# Patient Record
Sex: MALE | Race: BLACK OR AFRICAN AMERICAN | Employment: UNEMPLOYED | URBAN - METROPOLITAN AREA
[De-identification: names, ages, dates, MRNs, and addresses within clinical notes are randomized per-mention and may not be internally consistent; named-entity substitution may affect disease eponyms.]

---

## 2017-02-04 ENCOUNTER — HOSPITAL ENCOUNTER (EMERGENCY)
Age: 57
Discharge: HOME | End: 2017-02-04
Attending: EMERGENCY MEDICINE
Payer: COMMERCIAL

## 2017-02-04 ENCOUNTER — APPOINTMENT (OUTPATIENT)
Dept: CT IMAGING | Age: 57
End: 2017-02-04
Attending: EMERGENCY MEDICINE
Payer: COMMERCIAL

## 2017-02-04 VITALS
OXYGEN SATURATION: 98 % | HEART RATE: 70 BPM | SYSTOLIC BLOOD PRESSURE: 160 MMHG | RESPIRATION RATE: 16 BRPM | BODY MASS INDEX: 29.47 KG/M2 | TEMPERATURE: 98.1 F | DIASTOLIC BLOOD PRESSURE: 99 MMHG | WEIGHT: 199 LBS | HEIGHT: 69 IN

## 2017-02-04 DIAGNOSIS — G43.909 MIGRAINE WITHOUT STATUS MIGRAINOSUS, NOT INTRACTABLE, UNSPECIFIED MIGRAINE TYPE: Primary | ICD-10-CM

## 2017-02-04 PROCEDURE — 99282 EMERGENCY DEPT VISIT SF MDM: CPT

## 2017-02-04 PROCEDURE — 96374 THER/PROPH/DIAG INJ IV PUSH: CPT

## 2017-02-04 PROCEDURE — 74011250636 HC RX REV CODE- 250/636: Performed by: EMERGENCY MEDICINE

## 2017-02-04 PROCEDURE — 70450 CT HEAD/BRAIN W/O DYE: CPT

## 2017-02-04 RX ORDER — METOCLOPRAMIDE HYDROCHLORIDE 5 MG/ML
10 INJECTION INTRAMUSCULAR; INTRAVENOUS
Status: COMPLETED | OUTPATIENT
Start: 2017-02-04 | End: 2017-02-04

## 2017-02-04 RX ADMIN — METOCLOPRAMIDE 10 MG: 5 INJECTION, SOLUTION INTRAMUSCULAR; INTRAVENOUS at 11:40

## 2017-02-04 NOTE — ED TRIAGE NOTES
Pt presents to the ED with CC of headache onset this morning when awoken. Pt states occasional headaches. Pt reports taking 800 mg of ibuprofen this mornining at approximately 0730 hrs. Pt denies relief. Pt reports left eye pain with light. Pt denies N/V/D. Pt denies fever.

## 2017-02-04 NOTE — DISCHARGE INSTRUCTIONS
Migraine Headache: Care Instructions  Your Care Instructions  Migraines are painful, throbbing headaches that often start on one side of the head. They may cause nausea and vomiting and make you sensitive to light, sound, or smell. Without treatment, migraines can last from 4 hours to a few days. Medicines can help prevent migraines or stop them after they have started. Your doctor can help you find which ones work best for you. Follow-up care is a key part of your treatment and safety. Be sure to make and go to all appointments, and call your doctor if you are having problems. It's also a good idea to know your test results and keep a list of the medicines you take. How can you care for yourself at home? · Do not drive if you have taken a prescription pain medicine. · Rest in a quiet, dark room until your headache is gone. Close your eyes, and try to relax or go to sleep. Don't watch TV or read. · Put a cold, moist cloth or cold pack on the painful area for 10 to 20 minutes at a time. Put a thin cloth between the cold pack and your skin. · Use a warm, moist towel or a heating pad set on low to relax tight shoulder and neck muscles. · Have someone gently massage your neck and shoulders. · Take your medicines exactly as prescribed. Call your doctor if you think you are having a problem with your medicine. You will get more details on the specific medicines your doctor prescribes. · Be careful not to take pain medicine more often than the instructions allow. You could get worse or more frequent headaches when the medicine wears off. To prevent migraines  · Keep a headache diary so you can figure out what triggers your headaches. Avoiding triggers may help you prevent headaches. Record when each headache began, how long it lasted, and what the pain was like.  (Was it throbbing, aching, stabbing, or dull?) Write down any other symptoms you had with the headache, such as nausea, flashing lights or dark spots, or sensitivity to bright light or loud noise. Note if the headache occurred near your period. List anything that might have triggered the headache. Triggers may include certain foods (chocolate, cheese, wine) or odors, smoke, bright light, stress, or lack of sleep. · If your doctor has prescribed medicine for your migraines, take it as directed. You may have medicine that you take only when you get a migraine and medicine that you take all the time to help prevent migraines. ¨ If your doctor has prescribed medicine for when you get a headache, take it at the first sign of a migraine, unless your doctor has given you other instructions. ¨ If your doctor has prescribed medicine to prevent migraines, take it exactly as prescribed. Call your doctor if you think you are having a problem with your medicine. · Find healthy ways to deal with stress. Migraines are most common during or right after stressful times. Take time to relax before and after you do something that has caused a migraine in the past.  · Try to keep your muscles relaxed by keeping good posture. Check your jaw, face, neck, and shoulder muscles for tension. Try to relax them. When you sit at a desk, change positions often. And make sure to stretch for 30 seconds each hour. · Get plenty of sleep and exercise. · Eat meals on a regular schedule. Avoid foods and drinks that often trigger migraines. These include chocolate, alcohol (especially red wine and port), aspartame, monosodium glutamate (MSG), and some additives found in foods (such as hot dogs, du, cold cuts, aged cheeses, and pickled foods). · Limit caffeine. Don't drink too much coffee, tea, or soda. But don't quit caffeine suddenly. That can also give you migraines. · Do not smoke or allow others to smoke around you. If you need help quitting, talk to your doctor about stop-smoking programs and medicines. These can increase your chances of quitting for good.   · If you are taking birth control pills or hormone therapy, talk to your doctor about whether they are triggering your migraines. When should you call for help? Call 911 anytime you think you may need emergency care. For example, call if:  · You have signs of a stroke. These may include:  ¨ Sudden numbness, paralysis, or weakness in your face, arm, or leg, especially on only one side of your body. ¨ Sudden vision changes. ¨ Sudden trouble speaking. ¨ Sudden confusion or trouble understanding simple statements. ¨ Sudden problems with walking or balance. ¨ A sudden, severe headache that is different from past headaches. Call your doctor now or seek immediate medical care if:  · You have new or worse nausea and vomiting. · You have a new or higher fever. · Your headache gets much worse. Watch closely for changes in your health, and be sure to contact your doctor if:  · You are not getting better after 2 days (48 hours). Where can you learn more? Go to http://nam-leon.info/. Enter I403 in the search box to learn more about \"Migraine Headache: Care Instructions. \"  Current as of: February 19, 2016  Content Version: 11.1  © 5539-4832 [employer]. Care instructions adapted under license by [employer] (which disclaims liability or warranty for this information). If you have questions about a medical condition or this instruction, always ask your healthcare professional. [address] any warranty or liability for your use of this information.

## 2017-02-04 NOTE — ED PROVIDER NOTES
HPI Comments: 10:49 AM Amirah Pappas is a 60 y.o. male with a h/o HTN, DM, high cholesterol, CAD, and MI who presents to the ED with complaints of a constant \"sharp\" left sided headache that began at 0730 today. He denies any fever, chills, sweats, nausea, vomiting, diarrhea, cough, SOB, chest pain, weakness, dizziness, numbness, tingling, neck stiffness, neck pain, or any visual problems. He notes that he has been experiencing mild headaches the past few weeks, but \"nothing this bad\". He normally has some light sensitivity, but it is worse today with this headache. He took Ibuprofen this morning with no relief. He has no history of these symptoms. No further symptoms or complaints expressed at this time. Pt's PCP is Sulma Anders MD              The history is provided by the patient. Past Medical History:   Diagnosis Date    Alcohol use      Fri-Sun one fifth; Mon-Thur: Pint of liquor    CAD (coronary artery disease)     Cardiac angioplasty with stent 07/29/2009     2.5 x 13 Cypher stent to CX.  Cardiac cath 11/09/2011     RCA patent. LM patent. LAD patent. mD1 55%. CX patent. Prior stent patent. Edison 85% (2.5 x 15-mm Xience stent, resid 0%). LVEDP 12. EF 40-45%. High lateral hypk (RI distribution).       Cardiac inferior STEMI 2009    Current smoker      contemplating stopping    Diabetes ([facility] [address]      type 2-insulin dependent    GERD (gastroesophageal reflux disease)     HTN (hypertension)     Hyperlipidemia     Myocardial infarction [facility])        Past Surgical History:   Procedure Laterality Date    Hx coronary stent placement  07/29/2009    Hx heart catheterization  8/30/2011    Hx heart catheterization  11/09/2011    Hx wisdom teeth extraction       x4         Family History:   Problem Relation Age of Onset    Hypertension Mother     Heart Attack Mother     Diabetes Mother     Hypertension Father     Heart Attack Father     Diabetes Father     Diabetes Sister     Hypertension Sister     Diabetes Brother     Hypertension Brother     No Known Problems Maternal Grandmother     No Known Problems Maternal Grandfather     No Known Problems Paternal Grandmother     No Known Problems Paternal Grandfather     No Known Problems Brother     Heart Disease Other     Kidney Disease Other        Social History     Social History    Marital status: SINGLE     Spouse name: N/A    Number of children: N/A    Years of education: N/A     Occupational History    Not on file. Social History Main Topics    Smoking status: Current Every Day Smoker     Packs/day: 0.25     Years: 1.00     Types: Cigarettes    Smokeless tobacco: Never Used    Alcohol use 2.5 oz/week     5 Shots of liquor per week      Comment: occassionally    Drug use: Yes     Special: Prescription, OTC    Sexual activity: Not on file     Other Topics Concern    Not on file     Social History Narrative     at [employer]. ALLERGIES: Niacin    Review of Systems   Constitutional: Negative for chills and fever. Eyes: Positive for photophobia. Negative for visual disturbance. Respiratory: Negative for cough and shortness of breath. Cardiovascular: Negative for chest pain. Gastrointestinal: Negative for abdominal pain, diarrhea, nausea and vomiting. Musculoskeletal: Negative for neck pain and neck stiffness. Skin: Negative for rash. Neurological: Positive for headaches. Negative for dizziness, weakness and numbness. All other systems reviewed and are negative. Vitals:    02/04/17 1030   BP: (!) 160/99   Pulse: 70   Resp: 16   Temp: 98.1 °F (36.7 °C)   SpO2: 98%   Weight: 90.3 kg (199 lb)   Height: 5' 9\" (1.753 m)            Physical Exam   Constitutional: He is oriented to person, place, and time. He appears well-developed and well-nourished. No distress. HENT:   Head: Normocephalic.    Right Ear: Tympanic membrane, external ear and ear canal normal.   Left Ear: Tympanic membrane, external ear and ear canal normal.   Nose: Nose normal.   Mouth/Throat: Uvula is midline, oropharynx is clear and moist and mucous membranes are normal. No oropharyngeal exudate or posterior oropharyngeal erythema. Eyes: Conjunctivae and EOM are normal. Pupils are equal, round, and reactive to light. Neck: Neck supple. Cardiovascular: Normal rate, regular rhythm and normal heart sounds. Pulmonary/Chest: Effort normal and breath sounds normal.   Musculoskeletal: Normal range of motion. He exhibits no edema or tenderness. Lymphadenopathy:     He has no cervical adenopathy. Neurological: He is alert and oriented to person, place, and time. He has normal strength. No cranial nerve deficit or sensory deficit. Reflex Scores:       Patellar reflexes are 2+ on the right side and 2+ on the left side. No pronator drift bilaterally. Normal finger to nose bilaterally. Skin: Skin is warm and dry. He is not diaphoretic. Nursing note and vitals reviewed. MDM  Number of Diagnoses or Management Options  Diagnosis management comments: Pt with a left sided headache unrelieved with Ibuprofen. Pt states that he normally does not get headaches and that this one is the worst one of his entire life. No associated symptoms except for some photophobia that is worse than it normally is. Will treat with Reglan and obtain a head CT. Amount and/or Complexity of Data Reviewed  Tests in the radiology section of CPT®: ordered and reviewed      ED Course       Procedures           Medications ordered:   Medications   metoclopramide HCl (REGLAN) injection 10 mg (10 mg IntraVENous Given 2/4/17 1140)         Lab findings:  Labs Reviewed - No data to display      X-Ray, CT or other radiology findings or impressions:  CT HEAD WO CONT   IMPRESSION:     1.  No acute intracranial pathology Per Radiology           Progress notes, Consult notes or additional Procedure notes:     Reevaluation of patient:   I have reevaluated patient at 1315. Patient is feeling much better s/p Reglan. Still has a slight lingering of it. Pt last took Ibuprofen at 0730. Explained migraines and pt advised to take more Ibuprofen after he gets home to help with inflammation around the blood vessels and help prevent him from getting a rebound headache. Dispo:  Patient was discharged to home in stable condition. Patient is to return to emergency department with any new or worsening condition. Scribe Attestation:   I Bulah Aase, am scribing for and in the presence of Leslie Traylor MD on this day 02/04/17 at [address] [place] [address]    Provider Attestation:  Personally performed the services described in the documentation, reviewed the documentation, as recorded by the scribe in my presence, and it accurately and completely records my words and actions.   Leslie Traylor MD. 10:50 AM    Signed by: Bulah Aase, Scribe, 02/04/17 , 10:50 AM

## 2017-02-04 NOTE — ED NOTES
I have reviewed discharge instructions with the patient. The patient verbalized understanding. Current Discharge Medication List      CONTINUE these medications which have NOT CHANGED    Details   sitaGLIPtin-metFORMIN (JANUMET) 50-1,000 mg per tablet Take 1 Tab by mouth two (2) times daily (with meals). Qty: 60 Tab, Refills: 3    Associated Diagnoses: Uncontrolled type II diabetes mellitus ([facility] [address]      guaiFENesin-dextromethorphan (HUMIBID DM)  mg per tablet Take 1 Tab by mouth two (2) times a day. Qty: 30 Tab, Refills: 0    Associated Diagnoses: Acute bacterial rhinosinusitis      prasugrel (EFFIENT) 10 mg tablet Take 1 Tab by mouth daily. Qty: 30 Tab, Refills: 3      INSULIN ASPART (NOVOLOG SC) by SubCUTAneous route. insulin lispro protamine/insulin lispro (HUMALOG MIX 50/50) 100 unit/mL (50-50) injection 10 Units by SubCUTAneous route three (3) times daily (with meals). Inject  beneath the skin. Per pt 10 units three times a day (with meals)      gabapentin (NEURONTIN) 300 mg capsule Take 300 mg by mouth three (3) times daily. Indications: NEUROPATHIC PAIN      lisinopril (PRINIVIL, ZESTRIL) 10 mg tablet Take 1 Tab by mouth daily. Qty: 30 Tab, Refills: 6      aspirin 81 mg tablet Take 1 Tab by mouth daily. Qty: 1 Tab, Refills: 0      simvastatin (ZOCOR) 20 mg tablet Take 1 Tab by mouth nightly. Qty: 30 Tab, Refills: 11      nitroglycerin (NITROSTAT) 0.4 mg SL tablet 0.4 mg by SubLINGual route every five (5) minutes as needed. insulin glargine (LANTUS) 100 unit/mL injection 50 Units by SubCUTAneous route daily.          Patient armband removed and shredded

## 2017-02-22 ENCOUNTER — HOSPITAL ENCOUNTER (EMERGENCY)
Age: 57
Discharge: HOME | End: 2017-02-22
Attending: EMERGENCY MEDICINE
Payer: COMMERCIAL

## 2017-02-22 VITALS
WEIGHT: 199 LBS | BODY MASS INDEX: 29.47 KG/M2 | HEIGHT: 69 IN | HEART RATE: 68 BPM | RESPIRATION RATE: 16 BRPM | OXYGEN SATURATION: 96 % | SYSTOLIC BLOOD PRESSURE: 111 MMHG | TEMPERATURE: 98.5 F | DIASTOLIC BLOOD PRESSURE: 62 MMHG

## 2017-02-22 DIAGNOSIS — R51.9 NONINTRACTABLE HEADACHE, UNSPECIFIED CHRONICITY PATTERN, UNSPECIFIED HEADACHE TYPE: Primary | ICD-10-CM

## 2017-02-22 DIAGNOSIS — J01.90 ACUTE SINUSITIS, RECURRENCE NOT SPECIFIED, UNSPECIFIED LOCATION: ICD-10-CM

## 2017-02-22 PROCEDURE — 99282 EMERGENCY DEPT VISIT SF MDM: CPT

## 2017-02-22 PROCEDURE — 74011250636 HC RX REV CODE- 250/636: Performed by: EMERGENCY MEDICINE

## 2017-02-22 PROCEDURE — 96375 TX/PRO/DX INJ NEW DRUG ADDON: CPT

## 2017-02-22 PROCEDURE — 74011000258 HC RX REV CODE- 258: Performed by: EMERGENCY MEDICINE

## 2017-02-22 PROCEDURE — 96374 THER/PROPH/DIAG INJ IV PUSH: CPT

## 2017-02-22 RX ORDER — DIPHENHYDRAMINE HYDROCHLORIDE 50 MG/ML
25 INJECTION, SOLUTION INTRAMUSCULAR; INTRAVENOUS ONCE
Status: COMPLETED | OUTPATIENT
Start: 2017-02-22 | End: 2017-02-22

## 2017-02-22 RX ORDER — ONDANSETRON 4 MG/1
4 TABLET, ORALLY DISINTEGRATING ORAL
Qty: 14 TAB | Refills: 0 | Status: SHIPPED | OUTPATIENT
Start: 2017-02-22 | End: 2018-01-22

## 2017-02-22 RX ORDER — AMOXICILLIN 500 MG/1
500 TABLET, FILM COATED ORAL 3 TIMES DAILY
Qty: 21 TAB | Refills: 0 | Status: SHIPPED | OUTPATIENT
Start: 2017-02-22 | End: 2017-03-01

## 2017-02-22 RX ORDER — BUTALBITAL, ASPIRIN, CAFFEINE AND CODEINE PHOSPHATE 50; 325; 40; 30 MG/1; MG/1; MG/1; MG/1
1 CAPSULE ORAL
Qty: 20 CAP | Refills: 0 | Status: SHIPPED | OUTPATIENT
Start: 2017-02-22 | End: 2017-05-23

## 2017-02-22 RX ORDER — ONDANSETRON 2 MG/ML
4 INJECTION INTRAMUSCULAR; INTRAVENOUS
Status: COMPLETED | OUTPATIENT
Start: 2017-02-22 | End: 2017-02-22

## 2017-02-22 RX ORDER — HYDROMORPHONE HYDROCHLORIDE 1 MG/ML
1 INJECTION, SOLUTION INTRAMUSCULAR; INTRAVENOUS; SUBCUTANEOUS
Status: COMPLETED | OUTPATIENT
Start: 2017-02-22 | End: 2017-02-22

## 2017-02-22 RX ADMIN — DIPHENHYDRAMINE HYDROCHLORIDE 25 MG: 50 INJECTION INTRAMUSCULAR; INTRAVENOUS at 18:56

## 2017-02-22 RX ADMIN — SODIUM CHLORIDE 20 MG: 900 INJECTION, SOLUTION INTRAVENOUS at 18:56

## 2017-02-22 RX ADMIN — HYDROMORPHONE HYDROCHLORIDE 1 MG: 1 INJECTION, SOLUTION INTRAMUSCULAR; INTRAVENOUS; SUBCUTANEOUS at 19:54

## 2017-02-22 RX ADMIN — ONDANSETRON 4 MG: 2 INJECTION INTRAMUSCULAR; INTRAVENOUS at 19:54

## 2017-02-22 NOTE — ED PROVIDER NOTES
HPI Comments: 6:34 PM Sanchez Goff is a 60 y.o. male with a history of CAD, DM, and MI who presents to the ED c/o HA onset today. Pt states he was about to get off work when the HA started. No injury or trauma. Pt has taken Ibuprofen with mild relief. Pt is also c/o slight nausea, rhinorrhea and sinus flare up. Pt has h/o HA that usually go away on there own. No other concerns. PCP: Thien Pace MD        The history is provided by the patient. Past Medical History:   Diagnosis Date    Alcohol use     Fri-Sun one fifth; Mon-Thur: Pint of liquor    CAD (coronary artery disease)     Cardiac angioplasty with stent 07/29/2009    2.5 x 13 Cypher stent to CX.  Cardiac cath 11/09/2011    RCA patent. LM patent. LAD patent. mD1 55%. CX patent. Prior stent patent. Edison 85% (2.5 x 15-mm Xience stent, resid 0%). LVEDP 12. EF 40-45%. High lateral hypk (RI distribution).       Cardiac inferior STEMI 2009    Current smoker     contemplating stopping    Diabetes ([facility] [address]     type 2-insulin dependent    GERD (gastroesophageal reflux disease)     HTN (hypertension)     Hyperlipidemia     Migraine     Myocardial infarction [facility])        Past Surgical History:   Procedure Laterality Date    HX CORONARY STENT PLACEMENT  07/29/2009    HX HEART CATHETERIZATION  8/30/2011    HX HEART CATHETERIZATION  11/09/2011    HX WISDOM TEETH EXTRACTION      x4         Family History:   Problem Relation Age of Onset    Hypertension Mother     Heart Attack Mother     Diabetes Mother     Hypertension Father     Heart Attack Father     Diabetes Father     Diabetes Sister     Hypertension Sister     Diabetes Brother     Hypertension Brother     No Known Problems Maternal Grandmother     No Known Problems Maternal Grandfather     No Known Problems Paternal Grandmother     No Known Problems Paternal Grandfather     No Known Problems Brother     Heart Disease Other     Kidney Disease Other Social History     Social History    Marital status: SINGLE     Spouse name: N/A    Number of children: N/A    Years of education: N/A     Occupational History    Not on file. Social History Main Topics    Smoking status: Current Every Day Smoker     Packs/day: 0.25     Years: 1.00     Types: Cigarettes    Smokeless tobacco: Never Used    Alcohol use 2.5 oz/week     5 Shots of liquor per week      Comment: occassionally    Drug use: Yes     Special: Prescription, OTC    Sexual activity: Not on file     Other Topics Concern    Not on file     Social History Narrative     at [employer]. ALLERGIES: Niacin    Review of Systems   Constitutional: Negative for diaphoresis and fever. HENT: Positive for rhinorrhea and sinus pressure. Negative for ear pain and trouble swallowing. Eyes: Negative for visual disturbance. Respiratory: Negative for cough and shortness of breath. Cardiovascular: Negative for chest pain and leg swelling. Gastrointestinal: Positive for nausea. Negative for abdominal pain, blood in stool, diarrhea and vomiting. Genitourinary: Negative for difficulty urinating, flank pain and hematuria. Musculoskeletal: Negative for back pain and neck pain. Skin: Negative for rash. Neurological: Positive for headaches. Negative for dizziness, weakness and numbness. Hematological: Negative. Psychiatric/Behavioral: Negative. All other systems reviewed and are negative. Vitals:    02/22/17 2000 02/22/17 2008 02/22/17 2030 02/22/17 2100   BP: 113/64  111/62    Pulse:       Resp:       Temp:       SpO2:  96% 94% 96%   Weight:       Height:                Physical Exam   Constitutional: He is oriented to person, place, and time. He appears well-developed and well-nourished. HENT:   Head: Normocephalic and atraumatic.    Mouth/Throat: Oropharynx is clear and moist.   Clear nasal d/c    Eyes: Conjunctivae are normal. Pupils are equal, round, and reactive to light. No scleral icterus. Neck: Normal range of motion. Neck supple. No JVD present. No tracheal deviation present. Cardiovascular: Normal rate, regular rhythm and normal heart sounds. Pulmonary/Chest: Effort normal and breath sounds normal. No respiratory distress. He has no wheezes. Abdominal: Soft. Bowel sounds are normal.   Musculoskeletal: Normal range of motion. Neurological: He is alert and oriented to person, place, and time. He has normal strength. Gait normal. GCS eye subscore is 4. GCS verbal subscore is 5. GCS motor subscore is 6. In mild degree of pain    Skin: Skin is warm and dry. Psychiatric: He has a normal mood and affect. Nursing note and vitals reviewed. [facility]  ED Course       Procedures    Vitals:  No data found. 99%. Percentage is within normal limits. Progress Notes: Pt care transferred to Dr. Rosanna Curtis from Dr. Otto Sanders. Pt is unchanged, but resting. 8:46 PM Pt reevaluated at this time and is resting comfortably in NAD. Discussed results and findings, as well as, diagnosis and plan for discharge. No emc. No ind for repeat imaging, ct head noted in chart from earlier this month. Pt verbalizes understanding and agreement with plan. All questions addressed at this time. Disposition: DC    Diagnosis:   1. Nonintractable headache, unspecified chronicity pattern, unspecified headache type    2. Acute sinusitis, recurrence not specified, unspecified location        Follow-up Information     Follow up With Details Comments Contact Info    Caesar Pimentel MD Schedule an appointment as soon as possible for a visit in 2 days  [address] [id number] [phone]            Scribe Attestation:     Ada Khan, barbibing for and in the presence of Gerardo Mart MD February 24, 2017     Signed by:  Emiliano Chow, February 24, 2017 at 3:27 AM     Physician Attestation:   I personally performed the services described in this documentation, reviewed and edited the documentation which was dictated to the scribe in my presence, and it accurately records my words and actions. Maine Porter MD  February 24, 2017      Scribe Attestation:     Jacky Harvey, scribing for and in the presence of Allen Mcconnell MD February 24, 2017     Signed by: Emiliano Youngblood, February 24, 2017 at 3:27 AM     Physician Attestation:   I personally performed the services described in this documentation, reviewed and edited the documentation which was dictated to the scribe in my presence, and it accurately records my words and actions.  Allen Mcconnell MD  February 24, 2017

## 2017-02-23 NOTE — DISCHARGE INSTRUCTIONS
Return for pain, fever, shortness of breath, vomiting, decreased fluid intake, weakness, numbness, dizziness, or any change or concerns. Headache: Care Instructions  Your Care Instructions    Headaches have many possible causes. Most headaches aren't a sign of a more serious problem, and they will get better on their own. Home treatment may help you feel better faster. The doctor has checked you carefully, but problems can develop later. If you notice any problems or new symptoms, get medical treatment right away. Follow-up care is a key part of your treatment and safety. Be sure to make and go to all appointments, and call your doctor if you are having problems. It's also a good idea to know your test results and keep a list of the medicines you take. How can you care for yourself at home? · Do not drive if you have taken a prescription pain medicine. · Rest in a quiet, dark room until your headache is gone. Close your eyes and try to relax or go to sleep. Don't watch TV or read. · Put a cold, moist cloth or cold pack on the painful area for 10 to 20 minutes at a time. Put a thin cloth between the cold pack and your skin. · Use a warm, moist towel or a heating pad set on low to relax tight shoulder and neck muscles. · Have someone gently massage your neck and shoulders. · Take pain medicines exactly as directed. ¨ If the doctor gave you a prescription medicine for pain, take it as prescribed. ¨ If you are not taking a prescription pain medicine, ask your doctor if you can take an over-the-counter medicine. · Be careful not to take pain medicine more often than the instructions allow, because you may get worse or more frequent headaches when the medicine wears off. · Do not ignore new symptoms that occur with a headache, such as a fever, weakness or numbness, vision changes, or confusion. These may be signs of a more serious problem.   To prevent headaches  · Keep a headache diary so you can figure out what triggers your headaches. Avoiding triggers may help you prevent headaches. Record when each headache began, how long it lasted, and what the pain was like (throbbing, aching, stabbing, or dull). Write down any other symptoms you had with the headache, such as nausea, flashing lights or dark spots, or sensitivity to bright light or loud noise. Note if the headache occurred near your period. List anything that might have triggered the headache, such as certain foods (chocolate, cheese, wine) or odors, smoke, bright light, stress, or lack of sleep. · Find healthy ways to deal with stress. Headaches are most common during or right after stressful times. Take time to relax before and after you do something that has caused a headache in the past.  · Try to keep your muscles relaxed by keeping good posture. Check your jaw, face, neck, and shoulder muscles for tension, and try relaxing them. When sitting at a desk, change positions often, and stretch for 30 seconds each hour. · Get plenty of sleep and exercise. · Eat regularly and well. Long periods without food can trigger a headache. · Treat yourself to a massage. Some people find that regular massages are very helpful in relieving tension. · Limit caffeine by not drinking too much coffee, tea, or soda. But don't quit caffeine suddenly, because that can also give you headaches. · Reduce eyestrain from computers by blinking frequently and looking away from the computer screen every so often. Make sure you have proper eyewear and that your monitor is set up properly, about an arm's length away. · Seek help if you have depression or anxiety. Your headaches may be linked to these conditions. Treatment can both prevent headaches and help with symptoms of anxiety or depression. When should you call for help? Call 911 anytime you think you may need emergency care. For example, call if:  · You have signs of a stroke.  These may include:  ¨ Sudden numbness, paralysis, or weakness in your face, arm, or leg, especially on only one side of your body. ¨ Sudden vision changes. ¨ Sudden trouble speaking. ¨ Sudden confusion or trouble understanding simple statements. ¨ Sudden problems with walking or balance. ¨ A sudden, severe headache that is different from past headaches. Call your doctor now or seek immediate medical care if:  · You have a new or worse headache. · Your headache gets much worse. Where can you learn more? Go to http://nam-leon.info/. Enter M271 in the search box to learn more about \"Headache: Care Instructions. \"  Current as of: February 19, 2016  Content Version: 11.1  © 8652-4127 [employer]. Care instructions adapted under license by [employer] (which disclaims liability or warranty for this information). If you have questions about a medical condition or this instruction, always ask your healthcare professional. [place] [address] any warranty or liability for your use of this information.

## 2017-02-23 NOTE — ED NOTES
Patient given copy of dc instructions and script(s). Patient verbalized understanding of instructions and script (s). Patient given a current medication reconciliation form and verbalized understanding of their medications. Patient verbalized understanding of the importance of discussing medications with  his or her physician or clinic they will be following up with. Patient alert and oriented and in no acute distress. Patient discharged home ambulatory with self and family. Solis Lopes

## 2017-05-05 ENCOUNTER — APPOINTMENT (OUTPATIENT)
Dept: GENERAL RADIOLOGY | Age: 57
End: 2017-05-05
Attending: PHYSICIAN ASSISTANT
Payer: COMMERCIAL

## 2017-05-05 ENCOUNTER — HOSPITAL ENCOUNTER (EMERGENCY)
Age: 57
Discharge: LEFT BEFORE DISCHARGE | End: 2017-05-05
Attending: EMERGENCY MEDICINE
Payer: COMMERCIAL

## 2017-05-05 VITALS
SYSTOLIC BLOOD PRESSURE: 151 MMHG | BODY MASS INDEX: 29.47 KG/M2 | HEART RATE: 82 BPM | HEIGHT: 69 IN | OXYGEN SATURATION: 98 % | RESPIRATION RATE: 16 BRPM | WEIGHT: 199 LBS | DIASTOLIC BLOOD PRESSURE: 98 MMHG | TEMPERATURE: 98.7 F
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
## 2017-05-05 DIAGNOSIS — S40.012A CONTUSION OF LEFT SHOULDER, INITIAL ENCOUNTER: Primary | ICD-10-CM

## 2017-05-05 PROCEDURE — 73030 X-RAY EXAM OF SHOULDER: CPT

## 2017-05-05 PROCEDURE — 99281 EMR DPT VST MAYX REQ PHY/QHP: CPT

## 2017-05-05 RX ORDER — ACETAMINOPHEN 500 MG
1000 TABLET ORAL
Status: DISCONTINUED | OUTPATIENT
Start: 2017-05-05 | End: 2017-05-06 | Stop reason: HOSPADM

## 2017-05-05 RX ORDER — HYDROCODONE BITARTRATE AND ACETAMINOPHEN 5; 325 MG/1; MG/1
TABLET ORAL
Qty: 12 TAB | Refills: 0 | Status: SHIPPED | OUTPATIENT
Start: 2017-05-05 | End: 2018-01-22

## 2017-05-05 NOTE — ED TRIAGE NOTES
Pt presents to the ED with bilateral shoulder pain onset approximately x4 hours ago after sustaining a slip on some water down x3 stairs. Pt states fell \"mostly\" onto the left shoulder, patient reports right shoulder pain as well. Pt reports left shoulder pain increases with ROM.  Pt denies head injury or LOC

## 2017-05-06 NOTE — ED PROVIDER NOTES
[facility]ERGENCY DEPT      60 y.o. male with noted past medical history presents to the ED c/o left shoulder pain since 4 hours ago. Pt states he slipped on some water down 3 steps, landing on his left shoulder. Says he has a bit of pain to his right shoulder as well, but not much. He has not taken anything for pain. Did not hit his head, neck or back and does not have pain to these sites. Denies fever, chills, numbness, weakness, other injury, other symptoms at this time. Current Facility-Administered Medications   Medication Dose Route Frequency    acetaminophen (TYLENOL) tablet 1,000 mg  1,000 mg Oral NOW     Current Outpatient Prescriptions   Medication Sig    HYDROcodone-acetaminophen (NORCO) 5-325 mg per tablet Take 1-2 tablets PO every 4-6 hours as needed for pain control. If over the counter ibuprofen or acetaminophen was suggested, then only take the vicodin for pain not well controlled with the over the counter medication.  ondansetron (ZOFRAN ODT) 4 mg disintegrating tablet Take 1 Tab by mouth every eight (8) hours as needed for Nausea.  codeine-butalbital-aspirin-caffeine (FIORINAL-CODEINE #3) 76--40 mg per capsule Take 1 Cap by mouth every four (4) hours as needed for Pain for up to 90 days. Max Daily Amount: 6 Caps. Maximum 6 capsules daily    sitaGLIPtin-metFORMIN (JANUMET) 50-1,000 mg per tablet Take 1 Tab by mouth two (2) times daily (with meals).  guaiFENesin-dextromethorphan (HUMIBID DM)  mg per tablet Take 1 Tab by mouth two (2) times a day.  prasugrel (EFFIENT) 10 mg tablet Take 1 Tab by mouth daily.  INSULIN ASPART (NOVOLOG SC) by SubCUTAneous route.  insulin lispro protamine/insulin lispro (HUMALOG MIX 50/50) 100 unit/mL (50-50) injection 10 Units by SubCUTAneous route three (3) times daily (with meals). Inject  beneath the skin.  Per pt 10 units three times a day (with meals)    gabapentin (NEURONTIN) 300 mg capsule Take 300 mg by mouth three (3) times daily. Indications: NEUROPATHIC PAIN    lisinopril (PRINIVIL, ZESTRIL) 10 mg tablet Take 1 Tab by mouth daily.  aspirin 81 mg tablet Take 1 Tab by mouth daily.  simvastatin (ZOCOR) 20 mg tablet Take 1 Tab by mouth nightly.  nitroglycerin (NITROSTAT) 0.4 mg SL tablet 0.4 mg by SubLINGual route every five (5) minutes as needed.  insulin glargine (LANTUS) 100 unit/mL injection 50 Units by SubCUTAneous route daily. Past Medical History:   Diagnosis Date    Alcohol use     Fri-Sun one fifth; Mon-Thur: Pint of liquor    CAD (coronary artery disease)     Cardiac angioplasty with stent 07/29/2009    2.5 x 13 Cypher stent to CX.  Cardiac cath 11/09/2011    RCA patent. LM patent. LAD patent. mD1 55%. CX patent. Prior stent patent. Edison 85% (2.5 x 15-mm Xience stent, resid 0%). LVEDP 12. EF 40-45%. High lateral hypk (RI distribution).       Cardiac inferior STEMI 2009    Current smoker     contemplating stopping    Diabetes ([facility] [address]     type 2-insulin dependent    GERD (gastroesophageal reflux disease)     HTN (hypertension)     Hyperlipidemia     Migraine     Myocardial infarction [facility])        Past Surgical History:   Procedure Laterality Date    HX CORONARY STENT PLACEMENT  07/29/2009    HX HEART CATHETERIZATION  8/30/2011    HX HEART CATHETERIZATION  11/09/2011    HX WISDOM TEETH EXTRACTION      x4       Family History   Problem Relation Age of Onset    Hypertension Mother     Heart Attack Mother     Diabetes Mother     Hypertension Father     Heart Attack Father     Diabetes Father     Diabetes Sister     Hypertension Sister     Diabetes Brother     Hypertension Brother     No Known Problems Maternal Grandmother     No Known Problems Maternal Grandfather     No Known Problems Paternal Grandmother     No Known Problems Paternal Grandfather     No Known Problems Brother     Heart Disease Other     Kidney Disease Other        Social History     Social History    Marital status: SINGLE     Spouse name: N/A    Number of children: N/A    Years of education: N/A     Occupational History    Not on file. Social History Main Topics    Smoking status: Current Every Day Smoker     Packs/day: 0.25     Years: 1.00     Types: Cigarettes    Smokeless tobacco: Never Used    Alcohol use 2.5 oz/week     5 Shots of liquor per week      Comment: occassionally    Drug use: Yes     Special: Prescription, OTC    Sexual activity: Not on file     Other Topics Concern    Not on file     Social History Narrative     at [employer]. Allergies   Allergen Reactions    Niacin Itching       Patient's primary care provider (as noted in EPIC):  Fredo Sloan MD    REVIEW OF SYSTEMS:    Constitutional:  Negative for fever, diaphoresis. Musculoskeletal:  + left shoulder pain, minimal right shoulder pain. Negative for back pain. Skin:  Negative for pallor. Neurological:  Negative for weakness. All other systems negative as reviewed. Visit Vitals    BP (!) 151/98 (BP 1 Location: Right arm, BP Patient Position: Sitting)    Pulse 82    Temp 98.7 °F (37.1 °C)    Resp 16    Ht 5' 9\" (1.753 m)    Wt 90.3 kg (199 lb)    SpO2 98%    BMI 29.39 kg/m2       PHYSICAL EXAM:    CONSTITUTIONAL:  Alert, in no apparent distress;  well developed;  well nourished. HEAD:  Normocephalic, atraumatic. EYES:  EOMI. Non-icteric sclera. Normal conjunctiva. ENTM:  Mouth: mucous membranes moist.  NECK:  Supple, full ROM. RESPIRATORY:  Chest clear, equal breath sounds, good air movement. Without wheezes, rhonchi or rales. CARDIOVASCULAR:  Regular rate and rhythm. No murmurs, rubs, or gallops. UPPER EXT:  Normal inspection. Mildly limited ROM of left shoulder with TTP to posterior aspect. Right shoulder with full ROM, no TTP, minimal pain with full abduction. NVI distally bilaterally with 5/5 strength. LOWER EXT: normal gait.    MS: No TTP to CTLS Spine, no step off. NEURO:  Moves all four extremities, and grossly normal motor exam.  SKIN:  No rashes;  Normal for age. PSYCH:  Alert and normal affect. DIFFERENTIAL DIAGNOSES/ MEDICAL DECISION MAKING:  Contusion, sprain, dislocation, fracture, ligamentous tear/ disruption or a combination of the above. Radiologist and cardiologist interpretations if available at time of this note:  XR SHOULDER LT AP/LAT MIN 2 V   Final Result        Medication(s) ordered for patient during this emergency visit encounter:  Medications   acetaminophen (TYLENOL) tablet 1,000 mg (not administered)     Xray left shoulder: NAD    IMPRESSION AND MEDICAL DECISION MAKING:  Based upon the patients presentation with noted HPI and PE, along with the work up done in the emergency department, I believe that the patient is having noted left shoulder contusion. Will write for few Vicodin at home and have f/u with orthopedics if pain persists. Diagnosis:   1. Contusion of left shoulder, initial encounter    2. Fall, initial encounter      Disposition: Discharge    Follow-up Information     Follow up With Details Comments [address] [facility]. In 1 week  [facility] [address] [facility]. [address]    [address] [facility] EMERGENCY DEPT  If symptoms worsen [address] [facility]  [phone]          Patient's Medications   Start Taking    HYDROCODONE-ACETAMINOPHEN (NORCO) 5-325 MG PER TABLET    Take 1-2 tablets PO every 4-6 hours as needed for pain control. If over the counter ibuprofen or acetaminophen was suggested, then only take the vicodin for pain not well controlled with the over the counter medication. Continue Taking    ASPIRIN 81 MG TABLET    Take 1 Tab by mouth daily.     CODEINE-BUTALBITAL-ASPIRIN-CAFFEINE (FIORINAL-CODEINE #3) 60--40 MG PER CAPSULE    Take 1 Cap by mouth every four (4) hours as needed for Pain for up to 90 days. Max Daily Amount: 6 Caps. Maximum 6 capsules daily    GABAPENTIN (NEURONTIN) 300 MG CAPSULE    Take 300 mg by mouth three (3) times daily. Indications: NEUROPATHIC PAIN    GUAIFENESIN-DEXTROMETHORPHAN (HUMIBID DM)  MG PER TABLET    Take 1 Tab by mouth two (2) times a day. INSULIN ASPART (NOVOLOG SC)    by SubCUTAneous route. INSULIN GLARGINE (LANTUS) 100 UNIT/ML INJECTION    50 Units by SubCUTAneous route daily. INSULIN LISPRO PROTAMINE/INSULIN LISPRO (HUMALOG MIX 50/50) 100 UNIT/ML (50-50) INJECTION    10 Units by SubCUTAneous route three (3) times daily (with meals). Inject  beneath the skin. Per pt 10 units three times a day (with meals)    LISINOPRIL (PRINIVIL, ZESTRIL) 10 MG TABLET    Take 1 Tab by mouth daily. NITROGLYCERIN (NITROSTAT) 0.4 MG SL TABLET    0.4 mg by SubLINGual route every five (5) minutes as needed. ONDANSETRON (ZOFRAN ODT) 4 MG DISINTEGRATING TABLET    Take 1 Tab by mouth every eight (8) hours as needed for Nausea. PRASUGREL (EFFIENT) 10 MG TABLET    Take 1 Tab by mouth daily. SIMVASTATIN (ZOCOR) 20 MG TABLET    Take 1 Tab by mouth nightly. SITAGLIPTIN-METFORMIN (JANUMET) 50-1,000 MG PER TABLET    Take 1 Tab by mouth two (2) times daily (with meals).    These Medications have changed    No medications on file   Stop Taking    No medications on file     JESSICA Odonnell

## 2017-05-06 NOTE — ED NOTES
Patient called to say that he left because \" I wasn't seen fast enough. Other patients were seen before me and I was here before they were. So I left\". Patient was explained to how triage and care worked for different types of patients and that patient are not seen on a 'first come first serve basis'. Patient was instructed that his paperwork and prescriptions were ready if he wished to come pick them up and have the results from his xray read to him. Patient stated that he did not wish to come back in.

## 2018-01-22 ENCOUNTER — APPOINTMENT (OUTPATIENT)
Dept: GENERAL RADIOLOGY | Age: 58
End: 2018-01-22
Attending: PHYSICIAN ASSISTANT
Payer: SELF-PAY

## 2018-01-22 ENCOUNTER — HOSPITAL ENCOUNTER (EMERGENCY)
Age: 58
Discharge: HOME | End: 2018-01-22
Attending: EMERGENCY MEDICINE | Admitting: EMERGENCY MEDICINE
Payer: SELF-PAY

## 2018-01-22 VITALS
RESPIRATION RATE: 20 BRPM | HEART RATE: 67 BPM | BODY MASS INDEX: 29.62 KG/M2 | HEIGHT: 69 IN | DIASTOLIC BLOOD PRESSURE: 110 MMHG | TEMPERATURE: 98.9 F | OXYGEN SATURATION: 98 % | SYSTOLIC BLOOD PRESSURE: 175 MMHG | WEIGHT: 200 LBS
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
## 2018-01-22 DIAGNOSIS — S16.1XXA STRAIN OF NECK MUSCLE, INITIAL ENCOUNTER: Primary | ICD-10-CM

## 2018-01-22 DIAGNOSIS — S56.912A STRAIN OF LEFT FOREARM, INITIAL ENCOUNTER: ICD-10-CM

## 2018-01-22 DIAGNOSIS — S46.912A STRAIN OF LEFT SHOULDER, INITIAL ENCOUNTER: ICD-10-CM

## 2018-01-22 PROCEDURE — 72040 X-RAY EXAM NECK SPINE 2-3 VW: CPT

## 2018-01-22 PROCEDURE — 99283 EMERGENCY DEPT VISIT LOW MDM: CPT

## 2018-01-22 PROCEDURE — 73030 X-RAY EXAM OF SHOULDER: CPT

## 2018-01-22 PROCEDURE — 73090 X-RAY EXAM OF FOREARM: CPT

## 2018-01-22 PROCEDURE — 74011250637 HC RX REV CODE- 250/637: Performed by: PHYSICIAN ASSISTANT

## 2018-01-22 RX ORDER — CYCLOBENZAPRINE HCL 10 MG
10 TABLET ORAL
Qty: 12 TAB | Refills: 0 | Status: SHIPPED | OUTPATIENT
Start: 2018-01-22 | End: 2018-05-07

## 2018-01-22 RX ORDER — IBUPROFEN 400 MG/1
800 TABLET ORAL
Status: COMPLETED | OUTPATIENT
Start: 2018-01-22 | End: 2018-01-22

## 2018-01-22 RX ADMIN — IBUPROFEN 800 MG: 400 TABLET ORAL at 19:56

## 2018-01-23 NOTE — ED NOTES
Mahogany Bruno is a 59 y.o. male that was discharged in stable. Pt was accompanied by self. Pt is driving. The patients diagnosis, condition and treatment were explained to  patient and aftercare instructions were given. The patient verbalized understanding. Patient armband removed and shredded.
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
## 2018-01-29 ENCOUNTER — OFFICE VISIT (OUTPATIENT)
Dept: ORTHOPEDIC SURGERY | Facility: CLINIC | Age: 58
End: 2018-01-29

## 2018-01-29 VITALS
TEMPERATURE: 97.6 F | WEIGHT: 193 LBS | RESPIRATION RATE: 18 BRPM | DIASTOLIC BLOOD PRESSURE: 102 MMHG | SYSTOLIC BLOOD PRESSURE: 175 MMHG | OXYGEN SATURATION: 99 % | BODY MASS INDEX: 28.58 KG/M2 | HEIGHT: 69 IN | HEART RATE: 64 BPM

## 2018-01-29 DIAGNOSIS — S46.002A INJURY OF LEFT ROTATOR CUFF, INITIAL ENCOUNTER: Primary | ICD-10-CM

## 2018-01-29 NOTE — PATIENT INSTRUCTIONS
Rotator Cuff Problems: Care Instructions  Your Care Instructions  The rotator cuff is a group of tendons and muscles around the shoulder that keeps the shoulder joint stable and allows you to raise and rotate your arm. Over time, daily wear and exercise can cause the tendons to rub on the bones of your shoulder. This is called impingement. This condition may cause the tendons to bruise, degenerate, or tear. In many people, these problems do not cause pain. When they do cause pain, you can use rest, physical therapy, ice and heat, and anti-inflammatory medicine to reduce pain and swelling. If you still have pain after trying these treatments, you and your doctor can discuss having a steroid injection or surgery. Follow-up care is a key part of your treatment and safety. Be sure to make and go to all appointments, and call your doctor if you are having problems. It's also a good idea to know your test results and keep a list of the medicines you take. How can you care for yourself at home? · Be safe with medicines. Read and follow all instructions on the label. ¨ If the doctor gave you a prescription medicine for pain, take it as prescribed. ¨ If you are not taking a prescription pain medicine, ask your doctor if you can take an over-the-counter medicine. · Put ice or a cold pack on your shoulder for 10 to 20 minutes at a time. Try to do this every 1 to 2 hours for the next 3 days (when you are awake). Put a thin cloth between the ice pack and your skin. · After 3 days, put a warm, wet towel on your shoulder. This is to relax the muscles and increase blood flow. While holding the towel on your shoulder, lean forward so your arm hangs freely, and gently swing your arm back and forth like a pendulum. You also can do this standing under a warm shower. · Follow your doctor's advice for physical therapy. When your doctor says it is okay, try these stretching exercises. Do them slowly to avoid injury.  Put a warm, wet towel on your shoulder before exercising. Stop any exercise that increases pain. ¨ Range-of-motion exercises. If it is not too painful, stretch your arm in four directions: across the body, up the back, to the side, and overhead. ¨ Pendulum exercise. Lean forward and hold onto a table or the back of a chair with your good arm. Bend at the waist, letting the arm with the sore shoulder hang straight down. Swing your arm back and forth like a pendulum, then in circles that start small and slowly grow larger. This exercise does not use the arm muscles. Instead, use your legs and your hips to create movement that makes your arm swing freely. Try this for about 5 minutes, several times a day. ¨ Wall climbing (to the side). Stand with your side to a wall so that your fingers can just touch it. Then turn so your body is turned slightly toward the wall. Walk the fingers of your injured arm up the wall as high as pain permits. Try not to shrug your shoulder up toward your ear as you move your arm up. Hold that position for a count of 15 to 30 seconds. Walk your fingers down to the starting position. Repeat 2 to 4 times, trying to reach higher each time. ¨ Wall climbing (to the front). Face a wall, standing so your fingers can just touch it. Walk the fingers of your affected arm up the wall as high as pain permits. Try not to shrug your shoulder up toward your ear as you move your arm up. Hold that position for a count of 15 to 30 seconds. Slowly walk your fingers to the starting position. Repeat 2 to 4 times, trying to reach higher each time. · Rest your shoulder when you are not doing stretches and other exercises. Your doctor may tell you to wait for the pain to go away before doing exercises. Do not lift heavy bags of groceries, play sports, or do anything else that makes you twist or stress your shoulder. Avoid activities where you move your affected arm above your head.   When should you call for help?  Call your doctor now or seek immediate medical care if:  ? · You have severe pain. ? · You cannot move your shoulder or arm. ? · You have tingling or numbness in your arm or hand. ? · Your arm or hand is cool or pale. ? Watch closely for changes in your health, and be sure to contact your doctor if:  ? · Your pain gets worse. ? · You have new or worse swelling in your arm or hand. ? · You do not get better as expected. Where can you learn more? Go to http://nam-leon.info/. Enter E207 in the search box to learn more about \"Rotator Cuff Problems: Care Instructions. \"  Current as of: March 21, 2017  Content Version: 11.4  © 3620-1360 [employer]. Care instructions adapted under license by [employer] (which disclaims liability or warranty for this information). If you have questions about a medical condition or this instruction, always ask your healthcare professional. [place] [address] any warranty or liability for your use of this information.
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
## 2018-02-07 ENCOUNTER — HOSPITAL ENCOUNTER (OUTPATIENT)
Dept: MRI IMAGING | Age: 58
Discharge: HOME | End: 2018-02-07
Attending: ORTHOPAEDIC SURGERY
Payer: SELF-PAY

## 2018-02-07 DIAGNOSIS — S46.002A INJURY OF LEFT ROTATOR CUFF, INITIAL ENCOUNTER: ICD-10-CM

## 2018-02-07 PROCEDURE — 73221 MRI JOINT UPR EXTREM W/O DYE: CPT

## 2018-03-27 ENCOUNTER — OFFICE VISIT (OUTPATIENT)
Dept: FAMILY MEDICINE CLINIC | Age: 58
End: 2018-03-27

## 2018-03-27 ENCOUNTER — HOSPITAL ENCOUNTER (OUTPATIENT)
Dept: LAB | Age: 58
Discharge: HOME | End: 2018-03-27
Payer: COMMERCIAL

## 2018-03-27 VITALS
TEMPERATURE: 97.9 F | SYSTOLIC BLOOD PRESSURE: 140 MMHG | OXYGEN SATURATION: 99 % | WEIGHT: 195 LBS | HEART RATE: 64 BPM | HEIGHT: 69 IN | DIASTOLIC BLOOD PRESSURE: 81 MMHG | BODY MASS INDEX: 28.88 KG/M2 | RESPIRATION RATE: 17 BRPM

## 2018-03-27 DIAGNOSIS — E11.9 DIABETES MELLITUS TYPE 2, INSULIN DEPENDENT (HCC): ICD-10-CM

## 2018-03-27 DIAGNOSIS — E78.2 MIXED HYPERLIPIDEMIA: ICD-10-CM

## 2018-03-27 DIAGNOSIS — I10 ESSENTIAL HYPERTENSION: ICD-10-CM

## 2018-03-27 DIAGNOSIS — M25.512 LEFT SHOULDER PAIN, UNSPECIFIED CHRONICITY: ICD-10-CM

## 2018-03-27 DIAGNOSIS — Z29.9 PREVENTIVE MEASURE: ICD-10-CM

## 2018-03-27 DIAGNOSIS — Z79.4 DIABETES MELLITUS TYPE 2, INSULIN DEPENDENT (HCC): ICD-10-CM

## 2018-03-27 DIAGNOSIS — E11.49 OTHER DIABETIC NEUROLOGICAL COMPLICATION ASSOCIATED WITH TYPE 2 DIABETES MELLITUS (HCC): Primary | ICD-10-CM

## 2018-03-27 PROBLEM — E11.40 DIABETIC NEUROPATHY (HCC): Status: ACTIVE | Noted: 2018-03-27

## 2018-03-27 LAB
ALBUMIN SERPL-MCNC: 3.9 G/DL (ref 3.4–5)
ALBUMIN/GLOB SERPL: 1.2 {RATIO} (ref 0.8–1.7)
ALP SERPL-CCNC: 81 U/L (ref 45–117)
ALT SERPL-CCNC: 23 U/L (ref 16–61)
ANION GAP SERPL CALC-SCNC: 5 MMOL/L (ref 3–18)
AST SERPL-CCNC: 17 U/L (ref 15–37)
BASOPHILS # BLD: 0 K/UL (ref 0–0.06)
BASOPHILS NFR BLD: 1 % (ref 0–2)
BILIRUB SERPL-MCNC: 0.3 MG/DL (ref 0.2–1)
BUN SERPL-MCNC: 17 MG/DL (ref 7–18)
BUN/CREAT SERPL: 18 (ref 12–20)
CALCIUM SERPL-MCNC: 9.1 MG/DL (ref 8.5–10.1)
CHLORIDE SERPL-SCNC: 105 MMOL/L (ref 100–108)
CO2 SERPL-SCNC: 28 MMOL/L (ref 21–32)
CREAT SERPL-MCNC: 0.93 MG/DL (ref 0.6–1.3)
DIFFERENTIAL METHOD BLD: ABNORMAL
EOSINOPHIL # BLD: 0.1 K/UL (ref 0–0.4)
EOSINOPHIL NFR BLD: 4 % (ref 0–5)
ERYTHROCYTE [DISTWIDTH] IN BLOOD BY AUTOMATED COUNT: 14 % (ref 11.6–14.5)
ERYTHROCYTE [SEDIMENTATION RATE] IN BLOOD: 5 MM/HR (ref 0–20)
EST. AVERAGE GLUCOSE BLD GHB EST-MCNC: 197 MG/DL
GLOBULIN SER CALC-MCNC: 3.3 G/DL (ref 2–4)
GLUCOSE SERPL-MCNC: 151 MG/DL (ref 74–99)
HBA1C MFR BLD: 8.5 % (ref 4.2–5.6)
HCT VFR BLD AUTO: 44.9 % (ref 36–48)
HGB BLD-MCNC: 14.9 G/DL (ref 13–16)
LYMPHOCYTES # BLD: 1.2 K/UL (ref 0.9–3.6)
LYMPHOCYTES NFR BLD: 40 % (ref 21–52)
MCH RBC QN AUTO: 29.9 PG (ref 24–34)
MCHC RBC AUTO-ENTMCNC: 33.2 G/DL (ref 31–37)
MCV RBC AUTO: 90 FL (ref 74–97)
MONOCYTES # BLD: 0.3 K/UL (ref 0.05–1.2)
MONOCYTES NFR BLD: 10 % (ref 3–10)
NEUTS SEG # BLD: 1.3 K/UL (ref 1.8–8)
NEUTS SEG NFR BLD: 45 % (ref 40–73)
PLATELET # BLD AUTO: 185 K/UL (ref 135–420)
PMV BLD AUTO: 13 FL (ref 9.2–11.8)
POTASSIUM SERPL-SCNC: 4.4 MMOL/L (ref 3.5–5.5)
PROT SERPL-MCNC: 7.2 G/DL (ref 6.4–8.2)
RBC # BLD AUTO: 4.99 M/UL (ref 4.7–5.5)
SODIUM SERPL-SCNC: 138 MMOL/L (ref 136–145)
TSH SERPL DL<=0.05 MIU/L-ACNC: 1.06 UIU/ML (ref 0.36–3.74)
WBC # BLD AUTO: 3 K/UL (ref 4.6–13.2)

## 2018-03-27 PROCEDURE — 80053 COMPREHEN METABOLIC PANEL: CPT | Performed by: INTERNAL MEDICINE

## 2018-03-27 PROCEDURE — 82306 VITAMIN D 25 HYDROXY: CPT | Performed by: INTERNAL MEDICINE

## 2018-03-27 PROCEDURE — 83036 HEMOGLOBIN GLYCOSYLATED A1C: CPT | Performed by: INTERNAL MEDICINE

## 2018-03-27 PROCEDURE — 87389 HIV-1 AG W/HIV-1&-2 AB AG IA: CPT | Performed by: INTERNAL MEDICINE

## 2018-03-27 PROCEDURE — 36415 COLL VENOUS BLD VENIPUNCTURE: CPT | Performed by: INTERNAL MEDICINE

## 2018-03-27 PROCEDURE — 85025 COMPLETE CBC W/AUTO DIFF WBC: CPT | Performed by: INTERNAL MEDICINE

## 2018-03-27 PROCEDURE — 85652 RBC SED RATE AUTOMATED: CPT | Performed by: INTERNAL MEDICINE

## 2018-03-27 PROCEDURE — 84439 ASSAY OF FREE THYROXINE: CPT | Performed by: INTERNAL MEDICINE

## 2018-03-27 PROCEDURE — 84443 ASSAY THYROID STIM HORMONE: CPT | Performed by: INTERNAL MEDICINE

## 2018-03-27 RX ORDER — GABAPENTIN 800 MG/1
800 TABLET ORAL 2 TIMES DAILY
Qty: 60 TAB | Refills: 0 | Status: SHIPPED | OUTPATIENT
Start: 2018-03-27 | End: 2018-04-20 | Stop reason: SDUPTHER

## 2018-03-27 NOTE — MR AVS SNAPSHOT
[address] [place] [address] 
 
 
 [address] [place] [address] 
[phone] Patient: Dulce Rivero MRN: [id number] Visit Information Date & Time Provider Department Dept. Phone Encounter #  
 3/27/2018 10:15 AM Hiral Chester 77 [id number] Follow-up Instructions Return in about 2 weeks (around 4/9/2018) for Prefers late day appt. Upcoming Health Maintenance Date Due Hepatitis C Screening 1960 FOOT EXAM Q1 6/2/1968 EYE EXAM RETINAL OR DILATED Q1 6/2/1968 Pneumococcal 19-64 Medium Risk (1 of 1 - PPSV23) 6/2/1977 DTaP/Tdap/Td series (1 - Tdap) 6/2/1979 FOBT Q 1 YEAR AGE 50-75 6/2/2008 HEMOGLOBIN A1C Q6M 7/13/2016 MICROALBUMIN Q1 1/13/2017 LIPID PANEL Q1 1/13/2017 Influenza Age 5 to Adult 8/1/2017 Allergies as of 3/27/2018  Review Complete On: 4/34/1386 By: Agapito Alvarenga. Teo Escobar LPN Severity Noted Reaction Type Reactions Niacin  12/09/2011   Systemic Itching Current Immunizations  Never Reviewed No immunizations on file. Not reviewed this visit You Were Diagnosed With   
  
 Codes Comments Diabetes mellitus type 2, insulin dependent (HCC)    -  Primary ICD-10-CM: E11.9, Z79.4 ICD-9-CM: 250.00, V58.67 Left shoulder pain, unspecified chronicity     ICD-10-CM: M25.512 ICD-9-CM: 719.41 Mixed hyperlipidemia     ICD-10-CM: E78.2 ICD-9-CM: 272.2 Essential hypertension     ICD-10-CM: I10 
ICD-9-CM: 401.9 Preventive measure     ICD-10-CM: Z29.9 ICD-9-CM: V07.9 Other diabetic neurological complication associated with type 2 diabetes mellitus ([facility]     ICD-10-CM: E11.49 
ICD-9-CM: 250.60 Vitals BP Pulse Temp Resp Height(growth percentile) Weight(growth percentile) 140/81 (BP 1 Location: Right arm, BP Patient Position: Sitting) 64 97.9 °F (36.6 °C) (Oral) 17 5' 9\" (1.753 m) 195 lb (88.5 kg) SpO2 BMI Smoking Status 99% 28.8 kg/m2 Current Every Day Smoker BMI and BSA Data Body Mass Index Body Surface Area  
 28.8 kg/m 2 2.08 m 2 Preferred Pharmacy Pharmacy Name Phone [facility],[address] [facility] [phone] Your Updated Medication List  
  
   
This list is accurate as of 3/27/18 11:29 AM.  Always use your most recent med list.  
  
  
  
  
 aspirin 81 mg tablet Take 1 Tab by mouth daily. cyclobenzaprine 10 mg tablet Commonly known as:  FLEXERIL Take 1 Tab by mouth three (3) times daily as needed for Muscle Spasm(s). gabapentin 800 mg tablet Commonly known as:  NEURONTIN Take 1 Tab by mouth two (2) times a day. insulin lispro protamine/insulin lispro 100 unit/mL (50-50) injection Commonly known as:  HUMALOG MIX 50/50  
10 Units by SubCUTAneous route three (3) times daily (with meals). Inject  beneath the skin. Per pt 10 units three times a day (with meals) LANTUS U-100 INSULIN 100 unit/mL injection Generic drug:  insulin glargine 50 Units by SubCUTAneous route daily. lisinopril 10 mg tablet Commonly known as:  Aria Likens Take 1 Tab by mouth daily. NOVOLOG SC  
by SubCUTAneous route. simvastatin 20 mg tablet Commonly known as:  ZOCOR Take 1 Tab by mouth nightly. SITagliptin-metFORMIN 50-1,000 mg per tablet Commonly known as:  Zelpha Sciara Take 1 Tab by mouth two (2) times daily (with meals). Prescriptions Sent to Pharmacy Refills  
 gabapentin (NEURONTIN) 800 mg tablet 0 Sig: Take 1 Tab by mouth two (2) times a day. Class: Normal  
 Pharmacy: [facility] [address] [place] [address] #: [phone] Route: Oral  
  
We Performed the Following REFERRAL TO ORTHOPEDIC SURGERY [REF62 Custom] Comments:  
 Please evaluate patient for left shoulder pain and MRI follow up. Follow-up Instructions Return in about 2 weeks (around 4/9/2018) for Prefers late day appt. To-Do List   
 03/27/2018 Lab:  HEMOGLOBIN A1C WITH EAG   
  
 03/27/2018 Lab:  HIV 1/2 AG/AB, 4TH GENERATION,W RFLX CONFIRM   
  
 03/27/2018 Lab:  VITAMIN D, 25 HYDROXY Around 06/25/2018 Lab:  CBC WITH AUTOMATED DIFF Around 06/25/2018 Lab:  METABOLIC PANEL, COMPREHENSIVE Around 06/25/2018 Lab:  SED RATE (ESR) Around 06/25/2018 Lab:  T4, FREE Around 06/25/2018 Lab:  TSH 3RD GENERATION Referral Information Referral ID Referred By Referred To  
  
 [id number] YUKI Bueno Aas, MD   
   [address] [facility] [address] [place], [address] Phone: [phone] Fax: [phone] Visits Status Start Date End Date 1 New Request 3/27/18 3/27/19 If your referral has a status of pending review or denied, additional information will be sent to support the outcome of this decision. Patient Instructions Please contact our office if you have any questions about your visit today. 1.) Complete patient assistance form and leave it up front. 2.) Call Dr. Michael Mcbride about the MRI and shoulder. 3.) Return in early April. Please provide this summary of care documentation to your next provider. Your primary care clinician is listed as John Gastelum. If you have any questions after today's visit, please call [phone].

## 2018-03-27 NOTE — PROGRESS NOTES
Chief Complaint   Patient presents with    New Patient     1. Have you been to the ER, urgent care clinic since your last visit? Hospitalized since your last visit? No    2. Have you seen or consulted any other health care providers outside of the [address] [place] [address] since your last visit? Include any pap smears or colon screening.  No

## 2018-03-27 NOTE — PROGRESS NOTES
History and Physical    Patient: Adrianna Bence MRN: [id number]  SSN: [id number]    YOB: 1960  Age: 59 y.o. Sex: male      Subjective:      Adrianna Bence is a 59 y.o. male who is here to establish care. The patient previously saw Dr. Kristian Aranda. The patient also had been seeing orthopedics as well as cardiology. The patient mentions that he had stents placed 8 years ago. The patient mentions that he has been out of his BP meds and diabetic meds refilled. He mentions that he takes the gabapentin over three times a day. He mentions that e was previously on 300 mg but it wasn't sufficient. He states that he continues to smoke but is interested in quitting. He mentions that he would like to get back in contact with his cardiologist.   The patient mentions that he is on prescription assistance      Lantus through [employer] and [place] through [place]. Past Medical History:   Diagnosis Date    Alcohol use     Fri-Sun one fifth; Mon-Thur: Pint of liquor    CAD (coronary artery disease)     Cardiac angioplasty with stent 07/29/2009    2.5 x 13 Cypher stent to CX.  Cardiac cath 11/09/2011    RCA patent. LM patent. LAD patent. mD1 55%. CX patent. Prior stent patent. Edison 85% (2.5 x 15-mm Xience stent, resid 0%). LVEDP 12. EF 40-45%. High lateral hypk (RI distribution).       Cardiac inferior STEMI 2009    Current smoker     contemplating stopping    Diabetes ([address]     type 2-insulin dependent    GERD (gastroesophageal reflux disease)     HTN (hypertension)     Hyperlipidemia     Migraine     Myocardial infarction      Past Surgical History:   Procedure Laterality Date    HX CORONARY STENT PLACEMENT  07/29/2009    HX HEART CATHETERIZATION  8/30/2011    HX HEART CATHETERIZATION  11/09/2011    HX WISDOM TEETH EXTRACTION      x4      Family History   Problem Relation Age of Onset    Hypertension Mother     Heart Attack Mother     Diabetes Mother     Hypertension Father    [facility] Heart Attack Father     Diabetes Father     Diabetes Sister     Hypertension Sister     Stroke Sister     Diabetes Brother     Hypertension Brother     Stroke Brother     No Known Problems Maternal Grandmother     No Known Problems Maternal Grandfather     No Known Problems Paternal Grandmother     No Known Problems Paternal Grandfather     No Known Problems Brother     Heart Disease Other     Kidney Disease Other      Social History   Substance Use Topics    Smoking status: Current Every Day Smoker     Packs/day: 0.50     Years: 1.00     Types: Cigarettes    Smokeless tobacco: Never Used    Alcohol use 2.5 oz/week     5 Shots of liquor per week      Comment: occassionally      Prior to Admission medications    Medication Sig Start Date End Date Taking? Authorizing Provider   cyclobenzaprine (FLEXERIL) 10 mg tablet Take 1 Tab by mouth three (3) times daily as needed for Muscle Spasm(s). 1/22/18   Jonel Garber PA-C   sitaGLIPtin-metFORMIN (JANUMET) 50-1,000 mg per tablet Take 1 Tab by mouth two (2) times daily (with meals). 3/21/16   Diana Lacey NP   INSULIN ASPART (NOVOLOG SC) by SubCUTAneous route. Historical Provider   insulin lispro protamine/insulin lispro (HUMALOG MIX 50/50) 100 unit/mL (50-50) injection 10 Units by SubCUTAneous route three (3) times daily (with meals). Inject  beneath the skin. Per pt 10 units three times a day (with meals)    Historical Provider   gabapentin (NEURONTIN) 300 mg capsule Take 300 mg by mouth three (3) times daily. Indications: NEUROPATHIC PAIN    Historical Provider   lisinopril (PRINIVIL, ZESTRIL) 10 mg tablet Take 1 Tab by mouth daily. 5/29/15   Saurav Betts MD   aspirin 81 mg tablet Take 1 Tab by mouth daily. 11/16/12   Saurav Betts MD   simvastatin (ZOCOR) 20 mg tablet Take 1 Tab by mouth nightly. 11/16/12   Saurav Betts MD   insulin glargine (LANTUS) 100 unit/mL injection 50 Units by SubCUTAneous route daily.     Historical Provider        Allergies Allergen Reactions    Niacin Itching       Review of Systems:  Pertinent items are noted in the History of Present Illness. Objective:     Vitals:    03/27/18 1040   BP: 140/81   Pulse: 64   Resp: 17   Temp: 97.9 °F (36.6 °C)   TempSrc: Oral   SpO2: 99%   Weight: 195 lb (88.5 kg)   Height: 5' 9\" (1.753 m)        Physical Exam:  GENERAL: alert, cooperative, no distress, appears stated age  EYE: conjunctivae/corneas clear. PERRL, EOM's intact. Fundi benign  LYMPHATIC: Cervical, supraclavicular, and axillary nodes normal.   THROAT & NECK: normal and no erythema or exudates noted. LUNG: clear to auscultation bilaterally  HEART: regular rate and rhythm, S1, S2 normal, no murmur, click, rub or gallop  ABDOMEN: soft, non-tender. Bowel sounds normal. No masses,  no organomegaly  EXTREMITIES:  extremities normal, atraumatic, no cyanosis or edema  SKIN: Normal.  NEUROLOGIC: negative  PSYCHIATRIC: non focal      Labs:   Lab Results   Component Value Date/Time    WBC 4.3 07/06/2015 03:45 PM    WBC 4.3 07/06/2015 03:45 PM    HGB 12.5 (L) 07/06/2015 03:45 PM    HGB 12.5 (L) 07/06/2015 03:45 PM    HCT 37.5 (L) 07/06/2015 03:45 PM    HCT 37.5 (L) 07/06/2015 03:45 PM    PLATELET 204 37/80/2426 03:45 PM    MCV 89 07/06/2015 03:45 PM    MCV 89 07/06/2015 03:45 PM     Lab Results   Component Value Date/Time    Sodium 136 01/13/2016 05:14 PM    Potassium 4.1 01/13/2016 05:14 PM    Chloride 100 01/13/2016 05:14 PM    CO2 28 01/13/2016 05:14 PM    Anion gap 8 01/13/2016 05:14 PM    Glucose 206 (H) 01/13/2016 05:14 PM    BUN 14 01/13/2016 05:14 PM    Creatinine 1.09 01/13/2016 05:14 PM    BUN/Creatinine ratio 13 01/13/2016 05:14 PM    GFR est AA >60 01/13/2016 05:14 PM    GFR est non-AA >60 01/13/2016 05:14 PM    Calcium 8.9 01/13/2016 05:14 PM    AST (SGOT) 24 01/13/2016 05:14 PM    Alk.  phosphatase 95 01/13/2016 05:14 PM    Protein, total 7.4 01/13/2016 05:14 PM    Albumin 3.9 01/13/2016 05:14 PM    Globulin 3.5 01/13/2016 05:14 PM    A-G Ratio 1.1 01/13/2016 05:14 PM    ALT (SGPT) 46 01/13/2016 05:14 PM     Lab Results   Component Value Date/Time    TSH 1.66 06/19/2015 04:12 PM     Lab Results   Component Value Date/Time    Hemoglobin A1c 11.4 (H) 01/13/2016 05:26 PM    Hemoglobin A1c 10.6 (H) 06/19/2015 04:12 PM    Hemoglobin A1c 10.8 (H) 11/09/2011 04:20 PM    Hemoglobin A1c, External 7.8 01/28/2015 11:15 PM     Lab Results   Component Value Date/Time    Cholesterol, total 213 (H) 01/13/2016 05:14 PM    Cholesterol, total 157 06/19/2015 04:12 PM    Cholesterol, total 152 11/10/2011 04:10 AM    Cholesterol, total 152 08/29/2011 05:15 AM    Cholesterol, total 248 (H) 08/10/2010 03:45 PM    HDL Cholesterol 47 01/13/2016 05:14 PM    HDL Cholesterol 38 (L) 06/19/2015 04:12 PM    HDL Cholesterol 29 (L) 11/10/2011 04:10 AM    HDL Cholesterol 27 (L) 08/29/2011 05:15 AM    HDL Cholesterol 28 (L) 08/10/2010 03:45 PM    LDL, calculated 120.8 (H) 01/13/2016 05:14 PM    LDL, calculated 95 06/19/2015 04:12 PM    LDL, calculated 63.8 11/10/2011 04:10 AM    LDL, calculated  08/29/2011 05:15 AM     LDL AND VLDL CHOLESTEROL NOT CALCULATED WHEN TRIGLYCERIDES >400 MG/DL OR HDL CHOLESTEROL <20 MG/DL    LDL, calculated  08/10/2010 03:45 PM     LDL AND VLDL CHOLESTEROL NOT CALCULATED WHEN TRIGLYCERIDES >400 MG/DL OR HDL CHOLESTEROL <20 MG/DL    Triglyceride 226 (H) 01/13/2016 05:14 PM    Triglyceride 117 06/19/2015 04:12 PM    Triglyceride 296 (H) 11/10/2011 04:10 AM    Triglyceride 556 (H) 08/29/2011 05:15 AM    Triglyceride 1116 (H) 08/10/2010 03:45 PM    CHOL/HDL Ratio 4.5 01/13/2016 05:14 PM    CHOL/HDL Ratio 5.2 (H) 11/10/2011 04:10 AM    CHOL/HDL Ratio 5.6 (H) 08/29/2011 05:15 AM    CHOL/HDL Ratio 8.9 (H) 08/10/2010 03:45 PM    CHOL/HDL Ratio 5.7 (H) 12/09/2009 09:30 AM         Assessment:     1.) Diabetic Neuropathy: Patient's gabapentin ordered but dose was increased to 800 mg twice a day.      2.) Insulin Dependent Diabetes Mellitus Type 2: Patient will complete Patient Assistance paperwork to get his Lantus filled. 3.) Left Shoulder Pain: Patient referred to Orthopedic Surgeon, Dr. Joan Winn. He has seen him previously and will get his MRI reviewed by them. 4.) Essential Hypertension: Patient will contact us regarding refills on his BP meds and which appropriate agent he needs to be placed on.     5.) Hyperlipidemia: Patient ate earlier today. I would like to check this on future visits. 6.) Preventive: Other screening labs ordered as noted below. Patient will return in 2 weeks for follow up.      Plan:     Orders Placed This Encounter    METABOLIC PANEL, COMPREHENSIVE    CBC WITH AUTOMATED DIFF    SED RATE (ESR)    TSH 3RD GENERATION    T4, FREE    HEMOGLOBIN A1C WITH EAG    VITAMIN D, 25 HYDROXY    HIV 1/2 AG/AB, 4TH GENERATION,W RFLX CONFIRM    REFERRAL TO ORTHOPEDIC SURGERY    gabapentin (NEURONTIN) 800 mg tablet                 Signed By: [id number] Cristino Rivas DO     March 27, 2018

## 2018-03-27 NOTE — PATIENT INSTRUCTIONS
Please contact our office if you have any questions about your visit today. 1.) Complete patient assistance form and leave it up front. 2.) Call Dr. Omar Giron about the MRI and shoulder. 3.) Return in early April.

## 2018-03-28 LAB
25(OH)D3 SERPL-MCNC: 13.5 NG/ML (ref 30–100)
HIV 1+2 AB+HIV1 P24 AG SERPL QL IA: NONREACTIVE
HIV12 RESULT COMMENT, HHIVC: NORMAL
T4 FREE SERPL-MCNC: 1 NG/DL (ref 0.7–1.5)

## 2018-04-02 ENCOUNTER — TELEPHONE (OUTPATIENT)
Dept: FAMILY MEDICINE CLINIC | Age: 58
End: 2018-04-02

## 2018-04-06 NOTE — TELEPHONE ENCOUNTER
Pt calling again,asked me to try to find out what is happening with medication request --if you could let me kmow what to tell pt I will call him.

## 2018-04-06 NOTE — TELEPHONE ENCOUNTER
Talked with pt,explained info I was told--pt said,he has talked with drug co. He is already enrolled,trice that is needed it the top form to be filled out and sent in,said he asked them to send form

## 2018-04-10 ENCOUNTER — OFFICE VISIT (OUTPATIENT)
Dept: FAMILY MEDICINE CLINIC | Age: 58
End: 2018-04-10

## 2018-04-10 VITALS
BODY MASS INDEX: 29.62 KG/M2 | HEIGHT: 69 IN | WEIGHT: 200 LBS | RESPIRATION RATE: 16 BRPM | DIASTOLIC BLOOD PRESSURE: 100 MMHG | SYSTOLIC BLOOD PRESSURE: 170 MMHG | HEART RATE: 80 BPM | TEMPERATURE: 98.2 F

## 2018-04-10 DIAGNOSIS — I10 ESSENTIAL HYPERTENSION: ICD-10-CM

## 2018-04-10 DIAGNOSIS — Z79.4 TYPE 2 DIABETES MELLITUS WITH DIABETIC NEUROPATHY, WITH LONG-TERM CURRENT USE OF INSULIN (HCC): Primary | ICD-10-CM

## 2018-04-10 DIAGNOSIS — E55.9 VITAMIN D DEFICIENCY: ICD-10-CM

## 2018-04-10 DIAGNOSIS — E11.9 ENCOUNTER FOR DIABETIC FOOT EXAM (HCC): ICD-10-CM

## 2018-04-10 DIAGNOSIS — E11.40 TYPE 2 DIABETES MELLITUS WITH DIABETIC NEUROPATHY, WITH LONG-TERM CURRENT USE OF INSULIN (HCC): Primary | ICD-10-CM

## 2018-04-10 RX ORDER — INSULIN GLARGINE 100 [IU]/ML
10 INJECTION, SOLUTION SUBCUTANEOUS
Qty: 15 PEN | Refills: 3 | Status: SHIPPED | OUTPATIENT
Start: 2018-04-10 | End: 2018-06-28 | Stop reason: SDUPTHER

## 2018-04-10 RX ORDER — LISINOPRIL 40 MG/1
40 TABLET ORAL DAILY
Qty: 30 TAB | Refills: 3 | Status: SHIPPED | OUTPATIENT
Start: 2018-04-10 | End: 2018-08-20 | Stop reason: SDUPTHER

## 2018-04-10 NOTE — PROGRESS NOTES
Chief Complaint   Patient presents with    Other     neuropathy    Diabetes    Shoulder Pain     left    Hypertension    Cholesterol Problem     high chol    Results     discuss lab results       Health Maintenance Due   Topic Date Due    Hepatitis C Screening  1960    FOOT EXAM Q1  06/02/1968    EYE EXAM RETINAL OR DILATED Q1  06/02/1968    Pneumococcal 19-64 Medium Risk (1 of 1 - PPSV23) 06/02/1977    DTaP/Tdap/Td series (1 - Tdap) 06/02/1979    FOBT Q 1 YEAR AGE 50-75  06/02/2008    MICROALBUMIN Q1  01/13/2017    LIPID PANEL Q1  01/13/2017    Influenza Age 9 to Adult  08/01/2017    ZOSTER VACCINE AGE 60>  04/02/2018       Health Maintenance reviewed     1. Have you been to the ER, urgent care clinic since your last visit? Hospitalized since your last visit? No    2. Have you seen or consulted any other health care providers outside of the [address] [place] [address] since your last visit? Include any pap smears or colon screening.  No

## 2018-04-10 NOTE — PROGRESS NOTES
History and Physical    Patient: Marco Antonio Baron MRN: [id number]  SSN: [id number]    YOB: 1960  Age: 59 y.o. Sex: male      Subjective:      Marco Antonio Baron is a 59 y.o. male who is here for follow up. The patient mentions that he has completed his forms for Patient Assistance but has not heard back anything yet. He mentions that he has not had any medications with the exception of his gabapentin. He mentions that the gabapentin has been somewhat effective but does not resolve his neuropathy completely. He also mentions that he also needs to get all of his diabetes medications as well as his hypertension medications. Besides the neuropathy his shoulder still bothers him. Visit from 3/27/2018  Patient is here to establish care. The patient previously saw Dr. Bk Briscoe. The patient also had been seeing orthopedics as well as cardiology. The patient mentions that he had stents placed 8 years ago. The patient mentions that he has been out of his BP meds and diabetic meds refilled. He mentions that he takes the gabapentin over three times a day. He mentions that e was previously on 300 mg but it wasn't sufficient. He states that he continues to smoke but is interested in quitting. He mentions that he would like to get back in contact with his cardiologist.   The patient mentions that he is on prescription assistance      Lantus through [place] and [place] through [facility]. Past Medical History:   Diagnosis Date    Alcohol use     Fri-Sun one fifth; Mon-Thur: Pint of liquor    CAD (coronary artery disease)     Cardiac angioplasty with stent 07/29/2009    2.5 x 13 Cypher stent to CX.  Cardiac cath 11/09/2011    RCA patent. LM patent. LAD patent. mD1 55%. CX patent. Prior stent patent. Edison 85% (2.5 x 15-mm Xience stent, resid 0%). LVEDP 12. EF 40-45%. High lateral hypk (RI distribution).       Cardiac inferior STEMI 2009    Current smoker     contemplating stopping    Diabetes ([facility] [address]     type 2-insulin dependent    GERD (gastroesophageal reflux disease)     HTN (hypertension)     Hyperlipidemia     Migraine     Myocardial infarction [facility])      Past Surgical History:   Procedure Laterality Date    HX CORONARY STENT PLACEMENT  07/29/2009    HX HEART CATHETERIZATION  8/30/2011    HX HEART CATHETERIZATION  11/09/2011    HX WISDOM TEETH EXTRACTION      x4      Family History   Problem Relation Age of Onset    Hypertension Mother     Heart Attack Mother     Diabetes Mother     Hypertension Father     Heart Attack Father     Diabetes Father     Diabetes Sister     Hypertension Sister     Stroke Sister     Diabetes Brother     Hypertension Brother     Stroke Brother     No Known Problems Maternal Grandmother     No Known Problems Maternal Grandfather     No Known Problems Paternal Grandmother     No Known Problems Paternal Grandfather     No Known Problems Brother     Heart Disease Other     Kidney Disease Other      Social History   Substance Use Topics    Smoking status: Current Every Day Smoker     Packs/day: 0.50     Years: 1.00     Types: Cigarettes    Smokeless tobacco: Never Used    Alcohol use 2.5 oz/week     5 Shots of liquor per week      Comment: occassionally      Prior to Admission medications    Medication Sig Start Date End Date Taking? Authorizing Provider   gabapentin (NEURONTIN) 800 mg tablet Take 1 Tab by mouth two (2) times a day. 3/27/18  Yes Raeann Maurer,    sitaGLIPtin-metFORMIN (JANUMET) 50-1,000 mg per tablet Take 1 Tab by mouth two (2) times daily (with meals). 3/21/16  Yes Sebastian Purvis NP   INSULIN ASPART (NOVOLOG SC) by SubCUTAneous route. Yes Historical Provider   insulin lispro protamine/insulin lispro (HUMALOG MIX 50/50) 100 unit/mL (50-50) injection 10 Units by SubCUTAneous route three (3) times daily (with meals). Inject  beneath the skin.  Per pt 10 units three times a day (with meals)   Yes Historical Provider lisinopril (PRINIVIL, ZESTRIL) 10 mg tablet Take 1 Tab by mouth daily. 5/29/15  Yes Whit Husbands, MD   aspirin 81 mg tablet Take 1 Tab by mouth daily. 11/16/12  Yes Whit Husbands, MD   simvastatin (ZOCOR) 20 mg tablet Take 1 Tab by mouth nightly. 11/16/12  Yes Whit Husbands, MD   insulin glargine (LANTUS) 100 unit/mL injection 50 Units by SubCUTAneous route daily. Yes Historical Provider   cyclobenzaprine (FLEXERIL) 10 mg tablet Take 1 Tab by mouth three (3) times daily as needed for Muscle Spasm(s). 1/22/18   Peggy Costello PA-C        Allergies   Allergen Reactions    Niacin Itching       Review of Systems:  Pertinent items are noted in the History of Present Illness. Objective:     Vitals:    04/10/18 0823 04/10/18 0831   BP: (!) 157/97 (!) 170/100   Pulse: 80    Resp: 16    Temp: 98.2 °F (36.8 °C)    TempSrc: Oral    Weight: 200 lb (90.7 kg)    Height: 5' 9\" (1.753 m)         Physical Exam:  GENERAL: alert, cooperative, no distress, appears stated age  EYE: conjunctivae/corneas clear. PERRL, EOM's intact. Fundi benign  LYMPHATIC: Cervical, supraclavicular, and axillary nodes normal.   THROAT & NECK: normal and no erythema or exudates noted. LUNG: clear to auscultation bilaterally  HEART: regular rate and rhythm, S1, S2 normal, no murmur, click, rub or gallop  ABDOMEN: soft, non-tender.  Bowel sounds normal. No masses,  no organomegaly  EXTREMITIES:  extremities normal, atraumatic, no cyanosis or edema  SKIN: Normal.  NEUROLOGIC: negative  PSYCHIATRIC: non focal      Labs:     Lab Results   Component Value Date/Time    Hemoglobin A1c 8.5 (H) 03/27/2018 11:35 AM    Hemoglobin A1c, External 7.8 01/28/2015 11:15 PM     Lab Results   Component Value Date/Time    Vitamin D 25-Hydroxy 13.5 (L) 03/27/2018 11:35 AM             Lab Results   Component Value Date/Time    WBC 3.0 (L) 03/27/2018 11:35 AM    HGB 14.9 03/27/2018 11:35 AM    HCT 44.9 03/27/2018 11:35 AM    PLATELET 841 27/11/7429 11:35 AM    MCV 90.0 03/27/2018 11:35 AM     Lab Results   Component Value Date/Time    Sodium 138 03/27/2018 11:35 AM    Potassium 4.4 03/27/2018 11:35 AM    Chloride 105 03/27/2018 11:35 AM    CO2 28 03/27/2018 11:35 AM    Anion gap 5 03/27/2018 11:35 AM    Glucose 151 (H) 03/27/2018 11:35 AM    BUN 17 03/27/2018 11:35 AM    Creatinine 0.93 03/27/2018 11:35 AM    BUN/Creatinine ratio 18 03/27/2018 11:35 AM    GFR est AA >60 03/27/2018 11:35 AM    GFR est non-AA >60 03/27/2018 11:35 AM    Calcium 9.1 03/27/2018 11:35 AM    AST (SGOT) 17 03/27/2018 11:35 AM    Alk.  phosphatase 81 03/27/2018 11:35 AM    Protein, total 7.2 03/27/2018 11:35 AM    Albumin 3.9 03/27/2018 11:35 AM    Globulin 3.3 03/27/2018 11:35 AM    A-G Ratio 1.2 03/27/2018 11:35 AM    ALT (SGPT) 23 03/27/2018 11:35 AM     Lab Results   Component Value Date/Time    TSH 1.06 03/27/2018 11:35 AM     Lab Results   Component Value Date/Time    Hemoglobin A1c 8.5 (H) 03/27/2018 11:35 AM    Hemoglobin A1c 11.4 (H) 01/13/2016 05:26 PM    Hemoglobin A1c 10.6 (H) 06/19/2015 04:12 PM    Hemoglobin A1c, External 7.8 01/28/2015 11:15 PM     Lab Results   Component Value Date/Time    Cholesterol, total 213 (H) 01/13/2016 05:14 PM    Cholesterol, total 157 06/19/2015 04:12 PM    Cholesterol, total 152 11/10/2011 04:10 AM    Cholesterol, total 152 08/29/2011 05:15 AM    Cholesterol, total 248 (H) 08/10/2010 03:45 PM    HDL Cholesterol 47 01/13/2016 05:14 PM    HDL Cholesterol 38 (L) 06/19/2015 04:12 PM    HDL Cholesterol 29 (L) 11/10/2011 04:10 AM    HDL Cholesterol 27 (L) 08/29/2011 05:15 AM    HDL Cholesterol 28 (L) 08/10/2010 03:45 PM    LDL, calculated 120.8 (H) 01/13/2016 05:14 PM    LDL, calculated 95 06/19/2015 04:12 PM    LDL, calculated 63.8 11/10/2011 04:10 AM    LDL, calculated  08/29/2011 05:15 AM     LDL AND VLDL CHOLESTEROL NOT CALCULATED WHEN TRIGLYCERIDES >400 MG/DL OR HDL CHOLESTEROL <20 MG/DL    LDL, calculated  08/10/2010 03:45 PM     LDL AND VLDL CHOLESTEROL NOT CALCULATED WHEN TRIGLYCERIDES >400 MG/DL OR HDL CHOLESTEROL <20 MG/DL    Triglyceride 226 (H) 01/13/2016 05:14 PM    Triglyceride 117 06/19/2015 04:12 PM    Triglyceride 296 (H) 11/10/2011 04:10 AM    Triglyceride 556 (H) 08/29/2011 05:15 AM    Triglyceride 1116 (H) 08/10/2010 03:45 PM    CHOL/HDL Ratio 4.5 01/13/2016 05:14 PM    CHOL/HDL Ratio 5.2 (H) 11/10/2011 04:10 AM    CHOL/HDL Ratio 5.6 (H) 08/29/2011 05:15 AM    CHOL/HDL Ratio 8.9 (H) 08/10/2010 03:45 PM    CHOL/HDL Ratio 5.7 (H) 12/09/2009 09:30 AM         Assessment:     1.)  Insulin Dependent Diabetes Mellitus Type 2: Patient's Patient Assistance paperwork was completed for his Lantus and Janumet    2.) Diabetic Neuropathy: Patient will continue on gabapentin 800 mg twice a day that was previously reordered. 3.) Left Shoulder Pain: Patient will follow up with Orthopedic Surgeon, Dr. Mikey Velez.     4.) Essential Hypertension: Patient ordered lisinopril. 5.) Hyperlipidemia: Will get lipid panel on future visit. 6.) Vitamin D deficiency: Patient does not have funds to afford prescription strength Vitamin D. He was advised to get OTC Vitamin D 2,000 units daily. 7.)  Preventive: Patient referred to podiatry for diabetic foot exam.     This encounter including interview, exam, evaluation and discussion/ counseling was approximately 45 minutes. I reviewed patient's labs and summarized them with him. Patient will return in 1 month for follow up.      Plan:     Orders Placed This Encounter    REFERRAL TO PODIATRY    lisinopril (PRINIVIL, ZESTRIL) 40 mg tablet    SITagliptin-metFORMIN (JANUMET) 50-1,000 mg per tablet    insulin glargine (LANTUS,BASAGLAR) 100 unit/mL (3 mL) inpn               Signed By: Gisselle Rodrigez DO     April 10, 2018

## 2018-04-10 NOTE — MR AVS SNAPSHOT
[facility] 
 
 
 [address] [address] [address] [address] [phone] Patient: Isabel Snyder MRN: [id number] GRX:1/4/7000 Visit Information Date & Time Provider Department Dept. Phone Encounter #  
 4/10/2018  8:15 AM Arturo Alcantar, [address] [id number] Follow-up Instructions Return in about 1 month (around 5/10/2018) for Follow up . Your Appointments 4/16/2018 10:00 AM  
PROBLEM VISIT with Enriqueta Paz MD  
[facility] - [place] [id number] [facility]) Appt Note: LEFT SHOULDER PAIN/ REF BY [facility]/ MRI IN CC/*ADVISED PT TO COME EARLY W. PHOTO ID & INS. CARD, CURRENT MEDICATION LIST & DOSAGE TO THE  LOCATION  
 [address] [place] [address], [address] [place] [address]  
[phone]  
  
   
 [address] [place] [address], [address] [place] [address] Upcoming Health Maintenance Date Due Hepatitis C Screening 1960 FOOT EXAM Q1 6/2/1968 EYE EXAM RETINAL OR DILATED Q1 6/2/1968 Pneumococcal 19-64 Medium Risk (1 of 1 - PPSV23) 6/2/1977 DTaP/Tdap/Td series (1 - Tdap) 6/2/1979 FOBT Q 1 YEAR AGE 50-75 6/2/2008 MICROALBUMIN Q1 1/13/2017 LIPID PANEL Q1 1/13/2017 Influenza Age 5 to Adult 8/1/2017 ZOSTER VACCINE AGE 60> 4/2/2018 HEMOGLOBIN A1C Q6M 9/27/2018 Allergies as of 4/10/2018  Review Complete On: 4/10/2018 By: Arturo Alcantar DO Severity Noted Reaction Type Reactions Niacin  12/09/2011   Systemic Itching Current Immunizations  Never Reviewed No immunizations on file. Not reviewed this visit You Were Diagnosed With   
  
 Codes Comments Type 2 diabetes mellitus with diabetic neuropathy, with long-term current use of insulin (HCC)    -  Primary ICD-10-CM: E11.40, Z79.4 ICD-9-CM: 250.60, 357.2, V58.67 Essential hypertension     ICD-10-CM: I10 
ICD-9-CM: 401.9 Uncontrolled type 2 diabetes mellitus with complication, without long-term current use of insulin (HCC)     ICD-10-CM: E11.8, E11.65 ICD-9-CM: 250.82 Encounter for diabetic foot exam ([facility] [address]     ICD-10-CM: E11.9 ICD-9-CM: 250.00 Vitals BP Pulse Temp Resp Height(growth percentile) Weight(growth percentile) (!) 170/100 (BP 1 Location: Right arm, BP Patient Position: Sitting) 80 98.2 °F (36.8 °C) (Oral) 16 5' 9\" (1.753 m) 200 lb (90.7 kg) BMI Smoking Status 29.53 kg/m2 Current Every Day Smoker Vitals History BMI and BSA Data Body Mass Index Body Surface Area  
 29.53 kg/m 2 2.1 m 2 Preferred Pharmacy Pharmacy Name Phone Daniela Parcel [address], [address] [facility] [phone] Your Updated Medication List  
  
   
This list is accurate as of 4/10/18  9:25 AM.  Always use your most recent med list.  
  
  
  
  
 aspirin 81 mg tablet Take 1 Tab by mouth daily. cyclobenzaprine 10 mg tablet Commonly known as:  FLEXERIL Take 1 Tab by mouth three (3) times daily as needed for Muscle Spasm(s). gabapentin 800 mg tablet Commonly known as:  NEURONTIN Take 1 Tab by mouth two (2) times a day. insulin glargine 100 unit/mL (3 mL) Inpn Commonly known as:  LANTUS,BASAGLAR  
10 Units by SubCUTAneous route nightly. insulin lispro protamine/insulin lispro 100 unit/mL (50-50) injection Commonly known as:  HUMALOG MIX 50/50  
10 Units by SubCUTAneous route three (3) times daily (with meals). Inject  beneath the skin. Per pt 10 units three times a day (with meals)  
  
 lisinopril 40 mg tablet Commonly known as:  Fortunato Littler Take 1 Tab by mouth daily. NOVOLOG SC  
by SubCUTAneous route. simvastatin 20 mg tablet Commonly known as:  ZOCOR Take 1 Tab by mouth nightly. SITagliptin-metFORMIN 50-1,000 mg per tablet Commonly known as:  Merlyn Lott  
 Take 1 Tab by mouth two (2) times daily (with meals). Prescriptions Printed Refills SITagliptin-metFORMIN (JANUMET) 50-1,000 mg per tablet 3 Sig: Take 1 Tab by mouth two (2) times daily (with meals). Class: Print Route: Oral  
 insulin glargine (LANTUS,BASAGLAR) 100 unit/mL (3 mL) inpn 3 Sig: 10 Units by SubCUTAneous route nightly. Class: Print Route: SubCUTAneous Prescriptions Sent to Pharmacy Refills  
 lisinopril (PRINIVIL, ZESTRIL) 40 mg tablet 3 Sig: Take 1 Tab by mouth daily. Class: Normal  
 Pharmacy: Eleonora Mendoza [address] [place] [address], [address] [place] [address] #: [phone] Route: Oral  
  
We Performed the Following REFERRAL TO PODIATRY [REF90 Custom] Comments:  
 Please evaluate patient for Diabetic Foot Exam.  
  
Follow-up Instructions Return in about 1 month (around 5/10/2018) for Follow up . Referral Information Referral ID Referred By Referred To  
  
 [id number] YUKI LOWERY [facility] [address]. Phone: [phone] Fax: [phone] Visits Status Start Date End Date 1 New Request 4/10/18 4/10/19 If your referral has a status of pending review or denied, additional information will be sent to support the outcome of this decision. Patient Instructions Please contact our office if you have any questions about your visit today. Please provide this summary of care documentation to your next provider. Your primary care clinician is listed as Gisselle Rodrigez. If you have any questions after today's visit, please call [phone].

## 2018-04-15 PROBLEM — E55.9 VITAMIN D DEFICIENCY: Status: ACTIVE | Noted: 2018-04-15

## 2018-04-16 ENCOUNTER — OFFICE VISIT (OUTPATIENT)
Dept: ORTHOPEDIC SURGERY | Facility: CLINIC | Age: 58
End: 2018-04-16

## 2018-04-16 VITALS
TEMPERATURE: 97.4 F | OXYGEN SATURATION: 100 % | HEIGHT: 69 IN | HEART RATE: 65 BPM | DIASTOLIC BLOOD PRESSURE: 91 MMHG | RESPIRATION RATE: 18 BRPM | WEIGHT: 196.8 LBS | SYSTOLIC BLOOD PRESSURE: 145 MMHG | BODY MASS INDEX: 29.15 KG/M2

## 2018-04-16 DIAGNOSIS — M25.512 CHRONIC LEFT SHOULDER PAIN: Primary | ICD-10-CM

## 2018-04-16 DIAGNOSIS — M75.82 ROTATOR CUFF TENDONITIS, LEFT: ICD-10-CM

## 2018-04-16 DIAGNOSIS — G89.29 CHRONIC LEFT SHOULDER PAIN: Primary | ICD-10-CM

## 2018-04-16 DIAGNOSIS — M75.52 CHRONIC SHOULDER BURSITIS, LEFT: ICD-10-CM

## 2018-04-16 RX ORDER — BETAMETHASONE SODIUM PHOSPHATE AND BETAMETHASONE ACETATE 3; 3 MG/ML; MG/ML
6 INJECTION, SUSPENSION INTRA-ARTICULAR; INTRALESIONAL; INTRAMUSCULAR; SOFT TISSUE ONCE
Qty: 0.5 ML | Refills: 0
Start: 2018-04-16 | End: 2018-04-16

## 2018-04-16 RX ORDER — BUPIVACAINE HYDROCHLORIDE 2.5 MG/ML
4 INJECTION, SOLUTION EPIDURAL; INFILTRATION; INTRACAUDAL ONCE
Qty: 4 ML | Refills: 0
Start: 2018-04-16 | End: 2018-04-16

## 2018-04-16 NOTE — PATIENT INSTRUCTIONS
Shoulder Bursitis: Exercises  Your Care Instructions  Here are some examples of typical rehabilitation exercises for your condition. Start each exercise slowly. Ease off the exercise if you start to have pain. Your doctor or physical therapist will tell you when you can start these exercises and which ones will work best for you. How to do the exercises  Posterior stretching exercise    1. Hold the elbow of your injured arm with your other hand. 2. Use your hand to pull your injured arm gently up and across your body. You will feel a gentle stretch across the back of your injured shoulder. 3. Hold for at least 15 to 30 seconds. Then slowly lower your arm. 4. Repeat 2 to 4 times. Up-the-back stretch    Your doctor or physical therapist may want you to wait to do this stretch until you have regained most of your range of motion and strength. You can do this stretch in different ways. Hold any of these stretches for at least 15 to 30 seconds. Repeat them 2 to 4 times. 1. Put your hand in your back pocket. Let it rest there to stretch your shoulder. 2. With your other hand, hold your injured arm (palm outward) behind your back by the wrist. Pull your arm up gently to stretch your shoulder. 3. Next, put a towel over your other shoulder. Put the hand of your injured arm behind your back. Now hold the back end of the towel. With the other hand, hold the front end of the towel in front of your body. Pull gently on the front end of the towel. This will bring your hand farther up your back to stretch your shoulder. Overhead stretch    1. Standing about an arm's length away, grasp onto a solid surface. You could use a countertop, a doorknob, or the back of a sturdy chair. 2. With your knees slightly bent, bend forward with your arms straight. Lower your upper body, and let your shoulders stretch. 3. As your shoulders are able to stretch farther, you may need to take a step or two backward.   4. Hold for at least 15 to 30 seconds. Then stand up and relax. If you had stepped back during your stretch, step forward so you can keep your hands on the solid surface. 5. Repeat 2 to 4 times. Shoulder flexion (lying down)    To make a wand for this exercise, use a piece of PVC pipe or a broom handle with the broom removed. Make the wand about a foot wider than your shoulders. 1. Lie on your back, holding a wand with both hands. Your palms should face down as you hold the wand. 2. Keeping your elbows straight, slowly raise your arms over your head. Raise them until you feel a stretch in your shoulders, upper back, and chest.  3. Hold for 15 to 30 seconds. 4. Repeat 2 to 4 times. Shoulder rotation (lying down)    To make a wand for this exercise, use a piece of PVC pipe or a broom handle with the broom removed. Make the wand about a foot wider than your shoulders. 1. Lie on your back. Hold a wand with both hands with your elbows bent and palms up. 2. Keep your elbows close to your body, and move the wand across your body toward the sore arm. 3. Hold for 8 to 12 seconds. 4. Repeat 2 to 4 times. Shoulder blade squeeze    1. Stand with your arms at your sides, and squeeze your shoulder blades together. Do not raise your shoulders up as you squeeze. 2. Hold 6 seconds. 3. Repeat 8 to 12 times. Shoulder flexor and extensor exercise    These are isometric exercises. That means you contract your muscles without actually moving. 1. Push forward (flex): Stand facing a wall or doorjamb, about 6 inches or less back. Hold your injured arm against your body. Make a closed fist with your thumb on top. Then gently push your hand forward into the wall with about 25% to 50% of your strength. Don't let your body move backward as you push. Hold for about 6 seconds. Relax for a few seconds. Repeat 8 to 12 times. 2. Push backward (extend): Stand with your back flat against a wall.  Your upper arm should be against the wall, with your elbow bent 90 degrees (your hand straight ahead). Push your elbow gently back against the wall with about 25% to 50% of your strength. Don't let your body move forward as you push. Hold for about 6 seconds. Relax for a few seconds. Repeat 8 to 12 times. Scapular exercise: Wall push-ups    This exercise is best done with your fingers somewhat turned out, rather than straight up and down. 1. Stand facing a wall, about 12 inches to 18 inches away. 2. Place your hands on the wall at shoulder height. 3. Slowly bend your elbows and bring your face to the wall. Keep your back and hips straight. 4. Push back to where you started. 5. Repeat 8 to 12 times. 6. When you can do this exercise against a wall comfortably, you can try it against a counter. You can then slowly progress to the end of a couch, then to a sturdy chair, and finally to the floor. Scapular exercise: Retraction    For this exercise, you will need elastic exercise material, such as surgical tubing or Thera-Band. 1. Put the band around a solid object at about waist level. (A bedpost will work well.) Each hand should hold an end of the band. 2. With your elbows at your sides and bent to 90 degrees, pull the band back. Your shoulder blades should move toward each other. Then move your arms back where you started. 3. Repeat 8 to 12 times. 4. If you have good range of motion in your shoulders, try this exercise with your arms lifted out to the sides. Keep your elbows at a 90-degree angle. Raise the elastic band up to about shoulder level. Pull the band back to move your shoulder blades toward each other. Then move your arms back where you started. Internal rotator strengthening exercise    1. Start by tying a piece of elastic exercise material to a doorknob. You can use surgical tubing or Thera-Band. 2. Stand or sit with your shoulder relaxed and your elbow bent 90 degrees. Your upper arm should rest comfortably against your side.  Squeeze a rolled towel between your elbow and your body for comfort. This will help keep your arm at your side. 3. Hold one end of the elastic band in the hand of the painful arm. 4. Slowly rotate your forearm toward your body until it touches your belly. Slowly move it back to where you started. 5. Keep your elbow and upper arm firmly tucked against the towel roll or at your side. 6. Repeat 8 to 12 times. External rotator strengthening exercise    1. Start by tying a piece of elastic exercise material to a doorknob. You can use surgical tubing or Thera-Band. (You may also hold one end of the band in each hand.)  2. Stand or sit with your shoulder relaxed and your elbow bent 90 degrees. Your upper arm should rest comfortably against your side. Squeeze a rolled towel between your elbow and your body for comfort. This will help keep your arm at your side. 3. Hold one end of the elastic band with the hand of the painful arm. 4. Start with your forearm across your belly. Slowly rotate the forearm out away from your body. Keep your elbow and upper arm tucked against the towel roll or the side of your body until you begin to feel tightness in your shoulder. Slowly move your arm back to where you started. 5. Repeat 8 to 12 times. Follow-up care is a key part of your treatment and safety. Be sure to make and go to all appointments, and call your doctor if you are having problems. It's also a good idea to know your test results and keep a list of the medicines you take. Where can you learn more? Go to http://nam-leon.info/. Enter P107 in the search box to learn more about \"Shoulder Bursitis: Exercises. \"  Current as of: March 21, 2017  Content Version: 11.4  © 0566-6486 [employer]. Care instructions adapted under license by [employer] (which disclaims liability or warranty for this information).  If you have questions about a medical condition or this instruction, always ask your healthcare professional. [address] any warranty or liability for your use of this information. Joint Injections: Care Instructions  Your Care Instructions  Joint injections are shots into a joint, such as the knee. They may be used to put in medicines, such as pain relievers. Or they can be used to take out fluid. Sometimes the fluid is tested in a lab. This can help find the cause of a joint problem. A corticosteroid, or steroid, shot is used to reduce inflammation in tendons or joints. It is often used to treat problems such as arthritis, tendinitis, and bursitis. Steroids can be injected directly into a painful, inflamed joint. They can also help reduce inflammation of a bursa. A bursa is a sac of fluid. It cushions and lubricates areas where tendons, ligaments, skin, muscles, or bones rub against each other. A steroid shot can sometimes help with short-term pain relief when other treatments haven't worked. If steroid shots help, pain may improve for weeks or months. Follow-up care is a key part of your treatment and safety. Be sure to make and go to all appointments, and call your doctor if you are having problems. It's also a good idea to know your test results and keep a list of the medicines you take. How can you care for yourself at home? · Put ice or a cold pack on the area for 10 to 20 minutes at a time. Put a thin cloth between the ice and your skin. · Take anti-inflammatory medicines to reduce pain, swelling, or inflammation. These include ibuprofen (Advil, Motrin) and naproxen (Aleve). Read and follow all instructions on the label. · Avoid strenuous activities for several days, especially those that put stress on the area where you got the shot. · If you have dressings over the area, keep them clean and dry. You may remove them when your doctor tells you to. When should you call for help?   Call your doctor now or seek immediate medical care if:  ? · You have signs of infection, such as:  ¨ Increased pain, swelling, warmth, or redness. ¨ Red streaks leading from the site. ¨ Pus draining from the site. ¨ A fever. ? Watch closely for changes in your health, and be sure to contact your doctor if you have any problems. Where can you learn more? Go to http://nam-leon.info/. Enter N616 in the search box to learn more about \"Joint Injections: Care Instructions. \"  Current as of: March 21, 2017  Content Version: 11.4  © 0927-2053 [employer]. Care instructions adapted under license by [employer] (which disclaims liability or warranty for this information). If you have questions about a medical condition or this instruction, always ask your healthcare professional. [address] any warranty or liability for your use of this information.

## 2018-04-16 NOTE — MR AVS SNAPSHOT
[address] [place] [address], [address] [place] [address] 
[phone] Patient: Marco Antonio Baron MRN: [id number] Visit Information Date & Time Provider Department Dept. Phone Encounter #  
 4/16/2018  8:15 AM Umesh Bahena MD [address] [facility] [phone] [id number] Follow-up Instructions Return if symptoms worsen or fail to improve. Your Appointments 5/10/2018  8:00 AM  
Follow Up with Baljit-Gurmeet Maurer DO [address] (--) Appt Note: 1 month fu  
 [address] [facility] [address] [phone]  
  
   
 [address] [facility] [address] Upcoming Health Maintenance Date Due Hepatitis C Screening 1960 FOOT EXAM Q1 6/2/1968 EYE EXAM RETINAL OR DILATED Q1 6/2/1968 Pneumococcal 19-64 Medium Risk (1 of 1 - PPSV23) 6/2/1977 DTaP/Tdap/Td series (1 - Tdap) 6/2/1979 FOBT Q 1 YEAR AGE 50-75 6/2/2008 MICROALBUMIN Q1 1/13/2017 LIPID PANEL Q1 1/13/2017 Influenza Age 5 to Adult 8/1/2017 ZOSTER VACCINE AGE 60> 4/2/2018 HEMOGLOBIN A1C Q6M 9/27/2018 Allergies as of 4/16/2018  Review Complete On: 4/16/2018 By: Obi Pablo Severity Noted Reaction Type Reactions Niacin  12/09/2011   Systemic Itching Current Immunizations  Never Reviewed No immunizations on file. Not reviewed this visit You Were Diagnosed With   
  
 Codes Comments Chronic left shoulder pain    -  Primary ICD-10-CM: M25.512, E74.14 ICD-9-CM: 719.41, 338.29 Chronic shoulder bursitis, left     ICD-10-CM: M75.52 
ICD-9-CM: 726.10 Rotator cuff tendonitis, left     ICD-10-CM: M75.82 ICD-9-CM: 726.10 Vitals BP Pulse Temp Resp Height(growth percentile) Weight(growth percentile) (!) 145/91 65 97.4 °F (36.3 °C) (Oral) 18 5' 9\" (1.753 m) 196 lb 12.8 oz (89.3 kg) SpO2 BMI Smoking Status 100% 29.06 kg/m2 Current Every Day Smoker BMI and BSA Data Body Mass Index Body Surface Area  
 29.06 kg/m 2 2.09 m 2 Preferred Pharmacy Pharmacy Name Phone [facility],[address] [facility] [phone] Your Updated Medication List  
  
   
This list is accurate as of 4/16/18  9:21 AM.  Always use your most recent med list.  
  
  
  
  
 aspirin 81 mg tablet Take 1 Tab by mouth daily. cyclobenzaprine 10 mg tablet Commonly known as:  FLEXERIL Take 1 Tab by mouth three (3) times daily as needed for Muscle Spasm(s). gabapentin 800 mg tablet Commonly known as:  NEURONTIN Take 1 Tab by mouth two (2) times a day. insulin glargine 100 unit/mL (3 mL) Inpn Commonly known as:  LANTUS,BASAGLAR  
10 Units by SubCUTAneous route nightly. insulin lispro protamine/insulin lispro 100 unit/mL (50-50) injection Commonly known as:  HUMALOG MIX 50/50  
10 Units by SubCUTAneous route three (3) times daily (with meals). Inject  beneath the skin. Per pt 10 units three times a day (with meals)  
  
 lisinopril 40 mg tablet Commonly known as:  Brennan Hails Take 1 Tab by mouth daily. NOVOLOG SC  
by SubCUTAneous route. simvastatin 20 mg tablet Commonly known as:  ZOCOR Take 1 Tab by mouth nightly. SITagliptin-metFORMIN 50-1,000 mg per tablet Commonly known as:  Malen Gram Take 1 Tab by mouth two (2) times daily (with meals). Follow-up Instructions Return if symptoms worsen or fail to improve. Patient Instructions Shoulder Bursitis: Exercises Your Care Instructions Here are some examples of typical rehabilitation exercises for your condition. Start each exercise slowly. Ease off the exercise if you start to have pain. Your doctor or physical therapist will tell you when you can start these exercises and which ones will work best for you. How to do the exercises Posterior stretching exercise 1. Hold the elbow of your injured arm with your other hand. 2. Use your hand to pull your injured arm gently up and across your body. You will feel a gentle stretch across the back of your injured shoulder. 3. Hold for at least 15 to 30 seconds. Then slowly lower your arm. 4. Repeat 2 to 4 times. Up-the-back stretch Your doctor or physical therapist may want you to wait to do this stretch until you have regained most of your range of motion and strength. You can do this stretch in different ways. Hold any of these stretches for at least 15 to 30 seconds. Repeat them 2 to 4 times. 1. Put your hand in your back pocket. Let it rest there to stretch your shoulder. 2. With your other hand, hold your injured arm (palm outward) behind your back by the wrist. Pull your arm up gently to stretch your shoulder. 3. Next, put a towel over your other shoulder. Put the hand of your injured arm behind your back. Now hold the back end of the towel. With the other hand, hold the front end of the towel in front of your body. Pull gently on the front end of the towel. This will bring your hand farther up your back to stretch your shoulder. Overhead stretch 1. Standing about an arm's length away, grasp onto a solid surface. You could use a countertop, a doorknob, or the back of a sturdy chair. 2. With your knees slightly bent, bend forward with your arms straight. Lower your upper body, and let your shoulders stretch. 3. As your shoulders are able to stretch farther, you may need to take a step or two backward. 4. Hold for at least 15 to 30 seconds. Then stand up and relax. If you had stepped back during your stretch, step forward so you can keep your hands on the solid surface. 5. Repeat 2 to 4 times. Shoulder flexion (lying down) To make a wand for this exercise, use a piece of PVC pipe or a broom handle with the broom removed.  Make the wand about a foot wider than your shoulders. 1. Lie on your back, holding a wand with both hands. Your palms should face down as you hold the wand. 2. Keeping your elbows straight, slowly raise your arms over your head. Raise them until you feel a stretch in your shoulders, upper back, and chest. 
3. Hold for 15 to 30 seconds. 4. Repeat 2 to 4 times. Shoulder rotation (lying down) To make a wand for this exercise, use a piece of PVC pipe or a broom handle with the broom removed. Make the wand about a foot wider than your shoulders. 1. Lie on your back. Hold a wand with both hands with your elbows bent and palms up. 2. Keep your elbows close to your body, and move the wand across your body toward the sore arm. 3. Hold for 8 to 12 seconds. 4. Repeat 2 to 4 times. Shoulder blade squeeze 1. Stand with your arms at your sides, and squeeze your shoulder blades together. Do not raise your shoulders up as you squeeze. 2. Hold 6 seconds. 3. Repeat 8 to 12 times. Shoulder flexor and extensor exercise These are isometric exercises. That means you contract your muscles without actually moving. 1. Push forward (flex): Stand facing a wall or doorjamb, about 6 inches or less back. Hold your injured arm against your body. Make a closed fist with your thumb on top. Then gently push your hand forward into the wall with about 25% to 50% of your strength. Don't let your body move backward as you push. Hold for about 6 seconds. Relax for a few seconds. Repeat 8 to 12 times. 2. Push backward (extend): Stand with your back flat against a wall. Your upper arm should be against the wall, with your elbow bent 90 degrees (your hand straight ahead). Push your elbow gently back against the wall with about 25% to 50% of your strength. Don't let your body move forward as you push. Hold for about 6 seconds. Relax for a few seconds. Repeat 8 to 12 times. Scapular exercise: Wall push-ups This exercise is best done with your fingers somewhat turned out, rather than straight up and down. 1. Stand facing a wall, about 12 inches to 18 inches away. 2. Place your hands on the wall at shoulder height. 3. Slowly bend your elbows and bring your face to the wall. Keep your back and hips straight. 4. Push back to where you started. 5. Repeat 8 to 12 times. 6. When you can do this exercise against a wall comfortably, you can try it against a counter. You can then slowly progress to the end of a couch, then to a sturdy chair, and finally to the floor. Scapular exercise: Retraction For this exercise, you will need elastic exercise material, such as surgical tubing or Thera-Band. 1. Put the band around a solid object at about waist level. (A bedpost will work well.) Each hand should hold an end of the band. 2. With your elbows at your sides and bent to 90 degrees, pull the band back. Your shoulder blades should move toward each other. Then move your arms back where you started. 3. Repeat 8 to 12 times. 4. If you have good range of motion in your shoulders, try this exercise with your arms lifted out to the sides. Keep your elbows at a 90-degree angle. Raise the elastic band up to about shoulder level. Pull the band back to move your shoulder blades toward each other. Then move your arms back where you started. Internal rotator strengthening exercise 1. Start by tying a piece of elastic exercise material to a doorknob. You can use surgical tubing or Thera-Band. 2. Stand or sit with your shoulder relaxed and your elbow bent 90 degrees. Your upper arm should rest comfortably against your side. Squeeze a rolled towel between your elbow and your body for comfort. This will help keep your arm at your side. 3. Hold one end of the elastic band in the hand of the painful arm. 4. Slowly rotate your forearm toward your body until it touches your belly. Slowly move it back to where you started. 5. Keep your elbow and upper arm firmly tucked against the towel roll or at your side. 6. Repeat 8 to 12 times. External rotator strengthening exercise 1. Start by tying a piece of elastic exercise material to a doorknob. You can use surgical tubing or Thera-Band. (You may also hold one end of the band in each hand.) 2. Stand or sit with your shoulder relaxed and your elbow bent 90 degrees. Your upper arm should rest comfortably against your side. Squeeze a rolled towel between your elbow and your body for comfort. This will help keep your arm at your side. 3. Hold one end of the elastic band with the hand of the painful arm. 4. Start with your forearm across your belly. Slowly rotate the forearm out away from your body. Keep your elbow and upper arm tucked against the towel roll or the side of your body until you begin to feel tightness in your shoulder. Slowly move your arm back to where you started. 5. Repeat 8 to 12 times. Follow-up care is a key part of your treatment and safety. Be sure to make and go to all appointments, and call your doctor if you are having problems. It's also a good idea to know your test results and keep a list of the medicines you take. Where can you learn more? Go to http://nam-leon.info/. Enter X473 in the search box to learn more about \"Shoulder Bursitis: Exercises. \" Current as of: March 21, 2017 Content Version: 11.4 © 1885-0392 [employer]. Care instructions adapted under license by [employer] (which disclaims liability or warranty for this information). If you have questions about a medical condition or this instruction, always ask your healthcare professional. [place] [address] any warranty or liability for your use of this information. Joint Injections: Care Instructions Your Care Instructions Joint injections are shots into a joint, such as the knee.  They may be used to put in medicines, such as pain relievers. Or they can be used to take out fluid. Sometimes the fluid is tested in a lab. This can help find the cause of a joint problem. A corticosteroid, or steroid, shot is used to reduce inflammation in tendons or joints. It is often used to treat problems such as arthritis, tendinitis, and bursitis. Steroids can be injected directly into a painful, inflamed joint. They can also help reduce inflammation of a bursa. A bursa is a sac of fluid. It cushions and lubricates areas where tendons, ligaments, skin, muscles, or bones rub against each other. A steroid shot can sometimes help with short-term pain relief when other treatments haven't worked. If steroid shots help, pain may improve for weeks or months. Follow-up care is a key part of your treatment and safety. Be sure to make and go to all appointments, and call your doctor if you are having problems. It's also a good idea to know your test results and keep a list of the medicines you take. How can you care for yourself at home? · Put ice or a cold pack on the area for 10 to 20 minutes at a time. Put a thin cloth between the ice and your skin. · Take anti-inflammatory medicines to reduce pain, swelling, or inflammation. These include ibuprofen (Advil, Motrin) and naproxen (Aleve). Read and follow all instructions on the label. · Avoid strenuous activities for several days, especially those that put stress on the area where you got the shot. · If you have dressings over the area, keep them clean and dry. You may remove them when your doctor tells you to. When should you call for help? Call your doctor now or seek immediate medical care if: 
? · You have signs of infection, such as: 
¨ Increased pain, swelling, warmth, or redness. ¨ Red streaks leading from the site. ¨ Pus draining from the site. ¨ A fever. ? Watch closely for changes in your health, and be sure to contact your doctor if you have any problems. Where can you learn more? Go to http://nam-leon.info/. Enter N616 in the search box to learn more about \"Joint Injections: Care Instructions. \" Current as of: March 21, 2017 Content Version: 11.4 © 1232-8491 [employer]. Care instructions adapted under license by [employer] (which disclaims liability or warranty for this information). If you have questions about a medical condition or this instruction, always ask your healthcare professional. [address] any warranty or liability for your use of this information. Please provide this summary of care documentation to your next provider. Your primary care clinician is listed as Evins Kanner. If you have any questions after today's visit, please call [phone].
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
## 2018-04-18 ENCOUNTER — TELEPHONE (OUTPATIENT)
Dept: FAMILY MEDICINE CLINIC | Age: 58
End: 2018-04-18

## 2018-04-18 NOTE — TELEPHONE ENCOUNTER
Pt called and stated that he left several messages and has not received a return call pt stated that he dropped paper work off at the  yesterday in regards to medication. Pt states that he would like a call back today in regards to status of paperwork and medication. Please advise.

## 2018-04-20 DIAGNOSIS — E11.49 OTHER DIABETIC NEUROLOGICAL COMPLICATION ASSOCIATED WITH TYPE 2 DIABETES MELLITUS (HCC): ICD-10-CM

## 2018-04-20 RX ORDER — GABAPENTIN 800 MG/1
TABLET ORAL
Qty: 60 TAB | Refills: 0 | Status: SHIPPED | OUTPATIENT
Start: 2018-04-20 | End: 2018-05-10 | Stop reason: SDUPTHER

## 2018-04-22 ENCOUNTER — HOSPITAL ENCOUNTER (EMERGENCY)
Age: 58
Discharge: HOME | End: 2018-04-22
Attending: EMERGENCY MEDICINE | Admitting: EMERGENCY MEDICINE
Payer: COMMERCIAL

## 2018-04-22 VITALS
HEART RATE: 72 BPM | SYSTOLIC BLOOD PRESSURE: 130 MMHG | OXYGEN SATURATION: 99 % | WEIGHT: 200 LBS | BODY MASS INDEX: 29.62 KG/M2 | RESPIRATION RATE: 16 BRPM | HEIGHT: 69 IN | TEMPERATURE: 97.9 F | DIASTOLIC BLOOD PRESSURE: 75 MMHG

## 2018-04-22 DIAGNOSIS — R51.9 ACUTE NONINTRACTABLE HEADACHE, UNSPECIFIED HEADACHE TYPE: Primary | ICD-10-CM

## 2018-04-22 PROCEDURE — 74011250636 HC RX REV CODE- 250/636: Performed by: PHYSICIAN ASSISTANT

## 2018-04-22 PROCEDURE — 99283 EMERGENCY DEPT VISIT LOW MDM: CPT

## 2018-04-22 PROCEDURE — 96374 THER/PROPH/DIAG INJ IV PUSH: CPT

## 2018-04-22 PROCEDURE — 74011250637 HC RX REV CODE- 250/637: Performed by: PHYSICIAN ASSISTANT

## 2018-04-22 PROCEDURE — 96361 HYDRATE IV INFUSION ADD-ON: CPT

## 2018-04-22 PROCEDURE — 96375 TX/PRO/DX INJ NEW DRUG ADDON: CPT

## 2018-04-22 RX ORDER — BUTALBITAL, ACETAMINOPHEN AND CAFFEINE 300; 40; 50 MG/1; MG/1; MG/1
1 CAPSULE ORAL
Qty: 6 CAP | Refills: 0 | Status: SHIPPED | OUTPATIENT
Start: 2018-04-22 | End: 2019-03-03

## 2018-04-22 RX ORDER — KETOROLAC TROMETHAMINE 30 MG/ML
30 INJECTION, SOLUTION INTRAMUSCULAR; INTRAVENOUS
Status: COMPLETED | OUTPATIENT
Start: 2018-04-22 | End: 2018-04-22

## 2018-04-22 RX ORDER — BUTALBITAL, ACETAMINOPHEN AND CAFFEINE 300; 40; 50 MG/1; MG/1; MG/1
1 CAPSULE ORAL
Status: COMPLETED | OUTPATIENT
Start: 2018-04-22 | End: 2018-04-22

## 2018-04-22 RX ORDER — METOCLOPRAMIDE HYDROCHLORIDE 5 MG/ML
10 INJECTION INTRAMUSCULAR; INTRAVENOUS
Status: COMPLETED | OUTPATIENT
Start: 2018-04-22 | End: 2018-04-22

## 2018-04-22 RX ADMIN — BUTALBITAL, ACETAMINOPHEN AND CAFFEINE 1 CAPSULE: 300; 40; 50 CAPSULE ORAL at 14:18

## 2018-04-22 RX ADMIN — KETOROLAC TROMETHAMINE 30 MG: 30 INJECTION, SOLUTION INTRAMUSCULAR at 13:20

## 2018-04-22 RX ADMIN — METOCLOPRAMIDE 10 MG: 5 INJECTION, SOLUTION INTRAMUSCULAR; INTRAVENOUS at 13:21

## 2018-04-22 RX ADMIN — SODIUM CHLORIDE 1000 ML: 900 INJECTION, SOLUTION INTRAVENOUS at 13:21

## 2018-04-22 NOTE — ED PROVIDER NOTES
EMERGENCY DEPARTMENT HISTORY AND PHYSICAL EXAM    Date: 4/22/2018  Patient Name: Yesenia Richard    History of Presenting Illness     Chief Complaint   Patient presents with    Headache         History Provided By: Patient    Chief Complaint: headache  Duration: today  Timing:  Acute  Location: forehead  Quality: Aching  Severity: Moderate  Modifying Factors: no improvement with motrin  Associated Symptoms: photophobia      Additional History (Context): Yesenia Richard is a 59 y.o. male with HTN, GERD, DM, migraines who presents with HA which started this morning. Located on left side of forehead. Similar to previous HAs. Not worse HA of life. Took motrin without improvement in pain. Has some associated photophobia. Denies fever, chills, stiff neck, n/v. No other complaints. PCP: Gene Benitez DO    Current Facility-Administered Medications   Medication Dose Route Frequency Provider Last Rate Last Dose    sodium chloride 0.9 % bolus infusion 1,000 mL  1,000 mL IntraVENous ONCE Charla Garcia PA-C 1,000 mL/hr at 04/22/18 1321 1,000 mL at 04/22/18 1321    butalbital-acetaminophen-caff (FIORICET) per capsule 1 Cap  1 Cap Oral NOW Charla Garcia PA-C         Current Outpatient Prescriptions   Medication Sig Dispense Refill    butalbital-acetaminophen-caff (FIORICET) -40 mg per capsule Take 1 Cap by mouth every six (6) hours as needed for Headache. 6 Cap 0    lisinopril (PRINIVIL, ZESTRIL) 40 mg tablet Take 1 Tab by mouth daily. 30 Tab 3    insulin glargine (LANTUS,BASAGLAR) 100 unit/mL (3 mL) inpn 10 Units by SubCUTAneous route nightly. 15 Pen 3    aspirin 81 mg tablet Take 1 Tab by mouth daily. 1 Tab 0    gabapentin (NEURONTIN) 800 mg tablet TAKE ONE TABLET BY MOUTH TWICE A DAY 60 Tab 0    SITagliptin-metFORMIN (JANUMET) 50-1,000 mg per tablet Take 1 Tab by mouth two (2) times daily (with meals).  180 Tab 3    cyclobenzaprine (FLEXERIL) 10 mg tablet Take 1 Tab by mouth three (3) times daily as needed for Muscle Spasm(s). 12 Tab 0    INSULIN ASPART (NOVOLOG SC) by SubCUTAneous route.  insulin lispro protamine/insulin lispro (HUMALOG MIX 50/50) 100 unit/mL (50-50) injection 10 Units by SubCUTAneous route three (3) times daily (with meals). Inject  beneath the skin. Per pt 10 units three times a day (with meals)      simvastatin (ZOCOR) 20 mg tablet Take 1 Tab by mouth nightly. 27 Tab 11       Past History     Past Medical History:  Past Medical History:   Diagnosis Date    Alcohol use     Fri-Sun one fifth; Mon-Thur: Pint of liquor    CAD (coronary artery disease)     Cardiac angioplasty with stent 07/29/2009    2.5 x 13 Cypher stent to CX.  Cardiac cath 11/09/2011    RCA patent. LM patent. LAD patent. mD1 55%. CX patent. Prior stent patent. Edison 85% (2.5 x 15-mm Xience stent, resid 0%). LVEDP 12. EF 40-45%. High lateral hypk (RI distribution).       Cardiac inferior STEMI 2009    Current smoker     contemplating stopping    Diabetes ([address]     type 2-insulin dependent    GERD (gastroesophageal reflux disease)     HTN (hypertension)     Hyperlipidemia     Migraine     Myocardial infarction [facility])        Past Surgical History:  Past Surgical History:   Procedure Laterality Date    HX CORONARY STENT PLACEMENT  07/29/2009    HX HEART CATHETERIZATION  8/30/2011    HX HEART CATHETERIZATION  11/09/2011    HX WISDOM TEETH EXTRACTION      x4       Family History:  Family History   Problem Relation Age of Onset    Hypertension Mother     Heart Attack Mother     Diabetes Mother     Hypertension Father     Heart Attack Father     Diabetes Father     Diabetes Sister     Hypertension Sister     Stroke Sister     Diabetes Brother     Hypertension Brother     Stroke Brother     No Known Problems Maternal Grandmother     No Known Problems Maternal Grandfather     No Known Problems Paternal Grandmother     No Known Problems Paternal Grandfather     No Known Problems Brother  Heart Disease Other     Kidney Disease Other        Social History:  Social History   Substance Use Topics    Smoking status: Current Every Day Smoker     Packs/day: 0.50     Years: 1.00     Types: Cigarettes    Smokeless tobacco: Never Used    Alcohol use 2.5 oz/week     5 Shots of liquor per week      Comment: occassionally       Allergies: Allergies   Allergen Reactions    Niacin Itching         Review of Systems   Review of Systems   Constitutional: Negative for chills and fever. Eyes: Positive for photophobia. Gastrointestinal: Negative for nausea and vomiting. Neurological: Positive for headaches. All other systems reviewed and are negative. All Other Systems Negative  Physical Exam     Vitals:    04/22/18 1256   BP: 130/75   Pulse: 72   Resp: 16   Temp: 97.9 °F (36.6 °C)   SpO2: 99%   Weight: 90.7 kg (200 lb)   Height: 5' 9\" (1.753 m)     Physical Exam   Constitutional: He is oriented to person, place, and time. He appears well-developed and well-nourished. No distress. HENT:   Head: Normocephalic and atraumatic. Eyes: Conjunctivae and EOM are normal. Pupils are equal, round, and reactive to light. Neck: Normal range of motion. Neck supple. Cardiovascular: Normal rate, regular rhythm and normal heart sounds. Pulmonary/Chest: Effort normal and breath sounds normal. No respiratory distress. He has no wheezes. He has no rales. Abdominal: Soft. He exhibits no distension. There is no tenderness. There is no rebound and no guarding. Musculoskeletal: Normal range of motion. Neurological: He is alert and oriented to person, place, and time. No cranial nerve deficit. He exhibits normal muscle tone. Coordination normal.   Skin: Skin is warm and dry. Psychiatric: He has a normal mood and affect. His behavior is normal. Judgment and thought content normal.   Nursing note and vitals reviewed.              Diagnostic Study Results     Labs -   No results found for this or any previous visit (from the past 12 hour(s)). Radiologic Studies -   No orders to display     CT Results  (Last 48 hours)    None        CXR Results  (Last 48 hours)    None            Medical Decision Making   I am the first provider for this patient. I reviewed the vital signs, available nursing notes, past medical history, past surgical history, family history and social history. Records Reviewed: Nursing Notes and Old Medical Records    Procedures:  Procedures    Provider Notes (Medical Decision Making):     DDX: migraine, other HA    2:21 PM Pt well appearing and in NAD. Here with typical presentation of migraine HA. Feeling better with meds. No red flags. Will d/h to f/u with PCP for further eval.     MED RECONCILIATION:  Current Facility-Administered Medications   Medication Dose Route Frequency    sodium chloride 0.9 % bolus infusion 1,000 mL  1,000 mL IntraVENous ONCE    butalbital-acetaminophen-caff (FIORICET) per capsule 1 Cap  1 Cap Oral NOW     Current Outpatient Prescriptions   Medication Sig    butalbital-acetaminophen-caff (FIORICET) -40 mg per capsule Take 1 Cap by mouth every six (6) hours as needed for Headache.  lisinopril (PRINIVIL, ZESTRIL) 40 mg tablet Take 1 Tab by mouth daily.  insulin glargine (LANTUS,BASAGLAR) 100 unit/mL (3 mL) inpn 10 Units by SubCUTAneous route nightly.  aspirin 81 mg tablet Take 1 Tab by mouth daily.  gabapentin (NEURONTIN) 800 mg tablet TAKE ONE TABLET BY MOUTH TWICE A DAY    SITagliptin-metFORMIN (JANUMET) 50-1,000 mg per tablet Take 1 Tab by mouth two (2) times daily (with meals).  cyclobenzaprine (FLEXERIL) 10 mg tablet Take 1 Tab by mouth three (3) times daily as needed for Muscle Spasm(s).  INSULIN ASPART (NOVOLOG SC) by SubCUTAneous route.  insulin lispro protamine/insulin lispro (HUMALOG MIX 50/50) 100 unit/mL (50-50) injection 10 Units by SubCUTAneous route three (3) times daily (with meals). Inject  beneath the skin.  Per pt 10 units three times a day (with meals)    simvastatin (ZOCOR) 20 mg tablet Take 1 Tab by mouth nightly. Disposition:  home    DISCHARGE NOTE:     Patient is improved, resting quietly and comfortably. The patient will be discharged home.      The patient was reassured that these symptoms do not appear to represent a serious or life threatening condition at this time. Warning signs of worsening condition were discussed and understood by the patient.      Based on patient's age, coexisting illness, exam, and the results of this ED evaluation, the decision to treat as an outpatient was made. Based on the information available at time of discharge, acute pathology requiring immediate intervention was deemed relative unlikely. While it is impossible to completely exclude the possibility of underlying serious disease or worsening of condition, I feel the relative likelihood is extremely low. I discussed this uncertainty with the patient, who understood ED evaluation and treatment and felt comfortable with the outpatient treatment plan.      All questions regarding care, test results, and follow up were answered. The patient is stable and appropriate to discharge. They understand that they should return to the emergency department for any new or worsening symptoms. I stressed the importance of follow up for repeat assessment and possibly further evaluation/treatment. Follow-up Information     Follow up With Details Comments Contact Info    [address], DO Call To make a follow up appointment [address] [phone] [address] [facility] EMERGENCY DEPT  Immediately if symptoms worsen Derik Shepherd [address]           Current Discharge Medication List      START taking these medications    Details   butalbital-acetaminophen-caff (FIORICET) -40 mg per capsule Take 1 Cap by mouth every six (6) hours as needed for Headache.   Qty: 6 Cap, Refills: 0 CONTINUE these medications which have NOT CHANGED    Details   lisinopril (PRINIVIL, ZESTRIL) 40 mg tablet Take 1 Tab by mouth daily. Qty: 30 Tab, Refills: 3    Associated Diagnoses: Essential hypertension      insulin glargine (LANTUS,BASAGLAR) 100 unit/mL (3 mL) inpn 10 Units by SubCUTAneous route nightly. Qty: 15 Pen, Refills: 3    Comments: Dispense Lantus Specifically  Associated Diagnoses: Type 2 diabetes mellitus with diabetic neuropathy, with long-term current use of insulin ([facility] Uncontrolled type 2 diabetes mellitus with complication, without long-term current use of insulin ([facility])      aspirin 81 mg tablet Take 1 Tab by mouth daily. Qty: 1 Tab, Refills: 0      gabapentin (NEURONTIN) 800 mg tablet TAKE ONE TABLET BY MOUTH TWICE A DAY  Qty: 60 Tab, Refills: 0    Associated Diagnoses: Other diabetic neurological complication associated with type 2 diabetes mellitus ([facility])      SITagliptin-metFORMIN (JANUMET) 50-1,000 mg per tablet Take 1 Tab by mouth two (2) times daily (with meals). Qty: 180 Tab, Refills: 3    Associated Diagnoses: Uncontrolled type 2 diabetes mellitus with complication, without long-term current use of insulin ([address] Type 2 diabetes mellitus with diabetic neuropathy, with long-term current use of insulin ([facility])      cyclobenzaprine (FLEXERIL) 10 mg tablet Take 1 Tab by mouth three (3) times daily as needed for Muscle Spasm(s). Qty: 12 Tab, Refills: 0      INSULIN ASPART (NOVOLOG SC) by SubCUTAneous route. insulin lispro protamine/insulin lispro (HUMALOG MIX 50/50) 100 unit/mL (50-50) injection 10 Units by SubCUTAneous route three (3) times daily (with meals). Inject  beneath the skin. Per pt 10 units three times a day (with meals)      simvastatin (ZOCOR) 20 mg tablet Take 1 Tab by mouth nightly. Qty: 30 Tab, Refills: 11               Diagnosis     Clinical Impression:   1.  Acute nonintractable headache, unspecified headache type

## 2018-04-22 NOTE — ED NOTES
Farzana Gomez is a 59 y.o. male that was discharged in stable condition. The patients diagnosis, condition and treatment were explained to  patient and aftercare instructions were given. The patient verbalized understanding. Patient armband removed and shredded.

## 2018-04-22 NOTE — ED TRIAGE NOTES
NOBLE began about 8 this morning pt is light sensitive took motrin but not helping , Denies vomiting

## 2018-04-22 NOTE — DISCHARGE INSTRUCTIONS

## 2018-04-27 ENCOUNTER — TELEPHONE (OUTPATIENT)
Dept: FAMILY MEDICINE CLINIC | Age: 58
End: 2018-04-27

## 2018-04-27 ENCOUNTER — HOSPITAL ENCOUNTER (OUTPATIENT)
Dept: PHYSICAL THERAPY | Age: 58
Discharge: HOME | End: 2018-04-27
Payer: COMMERCIAL

## 2018-04-27 PROCEDURE — 97110 THERAPEUTIC EXERCISES: CPT | Performed by: PHYSICAL THERAPIST

## 2018-04-27 PROCEDURE — 97161 PT EVAL LOW COMPLEX 20 MIN: CPT | Performed by: PHYSICAL THERAPIST
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
## 2018-04-27 NOTE — PROGRESS NOTES
PT DAILY TREATMENT NOTE     Patient Name: Lakshmi Whiteside  Date:2018  : 1960  [x]  Patient  Verified  Payor: Seth Mendoza / Plan: Madelyn Care / Product Type: Commerical /    In time:308  Out time:332  Total Treatment Time (min): 24  Visit #: 1 of 10    Treatment Area: Pain in left shoulder [M25.512]  Bursitis of left shoulder [M75.52]  Other shoulder lesions, left shoulder [M75.82]    SUBJECTIVE  Pain Level (0-10 scale): 2  Any medication changes, allergies to medications, adverse drug reactions, diagnosis change, or new procedure performed?: [x] No    [] Yes (see summary sheet for update)  Subjective functional status/changes:   [] No changes reported  See eval.    OBJECTIVE    14 min [x]Eval                  []Re-Eval       10 min Therapeutic Exercise:  [] See flow sheet :   Rationale: increase ROM, increase strength, improve coordination and increase proprioception to improve the patients ability to tolerate L UE tasks with reduced pain. With   [] TE   [] TA   [] neuro   [] other: Patient Education: [x] Review HEP    [] Progressed/Changed HEP based on:   [] positioning   [] body mechanics   [] transfers   [] heat/ice application    [] other:      Other Objective/Functional Measures: See eval.     Pain Level (0-10 scale) post treatment: 2    ASSESSMENT/Changes in Function:   [x]  See Plan of Care    Progress towards goals / Updated goals:  Short Term Goals: To be accomplished in 2 weeks:  1. Pt to report [place] with HEP. Eval status: HEP issued and reviewed physically/verbally with pt    Long Term Goals: To be accomplished in 4 weeks:  1. Pt to report score of 68 on FOTO, indicating improved tolerance to activity and reduced pain. Eval status: 39 on initial FOTO  2. Pt to report max level of pain <4/10 with activity, indicating reduced pain and improved mobility. Eval status: max level of pain 9/10  3.  Pt to demonstrate full L shoulder AROM/PROM at 160 degrees flexion/abduction, 75 degrees ER (90/90), able to reach C7 with IR behind the back  Eval status: L shoulder AROM 125 degrees flexion, 100 degrees abduction, 50 degrees ER (90/90), able to reach to waistline of pants with behind the back IR  4. Pt to demonstrate full 5/5 L shoulder strength with MMT, indicating improved tolerance to activity and reduced pain. Eval status: 4/5 shoulder flexion, 4-/5 abduction, 4/5 ER, 4/5 IR, 4/5 bicep, 4/5 tricep  5. Pt to be able to sleep through the night without waking secondary to pain. Eval status: currently limited secondary to pain  6. Pt to be able to return to full PLOF at home and in the community with improved mobility, strength and stability, and reduced fall risk. Eval status: limited  7. Pt to be able to perform overhead and upper body hygiene/dressing tasks without increased c/o pain or difficulty.   Eval status: limited by pain and reduced ROM    PLAN  [x]  Upgrade activities as tolerated     [x]  Continue plan of care  []  Update interventions per flow sheet       []  Discharge due to:_  []  Other:_      Winter Colin, PT 4/27/2018  4:10 PM    Future Appointments  Date Time Provider Henri Becerra   5/10/2018 8:00 AM [facility] None

## 2018-04-27 NOTE — TELEPHONE ENCOUNTER
Pt calling in regards to update about insulin med that is supposed to be sent to office. Please advise.

## 2018-04-27 NOTE — TELEPHONE ENCOUNTER
Called the patient to advise him that [employer] has faxed a letter stating that the patient has to provide proof of income as the letter he handwrote was not sufficient and they will not process his application until this information is submitted. He has been given the contact number for them so that he can inquire as to what would be an acceptable document for submission.

## 2018-05-07 ENCOUNTER — APPOINTMENT (OUTPATIENT)
Dept: GENERAL RADIOLOGY | Age: 58
End: 2018-05-07
Attending: PHYSICIAN ASSISTANT
Payer: SELF-PAY

## 2018-05-07 ENCOUNTER — HOSPITAL ENCOUNTER (EMERGENCY)
Age: 58
Discharge: HOME | End: 2018-05-07
Attending: EMERGENCY MEDICINE
Payer: SELF-PAY

## 2018-05-07 VITALS
BODY MASS INDEX: 29.62 KG/M2 | DIASTOLIC BLOOD PRESSURE: 66 MMHG | OXYGEN SATURATION: 100 % | TEMPERATURE: 97.5 F | HEIGHT: 69 IN | WEIGHT: 200 LBS | RESPIRATION RATE: 18 BRPM | SYSTOLIC BLOOD PRESSURE: 124 MMHG | HEART RATE: 65 BPM

## 2018-05-07 DIAGNOSIS — R10.9 LEFT FLANK PAIN: ICD-10-CM

## 2018-05-07 DIAGNOSIS — M25.512 ACUTE PAIN OF LEFT SHOULDER: Primary | ICD-10-CM
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
## 2018-05-07 DIAGNOSIS — E11.65 TYPE 2 DIABETES MELLITUS WITH HYPERGLYCEMIA, WITH LONG-TERM CURRENT USE OF INSULIN (HCC): ICD-10-CM

## 2018-05-07 DIAGNOSIS — M75.52 ACUTE SHOULDER BURSITIS, LEFT: ICD-10-CM

## 2018-05-07 DIAGNOSIS — Z79.4 TYPE 2 DIABETES MELLITUS WITH HYPERGLYCEMIA, WITH LONG-TERM CURRENT USE OF INSULIN (HCC): ICD-10-CM

## 2018-05-07 LAB
ANION GAP SERPL CALC-SCNC: 7 MMOL/L (ref 3–18)
APPEARANCE UR: CLEAR
ATRIAL RATE: 66 BPM
BASOPHILS # BLD: 0 K/UL (ref 0–0.06)
BASOPHILS NFR BLD: 1 % (ref 0–2)
BILIRUB UR QL: NEGATIVE
BUN SERPL-MCNC: 18 MG/DL (ref 7–18)
BUN/CREAT SERPL: 16 (ref 12–20)
CALCIUM SERPL-MCNC: 8.5 MG/DL (ref 8.5–10.1)
CALCULATED P AXIS, ECG09: 40 DEGREES
CALCULATED R AXIS, ECG10: 44 DEGREES
CALCULATED T AXIS, ECG11: -112 DEGREES
CHLORIDE SERPL-SCNC: 98 MMOL/L (ref 100–108)
CK MB CFR SERPL CALC: 0.7 % (ref 0–4)
CK MB SERPL-MCNC: 2.1 NG/ML (ref 5–25)
CK SERPL-CCNC: 287 U/L (ref 39–308)
CO2 SERPL-SCNC: 28 MMOL/L (ref 21–32)
COLOR UR: YELLOW
CREAT SERPL-MCNC: 1.1 MG/DL (ref 0.6–1.3)
DIAGNOSIS, 93000: NORMAL
DIFFERENTIAL METHOD BLD: ABNORMAL
EOSINOPHIL # BLD: 0.1 K/UL (ref 0–0.4)
EOSINOPHIL NFR BLD: 4 % (ref 0–5)
ERYTHROCYTE [DISTWIDTH] IN BLOOD BY AUTOMATED COUNT: 12.8 % (ref 11.6–14.5)
GLUCOSE BLD STRIP.AUTO-MCNC: 193 MG/DL (ref 70–110)
GLUCOSE SERPL-MCNC: 384 MG/DL (ref 74–99)
GLUCOSE UR STRIP.AUTO-MCNC: >1000 MG/DL
HCT VFR BLD AUTO: 41.9 % (ref 36–48)
HGB BLD-MCNC: 14.5 G/DL (ref 13–16)
HGB UR QL STRIP: NEGATIVE
KETONES UR QL STRIP.AUTO: NEGATIVE MG/DL
LEUKOCYTE ESTERASE UR QL STRIP.AUTO: NEGATIVE
LYMPHOCYTES # BLD: 1.2 K/UL (ref 0.9–3.6)
LYMPHOCYTES NFR BLD: 38 % (ref 21–52)
MCH RBC QN AUTO: 30.2 PG (ref 24–34)
MCHC RBC AUTO-ENTMCNC: 34.6 G/DL (ref 31–37)
MCV RBC AUTO: 87.3 FL (ref 74–97)
MONOCYTES # BLD: 0.4 K/UL (ref 0.05–1.2)
MONOCYTES NFR BLD: 11 % (ref 3–10)
NEUTS SEG # BLD: 1.5 K/UL (ref 1.8–8)
NEUTS SEG NFR BLD: 46 % (ref 40–73)
NITRITE UR QL STRIP.AUTO: NEGATIVE
P-R INTERVAL, ECG05: 160 MS
PH UR STRIP: 5.5 [PH] (ref 5–8)
PLATELET # BLD AUTO: 131 K/UL (ref 135–420)
PMV BLD AUTO: 12.7 FL (ref 9.2–11.8)
POTASSIUM SERPL-SCNC: 3.7 MMOL/L (ref 3.5–5.5)
PROT UR STRIP-MCNC: NEGATIVE MG/DL
Q-T INTERVAL, ECG07: 420 MS
QRS DURATION, ECG06: 96 MS
QTC CALCULATION (BEZET), ECG08: 440 MS
RBC # BLD AUTO: 4.8 M/UL (ref 4.7–5.5)
SODIUM SERPL-SCNC: 133 MMOL/L (ref 136–145)
SP GR UR REFRACTOMETRY: >1.03 (ref 1–1.03)
TROPONIN I SERPL-MCNC: <0.02 NG/ML (ref 0–0.06)
UROBILINOGEN UR QL STRIP.AUTO: 1 EU/DL (ref 0.2–1)
VENTRICULAR RATE, ECG03: 66 BPM
WBC # BLD AUTO: 3.3 K/UL (ref 4.6–13.2)

## 2018-05-07 PROCEDURE — 82550 ASSAY OF CK (CPK): CPT | Performed by: PHYSICIAN ASSISTANT

## 2018-05-07 PROCEDURE — 73030 X-RAY EXAM OF SHOULDER: CPT

## 2018-05-07 PROCEDURE — 96361 HYDRATE IV INFUSION ADD-ON: CPT

## 2018-05-07 PROCEDURE — 74011636637 HC RX REV CODE- 636/637: Performed by: PHYSICIAN ASSISTANT

## 2018-05-07 PROCEDURE — 74011250636 HC RX REV CODE- 250/636: Performed by: PHYSICIAN ASSISTANT

## 2018-05-07 PROCEDURE — 85025 COMPLETE CBC W/AUTO DIFF WBC: CPT | Performed by: PHYSICIAN ASSISTANT

## 2018-05-07 PROCEDURE — 82962 GLUCOSE BLOOD TEST: CPT

## 2018-05-07 PROCEDURE — 80048 BASIC METABOLIC PNL TOTAL CA: CPT | Performed by: PHYSICIAN ASSISTANT

## 2018-05-07 PROCEDURE — 81003 URINALYSIS AUTO W/O SCOPE: CPT | Performed by: PHYSICIAN ASSISTANT

## 2018-05-07 PROCEDURE — 99284 EMERGENCY DEPT VISIT MOD MDM: CPT

## 2018-05-07 PROCEDURE — 71046 X-RAY EXAM CHEST 2 VIEWS: CPT

## 2018-05-07 PROCEDURE — 93005 ELECTROCARDIOGRAM TRACING: CPT

## 2018-05-07 PROCEDURE — 96374 THER/PROPH/DIAG INJ IV PUSH: CPT

## 2018-05-07 RX ORDER — HYDROCODONE BITARTRATE AND ACETAMINOPHEN 5; 325 MG/1; MG/1
1 TABLET ORAL
Qty: 10 TAB | Refills: 0 | Status: SHIPPED | OUTPATIENT
Start: 2018-05-07 | End: 2018-07-04 | Stop reason: ALTCHOICE

## 2018-05-07 RX ADMIN — SODIUM CHLORIDE 1000 ML: 900 INJECTION, SOLUTION INTRAVENOUS at 17:41

## 2018-05-07 RX ADMIN — INSULIN HUMAN 10 UNITS: 100 INJECTION, SOLUTION PARENTERAL at 17:41
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
## 2018-05-08 LAB — GLUCOSE BLD STRIP.AUTO-MCNC: 205 MG/DL (ref 70–110)

## 2018-05-10 ENCOUNTER — OFFICE VISIT (OUTPATIENT)
Dept: FAMILY MEDICINE CLINIC | Age: 58
End: 2018-05-10

## 2018-05-10 VITALS
SYSTOLIC BLOOD PRESSURE: 140 MMHG | RESPIRATION RATE: 20 BRPM | HEIGHT: 69 IN | HEART RATE: 55 BPM | WEIGHT: 196 LBS | DIASTOLIC BLOOD PRESSURE: 74 MMHG | TEMPERATURE: 97.4 F | BODY MASS INDEX: 29.03 KG/M2

## 2018-05-10 DIAGNOSIS — Z79.4 TYPE 2 DIABETES MELLITUS WITH DIABETIC NEUROPATHY, WITH LONG-TERM CURRENT USE OF INSULIN (HCC): ICD-10-CM

## 2018-05-10 DIAGNOSIS — E11.40 TYPE 2 DIABETES MELLITUS WITH DIABETIC NEUROPATHY, WITH LONG-TERM CURRENT USE OF INSULIN (HCC): ICD-10-CM

## 2018-05-10 DIAGNOSIS — E11.49 OTHER DIABETIC NEUROLOGICAL COMPLICATION ASSOCIATED WITH TYPE 2 DIABETES MELLITUS (HCC): ICD-10-CM

## 2018-05-10 DIAGNOSIS — M25.512 ACUTE PAIN OF LEFT SHOULDER: ICD-10-CM

## 2018-05-10 DIAGNOSIS — E78.00 HYPERCHOLESTEROLEMIA: Primary | ICD-10-CM

## 2018-05-10 RX ORDER — METFORMIN HYDROCHLORIDE 1000 MG/1
1000 TABLET ORAL 2 TIMES DAILY WITH MEALS
Qty: 60 TAB | Refills: 2 | Status: SHIPPED | OUTPATIENT
Start: 2018-05-10 | End: 2018-08-20 | Stop reason: SDUPTHER

## 2018-05-10 RX ORDER — IBUPROFEN 800 MG/1
800 TABLET ORAL
Qty: 60 TAB | Refills: 1 | Status: SHIPPED | OUTPATIENT
Start: 2018-05-10 | End: 2018-12-07 | Stop reason: SDUPTHER

## 2018-05-10 RX ORDER — SIMVASTATIN 20 MG/1
20 TABLET, FILM COATED ORAL
Qty: 30 TAB | Refills: 2 | Status: SHIPPED | OUTPATIENT
Start: 2018-05-10 | End: 2018-08-20 | Stop reason: SDUPTHER

## 2018-05-10 RX ORDER — INSULIN GLARGINE 100 [IU]/ML
10 INJECTION, SOLUTION SUBCUTANEOUS
Qty: 15 PEN | Refills: 3 | Status: CANCELLED | OUTPATIENT
Start: 2018-05-10

## 2018-05-10 RX ORDER — GABAPENTIN 800 MG/1
TABLET ORAL
Qty: 60 TAB | Refills: 2 | Status: SHIPPED | OUTPATIENT
Start: 2018-05-10 | End: 2018-07-04 | Stop reason: ALTCHOICE

## 2018-05-10 RX ORDER — GLIPIZIDE 5 MG/1
5 TABLET ORAL 2 TIMES DAILY
Qty: 60 TAB | Refills: 2 | Status: SHIPPED | OUTPATIENT
Start: 2018-05-10 | End: 2018-08-20 | Stop reason: DRUGHIGH
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
## 2018-05-10 NOTE — PATIENT INSTRUCTIONS
Please contact our office if you have any questions about your visit today. 1.) OK to use Men's One A Day multivitamin. 2.) Use Glipizide and Metformin for now for diabetes. 3.) Use Ibuprofen as needed for pain. 4.) Return in 2 months for follow up. Learning About Depression Screening  What is depression screening? Depression screening is a way to see if you have depression symptoms. It may be done by a doctor or counselor. This screening is often part of a routine checkup. That's because your mental health is just as important as your physical health. Depression is a medical illness that affects how you feel, think, and act. You may:  · Have less energy. · Lose interest in your daily activities. · Feel sad and grouchy for a long time. Depression is very common. It affects men and women of all ages. Many things can trigger depression. Some people become depressed after they have a stroke or find out they have a major illness like cancer or heart disease. The death of a loved one, a breakup, or changes in the natural brain chemicals may lead to depression. It can run in families. Most experts believe that a combination of family history (a person's genes) and stressful life events can cause depression. What happens during screening? Your doctor may ask about your feelings, any changes in eating habits, your energy level, and your interest in your daily tasks. He or she may ask other questions, such as how well you are sleeping and if you can focus on the things you do. This may be an informal talk between the two of you. Or your doctor may ask you to fill out a quick form and then talk about your answers. Some diseases or changes in your body can cause symptoms that look like depression. So your doctor may do blood tests to help rule out other problems, such as hormone changes, a low thyroid level, or anemia. What happens after screening?   If you have signs of depression, your doctor will talk to you about your options. Doctors usually treat depression with medicines or counseling. Often, combining the two works best. Many people don't get help because they think that they'll get over the depression on their own. But people with depression may not get better unless they get treatment. Many people feel embarrassed or ashamed about having depression. But it isn't a sign of personal weakness. It's not a character flaw. A person who is depressed is not \"crazy. \" Depression is caused by changes in the brain. A serious symptom of depression is thinking about death or suicide. If you or someone you care about talks about this or about feeling hopeless, get help right away. It's important to know that depression can be treated. The first step toward feeling better is often just seeing that the problem exists. Where can you learn more? Go to http://nam-leon.info/. Enter T185 in the search box to learn more about \"Learning About Depression Screening. \"  Current as of: May 12, 2017  Content Version: 11.4  © 6595-6841 Healthwise, Incorporated. Care instructions adapted under license by [employer] (which disclaims liability or warranty for this information). If you have questions about a medical condition or this instruction, always ask your healthcare professional. [address] any warranty or liability for your use of this information.

## 2018-05-10 NOTE — LETTER
NOTIFICATION RETURN TO WORK / SCHOOL 
 
5/10/2018 8:35 AM 
 
Mr. Kristina Schmitt 
[address] [facility] [address] To Whom It May Concern: 
 
Kristina Schmitt is currently under the care of Amador Wren. He is exempt from any tardiness for May 10, 2018. He will return to work/school on: May 10, 2018. If there are questions or concerns please have the patient contact our office. Sincerely, [id number] Cristino Rivas, DO

## 2018-05-10 NOTE — MR AVS SNAPSHOT
[facility] 
 
 
 [address] [address] [address] [address] [phone] Patient: Chan Beltran MRN: [id number] MNS:[id number] Visit Information Date & Time Provider Department Dept. Phone Encounter #  
 5/10/2018  8:00 AM Hiral Peña 77 [id number] Follow-up Instructions Return in about 2 months (around 7/10/2018) for Follow Up . Upcoming Health Maintenance Date Due Hepatitis C Screening 1960 FOOT EXAM Q1 6/2/1968 EYE EXAM RETINAL OR DILATED Q1 6/2/1968 Pneumococcal 19-64 Medium Risk (1 of 1 - PPSV23) 6/2/1977 DTaP/Tdap/Td series (1 - Tdap) 6/2/1979 FOBT Q 1 YEAR AGE 50-75 6/2/2008 MICROALBUMIN Q1 1/13/2017 LIPID PANEL Q1 1/13/2017 ZOSTER VACCINE AGE 60> 4/2/2018 Influenza Age 5 to Adult 8/1/2018 HEMOGLOBIN A1C Q6M 9/27/2018 Allergies as of 5/10/2018  Review Complete On: 5/7/2018 By: Eben Carr RN Severity Noted Reaction Type Reactions Niacin  12/09/2011   Systemic Itching Current Immunizations  Never Reviewed No immunizations on file. Not reviewed this visit You Were Diagnosed With   
  
 Codes Comments Hypercholesterolemia    -  Primary ICD-10-CM: E78.00 ICD-9-CM: 272.0 Other diabetic neurological complication associated with type 2 diabetes mellitus ([facility]     ICD-10-CM: E11.49 
ICD-9-CM: 250.60 Type 2 diabetes mellitus with diabetic neuropathy, with long-term current use of insulin (HCC)     ICD-10-CM: E11.40, Z79.4 ICD-9-CM: 250.60, 357.2, V58.67 Uncontrolled type 2 diabetes mellitus with complication, without long-term current use of insulin (HCC)     ICD-10-CM: E11.8, E11.65 ICD-9-CM: 250.82 Acute pain of left shoulder     ICD-10-CM: M25.512 ICD-9-CM: 719.41 Vitals BP Pulse Temp Resp Height(growth percentile) Weight(growth percentile) 140/74 (BP 1 Location: Left arm, BP Patient Position: Sitting) (!) 55 97.4 °F (36.3 °C) (Oral) 20 5' 9\" (1.753 m) 196 lb (88.9 kg) BMI Smoking Status 28.94 kg/m2 Current Every Day Smoker BMI and BSA Data Body Mass Index Body Surface Area  
 28.94 kg/m 2 2.08 m 2 Preferred Pharmacy Pharmacy Name Phone [facility] [address] [facility] [phone] Your Updated Medication List  
  
   
This list is accurate as of 5/10/18  8:31 AM.  Always use your most recent med list.  
  
  
  
  
 aspirin 81 mg tablet Take 1 Tab by mouth daily. butalbital-acetaminophen-caff -40 mg per capsule Commonly known as:  [employer] Take 1 Cap by mouth every six (6) hours as needed for Headache.  
  
 gabapentin 800 mg tablet Commonly known as:  NEURONTIN  
TAKE ONE TABLET BY MOUTH TWICE A DAY  
  
 glipiZIDE 5 mg tablet Commonly known as:  Zoey Huger Take 1 Tab by mouth two (2) times a day. HYDROcodone-acetaminophen 5-325 mg per tablet Commonly known as:  Ludlow Laity Take 1 Tab by mouth every four (4) hours as needed for Pain. Max Daily Amount: 6 Tabs. ibuprofen 800 mg tablet Commonly known as:  MOTRIN Take 1 Tab by mouth every eight (8) hours as needed for Pain. insulin glargine 100 unit/mL (3 mL) Inpn Commonly known as:  LANTUS,BASAGLAR  
10 Units by SubCUTAneous route nightly. insulin lispro protamine/insulin lispro 100 unit/mL (50-50) injection Commonly known as:  HUMALOG MIX 50/50  
10 Units by SubCUTAneous route three (3) times daily (with meals). Inject  beneath the skin. Per pt 10 units three times a day (with meals)  
  
 lisinopril 40 mg tablet Commonly known as:  Pecola Cypher Take 1 Tab by mouth daily. metFORMIN 1,000 mg tablet Commonly known as:  GLUCOPHAGE Take 1 Tab by mouth two (2) times daily (with meals). NOVOLOG SC  
by SubCUTAneous route. simvastatin 20 mg tablet Commonly known as:  ZOCOR Take 1 Tab by mouth nightly. SITagliptin-metFORMIN 50-1,000 mg per tablet Commonly known as:  Florinda Brow Take 1 Tab by mouth two (2) times daily (with meals). Prescriptions Sent to Pharmacy Refills  
 gabapentin (NEURONTIN) 800 mg tablet 2 Sig: TAKE ONE TABLET BY MOUTH TWICE A DAY Class: Normal  
 Pharmacy: [address] Ph #: [phone]  
 simvastatin (ZOCOR) 20 mg tablet 2 Sig: Take 1 Tab by mouth nightly. Class: Normal  
 Pharmacy: [address] Ph #: [phone] Route: Oral  
 glipiZIDE (GLUCOTROL) 5 mg tablet 2 Sig: Take 1 Tab by mouth two (2) times a day. Class: Normal  
 Pharmacy: [address] Ph #: [phone] Route: Oral  
 metFORMIN (GLUCOPHAGE) 1,000 mg tablet 2 Sig: Take 1 Tab by mouth two (2) times daily (with meals). Class: Normal  
 Pharmacy: [address] Ph #: [phone] Route: Oral  
 ibuprofen (MOTRIN) 800 mg tablet 1 Sig: Take 1 Tab by mouth every eight (8) hours as needed for Pain. Class: Normal  
 Pharmacy: [address] Ph #: [phone] Route: Oral  
  
Follow-up Instructions Return in about 2 months (around 7/10/2018) for Follow Up . Patient Instructions Please contact our office if you have any questions about your visit today. 1.) OK to use Men's One A Day multivitamin. 2.) Use Glipizide and Metformin for now for diabetes. 3.) Use Ibuprofen as needed for pain. 4.) Return in 2 months for follow up. Learning About Depression Screening What is depression screening? Depression screening is a way to see if you have depression symptoms.  It may be done by a doctor or counselor. This screening is often part of a routine checkup. That's because your mental health is just as important as your physical health. Depression is a medical illness that affects how you feel, think, and act. You may: 
· Have less energy. · Lose interest in your daily activities. · Feel sad and grouchy for a long time. Depression is very common. It affects men and women of all ages. Many things can trigger depression. Some people become depressed after they have a stroke or find out they have a major illness like cancer or heart disease. The death of a loved one, a breakup, or changes in the natural brain chemicals may lead to depression. It can run in families. Most experts believe that a combination of family history (a person's genes) and stressful life events can cause depression. What happens during screening? Your doctor may ask about your feelings, any changes in eating habits, your energy level, and your interest in your daily tasks. He or she may ask other questions, such as how well you are sleeping and if you can focus on the things you do. This may be an informal talk between the two of you. Or your doctor may ask you to fill out a quick form and then talk about your answers. Some diseases or changes in your body can cause symptoms that look like depression. So your doctor may do blood tests to help rule out other problems, such as hormone changes, a low thyroid level, or anemia. What happens after screening? If you have signs of depression, your doctor will talk to you about your options. Doctors usually treat depression with medicines or counseling. Often, combining the two works best. Many people don't get help because they think that they'll get over the depression on their own. But people with depression may not get better unless they get treatment. Many people feel embarrassed or ashamed about having depression.  But it isn't a sign of personal weakness. It's not a character flaw. A person who is depressed is not \"crazy. \" Depression is caused by changes in the brain. A serious symptom of depression is thinking about death or suicide. If you or someone you care about talks about this or about feeling hopeless, get help right away. It's important to know that depression can be treated. The first step toward feeling better is often just seeing that the problem exists. Where can you learn more? Go to http://nam-leon.info/. Enter T185 in the search box to learn more about \"Learning About Depression Screening. \" Current as of: May 12, 2017 Content Version: 11.4 © 3035-5793 Healthwise, Incorporated. Care instructions adapted under license by [employer] (which disclaims liability or warranty for this information). If you have questions about a medical condition or this instruction, always ask your healthcare professional. [place] [address] any warranty or liability for your use of this information. Please provide this summary of care documentation to your next provider. Your primary care clinician is listed as [id number] Cristino Rivas. If you have any questions after today's visit, please call [phone].

## 2018-05-10 NOTE — PROGRESS NOTES
Chief Complaint   Patient presents with    Follow-up     1 month f/u    Other     neuropathy    Hypertension    Cholesterol Problem     high chol    Vitamin D Deficiency       Health Maintenance Due   Topic Date Due    Hepatitis C Screening  1960    FOOT EXAM Q1  06/02/1968    EYE EXAM RETINAL OR DILATED Q1  06/02/1968    Pneumococcal 19-64 Medium Risk (1 of 1 - PPSV23) 06/02/1977    DTaP/Tdap/Td series (1 - Tdap) 06/02/1979    FOBT Q 1 YEAR AGE 50-75  06/02/2008    MICROALBUMIN Q1  01/13/2017    LIPID PANEL Q1  01/13/2017    ZOSTER VACCINE AGE 60>  04/02/2018       Health Maintenance reviewed       1. Have you been to the ER, urgent care clinic since your last visit? Hospitalized since your last visit? Yes When: 5/18 Where: [facility] Reason for visit: shoulder pain    2. Have you seen or consulted any other health care providers outside of the [address] [place] [address] since your last visit? Include any pap smears or colon screening.  No

## 2018-05-17 ENCOUNTER — OFFICE VISIT (OUTPATIENT)
Dept: ORTHOPEDIC SURGERY | Facility: CLINIC | Age: 58
End: 2018-05-17

## 2018-05-17 VITALS
TEMPERATURE: 98.2 F | SYSTOLIC BLOOD PRESSURE: 111 MMHG | RESPIRATION RATE: 18 BRPM | OXYGEN SATURATION: 98 % | HEIGHT: 69 IN | WEIGHT: 186.4 LBS | DIASTOLIC BLOOD PRESSURE: 63 MMHG | BODY MASS INDEX: 27.61 KG/M2 | HEART RATE: 99 BPM

## 2018-05-17 DIAGNOSIS — M25.512 CHRONIC LEFT SHOULDER PAIN: Primary | ICD-10-CM

## 2018-05-17 DIAGNOSIS — M75.52 CHRONIC SHOULDER BURSITIS, LEFT: ICD-10-CM

## 2018-05-17 DIAGNOSIS — M75.82 ROTATOR CUFF TENDONITIS, LEFT: ICD-10-CM

## 2018-05-17 DIAGNOSIS — S46.002A INJURY OF LEFT ROTATOR CUFF, INITIAL ENCOUNTER: ICD-10-CM

## 2018-05-17 DIAGNOSIS — G89.29 CHRONIC LEFT SHOULDER PAIN: Primary | ICD-10-CM
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
## 2018-05-21 ENCOUNTER — HOSPITAL ENCOUNTER (OUTPATIENT)
Dept: PHYSICAL THERAPY | Age: 58
Discharge: HOME | End: 2018-05-21
Payer: COMMERCIAL

## 2018-05-21 PROCEDURE — 97140 MANUAL THERAPY 1/> REGIONS: CPT

## 2018-05-21 PROCEDURE — 97164 PT RE-EVAL EST PLAN CARE: CPT

## 2018-05-21 PROCEDURE — 97014 ELECTRIC STIMULATION THERAPY: CPT

## 2018-05-21 NOTE — PROGRESS NOTES
PT DAILY TREATMENT NOTE     Patient Name: Mikala Woodson  Date:2018  : 1960  [x]  Patient  Verified  Payor: Minna Gayleal / Plan: Jessi Angel / Product Type: Commerical /    In time:400  Out time:450  Total Treatment Time (min): 50  Visit #: 1 of 8    Treatment Area: Pain in left shoulder [M25.512]  Bursitis of left shoulder [M75.52]  Other shoulder lesions, left shoulder [M75.82]    SUBJECTIVE  Pain Level (0-10 scale): 4  Any medication changes, allergies to medications, adverse drug reactions, diagnosis change, or new procedure performed?: [x] No    [] Yes (see summary sheet for update)  Subjective functional status/changes:   [x] See Eval form in paper chart     OBJECTIVE      30 min []Eval                  [x]Re-Eval       10 min Manual Therapy:  Supine gentle passive shoulder ROM, STm left UT   Rationale: decrease pain, increase ROM, increase tissue extensibility, decrease trigger points and increase postural awareness to improve ROM for ADLs.      Modality rationale: decrease pain and increase tissue extensibility to improve the patients ease with mobility   Min Type Additional Details   10 [x] Estim:  [x]Unatt       [x]IFC  []Premod                        []Other:  []w/ice   [x]w/heat  Position:supine with wedge under legs  Location:left shoulder    [] Estim: []Att    []TENS instruct  []NMES                    []Other:  []w/US   []w/ice   []w/heat  Position:  Location:    []  Traction: [] Cervical       []Lumbar                       [] Prone          []Supine                       []Intermittent   []Continuous Lbs:  [] before manual  [] after manual    []  Ultrasound: []Continuous   [] Pulsed                           []1MHz   []3MHz Location:  W/cm2:    []  Iontophoresis with dexamethasone         Location: [] Take home patch   [] In clinic    []  Ice     []  heat  []  Ice massage  []  Laser   []  Anodyne Position:  Location:    []  Laser with stim  []  Other: Position:  Location:    []  Vasopneumatic Device Pressure:       [] lo [] med [] hi   Temperature: [] lo [] med [] hi   [x] Skin assessment post-treatment:  [x]intact []redness- no adverse reaction    []redness  adverse reaction:             With   [] TE   [] TA   [] neuro   [] other: Patient Education: [x] Review HEP    [] Progressed/Changed HEP based on:   [] positioning   [] body mechanics   [] transfers   [] heat/ice application    [] other:           Pain Level (0-10 scale) post treatment: 4    ASSESSMENT:   [x]  See Evaluation          Goals:  Short Term Goals: To be accomplished in 2 weeks:  1. Pt to report [place] with HEP. Re- Eval status: HEP issued and reviewed physically/verbally with pt - updated HEP     Long Term Goals: To be accomplished in 4 weeks:  1. Pt to report score of 68 on FOTO, indicating improved tolerance to activity and reduced pain. Re- Eval status 39 on initial FOTO     2. Pt to report max level of pain <4/10 with activity, indicating reduced pain and improved mobility. Re- Eval status: max level of pain 9-10/10  3. Pt to demonstrate full L shoulder AROM/PROM at 160 degrees flexion/abduction, 75 degrees ER (90/90), able to reach C7 with IR behind the back      Re- Eval status: L shoulder AROM 90 degrees flexion, 70 degrees abduction, 50 degrees ER,  able to reach to waistline of pants with behind the back IR  4. Pt to demonstrate full 5/5 L shoulder strength with MMT, indicating improved tolerance to activity and reduced pain.       Re- Eval status:2+/5 shoulder flexion, 4-/5 abduction, 3+/5 ER, 3+5 IR, 4/5 bicep, 4/5 tricep      PLAN      [x]  Continue plan of care           Courtney Breen, PT 5/21/2018  4:50 PM

## 2018-05-24 ENCOUNTER — HOSPITAL ENCOUNTER (OUTPATIENT)
Dept: PHYSICAL THERAPY | Age: 58
Discharge: HOME | End: 2018-05-24
Payer: COMMERCIAL

## 2018-05-24 PROCEDURE — 97110 THERAPEUTIC EXERCISES: CPT | Performed by: PHYSICAL THERAPIST

## 2018-05-24 NOTE — PROGRESS NOTES
PT DAILY TREATMENT NOTE     Patient Name: Beatriz Rutledge  Date:2018  : 1960  [x]  Patient  Verified  Payor: Veronica Queen / Plan: Fransisco Logan / Product Type: Commerical /    In time:457  Out time:521  Total Treatment Time (min): 24  Visit #: 2 of 8    Treatment Area: Pain in left shoulder [M25.512]  Bursitis of left shoulder [M75.52]  Other shoulder lesions, left shoulder [M75.82]    SUBJECTIVE  Pain Level (0-10 scale): 4  Any medication changes, allergies to medications, adverse drug reactions, diagnosis change, or new procedure performed?: [x] No    [] Yes (see summary sheet for update)  Subjective functional status/changes:   [] No changes reported  \"I had an ok day. It is a little sore. \"    OBJECTIVE    24 min Therapeutic Exercise:  [] See flow sheet :   Rationale: increase ROM, increase strength, improve coordination and increase proprioception to improve the patients ability to tolerate L UE tasks with reduced pain. With   [] TE   [] TA   [] neuro   [] other: Patient Education: [x] Review HEP    [] Progressed/Changed HEP based on:   [] positioning   [] body mechanics   [] transfers   [] heat/ice application    [] other:      Other Objective/Functional Measures:      Pain Level (0-10 scale) post treatment: 3    ASSESSMENT/Changes in Function: Pt c/o fatigue at end of session today after initiating POC. He reports reduced pain intensity as well. Pt requires multiple visual, verbal, and tactile cues to complete exercises correctly today. Pt will transfer to the [place] location after today's visit to accommodate his work schedule and appointment times.     Patient will continue to benefit from skilled PT services to modify and progress therapeutic interventions, address functional mobility deficits, address ROM deficits, address strength deficits, analyze and address soft tissue restrictions, analyze and cue movement patterns and analyze and modify body mechanics/ergonomics to attain remaining goals. Progress towards goals / Updated goals:  Short Term Goals: To be accomplished in 2 weeks:  1.  Pt to report [place] with HEP. Re- Eval status: HEP issued and reviewed physically/verbally with pt - updated HEP      Long Term Goals: To be accomplished in 4 weeks: 1.    Pt to report score of 68 on FOTO, indicating improved tolerance to activity and reduced pain.                  Re- Eval status 39 on initial FOTO      2.   Pt to report max level of pain <4/10 with activity, indicating reduced pain and improved mobility.                 Re- Eval status: max level of pain 9-10/10  3. Pt to demonstrate full L shoulder AROM/PROM at 160 degrees flexion/abduction, 75 degrees ER (90/90), able to reach C7 with IR behind the back      Re- Eval status: L shoulder AROM 90 degrees flexion, 70 degrees abduction, 50 degrees ER,  able to reach to waistline of pants with behind the back IR  4. Pt to demonstrate full 5/5 L shoulder strength with MMT, indicating improved tolerance to activity and reduced pain. Re- Eval status:2+/5 shoulder flexion, 4-/5 abduction, 3+/5 ER, 3+5 IR, 4/5 bicep, 4/5 tricep    PLAN  [x]  Upgrade activities as tolerated     [x]  Continue plan of care  []  Update interventions per flow sheet       []  Discharge due to:_  []  Other:_      [place], PT 5/24/2018  5:05 PM    No future appointments.

## 2018-06-04 ENCOUNTER — APPOINTMENT (OUTPATIENT)
Dept: PHYSICAL THERAPY | Age: 58
End: 2018-06-04
Payer: COMMERCIAL

## 2018-06-07 ENCOUNTER — HOSPITAL ENCOUNTER (OUTPATIENT)
Dept: PHYSICAL THERAPY | Age: 58
Discharge: HOME | End: 2018-06-07
Payer: COMMERCIAL

## 2018-06-07 PROCEDURE — 97110 THERAPEUTIC EXERCISES: CPT

## 2018-06-07 NOTE — PROGRESS NOTES
PT DAILY TREATMENT NOTE     Patient Name: Jakub Carrasco  Date:2018  : 1960  [x]  Patient  Verified  Payor: Christen Dutta / Plan: Janice Couchr / Product Type: Commerical /    In time:503  Out time:522  Total Treatment Time (min): 19  Visit #: 3 of 8    Treatment Area: Pain in left shoulder [M25.512]  Bursitis of left shoulder [M75.52]  Other shoulder lesions, left shoulder [M75.82]    SUBJECTIVE  Pain Level (0-10 scale): 5  Any medication changes, allergies to medications, adverse drug reactions, diagnosis change, or new procedure performed?: [x] No    [] Yes (see summary sheet for update)  Subjective functional status/changes:   [] No changes reported  \"My shoulder hurts some. \"    OBJECTIVE    Modality rationale: patient declined   Min Type Additional Details    [] Estim:  []Unatt       []IFC  []Premod                        []Other:  []w/ice   []w/heat  Position:  Location:    [] Estim: []Att    []TENS instruct  []NMES                    []Other:  []w/US   []w/ice   []w/heat  Position:  Location:    []  Traction: [] Cervical       []Lumbar                       [] Prone          []Supine                       []Intermittent   []Continuous Lbs:  [] before manual  [] after manual    []  Ultrasound: []Continuous   [] Pulsed                           []1MHz   []3MHz W/cm2:  Location:    []  Iontophoresis with dexamethasone         Location: [] Take home patch   [] In clinic    []  Ice     []  heat  []  Ice massage  []  Laser   []  Anodyne Position:  Location:    []  Laser with stim  []  Other:  Position:  Location:    []  Vasopneumatic Device Pressure:       [] lo [] med [] hi   Temperature: [] lo [] med [] hi   [] Skin assessment post-treatment:  []intact []redness- no adverse reaction    []redness - adverse reaction:     19 min Therapeutic Exercise:  [x] See flow sheet :   Rationale: increase ROM and increase strength to improve the patients ability to perform ADLs        With [x] TE   [] TA   [] neuro   [] other: Patient Education: [x] Review HEP    [] Progressed/Changed HEP based on:   [x] positioning   [x] body mechanics   [] transfers   [] heat/ice application    [] other:      Other Objective/Functional Measures:      Pain Level (0-10 scale) post treatment: 4-5    ASSESSMENT/Changes in Function: Pt has seen minimal changes with shoulder pain, but has also had inconsistent attendance with therapy due to work schedule. Patient will continue to benefit from skilled PT services to modify and progress therapeutic interventions, address functional mobility deficits, address ROM deficits, address strength deficits, analyze and address soft tissue restrictions, analyze and cue movement patterns, analyze and modify body mechanics/ergonomics, assess and modify postural abnormalities, address imbalance/dizziness and instruct in home and community integration to attain remaining goals. [x]  See Plan of Care  []  See progress note/recertification  []  See Discharge Summary         Progress towards goals / Updated goals:  Short Term Goals: To be accomplished in 2 weeks:  1. Pt to report [place] with HEP. Re- Eval status: HEP issued and reviewed physically/verbally with pt - updated HEP     MET  Long Term Goals: To be accomplished in 4 weeks: 1.    Pt to report score of 68 on FOTO, indicating improved tolerance to activity and reduced pain. Re- Eval status 39 on initial FOTO     Assess at end of POC  2. Pt to report max level of pain <4/10 with activity, indicating reduced pain and improved mobility. Re- Eval status: max level of pain 9-10/10   Pain remains 4-5/10  3.  Pt to demonstrate full L shoulder AROM/PROM at 160 degrees flexion/abduction, 75 degrees ER (90/90), able to reach C7 with IR behind the back   Re- Eval status: L shoulder AROM 90 degrees flexion, 70 degrees abduction, 50 degrees ER,  able to reach to waistline of pants with behind the back IR   Slow improvements with motion  4. Pt to demonstrate full 5/5 L shoulder strength with MMT, indicating improved tolerance to activity and reduced pain.    Re- Eval status:2+/5 shoulder flexion, 4-/5 abduction, 3+/5 ER, 3+5 IR, 4/5 bicep, 4/5 tricep   Quick to fatigue with exercises    PLAN  []  Upgrade activities as tolerated     [x]  Continue plan of care  []  Update interventions per flow sheet       []  Discharge due to:_  []  Other:_      Davide Chan PTA, [facility] 6/7/2018  5:28 PM    Future Appointments  Date Time Provider Henri Becerra   6/7/2018 5:00 PM Davide Chan PTA [facility]   8/21/2018 3:40 PM [address] Odonnell, MD [address] [place] [address]

## 2018-06-14 ENCOUNTER — OFFICE VISIT (OUTPATIENT)
Dept: ORTHOPEDIC SURGERY | Facility: CLINIC | Age: 58
End: 2018-06-14

## 2018-06-14 VITALS
TEMPERATURE: 98.1 F | SYSTOLIC BLOOD PRESSURE: 158 MMHG | RESPIRATION RATE: 18 BRPM | WEIGHT: 181.6 LBS | OXYGEN SATURATION: 98 % | HEART RATE: 74 BPM | BODY MASS INDEX: 26.9 KG/M2 | DIASTOLIC BLOOD PRESSURE: 97 MMHG | HEIGHT: 69 IN

## 2018-06-15 ENCOUNTER — TELEPHONE (OUTPATIENT)
Dept: FAMILY MEDICINE CLINIC | Age: 58
End: 2018-06-15

## 2018-06-21 NOTE — TELEPHONE ENCOUNTER
Returned call to the patient to gather more information regarding the request for a new provider and the patient states that the provider declined his request to receive pain medications so he wants a new doctor that will silverio his request. Patient has been advised that the PCP will receive this information regarding the request and that we will contact him once the provider puts the referral in. Patient has also been advised that since he will have new health insurance at the beginning of next month to month he can call them and inquire about which providers are within the network.

## 2018-06-28 DIAGNOSIS — Z79.4 TYPE 2 DIABETES MELLITUS WITH DIABETIC NEUROPATHY, WITH LONG-TERM CURRENT USE OF INSULIN (HCC): ICD-10-CM

## 2018-06-28 DIAGNOSIS — E11.40 TYPE 2 DIABETES MELLITUS WITH DIABETIC NEUROPATHY, WITH LONG-TERM CURRENT USE OF INSULIN (HCC): ICD-10-CM

## 2018-06-28 RX ORDER — INSULIN GLARGINE 100 [IU]/ML
10 INJECTION, SOLUTION SUBCUTANEOUS
Qty: 15 PEN | Refills: 3 | Status: SHIPPED | OUTPATIENT
Start: 2018-06-28 | End: 2018-08-20 | Stop reason: SDUPTHER

## 2018-06-28 NOTE — PROGRESS NOTES
[facility] - [place] [address]  [address], [address] [phone] fax    Discharge Summary  Patient name: Vinnie Patel Start of Care: 2018   Referral source: Yadira Mota MD : 1960                          Medical Diagnosis: Pain in left shoulder [M25.512]  Bursitis of left shoulder [M75.52]  Other shoulder lesions, left shoulder [M75.82] Onset Date:17                          Treatment Diagnosis: L shoulder pain   Prior Hospitalization: see medical history Provider#: [id number]   Medications: Verified on Patient summary List    Comorbidities: depression, diabetes, high BP   Prior Level of Function: Pt reports [place] with all prior activities at home and work. Miguel Person states he is employed as a  at work and is working full time/full duty.  Ysey Howe         Visits from Start of Care: 4    Missed Visits: 2    Reporting Period : 18 to 18    Short Term Goals: To be accomplished in 2 weeks:  1. Pt to report [place] with HEP. Re- Eval status: HEP issued and reviewed physically/verbally with pt - updated HEP                        MET  Long Term Goals: To be accomplished in 4 weeks: 1.    Pt to report score of 68 on FOTO, indicating improved tolerance to activity and reduced pain. Re- Eval status 39 on initial FOTO                        Assess at end of POC  2. Pt to report max level of pain <4/10 with activity, indicating reduced pain and improved mobility. Re- Eval status: max level of pain 9-10/10                        Pain remains 4-5/10  3.  Pt to demonstrate full L shoulder AROM/PROM at 160 degrees flexion/abduction, 75 degrees ER (90/90), able to reach C7 with IR behind the back                        Re- Eval status: L shoulder AROM 90 degrees flexion, 70 degrees abduction, 50 degrees ER,  able to reach to waistline of pants with behind the back IR                        Slow improvements with motion  4. Pt to demonstrate full 5/5 L shoulder strength with MMT, indicating improved tolerance to activity and reduced pain. Re- Eval status:2+/5 shoulder flexion, 4-/5 abduction, 3+/5 ER, 3+5 IR, 4/5 bicep, 4/5 tricep                        Quick to fatigue with exercises    Assessment/Summary of care: The patient was scheduled for 6 visits. They attended 4 of them and was last seen on 6/7/18 with 2 missed visits. Patient was unable to be reached via phone to reschedule appointments. On the last visit they reported \"My shoulder hurts some. \"    Due to the inability to further their care from non-attendance, we are discharging the patient from physical therapy at this time. We appreciate the kind referral and would willingly work with this patient again, should they be able to arrange regular attendance. Your patient's health is our primary concern.       RECOMMENDATIONS:  [x]Discontinue therapy: []Patient has reached or is progressing toward set goals      [x]Patient is non-compliant or has abdicated      []Due to lack of appreciable progress towards set [address], PT 6/28/2018 8:35 AM

## 2018-06-28 NOTE — TELEPHONE ENCOUNTER
Pt called requesting refill for medication . Requested Prescriptions     Pending Prescriptions Disp Refills    insulin glargine (LANTUS,BASAGLAR) 100 unit/mL (3 mL) inpn 15 Pen 3     Sig: 10 Units by SubCUTAneous route nightly.

## 2018-07-02 ENCOUNTER — OFFICE VISIT (OUTPATIENT)
Dept: FAMILY MEDICINE CLINIC | Age: 58
End: 2018-07-02

## 2018-07-02 VITALS
WEIGHT: 177.25 LBS | BODY MASS INDEX: 26.25 KG/M2 | HEART RATE: 86 BPM | DIASTOLIC BLOOD PRESSURE: 76 MMHG | RESPIRATION RATE: 20 BRPM | TEMPERATURE: 98.4 F | SYSTOLIC BLOOD PRESSURE: 132 MMHG | HEIGHT: 69 IN

## 2018-07-02 DIAGNOSIS — E55.9 VITAMIN D DEFICIENCY: ICD-10-CM

## 2018-07-02 DIAGNOSIS — E78.2 MIXED HYPERLIPIDEMIA: ICD-10-CM

## 2018-07-02 DIAGNOSIS — G47.09 OTHER INSOMNIA: Primary | ICD-10-CM

## 2018-07-02 DIAGNOSIS — M25.512 CHRONIC LEFT SHOULDER PAIN: ICD-10-CM

## 2018-07-02 DIAGNOSIS — G89.29 CHRONIC LEFT SHOULDER PAIN: ICD-10-CM

## 2018-07-02 DIAGNOSIS — H00.015 HORDEOLUM EXTERNUM OF LEFT LOWER EYELID: ICD-10-CM

## 2018-07-02 RX ORDER — HYDROXYZINE PAMOATE 50 MG/1
50 CAPSULE ORAL
Qty: 30 CAP | Refills: 1 | Status: SHIPPED | OUTPATIENT
Start: 2018-07-02 | End: 2018-08-27 | Stop reason: SDUPTHER

## 2018-07-02 NOTE — PROGRESS NOTES
Chief Complaint   Patient presents with    Diabetes    Other     diabetic neuropathy    Shoulder Pain     left, would like referral to a diffrent orthopaedic, feel like 'not enough is being done'.  Hypertension    Cholesterol Problem     high chol    Vitamin D Deficiency       Health Maintenance Due   Topic Date Due    Hepatitis C Screening  1960    FOOT EXAM Q1  06/02/1968    EYE EXAM RETINAL OR DILATED Q1  06/02/1968    Pneumococcal 19-64 Medium Risk (1 of 1 - PPSV23) 06/02/1977    DTaP/Tdap/Td series (1 - Tdap) 06/02/1979    FOBT Q 1 YEAR AGE 50-75  06/02/2008    MICROALBUMIN Q1  01/13/2017    LIPID PANEL Q1  01/13/2017    ZOSTER VACCINE AGE 60>  04/02/2018       Health Maintenance reviewed      1. Have you been to the ER, urgent care clinic since your last visit? Hospitalized since your last visit? No    2. Have you seen or consulted any other health care providers outside of the [address] [place] [address] since your last visit? Include any pap smears or colon screening.  No
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
## 2018-07-02 NOTE — PATIENT INSTRUCTIONS
Please contact our office if you have any questions about your visit today. 1.) Use Hydroxyzine as needed for sleep. 2.) Return in 1 month for follow up. Please get labs a week before next visit. Give orthopedic doctor a call. Insomnia: Care Instructions  Your Care Instructions    Insomnia is the inability to sleep well. It is a common problem for most people at some time. Insomnia may make it hard for you to get to sleep, stay asleep, or sleep as long as you need to. This can make you tired and grouchy during the day. It can also make you forgetful, less effective at work, and unhappy. Insomnia can be caused by conditions such as depression or anxiety. Pain can also affect your ability to sleep. When these problems are solved, the insomnia usually clears up. But sometimes bad sleep habits can cause insomnia. If insomnia is affecting your work or your enjoyment of life, you can take steps to improve your sleep. Follow-up care is a key part of your treatment and safety. Be sure to make and go to all appointments, and call your doctor if you are having problems. It's also a good idea to know your test results and keep a list of the medicines you take. How can you care for yourself at home? What to avoid  · Do not have drinks with caffeine, such as coffee or black tea, for 8 hours before bed. · Do not smoke or use other types of tobacco near bedtime. Nicotine is a stimulant and can keep you awake. · Avoid drinking alcohol late in the evening, because it can cause you to wake in the middle of the night. · Do not eat a big meal close to bedtime. If you are hungry, eat a light snack. · Do not drink a lot of water close to bedtime, because the need to urinate may wake you up during the night. · Do not read or watch TV in bed. Use the bed only for sleeping and sexual activity. What to try  · Go to bed at the same time every night, and wake up at the same time every morning.  Do not take naps during the day. · Keep your bedroom quiet, dark, and cool. · Sleep on a comfortable pillow and mattress. · If watching the clock makes you anxious, turn it facing away from you so you cannot see the time. · If you worry when you lie down, start a worry book. Well before bedtime, write down your worries, and then set the book and your concerns aside. · Try meditation or other relaxation techniques before you go to bed. · If you cannot fall asleep, get up and go to another room until you feel sleepy. Do something relaxing. Repeat your bedtime routine before you go to bed again. · Make your house quiet and calm about an hour before bedtime. Turn down the lights, turn off the TV, log off the computer, and turn down the volume on music. This can help you relax after a busy day. When should you call for help? Watch closely for changes in your health, and be sure to contact your doctor if:  ? · Your efforts to improve your sleep do not work. ? · Your insomnia gets worse. ? · You have been feeling down, depressed, or hopeless or have lost interest in things that you usually enjoy. Where can you learn more? Go to http://nam-leon.info/. Enter P513 in the search box to learn more about \"Insomnia: Care Instructions. \"  Current as of: July 26, 2016  Content Version: 11.4  © 1072-9620 [employer]. Care instructions adapted under license by [employer] (which disclaims liability or warranty for this information). If you have questions about a medical condition or this instruction, always ask your healthcare professional. [place] [id number] any warranty or liability for your use of this information. Styes and Chalazia: Care Instructions  Your Care Instructions    Styes and chalazia (say \"tcf-ZTP-met-uh\") are both conditions that can cause swelling of the eyelid. A stye is an infection in the root of an eyelash.  The infection causes a tender red lump on the edge of the eyelid. The infection can spread until the whole eyelid becomes red and inflamed. Styes usually break open, and a tiny amount of pus drains. They usually clear up on their own in about a week, but they sometimes need treatment with antibiotics. A chalazion is a lump or cyst in the eyelid (chalazion is singular; chalazia is plural). It is caused by swelling and inflammation of deep oil glands inside the eyelid. Chalazia are usually not infected. They can take a few months to heal.  If a chalazion becomes more swollen and painful or does not go away, you may need to have it drained by your doctor. Follow-up care is a key part of your treatment and safety. Be sure to make and go to all appointments, and call your doctor if you are having problems. It's also a good idea to know your test results and keep a list of the medicines you take. How can you care for yourself at home? · Do not rub your eyes. Do not squeeze or try to open a stye or chalazion. · To help a stye or chalazion heal faster:  ¨ Put a warm, moist compress on your eye for 5 to 10 minutes, 3 to 6 times a day. Heat often brings a stye to a point where it drains on its own. Keep in mind that warm compresses will often increase swelling a little at first.  ¨ Do not use hot water or heat a wet cloth in a microwave oven. The compress may get too hot and can burn the eyelid. · Always wash your hands before and after you use a compress or touch your eyes. · If the doctor gave you antibiotic drops or ointment, use the medicine exactly as directed. Use the medicine for as long as instructed, even if your eye starts to feel better. · To put in eyedrops or ointment:  ¨ Tilt your head back, and pull your lower eyelid down with one finger. ¨ Drop or squirt the medicine inside the lower lid. ¨ Close your eye for 30 to 60 seconds to let the drops or ointment move around.   ¨ Do not touch the ointment or dropper tip to your eyelashes or any other surface. · Do not wear eye makeup or contact lenses until the stye or chalazion heals. · Do not share towels, pillows, or washcloths while you have a stye. When should you call for help? Call your doctor now or seek immediate medical care if:  ? · You have pain in your eye.   ? · You have a change in vision or loss of vision. ? · Redness and swelling get much worse. ? Watch closely for changes in your health, and be sure to contact your doctor if:  ? · Your stye does not get better in 1 week. ? · Your chalazion does not start to get better after several weeks. Where can you learn more? Go to http://nam-leon.info/. Enter T507 in the search box to learn more about \"Styes and Chalazia: Care Instructions. \"  Current as of: March 3, 2017  Content Version: 11.4  © 0266-8990 [employer]. Care instructions adapted under license by [employer] (which disclaims liability or warranty for this information). If you have questions about a medical condition or this instruction, always ask your healthcare professional. [place] [address] any warranty or liability for your use of this information.

## 2018-07-02 NOTE — MR AVS SNAPSHOT
[address] [facility] 
 
 
 [address] [address] [address] [address] [phone] Patient: Sindy Jo MRN: [id number] FFQ:[birth year] Visit Information Date & Time Provider Department Dept. Phone Encounter #  
 7/2/2018  3:15 PM Serene Pickett, [address] [id number] Follow-up Instructions Return in about 1 month (around 8/2/2018). Your Appointments 8/21/2018  3:40 PM  
Follow Up with Carole Samuels MD  
[facility] [place] ([address] [place] [address]) Appt Note: pt requested follow up, hasn't been seen since 2016 [facility] [address] [address] [address] [phone] [address] [place] [address] [address] Upcoming Health Maintenance Date Due Hepatitis C Screening 1960 FOOT EXAM Q1 6/2/1968 EYE EXAM RETINAL OR DILATED Q1 6/2/1968 Pneumococcal 19-64 Medium Risk (1 of 1 - PPSV23) 6/2/1977 DTaP/Tdap/Td series (1 - Tdap) 6/2/1979 FOBT Q 1 YEAR AGE 50-75 6/2/2008 MICROALBUMIN Q1 1/13/2017 LIPID PANEL Q1 1/13/2017 ZOSTER VACCINE AGE 60> 4/2/2018 Influenza Age 5 to Adult 8/1/2018 HEMOGLOBIN A1C Q6M 9/27/2018 Allergies as of 7/2/2018  Review Complete On: 7/2/2018 By: Serene Pickett DO Severity Noted Reaction Type Reactions Niacin  12/09/2011   Systemic Itching Current Immunizations  Never Reviewed No immunizations on file. Not reviewed this visit You Were Diagnosed With   
  
 Codes Comments Other insomnia    -  Primary ICD-10-CM: G47.09 
ICD-9-CM: 780.52 Insulin dependent diabetes mellitus (HCC)     ICD-10-CM: E11.9, Z79.4 ICD-9-CM: 250.00, V58.67 Mixed hyperlipidemia     ICD-10-CM: E78.2 ICD-9-CM: 272.2 Vitamin D deficiency     ICD-10-CM: E55.9 ICD-9-CM: 268.9 Chronic left shoulder pain     ICD-10-CM: M25.512, G89.29 ICD-9-CM: 719.41, 338.29   
 Hordeolum externum of left lower eyelid     ICD-10-CM: H00.015 
ICD-9-CM: 373.11 Vitals BP Pulse Temp Resp Height(growth percentile) Weight(growth percentile) 132/76 (BP 1 Location: Left arm, BP Patient Position: Sitting) 86 98.4 °F (36.9 °C) (Oral) 20 5' 9\" (1.753 m) 177 lb 4 oz (80.4 kg) BMI Smoking Status 26.18 kg/m2 Current Every Day Smoker BMI and BSA Data Body Mass Index Body Surface Area  
 26.18 kg/m 2 1.98 m 2 Preferred Pharmacy Pharmacy Name Phone [address], [address] [facility] [phone] Your Updated Medication List  
  
   
This list is accurate as of 7/2/18  4:08 PM.  Always use your most recent med list.  
  
  
  
  
 aspirin 81 mg tablet Take 1 Tab by mouth daily. butalbital-acetaminophen-caff -40 mg per capsule Commonly known as:  [employer] Take 1 Cap by mouth every six (6) hours as needed for Headache.  
  
 gabapentin 800 mg tablet Commonly known as:  NEURONTIN  
TAKE ONE TABLET BY MOUTH TWICE A DAY  
  
 glipiZIDE 5 mg tablet Commonly known as:  Carina Factor Take 1 Tab by mouth two (2) times a day. HYDROcodone-acetaminophen 5-325 mg per tablet Commonly known as:  Wayna Glaze Take 1 Tab by mouth every four (4) hours as needed for Pain. Max Daily Amount: 6 Tabs. hydrOXYzine pamoate 50 mg capsule Commonly known as:  VISTARIL Take 1 Cap by mouth nightly as needed for Other (sleep). ibuprofen 800 mg tablet Commonly known as:  MOTRIN Take 1 Tab by mouth every eight (8) hours as needed for Pain. insulin glargine 100 unit/mL (3 mL) Inpn Commonly known as:  LANTUS,BASAGLAR  
10 Units by SubCUTAneous route nightly. insulin lispro protamine/insulin lispro 100 unit/mL (50-50) injection Commonly known as:  HUMALOG MIX 50/50  
10 Units by SubCUTAneous route three (3) times daily (with meals).  Inject beneath the skin. Per pt 10 units three times a day (with meals)  
  
 lisinopril 40 mg tablet Commonly known as:  Michaelyn Mather Take 1 Tab by mouth daily. metFORMIN 1,000 mg tablet Commonly known as:  GLUCOPHAGE Take 1 Tab by mouth two (2) times daily (with meals). NOVOLOG SC  
by SubCUTAneous route. simvastatin 20 mg tablet Commonly known as:  ZOCOR Take 1 Tab by mouth nightly. SITagliptin-metFORMIN 50-1,000 mg per tablet Commonly known as:  Oni Brochure Take 1 Tab by mouth two (2) times daily (with meals). Prescriptions Sent to Pharmacy Refills  
 hydrOXYzine pamoate (VISTARIL) 50 mg capsule 1 Sig: Take 1 Cap by mouth nightly as needed for Other (sleep). Class: Normal  
 Pharmacy: Yonathan Carreon [address] [place] [address] #: [phone] Route: Oral  
  
We Performed the Following REFERRAL TO ORTHOPEDIC SURGERY [REF62 Custom] Comments:  
 Please evaluate patient for Chronic Left Shoulder Pain Follow-up Instructions Return in about 1 month (around 8/2/2018). To-Do List   
 07/02/2018 Lab:  HEMOGLOBIN A1C WITH EAG Around 09/30/2018 Lab:  LIPID PANEL Around 09/30/2018 Lab:  METABOLIC PANEL, COMPREHENSIVE Referral Information Referral ID Referred By Referred To  
  
 [id number] YUKI Arora [address] [place] [address],  [address], [address] [facility] [address] [place] [address]. Phone: [phone] Fax: [phone] Visits Status Start Date End Date 1 New Request 7/2/18 7/2/19 If your referral has a status of pending review or denied, additional information will be sent to support the outcome of this decision. Patient Instructions Please contact our office if you have any questions about your visit today. 1.) Use Hydroxyzine as needed for sleep. 2.) Return in 1 month for follow up.  Please get labs a week before next visit. Give orthopedic doctor a call. Insomnia: Care Instructions Your Care Instructions Insomnia is the inability to sleep well. It is a common problem for most people at some time. Insomnia may make it hard for you to get to sleep, stay asleep, or sleep as long as you need to. This can make you tired and grouchy during the day. It can also make you forgetful, less effective at work, and unhappy. Insomnia can be caused by conditions such as depression or anxiety. Pain can also affect your ability to sleep. When these problems are solved, the insomnia usually clears up. But sometimes bad sleep habits can cause insomnia. If insomnia is affecting your work or your enjoyment of life, you can take steps to improve your sleep. Follow-up care is a key part of your treatment and safety. Be sure to make and go to all appointments, and call your doctor if you are having problems. It's also a good idea to know your test results and keep a list of the medicines you take. How can you care for yourself at home? What to avoid · Do not have drinks with caffeine, such as coffee or black tea, for 8 hours before bed. · Do not smoke or use other types of tobacco near bedtime. Nicotine is a stimulant and can keep you awake. · Avoid drinking alcohol late in the evening, because it can cause you to wake in the middle of the night. · Do not eat a big meal close to bedtime. If you are hungry, eat a light snack. · Do not drink a lot of water close to bedtime, because the need to urinate may wake you up during the night. · Do not read or watch TV in bed. Use the bed only for sleeping and sexual activity. What to try · Go to bed at the same time every night, and wake up at the same time every morning. Do not take naps during the day. · Keep your bedroom quiet, dark, and cool. · Sleep on a comfortable pillow and mattress.  
· If watching the clock makes you anxious, turn it facing away from you so you cannot see the time. · If you worry when you lie down, start a worry book. Well before bedtime, write down your worries, and then set the book and your concerns aside. · Try meditation or other relaxation techniques before you go to bed. · If you cannot fall asleep, get up and go to another room until you feel sleepy. Do something relaxing. Repeat your bedtime routine before you go to bed again. · Make your house quiet and calm about an hour before bedtime. Turn down the lights, turn off the TV, log off the computer, and turn down the volume on music. This can help you relax after a busy day. When should you call for help? Watch closely for changes in your health, and be sure to contact your doctor if: 
? · Your efforts to improve your sleep do not work. ? · Your insomnia gets worse. ? · You have been feeling down, depressed, or hopeless or have lost interest in things that you usually enjoy. Where can you learn more? Go to http://nam-leon.info/. Enter P513 in the search box to learn more about \"Insomnia: Care Instructions. \" Current as of: July 26, 2016 Content Version: 11.4 © 1553-6337 [employer]. Care instructions adapted under license by [employer] (which disclaims liability or warranty for this information). If you have questions about a medical condition or this instruction, always ask your healthcare professional. [place] [address] any warranty or liability for your use of this information. Styes and Chalazia: Care Instructions Your Care Instructions Styes and chalazia (say \"zsy-CBX-ugd-uh\") are both conditions that can cause swelling of the eyelid. A stye is an infection in the root of an eyelash. The infection causes a tender red lump on the edge of the eyelid. The infection can spread until the whole eyelid becomes red and inflamed.  Styes usually break open, and a tiny amount of pus drains. They usually clear up on their own in about a week, but they sometimes need treatment with antibiotics. A chalazion is a lump or cyst in the eyelid (chalazion is singular; chalazia is plural). It is caused by swelling and inflammation of deep oil glands inside the eyelid. Chalazia are usually not infected. They can take a few months to heal. 
If a chalazion becomes more swollen and painful or does not go away, you may need to have it drained by your doctor. Follow-up care is a key part of your treatment and safety. Be sure to make and go to all appointments, and call your doctor if you are having problems. It's also a good idea to know your test results and keep a list of the medicines you take. How can you care for yourself at home? · Do not rub your eyes. Do not squeeze or try to open a stye or chalazion. · To help a stye or chalazion heal faster: ¨ Put a warm, moist compress on your eye for 5 to 10 minutes, 3 to 6 times a day. Heat often brings a stye to a point where it drains on its own. Keep in mind that warm compresses will often increase swelling a little at first. 
¨ Do not use hot water or heat a wet cloth in a microwave oven. The compress may get too hot and can burn the eyelid. · Always wash your hands before and after you use a compress or touch your eyes. · If the doctor gave you antibiotic drops or ointment, use the medicine exactly as directed. Use the medicine for as long as instructed, even if your eye starts to feel better. · To put in eyedrops or ointment: ¨ Tilt your head back, and pull your lower eyelid down with one finger. ¨ Drop or squirt the medicine inside the lower lid. ¨ Close your eye for 30 to 60 seconds to let the drops or ointment move around. ¨ Do not touch the ointment or dropper tip to your eyelashes or any other surface. · Do not wear eye makeup or contact lenses until the stye or chalazion heals. · Do not share towels, pillows, or washcloths while you have a stye. When should you call for help? Call your doctor now or seek immediate medical care if: 
? · You have pain in your eye.  
? · You have a change in vision or loss of vision. ? · Redness and swelling get much worse. ? Watch closely for changes in your health, and be sure to contact your doctor if: 
? · Your stye does not get better in 1 week. ? · Your chalazion does not start to get better after several weeks. Where can you learn more? Go to http://nam-leon.info/. Enter V414 in the search box to learn more about \"Styes and Chalazia: Care Instructions. \" Current as of: March 3, 2017 Content Version: 11.4 © 1483-2702 [employer]. Care instructions adapted under license by [employer]. [employer] (which disclaims liability or warranty for this information). If you have questions about a medical condition or this instruction, always ask your healthcare professional. [address] any warranty or liability for your use of this information. Please provide this summary of care documentation to your next provider. Your primary care clinician is listed as Divina Richards. If you have any questions after today's visit, please call [phone].

## 2018-07-30 ENCOUNTER — HOSPITAL ENCOUNTER (OUTPATIENT)
Dept: LAB | Age: 58
Discharge: HOME | End: 2018-07-30
Payer: COMMERCIAL

## 2018-07-30 DIAGNOSIS — E78.2 MIXED HYPERLIPIDEMIA: ICD-10-CM

## 2018-07-30 DIAGNOSIS — G47.09 OTHER INSOMNIA: ICD-10-CM

## 2018-07-30 DIAGNOSIS — E55.9 VITAMIN D DEFICIENCY: ICD-10-CM

## 2018-07-30 LAB
ALBUMIN SERPL-MCNC: 3.8 G/DL (ref 3.4–5)
ALBUMIN/GLOB SERPL: 1.2 {RATIO} (ref 0.8–1.7)
ALP SERPL-CCNC: 67 U/L (ref 45–117)
ALT SERPL-CCNC: 20 U/L (ref 16–61)
ANION GAP SERPL CALC-SCNC: 8 MMOL/L (ref 3–18)
AST SERPL-CCNC: 14 U/L (ref 15–37)
BILIRUB SERPL-MCNC: 0.3 MG/DL (ref 0.2–1)
BUN SERPL-MCNC: 10 MG/DL (ref 7–18)
BUN/CREAT SERPL: 12 (ref 12–20)
CALCIUM SERPL-MCNC: 8.9 MG/DL (ref 8.5–10.1)
CHLORIDE SERPL-SCNC: 107 MMOL/L (ref 100–108)
CHOLEST SERPL-MCNC: 142 MG/DL
CO2 SERPL-SCNC: 27 MMOL/L (ref 21–32)
CREAT SERPL-MCNC: 0.84 MG/DL (ref 0.6–1.3)
EST. AVERAGE GLUCOSE BLD GHB EST-MCNC: 197 MG/DL
GLOBULIN SER CALC-MCNC: 3.2 G/DL (ref 2–4)
GLUCOSE SERPL-MCNC: 84 MG/DL (ref 74–99)
HBA1C MFR BLD: 8.5 % (ref 4.2–5.6)
HDLC SERPL-MCNC: 54 MG/DL (ref 40–60)
HDLC SERPL: 2.6 {RATIO} (ref 0–5)
LDLC SERPL CALC-MCNC: 72 MG/DL (ref 0–100)
LIPID PROFILE,FLP: NORMAL
POTASSIUM SERPL-SCNC: 4.2 MMOL/L (ref 3.5–5.5)
PROT SERPL-MCNC: 7 G/DL (ref 6.4–8.2)
SODIUM SERPL-SCNC: 142 MMOL/L (ref 136–145)
TRIGL SERPL-MCNC: 80 MG/DL (ref ?–150)
VLDLC SERPL CALC-MCNC: 16 MG/DL

## 2018-07-30 PROCEDURE — 36415 COLL VENOUS BLD VENIPUNCTURE: CPT | Performed by: INTERNAL MEDICINE

## 2018-07-30 PROCEDURE — 80061 LIPID PANEL: CPT | Performed by: INTERNAL MEDICINE

## 2018-07-30 PROCEDURE — 83036 HEMOGLOBIN GLYCOSYLATED A1C: CPT | Performed by: INTERNAL MEDICINE

## 2018-07-30 PROCEDURE — 80053 COMPREHEN METABOLIC PANEL: CPT | Performed by: INTERNAL MEDICINE

## 2018-08-08 DIAGNOSIS — E78.2 MIXED HYPERLIPIDEMIA: ICD-10-CM

## 2018-08-08 DIAGNOSIS — E11.40 TYPE 2 DIABETES MELLITUS WITH DIABETIC NEUROPATHY, WITH LONG-TERM CURRENT USE OF INSULIN (HCC): ICD-10-CM

## 2018-08-08 DIAGNOSIS — E78.00 HYPERCHOLESTEROLEMIA: ICD-10-CM

## 2018-08-08 DIAGNOSIS — E55.9 VITAMIN D DEFICIENCY: ICD-10-CM

## 2018-08-08 DIAGNOSIS — Z79.4 TYPE 2 DIABETES MELLITUS WITH DIABETIC NEUROPATHY, WITH LONG-TERM CURRENT USE OF INSULIN (HCC): ICD-10-CM

## 2018-08-08 DIAGNOSIS — I10 ESSENTIAL HYPERTENSION: ICD-10-CM

## 2018-08-08 DIAGNOSIS — G47.09 OTHER INSOMNIA: ICD-10-CM

## 2018-08-08 RX ORDER — GLIPIZIDE 5 MG/1
5 TABLET ORAL 2 TIMES DAILY
Qty: 60 TAB | Refills: 2 | Status: CANCELLED | OUTPATIENT
Start: 2018-08-08

## 2018-08-08 RX ORDER — BUTALBITAL, ACETAMINOPHEN AND CAFFEINE 300; 40; 50 MG/1; MG/1; MG/1
1 CAPSULE ORAL
Qty: 6 CAP | Refills: 0 | Status: CANCELLED | OUTPATIENT
Start: 2018-08-08

## 2018-08-08 RX ORDER — SIMVASTATIN 20 MG/1
20 TABLET, FILM COATED ORAL
Qty: 30 TAB | Refills: 2 | Status: CANCELLED | OUTPATIENT
Start: 2018-08-08

## 2018-08-08 RX ORDER — METFORMIN HYDROCHLORIDE 1000 MG/1
1000 TABLET ORAL 2 TIMES DAILY WITH MEALS
Qty: 60 TAB | Refills: 2 | Status: CANCELLED | OUTPATIENT
Start: 2018-08-08

## 2018-08-08 RX ORDER — LISINOPRIL 40 MG/1
40 TABLET ORAL DAILY
Qty: 30 TAB | Refills: 3 | Status: CANCELLED | OUTPATIENT
Start: 2018-08-08

## 2018-08-08 RX ORDER — HYDROXYZINE PAMOATE 50 MG/1
50 CAPSULE ORAL
Qty: 30 CAP | Refills: 1 | Status: CANCELLED | OUTPATIENT
Start: 2018-08-08

## 2018-08-08 NOTE — TELEPHONE ENCOUNTER
Patient needs a medication refill. Please advise      Requested Prescriptions     Pending Prescriptions Disp Refills    butalbital-acetaminophen-caff (FIORICET) -40 mg per capsule 6 Cap 0     Sig: Take 1 Cap by mouth every six (6) hours as needed for Headache.  glipiZIDE (GLUCOTROL) 5 mg tablet 60 Tab 2     Sig: Take 1 Tab by mouth two (2) times a day.  hydrOXYzine pamoate (VISTARIL) 50 mg capsule 30 Cap 1     Sig: Take 1 Cap by mouth nightly as needed for Other (sleep).  lisinopril (PRINIVIL, ZESTRIL) 40 mg tablet 30 Tab 3     Sig: Take 1 Tab by mouth daily.  metFORMIN (GLUCOPHAGE) 1,000 mg tablet 60 Tab 2     Sig: Take 1 Tab by mouth two (2) times daily (with meals).  simvastatin (ZOCOR) 20 mg tablet 30 Tab 2     Sig: Take 1 Tab by mouth nightly.  SITagliptin-metFORMIN (JANUMET) 50-1,000 mg per tablet 180 Tab 3     Sig: Take 1 Tab by mouth two (2) times daily (with meals).

## 2018-08-20 ENCOUNTER — OFFICE VISIT (OUTPATIENT)
Dept: FAMILY MEDICINE CLINIC | Age: 58
End: 2018-08-20

## 2018-08-20 VITALS
HEART RATE: 64 BPM | BODY MASS INDEX: 27.55 KG/M2 | HEIGHT: 69 IN | SYSTOLIC BLOOD PRESSURE: 157 MMHG | TEMPERATURE: 98 F | WEIGHT: 186 LBS | RESPIRATION RATE: 16 BRPM | DIASTOLIC BLOOD PRESSURE: 85 MMHG

## 2018-08-20 DIAGNOSIS — I10 ESSENTIAL HYPERTENSION: ICD-10-CM

## 2018-08-20 DIAGNOSIS — G47.09 OTHER INSOMNIA: ICD-10-CM

## 2018-08-20 DIAGNOSIS — E11.40 TYPE 2 DIABETES MELLITUS WITH DIABETIC NEUROPATHY, WITH LONG-TERM CURRENT USE OF INSULIN (HCC): ICD-10-CM

## 2018-08-20 DIAGNOSIS — E78.00 HYPERCHOLESTEROLEMIA: ICD-10-CM

## 2018-08-20 DIAGNOSIS — E78.2 MIXED HYPERLIPIDEMIA: ICD-10-CM

## 2018-08-20 DIAGNOSIS — E55.9 VITAMIN D DEFICIENCY: ICD-10-CM

## 2018-08-20 DIAGNOSIS — Z79.4 TYPE 2 DIABETES MELLITUS WITH DIABETIC NEUROPATHY, WITH LONG-TERM CURRENT USE OF INSULIN (HCC): ICD-10-CM

## 2018-08-20 RX ORDER — SIMVASTATIN 20 MG/1
20 TABLET, FILM COATED ORAL
Qty: 90 TAB | Refills: 3 | Status: SHIPPED | OUTPATIENT
Start: 2018-08-20 | End: 2019-04-08 | Stop reason: SDUPTHER

## 2018-08-20 RX ORDER — RAMELTEON 8 MG/1
8 TABLET ORAL
Qty: 30 TAB | Refills: 0 | Status: SHIPPED | OUTPATIENT
Start: 2018-08-20 | End: 2019-05-28 | Stop reason: SDUPTHER

## 2018-08-20 RX ORDER — GLIPIZIDE 10 MG/1
10 TABLET ORAL 2 TIMES DAILY
Qty: 180 TAB | Refills: 3 | Status: SHIPPED | OUTPATIENT
Start: 2018-08-20 | End: 2019-04-08 | Stop reason: SDUPTHER

## 2018-08-20 RX ORDER — LISINOPRIL 40 MG/1
40 TABLET ORAL DAILY
Qty: 90 TAB | Refills: 3 | Status: SHIPPED | OUTPATIENT
Start: 2018-08-20 | End: 2019-04-11 | Stop reason: SDUPTHER

## 2018-08-20 RX ORDER — GLIPIZIDE 5 MG/1
5 TABLET ORAL 2 TIMES DAILY
Qty: 60 TAB | Refills: 2 | Status: CANCELLED | OUTPATIENT
Start: 2018-08-20

## 2018-08-20 RX ORDER — METFORMIN HYDROCHLORIDE 1000 MG/1
1000 TABLET ORAL 2 TIMES DAILY WITH MEALS
Qty: 180 TAB | Refills: 3 | Status: SHIPPED | OUTPATIENT
Start: 2018-08-20 | End: 2019-04-08 | Stop reason: ALTCHOICE

## 2018-08-20 RX ORDER — HYDROXYZINE PAMOATE 50 MG/1
50 CAPSULE ORAL
Qty: 30 CAP | Refills: 1 | Status: CANCELLED | OUTPATIENT
Start: 2018-08-20

## 2018-08-20 RX ORDER — INSULIN GLARGINE 100 [IU]/ML
10 INJECTION, SOLUTION SUBCUTANEOUS
Qty: 15 PEN | Refills: 3 | Status: SHIPPED | OUTPATIENT
Start: 2018-08-20 | End: 2018-09-18 | Stop reason: SDUPTHER

## 2018-08-20 NOTE — PROGRESS NOTES
Chief Complaint   Patient presents with    Diabetes     1. Have you been to the ER, urgent care clinic since your last visit? Hospitalized since your last visit? No    2. Have you seen or consulted any other health care providers outside of the [address] [place] [address] since your last visit? Include any pap smears or colon screening.  No

## 2018-08-20 NOTE — PATIENT INSTRUCTIONS
Please contact our office if you have any questions about your visit today. 1.) Please call us to let us know if the Rozerem (Sleep Medication) works in 2 weeks. We can refill this. 2.) Please call for follow up with either Dr. Sue Bucio or  Humphrey on your sleep medication. 3.) Check blood sugar regularly. Your glipizide was increased to 10 mg.     4.) Please call for follow up in 1 month. Other [facility] Clinicians: Dr. Sue Bucio and Hanane Humphrey (NP)      Dr. Keith Baron  [facility] [address] [place] [address].   [facility] 43, [address]    [phone]             Dr. Bob Olson  [address] [place] [address] [address]  [phone]      Dr. Kassy Gutierrez   [facility] [address]   [place]  [phone]

## 2018-08-20 NOTE — MR AVS SNAPSHOT
[address] 
 
 
 [place] [address] [phone] Patient: Uriel Gann MRN: [id number] WAK:[birth year] Visit Information Date & Time Provider Department Dept. Phone Encounter #  
 8/20/2018  3:15 PM JayaHiral [address] [id number] Follow-up Instructions Return for Patient will call for follow up. .  
  
Your Appointments 8/21/2018  3:40 PM  
Follow Up with [address]un[address], MD  
[facility] ([address]) Appt Note: pt requested follow up, hasn't been seen since 2016 [place] [address] [phone] [place] [phone]  
  
    
 9/5/2018  3:15 PM  
Complete Physical with Raeann Maurer DO [facility] (--) Appt Note: annual  
 [place] [id number] [phone]  
  
   
 [place] [id number] Upcoming Health Maintenance Date Due Hepatitis C Screening 1960 EYE EXAM RETINAL OR DILATED Q1 6/2/1968 Pneumococcal 19-64 Medium Risk (1 of 1 - PPSV23) 6/2/1977 DTaP/Tdap/Td series (1 - Tdap) 6/2/1979 FOBT Q 1 YEAR AGE 50-75 6/2/2008 MICROALBUMIN Q1 1/13/2017 ZOSTER VACCINE AGE 60> 4/2/2018 Influenza Age 5 to Adult 8/1/2018 HEMOGLOBIN A1C Q6M 1/30/2019 FOOT EXAM Q1 7/11/2019 LIPID PANEL Q1 7/30/2019 Allergies as of 8/20/2018  Review Complete On: 2/36/8652 By: Jeet Jamison. Dayron Dimas LPN Severity Noted Reaction Type Reactions Niacin  12/09/2011   Systemic Itching Current Immunizations  Never Reviewed No immunizations on file. Not reviewed this visit You Were Diagnosed With   
  
 Codes Comments Other insomnia     ICD-10-CM: G47.09 
ICD-9-CM: 780.52 Insulin dependent diabetes mellitus (HCC)     ICD-10-CM: E11.9, Z79.4 ICD-9-CM: 250.00, V58.67 Mixed hyperlipidemia     ICD-10-CM: E78.2 ICD-9-CM: 272.2 Vitamin D deficiency     ICD-10-CM: E55.9 ICD-9-CM: 268.9 Type 2 diabetes mellitus with diabetic neuropathy, with long-term current use of insulin (HCC)     ICD-10-CM: E11.40, Z79.4 ICD-9-CM: 250.60, 357.2, V58.67 Uncontrolled type 2 diabetes mellitus with complication, without long-term current use of insulin (HCC)     ICD-10-CM: E11.8, E11.65 ICD-9-CM: 250.82 Hypercholesterolemia     ICD-10-CM: E78.00 ICD-9-CM: 272.0 Essential hypertension     ICD-10-CM: I10 
ICD-9-CM: 401.9 Vitals BP Pulse Temp Resp Height(growth percentile) Weight(growth percentile) 157/85 (BP 1 Location: Right arm, BP Patient Position: Sitting) 64 98 °F (36.7 °C) (Oral) 16 5' 9\" (1.753 m) 186 lb (84.4 kg) BMI Smoking Status 27.47 kg/m2 Current Every Day Smoker BMI and BSA Data Body Mass Index Body Surface Area  
 27.47 kg/m 2 2.03 m 2 Preferred Pharmacy Pharmacy Name Phone [facility] [address] [facility] [phone] Your Updated Medication List  
  
   
This list is accurate as of 8/20/18  4:00 PM.  Always use your most recent med list.  
  
  
  
  
 aspirin 81 mg tablet Take 1 Tab by mouth daily. butalbital-acetaminophen-caff -40 mg per capsule Commonly known as:  [employer] Take 1 Cap by mouth every six (6) hours as needed for Headache. flash glucose scanning reader Misc Commonly known as:  FREESTYLE BRENT READER Check Blood Sugar After Largest Meal of the Day  
  
 flash glucose sensor Kit Commonly known as:  [address] Check Blood Sugar daily after largest meal of the day. glipiZIDE 10 mg tablet Commonly known as:  Carmela Harrison Take 1 Tab by mouth two (2) times a day.  
  
 hydrOXYzine pamoate 50 mg capsule Commonly known as:  VISTARIL  
 Take 1 Cap by mouth nightly as needed for Other (sleep). ibuprofen 800 mg tablet Commonly known as:  MOTRIN Take 1 Tab by mouth every eight (8) hours as needed for Pain. insulin glargine 100 unit/mL (3 mL) Inpn Commonly known as:  LANTUS,BASAGLAR  
10 Units by SubCUTAneous route nightly. insulin lispro protamine/insulin lispro 100 unit/mL (50-50) injection Commonly known as:  HUMALOG MIX 50/50  
10 Units by SubCUTAneous route three (3) times daily (with meals). Inject  beneath the skin. Per pt 10 units three times a day (with meals)  
  
 lisinopril 40 mg tablet Commonly known as:  Maljamar Escort Take 1 Tab by mouth daily. metFORMIN 1,000 mg tablet Commonly known as:  GLUCOPHAGE Take 1 Tab by mouth two (2) times daily (with meals). NOVOLOG SC  
by SubCUTAneous route. ramelteon 8 mg tablet Commonly known as:  ROZEREM Take 1 Tab by mouth nightly as needed for Sleep.  
  
 simvastatin 20 mg tablet Commonly known as:  ZOCOR Take 1 Tab by mouth nightly. SITagliptin-metFORMIN 50-1,000 mg per tablet Commonly known as:  Donnella Amble Take 1 Tab by mouth two (2) times daily (with meals). Prescriptions Sent to Pharmacy Refills  
 insulin glargine (LANTUS,BASAGLAR) 100 unit/mL (3 mL) inpn 3 Sig: 10 Units by SubCUTAneous route nightly. Class: Normal  
 Pharmacy: [address] Ph #: [phone] Route: SubCUTAneous  
 simvastatin (ZOCOR) 20 mg tablet 3 Sig: Take 1 Tab by mouth nightly. Class: Normal  
 Pharmacy: [address] Ph #: [phone] Route: Oral  
 metFORMIN (GLUCOPHAGE) 1,000 mg tablet 3 Sig: Take 1 Tab by mouth two (2) times daily (with meals). Class: Normal  
 Pharmacy: [address] Ph #: [phone]  Route: Oral  
 lisinopril (PRINIVIL, ZESTRIL) 40 mg tablet 3 Sig: Take 1 Tab by mouth daily. Class: Normal  
 Pharmacy: [address] Ph #: [phone] Route: Oral  
 flash glucose scanning reader (FREESTYLE BRENT READER) misc 1 Sig: Check Blood Sugar After Largest Meal of the Day Class: Normal  
 Pharmacy: [facility] [address] Ph #: [phone]  
 flash glucose sensor (FREESTYLE BRENT SENSOR) kit 1 Sig: Check Blood Sugar daily after largest meal of the day. Class: Normal  
 Pharmacy: [facility] [address]  Ph #: [phone]  
 glipiZIDE (GLUCOTROL) 10 mg tablet 3 Sig: Take 1 Tab by mouth two (2) times a day. Class: Normal  
 Pharmacy: [address] Ph #: [phone] Route: Oral  
 ramelteon (ROZEREM) 8 mg tablet 0 Sig: Take 1 Tab by mouth nightly as needed for Sleep. Class: Normal  
 Pharmacy: [address] Ph #: [phone] Route: Oral  
  
Follow-up Instructions Return for Patient will call for follow up. .  
  
  
Patient Instructions Please contact our office if you have any questions about your visit today. 1.) Please call us to let us know if the Rozerem (Sleep Medication) works in 2 weeks. We can refill this. 2.) Please call for follow up with either Dr. Paula Santana or Mrs. Yin on your sleep medication. 3.) Check blood sugar regularly. Your glipizide was increased to 10 mg.  
 
4.) Please call for follow up in 1 month. Other [facility] Clinicians: Dr. Paula Santana and Mrs. Yin (NP) Dr. Shan Jacobs [facility] [address] [place], [address] 
 
[phone] Dr. Kylah Julian [facility] [address],[facility] 300 
[phone] Dr. Migue Pillai [facility] [address] [place] 
[phone] Introducing [facility] SERVICES! Dear Imelda Vuong: Thank you for requesting a [employer] account. Our records indicate that you already have an active [employer] account. You can access your account anytime at [email or web address] Did you know that you can access your hospital and ER discharge instructions at any time in [employer]? You can also review all of your test results from your hospital stay or ER visit. Additional Information If you have questions, please visit the Frequently Asked Questions section of the [employer] website at [email or web address] Remember, [employer] is NOT to be used for urgent needs. For medical emergencies, dial 911. Now available from your iPhone and Android! Please provide this summary of care documentation to your next provider. Your primary care clinician is listed as Ray Acevedo. If you have any questions after today's visit, please call [phone].
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
## 2018-08-27 DIAGNOSIS — E55.9 VITAMIN D DEFICIENCY: ICD-10-CM

## 2018-08-27 DIAGNOSIS — G47.09 OTHER INSOMNIA: ICD-10-CM

## 2018-08-27 DIAGNOSIS — E78.2 MIXED HYPERLIPIDEMIA: ICD-10-CM

## 2018-08-28 RX ORDER — HYDROXYZINE PAMOATE 50 MG/1
CAPSULE ORAL
Qty: 30 CAP | Refills: 0 | Status: SHIPPED | OUTPATIENT
Start: 2018-08-28 | End: 2019-04-08 | Stop reason: SDUPTHER

## 2018-08-31 ENCOUNTER — OFFICE VISIT (OUTPATIENT)
Dept: CARDIOLOGY CLINIC | Age: 58
End: 2018-08-31

## 2018-08-31 VITALS
HEIGHT: 69 IN | WEIGHT: 186 LBS | HEART RATE: 71 BPM | OXYGEN SATURATION: 97 % | SYSTOLIC BLOOD PRESSURE: 110 MMHG | DIASTOLIC BLOOD PRESSURE: 72 MMHG | BODY MASS INDEX: 27.55 KG/M2

## 2018-08-31 DIAGNOSIS — E78.2 MIXED HYPERLIPIDEMIA: ICD-10-CM

## 2018-08-31 DIAGNOSIS — I25.10 ATHEROSCLEROSIS OF NATIVE CORONARY ARTERY OF NATIVE HEART WITHOUT ANGINA PECTORIS: Primary | ICD-10-CM

## 2018-08-31 DIAGNOSIS — F17.200 TOBACCO USE DISORDER: ICD-10-CM

## 2018-08-31 DIAGNOSIS — I10 ESSENTIAL HYPERTENSION: ICD-10-CM

## 2018-08-31 RX ORDER — IBUPROFEN 200 MG
1 TABLET ORAL EVERY 24 HOURS
Qty: 90 PATCH | Refills: 0 | Status: SHIPPED | OUTPATIENT
Start: 2018-08-31 | End: 2018-09-30

## 2018-08-31 RX ORDER — GABAPENTIN 800 MG/1
TABLET ORAL 3 TIMES DAILY
COMMUNITY
End: 2018-12-06 | Stop reason: SDUPTHER

## 2018-08-31 NOTE — MR AVS SNAPSHOT
[facility] [address]  [place] [address] 
[phone] Patient: Humphrey Lara MRN: [id number] UNL:[birth year] Visit Information Date & Time Provider Department Dept. Phone Encounter #  
 8/31/2018  3:20 PM Isabel Glass MD [facility] [id number] Your Appointments 9/5/2018  3:15 PM  
Complete Physical with Raeann Maurer DO [facility] (--) Appt Note: annual  
 [address]  [place] [address]  
[phone]  
  
   
 [address]  [place] [address] Upcoming Health Maintenance Date Due Hepatitis C Screening 1960 EYE EXAM RETINAL OR DILATED Q1 6/2/1968 Pneumococcal 19-64 Medium Risk (1 of 1 - PPSV23) 6/2/1977 DTaP/Tdap/Td series (1 - Tdap) 6/2/1979 FOBT Q 1 YEAR AGE 50-75 6/2/2008 MICROALBUMIN Q1 1/13/2017 ZOSTER VACCINE AGE 60> 4/2/2018 Influenza Age 5 to Adult 8/1/2018 HEMOGLOBIN A1C Q6M 1/30/2019 FOOT EXAM Q1 7/11/2019 LIPID PANEL Q1 7/30/2019 Allergies as of 8/31/2018  Review Complete On: 8/21/2018 By: Maryhelen Burkitt, DO Severity Noted Reaction Type Reactions Niacin  12/09/2011   Systemic Itching Current Immunizations  Never Reviewed No immunizations on file. Not reviewed this visit You Were Diagnosed With   
  
 Codes Comments Atherosclerosis of native coronary artery of native heart without angina pectoris    -  Primary ICD-10-CM: I25.10 ICD-9-CM: 414.01 Mixed hyperlipidemia     ICD-10-CM: E78.2 ICD-9-CM: 272.2 Essential hypertension     ICD-10-CM: I10 
ICD-9-CM: 401.9 Vitals BP Pulse Height(growth percentile) Weight(growth percentile) SpO2 BMI  
 110/72 71 5' 9\" (1.753 m) 186 lb (84.4 kg) 97% 27.47 kg/m2 Smoking Status Current Every Day Smoker Vitals History BMI and BSA Data Body Mass Index Body Surface Area  
 27.47 kg/m 2 2.03 m 2 Preferred Pharmacy Pharmacy Name Phone MELISSA ROWE [facility] [address] [facility] [phone] Your Updated Medication List  
  
   
This list is accurate as of 8/31/18  3:46 PM.  Always use your most recent med list.  
  
  
  
  
 aspirin 81 mg tablet Take 1 Tab by mouth daily. butalbital-acetaminophen-caff -40 mg per capsule Commonly known as:  [employer] Take 1 Cap by mouth every six (6) hours as needed for Headache. flash glucose scanning reader Misc Commonly known as:  FREESTYLE BRENT READER Check Blood Sugar After Largest Meal of the Day  
  
 flash glucose sensor Kit Commonly known as:  [address] Check Blood Sugar daily after largest meal of the day.  
  
 gabapentin 800 mg tablet Commonly known as:  NEURONTIN Take  by mouth three (3) times daily. glipiZIDE 10 mg tablet Commonly known as:  Jimmie Holcomb Take 1 Tab by mouth two (2) times a day.  
  
 hydrOXYzine pamoate 50 mg capsule Commonly known as:  VISTARIL  
TAKE ONE CAPSULE BY MOUTH EVERY EVENING AS NEEDED FOR SLEEP  
  
 ibuprofen 800 mg tablet Commonly known as:  MOTRIN Take 1 Tab by mouth every eight (8) hours as needed for Pain. insulin glargine 100 unit/mL (3 mL) Inpn Commonly known as:  LANLANGBASAGLAR  
10 Units by SubCUTAneous route nightly. lisinopril 40 mg tablet Commonly known as:  Sanders Edwin Take 1 Tab by mouth daily. metFORMIN 1,000 mg tablet Commonly known as:  GLUCOPHAGE Take 1 Tab by mouth two (2) times daily (with meals). NOVOLOG SC  
by SubCUTAneous route. ramelteon 8 mg tablet Commonly known as:  ROZEREM Take 1 Tab by mouth nightly as needed for Sleep.  
  
 simvastatin 20 mg tablet Commonly known as:  ZOCOR Take 1 Tab by mouth nightly. SITagliptin-metFORMIN 50-1,000 mg per tablet Commonly known as:  Ana Samuels  
 Take 1 Tab by mouth two (2) times daily (with meals). We Performed the Following AMB POC EKG ROUTINE W/ 12 LEADS, INTER & REP [40116 CPT(R)] Introducing [facility] SERVICES! Dear Víctor Later: Thank you for requesting a [employer] account. Our records indicate that you already have an active V[employer]ient account. You can access your account anytime at [email or web address] Did you know that you can access your hospital and ER discharge instructions at any time in [employer]? You can also review all of your test results from your hospital stay or ER visit. Additional Information If you have questions, please visit the Frequently Asked Questions section of the V[employer] website at [email or web address] Remember, [employer] is NOT to be used for urgent needs. For medical emergencies, dial 911. Now available from your iPhone and Android! Please provide this summary of care documentation to your next provider. Your primary care clinician is listed as Patrick Pena. If you have any questions after today's visit, please call [phone].

## 2018-08-31 NOTE — PROGRESS NOTES
HISTORY OF PRESENT ILLNESS  Vincenzo Miles is a 59 y.o. male. ASSESSMENT and PLAN    Mr. Richie Mclean has history of CAD. He presented back in 2009 with Malaise but no specific chest pains, inferior wall MI, thrombotic occlusion of his circumflex. He had Thrombectomy, angioplasty and stent to circumflex. He was also noted to have vasospasm. In 2011, he presented with non-STEMI; his ramus intermedius branch had severe stenosis which was stented. Unfortunately, he continues to drink about a fifth of liquor per week and smokes about half a pack per day. 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 On numerous occasions in the past, I have advised him and encouraged him to discontinue tobacco use completely. Again, I have asked him to discontinue tobacco use completely. He is not on beta blocker therapy due to the fact that he has ED. He also has asymptomatic sinus bradycardia. Patient had nuclear scan done in October 2016 which showed EF of 64% with small inferior fixed defect likely from diaphragmatic attenuation.  CAD:   Symptomatically stable.  BP:   Well-controlled.  HR:    Stable.  CHF:   There is no evidence of decompensated CHF noted.  Weight:   His weight today is 186 pounds. It is at baseline.  Cholesterol:   Target LDL <70. Zocor 20.  Tobacco:   Unfortunately, he still smokes about a pack per week. Again, lengthy discussion was carried on about the importance of smoking cessation. NicoDerm patch was provided.  Anti-platelet:   Remains on ASA. I will see him back annually. Thank you.     Again, I spent over 20 minutes trying to educate and encourage smoking cessation, as well as moderate alcohol use. Encounter Diagnoses   Name Primary?     Atherosclerosis of native coronary artery of native heart without angina pectoris Yes    Mixed hyperlipidemia     Essential hypertension     Tobacco use disorder      current treatment plan is effective, no change in therapy  lab results and schedule of future lab studies reviewed with patient  reviewed diet, exercise and weight control  very strongly urged to quit smoking to reduce cardiovascular risk  cardiovascular risk and specific lipid/LDL goals reviewed  use of aspirin to prevent MI and TIA's discussed      HPI  Today, Mr. Luke Benson has no complaints of chest pains, or increased shortness of breath. He remains active. He works as a . He denies any changes in his exercise capacity. He denies any orthopnea or PND. Unfortunately, he still smokes cigarettes. When pressed, he states smoking about pack per week. But he is not sure. He drinks about 1 pint of alcohol per week. Review of Systems   Respiratory: Negative for shortness of breath. Cardiovascular: Negative for chest pain, palpitations, orthopnea, claudication, leg swelling and PND. All other systems reviewed and are negative. Physical Exam   Constitutional: He is oriented to person, place, and time. He appears well-developed and well-nourished. HENT:   Head: Normocephalic. Eyes: Conjunctivae are normal.   Neck: Neck supple. No JVD present. Carotid bruit is not present. No thyromegaly present. Cardiovascular: Normal rate and regular rhythm. Abdominal: Bowel sounds are normal.   Musculoskeletal: He exhibits no edema. Neurological: He is alert and oriented to person, place, and time. Skin: Skin is warm and dry. Nursing note and vitals reviewed. PCP: Yassine Hogan DO    Past Medical History:   Diagnosis Date    Alcohol use     Fri-Sun one fifth; Mon-Thur: Pint of liquor    CAD (coronary artery disease)     Cardiac angioplasty with stent 07/29/2009    2.5 x 13 Cypher stent to CX.  Cardiac cath 11/09/2011    RCA patent. LM patent. LAD patent. mD1 55%. CX patent. Prior stent patent. Edison 85% (2.5 x 15-mm Xience stent, resid 0%). LVEDP 12. EF 40-45%.   High lateral hypk (RI distribution).  Cardiac inferior STEMI 2009    Current smoker     contemplating stopping    Diabetes ([address]     type 2-insulin dependent    GERD (gastroesophageal reflux disease)     HTN (hypertension)     Hyperlipidemia     Migraine     Myocardial infarction [facility])        Past Surgical History:   Procedure Laterality Date    HX CORONARY STENT PLACEMENT  07/29/2009    HX HEART CATHETERIZATION  8/30/2011    HX HEART CATHETERIZATION  11/09/2011    HX WISDOM TEETH EXTRACTION      x4       Current Outpatient Prescriptions   Medication Sig Dispense Refill    gabapentin (NEURONTIN) 800 mg tablet Take  by mouth three (3) times daily.  nicotine (NICODERM CQ) 21 mg/24 hr 1 Patch by TransDERmal route every twenty-four (24) hours for 30 days. 90 Patch 0    hydrOXYzine pamoate (VISTARIL) 50 mg capsule TAKE ONE CAPSULE BY MOUTH EVERY EVENING AS NEEDED FOR SLEEP 30 Cap 0    flash glucose sensor (FREESTYLE BRENT SENSOR) kit Check Blood Sugar daily after largest meal of the day. 3 Each 3    insulin glargine (LANTUS,BASAGLAR) 100 unit/mL (3 mL) inpn 10 Units by SubCUTAneous route nightly. (Patient taking differently: 50 Units by SubCUTAneous route daily. ) 15 Pen 3    simvastatin (ZOCOR) 20 mg tablet Take 1 Tab by mouth nightly. 90 Tab 3    metFORMIN (GLUCOPHAGE) 1,000 mg tablet Take 1 Tab by mouth two (2) times daily (with meals). 180 Tab 3    lisinopril (PRINIVIL, ZESTRIL) 40 mg tablet Take 1 Tab by mouth daily. 90 Tab 3    flash glucose scanning reader (FREESTYLE BRENT READER) misc Check Blood Sugar After Largest Meal of the Day 1 Each 1    glipiZIDE (GLUCOTROL) 10 mg tablet Take 1 Tab by mouth two (2) times a day. 180 Tab 3    ramelteon (ROZEREM) 8 mg tablet Take 1 Tab by mouth nightly as needed for Sleep. 30 Tab 0    ibuprofen (MOTRIN) 800 mg tablet Take 1 Tab by mouth every eight (8) hours as needed for Pain.  60 Tab 1    butalbital-acetaminophen-caff (FIORICET) -40 mg per capsule Take 1 Cap by mouth every six (6) hours as needed for Headache. 6 Cap 0    SITagliptin-metFORMIN (JANUMET) 50-1,000 mg per tablet Take 1 Tab by mouth two (2) times daily (with meals). 180 Tab 3    INSULIN ASPART (NOVOLOG SC) by SubCUTAneous route.  aspirin 81 mg tablet Take 1 Tab by mouth daily. 1 Tab 0       The patient has a family history of    Social History   Substance Use Topics    Smoking status: Current Every Day Smoker     Packs/day: 0.50     Years: 1.00     Types: Cigarettes    Smokeless tobacco: Never Used    Alcohol use 2.5 oz/week     5 Shots of liquor per week      Comment: occassionally       Lab Results   Component Value Date/Time    Cholesterol, total 142 07/30/2018 11:43 AM    HDL Cholesterol 54 07/30/2018 11:43 AM    LDL, calculated 72 07/30/2018 11:43 AM    Triglyceride 80 07/30/2018 11:43 AM    CHOL/HDL Ratio 2.6 07/30/2018 11:43 AM        BP Readings from Last 3 Encounters:   08/31/18 110/72   08/20/18 157/85   07/02/18 132/76        Pulse Readings from Last 3 Encounters:   08/31/18 71   08/20/18 64   07/02/18 86       Wt Readings from Last 3 Encounters:   08/31/18 84.4 kg (186 lb)   08/20/18 84.4 kg (186 lb)   07/02/18 80.4 kg (177 lb 4 oz)         EKG: unchanged from previous tracings, normal sinus rhythm, nonspecific ST and T waves changes, T-wave inversions noted inferolaterally.

## 2018-08-31 NOTE — PROGRESS NOTES
Ash Riggs presents today for   Chief Complaint   Patient presents with    Hypertension     follow up - no cardiac complaints        Ash Riggs preferred language for health care discussion is english/other. Is someone accompanying this pt? Self     Is the patient using any DME equipment during OV? No     Depression Screening:  PHQ over the last two weeks 8/20/2018   PHQ Not Done Patient Decline   Little interest or pleasure in doing things -   Feeling down, depressed, irritable, or hopeless -   Total Score PHQ 2 -       Learning Assessment:  Learning Assessment 10/11/2016   PRIMARY LEARNER Patient   HIGHEST LEVEL OF EDUCATION - PRIMARY LEARNER  -   BARRIERS PRIMARY LEARNER -   CO-LEARNER CAREGIVER -   PRIMARY LANGUAGE ENGLISH   LEARNER PREFERENCE PRIMARY DEMONSTRATION   ANSWERED BY Patient   RELATIONSHIP SELF       Abuse Screening:  Abuse Screening Questionnaire 8/20/2018   Do you ever feel afraid of your partner? N   Are you in a relationship with someone who physically or mentally threatens you? N   Is it safe for you to go home? Y       Fall Risk  Fall Risk Assessment, last 12 mths 8/20/2018   Able to walk? Yes   Fall in past 12 months? No       Pt currently taking Antiplatelet therapy? No     Coordination of Care:  1. Have you been to the ER, urgent care clinic since your last visit? Hospitalized since your last visit? No     2. Have you seen or consulted any other health care providers outside of the [facility] since your last visit? Include any pap smears or colon screening.  No

## 2018-09-18 DIAGNOSIS — E11.40 TYPE 2 DIABETES MELLITUS WITH DIABETIC NEUROPATHY, WITH LONG-TERM CURRENT USE OF INSULIN (HCC): ICD-10-CM

## 2018-09-18 DIAGNOSIS — Z79.4 TYPE 2 DIABETES MELLITUS WITH DIABETIC NEUROPATHY, WITH LONG-TERM CURRENT USE OF INSULIN (HCC): ICD-10-CM

## 2018-09-20 RX ORDER — INSULIN GLARGINE 100 [IU]/ML
50 INJECTION, SOLUTION SUBCUTANEOUS DAILY
Qty: 5 PEN | Refills: 10 | Status: SHIPPED | OUTPATIENT
Start: 2018-09-20 | End: 2019-04-04

## 2018-09-20 NOTE — TELEPHONE ENCOUNTER
PCP: Naida El DO    Last appt: 2018  Future Appointments  Date Time Provider Henri Becerra   9/3/2019 4:00 PM Kely Campbell MD [address] [place] [address]       Requested Prescriptions     Pending Prescriptions Disp Refills    insulin glargine (LANTUS,BASAGLAR) 100 unit/mL (3 mL) inpn 5 Pen 10     Si Units by SubCUTAneous route daily. Patient contacted, patient identified with two identifiers (Name & ). Patient states he is taking 50 units daily.

## 2018-12-06 DIAGNOSIS — M25.512 ACUTE PAIN OF LEFT SHOULDER: ICD-10-CM

## 2018-12-06 NOTE — TELEPHONE ENCOUNTER
Patient needs a medication refill. Please advise    Requested Prescriptions     Pending Prescriptions Disp Refills    gabapentin (NEURONTIN) 800 mg tablet       Sig: Take  by mouth three (3) times daily.

## 2018-12-07 NOTE — TELEPHONE ENCOUNTER
PCP: Margarita Orozco NP    Last appt: 8/20/2018  Future Appointments   Date Time Provider Henri Becerra   12/17/2018  2:30 PM Ramin ANAYA NP [facility] None   9/3/2019  4:00 PM Erin Larios MD [address] [place] [address]       Requested Prescriptions     Pending Prescriptions Disp Refills    gabapentin (NEURONTIN) 800 mg tablet 90 Tab 0     Sig: Take 1 Tab by mouth three (3) times daily.  ibuprofen (MOTRIN) 800 mg tablet 60 Tab 1     Sig: Take 1 Tab by mouth every eight (8) hours as needed for Pain.

## 2018-12-10 RX ORDER — GABAPENTIN 800 MG/1
800 TABLET ORAL 3 TIMES DAILY
Qty: 90 TAB | Refills: 0 | Status: SHIPPED | OUTPATIENT
Start: 2018-12-10 | End: 2019-02-13 | Stop reason: SDUPTHER

## 2018-12-10 RX ORDER — IBUPROFEN 800 MG/1
800 TABLET ORAL
Qty: 60 TAB | Refills: 1 | Status: SHIPPED | OUTPATIENT
Start: 2018-12-10 | End: 2019-02-13 | Stop reason: SDUPTHER

## 2019-02-13 DIAGNOSIS — M25.512 ACUTE PAIN OF LEFT SHOULDER: ICD-10-CM

## 2019-02-13 NOTE — TELEPHONE ENCOUNTER
Pt called requesting refill for medication . Requested Prescriptions     Pending Prescriptions Disp Refills    ibuprofen (MOTRIN) 800 mg tablet 60 Tab 1     Sig: Take 1 Tab by mouth every eight (8) hours as needed for Pain.  gabapentin (NEURONTIN) 800 mg tablet 90 Tab 0     Sig: Take 1 Tab by mouth three (3) times daily.

## 2019-02-19 RX ORDER — IBUPROFEN 800 MG/1
800 TABLET ORAL
Qty: 60 TAB | Refills: 1 | Status: SHIPPED | OUTPATIENT
Start: 2019-02-19 | End: 2019-06-04 | Stop reason: SDUPTHER

## 2019-02-19 RX ORDER — GABAPENTIN 800 MG/1
800 TABLET ORAL 3 TIMES DAILY
Qty: 90 TAB | Refills: 0 | Status: SHIPPED | OUTPATIENT
Start: 2019-02-19 | End: 2019-04-08 | Stop reason: ALTCHOICE

## 2019-02-19 NOTE — TELEPHONE ENCOUNTER
Pt re calling for medication---can this be filled ? ?-- pt ask me to call him and let him know if it cannot be filled at this time

## 2019-03-03 ENCOUNTER — APPOINTMENT (OUTPATIENT)
Dept: GENERAL RADIOLOGY | Age: 59
End: 2019-03-03
Attending: EMERGENCY MEDICINE
Payer: SELF-PAY

## 2019-03-03 ENCOUNTER — HOSPITAL ENCOUNTER (EMERGENCY)
Age: 59
Discharge: HOME | End: 2019-03-03
Attending: EMERGENCY MEDICINE
Payer: SELF-PAY

## 2019-03-03 VITALS
WEIGHT: 170 LBS | SYSTOLIC BLOOD PRESSURE: 137 MMHG | HEART RATE: 83 BPM | BODY MASS INDEX: 25.18 KG/M2 | RESPIRATION RATE: 18 BRPM | HEIGHT: 69 IN | DIASTOLIC BLOOD PRESSURE: 79 MMHG | OXYGEN SATURATION: 98 %

## 2019-03-03 DIAGNOSIS — M25.551 PAIN OF RIGHT HIP JOINT: Primary | ICD-10-CM

## 2019-03-03 PROCEDURE — 73502 X-RAY EXAM HIP UNI 2-3 VIEWS: CPT

## 2019-03-03 PROCEDURE — 99282 EMERGENCY DEPT VISIT SF MDM: CPT

## 2019-03-03 RX ORDER — DIFLUNISAL 500 MG/1
500 TABLET, FILM COATED ORAL 2 TIMES DAILY
Qty: 20 TAB | Refills: 0 | Status: SHIPPED | OUTPATIENT
Start: 2019-03-03 | End: 2019-06-04 | Stop reason: SDUPTHER

## 2019-03-03 NOTE — DISCHARGE INSTRUCTIONS
Patient Education        Hip Pain: Care Instructions  Your Care Instructions    Hip pain may be caused by many things, including overuse, a fall, or a twisting movement. Another cause of hip pain is arthritis. Your pain may increase when you stand up, walk, or squat. The pain may come and go or may be constant. Home treatment can help relieve hip pain, swelling, and stiffness. If your pain is ongoing, you may need more tests and treatment. Follow-up care is a key part of your treatment and safety. Be sure to make and go to all appointments, and call your doctor if you are having problems. It's also a good idea to know your test results and keep a list of the medicines you take. How can you care for yourself at home? · Take pain medicines exactly as directed. ? If the doctor gave you a prescription medicine for pain, take it as prescribed. ? If you are not taking a prescription pain medicine, ask your doctor if you can take an over-the-counter medicine. · Rest and protect your hip. Take a break from any activity, including standing or walking, that may cause pain. · Put ice or a cold pack against your hip for 10 to 20 minutes at a time. Try to do this every 1 to 2 hours for the next 3 days (when you are awake) or until the swelling goes down. Put a thin cloth between the ice and your skin. · Sleep on your healthy side with a pillow between your knees, or sleep on your back with pillows under your knees. · If there is no swelling, you can put moist heat, a heating pad, or a warm cloth on your hip. Do gentle stretching exercises to help keep your hip flexible. · Learn how to prevent falls. Have your vision and hearing checked regularly. Wear slippers or shoes with a nonskid sole. · Stay at a healthy weight. · Wear comfortable shoes. When should you call for help? Call 911 anytime you think you may need emergency care.  For example, call if:    · You have sudden chest pain and shortness of breath, or you cough up blood.     · You are not able to stand or walk or bear weight.     · Your buttocks, legs, or feet feel numb or tingly.     · Your leg or foot is cool or pale or changes color.     · You have severe pain.    Call your doctor now or seek immediate medical care if:    · You have signs of infection, such as:  ? Increased pain, swelling, warmth, or redness in the hip area. ? Red streaks leading from the hip area. ? Pus draining from the hip area. ? A fever.     · You have signs of a blood clot, such as:  ? Pain in your calf, back of the knee, thigh, or groin. ? Redness and swelling in your leg or groin.     · You are not able to bend, straighten, or move your leg normally.     · You have trouble urinating or having bowel movements.    Watch closely for changes in your health, and be sure to contact your doctor if:    · You do not get better as expected. Where can you learn more? Go to http://nam-leon.info/. Enter R596 in the search box to learn more about \"Hip Pain: Care Instructions. \"  Current as of: September 23, 2018  Content Version: 11.9  © 8521-9220 [employer]. Care instructions adapted under license by [employer] (which disclaims liability or warranty for this information). If you have questions about a medical condition or this instruction, always ask your healthcare professional. [place] [address] any warranty or liability for your use of this information.

## 2019-03-03 NOTE — ED NOTES
Pedro Guerra is a 59 y.o. male that was discharged in stable condition. The patients diagnosis, condition and treatment were explained to  patient and aftercare instructions were given. The patient verbalized understanding. Patient armband removed and shredded.

## 2019-03-03 NOTE — LETTER
[address] [place] [address] [facility] 
[address] [facility] [address] [phone] Work/School Note Date: 3/3/2019 To Whom It May concern: 
 
Shyann Donovan was seen and treated today in the emergency room by the following provider(s): 
Attending Provider: Martin Jacobo MD.   
 
Shyann Donovan may return to work on 3/5/2019.  
 
Sincerely, 
 
 
 
 
Gaurang Cameron MD

## 2019-03-03 NOTE — ED PROVIDER NOTES
EMERGENCY DEPARTMENT HISTORY AND PHYSICAL EXAM 
 
11:36 AM 
 
 
Date: 3/3/2019 Patient Name: Ata Lombardi History of Presenting Illness Chief Complaint Patient presents with  
 Hip Pain History Provided By: Patient Additional History (Context): Ata Lombardi is a 59 y.o. male with No significant past medical history pertaining to this encounter who presents with sharp pain at the right hip that radiates down the leg x4 days. The pt denies any fall or traumatic injury that his symptoms could be associated with. He does not recall any specific movement that resulted in the onset of the pain. He is still able to stand and ambulate independently without significant difficulty despite his symptoms. The pt states that Ibuprofen 800 mg has not been effective in alleviating his pain, only providing mild relief. The pt briefly mentions that he has been mildly constipated over the last few days. He presents no further medical complaints or concerns to the ED at this time. PCP: Fay Moreno NP Chief Complaint: Hip Pain Duration:  Days Timing:  Gradual 
Location: Right Hip Quality: Emilee Malling Severity: Moderate Modifying Factors: Mild alleviation of pain with Ibuprofen Associated Symptoms: denies any other associated signs or symptoms Current Outpatient Medications Medication Sig Dispense Refill  ibuprofen (MOTRIN) 800 mg tablet Take 1 Tab by mouth every eight (8) hours as needed for Pain. 60 Tab 1  
 gabapentin (NEURONTIN) 800 mg tablet Take 1 Tab by mouth three (3) times daily. 90 Tab 0  
 insulin glargine (LANTUS,BASAGLAR) 100 unit/mL (3 mL) inpn 50 Units by SubCUTAneous route daily. 5 Pen 10  
 hydrOXYzine pamoate (VISTARIL) 50 mg capsule TAKE ONE CAPSULE BY MOUTH EVERY EVENING AS NEEDED FOR SLEEP 30 Cap 0  
 flash glucose sensor (FREESTYLE BRENT SENSOR) kit Check Blood Sugar daily after largest meal of the day.  3 Each 3  
  simvastatin (ZOCOR) 20 mg tablet Take 1 Tab by mouth nightly. 90 Tab 3  
 metFORMIN (GLUCOPHAGE) 1,000 mg tablet Take 1 Tab by mouth two (2) times daily (with meals). 180 Tab 3  
 lisinopril (PRINIVIL, ZESTRIL) 40 mg tablet Take 1 Tab by mouth daily. 90 Tab 3  
 flash glucose scanning reader (FREESTYLE BRENT READER) misc Check Blood Sugar After Largest Meal of the Day 1 Each 1  
 glipiZIDE (GLUCOTROL) 10 mg tablet Take 1 Tab by mouth two (2) times a day. 180 Tab 3  
 ramelteon (ROZEREM) 8 mg tablet Take 1 Tab by mouth nightly as needed for Sleep. 30 Tab 0  
 butalbital-acetaminophen-caff (FIORICET) -40 mg per capsule Take 1 Cap by mouth every six (6) hours as needed for Headache. 6 Cap 0  
 SITagliptin-metFORMIN (JANUMET) 50-1,000 mg per tablet Take 1 Tab by mouth two (2) times daily (with meals). 180 Tab 3  
 INSULIN ASPART (NOVOLOG SC) by SubCUTAneous route.  aspirin 81 mg tablet Take 1 Tab by mouth daily. 1 Tab 0 Past History Past Medical History: 
Past Medical History:  
Diagnosis Date  Alcohol use Fri-Sun one fifth; Mon-Thur: Pint of liquor  CAD (coronary artery disease)  Cardiac angioplasty with stent 07/29/2009  
 2.5 x 13 Cypher stent to CX.  Cardiac cath 11/09/2011 RCA patent. LM patent. LAD patent. mD1 55%. CX patent. Prior stent patent. Edison 85% (2.5 x 15-mm Xience stent, resid 0%). LVEDP 12. EF 40-45%. High lateral hypk (RI distribution).  Cardiac inferior STEMI 2009  Current smoker   
 contemplating stopping  Diabetes ([address] type 2-insulin dependent  GERD (gastroesophageal reflux disease)  HTN (hypertension)  Hyperlipidemia  Migraine  Myocardial infarction ([facility] [address] Past Surgical History: 
Past Surgical History:  
Procedure Laterality Date  HX CORONARY STENT PLACEMENT  07/29/2009  HX HEART CATHETERIZATION  8/30/2011  HX HEART CATHETERIZATION  11/09/2011  HX WISDOM TEETH EXTRACTION    
 x4  
 
 
 Family History: 
Family History Problem Relation Age of Onset  Hypertension Mother  Heart Attack Mother  Diabetes Mother  Hypertension Father  Heart Attack Father  Diabetes Father  Diabetes Sister  Hypertension Sister  Stroke Sister  Diabetes Brother  Hypertension Brother  Stroke Brother  No Known Problems Maternal Grandmother  No Known Problems Maternal Grandfather  No Known Problems Paternal Grandmother  No Known Problems Paternal Grandfather  No Known Problems Brother  Heart Disease Other  Kidney Disease Other Social History: 
Social History Tobacco Use  Smoking status: Current Every Day Smoker Packs/day: 0.50 Years: 1.00 Pack years: 0.50 Types: Cigarettes  Smokeless tobacco: Never Used Substance Use Topics  Alcohol use: Yes Alcohol/week: 2.5 oz Types: 5 Shots of liquor per week Comment: occassionally  Drug use: Yes Types: Marijuana, Cocaine Allergies: Allergies Allergen Reactions  Niacin Itching Review of Systems Review of Systems Constitutional: Negative for activity change, fatigue and fever. HENT: Negative for congestion and rhinorrhea. Eyes: Negative for visual disturbance. Respiratory: Negative for shortness of breath. Cardiovascular: Negative for chest pain and palpitations. Gastrointestinal: Positive for constipation (mild). Negative for abdominal pain, diarrhea, nausea and vomiting. Genitourinary: Negative for dysuria and hematuria. Musculoskeletal: Negative for back pain. Positive for right hip pain. Skin: Negative for rash. Neurological: Negative for dizziness, weakness, light-headedness and numbness. Psychiatric/Behavioral: Negative for agitation. All other systems reviewed and are negative. Physical Exam  
 
Visit Vitals /79 (BP 1 Location: Left arm, BP Patient Position: At rest) Pulse 83 Resp 18 Ht 5' 9\" (1.753 m) Wt 77.1 kg (170 lb) SpO2 98% BMI 25.10 kg/m² Physical Exam  
Constitutional: He appears well-developed and well-nourished. No distress. HENT:  
Head: Normocephalic and atraumatic. Right Ear: External ear normal.  
Left Ear: External ear normal.  
Nose: Nose normal.  
Mouth/Throat: Oropharynx is clear and moist.  
Eyes: Conjunctivae and EOM are normal. Pupils are equal, round, and reactive to light. No scleral icterus. Neck: Normal range of motion. Neck supple. No JVD present. No tracheal deviation present. No thyromegaly present. Cardiovascular: Normal rate and regular rhythm. Exam reveals no friction rub. No murmur heard. Pulmonary/Chest: Effort normal and breath sounds normal. No stridor. He exhibits no tenderness. Abdominal: Soft. Bowel sounds are normal. He exhibits no distension. There is no tenderness. There is no rebound and no guarding. Musculoskeletal: Normal range of motion. He exhibits no edema. Right hip: He exhibits tenderness (mild over the lateral right hip). Lymphadenopathy:  
  He has no cervical adenopathy. Neurological: He is alert. No cranial nerve deficit. Coordination normal.  
Skin: Skin is warm and dry. Psychiatric: He has a normal mood and affect. His behavior is normal. Judgment and thought content normal.  
Nursing note and vitals reviewed. Diagnostic Study Results Labs - No results found for this or any previous visit (from the past 12 hour(s)). Radiologic Studies - R hip and pelvis exam show no acute changes, no FX. Jimena Adkins MD 
 
XR HIP RT W OR WO PELV 2-3 VWS    (Results Pending) Medical Decision Making It should be noted that Levi Spangler MD will be the provider of record for this patient. I reviewed the vital signs, available nursing notes, past medical history, past surgical history, family history and social history. Vital Signs-Reviewed the patient's vital signs. Pulse Oximetry Analysis -  98% on room air (Interpretation), wnl 
 
Cardiac Monitor: 
Rate: 83 bpm 
 
Records Reviewed: Nursing Notes (Time of Review: 11:36 AM) 
 
ED Course: Progress Notes, Reevaluation, and Consults: 
 
Provider Notes (Medical Decision Making): Results reviwed with pt who agrees with dispo and F/U plan. Liza Villa MD 
12:06 PM 
 
 
Diagnosis Clinical Impression: No diagnosis found. Disposition: Discharge home Follow-up Information None Medication List  
  
ASK your doctor about these medications   
aspirin 81 mg tablet Take 1 Tab by mouth daily. butalbital-acetaminophen-caff -40 mg per capsule Commonly known as:  [employer] Take 1 Cap by mouth every six (6) hours as needed for Headache. flash glucose scanning reader Misc Commonly known as:  FREESTYLE BRENT 10 DAY READER Check Blood Sugar After Largest Meal of the Day 
  
flash glucose sensor Kit Commonly known as:  [employer] Check Blood Sugar daily after largest meal of the day. 
  
gabapentin 800 mg tablet Commonly known as:  NEURONTIN Take 1 Tab by mouth three (3) times daily. glipiZIDE 10 mg tablet Commonly known as:  Dung Best Take 1 Tab by mouth two (2) times a day. 
  
hydrOXYzine pamoate 50 mg capsule Commonly known as:  VISTARIL 
TAKE ONE CAPSULE BY MOUTH EVERY EVENING AS NEEDED FOR SLEEP 
  
ibuprofen 800 mg tablet Commonly known as:  MOTRIN Take 1 Tab by mouth every eight (8) hours as needed for Pain. insulin glargine 100 unit/mL (3 mL) Inpn Commonly known as:  LANTUS,BASAGLAR 
50 Units by SubCUTAneous route daily. lisinopril 40 mg tablet Commonly known as:  Damaso Azul Take 1 Tab by mouth daily. metFORMIN 1,000 mg tablet Commonly known as:  GLUCOPHAGE Take 1 Tab by mouth two (2) times daily (with meals). NOVOLOG SC 
  
ramelteon 8 mg tablet Commonly known as:  ROZEREM Take 1 Tab by mouth nightly as needed for Sleep. simvastatin 20 mg tablet Commonly known as:  ZOCOR Take 1 Tab by mouth nightly. SITagliptin-metFORMIN 50-1,000 mg per tablet Commonly known as:  Randa Chin Take 1 Tab by mouth two (2) times daily (with meals). _______________________________ Scribe Attestation Joe Palafox acting as a scribe for and in the presence of Smita Pérez MD     
March 03, 2019 at 11:36 AM 
    
Provider Attestation:     
I personally performed the services described in the documentation, reviewed the documentation, as recorded by the scribe in my presence, and it accurately and completely records my words and actions. March 03, 2019 at 11:36 AM - Smita Pérez MD   
 
 
_______________________________

## 2019-04-01 ENCOUNTER — TELEPHONE (OUTPATIENT)
Dept: FAMILY MEDICINE CLINIC | Age: 59
End: 2019-04-01

## 2019-04-01 NOTE — TELEPHONE ENCOUNTER
Pharmacy called today and has questions about a new medication for this patient. She has questions concerning him getting back on lantus. They can be reached at [phone].

## 2019-04-03 NOTE — TELEPHONE ENCOUNTER
Called Pharmacy back and confirmed that pt has last filled 30 day supply of insulin glargine( Lantus, Basaglar) 50 units by subcutaneous route daily 9/21/18. Message sent to provider. Last   Last office visit 8/20/18 with Dr Marian Plummer. Last labs 7/30/18.

## 2019-04-04 ENCOUNTER — OFFICE VISIT (OUTPATIENT)
Dept: FAMILY MEDICINE CLINIC | Age: 59
End: 2019-04-04

## 2019-04-04 ENCOUNTER — HOSPITAL ENCOUNTER (EMERGENCY)
Age: 59
Discharge: HOME | End: 2019-04-04
Attending: EMERGENCY MEDICINE | Admitting: EMERGENCY MEDICINE
Payer: COMMERCIAL

## 2019-04-04 VITALS
WEIGHT: 169 LBS | RESPIRATION RATE: 16 BRPM | BODY MASS INDEX: 25.03 KG/M2 | HEIGHT: 69 IN | DIASTOLIC BLOOD PRESSURE: 88 MMHG | HEART RATE: 88 BPM | OXYGEN SATURATION: 100 % | TEMPERATURE: 98 F | SYSTOLIC BLOOD PRESSURE: 154 MMHG

## 2019-04-04 VITALS
SYSTOLIC BLOOD PRESSURE: 130 MMHG | HEIGHT: 69 IN | WEIGHT: 169.2 LBS | BODY MASS INDEX: 25.06 KG/M2 | RESPIRATION RATE: 20 BRPM | DIASTOLIC BLOOD PRESSURE: 72 MMHG | TEMPERATURE: 98.5 F | OXYGEN SATURATION: 99 % | HEART RATE: 90 BPM

## 2019-04-04 DIAGNOSIS — Z79.4 TYPE 2 DIABETES MELLITUS WITH DIABETIC NEUROPATHY, WITH LONG-TERM CURRENT USE OF INSULIN (HCC): Primary | ICD-10-CM

## 2019-04-04 DIAGNOSIS — E78.2 MIXED HYPERLIPIDEMIA: ICD-10-CM

## 2019-04-04 DIAGNOSIS — Z79.4 TYPE 2 DIABETES MELLITUS WITH DIABETIC NEUROPATHY, WITH LONG-TERM CURRENT USE OF INSULIN (HCC): ICD-10-CM

## 2019-04-04 DIAGNOSIS — E11.40 TYPE 2 DIABETES MELLITUS WITH DIABETIC NEUROPATHY, WITH LONG-TERM CURRENT USE OF INSULIN (HCC): ICD-10-CM

## 2019-04-04 DIAGNOSIS — E55.9 VITAMIN D DEFICIENCY: ICD-10-CM

## 2019-04-04 DIAGNOSIS — R73.9 HYPERGLYCEMIA: Primary | ICD-10-CM

## 2019-04-04 DIAGNOSIS — I10 ESSENTIAL HYPERTENSION: ICD-10-CM

## 2019-04-04 DIAGNOSIS — M25.512 LEFT SHOULDER PAIN, UNSPECIFIED CHRONICITY: ICD-10-CM

## 2019-04-04 DIAGNOSIS — E11.40 TYPE 2 DIABETES MELLITUS WITH DIABETIC NEUROPATHY, WITH LONG-TERM CURRENT USE OF INSULIN (HCC): Primary | ICD-10-CM

## 2019-04-04 PROBLEM — E11.21 TYPE 2 DIABETES WITH NEPHROPATHY (HCC): Status: ACTIVE | Noted: 2019-04-04

## 2019-04-04 LAB
ALBUMIN SERPL-MCNC: 3.9 G/DL (ref 3.4–5)
ALBUMIN/GLOB SERPL: 1 {RATIO} (ref 0.8–1.7)
ALP SERPL-CCNC: 119 U/L (ref 45–117)
ALT SERPL-CCNC: 27 U/L (ref 16–61)
ANION GAP SERPL CALC-SCNC: 17 MMOL/L (ref 3–18)
APPEARANCE UR: CLEAR
AST SERPL-CCNC: 16 U/L (ref 15–37)
BASOPHILS # BLD: 0 K/UL (ref 0–0.1)
BASOPHILS NFR BLD: 0 % (ref 0–2)
BILIRUB SERPL-MCNC: 0.5 MG/DL (ref 0.2–1)
BILIRUB UR QL: NEGATIVE
BUN SERPL-MCNC: 24 MG/DL (ref 7–18)
BUN/CREAT SERPL: 15 (ref 12–20)
CALCIUM SERPL-MCNC: 8.6 MG/DL (ref 8.5–10.1)
CHLORIDE SERPL-SCNC: 89 MMOL/L (ref 100–108)
CO2 SERPL-SCNC: 19 MMOL/L (ref 21–32)
COLOR UR: YELLOW
CREAT SERPL-MCNC: 1.59 MG/DL (ref 0.6–1.3)
DIFFERENTIAL METHOD BLD: ABNORMAL
EOSINOPHIL # BLD: 0 K/UL (ref 0–0.4)
EOSINOPHIL NFR BLD: 0 % (ref 0–5)
ERYTHROCYTE [DISTWIDTH] IN BLOOD BY AUTOMATED COUNT: 13.1 % (ref 11.6–14.5)
GLOBULIN SER CALC-MCNC: 3.9 G/DL (ref 2–4)
GLUCOSE BLD STRIP.AUTO-MCNC: 287 MG/DL (ref 70–110)
GLUCOSE POC: NORMAL MG/DL
GLUCOSE SERPL-MCNC: 571 MG/DL (ref 74–99)
GLUCOSE UR STRIP.AUTO-MCNC: >1000 MG/DL
HBA1C MFR BLD HPLC: NORMAL %
HCT VFR BLD AUTO: 42.2 % (ref 36–48)
HGB BLD-MCNC: 14.8 G/DL (ref 13–16)
HGB UR QL STRIP: NEGATIVE
KETONES UR QL STRIP.AUTO: 80 MG/DL
LEUKOCYTE ESTERASE UR QL STRIP.AUTO: NEGATIVE
LYMPHOCYTES # BLD: 1.4 K/UL (ref 0.9–3.6)
LYMPHOCYTES NFR BLD: 35 % (ref 21–52)
MCH RBC QN AUTO: 30.6 PG (ref 24–34)
MCHC RBC AUTO-ENTMCNC: 35.1 G/DL (ref 31–37)
MCV RBC AUTO: 87.4 FL (ref 74–97)
MONOCYTES # BLD: 0.2 K/UL (ref 0.05–1.2)
MONOCYTES NFR BLD: 6 % (ref 3–10)
NEUTS SEG # BLD: 2.3 K/UL (ref 1.8–8)
NEUTS SEG NFR BLD: 59 % (ref 40–73)
NITRITE UR QL STRIP.AUTO: NEGATIVE
PH UR STRIP: 5 [PH] (ref 5–8)
PLATELET # BLD AUTO: 167 K/UL (ref 135–420)
PMV BLD AUTO: 13.2 FL (ref 9.2–11.8)
POTASSIUM SERPL-SCNC: 4.8 MMOL/L (ref 3.5–5.5)
PROT SERPL-MCNC: 7.8 G/DL (ref 6.4–8.2)
PROT UR STRIP-MCNC: NEGATIVE MG/DL
RBC # BLD AUTO: 4.83 M/UL (ref 4.7–5.5)
SODIUM SERPL-SCNC: 125 MMOL/L (ref 136–145)
SP GR UR REFRACTOMETRY: 1.03 (ref 1–1.03)
UROBILINOGEN UR QL STRIP.AUTO: 0.2 EU/DL (ref 0.2–1)
WBC # BLD AUTO: 3.9 K/UL (ref 4.6–13.2)

## 2019-04-04 PROCEDURE — 74011250636 HC RX REV CODE- 250/636: Performed by: EMERGENCY MEDICINE

## 2019-04-04 PROCEDURE — 81003 URINALYSIS AUTO W/O SCOPE: CPT

## 2019-04-04 PROCEDURE — 96374 THER/PROPH/DIAG INJ IV PUSH: CPT

## 2019-04-04 PROCEDURE — 85025 COMPLETE CBC W/AUTO DIFF WBC: CPT

## 2019-04-04 PROCEDURE — 99282 EMERGENCY DEPT VISIT SF MDM: CPT

## 2019-04-04 PROCEDURE — 82962 GLUCOSE BLOOD TEST: CPT

## 2019-04-04 PROCEDURE — 80053 COMPREHEN METABOLIC PANEL: CPT

## 2019-04-04 PROCEDURE — 74011636637 HC RX REV CODE- 636/637: Performed by: EMERGENCY MEDICINE

## 2019-04-04 PROCEDURE — 96361 HYDRATE IV INFUSION ADD-ON: CPT

## 2019-04-04 RX ORDER — INSULIN ASPART 100 [IU]/ML
INJECTION, SOLUTION INTRAVENOUS; SUBCUTANEOUS
Qty: 10 ML | Refills: 0 | Status: SHIPPED | OUTPATIENT
Start: 2019-04-04 | End: 2019-06-04 | Stop reason: SDUPTHER

## 2019-04-04 RX ORDER — INSULIN GLARGINE 100 [IU]/ML
50 INJECTION, SOLUTION SUBCUTANEOUS DAILY
Qty: 5 PEN | Refills: 0 | Status: SHIPPED | OUTPATIENT
Start: 2019-04-04 | End: 2019-04-08 | Stop reason: SDUPTHER

## 2019-04-04 RX ADMIN — INSULIN HUMAN 10 UNITS: 100 INJECTION, SOLUTION PARENTERAL at 17:05

## 2019-04-04 RX ADMIN — SODIUM CHLORIDE 1000 ML: 900 INJECTION, SOLUTION INTRAVENOUS at 16:03

## 2019-04-04 RX ADMIN — SODIUM CHLORIDE 1000 ML: 900 INJECTION, SOLUTION INTRAVENOUS at 17:06

## 2019-04-04 NOTE — PROGRESS NOTES
HPI  Lorena Strickland is a 59 y.o. male  Chief Complaint   Patient presents with    Diabetes     needs medication, states he wants lantus    Hypertension     Reports he did not get insurance until April 1 so he has not had treatment or seen a doctor in 8 months. He also reports he does not have an endocrinologist.  Has not had recent labs. Reports he has not been checking his blood glucose at home. Reports he has not had insulin in over 8 months. Reports he is eating any and everything. Reports he is also drinking alcohol daily. Reports he has increased thirst, urination, and hunger. He denies chest pain, shortness of breath, nausea, vomiting, or abdominal pain. Reports increased fatigue. Reports he has some chronic leg pains. Past Medical History  Past Medical History:   Diagnosis Date    Alcohol use     Fri-Sun one fifth; Mon-Thur: Pint of liquor    CAD (coronary artery disease)     Cardiac angioplasty with stent 07/29/2009    2.5 x 13 Cypher stent to CX.  Cardiac cath 11/09/2011    RCA patent. LM patent. LAD patent. mD1 55%. CX patent. Prior stent patent. Edison 85% (2.5 x 15-mm Xience stent, resid 0%). LVEDP 12. EF 40-45%. High lateral hypk (RI distribution).  Cardiac inferior STEMI 2009    Current smoker     contemplating stopping    Diabetes ([address]     type 2-insulin dependent    GERD (gastroesophageal reflux disease)     HTN (hypertension)     Hyperlipidemia     Migraine     Myocardial infarction [facility])        Surgical History  Past Surgical History:   Procedure Laterality Date    HX CORONARY STENT PLACEMENT  07/29/2009    HX HEART CATHETERIZATION  8/30/2011    HX HEART CATHETERIZATION  11/09/2011    HX WISDOM TEETH EXTRACTION      x4        Medications  Current Outpatient Medications   Medication Sig Dispense Refill    aspirin 81 mg tablet Take 1 Tab by mouth daily. 1 Tab 0    insulin glargine (LANTUS,BASAGLAR) 100 unit/mL (3 mL) inpn 50 Units by SubCUTAneous route daily. 5 Pen 0    insulin aspart U-100 (NOVOLOG U-100 INSULIN ASPART) 100 unit/mL injection 10 units with meals 10 mL 0    diflunisal (DOLOBID) 500 mg tab Take 1 Tab by mouth two (2) times a day. 20 Tab 0    ibuprofen (MOTRIN) 800 mg tablet Take 1 Tab by mouth every eight (8) hours as needed for Pain. 60 Tab 1    gabapentin (NEURONTIN) 800 mg tablet Take 1 Tab by mouth three (3) times daily. 90 Tab 0    hydrOXYzine pamoate (VISTARIL) 50 mg capsule TAKE ONE CAPSULE BY MOUTH EVERY EVENING AS NEEDED FOR SLEEP 30 Cap 0    flash glucose sensor (FREESTYLE BRENT SENSOR) kit Check Blood Sugar daily after largest meal of the day. 3 Each 3    simvastatin (ZOCOR) 20 mg tablet Take 1 Tab by mouth nightly. 90 Tab 3    metFORMIN (GLUCOPHAGE) 1,000 mg tablet Take 1 Tab by mouth two (2) times daily (with meals). 180 Tab 3    lisinopril (PRINIVIL, ZESTRIL) 40 mg tablet Take 1 Tab by mouth daily. 90 Tab 3    flash glucose scanning reader (FREESTYLE BRENT READER) misc Check Blood Sugar After Largest Meal of the Day 1 Each 1    glipiZIDE (GLUCOTROL) 10 mg tablet Take 1 Tab by mouth two (2) times a day. 180 Tab 3    ramelteon (ROZEREM) 8 mg tablet Take 1 Tab by mouth nightly as needed for Sleep. 30 Tab 0    SITagliptin-metFORMIN (JANUMET) 50-1,000 mg per tablet Take 1 Tab by mouth two (2) times daily (with meals).  180 Tab 3       Allergies  Allergies   Allergen Reactions    Niacin Itching       Family History  Family History   Problem Relation Age of Onset    Hypertension Mother     Heart Attack Mother     Diabetes Mother     Hypertension Father     Heart Attack Father     Diabetes Father     Diabetes Sister     Hypertension Sister     Stroke Sister     Diabetes Brother     Hypertension Brother     Stroke Brother     No Known Problems Maternal Grandmother     No Known Problems Maternal Grandfather     No Known Problems Paternal Grandmother     No Known Problems Paternal Grandfather     No Known Problems Brother  Heart Disease Other     Kidney Disease Other        Social History  Social History     Socioeconomic History    Marital status:      Spouse name: Not on file    Number of children: Not on file    Years of education: Not on file    Highest education level: Not on file   Occupational History    Not on file   Social Needs    Financial resource strain: Not on file    Food insecurity:     Worry: Not on file     Inability: Not on file    Transportation needs:     Medical: Not on file     Non-medical: Not on file   Tobacco Use    Smoking status: Current Every Day Smoker     Packs/day: 0.50     Years: 1.00     Pack years: 0.50     Types: Cigarettes    Smokeless tobacco: Never Used   Substance and Sexual Activity    Alcohol use: Yes     Alcohol/week: 2.5 oz     Types: 5 Shots of liquor per week     Comment: occassionally    Drug use: Yes     Types: Marijuana, Cocaine    Sexual activity: Yes   Lifestyle    Physical activity:     Days per week: Not on file     Minutes per session: Not on file    Stress: Not on file   Relationships    Social connections:     Talks on phone: Not on file     Gets together: Not on file     Attends Yazidism service: Not on file     Active member of club or organization: Not on file     Attends meetings of clubs or organizations: Not on file     Relationship status: Not on file    Intimate partner violence:     Fear of current or ex partner: Not on file     Emotionally abused: Not on file     Physically abused: Not on file     Forced sexual activity: Not on file   Other Topics Concern    Not on file   Social History Narrative     at [employer].        Problem List  Patient Active Problem List   Diagnosis Code    Hyperlipidemia E78.5    HTN (hypertension) I10    Insulin dependent diabetes mellitus ([facility] [address] E11.9, Z79.4    Tobacco use disorder F17.200    Coronary atherosclerosis of native coronary artery I25.10    Tobacco dependence F17.200    Other and unspecified alcohol dependence, continuous drinking behavior F10.20    Mediastinal adenopathy R59.0    Diabetic neuropathy (HCC) E11.40    Vitamin D deficiency E55.9    Other insomnia G47.09    Type 2 diabetes with nephropathy ([facility] [address] E11.21       Review of Systems  Review of Systems   Constitutional: Positive for malaise/fatigue. Respiratory: Negative for shortness of breath. Cardiovascular: Negative for chest pain and palpitations. Gastrointestinal: Negative for abdominal pain, nausea and vomiting. Genitourinary: Positive for frequency. Musculoskeletal: Positive for myalgias. Negative for falls. Neurological: Negative for dizziness and loss of consciousness. Endo/Heme/Allergies: Positive for polydipsia. Vital Signs  Vitals:    04/04/19 1352   BP: 130/72   Pulse: 90   Resp: 20   Temp: 98.5 °F (36.9 °C)   TempSrc: Oral   SpO2: 99%   Weight: 169 lb 3.2 oz (76.7 kg)   Height: 5' 9\" (1.753 m)   PainSc:   8   PainLoc: Foot       Physical Exam  Physical Exam   Constitutional: He is oriented to person, place, and time. HENT:   Mouth/Throat: Oropharynx is clear and moist.   Eyes: Pupils are equal, round, and reactive to light. Puffiness under eyes   Cardiovascular: Normal rate, regular rhythm and normal heart sounds. Pulmonary/Chest: Effort normal and breath sounds normal.   Abdominal: Soft. There is no tenderness. Neurological: He is alert and oriented to person, place, and time.        Diagnostics  Orders Placed This Encounter    METABOLIC PANEL, COMPREHENSIVE     Standing Status:   Future     Number of Occurrences:   1     Standing Expiration Date:   4/4/2020    LIPID PANEL     Standing Status:   Future     Number of Occurrences:   1     Standing Expiration Date:   4/4/2020    CBC WITH AUTOMATED DIFF     Standing Status:   Future     Number of Occurrences:   1     Standing Expiration Date:   4/4/2020    TSH 3RD GENERATION     Standing Status:   Future Number of Occurrences:   1     Standing Expiration Date:   4/4/2020    T4, FREE     Standing Status:   Future     Number of Occurrences:   1     Standing Expiration Date:   10/4/2019    VITAMIN D, 25 HYDROXY     Standing Status:   Future     Number of Occurrences:   1     Standing Expiration Date:   4/4/2020    MICROALBUMIN, UR, RAND W/ MICROALB/CREAT RATIO     Standing Status:   Future     Number of Occurrences:   1     Standing Expiration Date:   4/4/2020    REFERRAL TO ENDOCRINOLOGY     Referral Priority:   Routine     Referral Type:   Consultation     Referral Reason:   Specialty Services Required     Number of Visits Requested:   1    AMB POC GLUCOSE, BLOOD BY GLUCOSE MONITORING DEVICE    AMB POC GLYCOSYLATED HEMOGLOBIN ASSAY       Results  Results for orders placed or performed in visit on 04/04/19   AMB POC GLUCOSE BLOOD, BY GLUCOSE MONITORING DEVICE   Result Value Ref Range    Glucose POC [facility] mg/dL   AMB POC HEMOGLOBIN A1C   Result Value Ref Range    Hemoglobin A1c (POC) Code 106( >15%) %       Assessment and Plan  Diagnoses and all orders for this visit:    1. Type 2 diabetes mellitus with diabetic neuropathy, with long-term current use of insulin (HCC)  -     METABOLIC PANEL, COMPREHENSIVE; Future  -     LIPID PANEL; Future  -     CBC WITH AUTOMATED DIFF; Future  -     TSH 3RD GENERATION; Future  -     T4, FREE; Future  -     VITAMIN D, 25 HYDROXY; Future  -     MICROALBUMIN, UR, RAND W/ MICROALB/CREAT RATIO; Future  -     REFERRAL TO ENDOCRINOLOGY  -     AMB POC GLUCOSE BLOOD, BY GLUCOSE MONITORING DEVICE  -     AMB POC HEMOGLOBIN A1C    2. Mixed hyperlipidemia  -     METABOLIC PANEL, COMPREHENSIVE; Future  -     LIPID PANEL; Future  -     CBC WITH AUTOMATED DIFF; Future  -     TSH 3RD GENERATION; Future  -     T4, FREE;  Future  -     VITAMIN D, 25 HYDROXY; Future  -     MICROALBUMIN, UR, RAND W/ MICROALB/CREAT RATIO; Future  -     REFERRAL TO ENDOCRINOLOGY    3. Vitamin D deficiency  - METABOLIC PANEL, COMPREHENSIVE; Future  -     LIPID PANEL; Future  -     CBC WITH AUTOMATED DIFF; Future  -     TSH 3RD GENERATION; Future  -     T4, FREE; Future  -     VITAMIN D, 25 HYDROXY; Future  -     MICROALBUMIN, UR, RAND W/ MICROALB/CREAT RATIO; Future  -     REFERRAL TO ENDOCRINOLOGY    4. Essential hypertension  -     METABOLIC PANEL, COMPREHENSIVE; Future  -     LIPID PANEL; Future  -     CBC WITH AUTOMATED DIFF; Future  -     TSH 3RD GENERATION; Future  -     T4, FREE; Future  -     VITAMIN D, 25 HYDROXY; Future  -     MICROALBUMIN, UR, RAND W/ MICROALB/CREAT RATIO; Future  -     REFERRAL TO ENDOCRINOLOGY    5. Left shoulder pain, unspecified chronicity  -     METABOLIC PANEL, COMPREHENSIVE; Future  -     LIPID PANEL; Future  -     CBC WITH AUTOMATED DIFF; Future  -     TSH 3RD GENERATION; Future  -     T4, FREE; Future  -     VITAMIN D, 25 HYDROXY; Future  -     MICROALBUMIN, UR, RAND W/ MICROALB/CREAT RATIO; Future  -     REFERRAL TO ENDOCRINOLOGY    Other orders  -     Blood-Glucose Meter monitoring kit; Twice daily. Once in the morning fasting and once two hours after a meal.      Discussed concerns for DKA. Recommended patient go to ER as plan of care hemoglobin A1c is above 15% and glucose register is a triple H indicating blood glucose is high and cannot be read. EMS transport offered and risks discussed with patient drove himself. Patient declines transport and signed a treatment refusal form acknowledging transport was offered, and the risks of him driving himself. After care summary printed and reviewed with patient. Plan reviewed with patient. Stressed the importance of compliance. Questions answered. Patient verbalized understanding of plan and is in agreement with plan. Patient to follow up in one week or earlier if symptoms worsen.      Johanna Gosselin, FNP-C

## 2019-04-04 NOTE — PROGRESS NOTES
Monique Huerta presents today for   Chief Complaint   Patient presents with    Diabetes     needs medication, states he wants lantus    Hypertension       Is someone accompanying this pt? No    Is the patient using any DME equipment during OV? No    Depression Screening:  3 most recent PHQ Screens 8/20/2018   PHQ Not Done Patient Decline   Little interest or pleasure in doing things -   Feeling down, depressed, irritable, or hopeless -   Total Score PHQ 2 -       Learning Assessment:  Learning Assessment 10/11/2016   PRIMARY LEARNER Patient   HIGHEST LEVEL OF EDUCATION - PRIMARY LEARNER  -   BARRIERS PRIMARY LEARNER -   CO-LEARNER CAREGIVER -   PRIMARY LANGUAGE ENGLISH   LEARNER PREFERENCE PRIMARY DEMONSTRATION   ANSWERED BY Patient   RELATIONSHIP SELF       Abuse Screening:  Abuse Screening Questionnaire 8/20/2018   Do you ever feel afraid of your partner? N   Are you in a relationship with someone who physically or mentally threatens you? N   Is it safe for you to go home? Y         Health Maintenance Due   Topic Date Due    Hepatitis C Screening  1960    Pneumococcal 0-64 years (1 of 1 - PPSV23) 06/02/1964    EYE EXAM RETINAL OR DILATED  06/02/1968    DTaP/Tdap/Td series (1 - Tdap) 06/02/1979    Shingrix Vaccine Age 50> (1 of 2) 06/02/2008    FOBT Q 1 YEAR AGE 50-75  06/02/2008    MICROALBUMIN Q1  01/13/2017    HEMOGLOBIN A1C Q6M  01/30/2019   . Health Maintenance reviewed and discussed and ordered per Provider. Monique Huerta is updated on all     Coordination of Care  1. Have you been to the ER, urgent care clinic since your last visit? Hospitalized since your last visit? Yes, HBV 3//19, r/t leg pain. 2. Have you seen or consulted any other health care providers outside of the [address] [place] [address] since your last visit? Include any pap smears or colon screening. No        Advance Directive:  1. Do you have an advance directive in place? Patient Reply:No    2.  If not, would you like material regarding how to put one in place?  Patient Reply: No

## 2019-04-04 NOTE — DISCHARGE INSTRUCTIONS
Patient Education        Counting Carbohydrates: Care Instructions  Your Care Instructions    You don't have to eat special foods when you have diabetes. You just have to be careful to eat healthy foods. Carbohydrates (carbs) raise blood sugar higher and quicker than any other nutrient. Carbs are found in desserts, breads and cereals, and fruit. They're also in starchy vegetables. These include potatoes, corn, and grains such as rice and pasta. Carbs are also in milk and yogurt. The more carbs you eat at one time, the higher your blood sugar will rise. Spreading carbs all through the day helps keep your blood sugar levels within your target range. Counting carbs is one of the best ways to keep your blood sugar under control. If you use insulin, counting carbs helps you match the right amount of insulin to the number of grams of carbs in a meal. Then you can change your diet and insulin dose as needed. Testing your blood sugar several times a day can help you learn how carbs affect your blood sugar. A registered dietitian or certified diabetes educator can help you plan meals and snacks. Follow-up care is a key part of your treatment and safety. Be sure to make and go to all appointments, and call your doctor if you are having problems. It's also a good idea to know your test results and keep a list of the medicines you take. How can you care for yourself at home? Know your daily amount of carbohydrates  Your daily amount depends on several things, such as your weight, how active you are, which diabetes medicines you take, and what your goals are for your blood sugar levels. A registered dietitian or certified diabetes educator can help you plan how many carbs to include in each meal and snack. For most adults, a guideline for the daily amount of carbs is:  · 45 to 60 grams at each meal. That's about the same as 3 to 4 carbohydrate servings. · 15 to 20 grams at each snack.  That's about the same as 1 carbohydrate serving. Count carbs  Counting carbs lets you know how much rapid-acting insulin to take before you eat. If you use an insulin pump, you get a constant rate of insulin during the day. So the pump must be programmed at meals. This gives you extra insulin to cover the rise in blood sugar after meals. If you take insulin:  · Learn your own insulin-to-carb ratio. You and your diabetes health professional will figure out the ratio. You can do this by testing your blood sugar after meals. For example, you may need a certain amount of insulin for every 15 grams of carbs. · Add up the carb grams in a meal. Then you can figure out how many units of insulin to take based on your insulin-to-carb ratio. · Exercise lowers blood sugar. You can use less insulin than you would if you were not doing exercise. Keep in mind that timing matters. If you exercise within 1 hour after a meal, your body may need less insulin for that meal than it would if you exercised 3 hours after the meal. Test your blood sugar to find out how exercise affects your need for insulin. If you do or don't take insulin:  · Look at labels on packaged foods. This can tell you how many carbs are in a serving. You can also use guides from the American Diabetes Association. · Be aware of portions, or serving sizes. If a package has two servings and you eat the whole package, you need to double the number of grams of carbohydrate listed for one serving. · Protein, fat, and fiber do not raise blood sugar as much as carbs do. If you eat a lot of these nutrients in a meal, your blood sugar will rise more slowly than it would otherwise. Eat from all food groups  · Eat at least three meals a day. · Plan meals to include food from all the food groups. The food groups include grains, fruits, dairy, proteins, and vegetables. · Talk to your dietitian or diabetes educator about ways to add limited amounts of sweets into your meal plan.   · If you drink alcohol, talk to your doctor. It may not be recommended when you are taking certain diabetes medicines. Where can you learn more? Go to http://nam-leon.info/. Enter A322 in the search box to learn more about \"Counting Carbohydrates: Care Instructions. \"  Current as of: July 25, 2018  Content Version: 11.9  © 6374-3206 [employer]. Care instructions adapted under license by [employer] (which disclaims liability or warranty for this information). If you have questions about a medical condition or this instruction, always ask your healthcare professional. [address] any warranty or liability for your use of this information. Patient Education        Diabetes Blood Sugar Emergencies: Your Action Plan  How can you prevent a blood sugar emergency? An important part of living with diabetes is keeping your blood sugar in your target range. You'll need to know what to do if it's too high or too low. Managing your blood sugar levels helps you avoid emergencies. This care sheet will teach you about the signs of high and low blood sugar. It will help you make an action plan with your doctor for when these signs occur. Low blood sugar is more likely to happen if you take certain medicines for diabetes. It can also happen if you skip a meal, drink alcohol, or exercise more than usual.  You may get high blood sugar if you eat differently than you normally do. One example is eating more carbohydrate than usual. Having a cold, the flu, or other sudden illness can also cause high blood sugar levels. Levels can also rise if you miss a dose of medicine. Any change in how you take your medicine may affect your blood sugar level. So it's important to work with your doctor before you make any changes. Check your blood sugar  Work with your doctor to fill in the blank spaces below that apply to you.   Track your levels, know your target range, and write down ways you can get your blood sugar back in your target range. A log book can help you track your levels. Take the book to all of your medical appointments. · Check your blood sugar _____ times a day, at these times:________________________________________________. (For example: Before meals, at bedtime, before exercise, during exercise, other.)  · Your blood sugar target range before a meal is ___________________. Your blood sugar target range after a meal is _______________________. · Do this--___________________________________________________--to get your blood sugar back within your safe range if your blood sugar results are _________________________________________. (For example: Less than 70 or above 250 mg/dL.)  Call your doctor when your blood sugar results are ___________________________________. (For example: Less than 70 or above 250 mg/dL.)  What are the symptoms of low and high blood sugar? Common symptoms of low blood sugar are sweating and feeling shaky, weak, hungry, or confused. Symptoms can start quickly. Common symptoms of high blood sugar are feeling very thirsty or very hungry. You may also pass urine more often than usual. You may have blurry vision and may lose weight without trying. But some people may have high or low blood sugar without having any symptoms. That's a good reason to check your blood sugar on a regular schedule. What should you do if you have symptoms? Work with your doctor to fill in the blank spaces below that apply to you. Low blood sugar  If you have symptoms of low blood sugar, check your blood sugar. If it's below _____ ( for example, below 70), eat or drink a quick-sugar food that has about 15 grams of carbohydrate. Your goal is to get your level back to your safe range. Check your blood sugar again 15 minutes later. If it's still not in your target range, take another 15 grams of carbohydrate and check your blood sugar again in 15 minutes.  Repeat this until you reach your target. Then go back to your regular testing schedule. When you have low blood sugar, it's best to stop or reduce any physical activity until your blood sugar is back in your target range and is stable. If you must stay active, eat or drink 30 grams of carbohydrate. Then check your blood sugar again in 15 minutes. If it's not in your target range, take another 30 grams of carbohydrates. Check your blood sugar again in 15 minutes. Keep doing this until you reach your target. You can then go back to your regular testing schedule. If your symptoms or blood sugar levels are getting worse or have not improved after 15 minutes, seek medical care right away. Here are some examples of quick-sugar foods with 15 grams of carbohydrate:  · 3 or 4 glucose tablets  · 1 tube of glucose gel  · Hard candy (such as 3 Jolly Ranchers or 5 to 7 Life Savers)  · ½ cup to ¾ cup (4 to 6 ounces) of fruit juice or regular (not diet) soda  High blood sugar  If you have symptoms of high blood sugar, check your blood sugar. Your goal is to get your level back to your target range. If it's above ______ ( for example, above 250), follow these steps:  · If you missed a dose of your diabetes medicine, take it now. Take only the amount of medicine that you have been prescribed. Do not take more or less medicine. · Give yourself insulin if your doctor has prescribed it for high blood sugar. · Test for ketones, if the doctor told you to do so. If the results of the ketone test show a moderate-to-large amount of ketones, call the doctor for advice. · Wait 30 minutes after you take the extra insulin or the missed medicine. Check your blood sugar again. If your symptoms or blood sugar levels are getting worse or have not improved after taking these steps, seek medical care right away. Follow-up care is a key part of your treatment and safety.  Be sure to make and go to all appointments, and call your doctor if you are having problems. It's also a good idea to know your test results and keep a list of the medicines you take. Where can you learn more? Go to http://nam-leon.info/. Enter E469 in the search box to learn more about \"Diabetes Blood Sugar Emergencies: Your Action Plan. \"  Current as of: July 25, 2018  Content Version: 11.9  © 6721-7923 [employer]. Care instructions adapted under license by [address] (which disclaims liability or warranty for this information). If you have questions about a medical condition or this instruction, always ask your healthcare professional. [place] [address] any warranty or liability for your use of this information.

## 2019-04-04 NOTE — ED PROVIDER NOTES
EMERGENCY DEPARTMENT HISTORY AND PHYSICAL EXAM 
 
3:50 PM 
 
 
Date: 4/4/2019 Patient Name: Nori Lopez History of Presenting Illness Chief Complaint Patient presents with  
 High Blood Sugar History Provided By: Patient Chief Complaint: High blood sugar Duration:  N/A Timing:  N/A Location: NA 
Quality: NA Severity: N/A Modifying Factors: none Associated Symptoms: Frequent urination and thirst 
 
 
Additional History (Context): Nori Lopez is a 59 y.o. male with diabetes and hypertension who presents with high blood sugar. Patient states that he has not taken any insulin in the last approximately 8 months. Due to loss of insurance. He does state he now has insurance so he made an appointment to see his PCP. Patient presented to the office today and reportedly his blood sugar was high so they send him to the ER for evaluation. He does admit to polyuria and polyphagia. Denies any nausea vomiting or abdominal pain. No other complaints. The patient was previously on Lantus 50 units in the morning and 10-15 units of Humalog with meals 3 times a day. PCP: Guy Edgar NP Current Outpatient Medications Medication Sig Dispense Refill  insulin glargine (LANTUS,BASAGLAR) 100 unit/mL (3 mL) inpn 50 Units by SubCUTAneous route daily. 5 Pen 0  
 insulin aspart U-100 (NOVOLOG U-100 INSULIN ASPART) 100 unit/mL injection 10 units with meals 10 mL 0  
 diflunisal (DOLOBID) 500 mg tab Take 1 Tab by mouth two (2) times a day. 20 Tab 0  ibuprofen (MOTRIN) 800 mg tablet Take 1 Tab by mouth every eight (8) hours as needed for Pain. 60 Tab 1  
 gabapentin (NEURONTIN) 800 mg tablet Take 1 Tab by mouth three (3) times daily. 90 Tab 0  
 hydrOXYzine pamoate (VISTARIL) 50 mg capsule TAKE ONE CAPSULE BY MOUTH EVERY EVENING AS NEEDED FOR SLEEP 30 Cap 0  
 flash glucose sensor (FREESTYLE BRENT SENSOR) kit Check Blood Sugar daily after largest meal of the day.  3 Each 3  
  simvastatin (ZOCOR) 20 mg tablet Take 1 Tab by mouth nightly. 90 Tab 3  
 metFORMIN (GLUCOPHAGE) 1,000 mg tablet Take 1 Tab by mouth two (2) times daily (with meals). 180 Tab 3  
 lisinopril (PRINIVIL, ZESTRIL) 40 mg tablet Take 1 Tab by mouth daily. 90 Tab 3  
 flash glucose scanning reader (FREESTYLE BRENT READER) misc Check Blood Sugar After Largest Meal of the Day 1 Each 1  
 glipiZIDE (GLUCOTROL) 10 mg tablet Take 1 Tab by mouth two (2) times a day. 180 Tab 3  
 ramelteon (ROZEREM) 8 mg tablet Take 1 Tab by mouth nightly as needed for Sleep. 30 Tab 0  
 SITagliptin-metFORMIN (JANUMET) 50-1,000 mg per tablet Take 1 Tab by mouth two (2) times daily (with meals). 180 Tab 3  
 aspirin 81 mg tablet Take 1 Tab by mouth daily. 1 Tab 0 Past History Past Medical History: 
Past Medical History:  
Diagnosis Date  Alcohol use Fri-Sun one fifth; Mon-Thur: Pint of liquor  CAD (coronary artery disease)  Cardiac angioplasty with stent 07/29/2009  
 2.5 x 13 Cypher stent to CX.  Cardiac cath 11/09/2011 RCA patent. LM patent. LAD patent. mD1 55%. CX patent. Prior stent patent. Edison 85% (2.5 x 15-mm Xience stent, resid 0%). LVEDP 12. EF 40-45%. High lateral hypk (RI distribution).  Cardiac inferior STEMI 2009  Current smoker   
 contemplating stopping  Diabetes ([address] type 2-insulin dependent  GERD (gastroesophageal reflux disease)  HTN (hypertension)  Hyperlipidemia  Migraine  Myocardial infarction ([address] Past Surgical History: 
Past Surgical History:  
Procedure Laterality Date  HX CORONARY STENT PLACEMENT  07/29/2009  HX HEART CATHETERIZATION  8/30/2011  HX HEART CATHETERIZATION  11/09/2011  HX WISDOM TEETH EXTRACTION    
 x4 Family History: 
Family History Problem Relation Age of Onset  Hypertension Mother  Heart Attack Mother  Diabetes Mother  Hypertension Father  Heart Attack Father  Diabetes Father  Diabetes Sister  Hypertension Sister  Stroke Sister  Diabetes Brother  Hypertension Brother  Stroke Brother  No Known Problems Maternal Grandmother  No Known Problems Maternal Grandfather  No Known Problems Paternal Grandmother  No Known Problems Paternal Grandfather  No Known Problems Brother  Heart Disease Other  Kidney Disease Other Social History: 
Social History Tobacco Use  Smoking status: Current Every Day Smoker Packs/day: 0.50 Years: 1.00 Pack years: 0.50 Types: Cigarettes  Smokeless tobacco: Never Used Substance Use Topics  Alcohol use: Yes Alcohol/week: 2.5 oz Types: 5 Shots of liquor per week Comment: occassionally  Drug use: Yes Types: Marijuana, Cocaine Allergies: Allergies Allergen Reactions  Niacin Itching Review of Systems Review of Systems Constitutional: Negative for fever. Gastrointestinal: Negative for abdominal pain, nausea and vomiting. Endocrine: Positive for polyphagia and polyuria. Genitourinary: Positive for frequency. All other systems reviewed and are negative. Physical Exam  
 
Visit Vitals /88 (BP 1 Location: Left arm, BP Patient Position: At rest;Sitting) Pulse 88 Temp 98 °F (36.7 °C) Resp 16 Ht 5' 9\" (1.753 m) Wt 76.7 kg (169 lb) SpO2 100% BMI 24.96 kg/m² Physical Exam  
Constitutional: He is oriented to person, place, and time. He appears well-developed and well-nourished. HENT:  
Head: Normocephalic and atraumatic. Neck: Neck supple. No JVD present. Cardiovascular: Normal rate and regular rhythm. Pulmonary/Chest: Effort normal and breath sounds normal. No respiratory distress. Abdominal: Soft. He exhibits no distension. There is no tenderness. There is no rebound and no guarding. Musculoskeletal: He exhibits no edema. No joint tenderness Neurological: He is alert and oriented to person, place, and time. Skin: Skin is warm and dry. No erythema. Psychiatric: Judgment normal.  
 
 
 
Diagnostic Study Results Labs - Recent Results (from the past 12 hour(s)) AMB POC HEMOGLOBIN A1C Collection Time: 04/04/19  2:00 PM  
Result Value Ref Range Hemoglobin A1c (POC) Code 106( >15%) % AMB POC GLUCOSE BLOOD, BY GLUCOSE MONITORING DEVICE Collection Time: 04/04/19  2:45 PM  
Result Value Ref Range Glucose POC HHH mg/dL CBC WITH AUTOMATED DIFF Collection Time: 04/04/19  3:45 PM  
Result Value Ref Range WBC 3.9 (L) 4.6 - 13.2 K/uL  
 RBC 4.83 4.70 - 5.50 M/uL  
 HGB 14.8 13.0 - 16.0 g/dL HCT 42.2 36.0 - 48.0 % MCV 87.4 74.0 - 97.0 FL  
 MCH 30.6 24.0 - 34.0 PG  
 MCHC 35.1 31.0 - 37.0 g/dL  
 RDW 13.1 11.6 - 14.5 % PLATELET 894 393 - 348 K/uL MPV 13.2 (H) 9.2 - 11.8 FL  
 NEUTROPHILS 59 40 - 73 % LYMPHOCYTES 35 21 - 52 % MONOCYTES 6 3 - 10 % EOSINOPHILS 0 0 - 5 % BASOPHILS 0 0 - 2 %  
 ABS. NEUTROPHILS 2.3 1.8 - 8.0 K/UL  
 ABS. LYMPHOCYTES 1.4 0.9 - 3.6 K/UL  
 ABS. MONOCYTES 0.2 0.05 - 1.2 K/UL  
 ABS. EOSINOPHILS 0.0 0.0 - 0.4 K/UL  
 ABS. BASOPHILS 0.0 0.0 - 0.1 K/UL  
 DF AUTOMATED METABOLIC PANEL, COMPREHENSIVE Collection Time: 04/04/19  3:45 PM  
Result Value Ref Range Sodium 125 (L) 136 - 145 mmol/L Potassium 4.8 3.5 - 5.5 mmol/L Chloride 89 (L) 100 - 108 mmol/L  
 CO2 19 (L) 21 - 32 mmol/L Anion gap 17 3.0 - 18 mmol/L Glucose 571 (HH) 74 - 99 mg/dL BUN 24 (H) 7.0 - 18 MG/DL Creatinine 1.59 (H) 0.6 - 1.3 MG/DL  
 BUN/Creatinine ratio 15 12 - 20 GFR est AA 54 (L) >60 ml/min/1.73m2 GFR est non-AA 45 (L) >60 ml/min/1.73m2 Calcium 8.6 8.5 - 10.1 MG/DL Bilirubin, total 0.5 0.2 - 1.0 MG/DL  
 ALT (SGPT) 27 16 - 61 U/L  
 AST (SGOT) 16 15 - 37 U/L Alk. phosphatase 119 (H) 45 - 117 U/L Protein, total 7.8 6.4 - 8.2 g/dL Albumin 3.9 3.4 - 5.0 g/dL Globulin 3.9 2.0 - 4.0 g/dL A-G Ratio 1.0 0.8 - 1.7 URINALYSIS W/ RFLX MICROSCOPIC Collection Time: 04/04/19  3:45 PM  
Result Value Ref Range Color YELLOW Appearance CLEAR Specific gravity 1.028 1.005 - 1.030    
 pH (UA) 5.0 5.0 - 8.0 Protein NEGATIVE  NEG mg/dL Glucose >1,000 (A) NEG mg/dL Ketone 80 (A) NEG mg/dL Bilirubin NEGATIVE  NEG Blood NEGATIVE  NEG Urobilinogen 0.2 0.2 - 1.0 EU/dL Nitrites NEGATIVE  NEG Leukocyte Esterase NEGATIVE  NEG    
GLUCOSE, POC Collection Time: 04/04/19  6:40 PM  
Result Value Ref Range Glucose (POC) 287 (H) 70 - 110 mg/dL Radiologic Studies - No orders to display Medical Decision Making I am the first provider for this patient. I reviewed the vital signs, available nursing notes, past medical history, past surgical history, family history and social history. Vital Signs-Reviewed the patient's vital signs. Pulse Oximetry Analysis -  100 on room air (Interpretation)nl Cardiac Monitor: 
Rate: 89 Rhythm:  Normal Sinus Rhythm Records Reviewed: Nursing Notes (Time of Review: 3:50 PM) ED Course: Progress Notes, Reevaluation, and Consults: 
 
Provider Notes (Medical Decision Making): 49-year-old male presents for evaluation of hyperglycemia. He reports it has been about a month since he used any insulin, was seen by his PCP today was noted to have an A1c reading greater than 15 so he was sent to the ER for evaluation. Will screen for DKA, give fluids check basic labs. He has no infectious symptoms. 6:42 PM 
Repeat glucose is 287 after 2 liters of fluid and 10 units of insulin. Patient stable for [place] home. Given new prescriptions for his insulin. Instructed to start taking these and follow-up with his PCP. Diagnosis Clinical Impression: 1. Hyperglycemia 2. Type 2 diabetes mellitus with diabetic neuropathy, with long-term current use of insulin ([address] 3. Uncontrolled type 2 diabetes mellitus with complication, without long-term current use of insulin ([address] Disposition: discharged Follow-up Information Follow up With Specialties Details Why Contact Cathi Benavidez NP Nurse Practitioner In 1 week  [address] [facility] 
[phone] [facility] EMERGENCY DEPT Emergency Medicine  As needed, If symptoms worsen [address] [facility] [address] 
[phone] Discharge Medication List as of 4/4/2019  6:42 PM  
  
CONTINUE these medications which have CHANGED Details  
insulin glargine (LANTUS,BASAGLAR) 100 unit/mL (3 mL) inpn 50 Units by SubCUTAneous route daily. , PrintDispense Lantus SpecificallyDisp-5 Pen, R-0  
  
insulin aspart U-100 (NOVOLOG U-100 INSULIN ASPART) 100 unit/mL injection 10 units with meals, Print, Disp-10 mL, R-0  
  
  
CONTINUE these medications which have NOT CHANGED Details  
diflunisal (DOLOBID) 500 mg tab Take 1 Tab by mouth two (2) times a day., Print, Disp-20 Tab, R-0  
  
ibuprofen (MOTRIN) 800 mg tablet Take 1 Tab by mouth every eight (8) hours as needed for Pain., Normal, Disp-60 Tab, R-1  
  
gabapentin (NEURONTIN) 800 mg tablet Take 1 Tab by mouth three (3) times daily. , Normal, Disp-90 Tab, R-0  
  
hydrOXYzine pamoate (VISTARIL) 50 mg capsule TAKE ONE CAPSULE BY MOUTH EVERY EVENING AS NEEDED FOR SLEEP, Normal, Disp-30 Cap, R-0  
  
flash glucose sensor (FREESTYLE BRENT SENSOR) kit Check Blood Sugar daily after largest meal of the day., Normal, Disp-3 Each, R-3  
  
simvastatin (ZOCOR) 20 mg tablet Take 1 Tab by mouth nightly., Normal, Disp-90 Tab, R-3  
  
metFORMIN (GLUCOPHAGE) 1,000 mg tablet Take 1 Tab by mouth two (2) times daily (with meals). , Normal, Disp-180 Tab, R-3  
  
lisinopril (PRINIVIL, ZESTRIL) 40 mg tablet Take 1 Tab by mouth daily. , Normal, Disp-90 Tab, R-3  
  
flash glucose scanning reader (FREESTYLE BRENT READER) [facility] Check Blood Sugar After Largest Meal of the Day, Normal, Disp-1 Each, R-1  
  
glipiZIDE (GLUCOTROL) 10 mg tablet Take 1 Tab by mouth two (2) times a day., Normal, Disp-180 Tab, R-3  
  
ramelteon (ROZEREM) 8 mg tablet Take 1 Tab by mouth nightly as needed for Sleep., Normal, Disp-30 Tab, R-0  
  
SITagliptin-metFORMIN (JANUMET) 50-1,000 mg per tablet Take 1 Tab by mouth two (2) times daily (with meals). , Print, Disp-180 Tab, R-3  
  
aspirin 81 mg tablet Take 1 Tab by mouth daily. , No Print, Disp-1 Tab, R-0  
  
  
 
_______________________________

## 2019-04-06 RX ORDER — INSULIN PUMP SYRINGE, 3 ML
EACH MISCELLANEOUS
Qty: 1 KIT | Refills: 0 | Status: SHIPPED | OUTPATIENT
Start: 2019-04-06 | End: 2019-04-11 | Stop reason: ALTCHOICE

## 2019-04-08 ENCOUNTER — OFFICE VISIT (OUTPATIENT)
Dept: FAMILY MEDICINE CLINIC | Age: 59
End: 2019-04-08

## 2019-04-08 VITALS
BODY MASS INDEX: 25.92 KG/M2 | WEIGHT: 175 LBS | TEMPERATURE: 98 F | HEIGHT: 69 IN | RESPIRATION RATE: 20 BRPM | HEART RATE: 80 BPM | SYSTOLIC BLOOD PRESSURE: 130 MMHG | DIASTOLIC BLOOD PRESSURE: 83 MMHG

## 2019-04-08 DIAGNOSIS — Z51.81 MEDICATION MONITORING ENCOUNTER: ICD-10-CM

## 2019-04-08 DIAGNOSIS — E11.40 TYPE 2 DIABETES MELLITUS WITH DIABETIC NEUROPATHY, WITH LONG-TERM CURRENT USE OF INSULIN (HCC): ICD-10-CM

## 2019-04-08 DIAGNOSIS — E55.9 VITAMIN D DEFICIENCY: ICD-10-CM

## 2019-04-08 DIAGNOSIS — E78.00 HYPERCHOLESTEROLEMIA: ICD-10-CM

## 2019-04-08 DIAGNOSIS — E78.2 MIXED HYPERLIPIDEMIA: ICD-10-CM

## 2019-04-08 DIAGNOSIS — Z79.4 TYPE 2 DIABETES MELLITUS WITH DIABETIC NEUROPATHY, WITH LONG-TERM CURRENT USE OF INSULIN (HCC): ICD-10-CM

## 2019-04-08 DIAGNOSIS — Z51.81 MEDICATION MONITORING ENCOUNTER: Primary | ICD-10-CM

## 2019-04-08 DIAGNOSIS — G47.09 OTHER INSOMNIA: ICD-10-CM

## 2019-04-08 RX ORDER — HYDROXYZINE PAMOATE 50 MG/1
CAPSULE ORAL
Qty: 30 CAP | Refills: 0 | Status: SHIPPED | OUTPATIENT
Start: 2019-04-08 | End: 2019-06-04 | Stop reason: SDUPTHER

## 2019-04-08 RX ORDER — GLIPIZIDE 10 MG/1
10 TABLET ORAL 2 TIMES DAILY
Qty: 180 TAB | Refills: 3 | Status: SHIPPED | OUTPATIENT
Start: 2019-04-08 | End: 2019-06-04 | Stop reason: SDUPTHER

## 2019-04-08 RX ORDER — INSULIN GLARGINE 100 [IU]/ML
50 INJECTION, SOLUTION SUBCUTANEOUS DAILY
Qty: 5 PEN | Refills: 0 | Status: SHIPPED | OUTPATIENT
Start: 2019-04-08 | End: 2019-06-03 | Stop reason: SDUPTHER

## 2019-04-08 RX ORDER — PREGABALIN 50 MG/1
50 CAPSULE ORAL 2 TIMES DAILY
Qty: 60 CAP | Refills: 0 | Status: SHIPPED | OUTPATIENT
Start: 2019-04-08 | End: 2019-04-10 | Stop reason: ALTCHOICE

## 2019-04-08 RX ORDER — METFORMIN HYDROCHLORIDE 1000 MG/1
1000 TABLET ORAL 2 TIMES DAILY WITH MEALS
Qty: 180 TAB | Refills: 3 | Status: CANCELLED | OUTPATIENT
Start: 2019-04-08

## 2019-04-08 RX ORDER — SIMVASTATIN 20 MG/1
20 TABLET, FILM COATED ORAL
Qty: 90 TAB | Refills: 3 | Status: SHIPPED | OUTPATIENT
Start: 2019-04-08 | End: 2019-05-28 | Stop reason: SDUPTHER

## 2019-04-08 NOTE — LETTER
Name:Sarah Beth Dawkins XVC:[birth year] MR #:[id number] Provider Name:Delilah Hernandez NP  
*EHTV-007* BSMG-491 (5/16) Page 1 of 5 Initial "[employer] CONTROLLED SUBSTANCE AGREEMENT I may be prescribed medications that are controlled substances as part  of my treatment plan for management of my medical condition(s). The goal of my treatment plan is to maintain and/or improve my health and wellbeing. Because controlled substances have an increased risk of abuse or harm, continual re-evaluation is needed determine if the goals of my treatment plan are being met for my safety and the safety of others. Liberty Tisha  am entering into this Controlled Substance Agreement with my provider, Kieran Ramos NP at [place] [address] . I understand that successful treatment requires mutual trust and honesty between me and my provider. I understand that there are state and federal laws and regulations which apply to the medications that my provider may prescribe that must be followed. I understand there are risks and benefits ts of taking the medicines that my provider may prescribe. I understand and agree that following this Agreement is necessary in continuing my provider-patient relationship and success of my treatment plan. As a part of my treatment plan, I agree to the following: COMMUNICATION: 
 
1. I will communicate fully with my provider about my medical condition(s), including the effect on my daily life and how well my medications are helping. I will tell my provider all of the medications that I take for any reason, including medications I receive from another health care provider, and will notify my provider about all issues, problems or concerns, including any side effects, which may be related to my medications. I understand that this information allows my provider to adjust my treatment plan to help manage my medical condition.  I understand that this information will become part of my permanent medical record. 2. I will notify my provider if I have a history of alcohol/drug misuse/addiction or if I have had treatment for alcohol/drug addiction in the past, or if I have a new problem with or concern about alcohol/drug use/addiction, because this increases the likelihood of high risk behaviors and may lead to serious medical conditions. 3. Females Only: I will notify my provider if I am or become pregnant, or if I intend to become pregnant, or if I intend to breastfeed. I understand that communication of these issues with my provider is important, due to possible effects my medication could have on an unborn fetus or breastfeeding child. Name:.Bryce Dawkins [facility]:[birth year] MR #:[id number] Provider Name:Reji Hernandez, MARILYN  
*REXL-545* BSMG-491 (5/16) Page 2 of 5 Initial [employer] MISUSE OF MEDICATIONS / DRUGS: 
 
1. I agree to take all controlled substances as prescribed, and will not misuse or abuse any controlled substances prescribed by my provider. For my safety, I will not increase the amount of medicine I take without first talking with and getting permission from my provider. 2. If I have a medical emergency, another health care provider may prescribe me medication. If I seek emergency treatment, I will notify my provider within seventy-two (72) hours. 3. I understand that my provider may discuss my use and/or possible misuse/abuse of controlled substances and alcohol, as appropriate, with any health care provider involved in my care, pharmacist or legal authority. ILLEGAL DRUGS: 
 
1. I will not use illegal drugs of any kind, including but not limited to marijuana, heroin, cocaine, or any prescription drug which is not prescribed to me. DRUG DIVERSION / PRESCRIPTION FRAUD: 
 
1. I will not share, sell, trade, give away, or otherwise misuse my prescriptions or medications. 2. I will not alter any prescriptions provided to me by my provider. SINGLE PROVIDER: 
 
1. I agree that all controlled substances that I take will be prescribed only by my provider (or his/her covering provider) under this Agreement. This agreement does not prevent me from seeking emergency medical treatment or receiving pain management related to a surgery. PROTECTING MEDICATIONS: 
 
1. I am responsible for keeping my prescriptions and medications in a safe and secure place including safeguarding them from loss or theft. I understand that lost, stolen or damaged/destroyed prescriptions or medications will not be replaced. Name:.Bryce Dawkins [facility]:[birth year] MR #:[id number] Provider Name:Lana Hernandez, MARILYN  
*VZUC-576* BSMG-491 (5/16) Page 3 of 5 Initial [employer] PRESCRIPTION RENEWALS/REFILLS: 
 
1. I will follow my controlled substance medication schedule as prescribed by my provider. 2. I understand and agree that I will make any requests for renewals or refills of my prescriptions only at the time of an office visit or during my providers regular office hours subject to the prescription refill requirements of the individual practice. 3. I understand that my provider may not call in prescriptions for controlled substances to my pharmacy. 4. I understand that my provider may adjust or discontinue these medications as deemed appropriate for my medical treatment plan. This Agreement does not guarantee the prescription of controlled medications. 5. I agree that if my medications are adjusted or discontinued, I will properly dispose of any remaining medications. I understand that I will be required to dispose of any remaining controlled medications prior to being provided with any prescriptions for other controlled medications.  
 
 
1. I authorize my provider and my pharmacy to cooperate fully with any local, state, or federal law enforcement agency in the investigation of any possible misuse, sale, or other diversion of my controlled substance prescriptions or medications. RISKS: 
 
 
1. I understand that if I do not adhere to this Agreement in any way, my provider may change my prescriptions, stop prescribing controlled substances or end our provider-patient relationship. 2. If my provider decides to stop prescribing medication, or decides to end our provider-patient relationship,my provider may require that I taper my medications slowly. If necessary, my provider may also provide a prescription for other medications to treat my withdrawal symptoms. UNDERSTANDING THIS AGREEMENT: 
 
I understand that my provider may adjust or stop my prescriptions for controlled substances based on my medical condition and my treatment plan. I understand that this Agreement does not guarantee that I will be prescribed medications or controlled substances. I understand that controlled substances may be just one part 
of my treatment plan. My initial on each page and my signature below shows that I have read each page of this Agreement, I have had an opportunity to ask questions, and all of my questions have been answered to my satisfaction by my provider. By signing below, I agree to comply with this Agreement, and I understand that if I do not follow the Agreements listed above, my provider may stop 
 
 
 
_________________________________________  Date/Time 4/8/2019 3:28 PM   
             (Patient Signature)

## 2019-04-08 NOTE — PROGRESS NOTES
SHARON Lopez is a 59 y.o. male  Chief Complaint   Patient presents with    Follow-up     1 week    Diabetes     Reports he has not had any alcohol. Reports he did see podiatry today and was told he needed lyrica and not gabapentin. Reports gabapentin is not helping for his burning and pain in his lower extremities. Pain is 6/10 and this has been chronic. Reports he has taken his insulin. Past Medical History  Past Medical History:   Diagnosis Date    Alcohol use     Fri-Sun one fifth; Mon-Thur: Pint of liquor    CAD (coronary artery disease)     Cardiac angioplasty with stent 07/29/2009    2.5 x 13 Cypher stent to CX.  Cardiac cath 11/09/2011    RCA patent. LM patent. LAD patent. mD1 55%. CX patent. Prior stent patent. Edison 85% (2.5 x 15-mm Xience stent, resid 0%). LVEDP 12. EF 40-45%. High lateral hypk (RI distribution).  Cardiac inferior STEMI 2009    Current smoker     contemplating stopping    Diabetes ([facility] [address]     type 2-insulin dependent    GERD (gastroesophageal reflux disease)     HTN (hypertension)     Hyperlipidemia     Migraine     Myocardial infarction [facility])        Surgical History  Past Surgical History:   Procedure Laterality Date    HX CORONARY STENT PLACEMENT  07/29/2009    HX HEART CATHETERIZATION  8/30/2011    HX HEART CATHETERIZATION  11/09/2011    HX WISDOM TEETH EXTRACTION      x4        Medications  Current Outpatient Medications   Medication Sig Dispense Refill    SITagliptin-metFORMIN (JANUMET) 50-1,000 mg per tablet Take 1 Tab by mouth two (2) times daily (with meals). 180 Tab 3    simvastatin (ZOCOR) 20 mg tablet Take 1 Tab by mouth nightly. 90 Tab 3    insulin glargine (LANTUS,BASAGLAR) 100 unit/mL (3 mL) inpn 50 Units by SubCUTAneous route daily. 5 Pen 0    glipiZIDE (GLUCOTROL) 10 mg tablet Take 1 Tab by mouth two (2) times a day.  180 Tab 3    flash glucose sensor (FREESTYLE BRENT 10 DAY SENSOR) kit Check Blood Sugar daily after largest meal of the day. 1 Kit 0    flash glucose scanning reader (NoveloSTYLE BRENT 10 DAY READER) misc Check Blood Sugar After Largest Meal of the Day 1 Each 1    hydrOXYzine pamoate (VISTARIL) 50 mg capsule TAKE ONE CAPSULE BY MOUTH EVERY EVENING AS NEEDED FOR SLEEP 30 Cap 0    pregabalin (LYRICA) 50 mg capsule Take 1 Cap by mouth two (2) times a day. Max Daily Amount: 100 mg. 60 Cap 0    Blood-Glucose Meter monitoring kit Twice daily. Once in the morning fasting and once two hours after a meal. 1 Kit 0    insulin aspart U-100 (NOVOLOG U-100 INSULIN ASPART) 100 unit/mL injection 10 units with meals 10 mL 0    diflunisal (DOLOBID) 500 mg tab Take 1 Tab by mouth two (2) times a day. 20 Tab 0    ibuprofen (MOTRIN) 800 mg tablet Take 1 Tab by mouth every eight (8) hours as needed for Pain. 60 Tab 1    lisinopril (PRINIVIL, ZESTRIL) 40 mg tablet Take 1 Tab by mouth daily. 90 Tab 3    ramelteon (ROZEREM) 8 mg tablet Take 1 Tab by mouth nightly as needed for Sleep. 30 Tab 0    aspirin 81 mg tablet Take 1 Tab by mouth daily.  1 Tab 0       Allergies  Allergies   Allergen Reactions    Niacin Itching       Family History  Family History   Problem Relation Age of Onset    Hypertension Mother     Heart Attack Mother     Diabetes Mother     Hypertension Father     Heart Attack Father     Diabetes Father     Diabetes Sister     Hypertension Sister     Stroke Sister     Diabetes Brother     Hypertension Brother     Stroke Brother     No Known Problems Maternal Grandmother     No Known Problems Maternal Grandfather     No Known Problems Paternal Grandmother     No Known Problems Paternal Grandfather     No Known Problems Brother     Heart Disease Other     Kidney Disease Other        Social History  Social History     Socioeconomic History    Marital status:      Spouse name: Not on file    Number of children: Not on file    Years of education: Not on file    Highest education level: Not on file Occupational History    Not on file   Social Needs    Financial resource strain: Not on file    Food insecurity:     Worry: Not on file     Inability: Not on file    Transportation needs:     Medical: Not on file     Non-medical: Not on file   Tobacco Use    Smoking status: Current Every Day Smoker     Packs/day: 0.50     Years: 1.00     Pack years: 0.50     Types: Cigarettes    Smokeless tobacco: Never Used   Substance and Sexual Activity    Alcohol use: Yes     Alcohol/week: 2.5 oz     Types: 5 Shots of liquor per week     Comment: occassionally    Drug use: Yes     Types: Marijuana, Cocaine    Sexual activity: Yes   Lifestyle    Physical activity:     Days per week: Not on file     Minutes per session: Not on file    Stress: Not on file   Relationships    Social connections:     Talks on phone: Not on file     Gets together: Not on file     Attends Bahai service: Not on file     Active member of club or organization: Not on file     Attends meetings of clubs or organizations: Not on file     Relationship status: Not on file    Intimate partner violence:     Fear of current or ex partner: Not on file     Emotionally abused: Not on file     Physically abused: Not on file     Forced sexual activity: Not on file   Other Topics Concern    Not on file   Social History Narrative     at [employer].        Problem List  Patient Active Problem List   Diagnosis Code    Hyperlipidemia E78.5    HTN (hypertension) I10    Insulin dependent diabetes mellitus ([facility] [address] E11.9, Z79.4    Tobacco use disorder F17.200    Coronary atherosclerosis of native coronary artery I25.10    Tobacco dependence F17.200    Other and unspecified alcohol dependence, continuous drinking behavior F10.20    Mediastinal adenopathy R59.0    Diabetic neuropathy ([address] E11.40    Vitamin D deficiency E55.9    Other insomnia G47.09    Type 2 diabetes with nephropathy ([address] E11.21       Review of Systems  Review of Systems   Respiratory: Negative for shortness of breath. Cardiovascular: Negative for chest pain and palpitations. Gastrointestinal: Negative for abdominal pain, nausea and vomiting. Endo/Heme/Allergies: Negative for polydipsia. Vital Signs  Vitals:    04/08/19 1456   BP: 130/83   Pulse: 80   Resp: 20   Temp: 98 °F (36.7 °C)   TempSrc: Oral   Weight: 175 lb (79.4 kg)   Height: 5' 9\" (1.753 m)   PainSc:   6   PainLoc: Foot       Physical Exam  Physical Exam   Constitutional: He is oriented to person, place, and time. HENT:   Mouth/Throat: Oropharynx is clear and moist.   Eyes: Pupils are equal, round, and reactive to light. Cardiovascular: Normal rate, regular rhythm and normal heart sounds. Pulmonary/Chest: Effort normal and breath sounds normal.   Abdominal: Soft. Bowel sounds are normal. He exhibits no distension. There is no tenderness. Neurological: He is alert and oriented to person, place, and time. No cranial nerve deficit. Psychiatric: He has a normal mood and affect. His behavior is normal.   Vitals reviewed.       Diagnostics  Orders Placed This Encounter    COMPLIANCE DRUG SCREEN/PRESCRIPTION MONITORING     Standing Status:   Future     Number of Occurrences:   1     Standing Expiration Date:   8/6/8309    METABOLIC PANEL, COMPREHENSIVE     Standing Status:   Future     Number of Occurrences:   1     Standing Expiration Date:   4/8/2020    LIPID PANEL     Standing Status:   Future     Number of Occurrences:   1     Standing Expiration Date:   4/8/2020    CBC WITH AUTOMATED DIFF     Standing Status:   Future     Number of Occurrences:   1     Standing Expiration Date:   4/8/2020    TSH 3RD GENERATION     Standing Status:   Future     Number of Occurrences:   1     Standing Expiration Date:   4/8/2020    T4, FREE     Standing Status:   Future     Standing Expiration Date:   10/8/2019    HEMOGLOBIN A1C WITH EAG     Standing Status:   Future     Number of Occurrences:   1     Standing Expiration Date:   2020    VITAMIN D, 25 HYDROXY     Standing Status:   Future     Number of Occurrences:   1     Standing Expiration Date:   2020    SITagliptin-metFORMIN (JANUMET) 50-1,000 mg per tablet     Sig: Take 1 Tab by mouth two (2) times daily (with meals). Dispense:  180 Tab     Refill:  3    simvastatin (ZOCOR) 20 mg tablet     Sig: Take 1 Tab by mouth nightly. Dispense:  90 Tab     Refill:  3    insulin glargine (LANTUS,BASAGLAR) 100 unit/mL (3 mL) inpn     Si Units by SubCUTAneous route daily. Dispense:  5 Pen     Refill:  0     Dispense Lantus Specifically    glipiZIDE (GLUCOTROL) 10 mg tablet     Sig: Take 1 Tab by mouth two (2) times a day. Dispense:  180 Tab     Refill:  3    flash glucose sensor (FREESTYLE BRENT 10 DAY SENSOR) kit     Sig: Check Blood Sugar daily after largest meal of the day. Dispense:  1 Kit     Refill:  0    flash glucose scanning reader (FREESTYLE BRENT 10 DAY READER) misc     Sig: Check Blood Sugar After Largest Meal of the Day     Dispense:  1 Each     Refill:  1    hydrOXYzine pamoate (VISTARIL) 50 mg capsule     Sig: TAKE ONE CAPSULE BY MOUTH EVERY EVENING AS NEEDED FOR SLEEP     Dispense:  30 Cap     Refill:  0    pregabalin (LYRICA) 50 mg capsule     Sig: Take 1 Cap by mouth two (2) times a day. Max Daily Amount: 100 mg.      Dispense:  60 Cap     Refill:  0       Results  Results for orders placed or performed during the hospital encounter of 19   CBC WITH AUTOMATED DIFF   Result Value Ref Range    WBC 3.9 (L) 4.6 - 13.2 K/uL    RBC 4.83 4.70 - 5.50 M/uL    HGB 14.8 13.0 - 16.0 g/dL    HCT 42.2 36.0 - 48.0 %    MCV 87.4 74.0 - 97.0 FL    MCH 30.6 24.0 - 34.0 PG    MCHC 35.1 31.0 - 37.0 g/dL    RDW 13.1 11.6 - 14.5 %    PLATELET 765 744 - 230 K/uL    MPV 13.2 (H) 9.2 - 11.8 FL    NEUTROPHILS 59 40 - 73 %    LYMPHOCYTES 35 21 - 52 %    MONOCYTES 6 3 - 10 %    EOSINOPHILS 0 0 - 5 %    BASOPHILS 0 0 - 2 %    ABS. NEUTROPHILS 2.3 1.8 - 8.0 K/UL    ABS. LYMPHOCYTES 1.4 0.9 - 3.6 K/UL    ABS. MONOCYTES 0.2 0.05 - 1.2 K/UL    ABS. EOSINOPHILS 0.0 0.0 - 0.4 K/UL    ABS. BASOPHILS 0.0 0.0 - 0.1 K/UL    DF AUTOMATED     METABOLIC PANEL, COMPREHENSIVE   Result Value Ref Range    Sodium 125 (L) 136 - 145 mmol/L    Potassium 4.8 3.5 - 5.5 mmol/L    Chloride 89 (L) 100 - 108 mmol/L    CO2 19 (L) 21 - 32 mmol/L    Anion gap 17 3.0 - 18 mmol/L    Glucose 571 (HH) 74 - 99 mg/dL    BUN 24 (H) 7.0 - 18 MG/DL    Creatinine 1.59 (H) 0.6 - 1.3 MG/DL    BUN/Creatinine ratio 15 12 - 20      GFR est AA 54 (L) >60 ml/min/1.73m2    GFR est non-AA 45 (L) >60 ml/min/1.73m2    Calcium 8.6 8.5 - 10.1 MG/DL    Bilirubin, total 0.5 0.2 - 1.0 MG/DL    ALT (SGPT) 27 16 - 61 U/L    AST (SGOT) 16 15 - 37 U/L    Alk. phosphatase 119 (H) 45 - 117 U/L    Protein, total 7.8 6.4 - 8.2 g/dL    Albumin 3.9 3.4 - 5.0 g/dL    Globulin 3.9 2.0 - 4.0 g/dL    A-G Ratio 1.0 0.8 - 1.7     URINALYSIS W/ RFLX MICROSCOPIC   Result Value Ref Range    Color YELLOW      Appearance CLEAR      Specific gravity 1.028 1.005 - 1.030      pH (UA) 5.0 5.0 - 8.0      Protein NEGATIVE  NEG mg/dL    Glucose >1,000 (A) NEG mg/dL    Ketone 80 (A) NEG mg/dL    Bilirubin NEGATIVE  NEG      Blood NEGATIVE  NEG      Urobilinogen 0.2 0.2 - 1.0 EU/dL    Nitrites NEGATIVE  NEG      Leukocyte Esterase NEGATIVE  NEG     GLUCOSE, POC   Result Value Ref Range    Glucose (POC) 287 (H) 70 - 110 mg/dL       Assessment and Plan  Diagnoses and all orders for this visit:    1. Medication monitoring encounter  -     COMPLIANCE DRUG SCREEN/PRESCRIPTION MONITORING; Future  -     METABOLIC PANEL, COMPREHENSIVE; Future  -     LIPID PANEL; Future  -     CBC WITH AUTOMATED DIFF; Future  -     TSH 3RD GENERATION; Future  -     T4, FREE; Future  -     HEMOGLOBIN A1C WITH EAG; Future  -     VITAMIN D, 25 HYDROXY; Future    2.  Uncontrolled type 2 diabetes mellitus with complication, without long-term current use of insulin ([facility])  -     SITagliptin-metFORMIN (JANUMET) 50-1,000 mg per tablet; Take 1 Tab by mouth two (2) times daily (with meals). -     insulin glargine (LANTUS,BASAGLAR) 100 unit/mL (3 mL) inpn; 50 Units by SubCUTAneous route daily.  -     glipiZIDE (GLUCOTROL) 10 mg tablet; Take 1 Tab by mouth two (2) times a day. -     METABOLIC PANEL, COMPREHENSIVE; Future  -     LIPID PANEL; Future  -     CBC WITH AUTOMATED DIFF; Future  -     TSH 3RD GENERATION; Future  -     T4, FREE; Future  -     HEMOGLOBIN A1C WITH EAG; Future  -     VITAMIN D, 25 HYDROXY; Future    3. Type 2 diabetes mellitus with diabetic neuropathy, with long-term current use of insulin ([facility])  -     SITagliptin-metFORMIN (JANUMET) 50-1,000 mg per tablet; Take 1 Tab by mouth two (2) times daily (with meals). -     insulin glargine (LANTUS,BASAGLAR) 100 unit/mL (3 mL) inpn; 50 Units by SubCUTAneous route daily. -     pregabalin (LYRICA) 50 mg capsule; Take 1 Cap by mouth two (2) times a day. Max Daily Amount: 100 mg.  -     METABOLIC PANEL, COMPREHENSIVE; Future  -     LIPID PANEL; Future  -     CBC WITH AUTOMATED DIFF; Future  -     TSH 3RD GENERATION; Future  -     T4, FREE; Future  -     HEMOGLOBIN A1C WITH EAG; Future  -     VITAMIN D, 25 HYDROXY; Future    4. Hypercholesterolemia  -     simvastatin (ZOCOR) 20 mg tablet; Take 1 Tab by mouth nightly. -     METABOLIC PANEL, COMPREHENSIVE; Future  -     LIPID PANEL; Future  -     CBC WITH AUTOMATED DIFF; Future  -     TSH 3RD GENERATION; Future  -     T4, FREE; Future  -     HEMOGLOBIN A1C WITH EAG; Future  -     VITAMIN D, 25 HYDROXY; Future    5. Insulin dependent diabetes mellitus (HCC)  -     glipiZIDE (GLUCOTROL) 10 mg tablet; Take 1 Tab by mouth two (2) times a day. -     flash glucose sensor (FREESTYLE BRENT 10 DAY SENSOR) kit; Check Blood Sugar daily after largest meal of the day. -     flash glucose scanning reader (FREESTYLE BRENT 10 DAY READER) misc;  Check Blood Sugar After Largest Meal of the Day  -     hydrOXYzine pamoate (VISTARIL) 50 mg capsule; TAKE ONE CAPSULE BY MOUTH EVERY EVENING AS NEEDED FOR SLEEP  -     METABOLIC PANEL, COMPREHENSIVE; Future  -     LIPID PANEL; Future  -     CBC WITH AUTOMATED DIFF; Future  -     TSH 3RD GENERATION; Future  -     T4, FREE; Future  -     HEMOGLOBIN A1C WITH EAG; Future  -     VITAMIN D, 25 HYDROXY; Future    6. Other insomnia  -     hydrOXYzine pamoate (VISTARIL) 50 mg capsule; TAKE ONE CAPSULE BY MOUTH EVERY EVENING AS NEEDED FOR SLEEP  -     METABOLIC PANEL, COMPREHENSIVE; Future  -     LIPID PANEL; Future  -     CBC WITH AUTOMATED DIFF; Future  -     TSH 3RD GENERATION; Future  -     T4, FREE; Future  -     HEMOGLOBIN A1C WITH EAG; Future  -     VITAMIN D, 25 HYDROXY; Future    7. Mixed hyperlipidemia  -     hydrOXYzine pamoate (VISTARIL) 50 mg capsule; TAKE ONE CAPSULE BY MOUTH EVERY EVENING AS NEEDED FOR SLEEP  -     METABOLIC PANEL, COMPREHENSIVE; Future  -     LIPID PANEL; Future  -     CBC WITH AUTOMATED DIFF; Future  -     TSH 3RD GENERATION; Future  -     T4, FREE; Future  -     HEMOGLOBIN A1C WITH EAG; Future  -     VITAMIN D, 25 HYDROXY; Future    8. Vitamin D deficiency  -     hydrOXYzine pamoate (VISTARIL) 50 mg capsule; TAKE ONE CAPSULE BY MOUTH EVERY EVENING AS NEEDED FOR SLEEP  -     METABOLIC PANEL, COMPREHENSIVE; Future  -     LIPID PANEL; Future  -     CBC WITH AUTOMATED DIFF; Future  -     TSH 3RD GENERATION; Future  -     T4, FREE; Future  -     HEMOGLOBIN A1C WITH EAG; Future  -     VITAMIN D, 25 HYDROXY; Future       checked with no concerns. Discussed Lyrica in detail. Patient aware that he needs to have a urine drug screen prior to receiving the script for this medication. Patient to monitor his blood glucose and return with his logs. Discussed his diet and importance of exercise. After care summary printed and reviewed with patient. Plan reviewed with patient. Questions answered.  Patient verbalized understanding of plan and is in agreement with plan. Patient to follow up in three weeks or earlier if symptoms worsen.      Reida Lombard, FNP-C

## 2019-04-08 NOTE — LETTER
NOTIFICATION RETURN TO WORK / SCHOOL 
 
4/8/2019 4:01 PM 
 
Mr. Jovanna Monroe 
[address] [facility] ([place] [id number] To Whom It May Concern: 
 
Jovanna Monroe is currently under the care of Amador Wren. He will return to work/school on: 04/09/2019 If there are questions or concerns please have the patient contact our office.  
 
 
 
Sincerely, 
 
 
Jose Duong NP

## 2019-04-08 NOTE — PROGRESS NOTES
Chief Complaint   Patient presents with    Follow-up     1 week    Diabetes         1. Have you been to the ER, urgent care clinic since your last visit? Hospitalized since your last visit? No    2. Have you seen or consulted any other health care providers outside of the [address] [place] [address] since your last visit? Include any pap smears or colon screening.  No

## 2019-04-10 LAB
INTERPRETATION, 910389: NORMAL
Lab: NORMAL
PDF IMAGE, 910387: NORMAL

## 2019-04-10 RX ORDER — GABAPENTIN 100 MG/1
100 CAPSULE ORAL 3 TIMES DAILY
Qty: 90 CAP | Refills: 0 | Status: SHIPPED | OUTPATIENT
Start: 2019-04-10 | End: 2019-05-07 | Stop reason: SDUPTHER

## 2019-04-10 RX ORDER — GABAPENTIN 800 MG/1
800 TABLET ORAL 3 TIMES DAILY
Qty: 90 TAB | Refills: 0 | Status: SHIPPED | OUTPATIENT
Start: 2019-04-10 | End: 2019-05-06 | Stop reason: SDUPTHER

## 2019-04-11 ENCOUNTER — HOSPITAL ENCOUNTER (OUTPATIENT)
Dept: LAB | Age: 59
Discharge: HOME | End: 2019-04-11

## 2019-04-11 ENCOUNTER — OFFICE VISIT (OUTPATIENT)
Dept: FAMILY MEDICINE CLINIC | Age: 59
End: 2019-04-11

## 2019-04-11 VITALS
HEIGHT: 69 IN | SYSTOLIC BLOOD PRESSURE: 155 MMHG | BODY MASS INDEX: 27.08 KG/M2 | RESPIRATION RATE: 18 BRPM | WEIGHT: 182.8 LBS | HEART RATE: 73 BPM | TEMPERATURE: 98.1 F | DIASTOLIC BLOOD PRESSURE: 81 MMHG

## 2019-04-11 DIAGNOSIS — Z51.81 MEDICATION MONITORING ENCOUNTER: Primary | ICD-10-CM

## 2019-04-11 DIAGNOSIS — I10 ESSENTIAL HYPERTENSION: ICD-10-CM

## 2019-04-11 LAB — XX-LABCORP SPECIMEN COL,LCBCF: NORMAL

## 2019-04-11 PROCEDURE — 99001 SPECIMEN HANDLING PT-LAB: CPT

## 2019-04-11 RX ORDER — METFORMIN HYDROCHLORIDE 500 MG/1
TABLET, EXTENDED RELEASE ORAL
Qty: 60 TAB | Refills: 1 | Status: CANCELLED | OUTPATIENT
Start: 2019-04-11

## 2019-04-11 RX ORDER — BLOOD-GLUCOSE METER
EACH MISCELLANEOUS
COMMUNITY
Start: 2019-04-07 | End: 2019-11-24 | Stop reason: SDUPTHER

## 2019-04-11 NOTE — PROGRESS NOTES
SHARON Arciniega is a 59 y.o. male  Chief Complaint   Patient presents with    Diabetes     follow up    Labs     completed 4/8/19     Reports his blood glucose was 233 fasting. He denies increased thirst, hunger, and or urination and reports his symptoms have improved since he has restarted his medications. Reports he has not been able to get all of his medications as his insurance does not cover all of them. Reports he has not been for his labs. Reports he still having pain in his legs from his neuropathy. Reports pain is burning and mainly in his right leg but he does feel the pain in both legs. 10/10 leg pain. He is requesting to restart the gabapentin as he is not taking anything for the pain right now. He denies having an endocrinologist or ever seeing an endocrinologist.    Past Medical History  Past Medical History:   Diagnosis Date    Alcohol use     Fri-Sun one fifth; Mon-Thur: Pint of liquor    CAD (coronary artery disease)     Cardiac angioplasty with stent 07/29/2009    2.5 x 13 Cypher stent to CX.  Cardiac cath 11/09/2011    RCA patent. LM patent. LAD patent. mD1 55%. CX patent. Prior stent patent. Edison 85% (2.5 x 15-mm Xience stent, resid 0%). LVEDP 12. EF 40-45%. High lateral hypk (RI distribution).  Cardiac inferior STEMI 2009    Current smoker     contemplating stopping    Diabetes ([address]     type 2-insulin dependent    GERD (gastroesophageal reflux disease)     HTN (hypertension)     Hyperlipidemia     Migraine     Myocardial infarction [facility])        Surgical History  Past Surgical History:   Procedure Laterality Date    HX CORONARY STENT PLACEMENT  07/29/2009    HX HEART CATHETERIZATION  8/30/2011    HX HEART CATHETERIZATION  11/09/2011    HX WISDOM TEETH EXTRACTION      x4        Medications  Current Outpatient Medications   Medication Sig Dispense Refill    lisinopril (PRINIVIL, ZESTRIL) 40 mg tablet Take 1 Tab by mouth daily.  [address] VERIO FLEX misc       gabapentin (NEURONTIN) 800 mg tablet Take 1 Tab by mouth three (3) times daily. Take in addition to the 100 mg tablet for a total of 900 mg 3x a day. 90 Tab 0    gabapentin (NEURONTIN) 100 mg capsule Take 1 Cap by mouth three (3) times daily. Take in addition to  800 mg tablet for a total of 900 mg 3x day 90 Cap 0    glucose blood VI test strips (ASCENSIA AUTODISC VI, ONE TOUCH ULTRA TEST VI) strip Twice daily-  fasting in the morning and once two hours after a meal. 100 Strip 10    SITagliptin-metFORMIN (JANUMET) 50-1,000 mg per tablet Take 1 Tab by mouth two (2) times daily (with meals). 180 Tab 3    simvastatin (ZOCOR) 20 mg tablet Take 1 Tab by mouth nightly. 90 Tab 3    insulin glargine (LANTUS,BASAGLAR) 100 unit/mL (3 mL) inpn 50 Units by SubCUTAneous route daily. 5 Pen 0    glipiZIDE (GLUCOTROL) 10 mg tablet Take 1 Tab by mouth two (2) times a day. 180 Tab 3    hydrOXYzine pamoate (VISTARIL) 50 mg capsule TAKE ONE CAPSULE BY MOUTH EVERY EVENING AS NEEDED FOR SLEEP 30 Cap 0    insulin aspart U-100 (NOVOLOG U-100 INSULIN ASPART) 100 unit/mL injection 10 units with meals 10 mL 0    diflunisal (DOLOBID) 500 mg tab Take 1 Tab by mouth two (2) times a day. 20 Tab 0    ibuprofen (MOTRIN) 800 mg tablet Take 1 Tab by mouth every eight (8) hours as needed for Pain. 60 Tab 1    ramelteon (ROZEREM) 8 mg tablet Take 1 Tab by mouth nightly as needed for Sleep. 30 Tab 0    aspirin 81 mg tablet Take 1 Tab by mouth daily.  1 Tab 0       Allergies  Allergies   Allergen Reactions    Niacin Itching       Family History  Family History   Problem Relation Age of Onset    Hypertension Mother     Heart Attack Mother     Diabetes Mother     Hypertension Father     Heart Attack Father     Diabetes Father     Diabetes Sister     Hypertension Sister     Stroke Sister     Diabetes Brother     Hypertension Brother     Stroke Brother     No Known Problems Maternal Grandmother     No Known Problems Maternal Grandfather     No Known Problems Paternal Grandmother     No Known Problems Paternal Grandfather     No Known Problems Brother     Heart Disease Other     Kidney Disease Other        Social History  Social History     Socioeconomic History    Marital status:      Spouse name: Not on file    Number of children: Not on file    Years of education: Not on file    Highest education level: Not on file   Occupational History    Not on file   Social Needs    Financial resource strain: Not on file    Food insecurity:     Worry: Not on file     Inability: Not on file    Transportation needs:     Medical: Not on file     Non-medical: Not on file   Tobacco Use    Smoking status: Current Every Day Smoker     Packs/day: 0.50     Years: 1.00     Pack years: 0.50     Types: Cigarettes    Smokeless tobacco: Never Used   Substance and Sexual Activity    Alcohol use: Yes     Alcohol/week: 2.5 oz     Types: 5 Shots of liquor per week     Comment: occassionally    Drug use: Yes     Types: Marijuana, Cocaine    Sexual activity: Yes   Lifestyle    Physical activity:     Days per week: Not on file     Minutes per session: Not on file    Stress: Not on file   Relationships    Social connections:     Talks on phone: Not on file     Gets together: Not on file     Attends [employer] service: Not on file     Active member of club or organization: Not on file     Attends meetings of clubs or organizations: Not on file     Relationship status: Not on file    Intimate partner violence:     Fear of current or ex partner: Not on file     Emotionally abused: Not on file     Physically abused: Not on file     Forced sexual activity: Not on file   Other Topics Concern    Not on file   Social History Narrative     at [employer].        Problem List  Patient Active Problem List   Diagnosis Code    Hyperlipidemia E78.5    HTN (hypertension) I10    Insulin dependent diabetes mellitus ([facility] E11.9, Z79.4    Tobacco use disorder F17.200    Coronary atherosclerosis of native coronary artery I25.10    Tobacco dependence F17.200    Other and unspecified alcohol dependence, continuous drinking behavior F10.20    Mediastinal adenopathy R59.0    Diabetic neuropathy (HCC) E11.40    Vitamin D deficiency E55.9    Other insomnia G47.09    Type 2 diabetes with nephropathy ([facility] E11.21       Review of Systems  Review of Systems   Respiratory: Negative for shortness of breath. Cardiovascular: Negative for chest pain. Gastrointestinal: Negative for abdominal pain, nausea and vomiting. Genitourinary: Negative. Musculoskeletal: Positive for myalgias. Endo/Heme/Allergies: Negative for polydipsia. Vital Signs  Vitals:    04/11/19 0819   BP: 155/81   Pulse: 73   Resp: 18   Temp: 98.1 °F (36.7 °C)   TempSrc: Oral   Weight: 182 lb 12.8 oz (82.9 kg)   Height: 5' 9\" (1.753 m)   PainSc:   0 - No pain       Physical Exam  Physical Exam   Constitutional: He is oriented to person, place, and time. HENT:   Mouth/Throat: Oropharynx is clear and moist.   Cardiovascular: Normal rate, regular rhythm and normal heart sounds. Pulmonary/Chest: Effort normal and breath sounds normal. No respiratory distress. Neurological: He is alert and oriented to person, place, and time. Psychiatric: He has a normal mood and affect. His behavior is normal.   Vitals reviewed. Diagnostics  Orders Placed This Encounter    ONETOUCH VERIO FLEX misc    lisinopril (PRINIVIL, ZESTRIL) 40 mg tablet     Sig: Take 1 Tab by mouth daily.      Dispense:  90 Tab     Refill:  0       Results  Results for orders placed or performed in visit on 04/08/19   CVD REPORT   Result Value Ref Range    INTERPRETATION Not applicable     PDF IMAGE Not applicable    DIABETES PATIENT EDUCATION   Result Value Ref Range    PDF Image Not applicable    COMPLIANCE DRUG SCREEN/PRESCRIPTION MONITORING   Result Value Ref Range Summary FINAL    SPECIMEN STATUS REPORT   Result Value Ref Range    SPECIMEN STATUS REPORT COMMENT    CBC WITH AUTOMATED DIFF   Result Value Ref Range    WBC 3.7 3.4 - 10.8 x10E3/uL    RBC 4.21 4.14 - 5.80 x10E6/uL    HGB 13.0 13.0 - 17.7 g/dL    HCT 39.0 37.5 - 51.0 %    MCV 93 79 - 97 fL    MCH 30.9 26.6 - 33.0 pg    MCHC 33.3 31.5 - 35.7 g/dL    RDW 14.2 12.3 - 15.4 %    PLATELET 028 737 - 126 x10E3/uL    NEUTROPHILS 54 Not Estab. %    Lymphocytes 36 Not Estab. %    MONOCYTES 7 Not Estab. %    EOSINOPHILS 2 Not Estab. %    BASOPHILS 1 Not Estab. %    ABS. NEUTROPHILS 2.0 1.4 - 7.0 x10E3/uL    Abs Lymphocytes 1.3 0.7 - 3.1 x10E3/uL    ABS. MONOCYTES 0.3 0.1 - 0.9 x10E3/uL    ABS. EOSINOPHILS 0.1 0.0 - 0.4 x10E3/uL    ABS. BASOPHILS 0.0 0.0 - 0.2 x10E3/uL    IMMATURE GRANULOCYTES 0 Not Estab. %    ABS. IMM. GRANS. 0.0 0.0 - 0.1 [id number]   METABOLIC PANEL, COMPREHENSIVE   Result Value Ref Range    Glucose 202 (H) 65 - 99 mg/dL    BUN 12 8 - 27 mg/dL    Creatinine 0.86 0.76 - 1.27 mg/dL    GFR est non-AA 94 >59 mL/min/1.73    GFR est  >59 mL/min/1.73    BUN/Creatinine ratio 14 10 - 24    Sodium 138 134 - 144 mmol/L    Potassium 4.0 3.5 - 5.2 mmol/L    Chloride 101 96 - 106 mmol/L    CO2 25 20 - 29 mmol/L    Calcium 8.9 8.6 - 10.2 mg/dL    Protein, total 5.7 (L) 6.0 - 8.5 g/dL    Albumin 3.7 3.6 - 4.8 g/dL    GLOBULIN, TOTAL 2.0 1.5 - 4.5 g/dL    A-G Ratio 1.9 1.2 - 2.2    Bilirubin, total <0.2 0.0 - 1.2 mg/dL    Alk.  phosphatase 67 39 - 117 IU/L    AST (SGOT) 18 0 - 40 IU/L    ALT (SGPT) 17 0 - 44 IU/L   LIPID PANEL   Result Value Ref Range    Cholesterol, total 191 100 - 199 mg/dL    Triglyceride 59 0 - 149 mg/dL    HDL Cholesterol 44 >39 mg/dL    VLDL, calculated 12 5 - 40 mg/dL    LDL, calculated 135 (H) 0 - 99 mg/dL   CVD REPORT   Result Value Ref Range    INTERPRETATION Note    HEMOGLOBIN A1C WITH EAG   Result Value Ref Range    Hemoglobin A1c >15.5 (H) 4.8 - 5.6 %    Estimated average glucose >398 mg/dL DIABETES PATIENT EDUCATION   Result Value Ref Range    PDF Image Not applicable    T4, FREE   Result Value Ref Range    T4, Free 1.29 0.82 - 1.77 ng/dL   VITAMIN D, 25 HYDROXY   Result Value Ref Range    VITAMIN D, 25-HYDROXY 13.0 (L) 30.0 - 100.0 ng/mL   TSH 3RD GENERATION   Result Value Ref Range    TSH 1.300 0.450 - 4.500 uIU/mL         Assessment and Plan  Diagnoses and all orders for this visit:    1. Essential hypertension  -     lisinopril (PRINIVIL, ZESTRIL) 40 mg tablet; Take 1 Tab by mouth daily. 2. Insulin dependent diabetes mellitus (HCC)  -     lisinopril (PRINIVIL, ZESTRIL) 40 mg tablet; Take 1 Tab by mouth daily. The nurse is currently working on prior authorization for patient's medications. She has also reached out to the pharmacy to determine if there are medications that his insurance will not pay for. Patient has been given a drug discount card to use in the event his insurance will not pay for his medications. Long discussion with patient about prior authorizations and alternate medications that can be used in the event his insurance will not cover some of the medications ordered. Will restart gabapentin and increase the dose. Will discontinue and avoid the prescription for Lyrica as patient has never received this as we were waiting for his urine compliance drug screen to return. Patient to go for fasting labs. Patient is aware of the signs and symptoms of hypo-and hyperglycemia and when to seek emergency assistance. More than 50% of 50 minute visit spent counseling and coordinating care with patient face to face on diabetes, endocrinology, neuropathy, medications, glucose log, labs, and blood pressure. After care summary printed and reviewed with patient. Plan reviewed with patient. Questions answered. Patient verbalized understanding of plan and is in agreement with plan. Patient to follow up in two weeks or earlier if symptoms worsen.      EDITH Montesinos

## 2019-04-11 NOTE — PATIENT INSTRUCTIONS
High Blood Pressure: Care Instructions  Overview    It's normal for blood pressure to go up and down throughout the day. But if it stays up, you have high blood pressure. Another name for high blood pressure is hypertension. Despite what a lot of people think, high blood pressure usually doesn't cause headaches or make you feel dizzy or lightheaded. It usually has no symptoms. But it does increase your risk of stroke, heart attack, and other problems. You and your doctor will talk about your risks of these problems based on your blood pressure. Your doctor will give you a goal for your blood pressure. Your goal will be based on your health and your age. Lifestyle changes, such as eating healthy and being active, are always important to help lower blood pressure. You might also take medicine to reach your blood pressure goal.  Follow-up care is a key part of your treatment and safety. Be sure to make and go to all appointments, and call your doctor if you are having problems. It's also a good idea to know your test results and keep a list of the medicines you take. How can you care for yourself at home? Medical treatment  · If you stop taking your medicine, your blood pressure will go back up. You may take one or more types of medicine to lower your blood pressure. Be safe with medicines. Take your medicine exactly as prescribed. Call your doctor if you think you are having a problem with your medicine. · Talk to your doctor before you start taking aspirin every day. Aspirin can help certain people lower their risk of a heart attack or stroke. But taking aspirin isn't right for everyone, because it can cause serious bleeding. · See your doctor regularly. You may need to see the doctor more often at first or until your blood pressure comes down. · If you are taking blood pressure medicine, talk to your doctor before you take decongestants or anti-inflammatory medicine, such as ibuprofen.  Some of these medicines can raise blood pressure. · Learn how to check your blood pressure at home. Lifestyle changes  · Stay at a healthy weight. This is especially important if you put on weight around the waist. Losing even 10 pounds can help you lower your blood pressure. · If your doctor recommends it, get more exercise. Walking is a good choice. Bit by bit, increase the amount you walk every day. Try for at least 30 minutes on most days of the week. You also may want to swim, bike, or do other activities. · Avoid or limit alcohol. Talk to your doctor about whether you can drink any alcohol. · Try to limit how much sodium you eat to less than 2,300 milligrams (mg) a day. Your doctor may ask you to try to eat less than 1,500 mg a day. · Eat plenty of fruits (such as bananas and oranges), vegetables, legumes, whole grains, and low-fat dairy products. · Lower the amount of saturated fat in your diet. Saturated fat is found in animal products such as milk, cheese, and meat. Limiting these foods may help you lose weight and also lower your risk for heart disease. · Do not smoke. Smoking increases your risk for heart attack and stroke. If you need help quitting, talk to your doctor about stop-smoking programs and medicines. These can increase your chances of quitting for good. When should you call for help? Call 911 anytime you think you may need emergency care. This may mean having symptoms that suggest that your blood pressure is causing a serious heart or blood vessel problem. Your blood pressure may be over 180/120.   For example, call 911 if:    · You have symptoms of a heart attack. These may include:  ? Chest pain or pressure, or a strange feeling in the chest.  ? Sweating. ? Shortness of breath. ? Nausea or vomiting. ? Pain, pressure, or a strange feeling in the back, neck, jaw, or upper belly or in one or both shoulders or arms. ? Lightheadedness or sudden weakness.   ? A fast or irregular heartbeat.     · You have symptoms of a stroke. These may include:  ? Sudden numbness, tingling, weakness, or loss of movement in your face, arm, or leg, especially on only one side of your body. ? Sudden vision changes. ? Sudden trouble speaking. ? Sudden confusion or trouble understanding simple statements. ? Sudden problems with walking or balance. ? A sudden, severe headache that is different from past headaches.     · You have severe back or belly pain.    Do not wait until your blood pressure comes down on its own. Get help right away.   Call your doctor now or seek immediate care if:    · Your blood pressure is much higher than normal (such as 180/120 or higher), but you don't have symptoms.     · You think high blood pressure is causing symptoms, such as:  ? Severe headache.  ? Blurry vision.    Watch closely for changes in your health, and be sure to contact your doctor if:    · Your blood pressure measures higher than your doctor recommends at least 2 times. That means the top number is higher or the bottom number is higher, or both.     · You think you may be having side effects from your blood pressure medicine. Where can you learn more? Go to http://nam-leon.info/. Enter N619 in the search box to learn more about \"High Blood Pressure: Care Instructions. \"  Current as of: July 22, 2018  Content Version: 11.9  © 3874-4521 [employer]. Care instructions adapted under license by [employer] (which disclaims liability or warranty for this information). If you have questions about a medical condition or this instruction, always ask your healthcare professional. [place] [address] any warranty or liability for your use of this information.

## 2019-04-11 NOTE — PROGRESS NOTES
Christopher Figueroa presents today for   Chief Complaint   Patient presents with    Diabetes     follow up   [facility]     completed 4/8/19       Christopher Figueroa preferred language for health care discussion is english/other. Is someone accompanying this pt? no    Is the patient using any DME equipment during [address]? no    Depression Screening:  3 most recent PHQ Screens 4/11/2019   PHQ Not Done -   Little interest or pleasure in doing things Not at all   Feeling down, depressed, irritable, or hopeless Not at all   Total Score PHQ 2 0       Learning Assessment:  Learning Assessment 4/11/2019   PRIMARY LEARNER Patient   HIGHEST LEVEL OF EDUCATION - PRIMARY LEARNER  DID NOT GRADUATE HIGH SCHOOL   BARRIERS PRIMARY LEARNER NONE   CO-LEARNER CAREGIVER No   PRIMARY LANGUAGE ENGLISH   LEARNER PREFERENCE PRIMARY LISTENING   ANSWERED BY self   RELATIONSHIP SELF       Abuse Screening:  Abuse Screening Questionnaire 4/11/2019   Do you ever feel afraid of your partner? N   Are you in a relationship with someone who physically or mentally threatens you? N   Is it safe for you to go home? Y           Coordination of Care:  1. Have you been to the ER, urgent care clinic since your last visit? Hospitalized since your last visit? no    2. Have you seen or consulted any other health care providers outside of the [address] [place] [address] since your last visit? Include any pap smears or colon screening. no    Per- verbal order ORIANA Hernandez to delete medications patient is not taking. Medications that where deleted are: none      Advance Directive:  1. Do you have an advance directive in place? Patient Reply:no    2. If not, would you like material regarding how to put one in place?  Patient Reply: no

## 2019-04-11 NOTE — LETTER
NOTIFICATION RETURN TO WORK / SCHOOL 
 
4/11/2019 9:43 AM 
 
Mr. Patsy Rodriguez 
[address] [place] [id number] To Whom It May Concern: 
 
Patsy Rodriguez is currently under the care of Amador Wren. He will return to work/school on:04- If there are questions or concerns please have the patient contact our office.  
 
 
 
Sincerely, 
 
 
Gordon Mallory NP

## 2019-04-13 LAB
DRUGS UR: NORMAL
SPECIMEN STATUS REPORT, ROLRST: NORMAL

## 2019-04-14 LAB
25(OH)D3+25(OH)D2 SERPL-MCNC: 13 NG/ML (ref 30–100)
ALBUMIN SERPL-MCNC: 3.7 G/DL (ref 3.6–4.8)
ALBUMIN/GLOB SERPL: 1.9 {RATIO} (ref 1.2–2.2)
ALP SERPL-CCNC: 67 IU/L (ref 39–117)
ALT SERPL-CCNC: 17 IU/L (ref 0–44)
AST SERPL-CCNC: 18 IU/L (ref 0–40)
BASOPHILS # BLD AUTO: 0 X10E3/UL (ref 0–0.2)
BASOPHILS NFR BLD AUTO: 1 %
BILIRUB SERPL-MCNC: <0.2 MG/DL (ref 0–1.2)
BUN SERPL-MCNC: 12 MG/DL (ref 8–27)
BUN/CREAT SERPL: 14 (ref 10–24)
CALCIUM SERPL-MCNC: 8.9 MG/DL (ref 8.6–10.2)
CHLORIDE SERPL-SCNC: 101 MMOL/L (ref 96–106)
CHOLEST SERPL-MCNC: 191 MG/DL (ref 100–199)
CO2 SERPL-SCNC: 25 MMOL/L (ref 20–29)
CREAT SERPL-MCNC: 0.86 MG/DL (ref 0.76–1.27)
EOSINOPHIL # BLD AUTO: 0.1 X10E3/UL (ref 0–0.4)
EOSINOPHIL NFR BLD AUTO: 2 %
ERYTHROCYTE [DISTWIDTH] IN BLOOD BY AUTOMATED COUNT: 14.2 % (ref 12.3–15.4)
EST. AVERAGE GLUCOSE BLD GHB EST-MCNC: >398 MG/DL
GLOBULIN SER CALC-MCNC: 2 G/DL (ref 1.5–4.5)
GLUCOSE SERPL-MCNC: 202 MG/DL (ref 65–99)
HBA1C MFR BLD: >15.5 % (ref 4.8–5.6)
HCT VFR BLD AUTO: 39 % (ref 37.5–51)
HDLC SERPL-MCNC: 44 MG/DL
HGB BLD-MCNC: 13 G/DL (ref 13–17.7)
IMM GRANULOCYTES # BLD AUTO: 0 X10E3/UL (ref 0–0.1)
IMM GRANULOCYTES NFR BLD AUTO: 0 %
INTERPRETATION, 910389: NORMAL
LDLC SERPL CALC-MCNC: 135 MG/DL (ref 0–99)
LYMPHOCYTES # BLD AUTO: 1.3 X10E3/UL (ref 0.7–3.1)
LYMPHOCYTES NFR BLD AUTO: 36 %
Lab: NORMAL
MCH RBC QN AUTO: 30.9 PG (ref 26.6–33)
MCHC RBC AUTO-ENTMCNC: 33.3 G/DL (ref 31.5–35.7)
MCV RBC AUTO: 93 FL (ref 79–97)
MONOCYTES # BLD AUTO: 0.3 X10E3/UL (ref 0.1–0.9)
MONOCYTES NFR BLD AUTO: 7 %
NEUTROPHILS # BLD AUTO: 2 X10E3/UL (ref 1.4–7)
NEUTROPHILS NFR BLD AUTO: 54 %
PLATELET # BLD AUTO: 157 X10E3/UL (ref 150–379)
POTASSIUM SERPL-SCNC: 4 MMOL/L (ref 3.5–5.2)
PROT SERPL-MCNC: 5.7 G/DL (ref 6–8.5)
RBC # BLD AUTO: 4.21 X10E6/UL (ref 4.14–5.8)
SODIUM SERPL-SCNC: 138 MMOL/L (ref 134–144)
T4 FREE SERPL-MCNC: 1.29 NG/DL (ref 0.82–1.77)
TRIGL SERPL-MCNC: 59 MG/DL (ref 0–149)
TSH SERPL DL<=0.005 MIU/L-ACNC: 1.3 UIU/ML (ref 0.45–4.5)
VLDLC SERPL CALC-MCNC: 12 MG/DL (ref 5–40)
WBC # BLD AUTO: 3.7 X10E3/UL (ref 3.4–10.8)

## 2019-04-14 RX ORDER — LISINOPRIL 40 MG/1
40 TABLET ORAL DAILY
Qty: 90 TAB | Refills: 0 | Status: SHIPPED | OUTPATIENT
Start: 2019-04-14 | End: 2019-06-04 | Stop reason: SDUPTHER

## 2019-04-15 ENCOUNTER — TELEPHONE (OUTPATIENT)
Dept: FAMILY MEDICINE CLINIC | Age: 59
End: 2019-04-15

## 2019-04-15 DIAGNOSIS — E55.9 VITAMIN D DEFICIENCY: Primary | ICD-10-CM

## 2019-04-15 RX ORDER — ERGOCALCIFEROL 1.25 MG/1
50000 CAPSULE ORAL
Qty: 4 CAP | Refills: 6 | Status: SHIPPED | OUTPATIENT
Start: 2019-04-15 | End: 2019-06-04 | Stop reason: SDUPTHER

## 2019-04-15 NOTE — TELEPHONE ENCOUNTER
Spoke with patient after he verified his name, , and his address. Informed him of his lab results. Informed him that his medication Janumet was approved. Discussed the difference in the lyrica and gabapentin. Patient verbalized understanding. Will send vitamin D to his pharmacy.  [facility]

## 2019-04-18 LAB
DRUGS UR: NORMAL
SPECIMEN STATUS REPORT, ROLRST: NORMAL

## 2019-05-07 RX ORDER — GABAPENTIN 100 MG/1
100 CAPSULE ORAL 3 TIMES DAILY
Qty: 90 CAP | Refills: 1 | Status: SHIPPED | OUTPATIENT
Start: 2019-05-07 | End: 2019-06-03 | Stop reason: SDUPTHER

## 2019-05-08 ENCOUNTER — OFFICE VISIT (OUTPATIENT)
Dept: FAMILY MEDICINE CLINIC | Age: 59
End: 2019-05-08

## 2019-05-08 VITALS
SYSTOLIC BLOOD PRESSURE: 162 MMHG | DIASTOLIC BLOOD PRESSURE: 80 MMHG | TEMPERATURE: 98.2 F | BODY MASS INDEX: 26.66 KG/M2 | RESPIRATION RATE: 20 BRPM | OXYGEN SATURATION: 99 % | HEART RATE: 63 BPM | WEIGHT: 180 LBS | HEIGHT: 69 IN

## 2019-05-08 DIAGNOSIS — F17.200 TOBACCO DEPENDENCE: ICD-10-CM

## 2019-05-08 DIAGNOSIS — R53.83 OTHER FATIGUE: ICD-10-CM

## 2019-05-08 DIAGNOSIS — I10 ESSENTIAL HYPERTENSION: ICD-10-CM

## 2019-05-08 PROBLEM — H92.09 OTALGIA: Status: ACTIVE | Noted: 2019-05-08

## 2019-05-08 NOTE — PROGRESS NOTES
HPI  Pt reports that he is feeling fine, but his supervisor saw that he was sleeping on the job and made him go to company doctor, who in turn found that his blood sugar was 273 and his blood pressure was elevated. They are also concerned about sleep apnea. Since pt is a , he can't go back to work until he is cleared by PCP. Upset about not being able to work as he says he won't be able to pay his bills. Says that he was off his medication for about 9 months and has only been back on it for about a month. Said that his diabetic symptoms are improving. Does not regularly check his blood sugar, but when he has recently, it has been as high as 300, lowest in low 200s. Admitted to eating 3 donuts for breakfast this morning. Admits to drink some alcohol at times, but says he is cutting back. Past Medical History  Past Medical History:   Diagnosis Date    Alcohol use     Fri-Sun one fifth; Mon-Thur: Pint of liquor    CAD (coronary artery disease)     Cardiac angioplasty with stent 07/29/2009    2.5 x 13 Cypher stent to CX.  Cardiac cath 11/09/2011    RCA patent. LM patent. LAD patent. mD1 55%. CX patent. Prior stent patent. Edison 85% (2.5 x 15-mm Xience stent, resid 0%). LVEDP 12. EF 40-45%. High lateral hypk (RI distribution).       Cardiac inferior STEMI 2009    Current smoker     contemplating stopping    Diabetes ([address]     type 2-insulin dependent    GERD (gastroesophageal reflux disease)     HTN (hypertension)     Hyperlipidemia     Migraine     Myocardial infarction [facility])        Surgical History  Past Surgical History:   Procedure Laterality Date    HX CORONARY STENT PLACEMENT  07/29/2009    HX HEART CATHETERIZATION  8/30/2011    HX HEART CATHETERIZATION  11/09/2011    HX WISDOM TEETH EXTRACTION      x4        Medications  Current Outpatient Medications   Medication Sig Dispense Refill    gabapentin (NEURONTIN) 800 mg tablet Take 1 Tab by mouth three (3) times daily. Take in addition to the 100 mg tablet for a total of 900 mg 3x a day. 90 Tab 1    gabapentin (NEURONTIN) 100 mg capsule Take 1 Cap by mouth three (3) times daily. Take in addition to  800 mg tablet for a total of 900 mg 3x day 90 Cap 1    ergocalciferol (ERGOCALCIFEROL) 50,000 unit capsule Take 1 Cap by mouth every seven (7) days. 4 Cap 6    lisinopril (PRINIVIL, ZESTRIL) 40 mg tablet Take 1 Tab by mouth daily. 90 Tab 0    ONETOUCH VERIO FLEX misc       glucose blood VI test strips (ASCENSIA AUTODISC VI, ONE TOUCH ULTRA TEST VI) strip Twice daily-  fasting in the morning and once two hours after a meal. 100 Strip 10    SITagliptin-metFORMIN (JANUMET) 50-1,000 mg per tablet Take 1 Tab by mouth two (2) times daily (with meals). 180 Tab 3    simvastatin (ZOCOR) 20 mg tablet Take 1 Tab by mouth nightly. 90 Tab 3    insulin glargine (LANTUS,BASAGLAR) 100 unit/mL (3 mL) inpn 50 Units by SubCUTAneous route daily. 5 Pen 0    glipiZIDE (GLUCOTROL) 10 mg tablet Take 1 Tab by mouth two (2) times a day. 180 Tab 3    hydrOXYzine pamoate (VISTARIL) 50 mg capsule TAKE ONE CAPSULE BY MOUTH EVERY EVENING AS NEEDED FOR SLEEP 30 Cap 0    insulin aspart U-100 (NOVOLOG U-100 INSULIN ASPART) 100 unit/mL injection 10 units with meals 10 mL 0    diflunisal (DOLOBID) 500 mg tab Take 1 Tab by mouth two (2) times a day. 20 Tab 0    ibuprofen (MOTRIN) 800 mg tablet Take 1 Tab by mouth every eight (8) hours as needed for Pain. 60 Tab 1    aspirin 81 mg tablet Take 1 Tab by mouth daily. 1 Tab 0    ramelteon (ROZEREM) 8 mg tablet Take 1 Tab by mouth nightly as needed for Sleep.  27 Tab 0       Allergies  Allergies   Allergen Reactions    Niacin Itching       Family History  Family History   Problem Relation Age of Onset    Hypertension Mother     Heart Attack Mother     Diabetes Mother     Hypertension Father     Heart Attack Father     Diabetes Father     Diabetes Sister     Hypertension Sister     Stroke Sister     Diabetes Brother     Hypertension Brother     Stroke Brother     No Known Problems Maternal Grandmother     No Known Problems Maternal Grandfather     No Known Problems Paternal Grandmother     No Known Problems Paternal Grandfather     No Known Problems Brother     Heart Disease Other     Kidney Disease Other        Social History  Social History     Socioeconomic History    Marital status:      Spouse name: Not on file    Number of children: Not on file    Years of education: Not on file    Highest education level: Not on file   Occupational History    Not on file   Social Needs    Financial resource strain: Not on file    Food insecurity:     Worry: Not on file     Inability: Not on file    Transportation needs:     Medical: Not on file     Non-medical: Not on file   Tobacco Use    Smoking status: Current Every Day Smoker     Packs/day: 0.50     Years: 1.00     Pack years: 0.50     Types: Cigarettes    Smokeless tobacco: Never Used   Substance and Sexual Activity    Alcohol use: Yes     Alcohol/week: 2.5 oz     Types: 5 Shots of liquor per week     Comment: occassionally    Drug use: Yes     Types: Marijuana, Cocaine    Sexual activity: Yes   Lifestyle    Physical activity:     Days per week: Not on file     Minutes per session: Not on file    Stress: Not on file   Relationships    Social connections:     Talks on phone: Not on file     Gets together: Not on file     Attends Christian service: Not on file     Active member of club or organization: Not on file     Attends meetings of clubs or organizations: Not on file     Relationship status: Not on file    Intimate partner violence:     Fear of current or ex partner: Not on file     Emotionally abused: Not on file     Physically abused: Not on file     Forced sexual activity: Not on file   Other Topics Concern    Not on file   Social History Narrative     at [employer].        Problem List  Patient Active Problem List   Diagnosis Code    Hyperlipidemia E78.5    HTN (hypertension) I10    Insulin dependent diabetes mellitus ([facility] [address] E11.9, Z79.4    Tobacco use disorder F17.200    Coronary atherosclerosis of native coronary artery I25.10    Tobacco dependence F17.200    Other and unspecified alcohol dependence, continuous drinking behavior F10.20    Mediastinal adenopathy R59.0    Diabetic neuropathy ([facility] [address] E11.40    Vitamin D deficiency E55.9    Other insomnia G47.09    Type 2 diabetes with nephropathy (HCC) E11.21    Otalgia H92.09    Other fatigue R53.83       Review of Systems  Review of Systems   Constitutional: Positive for malaise/fatigue. Respiratory: Negative. Cardiovascular: Negative. Genitourinary: Negative. Neurological: Negative. Endo/Heme/Allergies: Negative. Vital Signs  Vitals:    05/08/19 1537   BP: 162/80   Pulse: 63   Resp: 20   Temp: 98.2 °F (36.8 °C)   TempSrc: Oral   SpO2: 99%   Weight: 180 lb (81.6 kg)   Height: 5' 9\" (1.753 m)   PainSc:   0 - No pain       Physical Exam  Physical Exam   Constitutional: He is oriented to person, place, and time. He appears well-developed and well-nourished. No distress. Neck: Normal range of motion. Neck supple. Cardiovascular: Normal rate and regular rhythm. Pulmonary/Chest: Effort normal and breath sounds normal.   Musculoskeletal: Normal range of motion. Neurological: He is alert and oriented to person, place, and time. Skin: Skin is warm and dry. Nursing note and vitals reviewed.       Diagnostics  Orders Placed This Encounter    [facility] [address]     Referral Priority:   Routine     Referral Type:   PT/OT/ST     Referral Reason:   Specialty Services Required     Number of Visits Requested:   1    SLEEP MEDICINE REFERRAL     Referral Priority:   Routine     Referral Type:   Consultation     Referral Reason:   Specialty Services Required     Requested Specialty:   Sleep Medicine     Number of Visits Requested:   1       Results  Results for orders placed or performed during the hospital encounter of 04/11/19   [facility] SPECIMEN COL   Result Value Ref Range    [facility] SPECIM[facility]LL[facility]. Specimens collected/sent to [facility]. Please direct inquiries to ([phone]). Assessment and Plan  Diagnoses and all orders for this visit:    Conferred with PCP NP Peri York and discussed with patient that we would not be able to give patient clearance to return to work at this time. Pt needs to provide evidence that he is getting blood sugar under control so that he is able to safely preform the duties of his job. Encouraged pt to talk with employer about this- if he is eligible for FMLA, etc.    1. Insulin dependent diabetes mellitus ([facility] [address]  -     REFERRAL TO PHYSICAL THERAPY- diabetic nutrition to assist pt in making better dietary choices    Pt counseled that uncontrolled DM can lead to significant medical problems including but not limited to vascular disease leading to permanent vision loss and amputations, kidney disease leading to dialysis, heart disease leading to heart attack, and stroke. Stressed the importance of taking meds regularly and as prescribed    Also strongly encouraged to avoid alcohol as this can raise blood sugar    Pt strongly encouraged to check blood sugar twice daily and bring log of reading to appt with PCP next week to evaluate blood sugar control, as well as to promote his own self awareness. Endocrinology appt scheduled for Jennifer 10. Will try to assist pt with getting an earlier appt    2. Essential hypertension   pt encouraged to take medication as directed. Reviewed DASH diet. Encouraged increased physical activity. Reviewed return/emergency precautions. 3. Tobacco dependence    4.  Other fatigue  -     SLEEP MEDICINE REFERRAL  May be related to elevated blood sugar as well as sleep apnea causing day time fatigue      After care summary printed and reviewed with patient. Plan reviewed with patient. Questions answered. Patient verbalized understanding of plan and is in agreement with plan. Patient to follow up with PCP as scheduled or earlier if symptoms worsen. Return to work letter given. Encouraged the use of my chart.     EDITH Welsh

## 2019-05-08 NOTE — PROGRESS NOTES
Ranjith Chaevz presents today for   Chief Complaint   Patient presents with    Diabetes     Work Clearance follow up       Is someone accompanying this pt? No    Is the patient using any DME equipment during OV? No    Depression Screening:  3 most recent PHQ Screens 4/11/2019   PHQ Not Done -   Little interest or pleasure in doing things Not at all   Feeling down, depressed, irritable, or hopeless Not at all   Total Score PHQ 2 0       Learning Assessment:  Learning Assessment 4/11/2019   PRIMARY LEARNER Patient   HIGHEST LEVEL OF EDUCATION - PRIMARY LEARNER  DID NOT GRADUATE HIGH SCHOOL   BARRIERS PRIMARY LEARNER NONE   CO-LEARNER CAREGIVER No   PRIMARY LANGUAGE ENGLISH   LEARNER PREFERENCE PRIMARY LISTENING   ANSWERED BY self   RELATIONSHIP SELF       Abuse Screening:  Abuse Screening Questionnaire 4/11/2019   Do you ever feel afraid of your partner? N   Are you in a relationship with someone who physically or mentally threatens you? N   Is it safe for you to go home? Y       Fall Screening:  Fall Risk Assessment, last 12 mths 4/11/2019   Able to walk? Yes   Fall in past 12 months? No         Health Maintenance Due   Topic Date Due    Hepatitis C Screening  1960    Pneumococcal 0-64 years (1 of 1 - PPSV23) 06/02/1964    EYE EXAM RETINAL OR DILATED  06/02/1968    DTaP/Tdap/Td series (1 - Tdap) 06/02/1979    Shingrix Vaccine Age 50> (1 of 2) 06/02/2008    FOBT Q 1 YEAR AGE 50-75  06/02/2008    MICROALBUMIN Q1  01/13/2017   . Health Maintenance reviewed and discussed and ordered per Provider. Ranjith Chavez is updated on all       Coordination of Care:  1. Have you been to the ER, urgent care clinic since your last visit? Hospitalized since your last visit? No    2. Have you seen or consulted any other health care providers outside of the [address] [place] [address] since your last visit? Include any pap smears or colon screening. No      Advance Directive:  1.  Do you have an advance directive in place? Patient Reply:No    2. If not, would you like material regarding how to put one in place?  Patient Reply: No

## 2019-05-15 ENCOUNTER — OFFICE VISIT (OUTPATIENT)
Dept: FAMILY MEDICINE CLINIC | Age: 59
End: 2019-05-15

## 2019-05-15 VITALS
OXYGEN SATURATION: 99 % | RESPIRATION RATE: 18 BRPM | WEIGHT: 183.4 LBS | SYSTOLIC BLOOD PRESSURE: 125 MMHG | TEMPERATURE: 98.4 F | DIASTOLIC BLOOD PRESSURE: 70 MMHG | BODY MASS INDEX: 27.16 KG/M2 | HEART RATE: 67 BPM | HEIGHT: 69 IN

## 2019-05-15 DIAGNOSIS — I10 ESSENTIAL HYPERTENSION: ICD-10-CM

## 2019-05-15 LAB — GLUCOSE DOSE-GTT, POCT, GLDSPOCT: 303

## 2019-05-15 NOTE — PROGRESS NOTES
Patsy Rodriguez presents today for   Chief Complaint   Patient presents with    Diabetes     f/u fell asleep at work       Is someone accompanying this pt? No    Is the patient using any DME equipment during OV? No    Depression Screening:  3 most recent PHQ Screens 4/11/2019   PHQ Not Done -   Little interest or pleasure in doing things Not at all   Feeling down, depressed, irritable, or hopeless Not at all   Total Score PHQ 2 0       Learning Assessment:  Learning Assessment 4/11/2019   PRIMARY LEARNER Patient   HIGHEST LEVEL OF EDUCATION - PRIMARY LEARNER  DID NOT GRADUATE HIGH SCHOOL   BARRIERS PRIMARY LEARNER NONE   CO-LEARNER CAREGIVER No   PRIMARY LANGUAGE ENGLISH   LEARNER PREFERENCE PRIMARY LISTENING   ANSWERED BY self   RELATIONSHIP SELF       Abuse Screening:  Abuse Screening Questionnaire 4/11/2019   Do you ever feel afraid of your partner? N   Are you in a relationship with someone who physically or mentally threatens you? N   Is it safe for you to go home? Y         Health Maintenance Due   Topic Date Due    Hepatitis C Screening  1960    Pneumococcal 0-64 years (1 of 1 - PPSV23) 06/02/1964    EYE EXAM RETINAL OR DILATED  06/02/1968    DTaP/Tdap/Td series (1 - Tdap) 06/02/1979    Shingrix Vaccine Age 50> (1 of 2) 06/02/2008    FOBT Q 1 YEAR AGE 50-75  06/02/2008    MICROALBUMIN Q1  01/13/2017   . Health Maintenance reviewed and discussed and ordered per Provider. Coordination of Care  1. Have you been to the ER, urgent care clinic since your last visit? Hospitalized since your last visit? No    2. Have you seen or consulted any other health care providers outside of the [address] [place] [address] since your last visit? Include any pap smears or colon screening. No        Advance Directive:  1. Do you have an advance directive in place? Patient Reply:No    2. If not, would you like material regarding how to put one in place?  Patient Reply: No

## 2019-05-15 NOTE — PROGRESS NOTES
[facility], Emergency Department    [address], [place] [address]    Phone:  [phone]                                       Ashli Moscoso   MRN: [id number]    Department:  [facility], Emergency Department   Date of Visit:  6/23/2018           Patient Information     Date Of Birth          1949        Your diagnoses for this visit were:     Memory loss        You were seen by Carrie Lindsey MD.        Discharge Instructions       Please make an appointment to follow up with Neurology Clinic (phone: [phone]) as soon as possible even if entirely better.     Discharge References/Attachments     DEMENTIA, UNDERSTANDING (ENGLISH)      Your next 10 appointments already scheduled     Jul 23, 2018 12:40 PM CDT   Office Visit with Gauri Bolton MD   [facility]    [address] [address] [phone]           Bring a current list of meds and any records pertaining to this visit. For Physicals, please bring immunization records and any forms needing to be filled out. Please arrive 10 minutes early to complete paperwork.              24 Hour Appointment Hotline       To make an appointment at any [facility] call [phone] ([phone]). If you don't have a family doctor or clinic, we will help you find one. [facility] clinics are conveniently located to serve the needs of you and your family.             Review of your medicines      Our records show that you are taking the medicines listed below. If these are incorrect, please call your family doctor or clinic.        Dose / Directions Last dose taken    acyclovir 5 % ointment   Commonly known as:  ZOVIRAX   Quantity:  15 g        Apply  topically 6 times daily.   Refills:  3        ASPIRIN PO   Dose:  81 mg        Take 81 mg by mouth daily   Refills:  0        CITRACAL + D PO        Take  by mouth.   Refills:  0        CLARITIN 10 MG tablet   Dose:  10 mg   Generic  HPI  Tamika Baker is a 59 y.o. male  Chief Complaint   Patient presents with    Diabetes     f/u fell asleep at work     Reports his blood glucose was 183 this morning. Reports he ate some baked chicken and cabbage for breakfast. He does admit to eating fried chicken for lunch. He is a  and he was reported for falling asleep at work but he denies that he was sleep. Admits that his blood glucose was elevated. Reports he has been taking his medications and reports he feels better than he has in a long time. Reports weakness one day and reports they did have to help him get to his truck. Reports he has had fatigue. Blurred vision at times. Denies increased thirst, urination, or hunger since he has restarted on his insulin. Reports he does not take his full dosage of insulin when he has to work because he can not eat like he is supposed to and he does not want his sugar dropping when he is at work. He has now stated that he has also had a cake and a soda for lunch but states he was not able to take his mealtime insulin today. Past Medical History  Past Medical History:   Diagnosis Date    Alcohol use     Fri-Sun one fifth; Mon-Thur: Pint of liquor    CAD (coronary artery disease)     Cardiac angioplasty with stent 07/29/2009    2.5 x 13 Cypher stent to CX.  Cardiac cath 11/09/2011    RCA patent. LM patent. LAD patent. mD1 55%. CX patent. Prior stent patent. Edison 85% (2.5 x 15-mm Xience stent, resid 0%). LVEDP 12. EF 40-45%. High lateral hypk (RI distribution).       Cardiac inferior STEMI 2009    Current smoker     contemplating stopping    Diabetes ([facility] [address]     type 2-insulin dependent    GERD (gastroesophageal reflux disease)     HTN (hypertension)     Hyperlipidemia     Migraine     Myocardial infarction [facility])        Surgical History  Past Surgical History:   Procedure Laterality Date    HX CORONARY STENT PLACEMENT  07/29/2009    HX HEART CATHETERIZATION  8/30/2011 drug:  loratadine        Take 10 mg by mouth daily.   Refills:  0        escitalopram 10 MG tablet   Commonly known as:  LEXAPRO   Dose:  10 mg   Quantity:  90 tablet        Take 1 tablet by mouth daily.   Refills:  1        fenofibrate 160 MG tablet   Dose:  160 mg   Quantity:  90 tablet        Take 1 tablet by mouth daily.   Refills:  0        hydrochlorothiazide 25 MG tablet   Commonly known as:  HYDRODIURIL   Dose:  25 mg   Quantity:  90 tablet        Take 1 tablet (25 mg) by mouth daily   Refills:  3        lisinopril 10 MG tablet   Commonly known as:  PRINIVIL/ZESTRIL   Dose:  10 mg   Quantity:  90 tablet        Take 1 tablet by mouth daily.   Refills:  1        meclizine 25 MG tablet   Commonly known as:  ANTIVERT   Dose:  25 mg        Take 25 mg by mouth 2 times daily.   Refills:  0        metoprolol tartrate 50 MG tablet   Commonly known as:  LOPRESSOR   Dose:  50 mg   Quantity:  60 tablet        Take 1 tablet by mouth 2 times daily.   Refills:  12        niacin 100 MG tablet   Dose:  100 mg        Take 100 mg by mouth 2 times daily.   Refills:  0        NYSTOP [id number] UNIT/GM Powd   Dose:  15 g   Quantity:  2 Bottle   Generic drug:  nystatin        Externally apply 15 g topically 2 times daily.   Refills:  6        oxazepam 10 MG capsule   Commonly known as:  SERAX   Dose:  10 mg        Take 10 mg by mouth nightly as needed.   Refills:  0        simvastatin 20 MG tablet   Commonly known as:  ZOCOR   Dose:  20 mg   Quantity:  90 tablet        Take 1 tablet by mouth At Bedtime.   Refills:  0        SLOW  (50 Fe) MG tablet   Dose:  1 tablet   Generic drug:  ferrous sulfate Dried        Take 1 tablet by mouth daily (with breakfast).   Refills:  0                Procedures and tests performed during your visit     CBC with platelets differential    CT Head w/o Contrast    Comprehensive metabolic panel    EKG 12-lead, tracing only    TSH with free T4 reflex    Troponin I    UA with Microscopic    Vitamin   HX HEART CATHETERIZATION  11/09/2011    HX WISDOM TEETH EXTRACTION      x4        Medications  Current Outpatient Medications   Medication Sig Dispense Refill    gabapentin (NEURONTIN) 800 mg tablet Take 1 Tab by mouth three (3) times daily. Take in addition to the 100 mg tablet for a total of 900 mg 3x a day. 90 Tab 1    gabapentin (NEURONTIN) 100 mg capsule Take 1 Cap by mouth three (3) times daily. Take in addition to  800 mg tablet for a total of 900 mg 3x day 90 Cap 1    ergocalciferol (ERGOCALCIFEROL) 50,000 unit capsule Take 1 Cap by mouth every seven (7) days. 4 Cap 6    lisinopril (PRINIVIL, ZESTRIL) 40 mg tablet Take 1 Tab by mouth daily. 90 Tab 0    ONETOUCH VERIO FLEX misc       glucose blood VI test strips (ASCENSIA AUTODISC VI, ONE TOUCH ULTRA TEST VI) strip Twice daily-  fasting in the morning and once two hours after a meal. 100 Strip 10    SITagliptin-metFORMIN (JANUMET) 50-1,000 mg per tablet Take 1 Tab by mouth two (2) times daily (with meals). 180 Tab 3    simvastatin (ZOCOR) 20 mg tablet Take 1 Tab by mouth nightly. 90 Tab 3    insulin glargine (LANTUS,BASAGLAR) 100 unit/mL (3 mL) inpn 50 Units by SubCUTAneous route daily. 5 Pen 0    glipiZIDE (GLUCOTROL) 10 mg tablet Take 1 Tab by mouth two (2) times a day. 180 Tab 3    hydrOXYzine pamoate (VISTARIL) 50 mg capsule TAKE ONE CAPSULE BY MOUTH EVERY EVENING AS NEEDED FOR SLEEP 30 Cap 0    ibuprofen (MOTRIN) 800 mg tablet Take 1 Tab by mouth every eight (8) hours as needed for Pain. 60 Tab 1    aspirin 81 mg tablet Take 1 Tab by mouth daily. 1 Tab 0    insulin aspart U-100 (NOVOLOG U-100 INSULIN ASPART) 100 unit/mL injection 10 units with meals 10 mL 0    diflunisal (DOLOBID) 500 mg tab Take 1 Tab by mouth two (2) times a day. 20 Tab 0    ramelteon (ROZEREM) 8 mg tablet Take 1 Tab by mouth nightly as needed for Sleep.  30 Tab 0       Allergies  Allergies   Allergen Reactions    Niacin Itching       Family History  Family History B12      Orders Needing Specimen Collection     None      Pending Results     Date and Time Order Name Status Description    6/23/2018 1543 Vitamin B12 In process     6/23/2018 1543 TSH with free T4 reflex In process     6/23/2018 1521 EKG 12-lead, tracing only Preliminary             Pending Culture Results     No orders found from 6/21/2018 to 6/24/2018.            Pending Results Instructions     If you had any lab results that were not finalized at the time of your Discharge, you can call the ED Lab Result RN at [phone]. You will be contacted by this team for any positive Lab results or changes in treatment. The nurses are available 7 days a week from 10A to 6:30P.  You can leave a message 24 hours per day and they will return your call.        Thank you for choosing [facility]       Thank you for choosing [facility] for your care. Our goal is always to provide you with excellent care. Hearing back from our patients is one way we can continue to improve our services. Please take a few minutes to complete the written survey that you may receive in the mail after you visit with us. Thank you!        Care EveryWhere ID     This is your Care EveryWhere ID. This could be used by other organizations to access your [facility] medical records  [id number]        Equal Access to Services     DASH MCKEON : Dafne Reyes, porsha alanis, daniel key, jeffery del cid. So [facility] [phone].    ATENCIÓN: Si habla español, tiene a vides disposición servicios gratuitos de asistencia lingüística. Llame al [phone].    We comply with applicable federal civil rights laws and [place] laws. We do not discriminate on the basis of race, color, national origin, age, disability, sex, sexual orientation, or gender identity.            After Visit Summary       This is your record. Keep this with you and show to your community pharmacist(s) and doctor(s) at your next visit.   Problem Relation Age of Onset    Hypertension Mother     Heart Attack Mother     Diabetes Mother     Hypertension Father     Heart Attack Father     Diabetes Father     Diabetes Sister     Hypertension Sister     Stroke Sister     Diabetes Brother     Hypertension Brother     Stroke Brother     No Known Problems Maternal Grandmother     No Known Problems Maternal Grandfather     No Known Problems Paternal Grandmother     No Known Problems Paternal Grandfather     No Known Problems Brother     Heart Disease Other     Kidney Disease Other        Social History  Social History     Socioeconomic History    Marital status:      Spouse name: Not on file    Number of children: Not on file    Years of education: Not on file    Highest education level: Not on file   Occupational History    Not on file   Social Needs    Financial resource strain: Not on file    Food insecurity:     Worry: Not on file     Inability: Not on file    Transportation needs:     Medical: Not on file     Non-medical: Not on file   Tobacco Use    Smoking status: Current Every Day Smoker     Packs/day: 0.50     Years: 1.00     Pack years: 0.50     Types: Cigarettes    Smokeless tobacco: Never Used   Substance and Sexual Activity    Alcohol use:  Yes     Alcohol/week: 2.5 oz     Types: 5 Shots of liquor per week     Comment: occassionally    Drug use: Yes     Types: Marijuana, Cocaine    Sexual activity: Yes   Lifestyle    Physical activity:     Days per week: Not on file     Minutes per session: Not on file    Stress: Not on file   Relationships    Social connections:     Talks on phone: Not on file     Gets together: Not on file     Attends [employer] service: Not on file     Active member of club or organization: Not on file     Attends meetings of clubs or organizations: Not on file     Relationship status: Not on file    Intimate partner violence:     Fear of current or ex partner: Not on file Emotionally abused: Not on file     Physically abused: Not on file     Forced sexual activity: Not on file   Other Topics Concern    Not on file   Social History Narrative     at [employer]. Problem List  Patient Active Problem List   Diagnosis Code    Hyperlipidemia E78.5    HTN (hypertension) I10    [facility] E11.9, Z79.4    Tobacco use disorder F17.200    Coronary atherosclerosis of native coronary artery I25.10    Tobacco dependence F17.200    Other and unspecified alcohol dependence, continuous drinking behavior F10.20    Mediastinal adenopathy R59.0    Diabetic neuropathy ([facility] [address] E11.40    Vitamin D deficiency E55.9    Other insomnia G47.09    Type 2 diabetes with nephropathy (HCC) E11.21    Otalgia H92.09    Other fatigue R53.83       Review of Systems  Review of Systems   Constitutional: Positive for malaise/fatigue. Eyes: Positive for blurred vision. Respiratory: Negative for shortness of breath. Cardiovascular: Negative for chest pain. Gastrointestinal: Negative for abdominal pain, nausea and vomiting. Skin: Negative for itching and rash. Neurological: Negative for dizziness and loss of consciousness. Endo/Heme/Allergies: Negative for polydipsia. Vital Signs  Vitals:    05/15/19 1140 05/15/19 1144   BP: 146/78 125/70   Pulse: 67    Resp: 18    Temp: 98.4 °F (36.9 °C)    TempSrc: Oral    SpO2: 99%    Weight: 183 lb 6.4 oz (83.2 kg)    Height: 5' 9\" (1.753 m)    PainSc:   0 - No pain        Physical Exam  Physical Exam   Constitutional: He is oriented to person, place, and time. HENT:   Mouth/Throat: Oropharynx is clear and moist.   Eyes: Pupils are equal, round, and reactive to light. Cardiovascular: Normal rate, regular rhythm, normal heart sounds and intact distal pulses. Pulmonary/Chest: Effort normal and breath sounds normal.   Abdominal: Soft. Bowel sounds are normal. He exhibits no distension. There is no tenderness. Musculoskeletal: He exhibits no edema. Neurological: He is alert and oriented to person, place, and time. No cranial nerve deficit. Coordination normal.   Psychiatric: He has a normal mood and affect. His behavior is normal.   Vitals reviewed. Diagnostics  Orders Placed This Encounter    REFERRAL TO OPHTHALMOLOGY     Referral Priority:   Routine     Referral Type:   Consultation     Referral Reason:   Specialty Services Required     Requested Specialty:   Ophthalmology     Number of Visits Requested:   1    AMB POC GLUCOSE TEST       Results  Results for orders placed or performed during the hospital encounter of 04/11/19   [facility] SPECIMEN COL   Result Value Ref Range    [facility] SPECIMEN [facility]LL[facility]. Specimens collected/sent to [facility]. Please direct inquiries to ([phone]). Assessment and Plan  Diagnoses and all orders for this visit:    1. Insulin dependent diabetes mellitus (HCC)  -     REFERRAL TO OPHTHALMOLOGY  -     AMB POC GLUCOSE TEST    2. Essential hypertension    More than 50% of 90 minute visit spent counseling and coordinating care with patient face to face on diabetes, disability paperwork, medications, blood pressure, appointments, signs and symptoms of hypo and hyperglycemia, diet and exercise. Long discussion with patient regarding diet and blood glucose. He should check his blood glucose in the morning and then two hours after a meal. He is to take medications as prescribed. Endocrinology appointment  is scheduled for June 10th, he has a sleep appointment scheduled for June 13th. Ophthalmology appointment is pending. After care summary printed and reviewed with patient. Plan reviewed with patient. Questions answered. Patient verbalized understanding of plan and is in agreement with plan. Patient to follow up in two weeks or earlier if symptoms worsen. Paperwork completed and given to patient.    Follow-up and Dispositions    · Return in about 2 weeks (around 5/29/2019), or if symptoms worsen or fail to improve.        Yola Paulino, FNP-C

## 2019-05-28 DIAGNOSIS — E78.00 HYPERCHOLESTEROLEMIA: ICD-10-CM

## 2019-05-28 DIAGNOSIS — G47.09 OTHER INSOMNIA: ICD-10-CM

## 2019-05-28 RX ORDER — SIMVASTATIN 20 MG/1
20 TABLET, FILM COATED ORAL
Qty: 90 TAB | Refills: 3 | Status: SHIPPED | OUTPATIENT
Start: 2019-05-28 | End: 2019-06-04 | Stop reason: SDUPTHER

## 2019-05-28 RX ORDER — RAMELTEON 8 MG/1
8 TABLET ORAL
Qty: 30 TAB | Refills: 0 | Status: SHIPPED | OUTPATIENT
Start: 2019-05-28 | End: 2019-11-21 | Stop reason: SDUPTHER

## 2019-05-28 NOTE — TELEPHONE ENCOUNTER
Patient called and said that he need a refill on his medication. Please advise    Requested Prescriptions     Pending Prescriptions Disp Refills    simvastatin (ZOCOR) 20 mg tablet 90 Tab 3     Sig: Take 1 Tab by mouth nightly.  ramelteon (ROZEREM) 8 mg tablet 30 Tab 0     Sig: Take 1 Tab by mouth nightly as needed for Sleep.

## 2019-05-28 NOTE — TELEPHONE ENCOUNTER
Verified patient via name and , reviewed chart. Forwarded information to provider for review. PCP: Luiza Ndiaye NP    Last appt: 5/15/2019  Future Appointments   Date Time Provider Henri Becerra   6/3/2019  3:00 PM Luiza Ndiaye NP [facility] None   2019  9:00 AM Madeleine Chung MD [address]   9/3/2019  4:00 PM Benito Pearson MD [address] [place] [address]       Requested Prescriptions     Pending Prescriptions Disp Refills    simvastatin (ZOCOR) 20 mg tablet 90 Tab 3     Sig: Take 1 Tab by mouth nightly.  ramelteon (ROZEREM) 8 mg tablet 30 Tab 0     Sig: Take 1 Tab by mouth nightly as needed for Sleep.        Last Labs done:  19   Last UDS done:  19  Last :

## 2019-05-29 NOTE — TELEPHONE ENCOUNTER
Returned call to patient, verified name and . Informed patient that his requested Rx have been sent to his pharmacy and also reminded patient of his appointment next week. No further question and patient verbalized understanding.

## 2019-06-03 ENCOUNTER — OFFICE VISIT (OUTPATIENT)
Dept: FAMILY MEDICINE CLINIC | Age: 59
End: 2019-06-03

## 2019-06-03 VITALS
HEART RATE: 69 BPM | BODY MASS INDEX: 27.43 KG/M2 | HEIGHT: 69 IN | RESPIRATION RATE: 18 BRPM | SYSTOLIC BLOOD PRESSURE: 134 MMHG | DIASTOLIC BLOOD PRESSURE: 69 MMHG | TEMPERATURE: 98.2 F | WEIGHT: 185.2 LBS

## 2019-06-03 DIAGNOSIS — Z12.11 COLON CANCER SCREENING: ICD-10-CM

## 2019-06-03 DIAGNOSIS — Z12.11 COLON CANCER SCREENING: Primary | ICD-10-CM

## 2019-06-03 DIAGNOSIS — Z79.4 TYPE 2 DIABETES MELLITUS WITH DIABETIC NEUROPATHY, WITH LONG-TERM CURRENT USE OF INSULIN (HCC): ICD-10-CM

## 2019-06-03 DIAGNOSIS — E11.40 TYPE 2 DIABETES MELLITUS WITH DIABETIC NEUROPATHY, WITH LONG-TERM CURRENT USE OF INSULIN (HCC): ICD-10-CM

## 2019-06-03 RX ORDER — LIDOCAINE 50 MG/G
PATCH TOPICAL
COMMUNITY
Start: 2019-05-14 | End: 2020-02-05 | Stop reason: SDUPTHER

## 2019-06-03 RX ORDER — GABAPENTIN 100 MG/1
100 CAPSULE ORAL 3 TIMES DAILY
Qty: 90 CAP | Refills: 1 | Status: SHIPPED | OUTPATIENT
Start: 2019-06-03 | End: 2019-07-16 | Stop reason: ALTCHOICE

## 2019-06-03 RX ORDER — GABAPENTIN 800 MG/1
800 TABLET ORAL 3 TIMES DAILY
Qty: 90 TAB | Refills: 1 | Status: SHIPPED | OUTPATIENT
Start: 2019-06-03 | End: 2019-07-16 | Stop reason: ALTCHOICE

## 2019-06-03 RX ORDER — INSULIN GLARGINE 100 [IU]/ML
50 INJECTION, SOLUTION SUBCUTANEOUS DAILY
Qty: 5 PEN | Refills: 0 | Status: SHIPPED | OUTPATIENT
Start: 2019-06-03 | End: 2019-07-16 | Stop reason: SDUPTHER

## 2019-06-03 NOTE — PROGRESS NOTES
HPI  Marni Hatch is a 62 y.o. male  Chief Complaint   Patient presents with    Hypertension     follow up    Diabetes    Medication Refill     Reports blood glucose was 189 today, and 229 yesterday. Blurry vision has improved since he has started taking his medications. He denies any dizziness, fatigue, or sweating. He reports he has not been to endocrinology and he does not know when his appointment is. He is requesting a refill on his insulin. He denies chest pain, or shortness of breath. Reports he thinks he needs a refill on his blood pressure medications. Denies swelling in his lower extremities. Past Medical History  Past Medical History:   Diagnosis Date    Alcohol use     Fri-Sun one fifth; Mon-Thur: Pint of liquor    CAD (coronary artery disease)     Cardiac angioplasty with stent 07/29/2009    2.5 x 13 Cypher stent to CX.  Cardiac cath 11/09/2011    RCA patent. LM patent. LAD patent. mD1 55%. CX patent. Prior stent patent. Edison 85% (2.5 x 15-mm Xience stent, resid 0%). LVEDP 12. EF 40-45%. High lateral hypk (RI distribution).  Cardiac inferior STEMI 2009    Current smoker     contemplating stopping    Diabetes ([facility] [address]     type 2-insulin dependent    GERD (gastroesophageal reflux disease)     HTN (hypertension)     Hyperlipidemia     Migraine     Myocardial infarction [facility])        Surgical History  Past Surgical History:   Procedure Laterality Date    HX CORONARY STENT PLACEMENT  07/29/2009    HX HEART CATHETERIZATION  8/30/2011    HX HEART CATHETERIZATION  11/09/2011    HX WISDOM TEETH EXTRACTION      x4        Medications  Current Outpatient Medications   Medication Sig Dispense Refill    lidocaine (LIDODERM) 5 %       gabapentin (NEURONTIN) 100 mg capsule Take 1 Cap by mouth three (3) times daily. Take in addition to  800 mg tablet for a total of 900 mg 3x day 90 Cap 1    gabapentin (NEURONTIN) 800 mg tablet Take 1 Tab by mouth three (3) times daily.  Take in addition to the 100 mg tablet for a total of 900 mg 3x a day. 90 Tab 1    insulin glargine (LANTUS,BASAGLAR) 100 unit/mL (3 mL) inpn 50 Units by SubCUTAneous route daily. 5 Pen 0    simvastatin (ZOCOR) 20 mg tablet Take 1 Tab by mouth nightly. 90 Tab 3    ramelteon (ROZEREM) 8 mg tablet Take 1 Tab by mouth nightly as needed for Sleep. 30 Tab 0    ergocalciferol (ERGOCALCIFEROL) 50,000 unit capsule Take 1 Cap by mouth every seven (7) days. 4 Cap 6    lisinopril (PRINIVIL, ZESTRIL) 40 mg tablet Take 1 Tab by mouth daily. 90 Tab 0    ONETOUCH VERIO FLEX misc       glucose blood VI test strips (ASCENSIA AUTODISC VI, ONE TOUCH ULTRA TEST VI) strip Twice daily-  fasting in the morning and once two hours after a meal. 100 Strip 10    SITagliptin-metFORMIN (JANUMET) 50-1,000 mg per tablet Take 1 Tab by mouth two (2) times daily (with meals). 180 Tab 3    glipiZIDE (GLUCOTROL) 10 mg tablet Take 1 Tab by mouth two (2) times a day. 180 Tab 3    hydrOXYzine pamoate (VISTARIL) 50 mg capsule TAKE ONE CAPSULE BY MOUTH EVERY EVENING AS NEEDED FOR SLEEP 30 Cap 0    insulin aspart U-100 (NOVOLOG U-100 INSULIN ASPART) 100 unit/mL injection 10 units with meals 10 mL 0    ibuprofen (MOTRIN) 800 mg tablet Take 1 Tab by mouth every eight (8) hours as needed for Pain. 60 Tab 1    aspirin 81 mg tablet Take 1 Tab by mouth daily. 1 Tab 0    diflunisal (DOLOBID) 500 mg tab Take 1 Tab by mouth two (2) times a day.  21 Tab 0       Allergies  Allergies   Allergen Reactions    Niacin Itching       Family History  Family History   Problem Relation Age of Onset    Hypertension Mother     Heart Attack Mother     Diabetes Mother     Hypertension Father     Heart Attack Father     Diabetes Father     Diabetes Sister     Hypertension Sister     Stroke Sister     Diabetes Brother     Hypertension Brother     Stroke Brother     No Known Problems Maternal Grandmother     No Known Problems Maternal Grandfather     No Known Problems Paternal Grandmother     No Known Problems Paternal Grandfather     No Known Problems Brother     Heart Disease Other     Kidney Disease Other        Social History  Social History     Socioeconomic History    Marital status:      Spouse name: Not on file    Number of children: Not on file    Years of education: Not on file    Highest education level: Not on file   Occupational History    Not on file   Social Needs    Financial resource strain: Not on file    Food insecurity:     Worry: Not on file     Inability: Not on file    Transportation needs:     Medical: Not on file     Non-medical: Not on file   Tobacco Use    Smoking status: Current Every Day Smoker     Packs/day: 0.50     Years: 1.00     Pack years: 0.50     Types: Cigarettes    Smokeless tobacco: Never Used   Substance and Sexual Activity    Alcohol use: Yes     Alcohol/week: 2.5 oz     Types: 5 Shots of liquor per week     Comment: occassionally    Drug use: Yes     Types: Marijuana, Cocaine    Sexual activity: Yes   Lifestyle    Physical activity:     Days per week: Not on file     Minutes per session: Not on file    Stress: Not on file   Relationships    Social connections:     Talks on phone: Not on file     Gets together: Not on file     Attends Orthodox service: Not on file     Active member of club or organization: Not on file     Attends meetings of clubs or organizations: Not on file     Relationship status: Not on file    Intimate partner violence:     Fear of current or ex partner: Not on file     Emotionally abused: Not on file     Physically abused: Not on file     Forced sexual activity: Not on file   Other Topics Concern    Not on file   Social History Narrative     at [employer].        Problem List  Patient Active Problem List   Diagnosis Code    Hyperlipidemia E78.5    HTN (hypertension) I10    [facility] E11.9, Z79.4    Tobacco use disorder F17.200    Coronary atherosclerosis of native coronary artery I25.10    Tobacco dependence F17.200    Other and unspecified alcohol dependence, continuous drinking behavior F10.20    Mediastinal adenopathy R59.0    Diabetic neuropathy (HCC) E11.40    Vitamin D deficiency E55.9    Other insomnia G47.09    Type 2 diabetes with nephropathy (HCC) E11.21    Otalgia H92.09    Other fatigue R53.83       Review of Systems  Review of Systems   Constitutional: Negative for malaise/fatigue. Eyes: Negative for blurred vision. Respiratory: Negative for shortness of breath. Cardiovascular: Negative for chest pain. Gastrointestinal: Negative for abdominal pain, nausea and vomiting. Genitourinary: Negative. Musculoskeletal: Negative for falls. Neurological: Negative for dizziness and loss of consciousness. Endo/Heme/Allergies: Negative for polydipsia. Vital Signs  Vitals:    06/03/19 1451   BP: 134/69   Pulse: 69   Resp: 18   Temp: 98.2 °F (36.8 °C)   TempSrc: Oral   Weight: 185 lb 3.2 oz (84 kg)   Height: 5' 9\" (1.753 m)   PainSc:   0 - No pain       Physical Exam  Physical Exam   Constitutional: He is oriented to person, place, and time. HENT:   Mouth/Throat: Oropharynx is clear and moist.   Eyes: Pupils are equal, round, and reactive to light. Cardiovascular: Normal rate, regular rhythm and normal heart sounds. Pulmonary/Chest: Effort normal and breath sounds normal. No respiratory distress. Musculoskeletal: He exhibits no edema. Neurological: He is alert and oriented to person, place, and time. Skin: Skin is warm and dry. Psychiatric: He has a normal mood and affect. His behavior is normal. Judgment and thought content normal.   Vitals reviewed.       Diagnostics  Orders Placed This Encounter    OCCULT BLOOD IMMUNOASSAY,DIAGNOSTIC     Standing Status:   Future     Number of Occurrences:   1     Standing Expiration Date:   6/3/2020    lidocaine (LIDODERM) 5 %    gabapentin (NEURONTIN) 100 mg capsule     Sig: Take 1 Cap by mouth three (3) times daily. Take in addition to  800 mg tablet for a total of 900 mg 3x day     Dispense:  90 Cap     Refill:  1    gabapentin (NEURONTIN) 800 mg tablet     Sig: Take 1 Tab by mouth three (3) times daily. Take in addition to the 100 mg tablet for a total of 900 mg 3x a day. Dispense:  90 Tab     Refill:  1    insulin glargine (LANTUS,BASAGLAR) 100 unit/mL (3 mL) inpn     Si Units by SubCUTAneous route daily. Dispense:  5 Pen     Refill:  0     Dispense Lantus Specifically       Results  Results for orders placed or performed in visit on 05/15/19   AMB POC GLUCOSE TEST   Result Value Ref Range    Glucose dose-        Assessment and Plan  Diagnoses and all orders for this visit:    1. Colon cancer screening  -     OCCULT BLOOD IMMUNOASSAY,DIAGNOSTIC; Future    2. Type 2 diabetes mellitus with diabetic neuropathy, with long-term current use of insulin (HCC)  -     insulin glargine (LANTUS,BASAGLAR) 100 unit/mL (3 mL) inpn; 50 Units by SubCUTAneous route daily. 3. Uncontrolled type 2 diabetes mellitus with complication, without long-term current use of insulin (HCC)  -     insulin glargine (LANTUS,BASAGLAR) 100 unit/mL (3 mL) inpn; 50 Units by SubCUTAneous route daily. Other orders  -     gabapentin (NEURONTIN) 100 mg capsule; Take 1 Cap by mouth three (3) times daily. Take in addition to  800 mg tablet for a total of 900 mg 3x day  -     gabapentin (NEURONTIN) 800 mg tablet; Take 1 Tab by mouth three (3) times daily. Take in addition to the 100 mg tablet for a total of 900 mg 3x a day.  checked with no concerns. Given patient appointment information for endocrinology. He is to call the office back with his refill request.   After care summary printed and reviewed with patient. Plan reviewed with patient. Questions answered. Patient verbalized understanding of plan and is in agreement with plan.  Patient to follow up in two weeks or earlier if symptoms worsen. Follow-up and Dispositions    · Return in about 2 weeks (around 6/17/2019), or if symptoms worsen or fail to improve.        ELVIS LangstonC

## 2019-06-03 NOTE — LETTER
NOTIFICATION RETURN TO WORK / SCHOOL 
 
6/3/2019 3:48 PM 
 
Mr. Monique Huerta 
[address] [facility] [id number] To Whom It May Concern: 
 
Monique Huerta is currently under the care of Amador Wren. He was seen today. If there are questions or concerns please have the patient contact our office.  
 
 
 
Sincerely, 
 
 
Joyce Griffin NP

## 2019-06-03 NOTE — PROGRESS NOTES
Derrick Gr presents today for   Chief Complaint   Patient presents with    Hypertension     follow up    Diabetes    Medication Refill       Derrick Gr preferred language for health care discussion is english/other. Is someone accompanying this pt? no    Is the patient using any DME equipment during [address]? no    Depression Screening:  3 most recent PHQ Screens 4/11/2019   PHQ Not Done -   Little interest or pleasure in doing things Not at all   Feeling down, depressed, irritable, or hopeless Not at all   Total Score PHQ 2 0       Learning Assessment:  Learning Assessment 4/11/2019   PRIMARY LEARNER Patient   HIGHEST LEVEL OF EDUCATION - PRIMARY LEARNER  DID NOT GRADUATE HIGH SCHOOL   BARRIERS PRIMARY LEARNER NONE   CO-LEARNER CAREGIVER No   PRIMARY LANGUAGE ENGLISH   LEARNER PREFERENCE PRIMARY LISTENING   ANSWERED BY self   RELATIONSHIP SELF       Abuse Screening:  Abuse Screening Questionnaire 4/11/2019   Do you ever feel afraid of your partner? N   Are you in a relationship with someone who physically or mentally threatens you? N   Is it safe for you to go home? Y       Health Maintenance Due   Topic Date Due    Hepatitis C Screening  1960    Pneumococcal 0-64 years (1 of 1 - PPSV23) 06/02/1964    EYE EXAM RETINAL OR DILATED  06/02/1968    DTaP/Tdap/Td series (1 - Tdap) 06/02/1979    Shingrix Vaccine Age 50> (1 of 2) 06/02/2008    FOBT Q 1 YEAR AGE 50-75  06/02/2008    MICROALBUMIN Q1  01/13/2017   . Health Maintenance reviewed and discussed and ordered per Provider. Derrick Gr is updated on all     Coordination of Care:  1. Have you been to the ER, urgent care clinic since your last visit? Hospitalized since your last visit? no    2. Have you seen or consulted any other health care providers outside of the [address] [place] [address] since your last visit? Include any pap smears or colon screening. no      Advance Directive:  1. Do you have an advance directive in place? Patient Reply:no    2. If not, would you like material regarding how to put one in place?  Patient Reply: no

## 2019-06-04 ENCOUNTER — TELEPHONE (OUTPATIENT)
Dept: FAMILY MEDICINE CLINIC | Age: 59
End: 2019-06-04

## 2019-06-04 DIAGNOSIS — I10 ESSENTIAL HYPERTENSION: ICD-10-CM

## 2019-06-04 DIAGNOSIS — E78.00 HYPERCHOLESTEROLEMIA: ICD-10-CM

## 2019-06-04 DIAGNOSIS — G47.09 OTHER INSOMNIA: ICD-10-CM

## 2019-06-04 DIAGNOSIS — Z79.4 TYPE 2 DIABETES MELLITUS WITH DIABETIC NEUROPATHY, WITH LONG-TERM CURRENT USE OF INSULIN (HCC): ICD-10-CM

## 2019-06-04 DIAGNOSIS — E78.2 MIXED HYPERLIPIDEMIA: ICD-10-CM

## 2019-06-04 DIAGNOSIS — M25.512 ACUTE PAIN OF LEFT SHOULDER: ICD-10-CM

## 2019-06-04 DIAGNOSIS — E11.40 TYPE 2 DIABETES MELLITUS WITH DIABETIC NEUROPATHY, WITH LONG-TERM CURRENT USE OF INSULIN (HCC): ICD-10-CM

## 2019-06-04 DIAGNOSIS — E55.9 VITAMIN D DEFICIENCY: ICD-10-CM

## 2019-06-04 RX ORDER — INSULIN GLARGINE 100 [IU]/ML
50 INJECTION, SOLUTION SUBCUTANEOUS DAILY
Qty: 5 PEN | Refills: 0 | Status: CANCELLED | OUTPATIENT
Start: 2019-06-04

## 2019-06-04 NOTE — TELEPHONE ENCOUNTER
Patient need a medication refill. Please advise    Requested Prescriptions     Pending Prescriptions Disp Refills    SITagliptin-metFORMIN (JANUMET) 50-1,000 mg per tablet 180 Tab 3     Sig: Take 1 Tab by mouth two (2) times daily (with meals).  simvastatin (ZOCOR) 20 mg tablet 90 Tab 3     Sig: Take 1 Tab by mouth nightly.  ramelteon (ROZEREM) 8 mg tablet 30 Tab 0     Sig: Take 1 Tab by mouth nightly as needed for Sleep.  ONETOUCH VERIO FLEX misc      lisinopril (PRINIVIL, ZESTRIL) 40 mg tablet 90 Tab 0     Sig: Take 1 Tab by mouth daily.  lidocaine (LIDODERM) 5 % 1 Each     insulin aspart U-100 (NOVOLOG U-100 INSULIN ASPART) 100 unit/mL injection 10 mL 0     Sig: 10 units with meals    ibuprofen (MOTRIN) 800 mg tablet 60 Tab 1     Sig: Take 1 Tab by mouth every eight (8) hours as needed for Pain.  hydrOXYzine pamoate (VISTARIL) 50 mg capsule 30 Cap 0     Sig: TAKE ONE CAPSULE BY MOUTH EVERY EVENING AS NEEDED FOR SLEEP    glucose blood VI test strips (ASCENSIA AUTODISC VI, ONE TOUCH ULTRA TEST VI) strip 100 Strip 10     Sig: Twice daily-  fasting in the morning and once two hours after a meal.    glipiZIDE (GLUCOTROL) 10 mg tablet 180 Tab 3     Sig: Take 1 Tab by mouth two (2) times a day.  ergocalciferol (ERGOCALCIFEROL) 50,000 unit capsule 4 Cap 6     Sig: Take 1 Cap by mouth every seven (7) days.  diflunisal (DOLOBID) 500 mg tab 20 Tab 0     Sig: Take 1 Tab by mouth two (2) times a day.

## 2019-06-04 NOTE — TELEPHONE ENCOUNTER
called and said that he need all his medication refilled except for lantus and gabapentin.  Please advise

## 2019-06-05 RX ORDER — BLOOD-GLUCOSE METER
EACH MISCELLANEOUS
OUTPATIENT
Start: 2019-06-05

## 2019-06-05 RX ORDER — DIFLUNISAL 500 MG/1
500 TABLET, FILM COATED ORAL 2 TIMES DAILY
Qty: 20 TAB | Refills: 0 | Status: SHIPPED | OUTPATIENT
Start: 2019-06-05 | End: 2020-04-08 | Stop reason: ALTCHOICE

## 2019-06-05 RX ORDER — IBUPROFEN 800 MG/1
800 TABLET ORAL
Qty: 60 TAB | Refills: 1 | Status: SHIPPED | OUTPATIENT
Start: 2019-06-05 | End: 2019-11-21 | Stop reason: SDUPTHER

## 2019-06-05 RX ORDER — SIMVASTATIN 20 MG/1
20 TABLET, FILM COATED ORAL
Qty: 90 TAB | Refills: 3 | Status: SHIPPED | OUTPATIENT
Start: 2019-06-05 | End: 2020-02-03 | Stop reason: SDUPTHER

## 2019-06-05 RX ORDER — GLIPIZIDE 10 MG/1
10 TABLET ORAL 2 TIMES DAILY
Qty: 180 TAB | Refills: 0 | Status: SHIPPED | OUTPATIENT
Start: 2019-06-05 | End: 2019-11-21 | Stop reason: SDUPTHER

## 2019-06-05 RX ORDER — HYDROXYZINE PAMOATE 50 MG/1
CAPSULE ORAL
Qty: 30 CAP | Refills: 6 | Status: SHIPPED | OUTPATIENT
Start: 2019-06-05 | End: 2020-04-08 | Stop reason: ALTCHOICE

## 2019-06-05 RX ORDER — LIDOCAINE 50 MG/G
PATCH TOPICAL
Qty: 1 EACH | OUTPATIENT
Start: 2019-06-05

## 2019-06-05 RX ORDER — RAMELTEON 8 MG/1
8 TABLET ORAL
Qty: 30 TAB | Refills: 0 | OUTPATIENT
Start: 2019-06-05

## 2019-06-05 RX ORDER — LISINOPRIL 40 MG/1
40 TABLET ORAL DAILY
Qty: 90 TAB | Refills: 12 | Status: ON HOLD | OUTPATIENT
Start: 2019-06-05 | End: 2020-10-28

## 2019-06-05 RX ORDER — INSULIN ASPART 100 [IU]/ML
INJECTION, SOLUTION INTRAVENOUS; SUBCUTANEOUS
Qty: 10 ML | Refills: 0 | Status: SHIPPED | OUTPATIENT
Start: 2019-06-05 | End: 2020-05-05 | Stop reason: ALTCHOICE

## 2019-06-05 RX ORDER — ERGOCALCIFEROL 1.25 MG/1
50000 CAPSULE ORAL
Qty: 4 CAP | Refills: 6 | Status: SHIPPED | OUTPATIENT
Start: 2019-06-05 | End: 2019-07-08 | Stop reason: SDUPTHER

## 2019-06-06 ENCOUNTER — TELEPHONE (OUTPATIENT)
Dept: FAMILY MEDICINE CLINIC | Age: 59
End: 2019-06-06

## 2019-06-06 NOTE — TELEPHONE ENCOUNTER
Returned call to patient. He verified his name, , and address. He states he spoke with his job yesterday and they wanted him to come back to work on the . Informed patient that he would need to attend his referral appointments first in ordered to be cleared to return to work.  Yane Mukherjee

## 2019-06-07 NOTE — TELEPHONE ENCOUNTER
Verified patient name and . Reviewed chart. It is noted that medications have been sent to pharmacy 19.

## 2019-06-12 ENCOUNTER — OFFICE VISIT (OUTPATIENT)
Dept: NEUROLOGY | Age: 59
End: 2019-06-12

## 2019-06-12 VITALS
HEART RATE: 66 BPM | HEIGHT: 69 IN | RESPIRATION RATE: 16 BRPM | BODY MASS INDEX: 27.99 KG/M2 | DIASTOLIC BLOOD PRESSURE: 70 MMHG | TEMPERATURE: 97.3 F | OXYGEN SATURATION: 98 % | WEIGHT: 189 LBS | SYSTOLIC BLOOD PRESSURE: 118 MMHG

## 2019-06-12 DIAGNOSIS — G47.10 HYPERSOMNIA: ICD-10-CM

## 2019-06-12 DIAGNOSIS — E11.42 DIABETIC POLYNEUROPATHY ASSOCIATED WITH TYPE 2 DIABETES MELLITUS (HCC): ICD-10-CM

## 2019-06-12 DIAGNOSIS — G47.8 UPPER AIRWAY RESISTANCE SYNDROME: Primary | ICD-10-CM

## 2019-06-12 NOTE — LETTER
6/12/19 Patient: Derrick Gr YOB: 1960 Date of Visit: 6/12/2019 Arsen Cárdenas NP 
[address] [id number] [place] [address] VIA In Basket Dear Arsen Daner, MARILYN, Thank you for referring Mr. Rogelio Porras to [facility] for evaluation. My notes for this consultation are attached. If you have questions, please do not hesitate to call me. I look forward to following your patient along with you.  
 
 
Sincerely, 
 
Cydney Delacruz MD

## 2019-06-12 NOTE — PROGRESS NOTES
ROOM # 4    Yoli Caballero presents today for   Chief Complaint   Patient presents with    Establish Care    Sleep Problem         Visit Vitals  /70 (BP 1 Location: Left arm, BP Patient Position: Sitting)   Pulse 66   Temp 97.3 °F (36.3 °C) (Oral)   Resp 16   Ht 5' 9\" (1.753 m)   Wt 189 lb (85.7 kg)   SpO2 98%   BMI 27.91 kg/m²         Advance Directive:  1. Do you have an advance directive in place? Patient Reply: No    2. If not, would you like material regarding how to put one in place? Patient Reply: No    Coordination of Care:  1. Have you been to the ER, urgent care clinic since your last visit? Hospitalized since your last visit?  No

## 2019-06-12 NOTE — LETTER
6/12/2019 9:42 AM 
 
Mr. Ash Riggs 
[address] [facility] [address] To Whom It May Concern: Mr Omaira Ramsay was seen today, 06/12/2019, in our office.  
 
 
 
 
Sincerely, 
 
 
Young Jean MD

## 2019-06-12 NOTE — PROGRESS NOTES
HPI: 78-year-old male referred by Ethel Ivory NP for evaluation of fatigue and question of obstructive sleep apnea. He does not really know why he is here, he articulated in it, so I had to do a lot of direct questioning to elicit the history. He does report that he is told that he snores loudly. He is fatigued and tired all the time. Can fall asleep easily if sedentary. He has a history of a past STEMI, has coronary artery disease, history of an angioplasty, hyperlipidemia, and difficult to control diabetes. He is on gabapentin for diabetic painful neuropathy. He smokes a pack every day and a half or so and drinks most days, taking about 5 shots of liquor an average per week. He has irregular sleep schedules. He is not very articulate on the details. Social History     Socioeconomic History    Marital status:      Spouse name: Not on file    Number of children: Not on file    Years of education: Not on file    Highest education level: Not on file   Occupational History    Not on file   Social Needs    Financial resource strain: Not on file    Food insecurity:     Worry: Not on file     Inability: Not on file    Transportation needs:     Medical: Not on file     Non-medical: Not on file   Tobacco Use    Smoking status: Current Every Day Smoker     Packs/day: 0.50     Years: 1.00     Pack years: 0.50     Types: Cigarettes    Smokeless tobacco: Never Used   Substance and Sexual Activity    Alcohol use:  Yes     Alcohol/week: 2.5 oz     Types: 5 Shots of liquor per week     Comment: occassionally    Drug use: Yes     Types: Marijuana, Cocaine    Sexual activity: Yes   Lifestyle    Physical activity:     Days per week: Not on file     Minutes per session: Not on file    Stress: Not on file   Relationships    Social connections:     Talks on phone: Not on file     Gets together: Not on file     Attends Advent service: Not on file     Active member of club or organization: Not on file     Attends meetings of clubs or organizations: Not on file     Relationship status: Not on file    Intimate partner violence:     Fear of current or ex partner: Not on file     Emotionally abused: Not on file     Physically abused: Not on file     Forced sexual activity: Not on file   Other Topics Concern    Not on file   Social History Narrative     at [employer]. Family History   Problem Relation Age of Onset    Hypertension Mother     Heart Attack Mother     Diabetes Mother     Hypertension Father     Heart Attack Father     Diabetes Father     Diabetes Sister     Hypertension Sister     Stroke Sister     Diabetes Brother     Hypertension Brother     Stroke Brother     No Known Problems Maternal Grandmother     No Known Problems Maternal Grandfather     No Known Problems Paternal Grandmother     No Known Problems Paternal Grandfather     No Known Problems Brother     Heart Disease Other     Kidney Disease Other        Current Outpatient Medications   Medication Sig Dispense Refill    SITagliptin-metFORMIN (JANUMET) 50-1,000 mg per tablet Take 1 Tab by mouth two (2) times daily (with meals). 180 Tab 0    simvastatin (ZOCOR) 20 mg tablet Take 1 Tab by mouth nightly. 90 Tab 3    lisinopril (PRINIVIL, ZESTRIL) 40 mg tablet Take 1 Tab by mouth daily. 90 Tab 12    insulin aspart U-100 (NOVOLOG U-100 INSULIN ASPART) 100 unit/mL injection 10 units with meals 10 mL 0    ibuprofen (MOTRIN) 800 mg tablet Take 1 Tab by mouth every eight (8) hours as needed for Pain. 60 Tab 1    hydrOXYzine pamoate (VISTARIL) 50 mg capsule TAKE ONE CAPSULE BY MOUTH EVERY EVENING AS NEEDED FOR SLEEP 30 Cap 6    glucose blood VI test strips (ASCENSIA AUTODISC VI, ONE TOUCH ULTRA TEST VI) strip Twice daily-  fasting in the morning and once two hours after a meal. 100 Strip 10    glipiZIDE (GLUCOTROL) 10 mg tablet Take 1 Tab by mouth two (2) times a day.  180 Tab 0    ergocalciferol (ERGOCALCIFEROL) 50,000 unit capsule Take 1 Cap by mouth every seven (7) days. 4 Cap 6    diflunisal (DOLOBID) 500 mg tab Take 1 Tab by mouth two (2) times a day. 20 Tab 0    gabapentin (NEURONTIN) 100 mg capsule Take 1 Cap by mouth three (3) times daily. Take in addition to  800 mg tablet for a total of 900 mg 3x day 90 Cap 1    gabapentin (NEURONTIN) 800 mg tablet Take 1 Tab by mouth three (3) times daily. Take in addition to the 100 mg tablet for a total of 900 mg 3x a day. 90 Tab 1    insulin glargine (LANTUS,BASAGLAR) 100 unit/mL (3 mL) inpn 50 Units by SubCUTAneous route daily. 5 Pen 0    ramelteon (ROZEREM) 8 mg tablet Take 1 Tab by mouth nightly as needed for Sleep. 30 Tab 0    ONETOUCH VERIO FLEX misc       aspirin 81 mg tablet Take 1 Tab by mouth daily. 1 Tab 0    lidocaine (LIDODERM) 5 %          Past Medical History:   Diagnosis Date    Alcohol use     Fri-Sun one fifth; Mon-Thur: Pint of liquor    CAD (coronary artery disease)     Cardiac angioplasty with stent 07/29/2009    2.5 x 13 Cypher stent to CX.  Cardiac cath 11/09/2011    RCA patent. LM patent. LAD patent. mD1 55%. CX patent. Prior stent patent. Edison 85% (2.5 x 15-mm Xience stent, resid 0%). LVEDP 12. EF 40-45%. High lateral hypk (RI distribution).       Cardiac inferior STEMI 2009    Current smoker     contemplating stopping    Diabetes ([facility] [address]     type 2-insulin dependent    GERD (gastroesophageal reflux disease)     HTN (hypertension)     Hyperlipidemia     Migraine     Myocardial infarction [facility])        Past Surgical History:   Procedure Laterality Date    HX CORONARY STENT PLACEMENT  07/29/2009    HX HEART CATHETERIZATION  8/30/2011    HX HEART CATHETERIZATION  11/09/2011    HX WISDOM TEETH EXTRACTION      x4       Allergies   Allergen Reactions    Niacin Itching       Patient Active Problem List   Diagnosis Code    Hyperlipidemia E78.5    HTN (hypertension) I10    Insulin dependent diabetes mellitus (HCC) E11.9, Z79.4    Tobacco use disorder F17.200    Coronary atherosclerosis of native coronary artery I25.10    Tobacco dependence F17.200    Other and unspecified alcohol dependence, continuous drinking behavior F10.20    Mediastinal adenopathy R59.0    Diabetic neuropathy (HCC) E11.40    Vitamin D deficiency E55.9    Other insomnia G47.09    Type 2 diabetes with nephropathy (HCC) E11.21    Otalgia H92.09    Other fatigue R53.83         Review of Systems:   Constitutional: no fever or chills, reports fatigue  Skin denies rash or itching  HEENT:  Denies tinnitus, hearing loss, reports chronic history of blurred vision  Respiratory: denies shortness of breath  Cardiovascular: denies chest pain, dyspnea on exertion  Gastrointestinal: does not report nausea or vomiting  Genitourinary: does not report dysuria or incontinence  Musculoskeletal: does not report joint pain or swelling  Endocrine: denies weight change  Hematology: denies easy bruising or bleeding   Neurological: as above in HPI      PHYSICAL EXAMINATION:         Vital signs:    Visit Vitals  /70 (BP 1 Location: Left arm, BP Patient Position: Sitting)   Pulse 66   Temp 97.3 °F (36.3 °C) (Oral)   Resp 16   Ht 5' 9\" (1.753 m)   Wt 85.7 kg (189 lb)   SpO2 98%   BMI 27.91 kg/m²         GENERAL:                  Well developed, well nourished, in no apparent distress. HEART:                       RR, no murmurs  EXTREMITIES:           No edema is identified. Pulses are +2. HEAD:                         Normocephalic, atraumatic. Mallampati 4, crowded oropharynx. Marked Retrognathia     NEUROLOGIC EXAMINATION       MENTAL STATUS:     Awake, alert, and oriented x 4. Attention and STM are grossly normal. There is no aphasia. Fund of knowledge is adequate. Mood and affect are appropriate  CRANIAL NERVES:   Visual fields are full to confrontation. Pupils are reactive to light and accommodation.     Unable to see fundi bedside. Extraocular movements are intact and there is no nystagmus. Facial sensation is normal  Face is symmetrical.   Hearing is present. SCM/TPZ 5/5  Tongue protrudes midline, palate elevates symmetrically. MOTOR:          5/5 strength throughout, normal tone. CEREBELLAR:           No tremors or dysmetria     SENSORY:      Decreased distal pinprick, proprioception, and vibration. Romberg is negative. DTR's:                         Absent bilateral ankle jerks, no long tract signs                 GAIT:                           Normal gait      Impression: High probability of significant obstructive sleep apnea with a metabolic syndrome including hypertension, hyperlipidemia, coronary artery disease, uncontrolled diabetes, with features that include marked Retrognathia is a major predisposing feature, as his weight is just slightly elevated according to BMI tables. Plan: 1-attended overnight PSG. 2-Counseled pt at length about obstructive sleep apnea evaluation, treatment options, weight loss as appropriate, and consequences of untreated RITCHIE. 3-Counseled pt about sleep hygiene, including predictable bedtimes and rise times, avoidance of late meals near bedtime, no TV in bedroom, to get out of room if unable to fall asleep after 10-15 mins, and no napping. 4-Advised him about smoking and alcohol cessation. 5-I will see him after his sleep study. PLEASE NOTE:   Portions of this document may have been produced using voice recognition software. Unrecognized errors in transcription may be present. This note will not be viewable in [address]

## 2019-06-13 ENCOUNTER — TELEPHONE (OUTPATIENT)
Dept: FAMILY MEDICINE CLINIC | Age: 59
End: 2019-06-13

## 2019-06-13 NOTE — TELEPHONE ENCOUNTER
Returned call to patient. Patient verified his name, , and address. He states his job will be faxing over more forms for me to complete. He reports he has gone to endocrinology. Informed patient that I would complete his forms.  Yane Mukherjee

## 2019-06-25 ENCOUNTER — TELEPHONE (OUTPATIENT)
Dept: FAMILY MEDICINE CLINIC | Age: 59
End: 2019-06-25

## 2019-06-25 NOTE — TELEPHONE ENCOUNTER
[employer] sent over a fax stating that patient insurance denied his Novolog 100 unit/ ML Vial and preferred a prescription for Humalog. Pharmacy also stated that Rozerem 8 mg is not coved by insurance.

## 2019-07-02 ENCOUNTER — TELEPHONE (OUTPATIENT)
Dept: FAMILY MEDICINE CLINIC | Age: 59
End: 2019-07-02
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
## 2019-07-03 ENCOUNTER — OFFICE VISIT (OUTPATIENT)
Dept: FAMILY MEDICINE CLINIC | Age: 59
End: 2019-07-03

## 2019-07-03 VITALS
TEMPERATURE: 98.3 F | HEIGHT: 69 IN | WEIGHT: 189 LBS | SYSTOLIC BLOOD PRESSURE: 114 MMHG | BODY MASS INDEX: 27.99 KG/M2 | RESPIRATION RATE: 18 BRPM | HEART RATE: 67 BPM | DIASTOLIC BLOOD PRESSURE: 72 MMHG | OXYGEN SATURATION: 99 %

## 2019-07-03 DIAGNOSIS — Z78.9 ALCOHOL USE: ICD-10-CM

## 2019-07-03 DIAGNOSIS — Z79.4 TYPE 2 DIABETES MELLITUS WITH DIABETIC NEUROPATHY, WITH LONG-TERM CURRENT USE OF INSULIN (HCC): ICD-10-CM

## 2019-07-03 DIAGNOSIS — N17.9 ACUTE KIDNEY INJURY (HCC): ICD-10-CM

## 2019-07-03 DIAGNOSIS — R53.83 FATIGUE, UNSPECIFIED TYPE: ICD-10-CM

## 2019-07-03 DIAGNOSIS — E11.40 TYPE 2 DIABETES MELLITUS WITH DIABETIC NEUROPATHY, WITH LONG-TERM CURRENT USE OF INSULIN (HCC): ICD-10-CM

## 2019-07-03 DIAGNOSIS — F10.20: ICD-10-CM

## 2019-07-03 DIAGNOSIS — Z79.4 TYPE 2 DIABETES MELLITUS WITH DIABETIC NEUROPATHY, WITH LONG-TERM CURRENT USE OF INSULIN (HCC): Primary | ICD-10-CM

## 2019-07-03 DIAGNOSIS — E11.40 TYPE 2 DIABETES MELLITUS WITH DIABETIC NEUROPATHY, WITH LONG-TERM CURRENT USE OF INSULIN (HCC): Primary | ICD-10-CM

## 2019-07-03 LAB
GLUCOSE POC: 132 MG/DL
HGB BLD-MCNC: 14.2 G/DL

## 2019-07-03 RX ORDER — BISMUTH SUBSALICYLATE 262 MG
1 TABLET,CHEWABLE ORAL DAILY
Qty: 30 TAB | Refills: 0 | Status: SHIPPED | OUTPATIENT
Start: 2019-07-03 | End: 2020-04-09 | Stop reason: SDUPTHER

## 2019-07-03 NOTE — PROGRESS NOTES
Nimapayton Heller presents today for   Chief Complaint   Patient presents with    Diabetes     follow up       Nimapayton Heller preferred language for health care discussion is english/other. Is someone accompanying this pt? no    Is the patient using any DME equipment during [address]? no    Depression Screening:  3 most recent PHQ Screens 4/11/2019   PHQ Not Done -   Little interest or pleasure in doing things Not at all   Feeling down, depressed, irritable, or hopeless Not at all   Total Score PHQ 2 0       Learning Assessment:  Learning Assessment 4/11/2019   PRIMARY LEARNER Patient   HIGHEST LEVEL OF EDUCATION - PRIMARY LEARNER  DID NOT GRADUATE HIGH SCHOOL   BARRIERS PRIMARY LEARNER NONE   CO-LEARNER CAREGIVER No   PRIMARY LANGUAGE ENGLISH   LEARNER PREFERENCE PRIMARY LISTENING   ANSWERED BY self   RELATIONSHIP SELF       Abuse Screening:  Abuse Screening Questionnaire 4/11/2019   Do you ever feel afraid of your partner? N   Are you in a relationship with someone who physically or mentally threatens you? N   Is it safe for you to go home? Y       Health Maintenance Due   Topic Date Due    Hepatitis C Screening  1960    Pneumococcal 0-64 years (1 of 1 - PPSV23) 06/02/1964    EYE EXAM RETINAL OR DILATED  06/02/1968    DTaP/Tdap/Td series (1 - Tdap) 06/02/1979    Shingrix Vaccine Age 50> (1 of 2) 06/02/2008    FOBT Q 1 YEAR AGE 50-75  06/02/2008    MICROALBUMIN Q1  01/13/2017    FOOT EXAM Q1  07/11/2019   . Health Maintenance reviewed and discussed and ordered per Provider. Nima Heller is updated on all     Coordination of Care:  1. Have you been to the ER, urgent care clinic since your last visit? Hospitalized since your last visit? no    2. Have you seen or consulted any other health care providers outside of the [address] [place] [address] since your last visit? Include any pap smears or colon screening. no      Advance Directive:  1. Do you have an advance directive in place?  Patient Reply:no    2. If not, would you like material regarding how to put one in place?  Patient Reply: no

## 2019-07-03 NOTE — PROGRESS NOTES
HPI  Yonatan Henriquez is a 62 y.o. male     Chief Complaint   Patient presents with    Diabetes     follow up       Reports he was feeling bad and he went to ER. Reports he was dehydrated. Reports he has not returned to work. Reports he has not checked his blood glucose this morning. Reports he has been feeling sluggish. Reports he is drinking water and Power [employer]   Reports he has not felt like drinking any alcohol in the last few days. Past Medical History  Past Medical History:   Diagnosis Date    Alcohol use     Fri-Sun one fifth; Mon-Thur: Pint of liquor    CAD (coronary artery disease)     Cardiac angioplasty with stent 07/29/2009    2.5 x 13 Cypher stent to CX.  Cardiac cath 11/09/2011    RCA patent. LM patent. LAD patent. mD1 55%. CX patent. Prior stent patent. Edison 85% (2.5 x 15-mm Xience stent, resid 0%). LVEDP 12. EF 40-45%. High lateral hypk (RI distribution).  Cardiac inferior STEMI 2009    Current smoker     contemplating stopping    Diabetes ([facility] [address]     type 2-insulin dependent    GERD (gastroesophageal reflux disease)     HTN (hypertension)     Hyperlipidemia     Migraine     Myocardial infarction [facility])        Surgical History  Past Surgical History:   Procedure Laterality Date    HX CORONARY STENT PLACEMENT  07/29/2009    HX HEART CATHETERIZATION  8/30/2011    HX HEART CATHETERIZATION  11/09/2011    HX WISDOM TEETH EXTRACTION      x4        Medications  Current Outpatient Medications   Medication Sig Dispense Refill    multivitamin (ONE A DAY) tablet Take 1 Tab by mouth daily. 30 Tab 0    SITagliptin-metFORMIN (JANUMET) 50-1,000 mg per tablet Take 1 Tab by mouth two (2) times daily (with meals). 180 Tab 0    simvastatin (ZOCOR) 20 mg tablet Take 1 Tab by mouth nightly. 90 Tab 3    lisinopril (PRINIVIL, ZESTRIL) 40 mg tablet Take 1 Tab by mouth daily.  90 Tab 12    insulin aspart U-100 (NOVOLOG U-100 INSULIN ASPART) 100 unit/mL injection 10 units with meals 10 mL 0    ibuprofen (MOTRIN) 800 mg tablet Take 1 Tab by mouth every eight (8) hours as needed for Pain. 60 Tab 1    hydrOXYzine pamoate (VISTARIL) 50 mg capsule TAKE ONE CAPSULE BY MOUTH EVERY EVENING AS NEEDED FOR SLEEP 30 Cap 6    glucose blood VI test strips (ASCENSIA AUTODISC VI, ONE TOUCH ULTRA TEST VI) strip Twice daily-  fasting in the morning and once two hours after a meal. 100 Strip 10    glipiZIDE (GLUCOTROL) 10 mg tablet Take 1 Tab by mouth two (2) times a day. 180 Tab 0    ergocalciferol (ERGOCALCIFEROL) 50,000 unit capsule Take 1 Cap by mouth every seven (7) days. 4 Cap 6    diflunisal (DOLOBID) 500 mg tab Take 1 Tab by mouth two (2) times a day. 20 Tab 0    gabapentin (NEURONTIN) 100 mg capsule Take 1 Cap by mouth three (3) times daily. Take in addition to  800 mg tablet for a total of 900 mg 3x day 90 Cap 1    gabapentin (NEURONTIN) 800 mg tablet Take 1 Tab by mouth three (3) times daily. Take in addition to the 100 mg tablet for a total of 900 mg 3x a day. 90 Tab 1    insulin glargine (LANTUS,BASAGLAR) 100 unit/mL (3 mL) inpn 50 Units by SubCUTAneous route daily. 5 Pen 0    ramelteon (ROZEREM) 8 mg tablet Take 1 Tab by mouth nightly as needed for Sleep. 30 Tab 0    ONETOUCH VERIO FLEX misc       aspirin 81 mg tablet Take 1 Tab by mouth daily.  1 Tab 0    lidocaine (LIDODERM) 5 %          Allergies  Allergies   Allergen Reactions    Niacin Itching       Family History  Family History   Problem Relation Age of Onset    Hypertension Mother     Heart Attack Mother     Diabetes Mother     Hypertension Father     Heart Attack Father     Diabetes Father     Diabetes Sister     Hypertension Sister     Stroke Sister     Diabetes Brother     Hypertension Brother     Stroke Brother     No Known Problems Maternal Grandmother     No Known Problems Maternal Grandfather     No Known Problems Paternal Grandmother     No Known Problems Paternal Grandfather     No Known Problems Brother  Heart Disease Other     Kidney Disease Other        Social History  Social History     Socioeconomic History    Marital status:      Spouse name: Not on file    Number of children: Not on file    Years of education: Not on file    Highest education level: Not on file   Occupational History    Not on file   Social Needs    Financial resource strain: Not on file    Food insecurity:     Worry: Not on file     Inability: Not on file    Transportation needs:     Medical: Not on file     Non-medical: Not on file   Tobacco Use    Smoking status: Current Every Day Smoker     Packs/day: 0.50     Years: 1.00     Pack years: 0.50     Types: Cigarettes    Smokeless tobacco: Never Used   Substance and Sexual Activity    Alcohol use: Yes     Alcohol/week: 2.5 oz     Types: 5 Shots of liquor per week     Comment: occassionally    Drug use: Yes     Types: Marijuana, Cocaine    Sexual activity: Yes   Lifestyle    Physical activity:     Days per week: Not on file     Minutes per session: Not on file    Stress: Not on file   Relationships    Social connections:     Talks on phone: Not on file     Gets together: Not on file     Attends [employer]ats[employer] service: Not on file     Active member of club or organization: Not on file     Attends meetings of clubs or organizations: Not on file     Relationship status: Not on file    Intimate partner violence:     Fear of current or ex partner: Not on file     Emotionally abused: Not on file     Physically abused: Not on file     Forced sexual activity: Not on file   Other Topics Concern    Not on file   Social History Narrative     at [employer].        Problem List  Patient Active Problem List   Diagnosis Code    Hyperlipidemia E78.5    HTN (hypertension) I10    Insulin dependent diabetes mellitus ([facility] [address] E11.9, Z79.4    Tobacco use disorder F17.200    Coronary atherosclerosis of native coronary artery I25.10    Tobacco dependence F17.200    Other and unspecified alcohol dependence, continuous drinking behavior F10.20    Mediastinal adenopathy R59.0    Diabetic neuropathy (HCC) E11.40    Vitamin D deficiency E55.9    Other insomnia G47.09    Type 2 diabetes with nephropathy (HCC) E11.21    Otalgia H92.09    Other fatigue R53.83       Review of Systems  Review of Systems   Constitutional: Positive for malaise/fatigue. Negative for diaphoresis. Respiratory: Negative for shortness of breath. Cardiovascular: Negative for chest pain and palpitations. Gastrointestinal: Negative for abdominal pain, nausea and vomiting. Genitourinary: Negative for dysuria, flank pain, frequency, hematuria and urgency. Endo/Heme/Allergies: Negative for polydipsia. Vital Signs  Vitals:    07/03/19 0922   BP: 114/72   Pulse: 67   Resp: 18   Temp: 98.3 °F (36.8 °C)   TempSrc: Oral   SpO2: 99%   Weight: 189 lb (85.7 kg)   Height: 5' 9\" (1.753 m)   PainSc:   0 - No pain       Physical Exam  Physical Exam   Constitutional: He is oriented to person, place, and time. HENT:   Mouth/Throat: Oropharynx is clear and moist.   Eyes: Pupils are equal, round, and reactive to light. Cardiovascular: Normal rate, regular rhythm and normal heart sounds. Pulmonary/Chest: Effort normal and breath sounds normal. No respiratory distress. Abdominal: Soft. Bowel sounds are normal. He exhibits no distension. There is no tenderness. Musculoskeletal: He exhibits no edema. Neurological: He is alert and oriented to person, place, and time. No cranial nerve deficit. Psychiatric: He has a normal mood and affect. His behavior is normal. Judgment and thought content normal.   Vitals reviewed.       Diagnostics  Orders Placed This Encounter    METABOLIC PANEL, COMPREHENSIVE     Standing Status:   Future     Number of Occurrences:   1     Standing Expiration Date:   7/3/2020    MICROALBUMIN, UR, RAND W/ MICROALB/CREAT RATIO     Standing Status:   Future     Number of Occurrences:   1     Standing Expiration Date:   7/3/2020    REFERRAL TO NEPHROLOGY     Referral Priority:   Routine     Referral Type:   Consultation     Referral Reason:   Specialty Services Required     Number of Visits Requested:   1    AMB POC HEMOGLOBIN (HGB)    AMB POC GLUCOSE, QUANTITATIVE, BLOOD    multivitamin (ONE A DAY) tablet     Sig: Take 1 Tab by mouth daily. Dispense:  30 Tab     Refill:  0       Results  Results for orders placed or performed in visit on 07/03/19   AMB POC HEMOGLOBIN (HGB)   Result Value Ref Range    Hemoglobin (POC) 14.2    AMB POC GLUCOSE, QUANTITATIVE, BLOOD   Result Value Ref Range    Glucose  mg/dL       Assessment and Plan  Diagnoses and all orders for this visit:    1. Type 2 diabetes mellitus with diabetic neuropathy, with long-term current use of insulin (HCC)  -     AMB POC HEMOGLOBIN (HGB)  -     AMB POC GLUCOSE, QUANTITATIVE, BLOOD  -     REFERRAL TO NEPHROLOGY  -     MICROALBUMIN, UR, RAND W/ MICROALB/CREAT RATIO; Future    2. Acute kidney injury ([facility] [address]  -     METABOLIC PANEL, COMPREHENSIVE; Future  -     REFERRAL TO NEPHROLOGY    3. Alcohol use    4. Continuous alcohol dependence ([facility] [address]    5. Fatigue, unspecified type  -     multivitamin (ONE A DAY) tablet; Take 1 Tab by mouth daily. Discussed alarm symptoms and when to seek emergency assistance. Discussed risk of kidney failure and the impact of alcohol and diabetes on kidney function. Recommendation for patient to cease alcohol intake. After care summary printed and reviewed with patient. Plan reviewed with patient. Questions answered. Patient verbalized understanding of plan and is in agreement with plan. Patient to follow up in two weeks or earlier if symptoms worsen. Follow-up and Dispositions    · Return in about 2 weeks (around 7/17/2019), or if symptoms worsen or fail to improve.        Marleni Spencer, JANI-C

## 2019-07-06 ENCOUNTER — HOSPITAL ENCOUNTER (OUTPATIENT)
Dept: LAB | Age: 59
Discharge: HOME | End: 2019-07-06
Payer: SELF-PAY

## 2019-07-06 LAB
ALBUMIN SERPL-MCNC: 3.6 G/DL (ref 3.4–5)
ALBUMIN/GLOB SERPL: 1.2 {RATIO} (ref 0.8–1.7)
ALP SERPL-CCNC: 90 U/L (ref 45–117)
ALT SERPL-CCNC: 22 U/L (ref 16–61)
ANION GAP SERPL CALC-SCNC: 6 MMOL/L (ref 3–18)
AST SERPL-CCNC: 19 U/L (ref 15–37)
BILIRUB SERPL-MCNC: 0.2 MG/DL (ref 0.2–1)
BUN SERPL-MCNC: 10 MG/DL (ref 7–18)
BUN/CREAT SERPL: 9 (ref 12–20)
CALCIUM SERPL-MCNC: 8.6 MG/DL (ref 8.5–10.1)
CHLORIDE SERPL-SCNC: 108 MMOL/L (ref 100–108)
CO2 SERPL-SCNC: 28 MMOL/L (ref 21–32)
CREAT SERPL-MCNC: 1.07 MG/DL (ref 0.6–1.3)
CREAT UR-MCNC: 226 MG/DL (ref 30–125)
GLOBULIN SER CALC-MCNC: 3.1 G/DL (ref 2–4)
GLUCOSE SERPL-MCNC: 131 MG/DL (ref 74–99)
MICROALBUMIN UR-MCNC: 3.58 MG/DL (ref 0–3)
MICROALBUMIN/CREAT UR-RTO: 16 MG/G (ref 0–30)
POTASSIUM SERPL-SCNC: 4.6 MMOL/L (ref 3.5–5.5)
PROT SERPL-MCNC: 6.7 G/DL (ref 6.4–8.2)
SODIUM SERPL-SCNC: 142 MMOL/L (ref 136–145)

## 2019-07-06 PROCEDURE — 80053 COMPREHEN METABOLIC PANEL: CPT

## 2019-07-06 PROCEDURE — 36415 COLL VENOUS BLD VENIPUNCTURE: CPT

## 2019-07-06 PROCEDURE — 82043 UR ALBUMIN QUANTITATIVE: CPT

## 2019-07-07 ENCOUNTER — HOSPITAL ENCOUNTER (OUTPATIENT)
Dept: SLEEP MEDICINE | Age: 59
Discharge: HOME | End: 2019-07-07
Attending: PSYCHIATRY & NEUROLOGY
Payer: SELF-PAY

## 2019-07-07 DIAGNOSIS — G47.8 UPPER AIRWAY RESISTANCE SYNDROME: ICD-10-CM

## 2019-07-07 PROCEDURE — 95810 POLYSOM 6/> YRS 4/> PARAM: CPT

## 2019-07-08 VITALS — DIASTOLIC BLOOD PRESSURE: 85 MMHG | HEART RATE: 78 BPM | SYSTOLIC BLOOD PRESSURE: 132 MMHG

## 2019-07-08 DIAGNOSIS — E78.2 MIXED HYPERLIPIDEMIA: ICD-10-CM

## 2019-07-08 DIAGNOSIS — E55.9 VITAMIN D DEFICIENCY: ICD-10-CM

## 2019-07-08 DIAGNOSIS — E11.40 TYPE 2 DIABETES MELLITUS WITH DIABETIC NEUROPATHY, WITH LONG-TERM CURRENT USE OF INSULIN (HCC): ICD-10-CM

## 2019-07-08 DIAGNOSIS — G47.09 OTHER INSOMNIA: ICD-10-CM

## 2019-07-08 DIAGNOSIS — Z51.81 MEDICATION MONITORING ENCOUNTER: ICD-10-CM

## 2019-07-08 DIAGNOSIS — E78.00 HYPERCHOLESTEROLEMIA: ICD-10-CM

## 2019-07-08 DIAGNOSIS — Z79.4 TYPE 2 DIABETES MELLITUS WITH DIABETIC NEUROPATHY, WITH LONG-TERM CURRENT USE OF INSULIN (HCC): ICD-10-CM

## 2019-07-08 RX ORDER — INSULIN GLARGINE 100 [IU]/ML
50 INJECTION, SOLUTION SUBCUTANEOUS DAILY
Qty: 5 PEN | Refills: 0 | Status: CANCELLED | OUTPATIENT
Start: 2019-07-08

## 2019-07-11 NOTE — TELEPHONE ENCOUNTER
Verified patient name and . Request for refill received and sent to provider for review. PCP: Myrna Restrepo NP    Last appt: 7/3/2019  Future Appointments   Date Time Provider Henri Becerra   9/3/2019  4:00 PM Dagoberto Parker  [facility]       Requested Prescriptions     Pending Prescriptions Disp Refills    gabapentin (NEURONTIN) 800 mg tablet 90 Tab 1     Sig: Take 1 Tab by mouth three (3) times daily. Take in addition to the 100 mg tablet for a total of 900 mg 3x a day.  ergocalciferol (ERGOCALCIFEROL) 50,000 unit capsule 4 Cap 6     Sig: Take 1 Cap by mouth every seven (7) days.  insulin glargine (LANTUS,BASAGLAR) 100 unit/mL (3 mL) inpn 5 Pen 0     Si Units by SubCUTAneous route daily.        Last Labs done:  7/3/19,19  Last UDS done:  N/A  Last :  N/A

## 2019-07-15 ENCOUNTER — TELEPHONE (OUTPATIENT)
Dept: FAMILY MEDICINE CLINIC | Age: 59
End: 2019-07-15

## 2019-07-15 DIAGNOSIS — E11.40 TYPE 2 DIABETES MELLITUS WITH DIABETIC NEUROPATHY, WITH LONG-TERM CURRENT USE OF INSULIN (HCC): ICD-10-CM

## 2019-07-15 DIAGNOSIS — Z79.4 TYPE 2 DIABETES MELLITUS WITH DIABETIC NEUROPATHY, WITH LONG-TERM CURRENT USE OF INSULIN (HCC): ICD-10-CM

## 2019-07-16 RX ORDER — GABAPENTIN 600 MG/1
600 TABLET ORAL 3 TIMES DAILY
Qty: 90 TAB | Refills: 0 | Status: SHIPPED | OUTPATIENT
Start: 2019-07-16 | End: 2019-08-05 | Stop reason: SDUPTHER

## 2019-07-16 RX ORDER — INSULIN GLARGINE 100 [IU]/ML
50 INJECTION, SOLUTION SUBCUTANEOUS DAILY
Qty: 5 PEN | Refills: 0 | Status: SHIPPED | OUTPATIENT
Start: 2019-07-16 | End: 2019-11-20 | Stop reason: SDUPTHER

## 2019-07-16 NOTE — TELEPHONE ENCOUNTER
Call to patient. Patient verified his name, , and address. He states he is in need of his lantus. He states he did go for his labs and he is requesting his gabapentin. Labs reviewed with patient. Will restart gabapentin at a lower dose and watch kidney function.  [facility]

## 2019-07-17 RX ORDER — GABAPENTIN 800 MG/1
800 TABLET ORAL 3 TIMES DAILY
Qty: 90 TAB | Refills: 1 | OUTPATIENT
Start: 2019-07-17

## 2019-07-17 RX ORDER — ERGOCALCIFEROL 1.25 MG/1
50000 CAPSULE ORAL
Qty: 4 CAP | Refills: 6 | Status: SHIPPED | OUTPATIENT
Start: 2019-07-17 | End: 2020-08-03 | Stop reason: SDUPTHER

## 2019-07-31 ENCOUNTER — OFFICE VISIT (OUTPATIENT)
Dept: FAMILY MEDICINE CLINIC | Age: 59
End: 2019-07-31

## 2019-07-31 DIAGNOSIS — N17.9 ACUTE KIDNEY INJURY (HCC): ICD-10-CM

## 2019-07-31 DIAGNOSIS — E11.40 TYPE 2 DIABETES MELLITUS WITH DIABETIC NEUROPATHY, WITH LONG-TERM CURRENT USE OF INSULIN (HCC): Primary | ICD-10-CM

## 2019-07-31 DIAGNOSIS — Z79.4 TYPE 2 DIABETES MELLITUS WITH DIABETIC NEUROPATHY, WITH LONG-TERM CURRENT USE OF INSULIN (HCC): Primary | ICD-10-CM

## 2019-07-31 DIAGNOSIS — Z78.9 ALCOHOL USE: ICD-10-CM

## 2019-07-31 NOTE — PROGRESS NOTES
HPI  Wen Pacheco is a 62 y.o. male  Reports he is here to follow-up on his diabetes as his disability company is requiring additional paperwork. He does admit to seeing endocrinology. He states they are requesting that he has an insulin pump. He reports completing paperwork for his insulin pump. He denies any hypo-or hyperglycemic episodes. He does admit to having a few beers to days ago. He denies any urinary issues. He reports taking his diabetic medication as prescribed. States he feels well. Reports he did have a sleep study completed. He states he is to report tomorrow to get the results of the sleep study. He is requesting an increase of his gabapentin and would like to know if he can take something different if his gabapentin cannot be increased. Reports tingling and pain in his feet an lower legs that is chronic. Past Medical History  Past Medical History:   Diagnosis Date    Alcohol use     Fri-Sun one fifth; Mon-Thur: Pint of liquor    CAD (coronary artery disease)     Cardiac angioplasty with stent 07/29/2009    2.5 x 13 Cypher stent to CX.  Cardiac cath 11/09/2011    RCA patent. LM patent. LAD patent. mD1 55%. CX patent. Prior stent patent. Edison 85% (2.5 x 15-mm Xience stent, resid 0%). LVEDP 12. EF 40-45%. High lateral hypk (RI distribution).       Cardiac inferior STEMI 2009    Current smoker     contemplating stopping    Diabetes ([facility] [address]     type 2-insulin dependent    GERD (gastroesophageal reflux disease)     HTN (hypertension)     Hyperlipidemia     Migraine     Myocardial infarction [facility])        Surgical History  Past Surgical History:   Procedure Laterality Date    HX CORONARY STENT PLACEMENT  07/29/2009    HX HEART CATHETERIZATION  8/30/2011    HX HEART CATHETERIZATION  11/09/2011    HX WISDOM TEETH EXTRACTION      x4        Medications  Current Outpatient Medications   Medication Sig Dispense Refill    ergocalciferol (ERGOCALCIFEROL) 50,000 unit capsule Take 1 Cap by mouth every seven (7) days. 4 Cap 6    insulin glargine (LANTUS,BASAGLAR) 100 unit/mL (3 mL) inpn 50 Units by SubCUTAneous route daily. 5 Pen 0    gabapentin (NEURONTIN) 600 mg tablet Take 1 Tab by mouth three (3) times daily. Max Daily Amount: 1,800 mg. 90 Tab 0    multivitamin (ONE A DAY) tablet Take 1 Tab by mouth daily. 30 Tab 0    SITagliptin-metFORMIN (JANUMET) 50-1,000 mg per tablet Take 1 Tab by mouth two (2) times daily (with meals). 180 Tab 0    simvastatin (ZOCOR) 20 mg tablet Take 1 Tab by mouth nightly. 90 Tab 3    lisinopril (PRINIVIL, ZESTRIL) 40 mg tablet Take 1 Tab by mouth daily. 90 Tab 12    insulin aspart U-100 (NOVOLOG U-100 INSULIN ASPART) 100 unit/mL injection 10 units with meals 10 mL 0    ibuprofen (MOTRIN) 800 mg tablet Take 1 Tab by mouth every eight (8) hours as needed for Pain. 60 Tab 1    hydrOXYzine pamoate (VISTARIL) 50 mg capsule TAKE ONE CAPSULE BY MOUTH EVERY EVENING AS NEEDED FOR SLEEP 30 Cap 6    glucose blood VI test strips (ASCENSIA AUTODISC VI, ONE TOUCH ULTRA TEST VI) strip Twice daily-  fasting in the morning and once two hours after a meal. 100 Strip 10    glipiZIDE (GLUCOTROL) 10 mg tablet Take 1 Tab by mouth two (2) times a day. 180 Tab 0    diflunisal (DOLOBID) 500 mg tab Take 1 Tab by mouth two (2) times a day. 20 Tab 0    lidocaine (LIDODERM) 5 %       ramelteon (ROZEREM) 8 mg tablet Take 1 Tab by mouth nightly as needed for Sleep. 30 Tab 0    ONETOUCH VERIO FLEX misc       aspirin 81 mg tablet Take 1 Tab by mouth daily.  1 Tab 0       Allergies  Allergies   Allergen Reactions    Niacin Itching       Family History  Family History   Problem Relation Age of Onset    Hypertension Mother     Heart Attack Mother     Diabetes Mother     Hypertension Father     Heart Attack Father     Diabetes Father     Diabetes Sister     Hypertension Sister     Stroke Sister     Diabetes Brother     Hypertension Brother     Stroke Brother  No Known Problems Maternal Grandmother     No Known Problems Maternal Grandfather     No Known Problems Paternal Grandmother     No Known Problems Paternal Grandfather     No Known Problems Brother     Heart Disease Other     Kidney Disease Other        Social History  Social History     Socioeconomic History    Marital status:      Spouse name: Not on file    Number of children: Not on file    Years of education: Not on file    Highest education level: Not on file   Occupational History    Not on file   Social Needs    Financial resource strain: Not on file    Food insecurity:     Worry: Not on file     Inability: Not on file    Transportation needs:     Medical: Not on file     Non-medical: Not on file   Tobacco Use    Smoking status: Current Every Day Smoker     Packs/day: 0.50     Years: 1.00     Pack years: 0.50     Types: Cigarettes    Smokeless tobacco: Never Used   Substance and Sexual Activity    Alcohol use: Yes     Alcohol/week: 4.2 standard drinks     Types: 5 Shots of liquor per week     Comment: occassionally    Drug use: Yes     Types: Marijuana, Cocaine    Sexual activity: Yes   Lifestyle    Physical activity:     Days per week: Not on file     Minutes per session: Not on file    Stress: Not on file   Relationships    Social connections:     Talks on phone: Not on file     Gets together: Not on file     Attends Quaker service: Not on file     Active member of club or organization: Not on file     Attends meetings of clubs or organizations: Not on file     Relationship status: Not on file    Intimate partner violence:     Fear of current or ex partner: Not on file     Emotionally abused: Not on file     Physically abused: Not on file     Forced sexual activity: Not on file   Other Topics Concern    Not on file   Social History Narrative     at [employer].        Problem List  Patient Active Problem List   Diagnosis Code    Hyperlipidemia E78.5    HTN (hypertension) I10    Insulin dependent diabetes mellitus (HCC) E11.9, Z79.4    Tobacco use disorder F17.200    Coronary atherosclerosis of native coronary artery I25.10    Tobacco dependence F17.200    Other and unspecified alcohol dependence, continuous drinking behavior F10.20    Mediastinal adenopathy R59.0    Diabetic neuropathy (HCC) E11.40    Vitamin D deficiency E55.9    Other insomnia G47.09    Type 2 diabetes with nephropathy (HCC) E11.21    Otalgia H92.09    Other fatigue R53.83       Review of Systems  Review of Systems   Respiratory: Negative for shortness of breath. Cardiovascular: Negative for chest pain and palpitations. Gastrointestinal: Negative for abdominal pain, nausea and vomiting. Genitourinary: Negative. Musculoskeletal: Positive for myalgias. Neurological: Positive for tingling. Vital Signs  There were no vitals filed for this visit. Physical Exam  Physical Exam   Constitutional: He is oriented to person, place, and time. HENT:   Mouth/Throat: Oropharynx is clear and moist.   Eyes: Pupils are equal, round, and reactive to light. Cardiovascular: Normal rate and regular rhythm. Pulmonary/Chest: Effort normal and breath sounds normal.   Neurological: He is alert and oriented to person, place, and time. Psychiatric: He has a normal mood and affect. His behavior is normal.   Vitals reviewed. Diagnostics  No orders of the defined types were placed in this encounter.       Results  Results for orders placed or performed during the hospital encounter of 07/06/19   MICROALBUMIN, UR, RAND   Result Value Ref Range    Microalbumin,urine random 3.58 (H) 0 - 3.0 MG/DL    Creatinine, urine 226.00 (H) 30 - 125 mg/dL    Microalbumin/Creat ratio (mg/g creat) 16 0 - 30 mg/g   METABOLIC PANEL, COMPREHENSIVE   Result Value Ref Range    Sodium 142 136 - 145 mmol/L    Potassium 4.6 3.5 - 5.5 mmol/L    Chloride 108 100 - 108 mmol/L    CO2 28 21 - 32 mmol/L    Anion gap 6 3.0 - 18 mmol/L    Glucose 131 (H) 74 - 99 mg/dL    BUN 10 7.0 - 18 MG/DL    Creatinine 1.07 0.6 - 1.3 MG/DL    BUN/Creatinine ratio 9 (L) 12 - 20      GFR est AA >60 >60 ml/min/1.73m2    GFR est non-AA >60 >60 ml/min/1.73m2    Calcium 8.6 8.5 - 10.1 MG/DL    Bilirubin, total 0.2 0.2 - 1.0 MG/DL    ALT (SGPT) 22 16 - 61 U/L    AST (SGOT) 19 15 - 37 U/L    Alk. phosphatase 90 45 - 117 U/L    Protein, total 6.7 6.4 - 8.2 g/dL    Albumin 3.6 3.4 - 5.0 g/dL    Globulin 3.1 2.0 - 4.0 g/dL    A-G Ratio 1.2 0.8 - 1.7         Assessment and Plan  Diagnoses and all orders for this visit:    1. Type 2 diabetes mellitus with diabetic neuropathy, with long-term current use of insulin ([facility] [address]    2. Acute kidney injury ([facility] [address]    3. Alcohol use      We discussed the condition the importance of monitoring his kidney function with the use of gabapentin. He is to have his lab completed. Recommend alcohol cessation. Patient to attend his appointment tomorrow. He is to follow-up with endocrinology as indicated. He is to continue to take his medications as prescribed. Discussed importance of diet compliance with patient. After care summary printed and reviewed with patient. Plan reviewed with patient. Questions answered. Patient verbalized understanding of plan and is in agreement with plan. Patient to follow up in one week or earlier if symptoms worsen. Follow-up and Dispositions    · Return in about 1 month (around 8/31/2019), or if symptoms worsen or fail to improve.        Divina Crenshaw, EDITH

## 2019-08-01 VITALS
SYSTOLIC BLOOD PRESSURE: 118 MMHG | HEIGHT: 69 IN | DIASTOLIC BLOOD PRESSURE: 76 MMHG | BODY MASS INDEX: 28.2 KG/M2 | HEART RATE: 69 BPM | OXYGEN SATURATION: 98 % | RESPIRATION RATE: 16 BRPM | TEMPERATURE: 98.6 F | WEIGHT: 190.4 LBS

## 2019-08-01 NOTE — PROGRESS NOTES
Savanah Madison presents today for   Chief Complaint   Patient presents with    Other     Paperwork r/t disability. Is someone accompanying this pt? No    Is the patient using any DME equipment during OV? No    Depression Screening:  3 most recent PHQ Screens 4/11/2019   PHQ Not Done -   Little interest or pleasure in doing things Not at all   Feeling down, depressed, irritable, or hopeless Not at all   Total Score PHQ 2 0       Learning Assessment:  Learning Assessment 4/11/2019   PRIMARY LEARNER Patient   HIGHEST LEVEL OF EDUCATION - PRIMARY LEARNER  DID NOT GRADUATE HIGH SCHOOL   BARRIERS PRIMARY LEARNER NONE   CO-LEARNER CAREGIVER No   PRIMARY LANGUAGE ENGLISH   LEARNER PREFERENCE PRIMARY LISTENING   ANSWERED BY self   RELATIONSHIP SELF       Abuse Screening:  Abuse Screening Questionnaire 4/11/2019   Do you ever feel afraid of your partner? N   Are you in a relationship with someone who physically or mentally threatens you? N   Is it safe for you to go home? Y         Health Maintenance Due   Topic Date Due    Hepatitis C Screening  1960    Pneumococcal 0-64 years (1 of 1 - PPSV23) 06/02/1964    DTaP/Tdap/Td series (1 - Tdap) 06/02/1979    Shingrix Vaccine Age 50> (1 of 2) 06/02/2008    FOBT Q 1 YEAR AGE 50-75  06/02/2008    FOOT EXAM Q1  07/11/2019    Influenza Age 9 to Adult  08/01/2019   . Health Maintenance reviewed and discussed and ordered per Provider. Savanah Madison is updated on all     Coordination of Care  1. Have you been to the ER, urgent care clinic since your last visit? Hospitalized since your last visit? No    2. Have you seen or consulted any other health care providers outside of the [address] [place] [address] since your last visit? Include any pap smears or colon screening. No    Advance Directive:  1. Do you have an advance directive in place? Patient Reply:No    2. If not, would you like material regarding how to put one in place?  Patient Reply: No

## 2019-08-05 ENCOUNTER — PATIENT OUTREACH (OUTPATIENT)
Dept: FAMILY MEDICINE CLINIC | Age: 59
End: 2019-08-05

## 2019-08-05 DIAGNOSIS — E11.40 TYPE 2 DIABETES MELLITUS WITH DIABETIC NEUROPATHY, WITH LONG-TERM CURRENT USE OF INSULIN (HCC): ICD-10-CM

## 2019-08-05 DIAGNOSIS — Z79.4 TYPE 2 DIABETES MELLITUS WITH DIABETIC NEUROPATHY, WITH LONG-TERM CURRENT USE OF INSULIN (HCC): ICD-10-CM

## 2019-08-06 RX ORDER — GABAPENTIN 600 MG/1
600 TABLET ORAL 3 TIMES DAILY
Qty: 90 TAB | Refills: 0 | Status: SHIPPED | OUTPATIENT
Start: 2019-08-06 | End: 2019-09-03 | Stop reason: SDUPTHER

## 2019-08-06 NOTE — TELEPHONE ENCOUNTER
checked with patient receiving gabapentin on 7/16/19. Will need to ensure patient is taking medication as prescribed. Will leave at  with date for  after speaking with patient.  Yane Mukherjee

## 2019-08-20 ENCOUNTER — TELEPHONE (OUTPATIENT)
Dept: FAMILY MEDICINE CLINIC | Age: 59
End: 2019-08-20

## 2019-08-21 ENCOUNTER — TELEPHONE (OUTPATIENT)
Dept: FAMILY MEDICINE CLINIC | Age: 59
End: 2019-08-21

## 2019-08-21 NOTE — TELEPHONE ENCOUNTER
Returned call to patient. He verified his name, , and his address. He states his disability is running out and he needs to know what needs to be done to extend it. Recommend patient speak with his job to discuss his options and to see if he could apply for an extension. Also recommend patient make an appointment with endocrinology to get the insulin pump started so he can get regulated and be able to return to work soon.  [facility]

## 2019-08-26 ENCOUNTER — OFFICE VISIT (OUTPATIENT)
Dept: NEUROLOGY | Age: 59
End: 2019-08-26

## 2019-08-26 VITALS
SYSTOLIC BLOOD PRESSURE: 144 MMHG | TEMPERATURE: 97.8 F | HEIGHT: 69 IN | HEART RATE: 90 BPM | WEIGHT: 189 LBS | RESPIRATION RATE: 16 BRPM | BODY MASS INDEX: 27.99 KG/M2 | OXYGEN SATURATION: 98 % | DIASTOLIC BLOOD PRESSURE: 90 MMHG

## 2019-08-26 DIAGNOSIS — G47.8 UPPER AIRWAY RESISTANCE SYNDROME: Primary | ICD-10-CM

## 2019-08-26 DIAGNOSIS — E11.42 DIABETIC POLYNEUROPATHY ASSOCIATED WITH TYPE 2 DIABETES MELLITUS (HCC): ICD-10-CM

## 2019-08-26 NOTE — PROGRESS NOTES
8/26/2019 3:47 PM    SSN: [id number]    Subjective:   58-year-old male who I saw in June for evaluation of suspicion of obstructive sleep apnea. He has history of an ST elevation MI, coronary artery disease, hyperlipidemia, and difficult to control diabetes all which raise concern regarding sleep apnea. He is also a heavy drinker and smokes every day. With this suspicion he had a sleep study on July 7 which showed an AHI of 2.3 with a REM AHI of 6 and desaturations down to a minimum of 82%, not meeting criteria for diagnosis of obstructive sleep apnea. He had a total sleep time of 5.3 hours. Social History     Socioeconomic History    Marital status:      Spouse name: Not on file    Number of children: Not on file    Years of education: Not on file    Highest education level: Not on file   Occupational History    Not on file   Social Needs    Financial resource strain: Not on file    Food insecurity:     Worry: Not on file     Inability: Not on file    Transportation needs:     Medical: Not on file     Non-medical: Not on file   Tobacco Use    Smoking status: Current Every Day Smoker     Packs/day: 0.50     Years: 1.00     Pack years: 0.50     Types: Cigarettes    Smokeless tobacco: Never Used   Substance and Sexual Activity    Alcohol use:  Yes     Alcohol/week: 4.2 standard drinks     Types: 5 Shots of liquor per week     Comment: occassionally    Drug use: Yes     Types: Marijuana, Cocaine    Sexual activity: Yes   Lifestyle    Physical activity:     Days per week: Not on file     Minutes per session: Not on file    Stress: Not on file   Relationships    Social connections:     Talks on phone: Not on file     Gets together: Not on file     Attends Lutheran service: Not on file     Active member of club or organization: Not on file     Attends meetings of clubs or organizations: Not on file     Relationship status: Not on file    Intimate partner violence:     Fear of current or ex partner: Not on file     Emotionally abused: Not on file     Physically abused: Not on file     Forced sexual activity: Not on file   Other Topics Concern    Not on file   Social History Narrative     at [employer]. Family History   Problem Relation Age of Onset    Hypertension Mother     Heart Attack Mother     Diabetes Mother     Hypertension Father     Heart Attack Father     Diabetes Father     Diabetes Sister     Hypertension Sister     Stroke Sister     Diabetes Brother     Hypertension Brother     Stroke Brother     No Known Problems Maternal Grandmother     No Known Problems Maternal Grandfather     No Known Problems Paternal Grandmother     No Known Problems Paternal Grandfather     No Known Problems Brother     Heart Disease Other     Kidney Disease Other        Current Outpatient Medications   Medication Sig Dispense Refill    gabapentin (NEURONTIN) 600 mg tablet Take 1 Tab by mouth three (3) times daily. Max Daily Amount: 1,800 mg. 90 Tab 0    ergocalciferol (ERGOCALCIFEROL) 50,000 unit capsule Take 1 Cap by mouth every seven (7) days. 4 Cap 6    insulin glargine (LANTUS,BASAGLAR) 100 unit/mL (3 mL) inpn 50 Units by SubCUTAneous route daily. 5 Pen 0    multivitamin (ONE A DAY) tablet Take 1 Tab by mouth daily. 30 Tab 0    SITagliptin-metFORMIN (JANUMET) 50-1,000 mg per tablet Take 1 Tab by mouth two (2) times daily (with meals). 180 Tab 0    simvastatin (ZOCOR) 20 mg tablet Take 1 Tab by mouth nightly. 90 Tab 3    insulin aspart U-100 (NOVOLOG U-100 INSULIN ASPART) 100 unit/mL injection 10 units with meals 10 mL 0    ibuprofen (MOTRIN) 800 mg tablet Take 1 Tab by mouth every eight (8) hours as needed for Pain.  60 Tab 1    hydrOXYzine pamoate (VISTARIL) 50 mg capsule TAKE ONE CAPSULE BY MOUTH EVERY EVENING AS NEEDED FOR SLEEP 30 Cap 6    glucose blood VI test strips (ASCENSIA AUTODISC VI, ONE TOUCH ULTRA TEST VI) strip Twice daily-  fasting in the morning and once two hours after a meal. 100 Strip 10    glipiZIDE (GLUCOTROL) 10 mg tablet Take 1 Tab by mouth two (2) times a day. 180 Tab 0    ONETOUCH VERIO FLEX misc       aspirin 81 mg tablet Take 1 Tab by mouth daily. 1 Tab 0    lisinopril (PRINIVIL, ZESTRIL) 40 mg tablet Take 1 Tab by mouth daily. 90 Tab 12    diflunisal (DOLOBID) 500 mg tab Take 1 Tab by mouth two (2) times a day. 20 Tab 0    lidocaine (LIDODERM) 5 %       ramelteon (ROZEREM) 8 mg tablet Take 1 Tab by mouth nightly as needed for Sleep. 30 Tab 0       Past Medical History:   Diagnosis Date    Alcohol use     Fri-Sun one fifth; Mon-Thur: Pint of liquor    CAD (coronary artery disease)     Cardiac angioplasty with stent 07/29/2009    2.5 x 13 Cypher stent to CX.  Cardiac cath 11/09/2011    RCA patent. LM patent. LAD patent. mD1 55%. CX patent. Prior stent patent. Edison 85% (2.5 x 15-mm Xience stent, resid 0%). LVEDP 12. EF 40-45%. High lateral hypk (RI distribution).       Cardiac inferior STEMI 2009    Current smoker     contemplating stopping    Diabetes ([address]     type 2-insulin dependent    GERD (gastroesophageal reflux disease)     HTN (hypertension)     Hyperlipidemia     Migraine     Myocardial infarction [facility])        Past Surgical History:   Procedure Laterality Date    HX CORONARY STENT PLACEMENT  07/29/2009    HX HEART CATHETERIZATION  8/30/2011    HX HEART CATHETERIZATION  11/09/2011    HX WISDOM TEETH EXTRACTION      x4       Allergies   Allergen Reactions    Niacin Itching       Vital signs:    Visit Vitals  /90 (BP 1 Location: Left arm, BP Patient Position: Sitting)   Pulse 90   Temp 97.8 °F (36.6 °C) (Oral)   Resp 16   Ht 5' 9\" (1.753 m)   Wt 85.7 kg (189 lb)   SpO2 98%   BMI 27.91 kg/m²       Review of Systems:   GENERAL: Denies fever or fatigue  CARDIAC: No CP or SOB  PULMONARY: No cough of SOB  MUSCULOSKELETAL: No new joint pain  NEURO: SEE HPI      EXAM: Alert, in NAD. Heart is regular. Oriented x3, EOM's are full, PERRL, no facial asymmetries. Strength and tone are normal. DTR's +2, gait symmetric           Assessment/Plan: Upper airway resistance syndrome associated with a metabolic syndrome and cardiovascular comorbidities, but by sleep study criteria not significant enough to warrant intervention with CPAP. I advised him that since his weight is a little bit over what it should be weight loss of about 15 pounds could help him normalize even more so with the numbers on his sleep study. Suggested the option of supine sleep avoidance to reduce snoring and respiratory events. Advised him about symptoms of sleep apnea and when to consider returning for a reevaluation. PLEASE NOTE:   Portions of this document may have been produced using voice recognition software. Unrecognized errors in transcription may be present. This note will not be viewable in [address]

## 2019-08-26 NOTE — PROGRESS NOTES
Derrellremberto Los presents today for   Chief Complaint   Patient presents with    Follow-up    Sleep Study       Is someone accompanying this pt? No    Is the patient using any DME equipment during OV? No    Depression Screening:  3 most recent PHQ Screens 4/11/2019   PHQ Not Done -   Little interest or pleasure in doing things Not at all   Feeling down, depressed, irritable, or hopeless Not at all   Total Score PHQ 2 0       Learning Assessment:  Learning Assessment 4/11/2019   PRIMARY LEARNER Patient   HIGHEST LEVEL OF EDUCATION - PRIMARY LEARNER  DID NOT GRADUATE HIGH SCHOOL   BARRIERS PRIMARY LEARNER NONE   CO-LEARNER CAREGIVER No   PRIMARY LANGUAGE ENGLISH   LEARNER PREFERENCE PRIMARY LISTENING   ANSWERED BY self   RELATIONSHIP SELF       Abuse Screening:  Abuse Screening Questionnaire 4/11/2019   Do you ever feel afraid of your partner? N   Are you in a relationship with someone who physically or mentally threatens you? N   Is it safe for you to go home? Y         Coordination of Care:  1. Have you been to the ER, urgent care clinic since your last visit? Hospitalized since your last visit? No    2. Have you seen or consulted any other health care providers outside of the [address] [place] [address] since your last visit? Include any pap smears or colon screening.  No

## 2019-09-03 ENCOUNTER — OFFICE VISIT (OUTPATIENT)
Dept: CARDIOLOGY CLINIC | Age: 59
End: 2019-09-03

## 2019-09-03 VITALS
HEART RATE: 68 BPM | OXYGEN SATURATION: 98 % | BODY MASS INDEX: 27.4 KG/M2 | HEIGHT: 69 IN | WEIGHT: 185 LBS | SYSTOLIC BLOOD PRESSURE: 122 MMHG | DIASTOLIC BLOOD PRESSURE: 70 MMHG

## 2019-09-03 DIAGNOSIS — E11.40 TYPE 2 DIABETES MELLITUS WITH DIABETIC NEUROPATHY, WITH LONG-TERM CURRENT USE OF INSULIN (HCC): ICD-10-CM

## 2019-09-03 DIAGNOSIS — E78.2 MIXED HYPERLIPIDEMIA: ICD-10-CM

## 2019-09-03 DIAGNOSIS — I10 ESSENTIAL HYPERTENSION: ICD-10-CM

## 2019-09-03 DIAGNOSIS — I25.10 ATHEROSCLEROSIS OF NATIVE CORONARY ARTERY OF NATIVE HEART WITHOUT ANGINA PECTORIS: Primary | ICD-10-CM

## 2019-09-03 DIAGNOSIS — Z79.4 TYPE 2 DIABETES MELLITUS WITH DIABETIC NEUROPATHY, WITH LONG-TERM CURRENT USE OF INSULIN (HCC): ICD-10-CM

## 2019-09-03 NOTE — PROGRESS NOTES
Wen Pacheco presents today for   Chief Complaint   Patient presents with    Chest Pain     1 year follow up     Dizziness     sometimes        Wen Pacheco preferred language for health care discussion is english/other. Is someone accompanying this pt? no    Is the patient using any DME equipment during [address]? no    Depression Screening:  3 most recent PHQ Screens 4/11/2019   PHQ Not Done -   Little interest or pleasure in doing things Not at all   Feeling down, depressed, irritable, or hopeless Not at all   Total Score PHQ 2 0       Learning Assessment:  Learning Assessment 4/11/2019   PRIMARY LEARNER Patient   HIGHEST LEVEL OF EDUCATION - PRIMARY LEARNER  DID NOT GRADUATE HIGH SCHOOL   BARRIERS PRIMARY LEARNER NONE   CO-LEARNER CAREGIVER No   PRIMARY LANGUAGE ENGLISH   LEARNER PREFERENCE PRIMARY LISTENING   ANSWERED BY self   RELATIONSHIP SELF       Abuse Screening:  Abuse Screening Questionnaire 4/11/2019   Do you ever feel afraid of your partner? N   Are you in a relationship with someone who physically or mentally threatens you? N   Is it safe for you to go home? Y       Fall Risk  Fall Risk Assessment, last 12 mths 4/11/2019   Able to walk? Yes   Fall in past 12 months? No       Pt currently taking Anticoagulant therapy? AS 81mg every day     Coordination of Care:  1. Have you been to the ER, urgent care clinic since your last visit? Hospitalized since your last visit? 6/26 - 6/27 for [facility]     2. Have you seen or consulted any other health care providers outside of the [address] [place] [address] since your last visit? Include any pap smears or colon screening.  no

## 2019-09-03 NOTE — PROGRESS NOTES
HISTORY OF PRESENT ILLNESS  Nima Heller is a 62 y.o. male. ASSESSMENT and PLAN    Mr. Eladia Carroll has history of CAD. He presented back in 2009 with Malaise but no specific chest pains, inferior wall MI, thrombotic occlusion of his circumflex. He had Thrombectomy, angioplasty and stent to circumflex. He was also noted to have vasospasm. In 2011, he presented with non-STEMI; his ramus intermedius branch had severe stenosis which was stented. Unfortunately, he continues to drink about a fifth of liquor per week and smokes about half a pack per day. 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 On numerous occasions in the past, I have advised him and encouraged him to discontinue tobacco use completely. Again, I have asked him to discontinue tobacco use completely. He is not on beta blocker therapy due to the fact that he has ED. He also has asymptomatic sinus bradycardia. Patient had nuclear scan done in October 2016 which showed EF of 64% with small inferior fixed defect likely from diaphragmatic attenuation. In April 2019, while at work, he got dehydrated and had acute kidney injury. [place] [facility] [place] His lisinopril was discontinued. His blood pressure has been reasonably well controlled despite off of ACE inhibitor. · CAD:    Symptomatically stable. · BP:   Well controlled. · HR:    Stable. · CHF:    There is no evidence of decompensated CHF noted. · Weight:    His weight today is 185 pounds. It is at baseline. · Cholesterol:   Target LDL <70. Zocor 20. · Tobacco: Unfortunately, he still smokes about half a pack per day. Again, strong encouragement was given for smoking cessation and moderation in alcohol. All questions were answered. · Anti-platelet:   Remains on ASA. I will see him back annually. Thank you. Encounter Diagnoses   Name Primary?     Atherosclerosis of native coronary artery of native heart without angina pectoris Yes    Mixed hyperlipidemia     Essential hypertension      current treatment plan is effective, no change in therapy  lab results and schedule of future lab studies reviewed with patient  reviewed diet, exercise and weight control  very strongly urged to quit smoking to reduce cardiovascular risk  cardiovascular risk and specific lipid/LDL goals reviewed  use of aspirin to prevent MI and TIA's discussed        HPI  Today, Mr. Libia Moya has no complaints of chest pains, increased shortness of breath or decreased exercise capacity. He denies any orthopnea or PND. He denies any palpitations or dizziness. Unfortunately, he still smokes about half a pack per day despite numerous conversations in the past about importance of smoking cessation. He also drinks significant amount of alcohol weekly. Review of Systems   Respiratory: Negative for shortness of breath. Cardiovascular: Negative for chest pain, palpitations, orthopnea, claudication, leg swelling and PND. All other systems reviewed and are negative. Physical Exam   Constitutional: He is oriented to person, place, and time. He appears well-developed and well-nourished. HENT:   Head: Normocephalic. Eyes: Conjunctivae are normal.   Neck: Neck supple. No JVD present. Carotid bruit is not present. No thyromegaly present. Cardiovascular: Normal rate and regular rhythm. Pulmonary/Chest: Breath sounds normal.   Abdominal: Bowel sounds are normal.   Musculoskeletal: He exhibits no edema. Neurological: He is alert and oriented to person, place, and time. Skin: Skin is warm and dry. Nursing note and vitals reviewed. PCP: Joie Figueroa NP    Past Medical History:   Diagnosis Date    Alcohol use     Fri-Sun one fifth; Mon-Thur: Pint of liquor    CAD (coronary artery disease)     Cardiac angioplasty with stent 07/29/2009    2.5 x 13 Cypher stent to CX.  Cardiac cath 11/09/2011    RCA patent. LM patent. LAD patent.   mD1 55%.  CX patent. Prior stent patent. Edison 85% (2.5 x 15-mm Xience stent, resid 0%). LVEDP 12. EF 40-45%. High lateral hypk (RI distribution).  Cardiac inferior STEMI 2009    Current smoker     contemplating stopping    Diabetes ([address]     type 2-insulin dependent    GERD (gastroesophageal reflux disease)     HTN (hypertension)     Hyperlipidemia     Migraine     Myocardial infarction [facility])        Past Surgical History:   Procedure Laterality Date    HX CORONARY STENT PLACEMENT  07/29/2009    HX HEART CATHETERIZATION  8/30/2011    HX HEART CATHETERIZATION  11/09/2011    HX WISDOM TEETH EXTRACTION      x4       Current Outpatient Medications   Medication Sig Dispense Refill    gabapentin (NEURONTIN) 600 mg tablet Take 1 Tab by mouth three (3) times daily. Max Daily Amount: 1,800 mg. 90 Tab 0    ergocalciferol (ERGOCALCIFEROL) 50,000 unit capsule Take 1 Cap by mouth every seven (7) days. 4 Cap 6    insulin glargine (LANTUS,BASAGLAR) 100 unit/mL (3 mL) inpn 50 Units by SubCUTAneous route daily. 5 Pen 0    multivitamin (ONE A DAY) tablet Take 1 Tab by mouth daily. 30 Tab 0    SITagliptin-metFORMIN (JANUMET) 50-1,000 mg per tablet Take 1 Tab by mouth two (2) times daily (with meals). 180 Tab 0    simvastatin (ZOCOR) 20 mg tablet Take 1 Tab by mouth nightly. 90 Tab 3    lisinopril (PRINIVIL, ZESTRIL) 40 mg tablet Take 1 Tab by mouth daily. 90 Tab 12    insulin aspart U-100 (NOVOLOG U-100 INSULIN ASPART) 100 unit/mL injection 10 units with meals 10 mL 0    ibuprofen (MOTRIN) 800 mg tablet Take 1 Tab by mouth every eight (8) hours as needed for Pain.  60 Tab 1    hydrOXYzine pamoate (VISTARIL) 50 mg capsule TAKE ONE CAPSULE BY MOUTH EVERY EVENING AS NEEDED FOR SLEEP 30 Cap 6    glucose blood VI test strips (ASCENSIA AUTODISC VI, ONE TOUCH ULTRA TEST VI) strip Twice daily-  fasting in the morning and once two hours after a meal. 100 Strip 10    glipiZIDE (GLUCOTROL) 10 mg tablet Take 1 Tab by mouth two (2) times a day. 180 Tab 0    diflunisal (DOLOBID) 500 mg tab Take 1 Tab by mouth two (2) times a day. 20 Tab 0    lidocaine (LIDODERM) 5 %       ramelteon (ROZEREM) 8 mg tablet Take 1 Tab by mouth nightly as needed for Sleep. 30 Tab 0    ONETOUCH VERIO FLEX misc       aspirin 81 mg tablet Take 1 Tab by mouth daily. 1 Tab 0       The patient has a family history of    Social History     Tobacco Use    Smoking status: Current Every Day Smoker     Packs/day: 0.50     Years: 1.00     Pack years: 0.50     Types: Cigarettes    Smokeless tobacco: Never Used   Substance Use Topics    Alcohol use: Yes     Alcohol/week: 4.2 standard drinks     Types: 5 Shots of liquor per week     Comment: occassionally    Drug use: Yes     Types: Marijuana, Cocaine       Lab Results   Component Value Date/Time    Cholesterol, total 191 04/11/2019 12:00 AM    HDL Cholesterol 44 04/11/2019 12:00 AM    LDL, calculated 135 (H) 04/11/2019 12:00 AM    Triglyceride 59 04/11/2019 12:00 AM    CHOL/HDL Ratio 2.6 07/30/2018 11:43 AM        BP Readings from Last 3 Encounters:   09/03/19 122/70   08/26/19 144/90   08/01/19 118/76        Pulse Readings from Last 3 Encounters:   09/03/19 68   08/26/19 90   08/01/19 69       Wt Readings from Last 3 Encounters:   09/03/19 83.9 kg (185 lb)   08/26/19 85.7 kg (189 lb)   08/01/19 86.4 kg (190 lb 6.4 oz)         EKG: unchanged from previous tracings, normal sinus rhythm, nonspecific ST and T waves changes, LVH with repolarization changes.

## 2019-09-03 NOTE — LETTER
NOTIFICATION OF RETURN TO WORK / SCHOOL 
 
9/3/2019 4:01 PM 
 
Mr. Don Medina 
[address] [place] [id number] Misti Carty To Whom It May Concern: 
 
Don Medina was under the care of [address] [place] [address] and was seen today for follow up visit. If there are questions or concerns please have the patient contact our office.  
 
Sincerely, 
 
 
Augusto Aragon MD

## 2019-09-04 RX ORDER — GABAPENTIN 600 MG/1
TABLET ORAL
Qty: 90 TAB | Refills: 0 | Status: SHIPPED | OUTPATIENT
Start: 2019-09-04 | End: 2019-10-17 | Stop reason: SDUPTHER

## 2019-09-25 ENCOUNTER — TELEPHONE (OUTPATIENT)
Dept: FAMILY MEDICINE CLINIC | Age: 59
End: 2019-09-25

## 2019-09-25 ENCOUNTER — PATIENT OUTREACH (OUTPATIENT)
Dept: FAMILY MEDICINE CLINIC | Age: 59
End: 2019-09-25

## 2019-09-25 DIAGNOSIS — Z12.11 COLON CANCER SCREENING: Primary | ICD-10-CM

## 2019-10-07 ENCOUNTER — TELEPHONE (OUTPATIENT)
Dept: FAMILY MEDICINE CLINIC | Age: 59
End: 2019-10-07

## 2019-10-17 DIAGNOSIS — E11.40 TYPE 2 DIABETES MELLITUS WITH DIABETIC NEUROPATHY, WITH LONG-TERM CURRENT USE OF INSULIN (HCC): ICD-10-CM

## 2019-10-17 DIAGNOSIS — Z79.4 TYPE 2 DIABETES MELLITUS WITH DIABETIC NEUROPATHY, WITH LONG-TERM CURRENT USE OF INSULIN (HCC): ICD-10-CM

## 2019-10-17 RX ORDER — GABAPENTIN 600 MG/1
TABLET ORAL
Qty: 90 TAB | Refills: 2 | Status: SHIPPED | OUTPATIENT
Start: 2019-10-17 | End: 2019-11-26 | Stop reason: ALTCHOICE

## 2019-11-07 ENCOUNTER — TELEPHONE (OUTPATIENT)
Dept: FAMILY MEDICINE CLINIC | Age: 59
End: 2019-11-07

## 2019-11-11 ENCOUNTER — TELEPHONE (OUTPATIENT)
Dept: FAMILY MEDICINE CLINIC | Age: 59
End: 2019-11-11

## 2019-11-11 NOTE — TELEPHONE ENCOUNTER
Mr. Dilan Banks called asking for the phone number for the nephrologist he was referred to. I called [phone] for [facility] and was told that Mr. Dawkins \"no showed\" for his new patient appointment on 9/30/19. I was given the number for the [facility] office [phone]. I called Mr. Dawkins back and gave him this number to call and speak to Ari. Apparently he has another appointment scheduled for 11/21.

## 2019-11-15 NOTE — TELEPHONE ENCOUNTER
Reconciled our medication list with what patient states he is taking. There were many discrepancies. Called patient and asked that he schedule an appt to discuss this matter. He stated that he will call back to schedule an appt.

## 2019-11-20 ENCOUNTER — PATIENT OUTREACH (OUTPATIENT)
Dept: FAMILY MEDICINE CLINIC | Age: 59
End: 2019-11-20

## 2019-11-20 DIAGNOSIS — Z79.4 TYPE 2 DIABETES MELLITUS WITH DIABETIC NEUROPATHY, WITH LONG-TERM CURRENT USE OF INSULIN (HCC): ICD-10-CM

## 2019-11-20 DIAGNOSIS — E11.40 TYPE 2 DIABETES MELLITUS WITH DIABETIC NEUROPATHY, WITH LONG-TERM CURRENT USE OF INSULIN (HCC): ICD-10-CM

## 2019-11-20 RX ORDER — INSULIN GLARGINE 100 [IU]/ML
INJECTION, SOLUTION SUBCUTANEOUS
Qty: 5 ADJUSTABLE DOSE PRE-FILLED PEN SYRINGE | Refills: 1 | Status: SHIPPED | OUTPATIENT
Start: 2019-11-20 | End: 2020-03-15

## 2019-11-20 NOTE — PROGRESS NOTES
health screening:    No indication Mr. Angella Donald has returned to office to  a new fit kit nor has he completed the kit of old. Closed this episode of care.

## 2019-11-21 ENCOUNTER — OFFICE VISIT (OUTPATIENT)
Dept: FAMILY MEDICINE CLINIC | Age: 59
End: 2019-11-21

## 2019-11-21 VITALS
SYSTOLIC BLOOD PRESSURE: 139 MMHG | HEIGHT: 69 IN | OXYGEN SATURATION: 100 % | HEART RATE: 64 BPM | BODY MASS INDEX: 28.64 KG/M2 | WEIGHT: 193.4 LBS | RESPIRATION RATE: 20 BRPM | DIASTOLIC BLOOD PRESSURE: 89 MMHG | TEMPERATURE: 97.8 F

## 2019-11-21 DIAGNOSIS — N28.9 RENAL INSUFFICIENCY: ICD-10-CM

## 2019-11-21 DIAGNOSIS — M25.512 ACUTE PAIN OF LEFT SHOULDER: ICD-10-CM

## 2019-11-21 DIAGNOSIS — Z79.4 TYPE 2 DIABETES MELLITUS WITH DIABETIC NEUROPATHY, WITH LONG-TERM CURRENT USE OF INSULIN (HCC): ICD-10-CM

## 2019-11-21 DIAGNOSIS — G47.09 OTHER INSOMNIA: ICD-10-CM

## 2019-11-21 DIAGNOSIS — E11.40 TYPE 2 DIABETES MELLITUS WITH DIABETIC NEUROPATHY, WITH LONG-TERM CURRENT USE OF INSULIN (HCC): ICD-10-CM

## 2019-11-21 RX ORDER — IBUPROFEN 800 MG/1
800 TABLET ORAL
Qty: 60 TAB | Refills: 1 | Status: SHIPPED | OUTPATIENT
Start: 2019-11-21 | End: 2020-04-08 | Stop reason: ALTCHOICE

## 2019-11-21 RX ORDER — RAMELTEON 8 MG/1
8 TABLET ORAL
Qty: 30 TAB | Refills: 0 | Status: SHIPPED | OUTPATIENT
Start: 2019-11-21 | End: 2020-04-08 | Stop reason: ALTCHOICE

## 2019-11-21 RX ORDER — GLIPIZIDE 10 MG/1
10 TABLET ORAL 2 TIMES DAILY
Qty: 180 TAB | Refills: 1 | Status: SHIPPED | OUTPATIENT
Start: 2019-11-21 | End: 2020-05-06 | Stop reason: ALTCHOICE

## 2019-11-21 NOTE — PROGRESS NOTES
Melchor Mclaughlin presents today for   Chief Complaint   Patient presents with    Diabetes    Insomnia       Melchor Mclaughlin preferred language for health care discussion is english/other. Is someone accompanying this pt? no    Is the patient using any DME equipment during [address]? no    Depression Screening:  3 most recent PHQ Screens 4/11/2019   PHQ Not Done -   Little interest or pleasure in doing things Not at all   Feeling down, depressed, irritable, or hopeless Not at all   Total Score PHQ 2 0       Learning Assessment:  Learning Assessment 4/11/2019   PRIMARY LEARNER Patient   HIGHEST LEVEL OF EDUCATION - PRIMARY LEARNER  DID NOT GRADUATE HIGH SCHOOL   BARRIERS PRIMARY LEARNER NONE   CO-LEARNER CAREGIVER No   PRIMARY LANGUAGE ENGLISH   LEARNER PREFERENCE PRIMARY LISTENING   ANSWERED BY self   RELATIONSHIP SELF       Abuse Screening:  Abuse Screening Questionnaire 4/11/2019   Do you ever feel afraid of your partner? N   Are you in a relationship with someone who physically or mentally threatens you? N   Is it safe for you to go home? Y       Generalized Anxiety  No flowsheet data found. Health Maintenance Due   Topic Date Due    Hepatitis C Screening  1960    Pneumococcal 0-64 years (1 of 1 - PPSV23) 06/02/1964    DTaP/Tdap/Td series (1 - Tdap) 06/02/1969    Shingrix Vaccine Age 50> (1 of 2) 06/02/2008    FOBT Q 1 YEAR AGE 50-75  06/02/2008    FOOT EXAM Q1  07/11/2019    Influenza Age 9 to Adult  08/01/2019    HEMOGLOBIN A1C Q6M  10/11/2019   . Health Maintenance reviewed and discussed and ordered per Provider. Coordination of Care:  1. Have you been to the ER, urgent care clinic since your last visit? Hospitalized since your last visit? no    2. Have you seen or consulted any other health care providers outside of the [address] [place] [address] since your last visit? Include any pap smears or colon screening.  no      Advance Directive discussed 7/31/19

## 2019-11-21 NOTE — PROGRESS NOTES
SHARON Rubio is a 62 y.o. male  Chief Complaint   Patient presents with    Diabetes    Insomnia      He went to the kidney doctor today. Reports his blood glucose machine keeps reading an error and is not working well. He states he has been using his cousins. He denies dizziness, blurred vision, nausea, vomiting, or skin rash. He reports continual pain of needles and burning in his feet. He is taking the gabapentin and he wants to change to something that may work better. He is requesting a refill on his Remeron for his insomnia. He is requesting a refill on his motrin. Past Medical History  Past Medical History:   Diagnosis Date    Alcohol use     Fri-Sun one fifth; Mon-Thur: Pint of liquor    CAD (coronary artery disease)     Cardiac angioplasty with stent 07/29/2009    2.5 x 13 Cypher stent to CX.  Cardiac cath 11/09/2011    RCA patent. LM patent. LAD patent. mD1 55%. CX patent. Prior stent patent. Edison 85% (2.5 x 15-mm Xience stent, resid 0%). LVEDP 12. EF 40-45%. High lateral hypk (RI distribution).  Cardiac inferior STEMI 2009    Current smoker     contemplating stopping    Diabetes ([facility] [address]     type 2-insulin dependent    GERD (gastroesophageal reflux disease)     HTN (hypertension)     Hyperlipidemia     Migraine     Myocardial infarction [facility])        Surgical History  Past Surgical History:   Procedure Laterality Date    HX CORONARY STENT PLACEMENT  07/29/2009    HX HEART CATHETERIZATION  8/30/2011    HX HEART CATHETERIZATION  11/09/2011    HX WISDOM TEETH EXTRACTION      x4        Medications  Current Outpatient Medications   Medication Sig Dispense Refill    ONETOUCH VERIO FLEX misc Use twice daily for blood glucose checks 1 Each 0    glipiZIDE (GLUCOTROL) 10 mg tablet Take 1 Tab by mouth two (2) times a day. 180 Tab 1    ibuprofen (MOTRIN) 800 mg tablet Take 1 Tab by mouth every eight (8) hours as needed for Pain.  60 Tab 1    ramelteon (ROZEREM) 8 mg tablet Take 1 Tab by mouth nightly as needed for Sleep. 30 Tab 0    SITagliptin-metFORMIN (JANUMET) 50-1,000 mg per tablet Take 1 Tab by mouth two (2) times daily (with meals). 180 Tab 1    multivitamin-iron-FA, hematinic, (CEROVITE ADVANCED FORMULA)  mg-mcg tab tablet Take 1 Tab by mouth daily. 90 Tab 1    LANTUS SOLOSTAR U-100 INSULIN 100 unit/mL (3 mL) inpn INJECT 50 UNITS UNDER THE SKIN DAILY 5 Adjustable Dose Pre-filled Pen Syringe 1    gabapentin (NEURONTIN) 600 mg tablet TAKE ONE TABLET BY MOUTH THREE TIMES A DAY 90 Tab 2    ergocalciferol (ERGOCALCIFEROL) 50,000 unit capsule Take 1 Cap by mouth every seven (7) days. 4 Cap 6    multivitamin (ONE A DAY) tablet Take 1 Tab by mouth daily. 30 Tab 0    simvastatin (ZOCOR) 20 mg tablet Take 1 Tab by mouth nightly. 90 Tab 3    insulin aspart U-100 (NOVOLOG U-100 INSULIN ASPART) 100 unit/mL injection 10 units with meals 10 mL 0    hydrOXYzine pamoate (VISTARIL) 50 mg capsule TAKE ONE CAPSULE BY MOUTH EVERY EVENING AS NEEDED FOR SLEEP 30 Cap 6    glucose blood VI test strips (ASCENSIA AUTODISC VI, ONE TOUCH ULTRA TEST VI) strip Twice daily-  fasting in the morning and once two hours after a meal. 100 Strip 10    lidocaine (LIDODERM) 5 %       aspirin 81 mg tablet Take 1 Tab by mouth daily. 1 Tab 0    lisinopril (PRINIVIL, ZESTRIL) 40 mg tablet Take 1 Tab by mouth daily. 90 Tab 12    diflunisal (DOLOBID) 500 mg tab Take 1 Tab by mouth two (2) times a day.  21 Tab 0       Allergies  Allergies   Allergen Reactions    Niacin Itching       Family History  Family History   Problem Relation Age of Onset    Hypertension Mother     Heart Attack Mother     Diabetes Mother     Hypertension Father     Heart Attack Father     Diabetes Father     Diabetes Sister     Hypertension Sister     Stroke Sister     Diabetes Brother     Hypertension Brother     Stroke Brother     No Known Problems Maternal Grandmother     No Known Problems Maternal Grandfather     No Known Problems Paternal Grandmother     No Known Problems Paternal Grandfather     No Known Problems Brother     Heart Disease Other     Kidney Disease Other        Social History  Social History     Socioeconomic History    Marital status:      Spouse name: Not on file    Number of children: Not on file    Years of education: Not on file    Highest education level: Not on file   Occupational History    Not on file   Social Needs    Financial resource strain: Not on file    Food insecurity:     Worry: Not on file     Inability: Not on file    Transportation needs:     Medical: Not on file     Non-medical: Not on file   Tobacco Use    Smoking status: Current Every Day Smoker     Packs/day: 0.50     Years: 1.00     Pack years: 0.50     Types: Cigarettes    Smokeless tobacco: Never Used   Substance and Sexual Activity    Alcohol use: Yes     Alcohol/week: 4.2 standard drinks     Types: 5 Shots of liquor per week     Comment: occassionally    Drug use: Yes     Types: Marijuana, Cocaine    Sexual activity: Yes   Lifestyle    Physical activity:     Days per week: Not on file     Minutes per session: Not on file    Stress: Not on file   Relationships    Social connections:     Talks on phone: Not on file     Gets together: Not on file     Attends Advent service: Not on file     Active member of club or organization: Not on file     Attends meetings of clubs or organizations: Not on file     Relationship status: Not on file    Intimate partner violence:     Fear of current or ex partner: Not on file     Emotionally abused: Not on file     Physically abused: Not on file     Forced sexual activity: Not on file   Other Topics Concern    Not on file   Social History Narrative     at [employer].        Problem List  Patient Active Problem List   Diagnosis Code    Hyperlipidemia E78.5    HTN (hypertension) I10    Insulin dependent diabetes mellitus ([facility] [address] E11.9, Z79.4    Tobacco use disorder F17.200    Coronary atherosclerosis of native coronary artery I25.10    Tobacco dependence F17.200    Other and unspecified alcohol dependence, continuous drinking behavior F10.20    Mediastinal adenopathy R59.0    Diabetic neuropathy (HCC) E11.40    Vitamin D deficiency E55.9    Other insomnia G47.09    Type 2 diabetes with nephropathy (HCC) E11.21    Otalgia H92.09    Other fatigue R53.83       Review of Systems  Review of Systems   Constitutional: Negative for diaphoresis. Eyes: Negative for blurred vision. Respiratory: Negative for shortness of breath. Cardiovascular: Negative for chest pain. Gastrointestinal: Negative for abdominal pain, nausea and vomiting. Musculoskeletal: Positive for myalgias (feet). Neurological: Positive for tingling. Negative for dizziness and weakness. Psychiatric/Behavioral: The patient has insomnia. Vital Signs  Vitals:    11/21/19 1525   BP: 139/89   Pulse: 64   Resp: 20   Temp: 97.8 °F (36.6 °C)   TempSrc: Oral   SpO2: 100%   Weight: 193 lb 6.4 oz (87.7 kg)   Height: 5' 9\" (1.753 m)   PainSc:   0 - No pain       Physical Exam  Physical Exam  Vitals signs reviewed. HENT:      Nose: Nose normal.      Mouth/Throat:      Mouth: Mucous membranes are moist.   Cardiovascular:      Rate and Rhythm: Normal rate and regular rhythm. Pulmonary:      Effort: Pulmonary effort is normal.      Breath sounds: Normal breath sounds. Abdominal:      General: Abdomen is flat. Palpations: Abdomen is soft. Musculoskeletal:         General: No swelling or tenderness. Neurological:      Mental Status: He is oriented to person, place, and time.    Psychiatric:         Mood and Affect: Mood normal.         Diagnostics  Orders Placed This Encounter    METABOLIC PANEL, COMPREHENSIVE     Standing Status:   Future     Number of Occurrences:   1     Standing Expiration Date:   11/21/2020    LIPID PANEL     Standing Status:   Future     Number of Occurrences:   1     Standing Expiration Date:   11/21/2020    CBC WITH AUTOMATED DIFF     Standing Status:   Future     Number of Occurrences:   1     Standing Expiration Date:   11/21/2020    HEMOGLOBIN A1C WITH EAG     Standing Status:   Future     Number of Occurrences:   1     Standing Expiration Date:   11/21/2020    VITAMIN D, 25 HYDROXY     Standing Status:   Future     Number of Occurrences:   1     Standing Expiration Date:   11/21/2020    glipiZIDE (GLUCOTROL) 10 mg tablet     Sig: Take 1 Tab by mouth two (2) times a day. Dispense:  180 Tab     Refill:  1    ibuprofen (MOTRIN) 800 mg tablet     Sig: Take 1 Tab by mouth every eight (8) hours as needed for Pain. Dispense:  60 Tab     Refill:  1    ramelteon (ROZEREM) 8 mg tablet     Sig: Take 1 Tab by mouth nightly as needed for Sleep. Dispense:  30 Tab     Refill:  0    SITagliptin-metFORMIN (JANUMET) 50-1,000 mg per tablet     Sig: Take 1 Tab by mouth two (2) times daily (with meals). Dispense:  180 Tab     Refill:  1    DISCONTD: multivitamin/iron/folic acid (CEROVITE ADVANCED FORMULA PO)     Sig: Take  by mouth.  multivitamin-iron-FA, hematinic, (CEROVITE ADVANCED FORMULA)  mg-mcg tab tablet     Sig: Take 1 Tab by mouth daily.      Dispense:  90 Tab     Refill:  1    ONETOUCH VERIO FLEX misc     Sig: Use twice daily for blood glucose checks     Dispense:  1 Each     Refill:  0       Results  Results for orders placed or performed during the hospital encounter of 07/06/19   MICROALBUMIN, UR, RAND   Result Value Ref Range    Microalbumin,urine random 3.58 (H) 0 - 3.0 MG/DL    Creatinine, urine 226.00 (H) 30 - 125 mg/dL    Microalbumin/Creat ratio (mg/g creat) 16 0 - 30 mg/g   METABOLIC PANEL, COMPREHENSIVE   Result Value Ref Range    Sodium 142 136 - 145 mmol/L    Potassium 4.6 3.5 - 5.5 mmol/L    Chloride 108 100 - 108 mmol/L    CO2 28 21 - 32 mmol/L    Anion gap 6 3.0 - 18 mmol/L    Glucose 131 (H) 74 - 99 mg/dL    BUN 10 7.0 - 18 MG/DL    Creatinine 1.07 0.6 - 1.3 MG/DL    BUN/Creatinine ratio 9 (L) 12 - 20      GFR est AA >60 >60 ml/min/1.73m2    GFR est non-AA >60 >60 ml/min/1.73m2    Calcium 8.6 8.5 - 10.1 MG/DL    Bilirubin, total 0.2 0.2 - 1.0 MG/DL    ALT (SGPT) 22 16 - 61 U/L    AST (SGOT) 19 15 - 37 U/L    Alk. phosphatase 90 45 - 117 U/L    Protein, total 6.7 6.4 - 8.2 g/dL    Albumin 3.6 3.4 - 5.0 g/dL    Globulin 3.1 2.0 - 4.0 g/dL    A-G Ratio 1.2 0.8 - 1.7         Assessment and Plan  Diagnoses and all orders for this visit:    1. Insulin dependent diabetes mellitus (HCC)  -     glipiZIDE (GLUCOTROL) 10 mg tablet; Take 1 Tab by mouth two (2) times a day. -     METABOLIC PANEL, COMPREHENSIVE; Future  -     LIPID PANEL; Future  -     CBC WITH AUTOMATED DIFF; Future  -     HEMOGLOBIN A1C WITH EAG; Future  -     VITAMIN D, 25 HYDROXY; Future    2. Acute pain of left shoulder  -     ibuprofen (MOTRIN) 800 mg tablet; Take 1 Tab by mouth every eight (8) hours as needed for Pain.  -     METABOLIC PANEL, COMPREHENSIVE; Future  -     LIPID PANEL; Future  -     CBC WITH AUTOMATED DIFF; Future  -     HEMOGLOBIN A1C WITH EAG; Future  -     VITAMIN D, 25 HYDROXY; Future    3. Other insomnia  -     ramelteon (ROZEREM) 8 mg tablet; Take 1 Tab by mouth nightly as needed for Sleep. -     METABOLIC PANEL, COMPREHENSIVE; Future  -     LIPID PANEL; Future  -     CBC WITH AUTOMATED DIFF; Future  -     HEMOGLOBIN A1C WITH EAG; Future  -     VITAMIN D, 25 HYDROXY; Future    4. Type 2 diabetes mellitus with diabetic neuropathy, with long-term current use of insulin (HCC)  -     SITagliptin-metFORMIN (JANUMET) 50-1,000 mg per tablet; Take 1 Tab by mouth two (2) times daily (with meals). -     METABOLIC PANEL, COMPREHENSIVE; Future  -     LIPID PANEL; Future  -     CBC WITH AUTOMATED DIFF; Future  -     HEMOGLOBIN A1C WITH EAG;  Future  -     VITAMIN D, 25 HYDROXY; Future  -     ONETOUCH VERIO FLEX misc; Use twice daily for blood glucose checks    5. Renal insufficiency    Other orders  -     multivitamin-iron-FA, hematinic, (CEROVITE ADVANCED FORMULA)  mg-mcg tab tablet; Take 1 Tab by mouth daily. Discussed Lyrica. Will have to wait for labs as I discussed my concerns regarding patient's renal status. Will check CMP. After care summary printed and reviewed with patient. Plan reviewed with patient. Questions answered. Patient verbalized understanding of plan and is in agreement with plan. Patient to follow up in one month or earlier if symptoms worsen. Follow-up and Dispositions    · Return if symptoms worsen or fail to improve.        Dez Crowder, FARZANAP-C

## 2019-11-22 ENCOUNTER — HOSPITAL ENCOUNTER (OUTPATIENT)
Dept: LAB | Age: 59
Discharge: HOME | End: 2019-11-22

## 2019-11-22 LAB — SENTARA SPECIMEN COL,SENBCF: NORMAL

## 2019-11-22 PROCEDURE — 99001 SPECIMEN HANDLING PT-LAB: CPT

## 2019-11-23 LAB
25(OH)D3 SERPL-MCNC: 26 NG/ML (ref 32–100)
A-G RATIO,AGRAT: 1.7 RATIO (ref 1.1–2.6)
ABSOLUTE LYMPHOCYTE COUNT, 10803: 1.3 K/UL (ref 1–4.8)
ALBUMIN SERPL-MCNC: 4.5 G/DL (ref 3.5–5)
ALBUMIN SERPL-MCNC: 4.6 G/DL (ref 3.5–5)
ALP SERPL-CCNC: 76 U/L (ref 40–125)
ALT SERPL-CCNC: 15 U/L (ref 5–40)
ANION GAP SERPL CALC-SCNC: 14 MMOL/L
AST SERPL W P-5'-P-CCNC: 15 U/L (ref 10–37)
AVG GLU, 10930: 172 MG/DL (ref 91–123)
BASOPHILS # BLD: 0 K/UL (ref 0–0.2)
BASOPHILS NFR BLD: 1 % (ref 0–2)
BILIRUB SERPL-MCNC: 0.2 MG/DL (ref 0.2–1.2)
BUN SERPL-MCNC: 20 MG/DL (ref 6–22)
BUN SERPL-MCNC: 20 MG/DL (ref 6–22)
CALCIUM SERPL-MCNC: 9.6 MG/DL (ref 8.4–10.5)
CALCIUM SERPL-MCNC: 9.6 MG/DL (ref 8.4–10.5)
CHLORIDE SERPL-SCNC: 98 MMOL/L (ref 98–110)
CHLORIDE SERPL-SCNC: 99 MMOL/L (ref 98–110)
CHOLEST SERPL-MCNC: 170 MG/DL (ref 110–200)
CO2 SERPL-SCNC: 24 MMOL/L (ref 20–32)
CO2 SERPL-SCNC: 24 MMOL/L (ref 20–32)
CORRECTED SERUM CREATININE, 11222: 1.14 MG/DL
CREAT SERPL-MCNC: 1.1 MG/DL (ref 0.8–1.6)
CREAT SERPL-MCNC: 1.1 MG/DL (ref 0.8–1.6)
CREATININE, URINE: 105 MG/DL
EOSINOPHIL # BLD: 0.1 K/UL (ref 0–0.5)
EOSINOPHIL NFR BLD: 2 % (ref 0–6)
ERYTHROCYTE [DISTWIDTH] IN BLOOD BY AUTOMATED COUNT: 13.6 % (ref 10–15.5)
GFRAA, 66117: >60
GFRAA, 66117: >60
GFRNA, 66118: >60
GFRNA, 66118: >60
GLOBULIN,GLOB: 2.7 G/DL (ref 2–4)
GLUCOSE SERPL-MCNC: 299 MG/DL (ref 70–99)
GLUCOSE SERPL-MCNC: 304 MG/DL (ref 70–99)
GRANULOCYTES,GRANS: 52 % (ref 40–75)
HBA1C MFR BLD HPLC: 7.6 % (ref 4.8–5.6)
HCT VFR BLD AUTO: 42 % (ref 39.3–51.6)
HDLC SERPL-MCNC: 37 MG/DL
HDLC SERPL-MCNC: 4.6 MG/DL (ref 0–5)
HGB BLD-MCNC: 13.6 G/DL (ref 13.1–17.2)
LDL/HDL RATIO,LDHD: 2.9
LDLC SERPL CALC-MCNC: 106 MG/DL (ref 50–99)
LYMPHOCYTES, LYMLT: 37 % (ref 20–45)
Lab: 13 MG/DL
MCH RBC QN AUTO: 29 PG (ref 26–34)
MCHC RBC AUTO-ENTMCNC: 32 G/DL (ref 31–36)
MCV RBC AUTO: 91 FL (ref 80–95)
MONOCYTES # BLD: 0.3 K/UL (ref 0.1–1)
MONOCYTES NFR BLD: 9 % (ref 3–12)
NEUTROPHILS # BLD AUTO: 1.8 K/UL (ref 1.8–7.7)
NON-HDL CHOLESTEROL, 011976: 133 MG/DL
PHOSPHATE SERPL-MCNC: 3.4 MG/DL (ref 2.5–4.5)
PLATELET # BLD AUTO: 188 K/UL (ref 140–440)
PMV BLD AUTO: 12.8 FL (ref 9–13)
POTASSIUM SERPL-SCNC: 4.3 MMOL/L (ref 3.5–5.5)
POTASSIUM SERPL-SCNC: 4.4 MMOL/L (ref 3.5–5.5)
PROT SERPL-MCNC: 7.3 G/DL (ref 6.2–8.1)
PTH INTACT,IPTH: 31 PG/ML (ref 15–65)
RBC # BLD AUTO: 4.63 M/UL (ref 3.8–5.8)
SODIUM SERPL-SCNC: 135 MMOL/L (ref 133–145)
SODIUM SERPL-SCNC: 137 MMOL/L (ref 133–145)
TRIGL SERPL-MCNC: 136 MG/DL (ref 40–149)
VLDLC SERPL CALC-MCNC: 27 MG/DL (ref 8–30)
WBC # BLD AUTO: 3.5 K/UL (ref 4–11)

## 2019-11-24 RX ORDER — BLOOD-GLUCOSE METER
EACH MISCELLANEOUS
Qty: 1 EACH | Refills: 0 | Status: SHIPPED | OUTPATIENT
Start: 2019-11-24 | End: 2020-05-05 | Stop reason: CLARIF

## 2019-11-26 DIAGNOSIS — Z79.4 TYPE 2 DIABETES MELLITUS WITH DIABETIC NEUROPATHY, WITH LONG-TERM CURRENT USE OF INSULIN (HCC): Primary | ICD-10-CM

## 2019-11-26 DIAGNOSIS — E11.40 TYPE 2 DIABETES MELLITUS WITH DIABETIC NEUROPATHY, WITH LONG-TERM CURRENT USE OF INSULIN (HCC): Primary | ICD-10-CM

## 2019-11-26 RX ORDER — PREGABALIN 25 MG/1
25 CAPSULE ORAL
Status: CANCELLED | OUTPATIENT
Start: 2019-11-26

## 2019-11-26 RX ORDER — PREGABALIN 25 MG/1
25 CAPSULE ORAL 2 TIMES DAILY
Qty: 60 CAP | Refills: 0 | Status: SHIPPED | OUTPATIENT
Start: 2019-11-26 | End: 2019-11-27 | Stop reason: SDUPTHER

## 2019-11-27 DIAGNOSIS — Z79.4 TYPE 2 DIABETES MELLITUS WITH DIABETIC NEUROPATHY, WITH LONG-TERM CURRENT USE OF INSULIN (HCC): ICD-10-CM

## 2019-11-27 DIAGNOSIS — E11.40 TYPE 2 DIABETES MELLITUS WITH DIABETIC NEUROPATHY, WITH LONG-TERM CURRENT USE OF INSULIN (HCC): ICD-10-CM

## 2019-11-27 RX ORDER — PREGABALIN 25 MG/1
25 CAPSULE ORAL 2 TIMES DAILY
Qty: 60 CAP | Refills: 0 | Status: SHIPPED | OUTPATIENT
Start: 2019-11-27 | End: 2020-01-02 | Stop reason: DRUGHIGH

## 2019-11-27 NOTE — PROGRESS NOTES
received a call from the pharmacy indicating that they did not have Lyrica and a new prescription would need to sent to a different location [facility] in [place]). Prescription resent.  Yane Mukherjee

## 2020-01-02 ENCOUNTER — OFFICE VISIT (OUTPATIENT)
Dept: FAMILY MEDICINE CLINIC | Age: 60
End: 2020-01-02

## 2020-01-02 VITALS — BODY MASS INDEX: 28.05 KG/M2 | RESPIRATION RATE: 18 BRPM | WEIGHT: 189.4 LBS | HEIGHT: 69 IN | TEMPERATURE: 98.1 F

## 2020-01-02 DIAGNOSIS — E78.00 HYPERCHOLESTEROLEMIA: ICD-10-CM

## 2020-01-02 DIAGNOSIS — I10 ESSENTIAL HYPERTENSION: ICD-10-CM

## 2020-01-02 DIAGNOSIS — Z79.4 TYPE 2 DIABETES MELLITUS WITH DIABETIC NEUROPATHY, WITH LONG-TERM CURRENT USE OF INSULIN (HCC): Primary | ICD-10-CM

## 2020-01-02 DIAGNOSIS — E11.40 TYPE 2 DIABETES MELLITUS WITH DIABETIC NEUROPATHY, WITH LONG-TERM CURRENT USE OF INSULIN (HCC): Primary | ICD-10-CM

## 2020-01-02 DIAGNOSIS — Z29.9 PREVENTIVE MEASURE: ICD-10-CM

## 2020-01-02 DIAGNOSIS — Z71.2 ENCOUNTER TO DISCUSS TEST RESULTS: ICD-10-CM

## 2020-01-02 RX ORDER — PREGABALIN 50 MG/1
50 CAPSULE ORAL 2 TIMES DAILY
Qty: 60 CAP | Refills: 0 | Status: SHIPPED | OUTPATIENT
Start: 2020-01-02 | End: 2020-01-14 | Stop reason: SDUPTHER

## 2020-01-02 NOTE — PROGRESS NOTES
SHARON Monroe is a 62 y.o. male  Chief Complaint   Patient presents with    Diabetes     follow up    Hypertension    Labs     completed 11/22/19     62year-old [facility] American male presents for follow-up of diabetes mellitus management and review of lab results. Patient reports several missed dosages of medications last week, but unable remember how many doses and of which medications. Patient reports chronic right foot neuropathy for approximately 1 year that has not improved with current Lyrica dosage. Patient states he has not been checking his blood glucose at home due to a broken glucometer, last blood glucose he checked was approximately 1 month ago with a reading of 120. he does admit to eating unhealthy some days. He denies nausea, vomiting, or abdominal pain. Patient states he had appointment with nephrology and is awaiting a follow-up. Past Medical History  Past Medical History:   Diagnosis Date    Alcohol use     Fri-Sun one fifth; Mon-Thur: Pint of liquor    CAD (coronary artery disease)     Cardiac angioplasty with stent 07/29/2009    2.5 x 13 Cypher stent to CX.  Cardiac cath 11/09/2011    RCA patent. LM patent. LAD patent. mD1 55%. CX patent. Prior stent patent. Edison 85% (2.5 x 15-mm Xience stent, resid 0%). LVEDP 12. EF 40-45%. High lateral hypk (RI distribution).       Cardiac inferior STEMI 2009    Current smoker     contemplating stopping    Diabetes ([facility] [address]     type 2-insulin dependent    GERD (gastroesophageal reflux disease)     HTN (hypertension)     Hyperlipidemia     Migraine     Myocardial infarction [facility])        Surgical History  Past Surgical History:   Procedure Laterality Date    HX CORONARY STENT PLACEMENT  07/29/2009    HX HEART CATHETERIZATION  8/30/2011    HX HEART CATHETERIZATION  11/09/2011    HX WISDOM TEETH EXTRACTION      x4        Medications  Current Outpatient Medications   Medication Sig Dispense Refill    pregabalin (LYRICA) 50 mg capsule Take 1 Cap by mouth two (2) times a day. Max Daily Amount: 100 mg. 60 Cap 0    ONETOUCH VERIO FLEX misc Use twice daily for blood glucose checks 1 Each 0    glipiZIDE (GLUCOTROL) 10 mg tablet Take 1 Tab by mouth two (2) times a day. 180 Tab 1    ibuprofen (MOTRIN) 800 mg tablet Take 1 Tab by mouth every eight (8) hours as needed for Pain. 60 Tab 1    ramelteon (ROZEREM) 8 mg tablet Take 1 Tab by mouth nightly as needed for Sleep. 30 Tab 0    SITagliptin-metFORMIN (JANUMET) 50-1,000 mg per tablet Take 1 Tab by mouth two (2) times daily (with meals). 180 Tab 1    multivitamin-iron-FA, hematinic, (CEROVITE ADVANCED FORMULA)  mg-mcg tab tablet Take 1 Tab by mouth daily. 90 Tab 1    LANTUS SOLOSTAR U-100 INSULIN 100 unit/mL (3 mL) inpn INJECT 50 UNITS UNDER THE SKIN DAILY 5 Adjustable Dose Pre-filled Pen Syringe 1    ergocalciferol (ERGOCALCIFEROL) 50,000 unit capsule Take 1 Cap by mouth every seven (7) days. 4 Cap 6    multivitamin (ONE A DAY) tablet Take 1 Tab by mouth daily. 30 Tab 0    simvastatin (ZOCOR) 20 mg tablet Take 1 Tab by mouth nightly. 90 Tab 3    insulin aspart U-100 (NOVOLOG U-100 INSULIN ASPART) 100 unit/mL injection 10 units with meals 10 mL 0    hydrOXYzine pamoate (VISTARIL) 50 mg capsule TAKE ONE CAPSULE BY MOUTH EVERY EVENING AS NEEDED FOR SLEEP 30 Cap 6    glucose blood VI test strips (ASCENSIA AUTODISC VI, ONE TOUCH ULTRA TEST VI) strip Twice daily-  fasting in the morning and once two hours after a meal. 100 Strip 10    lidocaine (LIDODERM) 5 %       aspirin 81 mg tablet Take 1 Tab by mouth daily. 1 Tab 0    lisinopril (PRINIVIL, ZESTRIL) 40 mg tablet Take 1 Tab by mouth daily. 90 Tab 12    diflunisal (DOLOBID) 500 mg tab Take 1 Tab by mouth two (2) times a day.  20 Tab 0       Allergies  Allergies   Allergen Reactions    Niacin Itching       Family History  Family History   Problem Relation Age of Onset    Hypertension Mother     Heart Attack Mother    [address] [place] [address] Diabetes Mother     Hypertension Father     Heart Attack Father     Diabetes Father     Diabetes Sister     Hypertension Sister     Stroke Sister     Diabetes Brother     Hypertension Brother     Stroke Brother     No Known Problems Maternal Grandmother     No Known Problems Maternal Grandfather     No Known Problems Paternal Grandmother     No Known Problems Paternal Grandfather     No Known Problems Brother     Heart Disease Other     Kidney Disease Other        Social History  Social History     Socioeconomic History    Marital status:      Spouse name: Not on file    Number of children: Not on file    Years of education: Not on file    Highest education level: Not on file   Occupational History    Not on file   Social Needs    Financial resource strain: Not on file    Food insecurity:     Worry: Not on file     Inability: Not on file    Transportation needs:     Medical: Not on file     Non-medical: Not on file   Tobacco Use    Smoking status: Current Every Day Smoker     Packs/day: 0.50     Years: 1.00     Pack years: 0.50     Types: Cigarettes    Smokeless tobacco: Never Used   Substance and Sexual Activity    Alcohol use:  Yes     Alcohol/week: 4.2 standard drinks     Types: 5 Shots of liquor per week     Comment: occassionally    Drug use: Yes     Types: Marijuana, Cocaine    Sexual activity: Yes   Lifestyle    Physical activity:     Days per week: Not on file     Minutes per session: Not on file    Stress: Not on file   Relationships    Social connections:     Talks on phone: Not on file     Gets together: Not on file     Attends Anabaptism service: Not on file     Active member of club or organization: Not on file     Attends meetings of clubs or organizations: Not on file     Relationship status: Not on file    Intimate partner violence:     Fear of current or ex partner: Not on file     Emotionally abused: Not on file     Physically abused: Not on file     Forced sexual activity: Not on file   Other Topics Concern    Not on file   Social History Narrative     at [employer]. Problem List  Patient Active Problem List   Diagnosis Code    Hyperlipidemia E78.5    HTN (hypertension) I10    Insulin dependent diabetes mellitus ([facility] [address] E11.9, Z79.4    Tobacco use disorder F17.200    Coronary atherosclerosis of native coronary artery I25.10    Tobacco dependence F17.200    Other and unspecified alcohol dependence, continuous drinking behavior F10.20    Mediastinal adenopathy R59.0    Diabetic neuropathy ([facility] [address] E11.40    Vitamin D deficiency E55.9    Other insomnia G47.09    Type 2 diabetes with nephropathy (HCC) E11.21    Otalgia H92.09    Other fatigue R53.83       Review of Systems  Review of Systems   Constitutional: Negative for chills and fever. Eyes: Negative for blurred vision and double vision. Respiratory: Negative for shortness of breath. Cardiovascular: Negative for chest pain and leg swelling. Gastrointestinal: Negative for abdominal pain, nausea and vomiting. Neurological: Positive for sensory change (numbness/tingling to right foot). Negative for dizziness. Psychiatric/Behavioral: Negative for depression. Vital Signs  Vitals:    01/02/20 1518   Resp: 18   Temp: 98.1 °F (36.7 °C)   TempSrc: Oral   Weight: 189 lb 6.4 oz (85.9 kg)   Height: 5' 9\" (1.753 m)   PainSc:   0 - No pain     3 most recent PHQ Screens 1/2/2020   PHQ Not Done -   Little interest or pleasure in doing things Not at all   Feeling down, depressed, irritable, or hopeless Not at all   Total Score PHQ 2 0         Physical Exam  Physical Exam  Constitutional:       Appearance: Normal appearance. Cardiovascular:      Rate and Rhythm: Regular rhythm. Pulses: Normal pulses. Heart sounds: Normal heart sounds. Pulmonary:      Effort: Pulmonary effort is normal.      Breath sounds: Normal breath sounds.    Musculoskeletal: Feet:    Feet:      Right foot:      Protective Sensation: 5 sites tested. 4 sites sensed. Left foot:      Protective Sensation: 5 sites tested. 5 sites sensed. Neurological:      Mental Status: He is alert. Diagnostics  Orders Placed This Encounter    pregabalin (LYRICA) 50 mg capsule     Sig: Take 1 Cap by mouth two (2) times a day. Max Daily Amount: 100 mg. Dispense:  60 Cap     Refill:  0       Results  Results for orders placed or performed in visit on 11/22/19   HEMOGLOBIN A1C W/O EAG   Result Value Ref Range    Hemoglobin A1c 7.6 (H) 4.8 - 5.6 %    AVG  (H) 91 - 123 mg/dL   PTH INTACT   Result Value Ref Range    PTH, Intact 31 15 - 65 pg/mL   RENAL FUNCTION PANEL   Result Value Ref Range    Glucose 299 (H) 70 - 99 mg/dL    BUN 20 6 - 22 mg/dL    Creatinine 1.1 0.8 - 1.6 mg/dL    Sodium 135 133 - 145 mmol/L    Potassium 4.3 3.5 - 5.5 mmol/L    Chloride 98 98 - 110 mmol/L    CO2 24 20 - 32 mmol/L    Calcium 9.6 8.4 - 10.5 mg/dL    Phosphorus 3.4 2.5 - 4.5 mg/dL    Albumin 4.5 3.5 - 5.0 g/dL    GFRAA >60.0 >60.0    GFRNA >60.0 >60.0    Corrected Serum Creatinine 1.14 mg/dL   CREATININE, UR, RANDOM   Result Value Ref Range    Creatinine, urine 105 mg/dL   PROTEIN,TOTAL,URINE   Result Value Ref Range    TOT DC RND 13 mg/dL         Assessment and Plan  Diagnoses and all orders for this visit:    1. Type 2 diabetes mellitus with diabetic neuropathy, with long-term current use of insulin (HCC)  -     pregabalin (LYRICA) 50 mg capsule; Take 1 Cap by mouth two (2) times a day. Max Daily Amount: 100 mg.    2. Hypercholesterolemia    3. Essential hypertension    4. Preventive measure    5. Encounter to discuss test results    Discussed labs in detail. Will increase Lyrica dose. Discussed side effects and warnings with patient. He verbalized understanding. Follow up with neurology. Recommend patient take glucometer back to pharmacy to gain instruction in how to use this meter.  If his meter is broken and he cannot afford another, I recommend he purchase one OTC from [facility]. Discussed diet and exercise with patient. After care summary printed and reviewed with patient. Plan reviewed with patient. Questions answered. Patient verbalized understanding of plan and is in agreement with plan. Patient to follow up in three months or earlier if symptoms worsen. Follow-up and Dispositions    · Return in about 3 months (around 4/2/2020) for rotuine care with PCP, diabetes.        ELVIS GrantC

## 2020-01-02 NOTE — PROGRESS NOTES
Sanchez Overall presents today for   Chief Complaint   Patient presents with    Diabetes     follow up    Hypertension    Labs     completed 11/22/19       Sanchez Overall preferred language for health care discussion is english/other. Is someone accompanying this pt? no    Is the patient using any DME equipment during [address]? no    Depression Screening:  3 most recent PHQ Screens 1/2/2020   PHQ Not Done -   Little interest or pleasure in doing things Not at all   Feeling down, depressed, irritable, or hopeless Not at all   Total Score PHQ 2 0       Learning Assessment:  Learning Assessment 1/2/2020   PRIMARY LEARNER Patient   HIGHEST LEVEL OF EDUCATION - PRIMARY LEARNER  DID NOT GRADUATE [facility] PRIMARY LEARNER NONE   CO-LEARNER CAREGIVER No   PRIMARY LANGUAGE ENGLISH   LEARNER PREFERENCE PRIMARY LISTENING   ANSWERED BY self   RELATIONSHIP SELF       Abuse Screening:  Abuse Screening Questionnaire 1/2/2020   Do you ever feel afraid of your partner? N   Are you in a relationship with someone who physically or mentally threatens you? N   Is it safe for you to go home? Y       Generalized Anxiety  No flowsheet data found. Health Maintenance Due   Topic Date Due    Hepatitis C Screening  1960    Pneumococcal 0-64 years (1 of 1 - PPSV23) 06/02/1964    DTaP/Tdap/Td series (1 - Tdap) 06/02/1969    Shingrix Vaccine Age 50> (1 of 2) 06/02/2008    FOBT Q 1 YEAR AGE 50-75  06/02/2008    FOOT EXAM Q1  07/11/2019   . Health Maintenance reviewed and discussed and ordered per Provider. Sanchez Overall is updated on all     Coordination of Care:  1. Have you been to the ER, urgent care clinic since your last visit? Hospitalized since your last visit? no    2. Have you seen or consulted any other health care providers outside of the [address] [place] [address] since your last visit? Include any pap smears or colon screening. no      Advance Directive:  1.  Do you have an advance directive in place? Patient Reply:no    2. If not, would you like material regarding how to put one in place?  Patient Reply: no

## 2020-01-14 DIAGNOSIS — Z79.4 TYPE 2 DIABETES MELLITUS WITH DIABETIC NEUROPATHY, WITH LONG-TERM CURRENT USE OF INSULIN (HCC): ICD-10-CM

## 2020-01-14 DIAGNOSIS — E11.40 TYPE 2 DIABETES MELLITUS WITH DIABETIC NEUROPATHY, WITH LONG-TERM CURRENT USE OF INSULIN (HCC): ICD-10-CM

## 2020-01-14 RX ORDER — PREGABALIN 50 MG/1
50 CAPSULE ORAL 2 TIMES DAILY
Qty: 60 CAP | Refills: 0 | Status: SHIPPED | OUTPATIENT
Start: 2020-01-14 | End: 2020-02-03 | Stop reason: SDUPTHER

## 2020-02-03 ENCOUNTER — OFFICE VISIT (OUTPATIENT)
Dept: FAMILY MEDICINE CLINIC | Age: 60
End: 2020-02-03

## 2020-02-03 VITALS
RESPIRATION RATE: 20 BRPM | OXYGEN SATURATION: 99 % | WEIGHT: 187.8 LBS | BODY MASS INDEX: 27.81 KG/M2 | TEMPERATURE: 98.1 F | HEART RATE: 70 BPM | SYSTOLIC BLOOD PRESSURE: 132 MMHG | HEIGHT: 69 IN | DIASTOLIC BLOOD PRESSURE: 88 MMHG

## 2020-02-03 DIAGNOSIS — E78.00 HYPERCHOLESTEROLEMIA: ICD-10-CM

## 2020-02-03 DIAGNOSIS — Z79.4 TYPE 2 DIABETES MELLITUS WITH DIABETIC NEUROPATHY, WITH LONG-TERM CURRENT USE OF INSULIN (HCC): ICD-10-CM

## 2020-02-03 DIAGNOSIS — E11.40 TYPE 2 DIABETES MELLITUS WITH DIABETIC NEUROPATHY, WITH LONG-TERM CURRENT USE OF INSULIN (HCC): ICD-10-CM

## 2020-02-03 DIAGNOSIS — Z12.11 SCREENING FOR COLON CANCER: Primary | ICD-10-CM

## 2020-02-03 DIAGNOSIS — Z11.59 NEED FOR HEPATITIS C SCREENING TEST: ICD-10-CM

## 2020-02-03 RX ORDER — PREGABALIN 50 MG/1
50 CAPSULE ORAL 3 TIMES DAILY
Qty: 100 CAP | Refills: 0 | Status: SHIPPED | OUTPATIENT
Start: 2020-02-03 | End: 2020-02-21

## 2020-02-03 RX ORDER — SIMVASTATIN 20 MG/1
20 TABLET, FILM COATED ORAL
Qty: 90 TAB | Refills: 3 | Status: SHIPPED | OUTPATIENT
Start: 2020-02-03 | End: 2021-01-18 | Stop reason: SDUPTHER

## 2020-02-03 NOTE — PATIENT INSTRUCTIONS
Body Mass Index: Care Instructions Your Care Instructions Body mass index (BMI) can help you see if your weight is raising your risk for health problems. It uses a formula to compare how much you weigh with how tall you are. · A BMI lower than 18.5 is considered underweight. · A BMI between 18.5 and 24.9 is considered healthy. · A BMI between 25 and 29.9 is considered overweight. A BMI of 30 or higher is considered obese. If your BMI is in the normal range, it means that you have a lower risk for weight-related health problems. If your BMI is in the overweight or obese range, you may be at increased risk for weight-related health problems, such as high blood pressure, heart disease, stroke, arthritis or joint pain, and diabetes. If your BMI is in the underweight range, you may be at increased risk for health problems such as fatigue, lower protection (immunity) against illness, muscle loss, bone loss, hair loss, and hormone problems. BMI is just one measure of your risk for weight-related health problems. You may be at higher risk for health problems if you are not active, you eat an unhealthy diet, or you drink too much alcohol or use tobacco products. Follow-up care is a key part of your treatment and safety. Be sure to make and go to all appointments, and call your doctor if you are having problems. It's also a good idea to know your test results and keep a list of the medicines you take. How can you care for yourself at home? · Practice healthy eating habits. This includes eating plenty of fruits, vegetables, whole grains, lean protein, and low-fat dairy. · If your doctor recommends it, get more exercise. Walking is a good choice. Bit by bit, increase the amount you walk every day. Try for at least 30 minutes on most days of the week. · Do not smoke. Smoking can increase your risk for health problems.  If you need help quitting, talk to your doctor about stop-smoking programs and medicines. These can increase your chances of quitting for good. · Limit alcohol to 2 drinks a day for men and 1 drink a day for women. Too much alcohol can cause health problems. If you have a BMI higher than 25 · Your doctor may do other tests to check your risk for weight-related health problems. This may include measuring the distance around your waist. A waist measurement of more than 40 inches in men or 35 inches in women can increase the risk of weight-related health problems. · Talk with your doctor about steps you can take to stay healthy or improve your health. You may need to make lifestyle changes to lose weight and stay healthy, such as changing your diet and getting regular exercise. If you have a BMI lower than 18.5 · Your doctor may do other tests to check your risk for health problems. · Talk with your doctor about steps you can take to stay healthy or improve your health. You may need to make lifestyle changes to gain or maintain weight and stay healthy, such as getting more healthy foods in your diet and doing exercises to build muscle. Where can you learn more? Go to http://nam-leon.info/. Enter S176 in the search box to learn more about \"Body Mass Index: Care Instructions. \" Current as of: October 13, 2016 Content Version: 11.4 © 5455-9618 [employer]. Care instructions adapted under license by [employer] (which disclaims liability or warranty for this information). If you have questions about a medical condition or this instruction, always ask your healthcare professional. [address] any warranty or liability for your use of this information.

## 2020-02-03 NOTE — PROGRESS NOTES
HPI  Lorena Strickland is a 62 y.o. male  Chief Complaint   Patient presents with    Medication Refill     Reports he has not been taking his blood pressure medication. He denies chest pain, shortness of breath or swelling in his lower extremities. He admits to continual smoking. Reports he has not checked his blood glucose lately. He reports he did not take his blood glucose machine back to [facility]. He has not been checking his blood glucose. He reports he has been following with endocrinology and his A1C was down. He reports he still has the tingling and burning in his lower extremities and he would like to increase his Lyrica. He denies any new concerns on today's visit. He reports he did bring his occult stool kit in today but states he collected the stool about 3 days ago. Past Medical History  Past Medical History:   Diagnosis Date    Alcohol use     Fri-Sun one fifth; Mon-Thur: Pint of liquor    CAD (coronary artery disease)     Cardiac angioplasty with stent 07/29/2009    2.5 x 13 Cypher stent to CX.  Cardiac cath 11/09/2011    RCA patent. LM patent. LAD patent. mD1 55%. CX patent. Prior stent patent. Edison 85% (2.5 x 15-mm Xience stent, resid 0%). LVEDP 12. EF 40-45%. High lateral hypk (RI distribution).  Cardiac inferior STEMI 2009    Current smoker     contemplating stopping    Diabetes ([address]     type 2-insulin dependent    GERD (gastroesophageal reflux disease)     HTN (hypertension)     Hyperlipidemia     Migraine     Myocardial infarction [facility])        Surgical History  Past Surgical History:   Procedure Laterality Date    HX CORONARY STENT PLACEMENT  07/29/2009    HX HEART CATHETERIZATION  8/30/2011    HX HEART CATHETERIZATION  11/09/2011    HX WISDOM TEETH EXTRACTION      x4        Medications  Current Outpatient Medications   Medication Sig Dispense Refill    pregabalin (LYRICA) 50 mg capsule Take 1 Cap by mouth three (3) times daily. Max Daily Amount: 150 mg. 100 Cap 0    simvastatin (ZOCOR) 20 mg tablet Take 1 Tab by mouth nightly. 90 Tab 3    ONETOUCH VERIO FLEX misc Use twice daily for blood glucose checks 1 Each 0    glipiZIDE (GLUCOTROL) 10 mg tablet Take 1 Tab by mouth two (2) times a day. 180 Tab 1    ibuprofen (MOTRIN) 800 mg tablet Take 1 Tab by mouth every eight (8) hours as needed for Pain. 60 Tab 1    SITagliptin-metFORMIN (JANUMET) 50-1,000 mg per tablet Take 1 Tab by mouth two (2) times daily (with meals). 180 Tab 1    LANTUS SOLOSTAR U-100 INSULIN 100 unit/mL (3 mL) inpn INJECT 50 UNITS UNDER THE SKIN DAILY 5 Adjustable Dose Pre-filled Pen Syringe 1    ergocalciferol (ERGOCALCIFEROL) 50,000 unit capsule Take 1 Cap by mouth every seven (7) days. 4 Cap 6    multivitamin (ONE A DAY) tablet Take 1 Tab by mouth daily. 30 Tab 0    insulin aspart U-100 (NOVOLOG U-100 INSULIN ASPART) 100 unit/mL injection 10 units with meals 10 mL 0    glucose blood VI test strips (ASCENSIA AUTODISC VI, ONE TOUCH ULTRA TEST VI) strip Twice daily-  fasting in the morning and once two hours after a meal. 100 Strip 10    lidocaine (LIDODERM) 5 %       aspirin 81 mg tablet Take 1 Tab by mouth daily. 1 Tab 0    ramelteon (ROZEREM) 8 mg tablet Take 1 Tab by mouth nightly as needed for Sleep. 30 Tab 0    multivitamin-iron-FA, hematinic, (CEROVITE ADVANCED FORMULA)  mg-mcg tab tablet Take 1 Tab by mouth daily. 90 Tab 1    lisinopril (PRINIVIL, ZESTRIL) 40 mg tablet Take 1 Tab by mouth daily. 90 Tab 12    hydrOXYzine pamoate (VISTARIL) 50 mg capsule TAKE ONE CAPSULE BY MOUTH EVERY EVENING AS NEEDED FOR SLEEP 30 Cap 6    diflunisal (DOLOBID) 500 mg tab Take 1 Tab by mouth two (2) times a day.  20 Tab 0       Allergies  Allergies   Allergen Reactions    Niacin Itching       Family History  Family History   Problem Relation Age of Onset    Hypertension Mother     Heart Attack Mother     Diabetes Mother     Hypertension Father     Heart Attack Father     Diabetes Father     Diabetes Sister     Hypertension Sister     Stroke Sister     Diabetes Brother     Hypertension Brother     Stroke Brother     No Known Problems Maternal Grandmother     No Known Problems Maternal Grandfather     No Known Problems Paternal Grandmother     No Known Problems Paternal Grandfather     No Known Problems Brother     Heart Disease Other     Kidney Disease Other        Social History  Social History     Socioeconomic History    Marital status:      Spouse name: Not on file    Number of children: Not on file    Years of education: Not on file    Highest education level: Not on file   Occupational History    Not on file   Social Needs    Financial resource strain: Not on file    Food insecurity:     Worry: Not on file     Inability: Not on file    Transportation needs:     Medical: Not on file     Non-medical: Not on file   Tobacco Use    Smoking status: Current Every Day Smoker     Packs/day: 0.50     Years: 1.00     Pack years: 0.50     Types: Cigarettes    Smokeless tobacco: Never Used   Substance and Sexual Activity    Alcohol use:  Yes     Alcohol/week: 4.2 standard drinks     Types: 5 Shots of liquor per week     Comment: occassionally    Drug use: Yes     Types: Marijuana, Cocaine    Sexual activity: Yes   Lifestyle    Physical activity:     Days per week: Not on file     Minutes per session: Not on file    Stress: Not on file   Relationships    Social connections:     Talks on phone: Not on file     Gets together: Not on file     Attends Nondenominational service: Not on file     Active member of club or organization: Not on file     Attends meetings of clubs or organizations: Not on file     Relationship status: Not on file    Intimate partner violence:     Fear of current or ex partner: Not on file     Emotionally abused: Not on file     Physically abused: Not on file     Forced sexual activity: Not on file   Other Topics Concern    Not on file   Social History Narrative     at [employer]. Problem List  Patient Active Problem List   Diagnosis Code    Hyperlipidemia E78.5    HTN (hypertension) I10    Insulin dependent diabetes mellitus ([facility] [address] E11.9, Z79.4    Tobacco use disorder F17.200    Coronary atherosclerosis of native coronary artery I25.10    Tobacco dependence F17.200    Other and unspecified alcohol dependence, continuous drinking behavior F10.20    Mediastinal adenopathy R59.0    Diabetic neuropathy ([facility] [address] E11.40    Vitamin D deficiency E55.9    Other insomnia G47.09    Type 2 diabetes with nephropathy (HCC) E11.21    Otalgia H92.09    Other fatigue R53.83       Review of Systems  Review of Systems   Constitutional: Negative for chills and fever. Eyes: Negative for blurred vision. Respiratory: Negative for shortness of breath. Cardiovascular: Negative for chest pain. Gastrointestinal: Negative for abdominal pain, nausea and vomiting. Musculoskeletal: Negative for falls. Skin: Negative for itching and rash. Neurological: Positive for tingling. Negative for dizziness. Vital Signs  Vitals:    02/03/20 1518 02/03/20 1529   BP: (!) 146/93 132/88   Pulse: 70    Resp: 20    Temp: 98.1 °F (36.7 °C)    TempSrc: Oral    SpO2: 99%    Weight: 187 lb 12.8 oz (85.2 kg)    Height: 5' 9\" (1.753 m)    PainSc:   0 - No pain        Physical Exam  Physical Exam  Vitals signs reviewed. HENT:      Nose: Nose normal.   Eyes:      Pupils: Pupils are equal, round, and reactive to light. Neck:      Musculoskeletal: Normal range of motion. Cardiovascular:      Rate and Rhythm: Normal rate and regular rhythm. Pulmonary:      Effort: Pulmonary effort is normal.   Abdominal:      General: Abdomen is flat. Skin:     General: Skin is warm and dry. Neurological:      Mental Status: He is oriented to person, place, and time. Motor: No weakness.       Coordination: Coordination normal.   Psychiatric: Mood and Affect: Mood normal.         Diagnostics  Orders Placed This Encounter    METABOLIC PANEL, COMPREHENSIVE     Standing Status:   Future     Standing Expiration Date:   2/3/2021    CBC WITH AUTOMATED DIFF     Standing Status:   Future     Standing Expiration Date:   2/3/2021    LIPID PANEL     Standing Status:   Future     Standing Expiration Date:   2/3/2021    Referral for Colonoscopy (options for GI, Colon &  Rectal Surgery, & General Surgery)     Referral Priority:   Routine     Referral Type:   Consultation     Referral Reason:   Specialty Services Required     Number of Visits Requested:   1    REFERRAL TO PODIATRY     Referral Priority:   Routine     Referral Type:   Consultation     Referral Reason:   Specialty Services Required     Referred to Provider:   Nohemy Obregon DPM     Requested Specialty:   Podiatry     Number of Visits Requested:   1    pregabalin (LYRICA) 50 mg capsule     Sig: Take 1 Cap by mouth three (3) times daily. Max Daily Amount: 150 mg. Dispense:  100 Cap     Refill:  0    simvastatin (ZOCOR) 20 mg tablet     Sig: Take 1 Tab by mouth nightly.      Dispense:  90 Tab     Refill:  3       Results  Results for orders placed or performed in visit on 11/22/19   HEMOGLOBIN A1C W/O EAG   Result Value Ref Range    Hemoglobin A1c 7.6 (H) 4.8 - 5.6 %    AVG  (H) 91 - 123 mg/dL   PTH INTACT   Result Value Ref Range    PTH, Intact 31 15 - 65 pg/mL   RENAL FUNCTION PANEL   Result Value Ref Range    Glucose 299 (H) 70 - 99 mg/dL    BUN 20 6 - 22 mg/dL    Creatinine 1.1 0.8 - 1.6 mg/dL    Sodium 135 133 - 145 mmol/L    Potassium 4.3 3.5 - 5.5 mmol/L    Chloride 98 98 - 110 mmol/L    CO2 24 20 - 32 mmol/L    Calcium 9.6 8.4 - 10.5 mg/dL    Phosphorus 3.4 2.5 - 4.5 mg/dL    Albumin 4.5 3.5 - 5.0 g/dL    GFRAA >60.0 >60.0    GFRNA >60.0 >60.0    Corrected Serum Creatinine 1.14 mg/dL   CREATININE, UR, RANDOM   Result Value Ref Range    Creatinine, urine 105 mg/dL PROTEIN,TOTAL,URINE   Result Value Ref Range    TOT PA RND 13 mg/dL       Assessment and Plan  Diagnoses and all orders for this visit:    1. Screening for colon cancer  -     REFERRAL FOR COLONOSCOPY    2. Type 2 diabetes mellitus with diabetic neuropathy, with long-term current use of insulin (HCC)  -     pregabalin (LYRICA) 50 mg capsule; Take 1 Cap by mouth three (3) times daily. Max Daily Amount: 150 mg.  -     METABOLIC PANEL, COMPREHENSIVE; Future  -     CBC WITH AUTOMATED DIFF; Future  -     REFERRAL TO PODIATRY    3. Hypercholesterolemia  -     simvastatin (ZOCOR) 20 mg tablet; Take 1 Tab by mouth nightly. -     METABOLIC PANEL, COMPREHENSIVE; Future  -     CBC WITH AUTOMATED DIFF; Future  -     LIPID PANEL; Future    4. Need for hepatitis C screening test    5. BMI 27.0-27.9,adult       checked with no concerns. Educated on the importance of pneumonia vaccine, BMI reduction, Hep C screening, and foot exam. Patient verbalized understanding. He would like podiatry to perform his foot exam.   After care summary printed and reviewed with patient. Plan reviewed with patient. Questions answered. Patient verbalized understanding of plan and is in agreement with plan. Patient to follow up in three months or earlier if symptoms worsen. EDITH Curtis  Discussed the patient's BMI with him. The BMI follow up plan is as follows:     dietary management education, guidance, and counseling  encourage exercise  monitor weight  prescribed dietary intake    An After Visit Summary was printed and given to the patient.     EDITH Williamson

## 2020-02-03 NOTE — PROGRESS NOTES
Cherylene Fontan presents today for   Chief Complaint   Patient presents with    Medication Refill       Cherylene Fontan preferred language for health care discussion is english/other. Is someone accompanying this pt? no    Is the patient using any DME equipment during [address]? no    Depression Screening:  3 most recent PHQ Screens 1/2/2020   PHQ Not Done -   Little interest or pleasure in doing things Not at all   Feeling down, depressed, irritable, or hopeless Not at all   Total Score PHQ 2 0       Learning Assessment:  Learning Assessment 1/2/2020   PRIMARY LEARNER Patient   HIGHEST LEVEL OF EDUCATION - PRIMARY LEARNER  DID NOT GRADUATE [facility] PRIMARY LEARNER NONE   CO-LEARNER CAREGIVER No   PRIMARY LANGUAGE ENGLISH   LEARNER PREFERENCE PRIMARY LISTENING   ANSWERED BY self   RELATIONSHIP SELF       Abuse Screening:  Abuse Screening Questionnaire 1/2/2020   Do you ever feel afraid of your partner? N   Are you in a relationship with someone who physically or mentally threatens you? N   Is it safe for you to go home? Y       Generalized Anxiety  No flowsheet data found. Health Maintenance Due   Topic Date Due    Hepatitis C Screening  1960    Pneumococcal 0-64 years (1 of 1 - PPSV23) 06/02/1964    DTaP/Tdap/Td series (1 - Tdap) 06/02/1969    Shingrix Vaccine Age 50> (1 of 2) 06/02/2008    FOBT Q 1 YEAR AGE 50-75  06/02/2008    FOOT EXAM Q1  07/11/2019   . Health Maintenance reviewed and discussed and ordered per Provider. Coordination of Care:  1. Have you been to the ER, urgent care clinic since your last visit? Hospitalized since your last visit? no    2. Have you seen or consulted any other health care providers outside of the [address] [place] [address] since your last visit? Include any pap smears or colon screening.  no      Advance Directive:  Discussed 1/2/20

## 2020-02-05 NOTE — TELEPHONE ENCOUNTER
Pt called requesting refill for medication .   Requested Prescriptions     Pending Prescriptions Disp Refills    lidocaine (LIDODERM) 5 % 1 Each

## 2020-02-11 RX ORDER — LIDOCAINE 50 MG/G
1 PATCH TOPICAL EVERY 24 HOURS
Qty: 1 PACKAGE | Refills: 3 | Status: SHIPPED | OUTPATIENT
Start: 2020-02-11 | End: 2020-04-08 | Stop reason: SDUPTHER

## 2020-02-18 DIAGNOSIS — Z79.4 TYPE 2 DIABETES MELLITUS WITH DIABETIC NEUROPATHY, WITH LONG-TERM CURRENT USE OF INSULIN (HCC): ICD-10-CM

## 2020-02-18 DIAGNOSIS — E11.40 TYPE 2 DIABETES MELLITUS WITH DIABETIC NEUROPATHY, WITH LONG-TERM CURRENT USE OF INSULIN (HCC): ICD-10-CM

## 2020-02-21 RX ORDER — PREGABALIN 50 MG/1
CAPSULE ORAL
Qty: 60 CAP | Refills: 0 | Status: SHIPPED | OUTPATIENT
Start: 2020-02-21 | End: 2020-03-21

## 2020-03-03 DIAGNOSIS — E78.00 HYPERCHOLESTEROLEMIA: ICD-10-CM

## 2020-03-12 DIAGNOSIS — Z79.4 TYPE 2 DIABETES MELLITUS WITH DIABETIC NEUROPATHY, WITH LONG-TERM CURRENT USE OF INSULIN (HCC): ICD-10-CM

## 2020-03-12 DIAGNOSIS — E11.40 TYPE 2 DIABETES MELLITUS WITH DIABETIC NEUROPATHY, WITH LONG-TERM CURRENT USE OF INSULIN (HCC): ICD-10-CM

## 2020-03-15 RX ORDER — INSULIN GLARGINE 100 [IU]/ML
INJECTION, SOLUTION SUBCUTANEOUS
Qty: 5 PEN | Refills: 1 | Status: SHIPPED | OUTPATIENT
Start: 2020-03-15 | End: 2020-07-07 | Stop reason: SDUPTHER

## 2020-04-07 ENCOUNTER — HOSPITAL ENCOUNTER (OUTPATIENT)
Dept: LAB | Age: 60
Discharge: HOME | End: 2020-04-07

## 2020-04-07 LAB — XX-LABCORP SPECIMEN COL,LCBCF: NORMAL

## 2020-04-07 PROCEDURE — 99001 SPECIMEN HANDLING PT-LAB: CPT

## 2020-04-08 ENCOUNTER — VIRTUAL VISIT (OUTPATIENT)
Dept: FAMILY MEDICINE CLINIC | Age: 60
End: 2020-04-08

## 2020-04-08 DIAGNOSIS — Z79.4 TYPE 2 DIABETES MELLITUS WITH DIABETIC NEUROPATHY, WITH LONG-TERM CURRENT USE OF INSULIN (HCC): ICD-10-CM

## 2020-04-08 DIAGNOSIS — E78.00 HYPERCHOLESTEROLEMIA: ICD-10-CM

## 2020-04-08 DIAGNOSIS — E11.40 TYPE 2 DIABETES MELLITUS WITH DIABETIC NEUROPATHY, WITH LONG-TERM CURRENT USE OF INSULIN (HCC): ICD-10-CM

## 2020-04-08 DIAGNOSIS — Z12.11 COLON CANCER SCREENING: ICD-10-CM

## 2020-04-08 DIAGNOSIS — I10 ESSENTIAL HYPERTENSION: ICD-10-CM

## 2020-04-08 DIAGNOSIS — Z11.59 ENCOUNTER FOR HEPATITIS C SCREENING TEST FOR LOW RISK PATIENT: ICD-10-CM

## 2020-04-08 DIAGNOSIS — Z11.59 ENCOUNTER FOR HEPATITIS C SCREENING TEST FOR LOW RISK PATIENT: Primary | ICD-10-CM

## 2020-04-08 RX ORDER — LIDOCAINE 50 MG/G
1 PATCH TOPICAL EVERY 24 HOURS
Qty: 1 PACKAGE | Refills: 3 | Status: ON HOLD | OUTPATIENT
Start: 2020-04-08 | End: 2020-10-28

## 2020-04-08 NOTE — PROGRESS NOTES
Consent: Josep Min, who was seen by synchronous (real-time) audio-video technology, and/or his healthcare decision maker, is aware that this patient-initiated, Telehealth encounter on 2020 is a billable service, with coverage as determined by his insurance carrier. He is aware that he may receive a bill and has provided verbal consent to proceed: Yes. Patient verified name, , and address. Assessment & Plan:   Diagnoses and all orders for this visit:    1. Encounter for hepatitis C screening test for low risk patient  -     HEPATITIS C AB; Future    2. Type 2 diabetes mellitus with diabetic neuropathy, with long-term current use of insulin (HCC)  -     HEMOGLOBIN A1C WITH EAG; Future  -     TSH 3RD GENERATION; Future    3. Hypercholesterolemia  -     TSH 3RD GENERATION; Future    4. Essential hypertension  -     TSH 3RD GENERATION; Future    5. Colon cancer screening  -     OCCULT BLOOD IMMUNOASSAY,DIAGNOSTIC; Future    Other orders  -     lidocaine (LIDODERM) 5 %; 1 Patch by TransDERmal route every twenty-four (24) hours. Apply daily as needed      Diet discussed. I have instructed patient that we will need a hemoglobin A1c since he is not seeing his endocrinologist.  Recommended that he does a follow-up with endocrinology as soon as possible. But understand that this may be limited due to COVID-19.    712  Subjective:   Josep Min is a 62 y.o. male who was seen for Diabetes and Cholesterol Problem (high chol)  Patient reports he has not seen endocrinology and he does not even remember he who he has been seeing in the past.  He states that his blood glucose has been around 190. He does admit to seeing the foot doctor about 3 weeks ago. He denies seeing neurology. He does admit that the Lyrica is working. He reports going for his labs and he would like to know what his results are.   He denies any chest pain, shortness of breath, dizziness, blurred vision, nausea, vomiting, and or abdominal pain. Prior to Admission medications    Medication Sig Start Date End Date Taking? Authorizing Provider   lidocaine (LIDODERM) 5 % 1 Patch by TransDERmal route every twenty-four (24) hours. Apply daily as needed 4/8/20  Yes Faheem Milian NP   pregabalin (LYRICA) 50 mg capsule TAKE ONE CAPSULE BY MOUTH THREE TIMES A DAY 3/21/20  Yes Areli ANAYA NP   Lantus Solostar U-100 Insulin 100 unit/mL (3 mL) inpn INJECT 50 UNITS UNDER THE SKIN DAILY 3/15/20  Yes Areli ANAYA NP   simvastatin (ZOCOR) 20 mg tablet Take 1 Tab by mouth nightly. 2/3/20  Yes Houston Schmidt NP   ONETOUCH VERIO FLEX misc Use twice daily for blood glucose checks 11/24/19  Yes Areli ANAYA NP   glipiZIDE (GLUCOTROL) 10 mg tablet Take 1 Tab by mouth two (2) times a day. 11/21/19  Yes Areli ANAYA NP   SITagliptin-metFORMIN (JANUMET) 50-1,000 mg per tablet Take 1 Tab by mouth two (2) times daily (with meals). 11/21/19  Yes Areli ANAYA NP   ergocalciferol (ERGOCALCIFEROL) 50,000 unit capsule Take 1 Cap by mouth every seven (7) days. 7/17/19  Yes Areli ANAYA NP   multivitamin (ONE A DAY) tablet Take 1 Tab by mouth daily. 7/3/19  Yes Areli ANAYA NP   insulin aspart U-100 (NOVOLOG U-100 INSULIN ASPART) 100 unit/mL injection 10 units with meals 6/5/19  Yes Faheem Hernandez NP   glucose blood VI test strips (ASCENSIA AUTODISC VI, ONE TOUCH ULTRA TEST VI) strip Twice daily-  fasting in the morning and once two hours after a meal. 6/5/19  Yes Areli ANAYA NP   aspirin 81 mg tablet Take 1 Tab by mouth daily. 11/16/12  Yes Steve Mishra MD   lidocaine (LIDODERM) 5 % 1 Patch by TransDERmal route every twenty-four (24) hours. Apply daily as needed 2/11/20 4/8/20  Areli ANAYA NP   ibuprofen (MOTRIN) 800 mg tablet Take 1 Tab by mouth every eight (8) hours as needed for Pain.  11/21/19 4/8/20  Areli ANAYA, NP   ramelteon (ROZEREM) 8 mg tablet Take 1 Tab by mouth nightly as needed for Sleep. 11/21/19 4/8/20  Liz ANAYA NP   multivitamin-iron-FA, hematinic, (CEROVITE ADVANCED FORMULA)  mg-mcg tab tablet Take 1 Tab by mouth daily. 11/21/19 4/8/20  Liz ANAYA NP   lisinopril (PRINIVIL, ZESTRIL) 40 mg tablet Take 1 Tab by mouth daily. 6/5/19   Liz ANAYA NP   hydrOXYzine pamoate (VISTARIL) 50 mg capsule TAKE ONE CAPSULE BY MOUTH EVERY EVENING AS NEEDED FOR SLEEP 6/5/19 4/8/20  Liz ANAYA NP   diflunisal (DOLOBID) 500 mg tab Take 1 Tab by mouth two (2) times a day.  6/5/19 4/8/20  Cosmo Mahoney NP     Allergies   Allergen Reactions    Niacin Itching       Patient Active Problem List   Diagnosis Code    Hyperlipidemia E78.5    HTN (hypertension) I10    Insulin dependent diabetes mellitus ([facility] [address] E11.9, Z79.4    Tobacco use disorder F17.200    Coronary atherosclerosis of native coronary artery I25.10    Tobacco dependence F17.200    Other and unspecified alcohol dependence, continuous drinking behavior F10.20    Mediastinal adenopathy R59.0    Diabetic neuropathy ([facility] [address] E11.40    Vitamin D deficiency E55.9    Other insomnia G47.09    Type 2 diabetes with nephropathy (HCC) E11.21    Otalgia H92.09    Other fatigue R53.83     Patient Active Problem List    Diagnosis Date Noted    Otalgia 05/08/2019    Other fatigue 05/08/2019    Type 2 diabetes with nephropathy ([address] 04/04/2019    Other insomnia 07/02/2018    Vitamin D deficiency 04/15/2018    Diabetic neuropathy ([facility] [address] 03/27/2018    Mediastinal adenopathy 06/25/2015    Tobacco dependence 06/16/2015    Other and unspecified alcohol dependence, continuous drinking behavior 06/16/2015    Coronary atherosclerosis of native coronary artery 12/31/2013    Tobacco use disorder 12/09/2011    Hyperlipidemia     HTN (hypertension)     Insulin dependent diabetes mellitus (HCC)      Current Outpatient Medications   Medication Sig Dispense Refill    lidocaine (LIDODERM) 5 % 1 Patch by TransDERmal route every twenty-four (24) hours. Apply daily as needed 1 Package 3    pregabalin (LYRICA) 50 mg capsule TAKE ONE CAPSULE BY MOUTH THREE TIMES A DAY 90 Cap 0    Lantus Solostar U-100 Insulin 100 unit/mL (3 mL) inpn INJECT 50 UNITS UNDER THE SKIN DAILY 5 Pen 1    simvastatin (ZOCOR) 20 mg tablet Take 1 Tab by mouth nightly. 90 Tab 3    ONETOUCH VERIO FLEX misc Use twice daily for blood glucose checks 1 Each 0    glipiZIDE (GLUCOTROL) 10 mg tablet Take 1 Tab by mouth two (2) times a day. 180 Tab 1    SITagliptin-metFORMIN (JANUMET) 50-1,000 mg per tablet Take 1 Tab by mouth two (2) times daily (with meals). 180 Tab 1    ergocalciferol (ERGOCALCIFEROL) 50,000 unit capsule Take 1 Cap by mouth every seven (7) days. 4 Cap 6    multivitamin (ONE A DAY) tablet Take 1 Tab by mouth daily. 30 Tab 0    insulin aspart U-100 (NOVOLOG U-100 INSULIN ASPART) 100 unit/mL injection 10 units with meals 10 mL 0    glucose blood VI test strips (ASCENSIA AUTODISC VI, ONE TOUCH ULTRA TEST VI) strip Twice daily-  fasting in the morning and once two hours after a meal. 100 Strip 10    aspirin 81 mg tablet Take 1 Tab by mouth daily. 1 Tab 0    lisinopril (PRINIVIL, ZESTRIL) 40 mg tablet Take 1 Tab by mouth daily. 90 Tab 12     Allergies   Allergen Reactions    Niacin Itching     Past Medical History:   Diagnosis Date    Alcohol use     Fri-Sun one fifth; Mon-Thur: Pint of liquor    CAD (coronary artery disease)     Cardiac angioplasty with stent 07/29/2009    2.5 x 13 Cypher stent to CX.  Cardiac cath 11/09/2011    RCA patent. LM patent. LAD patent. mD1 55%. CX patent. Prior stent patent. Edison 85% (2.5 x 15-mm Xience stent, resid 0%). LVEDP 12. EF 40-45%. High lateral hypk (RI distribution).       Cardiac inferior STEMI 2009    Current smoker     contemplating stopping    Diabetes ([address]     type 2-insulin dependent    GERD (gastroesophageal reflux disease)     HTN (hypertension)     Hyperlipidemia     Migraine     Myocardial infarction [facility])      Past Surgical History:   Procedure Laterality Date    HX CORONARY STENT PLACEMENT  07/29/2009    HX HEART CATHETERIZATION  8/30/2011    HX HEART CATHETERIZATION  11/09/2011    HX WISDOM TEETH EXTRACTION      x4     Family History   Problem Relation Age of Onset    Hypertension Mother     Heart Attack Mother     Diabetes Mother     Hypertension Father     Heart Attack Father     Diabetes Father     Diabetes Sister     Hypertension Sister     Stroke Sister     Diabetes Brother     Hypertension Brother     Stroke Brother     No Known Problems Maternal Grandmother     No Known Problems Maternal Grandfather     No Known Problems Paternal Grandmother     No Known Problems Paternal Grandfather     No Known Problems Brother     Heart Disease Other     Kidney Disease Other      Social History     Tobacco Use    Smoking status: Current Every Day Smoker     Packs/day: 0.50     Years: 1.00     Pack years: 0.50     Types: Cigarettes    Smokeless tobacco: Never Used   Substance Use Topics    Alcohol use: Yes     Alcohol/week: 4.2 standard drinks     Types: 5 Shots of liquor per week     Comment: occassionally       Review of Systems   Eyes: Negative for blurred vision. Respiratory: Negative for shortness of breath. Cardiovascular: Negative for chest pain. Gastrointestinal: Negative for abdominal pain, diarrhea and vomiting. Genitourinary: Negative. Skin: Negative. Neurological: Positive for tingling. Negative for dizziness. Objective:   Vital Signs: (As obtained by patient/caregiver at home)  There were no vitals taken for this visit.      Constitutional: [x] Appears well-developed and well-nourished [x] No apparent distress      [] Abnormal -     Mental status: [x] Alert and awake  [x] Oriented to person/place/time [x] Able to follow commands    [] Abnormal -     Eyes:   EOM    [x]  Normal    [] Abnormal -   Sclera  [x]  Normal    [] Abnormal -          Discharge [x]  None visible   [] Abnormal -     HENT: [x] Normocephalic, atraumatic  [] Abnormal -   [x] Mouth/Throat: Mucous membranes are moist    External Ears [x] Normal  [] Abnormal -    Neck: [x] No visualized mass [] Abnormal -     Pulmonary/Chest: [x] Respiratory effort normal   [x] No visualized signs of difficulty breathing or respiratory distress        [] Abnormal -      Musculoskeletal:   [x] Normal gait with no signs of ataxia         [x] Normal range of motion of neck        [] Abnormal -     Neurological:        [x] No Facial Asymmetry (Cranial nerve 7 motor function) (limited exam due to video visit)          [x] No gaze palsy        [] Abnormal -          Skin:        [x] No significant exanthematous lesions or discoloration noted on facial skin         [] Abnormal -            Psychiatric:       [x] Normal Affect [] Abnormal -        [x] No Hallucinations    We discussed the expected course, resolution and complications of the diagnosis(es) in detail. Medication risks, benefits, costs, interactions, and alternatives were discussed as indicated. I advised him to contact the office if his condition worsens, changes or fails to improve as anticipated. He expressed understanding with the diagnosis(es) and plan. Arvid Canavan is a 62 y.o. male being evaluated by a video visit encounter for concerns as above. A caregiver was present when appropriate. Due to this being a TeleHealth encounter (During ACBYJ-06 public health emergency), evaluation of the following organ systems was limited: Vitals/Constitutional/EENT/Resp/CV/GI//MS/Neuro/Skin/Heme-Lymph-Imm.   Pursuant to the emergency declaration under the [address] [place] [address], [address] [place] [address] and the [employer], this Virtual  Visit was conducted, with patient's (and/or legal guardian's) consent, to reduce the patient's risk of exposure to COVID-19 and provide necessary medical care. Services were provided through a video synchronous discussion virtually to substitute for in-person clinic visit. Patient and provider were located at their individual homes.         Prieto Adkins NP

## 2020-04-08 NOTE — PROGRESS NOTES
Keerthi Cates presents today for   Chief Complaint   Patient presents with    Diabetes    Cholesterol Problem     high chol       Darrius Dawkins preferred language for health care discussion is english/other. Is someone accompanying this pt? no    Is the patient using any DME equipment during [address]? no    Depression Screening:  3 most recent PHQ Screens 1/2/2020   PHQ Not Done -   Little interest or pleasure in doing things Not at all   Feeling down, depressed, irritable, or hopeless Not at all   Total Score PHQ 2 0       Learning Assessment:  Learning Assessment 1/2/2020   PRIMARY LEARNER Patient   HIGHEST LEVEL OF EDUCATION - PRIMARY LEARNER  DID NOT GRADUATE [facility] PRIMARY LEARNER NONE   CO-LEARNER CAREGIVER No   PRIMARY LANGUAGE ENGLISH   LEARNER PREFERENCE PRIMARY LISTENING   ANSWERED BY self   RELATIONSHIP SELF       Abuse Screening:  Abuse Screening Questionnaire 1/2/2020   Do you ever feel afraid of your partner? N   Are you in a relationship with someone who physically or mentally threatens you? N   Is it safe for you to go home? Y       Generalized Anxiety  No flowsheet data found. Health Maintenance Due   Topic Date Due    Hepatitis C Screening  1960    Pneumococcal 0-64 years (1 of 1 - PPSV23) 06/02/1964    DTaP/Tdap/Td series (1 - Tdap) 06/02/1979    Shingrix Vaccine Age 50> (1 of 2) 06/02/2008    FOBT Q1Y Age 50-75  06/02/2008    Foot Exam Q1  07/11/2019   . Health Maintenance reviewed and discussed and ordered per Provider. Coordination of Care:  1. Have you been to the ER, urgent care clinic since your last visit? Hospitalized since your last visit? no    2. Have you seen or consulted any other health care providers outside of the [address] [place] [address] since your last visit? Include any pap smears or colon screening.  no      Advance Directive:  Discussed 2/3/20

## 2020-04-09 DIAGNOSIS — R53.83 FATIGUE, UNSPECIFIED TYPE: ICD-10-CM

## 2020-04-09 RX ORDER — BISMUTH SUBSALICYLATE 262 MG
1 TABLET,CHEWABLE ORAL DAILY
Qty: 90 TAB | Refills: 3 | Status: SHIPPED | OUTPATIENT
Start: 2020-04-09 | End: 2021-11-01 | Stop reason: SDUPTHER

## 2020-04-29 ENCOUNTER — HOSPITAL ENCOUNTER (OUTPATIENT)
Dept: LAB | Age: 60
Discharge: HOME | End: 2020-04-29

## 2020-04-29 LAB — XX-LABCORP SPECIMEN COL,LCBCF: NORMAL

## 2020-04-29 PROCEDURE — 99001 SPECIMEN HANDLING PT-LAB: CPT

## 2020-04-30 LAB
ALBUMIN SERPL-MCNC: 4.4 G/DL (ref 3.8–4.8)
ALBUMIN/GLOB SERPL: 2 {RATIO} (ref 1.2–2.2)
ALP SERPL-CCNC: 81 IU/L (ref 39–117)
ALT SERPL-CCNC: 16 IU/L (ref 0–44)
AST SERPL-CCNC: 18 IU/L (ref 0–40)
BASOPHILS # BLD AUTO: 0 X10E3/UL (ref 0–0.2)
BASOPHILS NFR BLD AUTO: 0 %
BILIRUB SERPL-MCNC: 0.2 MG/DL (ref 0–1.2)
BUN SERPL-MCNC: 15 MG/DL (ref 8–27)
BUN/CREAT SERPL: 15 (ref 10–24)
CALCIUM SERPL-MCNC: 9.2 MG/DL (ref 8.6–10.2)
CHLORIDE SERPL-SCNC: 100 MMOL/L (ref 96–106)
CHOLEST SERPL-MCNC: 118 MG/DL (ref 100–199)
CO2 SERPL-SCNC: 24 MMOL/L (ref 20–29)
CREAT SERPL-MCNC: 0.98 MG/DL (ref 0.76–1.27)
EOSINOPHIL # BLD AUTO: 0.1 X10E3/UL (ref 0–0.4)
EOSINOPHIL NFR BLD AUTO: 3 %
ERYTHROCYTE [DISTWIDTH] IN BLOOD BY AUTOMATED COUNT: 13.2 % (ref 11.6–15.4)
EST. AVERAGE GLUCOSE BLD GHB EST-MCNC: 272 MG/DL
GLOBULIN SER CALC-MCNC: 2.2 G/DL (ref 1.5–4.5)
GLUCOSE SERPL-MCNC: 159 MG/DL (ref 65–99)
HBA1C MFR BLD: 11.1 % (ref 4.8–5.6)
HCT VFR BLD AUTO: 42.3 % (ref 37.5–51)
HCV AB S/CO SERPL IA: <0.1 S/CO RATIO (ref 0–0.9)
HDLC SERPL-MCNC: 37 MG/DL
HGB BLD-MCNC: 14 G/DL (ref 13–17.7)
IMM GRANULOCYTES # BLD AUTO: 0 X10E3/UL (ref 0–0.1)
IMM GRANULOCYTES NFR BLD AUTO: 0 %
INTERPRETATION, 910389: NORMAL
LDLC SERPL CALC-MCNC: 64 MG/DL (ref 0–99)
LYMPHOCYTES # BLD AUTO: 1.3 X10E3/UL (ref 0.7–3.1)
LYMPHOCYTES NFR BLD AUTO: 42 %
Lab: NORMAL
MCH RBC QN AUTO: 28.7 PG (ref 26.6–33)
MCHC RBC AUTO-ENTMCNC: 33.1 G/DL (ref 31.5–35.7)
MCV RBC AUTO: 87 FL (ref 79–97)
MONOCYTES # BLD AUTO: 0.3 X10E3/UL (ref 0.1–0.9)
MONOCYTES NFR BLD AUTO: 10 %
NEUTROPHILS # BLD AUTO: 1.4 X10E3/UL (ref 1.4–7)
NEUTROPHILS NFR BLD AUTO: 45 %
PLATELET # BLD AUTO: 194 X10E3/UL (ref 150–450)
POTASSIUM SERPL-SCNC: 4.5 MMOL/L (ref 3.5–5.2)
PROT SERPL-MCNC: 6.6 G/DL (ref 6–8.5)
RBC # BLD AUTO: 4.87 X10E6/UL (ref 4.14–5.8)
SODIUM SERPL-SCNC: 139 MMOL/L (ref 134–144)
SPECIMEN STATUS REPORT, ROLRST: NORMAL
TRIGL SERPL-MCNC: 86 MG/DL (ref 0–149)
TSH SERPL DL<=0.005 MIU/L-ACNC: 2.21 UIU/ML (ref 0.45–4.5)
VLDLC SERPL CALC-MCNC: 17 MG/DL (ref 5–40)
WBC # BLD AUTO: 3.2 X10E3/UL (ref 3.4–10.8)

## 2020-04-30 NOTE — PROGRESS NOTES
Sent in [address]-   Please schedule an appointment as soon as possible so we can discuss your lab results and your treatment/medications. Your A1C has increased and this is very concerning. We need to adjust your medications.  EDITH Urena

## 2020-05-04 ENCOUNTER — VIRTUAL VISIT (OUTPATIENT)
Dept: FAMILY MEDICINE CLINIC | Age: 60
End: 2020-05-04

## 2020-05-04 DIAGNOSIS — E11.40 TYPE 2 DIABETES MELLITUS WITH DIABETIC NEUROPATHY, WITH LONG-TERM CURRENT USE OF INSULIN (HCC): ICD-10-CM

## 2020-05-04 DIAGNOSIS — Z71.2 ENCOUNTER TO DISCUSS TEST RESULTS: Primary | ICD-10-CM

## 2020-05-04 DIAGNOSIS — Z79.4 TYPE 2 DIABETES MELLITUS WITH DIABETIC NEUROPATHY, WITH LONG-TERM CURRENT USE OF INSULIN (HCC): ICD-10-CM

## 2020-05-04 NOTE — PROGRESS NOTES
Tanika Mercado is a 62 y.o. male who was seen by synchronous (real-time) audio-video technology on 2020. Consent: Tanika Mercado, who was seen by synchronous (real-time) audio-video technology, and/or his healthcare decision maker, is aware that this patient-initiated, Telehealth encounter on 2020 is a billable service, with coverage as determined by his insurance carrier. He is aware that he may receive a bill and has provided verbal consent to proceed: Yes. Patient verified name, , and address. Assessment & Plan:   Diagnoses and all orders for this visit:    1. Encounter to discuss test results    2. Type 2 diabetes mellitus with diabetic neuropathy, with long-term current use of insulin ([address]      I have reviewed his labs with him in detail. Patient is aware of the risk of diabetes. I have discussed this with him in detail as well as the alarm signs and symptoms. I recommended he change his diet immediately. I also recommend he exercise (walk). I have discussed other medication options with him and will defer making changes until after he sees the pharm D. He is to check his blood glucose 3x a week at a minimum. He knows he should be taking this daily but admits that he probably will not. He does agree to 3-4x a week. Subjective:   Tanika Mercado is a 62 y.o. male who was seen for Results (discuss lab results)    Last checked  His blood glucose 3 days ago and it was 180. He admits to eating sweets and eating unhealthy. He does admit to eating baked chicken. He denies walking or exercise. He denies any dizziness or blurred vision. He denies any increased urination, hunger, or thirst. He reports he is taking his medication for his diabetes as prescribed. He reports he has not seen endocrinology since sometime last year.                                                                                                                Prior to Admission medications    Medication Sig Start Date End Date Taking? Authorizing Provider   multivitamin (ONE A DAY) tablet Take 1 Tab by mouth daily. 4/9/20  Yes Faheem Hernandez NP   lidocaine (LIDODERM) 5 % 1 Patch by TransDERmal route every twenty-four (24) hours. Apply daily as needed 4/8/20  Yes Faheem Jones NP   pregabalin (LYRICA) 50 mg capsule TAKE ONE CAPSULE BY MOUTH THREE TIMES A DAY 3/21/20  Yes July ANAYA NP   Lantus Solostar U-100 Insulin 100 unit/mL (3 mL) inpn INJECT 50 UNITS UNDER THE SKIN DAILY 3/15/20  Yes July ANAYA NP   simvastatin (ZOCOR) 20 mg tablet Take 1 Tab by mouth nightly. 2/3/20  Yes Myrna Restrepo NP   ONETOUCH VERIO FLEX misc Use twice daily for blood glucose checks 11/24/19  Yes July ANAYA NP   glipiZIDE (GLUCOTROL) 10 mg tablet Take 1 Tab by mouth two (2) times a day. 11/21/19  Yes July ANAYA NP   SITagliptin-metFORMIN (JANUMET) 50-1,000 mg per tablet Take 1 Tab by mouth two (2) times daily (with meals). 11/21/19  Yes July ANAYA NP   ergocalciferol (ERGOCALCIFEROL) 50,000 unit capsule Take 1 Cap by mouth every seven (7) days. 7/17/19  Yes July ANAYA NP   insulin aspart U-100 (NOVOLOG U-100 INSULIN ASPART) 100 unit/mL injection 10 units with meals 6/5/19  Yes Faheem Hernandez NP   glucose blood VI test strips (ASCENSIA AUTODISC VI, ONE TOUCH ULTRA TEST VI) strip Twice daily-  fasting in the morning and once two hours after a meal. 6/5/19  Yes July ANAYA NP   aspirin 81 mg tablet Take 1 Tab by mouth daily. 11/16/12  Yes Steve Mishra MD   lisinopril (PRINIVIL, ZESTRIL) 40 mg tablet Take 1 Tab by mouth daily.  6/5/19   Myrna Restrepo NP     Allergies   Allergen Reactions    Niacin Itching       Patient Active Problem List   Diagnosis Code    Hyperlipidemia E78.5    HTN (hypertension) I10    Insulin dependent diabetes mellitus ([address] E11.9, Z79.4    Tobacco use disorder F17.200    Coronary atherosclerosis of native coronary artery I25.10    Tobacco dependence F17.200    Other and unspecified alcohol dependence, continuous drinking behavior F10.20    Mediastinal adenopathy R59.0    Diabetic neuropathy (HCC) E11.40    Vitamin D deficiency E55.9    Other insomnia G47.09    Type 2 diabetes with nephropathy (HCC) E11.21    Otalgia H92.09    Other fatigue R53.83     Patient Active Problem List    Diagnosis Date Noted    Otalgia 05/08/2019    Other fatigue 05/08/2019    Type 2 diabetes with nephropathy ([facility] 04/04/2019    Other insomnia 07/02/2018    Vitamin D deficiency 04/15/2018    Diabetic neuropathy ([facility] 03/27/2018    Mediastinal adenopathy 06/25/2015    Tobacco dependence 06/16/2015    Other and unspecified alcohol dependence, continuous drinking behavior 06/16/2015    Coronary atherosclerosis of native coronary artery 12/31/2013    Tobacco use disorder 12/09/2011    Hyperlipidemia     HTN (hypertension)     Insulin dependent diabetes mellitus (HCC)      Current Outpatient Medications   Medication Sig Dispense Refill    multivitamin (ONE A DAY) tablet Take 1 Tab by mouth daily. 90 Tab 3    lidocaine (LIDODERM) 5 % 1 Patch by TransDERmal route every twenty-four (24) hours. Apply daily as needed 1 Package 3    pregabalin (LYRICA) 50 mg capsule TAKE ONE CAPSULE BY MOUTH THREE TIMES A DAY 90 Cap 0    Lantus Solostar U-100 Insulin 100 unit/mL (3 mL) inpn INJECT 50 UNITS UNDER THE SKIN DAILY 5 Pen 1    simvastatin (ZOCOR) 20 mg tablet Take 1 Tab by mouth nightly. 90 Tab 3    ONETOUCH VERIO FLEX misc Use twice daily for blood glucose checks 1 Each 0    glipiZIDE (GLUCOTROL) 10 mg tablet Take 1 Tab by mouth two (2) times a day. 180 Tab 1    SITagliptin-metFORMIN (JANUMET) 50-1,000 mg per tablet Take 1 Tab by mouth two (2) times daily (with meals). 180 Tab 1    ergocalciferol (ERGOCALCIFEROL) 50,000 unit capsule Take 1 Cap by mouth every seven (7) days.  4 Cap 6    insulin aspart U-100 (NOVOLOG U-100 INSULIN ASPART) 100 unit/mL injection 10 units with meals 10 mL 0    glucose blood VI test strips (ASCENSIA AUTODISC VI, ONE TOUCH ULTRA TEST VI) strip Twice daily-  fasting in the morning and once two hours after a meal. 100 Strip 10    aspirin 81 mg tablet Take 1 Tab by mouth daily. 1 Tab 0    lisinopril (PRINIVIL, ZESTRIL) 40 mg tablet Take 1 Tab by mouth daily. 90 Tab 12     Allergies   Allergen Reactions    Niacin Itching     Past Medical History:   Diagnosis Date    Alcohol use     Fri-Sun one fifth; Mon-Thur: Pint of liquor    CAD (coronary artery disease)     Cardiac angioplasty with stent 07/29/2009    2.5 x 13 Cypher stent to CX.  Cardiac cath 11/09/2011    RCA patent. LM patent. LAD patent. mD1 55%. CX patent. Prior stent patent. Edison 85% (2.5 x 15-mm Xience stent, resid 0%). LVEDP 12. EF 40-45%. High lateral hypk (RI distribution).       Cardiac inferior STEMI 2009    Current smoker     contemplating stopping    Diabetes ([address]     type 2-insulin dependent    GERD (gastroesophageal reflux disease)     HTN (hypertension)     Hyperlipidemia     Migraine     [facility]ocardial infarction [facility])      Past Surgical History:   Procedure Laterality Date    HX CORONARY STENT PLACEMENT  07/29/2009    HX HEART CATHETERIZATION  8/30/2011    HX HEART CATHETERIZATION  11/09/2011    HX WISDOM TEETH EXTRACTION      x4     Family History   Problem Relation Age of Onset    Hypertension Mother     Heart Attack Mother     Diabetes Mother     Hypertension Father     Heart Attack Father     Diabetes Father     Diabetes Sister     Hypertension Sister     Stroke Sister     Diabetes Brother     Hypertension Brother     Stroke Brother     No Known Problems Maternal Grandmother     No Known Problems Maternal Grandfather     No Known Problems Paternal Grandmother     No Known Problems Paternal Grandfather     No Known Problems Brother     Heart Disease Other     Kidney Disease Other      Social History     Tobacco Use    Smoking status: Current Every Day Smoker     Packs/day: 0.50     Years: 1.00     Pack years: 0.50     Types: Cigarettes    Smokeless tobacco: Never Used   Substance Use Topics    Alcohol use: Yes     Alcohol/week: 4.2 standard drinks     Types: 5 Shots of liquor per week     Comment: occassionally       Review of Systems   Eyes: Negative for blurred vision. Respiratory: Negative for shortness of breath. Cardiovascular: Negative for chest pain. Gastrointestinal: Negative for abdominal pain, nausea and vomiting. Genitourinary: Negative. Neurological: Negative for dizziness. Endo/Heme/Allergies: Negative for polydipsia. Objective:   Vital Signs: (As obtained by patient/caregiver at home)  There were no vitals taken for this visit.      [INSTRUCTIONS:  \"[x]\" Indicates a positive item  \"[]\" Indicates a negative item  -- DELETE ALL ITEMS NOT EXAMINED]    Constitutional: [x] Appears well-developed and well-nourished [x] No apparent distress      [] Abnormal -     Mental status: [x] Alert and awake  [x] Oriented to person/place/time [x] Able to follow commands    [] Abnormal -     Eyes:   EOM    [x]  Normal    [] Abnormal -   Sclera  [x]  Normal    [] Abnormal -          Discharge [x]  None visible   [] Abnormal -     HENT: [x] Normocephalic, atraumatic  [] Abnormal -   [x] Mouth/Throat: Mucous membranes are moist    External Ears [x] Normal  [] Abnormal -    Neck: [x] No visualized mass [] Abnormal -     Pulmonary/Chest: [x] Respiratory effort normal   [x] No visualized signs of difficulty breathing or respiratory distress        [] Abnormal -      Musculoskeletal:   [x] Normal gait with no signs of ataxia         [x] Normal range of motion of neck        [] Abnormal -     Neurological:        [x] No Facial Asymmetry (Cranial nerve 7 motor function) (limited exam due to video visit)          [x] No gaze palsy        [] Abnormal -          Skin:        [x] No significant exanthematous lesions or discoloration noted on facial skin         [] Abnormal -            Psychiatric:       [x] Normal Affect [] Abnormal -        [x] No Hallucinations    We discussed the expected course, resolution and complications of the diagnosis(es) in detail. Medication risks, benefits, costs, interactions, and alternatives were discussed as indicated. I advised him to contact the office if his condition worsens, changes or fails to improve as anticipated. He expressed understanding with the diagnosis(es) and plan. Liv Odonnell is a 62 y.o. male who was evaluated by a video visit encounter for concerns as above. Patient identification was verified prior to start of the visit. A caregiver was present when appropriate. Due to this being a TeleHealth encounter (During [facility]- public health emergency), evaluation of the following organ systems was limited: Vitals/Constitutional/EENT/Resp/CV/GI//MS/Neuro/Skin/Heme-Lymph-Imm. Pursuant to the emergency declaration under the [facility] waiver authority and the [employer] and [employer] Act, this Virtual  Visit was conducted, with patient's (and/or legal guardian's) consent, to reduce the patient's risk of exposure to COVID-19 and provide necessary medical care. Services were provided through a video synchronous discussion virtually to substitute for in-person clinic visit. Patient was located at their individual homes. Provider was located in the office.        Yennifer Salvador NP

## 2020-05-04 NOTE — PROGRESS NOTES
Melinda Oreilly presents today for   Chief Complaint   Patient presents with    Results     discuss lab results       Melinda Oreilly preferred language for health care discussion is english/other. Is someone accompanying this pt? no    Is the patient using any DME equipment during [address]? no    Depression Screening:  3 most recent PHQ Screens 1/2/2020   PHQ Not Done -   Little interest or pleasure in doing things Not at all   Feeling down, depressed, irritable, or hopeless Not at all   Total Score PHQ 2 0       Learning Assessment:  Learning Assessment 1/2/2020   PRIMARY LEARNER Patient   HIGHEST LEVEL OF EDUCATION - PRIMARY LEARNER  DID NOT GRADUATE [facility] PRIMARY LEARNER NONE   CO-LEARNER CAREGIVER No   PRIMARY LANGUAGE ENGLISH   LEARNER PREFERENCE PRIMARY LISTENING   ANSWERED BY self   RELATIONSHIP SELF       Abuse Screening:  Abuse Screening Questionnaire 1/2/2020   Do you ever feel afraid of your partner? N   Are you in a relationship with someone who physically or mentally threatens you? N   Is it safe for you to go home? Y       Generalized Anxiety  No flowsheet data found. Health Maintenance Due   Topic Date Due    Pneumococcal 0-64 years (1 of 1 - PPSV23) 06/02/1966    DTaP/Tdap/Td series (1 - Tdap) 06/02/1979    Shingrix Vaccine Age 50> (1 of 2) 06/02/2008    FOBT Q1Y Age 50-75  06/02/2008    Foot Exam Q1  07/11/2019   . Health Maintenance reviewed and discussed and ordered per Provider. Coordination of Care:  1. Have you been to the ER, urgent care clinic since your last visit? Hospitalized since your last visit? no    2. Have you seen or consulted any other health care providers outside of the [address] [place] [address] since your last visit? Include any pap smears or colon screening.  no      Advance Directive:  Discussed 1/2/20

## 2020-05-05 ENCOUNTER — TELEPHONE (OUTPATIENT)
Dept: FAMILY MEDICINE CLINIC | Age: 60
End: 2020-05-05

## 2020-05-05 ENCOUNTER — VIRTUAL VISIT (OUTPATIENT)
Dept: INTERNAL MEDICINE CLINIC | Age: 60
End: 2020-05-05

## 2020-05-05 DIAGNOSIS — E11.21 TYPE 2 DIABETES WITH NEPHROPATHY (HCC): Primary | ICD-10-CM

## 2020-05-05 DIAGNOSIS — E11.40 TYPE 2 DIABETES MELLITUS WITH DIABETIC NEUROPATHY, WITH LONG-TERM CURRENT USE OF INSULIN (HCC): ICD-10-CM

## 2020-05-05 DIAGNOSIS — Z79.4 TYPE 2 DIABETES MELLITUS WITH DIABETIC NEUROPATHY, WITH LONG-TERM CURRENT USE OF INSULIN (HCC): Primary | ICD-10-CM

## 2020-05-05 DIAGNOSIS — E11.40 TYPE 2 DIABETES MELLITUS WITH DIABETIC NEUROPATHY, WITH LONG-TERM CURRENT USE OF INSULIN (HCC): Primary | ICD-10-CM

## 2020-05-05 DIAGNOSIS — Z79.4 TYPE 2 DIABETES MELLITUS WITH DIABETIC NEUROPATHY, WITH LONG-TERM CURRENT USE OF INSULIN (HCC): ICD-10-CM

## 2020-05-05 RX ORDER — LANCETS
EACH MISCELLANEOUS
Qty: 1 PACKAGE | Refills: 11 | Status: SHIPPED | OUTPATIENT
Start: 2020-05-05 | End: 2021-11-01 | Stop reason: SDUPTHER

## 2020-05-05 RX ORDER — INSULIN PUMP SYRINGE, 3 ML
EACH MISCELLANEOUS
Qty: 1 KIT | Refills: 0 | Status: SHIPPED | OUTPATIENT
Start: 2020-05-05 | End: 2022-02-07

## 2020-05-05 RX ORDER — SITAGLIPTIN AND METFORMIN HYDROCHLORIDE 100; 1000 MG/1; MG/1
TABLET, FILM COATED, EXTENDED RELEASE ORAL
Qty: 30 TAB | Refills: 1 | Status: SHIPPED | OUTPATIENT
Start: 2020-05-05 | End: 2020-05-20

## 2020-05-05 RX ORDER — PREGABALIN 50 MG/1
CAPSULE ORAL
Qty: 90 CAP | Refills: 0 | Status: SHIPPED | OUTPATIENT
Start: 2020-05-05 | End: 2020-07-14 | Stop reason: SDUPTHER

## 2020-05-05 RX ORDER — PREGABALIN 50 MG/1
CAPSULE ORAL
Qty: 90 CAP | Refills: 0 | Status: CANCELLED | OUTPATIENT
Start: 2020-05-05

## 2020-05-05 NOTE — TELEPHONE ENCOUNTER
Pharmacy called and stated they would like to know if patient can have a medication change for Basaglar 100 unit ml Quick [place], Instead of using the Lantus Solostar Copay is only 5 dollars for patient, Please advise.

## 2020-05-05 NOTE — PROGRESS NOTES
CC:  Diabetes management    S/O: Eladia Ford is a 62 y.o. male referred by Houston Schmidt NP for diabetes management. Contacted patient via telephone for appointment today. Past Medical History:   Diagnosis Date    Alcohol use     Fri-Sun one fifth; Mon-Thur: Pint of liquor    CAD (coronary artery disease)     Cardiac angioplasty with stent 07/29/2009    2.5 x 13 Cypher stent to CX.  Cardiac cath 11/09/2011    RCA patent. LM patent. LAD patent. mD1 55%. CX patent. Prior stent patent. Edison 85% (2.5 x 15-mm Xience stent, resid 0%). LVEDP 12. EF 40-45%. High lateral hypk (RI distribution).  Cardiac inferior STEMI 2009    Current smoker     contemplating stopping    Diabetes ([facility]     type 2-insulin dependent    GERD (gastroesophageal reflux disease)     HTN (hypertension)     Hyperlipidemia     Migraine     Myocardial infarction [facility])      Current diabetes regimen consists of the following: glipizide 10 mg BID, Novolog 10 units with meals, Lantus 50 units daily, Janumet  mg 1 tab BID. -States only takes glipizide and janumet 1 tab daily with dinner  -States only takes Novolog 10 units around 1-2 times per week    Patient reports the following signs/symptoms of diabetes: numbness in feet. Patient reports recent hypoglycemic symptoms, with 1 hypoglycemic episodes in the last couple weeks. -States when he \"doesn't eat properly I get weak and shaky\"   -States last time this happened was a few weeks ago    Patient checks blood sugars 1-2 times per week and readings run 180s-240s mg/dL per his report. Most recent A1C was 11.1 %.   Lab Results   Component Value Date/Time    Hemoglobin A1c 11.1 (H) 04/29/2020 12:00 AM    Hemoglobin A1c (POC) Code 106( >15%) 04/04/2019 02:00 PM    Hemoglobin A1c, External 7.8 01/28/2015 11:15 PM   -States he needs new BG machine since it \"keeps saying error\"  -States last BG was 190s fasting a few days ago  -States one time BG was over 200s  -States lowest fasting BG in past month was 180, and highest BG was 240    Patient denies adherence with His medications. Patient denies adverse effects from their medications.  -States ran out of lidocaine patches and sometimes uses icy hot patches instead      Past Surgical History:   Procedure Laterality Date    HX CORONARY STENT PLACEMENT  07/29/2009    HX HEART CATHETERIZATION  8/30/2011    HX HEART CATHETERIZATION  11/09/2011    HX WISDOM TEETH EXTRACTION      x4     Family History   Problem Relation Age of Onset    Hypertension Mother     Heart Attack Mother     Diabetes Mother     Hypertension Father     Heart Attack Father     Diabetes Father     Diabetes Sister     Hypertension Sister     Stroke Sister     Diabetes Brother     Hypertension Brother     Stroke Brother     No Known Problems Maternal Grandmother     No Known Problems Maternal Grandfather     No Known Problems Paternal Grandmother     No Known Problems Paternal Grandfather     No Known Problems Brother     Heart Disease Other     Kidney Disease Other      Social History     Socioeconomic History    Marital status:      Spouse name: Not on file    Number of children: Not on file    Years of education: Not on file    Highest education level: Not on file   Tobacco Use    Smoking status: Current Every Day Smoker     Packs/day: 0.50     Years: 1.00     Pack years: 0.50     Types: Cigarettes    Smokeless tobacco: Never Used   Substance and Sexual Activity    Alcohol use: Yes     Alcohol/week: 4.2 standard drinks     Types: 5 Shots of liquor per week     Comment: occassionally    Drug use: Yes     Types: Marijuana, Cocaine    Sexual activity: Yes   Social History Narrative     at [employer].      Allergies   Allergen Reactions    Niacin Itching     Current Outpatient Medications   Medication Sig    pregabalin (LYRICA) 50 mg capsule TAKE ONE CAPSULE BY MOUTH THREE TIMES A DAY  multivitamin (ONE A DAY) tablet Take 1 Tab by mouth daily.  lidocaine (LIDODERM) 5 % 1 Patch by TransDERmal route every twenty-four (24) hours. Apply daily as needed    Lantus Solostar U-100 Insulin 100 unit/mL (3 mL) inpn INJECT 50 UNITS UNDER THE SKIN DAILY    simvastatin (ZOCOR) 20 mg tablet Take 1 Tab by mouth nightly.  ONETOUCH VERIO FLEX misc Use twice daily for blood glucose checks    glipiZIDE (GLUCOTROL) 10 mg tablet Take 1 Tab by mouth two (2) times a day.  SITagliptin-metFORMIN (JANUMET) 50-1,000 mg per tablet Take 1 Tab by mouth two (2) times daily (with meals).  ergocalciferol (ERGOCALCIFEROL) 50,000 unit capsule Take 1 Cap by mouth every seven (7) days.  lisinopril (PRINIVIL, ZESTRIL) 40 mg tablet Take 1 Tab by mouth daily.  insulin aspart U-100 (NOVOLOG U-100 INSULIN ASPART) 100 unit/mL injection 10 units with meals    glucose blood VI test strips (ASCENSIA AUTODISC VI, ONE TOUCH ULTRA TEST VI) strip Twice daily-  fasting in the morning and once two hours after a meal.    aspirin 81 mg tablet Take 1 Tab by mouth daily. No current facility-administered medications for this visit. There were no vitals taken for this visit.     Lab Results   Component Value Date/Time    Sodium 139 04/29/2020 12:00 AM    Potassium 4.5 04/29/2020 12:00 AM    Chloride 100 04/29/2020 12:00 AM    CO2 24 04/29/2020 12:00 AM    Anion gap 14.0 11/22/2019 03:20 AM    Glucose 159 (H) 04/29/2020 12:00 AM    BUN 15 04/29/2020 12:00 AM    Creatinine 0.98 04/29/2020 12:00 AM    BUN/Creatinine ratio 15 04/29/2020 12:00 AM    GFR est AA 96 04/29/2020 12:00 AM    GFR est non-AA 83 04/29/2020 12:00 AM    Calcium 9.2 04/29/2020 12:00 AM      Lab Results   Component Value Date/Time    Creatinine 0.98 04/29/2020 12:00 AM     Lab Results   Component Value Date/Time    Cholesterol, total 118 04/29/2020 12:00 AM    HDL Cholesterol 37 (L) 04/29/2020 12:00 AM    LDL, calculated 64 04/29/2020 12:00 AM    VLDL, calculated 17 04/29/2020 12:00 AM    Triglyceride 86 04/29/2020 12:00 AM    CHOL/HDL Ratio 2.6 07/30/2018 11:43 AM     Lab Results   Component Value Date/Time    ALT (SGPT) 16 04/29/2020 12:00 AM    AST (SGOT) 18 04/29/2020 12:00 AM    Alk. phosphatase 81 04/29/2020 12:00 AM    Bilirubin, direct <0.1 01/13/2016 05:14 PM    Bilirubin, total 0.2 04/29/2020 12:00 AM     Lab Results   Component Value Date/Time    Microalbumin/Creat ratio (mg/g creat) 16 07/06/2019 10:02 AM    Microalbumin,urine random 3.58 (H) 07/06/2019 10:02 AM     BP Readings from Last 3 Encounters:   02/03/20 132/88   11/21/19 139/89   09/03/19 122/70     Wt Readings from Last 3 Encounters:   02/03/20 187 lb 12.8 oz (85.2 kg)   01/02/20 189 lb 6.4 oz (85.9 kg)   11/21/19 193 lb 6.4 oz (87.7 kg)     Estimated body mass index is 27.73 kg/m² as calculated from the following:    Height as of 2/3/20: 5' 9\" (1.753 m). Weight as of 2/3/20: 187 lb 12.8 oz (85.2 kg). Screenings:  Diabetic Foot and Eye Exam HM Status   Topic Date Due    Diabetic Foot Care  07/11/2019    Eye Exam  06/24/2021       Assessment/Plan:     1. Diabetes: uncontrolled with goal A1C at least <7%. Blood glucose readings: high with 240s readings the highest consistently per his report.  -Discussed long-term consequences of uncontrolled diabetes and blood sugars, signs/symptoms of hypo/hyperglycemia, and A1C and BG goals with patient today    A. Medications: Stressed importance of medication adherence on overall diabetes control as well as with preventing complications of diabetes. Reviewed symptoms of hypoglycemia and how to treat.  Instructed patient to continue to monitor blood sugars at least once daily (fasting and sometimes bedtime)  -Patient states he is willing to change glipizide and Janumet to extended release formulations to help with compliance   -Will discuss this with PCP  -Discussed diabetes medication options such as Glp-1 agonists with patients - however this will likely not be affordable for patient with current insurance coverage  -Instructed patient to stop Novolog insulin since he has not been compliant with this  -Instructed patient to continue Lantus 50 units daily  -Will have PCP send Rxs for glucometer and test strips to his pharmacy per his request  -Also discussed affordable OTC glucometer options in case insurance does not cover  -Instructed patient to call PharmD if he has trouble getting any of his medications or checking his BG; also instructed him to call if BG remain elevated (>250) or if he has episodes of hypoglycemia    B. Diet/lifestyle: Will discuss dietary recommendations at follow up.  -Instructed patient to decrease intake of carbs and sweets    C. Screenings/Prevention:  Vaccinations: Patient is eligible for the following vaccinations: influenza, pneumococcal and hepatitis B  Dilated eye exam (recommended at least every 2 years or annually if retinopathy present): next due 21    Albumin-to creatinine ratio (recommended annually): next due 21  Foot Exam (recommended annually): due    2. Medication reconciliation was completed during the visit. There are no discontinued medications. Patient verbalized understanding of the information presented and all of the patients questions were answered. Patient advised to call the office with any additional questions or concerns. Follow-up: telephone call 1 week. Notification of recommendations will be sent to Dr. Juan Francisco Gutierres, MARILYN for review.     Thank you for the consult,  Evonne Crockett PharmD  Clinical Pharmacist Specialist    Time spent with the patient: 35 mins  Time spent documentin mins     CLINICAL PHARMACY CONSULT: MED RECONCILIATION/REVIEW ADDENDUM    For Pharmacy Admin Tracking Only    PHSO: PHSO Patient?: No  Total # of Interventions Recommended: Count: 4  - Discontinued Medication #: 2 Discontinue Reason(s): Patient Preference  - New Order #: 2 New Medication Order Reason(s): Needs Additional Medication Therapy  Total Interventions Accepted: 4  Time Spent (min): Rober Santillan, [facility], PharmD

## 2020-05-06 DIAGNOSIS — Z79.4 TYPE 2 DIABETES MELLITUS WITH DIABETIC NEUROPATHY, WITH LONG-TERM CURRENT USE OF INSULIN (HCC): Primary | ICD-10-CM

## 2020-05-06 DIAGNOSIS — E11.40 TYPE 2 DIABETES MELLITUS WITH DIABETIC NEUROPATHY, WITH LONG-TERM CURRENT USE OF INSULIN (HCC): Primary | ICD-10-CM

## 2020-05-06 RX ORDER — GLIPIZIDE 10 MG/1
10 TABLET, FILM COATED, EXTENDED RELEASE ORAL DAILY
Qty: 30 TAB | Refills: 3 | Status: SHIPPED | OUTPATIENT
Start: 2020-05-06 | End: 2020-08-27

## 2020-05-06 NOTE — TELEPHONE ENCOUNTER
Spoke with Coryb Garcia permission for patient to change from Lantus Solostar to Basaglar 100 unit ml Quick Pen which is more cost efficient for the patient.  Yane Mukherjee

## 2020-05-12 ENCOUNTER — VIRTUAL VISIT (OUTPATIENT)
Dept: INTERNAL MEDICINE CLINIC | Age: 60
End: 2020-05-12

## 2020-05-12 DIAGNOSIS — E11.21 TYPE 2 DIABETES WITH NEPHROPATHY (HCC): Primary | ICD-10-CM

## 2020-05-12 NOTE — PROGRESS NOTES
Pharmacy Note:  Diabetes Follow-up    S/O: Placed an outgoing call to patient to follow-up on SMBG readings and diabetes. Patient referred by Madi Naranjo NP for diabetes management. Patient is currently taking the following for diabetes: Glipizide SR 10 mg daily, Janumet -1000 mg daily, Lantus 50 units daily.  -Taking Janumet  mg daily since insurance Janumet XR copay is $400 - not affordable for him    He denies any signs/sx of hypoglycemia. Denies any BG <70. Denies polyuria/dipsia/phagia and blurry vision. Reports neuropathy in feet. Last A1C was 11.1 on 4/29/2020. Patient is checking His blood sugar regularly reports the following values:  Date Before Dinner   5/8 455   5/6 212   -States he has had trouble with BG monitor - states \"when I put the test strip in it doesn't show anything\"    Diet/Exercise:  -States he has tried to stop eating a lot of sweets  -States he has been eating more salads    Medication Adherence:   -Patient reports adherence with medications  -Patient denies adverse effects from medications  -States he was told to stop lisinopril during past admission  -Taking Lyrica 2 times daily for neuropathy    Screenings:  Diabetic Foot and Eye Exam HM Status   Topic Date Due    Diabetic Foot Care  07/11/2019    Eye Exam  06/24/2021       Current Outpatient Medications   Medication Sig    glipiZIDE SR (GLUCOTROL XL) 10 mg CR tablet Take 1 Tab by mouth daily.     pregabalin (LYRICA) 50 mg capsule TAKE ONE CAPSULE BY MOUTH THREE TIMES A DAY (Patient taking differently: Take 50 mg by mouth daily.)    SITagliptin-metFORMIN (Janumet XR) 100-1,000 mg TM24 Take one tablet once daily    Blood-Glucose Meter monitoring kit Blood glucose kit that insurance will cover - Check blood glucose twice a day once in the morning fasting and once after a meal.    glucose blood VI test strips (ASCENSIA AUTODISC VI, ONE TOUCH ULTRA TEST VI) strip Provide test strip and glucose meter that insurance will cover - Twice daily-  fasting in the morning and once two hours after a meal.    lancets misc Please provide lancets for blood glucose meter insurance will cover    multivitamin (ONE A DAY) tablet Take 1 Tab by mouth daily.  lidocaine (LIDODERM) 5 % 1 Patch by TransDERmal route every twenty-four (24) hours. Apply daily as needed    Lantus Solostar U-100 Insulin 100 unit/mL (3 mL) inpn INJECT 50 UNITS UNDER THE SKIN DAILY    simvastatin (ZOCOR) 20 mg tablet Take 1 Tab by mouth nightly.  ergocalciferol (ERGOCALCIFEROL) 50,000 unit capsule Take 1 Cap by mouth every seven (7) days.  lisinopril (PRINIVIL, ZESTRIL) 40 mg tablet Take 1 Tab by mouth daily.  aspirin 81 mg tablet Take 1 Tab by mouth daily. No current facility-administered medications for this visit.         Lab Results   Component Value Date/Time    Hemoglobin A1c 11.1 (H) 04/29/2020 12:00 AM    Hemoglobin A1c (POC) Code 106( >15%) 04/04/2019 02:00 PM    Hemoglobin A1c, External 7.8 01/28/2015 11:15 PM       Lab Results   Component Value Date/Time    Sodium 139 04/29/2020 12:00 AM    Potassium 4.5 04/29/2020 12:00 AM    Chloride 100 04/29/2020 12:00 AM    CO2 24 04/29/2020 12:00 AM    Anion gap 14.0 11/22/2019 03:20 AM    Glucose 159 (H) 04/29/2020 12:00 AM    BUN 15 04/29/2020 12:00 AM    Creatinine 0.98 04/29/2020 12:00 AM    BUN/Creatinine ratio 15 04/29/2020 12:00 AM    GFR est AA 96 04/29/2020 12:00 AM    GFR est non-AA 83 04/29/2020 12:00 AM    Calcium 9.2 04/29/2020 12:00 AM       Lab Results   Component Value Date/Time    Cholesterol, total 118 04/29/2020 12:00 AM    HDL Cholesterol 37 (L) 04/29/2020 12:00 AM    LDL, calculated 64 04/29/2020 12:00 AM    Triglyceride 86 04/29/2020 12:00 AM    CHOL/HDL Ratio 2.6 07/30/2018 11:43 AM       A/P:    Diabetes:  -Instructed patient to continue glipizide SR 10 mg daily and Lantus 50 units daily  -Instructed him to continue taking Janumet  mg BID since Janumet XR copay is not affordable  -Reiterated importance of checking BG 1-2 times daily  -Instructed patient to call help number for his BG monitor to see if they can troubleshoot issue or provide him with new monitor; provided him with telephone number   -Patient later returned call and states they are sending him new monitor    Reviewed with patient signs/sx/treatment of hypoglycemia. Patient verbalized understanding. Will follow up with patient in 2 weeks for telephone visit. Will route note to PCP.     Evonne Crockett, PharmD  Clinical Pharmacist Specialist    CLINICAL PHARMACY CONSULT: MED RECONCILIATION/REVIEW ADDENDUM    For Pharmacy Admin Tracking Only    PHSO: PHSO Patient?: No  Total # of Interventions Recommended: Count: 0  Total Interventions Accepted: 0  Time Spent (min): [address] [facility], [address] [facility], PharmD

## 2020-05-12 NOTE — Clinical Note
Nestor Mayen,  I spoke with Mr. Dirk Meo. He states copay for Janumet XR was too expensive, so he wants to continue his regular Janumet  mg BID. He was also having trouble with his BG monitor; he called the BG monitor company and they are sending him a new monitor in the mail. I am having him continue current BG medications and will call him in 2 weeks to follow up.   Nani Freitas, PharmD Clinical Pharmacist Specialist

## 2020-05-20 DIAGNOSIS — E11.40 TYPE 2 DIABETES MELLITUS WITH DIABETIC NEUROPATHY, WITH LONG-TERM CURRENT USE OF INSULIN (HCC): ICD-10-CM

## 2020-05-20 DIAGNOSIS — Z79.4 TYPE 2 DIABETES MELLITUS WITH DIABETIC NEUROPATHY, WITH LONG-TERM CURRENT USE OF INSULIN (HCC): ICD-10-CM

## 2020-05-20 RX ORDER — SITAGLIPTIN AND METFORMIN HYDROCHLORIDE 50; 1000 MG/1; MG/1
1 TABLET, FILM COATED ORAL 2 TIMES DAILY WITH MEALS
Qty: 180 TAB | Refills: 1 | Status: SHIPPED | OUTPATIENT
Start: 2020-05-20 | End: 2020-07-30 | Stop reason: ALTCHOICE

## 2020-05-26 ENCOUNTER — VIRTUAL VISIT (OUTPATIENT)
Dept: INTERNAL MEDICINE CLINIC | Age: 60
End: 2020-05-26

## 2020-05-26 DIAGNOSIS — E11.21 TYPE 2 DIABETES WITH NEPHROPATHY (HCC): Primary | ICD-10-CM

## 2020-05-26 NOTE — Clinical Note
Nestor Mayen,  I have attached my note from my last visit with Mr. Annika Talavera. His fasting BG were running 120s-220s. I instructed him to increase his Basaglar from 50 units daily to 52 units daily and I instructed him to continue his glipizide and Janumet. I am calling him next week to follow up.   Tayla Freitas, PharmD Clinical Pharmacist Specialist

## 2020-05-26 NOTE — PROGRESS NOTES
Pharmacy Note:  Diabetes Follow-up    S/O: Placed an outgoing call to patient to follow-up on SMBG readings and diabetes. Patient referred by Jeffrey Wilson NP for diabetes management. Patient is currently taking the following for diabetes: Glipizide SR 10 mg daily, Janumet  mg BID, Basaglar 50 units daily. He denies any signs/sx of hypoglycemia. Denies any BG <70. Last A1C was 11.1 on 4/29/2020. States he has neuropathy in toes - Lyrica is helping with this. Patient is checking His blood sugar 1 times daily and reports the following values:  Date Fasting   5/26 158   5/25 128   5/24 136   5/23 152   5/22 222 (had snack in middle of night)   5/21 175   5/19 204   5/18 197   -States lowest BG has been 128, highest BG has been 235    Diet/Exercise:  -Breakfast - [place], eggs, sausage, biscuit  -Lunch - Leftovers from [employer] - Spare ribs and green beans and potatoes, spaghetti with turkey meat   -States he doesn't do pasta often  -Snack - Doesn't snack much during day; sometimes a hot dog   -States for midnight snack, sometimes has some cereal or leftovers  -States he has midnight snack around 1-2 times per week  -States hasn't been too active recently    Medication Adherence:  -Patient reports adherence with medications  -Patient denies adverse effects from medications  -States he hasn't been taking lisinopril for 3 months    Screenings:  Diabetic Foot and Eye Exam HM Status   Topic Date Due    Diabetic Foot Care  07/11/2019    Eye Exam  06/24/2021       Current Outpatient Medications   Medication Sig    SITagliptin-metFORMIN (Janumet) 50-1,000 mg per tablet Take 1 Tab by mouth two (2) times daily (with meals).  OTHER Neurx-tf (Alpha lipoic acid 350 mg, Benfotiamine 300 mg, Vitamin B6 35 mg, Vitamin B12 2000 mcg) 1 tab daily    glipiZIDE SR (GLUCOTROL XL) 10 mg CR tablet Take 1 Tab by mouth daily.     pregabalin (LYRICA) 50 mg capsule TAKE ONE CAPSULE BY MOUTH THREE TIMES A DAY (Patient taking differently: Take 50 mg by mouth two (2) times a day.)    Blood-Glucose Meter monitoring kit Blood glucose kit that insurance will cover - Check blood glucose twice a day once in the morning fasting and once after a meal.    glucose blood VI test strips (ASCENSIA AUTODISC VI, ONE TOUCH ULTRA TEST VI) strip Provide test strip and glucose meter that insurance will cover - Twice daily-  fasting in the morning and once two hours after a meal.    lancets misc Please provide lancets for blood glucose meter insurance will cover    multivitamin (ONE A DAY) tablet Take 1 Tab by mouth daily.  lidocaine (LIDODERM) 5 % 1 Patch by TransDERmal route every twenty-four (24) hours. Apply daily as needed    Lantus Solostar U-100 Insulin 100 unit/mL (3 mL) inpn INJECT 50 UNITS UNDER THE SKIN DAILY    simvastatin (ZOCOR) 20 mg tablet Take 1 Tab by mouth nightly.  ergocalciferol (ERGOCALCIFEROL) 50,000 unit capsule Take 1 Cap by mouth every seven (7) days.  lisinopril (PRINIVIL, ZESTRIL) 40 mg tablet Take 1 Tab by mouth daily.  aspirin 81 mg tablet Take 1 Tab by mouth daily. No current facility-administered medications for this visit.         Lab Results   Component Value Date/Time    Hemoglobin A1c 11.1 (H) 04/29/2020 12:00 AM    Hemoglobin A1c (POC) Code 106( >15%) 04/04/2019 02:00 PM    Hemoglobin A1c, External 7.8 01/28/2015 11:15 PM       Lab Results   Component Value Date/Time    Sodium 139 04/29/2020 12:00 AM    Potassium 4.5 04/29/2020 12:00 AM    Chloride 100 04/29/2020 12:00 AM    CO2 24 04/29/2020 12:00 AM    Anion gap 14.0 11/22/2019 03:20 AM    Glucose 159 (H) 04/29/2020 12:00 AM    BUN 15 04/29/2020 12:00 AM    Creatinine 0.98 04/29/2020 12:00 AM    BUN/Creatinine ratio 15 04/29/2020 12:00 AM    GFR est AA 96 04/29/2020 12:00 AM    GFR est non-AA 83 04/29/2020 12:00 AM    Calcium 9.2 04/29/2020 12:00 AM       Lab Results   Component Value Date/Time    Cholesterol, total 118 04/29/2020 12:00 AM    HDL Cholesterol 37 (L) 04/29/2020 12:00 AM    LDL, calculated 64 04/29/2020 12:00 AM    Triglyceride 86 04/29/2020 12:00 AM    CHOL/HDL Ratio 2.6 07/30/2018 11:43 AM       A/P:    Diabetes:  -Instructed patient to increase Basaglar to 52 units  -Instructed patient to continue glipizide and Janumet  -Reiterated importance of checking BG 1-2 times daily (fasting, 2 hours postprandial)    Reviewed with patient signs/sx/treatment of hypoglycemia. Patient verbalized understanding. Will follow up with patient in 3 weeks for telephone visit. Will route note to PCP.     Regina Xiong, PharmD  Clinical Pharmacist Specialist    CLINICAL PHARMACY CONSULT: MED RECONCILIATION/REVIEW ADDENDUM    For Pharmacy Admin Tracking Only    PHSO: PHSO Patient?: No  Total # of Interventions Recommended: Count: 1  - Increased Dose #: 1  Total Interventions Accepted: 1  Time Spent (min): 138 Sheela Santillan, [address] [facility], PharmD

## 2020-06-17 ENCOUNTER — VIRTUAL VISIT (OUTPATIENT)
Dept: INTERNAL MEDICINE CLINIC | Age: 60
End: 2020-06-17

## 2020-06-17 DIAGNOSIS — E11.21 TYPE 2 DIABETES WITH NEPHROPATHY (HCC): Primary | ICD-10-CM

## 2020-06-17 NOTE — PROGRESS NOTES
Pharmacy Note:  Diabetes Follow-up    S/O: Placed an outgoing call to patient to follow-up on SMBG readings and diabetes. Patient referred by Agustin Navarro NP for diabetes management. Patient is currently taking the following for diabetes: Glipizide SR 10 mg daily, Janumet  mg BID, Basaglar 52 units daily. He reports signs/sx of hypoglycemia. States he went all day without eating 1 day and felt shaky at night.  -States he felt better after eating  -States BG this morning was 64    Last A1C was 11.1 on 4/29/2020. Patient is checking His blood sugar 1 times daily and reports the following values:  Date Fasting   6/17 64   6/16 160   -States BG usually 120s-160s fasting; states he doesn't have BG monitor with him today  -States he hasn't been able to check BG at night    Diet/Exercise:  -States he is doing a little walking and trying to change his diet  -States he is trying not to eat a lot of pork and is eating more salads  -States he has a soda 1-2 times a week at work  -States he is not really snacking at midnight anymore    Medication Adherence:  -Patient reports adherence with medications  -Patient denies adverse effects from medications    Screenings:  Diabetic Foot and Eye Exam HM Status   Topic Date Due    Diabetic Foot Care  07/11/2019    Eye Exam  06/24/2021       Current Outpatient Medications   Medication Sig    SITagliptin-metFORMIN (Janumet) 50-1,000 mg per tablet Take 1 Tab by mouth two (2) times daily (with meals).  OTHER Neurx-tf (Alpha lipoic acid 350 mg, Benfotiamine 300 mg, Vitamin B6 35 mg, Vitamin B12 2000 mcg) 1 tab daily    glipiZIDE SR (GLUCOTROL XL) 10 mg CR tablet Take 1 Tab by mouth daily.     pregabalin (LYRICA) 50 mg capsule TAKE ONE CAPSULE BY MOUTH THREE TIMES A DAY (Patient taking differently: Take 50 mg by mouth daily.)    Blood-Glucose Meter monitoring kit Blood glucose kit that insurance will cover - Check blood glucose twice a day once in the morning fasting and once after a meal.    glucose blood VI test strips (ASCENSIA AUTODISC VI, ONE TOUCH ULTRA TEST VI) strip Provide test strip and glucose meter that insurance will cover - Twice daily-  fasting in the morning and once two hours after a meal.    lancets misc Please provide lancets for blood glucose meter insurance will cover    multivitamin (ONE A DAY) tablet Take 1 Tab by mouth daily.  lidocaine (LIDODERM) 5 % 1 Patch by TransDERmal route every twenty-four (24) hours. Apply daily as needed    Lantus Solostar U-100 Insulin 100 unit/mL (3 mL) inpn INJECT 50 UNITS UNDER THE SKIN DAILY    simvastatin (ZOCOR) 20 mg tablet Take 1 Tab by mouth nightly.  ergocalciferol (ERGOCALCIFEROL) 50,000 unit capsule Take 1 Cap by mouth every seven (7) days.  lisinopril (PRINIVIL, ZESTRIL) 40 mg tablet Take 1 Tab by mouth daily.  aspirin 81 mg tablet Take 1 Tab by mouth daily. No current facility-administered medications for this visit.         Lab Results   Component Value Date/Time    Hemoglobin A1c 11.1 (H) 04/29/2020 12:00 AM    Hemoglobin A1c (POC) Code 106( >15%) 04/04/2019 02:00 PM    Hemoglobin A1c, External 7.8 01/28/2015 11:15 PM       Lab Results   Component Value Date/Time    Sodium 139 04/29/2020 12:00 AM    Potassium 4.5 04/29/2020 12:00 AM    Chloride 100 04/29/2020 12:00 AM    CO2 24 04/29/2020 12:00 AM    Anion gap 14.0 11/22/2019 03:20 AM    Glucose 159 (H) 04/29/2020 12:00 AM    BUN 15 04/29/2020 12:00 AM    Creatinine 0.98 04/29/2020 12:00 AM    BUN/Creatinine ratio 15 04/29/2020 12:00 AM    GFR est AA 96 04/29/2020 12:00 AM    GFR est non-AA 83 04/29/2020 12:00 AM    Calcium 9.2 04/29/2020 12:00 AM       Lab Results   Component Value Date/Time    Cholesterol, total 118 04/29/2020 12:00 AM    HDL Cholesterol 37 (L) 04/29/2020 12:00 AM    LDL, calculated 64 04/29/2020 12:00 AM    Triglyceride 86 04/29/2020 12:00 AM    CHOL/HDL Ratio 2.6 07/30/2018 11:43 AM       A/P: Diabetes:  -Discussed additional medication options to help with BG control, including stopping Janumet and starting metformin and Trulicity once weekly   -Patient states he doesn't want to make any medication changes today  -Instructed patient to continue current BG regimen of glipizide SR 10 mg daily, Janumet  mg BID, Basaglar 50 units daily  -Reiterated importance of checking BG at least 2 times a day, especially when he is not feeling well  -Discussed that patient should not skip meals since this increases risk of hypoglycemia  -Reiterated importance of continuing to decrease intake of sugary drinks    Reviewed with patient signs/sx/treatment of hypoglycemia. Patient verbalized understanding. Will follow up with patient in 2 weeks for telephone visit. Will route note to PCP.     Adrianne Canseco, MarkD  Clinical Pharmacist Specialist    CLINICAL PHARMACY CONSULT: MED RECONCILIATION/REVIEW ADDENDUM    For Pharmacy Admin Tracking Only    PHSO: PHSO Patient?: No  Total # of Interventions Recommended: Count: 0  Total Interventions Accepted: 0  Time Spent (min): [address] [facility], PharmD

## 2020-06-29 ENCOUNTER — VIRTUAL VISIT (OUTPATIENT)
Dept: FAMILY MEDICINE CLINIC | Age: 60
End: 2020-06-29

## 2020-06-29 ENCOUNTER — TELEPHONE (OUTPATIENT)
Dept: INTERNAL MEDICINE CLINIC | Age: 60
End: 2020-06-29

## 2020-06-29 NOTE — TELEPHONE ENCOUNTER
Called patient for PharmD telephone appointment for diabetes follow up today. Patient requests to reschedule since he does not have BG monitor with him.     Rescheduled patient's appointment to tomorrow 6/30 per his request.    Rodolfo Tsang, Lala  Clinical Pharmacist Specialist

## 2020-06-30 ENCOUNTER — VIRTUAL VISIT (OUTPATIENT)
Dept: INTERNAL MEDICINE CLINIC | Age: 60
End: 2020-06-30

## 2020-06-30 DIAGNOSIS — E11.21 TYPE 2 DIABETES WITH NEPHROPATHY (HCC): Primary | ICD-10-CM

## 2020-06-30 NOTE — Clinical Note
Nestor Mayen,    I wanted to ask if we can switch Mr. Fidelina Delgado to metformin 1000 mg BID and start Bydureon pen to help with postprandial BG.     Fortino, Vanessa Whitfield, PharmD  Clinical Pharmacist Specialist

## 2020-06-30 NOTE — PROGRESS NOTES
Pharmacy Note:  Diabetes Follow-up    S/O: Placed an outgoing call to patient to follow-up on SMBG readings and diabetes. Patient referred by Barrera Jenkins NP for diabetes management. Patient is currently taking the following for diabetes: Glipizide SR 10 mg daily, Janumet  mg BID, Basaglar 52 units daily.  -States he needs Rx refill for Basaglar     He denies any signs/sx of hypoglycemia. -States he had 1 BG under 70 - states BG was 64 fasting one day    Last A1C was 11.1 on 4/29/2020. Denies any signs of hyperglycemia except neuropathy in toes. Patient is checking His blood sugar 1 times daily and reports the following values:  Date Fasting   6/30 150   6/29 186   6/28 157   6/27 132   6/26 95   6/25 162   6/24 108   6/23 164   6/22 159     Diet/Exercise:  -Breakfast - Sausage or du and cheese sandwich  -Lunch - Tuna fish sandwich; sometimes skips  -Dinner - [place] wings with stuffing and macaroni and cheese  -Snacks - States not really snacking  -Beverages - States he has soda 2-3 cans per week  -States he is eating greens and trying not to eat a lot of sweets or pork    Medication Adherence:  -Patient reports adherence with medications  -Patient denies adverse effects from medications    Screenings:  Diabetic Foot and Eye Exam HM Status   Topic Date Due    Diabetic Foot Care  07/11/2019    Eye Exam  06/24/2021       Current Outpatient Medications   Medication Sig    SITagliptin-metFORMIN (Janumet) 50-1,000 mg per tablet Take 1 Tab by mouth two (2) times daily (with meals).  OTHER Neurx-tf (Alpha lipoic acid 350 mg, Benfotiamine 300 mg, Vitamin B6 35 mg, Vitamin B12 2000 mcg) 1 tab daily    glipiZIDE SR (GLUCOTROL XL) 10 mg CR tablet Take 1 Tab by mouth daily.     pregabalin (LYRICA) 50 mg capsule TAKE ONE CAPSULE BY MOUTH THREE TIMES A DAY (Patient taking differently: Take 50 mg by mouth daily.)    Blood-Glucose Meter monitoring kit Blood glucose kit that insurance will cover - Check blood glucose twice a day once in the morning fasting and once after a meal.    glucose blood VI test strips (ASCENSIA AUTODISC VI, ONE TOUCH ULTRA TEST VI) strip Provide test strip and glucose meter that insurance will cover - Twice daily-  fasting in the morning and once two hours after a meal.    lancets misc Please provide lancets for blood glucose meter insurance will cover    multivitamin (ONE A DAY) tablet Take 1 Tab by mouth daily.  lidocaine (LIDODERM) 5 % 1 Patch by TransDERmal route every twenty-four (24) hours. Apply daily as needed    Lantus Solostar U-100 Insulin 100 unit/mL (3 mL) inpn INJECT 50 UNITS UNDER THE SKIN DAILY (Patient taking differently: 52 units once daily)    simvastatin (ZOCOR) 20 mg tablet Take 1 Tab by mouth nightly.  ergocalciferol (ERGOCALCIFEROL) 50,000 unit capsule Take 1 Cap by mouth every seven (7) days.  lisinopril (PRINIVIL, ZESTRIL) 40 mg tablet Take 1 Tab by mouth daily.  aspirin 81 mg tablet Take 1 Tab by mouth daily. No current facility-administered medications for this visit.         Lab Results   Component Value Date/Time    Hemoglobin A1c 11.1 (H) 04/29/2020 12:00 AM    Hemoglobin A1c (POC) Code 106( >15%) 04/04/2019 02:00 PM    Hemoglobin A1c, External 7.8 01/28/2015 11:15 PM       Lab Results   Component Value Date/Time    Sodium 139 04/29/2020 12:00 AM    Potassium 4.5 04/29/2020 12:00 AM    Chloride 100 04/29/2020 12:00 AM    CO2 24 04/29/2020 12:00 AM    Anion gap 14.0 11/22/2019 03:20 AM    Glucose 159 (H) 04/29/2020 12:00 AM    BUN 15 04/29/2020 12:00 AM    Creatinine 0.98 04/29/2020 12:00 AM    BUN/Creatinine ratio 15 04/29/2020 12:00 AM    GFR est AA 96 04/29/2020 12:00 AM    GFR est non-AA 83 04/29/2020 12:00 AM    Calcium 9.2 04/29/2020 12:00 AM       Lab Results   Component Value Date/Time    Cholesterol, total 118 04/29/2020 12:00 AM    HDL Cholesterol 37 (L) 04/29/2020 12:00 AM    LDL, calculated 64 04/29/2020 12:00 AM Triglyceride 86 04/29/2020 12:00 AM    CHOL/HDL Ratio 2.6 07/30/2018 11:43 AM       A/P:    Diabetes:  -Discussed additional options for BG control including switching Janumet to metformin and starting Bydureon pen   -Will discuss this with PCP  -Instructed patient to continue current BG regimen of Glipizide SR 10 mg daily, Janumet  mg BID, Basaglar 52 units daily.  -Reiterated importance of continuing to check BG 1-2 times daily  -Reviewed importance of decreasing intake of sugary drinks    Reviewed with patient signs/sx/treatment of hypoglycemia. Patient verbalized understanding. Will follow up with patient in 2 weeks for telephone visit. Will route note to PCP.     Bernardo Jennings, PharmD  Clinical Pharmacist Specialist    CLINICAL PHARMACY CONSULT: MED RECONCILIATION/REVIEW ADDENDUM    For Pharmacy Admin Tracking Only    PHSO: PHSO Patient?: No  Total # of Interventions Recommended: Count: 0  Total Interventions Accepted: 0  Time Spent (min): 6[address] [facility], PharmD

## 2020-07-07 DIAGNOSIS — E11.40 TYPE 2 DIABETES MELLITUS WITH DIABETIC NEUROPATHY, WITH LONG-TERM CURRENT USE OF INSULIN (HCC): ICD-10-CM

## 2020-07-07 DIAGNOSIS — Z79.4 TYPE 2 DIABETES MELLITUS WITH DIABETIC NEUROPATHY, WITH LONG-TERM CURRENT USE OF INSULIN (HCC): ICD-10-CM

## 2020-07-07 RX ORDER — INSULIN GLARGINE 100 [IU]/ML
INJECTION, SOLUTION SUBCUTANEOUS
Qty: 18 ML | Refills: 3 | Status: SHIPPED | OUTPATIENT
Start: 2020-07-07 | End: 2020-09-29 | Stop reason: SDUPTHER

## 2020-07-14 ENCOUNTER — VIRTUAL VISIT (OUTPATIENT)
Dept: INTERNAL MEDICINE CLINIC | Age: 60
End: 2020-07-14

## 2020-07-14 DIAGNOSIS — E11.40 TYPE 2 DIABETES MELLITUS WITH DIABETIC NEUROPATHY, WITH LONG-TERM CURRENT USE OF INSULIN (HCC): ICD-10-CM

## 2020-07-14 DIAGNOSIS — Z79.4 TYPE 2 DIABETES MELLITUS WITH DIABETIC NEUROPATHY, WITH LONG-TERM CURRENT USE OF INSULIN (HCC): ICD-10-CM

## 2020-07-14 DIAGNOSIS — E11.40 TYPE 2 DIABETES MELLITUS WITH DIABETIC NEUROPATHY, WITH LONG-TERM CURRENT USE OF INSULIN (HCC): Primary | ICD-10-CM

## 2020-07-14 DIAGNOSIS — Z79.4 TYPE 2 DIABETES MELLITUS WITH DIABETIC NEUROPATHY, WITH LONG-TERM CURRENT USE OF INSULIN (HCC): Primary | ICD-10-CM

## 2020-07-14 RX ORDER — PREGABALIN 50 MG/1
CAPSULE ORAL
Qty: 90 CAP | Refills: 0 | Status: SHIPPED | OUTPATIENT
Start: 2020-07-14 | End: 2020-08-27 | Stop reason: SDUPTHER

## 2020-07-14 NOTE — Clinical Note
Nestor Mayen,   I have been following with Mr. Raine Perez. It looks like he is due for labwork, including A1C and vitamin D levels (he finished vitamin D3 course). I wanted to ask if you can order A1c and vitamin D level labs for him.     Thank you, Sukhjinder Delgado, PharmD  Clinical Pharmacist Specialist

## 2020-07-14 NOTE — PROGRESS NOTES
Pharmacy Note:  Diabetes Follow-up    S/O: Placed an outgoing call to patient to follow-up on SMBG readings and diabetes. Patient referred by Lindsey Sousa NP for diabetes management. Discussed with patient the Pharmacist Collaborative Practice Agreement. Patient provided verbal and/or electronic (ex. [employer]) consent to participate in the collaborative practice agreement between the pharmacist and referred patient. This is in lieu of paper consent due to COVID-19 precautions and the use of remote/virtual visits. Patient is currently taking the following for diabetes: Glipizide SR 10 mg daily, Janumet  mg BID, Basaglar 52 units daily. He denies any signs/sx of hypoglycemia. Denies any BG <70. Last A1C was 11.1 on 4/29/2020. States sometimes blurred vision and headache. States gets this around once a month.   -States he takes Advil and it helped  -Also reports tingling in feet - states he needs Lyrica refill    Patient is checking His blood sugar 1 times daily and reports the following values:  Date Fasting   7/14 181   7/13 127   7/12 136   7/11 147   7/10 94   7/9 116   7/7 120   7/2 158   7/1 100     Diet/Exercise:  -Breakfast - Sausage   -Lunch - Leftovers from dinner; cabbage or baked chicken  -Dinner - Baked chicken and cabbage; sometimes has fried fish once in a while  -Snacks - States rarely snacks; states has an ice cream sandwich at night 102 times per week; states at work has peanuts or chips      Medication Adherence:  -Patient reports adherence with medications  -Patient reports adverse effects from medications; states sometimes gets a little sting from injecting Lantus - states it only lasts 5-10 seconds  -States he ran out of vitamin D3 and is asking if he needs a refill    Screenings:  Diabetic Foot and Eye Exam HM Status   Topic Date Due    Diabetic Foot Care  07/11/2019    Eye Exam  06/24/2021       Current Outpatient Medications   Medication Sig    insulin glargine (Lantus Solostar U-100 Insulin) 100 unit/mL (3 mL) inpn Inject 52 units subcutaneously daily. Or as prescribed by PCP.  SITagliptin-metFORMIN (Janumet) 50-1,000 mg per tablet Take 1 Tab by mouth two (2) times daily (with meals).  OTHER Neurx-tf (Alpha lipoic acid 350 mg, Benfotiamine 300 mg, Vitamin B6 35 mg, Vitamin B12 2000 mcg) 1 tab daily    glipiZIDE SR (GLUCOTROL XL) 10 mg CR tablet Take 1 Tab by mouth daily.  pregabalin (LYRICA) 50 mg capsule TAKE ONE CAPSULE BY MOUTH THREE TIMES A DAY (Patient taking differently: Take 50 mg by mouth daily.)    Blood-Glucose Meter monitoring kit Blood glucose kit that insurance will cover - Check blood glucose twice a day once in the morning fasting and once after a meal.    glucose blood VI test strips (ASCENSIA AUTODISC VI, ONE TOUCH ULTRA TEST VI) strip Provide test strip and glucose meter that insurance will cover - Twice daily-  fasting in the morning and once two hours after a meal.    lancets misc Please provide lancets for blood glucose meter insurance will cover    multivitamin (ONE A DAY) tablet Take 1 Tab by mouth daily.  lidocaine (LIDODERM) 5 % 1 Patch by TransDERmal route every twenty-four (24) hours. Apply daily as needed    simvastatin (ZOCOR) 20 mg tablet Take 1 Tab by mouth nightly.  ergocalciferol (ERGOCALCIFEROL) 50,000 unit capsule Take 1 Cap by mouth every seven (7) days.  lisinopril (PRINIVIL, ZESTRIL) 40 mg tablet Take 1 Tab by mouth daily.  aspirin 81 mg tablet Take 1 Tab by mouth daily. No current facility-administered medications for this visit.         Lab Results   Component Value Date/Time    Hemoglobin A1c 11.1 (H) 04/29/2020 12:00 AM    Hemoglobin A1c (POC) Code 106( >15%) 04/04/2019 02:00 PM    Hemoglobin A1c, External 7.8 01/28/2015 11:15 PM       Lab Results   Component Value Date/Time    Sodium 139 04/29/2020 12:00 AM    Potassium 4.5 04/29/2020 12:00 AM    Chloride 100 04/29/2020 12:00 AM    CO2 24 04/29/2020 12:00 AM    Anion gap 14.0 11/22/2019 03:20 AM    Glucose 159 (H) 04/29/2020 12:00 AM    BUN 15 04/29/2020 12:00 AM    Creatinine 0.98 04/29/2020 12:00 AM    BUN/Creatinine ratio 15 04/29/2020 12:00 AM    GFR est AA 96 04/29/2020 12:00 AM    GFR est non-AA 83 04/29/2020 12:00 AM    Calcium 9.2 04/29/2020 12:00 AM       Lab Results   Component Value Date/Time    Cholesterol, total 118 04/29/2020 12:00 AM    HDL Cholesterol 37 (L) 04/29/2020 12:00 AM    LDL, calculated 64 04/29/2020 12:00 AM    Triglyceride 86 04/29/2020 12:00 AM    CHOL/HDL Ratio 2.6 07/30/2018 11:43 AM       A/P:    Diabetes:  -Instructed patient to continue glipizide SR 10 mg daily, Janumet  mg BID, and Basaglar 52 units daily  -Instructed him to contact PharmD or PCP if stinging with Basaglar injection worsens  -Reiterated importance of checking BG 1-2 times daily  -Needs A1C and labwork checked, including Vitamin D3 level   -Will route note to PCP  -Routed Rx request for Lyrica to PCP    Reviewed with patient signs/sx/treatment of hypoglycemia. Patient verbalized understanding. Will follow up with patient in 3 weeks for telephone visit. Will route note to PCP.     Maura Massey, PharmD  Clinical Pharmacist Specialist    CLINICAL PHARMACY CONSULT: MED RECONCILIATION/REVIEW ADDENDUM    For Pharmacy Admin Tracking Only    PHSO: PHSO Patient?: No  Total # of Interventions Recommended: Count: 0  Total Interventions Accepted: 0  Time Spent (min): [address] [facility], PharmD

## 2020-07-14 NOTE — TELEPHONE ENCOUNTER
Patient requested refill for Lyrica. [facility] , patient last filled Lyrica 30 day Rx on 5/5/2020. Pended Rx and routed to PCP for signing.     Diego Rivera, Lala  Clinical Pharmacist Specialist

## 2020-07-15 ENCOUNTER — TELEPHONE (OUTPATIENT)
Dept: FAMILY MEDICINE CLINIC | Age: 60
End: 2020-07-15

## 2020-07-15 NOTE — TELEPHONE ENCOUNTER
Mr. Elaina Hernández said that he needs labs ordered. He said that Enzo Patel, Lala) told him you would be ordering labs. It looks like he is is due for an A1C, and Comp? I advised we would call him when labs are ordered.

## 2020-07-22 ENCOUNTER — TELEPHONE (OUTPATIENT)
Dept: FAMILY MEDICINE CLINIC | Age: 60
End: 2020-07-22

## 2020-07-30 DIAGNOSIS — I10 ESSENTIAL HYPERTENSION: ICD-10-CM

## 2020-07-30 DIAGNOSIS — Z79.4 TYPE 2 DIABETES MELLITUS WITH DIABETIC NEUROPATHY, WITH LONG-TERM CURRENT USE OF INSULIN (HCC): Primary | ICD-10-CM

## 2020-07-30 DIAGNOSIS — E78.00 HYPERCHOLESTEROLEMIA: ICD-10-CM

## 2020-07-30 DIAGNOSIS — E11.40 TYPE 2 DIABETES MELLITUS WITH DIABETIC NEUROPATHY, WITH LONG-TERM CURRENT USE OF INSULIN (HCC): Primary | ICD-10-CM

## 2020-07-30 DIAGNOSIS — I25.10 ATHEROSCLEROSIS OF NATIVE CORONARY ARTERY OF NATIVE HEART WITHOUT ANGINA PECTORIS: ICD-10-CM

## 2020-07-30 DIAGNOSIS — E55.9 VITAMIN D DEFICIENCY: ICD-10-CM

## 2020-07-30 RX ORDER — EXENATIDE 2 MG/.65ML
2 INJECTION, SUSPENSION, EXTENDED RELEASE SUBCUTANEOUS
Qty: 4 PEN | Refills: 3 | Status: SHIPPED | OUTPATIENT
Start: 2020-07-30 | End: 2020-11-09 | Stop reason: SDUPTHER

## 2020-07-30 RX ORDER — METFORMIN HYDROCHLORIDE 1000 MG/1
1000 TABLET ORAL 2 TIMES DAILY WITH MEALS
Qty: 60 TAB | Refills: 3 | Status: SHIPPED | OUTPATIENT
Start: 2020-07-30 | End: 2021-02-07

## 2020-07-30 NOTE — PROGRESS NOTES
Spoke with patient. He verified his name, , and address. He is to have labs prior to his visit next week. I have discussed the change in his medication from Janumet to the metformin and Bydureon. He verbalizes understanding.  Yane Mukherjee

## 2020-08-03 ENCOUNTER — VIRTUAL VISIT (OUTPATIENT)
Dept: FAMILY MEDICINE CLINIC | Age: 60
End: 2020-08-03

## 2020-08-03 DIAGNOSIS — E55.9 VITAMIN D DEFICIENCY: ICD-10-CM

## 2020-08-03 DIAGNOSIS — E11.40 TYPE 2 DIABETES MELLITUS WITH DIABETIC NEUROPATHY, WITH LONG-TERM CURRENT USE OF INSULIN (HCC): Primary | ICD-10-CM

## 2020-08-03 DIAGNOSIS — K59.00 CONSTIPATION, UNSPECIFIED CONSTIPATION TYPE: ICD-10-CM

## 2020-08-03 DIAGNOSIS — Z71.6 ENCOUNTER FOR SMOKING CESSATION COUNSELING: ICD-10-CM

## 2020-08-03 DIAGNOSIS — I10 ESSENTIAL HYPERTENSION: ICD-10-CM

## 2020-08-03 DIAGNOSIS — Z79.4 TYPE 2 DIABETES MELLITUS WITH DIABETIC NEUROPATHY, WITH LONG-TERM CURRENT USE OF INSULIN (HCC): Primary | ICD-10-CM

## 2020-08-03 RX ORDER — ERGOCALCIFEROL 1.25 MG/1
50000 CAPSULE ORAL
Qty: 4 CAP | Refills: 6 | Status: SHIPPED | OUTPATIENT
Start: 2020-08-03 | End: 2021-09-04

## 2020-08-03 NOTE — PROGRESS NOTES
Katie Clements presents today for   Chief Complaint   Patient presents with    Diabetes       Katie Clements preferred language for health care discussion is english/other. Is someone accompanying this pt? no    Is the patient using any DME equipment during [address]? no    Depression Screening:  3 most recent PHQ Screens 1/2/2020   PHQ Not Done -   Little interest or pleasure in doing things Not at all   Feeling down, depressed, irritable, or hopeless Not at all   Total Score PHQ 2 0       Learning Assessment:  Learning Assessment 1/2/2020   PRIMARY LEARNER Patient   HIGHEST LEVEL OF EDUCATION - PRIMARY LEARNER  DID NOT GRADUATE [facility] PRIMARY LEARNER NONE   CO-LEARNER CAREGIVER No   PRIMARY LANGUAGE ENGLISH   LEARNER PREFERENCE PRIMARY LISTENING   ANSWERED BY self   RELATIONSHIP SELF       Abuse Screening:  Abuse Screening Questionnaire 1/2/2020   Do you ever feel afraid of your partner? N   Are you in a relationship with someone who physically or mentally threatens you? N   Is it safe for you to go home? Y       Generalized Anxiety  No flowsheet data found. Health Maintenance Due   Topic Date Due    Pneumococcal 0-64 years (1 of 1 - PPSV23) 06[birth year]02/1964    DTaP/Tdap/Td series (1 - Tdap) 06/02/1979    Shingrix Vaccine Age 50> (1 of 2) 06/02/2008    FOBT Q1Y Age 50-75  06/02/2008    Foot Exam Q1  07/11/2019    MICROALBUMIN Q1  07/06/2020    A1C test (Diabetic or Prediabetic)  07/29/2020    Influenza Age 9 to Adult  08/01/2020   . Health Maintenance reviewed and discussed and ordered per Provider. Coordination of Care:  1. Have you been to the ER, urgent care clinic since your last visit? Hospitalized since your last visit? no    2. Have you seen or consulted any other health care providers outside of the [address] [place] [address] since your last visit? Include any pap smears or colon screening.  no      Advance Directive:  Discussed 1/2/20

## 2020-08-03 NOTE — PROGRESS NOTES
Zion Dowell is a 56 y.o. male who was seen by synchronous (real-time) audio-video technology on 8/3/2020 for Diabetes    Reports eating fried chicken potato salad and string beans. Reports his blood glucose was 257 this morning as he did wake up and get a snack last night - he ate some potato salad this morning at 3am. He has not had his labs completed as of yet. He has not started taking his Bydureon as of yet. As he thought this was just another type of insulin. He denies any dizziness. He reports he has stopped eating cakes and cookies. He does admit he is taking Lyrica and this reduce some of the pain in his leg but he has a mild nagging pain that kind of just hangs around. Reports he does have some constipation. He is using some medicine OTC to have a bowel movement. Reports he does drink 3-4 bottles of water a day. He does smoke. He is not interested in stopping. Assessment & Plan:   Diagnoses and all orders for this visit:    1. Type 2 diabetes mellitus with diabetic neuropathy, with long-term current use of insulin ([address]    2. Vitamin D deficiency  -     ergocalciferol (ERGOCALCIFEROL) 1,250 mcg (50,000 unit) capsule; Take 1 Cap by mouth every seven (7) days. 3. Essential hypertension    4. Constipation, unspecified constipation type    5. Encounter for smoking cessation counseling    Smoking cessation recommended. I have explained to him how to take his medication in detail. He is to go for his fasting labs. We have had a detailed discussion about his diet. Including foods high in fiber. He is to use OTC stool softener for his constipation. Continue to take Lyrica. 25 minute visit spent counseling and coordinating care with patient on diet, medications, smoking cessation, exercise, and labs. Subjective:       Prior to Admission medications    Medication Sig Start Date End Date Taking?  Authorizing Provider   ergocalciferol (ERGOCALCIFEROL) 1,250 mcg (50,000 unit) capsule Take 1 Cap by mouth every seven (7) days. 8/3/20  Yes Orquidea ANAYA NP   metFORMIN (GLUCOPHAGE) 1,000 mg tablet Take 1 Tab by mouth two (2) times daily (with meals). 7/30/20  Yes Orquidea ANAYA NP   exenatide microspheres (Bydureon) 2 mg/0.65 mL pnij 0.65 mL by SubCUTAneous route every seven (7) days. 7/30/20  Yes Orquidea ANAYA NP   ibuprofen (ADVIL PO) Take 2 Tabs by mouth daily as needed. Yes Provider, Historical   pregabalin (LYRICA) 50 mg capsule TAKE ONE CAPSULE BY MOUTH THREE TIMES A DAY 7/14/20  Yes Orquidea ANAYA NP   glipiZIDE SR (GLUCOTROL XL) 10 mg CR tablet Take 1 Tab by mouth daily. 5/6/20  Yes Tierra Villalta NP   Blood-Glucose Meter monitoring kit Blood glucose kit that insurance will cover - Check blood glucose twice a day once in the morning fasting and once after a meal. 5/5/20  Yes Orquidea ANAYA NP   glucose blood VI test strips (ASCENSIA AUTODISC VI, ONE TOUCH ULTRA TEST VI) strip Provide test strip and glucose meter that insurance will cover - Twice daily-  fasting in the morning and once two hours after a meal. 5/5/20  Yes Tierra Villalta NP   lancets misc Please provide lancets for blood glucose meter insurance will cover 5/5/20  Yes Orquidea ANAYA NP   multivitamin (ONE A DAY) tablet Take 1 Tab by mouth daily. 4/9/20  Yes Orquidea ANAYA NP   simvastatin (ZOCOR) 20 mg tablet Take 1 Tab by mouth nightly. 2/3/20  Yes Orquidea ANAYA NP   aspirin 81 mg tablet Take 1 Tab by mouth daily. 11/16/12  Yes Veronica Peterson MD   insulin glargine (Lantus Solostar U-100 Insulin) 100 unit/mL (3 mL) inpn Inject 52 units subcutaneously daily. Or as prescribed by PCP. 7/7/20   Tierra Villalta NP   OTHER Neurx-tf (Alpha lipoic acid 350 mg, Benfotiamine 300 mg, Vitamin B6 35 mg, Vitamin B12 2000 mcg) 1 tab daily    Provider, Historical   lidocaine (LIDODERM) 5 % 1 Patch by TransDERmal route every twenty-four (24) hours.  Apply daily as needed 4/8/20   Tierra Villalta NP ergocalciferol (ERGOCALCIFEROL) 50,000 unit capsule Take 1 Cap by mouth every seven (7) days. 7/17/19 8/3/20  John ANAYA NP   lisinopril (PRINIVIL, ZESTRIL) 40 mg tablet Take 1 Tab by mouth daily. 6/5/19   Graeme Ballard NP     Patient Active Problem List   Diagnosis Code    Hyperlipidemia E78.5    HTN (hypertension) I10    Insulin dependent diabetes mellitus [id number]    Tobacco use disorder F17.200    Coronary atherosclerosis of native coronary artery I25.10    Tobacco dependence F17.200    Other and unspecified alcohol dependence, continuous drinking behavior F10.20    Mediastinal adenopathy R59.0    Diabetic neuropathy ([facility] [address] E11.40    Vitamin D deficiency E55.9    Other insomnia G47.09    Type 2 diabetes with nephropathy ([facility] [address] E11.21    Otalgia H92.09    Other fatigue R53.83     Patient Active Problem List    Diagnosis Date Noted    Otalgia 05/08/2019    Other fatigue 05/08/2019    Type 2 diabetes with nephropathy ([facility] [address] 04/04/2019    Other insomnia 07/02/2018    Vitamin D deficiency 04/15/2018    Diabetic neuropathy ([facility] [address] 03/27/2018    Mediastinal adenopathy 06/25/2015    Tobacco dependence 06/16/2015    Other and unspecified alcohol dependence, continuous drinking behavior 06/16/2015    Coronary atherosclerosis of native coronary artery 12/31/2013    Tobacco use disorder 12/09/2011    Hyperlipidemia     HTN (hypertension)     Insulin dependent diabetes mellitus      Current Outpatient Medications   Medication Sig Dispense Refill    ergocalciferol (ERGOCALCIFEROL) 1,250 mcg (50,000 unit) capsule Take 1 Cap by mouth every seven (7) days. 4 Cap 6    metFORMIN (GLUCOPHAGE) 1,000 mg tablet Take 1 Tab by mouth two (2) times daily (with meals). 60 Tab 3    exenatide microspheres (Bydureon) 2 mg/0.65 mL pnij 0.65 mL by SubCUTAneous route every seven (7) days. 4 Pen 3    ibuprofen (ADVIL PO) Take 2 Tabs by mouth daily as needed.       pregabalin (LYRICA) 50 mg capsule TAKE ONE CAPSULE BY MOUTH THREE TIMES A DAY 90 Cap 0    glipiZIDE SR (GLUCOTROL XL) 10 mg CR tablet Take 1 Tab by mouth daily. 30 Tab 3    Blood-Glucose Meter monitoring kit Blood glucose kit that insurance will cover - Check blood glucose twice a day once in the morning fasting and once after a meal. 1 Kit 0    glucose blood VI test strips (ASCENSIA AUTODISC VI, ONE TOUCH ULTRA TEST VI) strip Provide test strip and glucose meter that insurance will cover - Twice daily-  fasting in the morning and once two hours after a meal. 100 Strip 10    lancets misc Please provide lancets for blood glucose meter insurance will cover 1 Package 11    multivitamin (ONE A DAY) tablet Take 1 Tab by mouth daily. 90 Tab 3    simvastatin (ZOCOR) 20 mg tablet Take 1 Tab by mouth nightly. 90 Tab 3    aspirin 81 mg tablet Take 1 Tab by mouth daily. 1 Tab 0    insulin glargine (Lantus Solostar U-100 Insulin) 100 unit/mL (3 mL) inpn Inject 52 units subcutaneously daily. Or as prescribed by PCP. 18 mL 3    OTHER Neurx-tf (Alpha lipoic acid 350 mg, Benfotiamine 300 mg, Vitamin B6 35 mg, Vitamin B12 2000 mcg) 1 tab daily      lidocaine (LIDODERM) 5 % 1 Patch by TransDERmal route every twenty-four (24) hours. Apply daily as needed 1 Package 3    lisinopril (PRINIVIL, ZESTRIL) 40 mg tablet Take 1 Tab by mouth daily. 90 Tab 12     Allergies   Allergen Reactions    Niacin Itching     Past Medical History:   Diagnosis Date    Alcohol use     Fri-Sun one fifth; Mon-Thur: Pint of liquor    CAD (coronary artery disease)     Cardiac angioplasty with stent 07/29/2009    2.5 x 13 Cypher stent to CX.  Cardiac cath 11/09/2011    RCA patent. LM patent. LAD patent. mD1 55%. CX patent. Prior stent patent. Edison 85% (2.5 x 15-mm Xience stent, resid 0%). LVEDP 12. EF 40-45%. High lateral hypk (RI distribution).       Cardiac inferior STEMI 2009    Current smoker     contemplating stopping    Diabetes [facility] [address]     type 2-insulin dependent    GERD (gastroesophageal reflux disease)     HTN (hypertension)     Hyperlipidemia     Migraine     Myocardial infarction [facility])      Past Surgical History:   Procedure Laterality Date    HX CORONARY STENT PLACEMENT  07/29/2009    HX HEART CATHETERIZATION  8/30/2011    HX HEART CATHETERIZATION  11/09/2011    HX WISDOM TEETH EXTRACTION      x4     Family History   Problem Relation Age of Onset    Hypertension Mother     Heart Attack Mother     Diabetes Mother     Hypertension Father     Heart Attack Father     Diabetes Father     Diabetes Sister     Hypertension Sister     Stroke Sister     Diabetes Brother     Hypertension Brother     Stroke Brother     No Known Problems Maternal Grandmother     No Known Problems Maternal Grandfather     No Known Problems Paternal Grandmother     No Known Problems Paternal Grandfather     No Known Problems Brother     Heart Disease Other     Kidney Disease Other      Social History     Tobacco Use    Smoking status: Current Every Day Smoker     Packs/day: 0.50     Years: 1.00     Pack years: 0.50     Types: Cigarettes    Smokeless tobacco: Never Used   Substance Use Topics    Alcohol use: Yes     Alcohol/week: 4.2 standard drinks     Types: 5 Shots of liquor per week     Comment: occassionally       Review of Systems   Constitutional: Negative for fever. HENT: Negative for congestion. Eyes: Negative for blurred vision. Respiratory: Negative for cough and shortness of breath. Cardiovascular: Negative for chest pain. Genitourinary: Negative. Musculoskeletal: Negative for myalgias. Neurological: Negative for dizziness. Objective:   No flowsheet data found.      [INSTRUCTIONS:  \"[x]\" Indicates a positive item  \"[]\" Indicates a negative item  -- DELETE ALL ITEMS NOT EXAMINED]    Constitutional: [x] Appears well-developed and well-nourished [x] No apparent distress      [] Abnormal -     Mental status: [x] Alert and awake  [x] Oriented to person/place/time [x] Able to follow commands    [] Abnormal -     Eyes:   EOM    [x]  Normal    [] Abnormal -   Sclera  [x]  Normal    [] Abnormal -          Discharge [x]  None visible   [] Abnormal -     HENT: [x] Normocephalic, atraumatic  [] Abnormal -   [x] Mouth/Throat: Mucous membranes are moist    External Ears [x] Normal  [] Abnormal -    Neck: [x] No visualized mass [] Abnormal -     Pulmonary/Chest: [x] Respiratory effort normal   [x] No visualized signs of difficulty breathing or respiratory distress        [] Abnormal -      Musculoskeletal:   [x] Normal gait with no signs of ataxia         [x] Normal range of motion of neck        [] Abnormal -     Neurological:        [x] No Facial Asymmetry (Cranial nerve 7 motor function) (limited exam due to video visit)          [x] No gaze palsy        [] Abnormal -          Skin:        [x] No significant exanthematous lesions or discoloration noted on facial skin         [] Abnormal -            Psychiatric:       [x] Normal Affect [] Abnormal -        [x] No Hallucinations    We discussed the expected course, resolution and complications of the diagnosis(es) in detail. Medication risks, benefits, costs, interactions, and alternatives were discussed as indicated. I advised him to contact the office if his condition worsens, changes or fails to improve as anticipated. He expressed understanding with the diagnosis(es) and plan. Hubert Parker, who was evaluated through a patient-initiated, synchronous (real-time) audio-video encounter, and/or his healthcare decision maker, is aware that it is a billable service, with coverage as determined by his insurance carrier. He provided verbal consent to proceed: Yes, and patient identification was verified.  It was conducted pursuant to the emergency declaration under the [facility], [address] [place] [address] authority and the [employer] and [employer] [facility] Act. A caregiver was present when appropriate. Ability to conduct physical exam was limited. I was at home. The patient was at home.       Fadi Rowland NP

## 2020-08-04 ENCOUNTER — VIRTUAL VISIT (OUTPATIENT)
Dept: INTERNAL MEDICINE CLINIC | Age: 60
End: 2020-08-04

## 2020-08-04 DIAGNOSIS — Z79.4 TYPE 2 DIABETES MELLITUS WITH DIABETIC NEUROPATHY, WITH LONG-TERM CURRENT USE OF INSULIN (HCC): Primary | ICD-10-CM

## 2020-08-04 DIAGNOSIS — E11.40 TYPE 2 DIABETES MELLITUS WITH DIABETIC NEUROPATHY, WITH LONG-TERM CURRENT USE OF INSULIN (HCC): Primary | ICD-10-CM

## 2020-08-04 NOTE — PROGRESS NOTES
Pharmacy Note:  Diabetes Follow-up    S/O: Placed an outgoing call to patient to follow-up on SMBG readings and diabetes. Patient referred by Ernestine Tavares NP for diabetes management. Patient is currently taking the following for diabetes: Glipizide SR 10 mg daily, metformin 1000 mg BID, Basaglar 52 units daily each morning, Bydureon 2 mg once weekly. -States he has only been taking metformin 100 mg daily    He denies any signs/sx of hypoglycemia. Denies any BG <70. Last A1C was 11.1 on 4/29/2020. Denies any signs of hyperglycemia except for some tingling in right foot. States Lyrica helps a little with neuropathy. Patient is checking His blood sugar 1 times daily and reports the following values:   Date Fasting   8/3 257   8/2 275   7/31 160   7/30 157   7/29 126   7/28 198   7/27 202   7/26 188   7/25 150   7/24 162     Diet/Exercise:  -States his diet has been the same  -States he has midnight snack sometimes    Medication Adherence:  -Patient denies adherence with medications - only taking metformin 1000 mg daily instead of BID as prescribed; states he misses dose of another medicine around once a week  -Patient reports adverse effects from medications - having some constipation    Screenings:  Diabetic Foot and Eye Exam HM Status   Topic Date Due    Diabetic Foot Care  07/11/2019    Eye Exam  06/24/2021       Current Outpatient Medications   Medication Sig    ergocalciferol (ERGOCALCIFEROL) 1,250 mcg (50,000 unit) capsule Take 1 Cap by mouth every seven (7) days.  metFORMIN (GLUCOPHAGE) 1,000 mg tablet Take 1 Tab by mouth two (2) times daily (with meals).  exenatide microspheres (Bydureon) 2 mg/0.65 mL pnij 0.65 mL by SubCUTAneous route every seven (7) days.  ibuprofen (ADVIL PO) Take 2 Tabs by mouth daily as needed.     pregabalin (LYRICA) 50 mg capsule TAKE ONE CAPSULE BY MOUTH THREE TIMES A DAY    insulin glargine (Lantus Solostar U-100 Insulin) 100 unit/mL (3 mL) inpn Inject 52 units subcutaneously daily. Or as prescribed by PCP.  OTHER Neurx-tf (Alpha lipoic acid 350 mg, Benfotiamine 300 mg, Vitamin B6 35 mg, Vitamin B12 2000 mcg) 1 tab daily    glipiZIDE SR (GLUCOTROL XL) 10 mg CR tablet Take 1 Tab by mouth daily.  Blood-Glucose Meter monitoring kit Blood glucose kit that insurance will cover - Check blood glucose twice a day once in the morning fasting and once after a meal.    glucose blood VI test strips (ASCENSIA AUTODISC VI, ONE TOUCH ULTRA TEST VI) strip Provide test strip and glucose meter that insurance will cover - Twice daily-  fasting in the morning and once two hours after a meal.    lancets misc Please provide lancets for blood glucose meter insurance will cover    multivitamin (ONE A DAY) tablet Take 1 Tab by mouth daily.  lidocaine (LIDODERM) 5 % 1 Patch by TransDERmal route every twenty-four (24) hours. Apply daily as needed    simvastatin (ZOCOR) 20 mg tablet Take 1 Tab by mouth nightly.  lisinopril (PRINIVIL, ZESTRIL) 40 mg tablet Take 1 Tab by mouth daily.  aspirin 81 mg tablet Take 1 Tab by mouth daily. No current facility-administered medications for this visit.       Lab Results   Component Value Date/Time    Hemoglobin A1c 11.1 (H) 04/29/2020 12:00 AM    Hemoglobin A1c (POC) Code 106( >15%) 04/04/2019 02:00 PM    Hemoglobin A1c, External 7.8 01/28/2015 11:15 PM     Lab Results   Component Value Date/Time    Sodium 139 04/29/2020 12:00 AM    Potassium 4.5 04/29/2020 12:00 AM    Chloride 100 04/29/2020 12:00 AM    CO2 24 04/29/2020 12:00 AM    Anion gap 14.0 11/22/2019 03:20 AM    Glucose 159 (H) 04/29/2020 12:00 AM    BUN 15 04/29/2020 12:00 AM    Creatinine 0.98 04/29/2020 12:00 AM    BUN/Creatinine ratio 15 04/29/2020 12:00 AM    GFR est AA 96 04/29/2020 12:00 AM    GFR est non-AA 83 04/29/2020 12:00 AM    Calcium 9.2 04/29/2020 12:00 AM       Lab Results   Component Value Date/Time    Cholesterol, total 118 04/29/2020 12:00 AM    HDL Cholesterol 37 (L) 04/29/2020 12:00 AM    LDL, calculated 64 04/29/2020 12:00 AM    Triglyceride 86 04/29/2020 12:00 AM    CHOL/HDL Ratio 2.6 07/30/2018 11:43 AM     A/P:  Diabetes:  -Instructed patient to start Bydiureon pen 2 mg once weekly   -Reviewed proper storage and administration instructions with patient  -Instructed him to continue Glipizide SR 10 mg daily, metformin 1000 mg daily, and Basaglar 52 units daily each morning  -Would recommend waiting until patient starts Bydureon dose to increase metformin to 1000 mg BID, since if patient develops GI symptoms it would be hard to determine whether it was from [address] or metformin  -Reiterated importance of checking BG 2 times daily    Reviewed with patient signs/sx/treatment of hypoglycemia. Patient verbalized understanding. Will follow up with patient in 2 weeks for telephone visit. Will route note to PCP.     Aristides Birmingham, PharmD  Clinical Pharmacist Specialist    CLINICAL PHARMACY CONSULT: MED RECONCILIATION/REVIEW ADDENDUM    For Pharmacy Admin Tracking Only    PHSO: PHSO Patient?: No  Total # of Interventions Recommended: Count: 0  Total Interventions Accepted: 0  Time Spent (min): [address] [facility], PharmD

## 2020-08-18 ENCOUNTER — TELEPHONE (OUTPATIENT)
Dept: INTERNAL MEDICINE CLINIC | Age: 60
End: 2020-08-18

## 2020-08-18 NOTE — TELEPHONE ENCOUNTER
Called patient for PharmD telephone appointment for diabetes follow up. Patient states he does not have BG monitor with him. Rescheduled appointment to 8/20 per his request. Reiterated importance of him having BG monitor and readings with him for appointments. Patient expressed understanding and had no further questions.     Derrick Rojas, PharmD  Clinical Pharmacist Specialist

## 2020-08-19 ENCOUNTER — HOSPITAL ENCOUNTER (OUTPATIENT)
Dept: LAB | Age: 60
Discharge: HOME | End: 2020-08-19
Payer: COMMERCIAL

## 2020-08-19 DIAGNOSIS — Z79.4 TYPE 2 DIABETES MELLITUS WITH DIABETIC NEUROPATHY, WITH LONG-TERM CURRENT USE OF INSULIN (HCC): ICD-10-CM

## 2020-08-19 DIAGNOSIS — I25.10 ATHEROSCLEROSIS OF NATIVE CORONARY ARTERY OF NATIVE HEART WITHOUT ANGINA PECTORIS: ICD-10-CM

## 2020-08-19 DIAGNOSIS — E11.40 TYPE 2 DIABETES MELLITUS WITH DIABETIC NEUROPATHY, WITH LONG-TERM CURRENT USE OF INSULIN (HCC): ICD-10-CM

## 2020-08-19 DIAGNOSIS — I10 ESSENTIAL HYPERTENSION: ICD-10-CM

## 2020-08-19 DIAGNOSIS — E55.9 VITAMIN D DEFICIENCY: ICD-10-CM

## 2020-08-19 DIAGNOSIS — E78.00 HYPERCHOLESTEROLEMIA: ICD-10-CM

## 2020-08-19 LAB
25(OH)D3 SERPL-MCNC: 38.9 NG/ML (ref 30–100)
ALBUMIN SERPL-MCNC: 4 G/DL (ref 3.4–5)
ALBUMIN/GLOB SERPL: 1.2 {RATIO} (ref 0.8–1.7)
ALP SERPL-CCNC: 80 U/L (ref 45–117)
ALT SERPL-CCNC: 33 U/L (ref 16–61)
ANION GAP SERPL CALC-SCNC: 3 MMOL/L (ref 3–18)
AST SERPL-CCNC: 22 U/L (ref 10–38)
BASOPHILS # BLD: 0 K/UL (ref 0–0.1)
BASOPHILS NFR BLD: 0 % (ref 0–2)
BILIRUB SERPL-MCNC: 0.3 MG/DL (ref 0.2–1)
BUN SERPL-MCNC: 22 MG/DL (ref 7–18)
BUN/CREAT SERPL: 20 (ref 12–20)
CALCIUM SERPL-MCNC: 9 MG/DL (ref 8.5–10.1)
CHLORIDE SERPL-SCNC: 108 MMOL/L (ref 100–111)
CHOLEST SERPL-MCNC: 133 MG/DL
CO2 SERPL-SCNC: 28 MMOL/L (ref 21–32)
CREAT SERPL-MCNC: 1.1 MG/DL (ref 0.6–1.3)
DIFFERENTIAL METHOD BLD: ABNORMAL
EOSINOPHIL # BLD: 0.1 K/UL (ref 0–0.4)
EOSINOPHIL NFR BLD: 3 % (ref 0–5)
ERYTHROCYTE [DISTWIDTH] IN BLOOD BY AUTOMATED COUNT: 14.6 % (ref 11.6–14.5)
EST. AVERAGE GLUCOSE BLD GHB EST-MCNC: 163 MG/DL
GLOBULIN SER CALC-MCNC: 3.3 G/DL (ref 2–4)
GLUCOSE SERPL-MCNC: 99 MG/DL (ref 74–99)
HBA1C MFR BLD: 7.3 % (ref 4.2–5.6)
HCT VFR BLD AUTO: 40.5 % (ref 36–48)
HDLC SERPL-MCNC: 41 MG/DL (ref 40–60)
HDLC SERPL: 3.2 {RATIO} (ref 0–5)
HGB BLD-MCNC: 13.7 G/DL (ref 13–16)
LDLC SERPL CALC-MCNC: 69 MG/DL (ref 0–100)
LIPID PROFILE,FLP: NORMAL
LYMPHOCYTES # BLD: 1.4 K/UL (ref 0.9–3.6)
LYMPHOCYTES NFR BLD: 37 % (ref 21–52)
MCH RBC QN AUTO: 29.8 PG (ref 24–34)
MCHC RBC AUTO-ENTMCNC: 33.8 G/DL (ref 31–37)
MCV RBC AUTO: 88 FL (ref 74–97)
MONOCYTES # BLD: 0.4 K/UL (ref 0.05–1.2)
MONOCYTES NFR BLD: 10 % (ref 3–10)
NEUTS SEG # BLD: 1.9 K/UL (ref 1.8–8)
NEUTS SEG NFR BLD: 50 % (ref 40–73)
PLATELET # BLD AUTO: 195 K/UL (ref 135–420)
PMV BLD AUTO: 12.3 FL (ref 9.2–11.8)
POTASSIUM SERPL-SCNC: 4.7 MMOL/L (ref 3.5–5.5)
PROT SERPL-MCNC: 7.3 G/DL (ref 6.4–8.2)
RBC # BLD AUTO: 4.6 M/UL (ref 4.7–5.5)
SODIUM SERPL-SCNC: 139 MMOL/L (ref 136–145)
TRIGL SERPL-MCNC: 115 MG/DL (ref ?–150)
VLDLC SERPL CALC-MCNC: 23 MG/DL
WBC # BLD AUTO: 3.7 K/UL (ref 4.6–13.2)

## 2020-08-19 PROCEDURE — 36415 COLL VENOUS BLD VENIPUNCTURE: CPT

## 2020-08-19 PROCEDURE — 80061 LIPID PANEL: CPT

## 2020-08-19 PROCEDURE — 82306 VITAMIN D 25 HYDROXY: CPT

## 2020-08-19 PROCEDURE — 80053 COMPREHEN METABOLIC PANEL: CPT

## 2020-08-19 PROCEDURE — 83036 HEMOGLOBIN GLYCOSYLATED A1C: CPT

## 2020-08-19 PROCEDURE — 85025 COMPLETE CBC W/AUTO DIFF WBC: CPT

## 2020-08-19 NOTE — PROGRESS NOTES
Pharmacy Note:  Diabetes Follow-up    S/O: Placed an outgoing call to patient to follow-up on SMBG readings and diabetes. Patient referred by Mohit Hoff NP for diabetes management. Patient is currently taking the following for diabetes: Glipizide SR 10 mg daily, metformin 1000 mg daily, Basaglar 52 units daily each morning, Bydureon 2 mg once weekly. -States he is taking Basaglar 50 units daily instead of 52 units daily    He denies any signs/sx of hypoglycemia. Denies any BG <70. Last A1C was 11.1 on 4/29/2020. Denies polyuria/dipsia/phagia. Reports neuropathy in toes, which has been the same. Denies any recent blurry vision. Patient is checking His blood sugar 1 times daily and reports the following values:  Date Fasting   8/20 179 (had big dinner)   8/19 96   8/18 106   8/17 188   8/14 161   8/13 86   8/12 186   8/11 172   8/10 135   8/9 167   8/8 110   8/7 235     Diet/Exercise:  -Breakfast - Aguilera or sausage, grits  -Lunch - Doesn't really eat lunch  -Dinner - Steak, green beans; chicken with potato salad, pasta salad, or macaroni and cheese  -States on the weekdays he has 1 side with dinner, and the weekends he has 2 sides  -Snacks - Doesn't really snack  -Beverages - Water, sometimes alcohol (liquor around 3 times per week), sometimes coffee or juice with electrolytes (sugar free)    Medication Adherence:  -Patient reports adherence with medications  -Patient denies adverse effects from medications    Screenings:  Diabetic Foot and Eye Exam  Status   Topic Date Due    Diabetic Foot Care  07/11/2019    Eye Exam  06/24/2021       Current Outpatient Medications   Medication Sig    ergocalciferol (ERGOCALCIFEROL) 1,250 mcg (50,000 unit) capsule Take 1 Cap by mouth every seven (7) days.  metFORMIN (GLUCOPHAGE) 1,000 mg tablet Take 1 Tab by mouth two (2) times daily (with meals).  exenatide microspheres (Bydureon) 2 mg/0.65 mL pnij 0.65 mL by SubCUTAneous route every seven (7) days.  ibuprofen (ADVIL PO) Take 2 Tabs by mouth daily as needed.  pregabalin (LYRICA) 50 mg capsule TAKE ONE CAPSULE BY MOUTH THREE TIMES A DAY (Patient taking differently: Take 50 mg by mouth two (2) times a day. TAKE ONE CAPSULE BY MOUTH THREE TIMES A DAY)    insulin glargine (Lantus Solostar U-100 Insulin) 100 unit/mL (3 mL) inpn Inject 52 units subcutaneously daily. Or as prescribed by PCP.  OTHER Neurx-tf (Alpha lipoic acid 350 mg, Benfotiamine 300 mg, Vitamin B6 35 mg, Vitamin B12 2000 mcg) 1 tab daily    glipiZIDE SR (GLUCOTROL XL) 10 mg CR tablet Take 1 Tab by mouth daily.  Blood-Glucose Meter monitoring kit Blood glucose kit that insurance will cover - Check blood glucose twice a day once in the morning fasting and once after a meal.    glucose blood VI test strips (ASCENSIA AUTODISC VI, ONE TOUCH ULTRA TEST VI) strip Provide test strip and glucose meter that insurance will cover - Twice daily-  fasting in the morning and once two hours after a meal.    lancets misc Please provide lancets for blood glucose meter insurance will cover    multivitamin (ONE A DAY) tablet Take 1 Tab by mouth daily.  lidocaine (LIDODERM) 5 % 1 Patch by TransDERmal route every twenty-four (24) hours. Apply daily as needed    simvastatin (ZOCOR) 20 mg tablet Take 1 Tab by mouth nightly.  lisinopril (PRINIVIL, ZESTRIL) 40 mg tablet Take 1 Tab by mouth daily.  aspirin 81 mg tablet Take 1 Tab by mouth daily. No current facility-administered medications for this visit.         Lab Results   Component Value Date/Time    Hemoglobin A1c 7.3 (H) 08/19/2020 06:49 AM    Hemoglobin A1c (POC) Code 106( >15%) 04/04/2019 02:00 PM    Hemoglobin A1c, External 7.8 01/28/2015 11:15 PM       Lab Results   Component Value Date/Time    Sodium 139 08/19/2020 06:49 AM    Potassium 4.7 08/19/2020 06:49 AM    Chloride 108 08/19/2020 06:49 AM    CO2 28 08/19/2020 06:49 AM    Anion gap 3 08/19/2020 06:49 AM    Glucose 99 08/19/2020 06:49 AM    BUN 22 (H) 08/19/2020 06:49 AM    Creatinine 1.10 08/19/2020 06:49 AM    BUN/Creatinine ratio 20 08/19/2020 06:49 AM    GFR est AA >60 08/19/2020 06:49 AM    GFR est non-AA >60 08/19/2020 06:49 AM    Calcium 9.0 08/19/2020 06:49 AM       Lab Results   Component Value Date/Time    Cholesterol, total 133 08/19/2020 06:49 AM    HDL Cholesterol 41 08/19/2020 06:49 AM    LDL, calculated 69 08/19/2020 06:49 AM    Triglyceride 115 08/19/2020 06:49 AM    CHOL/HDL Ratio 3.2 08/19/2020 06:49 AM     A/P:    Diabetes:  -Congratulated patient on recent A1C of 7.3 - A1C near goal of <7   -Encouraged patient to keep up good work with medication adherence  -Instructed patient to continue current regimen of glipizide SR 10 mg daily, metformin 1000 mg daily, Basaglar 50 units daily each morning, and Bydureon 2 mg once weekly   -Will ask PCP to stop glipizide XL to decrease risk of hypoglycemia   -Still have room to go up to metformin 1000 mg BID if BG starts to increase after stopping glipizide XL   -Will expect patient's BG to continue to improve on Bydureon  -Reiterated importance of checking BG 2 times daily and not skipping meals since this contributes to risk of hypoglycemia   -Instructed him to eat lunch or at least have snack around midday each day    Reviewed with patient signs/sx/treatment of hypoglycemia. Patient verbalized understanding. Will follow up with patient in 2 weeks for telephone visit. Will route note to PCP.     Misti Coronado, PharmD  Clinical Pharmacist Specialist    CLINICAL PHARMACY CONSULT: MED RECONCILIATION/REVIEW ADDENDUM    For Pharmacy Admin Tracking Only    PHSO: PHSO Patient?: No  Total # of Interventions Recommended: Count: 0  Total Interventions Accepted: 0  Time Spent (min): [facility] [facility], [facility], PharmD

## 2020-08-20 ENCOUNTER — VIRTUAL VISIT (OUTPATIENT)
Dept: FAMILY MEDICINE CLINIC | Age: 60
End: 2020-08-20

## 2020-08-20 DIAGNOSIS — Z79.4 TYPE 2 DIABETES MELLITUS WITH DIABETIC NEUROPATHY, WITH LONG-TERM CURRENT USE OF INSULIN (HCC): Primary | ICD-10-CM

## 2020-08-20 DIAGNOSIS — E11.40 TYPE 2 DIABETES MELLITUS WITH DIABETIC NEUROPATHY, WITH LONG-TERM CURRENT USE OF INSULIN (HCC): Primary | ICD-10-CM

## 2020-08-20 NOTE — Clinical Note
Nestor Mayen,    I spoke with . Tam Canales today. BG are improving since he started Bydureon, but bouncing between 80s-180s. I wanted to ask if we can stop his glipizide XL as this increases his risk of hypoglycemia. He is only taking metformin 1000 mg daily right now, so we have room to go up on this if BGs increase when we stop glipizide XL. Please let me know what you recommend for him. I am doing my last follow up with him in 2 weeks before my last day at work on September 3.     Bryan Freitas, PharmD  Clinical Pharmacist Specialist

## 2020-08-27 DIAGNOSIS — E11.40 TYPE 2 DIABETES MELLITUS WITH DIABETIC NEUROPATHY, WITH LONG-TERM CURRENT USE OF INSULIN (HCC): ICD-10-CM

## 2020-08-27 DIAGNOSIS — Z79.4 TYPE 2 DIABETES MELLITUS WITH DIABETIC NEUROPATHY, WITH LONG-TERM CURRENT USE OF INSULIN (HCC): ICD-10-CM

## 2020-08-27 RX ORDER — PREGABALIN 50 MG/1
CAPSULE ORAL
Qty: 90 CAP | Refills: 0 | Status: SHIPPED | OUTPATIENT
Start: 2020-08-27 | End: 2020-10-13 | Stop reason: SDUPTHER

## 2020-08-27 NOTE — TELEPHONE ENCOUNTER
Pt stated that his is out of medication, please advise.    Requested Prescriptions     Pending Prescriptions Disp Refills    pregabalin (LYRICA) 50 mg capsule 90 Cap 0     Sig: TAKE ONE CAPSULE BY MOUTH THREE TIMES A DAY

## 2020-08-27 NOTE — PROGRESS NOTES
Please contact Mr. Dawkins and have him stop the glipizide XL per the recommendation by the pharmacist. He has an appointment with her on Sept. 3 virtual and he needs to make sure he keeps this appointment.  Yane Mukherjee

## 2020-08-28 NOTE — PROGRESS NOTES
Spoke with patient and advised him to stop the glipizide and to keep appt 9/3/20.  Stated he understood

## 2020-09-01 ENCOUNTER — TELEPHONE (OUTPATIENT)
Dept: INTERNAL MEDICINE CLINIC | Age: 60
End: 2020-09-01

## 2020-09-01 NOTE — TELEPHONE ENCOUNTER
Called patient for PharmD telephone appointment for diabetes follow up today. He states he is at work and he does not have BG monitor with him today. He states his BG have been running 130s-170s. States he has been taking Basaglar 52 units daily, metformin 1000 mg daily, and Bydureon 2 mg once weekly. States he stopped glipizide XL per instructions from PCP. States since he stopped glipizide XL, he has not had any BG in 80s like last visit. Instructed him to continue current BG regimen. If patient needs additional help with BG control in the future, consider increasing metformin to 1000 mg BID if tolerated. Scheduled him for follow up telephone appointment on 10/7 with new PharmD. Will have him follow up with PCP for diabetes until he establishes care with new PharmSYLVIE. Patient expressed understanding and had no further questions.     Misti Coronado, PharmD  Clinical Pharmacist Specialist

## 2020-09-06 DIAGNOSIS — Z79.4 TYPE 2 DIABETES MELLITUS WITH DIABETIC NEUROPATHY, WITH LONG-TERM CURRENT USE OF INSULIN (HCC): ICD-10-CM

## 2020-09-06 DIAGNOSIS — E11.40 TYPE 2 DIABETES MELLITUS WITH DIABETIC NEUROPATHY, WITH LONG-TERM CURRENT USE OF INSULIN (HCC): ICD-10-CM

## 2020-09-07 RX ORDER — GLIPIZIDE 10 MG/1
TABLET, FILM COATED, EXTENDED RELEASE ORAL
Qty: 30 TAB | Refills: 2 | OUTPATIENT
Start: 2020-09-07

## 2020-09-22 ENCOUNTER — OFFICE VISIT (OUTPATIENT)
Dept: SURGERY | Age: 60
End: 2020-09-22

## 2020-09-22 VITALS
RESPIRATION RATE: 16 BRPM | OXYGEN SATURATION: 99 % | TEMPERATURE: 99 F | HEART RATE: 77 BPM | WEIGHT: 191 LBS | HEIGHT: 69 IN | BODY MASS INDEX: 28.29 KG/M2

## 2020-09-22 DIAGNOSIS — Z01.818 PRE-OP TESTING: Primary | ICD-10-CM

## 2020-09-22 DIAGNOSIS — Z12.11 COLON CANCER SCREENING: ICD-10-CM

## 2020-09-22 NOTE — PROGRESS NOTES
Review of Systems   Constitutional: Negative. HENT: Negative. Eyes: Negative. Respiratory: Negative. Cardiovascular: Negative. Gastrointestinal: Positive for constipation and heartburn. Negative for abdominal pain, blood in stool, diarrhea, melena, nausea and vomiting. Genitourinary: Negative. Musculoskeletal: Negative. Skin: Negative. Neurological: Positive for tingling. Negative for dizziness, tremors, sensory change, speech change, focal weakness, seizures, loss of consciousness, weakness and headaches. Right foot   Endo/Heme/Allergies: Negative. Psychiatric/Behavioral: Negative. Colon Screen    Patient: Jennifer Pa MRN: [id number]  SSN: [id number]    YOB: 1960  Age: 56 y.o. Sex: male        Subjective:   Jennifer Pa was referred by his PCP, Octavia Charles NP. Patient referred for colonoscopy for   Personal history of colon polyps (screening only). Patient denies rectal pain or bleeding. Abdominal surgeries as described below, specifically none. Family history as described below, specifically none. Last colonoscopy was 8 years ago with Dr. Wilver Munson. Operative note is in [facility] from 2012. (Taken from procedure note) Difficult examination up to the splenic flexure to the distal transverse colon. Tortuous colon. Grade 1 internal hemorrhoids, hyperplastic polyps not clinically significant. The patient will need a air contrast barium enema as colonoscopy was not completed due to a very sharp turn at the splenic flexure, transverse colon level and the patient was not comfortable with trying to get around this colon. I would Hemoccult stools, get a CBC and again would order an air contrast enema in the immediate future.      Allergies   Allergen Reactions    Niacin Itching       Past Medical History:   Diagnosis Date    Alcohol use     Fri-Sun one fifth; Mon-Thur: Pint of liquor    CAD (coronary artery disease)     Cardiac angioplasty with stent 07/29/2009    2.5 x 13 Cypher stent to CX.  Cardiac cath 11/09/2011    RCA patent. LM patent. LAD patent. mD1 55%. CX patent. Prior stent patent. Edison 85% (2.5 x 15-mm Xience stent, resid 0%). LVEDP 12. EF 40-45%. High lateral hypk (RI distribution).  Cardiac inferior STEMI 2009    Current smoker     contemplating stopping    Diabetes ([address]     type 2-insulin dependent    GERD (gastroesophageal reflux disease)     HTN (hypertension)     Hyperlipidemia     Migraine     Myocardial infarction [facility])      Past Surgical History:   Procedure Laterality Date    HX CORONARY STENT PLACEMENT  07/29/2009    HX HEART CATHETERIZATION  8/30/2011    HX HEART CATHETERIZATION  11/09/2011    HX WISDOM TEETH EXTRACTION      x4      Family History   Problem Relation Age of Onset    Hypertension Mother     Heart Attack Mother     Diabetes Mother     Hypertension Father     Heart Attack Father     Diabetes Father     Diabetes Sister     Hypertension Sister     Stroke Sister     Diabetes Brother     Hypertension Brother     Stroke Brother     No Known Problems Maternal Grandmother     No Known Problems Maternal Grandfather     No Known Problems Paternal Grandmother     No Known Problems Paternal Grandfather     No Known Problems Brother     Heart Disease Other     Kidney Disease Other      Social History     Tobacco Use    Smoking status: Current Every Day Smoker     Packs/day: 0.50     Years: 1.00     Pack years: 0.50     Types: Cigarettes    Smokeless tobacco: Never Used   Substance Use Topics    Alcohol use: Yes     Alcohol/week: 4.2 standard drinks     Types: 5 Shots of liquor per week     Comment: occassionally      Prior to Admission medications    Medication Sig Start Date End Date Taking?  Authorizing Provider   pregabalin (LYRICA) 50 mg capsule TAKE ONE CAPSULE BY MOUTH THREE TIMES A DAY 8/27/20  Yes Jesús Albright B, NP   ergocalciferol (ERGOCALCIFEROL) 1,250 mcg (50,000 unit) capsule Take 1 Cap by mouth every seven (7) days. 8/3/20  Yes Sadie ANAYA NP   metFORMIN (GLUCOPHAGE) 1,000 mg tablet Take 1 Tab by mouth two (2) times daily (with meals). Patient taking differently: Take 1,000 mg by mouth daily (after dinner). 7/30/20  Yes Sadie ANAYA NP   exenatide microspheres (Bydureon) 2 mg/0.65 mL pnij 0.65 mL by SubCUTAneous route every seven (7) days. 7/30/20  Yes Sadie ANAYA NP   ibuprofen (ADVIL PO) Take 2 Tabs by mouth daily as needed. Yes Provider, Historical   insulin glargine (Lantus Solostar U-100 Insulin) 100 unit/mL (3 mL) inpn Inject 52 units subcutaneously daily. Or as prescribed by PCP. 7/7/20  Yes Luis Alfredo Jiménez NP   OTHER Neurx-tf (Alpha lipoic acid 350 mg, Benfotiamine 300 mg, Vitamin B6 35 mg, Vitamin B12 2000 mcg) 1 tab daily   Yes Provider, Historical   Blood-Glucose Meter monitoring kit Blood glucose kit that insurance will cover - Check blood glucose twice a day once in the morning fasting and once after a meal. 5/5/20  Yes Sadie ANAYA NP   glucose blood VI test strips (ASCENSIA AUTODISC VI, ONE TOUCH ULTRA TEST VI) strip Provide test strip and glucose meter that insurance will cover - Twice daily-  fasting in the morning and once two hours after a meal. 5/5/20  Yes Luis Alfredo Jiménez NP   lancets misc Please provide lancets for blood glucose meter insurance will cover 5/5/20  Yes Sadie ANAYA NP   multivitamin (ONE A DAY) tablet Take 1 Tab by mouth daily. 4/9/20  Yes Faheem Hernandez NP   lidocaine (LIDODERM) 5 % 1 Patch by TransDERmal route every twenty-four (24) hours. Apply daily as needed 4/8/20  Yes Faheem Allen NP   simvastatin (ZOCOR) 20 mg tablet Take 1 Tab by mouth nightly. 2/3/20  Yes Faheem Hernandez NP   lisinopril (PRINIVIL, ZESTRIL) 40 mg tablet Take 1 Tab by mouth daily. 6/5/19  Yes Sadie ANAYA NP   aspirin 81 mg tablet Take 1 Tab by mouth daily.  11/16/12  Yes Viri Sage MD Review of Systems:    Risks colonoscopy described- colon injury, missed lesion, anesthesia problems, bleeding       Idalmis Kwan, ASHISH  September 22, 7722  2:34 PM

## 2020-09-28 DIAGNOSIS — Z79.4 TYPE 2 DIABETES MELLITUS WITH DIABETIC NEUROPATHY, WITH LONG-TERM CURRENT USE OF INSULIN (HCC): ICD-10-CM

## 2020-09-28 DIAGNOSIS — E11.40 TYPE 2 DIABETES MELLITUS WITH DIABETIC NEUROPATHY, WITH LONG-TERM CURRENT USE OF INSULIN (HCC): ICD-10-CM

## 2020-09-29 DIAGNOSIS — E11.40 TYPE 2 DIABETES MELLITUS WITH DIABETIC NEUROPATHY, WITH LONG-TERM CURRENT USE OF INSULIN (HCC): ICD-10-CM

## 2020-09-29 DIAGNOSIS — Z79.4 TYPE 2 DIABETES MELLITUS WITH DIABETIC NEUROPATHY, WITH LONG-TERM CURRENT USE OF INSULIN (HCC): ICD-10-CM

## 2020-09-29 RX ORDER — INSULIN GLARGINE 100 [IU]/ML
INJECTION, SOLUTION SUBCUTANEOUS
Qty: 18 ML | Refills: 3 | Status: ON HOLD | OUTPATIENT
Start: 2020-09-29 | End: 2020-10-28

## 2020-10-13 ENCOUNTER — OFFICE VISIT (OUTPATIENT)
Dept: CARDIOLOGY CLINIC | Age: 60
End: 2020-10-13
Payer: COMMERCIAL

## 2020-10-13 VITALS
WEIGHT: 192 LBS | OXYGEN SATURATION: 96 % | HEIGHT: 69 IN | DIASTOLIC BLOOD PRESSURE: 86 MMHG | HEART RATE: 78 BPM | SYSTOLIC BLOOD PRESSURE: 136 MMHG | BODY MASS INDEX: 28.44 KG/M2

## 2020-10-13 DIAGNOSIS — I25.10 ATHEROSCLEROSIS OF NATIVE CORONARY ARTERY OF NATIVE HEART WITHOUT ANGINA PECTORIS: Primary | ICD-10-CM

## 2020-10-13 DIAGNOSIS — I25.10 ATHEROSCLEROSIS OF NATIVE CORONARY ARTERY OF NATIVE HEART WITHOUT ANGINA PECTORIS: ICD-10-CM

## 2020-10-13 DIAGNOSIS — E78.2 MIXED HYPERLIPIDEMIA: ICD-10-CM

## 2020-10-13 DIAGNOSIS — Z01.818 PRE-OP EVALUATION: ICD-10-CM

## 2020-10-13 DIAGNOSIS — I10 ESSENTIAL HYPERTENSION: ICD-10-CM

## 2020-10-13 PROCEDURE — 99214 OFFICE O/P EST MOD 30 MIN: CPT | Performed by: INTERNAL MEDICINE

## 2020-10-13 PROCEDURE — 93000 ELECTROCARDIOGRAM COMPLETE: CPT | Performed by: INTERNAL MEDICINE

## 2020-10-13 NOTE — PROGRESS NOTES
HISTORY OF PRESENT ILLNESS  Josiane Wallace is a 56 y.o. male. ASSESSMENT and PLAN    Mr. Damon Steiner has history of CAD. He presented back in 2009 with Malaise but no specific chest pains, inferior wall MI, thrombotic occlusion of his circumflex. He had Thrombectomy, angioplasty and stent to circumflex. He was also noted to have vasospasm. In 2011, he presented with non-STEMI; his ramus intermedius branch had severe stenosis which was stented. Unfortunately, he continues to drink about a fifth of liquor per week and smokes about half a pack per day. 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 On numerous occasions in the past, I have advised him and encouraged him to discontinue tobacco use completely. Again, I have asked him to discontinue tobacco use completely. He is not on beta blocker therapy due to the fact that he has ED. He also has asymptomatic sinus bradycardia. Patient had nuclear scan done in October 2016 which showed EF of 64% with small inferior fixed defect likely from diaphragmatic attenuation. In April 2019, while at work, he got dehydrated and had acute kidney injury. He was taking care at [facility]. His lisinopril was discontinued. His blood pressure has been reasonably well controlled despite off of ACE inhibitor. · CAD:    Symptomatically stable. · BP:   Well controlled. · HR:    Stable. · CHF:    Currently, there is no evidence of decompensated CHF noted. · Weight:    His weight today is 193 pounds. His baseline weight is 185 pounds. I have asked him not to gain any further weight. · Cholesterol:   Target LDL <70. Zocor 20. · Tobacco: Unfortunately, he continues to smoke about a half a pack per day. Again, smoking cessation is strongly encouraged. · Anti-platelet:   Remains on ASA. He is scheduled to undergo colonoscopy later this month.   I have asked him to proceed with repeat echocardiogram since his last echocardiogram was about 4 years ago and he has known history of CAD and episodes of shortness of breath and fatigue. If his echocardiogram is unremarkable, he should be able to tolerate colonoscopy without difficulty. I will see him back annually. Thank you. Encounter Diagnoses   Name Primary?  Atherosclerosis of native coronary artery of native heart without angina pectoris Yes    Mixed hyperlipidemia     Essential hypertension     Pre-op evaluation      current treatment plan is effective, no change in therapy  lab results and schedule of future lab studies reviewed with patient  reviewed diet, exercise and weight control  very strongly urged to quit smoking to reduce cardiovascular risk  cardiovascular risk and specific lipid/LDL goals reviewed  use of aspirin to prevent MI and TIA's discussed      HPI   Date, Mr. Danielle Ruiz has no complaints of chest pains. He does have baseline dyspnea on exertion. He works as a . Unfortunately, he still smokes cigarettes and drinks significant amount of alcohol. He denies any orthopnea or PND. He denies any palpitations or dizziness. Review of Systems   Respiratory: Positive for shortness of breath. Cardiovascular: Negative for chest pain, palpitations, orthopnea, claudication, leg swelling and PND. All other systems reviewed and are negative. Physical Exam  Vitals signs and nursing note reviewed. Constitutional:       Appearance: Normal appearance. HENT:      Head: Normocephalic. Eyes:      Conjunctiva/sclera: Conjunctivae normal.   Neck:      Musculoskeletal: No neck rigidity. Cardiovascular:      Rate and Rhythm: Normal rate and regular rhythm. Pulmonary:      Breath sounds: Normal breath sounds. Abdominal:      Palpations: Abdomen is soft. Musculoskeletal:         General: No swelling. Skin:     General: Skin is warm and dry. Neurological:      General: No focal deficit present.       Mental Status: He is alert and oriented to person, place, and time. Psychiatric:         Mood and Affect: Mood normal.         Behavior: Behavior normal.         PCP: Regina Rizo NP    Past Medical History:   Diagnosis Date    Alcohol use     Fri-Sun one fifth; Mon-Thur: Pint of liquor    CAD (coronary artery disease)     Cardiac angioplasty with stent 07/29/2009    2.5 x 13 Cypher stent to CX.  Cardiac cath 11/09/2011    RCA patent. LM patent. LAD patent. mD1 55%. CX patent. Prior stent patent. Edison 85% (2.5 x 15-mm Xience stent, resid 0%). LVEDP 12. EF 40-45%. High lateral hypk (RI distribution).  Cardiac inferior STEMI 2009    Current smoker     contemplating stopping    Diabetes ([facility] [address]     type 2-insulin dependent    GERD (gastroesophageal reflux disease)     HTN (hypertension)     Hyperlipidemia     Migraine     Myocardial infarction [facility])        Past Surgical History:   Procedure Laterality Date    HX CORONARY STENT PLACEMENT  07/29/2009    HX HEART CATHETERIZATION  8/30/2011    HX HEART CATHETERIZATION  11/09/2011    HX WISDOM TEETH EXTRACTION      x4       Current Outpatient Medications   Medication Sig Dispense Refill    pregabalin (LYRICA) 50 mg capsule TAKE ONE CAPSULE BY MOUTH THREE TIMES A DAY 90 Cap 0    insulin glargine (Lantus Solostar U-100 Insulin) 100 unit/mL (3 mL) inpn Inject 52 units subcutaneously daily. Or as prescribed by PCP. 18 mL 3    ergocalciferol (ERGOCALCIFEROL) 1,250 mcg (50,000 unit) capsule Take 1 Cap by mouth every seven (7) days. 4 Cap 6    metFORMIN (GLUCOPHAGE) 1,000 mg tablet Take 1 Tab by mouth two (2) times daily (with meals). (Patient taking differently: Take 1,000 mg by mouth daily (after dinner). ) 60 Tab 3    exenatide microspheres (Bydureon) 2 mg/0.65 mL pnij 0.65 mL by SubCUTAneous route every seven (7) days. 4 Pen 3    ibuprofen (ADVIL PO) Take 2 Tabs by mouth daily as needed.       OTHER Neurx-tf (Alpha lipoic acid 350 mg, Benfotiamine 300 mg, Vitamin B6 35 mg, Vitamin B12 2000 mcg) 1 tab daily      Blood-Glucose Meter monitoring kit Blood glucose kit that insurance will cover - Check blood glucose twice a day once in the morning fasting and once after a meal. 1 Kit 0    glucose blood VI test strips (ASCENSIA AUTODISC VI, ONE TOUCH ULTRA TEST VI) strip Provide test strip and glucose meter that insurance will cover - Twice daily-  fasting in the morning and once two hours after a meal. 100 Strip 10    lancets misc Please provide lancets for blood glucose meter insurance will cover 1 Package 11    multivitamin (ONE A DAY) tablet Take 1 Tab by mouth daily. 90 Tab 3    lidocaine (LIDODERM) 5 % 1 Patch by TransDERmal route every twenty-four (24) hours. Apply daily as needed 1 Package 3    simvastatin (ZOCOR) 20 mg tablet Take 1 Tab by mouth nightly. 90 Tab 3    lisinopril (PRINIVIL, ZESTRIL) 40 mg tablet Take 1 Tab by mouth daily. 90 Tab 12    aspirin 81 mg tablet Take 1 Tab by mouth daily. 1 Tab 0       The patient has a family history of    Social History     Tobacco Use    Smoking status: Current Every Day Smoker     Packs/day: 0.50     Years: 1.00     Pack years: 0.50     Types: Cigarettes    Smokeless tobacco: Never Used   Substance Use Topics    Alcohol use:  Yes     Alcohol/week: 4.2 standard drinks     Types: 5 Shots of liquor per week     Comment: occassionally    Drug use: Yes     Types: Marijuana, Cocaine     Comment: none       Lab Results   Component Value Date/Time    Cholesterol, total 133 08/19/2020 06:49 AM    HDL Cholesterol 41 08/19/2020 06:49 AM    LDL, calculated 69 08/19/2020 06:49 AM    Triglyceride 115 08/19/2020 06:49 AM    CHOL/HDL Ratio 3.2 08/19/2020 06:49 AM        BP Readings from Last 3 Encounters:   10/13/20 136/86   02/03/20 132/88   11/21/19 139/89        Pulse Readings from Last 3 Encounters:   10/13/20 78   09/22/20 77   02/03/20 70       Wt Readings from Last 3 Encounters:   10/13/20 87.1 kg (192 lb)   09/22/20 86.6 kg (191 lb)   02/03/20 85.2 kg (187 lb 12.8 oz)         EKG: normal sinus rhythm, inferolateral T wave inversion.

## 2020-10-13 NOTE — PROGRESS NOTES
Ted Zaragoza presents today for   Chief Complaint   Patient presents with    Follow-up     1 year follow up        Ted Zaragoza preferred language for health care discussion is english/other. Is someone accompanying this pt? no    Is the patient using any DME equipment during [address]? no    Depression Screening:  3 most recent PHQ Screens 10/13/2020   PHQ Not Done -   Little interest or pleasure in doing things Not at all   Feeling down, depressed, irritable, or hopeless Not at all   Total Score PHQ 2 0       Learning Assessment:  Learning Assessment 1/2/2020   PRIMARY LEARNER Patient   HIGHEST LEVEL OF EDUCATION - PRIMARY LEARNER  DID NOT GRADUATE [facility] PRIMARY LEARNER NONE   CO-LEARNER CAREGIVER No   PRIMARY LANGUAGE ENGLISH   LEARNER PREFERENCE PRIMARY LISTENING   ANSWERED BY self   RELATIONSHIP SELF       Abuse Screening:  Abuse Screening Questionnaire 10/13/2020   Do you ever feel afraid of your partner? N   Are you in a relationship with someone who physically or mentally threatens you? N   Is it safe for you to go home? Y       Fall Risk  Fall Risk Assessment, last 12 mths 10/13/2020   Able to walk? Yes   Fall in past 12 months? No       Pt currently taking Anticoagulant therapy? ASA 81mg every day     Coordination of Care:  1. Have you been to the ER, urgent care clinic since your last visit? Hospitalized since your last visit? n    2. Have you seen or consulted any other health care providers outside of the [address] [place] [address] since your last visit? Include any pap smears or colon screening.  melvin

## 2020-10-13 NOTE — LETTER
NOTIFICATION OF RETURN TO WORK 
10/13/2020 3:20 PM 
 
Mr. Elijah Frey 
[address] [facility] [id number] Marti Mcgee To Whom It May Concern: 
 
Elijah Frey was under the care of [address] [place] [address] He will be able to return to work on 10/14/2020 without no restrictions. If there are questions or concerns please have the patient contact our office.  
 
Sincerely, 
 
 
 
Deepti Solis MD

## 2020-10-21 ENCOUNTER — VIRTUAL VISIT (OUTPATIENT)
Dept: INTERNAL MEDICINE CLINIC | Age: 60
End: 2020-10-21

## 2020-10-21 DIAGNOSIS — E11.40 TYPE 2 DIABETES MELLITUS WITH DIABETIC NEUROPATHY, WITH LONG-TERM CURRENT USE OF INSULIN (HCC): Primary | ICD-10-CM

## 2020-10-21 DIAGNOSIS — Z79.4 TYPE 2 DIABETES MELLITUS WITH DIABETIC NEUROPATHY, WITH LONG-TERM CURRENT USE OF INSULIN (HCC): Primary | ICD-10-CM

## 2020-10-21 DIAGNOSIS — I10 ESSENTIAL HYPERTENSION: ICD-10-CM

## 2020-10-21 DIAGNOSIS — R79.89 ABNORMAL CBC: ICD-10-CM

## 2020-10-21 NOTE — PROGRESS NOTES
Call to patient. He verified his name, , and address. Discussed his labs. He is aware that he should go for his labs. He is also aware that he should have his flu and pneumonia shot. He is in agreement with these vaccine.  [facility]

## 2020-10-21 NOTE — Clinical Note
Hallie!    I spoke to Mr. Dawkins for diabetes follow-up. Blood sugars are starting to increase a little bit lately, with some even spiking to the 300s. I saw that he did stop the glipizide a few months ago, but he also appears to be eating a little more sweets than he usually does. I would recommend starting to increase the metformin. Can start with 500 mg with breakfast and 1000 mg with dinner, with the goal of getting to 1000 mg BID. I am planning to follow-up with him again in 2 weeks for a telephone call to see how his sugars are doing. Thanks!   Kennedy Rizo

## 2020-10-22 ENCOUNTER — HOSPITAL ENCOUNTER (OUTPATIENT)
Dept: LAB | Age: 60
Discharge: HOME | End: 2020-10-22
Payer: COMMERCIAL

## 2020-10-22 DIAGNOSIS — R79.89 ABNORMAL CBC: ICD-10-CM

## 2020-10-22 DIAGNOSIS — Z79.4 TYPE 2 DIABETES MELLITUS WITH DIABETIC NEUROPATHY, WITH LONG-TERM CURRENT USE OF INSULIN (HCC): ICD-10-CM

## 2020-10-22 DIAGNOSIS — I10 ESSENTIAL HYPERTENSION: ICD-10-CM

## 2020-10-22 DIAGNOSIS — E11.40 TYPE 2 DIABETES MELLITUS WITH DIABETIC NEUROPATHY, WITH LONG-TERM CURRENT USE OF INSULIN (HCC): ICD-10-CM

## 2020-10-22 LAB
ALBUMIN SERPL-MCNC: 3.6 G/DL (ref 3.4–5)
ALBUMIN/GLOB SERPL: 1.1 {RATIO} (ref 0.8–1.7)
ALP SERPL-CCNC: 107 U/L (ref 45–117)
ALT SERPL-CCNC: 34 U/L (ref 16–61)
ANION GAP SERPL CALC-SCNC: 6 MMOL/L (ref 3–18)
AST SERPL-CCNC: 22 U/L (ref 10–38)
BASOPHILS # BLD: 0 K/UL (ref 0–0.1)
BASOPHILS NFR BLD: 0 % (ref 0–2)
BILIRUB SERPL-MCNC: 0.2 MG/DL (ref 0.2–1)
BUN SERPL-MCNC: 19 MG/DL (ref 7–18)
BUN/CREAT SERPL: 16 (ref 12–20)
CALCIUM SERPL-MCNC: 9.1 MG/DL (ref 8.5–10.1)
CHLORIDE SERPL-SCNC: 107 MMOL/L (ref 100–111)
CO2 SERPL-SCNC: 26 MMOL/L (ref 21–32)
CREAT SERPL-MCNC: 1.22 MG/DL (ref 0.6–1.3)
CREAT UR-MCNC: <13 MG/DL (ref 30–125)
DIFFERENTIAL METHOD BLD: ABNORMAL
EOSINOPHIL # BLD: 0.1 K/UL (ref 0–0.4)
EOSINOPHIL NFR BLD: 2 % (ref 0–5)
ERYTHROCYTE [DISTWIDTH] IN BLOOD BY AUTOMATED COUNT: 13.6 % (ref 11.6–14.5)
GLOBULIN SER CALC-MCNC: 3.3 G/DL (ref 2–4)
GLUCOSE SERPL-MCNC: 272 MG/DL (ref 74–99)
HCT VFR BLD AUTO: 39.1 % (ref 36–48)
HGB BLD-MCNC: 13.5 G/DL (ref 13–16)
LYMPHOCYTES # BLD: 1.4 K/UL (ref 0.9–3.6)
LYMPHOCYTES NFR BLD: 36 % (ref 21–52)
MCH RBC QN AUTO: 29.8 PG (ref 24–34)
MCHC RBC AUTO-ENTMCNC: 34.5 G/DL (ref 31–37)
MCV RBC AUTO: 86.3 FL (ref 74–97)
MICROALBUMIN UR-MCNC: <0.5 MG/DL (ref 0–3)
MICROALBUMIN/CREAT UR-RTO: ABNORMAL MG/G (ref 0–30)
MONOCYTES # BLD: 0.4 K/UL (ref 0.05–1.2)
MONOCYTES NFR BLD: 9 % (ref 3–10)
NEUTS SEG # BLD: 2.2 K/UL (ref 1.8–8)
NEUTS SEG NFR BLD: 53 % (ref 40–73)
PLATELET # BLD AUTO: 198 K/UL (ref 135–420)
PMV BLD AUTO: 12.6 FL (ref 9.2–11.8)
POTASSIUM SERPL-SCNC: 4.4 MMOL/L (ref 3.5–5.5)
PROT SERPL-MCNC: 6.9 G/DL (ref 6.4–8.2)
RBC # BLD AUTO: 4.53 M/UL (ref 4.7–5.5)
SODIUM SERPL-SCNC: 139 MMOL/L (ref 136–145)
WBC # BLD AUTO: 4.1 K/UL (ref 4.6–13.2)

## 2020-10-22 PROCEDURE — 80053 COMPREHEN METABOLIC PANEL: CPT

## 2020-10-22 PROCEDURE — 85025 COMPLETE CBC W/AUTO DIFF WBC: CPT

## 2020-10-22 PROCEDURE — 36415 COLL VENOUS BLD VENIPUNCTURE: CPT

## 2020-10-22 PROCEDURE — 82043 UR ALBUMIN QUANTITATIVE: CPT

## 2020-10-22 NOTE — PROGRESS NOTES
Pharmacy Progress Note - Diabetes Management    S/O: Mr. Elijah Frey is a 56 y.o. male, referred by Dr. Krish Irving NP, was contacted via an outbound telephone call today for diabetes management follow-up visit. Patient's last A1c was 7.3% in August 2020. This is a(n) decrease from 11.1% in April 2020. Current anti-hyperglycemic regimen include(s):    - Basaglar 52 units daily  - Bydureon 2 mg once weekly  - Metformin 1000 mg daily     Patient reports adherence with his medications. ROS:  Today, Pt endorses:  - Symptoms of Hyperglycemia: yes - blurry vision (happens more so at night) and neuropathy in his feet (getting better - on pregabalin)  - Symptoms of Hypoglycemia: none - Denies any BG < 70    Self Monitoring Blood Glucose (SMBG) or CGM:  - Brought in home glucometer/blood glucose log/CGM reader today:  yes  - Conducts/scans: Checks once daily in the morning (fasting)    Date Fasting Notes   13-Oct 376 Might have eaten ice cream cake the night before   14-Oct 308 Not sure what he ate the night before   15-Oct 164    16-Oct 163    17-Oct 129    18-Oct 197    19-Oct 175    20-Oct 181    21-Oct 204      Diet/Exercise:  - Eats 3 meals/day  - Breakfast: Usually du and a slice of bread  - Lunch/Dinner: Will usually eat dinner leftovers for lunch. Meal may be a baked protein such as chicken, string beans and steamed potatoes   - He states that he will have pasta like spaghetti every once in a while   - Snack(s): States that he has cut back on snacking, but does sometimes wake up in the middle of the night and eats M&Ms or other candy. Also states he occassionally eats a nutty butty bar. - Beverage(s): Drinks mostly water but will have a ginger ale or sprite once per day    Vitals:   Wt Readings from Last 3 Encounters:   10/13/20 192 lb (87.1 kg)   09/22/20 191 lb (86.6 kg)   02/03/20 187 lb 12.8 oz (85.2 kg)     BP Readings from Last 3 Encounters:   10/13/20 136/86   02/03/20 132/88   11/21/19 139/89     Pulse Readings from Last 3 Encounters:   10/13/20 78   09/22/20 77   02/03/20 70       Past Medical History:   Diagnosis Date    Alcohol use     Fri-Sun one fifth; Mon-Thur: Pint of liquor    CAD (coronary artery disease)     Cardiac angioplasty with stent 07/29/2009    2.5 x 13 Cypher stent to CX.  Cardiac cath 11/09/2011    RCA patent. LM patent. LAD patent. mD1 55%. CX patent. Prior stent patent. Edison 85% (2.5 x 15-mm Xience stent, resid 0%). LVEDP 12. EF 40-45%. High lateral hypk (RI distribution).  Cardiac inferior STEMI 2009    Current smoker     contemplating stopping    Diabetes ([facility] [address]     type 2-insulin dependent    GERD (gastroesophageal reflux disease)     HTN (hypertension)     Hyperlipidemia     Migraine     Myocardial infarction (HCC)      Allergies   Allergen Reactions    Niacin Itching       Current Outpatient Medications   Medication Sig    pregabalin (LYRICA) 50 mg capsule TAKE ONE CAPSULE BY MOUTH THREE TIMES A DAY    insulin glargine (Lantus Solostar U-100 Insulin) 100 unit/mL (3 mL) inpn Inject 52 units subcutaneously daily. Or as prescribed by PCP.  ergocalciferol (ERGOCALCIFEROL) 1,250 mcg (50,000 unit) capsule Take 1 Cap by mouth every seven (7) days.  metFORMIN (GLUCOPHAGE) 1,000 mg tablet Take 1 Tab by mouth two (2) times daily (with meals). (Patient taking differently: Take 1,000 mg by mouth daily (after dinner). )    exenatide microspheres (Bydureon) 2 mg/0.65 mL pnij 0.65 mL by SubCUTAneous route every seven (7) days.  ibuprofen (ADVIL PO) Take 2 Tabs by mouth daily as needed.     OTHER Neurx-tf (Alpha lipoic acid 350 mg, Benfotiamine 300 mg, Vitamin B6 35 mg, Vitamin B12 2000 mcg) 1 tab daily    Blood-Glucose Meter monitoring kit Blood glucose kit that insurance will cover - Check blood glucose twice a day once in the morning fasting and once after a meal.    glucose blood VI test strips (ASCENSIA AUTODISC VI, ONE TOUCH ULTRA TEST VI) strip Provide test strip and glucose meter that insurance will cover - Twice daily-  fasting in the morning and once two hours after a meal.    lancets misc Please provide lancets for blood glucose meter insurance will cover    multivitamin (ONE A DAY) tablet Take 1 Tab by mouth daily.  lidocaine (LIDODERM) 5 % 1 Patch by TransDERmal route every twenty-four (24) hours. Apply daily as needed    simvastatin (ZOCOR) 20 mg tablet Take 1 Tab by mouth nightly.  lisinopril (PRINIVIL, ZESTRIL) 40 mg tablet Take 1 Tab by mouth daily.  aspirin 81 mg tablet Take 1 Tab by mouth daily. No current facility-administered medications for this visit. Lab Results   Component Value Date/Time    Sodium 139 08/19/2020 06:49 AM    Potassium 4.7 08/19/2020 06:49 AM    Chloride 108 08/19/2020 06:49 AM    CO2 28 08/19/2020 06:49 AM    Anion gap 3 08/19/2020 06:49 AM    Glucose 99 08/19/2020 06:49 AM    BUN 22 (H) 08/19/2020 06:49 AM    Creatinine 1.10 08/19/2020 06:49 AM    BUN/Creatinine ratio 20 08/19/2020 06:49 AM    GFR est AA >60 08/19/2020 06:49 AM    GFR est non-AA >60 08/19/2020 06:49 AM    Calcium 9.0 08/19/2020 06:49 AM    Bilirubin, total 0.3 08/19/2020 06:49 AM    Alk.  phosphatase 80 08/19/2020 06:49 AM    Protein, total 7.3 08/19/2020 06:49 AM    Albumin 4.0 08/19/2020 06:49 AM    Globulin 3.3 08/19/2020 06:49 AM    A-G Ratio 1.2 08/19/2020 06:49 AM    ALT (SGPT) 33 08/19/2020 06:49 AM       Lab Results   Component Value Date/Time    Cholesterol, total 133 08/19/2020 06:49 AM    HDL Cholesterol 41 08/19/2020 06:49 AM    LDL, calculated 69 08/19/2020 06:49 AM    VLDL, calculated 23 08/19/2020 06:49 AM    Triglyceride 115 08/19/2020 06:49 AM    CHOL/HDL Ratio 3.2 08/19/2020 06:49 AM       Lab Results   Component Value Date/Time    WBC 3.7 (L) 08/19/2020 06:49 AM    Hemoglobin (POC) 14.2 07/03/2019 09:44 AM    HGB 13.7 08/19/2020 06:49 AM    HCT 40.5 08/19/2020 06:49 AM    PLATELET 660 51/36/0796 06:49 AM MCV 88.0 08/19/2020 06:49 AM       Lab Results   Component Value Date/Time    Microalbumin/Creat ratio (mg/g creat) 16 07/06/2019 10:02 AM    Microalbumin,urine random 3.58 (H) 07/06/2019 10:02 AM       HbA1c:  Lab Results   Component Value Date/Time    Hemoglobin A1c 7.3 (H) 08/19/2020 06:49 AM    Hemoglobin A1c (POC) Code 106( >15%) 04/04/2019 02:00 PM    Hemoglobin A1c, External 7.8 01/28/2015 11:15 PM     No components found for: 2     Last Point of Care HGB A1C  Hemoglobin A1c (POC)   Date Value Ref Range Status   04/04/2019 Code 106( >15%) % Final        CrCl cannot be calculated (Unknown ideal weight. ). A/P:    Diabetes Management:  - Per ADA guidelines, Pt's A1c near goal of < 7%. He states that he is due to get labs again this week in preparation for PCP appointment next week. - Current SMBG(s)/CGM trend is starting to increase. Glipizide was stopped a few months ago due to hypoglycemia risk, and patient also appears to be eating a little more sweets than he usually does (ice cream cake, M&Ms at night, etc) that is spiking his blood sugar. Discussed importance of limiting sweets as much as possible. Will send patient a list of low carb snacks via [employer] as alternative options for snacks if he gets hungry.   - Instructed patient to continue Basaglar 52 units daily and Bydureon 2 mg once weekly  - Would recommend increasing metformin to 1000 mg BID for better blood sugar control. Can start with 500 mg with breakfast and 1000 mg with dinner to lessen GI side effects. Will discuss with PCP. - Instructed patient to continue checking blood sugars 1-2x daily and whenever he feels bad    Patient verbalized understanding of the information presented and all of the patients questions were answered. AVS was handed to the patient. Patient advised to call the office with any additional questions or concerns. Notifications of recommendations will be sent to Dr. Myla Rodríguez, MARILYN for review.     Will follow-up with patient in 2 weeks for a telephone visit. Thank you,  Edwina Capone.  Idalmis Johnson, PHARMD, BCPS      CLINICAL PHARMACY CONSULT: MED RECONCILIATION/REVIEW ADDENDUM    For Pharmacy Admin Tracking Only    PHSO: PHSO Patient?: Yes  Total # of Interventions Recommended: Count: 1  - Increased Dose #: 1  Total Interventions Accepted: N/A  Time Spent (min): 30

## 2020-10-23 ENCOUNTER — HOSPITAL ENCOUNTER (OUTPATIENT)
Dept: LAB | Age: 60
Discharge: HOME | End: 2020-10-23
Payer: COMMERCIAL

## 2020-10-23 PROCEDURE — 87635 SARS-COV-2 COVID-19 AMP PRB: CPT

## 2020-10-24 LAB — SARS-COV-2, COV2NT: NOT DETECTED

## 2020-10-26 ENCOUNTER — TELEPHONE (OUTPATIENT)
Dept: CARDIOLOGY CLINIC | Age: 60
End: 2020-10-26

## 2020-10-26 ENCOUNTER — VIRTUAL VISIT (OUTPATIENT)
Dept: FAMILY MEDICINE CLINIC | Age: 60
End: 2020-10-26
Payer: COMMERCIAL

## 2020-10-26 DIAGNOSIS — K59.00 CONSTIPATION, UNSPECIFIED CONSTIPATION TYPE: ICD-10-CM

## 2020-10-26 DIAGNOSIS — F10.20: ICD-10-CM

## 2020-10-26 DIAGNOSIS — Z71.2 ENCOUNTER TO DISCUSS TEST RESULTS: ICD-10-CM

## 2020-10-26 DIAGNOSIS — Z71.89 COUNSELING ON HEALTH PROMOTION AND DISEASE PREVENTION: ICD-10-CM

## 2020-10-26 DIAGNOSIS — I10 ESSENTIAL HYPERTENSION: ICD-10-CM

## 2020-10-26 DIAGNOSIS — E11.40 TYPE 2 DIABETES MELLITUS WITH DIABETIC NEUROPATHY, WITH LONG-TERM CURRENT USE OF INSULIN (HCC): Primary | ICD-10-CM

## 2020-10-26 DIAGNOSIS — Z79.4 TYPE 2 DIABETES MELLITUS WITH DIABETIC NEUROPATHY, WITH LONG-TERM CURRENT USE OF INSULIN (HCC): Primary | ICD-10-CM

## 2020-10-26 PROCEDURE — 3051F HG A1C>EQUAL 7.0%<8.0%: CPT | Performed by: NURSE PRACTITIONER

## 2020-10-26 PROCEDURE — 99213 OFFICE O/P EST LOW 20 MIN: CPT | Performed by: NURSE PRACTITIONER

## 2020-10-26 NOTE — PROGRESS NOTES
Vitals not taken due to this is a virtual visit [FreeTextEntry1] : 89-year-old female here for followup of hypertension. \par She had seen her Granddaughter get  and also was in [place] for her great grandchild naming \par She had tripped and fallen right before the wedding and went to ER for scans which were negative\par She feels like she is falling apart\par BP at home was 130-140/80

## 2020-10-26 NOTE — PROGRESS NOTES
Bailee Hill presents today for   Chief Complaint   Patient presents with    Follow-up    Diabetes    Results     discuss lab results       Bailee Hill preferred language for health care discussion is english/other. Is someone accompanying this pt? no    Is the patient using any DME equipment during [address]? no    Depression Screening:  3 most recent PHQ Screens 10/13/2020   PHQ Not Done -   Little interest or pleasure in doing things Not at all   Feeling down, depressed, irritable, or hopeless Not at all   Total Score PHQ 2 0       Learning Assessment:  Learning Assessment 1/2/2020   PRIMARY LEARNER Patient   HIGHEST LEVEL OF EDUCATION - PRIMARY LEARNER  DID NOT GRADUATE [facility] PRIMARY LEARNER NONE   CO-LEARNER CAREGIVER No   PRIMARY LANGUAGE ENGLISH   LEARNER PREFERENCE PRIMARY LISTENING   ANSWERED BY self   RELATIONSHIP SELF       Abuse Screening:  Abuse Screening Questionnaire 10/13/2020   Do you ever feel afraid of your partner? N   Are you in a relationship with someone who physically or mentally threatens you? N   Is it safe for you to go home? Y       Generalized Anxiety  No flowsheet data found. Health Maintenance Due   Topic Date Due    Pneumococcal 0-64 years (1 of 1 - PPSV23) 06[birth year]02/1964    DTaP/Tdap/Td series (1 - Tdap) 06/02/1979    Shingrix Vaccine Age 50> (1 of 2) 06/02/2008    Colorectal Cancer Screening Combo  06/02/2008    Foot Exam Q1  07/11/2019    Flu Vaccine (1) 09/01/2020   . Health Maintenance reviewed and discussed and ordered per Provider. Coordination of Care:  1. Have you been to the ER, urgent care clinic since your last visit? Hospitalized since your last visit? no    2. Have you seen or consulted any other health care providers outside of the [address] [place] [address] since your last visit? Include any pap smears or colon screening.  no      Advance Directive:  Discussed 1/2/20

## 2020-10-26 NOTE — TELEPHONE ENCOUNTER
Spoke to patient and informed him that we would need to reschedule his echo appointment due to staffing issues. Rescheduled his appointment to Friday, 10/30/20 at 1530.

## 2020-10-26 NOTE — PROGRESS NOTES
Madhuri Dent is a 56 y.o. male who was seen by synchronous (real-time) audio-video technology on 10/26/2020 for Follow-up; Diabetes; and Results (discuss lab results)  Diabetes - taking metformin once a day. He has followed up with pharm D. Does not have log on today's visit. Lyrica is helping with his leg pains/burning in his feet and neuropathy  He does have some occasional constipation. Followed by GI. He has a colonoscopy on Wed. Admits to continual alcohol intake but states this has been reduced to once a week. Hypertension - denies chest pain. Been for labs. Taking medications as prescribed. Has not had his flu vaccine. Assessment & Plan:   Diagnoses and all orders for this visit:    1. Type 2 diabetes mellitus with diabetic neuropathy, with long-term current use of insulin ([facility] [address]    2. Continuous alcohol dependence ([facility] [address]    3. Essential hypertension    4. Constipation, unspecified constipation type    5. Counseling on health promotion and disease prevention    6. Encounter to discuss test results    Labs reviewed. Discussed diet. Discussed the impact of alcohol on his diabetes and recommended continual decrease. Care gaps/ immunizations due reviewed, recommended, and encouraged. Go for colonoscopy. Follow with GI. He is to increase his metformin to a half a tablet in the morning along with his evening dose of one tablet for two weeks and then increase to one whole tablet in the morning and one tablet  In the evening. He is to follow up with Pharm D and follow up with me in 3 months. He verbalizes understanding. Subjective:       Prior to Admission medications    Medication Sig Start Date End Date Taking? Authorizing Provider   pregabalin (LYRICA) 50 mg capsule TAKE ONE CAPSULE BY MOUTH THREE TIMES A DAY 10/13/20  Yes Sebastian ANAYA NP   ergocalciferol (ERGOCALCIFEROL) 1,250 mcg (50,000 unit) capsule Take 1 Cap by mouth every seven (7) days.  8/3/20  Yes Sandie Herring NP   metFORMIN (GLUCOPHAGE) 1,000 mg tablet Take 1 Tab by mouth two (2) times daily (with meals). Patient taking differently: Take 1,000 mg by mouth daily (after dinner). 7/30/20  Yes Sunshine ANAYA NP   exenatide microspheres (Bydureon) 2 mg/0.65 mL pnij 0.65 mL by SubCUTAneous route every seven (7) days. 7/30/20  Yes Sunshine ANAYA NP   ibuprofen (ADVIL PO) Take 2 Tabs by mouth daily as needed. Yes Provider, Historical   OTHER Neurx-tf (Alpha lipoic acid 350 mg, Benfotiamine 300 mg, Vitamin B6 35 mg, Vitamin B12 2000 mcg) 1 tab daily   Yes Provider, Historical   Blood-Glucose Meter monitoring kit Blood glucose kit that insurance will cover - Check blood glucose twice a day once in the morning fasting and once after a meal. 5/5/20  Yes Regina Rizo NP   glucose blood VI test strips (ASCENSIA AUTODISC VI, ONE TOUCH ULTRA TEST VI) strip Provide test strip and glucose meter that insurance will cover - Twice daily-  fasting in the morning and once two hours after a meal. 5/5/20  Yes Regina Rizo NP   lancets misc Please provide lancets for blood glucose meter insurance will cover 5/5/20  Yes Sunshine ANAYA NP   multivitamin (ONE A DAY) tablet Take 1 Tab by mouth daily. 4/9/20  Yes Sunshine ANAYA NP   simvastatin (ZOCOR) 20 mg tablet Take 1 Tab by mouth nightly. 2/3/20  Yes Sunshine ANAYA NP   aspirin 81 mg tablet Take 1 Tab by mouth daily. 11/16/12  Yes Chapincito Paula MD   insulin glargine (Lantus Solostar U-100 Insulin) 100 unit/mL (3 mL) inpn Inject 52 units subcutaneously daily. Or as prescribed by PCP. 9/29/20   Sunshine ANAYA NP   lidocaine (LIDODERM) 5 % 1 Patch by TransDERmal route every twenty-four (24) hours. Apply daily as needed 4/8/20   Sunshine ANAYA NP   lisinopril (PRINIVIL, ZESTRIL) 40 mg tablet Take 1 Tab by mouth daily.  6/5/19   Regina Rizo NP     Patient Active Problem List   Diagnosis Code    Hyperlipidemia E78.5    HTN (hypertension) I10    Insulin dependent diabetes mellitus [id number]    Tobacco use disorder F17.200    Coronary atherosclerosis of native coronary artery I25.10    Tobacco dependence F17.200    Other and unspecified alcohol dependence, continuous drinking behavior F10.20    Mediastinal adenopathy R59.0    Diabetic neuropathy (HCC) E11.40    Vitamin D deficiency E55.9    Other insomnia G47.09    Type 2 diabetes with nephropathy (HCC) E11.21    Otalgia H92.09    Other fatigue R53.83     Patient Active Problem List    Diagnosis Date Noted    Otalgia 05/08/2019    Other fatigue 05/08/2019    Type 2 diabetes with nephropathy ([facility] 04/04/2019    Other insomnia 07/02/2018    Vitamin D deficiency 04/15/2018    Diabetic neuropathy ([facility] 03/27/2018    Mediastinal adenopathy 06/25/2015    Tobacco dependence 06/16/2015    Other and unspecified alcohol dependence, continuous drinking behavior 06/16/2015    Coronary atherosclerosis of native coronary artery 12/31/2013    Tobacco use disorder 12/09/2011    Hyperlipidemia     HTN (hypertension)     Insulin dependent diabetes mellitus      Current Outpatient Medications   Medication Sig Dispense Refill    pregabalin (LYRICA) 50 mg capsule TAKE ONE CAPSULE BY MOUTH THREE TIMES A DAY 90 Cap 0    ergocalciferol (ERGOCALCIFEROL) 1,250 mcg (50,000 unit) capsule Take 1 Cap by mouth every seven (7) days. 4 Cap 6    metFORMIN (GLUCOPHAGE) 1,000 mg tablet Take 1 Tab by mouth two (2) times daily (with meals). (Patient taking differently: Take 1,000 mg by mouth daily (after dinner). ) 60 Tab 3    exenatide microspheres (Bydureon) 2 mg/0.65 mL pnij 0.65 mL by SubCUTAneous route every seven (7) days. 4 Pen 3    ibuprofen (ADVIL PO) Take 2 Tabs by mouth daily as needed.       OTHER Neurx-tf (Alpha lipoic acid 350 mg, Benfotiamine 300 mg, Vitamin B6 35 mg, Vitamin B12 2000 mcg) 1 tab daily      Blood-Glucose Meter monitoring kit Blood glucose kit that insurance will cover - Check blood glucose twice a day once in the morning fasting and once after a meal. 1 Kit 0    glucose blood VI test strips (ASCENSIA AUTODISC VI, ONE TOUCH ULTRA TEST VI) strip Provide test strip and glucose meter that insurance will cover - Twice daily-  fasting in the morning and once two hours after a meal. 100 Strip 10    lancets misc Please provide lancets for blood glucose meter insurance will cover 1 Package 11    multivitamin (ONE A DAY) tablet Take 1 Tab by mouth daily. 90 Tab 3    simvastatin (ZOCOR) 20 mg tablet Take 1 Tab by mouth nightly. 90 Tab 3    aspirin 81 mg tablet Take 1 Tab by mouth daily. 1 Tab 0    insulin glargine (Lantus Solostar U-100 Insulin) 100 unit/mL (3 mL) inpn Inject 52 units subcutaneously daily. Or as prescribed by PCP. 18 mL 3    lidocaine (LIDODERM) 5 % 1 Patch by TransDERmal route every twenty-four (24) hours. Apply daily as needed 1 Package 3    lisinopril (PRINIVIL, ZESTRIL) 40 mg tablet Take 1 Tab by mouth daily. 90 Tab 12     Allergies   Allergen Reactions    Niacin Itching     Past Medical History:   Diagnosis Date    Alcohol use     Fri-Sun one fifth; Mon-Thur: Pint of liquor    CAD (coronary artery disease)     Cardiac angioplasty with stent 07/29/2009    2.5 x 13 Cypher stent to CX.  Cardiac cath 11/09/2011    RCA patent. LM patent. LAD patent. mD1 55%. CX patent. Prior stent patent. Edison 85% (2.5 x 15-mm Xience stent, resid 0%). LVEDP 12. EF 40-45%. High lateral hypk (RI distribution).       Cardiac inferior STEMI 2009    Current smoker     contemplating stopping    Diabetes ([address]     type 2-insulin dependent    GERD (gastroesophageal reflux disease)     HTN (hypertension)     Hyperlipidemia     Migraine     Myocardial infarction [facility])      Past Surgical History:   Procedure Laterality Date    HX CORONARY STENT PLACEMENT  07/29/2009    HX HEART CATHETERIZATION  8/30/2011    HX HEART CATHETERIZATION  11/09/2011    HX WISDOM TEETH EXTRACTION      x4     Family History Problem Relation Age of Onset    Hypertension Mother     Heart Attack Mother     Diabetes Mother     Hypertension Father     Heart Attack Father     Diabetes Father     Diabetes Sister     Hypertension Sister     Stroke Sister     Diabetes Brother     Hypertension Brother     Stroke Brother     No Known Problems Maternal Grandmother     No Known Problems Maternal Grandfather     No Known Problems Paternal Grandmother     No Known Problems Paternal Grandfather     No Known Problems Brother     Heart Disease Other     Kidney Disease Other      Social History     Tobacco Use    Smoking status: Current Every Day Smoker     Packs/day: 0.50     Years: 1.00     Pack years: 0.50     Types: Cigarettes    Smokeless tobacco: Never Used   Substance Use Topics    Alcohol use: Yes     Alcohol/week: 4.2 standard drinks     Types: 5 Shots of liquor per week     Comment: occassionally       Review of Systems   Eyes: Negative for blurred vision. Respiratory: Negative for shortness of breath. Cardiovascular: Negative for chest pain. Gastrointestinal: Positive for constipation. Negative for abdominal pain, blood in stool, diarrhea, nausea and vomiting. Genitourinary: Negative. Musculoskeletal: Positive for myalgias (neuropathy - medication helps). Negative for falls. Skin: Negative for itching and rash. Neurological: Negative for dizziness. Psychiatric/Behavioral: Positive for substance abuse (alcohol use - reports once a week). Objective:   No flowsheet data found.      [INSTRUCTIONS:  \"[x]\" Indicates a positive item  \"[]\" Indicates a negative item  -- DELETE ALL ITEMS NOT EXAMINED]    Constitutional: [x] Appears well-developed and well-nourished [x] No apparent distress      [] Abnormal -     Mental status: [x] Alert and awake  [x] Oriented to person/place/time [x] Able to follow commands    [] Abnormal -     Eyes:   EOM    [x]  Normal    [] Abnormal -   Sclera  [x]  Normal    [] Abnormal -          Discharge [x]  None visible   [] Abnormal -     HENT: [x] Normocephalic, atraumatic  [] Abnormal -   [x] Mouth/Throat: Mucous membranes are moist    External Ears [x] Normal  [] Abnormal -    Neck: [x] No visualized mass [] Abnormal -     Pulmonary/Chest: [x] Respiratory effort normal   [x] No visualized signs of difficulty breathing or respiratory distress        [] Abnormal -      Musculoskeletal:   [] Normal gait with no signs of ataxia         [x] Normal range of motion of neck        [] Abnormal -     Neurological:        [x] No Facial Asymmetry (Cranial nerve 7 motor function) (limited exam due to video visit)          [x] No gaze palsy        [] Abnormal -          Skin:        [x] No significant exanthematous lesions or discoloration noted on facial skin         [] Abnormal -            Psychiatric:       [x] Normal Affect [] Abnormal -        [x] No Hallucinations    We discussed the expected course, resolution and complications of the diagnosis(es) in detail. Medication risks, benefits, costs, interactions, and alternatives were discussed as indicated. I advised him to contact the office if his condition worsens, changes or fails to improve as anticipated. He expressed understanding with the diagnosis(es) and plan. Moira Rivers, who was evaluated through a patient-initiated, synchronous (real-time) audio-video encounter, and/or his healthcare decision maker, is aware that it is a billable service, with coverage as determined by his insurance carrier. He provided verbal consent to proceed: Yes, and patient identification was verified. It was conducted pursuant to the emergency declaration under the [address] [place] [address] and the [employer]. A caregiver was present when appropriate. Ability to conduct physical exam was limited. I was at home.  The patient was at home.      Ginger Valle NP

## 2020-10-27 ENCOUNTER — ANESTHESIA EVENT (OUTPATIENT)
Dept: ENDOSCOPY | Age: 60
End: 2020-10-27
Payer: COMMERCIAL

## 2020-10-28 ENCOUNTER — ANESTHESIA (OUTPATIENT)
Dept: ENDOSCOPY | Age: 60
End: 2020-10-28
Payer: COMMERCIAL

## 2020-10-28 ENCOUNTER — HOSPITAL ENCOUNTER (OUTPATIENT)
Age: 60
Setting detail: OUTPATIENT SURGERY
Discharge: HOME | End: 2020-10-28
Attending: COLON & RECTAL SURGERY | Admitting: COLON & RECTAL SURGERY
Payer: COMMERCIAL

## 2020-10-28 VITALS
SYSTOLIC BLOOD PRESSURE: 115 MMHG | DIASTOLIC BLOOD PRESSURE: 60 MMHG | RESPIRATION RATE: 18 BRPM | WEIGHT: 190 LBS | HEIGHT: 69 IN | HEART RATE: 73 BPM | BODY MASS INDEX: 28.14 KG/M2 | TEMPERATURE: 98.3 F | OXYGEN SATURATION: 100 %

## 2020-10-28 LAB
AMPHET UR QL SCN: NEGATIVE
BARBITURATES UR QL SCN: NEGATIVE
BENZODIAZ UR QL: NEGATIVE
CANNABINOIDS UR QL SCN: NEGATIVE
COCAINE UR QL SCN: NEGATIVE
GLUCOSE BLD STRIP.AUTO-MCNC: 109 MG/DL (ref 70–110)
GLUCOSE BLD STRIP.AUTO-MCNC: 132 MG/DL (ref 70–110)
HDSCOM,HDSCOM: NORMAL
METHADONE UR QL: NEGATIVE
OPIATES UR QL: NEGATIVE
PCP UR QL: NEGATIVE

## 2020-10-28 PROCEDURE — 76060000032 HC ANESTHESIA 0.5 TO 1 HR: Performed by: COLON & RECTAL SURGERY

## 2020-10-28 PROCEDURE — 88305 TISSUE EXAM BY PATHOLOGIST: CPT

## 2020-10-28 PROCEDURE — 74011250636 HC RX REV CODE- 250/636: Performed by: ANESTHESIOLOGY

## 2020-10-28 PROCEDURE — 74011250637 HC RX REV CODE- 250/637: Performed by: COLON & RECTAL SURGERY

## 2020-10-28 PROCEDURE — 80307 DRUG TEST PRSMV CHEM ANLYZR: CPT

## 2020-10-28 PROCEDURE — 74011000250 HC RX REV CODE- 250: Performed by: ANESTHESIOLOGY

## 2020-10-28 PROCEDURE — 00812 ANES LWR INTST SCR COLSC: CPT | Performed by: ANESTHESIOLOGY

## 2020-10-28 PROCEDURE — 74011250636 HC RX REV CODE- 250/636: Performed by: NURSE ANESTHETIST, CERTIFIED REGISTERED

## 2020-10-28 PROCEDURE — 74011250637 HC RX REV CODE- 250/637: Performed by: NURSE ANESTHETIST, CERTIFIED REGISTERED

## 2020-10-28 PROCEDURE — 77030021593 HC FCPS BIOP ENDOSC BSC -A: Performed by: COLON & RECTAL SURGERY

## 2020-10-28 PROCEDURE — 2709999900 HC NON-CHARGEABLE SUPPLY: Performed by: COLON & RECTAL SURGERY

## 2020-10-28 PROCEDURE — 45380 COLONOSCOPY AND BIOPSY: CPT | Performed by: COLON & RECTAL SURGERY

## 2020-10-28 PROCEDURE — 82962 GLUCOSE BLOOD TEST: CPT

## 2020-10-28 PROCEDURE — 76040000007: Performed by: COLON & RECTAL SURGERY

## 2020-10-28 RX ORDER — INSULIN LISPRO 100 [IU]/ML
INJECTION, SOLUTION INTRAVENOUS; SUBCUTANEOUS ONCE
Status: DISCONTINUED | OUTPATIENT
Start: 2020-10-28 | End: 2020-10-28 | Stop reason: HOSPADM

## 2020-10-28 RX ORDER — LIDOCAINE HYDROCHLORIDE 20 MG/ML
INJECTION, SOLUTION EPIDURAL; INFILTRATION; INTRACAUDAL; PERINEURAL AS NEEDED
Status: DISCONTINUED | OUTPATIENT
Start: 2020-10-28 | End: 2020-10-28 | Stop reason: HOSPADM

## 2020-10-28 RX ORDER — PROPOFOL 10 MG/ML
INJECTION, EMULSION INTRAVENOUS AS NEEDED
Status: DISCONTINUED | OUTPATIENT
Start: 2020-10-28 | End: 2020-10-28 | Stop reason: HOSPADM

## 2020-10-28 RX ORDER — FAMOTIDINE 20 MG/1
20 TABLET, FILM COATED ORAL ONCE
Status: COMPLETED | OUTPATIENT
Start: 2020-10-28 | End: 2020-10-28

## 2020-10-28 RX ORDER — SODIUM CHLORIDE, SODIUM LACTATE, POTASSIUM CHLORIDE, CALCIUM CHLORIDE 600; 310; 30; 20 MG/100ML; MG/100ML; MG/100ML; MG/100ML
50 INJECTION, SOLUTION INTRAVENOUS CONTINUOUS
Status: DISCONTINUED | OUTPATIENT
Start: 2020-10-28 | End: 2020-10-28 | Stop reason: HOSPADM

## 2020-10-28 RX ORDER — DEXTROMETHORPHAN/PSEUDOEPHED 2.5-7.5/.8
DROPS ORAL AS NEEDED
Status: DISCONTINUED | OUTPATIENT
Start: 2020-10-28 | End: 2020-10-28 | Stop reason: HOSPADM

## 2020-10-28 RX ADMIN — LIDOCAINE HYDROCHLORIDE 80 MG: 20 INJECTION, SOLUTION EPIDURAL; INFILTRATION; INTRACAUDAL; PERINEURAL at 16:15

## 2020-10-28 RX ADMIN — PROPOFOL 30 MG: 10 INJECTION, EMULSION INTRAVENOUS at 16:28

## 2020-10-28 RX ADMIN — PROPOFOL 50 MG: 10 INJECTION, EMULSION INTRAVENOUS at 16:17

## 2020-10-28 RX ADMIN — SODIUM CHLORIDE, SODIUM LACTATE, POTASSIUM CHLORIDE, AND CALCIUM CHLORIDE 50 ML/HR: 600; 310; 30; 20 INJECTION, SOLUTION INTRAVENOUS at 15:05

## 2020-10-28 RX ADMIN — PROPOFOL 30 MG: 10 INJECTION, EMULSION INTRAVENOUS at 16:37

## 2020-10-28 RX ADMIN — PROPOFOL 30 MG: 10 INJECTION, EMULSION INTRAVENOUS at 16:25

## 2020-10-28 RX ADMIN — FAMOTIDINE 20 MG: 20 TABLET ORAL at 15:06

## 2020-10-28 RX ADMIN — SODIUM CHLORIDE, SODIUM LACTATE, POTASSIUM CHLORIDE, AND CALCIUM CHLORIDE: 600; 310; 30; 20 INJECTION, SOLUTION INTRAVENOUS at 16:10

## 2020-10-28 RX ADMIN — PROPOFOL 30 MG: 10 INJECTION, EMULSION INTRAVENOUS at 16:31

## 2020-10-28 RX ADMIN — PROPOFOL 50 MG: 10 INJECTION, EMULSION INTRAVENOUS at 16:16

## 2020-10-28 RX ADMIN — SODIUM CHLORIDE, SODIUM LACTATE, POTASSIUM CHLORIDE, AND CALCIUM CHLORIDE: 600; 310; 30; 20 INJECTION, SOLUTION INTRAVENOUS at 16:30

## 2020-10-28 RX ADMIN — PROPOFOL 30 MG: 10 INJECTION, EMULSION INTRAVENOUS at 16:34

## 2020-10-28 NOTE — H&P
HPI: Adam Sandy is a 56 y.o. male presenting with chief complain of need for colorectal cancer screen. History of polyps. Past Medical History:   Diagnosis Date    Alcohol use     Fri-Sun one fifth; Mon-Thur: Pint of liquor    CAD (coronary artery disease)     Cardiac angioplasty with stent 07/29/2009    2.5 x 13 Cypher stent to CX.  Cardiac cath 11/09/2011    RCA patent. LM patent. LAD patent. mD1 55%. CX patent. Prior stent patent. Edison 85% (2.5 x 15-mm Xience stent, resid 0%). LVEDP 12. EF 40-45%. High lateral hypk (RI distribution).       Cardiac inferior STEMI 2009    Current smoker     contemplating stopping    Diabetes ([facility] [address]     type 2-insulin dependent    GERD (gastroesophageal reflux disease)     HTN (hypertension)     Hyperlipidemia     Migraine     Myocardial infarction [facility])        Past Surgical History:   Procedure Laterality Date    HX CORONARY STENT PLACEMENT  07/29/2009    HX HEART CATHETERIZATION  8/30/2011    HX HEART CATHETERIZATION  11/09/2011    HX WISDOM TEETH EXTRACTION      x4       Family History   Problem Relation Age of Onset    Hypertension Mother     Heart Attack Mother     Diabetes Mother     Hypertension Father     Heart Attack Father     Diabetes Father     Diabetes Sister     Hypertension Sister     Stroke Sister     Diabetes Brother     Hypertension Brother     Stroke Brother     No Known Problems Maternal Grandmother     No Known Problems Maternal Grandfather     No Known Problems Paternal Grandmother     No Known Problems Paternal Grandfather     No Known Problems Brother     Heart Disease Other     Kidney Disease Other        Social History     Socioeconomic History    Marital status:      Spouse name: Not on file    Number of children: Not on file    Years of education: Not on file    Highest education level: Not on file   Tobacco Use    Smoking status: Current Every Day Smoker     Packs/day: 0.50     Years: 1.00 Pack years: 0.50     Types: Cigarettes    Smokeless tobacco: Never Used   Substance and Sexual Activity    Alcohol use: Yes     Alcohol/week: 4.2 standard drinks     Types: 5 Shots of liquor per week     Comment: occassionally    Drug use: Yes     Types: Marijuana, Cocaine     Comment: none    Sexual activity: Yes   Social History Narrative     at [employer]. Review of Systems - neg    Outpatient Medications Marked as Taking for the 10/28/20 encounter [facility] Encounter)   Medication Sig Dispense Refill    glucose blood VI test strips (ASCENSIA AUTODISC VI, ONE TOUCH ULTRA TEST VI) strip Provide test strip and glucose meter that insurance will cover - Twice daily-  fasting in the morning and once two hours after a meal. 100 Strip 10    lancets misc Please provide lancets for blood glucose meter insurance will cover 1 Package 11       Allergies   Allergen Reactions    Niacin Itching       Vitals:    10/28/20 1421   BP: (!) 147/82   Pulse: 76   Resp: 16   Temp: 98.3 °F (36.8 °C)   SpO2: 100%   Weight: 86.2 kg (190 lb)   Height: 5' 9\" (1.753 m)       Physical Exam  Constitutional:       Appearance: He is well-developed. HENT:      Head: Normocephalic and atraumatic. Eyes:      Conjunctiva/sclera: Conjunctivae normal.   Abdominal:      General: There is no distension. Palpations: Abdomen is soft. There is no mass. Tenderness: There is no abdominal tenderness. Musculoskeletal: Normal range of motion. Lymphadenopathy:      Cervical: No cervical adenopathy. Skin:     General: Skin is warm and dry. Neurological:      Sensory: No sensory deficit. Psychiatric:         Speech: Speech normal.         Assessment / Plan    colo    The diagnoses and plan were discussed with the patient. All questions answered. Plan of care agreed to by all concerned.

## 2020-10-28 NOTE — ANESTHESIA POSTPROCEDURE EVALUATION
Procedure(s):  COLONOSCOPY w/polypectomy. No value filed. Anesthesia Post Evaluation      Multimodal analgesia: multimodal analgesia used between 6 hours prior to anesthesia start to PACU discharge  Patient location during evaluation: bedside  Patient participation: complete - patient participated  Level of consciousness: awake  Pain score: 0  Airway patency: patent  Anesthetic complications: no  Cardiovascular status: acceptable  Respiratory status: acceptable  Hydration status: acceptable  Post anesthesia nausea and vomiting:  none      INITIAL Post-op Vital signs:   Vitals Value Taken Time   /57 10/28/2020  5:00 PM   Temp     Pulse 81 10/28/2020  5:03 PM   Resp 16 10/28/2020  5:03 PM   SpO2 100 % 10/28/2020  5:03 PM   Vitals shown include unvalidated device data.

## 2020-10-28 NOTE — DISCHARGE INSTRUCTIONS
Patient Education   FOLLOW UP VISIT Appointment in: Other (Specify) No aspirin or ibuprofen (e.g. Aleve, Motrin, Advil) for 5 days. Repeat colonoscopy in 5 year(s). Colon Polyps: Care Instructions  Your Care Instructions     Colon polyps are growths in the colon or the rectum. The cause of most colon polyps is not known, and most people who get them do not have any problems. But a certain kind can turn into cancer. For this reason, regular testing for colon polyps is important for people as they get older. It is also important for anyone who has an increased risk for colon cancer. Polyps are usually found through routine colon cancer screening tests. Although most colon polyps are not cancerous, they are usually removed and then tested for cancer. Screening for colon cancer saves lives because the cancer can usually be cured if it is caught early. If you have a polyp that is the type that can turn into cancer, you may need more tests to examine your entire colon. The doctor will remove any other polyps that he or she finds, and you will be tested more often. Follow-up care is a key part of your treatment and safety. Be sure to make and go to all appointments, and call your doctor if you are having problems. It's also a good idea to know your test results and keep a list of the medicines you take. How can you care for yourself at home? Regular exams to look for colon polyps are the best way to prevent polyps from turning into colon cancer. These can include stool tests, sigmoidoscopy, colonoscopy, and CT colonography. Talk with your doctor about a testing schedule that is right for you. To prevent polyps  There is no home treatment that can prevent colon polyps. But these steps may help lower your risk for cancer. · Stay active. Being active can help you get to and stay at a healthy weight. Try to exercise on most days of the week. Walking is a good choice. · Eat well.  Choose a variety of vegetables, fruits, legumes (such as peas and beans), fish, poultry, and whole grains. · Do not smoke. If you need help quitting, talk to your doctor about stop-smoking programs and medicines. These can increase your chances of quitting for good. · If you drink alcohol, limit how much you drink. Limit alcohol to 2 drinks a day for men and 1 drink a day for women. When should you call for help? Call your doctor now or seek immediate medical care if:    · You have severe belly pain.     · Your stools are maroon or very bloody. Watch closely for changes in your health, and be sure to contact your doctor if:    · You have a fever.     · You have nausea or vomiting.     · You have a change in bowel habits (new constipation or diarrhea).     · Your symptoms get worse or are not improving as expected. Where can you learn more? Go to http://www.evans.com/  Enter C571 in the search box to learn more about \"Colon Polyps: Care Instructions. \"  Current as of: April 29, 2020               Content Version: 12.6  © 5164-4530 [employer]. Care instructions adapted under license by [employer] (which disclaims liability or warranty for this information). If you have questions about a medical condition or this instruction, always ask your healthcare professional. [address] any warranty or liability for your use of this information. Colonoscopy: What to Expect at [address] [place] [address]  After you have a colonoscopy, you will stay at the clinic for 1 to 2 hours until the medicines wear off. Then you can go home. But you will need to arrange for a ride. Your doctor will tell you when you can eat and do your other usual activities. Your doctor will talk to you about when you will need your next colonoscopy. Your doctor can help you decide how often you need to be checked. This will depend on the results of your test and your risk for colorectal cancer.   After the test, you may be bloated or have gas pains. You may need to pass gas. If a biopsy was done or a polyp was removed, you may have streaks of blood in your stool (feces) for a few days. This care sheet gives you a general idea about how long it will take for you to recover. But each person recovers at a different pace. Follow the steps below to get better as quickly as possible. How can you care for yourself at home? Activity  · Rest when you feel tired. · You can do your normal activities when it feels okay to do so. Diet  · Follow your doctor's directions for eating. · Unless your doctor has told you not to, drink plenty of fluids. This helps to replace the fluids that were lost during the colon prep. · Do not drink alcohol. Medicines  · If polyps were removed or a biopsy was done during the test, your doctor may tell you not to take aspirin or other anti-inflammatory medicines for a few days. These include ibuprofen (Advil, Motrin) and naproxen (Aleve). Other instructions  · For your safety, do not drive or operate machinery until the medicine wears off and you can think clearly. Your doctor may tell you not to drive or operate machinery until the day after your test.  · Do not sign legal documents or make major decisions until the medicine wears off and you can think clearly. The anesthesia can make it hard for you to fully understand what you are agreeing to. Follow-up care is a key part of your treatment and safety. Be sure to make and go to all appointments, and call your doctor if you are having problems. It's also a good idea to know your test results and keep a list of the medicines you take. When should you call for help? Call 911 anytime you think you may need emergency care. For example, call if:  · You passed out (lost consciousness). · You pass maroon or bloody stools. · You have severe belly pain.   Call your doctor now or seek immediate medical care if:  · Your stools are black and tarlike. · Your stools have streaks of blood, but you did not have a biopsy or any polyps removed. · You have belly pain, or your belly is swollen and firm. · You vomit. · You have a fever. · You are very dizzy. Watch closely for changes in your health, and be sure to contact your doctor if you have any problems. Where can you learn more? Go to [employer] Social[employer]be  Enter E264 in the search box to learn more about \"Colonoscopy: What to Expect at Home. \"   © 0783-7614 Healthwise, Incorporated. Care instructions adapted under license by [address] [place] [address] [employer] (which disclaims liability or warranty for this information). This care instruction is for use with your licensed healthcare professional. If you have questions about a medical condition or this instruction, always ask your healthcare professional. [place] [address] any warranty or liability for your use of this information. Content Version: 35.8.207345; Current as of: November 14, 2014      DISCHARGE SUMMARY from Nurse     POST-PROCEDURE INSTRUCTIONS:    Call your Physician if you:  ? Observe any excess bleeding. ? Develop a temperature over 100.5o F.  ? Experience abdominal, shoulder or chest pain. ? Notice any signs of decreased circulation or nerve impairment to an extremity such as a change in color, persistent numbness, tingling, coldness or increase in pain. ? Vomit blood or you have nausea and vomiting lasting longer than 4 hours. ? Are unable to take medications. ? Are unable to urinate within 8 hours after discharge following general anesthesia or intravenous sedation. For the next 24 hours after receiving general anesthesia or intravenous sedation, or while taking prescription Narcotics, limit your activities:  ? Do NOT drive a motor vehicle, operate hazard machinery or power tools, or perform tasks that require coordination.   The medication you received during your procedure may have some effect on your mental awareness. ? Do NOT make important personal or business decisions. The medication you received during your procedure may have some effect on your mental awareness. ? Do NOT drink alcoholic beverages. These drinks do not mix well with the medications that have been given to you. ? Upon discharge from the hospital, you must be accompanied by a responsible adult. ? Resume your diet as directed by your physician. ? Resume medications as your physician has prescribed. ? Please give a list of your current medications to your Primary Care Provider. ? Please update this list whenever your medications are discontinued, doses are changed, or new medications (including over-the-counter products) are added. ? Please carry medication information at all times in case of emergency situations. These are general instructions for a healthy lifestyle:    No smoking/ No tobacco products/ Avoid exposure to second hand smoke.  Surgeon General's Warning:  Quitting smoking now greatly reduces serious risk to your health. Obesity, smoking, and a sedentary lifestyle greatly increase your risk for illness.  A healthy diet, regular physical exercise & weight monitoring are important for maintaining a healthy lifestyle   You may be retaining fluid if you have a history of heart failure or if you experience any of the following symptoms:  Weight gain of 3 pounds or more overnight or 5 pounds in a week, increased swelling in our hands or feet or shortness of breath while lying flat in bed. Please call your doctor as soon as you notice any of these symptoms; do not wait until your next office visit. Recognize signs and symptoms of STROKE:  F  -  Face looks uneven  A  -  Arms unable to move or move unevenly  S  -  Speech slurred or non-existent  T  -  Time to call 911 - as soon as signs and symptoms begin - DO NOT go back to bed or wait to see If you get better - TIME IS BRAIN.     Colorectal Screening   Colorectal cancer almost always develops from precancerous polyps (abnormal growths) in the colon or rectum. Screening tests can find precancerous polyps, so that they can be removed before they turn into cancer. Screening tests can also find colorectal cancer early, when treatment works best.  Leesa Urbano Speak with your physician about when you should begin screening and how often you should be tested. Additional Information    If you have questions, please call [phone]. Remember, [employer] is NOT to be used for urgent needs. For medical emergencies, dial 911. Educational references and/or instructions provided during this visit included:    see attachments      APPOINTMENTS:    Please make a follow-up appointment with your physician. Discharge information has been reviewed with the patient and responsible party. The patient and responsible party verbalized understanding.

## 2020-10-28 NOTE — PROCEDURES
[facility]  [address], [address]   [place], [address]  [phone]                    Colonoscopy Procedure Note      Para Reusing  1960  [id number]                Date of Procedure: 10/28/2020    Preoperative diagnosis: Colon cancer Screening:   Z12.11    Postoperative diagnosis: colon polyp (sigmoid)    :  Diana Lux MD    Assistant(s): Endoscopy Technician-1: Roshni Cruz  Endoscopy RN-1: Joyce Moreno RN  Float Staff: Shyann Lacy RN    Sedation: MAC    Complications: None    Implants: None    Procedure Details:  Prior to the procedure, a history and physical were performed. The patients medications, allergies and sensitivities were reviewed and all questions were answered. After informed consent was obtained for the procedure, with all risks and benefits of procedure explained. The patient was taken to the endoscopy suite and placed in the left lateral decubitus position. Patient identification and proposed procedure were verified prior to the procedure by the nurse and I. After sequential anesthesia administered by anesthesiologist, a digital rectal exam was performed and was normal.  The Olympus video colonoscope was introduced through the anus and advanced to cecum, which was identified by the ileocecal valve and appendiceal orifice. The quality of preparation was good. The colonoscope was slowly withdrawn and the mucosa examined for any abnormalities. Cecal withdrawal time was greater than 6 minutes. The patient tolerated the procedure well. There were no complications. Findings/Interventions:   Polyps - #1, 5 mm in size, located in the sigmoid, removed by cold biopsy and sent for pathology    EBL: none    Recommendations: -Repeat colonoscopy in 5 years.    NO aspirin for 5 days     Discharge Disposition:  Home  Diana Lux MD  10/28/2020  4:41 PM

## 2020-10-28 NOTE — ANESTHESIA PREPROCEDURE EVALUATION
Relevant Problems   No relevant active problems       Anesthetic History   No history of anesthetic complications            Review of Systems / Medical History  Patient summary reviewed, nursing notes reviewed and pertinent labs reviewed    Pulmonary  Within defined limits                 Neuro/Psych   Within defined limits           Cardiovascular  Within defined limits  Hypertension: well controlled          Past MI, CAD and hyperlipidemia    Exercise tolerance: >4 METS  Comments: Cardiac cath (N58.934)  11/09/2011  :  RCA patent. LM patent. LAD patent. mD1 55%. CX patent. Prior stent patent. Edison 85% (2.5 x 15-mm Xience stent, resid 0%). LVEDP 12. EF 40-45%. High lateral hypk (RI distribution).        Cardiac angioplasty with stent (Z95.820)  07/29/2009  2.5 x 13 Cypher stent to CX.      GI/Hepatic/Renal     GERD: well controlled           Endo/Other    Diabetes: poorly controlled, using insulin         Other Findings              Physical Exam    Airway  Mallampati: II  TM Distance: 4 - 6 cm  Neck ROM: normal range of motion   Mouth opening: Normal     Cardiovascular  Regular rate and rhythm,  S1 and S2 normal,  no murmur, click, rub, or gallop  Rhythm: regular  Rate: normal         Dental    Dentition: Poor dentition     Pulmonary  Breath sounds clear to auscultation               Abdominal  GI exam deferred       Other Findings            Anesthetic Plan    ASA: 3  Anesthesia type: MAC          Induction: Intravenous  Anesthetic plan and risks discussed with: Patient

## 2020-11-04 ENCOUNTER — TELEPHONE (OUTPATIENT)
Dept: INTERNAL MEDICINE CLINIC | Age: 60
End: 2020-11-04

## 2020-11-04 ENCOUNTER — VIRTUAL VISIT (OUTPATIENT)
Dept: INTERNAL MEDICINE CLINIC | Age: 60
End: 2020-11-04

## 2020-11-04 DIAGNOSIS — Z79.4 TYPE 2 DIABETES MELLITUS WITH DIABETIC NEUROPATHY, WITH LONG-TERM CURRENT USE OF INSULIN (HCC): Primary | ICD-10-CM

## 2020-11-04 DIAGNOSIS — E11.40 TYPE 2 DIABETES MELLITUS WITH DIABETIC NEUROPATHY, WITH LONG-TERM CURRENT USE OF INSULIN (HCC): Primary | ICD-10-CM

## 2020-11-04 RX ORDER — FLASH GLUCOSE SCANNING READER
EACH MISCELLANEOUS
Qty: 1 EACH | Refills: 0 | Status: SHIPPED | OUTPATIENT
Start: 2020-11-04 | End: 2021-11-01 | Stop reason: SDUPTHER

## 2020-11-04 RX ORDER — FLASH GLUCOSE SENSOR
KIT MISCELLANEOUS
Qty: 2 KIT | Refills: 5 | Status: SHIPPED | OUTPATIENT
Start: 2020-11-04 | End: 2021-11-01 | Stop reason: SDUPTHER

## 2020-11-04 NOTE — Clinical Note
Hi there,    Met with Mr. Jeferson Gil today for diabetes follow-up. I am afraid we are starting to lose control of his blood sugars, due to snacking and missing a few doses of his Bydureon. We are seeing numbers up into the 300s. He is also not checking his blood sugars as consistently as he was. I want to see if we can get [facility] covered for him. Would you mind sending in Rxs for the Freestyle Ezio 2 meter (quantity 1) and Freestyle Ezio 2 sensor kit (quantity 2). I can do it as well with your authorization if you'd like.     Thanks,  Trixie Elise

## 2020-11-04 NOTE — TELEPHONE ENCOUNTER
Sent in Rx for [facility] per authorization from PCP. [facility] will require PA. Will complete to see if we are able to get it covered and follow up with patient. Thank you,  Zuri Rebollar.  ISHMAEL NavarroS

## 2020-11-04 NOTE — PROGRESS NOTES
Pharmacy Progress Note - Diabetes Management    S/O: Mr. Adam Sandy is a 56 y.o. male, referred by Dr. Mily Heart, NP, was seen today for diabetes management follow-up visit. Patient's last A1c was 7.3% in August 2020. This is a(n) decrease from 11.1% in April 2020. Current anti-hyperglycemic regimen include(s):    - Basaglar 52 units daily  - Bydureon 2 mg once weekly  - Metformin 500 mg every AM and 1000 mg every PM    Patient denies adherence with his medications. - Patient states that he's missed 1-2 doses of Bydureon  - Patient states that he is tolerating the increase in metformin dose well    ROS:  Today, Pt endorses:  - Symptoms of Hyperglycemia: yes - neuropathy in feet (about the same), blurry vision (about the same, rarely happens)  - Symptoms of Hypoglycemia: none - denies any BG < 70 or feelings of lows     Self Monitoring Blood Glucose (SMBG) or CGM:  - Brought in home glucometer/blood glucose log/CGM reader today:  yes  - Conducts/scans: Once daily (fasting)     Since last visit:  Date Fasting   30-Oct 347   31-Oct 247   4-Nov 208     Nutrition/Lifestyle Modifications:  - No significant changes in diet since last visit  - Patient states he was still snacking on candy and nutty butty bars at night time, and just recently changed over to eating protein bars instead if he gets hungry     Vitals: Wt Readings from Last 3 Encounters:   10/28/20 190 lb (86.2 kg)   10/13/20 192 lb (87.1 kg)   09/22/20 191 lb (86.6 kg)     BP Readings from Last 3 Encounters:   10/28/20 115/60   10/13/20 136/86   02/03/20 132/88     Pulse Readings from Last 3 Encounters:   10/28/20 73   10/13/20 78   09/22/20 77       Past Medical History:   Diagnosis Date    Alcohol use     Fri-Sun one fifth; Mon-Thur: Pint of liquor    CAD (coronary artery disease)     Cardiac angioplasty with stent 07/29/2009    2.5 x 13 Cypher stent to CX.  Cardiac cath 11/09/2011    RCA patent. LM patent. LAD patent.   mD1 55%.  CX patent. Prior stent patent. Edison 85% (2.5 x 15-mm Xience stent, resid 0%). LVEDP 12. EF 40-45%. High lateral hypk (RI distribution).  Cardiac inferior STEMI 2009    Current smoker     contemplating stopping    Diabetes ([address]     type 2-insulin dependent    GERD (gastroesophageal reflux disease)     HTN (hypertension)     Hyperlipidemia     Migraine     Myocardial infarction (HCC)      Allergies   Allergen Reactions    Niacin Itching       Current Outpatient Medications   Medication Sig    pregabalin (LYRICA) 50 mg capsule TAKE ONE CAPSULE BY MOUTH THREE TIMES A DAY    ergocalciferol (ERGOCALCIFEROL) 1,250 mcg (50,000 unit) capsule Take 1 Cap by mouth every seven (7) days.  metFORMIN (GLUCOPHAGE) 1,000 mg tablet Take 1 Tab by mouth two (2) times daily (with meals). (Patient taking differently: Take 1,000 mg by mouth daily (after dinner). )    exenatide microspheres (Bydureon) 2 mg/0.65 mL pnij 0.65 mL by SubCUTAneous route every seven (7) days.  OTHER Neurx-tf (Alpha lipoic acid 350 mg, Benfotiamine 300 mg, Vitamin B6 35 mg, Vitamin B12 2000 mcg) 1 tab daily    Blood-Glucose Meter monitoring kit Blood glucose kit that insurance will cover - Check blood glucose twice a day once in the morning fasting and once after a meal.    glucose blood VI test strips (ASCENSIA AUTODISC VI, ONE TOUCH ULTRA TEST VI) strip Provide test strip and glucose meter that insurance will cover - Twice daily-  fasting in the morning and once two hours after a meal.    lancets misc Please provide lancets for blood glucose meter insurance will cover    multivitamin (ONE A DAY) tablet Take 1 Tab by mouth daily.  simvastatin (ZOCOR) 20 mg tablet Take 1 Tab by mouth nightly. No current facility-administered medications for this visit.         Lab Results   Component Value Date/Time    Sodium 139 10/22/2020 03:08 PM    Potassium 4.4 10/22/2020 03:08 PM    Chloride 107 10/22/2020 03:08 PM    CO2 26 10/22/2020 03:08 PM    Anion gap 6 10/22/2020 03:08 PM    Glucose 272 (H) 10/22/2020 03:08 PM    BUN 19 (H) 10/22/2020 03:08 PM    Creatinine 1.22 10/22/2020 03:08 PM    BUN/Creatinine ratio 16 10/22/2020 03:08 PM    GFR est AA >60 10/22/2020 03:08 PM    GFR est non-AA >60 10/22/2020 03:08 PM    Calcium 9.1 10/22/2020 03:08 PM    Bilirubin, total 0.2 10/22/2020 03:08 PM    Alk. phosphatase 107 10/22/2020 03:08 PM    Protein, total 6.9 10/22/2020 03:08 PM    Albumin 3.6 10/22/2020 03:08 PM    Globulin 3.3 10/22/2020 03:08 PM    A-G Ratio 1.1 10/22/2020 03:08 PM    ALT (SGPT) 34 10/22/2020 03:08 PM       Lab Results   Component Value Date/Time    Cholesterol, total 133 08/19/2020 06:49 AM    HDL Cholesterol 41 08/19/2020 06:49 AM    LDL, calculated 69 08/19/2020 06:49 AM    VLDL, calculated 23 08/19/2020 06:49 AM    Triglyceride 115 08/19/2020 06:49 AM    CHOL/HDL Ratio 3.2 08/19/2020 06:49 AM       Lab Results   Component Value Date/Time    WBC 4.1 (L) 10/22/2020 03:08 PM    Hemoglobin (POC) 14.2 07/03/2019 09:44 AM    HGB 13.5 10/22/2020 03:08 PM    HCT 39.1 10/22/2020 03:08 PM    PLATELET 733 95/66/7067 03:08 PM    MCV 86.3 10/22/2020 03:08 PM       Lab Results   Component Value Date/Time    Microalbumin/Creat ratio (mg/g creat) Cannot be calculated 10/22/2020 03:08 PM    Microalbumin,urine random <0.50 10/22/2020 03:08 PM       HbA1c:  Lab Results   Component Value Date/Time    Hemoglobin A1c 7.3 (H) 08/19/2020 06:49 AM    Hemoglobin A1c (POC) Code 106( >15%) 04/04/2019 02:00 PM    Hemoglobin A1c, External 7.8 01/28/2015 11:15 PM     No components found for: 2     Last Point of Care HGB A1C  Hemoglobin A1c (POC)   Date Value Ref Range Status   04/04/2019 Code 106( >15%) % Final        CrCl cannot be calculated (Unknown ideal weight. ). A/P:    Diabetes Management:  - Per ADA guidelines, Pt's A1c is near goal of < 7%.  However, with current SMBG readings, we are seeing less control of blood sugars and would expect a higher A1c   - Reminded patient that he was sent a list of low carb snacks to try that would have less impact on his blood sugars. Agreed that protein bars are better options for him than M&Ms and nutty butty bars, but still can have a decent amount of carbs in them depending on the brand. Encouraged him to choose protein bars that have 15 g of carbs or less  - Patient also starting to get away from checking blood sugars consistently. Freestyle Víctor Quill may be a good option for him depending on insurance coverage. Will ask PCP to send in Rx and determine from there  - Instructed patient to increase Basaglar to 54 units daily  - Instructed patient to increase metformin to 1000 mg BID on Monday, 11/9, if he continues to tolerate it okay per PCP instructions      Patient verbalized understanding of the information presented and all of the patients questions were answered. AVS was handed to the patient. Patient advised to call the office with any additional questions or concerns. Notifications of recommendations will be sent to Dr. Mirza Bolanos, NP for review. Patient will return to clinic in 2 week(s) for follow up. Thank you,  Justice Lares.  Ariella Rodriguez, INEZD, [facility]      CLINICAL PHARMACY CONSULT: MED RECONCILIATION/REVIEW ADDENDUM    For Pharmacy Admin Tracking Only    PHSO: PHSO Patient?: Yes  Total # of Interventions Recommended: Count: 3  - Increased Dose #: 2  - New Order #: 1 New Medication Order Reason(s): Needs Additional Medication Therapy  Total Interventions Accepted: 3  Time Spent (min): 45

## 2020-11-09 DIAGNOSIS — E11.40 TYPE 2 DIABETES MELLITUS WITH DIABETIC NEUROPATHY, WITH LONG-TERM CURRENT USE OF INSULIN (HCC): ICD-10-CM

## 2020-11-09 DIAGNOSIS — Z79.4 TYPE 2 DIABETES MELLITUS WITH DIABETIC NEUROPATHY, WITH LONG-TERM CURRENT USE OF INSULIN (HCC): ICD-10-CM

## 2020-11-09 RX ORDER — EXENATIDE 2 MG/.65ML
2 INJECTION, SUSPENSION, EXTENDED RELEASE SUBCUTANEOUS
Qty: 4 PEN | Refills: 3 | Status: SHIPPED | OUTPATIENT
Start: 2020-11-09 | End: 2021-03-10

## 2020-11-09 RX ORDER — PEN NEEDLE, DIABETIC 30 GX3/16"
NEEDLE, DISPOSABLE MISCELLANEOUS
Qty: 1 PACKAGE | Refills: 11 | Status: SHIPPED | OUTPATIENT
Start: 2020-11-09 | End: 2021-11-01 | Stop reason: SDUPTHER

## 2020-11-11 ENCOUNTER — TELEPHONE (OUTPATIENT)
Dept: INTERNAL MEDICINE CLINIC | Age: 60
End: 2020-11-11

## 2020-11-11 ENCOUNTER — OFFICE VISIT (OUTPATIENT)
Dept: INTERNAL MEDICINE CLINIC | Age: 60
End: 2020-11-11

## 2020-11-11 DIAGNOSIS — Z79.4 TYPE 2 DIABETES MELLITUS WITH DIABETIC NEUROPATHY, WITH LONG-TERM CURRENT USE OF INSULIN (HCC): Primary | ICD-10-CM

## 2020-11-11 DIAGNOSIS — E11.40 TYPE 2 DIABETES MELLITUS WITH DIABETIC NEUROPATHY, WITH LONG-TERM CURRENT USE OF INSULIN (HCC): Primary | ICD-10-CM

## 2020-11-11 NOTE — TELEPHONE ENCOUNTER
Returned call to patient. No need to  Freestyle Ezio from pharmacy prior to visit; instructed patient to bring his own glucometer for review as well. Thank you,  Maddi Stewart.  ISHMAEL EvansS

## 2020-11-11 NOTE — PROGRESS NOTES
Pharmacy Progress Note - Diabetes Management    S/O: Mr. Song Arenas is a 56 y.o. male, referred by Dr. Chris Desai, MARILYN, was seen today for [facility] teaching. Reviewed the following with patient:  · Target goal (>70% of time between )  · Importance of scanning at least 3 times per day to collect continuous data   · Difference between readings with gaviota and glucometer  · When to check sugar with a glucometer (blood sugar changing quickly, when gaviota recommends, symptoms don't match the reading)  · Different reports to view readings and trends  · Tips to make sensor stay on  · What to do if senor falls off    Verbally walked patient through applying the Freestyle sensor and placed it on him in clinic today. - Insurance notified PharmD that PA for [facility] was approved; however, patient still having issue at the pharmacy to fill it. Provided pharmacy with number to call insurance help desk to assist with running the claim. Will follow-up and notify patient of results. - In the meantime, provided patient with sample reader and sensor. He will not need a new reader, but the sensor will need to be replaced in 2 weeks. Hope to have an answer from insurance company by then. Patient verbalized understanding of the information presented and all of the patients questions were answered. Notifications of recommendations will be sent to Dr. Chris Desai, NP for review. Thank you,  Vlad Mcguire.  Damir Paul, INEZD, [facility]      CLINICAL PHARMACY CONSULT: MED RECONCILIATION/REVIEW ADDENDUM    For Pharmacy Admin Tracking Only    PHSO: PHSO Patient?: Yes  Total # of Interventions Recommended: Count: 0  Total Interventions Accepted: 0  Time Spent (min): 30

## 2020-11-11 NOTE — TELEPHONE ENCOUNTER
Pt calling asking if he needs to bring his meter to the appt today. He hasn't picked it up from pharmacy yet.

## 2020-11-16 DIAGNOSIS — Z79.4 TYPE 2 DIABETES MELLITUS WITH DIABETIC NEUROPATHY, WITH LONG-TERM CURRENT USE OF INSULIN (HCC): ICD-10-CM

## 2020-11-16 DIAGNOSIS — E11.40 TYPE 2 DIABETES MELLITUS WITH DIABETIC NEUROPATHY, WITH LONG-TERM CURRENT USE OF INSULIN (HCC): ICD-10-CM

## 2020-11-16 RX ORDER — EXENATIDE 2 MG/.65ML
2 INJECTION, SUSPENSION, EXTENDED RELEASE SUBCUTANEOUS
Qty: 4 PEN | Refills: 3 | OUTPATIENT
Start: 2020-11-16

## 2020-11-16 RX ORDER — PREGABALIN 50 MG/1
CAPSULE ORAL
Qty: 90 CAP | Refills: 0 | Status: SHIPPED | OUTPATIENT
Start: 2020-11-16 | End: 2020-12-16

## 2020-11-18 ENCOUNTER — TELEPHONE (OUTPATIENT)
Dept: INTERNAL MEDICINE CLINIC | Age: 60
End: 2020-11-18

## 2020-11-18 LAB
ECHO AO ROOT DIAM: 3.78 CM
ECHO LA AREA 4C: 18.97 CM2
ECHO LA VOL 2C: 74.05 ML (ref 18–58)
ECHO LA VOL 4C: 48.23 ML (ref 18–58)
ECHO LA VOL BP: 67.11 ML (ref 18–58)
ECHO LA VOL/BSA BIPLANE: 33.2 ML/M2 (ref 16–28)
ECHO LA VOLUME INDEX A2C: 36.64 ML/M2 (ref 16–28)
ECHO LA VOLUME INDEX A4C: 23.86 ML/M2 (ref 16–28)
ECHO LV E' LATERAL VELOCITY: 7.23 CM/S
ECHO LV E' SEPTAL VELOCITY: 4.94 CM/S
ECHO LV EDV A2C: 98.43 ML
ECHO LV EDV A4C: 105.38 ML
ECHO LV EDV BP: 105.52 ML (ref 67–155)
ECHO LV EDV INDEX A4C: 52.1 ML/M2
ECHO LV EDV INDEX BP: 52.2 ML/M2
ECHO LV EDV NDEX A2C: 48.7 ML/M2
ECHO LV EJECTION FRACTION A2C: 45 PERCENT
ECHO LV EJECTION FRACTION A4C: 52 PERCENT
ECHO LV EJECTION FRACTION BIPLANE: 48.7 PERCENT (ref 55–100)
ECHO LV ESV A2C: 53.73 ML
ECHO LV ESV A4C: 50.91 ML
ECHO LV ESV BP: 54.15 ML (ref 22–58)
ECHO LV ESV INDEX A2C: 26.6 ML/M2
ECHO LV ESV INDEX A4C: 25.2 ML/M2
ECHO LV ESV INDEX BP: 26.8 ML/M2
ECHO LV INTERNAL DIMENSION DIASTOLIC: 5.03 CM (ref 4.2–5.9)
ECHO LV INTERNAL DIMENSION SYSTOLIC: 3.58 CM
ECHO LV IVSD: 1.28 CM (ref 0.6–1)
ECHO LV MASS 2D: 264.3 G (ref 88–224)
ECHO LV MASS INDEX 2D: 130.8 G/M2 (ref 49–115)
ECHO LV POSTERIOR WALL DIASTOLIC: 1.32 CM (ref 0.6–1)
ECHO LVOT CARDIAC OUTPUT: 4.69 LITER/MINUTE
ECHO LVOT DIAM: 2.46 CM
ECHO LVOT PEAK GRADIENT: 3.02 MMHG
ECHO LVOT PEAK VELOCITY: 86.94 CM/S
ECHO LVOT SV: 44.7 ML
ECHO LVOT SV: 65.5 ML
ECHO LVOT VTI: 13.78 CM
ECHO MV A VELOCITY: 74.08 CM/S
ECHO MV E DECELERATION TIME (DT): 292.35 MS
ECHO MV E VELOCITY: 42.98 CM/S
ECHO MV E/A RATIO: 0.58
ECHO MV E/E' LATERAL: 5.94
ECHO MV E/E' RATIO (AVERAGED): 7.32
ECHO MV E/E' SEPTAL: 8.7
ECHO RV TAPSE: 2.31 CM (ref 1.5–2)
LVOT MG: 1.54 MMHG

## 2020-11-18 NOTE — PROGRESS NOTES
Per your last note\" Mr. Jarrell Lewis has history of CAD. He presented back in 2009 with Malaise but no specific chest pains, inferior wall MI, thrombotic occlusion of his circumflex. He had Thrombectomy, angioplasty and stent to circumflex. He was also noted to have vasospasm. In 2011, he presented with non-STEMI; his ramus intermedius branch had severe stenosis which was stented. Unfortunately, he continues to drink about a fifth of liquor per week and smokes about half a pack per day. 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 On numerous occasions in the past, I have advised him and encouraged him to discontinue tobacco use completely. Again, I have asked him to discontinue tobacco use completely. He is not on beta blocker therapy due to the fact that he has ED. He also has asymptomatic sinus bradycardia. Patient had nuclear scan done in October 2016 which showed EF of 64% with small inferior fixed defect likely from diaphragmatic attenuation. In April 2019, while at work, he got dehydrated and had acute kidney injury. [place] was taking care at [facility].  His lisinopril was discontinued.  His blood pressure has been reasonably well controlled despite off of ACE inhibitor.     · CAD:    Symptomatically stable. · BP:   Well controlled. · HR:    Stable. · CHF:    Currently, there is no evidence of decompensated CHF noted. · Weight:    His weight today is 193 pounds. His baseline weight is 185 pounds. I have asked him not to gain any further weight. · Cholesterol:   Target LDL <70. Zocor 20. · Tobacco: Unfortunately, he continues to smoke about a half a pack per day. Again, smoking cessation is strongly encouraged. · Anti-platelet:   Remains on ASA.      He is scheduled to undergo colonoscopy later this month.   I have asked him to proceed with repeat echocardiogram since his last echocardiogram was about 4 years ago and he has known history of CAD and episodes of shortness of breath and fatigue. If his echocardiogram is unremarkable, he should be able to tolerate colonoscopy without difficulty. I will see him back annually.  Thank you.

## 2020-11-18 NOTE — TELEPHONE ENCOUNTER
Pharmacy Progress Note - Telephone Encounter    S/O: Mr. Pizaror Smoker 56 y.o. male, referred by Dr. Bobby Benedict NP, was contacted via an outbound telephone today. Verified patients identifiers (name & ) per HIPAA policy. Updated patient that I am still working with pharmacy/insurance company to see if Cheryl Saab will be covered at a reasonable price for him. Will update patient again later this week. Thank you,  Jay Oneill.  ISHMAEL CostaS

## 2020-11-25 ENCOUNTER — TELEPHONE (OUTPATIENT)
Dept: CARDIOLOGY CLINIC | Age: 60
End: 2020-11-25

## 2020-11-25 NOTE — TELEPHONE ENCOUNTER
Spoke to patient. Verified name and . Patient stated that he already had colonoscopy, but made him aware of results. Patient indicated understanding.

## 2020-11-25 NOTE — TELEPHONE ENCOUNTER
----- Message from [id number] Chidi Odonnell MD sent at 11/24/2020  3:26 PM EST -----  Let the patient know that his echocardiogram is unremarkable. He should be able to tolerate colonoscopy from cardiac standpoint.  ----- Message -----  From: Geno Mammoth LakesASHISH  Sent: 29/59/6916   3:28 PM EST  To: [id number] Chidi Odonnell MD    Per your last note\" Mr. Ranjana Shen has history of CAD. He presented back in 2009 with Malaise but no specific chest pains, inferior wall MI, thrombotic occlusion of his circumflex. He had Thrombectomy, angioplasty and stent to circumflex. He was also noted to have vasospasm. In 2011, he presented with non-STEMI; his ramus intermedius branch had severe stenosis which was stented. Unfortunately, he continues to drink about a fifth of liquor per week and smokes about half a pack per day. 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 On numerous occasions in the past, I have advised him and encouraged him to discontinue tobacco use completely. Again, I have asked him to discontinue tobacco use completely. He is not on beta blocker therapy due to the fact that he has ED. He also has asymptomatic sinus bradycardia. Patient had nuclear scan done in October 2016 which showed EF of 64% with small inferior fixed defect likely from diaphragmatic attenuation. In April 2019, while at work, he got dehydrated and had acute kidney injury. Ladonna Cain was taking care at [facility].  His lisinopril was discontinued.  His blood pressure has been reasonably well controlled despite off of ACE inhibitor.     · CAD:    Symptomatically stable. · BP:   Well controlled. · HR:    Stable. · CHF:    Currently, there is no evidence of decompensated CHF noted. · Weight:    His weight today is 193 pounds. His baseline weight is 185 pounds. I have asked him not to gain any further weight. · Cholesterol:   Target LDL <70. Zocor 20.   · Tobacco: Unfortunately, he continues to smoke about a half a pack per day. Again, smoking cessation is strongly encouraged. · Anti-platelet:   Remains on ASA.      He is scheduled to undergo colonoscopy later this month. I have asked him to proceed with repeat echocardiogram since his last echocardiogram was about 4 years ago and he has known history of CAD and episodes of shortness of breath and fatigue. If his echocardiogram is unremarkable, he should be able to tolerate colonoscopy without difficulty. I will see him back annually.  Thank you.

## 2020-12-01 ENCOUNTER — TELEPHONE (OUTPATIENT)
Dept: INTERNAL MEDICINE CLINIC | Age: 60
End: 2020-12-01

## 2020-12-01 ENCOUNTER — TELEPHONE (OUTPATIENT)
Dept: SURGERY | Age: 60
End: 2020-12-01

## 2020-12-01 NOTE — TELEPHONE ENCOUNTER
Pharmacy Progress Note - Telephone Encounter    S/O: Mr. Padmini Parisi 56 y.o. male, referred by Dr. Nakia Mota, MARILYN, was contacted via an outbound telephone call to discuss [address] coverage today. Verified patients identifiers (name & ) per HIPAA policy.     - Followed up on Freestyle Ezio coverage   - Device was previously covered by [address] [facility], which coverage has now  per pharmacy as patient has transitioned to [address] is not covered by [facility] plan    A/P:  - Patient to go back to checking blood sugars at least twice per day  - He discussed possibly paying out of pocket for [address]; made patient aware of [employer] coupons or  coupon to help with costs if he chooses to do so  - Scheduled follow-up with patient next week to review blood sugars  - Patient endorses understanding to the provided information. All questions answered at this time. Thank you,  Ellis Edward.  Bart Cushing, PHARMD, [facility]    CLINICAL PHARMACY CONSULT: MED RECONCILIATION/REVIEW ADDENDUM    For Pharmacy Admin Tracking Only    PHSO: PHSO Patient?: Yes  Total # of Interventions Recommended: Count: 0  Total Interventions Accepted: 0  Time Spent (min): 60

## 2020-12-01 NOTE — TELEPHONE ENCOUNTER
----- Message from Sherie Abbott MD sent at 10/30/2020  2:23 PM EDT -----  Benign polyp(s). Repeat colonoscopy in 5 year(s) as planned. LM x 2 for patient to call us regarding results. Tickler placed in recall for 5 years.

## 2020-12-02 RX ORDER — INSULIN GLARGINE 100 [IU]/ML
INJECTION, SOLUTION SUBCUTANEOUS
Qty: 15 ADJUSTABLE DOSE PRE-FILLED PEN SYRINGE | Refills: 1 | Status: SHIPPED | OUTPATIENT
Start: 2020-12-02 | End: 2020-12-16

## 2020-12-04 NOTE — TELEPHONE ENCOUNTER
Patient Education   Citalopram Hydrobromide Oral tablet  What is this medicine?  CITALOPRAM (sye DEEJAY oh pram) is a medicine for depression.  This medicine may be used for other purposes; ask your health care provider or pharmacist if you have questions.  What should I tell my health care provider before I take this medicine?  They need to know if you have any of these conditions:  · bipolar disorder or a family history of bipolar disorder  · diabetes  · glaucoma  · heart disease  · history of irregular heartbeat  · kidney or liver disease  · low levels of magnesium or potassium in the blood  · receiving electroconvulsive therapy  · seizures (convulsions)  · suicidal thoughts or a previous suicide attempt  · an unusual or allergic reaction to citalopram, escitalopram, other medicines, foods, dyes, or preservatives  · pregnant or trying to become pregnant  · breast-feeding  How should I use this medicine?  Take this medicine by mouth with a glass of water. Follow the directions on the prescription label. You can take it with or without food. Take your medicine at regular intervals. Do not take your medicine more often than directed. Do not stop taking this medicine suddenly except upon the advice of your doctor. Stopping this medicine too quickly may cause serious side effects or your condition may worsen.  A special MedGuide will be given to you by the pharmacist with each prescription and refill. Be sure to read this information carefully each time.  Talk to your pediatrician regarding the use of this medicine in children. Special care may be needed.  Patients over 60 years old may have a stronger reaction and need a smaller dose.  Overdosage: If you think you have taken too much of this medicine contact a poison control center or emergency room at once.  NOTE: This medicine is only for you. Do not share this medicine with others.  What if I miss a dose?  If you miss a dose, take it as soon as you can. If it is  Patient need a medication he is out of his medication. Please advise     Requested Prescriptions     Pending Prescriptions Disp Refills    pregabalin (LYRICA) 50 mg capsule 90 Cap 0     Sig: TAKE ONE CAPSULE BY MOUTH THREE TIMES A DAY    exenatide microspheres (Bydureon) 2 mg/0.65 mL pnij 4 Pen 3     Si.65 mL by SubCUTAneous route every seven (7) days. almost time for your next dose, take only that dose. Do not take double or extra doses.  What may interact with this medicine?  Do not take this medicine with any of the following medications:  · cisapride  · dofetlide  · dronedarone  · escitalopram  · linezolid  · MAOIs like Carbex, Eldepryl, Marplan, Nardil, and Parnate  · methylene blue (injected into a vein)  · pimozide  · posaconazole  · thioridazine  · ziprasidone  This medicine may also interact with the following medications:  · alcohol  · aspirin and aspirin-like medicines  · carbamazepine  · certain medicines for depression, anxiety, or psychotic disturbances  · certain medicines for fungal infections like ketoconazole and itraconazole  · certain medicines used to treat infections like chloroquine, clarithromycin, erythromycin, pentamidine  · certain medicines for migraine headaches like almotriptan, eletriptan, frovatriptan, naratriptan, rizatriptan, sumatriptan, zolmitriptan  · cimetidine  · diuretics  · fentanyl  · furazolidone  · isoniazid  · lithium  · medicines for sleep  · medicines that treat or prevent blood clots like warfarin, enoxaparin, and dalteparin  · methadone  · metoprolol  · NSAIDs, medicines for pain and inflammation, like ibuprofen or naproxen  · omeprazole  · other medicines that prolong the QT interval (cause an abnormal heart rhythm)  · procarbazine  · rasagiline  · supplements like [place]'s wort, kava kava, valerian  · tramadol  · tryptophan  This list may not describe all possible interactions. Give your health care provider a list of all the medicines, herbs, non-prescription drugs, or dietary supplements you use. Also tell them if you smoke, drink alcohol, or use illegal drugs. Some items may interact with your medicine.  What should I watch for while using this medicine?  Tell your doctor if your symptoms do not get better or if they get worse. Visit your doctor or health care professional for regular checks on your progress.  Because it may take several weeks to see the full effects of this medicine, it is important to continue your treatment as prescribed by your doctor.  Patients and their families should watch out for new or worsening thoughts of suicide or depression. Also watch out for sudden changes in feelings such as feeling anxious, agitated, panicky, irritable, hostile, aggressive, impulsive, severely restless, overly excited and hyperactive, or not being able to sleep. If this happens, especially at the beginning of treatment or after a change in dose, call your health care professional.  You may get drowsy or dizzy. Do not drive, use machinery, or do anything that needs mental alertness until you know how this medicine affects you. Do not stand or sit up quickly, especially if you are an older patient. This reduces the risk of dizzy or fainting spells. Alcohol may interfere with the effect of this medicine. Avoid alcoholic drinks.  Your mouth may get dry. Chewing sugarless gum or sucking hard candy, and drinking plenty of water will help. Contact your doctor if the problem does not go away or is severe.  What side effects may I notice from receiving this medicine?  Side effects that you should report to your doctor or health care professional as soon as possible:  · allergic reactions like skin rash, itching or hives, swelling of the face, lips, or tongue  · chest pain  · confusion  · dizziness  · fast, irregular heartbeat  · fast talking and excited feelings or actions that are out of control  · feeling faint or lightheaded, falls  · hallucination, loss of contact with reality  · seizures  · shortness of breath  · suicidal thoughts or other mood changes  · unusual bleeding or bruising  Side effects that usually do not require medical attention (report to your doctor or health care professional if they continue or are bothersome):  · blurred vision  · change in appetite  · change in sex drive or  performance  · headache  · increased sweating  · nausea  · trouble sleeping  This list may not describe all possible side effects. Call your doctor for medical advice about side effects. You may report side effects to FDA at 1-470-SGD-1995.  Where should I keep my medicine?  Keep out of reach of children.  Store at room temperature between 15 and 30 degrees C (59 and 86 degrees F). Throw away any unused medicine after the expiration date.  NOTE:This sheet is a summary. It may not cover all possible information. If you have questions about this medicine, talk to your doctor, pharmacist, or health care provider. Copyright© 2016 Gold Standard

## 2020-12-09 ENCOUNTER — VIRTUAL VISIT (OUTPATIENT)
Dept: INTERNAL MEDICINE CLINIC | Age: 60
End: 2020-12-09

## 2020-12-09 DIAGNOSIS — Z79.4 TYPE 2 DIABETES MELLITUS WITH DIABETIC NEUROPATHY, WITH LONG-TERM CURRENT USE OF INSULIN (HCC): Primary | ICD-10-CM

## 2020-12-09 DIAGNOSIS — E11.40 TYPE 2 DIABETES MELLITUS WITH DIABETIC NEUROPATHY, WITH LONG-TERM CURRENT USE OF INSULIN (HCC): Primary | ICD-10-CM

## 2020-12-09 NOTE — Clinical Note
Hallie! Mr. Jorgito Cordoba blood sugars are doing much better. Running in the 120s fasting per his report. It has been since July since his A1c was checked, so I will plan to have him come into the office next month to do a POC A1c to see where he is. Thanks!   Domitila Mtz

## 2020-12-09 NOTE — PROGRESS NOTES
Pharmacy Progress Note - Diabetes Management    S/O: Mr. Jennifer Cheek is a 56 y.o. male, referred by Dr. Ady Maxwell, NP, was contacted today for diabetes management follow up. Patient's last A1c was 7.3% in August 2020. Current anti-hyperglycemic regimen include(s):    - Basaglar 54 units daily  - Bydureon 2 mg once weekly  - Metformin 500 mg BID - was supposed to start increasing but has not done so    ROS:  Today, Pt endorses:  - Symptoms of Hyperglycemia: none  - Symptoms of Hypoglycemia: none    Self Monitoring Blood Glucose (SMBG) or CGM:  - Conducts/scans: Once daily in the morning  - [place] turned out not to be covered by insurance, but patient is considering paying out of pocket for device   - Fasting blood sugars are averaging in the 120s per patient's report. The lowest he's seen is 94 and the highest is 140    Nutrition/Lifestyle Modifications:  - Patient states that he has really cut back on all the candy, snacks and sweets that he was eating a few months ago  - Sugars have come down nicely       Vitals: Wt Readings from Last 3 Encounters:   11/17/20 190 lb (86.2 kg)   10/28/20 190 lb (86.2 kg)   10/13/20 192 lb (87.1 kg)     BP Readings from Last 3 Encounters:   11/17/20 136/86   10/28/20 115/60   10/13/20 136/86     Pulse Readings from Last 3 Encounters:   10/28/20 73   10/13/20 78   09/22/20 77       Past Medical History:   Diagnosis Date    Alcohol use     Fri-Sun one fifth; Mon-Thur: Pint of liquor    CAD (coronary artery disease)     Cardiac angioplasty with stent 07/29/2009    2.5 x 13 Cypher stent to CX.  Cardiac cath 11/09/2011    RCA patent. LM patent. LAD patent. mD1 55%. CX patent. Prior stent patent. Edison 85% (2.5 x 15-mm Xience stent, resid 0%). LVEDP 12. EF 40-45%. High lateral hypk (RI distribution).       Cardiac inferior STEMI 2009    Current smoker     contemplating stopping    Diabetes ([address]     type 2-insulin dependent    GERD (gastroesophageal reflux disease)     HTN (hypertension)     Hyperlipidemia     Migraine     Myocardial infarction (HCC)      Allergies   Allergen Reactions    Niacin Itching       Current Outpatient Medications   Medication Sig    insulin glargine (LANTUS,BASAGLAR) 100 unit/mL (3 mL) inpn Inject 52 units under the skin daily    pregabalin (LYRICA) 50 mg capsule TAKE ONE CAPSULE BY MOUTH THREE TIMES A DAY    Insulin Needles, Disposable, 31 gauge x 5/16\" ndle Pen needles for Bydureon syringe    exenatide microspheres (Bydureon) 2 mg/0.65 mL pnij 0.65 mL by SubCUTAneous route every seven (7) days.  flash glucose scanning reader (FreeStyle Ezio 2 [place]) misc Use as directed to check blood sugar at least 3 times daily    flash glucose sensor (FreeStyle Ezio 2 Sensor) kit Apply 1 sensor every 14 days to check blood sugar as directed at least 3 times daily.  ergocalciferol (ERGOCALCIFEROL) 1,250 mcg (50,000 unit) capsule Take 1 Cap by mouth every seven (7) days.  metFORMIN (GLUCOPHAGE) 1,000 mg tablet Take 1 Tab by mouth two (2) times daily (with meals). (Patient taking differently: Take 1,000 mg by mouth daily (after dinner). )    OTHER Neurx-tf (Alpha lipoic acid 350 mg, Benfotiamine 300 mg, Vitamin B6 35 mg, Vitamin B12 2000 mcg) 1 tab daily    Blood-Glucose Meter monitoring kit Blood glucose kit that insurance will cover - Check blood glucose twice a day once in the morning fasting and once after a meal.    glucose blood VI test strips (ASCENSIA AUTODISC VI, ONE TOUCH ULTRA TEST VI) strip Provide test strip and glucose meter that insurance will cover - Twice daily-  fasting in the morning and once two hours after a meal.    lancets misc Please provide lancets for blood glucose meter insurance will cover    multivitamin (ONE A DAY) tablet Take 1 Tab by mouth daily.  simvastatin (ZOCOR) 20 mg tablet Take 1 Tab by mouth nightly. No current facility-administered medications for this visit. Lab Results   Component Value Date/Time    Sodium 139 10/22/2020 03:08 PM    Potassium 4.4 10/22/2020 03:08 PM    Chloride 107 10/22/2020 03:08 PM    CO2 26 10/22/2020 03:08 PM    Anion gap 6 10/22/2020 03:08 PM    Glucose 272 (H) 10/22/2020 03:08 PM    BUN 19 (H) 10/22/2020 03:08 PM    Creatinine 1.22 10/22/2020 03:08 PM    BUN/Creatinine ratio 16 10/22/2020 03:08 PM    GFR est AA >60 10/22/2020 03:08 PM    GFR est non-AA >60 10/22/2020 03:08 PM    Calcium 9.1 10/22/2020 03:08 PM    Bilirubin, total 0.2 10/22/2020 03:08 PM    Alk.  phosphatase 107 10/22/2020 03:08 PM    Protein, total 6.9 10/22/2020 03:08 PM    Albumin 3.6 10/22/2020 03:08 PM    Globulin 3.3 10/22/2020 03:08 PM    A-G Ratio 1.1 10/22/2020 03:08 PM    ALT (SGPT) 34 10/22/2020 03:08 PM       Lab Results   Component Value Date/Time    Cholesterol, total 133 08/19/2020 06:49 AM    HDL Cholesterol 41 08/19/2020 06:49 AM    LDL, calculated 69 08/19/2020 06:49 AM    VLDL, calculated 23 08/19/2020 06:49 AM    Triglyceride 115 08/19/2020 06:49 AM    CHOL/HDL Ratio 3.2 08/19/2020 06:49 AM       Lab Results   Component Value Date/Time    WBC 4.1 (L) 10/22/2020 03:08 PM    Hemoglobin (POC) 14.2 07/03/2019 09:44 AM    HGB 13.5 10/22/2020 03:08 PM    HCT 39.1 10/22/2020 03:08 PM    PLATELET 605 60/50/0398 03:08 PM    MCV 86.3 10/22/2020 03:08 PM       Lab Results   Component Value Date/Time    Microalbumin/Creat ratio (mg/g creat) Cannot be calculated 10/22/2020 03:08 PM    Microalbumin,urine random <0.50 10/22/2020 03:08 PM       HbA1c:  Lab Results   Component Value Date/Time    Hemoglobin A1c 7.3 (H) 08/19/2020 06:49 AM    Hemoglobin A1c (POC) Code 106( >15%) 04/04/2019 02:00 PM    Hemoglobin A1c, External 7.8 01/28/2015 11:15 PM     No components found for: 2     Last Point of Care HGB A1C  Hemoglobin A1c (POC)   Date Value Ref Range Status   04/04/2019 Code 106( >15%) % Final        CrCl cannot be calculated (Unknown ideal weight. ). A/P:    Diabetes Management:  - Per ADA guidelines, Pt's A1c is not at goal of < 7%. Patient did not get A1c checked at last PCP visit, so will have him plan to come in next month to get a POC A1c checked  - Current SMBG(s)/CGM trend is much improved. Having [facility] for about a month really allowed patient to see how candy/sweets were spiking his blood sugars  - Patient had not started metformin titration yet, so will have him increase metformin to 1000 mg BID  - Will decrease insulin to 52 units to account for this change  - Continue Bydureon 2 mg once weekly   - Follow-up in 1 month    Notifications of recommendations will be sent to Dr. Regina Rizo, MARILYN for review. Will follow up with patient in 4 week(s). Thank you,  Kathrin Sanders.  Claribel Riggs, PHARMD, BCPS        CLINICAL PHARMACY CONSULT: MED RECONCILIATION/REVIEW ADDENDUM    For Pharmacy Admin Tracking Only    PHSO: PHSO Patient?: Yes  Total # of Interventions Recommended: Count: 2  - Increased Dose #: 1  - Decreased Dose #: 1  Total Interventions Accepted: 2  Time Spent (min): 30

## 2020-12-16 RX ORDER — INSULIN GLARGINE 100 [IU]/ML
INJECTION, SOLUTION SUBCUTANEOUS
Qty: 5 ADJUSTABLE DOSE PRE-FILLED PEN SYRINGE | Refills: 2 | Status: SHIPPED | OUTPATIENT
Start: 2020-12-16 | End: 2021-06-14

## 2020-12-18 PROBLEM — E11.3292 MILD NONPROLIFERATIVE DIABETIC RETINOPATHY OF LEFT EYE WITHOUT MACULAR EDEMA ASSOCIATED WITH TYPE 2 DIABETES MELLITUS (HCC): Status: ACTIVE | Noted: 2020-12-18

## 2020-12-18 PROBLEM — H25.13 NUCLEAR SCLEROSIS OF BOTH EYES: Status: ACTIVE | Noted: 2020-12-18

## 2020-12-18 PROBLEM — H35.039 HYPERTENSIVE RETINOPATHY: Status: ACTIVE | Noted: 2020-12-18

## 2021-01-05 ENCOUNTER — TELEPHONE (OUTPATIENT)
Dept: INTERNAL MEDICINE CLINIC | Age: 61
End: 2021-01-05

## 2021-01-05 NOTE — TELEPHONE ENCOUNTER
Pharmacy Progress Note     Scheduled patient for diabetes management follow up office visit and POC A1c check on 1/13. Thank you,  Matty Bell.  Mita Navarro, PHARMD, [facility]      CLINICAL PHARMACY CONSULT: MED RECONCILIATION/REVIEW ADDENDUM    For Pharmacy Admin Tracking Only    PHSO: PHSO Patient?: Yes  Time Spent (min): 5

## 2021-01-13 ENCOUNTER — OFFICE VISIT (OUTPATIENT)
Dept: INTERNAL MEDICINE CLINIC | Age: 61
End: 2021-01-13
Payer: COMMERCIAL

## 2021-01-13 DIAGNOSIS — E11.40 TYPE 2 DIABETES MELLITUS WITH DIABETIC NEUROPATHY, WITH LONG-TERM CURRENT USE OF INSULIN (HCC): Primary | ICD-10-CM

## 2021-01-13 DIAGNOSIS — Z79.4 TYPE 2 DIABETES MELLITUS WITH DIABETIC NEUROPATHY, WITH LONG-TERM CURRENT USE OF INSULIN (HCC): Primary | ICD-10-CM

## 2021-01-13 LAB — HBA1C MFR BLD HPLC: 7 %

## 2021-01-13 PROCEDURE — 83036 HEMOGLOBIN GLYCOSYLATED A1C: CPT

## 2021-01-13 NOTE — Clinical Note
Hi there,    I checked Mr. Dawkins's A1c today in clinic and it was 7.0%! He is doing well and I will scale back a little bit with visits for him. But I will still plan to give him a quick call every 1-2 months to make sure he is still on track. He also has my contact information if anything comes up. Thanks!   Tanvi Pineda

## 2021-01-13 NOTE — PATIENT INSTRUCTIONS
Your Visit Summary:  
 
Your A1c is at goal of 7.0%! Excellent job! Continue current medications Check and document your blood sugar first thing in the morning (fasting), before a meal, and before bedtime. Bring your meter/log to all future visits. Your blood sugar goals: 
- Fasting (first thing in the morning)  blood sugar: 80 - 130  
- 1 to 2 hours after a meal: less than 180 When you experience symptoms of low blood sugar (example: less than 70): - Confirm low reading by checking your blood sugar.  
- Then treat with 15 grams of carbohydrates (one-half cup of juice or regular soda, or 4-5 glucose tablets). - Wait 15 minutes to recheck blood sugar.  
- Then eat a protein containing meal/snack to prevent another low blood sugar episode. (example: peanut butter + crackers) Other recommendations: - Schedule an annual eye exam. 
- Check your feet daily for any signs of open wounds, cuts, or sores. - Given your risk factors, the following vaccines are recommended: annual flu shot, age-based pneumococcal vaccines (Pneumovax, Prevnar 13). In addition to taking your medications as directed, improving your blood sugar involves modifying your nutrition and maximizing the amount of physical activity. Nutrition: 
- When reviewing a nutrition label, focus on the serving size, total calories, fat (and type of fats), total carbohydrates, sugar (and amount of added sugar), amount of fiber (good for your digestive), and amount of protein. Refer to your nutrition label guide for more information. 
- For a meal : max 45 - 60 grams of carbohydrates - For a snack: max 15 grams of carbohydrates - Reduce amount of saturated and trans fat. Consider more unsaturated fat options as they are better for your heart health. - Have at least 1 serving of lean fat protein with each meal.   
- Increase fiber intake slowly to prevent constipation.  
- Substitute fruit juices for the whole fruit Physical Activity: - Aim for 30 minutes of consistent, moderately intensive, physical activity a day for 5 days or an average of 150 minutes per week. - Start slow, increase as tolerated. For example: Walk every day, working up to 30 minutes of brisk walking, 5 days a weekor split the 30 minutes into two 15-minute or three 10-minute walks. - If you sit for a long time, get up and move/stretch every 90 minutes.

## 2021-01-14 NOTE — PROGRESS NOTES
Pharmacy Progress Note - Diabetes Management    S/O: Mr. Josep Min is a 56 y.o. male, referred by Dr. Shawna Perez, MARILYN, was seen today for diabetes management follow up visit. Patient's last A1c was 7.0% today in clinic. This is a(n) decrease from 7.3% in August 2020. Current anti-hyperglycemic regimen include(s): - Bydureon 2 mg once weekly  - Metformin 1000 mg BID with meals  - Basaglar 54 units once daily     Patient reports adherence with his medications. ROS:  Today, Pt endorses:  - Symptoms of Hyperglycemia: none  - Symptoms of Hypoglycemia: none    Self Monitoring Blood Glucose (SMBG) or CGM:  - Brought in home glucometer/blood glucose log/CGM reader today:  yes  - Patient switching between [facility] and the traditional blood glucose meter   - He reports most readings are averaging around 130 throughout the day   - POC A1c: 7.0%    Nutrition/Lifestyle Modifications:  - Patient has done a great job limiting snacking at night   - Now snacking on nutrigrain bars if he gets hungry as opposed to candy   - No other significant changes to diet    Vitals: Wt Readings from Last 3 Encounters:   11/17/20 190 lb (86.2 kg)   10/28/20 190 lb (86.2 kg)   10/13/20 192 lb (87.1 kg)     BP Readings from Last 3 Encounters:   11/17/20 136/86   10/28/20 115/60   10/13/20 136/86     Pulse Readings from Last 3 Encounters:   10/28/20 73   10/13/20 78   09/22/20 77       Past Medical History:   Diagnosis Date    Alcohol use     Fri-Sun one fifth; Mon-Thur: Pint of liquor    CAD (coronary artery disease)     Cardiac angioplasty with stent 07/29/2009    2.5 x 13 Cypher stent to CX.  Cardiac cath 11/09/2011    RCA patent. LM patent. LAD patent. mD1 55%. CX patent. Prior stent patent. Edison 85% (2.5 x 15-mm Xience stent, resid 0%). LVEDP 12. EF 40-45%. High lateral hypk (RI distribution).       Cardiac inferior STEMI 2009    Current smoker     contemplating stopping    Diabetes ([address] type 2-insulin dependent    GERD (gastroesophageal reflux disease)     HTN (hypertension)     Hyperlipidemia     Migraine     Myocardial infarction (HCC)      Allergies   Allergen Reactions    Niacin Itching       Current Outpatient Medications   Medication Sig    pregabalin (LYRICA) 50 mg capsule TAKE ONE CAPSULE BY MOUTH THREE TIMES A DAY    insulin glargine (Basaglar KwikPen U-100 Insulin) 100 unit/mL (3 mL) inpn INJECT FIFTY UNITS UNDER THE SKIN DAILY    Insulin Needles, Disposable, 31 gauge x 5/16\" ndle Pen needles for Bydureon syringe    exenatide microspheres (Bydureon) 2 mg/0.65 mL pnij 0.65 mL by SubCUTAneous route every seven (7) days.  flash glucose scanning reader (FreeStyle Eizo 2 [place]) misc Use as directed to check blood sugar at least 3 times daily    flash glucose sensor (FreeStyle Ezio 2 Sensor) kit Apply 1 sensor every 14 days to check blood sugar as directed at least 3 times daily.  ergocalciferol (ERGOCALCIFEROL) 1,250 mcg (50,000 unit) capsule Take 1 Cap by mouth every seven (7) days.  metFORMIN (GLUCOPHAGE) 1,000 mg tablet Take 1 Tab by mouth two (2) times daily (with meals). (Patient taking differently: Take 1,000 mg by mouth daily (after dinner). )    OTHER Neurx-tf (Alpha lipoic acid 350 mg, Benfotiamine 300 mg, Vitamin B6 35 mg, Vitamin B12 2000 mcg) 1 tab daily    Blood-Glucose Meter monitoring kit Blood glucose kit that insurance will cover - Check blood glucose twice a day once in the morning fasting and once after a meal.    glucose blood VI test strips (ASCENSIA AUTODISC VI, ONE TOUCH ULTRA TEST VI) strip Provide test strip and glucose meter that insurance will cover - Twice daily-  fasting in the morning and once two hours after a meal.    lancets misc Please provide lancets for blood glucose meter insurance will cover    multivitamin (ONE A DAY) tablet Take 1 Tab by mouth daily.  simvastatin (ZOCOR) 20 mg tablet Take 1 Tab by mouth nightly.      No current facility-administered medications for this visit. Lab Results   Component Value Date/Time    Sodium 139 10/22/2020 03:08 PM    Potassium 4.4 10/22/2020 03:08 PM    Chloride 107 10/22/2020 03:08 PM    CO2 26 10/22/2020 03:08 PM    Anion gap 6 10/22/2020 03:08 PM    Glucose 272 (H) 10/22/2020 03:08 PM    BUN 19 (H) 10/22/2020 03:08 PM    Creatinine 1.22 10/22/2020 03:08 PM    BUN/Creatinine ratio 16 10/22/2020 03:08 PM    GFR est AA >60 10/22/2020 03:08 PM    GFR est non-AA >60 10/22/2020 03:08 PM    Calcium 9.1 10/22/2020 03:08 PM    Bilirubin, total 0.2 10/22/2020 03:08 PM    Alk.  phosphatase 107 10/22/2020 03:08 PM    Protein, total 6.9 10/22/2020 03:08 PM    Albumin 3.6 10/22/2020 03:08 PM    Globulin 3.3 10/22/2020 03:08 PM    A-G Ratio 1.1 10/22/2020 03:08 PM    ALT (SGPT) 34 10/22/2020 03:08 PM       Lab Results   Component Value Date/Time    Cholesterol, total 133 08/19/2020 06:49 AM    HDL Cholesterol 41 08/19/2020 06:49 AM    LDL, calculated 69 08/19/2020 06:49 AM    VLDL, calculated 23 08/19/2020 06:49 AM    Triglyceride 115 08/19/2020 06:49 AM    CHOL/HDL Ratio 3.2 08/19/2020 06:49 AM       Lab Results   Component Value Date/Time    WBC 4.1 (L) 10/22/2020 03:08 PM    Hemoglobin (POC) 14.2 07/03/2019 09:44 AM    HGB 13.5 10/22/2020 03:08 PM    HCT 39.1 10/22/2020 03:08 PM    PLATELET 007 06/52/4099 03:08 PM    MCV 86.3 10/22/2020 03:08 PM       Lab Results   Component Value Date/Time    Microalbumin/Creat ratio (mg/g creat) Cannot be calculated 10/22/2020 03:08 PM    Microalbumin,urine random <0.50 10/22/2020 03:08 PM       HbA1c:  Lab Results   Component Value Date/Time    Hemoglobin A1c 7.3 (H) 08/19/2020 06:49 AM    Hemoglobin A1c (POC) 7.0 01/13/2021 04:12 PM    Hemoglobin A1c, External 7.8 01/28/2015 11:15 PM     No components found for: 2     Last Point of Care HGB A1C  Hemoglobin A1c (POC)   Date Value Ref Range Status   01/13/2021 7.0 % Final        CrCl cannot be calculated (Unknown ideal weight. ). A/P:    Diabetes Management:  - Per ADA guidelines, Pt's A1c is now at goal of < 7.0%. Congratulated patient on this effort!  - Patient to continue Basaglar, metformin and Bydureon   - Will scale back visits with patient now that he is at goal, but will plan to touch base every 1-2 months and as requested by patient to ensure he is still meeting goals  - Discussed that he can certainly continue to improve and decrease blood sugars further with improved diet and exercise     Medication reconciliation was completed during the visit. There are no discontinued medications. Patient verbalized understanding of the information presented and all of the patients questions were answered. AVS was handed to the patient. Patient advised to call the office with any additional questions or concerns. Notifications of recommendations will be sent to Dr. Davidson Hatch, NP for review. Thank you,  Dena Goss.  Keara Davies, PHARMD, [facility]      CLINICAL PHARMACY CONSULT: MED RECONCILIATION/REVIEW ADDENDUM    For Pharmacy Admin Tracking Only    PHSO: PHSO Patient?: Yes  Total # of Interventions Recommended: Count: 1  - Maintenance Safety Lab Monitoring #: 1  Total Interventions Accepted: 1  Time Spent (min): 30

## 2021-01-18 ENCOUNTER — VIRTUAL VISIT (OUTPATIENT)
Dept: FAMILY MEDICINE CLINIC | Age: 61
End: 2021-01-18
Payer: COMMERCIAL

## 2021-01-18 DIAGNOSIS — G47.00 INSOMNIA, UNSPECIFIED TYPE: ICD-10-CM

## 2021-01-18 DIAGNOSIS — Z51.81 MEDICATION MONITORING ENCOUNTER: ICD-10-CM

## 2021-01-18 DIAGNOSIS — E78.2 MIXED HYPERLIPIDEMIA: ICD-10-CM

## 2021-01-18 DIAGNOSIS — I10 ESSENTIAL HYPERTENSION: ICD-10-CM

## 2021-01-18 DIAGNOSIS — E78.00 HYPERCHOLESTEROLEMIA: ICD-10-CM

## 2021-01-18 DIAGNOSIS — Z71.89 COUNSELING ON HEALTH PROMOTION AND DISEASE PREVENTION: ICD-10-CM

## 2021-01-18 DIAGNOSIS — F17.200 TOBACCO USE DISORDER: ICD-10-CM

## 2021-01-18 DIAGNOSIS — Z79.4 TYPE 2 DIABETES MELLITUS WITH DIABETIC NEUROPATHY, WITH LONG-TERM CURRENT USE OF INSULIN (HCC): Primary | ICD-10-CM

## 2021-01-18 DIAGNOSIS — F32.A MILD DEPRESSION: ICD-10-CM

## 2021-01-18 DIAGNOSIS — E11.40 TYPE 2 DIABETES MELLITUS WITH DIABETIC NEUROPATHY, WITH LONG-TERM CURRENT USE OF INSULIN (HCC): Primary | ICD-10-CM

## 2021-01-18 PROCEDURE — 99443 PR PHYS/QHP TELEPHONE EVALUATION 21-30 MIN: CPT | Performed by: NURSE PRACTITIONER

## 2021-01-18 RX ORDER — TRAZODONE HYDROCHLORIDE 50 MG/1
50 TABLET ORAL
Qty: 30 TAB | Refills: 0 | Status: SHIPPED | OUTPATIENT
Start: 2021-01-18 | End: 2021-02-16

## 2021-01-18 RX ORDER — SIMVASTATIN 20 MG/1
20 TABLET, FILM COATED ORAL
Qty: 90 TAB | Refills: 3 | Status: SHIPPED | OUTPATIENT
Start: 2021-01-18 | End: 2021-11-01 | Stop reason: SDUPTHER

## 2021-01-18 NOTE — PROGRESS NOTES
Babak Rojas presents today for   Chief Complaint   Patient presents with    Diabetes    Other     PHQ  6       Babak Rojas preferred language for health care discussion is english/other. Is someone accompanying this pt? no    Is the patient using any DME equipment during [address]? no    Depression Screening:  3 most recent PHQ Screens 1/18/2021   PHQ Not Done -   Little interest or pleasure in doing things Nearly every day   Feeling down, depressed, irritable, or hopeless Several days   Total Score PHQ 2 4   Trouble falling or staying asleep, or sleeping too much Several days   Feeling tired or having little energy Not at all   Poor appetite, weight loss, or overeating Not at all   Feeling bad about yourself - or that you are a failure or have let yourself or your family down Several days   Trouble concentrating on things such as school, work, reading, or watching TV Not at all   Moving or speaking so slowly that other people could have noticed; or the opposite being so fidgety that others notice Not at all   Thoughts of being better off dead, or hurting yourself in some way Not at all   PHQ 9 Score 6   How difficult have these problems made it for you to do your work, take care of your home and get along with others Not difficult at all       Learning Assessment:  Learning Assessment 1/18/2021   PRIMARY LEARNER Patient   HIGHEST LEVEL OF EDUCATION - PRIMARY LEARNER  DID NOT GRADUATE [facility] PRIMARY LEARNER NONE   CO-LEARNER CAREGIVER No   PRIMARY LANGUAGE ENGLISH    NEED No   LEARNER PREFERENCE PRIMARY DEMONSTRATION   ANSWERED BY patient   RELATIONSHIP SELF       Abuse Screening:  Abuse Screening Questionnaire 1/18/2021   Do you ever feel afraid of your partner? N   Are you in a relationship with someone who physically or mentally threatens you? N   Is it safe for you to go home? Y       Generalized Anxiety  No flowsheet data found.       Health Maintenance Due   Topic Date Due    Pneumococcal 0-64 years (1 of 1 - PPSV23) 06/02/1966    DTaP/Tdap/Td series (1 - Tdap) 06/02/1979    Shingrix Vaccine Age 50> (1 of 2) 06/02/2008    Foot Exam Q1  07/11/2019    Flu Vaccine (1) 09/01/2020   . Health Maintenance reviewed and discussed and ordered per Provider. Coordination of Care:  1. Have you been to the ER, urgent care clinic since your last visit? Hospitalized since your last visit? no    2. Have you seen or consulted any other health care providers outside of the [address] [place] [address] since your last visit? Include any pap smears or colon screening. no      Advance Directive:  1. Do you have an advance directive in place?  Patient Reply:no

## 2021-01-18 NOTE — PROGRESS NOTES
Mikey Mcconnell is a 56 y.o. male   Mikey Mcconnell is a 56 y.o. male, evaluated via audio-only technology on 1/18/2021 for Diabetes and Other (PHQ  6)  Connectivity issues with virtual video transition to audio. Diabetes- reports he has decreased fried foods and grease in his diet. He is not eating sweets and snacking at night. He denies any chest pain and or shortness of breath. He denies any blurry vision or dizziness. He is taking his medications as prescribed. He has not had his labs and he is not taking his Zocor. He states he still has the pain in his legs and feet. He has not seen a podiatrist.  He states he is taking the Lyrica and this is helping some. He states the pain has not worsened since he has been on the Lyrica. He does admit to some mild depression. He reports he is just down about his past and the life he use to live. He reports he has trouble sleeping at night and he falls a sleep but usually wakes up around midnight and he tosses and turns until about 7:00 in the morning. He denies any desire to hurt or harm himself or anyone else. He denies any suicidal ideations. He is requesting something for sleep. He states he has tried things over-the-counter and that has not helped.   3 most recent PHQ Screens 1/18/2021   PHQ Not Done -   Little interest or pleasure in doing things Nearly every day   Feeling down, depressed, irritable, or hopeless Several days   Total Score PHQ 2 4   Trouble falling or staying asleep, or sleeping too much Several days   Feeling tired or having little energy Not at all   Poor appetite, weight loss, or overeating Not at all   Feeling bad about yourself - or that you are a failure or have let yourself or your family down Several days   Trouble concentrating on things such as school, work, reading, or watching TV Not at all   Moving or speaking so slowly that other people could have noticed; or the opposite being so fidgety that others notice Not at all   Thoughts of being better off dead, or hurting yourself in some way Not at all   PHQ 9 Score 6   How difficult have these problems made it for you to do your work, take care of your home and get along with others Not difficult at all       Assessment & Plan:   Diagnoses and all orders for this visit:    1. Type 2 diabetes mellitus with diabetic neuropathy, with long-term current use of insulin (HCC)  -     REFERRAL TO PODIATRY  -     METABOLIC PANEL, COMPREHENSIVE; Future  -     TSH 3RD GENERATION; Future  -     HEMOGLOBIN A1C WITH EAG; Future    2. Essential hypertension  -     CBC WITH AUTOMATED DIFF; Future  -     TSH 3RD GENERATION; Future    3. Mixed hyperlipidemia  -     LIPID PANEL; Future  -     TSH 3RD GENERATION; Future    4. Tobacco use disorder    5. Insomnia, unspecified type  -     traZODone (DESYREL) 50 mg tablet; Take 1 Tab by mouth nightly. 6. Mild depression (HCC)  -     traZODone (DESYREL) 50 mg tablet; Take 1 Tab by mouth nightly. 7. Counseling on health promotion and disease prevention    8. Medication monitoring encounter  -     COMPLIANCE DRUG SCREEN/PRESCRIPTION MONITORING; Future    9. Hypercholesterolemia  -     simvastatin (Zocor) 20 mg tablet; Take 1 Tab by mouth nightly. Patient has been educated on his care gaps in health prevention. Medication, side effects, possible allergic reactions and warnings reviewed with patient. Patient verbalized understanding. Go for fasting labs. 12  Subjective:       Prior to Admission medications    Medication Sig Start Date End Date Taking? Authorizing Provider   traZODone (DESYREL) 50 mg tablet Take 1 Tab by mouth nightly. 1/18/21  Yes Alena ANAYA NP   simvastatin (Zocor) 20 mg tablet Take 1 Tab by mouth nightly.  1/18/21  Yes Alena ANAYA NP   pregabalin (LYRICA) 50 mg capsule TAKE ONE CAPSULE BY MOUTH THREE TIMES A DAY 12/16/20  Yes Alena ANAYA NP   insulin glargine (Basaglar KwikPen U-100 Insulin) 100 unit/mL (3 mL) inpn INJECT FIFTY UNITS UNDER THE SKIN DAILY 12/16/20  Yes Mando ANAYA NP   Insulin Needles, Disposable, 31 gauge x 5/16\" ndle Pen needles for Bydureon syringe 11/9/20  Yes Mando ANAYA NP   exenatide microspheres (Bydureon) 2 mg/0.65 mL pnij 0.65 mL by SubCUTAneous route every seven (7) days. 11/9/20  Yes Mando ANAYA NP   flash glucose scanning reader (FreeStyle Ezio 2 [place]) misc Use as directed to check blood sugar at least 3 times daily 11/4/20  Yes Mando ANAYA NP   flash glucose sensor (FreeStyle Cox 2 Sensor) kit Apply 1 sensor every 14 days to check blood sugar as directed at least 3 times daily. 11/4/20  Yes Mando ANAYA NP   ergocalciferol (ERGOCALCIFEROL) 1,250 mcg (50,000 unit) capsule Take 1 Cap by mouth every seven (7) days. 8/3/20  Yes Mando ANAYA NP   metFORMIN (GLUCOPHAGE) 1,000 mg tablet Take 1 Tab by mouth two (2) times daily (with meals). Patient taking differently: Take 1,000 mg by mouth daily (after dinner). 7/30/20  Yes Mando ANAYA NP   OTHER Neurx-tf (Alpha lipoic acid 350 mg, Benfotiamine 300 mg, Vitamin B6 35 mg, Vitamin B12 2000 mcg) 1 tab daily   Yes Provider, Historical   Blood-Glucose Meter monitoring kit Blood glucose kit that insurance will cover - Check blood glucose twice a day once in the morning fasting and once after a meal. 5/5/20  Yes Faheem Peralta NP   glucose blood VI test strips (ASCENSIA AUTODISC VI, ONE TOUCH ULTRA TEST VI) strip Provide test strip and glucose meter that insurance will cover - Twice daily-  fasting in the morning and once two hours after a meal. 5/5/20  Yes Estefanía Nieto NP   lancets misc Please provide lancets for blood glucose meter insurance will cover 5/5/20  Yes Mando ANAYA NP   multivitamin (ONE A DAY) tablet Take 1 Tab by mouth daily. 4/9/20  Yes Mando ANAYA NP   simvastatin (ZOCOR) 20 mg tablet Take 1 Tab by mouth nightly.  2/3/20 1/18/21  Estefanía Nieto NP     Patient Active Problem List   Diagnosis Code    Hyperlipidemia E78.5    HTN (hypertension) I10    Insulin dependent diabetes mellitus [id number]    Tobacco use disorder F17.200    Coronary atherosclerosis of native coronary artery I25.10    Tobacco dependence F17.200    Other and unspecified alcohol dependence, continuous drinking behavior F10.20    Mediastinal adenopathy R59.0    Diabetic neuropathy ([address] E11.40    Vitamin D deficiency E55.9    Other insomnia G47.09    Type 2 diabetes with nephropathy ([address] E11.21    Otalgia H92.09    Other fatigue R53.83    Mild nonproliferative diabetic retinopathy of left eye without macular edema associated with type 2 diabetes mellitus ([address] G84.9461    Hypertensive retinopathy H35.039    Nuclear sclerosis of both eyes H25.13     Patient Active Problem List    Diagnosis Date Noted    Mild nonproliferative diabetic retinopathy of left eye without macular edema associated with type 2 diabetes mellitus ([address] 12/18/2020    Hypertensive retinopathy 12/18/2020    Nuclear sclerosis of both eyes 12/18/2020    Otalgia 05/08/2019    Other fatigue 05/08/2019    Type 2 diabetes with nephropathy ([address] 04/04/2019    Other insomnia 07/02/2018    Vitamin D deficiency 04/15/2018    Diabetic neuropathy ([address] 03/27/2018    Mediastinal adenopathy 06/25/2015    Tobacco dependence 06/16/2015    Other and unspecified alcohol dependence, continuous drinking behavior 06/16/2015    Coronary atherosclerosis of native coronary artery 12/31/2013    Tobacco use disorder 12/09/2011    Hyperlipidemia     HTN (hypertension)     Insulin dependent diabetes mellitus      Current Outpatient Medications   Medication Sig Dispense Refill    traZODone (DESYREL) 50 mg tablet Take 1 Tab by mouth nightly. 30 Tab 0    simvastatin (Zocor) 20 mg tablet Take 1 Tab by mouth nightly.  90 Tab 3    pregabalin (LYRICA) 50 mg capsule TAKE ONE CAPSULE BY MOUTH THREE TIMES A DAY 90 Cap 0    insulin glargine (Basaglar KwikPen U-100 Insulin) 100 unit/mL (3 mL) inpn INJECT FIFTY UNITS UNDER THE SKIN DAILY 5 Adjustable Dose Pre-filled Pen Syringe 2    Insulin Needles, Disposable, 31 gauge x 5/16\" ndle Pen needles for Bydureon syringe 1 Package 11    exenatide microspheres (Bydureon) 2 mg/0.65 mL pnij 0.65 mL by SubCUTAneous route every seven (7) days. 4 Pen 3    flash glucose scanning reader (FreeStyle Ezio 2 [place]) misc Use as directed to check blood sugar at least 3 times daily 1 Each 0    flash glucose sensor (FreeStyle Ezio 2 Sensor) kit Apply 1 sensor every 14 days to check blood sugar as directed at least 3 times daily. 2 Kit 5    ergocalciferol (ERGOCALCIFEROL) 1,250 mcg (50,000 unit) capsule Take 1 Cap by mouth every seven (7) days. 4 Cap 6    metFORMIN (GLUCOPHAGE) 1,000 mg tablet Take 1 Tab by mouth two (2) times daily (with meals). (Patient taking differently: Take 1,000 mg by mouth daily (after dinner). ) 60 Tab 3    OTHER Neurx-tf (Alpha lipoic acid 350 mg, Benfotiamine 300 mg, Vitamin B6 35 mg, Vitamin B12 2000 mcg) 1 tab daily      Blood-Glucose Meter monitoring kit Blood glucose kit that insurance will cover - Check blood glucose twice a day once in the morning fasting and once after a meal. 1 Kit 0    glucose blood VI test strips (ASCENSIA AUTODISC VI, ONE TOUCH ULTRA TEST VI) strip Provide test strip and glucose meter that insurance will cover - Twice daily-  fasting in the morning and once two hours after a meal. 100 Strip 10    lancets misc Please provide lancets for blood glucose meter insurance will cover 1 Package 11    multivitamin (ONE A DAY) tablet Take 1 Tab by mouth daily. 90 Tab 3     Allergies   Allergen Reactions    Niacin Itching     Past Medical History:   Diagnosis Date    Alcohol use     Fri-Sun one fifth; Mon-Thur: Pint of liquor    CAD (coronary artery disease)     Cardiac angioplasty with stent 07/29/2009    2.5 x 13 Cypher stent to CX.       Cardiac cath 11/09/2011 RCA patent. LM patent. LAD patent. mD1 55%. CX patent. Prior stent patent. Edison 85% (2.5 x 15-mm Xience stent, resid 0%). LVEDP 12. EF 40-45%. High lateral hypk (RI distribution).  Cardiac inferior STEMI 2009    Current smoker     contemplating stopping    Diabetes ([address]     type 2-insulin dependent    GERD (gastroesophageal reflux disease)     HTN (hypertension)     Hyperlipidemia     Migraine     Myocardial infarction [facility])      Past Surgical History:   Procedure Laterality Date    COLONOSCOPY N/A 10/28/2020    COLONOSCOPY w/polypectomy performed by Yojana Julian MD at [address]  07/29/2009    HX HEART CATHETERIZATION  8/30/2011    HX HEART CATHETERIZATION  11/09/2011    HX WISDOM TEETH EXTRACTION      x4     Family History   Problem Relation Age of Onset    Hypertension Mother     Heart Attack Mother     Diabetes Mother     Hypertension Father     Heart Attack Father     Diabetes Father     Diabetes Sister     Hypertension Sister     Stroke Sister     Diabetes Brother     Hypertension Brother     Stroke Brother     No Known Problems Maternal Grandmother     No Known Problems Maternal Grandfather     No Known Problems Paternal Grandmother     No Known Problems Paternal Grandfather     No Known Problems Brother     Heart Disease Other     Kidney Disease Other      Social History     Tobacco Use    Smoking status: Current Every Day Smoker     Packs/day: 0.50     Years: 1.00     Pack years: 0.50     Types: Cigarettes    Smokeless tobacco: Never Used   Substance Use Topics    Alcohol use: Yes     Alcohol/week: 4.2 standard drinks     Types: 5 Shots of liquor per week     Comment: occassionally       Review of Systems   Constitutional: Negative for fever. HENT: Negative for congestion. Eyes: Negative for blurred vision. Respiratory: Negative for cough and shortness of breath. Cardiovascular: Negative for chest pain. Gastrointestinal: Negative for abdominal pain, blood in stool, nausea and vomiting. Genitourinary: Negative. Neurological: Negative for dizziness. Psychiatric/Behavioral: Positive for depression. Negative for substance abuse and suicidal ideas. The patient has insomnia. No flowsheet data found. Oxana Peng, who was evaluated through a patient-initiated, synchronous (real-time) audio only encounter, and/or her healthcare decision maker, is aware that it is a billable service, with coverage as determined by his insurance carrier. He provided verbal consent to proceed: Yes. He has not had a related appointment within my department in the past 7 days or scheduled within the next 24 hours.       Total Time: minutes: 21-30 minutes    Dee De Guzman NP

## 2021-01-25 ENCOUNTER — TELEPHONE (OUTPATIENT)
Dept: INTERNAL MEDICINE CLINIC | Age: 61
End: 2021-01-25

## 2021-01-25 NOTE — TELEPHONE ENCOUNTER
Pharmacy Progress Note - Telephone Encounter    S/O: Mr. Gusman Brain 56 y.o. male contacted office/me via an inbound telephone call to discuss smoking cessation today. Verified patients identifiers (name & ) per HIPAA policy.     - Received call from patient regarding his wanting to stop smoking. He is requesting medication therapy to help him with this  - He states that he has not tried any nicotine replacement products in the past or any other medications to help him stop smoking  - Notified patient that PharmD will discuss with NP Marianne Perez and get back into touch with him   - Patient endorses understanding to the provided information. All questions answered at this time. Thank you,  Tessa George.  Martha Begum, INEZD, [facility]        CLINICAL PHARMACY CONSULT: MED RECONCILIATION/REVIEW ADDENDUM    For Pharmacy Admin Tracking Only    PHSO: PHSO Patient?: Yes  Total # of Interventions Recommended: Count: 0  Time Spent (min): 10

## 2021-02-05 DIAGNOSIS — Z79.4 TYPE 2 DIABETES MELLITUS WITH DIABETIC NEUROPATHY, WITH LONG-TERM CURRENT USE OF INSULIN (HCC): ICD-10-CM

## 2021-02-05 DIAGNOSIS — E11.40 TYPE 2 DIABETES MELLITUS WITH DIABETIC NEUROPATHY, WITH LONG-TERM CURRENT USE OF INSULIN (HCC): ICD-10-CM

## 2021-02-07 RX ORDER — METFORMIN HYDROCHLORIDE 1000 MG/1
TABLET ORAL
Qty: 180 TAB | Refills: 2 | Status: SHIPPED | OUTPATIENT
Start: 2021-02-07 | End: 2021-11-01 | Stop reason: SDUPTHER

## 2021-02-16 ENCOUNTER — VIRTUAL VISIT (OUTPATIENT)
Dept: FAMILY MEDICINE CLINIC | Age: 61
End: 2021-02-16
Payer: COMMERCIAL

## 2021-02-16 DIAGNOSIS — E78.2 MIXED HYPERLIPIDEMIA: ICD-10-CM

## 2021-02-16 DIAGNOSIS — F17.200 TOBACCO USE DISORDER: ICD-10-CM

## 2021-02-16 DIAGNOSIS — E11.40 TYPE 2 DIABETES MELLITUS WITH DIABETIC NEUROPATHY, WITH LONG-TERM CURRENT USE OF INSULIN (HCC): ICD-10-CM

## 2021-02-16 DIAGNOSIS — F32.A MILD DEPRESSION: ICD-10-CM

## 2021-02-16 DIAGNOSIS — Z79.4 TYPE 2 DIABETES MELLITUS WITH DIABETIC NEUROPATHY, WITH LONG-TERM CURRENT USE OF INSULIN (HCC): ICD-10-CM

## 2021-02-16 DIAGNOSIS — I10 ESSENTIAL HYPERTENSION: ICD-10-CM

## 2021-02-16 DIAGNOSIS — G47.00 INSOMNIA, UNSPECIFIED TYPE: Primary | ICD-10-CM

## 2021-02-16 DIAGNOSIS — G47.00 INSOMNIA, UNSPECIFIED TYPE: ICD-10-CM

## 2021-02-16 PROCEDURE — 99214 OFFICE O/P EST MOD 30 MIN: CPT | Performed by: NURSE PRACTITIONER

## 2021-02-16 PROCEDURE — 3051F HG A1C>EQUAL 7.0%<8.0%: CPT | Performed by: NURSE PRACTITIONER

## 2021-02-16 RX ORDER — TRAZODONE HYDROCHLORIDE 50 MG/1
TABLET ORAL
Qty: 30 TAB | Refills: 0 | Status: SHIPPED | OUTPATIENT
Start: 2021-02-16 | End: 2021-03-08 | Stop reason: SDUPTHER

## 2021-02-16 RX ORDER — BUPROPION HYDROCHLORIDE 150 MG/1
TABLET, EXTENDED RELEASE ORAL
Qty: 60 TAB | Refills: 0 | Status: SHIPPED | OUTPATIENT
Start: 2021-02-16 | End: 2021-03-08 | Stop reason: SDUPTHER

## 2021-02-16 NOTE — PROGRESS NOTES
Winter Mcguire is a 56 y.o. male who was seen by synchronous (real-time) audio-video technology on 2/16/2021 for Follow-up (1 month), Diabetes, Insomnia (very little improvement), Depression (PHQ  2), Hypertension, Cholesterol Problem (high chol), and Results (discuss lab results)  Blood glucose  Running around 180 or 200. Denies any hypo or hyperglycemic episodes. Not always eating healthy. Has not been for labs. Has not checked his blood pressure. Trazadone - getting  6 hours a sleep a night. Would like to get at least 8 hours. Smoking none some days but some days he has cravings and can smoke about 10 a day. He has had 2 cigarettes today. Assessment & Plan:   Diagnoses and all orders for this visit:    1. Insomnia, unspecified type    2. Mild depression ([address]    3. Type 2 diabetes mellitus with diabetic neuropathy, with long-term current use of insulin ([address]    4. Essential hypertension    5. Mixed hyperlipidemia    6. Tobacco use disorder  -     buPROPion SR (WELLBUTRIN SR) 150 mg SR tablet; Take one tablet by mouth for the first 3 days and then increase to one tablet by mouth twice a day. Smoking cessation date is Feb 26th. Patient to continue with smoking reduction daily until the 26th. Go for fasting labs. Continue to take blood glucose and follow up with Pharm D. He should not slack on his diet and return to a healthy diet. He can try OTC melatonin if needed to assist with insomnia but he should try the Wellbutrin in addition to the trazodone. Subjective:       Prior to Admission medications    Medication Sig Start Date End Date Taking? Authorizing Provider   buPROPion SR [facility] SR) 150 mg SR tablet Take one tablet by mouth for the first 3 days and then increase to one tablet by mouth twice a day.  2/16/21  Yes Seth ANAYA NP   metFORMIN (GLUCOPHAGE) 1,000 mg tablet TAKE ONE TABLET BY MOUTH TWICE A DAY WITH A MEAL 2/7/21  Yes Seth ANAYA NP   traZODone (DESYREL) 50 mg tablet Take 1 Tab by mouth nightly. 1/18/21  Yes Betzy ANAYA NP   simvastatin (Zocor) 20 mg tablet Take 1 Tab by mouth nightly. 1/18/21  Yes Betzy ANAYA NP   pregabalin (LYRICA) 50 mg capsule TAKE ONE CAPSULE BY MOUTH THREE TIMES A DAY 12/16/20  Yes Betzy ANAYA NP   insulin glargine (Basaglar KwikPen U-100 Insulin) 100 unit/mL (3 mL) inpn INJECT FIFTY UNITS UNDER THE SKIN DAILY 12/16/20  Yes Betzy ANAYA NP   Insulin Needles, Disposable, 31 gauge x 5/16\" ndle Pen needles for Bydureon syringe 11/9/20  Yes Betzy ANAYA NP   exenatide microspheres (Bydureon) 2 mg/0.65 mL pnij 0.65 mL by SubCUTAneous route every seven (7) days. 11/9/20  Yes Betzy ANAYA NP   flash glucose scanning reader (FreeStyle Ezio 2 [place]) misc Use as directed to check blood sugar at least 3 times daily 11/4/20  Yes Betzy ANAYA NP   flash glucose sensor (FreeStyle Dewane Ramirez 2 Sensor) kit Apply 1 sensor every 14 days to check blood sugar as directed at least 3 times daily. 11/4/20  Yes Betzy ANAYA NP   ergocalciferol (ERGOCALCIFEROL) 1,250 mcg (50,000 unit) capsule Take 1 Cap by mouth every seven (7) days. 8/3/20  Yes Bridget Mathew NP   Blood-Glucose Meter monitoring kit Blood glucose kit that insurance will cover - Check blood glucose twice a day once in the morning fasting and once after a meal. 5/5/20  Yes Betzy ANAYA NP   glucose blood VI test strips (ASCENSIA AUTODISC VI, ONE TOUCH ULTRA TEST VI) strip Provide test strip and glucose meter that insurance will cover - Twice daily-  fasting in the morning and once two hours after a meal. 5/5/20  Yes Bridget Mathew NP   lancets misc Please provide lancets for blood glucose meter insurance will cover 5/5/20  Yes Betzy ANAYA NP   multivitamin (ONE A DAY) tablet Take 1 Tab by mouth daily.  4/9/20  Yes Betzy Dauer B, NP   OTHER Neurx-tf (Alpha lipoic acid 350 mg, Benfotiamine 300 mg, Vitamin B6 35 mg, Vitamin B12 2000 mcg) 1 tab daily  2/16/21  Provider, Historical     Patient Active Problem List   Diagnosis Code    Hyperlipidemia E78.5    HTN (hypertension) I10    Insulin dependent diabetes mellitus [id number]    Tobacco use disorder F17.200    Coronary atherosclerosis of native coronary artery I25.10    Tobacco dependence F17.200    Other and unspecified alcohol dependence, continuous drinking behavior F10.20    Mediastinal adenopathy R59.0    Diabetic neuropathy ([address] E11.40    Vitamin D deficiency E55.9    Other insomnia G47.09    Type 2 diabetes with nephropathy ([address] E11.21    Otalgia H92.09    Other fatigue R53.83    Mild nonproliferative diabetic retinopathy of left eye without macular edema associated with type 2 diabetes mellitus ([address] E71.6498    Hypertensive retinopathy H35.039    Nuclear sclerosis of both eyes H25.13     Patient Active Problem List    Diagnosis Date Noted    Mild nonproliferative diabetic retinopathy of left eye without macular edema associated with type 2 diabetes mellitus ([address] 12/18/2020    Hypertensive retinopathy 12/18/2020    Nuclear sclerosis of both eyes 12/18/2020    Otalgia 05/08/2019    Other fatigue 05/08/2019    Type 2 diabetes with nephropathy ([address] 04/04/2019    Other insomnia 07/02/2018    Vitamin D deficiency 04/15/2018    Diabetic neuropathy ([address] 03/27/2018    Mediastinal adenopathy 06/25/2015    Tobacco dependence 06/16/2015    Other and unspecified alcohol dependence, continuous drinking behavior 06/16/2015    Coronary atherosclerosis of native coronary artery 12/31/2013    Tobacco use disorder 12/09/2011    Hyperlipidemia     HTN (hypertension)     Insulin dependent diabetes mellitus      Current Outpatient Medications   Medication Sig Dispense Refill    buPROPion SR (WELLBUTRIN SR) 150 mg SR tablet Take one tablet by mouth for the first 3 days and then increase to one tablet by mouth twice a day.  60 Tab 0    metFORMIN (GLUCOPHAGE) 1,000 mg tablet TAKE ONE TABLET BY MOUTH TWICE A DAY WITH A MEAL 180 Tab 2    traZODone (DESYREL) 50 mg tablet Take 1 Tab by mouth nightly. 30 Tab 0    simvastatin (Zocor) 20 mg tablet Take 1 Tab by mouth nightly. 90 Tab 3    pregabalin (LYRICA) 50 mg capsule TAKE ONE CAPSULE BY MOUTH THREE TIMES A DAY 90 Cap 0    insulin glargine (Basaglar KwikPen U-100 Insulin) 100 unit/mL (3 mL) inpn INJECT FIFTY UNITS UNDER THE SKIN DAILY 5 Adjustable Dose Pre-filled Pen Syringe 2    Insulin Needles, Disposable, 31 gauge x 5/16\" ndle Pen needles for Bydureon syringe 1 Package 11    exenatide microspheres (Bydureon) 2 mg/0.65 mL pnij 0.65 mL by SubCUTAneous route every seven (7) days. 4 Pen 3    flash glucose scanning reader (FreeStyle Ezio 2 [place]) misc Use as directed to check blood sugar at least 3 times daily 1 Each 0    flash glucose sensor (FreeStyle Ezio 2 Sensor) kit Apply 1 sensor every 14 days to check blood sugar as directed at least 3 times daily. 2 Kit 5    ergocalciferol (ERGOCALCIFEROL) 1,250 mcg (50,000 unit) capsule Take 1 Cap by mouth every seven (7) days. 4 Cap 6    Blood-Glucose Meter monitoring kit Blood glucose kit that insurance will cover - Check blood glucose twice a day once in the morning fasting and once after a meal. 1 Kit 0    glucose blood VI test strips (ASCENSIA AUTODISC VI, ONE TOUCH ULTRA TEST VI) strip Provide test strip and glucose meter that insurance will cover - Twice daily-  fasting in the morning and once two hours after a meal. 100 Strip 10    lancets misc Please provide lancets for blood glucose meter insurance will cover 1 Package 11    multivitamin (ONE A DAY) tablet Take 1 Tab by mouth daily. 90 Tab 3     Allergies   Allergen Reactions    Niacin Itching     Past Medical History:   Diagnosis Date    Alcohol use     Fri-Sun one fifth; Mon-Thur: Pint of liquor    CAD (coronary artery disease)     Cardiac angioplasty with stent 07/29/2009    2.5 x 13 Cypher stent to CX.       Cardiac cath 11/09/2011    RCA patent. LM patent. LAD patent. mD1 55%. CX patent. Prior stent patent. Edison 85% (2.5 x 15-mm Xience stent, resid 0%). LVEDP 12. EF 40-45%. High lateral hypk (RI distribution).  Cardiac inferior STEMI 2009    Current smoker     contemplating stopping    Diabetes ([address]     type 2-insulin dependent    GERD (gastroesophageal reflux disease)     HTN (hypertension)     Hyperlipidemia     Migraine     Myocardial infarction [facility])      Past Surgical History:   Procedure Laterality Date    COLONOSCOPY N/A 10/28/2020    COLONOSCOPY w/polypectomy performed by Benji Gustafson MD at [address]  07/29/2009    HX HEART CATHETERIZATION  8/30/2011    HX HEART CATHETERIZATION  11/09/2011    HX WISDOM TEETH EXTRACTION      x4     Family History   Problem Relation Age of Onset    Hypertension Mother     Heart Attack Mother     Diabetes Mother     Hypertension Father     Heart Attack Father     Diabetes Father     Diabetes Sister     Hypertension Sister     Stroke Sister     Diabetes Brother     Hypertension Brother     Stroke Brother     No Known Problems Maternal Grandmother     No Known Problems Maternal Grandfather     No Known Problems Paternal Grandmother     No Known Problems Paternal Grandfather     No Known Problems Brother     Heart Disease Other     Kidney Disease Other      Social History     Tobacco Use    Smoking status: Current Every Day Smoker     Packs/day: 0.50     Years: 1.00     Pack years: 0.50     Types: Cigarettes    Smokeless tobacco: Never Used   Substance Use Topics    Alcohol use: Yes     Alcohol/week: 4.2 standard drinks     Types: 5 Shots of liquor per week     Comment: occassionally       Review of Systems   Constitutional: Negative for fever. Eyes: Negative for blurred vision. Respiratory: Negative for shortness of breath. Cardiovascular: Negative for chest pain.    Gastrointestinal: Negative for abdominal pain, nausea and vomiting. Neurological: Negative for dizziness. Psychiatric/Behavioral: The patient has insomnia. Objective:   No flowsheet data found. [INSTRUCTIONS:  \"[x]\" Indicates a positive item  \"[]\" Indicates a negative item  -- DELETE ALL ITEMS NOT EXAMINED]    Constitutional: [x] Appears well-developed and well-nourished [x] No apparent distress      [] Abnormal -     Mental status: [x] Alert and awake  [x] Oriented to person/place/time [x] Able to follow commands    [] Abnormal -     Eyes:   EOM    [x]  Normal    [] Abnormal -   Sclera  [x]  Normal    [] Abnormal -          Discharge [x]  None visible   [] Abnormal -     HENT: [x] Normocephalic, atraumatic  [] Abnormal -   [x] Mouth/Throat: Mucous membranes are moist    External Ears [x] Normal  [] Abnormal -    Neck: [x] No visualized mass [] Abnormal -     Pulmonary/Chest: [x] Respiratory effort normal   [x] No visualized signs of difficulty breathing or respiratory distress        [] Abnormal -      Musculoskeletal:   [x] Normal gait with no signs of ataxia         [x] Normal range of motion of neck        [] Abnormal -     Neurological:        [x] No Facial Asymmetry (Cranial nerve 7 motor function) (limited exam due to video visit)          [x] No gaze palsy        [] Abnormal -          Skin:        [x] No significant exanthematous lesions or discoloration noted on facial skin         [] Abnormal -            Psychiatric:       [x] Normal Affect [] Abnormal -        [x] No Hallucinations    We discussed the expected course, resolution and complications of the diagnosis(es) in detail. Medication risks, benefits, costs, interactions, and alternatives were discussed as indicated. I advised him to contact the office if his condition worsens, changes or fails to improve as anticipated. He expressed understanding with the diagnosis(es) and plan.        Kierra Rubio, who was evaluated through a patient-initiated, synchronous (real-time) audio-video encounter, and/or his healthcare decision maker, is aware that it is a billable service, with coverage as determined by his insurance carrier. He provided verbal consent to proceed: Yes, and patient identification was verified. It was conducted pursuant to the emergency declaration under the [address] [place] [address], [address] [place] [address] authority and the [employer] Act. A caregiver was present when appropriate. Ability to conduct physical exam was limited. I was in the office. The patient was at home.       Ryan Jalloh NP

## 2021-02-16 NOTE — PROGRESS NOTES
Bernie Rodríguezmaddison presents today for   Chief Complaint   Patient presents with    Follow-up     1 month    Diabetes    Insomnia     very little improvement    Depression     PHQ  2    Hypertension    Cholesterol Problem     high chol    Results     discuss lab results       Bernie Powell preferred language for health care discussion is english/other. Is someone accompanying this pt? no    Is the patient using any DME equipment during [address]? no    Depression Screening:  3 most recent PHQ Screens 2/16/2021   PHQ Not Done -   Little interest or pleasure in doing things Several days   Feeling down, depressed, irritable, or hopeless Several days   Total Score PHQ 2 2   Trouble falling or staying asleep, or sleeping too much -   Feeling tired or having little energy -   Poor appetite, weight loss, or overeating -   Feeling bad about yourself - or that you are a failure or have let yourself or your family down -   Trouble concentrating on things such as school, work, reading, or watching TV -   Moving or speaking so slowly that other people could have noticed; or the opposite being so fidgety that others notice -   Thoughts of being better off dead, or hurting yourself in some way -   PHQ 9 Score -   How difficult have these problems made it for you to do your work, take care of your home and get along with others -       Learning Assessment:  Learning Assessment 1/18/2021   PRIMARY LEARNER Patient   HIGHEST LEVEL OF EDUCATION - PRIMARY LEARNER  DID NOT GRADUATE HIGH SCHOOL   BARRIERS PRIMARY LEARNER NONE   CO-LEARNER CAREGIVER No   PRIMARY LANGUAGE ENGLISH    NEED No   LEARNER PREFERENCE PRIMARY DEMONSTRATION   ANSWERED BY patient   RELATIONSHIP SELF       Abuse Screening:  Abuse Screening Questionnaire 1/18/2021   Do you ever feel afraid of your partner? N   Are you in a relationship with someone who physically or mentally threatens you? N   Is it safe for you to go home?  Y       Generalized Anxiety  No flowsheet data found. Health Maintenance Due   Topic Date Due    Pneumococcal 0-64 years (1 of 1 - PPSV23) [birth year]/1964    COVID-19 Vaccine (1 of 2) 06/02/1974    DTaP/Tdap/Td series (1 - Tdap) 06/02/1979    Shingrix Vaccine Age 50> (1 of 2) 06/02/2008    Foot Exam Q1  07/11/2019    Flu Vaccine (1) 09/01/2020   . Health Maintenance reviewed and discussed and ordered per Provider. Coordination of Care:  1. Have you been to the ER, urgent care clinic since your last visit? Hospitalized since your last visit? no    2. Have you seen or consulted any other health care providers outside of the [address] [place] [address] since your last visit? Include any pap smears or colon screening.  no      Advance Directive:  Discussed 1/18/21

## 2021-03-08 ENCOUNTER — VIRTUAL VISIT (OUTPATIENT)
Dept: FAMILY MEDICINE CLINIC | Age: 61
End: 2021-03-08
Payer: COMMERCIAL

## 2021-03-08 DIAGNOSIS — F32.A MILD DEPRESSION: ICD-10-CM

## 2021-03-08 DIAGNOSIS — F17.200 TOBACCO USE DISORDER: ICD-10-CM

## 2021-03-08 DIAGNOSIS — E11.40 TYPE 2 DIABETES MELLITUS WITH DIABETIC NEUROPATHY, WITH LONG-TERM CURRENT USE OF INSULIN (HCC): Primary | ICD-10-CM

## 2021-03-08 DIAGNOSIS — G47.00 INSOMNIA, UNSPECIFIED TYPE: ICD-10-CM

## 2021-03-08 DIAGNOSIS — Z79.4 TYPE 2 DIABETES MELLITUS WITH DIABETIC NEUROPATHY, WITH LONG-TERM CURRENT USE OF INSULIN (HCC): Primary | ICD-10-CM

## 2021-03-08 PROCEDURE — 99212 OFFICE O/P EST SF 10 MIN: CPT | Performed by: NURSE PRACTITIONER

## 2021-03-08 RX ORDER — TRAZODONE HYDROCHLORIDE 50 MG/1
TABLET ORAL
Qty: 30 TAB | Refills: 2 | Status: SHIPPED | OUTPATIENT
Start: 2021-03-08 | End: 2021-03-15

## 2021-03-08 RX ORDER — BUPROPION HYDROCHLORIDE 150 MG/1
TABLET, EXTENDED RELEASE ORAL
Qty: 60 TAB | Refills: 0 | Status: SHIPPED | OUTPATIENT
Start: 2021-03-08 | End: 2021-03-15

## 2021-03-08 NOTE — PROGRESS NOTES
Tod Chapa is a 56 y.o. male, evaluated via audio-only technology on 3/8/2021 for Follow-up (3 week), Diabetes, and Other (smoking cessation)  . He reports he is not taking the Wellbutrin as he lost these at work. He has reduced to 5-6 cigarettes a day. Diabetes - has not had a chance to do his lab work. He reports his blood glucose was 180 today. Trazodone - needs a refill. He reports he was up and down last night as he is out of the medication. Denies desire to hurt or harm herself or others. He denies any suicidal ideations. Assessment & Plan:   Diagnoses and all orders for this visit:    1. Type 2 diabetes mellitus with diabetic neuropathy, with long-term current use of insulin ([address]    2. Tobacco use disorder  -     buPROPion SR (WELLBUTRIN SR) 150 mg SR tablet; Take one tablet by mouth for the first 3 days and then increase to one tablet by mouth twice a day. 3. Insomnia, unspecified type  -     traZODone (DESYREL) 50 mg tablet; TAKE ONE TABLET BY MOUTH ONCE NIGHTLY    4. Mild depression (HCC)  -     traZODone (DESYREL) 50 mg tablet; TAKE ONE TABLET BY MOUTH ONCE NIGHTLY        Lean protein and fresh fruits and vegetables. He agrees to restart the Wellbutrin and be reduce to 4 cigarettes by the end of the week. 12  Subjective:       Prior to Admission medications    Medication Sig Start Date End Date Taking? Authorizing Provider   buPROPion SR [facility] SR) 150 mg SR tablet Take one tablet by mouth for the first 3 days and then increase to one tablet by mouth twice a day. 3/8/21  Yes Otilia ANAYA NP   traZODone (DESYREL) 50 mg tablet TAKE ONE TABLET BY MOUTH ONCE NIGHTLY 3/8/21  Yes Otilia ANAYA NP   metFORMIN (GLUCOPHAGE) 1,000 mg tablet TAKE ONE TABLET BY MOUTH TWICE A DAY WITH A MEAL 2/7/21  Yes Otilia ANAYA NP   simvastatin (Zocor) 20 mg tablet Take 1 Tab by mouth nightly.  1/18/21  Yes Faheem Hernandez NP   pregabalin (LYRICA) 50 mg capsule TAKE ONE CAPSULE BY MOUTH THREE TIMES A DAY 12/16/20  Yes Betzy ANAYA NP   insulin glargine (Basaglar KwikPen U-100 Insulin) 100 unit/mL (3 mL) inpn INJECT FIFTY UNITS UNDER THE SKIN DAILY 12/16/20  Yes Betzy ANAYA NP   Insulin Needles, Disposable, 31 gauge x 5/16\" ndle Pen needles for Bydureon syringe 11/9/20  Yes Betzy ANAYA NP   exenatide microspheres (Bydureon) 2 mg/0.65 mL pnij 0.65 mL by SubCUTAneous route every seven (7) days. 11/9/20  Yes Betzy ANAYA NP   flash glucose scanning reader (FreeStyle Ezio 2 [place]) misc Use as directed to check blood sugar at least 3 times daily 11/4/20  Yes Betzy ANAYA NP   flash glucose sensor (FreeStyle Dewane Ramirez 2 Sensor) kit Apply 1 sensor every 14 days to check blood sugar as directed at least 3 times daily. 11/4/20  Yes Betzy ANAYA NP   ergocalciferol (ERGOCALCIFEROL) 1,250 mcg (50,000 unit) capsule Take 1 Cap by mouth every seven (7) days. 8/3/20  Yes Bridget Mathew NP   Blood-Glucose Meter monitoring kit Blood glucose kit that insurance will cover - Check blood glucose twice a day once in the morning fasting and once after a meal. 5/5/20  Yes Betzy ANAYA NP   glucose blood VI test strips (ASCENSIA AUTODISC VI, ONE TOUCH ULTRA TEST VI) strip Provide test strip and glucose meter that insurance will cover - Twice daily-  fasting in the morning and once two hours after a meal. 5/5/20  Yes Bridget Mathew NP   lancets misc Please provide lancets for blood glucose meter insurance will cover 5/5/20  Yes Betzy ANAYA NP   multivitamin (ONE A DAY) tablet Take 1 Tab by mouth daily. 4/9/20  Yes Betzy ANAYA NP   traZODone (DESYREL) 50 mg tablet TAKE ONE TABLET BY MOUTH ONCE NIGHTLY 2/16/21 3/8/21  Betzy ANAYA NP   buPROPion SR [facility]) 150 mg SR tablet Take one tablet by mouth for the first 3 days and then increase to one tablet by mouth twice a day.  2/16/21 3/8/21  Bridget Mathew NP     Patient Active Problem List Diagnosis Code    Hyperlipidemia E78.5    HTN (hypertension) I10    Insulin dependent diabetes mellitus [id number]    Tobacco use disorder F17.200    Coronary atherosclerosis of native coronary artery I25.10    Tobacco dependence F17.200    Other and unspecified alcohol dependence, continuous drinking behavior F10.20    Mediastinal adenopathy R59.0    Diabetic neuropathy (HCC) E11.40    Vitamin D deficiency E55.9    Other insomnia G47.09    Type 2 diabetes with nephropathy (HCC) E11.21    Otalgia H92.09    Other fatigue R53.83    Mild nonproliferative diabetic retinopathy of left eye without macular edema associated with type 2 diabetes mellitus ([facility] B54.3130    Hypertensive retinopathy H35.039    Nuclear sclerosis of both eyes H25.13     Patient Active Problem List    Diagnosis Date Noted    Mild nonproliferative diabetic retinopathy of left eye without macular edema associated with type 2 diabetes mellitus ([facility] 12/18/2020    Hypertensive retinopathy 12/18/2020    Nuclear sclerosis of both eyes 12/18/2020    Otalgia 05/08/2019    Other fatigue 05/08/2019    Type 2 diabetes with nephropathy ([facility] 04/04/2019    Other insomnia 07/02/2018    Vitamin D deficiency 04/15/2018    Diabetic neuropathy ([facility] 03/27/2018    Mediastinal adenopathy 06/25/2015    Tobacco dependence 06/16/2015    Other and unspecified alcohol dependence, continuous drinking behavior 06/16/2015    Coronary atherosclerosis of native coronary artery 12/31/2013    Tobacco use disorder 12/09/2011    Hyperlipidemia     HTN (hypertension)     Insulin dependent diabetes mellitus      Current Outpatient Medications   Medication Sig Dispense Refill    buPROPion SR (WELLBUTRIN SR) 150 mg SR tablet Take one tablet by mouth for the first 3 days and then increase to one tablet by mouth twice a day.  60 Tab 0    traZODone (DESYREL) 50 mg tablet TAKE ONE TABLET BY MOUTH ONCE NIGHTLY 30 Tab 2    metFORMIN (GLUCOPHAGE) 1,000 mg tablet TAKE ONE TABLET BY MOUTH TWICE A DAY WITH A MEAL 180 Tab 2   • simvastatin (Zocor) 20 mg tablet Take 1 Tab by mouth nightly. 90 Tab 3   • pregabalin (LYRICA) 50 mg capsule TAKE ONE CAPSULE BY MOUTH THREE TIMES A DAY 90 Cap 0   • insulin glargine (Basaglar KwikPen U-100 Insulin) 100 unit/mL (3 mL) inpn INJECT FIFTY UNITS UNDER THE SKIN DAILY 5 Adjustable Dose Pre-filled Pen Syringe 2   • Insulin Needles, Disposable, 31 gauge x 5/16\" ndle Pen needles for Bydureon syringe 1 Package 11   • exenatide microspheres (Bydureon) 2 mg/0.65 mL pnij 0.65 mL by SubCUTAneous route every seven (7) days. 4 Pen 3   • flash glucose scanning reader (FreeStyle Ezio 2 [place]) misc Use as directed to check blood sugar at least 3 times daily 1 Each 0   • flash glucose sensor (FreeStyle Ezio 2 Sensor) kit Apply 1 sensor every 14 days to check blood sugar as directed at least 3 times daily. 2 Kit 5   • ergocalciferol (ERGOCALCIFEROL) 1,250 mcg (50,000 unit) capsule Take 1 Cap by mouth every seven (7) days. 4 Cap 6   • Blood-Glucose Meter monitoring kit Blood glucose kit that insurance will cover - Check blood glucose twice a day once in the morning fasting and once after a meal. 1 Kit 0   • glucose blood VI test strips (ASCENSIA AUTODISC VI, ONE TOUCH ULTRA TEST VI) strip Provide test strip and glucose meter that insurance will cover - Twice daily-  fasting in the morning and once two hours after a meal. 100 Strip 10   • lancets misc Please provide lancets for blood glucose meter insurance will cover 1 Package 11   • multivitamin (ONE A DAY) tablet Take 1 Tab by mouth daily. 90 Tab 3     Allergies   Allergen Reactions   • Niacin Itching     Past Medical History:   Diagnosis Date   • Alcohol use     Fri-Sun one fifth; Mon-Thur: Pint of liquor   • CAD (coronary artery disease)    • Cardiac angioplasty with stent 07/29/2009    2.5 x 13 Cypher stent to CX.     • Cardiac cath 11/09/2011    RCA patent.  LM patent.  LAD patent.  mD1 55%.   CX patent. Prior stent patent. Edison 85% (2.5 x 15-mm Xience stent, resid 0%). LVEDP 12. EF 40-45%. High lateral hypk (RI distribution).  Cardiac inferior STEMI 2009    Current smoker     contemplating stopping    Diabetes ([address]     type 2-insulin dependent    GERD (gastroesophageal reflux disease)     HTN (hypertension)     Hyperlipidemia     Migraine     Myocardial infarction [facility])      Past Surgical History:   Procedure Laterality Date    COLONOSCOPY N/A 10/28/2020    COLONOSCOPY w/polypectomy performed by Jose Noble MD at [address]  07/29/2009    HX HEART CATHETERIZATION  8/30/2011    HX HEART CATHETERIZATION  11/09/2011    HX WISDOM TEETH EXTRACTION      x4     Family History   Problem Relation Age of Onset    Hypertension Mother     Heart Attack Mother     Diabetes Mother     Hypertension Father     Heart Attack Father     Diabetes Father     Diabetes Sister     Hypertension Sister     Stroke Sister     Diabetes Brother     Hypertension Brother     Stroke Brother     No Known Problems Maternal Grandmother     No Known Problems Maternal Grandfather     No Known Problems Paternal Grandmother     No Known Problems Paternal Grandfather     No Known Problems Brother     Heart Disease Other     Kidney Disease Other      Social History     Tobacco Use    Smoking status: Current Every Day Smoker     Packs/day: 0.50     Years: 1.00     Pack years: 0.50     Types: Cigarettes    Smokeless tobacco: Never Used   Substance Use Topics    Alcohol use: Yes     Alcohol/week: 4.2 standard drinks     Types: 5 Shots of liquor per week     Comment: occassionally       Review of Systems   Constitutional: Negative for fever. Respiratory: Negative for shortness of breath. Cardiovascular: Negative for chest pain. Gastrointestinal: Negative for abdominal pain, nausea and vomiting. Neurological: Negative for dizziness.    Psychiatric/Behavioral: The patient has insomnia. No flowsheet data found. Fabrice Espinal, who was evaluated through a patient-initiated, synchronous (real-time) audio only encounter, and/or her healthcare decision maker, is aware that it is a billable service, with coverage as determined by his insurance carrier. He provided verbal consent to proceed: Yes. He has not had a related appointment within my department in the past 7 days or scheduled within the next 24 hours.       Total Time: minutes: 11-20 minutes    Vladimir Collazo NP

## 2021-03-08 NOTE — PROGRESS NOTES
Melinda Oreilly is a 56 y.o. male who was seen by synchronous (real-time) audio-video technology on 3/8/2021 for Follow-up (3 week), Diabetes, and Other (smoking cessation) He reports he is not taking the Wellbutrin as he lost these at work. He has reduced to 5-6 cigarettes a day. Diabetes - has not had a chance to do his lab work. He reports his blood glucose was 180 today. Trazodone - needs a refill. He reports he was up and down last night as he is out of the medication. Assessment & Plan:  
{A/P PLUS DISPO BRJI:21825} Lean protein and fresh fruits and vegetables. {time statement optional (Optional):26964} Subjective:  
 
 
Prior to Admission medications Medication Sig Start Date End Date Taking? Authorizing Provider  
traZODone (DESYREL) 50 mg tablet TAKE ONE TABLET BY MOUTH ONCE NIGHTLY 2/16/21  Yes Richelel ANAYA NP  
buPROPion SR [facility] SR) 150 mg SR tablet Take one tablet by mouth for the first 3 days and then increase to one tablet by mouth twice a day. 2/16/21  Yes Richelle ANAYA NP  
metFORMIN (GLUCOPHAGE) 1,000 mg tablet TAKE ONE TABLET BY MOUTH TWICE A DAY WITH A MEAL 2/7/21  Yes Richelle ANAYA NP  
simvastatin (Zocor) 20 mg tablet Take 1 Tab by mouth nightly. 1/18/21  Yes Richelle ANAYA NP  
pregabalin (LYRICA) 50 mg capsule TAKE ONE CAPSULE BY MOUTH THREE TIMES A DAY 12/16/20  Yes Richelle ANAYA NP  
insulin glargine (Basaglar KwikPen U-100 Insulin) 100 unit/mL (3 mL) inpn INJECT FIFTY UNITS UNDER THE SKIN DAILY 12/16/20  Yes Rubia Perez NP Insulin Needles, Disposable, 31 gauge x 5/16\" ndle Pen needles for Bydureon syringe 11/9/20  Yes Faheem Hernandez NP  
exenatide microspheres (Bydureon) 2 mg/0.65 mL pnij 0.65 mL by SubCUTAneous route every seven (7) days.  11/9/20  Yes Rubia Perez NP  
 flash glucose scanning reader (FreeStyle Ezio 2 [place]) misc Use as directed to check blood sugar at least 3 times daily 11/4/20  Yes Neeru ANAYA NP  
flash glucose sensor (FreeStyle Cox 2 Sensor) kit Apply 1 sensor every 14 days to check blood sugar as directed at least 3 times daily. 11/4/20  Yes Neeru ANAYA NP  
ergocalciferol (ERGOCALCIFEROL) 1,250 mcg (50,000 unit) capsule Take 1 Cap by mouth every seven (7) days. 8/3/20  Yes Juan Francisco Gutierres NP Blood-Glucose Meter monitoring kit Blood glucose kit that insurance will cover - Check blood glucose twice a day once in the morning fasting and once after a meal. 5/5/20  Yes Faheem Chung NP  
glucose blood VI test strips (ASCENSIA AUTODISC VI, ONE TOUCH ULTRA TEST VI) strip Provide test strip and glucose meter that insurance will cover - Twice daily-  fasting in the morning and once two hours after a meal. 5/5/20  Yes Juan Francisco Gutierres NP  
lancets misc Please provide lancets for blood glucose meter insurance will cover 5/5/20  Yes Neeru ANAYA NP  
multivitamin (ONE A DAY) tablet Take 1 Tab by mouth daily. 4/9/20  Yes Juan Francisco Gutierres NP  
 
{History SmartLink choices - disappears if left unselected (Optional):13062} Review of Systems Constitutional: Negative for fever. HENT: Negative for congestion. Respiratory: Negative for cough and shortness of breath. Cardiovascular: Negative for chest pain. Gastrointestinal: Negative for abdominal pain, nausea and vomiting. Musculoskeletal: Negative for falls. Neurological: Negative for dizziness. Psychiatric/Behavioral: Negative for depression, hallucinations, substance abuse and suicidal ideas. Objective: No flowsheet data found. [INSTRUCTIONS:  \"[x]\" Indicates a positive item  \"[]\" Indicates a negative item  -- DELETE ALL ITEMS NOT EXAMINED] Constitutional: [x] Appears well-developed and well-nourished [x] No apparent distress   
  [] Abnormal -  
 
 Mental status: [x] Alert and awake  [x] Oriented to person/place/time [x] Able to follow commands   
[] Abnormal - Eyes:   EOM    [x]  Normal    [] Abnormal -  
Sclera  [x]  Normal    [] Abnormal - 
        Discharge [x]  None visible   [] Abnormal - HENT: [x] Normocephalic, atraumatic  [] Abnormal -  
[x] Mouth/Throat: Mucous membranes are moist 
 
External Ears [x] Normal  [] Abnormal - Neck: [x] No visualized mass [] Abnormal - Pulmonary/Chest: [x] Respiratory effort normal   [x] No visualized signs of difficulty breathing or respiratory distress 
      [] Abnormal - Musculoskeletal:   [x] Normal gait with no signs of ataxia [x] Normal range of motion of neck [] Abnormal -  
 
Neurological:        [x] No Facial Asymmetry (Cranial nerve 7 motor function) (limited exam due to video visit) [x] No gaze palsy  
     [] Abnormal -   
     
Skin:        [x] No significant exanthematous lesions or discoloration noted on facial skin   
     [] Abnormal - Psychiatric:       [x] Normal Affect [] Abnormal -  
     [x] No Hallucinations Other pertinent observable physical exam findings:- 
 
 
 
We discussed the expected course, resolution and complications of the diagnosis(es) in detail. Medication risks, benefits, costs, interactions, and alternatives were discussed as indicated. I advised him to contact the office if his condition worsens, changes or fails to improve as anticipated. He expressed understanding with the diagnosis(es) and plan. Lilian Quintero, was evaluated through a synchronous (real-time) audio-video encounter. The patient (or guardian if applicable) is aware that this is a billable service. Verbal consent to proceed has been obtained within the past 12 months. The visit was conducted pursuant to the emergency declaration under the [address] [place] [address] and the [employer] Act. Patient identification was verified, and a caregiver was present when appropriate. The patient was located in a state where the provider was credentialed to provide care.  
 
 
Juan Pascual NP

## 2021-03-08 NOTE — PROGRESS NOTES
Fabrice Espinal presents today for   Chief Complaint   Patient presents with    Follow-up     3 week    Diabetes    Other     smoking cessation       Fabrice Espinal preferred language for health care discussion is english/other. Is someone accompanying this pt? no    Is the patient using any DME equipment during [address]? no    Depression Screening:  3 most recent PHQ Screens 3/8/2021   PHQ Not Done -   Little interest or pleasure in doing things Not at all   Feeling down, depressed, irritable, or hopeless Not at all   Total Score PHQ 2 0   Trouble falling or staying asleep, or sleeping too much -   Feeling tired or having little energy -   Poor appetite, weight loss, or overeating -   Feeling bad about yourself - or that you are a failure or have let yourself or your family down -   Trouble concentrating on things such as school, work, reading, or watching TV -   Moving or speaking so slowly that other people could have noticed; or the opposite being so fidgety that others notice -   Thoughts of being better off dead, or hurting yourself in some way -   PHQ 9 Score -   How difficult have these problems made it for you to do your work, take care of your home and get along with others -       Learning Assessment:  Learning Assessment 1/18/2021   PRIMARY LEARNER Patient   HIGHEST LEVEL OF EDUCATION - PRIMARY LEARNER  DID NOT GRADUATE HIGH SCHOOL   BARRIERS PRIMARY LEARNER NONE   CO-LEARNER CAREGIVER No   PRIMARY LANGUAGE ENGLISH    NEED No   LEARNER PREFERENCE PRIMARY DEMONSTRATION   ANSWERED BY patient   RELATIONSHIP SELF       Abuse Screening:  Abuse Screening Questionnaire 1/18/2021   Do you ever feel afraid of your partner? N   Are you in a relationship with someone who physically or mentally threatens you? N   Is it safe for you to go home? Y       Generalized Anxiety  No flowsheet data found.       Health Maintenance Due   Topic Date Due    Pneumococcal 0-64 years (1 of 1 - PPSV23) Never done    COVID-19 Vaccine (1 of 2) Never done    DTaP/Tdap/Td series (1 - Tdap) Never done    Shingrix Vaccine Age 50> (1 of 2) Never done    Foot Exam Q1  07/11/2019    Flu Vaccine (1) Never done   . Health Maintenance reviewed and discussed and ordered per Provider. Coordination of Care:  1. Have you been to the ER, urgent care clinic since your last visit? Hospitalized since your last visit? no    2. Have you seen or consulted any other health care providers outside of the [address] [place] [address] since your last visit? Include any pap smears or colon screening.  no      Advance Directive:  Discussed 1/18/21

## 2021-03-10 DIAGNOSIS — E11.40 TYPE 2 DIABETES MELLITUS WITH DIABETIC NEUROPATHY, WITH LONG-TERM CURRENT USE OF INSULIN (HCC): ICD-10-CM

## 2021-03-10 DIAGNOSIS — Z79.4 TYPE 2 DIABETES MELLITUS WITH DIABETIC NEUROPATHY, WITH LONG-TERM CURRENT USE OF INSULIN (HCC): ICD-10-CM

## 2021-03-10 RX ORDER — EXENATIDE 2 MG/.65ML
INJECTION, SUSPENSION, EXTENDED RELEASE SUBCUTANEOUS
Qty: 4 ADJUSTABLE DOSE PRE-FILLED PEN SYRINGE | Refills: 3 | Status: SHIPPED
Start: 2021-03-10 | End: 2021-07-01

## 2021-03-14 DIAGNOSIS — F32.A MILD DEPRESSION: ICD-10-CM

## 2021-03-14 DIAGNOSIS — G47.00 INSOMNIA, UNSPECIFIED TYPE: ICD-10-CM

## 2021-03-14 DIAGNOSIS — F17.200 TOBACCO USE DISORDER: ICD-10-CM

## 2021-03-15 RX ORDER — TRAZODONE HYDROCHLORIDE 50 MG/1
TABLET ORAL
Qty: 30 TAB | Refills: 0 | Status: SHIPPED | OUTPATIENT
Start: 2021-03-15 | End: 2021-06-21

## 2021-03-15 RX ORDER — BUPROPION HYDROCHLORIDE 150 MG/1
TABLET, EXTENDED RELEASE ORAL
Qty: 60 TAB | Refills: 0 | Status: SHIPPED | OUTPATIENT
Start: 2021-03-15 | End: 2021-04-01 | Stop reason: SDUPTHER

## 2021-03-16 ENCOUNTER — TELEPHONE (OUTPATIENT)
Dept: FAMILY MEDICINE CLINIC | Age: 61
End: 2021-03-16

## 2021-03-16 ENCOUNTER — HOSPITAL ENCOUNTER (OUTPATIENT)
Dept: LAB | Age: 61
Discharge: HOME | End: 2021-03-16
Payer: COMMERCIAL

## 2021-03-16 DIAGNOSIS — E78.2 MIXED HYPERLIPIDEMIA: ICD-10-CM

## 2021-03-16 DIAGNOSIS — E11.40 TYPE 2 DIABETES MELLITUS WITH DIABETIC NEUROPATHY, WITH LONG-TERM CURRENT USE OF INSULIN (HCC): ICD-10-CM

## 2021-03-16 DIAGNOSIS — Z79.4 TYPE 2 DIABETES MELLITUS WITH DIABETIC NEUROPATHY, WITH LONG-TERM CURRENT USE OF INSULIN (HCC): ICD-10-CM

## 2021-03-16 DIAGNOSIS — I10 ESSENTIAL HYPERTENSION: ICD-10-CM

## 2021-03-16 DIAGNOSIS — Z51.81 MEDICATION MONITORING ENCOUNTER: ICD-10-CM

## 2021-03-16 LAB
ALBUMIN SERPL-MCNC: 3.6 G/DL (ref 3.4–5)
ALBUMIN/GLOB SERPL: 1.1 {RATIO} (ref 0.8–1.7)
ALP SERPL-CCNC: 87 U/L (ref 45–117)
ALT SERPL-CCNC: 37 U/L (ref 16–61)
ANION GAP SERPL CALC-SCNC: 6 MMOL/L (ref 3–18)
AST SERPL-CCNC: 24 U/L (ref 10–38)
BASOPHILS # BLD: 0 K/UL (ref 0–0.1)
BASOPHILS NFR BLD: 0 % (ref 0–2)
BILIRUB SERPL-MCNC: 0.2 MG/DL (ref 0.2–1)
BUN SERPL-MCNC: 13 MG/DL (ref 7–18)
BUN/CREAT SERPL: 13 (ref 12–20)
CALCIUM SERPL-MCNC: 8.6 MG/DL (ref 8.5–10.1)
CHLORIDE SERPL-SCNC: 107 MMOL/L (ref 100–111)
CHOLEST SERPL-MCNC: 109 MG/DL
CO2 SERPL-SCNC: 26 MMOL/L (ref 21–32)
CREAT SERPL-MCNC: 0.99 MG/DL (ref 0.6–1.3)
DIFFERENTIAL METHOD BLD: ABNORMAL
EOSINOPHIL # BLD: 0.1 K/UL (ref 0–0.4)
EOSINOPHIL NFR BLD: 3 % (ref 0–5)
ERYTHROCYTE [DISTWIDTH] IN BLOOD BY AUTOMATED COUNT: 13.7 % (ref 11.6–14.5)
EST. AVERAGE GLUCOSE BLD GHB EST-MCNC: 180 MG/DL
GLOBULIN SER CALC-MCNC: 3.2 G/DL (ref 2–4)
GLUCOSE SERPL-MCNC: 152 MG/DL (ref 74–99)
HBA1C MFR BLD: 7.9 % (ref 4.2–5.6)
HCT VFR BLD AUTO: 39.5 % (ref 36–48)
HDLC SERPL-MCNC: 38 MG/DL (ref 40–60)
HDLC SERPL: 2.9 {RATIO} (ref 0–5)
HGB BLD-MCNC: 13.6 G/DL (ref 13–16)
LDLC SERPL CALC-MCNC: 45.2 MG/DL (ref 0–100)
LIPID PROFILE,FLP: ABNORMAL
LYMPHOCYTES # BLD: 1.1 K/UL (ref 0.9–3.6)
LYMPHOCYTES NFR BLD: 38 % (ref 21–52)
MCH RBC QN AUTO: 29.7 PG (ref 24–34)
MCHC RBC AUTO-ENTMCNC: 34.4 G/DL (ref 31–37)
MCV RBC AUTO: 86.2 FL (ref 74–97)
MONOCYTES # BLD: 0.4 K/UL (ref 0.05–1.2)
MONOCYTES NFR BLD: 12 % (ref 3–10)
NEUTS SEG # BLD: 1.4 K/UL (ref 1.8–8)
NEUTS SEG NFR BLD: 47 % (ref 40–73)
PLATELET # BLD AUTO: 188 K/UL (ref 135–420)
PMV BLD AUTO: 12.3 FL (ref 9.2–11.8)
POTASSIUM SERPL-SCNC: 4.6 MMOL/L (ref 3.5–5.5)
PROT SERPL-MCNC: 6.8 G/DL (ref 6.4–8.2)
RBC # BLD AUTO: 4.58 M/UL (ref 4.7–5.5)
SODIUM SERPL-SCNC: 139 MMOL/L (ref 136–145)
TRIGL SERPL-MCNC: 129 MG/DL (ref ?–150)
TSH SERPL DL<=0.05 MIU/L-ACNC: 1.63 UIU/ML (ref 0.36–3.74)
VLDLC SERPL CALC-MCNC: 25.8 MG/DL
WBC # BLD AUTO: 3 K/UL (ref 4.6–13.2)

## 2021-03-16 PROCEDURE — 80307 DRUG TEST PRSMV CHEM ANLYZR: CPT

## 2021-03-16 PROCEDURE — 84443 ASSAY THYROID STIM HORMONE: CPT

## 2021-03-16 PROCEDURE — 36415 COLL VENOUS BLD VENIPUNCTURE: CPT

## 2021-03-16 PROCEDURE — 80061 LIPID PANEL: CPT

## 2021-03-16 PROCEDURE — 85025 COMPLETE CBC W/AUTO DIFF WBC: CPT

## 2021-03-16 PROCEDURE — 80053 COMPREHEN METABOLIC PANEL: CPT

## 2021-03-16 PROCEDURE — 83036 HEMOGLOBIN GLYCOSYLATED A1C: CPT

## 2021-03-16 NOTE — TELEPHONE ENCOUNTER
Call to patient to discuss his WBC. He verified his name, , and address. He states he is feeling fine. I informed him that we will probably need to repeat this lab and if it remains low we will refer him to hematology. He is in agreement with this plan. Will await other pending labs.  JANI Betancourt

## 2021-03-17 ENCOUNTER — TELEPHONE (OUTPATIENT)
Dept: FAMILY MEDICINE CLINIC | Age: 61
End: 2021-03-17

## 2021-03-17 DIAGNOSIS — Z79.4 TYPE 2 DIABETES MELLITUS WITH DIABETIC NEUROPATHY, WITH LONG-TERM CURRENT USE OF INSULIN (HCC): ICD-10-CM

## 2021-03-17 DIAGNOSIS — E11.40 TYPE 2 DIABETES MELLITUS WITH DIABETIC NEUROPATHY, WITH LONG-TERM CURRENT USE OF INSULIN (HCC): ICD-10-CM

## 2021-03-17 DIAGNOSIS — R79.89 ABNORMAL CBC: Primary | ICD-10-CM

## 2021-03-17 RX ORDER — PREGABALIN 50 MG/1
CAPSULE ORAL
Qty: 90 CAP | Refills: 0 | Status: SHIPPED | OUTPATIENT
Start: 2021-03-17 | End: 2021-05-06

## 2021-03-17 NOTE — PROGRESS NOTES
Call to patient. Labs discussed. Will repeat CBC. Discussed the importance of him watching his diet as his A1C has increased. PM checked with no concerns. Patient has gone for UDS which is pending.  ANA LUISA Urena, FARZANAP-C

## 2021-03-17 NOTE — TELEPHONE ENCOUNTER
Patient called and would like for you to call him concerning his labs that you discussed with him yesterday. Please advise

## 2021-03-20 LAB — DRUGS UR: NORMAL

## 2021-04-01 ENCOUNTER — VIRTUAL VISIT (OUTPATIENT)
Dept: FAMILY MEDICINE CLINIC | Age: 61
End: 2021-04-01
Payer: COMMERCIAL

## 2021-04-01 DIAGNOSIS — Z79.4 TYPE 2 DIABETES MELLITUS WITH DIABETIC NEUROPATHY, WITH LONG-TERM CURRENT USE OF INSULIN (HCC): Primary | ICD-10-CM

## 2021-04-01 DIAGNOSIS — G47.00 INSOMNIA, UNSPECIFIED TYPE: ICD-10-CM

## 2021-04-01 DIAGNOSIS — I10 ESSENTIAL HYPERTENSION: ICD-10-CM

## 2021-04-01 DIAGNOSIS — F17.200 TOBACCO USE DISORDER: ICD-10-CM

## 2021-04-01 DIAGNOSIS — Z71.2 ENCOUNTER TO DISCUSS TEST RESULTS: ICD-10-CM

## 2021-04-01 DIAGNOSIS — E11.40 TYPE 2 DIABETES MELLITUS WITH DIABETIC NEUROPATHY, WITH LONG-TERM CURRENT USE OF INSULIN (HCC): Primary | ICD-10-CM

## 2021-04-01 PROCEDURE — 99212 OFFICE O/P EST SF 10 MIN: CPT | Performed by: NURSE PRACTITIONER

## 2021-04-01 RX ORDER — BUPROPION HYDROCHLORIDE 150 MG/1
TABLET, EXTENDED RELEASE ORAL
Qty: 60 TAB | Refills: 0 | Status: SHIPPED | OUTPATIENT
Start: 2021-04-01 | End: 2021-11-24 | Stop reason: SDUPTHER

## 2021-04-01 NOTE — PROGRESS NOTES
Miranda Padgett presents today for   Chief Complaint   Patient presents with    Follow-up     3 week    Other     smoking cessation    Diabetes    Insomnia       Dain Dawkins preferred language for health care discussion is english/other. Is someone accompanying this pt? no    Is the patient using any DME equipment during [address]? no    Depression Screening:  3 most recent PHQ Screens 4/1/2021   PHQ Not Done -   Little interest or pleasure in doing things Not at all   Feeling down, depressed, irritable, or hopeless Not at all   Total Score PHQ 2 0   Trouble falling or staying asleep, or sleeping too much -   Feeling tired or having little energy -   Poor appetite, weight loss, or overeating -   Feeling bad about yourself - or that you are a failure or have let yourself or your family down -   Trouble concentrating on things such as school, work, reading, or watching TV -   Moving or speaking so slowly that other people could have noticed; or the opposite being so fidgety that others notice -   Thoughts of being better off dead, or hurting yourself in some way -   PHQ 9 Score -   How difficult have these problems made it for you to do your work, take care of your home and get along with others -       Learning Assessment:  Learning Assessment 1/18/2021   PRIMARY LEARNER Patient   HIGHEST LEVEL OF EDUCATION - PRIMARY LEARNER  DID NOT GRADUATE HIGH SCHOOL   BARRIERS PRIMARY LEARNER NONE   CO-LEARNER CAREGIVER No   PRIMARY LANGUAGE ENGLISH    NEED No   LEARNER PREFERENCE PRIMARY DEMONSTRATION   ANSWERED BY patient   RELATIONSHIP SELF       Abuse Screening:  Abuse Screening Questionnaire 1/18/2021   Do you ever feel afraid of your partner? N   Are you in a relationship with someone who physically or mentally threatens you? N   Is it safe for you to go home? Y       Generalized Anxiety  No flowsheet data found.       Health Maintenance Due   Topic Date Due    Pneumococcal 0-64 years (1 of 1 - PPSV23) Never done    COVID-19 Vaccine (1) Never done    DTaP/Tdap/Td series (1 - Tdap) Never done    Shingrix Vaccine Age 50> (1 of 2) Never done    Foot Exam Q1  07/11/2019   . Health Maintenance reviewed and discussed and ordered per Provider. Coordination of Care:  1. Have you been to the ER, urgent care clinic since your last visit? Hospitalized since your last visit? no    2. Have you seen or consulted any other health care providers outside of the [address] [place] [address] since your last visit? Include any pap smears or colon screening.  Yes, podiatrist      Advance Directive:  Discussed 1/18/21

## 2021-04-01 NOTE — PROGRESS NOTES
Beatrice Gastelum is a 56 y.o. male, evaluated via audio-only technology on 4/1/2021 for Follow-up (3 week), Other (smoking cessation), Diabetes, and Insomnia  He reports he never went for his follow up CBC. He is having trouble with his blood glucose as his Freestyle is not working right so he has to go back to pricking his finger. He is taking his medications as prescribed. He is avoiding fried foods and he is doing a lot of baking his foods. Insomnia - sleeping better at night. He is taking some Zzzquil with the trazodone and this does help. He is getting 7-8 hours of sleep on average. No concerns with his sleeping at this time. Wellbutrin - He is taking this and it has helped with his smoking but he is still smoking 7 cigarettes a day. He would like to continue the Wellbutrin and agrees to continue to reduce his cigarette intake. Assessment & Plan:   Diagnoses and all orders for this visit:    1. Type 2 diabetes mellitus with diabetic neuropathy, with long-term current use of insulin ([facility] [address]    2. Insomnia, unspecified type    3. Tobacco use disorder  -     buPROPion SR (WELLBUTRIN SR) 150 mg SR tablet; ONE TABLET BY MOUTH TWICE A DAY    4. Essential hypertension    5. Encounter to discuss test results      Patient agrees to go for his repeat cbc. I have had a discussion with him regarding the Wellbutrin and smoking cessation and he wishes to continue to try this medication for his smoking cessation. He is to check his blood glucose. I have reviewed his A1C and we have discussed his diet. He is to follow up in 2 weeks. He agrees to call the office back to schedule his appointment as he is getting ready to purchase a car. 12  Subjective:       Prior to Admission medications    Medication Sig Start Date End Date Taking?  Authorizing Provider   buPROPion SR [facility] SR) 150 mg SR tablet ONE TABLET BY MOUTH TWICE A DAY 4/1/21  Yes Faheem Peacock, NP   pregabalin (LYRICA) 50 mg capsule TAKE ONE CAPSULE BY MOUTH THREE TIMES A DAY 3/17/21  Yes Shahida ANAYA NP   traZODone (DESYREL) 50 mg tablet TAKE ONE TABLET BY MOUTH ONCE NIGHTLY 3/15/21  Yes Faheem Hernandez NP   Bydureon 2 mg/0.65 mL pnij INJECT 2 MG (THE CONTENTS OF ONE PEN OR ONE VIAL) IMMEDIATELY UNDER THE SKIN INTO THE THIGH, ABDOMEN, OR UPPER ARM EVERY 7 DAYS 3/10/21  Yes Shahida ANAYA NP   metFORMIN (GLUCOPHAGE) 1,000 mg tablet TAKE ONE TABLET BY MOUTH TWICE A DAY WITH A MEAL 2/7/21  Yes Shahida ANAYA NP   simvastatin (Zocor) 20 mg tablet Take 1 Tab by mouth nightly. 1/18/21  Yes Shahida ANAYA NP   insulin glargine (Basaglar KwikPen U-100 Insulin) 100 unit/mL (3 mL) inpn INJECT FIFTY UNITS UNDER THE SKIN DAILY 12/16/20  Yes Shahida ANAYA NP   Insulin Needles, Disposable, 31 gauge x 5/16\" ndle Pen needles for Bydureon syringe 11/9/20  Yes Shahida ANAYA NP   flash glucose scanning reader (FreeStyle Ezio 2 [place]) misc Use as directed to check blood sugar at least 3 times daily 11/4/20  Yes Shahida ANAYA NP   flash glucose sensor (FreeStyle Cox 2 Sensor) kit Apply 1 sensor every 14 days to check blood sugar as directed at least 3 times daily. 11/4/20  Yes Shahida ANAYA NP   ergocalciferol (ERGOCALCIFEROL) 1,250 mcg (50,000 unit) capsule Take 1 Cap by mouth every seven (7) days.  8/3/20  Yes Tiago Thomas NP   Blood-Glucose Meter monitoring kit Blood glucose kit that insurance will cover - Check blood glucose twice a day once in the morning fasting and once after a meal. 5/5/20  Yes Shahida ANAYA NP   glucose blood VI test strips (ASCENSIA AUTODISC VI, ONE TOUCH ULTRA TEST VI) strip Provide test strip and glucose meter that insurance will cover - Twice daily-  fasting in the morning and once two hours after a meal. 5/5/20  Yes Tiago Thomas NP   lancets misc Please provide lancets for blood glucose meter insurance will cover 5/5/20  Yes Shahida ANAYA, NP   multivitamin (ONE A DAY) tablet Take 1 Tab by mouth daily.  4/9/20  Yes Betzy Andrews NP   buPROPion SR (WELLBUTRIN SR) 150 mg SR tablet TAKE ONE TABLET BY MOUTH FOR THE FIRST 3 DAYS THEN INCREASE TO ONE TABLET BY MOUTH TWICE A DAY 3/15/21 4/1/21  Betzy Andrews NP     Patient Active Problem List   Diagnosis Code    Hyperlipidemia E78.5    HTN (hypertension) I10    Insulin dependent diabetes mellitus [id number]    Tobacco use disorder F17.200    Coronary atherosclerosis of native coronary artery I25.10    Tobacco dependence F17.200    Other and unspecified alcohol dependence, continuous drinking behavior F10.20    Mediastinal adenopathy R59.0    Diabetic neuropathy ([address] E11.40    Vitamin D deficiency E55.9    Other insomnia G47.09    Type 2 diabetes with nephropathy ([address] E11.21    Otalgia H92.09    Other fatigue R53.83    Mild nonproliferative diabetic retinopathy of left eye without macular edema associated with type 2 diabetes mellitus ([address] A02.2646    Hypertensive retinopathy H35.039    Nuclear sclerosis of both eyes H25.13     Patient Active Problem List    Diagnosis Date Noted    Mild nonproliferative diabetic retinopathy of left eye without macular edema associated with type 2 diabetes mellitus ([address] 12/18/2020    Hypertensive retinopathy 12/18/2020    Nuclear sclerosis of both eyes 12/18/2020    Otalgia 05/08/2019    Other fatigue 05/08/2019    Type 2 diabetes with nephropathy ([address] 04/04/2019    Other insomnia 07/02/2018    Vitamin D deficiency 04/15/2018    Diabetic neuropathy ([address] 03/27/2018    Mediastinal adenopathy 06/25/2015    Tobacco dependence 06/16/2015    Other and unspecified alcohol dependence, continuous drinking behavior 06/16/2015    Coronary atherosclerosis of native coronary artery 12/31/2013    Tobacco use disorder 12/09/2011    Hyperlipidemia     HTN (hypertension)     Insulin dependent diabetes mellitus      Current Outpatient Medications   Medication Sig Dispense Refill    buPROPion SR (WELLBUTRIN SR) 150 mg SR tablet ONE TABLET BY MOUTH TWICE A DAY 60 Tab 0    pregabalin (LYRICA) 50 mg capsule TAKE ONE CAPSULE BY MOUTH THREE TIMES A DAY 90 Cap 0    traZODone (DESYREL) 50 mg tablet TAKE ONE TABLET BY MOUTH ONCE NIGHTLY 30 Tab 0    Bydureon 2 mg/0.65 mL pnij INJECT 2 MG (THE CONTENTS OF ONE PEN OR ONE VIAL) IMMEDIATELY UNDER THE SKIN INTO THE THIGH, ABDOMEN, OR UPPER ARM EVERY 7 DAYS 4 Adjustable Dose Pre-filled Pen Syringe 3    metFORMIN (GLUCOPHAGE) 1,000 mg tablet TAKE ONE TABLET BY MOUTH TWICE A DAY WITH A MEAL 180 Tab 2    simvastatin (Zocor) 20 mg tablet Take 1 Tab by mouth nightly. 90 Tab 3    insulin glargine (Basaglar KwikPen U-100 Insulin) 100 unit/mL (3 mL) inpn INJECT FIFTY UNITS UNDER THE SKIN DAILY 5 Adjustable Dose Pre-filled Pen Syringe 2    Insulin Needles, Disposable, 31 gauge x 5/16\" ndle Pen needles for Bydureon syringe 1 Package 11    flash glucose scanning reader (FreeStyle Ezio 2 [place]) misc Use as directed to check blood sugar at least 3 times daily 1 Each 0    flash glucose sensor (FreeStyle Ezio 2 Sensor) kit Apply 1 sensor every 14 days to check blood sugar as directed at least 3 times daily. 2 Kit 5    ergocalciferol (ERGOCALCIFEROL) 1,250 mcg (50,000 unit) capsule Take 1 Cap by mouth every seven (7) days. 4 Cap 6    Blood-Glucose Meter monitoring kit Blood glucose kit that insurance will cover - Check blood glucose twice a day once in the morning fasting and once after a meal. 1 Kit 0    glucose blood VI test strips (ASCENSIA AUTODISC VI, ONE TOUCH ULTRA TEST VI) strip Provide test strip and glucose meter that insurance will cover - Twice daily-  fasting in the morning and once two hours after a meal. 100 Strip 10    lancets misc Please provide lancets for blood glucose meter insurance will cover 1 Package 11    multivitamin (ONE A DAY) tablet Take 1 Tab by mouth daily.  90 Tab 3     Allergies   Allergen Reactions    Niacin Itching     Past Medical History:   Diagnosis Date    Alcohol use     Fri-Sun one fifth; Mon-Thur: Pint of liquor    CAD (coronary artery disease)     Cardiac angioplasty with stent 07/29/2009    2.5 x 13 Cypher stent to CX.  Cardiac cath 11/09/2011    RCA patent. LM patent. LAD patent. mD1 55%. CX patent. Prior stent patent. Edison 85% (2.5 x 15-mm Xience stent, resid 0%). LVEDP 12. EF 40-45%. High lateral hypk (RI distribution).  Cardiac inferior STEMI 2009    Current smoker     contemplating stopping    Diabetes ([facility] [address]     type 2-insulin dependent    GERD (gastroesophageal reflux disease)     HTN (hypertension)     Hyperlipidemia     Migraine     Myocardial infarction [facility])      Past Surgical History:   Procedure Laterality Date    COLONOSCOPY N/A 10/28/2020    COLONOSCOPY w/polypectomy performed by Radha Romero MD at [address]  07/29/2009    HX HEART CATHETERIZATION  8/30/2011    HX HEART CATHETERIZATION  11/09/2011    HX WISDOM TEETH EXTRACTION      x4     Family History   Problem Relation Age of Onset    Hypertension Mother     Heart Attack Mother     Diabetes Mother     Hypertension Father     Heart Attack Father     Diabetes Father     Diabetes Sister     Hypertension Sister     Stroke Sister     Diabetes Brother     Hypertension Brother     Stroke Brother     No Known Problems Maternal Grandmother     No Known Problems Maternal Grandfather     No Known Problems Paternal Grandmother     No Known Problems Paternal Grandfather     No Known Problems Brother     Heart Disease Other     Kidney Disease Other      Social History     Tobacco Use    Smoking status: Current Every Day Smoker     Packs/day: 0.50     Years: 1.00     Pack years: 0.50     Types: Cigarettes    Smokeless tobacco: Never Used   Substance Use Topics    Alcohol use:  Yes     Alcohol/week: 4.2 standard drinks     Types: 5 Shots of liquor per week     Comment: occassionally Review of Systems   Constitutional: Negative for fever. HENT: Negative for congestion. Eyes: Negative for blurred vision. Respiratory: Negative for cough and shortness of breath. Cardiovascular: Negative for chest pain. Gastrointestinal: Negative for abdominal pain, blood in stool, nausea and vomiting. Genitourinary: Negative. Neurological: Negative for dizziness. No flowsheet data found. Jacklyn López, who was evaluated through a patient-initiated, synchronous (real-time) audio only encounter, and/or her healthcare decision maker, is aware that it is a billable service, with coverage as determined by his insurance carrier. He provided verbal consent to proceed: Yes. He has not had a related appointment within my department in the past 7 days or scheduled within the next 24 hours.     Total time 9 minutes  Total Time: minutes: 5-10 minutes    Lesley Ye NP

## 2021-05-06 DIAGNOSIS — Z79.4 TYPE 2 DIABETES MELLITUS WITH DIABETIC NEUROPATHY, WITH LONG-TERM CURRENT USE OF INSULIN (HCC): ICD-10-CM

## 2021-05-06 DIAGNOSIS — E11.40 TYPE 2 DIABETES MELLITUS WITH DIABETIC NEUROPATHY, WITH LONG-TERM CURRENT USE OF INSULIN (HCC): ICD-10-CM

## 2021-05-06 RX ORDER — PREGABALIN 50 MG/1
CAPSULE ORAL
Qty: 90 CAP | Refills: 0 | Status: SHIPPED | OUTPATIENT
Start: 2021-05-06 | End: 2021-09-04

## 2021-05-14 ENCOUNTER — OFFICE VISIT (OUTPATIENT)
Dept: INTERNAL MEDICINE CLINIC | Age: 61
End: 2021-05-14

## 2021-05-14 DIAGNOSIS — E11.40 TYPE 2 DIABETES MELLITUS WITH DIABETIC NEUROPATHY, WITH LONG-TERM CURRENT USE OF INSULIN (HCC): Primary | ICD-10-CM

## 2021-05-14 DIAGNOSIS — Z79.4 TYPE 2 DIABETES MELLITUS WITH DIABETIC NEUROPATHY, WITH LONG-TERM CURRENT USE OF INSULIN (HCC): Primary | ICD-10-CM

## 2021-05-14 NOTE — PATIENT INSTRUCTIONS
Your Visit Summary:  
 
- Continue current medications for now - Let's get back on track with diet Check and document your blood sugar first thing in the morning (fasting), before a meal, and before bedtime. Bring your meter/log to all future visits. Your blood sugar goals: 
- Fasting (first thing in the morning)  blood sugar: 80 - 130  
- 1 to 2 hours after a meal: less than 180 When you experience symptoms of low blood sugar (example: less than 70): - Confirm low reading by checking your blood sugar.  
- Then treat with 15 grams of carbohydrates (one-half cup of juice or regular soda, or 4-5 glucose tablets). - Wait 15 minutes to recheck blood sugar.  
- Then eat a protein containing meal/snack to prevent another low blood sugar episode. (example: peanut butter + crackers) Other recommendations: - Schedule an annual eye exam. 
- Check your feet daily for any signs of open wounds, cuts, or sores. - Given your risk factors, the following vaccines are recommended: annual flu shot, age-based pneumococcal vaccines (Pneumovax, Prevnar 13). In addition to taking your medications as directed, improving your blood sugar involves modifying your nutrition and maximizing the amount of physical activity. Nutrition: 
- When reviewing a nutrition label, focus on the serving size, total calories, fat (and type of fats), total carbohydrates, sugar (and amount of added sugar), amount of fiber (good for your digestive), and amount of protein. Refer to your nutrition label guide for more information. 
- For a meal : max 45 - 60 grams of carbohydrates - For a snack: max 15 grams of carbohydrates - Reduce amount of saturated and trans fat. Consider more unsaturated fat options as they are better for your heart health. - Have at least 1 serving of lean fat protein with each meal.   
- Increase fiber intake slowly to prevent constipation.  
- Substitute fruit juices for the whole fruit Low carb snack ideas (15 grams total carb or less): 
 
 String cheese or babybel with 6 crackers  4 peanut butter crackers  3 cups of popcorn  1 cup raw vegetables with hummus or ranch dip (just need to watch how much dip you use)  Nuts  2 rice cakes  Celery with peanut butter or cream cheese  String cheese with 1 serving of fruit  Greek yogurt (look at label to make sure < 15 gram carb)  Plain greek yogurt with fresh berries added [address] protein bar  Whisps parmesan cheese crisps  Hard boiled egg [address]  Tuna salad lettuce roll-ups  Deli meat roll-ups with slice of cheese  Sugar Free Jello  Glucerna shake (16 grams)  Glucerna hunger smart shake (16 grams)  Ensure protein max shake  Fruit (1 serving/15 grams)  1/2 banana, sanju, or grapefruit  1/3 melon (small cantaloupe)  1 slice or 1 cup of honeydew melon  1 slice or 1 and 1/4 cups of watermelon  1 small apple, peach, orange or pear  2 small tangerines  1 cup of raspberries  3/4 cup of blackberries, blueberries or pineapples  1/2 cup of fruit juice, pears, applesauce, or mangos  17 small grapes  12 cherries Be careful with the glucerna products as they differ in the total carbs depending on the product (some are intended as meal replacements not snacks). Make sure you look at the total carbs on the label as products can differ. Physical Activity: - Aim for 30 minutes of consistent, moderately intensive, physical activity a day for 5 days or an average of 150 minutes per week. - Start slow, increase as tolerated. For example: Walk every day, working up to 30 minutes of brisk walking, 5 days a weekor split the 30 minutes into two 15-minute or three 10-minute walks. - If you sit for a long time, get up and move/stretch every 90 minutes.

## 2021-05-16 NOTE — PROGRESS NOTES
Pharmacy Progress Note - Diabetes Management    S/O: Mr. Rossy Restrepo is a 56 y.o. male, referred by Dr. Onur Viramontes NP, was seen today for diabetes management follow up visit. Patient's last A1c was 7.9% in March 2021. This is a(n) increase from 7.0% in January 2021. Interim History: Patient states that he has been doing okay over the past few months. He reports still working on quitting smoking and is down to about 3 cigarettes per day (from 1 pack per day). He did previously report issues with insomnia to PCP, but states that this is getting better and he is getting about 7 hours of sleep every night. Regarding his diabetes, he states that he has just not been following a diabetic-friendly diet which led to recent A1c increase. He reports eating more starches/bread than usual and drinking sodas such as Sprite on a regular basis. He is still using the Freestyle Ezio CGM device, but did not have the reader with him today. Current anti-hyperglycemic regimen include(s): - Bydureon 2 mg once weekly   - Metformin 1000 mg BID with meals   - Basaglar 50 units once daily     Patient reports adherence with his medications. He states that he has not had any issues getting the Bydureon pen and that he still has some at home. ROS:  Today, Pt endorses:  - Symptoms of Hyperglycemia: none  - Symptoms of Hypoglycemia: none    Self Monitoring Blood Glucose (SMBG) or CGM:  - Brought in home glucometer/blood glucose log/CGM reader today:  no  - Still using Freestyle Ezio CGM but did not have reader with him today in clinic     Nutrition/Lifestyle Modifications:  - Reports not following a diabetic friendly diet   - Eating more bread and starches than he usually does and drinks sodas  - States sweets have not been a huge issue    Vitals:   Wt Readings from Last 3 Encounters:   11/17/20 190 lb (86.2 kg)   10/28/20 190 lb (86.2 kg)   10/13/20 192 lb (87.1 kg)     BP Readings from Last 3 Encounters: 11/17/20 136/86   10/28/20 115/60   10/13/20 136/86     Pulse Readings from Last 3 Encounters:   10/28/20 73   10/13/20 78   09/22/20 77       Past Medical History:   Diagnosis Date    Alcohol use     Fri-Sun one fifth; Mon-Thur: Pint of liquor    CAD (coronary artery disease)     Cardiac angioplasty with stent 07/29/2009    2.5 x 13 Cypher stent to CX.  Cardiac cath 11/09/2011    RCA patent. LM patent. LAD patent. mD1 55%. CX patent. Prior stent patent. Edison 85% (2.5 x 15-mm Xience stent, resid 0%). LVEDP 12. EF 40-45%. High lateral hypk (RI distribution).  Cardiac inferior STEMI 2009    Current smoker     contemplating stopping    Diabetes ([facility] [address]     type 2-insulin dependent    GERD (gastroesophageal reflux disease)     HTN (hypertension)     Hyperlipidemia     Migraine     Myocardial infarction (HCC)      Allergies   Allergen Reactions    Niacin Itching       Current Outpatient Medications   Medication Sig    pregabalin (LYRICA) 50 mg capsule TAKE ONE CAPSULE BY MOUTH THREE TIMES A DAY    buPROPion SR (WELLBUTRIN SR) 150 mg SR tablet ONE TABLET BY MOUTH TWICE A DAY    traZODone (DESYREL) 50 mg tablet TAKE ONE TABLET BY MOUTH ONCE NIGHTLY    Bydureon 2 mg/0.65 mL pnij INJECT 2 MG (THE CONTENTS OF ONE PEN OR ONE VIAL) IMMEDIATELY UNDER THE SKIN INTO THE THIGH, ABDOMEN, OR UPPER ARM EVERY 7 DAYS    metFORMIN (GLUCOPHAGE) 1,000 mg tablet TAKE ONE TABLET BY MOUTH TWICE A DAY WITH A MEAL    simvastatin (Zocor) 20 mg tablet Take 1 Tab by mouth nightly.     insulin glargine (Basaglar KwikPen U-100 Insulin) 100 unit/mL (3 mL) inpn INJECT FIFTY UNITS UNDER THE SKIN DAILY    Insulin Needles, Disposable, 31 gauge x 5/16\" ndle Pen needles for Bydureon syringe    flash glucose scanning reader (FreeStyle Ezio 2 [place]) misc Use as directed to check blood sugar at least 3 times daily    flash glucose sensor (FreeStyle Ezio 2 Sensor) kit Apply 1 sensor every 14 days to check blood sugar as directed at least 3 times daily.  ergocalciferol (ERGOCALCIFEROL) 1,250 mcg (50,000 unit) capsule Take 1 Cap by mouth every seven (7) days.  Blood-Glucose Meter monitoring kit Blood glucose kit that insurance will cover - Check blood glucose twice a day once in the morning fasting and once after a meal.    glucose blood VI test strips (ASCENSIA AUTODISC VI, ONE TOUCH ULTRA TEST VI) strip Provide test strip and glucose meter that insurance will cover - Twice daily-  fasting in the morning and once two hours after a meal.    lancets misc Please provide lancets for blood glucose meter insurance will cover    multivitamin (ONE A DAY) tablet Take 1 Tab by mouth daily. No current facility-administered medications for this visit. Lab Results   Component Value Date/Time    Sodium 139 03/16/2021 08:26 AM    Potassium 4.6 03/16/2021 08:26 AM    Chloride 107 03/16/2021 08:26 AM    CO2 26 03/16/2021 08:26 AM    Anion gap 6 03/16/2021 08:26 AM    Glucose 152 (H) 03/16/2021 08:26 AM    BUN 13 03/16/2021 08:26 AM    Creatinine 0.99 03/16/2021 08:26 AM    BUN/Creatinine ratio 13 03/16/2021 08:26 AM    GFR est AA >60 03/16/2021 08:26 AM    GFR est non-AA >60 03/16/2021 08:26 AM    Calcium 8.6 03/16/2021 08:26 AM    Bilirubin, total 0.2 03/16/2021 08:26 AM    Alk.  phosphatase 87 03/16/2021 08:26 AM    Protein, total 6.8 03/16/2021 08:26 AM    Albumin 3.6 03/16/2021 08:26 AM    Globulin 3.2 03/16/2021 08:26 AM    A-G Ratio 1.1 03/16/2021 08:26 AM    ALT (SGPT) 37 03/16/2021 08:26 AM       Lab Results   Component Value Date/Time    Cholesterol, total 109 03/16/2021 08:26 AM    HDL Cholesterol 38 (L) 03/16/2021 08:26 AM    LDL, calculated 45.2 03/16/2021 08:26 AM    VLDL, calculated 25.8 03/16/2021 08:26 AM    Triglyceride 129 03/16/2021 08:26 AM    CHOL/HDL Ratio 2.9 03/16/2021 08:26 AM       Lab Results   Component Value Date/Time    WBC 3.0 (L) 03/16/2021 08:26 AM    Hemoglobin (POC) 14.2 07/03/2019 09:44 AM HGB 13.6 03/16/2021 08:26 AM    HCT 39.5 03/16/2021 08:26 AM    PLATELET 629 04/36/6490 08:26 AM    MCV 86.2 03/16/2021 08:26 AM       Lab Results   Component Value Date/Time    Microalbumin/Creat ratio (mg/g creat) Cannot be calculated 10/22/2020 03:08 PM    Microalbumin,urine random <0.50 10/22/2020 03:08 PM       HbA1c:  Lab Results   Component Value Date/Time    Hemoglobin A1c 7.9 (H) 03/16/2021 08:26 AM    Hemoglobin A1c (POC) 7.0 01/13/2021 04:12 PM    Hemoglobin A1c, External 7.8 01/28/2015 11:15 PM     No components found for: 2     Last Point of Care HGB A1C  Hemoglobin A1c (POC)   Date Value Ref Range Status   01/13/2021 7.0 % Final        CrCl cannot be calculated (Unknown ideal weight. ). A/P:    Diabetes Management:  - Per ADA guidelines, Pt's A1c is not at goal of < 7%. - Current SMBG(s)/CGM trend is unable to be assessed today since patient does not have his CGM reader with him   - Spent today mostly focusing on getting back on track - patient knows what he needs to do; depression likely playing a role here   - Continue current medications for now with emphasis on improvements in diet   - It is unclear if patient still has Bydureon (or has been adherent with Bydureon) since device has been discontinued for a few months now. A once weekly GLP-1 agonist option is important for his adherence and to maintain A1c control. Will submit PA for once weekly option.   - Follow up in 1 month and make adjustments from there based on readings    Patient verbalized understanding of the information presented and all of the patients questions were answered. AVS was handed to the patient. Patient advised to call the office with any additional questions or concerns. Notifications of recommendations will be sent to Dr. Erika Castillo NP for review. Patient will return to clinic in 4 week(s) for follow up. Thank you,  Bebe Puri.  Alayna Solo, ISHMAELS      For Pharmacy Admin Tracking Only     CPA in place:  Yes   Recommendation Provided To: Patient/Caregiver: 1 via In person   Time Spent (min): 30

## 2021-06-14 RX ORDER — INSULIN GLARGINE 100 [IU]/ML
INJECTION, SOLUTION SUBCUTANEOUS
Qty: 5 ADJUSTABLE DOSE PRE-FILLED PEN SYRINGE | Refills: 1 | Status: SHIPPED | OUTPATIENT
Start: 2021-06-14 | End: 2021-11-01 | Stop reason: SDUPTHER

## 2021-06-18 DIAGNOSIS — G47.00 INSOMNIA, UNSPECIFIED TYPE: ICD-10-CM

## 2021-06-18 DIAGNOSIS — F32.A MILD DEPRESSION: ICD-10-CM

## 2021-06-21 RX ORDER — TRAZODONE HYDROCHLORIDE 50 MG/1
TABLET ORAL
Qty: 30 TABLET | Refills: 1 | Status: SHIPPED | OUTPATIENT
Start: 2021-06-21 | End: 2021-09-04

## 2021-06-30 ENCOUNTER — OFFICE VISIT (OUTPATIENT)
Dept: INTERNAL MEDICINE CLINIC | Age: 61
End: 2021-06-30

## 2021-06-30 DIAGNOSIS — Z79.4 TYPE 2 DIABETES MELLITUS WITH DIABETIC NEUROPATHY, WITH LONG-TERM CURRENT USE OF INSULIN (HCC): Primary | ICD-10-CM

## 2021-06-30 DIAGNOSIS — E11.40 TYPE 2 DIABETES MELLITUS WITH DIABETIC NEUROPATHY, WITH LONG-TERM CURRENT USE OF INSULIN (HCC): Primary | ICD-10-CM

## 2021-06-30 NOTE — LETTER
NOTIFICATION RETURN TO WORK / SCHOOL    6/30/2021 4:09 PM    Mr. Mando Albright  [address] [place] [address]      To Whom It May Concern:    Mando Albright is currently under the care of Bowen Cooper. He was seen in our office today at 4:00 PM for a diabetes management appointment. He will return to work on: 7/1/21. If there are questions or concerns please have the patient contact our office.         Sincerely,      Soha Sams, PHARMD

## 2021-07-01 RX ORDER — DULAGLUTIDE 0.75 MG/.5ML
0.75 INJECTION, SOLUTION SUBCUTANEOUS
Qty: 4 EACH | Refills: 0 | Status: SHIPPED | OUTPATIENT
Start: 2021-07-01 | End: 2021-07-28

## 2021-07-01 NOTE — PROGRESS NOTES
Pharmacy Progress Note - Diabetes Management    S/O: Mr. Brandi Cameron is a 59 y.o. male, referred by Dr. Ivan Mayes, NP, was seen today for diabetes management follow up visit. Patient's last A1c was 7.9% in March 2021. Interim History: Patient states that everything has been \"so so\" over the past few months. There has not been any significant changes in diet, but he states that he has been consistent with taking his medications. He is still working on quitting smoking and is still at about 7 cigarettes per day. Overall, he says that his mood has been \"okay\" over the past few months as well. Current anti-hyperglycemic regimen include(s):    - Basaglar 54 units once daily  - Metformin 1000 mg twice daily  - Bydureon 2 mg once weekly (says he still has some left. Rose Brice )    Patient reports adherence with his medications. ROS:  Today, Pt endorses:  - Symptoms of Hyperglycemia: none  - Symptoms of Hypoglycemia: none    Self Monitoring Blood Glucose (SMBG) or CGM:  - Brought in home glucometer/blood glucose log/CGM reader today:  no  - Buys Freestyle Ezio out of pocket, but hasn't had it in a few weeks  - Has not been checking blood sugars using glucometer either   - When he has checked, he states his blood sugars range form 90 - 210    Nutrition/Lifestyle Modifications:  - Eats 3 meals/day ; no significant changes  - Still some carb heavy meals like spaghetti  - Still drinking sodas (about 2 per day)  - States he is better with the snacking and sweets     Vitals:   Wt Readings from Last 3 Encounters:   11/17/20 190 lb (86.2 kg)   10/28/20 190 lb (86.2 kg)   10/13/20 192 lb (87.1 kg)     BP Readings from Last 3 Encounters:   11/17/20 136/86   10/28/20 115/60   10/13/20 136/86     Pulse Readings from Last 3 Encounters:   10/28/20 73   10/13/20 78   09/22/20 77       Past Medical History:   Diagnosis Date    Alcohol use     Fri-Sun one fifth; Mon-Thur: Pint of liquor    CAD (coronary artery disease)     Cardiac angioplasty with stent 07/29/2009    2.5 x 13 Cypher stent to CX.  Cardiac cath 11/09/2011    RCA patent. LM patent. LAD patent. mD1 55%. CX patent. Prior stent patent. Edison 85% (2.5 x 15-mm Xience stent, resid 0%). LVEDP 12. EF 40-45%. High lateral hypk (RI distribution).  Cardiac inferior STEMI 2009    Current smoker     contemplating stopping    Diabetes ([address]     type 2-insulin dependent    GERD (gastroesophageal reflux disease)     HTN (hypertension)     Hyperlipidemia     Migraine     Myocardial infarction (HCC)      Allergies   Allergen Reactions    Niacin Itching       Current Outpatient Medications   Medication Sig    traZODone (DESYREL) 50 mg tablet TAKE ONE TABLET BY MOUTH ONCE NIGHTLY    insulin glargine (Basaglar KwikPen U-100 Insulin) 100 unit/mL (3 mL) inpn INJECT 50 UNITS UNDER THE SKIN DAILY    pregabalin (LYRICA) 50 mg capsule TAKE ONE CAPSULE BY MOUTH THREE TIMES A DAY    buPROPion SR (WELLBUTRIN SR) 150 mg SR tablet ONE TABLET BY MOUTH TWICE A DAY    Bydureon 2 mg/0.65 mL pnij INJECT 2 MG (THE CONTENTS OF ONE PEN OR ONE VIAL) IMMEDIATELY UNDER THE SKIN INTO THE THIGH, ABDOMEN, OR UPPER ARM EVERY 7 DAYS    metFORMIN (GLUCOPHAGE) 1,000 mg tablet TAKE ONE TABLET BY MOUTH TWICE A DAY WITH A MEAL    simvastatin (Zocor) 20 mg tablet Take 1 Tab by mouth nightly.  Insulin Needles, Disposable, 31 gauge x 5/16\" ndle Pen needles for Bydureon syringe    flash glucose scanning reader (FreeStyle Ezio 2 [place]) misc Use as directed to check blood sugar at least 3 times daily    flash glucose sensor (FreeStyle Ezio 2 Sensor) kit Apply 1 sensor every 14 days to check blood sugar as directed at least 3 times daily.  ergocalciferol (ERGOCALCIFEROL) 1,250 mcg (50,000 unit) capsule Take 1 Cap by mouth every seven (7) days.     Blood-Glucose Meter monitoring kit Blood glucose kit that insurance will cover - Check blood glucose twice a day once in the morning fasting and once after a meal.    glucose blood VI test strips (ASCENSIA AUTODISC VI, ONE TOUCH ULTRA TEST VI) strip Provide test strip and glucose meter that insurance will cover - Twice daily-  fasting in the morning and once two hours after a meal.    lancets misc Please provide lancets for blood glucose meter insurance will cover    multivitamin (ONE A DAY) tablet Take 1 Tab by mouth daily. No current facility-administered medications for this visit. Lab Results   Component Value Date/Time    Sodium 139 03/16/2021 08:26 AM    Potassium 4.6 03/16/2021 08:26 AM    Chloride 107 03/16/2021 08:26 AM    CO2 26 03/16/2021 08:26 AM    Anion gap 6 03/16/2021 08:26 AM    Glucose 152 (H) 03/16/2021 08:26 AM    BUN 13 03/16/2021 08:26 AM    Creatinine 0.99 03/16/2021 08:26 AM    BUN/Creatinine ratio 13 03/16/2021 08:26 AM    GFR est AA >60 03/16/2021 08:26 AM    GFR est non-AA >60 03/16/2021 08:26 AM    Calcium 8.6 03/16/2021 08:26 AM    Bilirubin, total 0.2 03/16/2021 08:26 AM    Alk.  phosphatase 87 03/16/2021 08:26 AM    Protein, total 6.8 03/16/2021 08:26 AM    Albumin 3.6 03/16/2021 08:26 AM    Globulin 3.2 03/16/2021 08:26 AM    A-G Ratio 1.1 03/16/2021 08:26 AM    ALT (SGPT) 37 03/16/2021 08:26 AM       Lab Results   Component Value Date/Time    Cholesterol, total 109 03/16/2021 08:26 AM    HDL Cholesterol 38 (L) 03/16/2021 08:26 AM    LDL, calculated 45.2 03/16/2021 08:26 AM    VLDL, calculated 25.8 03/16/2021 08:26 AM    Triglyceride 129 03/16/2021 08:26 AM    CHOL/HDL Ratio 2.9 03/16/2021 08:26 AM       Lab Results   Component Value Date/Time    WBC 3.0 (L) 03/16/2021 08:26 AM    Hemoglobin (POC) 14.2 07/03/2019 09:44 AM    HGB 13.6 03/16/2021 08:26 AM    HCT 39.5 03/16/2021 08:26 AM    PLATELET 521 62/83/2913 08:26 AM    MCV 86.2 03/16/2021 08:26 AM       Lab Results   Component Value Date/Time    Microalbumin/Creat ratio (mg/g creat) Cannot be calculated 10/22/2020 03:08 PM    Microalbumin,urine random <0.50 10/22/2020 03:08 PM       HbA1c:  Lab Results   Component Value Date/Time    Hemoglobin A1c 7.9 (H) 03/16/2021 08:26 AM    Hemoglobin A1c (POC) 7.0 01/13/2021 04:12 PM    Hemoglobin A1c, External 7.8 01/28/2015 11:15 PM     No components found for: 2     Last Point of Care HGB A1C  Hemoglobin A1c (POC)   Date Value Ref Range Status   01/13/2021 7.0 % Final        CrCl cannot be calculated (Unknown ideal weight. ). A/P:    Diabetes Management:  - Per ADA guidelines, Pt's A1c is not at goal of < 7%. - Current SMBG(s)/CGM trend is unable to be assessed today since patient has not been checking blood sugars at home   - Patient states that he will  [facility] monitor soon so that he can start monitoring again   - Discussed that we will change Bydureon to Trulicity today due to Medicaid formulary changes - will start with Trulicity 3.20 mg once weekly and titrate after 1 month if needed  - Continue metformin and Basaglar 54 units once daily for now until we can reassess blood sugars at a later time   - Follow up via telephone in 2-3 weeks to see how he is doing with the Trulclity    Medication reconciliation was completed during the visit. Medications Discontinued During This Encounter   Medication Reason    Bydureon 2 mg/0.65 mL pnij Cost of Medication       Patient verbalized understanding of the information presented and all of the patients questions were answered. AVS was handed to the patient. Patient advised to call the office with any additional questions or concerns. Notifications of recommendations will be sent to Dr. Germán Gonzalez NP for review. Thank you,  SAVANAH Cuellar      For Pharmacy Admin Tracking Only     CPA in place:  Yes   Recommendation Provided To: Patient/Caregiver: 2 via In person   Intervention Detail: Discontinued Rx: 1, reason: Cost/Formulary Change and New Rx: 1, reason: Cost/Formulary Change   Gap Closed?: No   Total # of Interventions Recommended: 2   Total # of Interventions Accepted: 2   Intervention Accepted By: Patient/Caregiver: 2   Time Spent (min): 20

## 2021-07-28 DIAGNOSIS — E11.40 TYPE 2 DIABETES MELLITUS WITH DIABETIC NEUROPATHY, WITH LONG-TERM CURRENT USE OF INSULIN (HCC): ICD-10-CM

## 2021-07-28 DIAGNOSIS — Z79.4 TYPE 2 DIABETES MELLITUS WITH DIABETIC NEUROPATHY, WITH LONG-TERM CURRENT USE OF INSULIN (HCC): ICD-10-CM

## 2021-07-28 RX ORDER — DULAGLUTIDE 0.75 MG/.5ML
INJECTION, SOLUTION SUBCUTANEOUS
Qty: 5 SYRINGE | Refills: 1 | Status: SHIPPED | OUTPATIENT
Start: 2021-07-28 | End: 2021-09-04

## 2021-08-10 ENCOUNTER — TELEPHONE (OUTPATIENT)
Dept: FAMILY MEDICINE CLINIC | Age: 61
End: 2021-08-10

## 2021-08-10 NOTE — TELEPHONE ENCOUNTER
Pharmacy Progress Note - Telephone Encounter    S/O: Mr. Yulissa George 59 y.o. male, referred by Dr. Yunior Munoz, MARILYN, was contacted via an outbound telephone call to discuss diabetes management today. Verified patients identifiers (name & ) per HIPAA policy.     - Patient states that he has not gone to  the Trulicity from the pharmacy yet  - Instructed patient to  medication at his earliest convenience as this will replace the Bydureon. He expressed understanding       Thank you,  Andrzej Gu. SAVANAH Bowie        For Pharmacy Admin Tracking Only     CPA in place:  Yes   Recommendation Provided To: Patient/Caregiver: 1 via Telephone   Time Spent (min): 10

## 2021-08-14 ENCOUNTER — APPOINTMENT (OUTPATIENT)
Dept: GENERAL RADIOLOGY | Age: 61
End: 2021-08-14
Attending: STUDENT IN AN ORGANIZED HEALTH CARE EDUCATION/TRAINING PROGRAM
Payer: COMMERCIAL

## 2021-08-14 ENCOUNTER — HOSPITAL ENCOUNTER (EMERGENCY)
Age: 61
Discharge: LEFT BEFORE DISCHARGE | End: 2021-08-15
Attending: STUDENT IN AN ORGANIZED HEALTH CARE EDUCATION/TRAINING PROGRAM
Payer: COMMERCIAL

## 2021-08-14 VITALS
OXYGEN SATURATION: 99 % | HEART RATE: 74 BPM | RESPIRATION RATE: 16 BRPM | TEMPERATURE: 98.2 F | DIASTOLIC BLOOD PRESSURE: 78 MMHG | HEIGHT: 69 IN | WEIGHT: 190 LBS | BODY MASS INDEX: 28.14 KG/M2 | SYSTOLIC BLOOD PRESSURE: 145 MMHG

## 2021-08-14 DIAGNOSIS — K59.00 CONSTIPATION, UNSPECIFIED CONSTIPATION TYPE: Primary | ICD-10-CM

## 2021-08-14 DIAGNOSIS — Z53.21 ELOPED FROM EMERGENCY DEPARTMENT: ICD-10-CM

## 2021-08-14 PROCEDURE — 74018 RADEX ABDOMEN 1 VIEW: CPT

## 2021-08-14 PROCEDURE — 99281 EMR DPT VST MAYX REQ PHY/QHP: CPT

## 2021-08-31 ENCOUNTER — TELEPHONE (OUTPATIENT)
Dept: FAMILY MEDICINE CLINIC | Age: 61
End: 2021-08-31

## 2021-08-31 NOTE — TELEPHONE ENCOUNTER
Pharmacy Progress Note - Telephone Encounter    S/O: Mr. Jacob Barrera 59 y.o. male contacted office/me via an inbound telephone call to discuss diabetes management today. Verified patients identifiers (name & ) per HIPAA policy.     - Patient states that he has been doing well and blood sugars have been \"normal\"  - He states that blood sugars have been around 180 in the mornings  - He did start the Trulicity and is doing okay with it, but states he has noticed more constipation. He has not taken anything over the counter yet for relief     A/P:  - Encouraged patient to increase water, vegetables and other fiber sources in his diet. If still no relief, would recommend OTC stool softener.  - Will keep Trulicity dose as is for now until constipation concerns resolve   - Continue Basaglar and metformin  - Follow up in 2 weeks   - Patient endorses understanding to the provided information. All questions answered at this time. There are no discontinued medications. No orders of the defined types were placed in this encounter. Thank you,  Clyde Ramirez. SAVANAH Cha      For Pharmacy Admin Tracking Only     CPA in place:  Yes   Recommendation Provided To: Patient/Caregiver: 1 via Telephone   Time Spent (min): 15

## 2021-08-31 NOTE — TELEPHONE ENCOUNTER
Pharmacy Progress Note - Telephone Call    Mr. Ramona Chavez 59 y.o. was contacted via an outbound telephone call regarding diabetes management today. Unable to leave voicemail. Will try again at another time. Thank you,  Linwood Finley. SAVANAH Griffith    For Pharmacy Admin Tracking Only     CPA in place:  Yes   Recommendation Provided To: Patient/Caregiver: 1 via Joni Powers 20 Time Spent (min): 10

## 2021-09-03 DIAGNOSIS — E55.9 VITAMIN D DEFICIENCY: ICD-10-CM

## 2021-09-04 ENCOUNTER — HOSPITAL ENCOUNTER (EMERGENCY)
Age: 61
Discharge: HOME | End: 2021-09-04
Attending: STUDENT IN AN ORGANIZED HEALTH CARE EDUCATION/TRAINING PROGRAM
Payer: COMMERCIAL

## 2021-09-04 VITALS
HEART RATE: 84 BPM | HEIGHT: 69 IN | BODY MASS INDEX: 26.66 KG/M2 | DIASTOLIC BLOOD PRESSURE: 76 MMHG | OXYGEN SATURATION: 98 % | SYSTOLIC BLOOD PRESSURE: 126 MMHG | WEIGHT: 180 LBS | TEMPERATURE: 98.8 F | RESPIRATION RATE: 18 BRPM

## 2021-09-04 DIAGNOSIS — K59.00 CONSTIPATION, UNSPECIFIED CONSTIPATION TYPE: Primary | ICD-10-CM

## 2021-09-04 PROCEDURE — 99282 EMERGENCY DEPT VISIT SF MDM: CPT

## 2021-09-04 RX ORDER — POLYETHYLENE GLYCOL 3350 17 G/17G
17 POWDER, FOR SOLUTION ORAL DAILY
Qty: 850 G | Refills: 0 | Status: SHIPPED | OUTPATIENT
Start: 2021-09-04 | End: 2021-09-11

## 2021-09-04 RX ORDER — ERGOCALCIFEROL 1.25 MG/1
CAPSULE ORAL
Qty: 4 CAPSULE | Refills: 6 | Status: SHIPPED | OUTPATIENT
Start: 2021-09-04 | End: 2021-11-01 | Stop reason: SDUPTHER

## 2021-09-04 NOTE — ED PROVIDER NOTES
EMERGENCY DEPARTMENT HISTORY AND PHYSICAL EXAM    I have evaluated the patient at 3:33 PM      Date: 9/4/2021  Patient Name: Mando Albright    History of Presenting Illness     Chief Complaint   Patient presents with    Constipation         History Provided By: Patient  Location/Duration/Severity/Modifying factors   76-year-old male presenting to the emergency department for evaluation of constipation. Patient states that he last had a proper bowel movement approximately 2 weeks ago. Since then he has had multiple small bowel movements. He does not normally suffer constipation. Denies having any abdominal pain but does describe a fullness. Denies any nausea or vomiting. Denies any fevers or chills no dysuria hematuria. PCP: Janette Dowd NP    Current Outpatient Medications   Medication Sig Dispense Refill    ergocalciferol (ERGOCALCIFEROL) 1,250 mcg (50,000 unit) capsule TAKE ONE CAPSULE BY MOUTH ONCE WEEKLY 4 Capsule 6    polyethylene glycol (Miralax) 17 gram/dose powder Take 17 g by mouth daily for 7 days. 1 tablespoon with 8 oz of water daily 850 g 0    insulin glargine (Basaglar KwikPen U-100 Insulin) 100 unit/mL (3 mL) inpn INJECT 50 UNITS UNDER THE SKIN DAILY 5 Adjustable Dose Pre-filled Pen Syringe 1    buPROPion SR (WELLBUTRIN SR) 150 mg SR tablet ONE TABLET BY MOUTH TWICE A DAY 60 Tab 0    metFORMIN (GLUCOPHAGE) 1,000 mg tablet TAKE ONE TABLET BY MOUTH TWICE A DAY WITH A MEAL 180 Tab 2    simvastatin (Zocor) 20 mg tablet Take 1 Tab by mouth nightly. 90 Tab 3    multivitamin (ONE A DAY) tablet Take 1 Tab by mouth daily.  90 Tab 3    Insulin Needles, Disposable, 31 gauge x 5/16\" ndle Pen needles for Bydureon syringe 1 Package 11    flash glucose scanning reader (FreeStyle Ezio 2 [place]) misc Use as directed to check blood sugar at least 3 times daily 1 Each 0    flash glucose sensor (FreeStyle Ezio 2 Sensor) kit Apply 1 sensor every 14 days to check blood sugar as directed at least 3 times daily. 2 Kit 5    Blood-Glucose Meter monitoring kit Blood glucose kit that insurance will cover - Check blood glucose twice a day once in the morning fasting and once after a meal. 1 Kit 0    glucose blood VI test strips (ASCENSIA AUTODISC VI, ONE TOUCH ULTRA TEST VI) strip Provide test strip and glucose meter that insurance will cover - Twice daily-  fasting in the morning and once two hours after a meal. 100 Strip 10    lancets misc Please provide lancets for blood glucose meter insurance will cover 1 Package 11       Past History     Past Medical History:  Past Medical History:   Diagnosis Date    Alcohol use     Fri-Sun one fifth; Mon-Thur: Pint of liquor    CAD (coronary artery disease)     Cardiac angioplasty with stent 07/29/2009    2.5 x 13 Cypher stent to CX.  Cardiac cath 11/09/2011    RCA patent. LM patent. LAD patent. mD1 55%. CX patent. Prior stent patent. Edison 85% (2.5 x 15-mm Xience stent, resid 0%). LVEDP 12. EF 40-45%. High lateral hypk (RI distribution).       Cardiac inferior STEMI 2009    Constipation     Current smoker     contemplating stopping    Diabetes ([address]     type 2-insulin dependent    GERD (gastroesophageal reflux disease)     HTN (hypertension)     Hyperlipidemia     Migraine     Myocardial infarction ([address]     Vitamin D deficiency        Past Surgical History:  Past Surgical History:   Procedure Laterality Date    COLONOSCOPY N/A 10/28/2020    COLONOSCOPY w/polypectomy performed by Monty Jackson MD at [address]  07/29/2009    HX HEART CATHETERIZATION  8/30/2011    HX HEART CATHETERIZATION  11/09/2011    HX WISDOM TEETH EXTRACTION      x4       Family History:  Family History   Problem Relation Age of Onset    Hypertension Mother     Heart Attack Mother     Diabetes Mother     Hypertension Father     Heart Attack Father     Diabetes Father     Diabetes Sister     Hypertension Sister    Lyn Galarza Stroke Sister     Diabetes Brother     Hypertension Brother     Stroke Brother     No Known Problems Maternal Grandmother     No Known Problems Maternal Grandfather     No Known Problems Paternal Grandmother     No Known Problems Paternal Grandfather     No Known Problems Brother     Heart Disease Other     Kidney Disease Other        Social History:  Social History     Tobacco Use    Smoking status: Current Every Day Smoker     Packs/day: 0.50     Years: 1.00     Pack years: 0.50     Types: Cigarettes    Smokeless tobacco: Never Used   Substance Use Topics    Alcohol use: Yes     Alcohol/week: 4.2 standard drinks     Types: 5 Shots of liquor per week     Comment: occ    Drug use: Not Currently     Types: Marijuana, Cocaine     Comment: none       Allergies: Allergies   Allergen Reactions    Niacin Itching         Review of Systems       Review of Systems   Constitutional: Negative for activity change, chills, diaphoresis, fatigue and fever. Respiratory: Negative for cough, chest tightness, shortness of breath, wheezing and stridor. Cardiovascular: Negative for chest pain and palpitations. Gastrointestinal: Positive for abdominal pain and constipation. Negative for abdominal distention, diarrhea, nausea and vomiting. Genitourinary: Negative for difficulty urinating, dysuria and hematuria. Musculoskeletal: Negative for back pain, joint swelling and myalgias. Skin: Negative for rash. Neurological: Negative for dizziness, weakness and headaches. Psychiatric/Behavioral: Negative for agitation. The patient is not nervous/anxious. Physical Exam     Visit Vitals  /76 (BP 1 Location: Left upper arm, BP Patient Position: At rest)   Pulse 84   Temp 98.8 °F (37.1 °C)   Resp 18   Ht 5' 9\" (1.753 m)   Wt 81.6 kg (180 lb)   SpO2 98%   BMI 26.58 kg/m²         Physical Exam  Constitutional:       General: He is not in acute distress. Appearance: He is not toxic-appearing.    HENT: Head: Normocephalic and atraumatic. Mouth/Throat:      Mouth: Mucous membranes are moist.   Cardiovascular:      Rate and Rhythm: Normal rate and regular rhythm. Heart sounds: Normal heart sounds. No murmur heard. No friction rub. No gallop. Pulmonary:      Effort: Pulmonary effort is normal.      Breath sounds: Normal breath sounds. Abdominal:      General: There is no distension. Palpations: Abdomen is soft. There is no mass. Tenderness: There is no abdominal tenderness. There is no guarding. Hernia: No hernia is present. Musculoskeletal:         General: No swelling, tenderness or deformity. Cervical back: Normal range of motion and neck supple. Skin:     General: Skin is warm and dry. Findings: No rash. Neurological:      General: No focal deficit present. Mental Status: He is alert and oriented to person, place, and time. Psychiatric:         Mood and Affect: Mood normal.           Diagnostic Study Results     Labs -  No results found for this or any previous visit (from the past 12 hour(s)). Radiologic Studies -   No orders to display         Medical Decision Making   I am the first provider for this patient. I reviewed the vital signs, available nursing notes, past medical history, past surgical history, family history and social history. Vital Signs-Reviewed the patient's vital signs. Records Reviewed: Nursing Notes (Time of Review: 3:33 PM)    ED Course: Progress Notes, Reevaluation, and Consults:         Provider Notes (Medical Decision Making):   MDM  Number of Diagnoses or Management Options  Constipation, unspecified constipation type  Diagnosis management comments: Patient presents with constipation. He appears nontoxic. He is watching TV on my exam.  Vital signs are stable. Is afebrile. Abdomen is soft, nontender. I do not see the need for blood work or imaging at this time. He will be discharged home with MiraLAX.   He has been instructed on dosing. Is been instructed on primary care follow-up and ED return precautions. Patient verbalizes understanding agreement plan. Diagnosis     Clinical Impression:   1. Constipation, unspecified constipation type        Disposition: home    Follow-up Information     Follow up With Specialties Details [facility] EMERGENCY DEPT Emergency Medicine  As needed, If symptoms worsen [address] [facility], NP Nurse Practitioner Call   [address]  [phone]             Patient's Medications   Start Taking    POLYETHYLENE GLYCOL [facility]) 17 GRAM/DOSE POWDER    Take 17 g by mouth daily for 7 days. 1 tablespoon with 8 oz of water daily   Continue Taking    BLOOD-GLUCOSE METER MONITORING KIT    Blood glucose kit that insurance will cover - Check blood glucose twice a day once in the morning fasting and once after a meal.    BUPROPION SR (WELLBUTRIN SR) 150 MG SR TABLET    ONE TABLET BY MOUTH TWICE A DAY    ERGOCALCIFEROL (ERGOCALCIFEROL) 1,250 MCG (50,000 UNIT) CAPSULE    TAKE ONE CAPSULE BY MOUTH ONCE WEEKLY    FLASH GLUCOSE SCANNING READER (FREESTYLE BRENT 2 READER) [facility]    Use as directed to check blood sugar at least 3 times daily    FLASH GLUCOSE SENSOR (FREESTYLE BRENT 2 SENSOR) KIT    Apply 1 sensor every 14 days to check blood sugar as directed at least 3 times daily.     GLUCOSE BLOOD VI TEST STRIPS (ASCENSIA AUTODISC VI, ONE TOUCH ULTRA TEST VI) STRIP    Provide test strip and glucose meter that insurance will cover - Twice daily-  fasting in the morning and once two hours after a meal.    INSULIN GLARGINE (BASAGLAR KWIKPEN U-100 INSULIN) 100 UNIT/ML (3 ML) INPN    INJECT 50 UNITS UNDER THE SKIN DAILY    INSULIN NEEDLES, DISPOSABLE, 31 GAUGE X 5/16\" NDLE    Pen needles for Bydureon syringe    LANCETS MISC    Please provide lancets for blood glucose meter insurance will cover    METFORMIN (GLUCOPHAGE) 1,000 MG TABLET    TAKE ONE TABLET BY MOUTH TWICE A DAY WITH A MEAL    MULTIVITAMIN (ONE A DAY) TABLET    Take 1 Tab by mouth daily. SIMVASTATIN (ZOCOR) 20 MG TABLET    Take 1 Tab by mouth nightly. These Medications have changed    No medications on file   Stop Taking    PREGABALIN (LYRICA) 50 MG CAPSULE    TAKE ONE CAPSULE BY MOUTH THREE TIMES A DAY    TRAZODONE (DESYREL) 50 MG TABLET    TAKE ONE TABLET BY MOUTH ONCE NIGHTLY    TRULICITY 2.23 IQ/8.8 ML SUB-Q PEN    INJECT 0.75 MG UNDER THE SKIN ONCE WEEKLY     Disclaimer: Sections of this note are dictated using utilizing voice recognition software. Minor typographical errors may be present. If questions arise, please do not hesitate to contact me or call our department.

## 2021-09-04 NOTE — ED TRIAGE NOTES
Patient states constipation x one week. He states last bowel movement was this morning. He states small movement. Advises that he took an OTC laxative a few days ago.

## 2021-09-04 NOTE — DISCHARGE INSTRUCTIONS
Stay hydrated. Eat plenty of fruits and vegetables and fiber. Use MiraLAX, 2 capfuls a day until you have a bowel movement. Return to the emergency department for any new or worsening symptoms.   Follow-up with your primary care doctor

## 2021-09-21 ENCOUNTER — TELEPHONE (OUTPATIENT)
Dept: INTERNAL MEDICINE CLINIC | Age: 61
End: 2021-09-21

## 2021-09-21 NOTE — TELEPHONE ENCOUNTER
Pharmacy Progress Note - Telephone Encounter    S/O: Mr. Mauro Dawson 59 y.o. male, referred by Dr. Germán Gonzalez NP, was contacted via an outbound telephone call to discuss diabetes management today. Verified patients identifiers (name & ) per HIPAA policy.     - Patient states that blood sugars have been trending in the 160 - 250 range in the mornings  - He thinks this may be due to diet but is unsure   - He states that he has continued with his medications (including the Trulicity), but states that constipation has not gotten much better     A/P:  - Scheduled patient for in office visit on 10/4  - Will need to take a look at blood sugars/diet and develop plan to help patient get back on track  - May need to consider alternative medication to Trulicity if constipation does not improve. Patient expressed understanding.   - Patient endorses understanding to the provided information. All questions answered at this time. There are no discontinued medications. No orders of the defined types were placed in this encounter. Thank you,  Tejal Gastelum. SAVANAH Luong        For Pharmacy Admin Tracking Only     CPA in place:  Yes   Recommendation Provided To: Patient/Caregiver: 1 via Telephone   Intervention Detail: Scheduled Appointment   Gap Closed?: No   Intervention Accepted By: Patient/Caregiver: 1   Time Spent (min): 15

## 2021-10-01 DIAGNOSIS — E11.40 TYPE 2 DIABETES MELLITUS WITH DIABETIC NEUROPATHY, WITH LONG-TERM CURRENT USE OF INSULIN (HCC): ICD-10-CM

## 2021-10-01 DIAGNOSIS — Z79.4 TYPE 2 DIABETES MELLITUS WITH DIABETIC NEUROPATHY, WITH LONG-TERM CURRENT USE OF INSULIN (HCC): ICD-10-CM

## 2021-10-01 RX ORDER — DULAGLUTIDE 0.75 MG/.5ML
INJECTION, SOLUTION SUBCUTANEOUS
Qty: 2 ML | Refills: 0 | Status: SHIPPED | OUTPATIENT
Start: 2021-10-01 | End: 2021-11-01 | Stop reason: SDUPTHER

## 2021-10-21 ENCOUNTER — APPOINTMENT (OUTPATIENT)
Dept: GENERAL RADIOLOGY | Age: 61
End: 2021-10-21
Attending: EMERGENCY MEDICINE
Payer: COMMERCIAL

## 2021-10-21 ENCOUNTER — APPOINTMENT (OUTPATIENT)
Dept: CT IMAGING | Age: 61
End: 2021-10-21
Attending: EMERGENCY MEDICINE
Payer: COMMERCIAL

## 2021-10-21 ENCOUNTER — HOSPITAL ENCOUNTER (EMERGENCY)
Age: 61
Discharge: HOME | End: 2021-10-21
Attending: EMERGENCY MEDICINE
Payer: COMMERCIAL

## 2021-10-21 VITALS
HEIGHT: 69 IN | RESPIRATION RATE: 15 BRPM | BODY MASS INDEX: 26.66 KG/M2 | OXYGEN SATURATION: 100 % | DIASTOLIC BLOOD PRESSURE: 75 MMHG | WEIGHT: 180 LBS | SYSTOLIC BLOOD PRESSURE: 117 MMHG | HEART RATE: 72 BPM

## 2021-10-21 DIAGNOSIS — E86.0 DEHYDRATION: ICD-10-CM

## 2021-10-21 DIAGNOSIS — R41.82 ALTERED MENTAL STATUS, UNSPECIFIED ALTERED MENTAL STATUS TYPE: ICD-10-CM

## 2021-10-21 DIAGNOSIS — I63.81 LACUNAR INFARCTION (HCC): ICD-10-CM

## 2021-10-21 DIAGNOSIS — R73.9 HYPERGLYCEMIA: Primary | ICD-10-CM

## 2021-10-21 LAB
ADMINISTERED INITIALS, ADMINIT: NORMAL
ALBUMIN SERPL-MCNC: 3.5 G/DL (ref 3.4–5)
ALBUMIN/GLOB SERPL: 1.1 {RATIO} (ref 0.8–1.7)
ALP SERPL-CCNC: 182 U/L (ref 45–117)
ALT SERPL-CCNC: 42 U/L (ref 16–61)
AMPHET UR QL SCN: NEGATIVE
ANION GAP SERPL CALC-SCNC: 7 MMOL/L (ref 3–18)
APAP SERPL-MCNC: <2 UG/ML (ref 10–30)
APPEARANCE UR: CLEAR
APTT PPP: 23.6 SEC (ref 23–36.4)
AST SERPL-CCNC: 21 U/L (ref 10–38)
BARBITURATES UR QL SCN: NEGATIVE
BASOPHILS # BLD: 0 K/UL (ref 0–0.1)
BASOPHILS NFR BLD: 1 % (ref 0–2)
BENZODIAZ UR QL: NEGATIVE
BILIRUB SERPL-MCNC: <0.1 MG/DL (ref 0.2–1)
BILIRUB UR QL: NEGATIVE
BUN SERPL-MCNC: 18 MG/DL (ref 7–18)
BUN/CREAT SERPL: 13 (ref 12–20)
CALCIUM SERPL-MCNC: 9.1 MG/DL (ref 8.5–10.1)
CANNABINOIDS UR QL SCN: NEGATIVE
CHLORIDE SERPL-SCNC: 97 MMOL/L (ref 100–111)
CK MB CFR SERPL CALC: 3.3 % (ref 0–4)
CK MB SERPL-MCNC: 1.8 NG/ML (ref 5–25)
CK SERPL-CCNC: 55 U/L (ref 39–308)
CO2 SERPL-SCNC: 26 MMOL/L (ref 21–32)
COCAINE UR QL SCN: NEGATIVE
COLOR UR: YELLOW
CREAT SERPL-MCNC: 1.4 MG/DL (ref 0.6–1.3)
D50 ADMINISTERED, D50ADM: 0 ML
D50 ORDER, D50ORD: 0 ML
DIFFERENTIAL METHOD BLD: ABNORMAL
EOSINOPHIL # BLD: 0 K/UL (ref 0–0.4)
EOSINOPHIL NFR BLD: 1 % (ref 0–5)
ERYTHROCYTE [DISTWIDTH] IN BLOOD BY AUTOMATED COUNT: 13.3 % (ref 11.6–14.5)
EST. AVERAGE GLUCOSE BLD GHB EST-MCNC: ABNORMAL MG/DL
ETHANOL SERPL-MCNC: <3 MG/DL (ref 0–3)
GLOBULIN SER CALC-MCNC: 3.2 G/DL (ref 2–4)
GLUCOSE BLD STRIP.AUTO-MCNC: 134 MG/DL (ref 70–110)
GLUCOSE BLD STRIP.AUTO-MCNC: 235 MG/DL (ref 70–110)
GLUCOSE BLD STRIP.AUTO-MCNC: 295 MG/DL (ref 70–110)
GLUCOSE BLD STRIP.AUTO-MCNC: 367 MG/DL (ref 70–110)
GLUCOSE BLD STRIP.AUTO-MCNC: 533 MG/DL (ref 70–110)
GLUCOSE BLD STRIP.AUTO-MCNC: 539 MG/DL (ref 70–110)
GLUCOSE BLD STRIP.AUTO-MCNC: 590 MG/DL (ref 70–110)
GLUCOSE BLD STRIP.AUTO-MCNC: >600 MG/DL (ref 70–110)
GLUCOSE SERPL-MCNC: 565 MG/DL (ref 74–99)
GLUCOSE UR STRIP.AUTO-MCNC: >1000 MG/DL
GLUCOSE, GLC: 235 MG/DL
GLUCOSE, GLC: 295 MG/DL
GLUCOSE, GLC: 367 MG/DL
GLUCOSE, GLC: 590 MG/DL
HBA1C MFR BLD: >14 % (ref 4.2–5.6)
HCT VFR BLD AUTO: 41.2 % (ref 36–48)
HDSCOM,HDSCOM: NORMAL
HGB BLD-MCNC: 14.2 G/DL (ref 13–16)
HGB UR QL STRIP: NEGATIVE
HIGH TARGET, HITG: 180 MG/DL
INR PPP: 0.9 (ref 0.8–1.2)
INSULIN ADMINSTERED, INSADM: 10.6 UNITS/HOUR
INSULIN ADMINSTERED, INSADM: 3.1 UNITS/HOUR
INSULIN ADMINSTERED, INSADM: 4.7 UNITS/HOUR
INSULIN ADMINSTERED, INSADM: 5.3 UNITS/HOUR
INSULIN ORDER, INSORD: 10.6 UNITS/HOUR
INSULIN ORDER, INSORD: 3.1 UNITS/HOUR
INSULIN ORDER, INSORD: 4.7 UNITS/HOUR
INSULIN ORDER, INSORD: 5.3 UNITS/HOUR
KETONES UR QL STRIP.AUTO: 15 MG/DL
LEUKOCYTE ESTERASE UR QL STRIP.AUTO: NEGATIVE
LOW TARGET, LOT: 140 MG/DL
LYMPHOCYTES # BLD: 1.3 K/UL (ref 0.9–3.6)
LYMPHOCYTES NFR BLD: 30 % (ref 21–52)
MAGNESIUM SERPL-MCNC: 2.3 MG/DL (ref 1.6–2.6)
MCH RBC QN AUTO: 29.1 PG (ref 24–34)
MCHC RBC AUTO-ENTMCNC: 34.5 G/DL (ref 31–37)
MCV RBC AUTO: 84.4 FL (ref 78–100)
METHADONE UR QL: NEGATIVE
MINUTES UNTIL NEXT BG, NBG: 60 MIN
MONOCYTES # BLD: 0.3 K/UL (ref 0.05–1.2)
MONOCYTES NFR BLD: 7 % (ref 3–10)
MULTIPLIER, MUL: 0.01
MULTIPLIER, MUL: 0.02
MULTIPLIER, MUL: 0.02
MULTIPLIER, MUL: 0.03
NEUTS SEG # BLD: 2.7 K/UL (ref 1.8–8)
NEUTS SEG NFR BLD: 62 % (ref 40–73)
NITRITE UR QL STRIP.AUTO: NEGATIVE
OPIATES UR QL: NEGATIVE
ORDER INITIALS, ORDINIT: NORMAL
PCP UR QL: NEGATIVE
PH BLDV: 7.34 [PH] (ref 7.32–7.42)
PH UR STRIP: 5.5 [PH] (ref 5–8)
PHOSPHATE SERPL-MCNC: 3.7 MG/DL (ref 2.5–4.9)
PLATELET # BLD AUTO: 218 K/UL (ref 135–420)
PMV BLD AUTO: 12 FL (ref 9.2–11.8)
POTASSIUM SERPL-SCNC: 4.6 MMOL/L (ref 3.5–5.5)
PROT SERPL-MCNC: 6.7 G/DL (ref 6.4–8.2)
PROT UR STRIP-MCNC: NEGATIVE MG/DL
PROTHROMBIN TIME: 11.7 SEC (ref 11.5–15.2)
RBC # BLD AUTO: 4.88 M/UL (ref 4.35–5.65)
SALICYLATES SERPL-MCNC: 2 MG/DL (ref 2.8–20)
SODIUM SERPL-SCNC: 130 MMOL/L (ref 136–145)
SP GR UR REFRACTOMETRY: >1.03 (ref 1–1.03)
TROPONIN I SERPL-MCNC: 0.03 NG/ML (ref 0–0.04)
UROBILINOGEN UR QL STRIP.AUTO: 0.2 EU/DL (ref 0.2–1)
WBC # BLD AUTO: 4.3 K/UL (ref 4.6–13.2)

## 2021-10-21 PROCEDURE — 96365 THER/PROPH/DIAG IV INF INIT: CPT

## 2021-10-21 PROCEDURE — 81003 URINALYSIS AUTO W/O SCOPE: CPT

## 2021-10-21 PROCEDURE — 96366 THER/PROPH/DIAG IV INF ADDON: CPT

## 2021-10-21 PROCEDURE — 82962 GLUCOSE BLOOD TEST: CPT

## 2021-10-21 PROCEDURE — 80179 DRUG ASSAY SALICYLATE: CPT

## 2021-10-21 PROCEDURE — 74011000258 HC RX REV CODE- 258: Performed by: EMERGENCY MEDICINE

## 2021-10-21 PROCEDURE — 80307 DRUG TEST PRSMV CHEM ANLYZR: CPT

## 2021-10-21 PROCEDURE — 82077 ASSAY SPEC XCP UR&BREATH IA: CPT

## 2021-10-21 PROCEDURE — 84100 ASSAY OF PHOSPHORUS: CPT

## 2021-10-21 PROCEDURE — 80143 DRUG ASSAY ACETAMINOPHEN: CPT

## 2021-10-21 PROCEDURE — 74011636637 HC RX REV CODE- 636/637: Performed by: EMERGENCY MEDICINE

## 2021-10-21 PROCEDURE — 82800 BLOOD PH: CPT

## 2021-10-21 PROCEDURE — 85610 PROTHROMBIN TIME: CPT

## 2021-10-21 PROCEDURE — 99285 EMERGENCY DEPT VISIT HI MDM: CPT

## 2021-10-21 PROCEDURE — 71045 X-RAY EXAM CHEST 1 VIEW: CPT

## 2021-10-21 PROCEDURE — 85730 THROMBOPLASTIN TIME PARTIAL: CPT

## 2021-10-21 PROCEDURE — 83735 ASSAY OF MAGNESIUM: CPT

## 2021-10-21 PROCEDURE — 85025 COMPLETE CBC W/AUTO DIFF WBC: CPT

## 2021-10-21 PROCEDURE — 82553 CREATINE MB FRACTION: CPT

## 2021-10-21 PROCEDURE — 70450 CT HEAD/BRAIN W/O DYE: CPT

## 2021-10-21 PROCEDURE — 83036 HEMOGLOBIN GLYCOSYLATED A1C: CPT

## 2021-10-21 PROCEDURE — 80053 COMPREHEN METABOLIC PANEL: CPT

## 2021-10-21 RX ORDER — MAGNESIUM SULFATE 100 %
4 CRYSTALS MISCELLANEOUS AS NEEDED
Status: DISCONTINUED | OUTPATIENT
Start: 2021-10-21 | End: 2021-10-21 | Stop reason: HOSPADM

## 2021-10-21 RX ORDER — DEXTROSE 50 % IN WATER (D50W) INTRAVENOUS SYRINGE
25-50 AS NEEDED
Status: DISCONTINUED | OUTPATIENT
Start: 2021-10-21 | End: 2021-10-21 | Stop reason: HOSPADM

## 2021-10-21 RX ORDER — SODIUM CHLORIDE 9 MG/ML
125 INJECTION, SOLUTION INTRAVENOUS CONTINUOUS
Status: DISCONTINUED | OUTPATIENT
Start: 2021-10-21 | End: 2021-10-21 | Stop reason: HOSPADM

## 2021-10-21 RX ADMIN — SODIUM CHLORIDE 10.8 UNITS/HR: 9 INJECTION, SOLUTION INTRAVENOUS at 15:22

## 2021-10-21 NOTE — DISCHARGE INSTRUCTIONS
Please make sure to follow your diabetic diet and use your insulin as directed. Your symptoms today are likely related to poor control of your sugars. In addition your CAT scan shows you have had a stroke in the past which is likely due to the fact that your blood pressure has not been well controlled at times. Please make sure to follow-up closely with your primary doctor in the next 24 hours to discuss these results. Admitted

## 2021-10-21 NOTE — ED NOTES
Family called stating that patient was confused and weak. Patient confusion was intermittent according to family.  Upon EMS arrival patient was noted to have a Glucose reading that read HI

## 2021-10-21 NOTE — ED PROVIDER NOTES
EMERGENCY DEPARTMENT HISTORY AND PHYSICAL EXAM    1:19 PM      Date: 10/21/2021  Patient Name: Claire Slater    History of Presenting Illness     Chief Complaint   Patient presents with    High Blood Sugar         History Provided By: Patient  Location/Duration/Severity/Modifying factors   Patient is a 39-year-old male with a history of insulin-dependent diabetes, poor compliance with his diet, alcohol use, chronic pain, hypertension, coronary disease, migraine headache, smoking, the presents emergency department with a complaint of 1 week of not feeling well. The patient's history is primarily provided by his girlfriend and his daughter. The patient for the last week he has not been able to work and has not clear why he is not working according to the family. When he is not working the past has been related to a health issue however is not very open about his health. Over the last week the patient has been feeling bad and then today was complaining of a headache and was confused according to the girlfriend. The patient drinks alcohol regularly and today said he wanted to come home from [place] and did not know where he was. This is unusual for him. The patient the emergency department is without complaints. The patient admits to being a smoker, occasional drinker, denies any drug use. The patient works as a heavy . There are no other known aggravating or alleviating factors. The patient's family is also noted over the past 2 months he has had progressive weight loss and has been drinking a lot of water.           PCP: Nanda Miranda NP    Current Facility-Administered Medications   Medication Dose Route Frequency Provider Last Rate Last Admin    sodium chloride 0.9 % bolus infusion 1,000 mL  1,000 mL IntraVENous ONCE Judge Sidney MD        0.9% sodium chloride infusion  125 mL/hr IntraVENous CONTINUOUS Judge Sidney MD        sodium chloride 0.9 % bolus infusion 1,000 mL  1,000 mL IntraVENous ONCE Eva Tong MD        glucose chewable tablet 16 g  4 Tablet Oral PRN Lisandro Amanda MD        glucagon (GLUCAGEN) injection 1 mg  1 mg IntraMUSCular PRN Lisandro Amanda MD        dextrose (D50W) injection syrg 12.5-25 g  25-50 mL IntraVENous PRN Lisandro Amanda MD        insulin regular (Linda Money, HUMULIN) 100 units/100 ml NS infusion (premix)  0-50 Units/hr IntraVENous TITRATE Lisandro Amanda MD 4.7 mL/hr at 10/21/21 1809 4.7 Units/hr at 10/21/21 1809     Current Outpatient Medications   Medication Sig Dispense Refill    traZODone (DESYREL) 50 mg tablet TAKE ONE TABLET BY MOUTH ONCE NIGHTLY 30 Tablet 0    Trulicity 3.48 BV/8.0 mL sub-q pen INJECT 0.75 MG UNDER THE SKIN ONCE WEEKLY 2 mL 0    ergocalciferol (ERGOCALCIFEROL) 1,250 mcg (50,000 unit) capsule TAKE ONE CAPSULE BY MOUTH ONCE WEEKLY 4 Capsule 6    insulin glargine (Basaglar KwikPen U-100 Insulin) 100 unit/mL (3 mL) inpn INJECT 50 UNITS UNDER THE SKIN DAILY 5 Adjustable Dose Pre-filled Pen Syringe 1    buPROPion SR (WELLBUTRIN SR) 150 mg SR tablet ONE TABLET BY MOUTH TWICE A DAY 60 Tab 0    metFORMIN (GLUCOPHAGE) 1,000 mg tablet TAKE ONE TABLET BY MOUTH TWICE A DAY WITH A MEAL 180 Tab 2    simvastatin (Zocor) 20 mg tablet Take 1 Tab by mouth nightly. 90 Tab 3    Insulin Needles, Disposable, 31 gauge x 5/16\" ndle Pen needles for Bydureon syringe 1 Package 11    flash glucose scanning reader (FreeStyle Ezio 2 Gordon) misc Use as directed to check blood sugar at least 3 times daily 1 Each 0    flash glucose sensor (FreeStyle Ezio 2 Sensor) kit Apply 1 sensor every 14 days to check blood sugar as directed at least 3 times daily.  2 Kit 5    Blood-Glucose Meter monitoring kit Blood glucose kit that insurance will cover - Check blood glucose twice a day once in the morning fasting and once after a meal. 1 Kit 0    glucose blood VI test strips (ASCENSIA AUTODISC VI, ONE TOUCH ULTRA TEST VI) strip Provide test strip and glucose meter that insurance will cover - Twice daily-  fasting in the morning and once two hours after a meal. 100 Strip 10    lancets misc Please provide lancets for blood glucose meter insurance will cover 1 Package 11    multivitamin (ONE A DAY) tablet Take 1 Tab by mouth daily. 80 Tab 3       Past History     Past Medical History:  Past Medical History:   Diagnosis Date    Alcohol use     Fri-Sun one fifth; Mon-Thur: Pint of liquor    CAD (coronary artery disease)     Cardiac angioplasty with stent 07/29/2009    2.5 x 13 Cypher stent to CX.  Cardiac cath 11/09/2011    RCA patent. LM patent. LAD patent. mD1 55%. CX patent. Prior stent patent. Edison 85% (2.5 x 15-mm Xience stent, resid 0%). LVEDP 12. EF 40-45%. High lateral hypk (RI distribution).       Cardiac inferior STEMI 2009    Constipation     Current smoker     contemplating stopping    Diabetes ([facility] [address]     type 2-insulin dependent    GERD (gastroesophageal reflux disease)     HTN (hypertension)     Hyperlipidemia     Migraine     Myocardial infarction ([facility] [address]     Vitamin D deficiency        Past Surgical History:  Past Surgical History:   Procedure Laterality Date    COLONOSCOPY N/A 10/28/2020    COLONOSCOPY w/polypectomy performed by [facility] Walker [facility] at [facility] ENDOSCOPY    HX [address]  07/29/2009    HX HEART CATHETERIZATION  8/30/2011    HX HEART CATHETERIZATION  11/09/2011    HX WISDOM TEETH EXTRACTION      x4       Family History:  Family History   Problem Relation Age of Onset    Hypertension Mother     Heart Attack Mother     Diabetes Mother     Hypertension Father     Heart Attack Father     Diabetes Father     Diabetes Sister     Hypertension Sister     Stroke Sister     Diabetes Brother     Hypertension Brother     Stroke Brother     No Known Problems Maternal Grandmother     No Known Problems Maternal Grandfather     No Known Problems Paternal Grandmother     No Known Problems Paternal Grandfather     No Known Problems Brother     Heart Disease Other     Kidney Disease Other        Social History:  Social History     Tobacco Use    Smoking status: Current Every Day Smoker     Packs/day: 0.50     Years: 1.00     Pack years: 0.50     Types: Cigarettes    Smokeless tobacco: Never Used   Substance Use Topics    Alcohol use: Yes     Alcohol/week: 4.2 standard drinks     Types: 5 Shots of liquor per week     Comment: occ    Drug use: Not Currently     Types: Marijuana, Cocaine     Comment: none       Allergies: Allergies   Allergen Reactions    Niacin Itching         Review of Systems       Review of Systems   Constitutional: Positive for activity change and unexpected weight change. Negative for fatigue and fever. HENT: Negative for congestion and rhinorrhea. Eyes: Negative for visual disturbance. Respiratory: Negative for shortness of breath. Cardiovascular: Negative for chest pain and palpitations. Gastrointestinal: Negative for abdominal pain, diarrhea, nausea and vomiting. Genitourinary: Negative for dysuria and hematuria. Musculoskeletal: Negative for back pain. Skin: Negative for rash. Neurological: Positive for light-headedness. Negative for dizziness and weakness. Psychiatric/Behavioral: Positive for confusion. All other systems reviewed and are negative. Physical Exam     Visit Vitals  /79   Pulse 74   Resp 18   SpO2 100%         Physical Exam  Vitals and nursing note reviewed. Constitutional:       General: He is not in acute distress. Appearance: He is well-developed. Comments: Thin   HENT:      Head: Normocephalic and atraumatic. Right Ear: External ear normal.      Left Ear: External ear normal.      Nose: Nose normal.   Eyes:      General: No scleral icterus.      Conjunctiva/sclera: Conjunctivae normal.      Pupils: Pupils are equal, round, and reactive to light.   Neck:      Thyroid: No thyromegaly. Vascular: No JVD. Trachea: No tracheal deviation. Cardiovascular:      Rate and Rhythm: Normal rate and regular rhythm. Heart sounds: Normal heart sounds. No murmur heard. No friction rub. No gallop. Pulmonary:      Effort: Pulmonary effort is normal.      Breath sounds: Normal breath sounds. Chest:      Chest wall: No tenderness. Abdominal:      General: Bowel sounds are normal. There is no distension. Palpations: Abdomen is soft. Tenderness: There is no abdominal tenderness. There is no guarding or rebound. Musculoskeletal:         General: No tenderness. Normal range of motion. Cervical back: Normal range of motion and neck supple. Lymphadenopathy:      Cervical: No cervical adenopathy. Skin:     General: Skin is warm and dry. Neurological:      Mental Status: He is alert. Cranial Nerves: No cranial nerve deficit.       Coordination: Coordination normal.      Comments: Oriented x 2, follows commands, gait not observed   Psychiatric:      Comments: Supportive girlfriend and daughter at bedside            Diagnostic Study Results     Labs -  Recent Results (from the past 12 hour(s))   GLUCOSE, POC    Collection Time: 10/21/21 12:46 PM   Result Value Ref Range    Glucose (POC) 533 (HH) 70 - 110 mg/dL   GLUCOSE, POC    Collection Time: 10/21/21 12:48 PM   Result Value Ref Range    Glucose (POC) 539 (HH) 70 - 110 mg/dL   GLUCOSE, POC    Collection Time: 10/21/21  1:08 PM   Result Value Ref Range    Glucose (POC) 590 (HH) 70 - 110 mg/dL   GLUCOSE, POC    Collection Time: 10/21/21  1:11 PM   Result Value Ref Range    Glucose (POC) >600 (HH) 70 - 110 mg/dL   CBC WITH AUTOMATED DIFF    Collection Time: 10/21/21  1:14 PM   Result Value Ref Range    WBC 4.3 (L) 4.6 - 13.2 K/uL    RBC 4.88 4.35 - 5.65 M/uL    HGB 14.2 13.0 - 16.0 g/dL    HCT 41.2 36.0 - 48.0 %    MCV 84.4 78.0 - 100.0 FL    MCH 29.1 24.0 - 34.0 PG    MCHC 34.5 31.0 - 37.0 g/dL    RDW 13.3 11.6 - 14.5 %    PLATELET 945 253 - 645 K/uL    MPV 12.0 (H) 9.2 - 11.8 FL    NEUTROPHILS 62 40 - 73 %    LYMPHOCYTES 30 21 - 52 %    MONOCYTES 7 3 - 10 %    EOSINOPHILS 1 0 - 5 %    BASOPHILS 1 0 - 2 %    ABS. NEUTROPHILS 2.7 1.8 - 8.0 K/UL    ABS. LYMPHOCYTES 1.3 0.9 - 3.6 K/UL    ABS. MONOCYTES 0.3 0.05 - 1.2 K/UL    ABS. EOSINOPHILS 0.0 0.0 - 0.4 K/UL    ABS. BASOPHILS 0.0 0.0 - 0.1 K/UL    DF AUTOMATED     METABOLIC PANEL, COMPREHENSIVE    Collection Time: 10/21/21  1:14 PM   Result Value Ref Range    Sodium 130 (L) 136 - 145 mmol/L    Potassium 4.6 3.5 - 5.5 mmol/L    Chloride 97 (L) 100 - 111 mmol/L    CO2 26 21 - 32 mmol/L    Anion gap 7 3.0 - 18 mmol/L    Glucose 565 (HH) 74 - 99 mg/dL    BUN 18 7.0 - 18 MG/DL    Creatinine 1.40 (H) 0.6 - 1.3 MG/DL    BUN/Creatinine ratio 13 12 - 20      GFR est AA >60 >60 ml/min/1.73m2    GFR est non-AA 51 (L) >60 ml/min/1.73m2    Calcium 9.1 8.5 - 10.1 MG/DL    Bilirubin, total <0.1 (L) 0.2 - 1.0 MG/DL    ALT (SGPT) 42 16 - 61 U/L    AST (SGOT) 21 10 - 38 U/L    Alk.  phosphatase 182 (H) 45 - 117 U/L    Protein, total 6.7 6.4 - 8.2 g/dL    Albumin 3.5 3.4 - 5.0 g/dL    Globulin 3.2 2.0 - 4.0 g/dL    A-G Ratio 1.1 0.8 - 1.7     PH, VENOUS    Collection Time: 10/21/21  1:14 PM   Result Value Ref Range    VENOUS PH 7.34 7.32 - 7.42     PROTHROMBIN TIME + INR    Collection Time: 10/21/21  1:14 PM   Result Value Ref Range    Prothrombin time 11.7 11.5 - 15.2 sec    INR 0.9 0.8 - 1.2     PTT    Collection Time: 10/21/21  1:14 PM   Result Value Ref Range    aPTT 23.6 23.0 - 36.4 SEC   MAGNESIUM    Collection Time: 10/21/21  1:14 PM   Result Value Ref Range    Magnesium 2.3 1.6 - 2.6 mg/dL   PHOSPHORUS    Collection Time: 10/21/21  1:14 PM   Result Value Ref Range    Phosphorus 3.7 2.5 - 4.9 MG/DL   CARDIAC PANEL,(CK, CKMB & TROPONIN)    Collection Time: 10/21/21  1:14 PM   Result Value Ref Range    CK - MB 1.8 <3.6 ng/ml    CK-MB Index 3.3 0.0 - 4.0 % CK 55 39 - 308 U/L    Troponin-I, QT 0.03 0.0 - 4.682 NG/ML   SALICYLATE    Collection Time: 10/21/21  1:14 PM   Result Value Ref Range    Salicylate level 2.0 (L) 2.8 - 20.0 MG/DL   ETHYL ALCOHOL    Collection Time: 10/21/21  1:14 PM   Result Value Ref Range    ALCOHOL(ETHYL),SERUM <3 0 - 3 MG/DL   HEMOGLOBIN A1C WITH EAG    Collection Time: 10/21/21  1:14 PM   Result Value Ref Range    Hemoglobin A1c >14.0 (H) 4.2 - 5.6 %    Est. average glucose Cannot be calculated mg/dL   ACETAMINOPHEN    Collection Time: 10/21/21  1:14 PM   Result Value Ref Range    Acetaminophen level <2 (L) 10.0 - 30.0 ug/mL   URINALYSIS W/ RFLX MICROSCOPIC    Collection Time: 10/21/21  1:30 PM   Result Value Ref Range    Color YELLOW      Appearance CLEAR      Specific gravity >1.030 (H) 1.005 - 1.030    pH (UA) 5.5 5.0 - 8.0      Protein Negative NEG mg/dL    Glucose >1,000 (A) NEG mg/dL    Ketone 15 (A) NEG mg/dL    Bilirubin Negative NEG      Blood Negative NEG      Urobilinogen 0.2 0.2 - 1.0 EU/dL    Nitrites Negative NEG      Leukocyte Esterase Negative NEG     DRUG SCREEN, URINE    Collection Time: 10/21/21  1:30 PM   Result Value Ref Range    BENZODIAZEPINES Negative NEG      BARBITURATES Negative NEG      THC (TH-CANNABINOL) Negative NEG      OPIATES Negative NEG      PCP(PHENCYCLIDINE) Negative NEG      COCAINE Negative NEG      AMPHETAMINES Negative NEG      METHADONE Negative NEG      HDSCOM (NOTE)    GLUCOSTABILIZER    Collection Time: 10/21/21  3:33 PM   Result Value Ref Range    Glucose 590 mg/dL    Insulin order 10.6 units/hour    Insulin adminstered 10.6 units/hour    Multiplier 0.020     Low target 140 mg/dL    High target 180 mg/dL    D50 order 0.0 ml    D50 administered 0.00 ml    Minutes until next BG 60 min    Order initials csa     Administered initials csa    GLUCOSE, POC    Collection Time: 10/21/21  5:02 PM   Result Value Ref Range    Glucose (POC) 367 (H) 70 - 110 mg/dL   GLUCOSTABILIZER    Collection Time: 10/21/21  5:08 PM   Result Value Ref Range    Glucose 367 mg/dL    Insulin order 3.1 units/hour    Insulin adminstered 3.1 units/hour    Multiplier 0.010     Low target 140 mg/dL    High target 180 mg/dL    D50 order 0.0 ml    D50 administered 0.00 ml    Minutes until next BG 60 min    Order initials hlb     Administered initials hlb    GLUCOSE, POC    Collection Time: 10/21/21  6:06 PM   Result Value Ref Range    Glucose (POC) 295 (H) 70 - 110 mg/dL   GLUCOSTABILIZER    Collection Time: 10/21/21  6:07 PM   Result Value Ref Range    Glucose 295 mg/dL    Insulin order 4.7 units/hour    Insulin adminstered 4.7 units/hour    Multiplier 0.020     Low target 140 mg/dL    High target 180 mg/dL    D50 order 0.0 ml    D50 administered 0.00 ml    Minutes until next BG 60 min    Order initials hlb     Administered initials hlb        Radiologic Studies -   CT HEAD WO CONT   Final Result      No acute intracranial abnormality. Note: If clinical concern of acute stroke, MRI with diffusion weighted images is   recommended for better evaluation. Mild progression of microvascular ischemic disease. Small lacunar infarct in right caudate nucleus. Thank you for your referral.      XR CHEST SNGL V   Final Result      No acute cardiopulmonary abnormality. Kevin Peña MD    R1 Radiology Resident      All findings discussed with attending radiologist. I have personally reviewed   all images and agree with the interpretation above. Medical Decision Making   I am the first provider for this patient. I reviewed the vital signs, available nursing notes, past medical history, past surgical history, family history and social history. Vital Signs-Reviewed the patient's vital signs. Records Reviewed: Nursing Notes, Old Medical Records, Previous Radiology Studies and Previous Laboratory Studies (Time of Review: 1:19 PM)    ED Course: Progress Notes, Reevaluation, and Consults:      The patient's blood sugar is much better and the patient is alert and oriented and the patient's girlfriend at the bedside and said he is at his baseline. I suspect his hyperglycemia caused some encephalopathy which is improved. The patient is insisting on going home and is hungry and would like to leave. I do not think that is unreasonable based on his work-up as there is no clear evidence of diabetic ketoacidosis and the patient will follow closely with his primary doctor tomorrow and return if at all worsened or concerned. The patient has insulin at home and I told him to take it as directed. In addition we discussed improving his diet and compliance with his medications. The patient takes an aspirin a day and I discussed with him the findings of lacunar infarct. He understands that he needs to make sure he takes his blood pressure medication and to take his aspirin daily and discussed this with his primary doctor. The patient's exam is nonfocal at this time and do not suspect acute stroke during this evaluation. The patient will return if at all worsened or concerned. Workup and recommendations were reviewed with the patient and all questions were answered. The patient understands the plan and will proceed with close outpatient care. I have encouraged the patient to return if at all worsened or concerned. Piper Meza DO 7:29 PM      Provider Notes (Medical Decision Making):   MDM  Number of Diagnoses or Management Options  Diagnosis management comments: Patient is a 78-year-old male with a history of diabetes, alcohol use, hypertension, coronary disease, migraine headache, the presents emergency department with a complaint of 2 months of progressive weight loss, elevated blood sugar, and now altered mental status.   Suspect the patient has diabetes related complication and will start IV hydration, lowering glucose of his potassium is reassuring, CT his head, cardiac labs, electrolytes, and then reevaluate. Barry Pritchett DO 1:26 PM        Procedures        Diagnosis     Clinical Impression:   1. Hyperglycemia    2. Dehydration    3. Altered mental status, unspecified altered mental status type    4. Lacunar infarction ([facility] [address]        Disposition: DC    Follow-up Information    None          Patient's Medications   Start Taking    No medications on file   Continue Taking    BLOOD-GLUCOSE METER MONITORING KIT    Blood glucose kit that insurance will cover - Check blood glucose twice a day once in the morning fasting and once after a meal.    BUPROPION SR (WELLBUTRIN SR) 150 MG SR TABLET    ONE TABLET BY MOUTH TWICE A DAY    ERGOCALCIFEROL (ERGOCALCIFEROL) 1,250 MCG (50,000 UNIT) CAPSULE    TAKE ONE CAPSULE BY MOUTH ONCE WEEKLY    FLASH GLUCOSE SCANNING READER (FREESTYLE BRENT 2 READER) MISC    Use as directed to check blood sugar at least 3 times daily    FLASH GLUCOSE SENSOR (FREESTYLE BRENT 2 SENSOR) KIT    Apply 1 sensor every 14 days to check blood sugar as directed at least 3 times daily. GLUCOSE BLOOD VI TEST STRIPS (ASCENSIA AUTODISC VI, ONE TOUCH ULTRA TEST VI) STRIP    Provide test strip and glucose meter that insurance will cover - Twice daily-  fasting in the morning and once two hours after a meal.    INSULIN GLARGINE (BASAGLAR KWIKPEN U-100 INSULIN) 100 UNIT/ML (3 ML) INPN    INJECT 50 UNITS UNDER THE SKIN DAILY    INSULIN NEEDLES, DISPOSABLE, 31 GAUGE X 5/16\" NDLE    Pen needles for Bydureon syringe    LANCETS MISC    Please provide lancets for blood glucose meter insurance will cover    METFORMIN (GLUCOPHAGE) 1,000 MG TABLET    TAKE ONE TABLET BY MOUTH TWICE A DAY WITH A MEAL    MULTIVITAMIN (ONE A DAY) TABLET    Take 1 Tab by mouth daily. SIMVASTATIN (ZOCOR) 20 MG TABLET    Take 1 Tab by mouth nightly.     TRAZODONE (DESYREL) 50 MG TABLET    TAKE ONE TABLET BY MOUTH ONCE NIGHTLY    TRULICITY 0.82 VE/0.8 ML SUB-Q PEN    INJECT 0.75 MG UNDER THE SKIN ONCE WEEKLY   These Medications have changed    No medications on file   Stop Taking    No medications on file     Disclaimer: Sections of this note are dictated using utilizing voice recognition software. Minor typographical errors may be present. If questions arise, please do not hesitate to contact me or call our department.

## 2021-10-21 NOTE — DIABETES MGMT
Diabetes Plan of Care    Recommendations:  1.) follow insulin infusion protocol via GlucoStabilizer. 2.) follow criteria for transition to SC insulin:  Stable/no increase in blood glucose values for at least 4 hours. Stable/no increase in insulin drip requirement for at least 4 hours. 3.) administer first dose of lantus insulin at least 2 hours before d/c insulin drip. 4.) add correctional lispro insulin once patient is off [facility]. 5.) consider clear liquid with no concentrated sweets while on GlucoStabilizer, if not contraindicated. 10/21: Seen patient in ED. He was asking for food/drink. Patient stated that he doesn't feel sick in his stomach. Noted he was admitted with report of confusion and hyperglycemia. Patient is currently on regular insulin drip via GlucoStabilizer. Discussed with nursing the importance of hourly blood glucose checks in order to adjust insulin drip rate. Glycemic Assessment:    History: T2DM   A1c >14.0% (10/21/2021)  10/21: Pending full assessment of home diabetes management and educational needs when patient is able to participate  Home diabetes medications: Unverified:  Basaglar (lantus) pen insulin 3 units daily  Metformin 1000 mg 2 times daily  Trulicity 9.17 mg SC injection every 7 days    Results for Bill Sabillon (MRN [id number]) as of 10/21/2021 17:18   Ref. Range 10/21/2021 12:46 10/21/2021 12:48 10/21/2021 13:08 10/21/2021 13:11 10/21/2021 17:02   GLUCOSE,FAST - POC Latest Ref Range: 70 - 110 mg/dL 533 (HH) 539 (HH) 590 (HH) >600 (HH) 367 (H)       IVF containing dextrose: NPO  Steroids: NPO  Diet:  NPO    Most recent blood glucose values: Within target range (non-ICU: <140; ICU<180):  No.    Current A1c >14.0% (10/21/2021) which is equivalent to estimated average blood glucose of 360 mg/dL during the past 2-3 months    Adequate glycemic control PTA:  No.    Current insulin regimen:  Regular insulin drip via GlucoStabilizer. [facility].  Drip rate at time of review: 3.1 units/hr    TDD previous day N/A.  Patient admitted 10/21/2021    Goals: Blood glucose will be within target of 70 - 180 mg/dl by: 10/24/2021    Education:    _____ Refer to Diabetes Education Record  _____ Education not indicated at this time     Lashell Trujillo RN [facility]  Pager: [phone]

## 2021-11-01 ENCOUNTER — OFFICE VISIT (OUTPATIENT)
Dept: FAMILY MEDICINE CLINIC | Age: 61
End: 2021-11-01
Payer: COMMERCIAL

## 2021-11-01 VITALS
OXYGEN SATURATION: 100 % | BODY MASS INDEX: 23.85 KG/M2 | SYSTOLIC BLOOD PRESSURE: 125 MMHG | TEMPERATURE: 98.3 F | HEART RATE: 80 BPM | DIASTOLIC BLOOD PRESSURE: 84 MMHG | WEIGHT: 161 LBS | RESPIRATION RATE: 17 BRPM | HEIGHT: 69 IN

## 2021-11-01 DIAGNOSIS — G47.00 INSOMNIA, UNSPECIFIED TYPE: ICD-10-CM

## 2021-11-01 DIAGNOSIS — R53.83 FATIGUE, UNSPECIFIED TYPE: ICD-10-CM

## 2021-11-01 DIAGNOSIS — E55.9 VITAMIN D DEFICIENCY: ICD-10-CM

## 2021-11-01 DIAGNOSIS — E11.40 TYPE 2 DIABETES MELLITUS WITH DIABETIC NEUROPATHY, WITH LONG-TERM CURRENT USE OF INSULIN (HCC): Primary | ICD-10-CM

## 2021-11-01 DIAGNOSIS — Z78.9 ALCOHOL USE: ICD-10-CM

## 2021-11-01 DIAGNOSIS — Z79.4 TYPE 2 DIABETES MELLITUS WITH DIABETIC NEUROPATHY, WITH LONG-TERM CURRENT USE OF INSULIN (HCC): Primary | ICD-10-CM

## 2021-11-01 DIAGNOSIS — F32.A MILD DEPRESSION: ICD-10-CM

## 2021-11-01 DIAGNOSIS — E78.00 HYPERCHOLESTEROLEMIA: ICD-10-CM

## 2021-11-01 PROCEDURE — 99214 OFFICE O/P EST MOD 30 MIN: CPT | Performed by: NURSE PRACTITIONER

## 2021-11-01 PROCEDURE — 3051F HG A1C>EQUAL 7.0%<8.0%: CPT | Performed by: NURSE PRACTITIONER

## 2021-11-01 RX ORDER — ERGOCALCIFEROL 1.25 MG/1
CAPSULE ORAL
Qty: 4 CAPSULE | Refills: 6 | Status: SHIPPED
Start: 2021-11-01 | End: 2022-03-02

## 2021-11-01 RX ORDER — LANCETS
EACH MISCELLANEOUS
Qty: 100 LANCET | Refills: 3 | Status: SHIPPED | OUTPATIENT
Start: 2021-11-01 | End: 2022-02-07

## 2021-11-01 RX ORDER — SIMVASTATIN 20 MG/1
20 TABLET, FILM COATED ORAL
Qty: 90 TABLET | Refills: 1 | Status: SHIPPED | OUTPATIENT
Start: 2021-11-01

## 2021-11-01 RX ORDER — BISMUTH SUBSALICYLATE 262 MG
1 TABLET,CHEWABLE ORAL DAILY
Qty: 90 TABLET | Refills: 3 | Status: SHIPPED | OUTPATIENT
Start: 2021-11-01

## 2021-11-01 RX ORDER — DULAGLUTIDE 0.75 MG/.5ML
INJECTION, SOLUTION SUBCUTANEOUS
Qty: 2 ML | Refills: 0 | Status: SHIPPED | OUTPATIENT
Start: 2021-11-01 | End: 2021-12-27

## 2021-11-01 RX ORDER — FLASH GLUCOSE SCANNING READER
EACH MISCELLANEOUS
Qty: 1 EACH | Refills: 0 | Status: SHIPPED | OUTPATIENT
Start: 2021-11-01 | End: 2022-02-07

## 2021-11-01 RX ORDER — METFORMIN HYDROCHLORIDE 1000 MG/1
TABLET ORAL
Qty: 180 TABLET | Refills: 2 | Status: SHIPPED | OUTPATIENT
Start: 2021-11-01

## 2021-11-01 RX ORDER — PEN NEEDLE, DIABETIC 30 GX3/16"
NEEDLE, DISPOSABLE MISCELLANEOUS
Qty: 100 EACH | Refills: 0 | Status: SHIPPED | OUTPATIENT
Start: 2021-11-01 | End: 2022-02-07

## 2021-11-01 RX ORDER — INSULIN GLARGINE 100 [IU]/ML
INJECTION, SOLUTION SUBCUTANEOUS
Qty: 5 ADJUSTABLE DOSE PRE-FILLED PEN SYRINGE | Refills: 1 | Status: ON HOLD | OUTPATIENT
Start: 2021-11-01 | End: 2021-12-01 | Stop reason: SDUPTHER

## 2021-11-01 RX ORDER — FLASH GLUCOSE SENSOR
KIT MISCELLANEOUS
Qty: 2 KIT | Refills: 5 | Status: SHIPPED | OUTPATIENT
Start: 2021-11-01

## 2021-11-01 RX ORDER — TRAZODONE HYDROCHLORIDE 50 MG/1
50 TABLET ORAL
Qty: 90 TABLET | Refills: 1 | Status: ON HOLD
Start: 2021-11-01 | End: 2022-03-01 | Stop reason: CLARIF

## 2021-11-01 NOTE — PROGRESS NOTES
HPI  Rosalia Quinn is a 59 y.o. male  Chief Complaint   Patient presents with   [facility] Follow Up     Blood sugar running 600 and higher at [facility] Medication Refill     need refill on all medications.  Headache     More frequent    Attends appointment with his daughter. He admits to noncompliance. He was seen in the ER about 2 weeks ago. He has not had any alcohol since this ER visit. Last had a headache yesterday. He has been taking OTC tylenol for his headaches and this does help. He reports he feels different since he went to the hospital and thinks he needs to be back on his medications. He has been out of some of them for 2 days and out of the rest of them for longer. He is unable to identify which ones he has been out of. He reports he has been sleeping some. 3 most recent PHQ Screens 11/1/2021   PHQ Not Done -   Little interest or pleasure in doing things Not at all   Feeling down, depressed, irritable, or hopeless Not at all   Total Score PHQ 2 0   Trouble falling or staying asleep, or sleeping too much -   Feeling tired or having little energy -   Poor appetite, weight loss, or overeating -   Feeling bad about yourself - or that you are a failure or have let yourself or your family down -   Trouble concentrating on things such as school, work, reading, or watching TV -   Moving or speaking so slowly that other people could have noticed; or the opposite being so fidgety that others notice -   Thoughts of being better off dead, or hurting yourself in some way -   PHQ 9 Score -   How difficult have these problems made it for you to do your work, take care of your home and get along with others -       Past Medical History  Past Medical History:   Diagnosis Date    Alcohol use     Fri-Sun one fifth; Mon-Thur: Pint of liquor    CAD (coronary artery disease)     Cardiac angioplasty with stent 07/29/2009    2.5 x 13 Cypher stent to CX.  Cardiac cath 11/09/2011    RCA patent.   LM patent. LAD patent. mD1 55%. CX patent. Prior stent patent. Edison 85% (2.5 x 15-mm Xience stent, resid 0%). LVEDP 12. EF 40-45%. High lateral hypk (RI distribution).  Cardiac inferior STEMI 2009    Constipation     Current smoker     contemplating stopping    Diabetes ([facility]     type 2-insulin dependent    GERD (gastroesophageal reflux disease)     HTN (hypertension)     Hyperlipidemia     Migraine     Myocardial infarction ([facility]     Vitamin D deficiency        Surgical History  Past Surgical History:   Procedure Laterality Date    COLONOSCOPY N/A 10/28/2020    COLONOSCOPY w/polypectomy performed by Thomas Roberts MD at [address]  07/29/2009    HX HEART CATHETERIZATION  8/30/2011    HX HEART CATHETERIZATION  11/09/2011    HX WISDOM TEETH EXTRACTION      x4        Medications  Current Outpatient Medications   Medication Sig Dispense Refill    traZODone (DESYREL) 50 mg tablet Take 1 Tablet by mouth nightly. 90 Tablet 1    dulaglutide (Trulicity) 4.77 ET/6.5 mL sub-q pen INJECT 0.75 MG UNDER THE SKIN ONCE WEEKLY 2 mL 0    simvastatin (Zocor) 20 mg tablet Take 1 Tablet by mouth nightly. 90 Tablet 1    metFORMIN (GLUCOPHAGE) 1,000 mg tablet TAKE ONE TABLET BY MOUTH TWICE A DAY WITH A MEAL 180 Tablet 2    lancets misc Please provide lancets for blood glucose meter insurance will cover 100 Lancet 3    glucose blood VI test strips (ASCENSIA AUTODISC VI, ONE TOUCH ULTRA TEST VI) strip Provide test strip and glucose meter that insurance will cover - Twice daily-  fasting in the morning and once two hours after a meal. 100 Strip 10    flash glucose sensor (FreeStyle Ezio 2 Sensor) kit Apply 1 sensor every 14 days to check blood sugar as directed at least 3 times daily.  2 Kit 5    flash glucose scanning reader (FreeStyle Ezio 2 [place]) misc Use as directed to check blood sugar at least 3 times daily 1 Each 0    ergocalciferol (ERGOCALCIFEROL) 1,250 mcg (50,000 unit) capsule TAKE ONE CAPSULE BY MOUTH ONCE WEEKLY  Indications: vitamin D deficiency (high dose therapy) 4 Capsule 6    insulin glargine (Basaglar KwikPen U-100 Insulin) 100 unit/mL (3 mL) inpn INJECT 50 UNITS UNDER THE SKIN DAILY 5 Adjustable Dose Pre-filled Pen Syringe 1    Insulin Needles, Disposable, 31 gauge x 5/16\" ndle Pen needles for Bydureon syringe 100 Each 0    multivitamin (ONE A DAY) tablet Take 1 Tablet by mouth daily. 90 Tablet 3    buPROPion SR (WELLBUTRIN SR) 150 mg SR tablet ONE TABLET BY MOUTH TWICE A DAY 60 Tab 0    Blood-Glucose Meter monitoring kit Blood glucose kit that insurance will cover - Check blood glucose twice a day once in the morning fasting and once after a meal. 1 Kit 0       Allergies  Allergies   Allergen Reactions    Niacin Itching       Family History  Family History   Problem Relation Age of Onset    Hypertension Mother     Heart Attack Mother     Diabetes Mother     Hypertension Father     Heart Attack Father     Diabetes Father     Diabetes Sister     Hypertension Sister     Stroke Sister     Diabetes Brother     Hypertension Brother     Stroke Brother     No Known Problems Maternal Grandmother     No Known Problems Maternal Grandfather     No Known Problems Paternal Grandmother     No Known Problems Paternal Grandfather     No Known Problems Brother     Heart Disease Other     Kidney Disease Other        Social History  Social History     Socioeconomic History    Marital status: SINGLE     Spouse name: Not on file    Number of children: Not on file    Years of education: Not on file    Highest education level: Not on file   Occupational History    Not on file   Tobacco Use    Smoking status: Current Every Day Smoker     Packs/day: 0.50     Years: 1.00     Pack years: 0.50     Types: Cigarettes    Smokeless tobacco: Never Used   Substance and Sexual Activity    Alcohol use:  Yes     Alcohol/week: 4.2 standard drinks     Types: 5 Shots of liquor per week     Comment: occ    Drug use: Not Currently     Types: Marijuana, Cocaine     Comment: none    Sexual activity: Yes   Other Topics Concern    Not on file   Social History Narrative     at [employer]. Social Determinants of Health     Financial Resource Strain:     Difficulty of Paying Living Expenses:    Food Insecurity:     Worried About Running Out of Food in the Last Year:     [address] in the Last Year:    Transportation Needs:     Lack of Transportation (Medical):      Lack of Transportation (Non-Medical):    Physical Activity:     Days of Exercise per Week:     Minutes of Exercise per Session:    Stress:     Feeling of Stress :    Social Connections:     Frequency of Communication with Friends and Family:     Frequency of Social Gatherings with Friends and Family:     Attends [employer] Services:     Active Member of Clubs or Organizations:     Attends Club or Organization Meetings:     Marital Status:    Intimate Partner Violence:     Fear of Current or Ex-Partner:     Emotionally Abused:     Physically Abused:     Sexually Abused:        Problem List  Patient Active Problem List   Diagnosis Code    Hyperlipidemia E78.5    HTN (hypertension) I10    Insulin dependent diabetes mellitus [id number]    Tobacco use disorder F17.200    Coronary atherosclerosis of native coronary artery I25.10    Tobacco dependence F17.200    Other and unspecified alcohol dependence, continuous drinking behavior F10.20    Mediastinal adenopathy R59.0    Diabetic neuropathy ([address] E11.40    Vitamin D deficiency E55.9    Other insomnia G47.09    Type 2 diabetes with nephropathy ([address] E11.21    Otalgia H92.09    Other fatigue R53.83    Mild nonproliferative diabetic retinopathy of left eye without macular edema associated with type 2 diabetes mellitus ([address] E86.7808    Hypertensive retinopathy H35.039    Nuclear sclerosis of both eyes H25.13       Review of Systems  Review of Systems   Constitutional: Negative for fever. HENT: Negative for congestion. Eyes: Negative for blurred vision. Respiratory: Negative for cough and shortness of breath. Cardiovascular: Negative for chest pain, palpitations and leg swelling. Gastrointestinal: Negative for abdominal pain, nausea and vomiting. Genitourinary: Negative. Musculoskeletal: Negative for falls. Neurological: Positive for headaches. Negative for dizziness. Psychiatric/Behavioral: Negative for depression and suicidal ideas. Vital Signs  Vitals:    11/01/21 1027   BP: 125/84   Pulse: 80   Resp: 17   Temp: 98.3 °F (36.8 °C)   TempSrc: Oral   SpO2: 100%   Weight: 161 lb (73 kg)   Height: 5' 9\" (1.753 m)   PainSc:   0 - No pain       Physical Exam  Physical Exam  Vitals reviewed. Constitutional:       General: He is not in acute distress. HENT:      Head: Normocephalic and atraumatic. Nose: Nose normal.      Mouth/Throat:      Mouth: Mucous membranes are moist.   Eyes:      Extraocular Movements: Extraocular movements intact. Pupils: Pupils are equal, round, and reactive to light. Cardiovascular:      Rate and Rhythm: Normal rate and regular rhythm. Pulses: Normal pulses. Pulmonary:      Effort: Pulmonary effort is normal.      Breath sounds: Normal breath sounds. Abdominal:      General: Abdomen is flat. Bowel sounds are normal. There is no distension. Palpations: Abdomen is soft. Tenderness: There is no abdominal tenderness. There is no right CVA tenderness or left CVA tenderness. Musculoskeletal:      Cervical back: Normal range of motion. Neurological:      Mental Status: He is alert and oriented to person, place, and time. Gait: Gait normal.   Psychiatric:         Mood and Affect: Mood is depressed. Behavior: Behavior normal.         Thought Content:  Thought content normal.         Judgment: Judgment normal. Diagnostics  Orders Placed This Encounter    traZODone (DESYREL) 50 mg tablet     Sig: Take 1 Tablet by mouth nightly. Dispense:  90 Tablet     Refill:  1    dulaglutide (Trulicity) 8.50 UB/4.5 mL sub-q pen     Sig: INJECT 0.75 MG UNDER THE SKIN ONCE WEEKLY     Dispense:  2 mL     Refill:  0     Appointment needed prior to any additional refills.  simvastatin (Zocor) 20 mg tablet     Sig: Take 1 Tablet by mouth nightly. Dispense:  90 Tablet     Refill:  1    metFORMIN (GLUCOPHAGE) 1,000 mg tablet     Sig: TAKE ONE TABLET BY MOUTH TWICE A DAY WITH A MEAL     Dispense:  180 Tablet     Refill:  2    lancets misc     Sig: Please provide lancets for blood glucose meter insurance will cover     Dispense:  100 Lancet     Refill:  3    glucose blood VI test strips (ASCENSIA AUTODISC VI, ONE TOUCH ULTRA TEST VI) strip     Sig: Provide test strip and glucose meter that insurance will cover - Twice daily-  fasting in the morning and once two hours after a meal.     Dispense:  100 Strip     Refill:  10    flash glucose sensor (FreeStyle Ezio 2 Sensor) kit     Sig: Apply 1 sensor every 14 days to check blood sugar as directed at least 3 times daily.      Dispense:  2 Kit     Refill:  5    flash glucose scanning reader (FreeStyle Ezio 2 [place]) misc     Sig: Use as directed to check blood sugar at least 3 times daily     Dispense:  1 Each     Refill:  0    ergocalciferol (ERGOCALCIFEROL) 1,250 mcg (50,000 unit) capsule     Sig: TAKE ONE CAPSULE BY MOUTH ONCE WEEKLY  Indications: vitamin D deficiency (high dose therapy)     Dispense:  4 Capsule     Refill:  6    insulin glargine (Basaglar KwikPen U-100 Insulin) 100 unit/mL (3 mL) inpn     Sig: INJECT 50 UNITS UNDER THE SKIN DAILY     Dispense:  5 Adjustable Dose Pre-filled Pen Syringe     Refill:  1    Insulin Needles, Disposable, 31 gauge x 5/16\" ndle     Sig: Pen needles for Bydureon syringe     Dispense:  100 Each     Refill:  0    multivitamin (ONE A DAY) tablet     Sig: Take 1 Tablet by mouth daily. Dispense:  90 Tablet     Refill:  3       Results  Results for orders placed or performed during the hospital encounter of 10/21/21   CBC WITH AUTOMATED DIFF   Result Value Ref Range    WBC 4.3 (L) 4.6 - 13.2 K/uL    RBC 4.88 4.35 - 5.65 M/uL    HGB 14.2 13.0 - 16.0 g/dL    HCT 41.2 36.0 - 48.0 %    MCV 84.4 78.0 - 100.0 FL    MCH 29.1 24.0 - 34.0 PG    MCHC 34.5 31.0 - 37.0 g/dL    RDW 13.3 11.6 - 14.5 %    PLATELET 206 525 - 398 K/uL    MPV 12.0 (H) 9.2 - 11.8 FL    NEUTROPHILS 62 40 - 73 %    LYMPHOCYTES 30 21 - 52 %    MONOCYTES 7 3 - 10 %    EOSINOPHILS 1 0 - 5 %    BASOPHILS 1 0 - 2 %    ABS. NEUTROPHILS 2.7 1.8 - 8.0 K/UL    ABS. LYMPHOCYTES 1.3 0.9 - 3.6 K/UL    ABS. MONOCYTES 0.3 0.05 - 1.2 K/UL    ABS. EOSINOPHILS 0.0 0.0 - 0.4 K/UL    ABS. BASOPHILS 0.0 0.0 - 0.1 K/UL    DF AUTOMATED     METABOLIC PANEL, COMPREHENSIVE   Result Value Ref Range    Sodium 130 (L) 136 - 145 mmol/L    Potassium 4.6 3.5 - 5.5 mmol/L    Chloride 97 (L) 100 - 111 mmol/L    CO2 26 21 - 32 mmol/L    Anion gap 7 3.0 - 18 mmol/L    Glucose 565 (HH) 74 - 99 mg/dL    BUN 18 7.0 - 18 MG/DL    Creatinine 1.40 (H) 0.6 - 1.3 MG/DL    BUN/Creatinine ratio 13 12 - 20      GFR est AA >60 >60 ml/min/1.73m2    GFR est non-AA 51 (L) >60 ml/min/1.73m2    Calcium 9.1 8.5 - 10.1 MG/DL    Bilirubin, total <0.1 (L) 0.2 - 1.0 MG/DL    ALT (SGPT) 42 16 - 61 U/L    AST (SGOT) 21 10 - 38 U/L    Alk.  phosphatase 182 (H) 45 - 117 U/L    Protein, total 6.7 6.4 - 8.2 g/dL    Albumin 3.5 3.4 - 5.0 g/dL    Globulin 3.2 2.0 - 4.0 g/dL    A-G Ratio 1.1 0.8 - 1.7     PH, VENOUS   Result Value Ref Range    VENOUS PH 7.34 7.32 - 7.42     URINALYSIS W/ RFLX MICROSCOPIC   Result Value Ref Range    Color YELLOW      Appearance CLEAR      Specific gravity >1.030 (H) 1.005 - 1.030    pH (UA) 5.5 5.0 - 8.0      Protein Negative NEG mg/dL    Glucose >1,000 (A) NEG mg/dL    Ketone 15 (A) NEG mg/dL    Bilirubin Negative NEG      Blood Negative NEG      Urobilinogen 0.2 0.2 - 1.0 EU/dL    Nitrites Negative NEG      Leukocyte Esterase Negative NEG     PROTHROMBIN TIME + INR   Result Value Ref Range    Prothrombin time 11.7 11.5 - 15.2 sec    INR 0.9 0.8 - 1.2     PTT   Result Value Ref Range    aPTT 23.6 23.0 - 36.4 SEC   MAGNESIUM   Result Value Ref Range    Magnesium 2.3 1.6 - 2.6 mg/dL   PHOSPHORUS   Result Value Ref Range    Phosphorus 3.7 2.5 - 4.9 MG/DL   CARDIAC PANEL,(CK, CKMB & TROPONIN)   Result Value Ref Range    CK - MB 1.8 <3.6 ng/ml    CK-MB Index 3.3 0.0 - 4.0 %    CK 55 39 - 308 U/L    Troponin-I, QT 0.03 0.0 - 0.045 NG/ML   DRUG SCREEN, URINE   Result Value Ref Range    BENZODIAZEPINES Negative NEG      BARBITURATES Negative NEG      THC (TH-CANNABINOL) Negative NEG      OPIATES Negative NEG      PCP(PHENCYCLIDINE) Negative NEG      COCAINE Negative NEG      AMPHETAMINES Negative NEG      METHADONE Negative NEG      HDSCOM (NOTE)    SALICYLATE   Result Value Ref Range    Salicylate level 2.0 (L) 2.8 - 20.0 MG/DL   ETHYL ALCOHOL   Result Value Ref Range    ALCOHOL(ETHYL),SERUM <3 0 - 3 MG/DL   HEMOGLOBIN A1C WITH EAG   Result Value Ref Range    Hemoglobin A1c >14.0 (H) 4.2 - 5.6 %    Est. average glucose Cannot be calculated mg/dL   ACETAMINOPHEN   Result Value Ref Range    Acetaminophen level <2 (L) 10.0 - 30.0 ug/mL   GLUCOSTABILIZER   Result Value Ref Range    Glucose 590 mg/dL    Insulin order 10.6 units/hour    Insulin adminstered 10.6 units/hour    Multiplier 0.020     Low target 140 mg/dL    High target 180 mg/dL    D50 order 0.0 ml    D50 administered 0.00 ml    Minutes until next BG 60 min    Order initials csa     Administered initials csa    GLUCOSTABILIZER   Result Value Ref Range    Glucose 367 mg/dL    Insulin order 3.1 units/hour    Insulin adminstered 3.1 units/hour    Multiplier 0.010     Low target 140 mg/dL    High target 180 mg/dL    D50 order 0.0 ml    D50 administered 0.00 ml    Minutes until next BG 60 min    Order initials hlb     Administered initials hlb    GLUCOSTABILIZER   Result Value Ref Range    Glucose 295 mg/dL    Insulin order 4.7 units/hour    Insulin adminstered 4.7 units/hour    Multiplier 0.020     Low target 140 mg/dL    High target 180 mg/dL    D50 order 0.0 ml    D50 administered 0.00 ml    Minutes until next BG 60 min    Order initials hlb     Administered initials hlb    GLUCOSTABILIZER   Result Value Ref Range    Glucose 235 mg/dL    Insulin order 5.3 units/hour    Insulin adminstered 5.3 units/hour    Multiplier 0.030     Low target 140 mg/dL    High target 180 mg/dL    D50 order 0.0 ml    D50 administered 0.00 ml    Minutes until next BG 60 min    Order initials hlb     Administered initials hlb    GLUCOSE, POC   Result Value Ref Range    Glucose (POC) 533 (HH) 70 - 110 mg/dL   GLUCOSE, POC   Result Value Ref Range    Glucose (POC) 539 (HH) 70 - 110 mg/dL   GLUCOSE, POC   Result Value Ref Range    Glucose (POC) 590 (HH) 70 - 110 mg/dL   GLUCOSE, POC   Result Value Ref Range    Glucose (POC) >600 (HH) 70 - 110 mg/dL   GLUCOSE, POC   Result Value Ref Range    Glucose (POC) 367 (H) 70 - 110 mg/dL   GLUCOSE, POC   Result Value Ref Range    Glucose (POC) 295 (H) 70 - 110 mg/dL   GLUCOSE, POC   Result Value Ref Range    Glucose (POC) 235 (H) 70 - 110 mg/dL   GLUCOSE, POC   Result Value Ref Range    Glucose (POC) 134 (H) 70 - 110 mg/dL         Assessment and Plan  Diagnoses and all orders for this visit:    1.  Type 2 diabetes mellitus with diabetic neuropathy, with long-term current use of insulin (HCC)  -     dulaglutide (Trulicity) 9.01 CF/1.6 mL sub-q pen; INJECT 0.75 MG UNDER THE SKIN ONCE WEEKLY  -     metFORMIN (GLUCOPHAGE) 1,000 mg tablet; TAKE ONE TABLET BY MOUTH TWICE A DAY WITH A MEAL  -     lancets misc; Please provide lancets for blood glucose meter insurance will cover  -     glucose blood VI test strips (ASCENSIA AUTODISC VI, ONE TOUCH ULTRA TEST VI) strip; Provide test strip and glucose meter that insurance will cover - Twice daily-  fasting in the morning and once two hours after a meal.  -     flash glucose sensor (FreeStyle Ezio 2 Sensor) kit; Apply 1 sensor every 14 days to check blood sugar as directed at least 3 times daily. -     flash glucose scanning reader (FreeStyle Ezio 2 [place]) misc; Use as directed to check blood sugar at least 3 times daily  -     Insulin Needles, Disposable, 31 gauge x 5/16\" ndle; Pen needles for Bydureon syringe    2. Insomnia, unspecified type  -     traZODone (DESYREL) 50 mg tablet; Take 1 Tablet by mouth nightly. 3. Mild depression (HCC)  -     traZODone (DESYREL) 50 mg tablet; Take 1 Tablet by mouth nightly. 4. Hypercholesterolemia  -     simvastatin (Zocor) 20 mg tablet; Take 1 Tablet by mouth nightly. 5. Vitamin D deficiency  -     ergocalciferol (ERGOCALCIFEROL) 1,250 mcg (50,000 unit) capsule; TAKE ONE CAPSULE BY MOUTH ONCE WEEKLY  Indications: vitamin D deficiency (high dose therapy)    6. Fatigue, unspecified type  -     multivitamin (ONE A DAY) tablet; Take 1 Tablet by mouth daily. 7. Alcohol use    Other orders  -     insulin glargine (Basaglar KwikPen U-100 Insulin) 100 unit/mL (3 mL) inpn; INJECT 50 UNITS UNDER THE SKIN DAILY    Avoid alcohol. Diet discussed. I have advised him not to drive with an elevated blood glucose level. I have discussed the danger and risk and the importance of him getting his blood glucose under control and he verbalizes understanding of this. Labs reviewed from ER. Can continue OTC tylenol for his headaches. Discussed the importance of maintaining follow ups and compliance. Discussed the need for patient to take responsibility for disease process  After care summary printed and reviewed with patient. Plan reviewed with patient. Questions answered. Patient verbalized understanding of plan and is in agreement with plan. Patient to follow up in 3 weeks or earlier if symptoms worsen. Follow-up and Dispositions    · Return in about 3 weeks (around 11/22/2021), or if symptoms worsen or fail to improve, for diabetes, headache, Depression.        JANI Rush-C

## 2021-11-01 NOTE — PROGRESS NOTES
Ly Gallardo presents today for   Chief Complaint   Patient presents with   [facility] Follow Up     Blood sugar running 600 and higher at [facility] Medication Refill     need refill on all medications.  Headache     More frequent        Is someone accompanying this pt? no    Is the patient using any DME equipment during OV? no    Depression Screening:  3 most recent PHQ Screens 11/1/2021   PHQ Not Done -   Little interest or pleasure in doing things Not at all   Feeling down, depressed, irritable, or hopeless Not at all   Total Score PHQ 2 0   Trouble falling or staying asleep, or sleeping too much -   Feeling tired or having little energy -   Poor appetite, weight loss, or overeating -   Feeling bad about yourself - or that you are a failure or have let yourself or your family down -   Trouble concentrating on things such as school, work, reading, or watching TV -   Moving or speaking so slowly that other people could have noticed; or the opposite being so fidgety that others notice -   Thoughts of being better off dead, or hurting yourself in some way -   PHQ 9 Score -   How difficult have these problems made it for you to do your work, take care of your home and get along with others -       Learning Assessment:  Learning Assessment 1/18/2021   PRIMARY LEARNER Patient   HIGHEST LEVEL OF EDUCATION - PRIMARY LEARNER  DID NOT GRADUATE HIGH SCHOOL   BARRIERS PRIMARY LEARNER NONE   CO-LEARNER CAREGIVER No   PRIMARY LANGUAGE ENGLISH    NEED No   LEARNER PREFERENCE PRIMARY DEMONSTRATION   ANSWERED BY patient   RELATIONSHIP SELF       Fall Risk  Fall Risk Assessment, last 12 mths 10/13/2020   Able to walk? Yes   Fall in past 12 months? No       ADL  No flowsheet data found. Travel Screening:    Travel Screening     Question   Response    In the last month, have you been in contact with someone who was confirmed or suspected to have Coronavirus / COVID-19?   No / Unsure    Have you had a COVID-19 viral test in the last 14 days? No    Do you have any of the following new or worsening symptoms? Have you traveled internationally or domestically in the last month? No      Travel History   Travel since 10/01/21     No documented travel since 10/01/21          Health Maintenance reviewed and discussed and ordered per Provider. Health Maintenance Due   Topic Date Due    Pneumococcal 0-64 years (1 of 2 - PPSV23) Never done    COVID-19 Vaccine (1) Never done    DTaP/Tdap/Td series (1 - Tdap) Never done    Shingrix Vaccine Age 50> (1 of 2) Never done    Foot Exam Q1  07/11/2019    Flu Vaccine (1) Never done    MICROALBUMIN Q1  10/22/2021   . Coordination of Care:  1. Have you been to the ER, urgent care clinic since your last visit? Hospitalized since your last visit? Yes, [facility] due to blood sugars running high. 2. Have you seen or consulted any other health care providers outside of the [address] [place] [address] since your last visit? Include any pap smears or colon screening.  No

## 2021-11-24 ENCOUNTER — OFFICE VISIT (OUTPATIENT)
Dept: FAMILY MEDICINE CLINIC | Age: 61
End: 2021-11-24
Payer: COMMERCIAL

## 2021-11-24 VITALS
WEIGHT: 161 LBS | BODY MASS INDEX: 23.85 KG/M2 | HEIGHT: 69 IN | TEMPERATURE: 98.3 F | HEART RATE: 89 BPM | RESPIRATION RATE: 20 BRPM | OXYGEN SATURATION: 93 % | DIASTOLIC BLOOD PRESSURE: 83 MMHG | SYSTOLIC BLOOD PRESSURE: 118 MMHG

## 2021-11-24 DIAGNOSIS — Z79.4 TYPE 2 DIABETES MELLITUS WITH DIABETIC NEUROPATHY, WITH LONG-TERM CURRENT USE OF INSULIN (HCC): ICD-10-CM

## 2021-11-24 DIAGNOSIS — Z78.9 ALCOHOL USE: ICD-10-CM

## 2021-11-24 DIAGNOSIS — E78.00 HYPERCHOLESTEROLEMIA: ICD-10-CM

## 2021-11-24 DIAGNOSIS — F32.A MILD DEPRESSION: ICD-10-CM

## 2021-11-24 DIAGNOSIS — F17.200 TOBACCO USE DISORDER: ICD-10-CM

## 2021-11-24 DIAGNOSIS — E11.40 TYPE 2 DIABETES MELLITUS WITH DIABETIC NEUROPATHY, WITH LONG-TERM CURRENT USE OF INSULIN (HCC): ICD-10-CM

## 2021-11-24 DIAGNOSIS — R68.89 FORGETFULNESS: Primary | ICD-10-CM

## 2021-11-24 PROCEDURE — 99214 OFFICE O/P EST MOD 30 MIN: CPT | Performed by: NURSE PRACTITIONER

## 2021-11-24 PROCEDURE — 3051F HG A1C>EQUAL 7.0%<8.0%: CPT | Performed by: NURSE PRACTITIONER

## 2021-11-24 RX ORDER — BUPROPION HYDROCHLORIDE 150 MG/1
TABLET, EXTENDED RELEASE ORAL
Qty: 90 TABLET | Refills: 0 | Status: SHIPPED | OUTPATIENT
Start: 2021-11-24 | End: 2022-03-14 | Stop reason: SDUPTHER

## 2021-11-24 NOTE — PROGRESS NOTES
Genaro Rosas presents today for   Chief Complaint   Patient presents with    Follow-up     3 week    Diabetes    Headache     subsided    Depression       Genaro Rosas preferred language for health care discussion is english/other. Is someone accompanying this pt? no    Is the patient using any DME equipment during [address]? no    Depression Screening:  3 most recent PHQ Screens 11/24/2021   PHQ Not Done -   Little interest or pleasure in doing things Several days   Feeling down, depressed, irritable, or hopeless Not at all   Total Score PHQ 2 1   Trouble falling or staying asleep, or sleeping too much -   Feeling tired or having little energy -   Poor appetite, weight loss, or overeating -   Feeling bad about yourself - or that you are a failure or have let yourself or your family down -   Trouble concentrating on things such as school, work, reading, or watching TV -   Moving or speaking so slowly that other people could have noticed; or the opposite being so fidgety that others notice -   Thoughts of being better off dead, or hurting yourself in some way -   PHQ 9 Score -   How difficult have these problems made it for you to do your work, take care of your home and get along with others -       Learning Assessment:  Learning Assessment 1/18/2021   PRIMARY LEARNER Patient   HIGHEST LEVEL OF EDUCATION - PRIMARY LEARNER  DID NOT GRADUATE HIGH SCHOOL   BARRIERS PRIMARY LEARNER NONE   CO-LEARNER CAREGIVER No   PRIMARY LANGUAGE ENGLISH    NEED No   LEARNER PREFERENCE PRIMARY DEMONSTRATION   ANSWERED BY patient   RELATIONSHIP SELF       Abuse Screening:  Abuse Screening Questionnaire 1/18/2021   Do you ever feel afraid of your partner? N   Are you in a relationship with someone who physically or mentally threatens you? N   Is it safe for you to go home? Y       Generalized Anxiety  No flowsheet data found.       Health Maintenance Due   Topic Date Due    COVID-19 Vaccine (1) Never done   [address] [place] [address] Pneumococcal 0-64 years (1 of 2 - PPSV23) Never done    DTaP/Tdap/Td series (1 - Tdap) Never done    Shingrix Vaccine Age 50> (1 of 2) Never done    Foot Exam Q1  07/11/2019    Flu Vaccine (1) Never done    MICROALBUMIN Q1  10/22/2021   . Health Maintenance reviewed and discussed and ordered per Provider. Coordination of Care:  1. Have you been to the ER, urgent care clinic since your last visit? Hospitalized since your last visit? no    2. Have you seen or consulted any other health care providers outside of the [address] [place] [address] since your last visit? Include any pap smears or colon screening.  no

## 2021-11-24 NOTE — PROGRESS NOTES
SHARON Lopez is a 59 y.o. male  Chief Complaint   Patient presents with    Follow-up     3 week    Diabetes    Headache     subsided    Depression     He reports he is feeling a little better. He has restarted taking his medications. He reports his blood glucose today was 220. Reports he is having some coldness in his feet with some tingling. He reports his headaches have resolved. He admits to some depression but again states he feels better than he did 3 weeks ago. He denies desire to hurt or harm himself or others. He denies any suicidal ideations. He is sleeping some but he has not restarted taking his Trazadone. He has not had any alcohol. He is smoking a cigarette every now and then. I did speak with daughter right after the visit per patient request. Daughter concerned about patient's memory and forgetfulness. Daughter reports patient has an appointment with nephrology on Dec. 3rd. Past Medical History  Past Medical History:   Diagnosis Date    Alcohol use     Fri-Sun one fifth; Mon-Thur: Pint of liquor    CAD (coronary artery disease)     Cardiac angioplasty with stent 07/29/2009    2.5 x 13 Cypher stent to CX.  Cardiac cath 11/09/2011    RCA patent. LM patent. LAD patent. mD1 55%. CX patent. Prior stent patent. Edison 85% (2.5 x 15-mm Xience stent, resid 0%). LVEDP 12. EF 40-45%. High lateral hypk (RI distribution).       Cardiac inferior STEMI 2009    Constipation     Current smoker     contemplating stopping    Diabetes ([address]     type 2-insulin dependent    GERD (gastroesophageal reflux disease)     HTN (hypertension)     Hyperlipidemia     Migraine     Myocardial infarction ([address]     Vitamin D deficiency        Surgical History  Past Surgical History:   Procedure Laterality Date    COLONOSCOPY N/A 10/28/2020    COLONOSCOPY w/polypectomy performed by Srinivasa Jiménez MD at [address]   07/29/2009    [facility]    HX HEART CATHETERIZATION  11/09/2011    HX WISDOM TEETH EXTRACTION      x4        Medications  Current Outpatient Medications   Medication Sig Dispense Refill    buPROPion SR (WELLBUTRIN SR) 150 mg SR tablet ONE TABLET BY MOUTH ONCE A DAY 90 Tablet 0    traZODone (DESYREL) 50 mg tablet Take 1 Tablet by mouth nightly. 90 Tablet 1    dulaglutide (Trulicity) 0.72 MB/1.9 mL sub-q pen INJECT 0.75 MG UNDER THE SKIN ONCE WEEKLY 2 mL 0    simvastatin (Zocor) 20 mg tablet Take 1 Tablet by mouth nightly. 90 Tablet 1    metFORMIN (GLUCOPHAGE) 1,000 mg tablet TAKE ONE TABLET BY MOUTH TWICE A DAY WITH A MEAL 180 Tablet 2    lancets misc Please provide lancets for blood glucose meter insurance will cover 100 Lancet 3    glucose blood VI test strips (ASCENSIA AUTODISC VI, ONE TOUCH ULTRA TEST VI) strip Provide test strip and glucose meter that insurance will cover - Twice daily-  fasting in the morning and once two hours after a meal. 100 Strip 10    flash glucose sensor (FreeStyle Ezio 2 Sensor) kit Apply 1 sensor every 14 days to check blood sugar as directed at least 3 times daily. 2 Kit 5    flash glucose scanning reader (FreeStyle Ezio 2 [place]) misc Use as directed to check blood sugar at least 3 times daily 1 Each 0    ergocalciferol (ERGOCALCIFEROL) 1,250 mcg (50,000 unit) capsule TAKE ONE CAPSULE BY MOUTH ONCE WEEKLY  Indications: vitamin D deficiency (high dose therapy) 4 Capsule 6    insulin glargine (Basaglar KwikPen U-100 Insulin) 100 unit/mL (3 mL) inpn INJECT 50 UNITS UNDER THE SKIN DAILY 5 Adjustable Dose Pre-filled Pen Syringe 1    Insulin Needles, Disposable, 31 gauge x 5/16\" ndle Pen needles for Bydureon syringe 100 Each 0    multivitamin (ONE A DAY) tablet Take 1 Tablet by mouth daily.  90 Tablet 3    Blood-Glucose Meter monitoring kit Blood glucose kit that insurance will cover - Check blood glucose twice a day once in the morning fasting and once after a meal. 1 Kit 0 Allergies  Allergies   Allergen Reactions    Niacin Itching       Family History  Family History   Problem Relation Age of Onset    Hypertension Mother     Heart Attack Mother     Diabetes Mother     Hypertension Father     Heart Attack Father     Diabetes Father     Diabetes Sister     Hypertension Sister     Stroke Sister     Diabetes Brother     Hypertension Brother     Stroke Brother     No Known Problems Maternal Grandmother     No Known Problems Maternal Grandfather     No Known Problems Paternal Grandmother     No Known Problems Paternal Grandfather     No Known Problems Brother     Heart Disease Other     Kidney Disease Other        Social History  Social History     Socioeconomic History    Marital status: SINGLE     Spouse name: Not on file    Number of children: Not on file    Years of education: Not on file    Highest education level: Not on file   Occupational History    Not on file   Tobacco Use    Smoking status: Current Every Day Smoker     Packs/day: 0.50     Years: 1.00     Pack years: 0.50     Types: Cigarettes    Smokeless tobacco: Never Used   Substance and Sexual Activity    Alcohol use: Yes     Alcohol/week: 4.2 standard drinks     Types: 5 Shots of liquor per week     Comment: occ    Drug use: Not Currently     Types: Marijuana, Cocaine     Comment: none    Sexual activity: Yes   Other Topics Concern    Not on file   Social History Narrative     at [employer]. Social Determinants of Health     Financial Resource Strain:     Difficulty of Paying Living Expenses: Not on file   Food Insecurity:     Worried About Running Out of Food in the Last Year: Not on file    Mara of Food in the Last Year: Not on file   Transportation Needs:     Lack of Transportation (Medical): Not on file    Lack of Transportation (Non-Medical):  Not on file   Physical Activity:     Days of Exercise per Week: Not on file    Minutes of Exercise per Session: Not on file   Stress:     Feeling of Stress : Not on file   Social Connections:     Frequency of Communication with Friends and Family: Not on file    Frequency of Social Gatherings with Friends and Family: Not on file    Attends Rastafari Services: Not on file    Active Member of Clubs or Organizations: Not on file    Attends Club or Organization Meetings: Not on file    Marital Status: Not on file   Intimate Partner Violence:     Fear of Current or Ex-Partner: Not on file    Emotionally Abused: Not on file    Physically Abused: Not on file    Sexually Abused: Not on file   Housing Stability:     Unable to Pay for Housing in the Last Year: Not on file    Number of [place] in the Last Year: Not on file    Unstable Housing in the Last Year: Not on file       Problem List  Patient Active Problem List   Diagnosis Code    Hyperlipidemia E78.5    HTN (hypertension) I10    Insulin dependent diabetes mellitus [id number]    Tobacco use disorder F17.200    Coronary atherosclerosis of native coronary artery I25.10    Tobacco dependence F17.200    Other and unspecified alcohol dependence, continuous drinking behavior F10.20    Mediastinal adenopathy R59.0    Diabetic neuropathy ([facility] E11.40    Vitamin D deficiency E55.9    Other insomnia G47.09    Type 2 diabetes with nephropathy ([facility] E11.21    Otalgia H92.09    Other fatigue R53.83    Mild nonproliferative diabetic retinopathy of left eye without macular edema associated with type 2 diabetes mellitus ([facility] I37.5589    Hypertensive retinopathy H35.039    Nuclear sclerosis of both eyes H25.13       Review of Systems  Review of Systems   Constitutional: Negative for fever. HENT: Negative for congestion. Eyes: Negative for blurred vision. Respiratory: Negative for cough and shortness of breath. Cardiovascular: Negative for chest pain, palpitations and leg swelling.    Gastrointestinal: Negative for abdominal pain, nausea and vomiting. Genitourinary: Negative. Musculoskeletal: Negative for falls. Neurological: Positive for tingling. Negative for dizziness and headaches. Psychiatric/Behavioral: Positive for depression and memory loss. Negative for suicidal ideas. The patient has insomnia. Vital Signs  Vitals:    11/24/21 1438   BP: 118/83   Pulse: 89   Resp: 20   Temp: 98.3 °F (36.8 °C)   TempSrc: Oral   SpO2: 93%   Weight: 161 lb (73 kg)   Height: 5' 9\" (1.753 m)   PainSc:   0 - No pain       Physical Exam  Physical Exam  Vitals reviewed. Constitutional:       General: He is not in acute distress. HENT:      Head: Normocephalic and atraumatic. Nose: Nose normal.      Mouth/Throat:      Mouth: Mucous membranes are moist.   Eyes:      Extraocular Movements: Extraocular movements intact. Pupils: Pupils are equal, round, and reactive to light. Cardiovascular:      Rate and Rhythm: Normal rate and regular rhythm. Pulses: Normal pulses. Pulmonary:      Effort: Pulmonary effort is normal.      Breath sounds: Normal breath sounds. Abdominal:      General: Abdomen is flat. Bowel sounds are normal. There is no distension. Palpations: Abdomen is soft. Tenderness: There is no abdominal tenderness. There is no right CVA tenderness or left CVA tenderness. Musculoskeletal:      Cervical back: Normal range of motion. Neurological:      Mental Status: He is alert and oriented to person, place, and time. Gait: Gait normal.   Psychiatric:         Mood and Affect: Mood normal.         Behavior: Behavior normal.         Thought Content:  Thought content normal.         Judgment: Judgment normal.         Diagnostics  Orders Placed This Encounter    COMPLIANCE DRUG SCREEN/PRESCRIPTION MONITORING     Standing Status:   Future     Standing Expiration Date:   11/25/2022    REFERRAL TO NEUROLOGY     Referral Priority:   Routine     Referral Type:   Consultation     Referral Reason:   Specialty Services Required     Referred to Provider:   Zain Rodriguez NP     Requested Specialty:   Neurology     Number of Visits Requested:   1    buPROPion SR (WELLBUTRIN SR) 150 mg SR tablet     Sig: ONE TABLET BY MOUTH ONCE A DAY     Dispense:  90 Tablet     Refill:  0       Results  Results for orders placed or performed during the hospital encounter of 10/21/21   CBC WITH AUTOMATED DIFF   Result Value Ref Range    WBC 4.3 (L) 4.6 - 13.2 K/uL    RBC 4.88 4.35 - 5.65 M/uL    HGB 14.2 13.0 - 16.0 g/dL    HCT 41.2 36.0 - 48.0 %    MCV 84.4 78.0 - 100.0 FL    MCH 29.1 24.0 - 34.0 PG    MCHC 34.5 31.0 - 37.0 g/dL    RDW 13.3 11.6 - 14.5 %    PLATELET 855 180 - 197 K/uL    MPV 12.0 (H) 9.2 - 11.8 FL    NEUTROPHILS 62 40 - 73 %    LYMPHOCYTES 30 21 - 52 %    MONOCYTES 7 3 - 10 %    EOSINOPHILS 1 0 - 5 %    BASOPHILS 1 0 - 2 %    ABS. NEUTROPHILS 2.7 1.8 - 8.0 K/UL    ABS. LYMPHOCYTES 1.3 0.9 - 3.6 K/UL    ABS. MONOCYTES 0.3 0.05 - 1.2 K/UL    ABS. EOSINOPHILS 0.0 0.0 - 0.4 K/UL    ABS. BASOPHILS 0.0 0.0 - 0.1 K/UL    DF AUTOMATED     METABOLIC PANEL, COMPREHENSIVE   Result Value Ref Range    Sodium 130 (L) 136 - 145 mmol/L    Potassium 4.6 3.5 - 5.5 mmol/L    Chloride 97 (L) 100 - 111 mmol/L    CO2 26 21 - 32 mmol/L    Anion gap 7 3.0 - 18 mmol/L    Glucose 565 (HH) 74 - 99 mg/dL    BUN 18 7.0 - 18 MG/DL    Creatinine 1.40 (H) 0.6 - 1.3 MG/DL    BUN/Creatinine ratio 13 12 - 20      GFR est AA >60 >60 ml/min/1.73m2    GFR est non-AA 51 (L) >60 ml/min/1.73m2    Calcium 9.1 8.5 - 10.1 MG/DL    Bilirubin, total <0.1 (L) 0.2 - 1.0 MG/DL    ALT (SGPT) 42 16 - 61 U/L    AST (SGOT) 21 10 - 38 U/L    Alk.  phosphatase 182 (H) 45 - 117 U/L    Protein, total 6.7 6.4 - 8.2 g/dL    Albumin 3.5 3.4 - 5.0 g/dL    Globulin 3.2 2.0 - 4.0 g/dL    A-G Ratio 1.1 0.8 - 1.7     PH, VENOUS   Result Value Ref Range    VENOUS PH 7.34 7.32 - 7.42     URINALYSIS W/ RFLX MICROSCOPIC   Result Value Ref Range    Color YELLOW      Appearance CLEAR Specific gravity >1.030 (H) 1.005 - 1.030    pH (UA) 5.5 5.0 - 8.0      Protein Negative NEG mg/dL    Glucose >1,000 (A) NEG mg/dL    Ketone 15 (A) NEG mg/dL    Bilirubin Negative NEG      Blood Negative NEG      Urobilinogen 0.2 0.2 - 1.0 EU/dL    Nitrites Negative NEG      Leukocyte Esterase Negative NEG     PROTHROMBIN TIME + INR   Result Value Ref Range    Prothrombin time 11.7 11.5 - 15.2 sec    INR 0.9 0.8 - 1.2     PTT   Result Value Ref Range    aPTT 23.6 23.0 - 36.4 SEC   MAGNESIUM   Result Value Ref Range    Magnesium 2.3 1.6 - 2.6 mg/dL   PHOSPHORUS   Result Value Ref Range    Phosphorus 3.7 2.5 - 4.9 MG/DL   CARDIAC PANEL,(CK, CKMB & TROPONIN)   Result Value Ref Range    CK - MB 1.8 <3.6 ng/ml    CK-MB Index 3.3 0.0 - 4.0 %    CK 55 39 - 308 U/L    Troponin-I, QT 0.03 0.0 - 0.045 NG/ML   DRUG SCREEN, URINE   Result Value Ref Range    BENZODIAZEPINES Negative NEG      BARBITURATES Negative NEG      THC (TH-CANNABINOL) Negative NEG      OPIATES Negative NEG      PCP(PHENCYCLIDINE) Negative NEG      COCAINE Negative NEG      AMPHETAMINES Negative NEG      METHADONE Negative NEG      HDSCOM (NOTE)    SALICYLATE   Result Value Ref Range    Salicylate level 2.0 (L) 2.8 - 20.0 MG/DL   ETHYL ALCOHOL   Result Value Ref Range    ALCOHOL(ETHYL),SERUM <3 0 - 3 MG/DL   HEMOGLOBIN A1C WITH EAG   Result Value Ref Range    Hemoglobin A1c >14.0 (H) 4.2 - 5.6 %    Est. average glucose Cannot be calculated mg/dL   ACETAMINOPHEN   Result Value Ref Range    Acetaminophen level <2 (L) 10.0 - 30.0 ug/mL   GLUCOSTABILIZER   Result Value Ref Range    Glucose 590 mg/dL    Insulin order 10.6 units/hour    Insulin adminstered 10.6 units/hour    Multiplier 0.020     Low target 140 mg/dL    High target 180 mg/dL    D50 order 0.0 ml    D50 administered 0.00 ml    Minutes until next BG 60 min    Order initials csa     Administered initials csa    GLUCOSTABILIZER   Result Value Ref Range    Glucose 367 mg/dL    Insulin order 3.1 units/hour    Insulin adminstered 3.1 units/hour    Multiplier 0.010     Low target 140 mg/dL    High target 180 mg/dL    D50 order 0.0 ml    D50 administered 0.00 ml    Minutes until next BG 60 min    Order initials hlb     Administered initials hlb    GLUCOSTABILIZER   Result Value Ref Range    Glucose 295 mg/dL    Insulin order 4.7 units/hour    Insulin adminstered 4.7 units/hour    Multiplier 0.020     Low target 140 mg/dL    High target 180 mg/dL    D50 order 0.0 ml    D50 administered 0.00 ml    Minutes until next BG 60 min    Order initials hlb     Administered initials hlb    GLUCOSTABILIZER   Result Value Ref Range    Glucose 235 mg/dL    Insulin order 5.3 units/hour    Insulin adminstered 5.3 units/hour    Multiplier 0.030     Low target 140 mg/dL    High target 180 mg/dL    D50 order 0.0 ml    D50 administered 0.00 ml    Minutes until next BG 60 min    Order initials hlb     Administered initials hlb    GLUCOSE, POC   Result Value Ref Range    Glucose (POC) 533 (HH) 70 - 110 mg/dL   GLUCOSE, POC   Result Value Ref Range    Glucose (POC) 539 (HH) 70 - 110 mg/dL   GLUCOSE, POC   Result Value Ref Range    Glucose (POC) 590 (HH) 70 - 110 mg/dL   GLUCOSE, POC   Result Value Ref Range    Glucose (POC) >600 (HH) 70 - 110 mg/dL   GLUCOSE, POC   Result Value Ref Range    Glucose (POC) 367 (H) 70 - 110 mg/dL   GLUCOSE, POC   Result Value Ref Range    Glucose (POC) 295 (H) 70 - 110 mg/dL   GLUCOSE, POC   Result Value Ref Range    Glucose (POC) 235 (H) 70 - 110 mg/dL   GLUCOSE, POC   Result Value Ref Range    Glucose (POC) 134 (H) 70 - 110 mg/dL       Assessment and Plan  Diagnoses and all orders for this visit:    1. Forgetfulness  -     REFERRAL TO NEUROLOGY    2. Type 2 diabetes mellitus with diabetic neuropathy, with long-term current use of insulin [facility]  -     COMPLIANCE DRUG SCREEN/PRESCRIPTION MONITORING; Future    3.  Mild depression (HCC)  -     buPROPion SR (WELLBUTRIN SR) 150 mg SR tablet; ONE TABLET BY MOUTH ONCE A DAY    4. Hypercholesterolemia    5. Alcohol use    6. Tobacco use disorder  -     buPROPion SR (WELLBUTRIN SR) 150 mg SR tablet; ONE TABLET BY MOUTH ONCE A DAY      Patient to come to the office next week after he returns from the holiday to give a urine as he is unable to produce urine at this visit. Will await urine results and then restart his Lyrica. Discussed the importance of foot care. Discussed diet and exercise and continual checking of his blood glucose. Will restart his Wellbutrin to help with his smoking and his depression. Medication, side effects, possible allergic reactions and warnings reviewed with patient and daughter. Patient verbalized understanding. Continue with alcohol cessation and continue to work on and eliminate cigarettes. Follow up with nephrology. After care summary printed and reviewed with patient. Plan reviewed with patient. Questions answered. Patient verbalized understanding of plan and is in agreement with plan. Patient to follow up in one month or earlier if symptoms worsen.      ELVIS DunnC

## 2021-11-27 ENCOUNTER — APPOINTMENT (OUTPATIENT)
Dept: CT IMAGING | Age: 61
DRG: 181 | End: 2021-11-27
Attending: EMERGENCY MEDICINE
Payer: COMMERCIAL

## 2021-11-27 ENCOUNTER — APPOINTMENT (OUTPATIENT)
Dept: VASCULAR SURGERY | Age: 61
DRG: 181 | End: 2021-11-27
Attending: EMERGENCY MEDICINE
Payer: COMMERCIAL

## 2021-11-27 ENCOUNTER — HOSPITAL ENCOUNTER (INPATIENT)
Age: 61
LOS: 4 days | Discharge: HOME HEALTH | DRG: 181 | End: 2021-12-01
Attending: EMERGENCY MEDICINE | Admitting: HOSPITALIST
Payer: COMMERCIAL

## 2021-11-27 DIAGNOSIS — I73.9 PAD (PERIPHERAL ARTERY DISEASE) (HCC): ICD-10-CM

## 2021-11-27 DIAGNOSIS — R73.9 HYPERGLYCEMIA: ICD-10-CM

## 2021-11-27 DIAGNOSIS — I70.209 ARTERIAL OCCLUSION, LOWER EXTREMITY (HCC): Primary | ICD-10-CM

## 2021-11-27 LAB
ADMINISTERED INITIALS, ADMINIT: NORMAL
ALBUMIN SERPL-MCNC: 3.6 G/DL (ref 3.4–5)
ALBUMIN/GLOB SERPL: 1 {RATIO} (ref 0.8–1.7)
ALP SERPL-CCNC: 130 U/L (ref 45–117)
ALT SERPL-CCNC: 39 U/L (ref 16–61)
ANION GAP SERPL CALC-SCNC: 7 MMOL/L (ref 3–18)
APTT PPP: 28.1 SEC (ref 23–36.4)
APTT PPP: 28.8 SEC (ref 23–36.4)
AST SERPL-CCNC: 12 U/L (ref 10–38)
BASOPHILS # BLD: 0 K/UL (ref 0–0.1)
BASOPHILS # BLD: 0 K/UL (ref 0–0.1)
BASOPHILS NFR BLD: 0 % (ref 0–2)
BASOPHILS NFR BLD: 1 % (ref 0–2)
BILIRUB SERPL-MCNC: 0.2 MG/DL (ref 0.2–1)
BUN SERPL-MCNC: 32 MG/DL (ref 7–18)
BUN/CREAT SERPL: 22 (ref 12–20)
CALCIUM SERPL-MCNC: 9 MG/DL (ref 8.5–10.1)
CHLORIDE SERPL-SCNC: 95 MMOL/L (ref 100–111)
CO2 SERPL-SCNC: 25 MMOL/L (ref 21–32)
CREAT SERPL-MCNC: 1.45 MG/DL (ref 0.6–1.3)
D50 ADMINISTERED, D50ADM: 0 ML
D50 ORDER, D50ORD: 0 ML
DIFFERENTIAL METHOD BLD: ABNORMAL
DIFFERENTIAL METHOD BLD: ABNORMAL
EOSINOPHIL # BLD: 0 K/UL (ref 0–0.4)
EOSINOPHIL # BLD: 0.1 K/UL (ref 0–0.4)
EOSINOPHIL NFR BLD: 1 % (ref 0–5)
EOSINOPHIL NFR BLD: 1 % (ref 0–5)
ERYTHROCYTE [DISTWIDTH] IN BLOOD BY AUTOMATED COUNT: 13.4 % (ref 11.6–14.5)
ERYTHROCYTE [DISTWIDTH] IN BLOOD BY AUTOMATED COUNT: 13.6 % (ref 11.6–14.5)
EST. AVERAGE GLUCOSE BLD GHB EST-MCNC: ABNORMAL MG/DL
GLOBULIN SER CALC-MCNC: 3.6 G/DL (ref 2–4)
GLUCOSE BLD STRIP.AUTO-MCNC: 190 MG/DL (ref 70–110)
GLUCOSE BLD STRIP.AUTO-MCNC: 246 MG/DL (ref 70–110)
GLUCOSE BLD STRIP.AUTO-MCNC: 288 MG/DL (ref 70–110)
GLUCOSE BLD STRIP.AUTO-MCNC: 442 MG/DL (ref 70–110)
GLUCOSE BLD STRIP.AUTO-MCNC: 474 MG/DL (ref 70–110)
GLUCOSE BLD STRIP.AUTO-MCNC: 574 MG/DL (ref 70–110)
GLUCOSE BLD STRIP.AUTO-MCNC: 72 MG/DL (ref 70–110)
GLUCOSE SERPL-MCNC: 586 MG/DL (ref 74–99)
GLUCOSE, GLC: 190 MG/DL
GLUCOSE, GLC: 246 MG/DL
GLUCOSE, GLC: 288 MG/DL
GLUCOSE, GLC: 442 MG/DL
GLUCOSE, GLC: 474 MG/DL
GLUCOSE, GLC: 574 MG/DL
HBA1C MFR BLD: >14 % (ref 4.2–5.6)
HCT VFR BLD AUTO: 33.5 % (ref 36–48)
HCT VFR BLD AUTO: 35.1 % (ref 36–48)
HGB BLD-MCNC: 11.7 G/DL (ref 13–16)
HGB BLD-MCNC: 12.4 G/DL (ref 13–16)
HIGH TARGET, HITG: 180 MG/DL
IMM GRANULOCYTES # BLD AUTO: 0 K/UL (ref 0–0.04)
IMM GRANULOCYTES # BLD AUTO: 0 K/UL (ref 0–0.04)
IMM GRANULOCYTES NFR BLD AUTO: 0 % (ref 0–0.5)
IMM GRANULOCYTES NFR BLD AUTO: 0 % (ref 0–0.5)
INR PPP: 0.9 (ref 0.8–1.2)
INSULIN ADMINSTERED, INSADM: 1.9 UNITS/HOUR
INSULIN ADMINSTERED, INSADM: 15.4 UNITS/HOUR
INSULIN ADMINSTERED, INSADM: 2.6 UNITS/HOUR
INSULIN ADMINSTERED, INSADM: 6.8 UNITS/HOUR
INSULIN ADMINSTERED, INSADM: 7.6 UNITS/HOUR
INSULIN ADMINSTERED, INSADM: 8.3 UNITS/HOUR
INSULIN ORDER, INSORD: 1.9 UNITS/HOUR
INSULIN ORDER, INSORD: 15.4 UNITS/HOUR
INSULIN ORDER, INSORD: 2.6 UNITS/HOUR
INSULIN ORDER, INSORD: 6.8 UNITS/HOUR
INSULIN ORDER, INSORD: 7.6 UNITS/HOUR
INSULIN ORDER, INSORD: 8.3 UNITS/HOUR
LOW TARGET, LOT: 140 MG/DL
LYMPHOCYTES # BLD: 1 K/UL (ref 0.9–3.6)
LYMPHOCYTES # BLD: 1.1 K/UL (ref 0.9–3.6)
LYMPHOCYTES NFR BLD: 21 % (ref 21–52)
LYMPHOCYTES NFR BLD: 23 % (ref 21–52)
MAGNESIUM SERPL-MCNC: 2.2 MG/DL (ref 1.6–2.6)
MAGNESIUM SERPL-MCNC: 2.3 MG/DL (ref 1.6–2.6)
MCH RBC QN AUTO: 30.2 PG (ref 24–34)
MCH RBC QN AUTO: 30.2 PG (ref 24–34)
MCHC RBC AUTO-ENTMCNC: 34.9 G/DL (ref 31–37)
MCHC RBC AUTO-ENTMCNC: 35.3 G/DL (ref 31–37)
MCV RBC AUTO: 85.6 FL (ref 78–100)
MCV RBC AUTO: 86.3 FL (ref 78–100)
MINUTES UNTIL NEXT BG, NBG: 60 MIN
MONOCYTES # BLD: 0.3 K/UL (ref 0.05–1.2)
MONOCYTES # BLD: 0.4 K/UL (ref 0.05–1.2)
MONOCYTES NFR BLD: 7 % (ref 3–10)
MONOCYTES NFR BLD: 8 % (ref 3–10)
MULTIPLIER, MUL: 0.01
MULTIPLIER, MUL: 0.02
MULTIPLIER, MUL: 0.03
MULTIPLIER, MUL: 0.03
NEUTS SEG # BLD: 3.1 K/UL (ref 1.8–8)
NEUTS SEG # BLD: 3.6 K/UL (ref 1.8–8)
NEUTS SEG NFR BLD: 69 % (ref 40–73)
NEUTS SEG NFR BLD: 70 % (ref 40–73)
NRBC # BLD: 0 K/UL (ref 0–0.01)
NRBC # BLD: 0 K/UL (ref 0–0.01)
NRBC BLD-RTO: 0 PER 100 WBC
NRBC BLD-RTO: 0 PER 100 WBC
ORDER INITIALS, ORDINIT: NORMAL
PHOSPHATE SERPL-MCNC: 4.2 MG/DL (ref 2.5–4.9)
PLATELET # BLD AUTO: 211 K/UL (ref 135–420)
PLATELET # BLD AUTO: 222 K/UL (ref 135–420)
PMV BLD AUTO: 11.7 FL (ref 9.2–11.8)
PMV BLD AUTO: 12.1 FL (ref 9.2–11.8)
POTASSIUM SERPL-SCNC: 4.9 MMOL/L (ref 3.5–5.5)
PROT SERPL-MCNC: 7.2 G/DL (ref 6.4–8.2)
PROTHROMBIN TIME: 12.1 SEC (ref 11.5–15.2)
RBC # BLD AUTO: 3.88 M/UL (ref 4.35–5.65)
RBC # BLD AUTO: 4.1 M/UL (ref 4.35–5.65)
SODIUM SERPL-SCNC: 127 MMOL/L (ref 136–145)
WBC # BLD AUTO: 4.5 K/UL (ref 4.6–13.2)
WBC # BLD AUTO: 5.2 K/UL (ref 4.6–13.2)

## 2021-11-27 PROCEDURE — 85730 THROMBOPLASTIN TIME PARTIAL: CPT

## 2021-11-27 PROCEDURE — 80053 COMPREHEN METABOLIC PANEL: CPT

## 2021-11-27 PROCEDURE — 83036 HEMOGLOBIN GLYCOSYLATED A1C: CPT

## 2021-11-27 PROCEDURE — 96366 THER/PROPH/DIAG IV INF ADDON: CPT

## 2021-11-27 PROCEDURE — 96361 HYDRATE IV INFUSION ADD-ON: CPT

## 2021-11-27 PROCEDURE — 96365 THER/PROPH/DIAG IV INF INIT: CPT

## 2021-11-27 PROCEDURE — 84100 ASSAY OF PHOSPHORUS: CPT

## 2021-11-27 PROCEDURE — 96376 TX/PRO/DX INJ SAME DRUG ADON: CPT

## 2021-11-27 PROCEDURE — 74011250636 HC RX REV CODE- 250/636: Performed by: EMERGENCY MEDICINE

## 2021-11-27 PROCEDURE — 85610 PROTHROMBIN TIME: CPT

## 2021-11-27 PROCEDURE — 75635 CT ANGIO ABDOMINAL ARTERIES: CPT

## 2021-11-27 PROCEDURE — 85025 COMPLETE CBC W/AUTO DIFF WBC: CPT

## 2021-11-27 PROCEDURE — 74011250636 HC RX REV CODE- 250/636

## 2021-11-27 PROCEDURE — 83735 ASSAY OF MAGNESIUM: CPT

## 2021-11-27 PROCEDURE — 74011000636 HC RX REV CODE- 636: Performed by: EMERGENCY MEDICINE

## 2021-11-27 PROCEDURE — 74011000258 HC RX REV CODE- 258: Performed by: EMERGENCY MEDICINE

## 2021-11-27 PROCEDURE — 99285 EMERGENCY DEPT VISIT HI MDM: CPT

## 2021-11-27 PROCEDURE — 93926 LOWER EXTREMITY STUDY: CPT

## 2021-11-27 PROCEDURE — 82962 GLUCOSE BLOOD TEST: CPT

## 2021-11-27 PROCEDURE — 74011636637 HC RX REV CODE- 636/637: Performed by: EMERGENCY MEDICINE

## 2021-11-27 PROCEDURE — 65660000004 HC RM CVT STEPDOWN

## 2021-11-27 PROCEDURE — 74011250637 HC RX REV CODE- 250/637: Performed by: EMERGENCY MEDICINE

## 2021-11-27 RX ORDER — HEPARIN SODIUM 10000 [USP'U]/100ML
12-25 INJECTION, SOLUTION INTRAVENOUS
Status: DISCONTINUED | OUTPATIENT
Start: 2021-11-27 | End: 2021-11-27

## 2021-11-27 RX ORDER — GUAIFENESIN 100 MG/5ML
325 LIQUID (ML) ORAL
Status: COMPLETED | OUTPATIENT
Start: 2021-11-27 | End: 2021-11-27

## 2021-11-27 RX ORDER — HEPARIN SODIUM 10000 [USP'U]/100ML
INJECTION, SOLUTION INTRAVENOUS
Status: COMPLETED
Start: 2021-11-27 | End: 2021-11-27

## 2021-11-27 RX ORDER — TRAZODONE HYDROCHLORIDE 50 MG/1
50 TABLET ORAL
Status: DISCONTINUED | OUTPATIENT
Start: 2021-11-27 | End: 2021-12-01 | Stop reason: HOSPADM

## 2021-11-27 RX ORDER — HEPARIN SODIUM 1000 [USP'U]/ML
4000 INJECTION, SOLUTION INTRAVENOUS; SUBCUTANEOUS ONCE
Status: COMPLETED | OUTPATIENT
Start: 2021-11-27 | End: 2021-11-27

## 2021-11-27 RX ORDER — HEPARIN SODIUM 1000 [USP'U]/ML
4000 INJECTION, SOLUTION INTRAVENOUS; SUBCUTANEOUS ONCE
Status: DISCONTINUED | OUTPATIENT
Start: 2021-11-27 | End: 2021-11-27

## 2021-11-27 RX ORDER — MAGNESIUM SULFATE 100 %
4 CRYSTALS MISCELLANEOUS AS NEEDED
Status: DISCONTINUED | OUTPATIENT
Start: 2021-11-27 | End: 2021-12-01 | Stop reason: HOSPADM

## 2021-11-27 RX ORDER — HEPARIN SODIUM 10000 [USP'U]/100ML
12-25 INJECTION, SOLUTION INTRAVENOUS
Status: DISPENSED | OUTPATIENT
Start: 2021-11-27 | End: 2021-11-29

## 2021-11-27 RX ORDER — DEXTROSE 50 % IN WATER (D50W) INTRAVENOUS SYRINGE
25-50 AS NEEDED
Status: DISCONTINUED | OUTPATIENT
Start: 2021-11-27 | End: 2021-12-01 | Stop reason: HOSPADM

## 2021-11-27 RX ORDER — HEPARIN SODIUM 10000 [USP'U]/100ML
12-25 INJECTION, SOLUTION INTRAVENOUS
Status: DISCONTINUED | OUTPATIENT
Start: 2021-11-27 | End: 2021-11-27 | Stop reason: SDUPTHER

## 2021-11-27 RX ORDER — INSULIN GLARGINE 100 [IU]/ML
25 INJECTION, SOLUTION SUBCUTANEOUS
Status: DISCONTINUED | OUTPATIENT
Start: 2021-11-27 | End: 2021-11-28

## 2021-11-27 RX ADMIN — HEPARIN SODIUM 12 UNITS/KG/HR: 10000 INJECTION, SOLUTION INTRAVENOUS at 19:27

## 2021-11-27 RX ADMIN — HEPARIN SODIUM 4000 UNITS: 1000 INJECTION INTRAVENOUS; SUBCUTANEOUS at 19:19

## 2021-11-27 RX ADMIN — IOPAMIDOL 100 ML: 755 INJECTION, SOLUTION INTRAVENOUS at 16:33

## 2021-11-27 RX ADMIN — SODIUM CHLORIDE 1.9 UNITS/HR: 0.9 INJECTION INTRAVENOUS at 20:26

## 2021-11-27 RX ADMIN — TRAZODONE HYDROCHLORIDE 50 MG: 100 TABLET ORAL at 21:33

## 2021-11-27 RX ADMIN — SODIUM CHLORIDE 1000 ML: 900 INJECTION, SOLUTION INTRAVENOUS at 16:45

## 2021-11-27 RX ADMIN — ASPIRIN 81 MG CHEWABLE TABLET 324 MG: 81 TABLET CHEWABLE at 16:44

## 2021-11-27 RX ADMIN — INSULIN GLARGINE 25 UNITS: 100 INJECTION, SOLUTION SUBCUTANEOUS at 21:33

## 2021-11-27 RX ADMIN — SODIUM CHLORIDE 8.7 UNITS/HR: 0.9 INJECTION INTRAVENOUS at 17:17

## 2021-11-27 NOTE — Clinical Note
Contrast Dose Calculator:   Patient's age: 59.   Patient's sex: Male. Patient weight (kg) = 76.2. Creatinine level (mg/dL) = 1.06.   Creatinine clearance (mL/min): 77.   Contrast concentration (mg/mL) = 300. MACD = 300 mL. Max Contrast dose per Creatinine Cl calculator = 173.25 mL.

## 2021-11-27 NOTE — Clinical Note
Status[de-identified] INPATIENT [101]   Type of Bed: Stepdown [17]   Cardiac Monitoring Required?: Yes   Inpatient Hospitalization Certified Necessary for the Following Reasons: 3.  Patient receiving treatment that can only be provided in an inpatient setting (further clarification in H&P documentation)   Admitting Diagnosis: Arterial occlusion, lower extremity [facility]) [[id number]]   Admitting Physician: Rosalia Ott [[id number]]   Attending Physician: Rosalia Ott [[id number]]   Estimated Length of Stay: 2 Midnights   Discharge Plan[de-identified] [facility]

## 2021-11-27 NOTE — Clinical Note
Left femoral artery. Accessed successfully. Femoral access needle used. Using ultrasound guidance. Number of attempts =  3.

## 2021-11-27 NOTE — Clinical Note
Peripheral Lesion 1. Balloon inflated using single inflation technique. Inflation#1: Pressure = 10 pearl; Duration = 111 sec.

## 2021-11-27 NOTE — ED PROVIDER NOTES
EMERGENCY DEPARTMENT HISTORY AND PHYSICAL EXAM    2:03 PM    Date: 11/27/2021  Patient Name: Genaro Rosas    History of Presenting Illness     Chief Complaint   Patient presents with    Foot Pain    Numbness       History Provided By: Patient  Location/Duration/Severity/Modifying factors   Patient is a 70-year-old male presents to the ED with a chief complaint of right-sided foot pain with some going on for 2 days. He had pain in the foot in the past on and off and has had numbness in both feet for years. He was previous on gabapentin and reports that he is not taking it currently. He endorses that the foot feels cold to him. Has been developing over the past several days as well. He denies any chest pain or shortness of breath no falls or trauma. Denies any vascular surgeries in the past.  Patient reports he is otherwise been in his usual state of health. He denies any history of arterial disease in his legs that he knows of.           PCP: Uzair Stover NP    Current Facility-Administered Medications   Medication Dose Route Frequency Provider Last Rate Last Admin    insulin lispro (HUMALOG) injection   SubCUTAneous AC&HS Kittsonrob Sanchez, DO        buPROPion [facility] SR) tablet 150 mg  150 mg Oral DAILY Kittson Edge, DO        aspirin tablet 325 mg  325 mg Oral DAILY Kittson Edge, DO        glucose chewable tablet 16 g  4 Tablet Oral PRN Kittson Laura, DO        glucagon (GLUCAGEN) injection 1 mg  1 mg IntraMUSCular PRN Kittsonrob Sanchez, DO        dextrose (D50W) injection syrg 12.5-25 g  25-50 mL IntraVENous PRN Latricerob Sanchez, DO        traZODone (DESYREL) tablet 50 mg  50 mg Oral QHS Birdie Harrison K, DO   50 mg at 11/27/21 2133    insulin glargine (LANTUS) injection 25 Units  25 Units SubCUTAneous QHS Kittson Edge, DO   25 Units at 11/27/21 2133    heparin (porcine) 25,000 units in 0.45% saline 250 ml infusion  12-25 Units/kg/hr IntraVENous TITRATE Birdie Hunter K, DO 11.4 mL/hr at 11/28/21 0716 14 Units/kg/hr at 11/28/21 0716     Current Outpatient Medications   Medication Sig Dispense Refill    buPROPion SR (WELLBUTRIN SR) 150 mg SR tablet ONE TABLET BY MOUTH ONCE A DAY 90 Tablet 0    traZODone (DESYREL) 50 mg tablet Take 1 Tablet by mouth nightly. 90 Tablet 1    dulaglutide (Trulicity) 4.13 WZ/9.9 mL sub-q pen INJECT 0.75 MG UNDER THE SKIN ONCE WEEKLY 2 mL 0    simvastatin (Zocor) 20 mg tablet Take 1 Tablet by mouth nightly. 90 Tablet 1    metFORMIN (GLUCOPHAGE) 1,000 mg tablet TAKE ONE TABLET BY MOUTH TWICE A DAY WITH A MEAL 180 Tablet 2    lancets misc Please provide lancets for blood glucose meter insurance will cover 100 Lancet 3    glucose blood VI test strips (ASCENSIA AUTODISC VI, ONE TOUCH ULTRA TEST VI) strip Provide test strip and glucose meter that insurance will cover - Twice daily-  fasting in the morning and once two hours after a meal. 100 Strip 10    flash glucose sensor (FreeStyle Ezio 2 Sensor) kit Apply 1 sensor every 14 days to check blood sugar as directed at least 3 times daily. 2 Kit 5    flash glucose scanning reader (FreeStyle Ezio 2 [place]) misc Use as directed to check blood sugar at least 3 times daily 1 Each 0    ergocalciferol (ERGOCALCIFEROL) 1,250 mcg (50,000 unit) capsule TAKE ONE CAPSULE BY MOUTH ONCE WEEKLY  Indications: vitamin D deficiency (high dose therapy) 4 Capsule 6    insulin glargine (Basaglar KwikPen U-100 Insulin) 100 unit/mL (3 mL) inpn INJECT 50 UNITS UNDER THE SKIN DAILY 5 Adjustable Dose Pre-filled Pen Syringe 1    Insulin Needles, Disposable, 31 gauge x 5/16\" ndle Pen needles for Bydureon syringe 100 Each 0    multivitamin (ONE A DAY) tablet Take 1 Tablet by mouth daily.  90 Tablet 3    Blood-Glucose Meter monitoring kit Blood glucose kit that insurance will cover - Check blood glucose twice a day once in the morning fasting and once after a meal. 1 Kit 0       Past History     Past Medical History:  Past Medical History: Diagnosis Date    Alcohol use     Fri-Sun one fifth; Mon-Thur: Pint of liquor    CAD (coronary artery disease)     Cardiac angioplasty with stent 07/29/2009    2.5 x 13 Cypher stent to CX.  Cardiac cath 11/09/2011    RCA patent. LM patent. LAD patent. mD1 55%. CX patent. Prior stent patent. Edison 85% (2.5 x 15-mm Xience stent, resid 0%). LVEDP 12. EF 40-45%. High lateral hypk (RI distribution).       Cardiac inferior STEMI 2009    Constipation     Current smoker     contemplating stopping    Diabetes ([facility] [address]     type 2-insulin dependent    Diabetic neuropathy (HCC)     GERD (gastroesophageal reflux disease)     HTN (hypertension)     Hyperlipidemia     Lacunar infarction ([facility] [address]     Migraine     Myocardial infarction ([facility] [address]     Vitamin D deficiency        Past Surgical History:  Past Surgical History:   Procedure Laterality Date    COLONOSCOPY N/A 10/28/2020    COLONOSCOPY w/polypectomy performed by Finesse Musa MD at [address]  07/29/2009    HX HEART CATHETERIZATION  8/30/2011    HX HEART CATHETERIZATION  11/09/2011    HX WISDOM TEETH EXTRACTION      x4       Family History:  Family History   Problem Relation Age of Onset    Hypertension Mother     Heart Attack Mother     Diabetes Mother     Hypertension Father     Heart Attack Father     Diabetes Father     Diabetes Sister     Hypertension Sister     Stroke Sister     Diabetes Brother     Hypertension Brother     Stroke Brother     No Known Problems Maternal Grandmother     No Known Problems Maternal Grandfather     No Known Problems Paternal Grandmother     No Known Problems Paternal Grandfather     No Known Problems Brother     Heart Disease Other     Kidney Disease Other        Social History:  Social History     Tobacco Use    Smoking status: Current Every Day Smoker     Packs/day: 0.50     Years: 1.00     Pack years: 0.50     Types: Cigarettes    Smokeless tobacco: Never Used   Substance Use Topics    Alcohol use: Yes     Alcohol/week: 4.2 standard drinks     Types: 5 Shots of liquor per week     Comment: rare    Drug use: Not Currently     Types: Marijuana, Cocaine     Comment: none       Allergies: Allergies   Allergen Reactions    Niacin Itching       I reviewed and confirmed the above information with patient and updated as necessary. Review of Systems     Review of Systems   Constitutional: Negative for fever. HENT: Negative for congestion and rhinorrhea. Eyes: Negative for visual disturbance. Respiratory: Negative for cough and shortness of breath. Cardiovascular: Negative for chest pain. Gastrointestinal: Negative for abdominal pain, diarrhea, nausea and vomiting. Genitourinary: Negative for urgency. Musculoskeletal: Positive for arthralgias (Foot pain). Negative for myalgias. Skin: Negative for rash. Neurological: Positive for numbness. Negative for headaches. Physical Exam     Visit Vitals  /87   Pulse 82   Temp 98.1 °F (36.7 °C)   Resp 19   Ht 5' 9\" (1.753 m)   Wt 81.6 kg (180 lb)   SpO2 99%   BMI 26.58 kg/m²       Physical Exam  Vitals and nursing note reviewed. Constitutional:       General: He is not in acute distress. Appearance: Normal appearance. He is normal weight. He is not ill-appearing or toxic-appearing. HENT:      Head: Normocephalic and atraumatic. Right Ear: External ear normal.      Left Ear: External ear normal.      Nose: Nose normal. No congestion or rhinorrhea. Mouth/Throat:      Mouth: Mucous membranes are moist.      Pharynx: No oropharyngeal exudate or posterior oropharyngeal erythema. Eyes:      Conjunctiva/sclera: Conjunctivae normal.      Pupils: Pupils are equal, round, and reactive to light. Cardiovascular:      Rate and Rhythm: Normal rate and regular rhythm. Pulses: Normal pulses. Heart sounds: Normal heart sounds. No murmur heard. No friction rub.    Pulmonary: Effort: Pulmonary effort is normal.      Breath sounds: Normal breath sounds. No wheezing, rhonchi or rales. Abdominal:      General: Abdomen is flat. Tenderness: There is no abdominal tenderness. There is no guarding or rebound. Musculoskeletal:         General: No swelling or tenderness. Normal range of motion. Cervical back: Normal range of motion and neck supple. Right lower leg: No edema. Left lower leg: No edema. Comments: I do not detect a pulse in the right foot, however pulses somewhat diminished in the left foot, able to PT and DP. There is minimal color difference but does feel cooler on the right as compared to the left. Capillary refill on both feet around 2-4 seconds   Skin:     General: Skin is warm and dry. Capillary Refill: Capillary refill takes less than 2 seconds. Neurological:      General: No focal deficit present. Mental Status: He is alert. Motor: No weakness.          Diagnostic Study Results     Labs -  Recent Results (from the past 24 hour(s))   DUPLEX LOWER EXT ARTERY RIGHT    Collection Time: 11/27/21  2:53 PM   Result Value Ref Range    Right CFA dist sys .5 cm/s    Right Prox PFA A .0 cm/s    Right super femoral dist sys PSV 43.8 cm/s    Right super femoral mid sys PSV 59.2 cm/s    Right super femoral prox sys PSV 46.8 cm/s    Right popliteal dist sys .6 cm/s    Right popliteal mid sys PSV 14.8 cm/s    Right popliteal prox sys PSV 51.5 cm/s    Right Dist PTA PSV 13.2 cm/s    Right mid PTA sys PSV 14.7 cm/s    Right Prox PTA PSV 11.0 cm/s    Right Dist Peroneal Velocity 8.4 cm/s    Right mid peroneal sys PSV 13.2 cm/s    Right prox peroneal sys PSV 16.6 cm/s    Right Dist MICHAEL Velocity 12.3 cm/s    Right Mid MICHAEL Velocity 10.2 cm/s    Right Prox MICHAEL Velocity 15.2 cm/s    Right SFA Prox Mookie Ratio 0.4     Right SFA Mid Mookie Ratio 1.3     Right SFA Dist Mookie Ratio 0.74     Right Pop A Prox Mookie Ratio 1.18     Right Pop A Mid Mookie Ratio 0.29     Right Pop A Dist Mookie Ratio 16.73    CBC WITH AUTOMATED DIFF    Collection Time: 11/27/21  3:10 PM   Result Value Ref Range    WBC 5.2 4.6 - 13.2 K/uL    RBC 4.10 (L) 4.35 - 5.65 M/uL    HGB 12.4 (L) 13.0 - 16.0 g/dL    HCT 35.1 (L) 36.0 - 48.0 %    MCV 85.6 78.0 - 100.0 FL    MCH 30.2 24.0 - 34.0 PG    MCHC 35.3 31.0 - 37.0 g/dL    RDW 13.4 11.6 - 14.5 %    PLATELET 476 523 - 994 K/uL    MPV 11.7 9.2 - 11.8 FL    NRBC 0.0 0  WBC    ABSOLUTE NRBC 0.00 0.00 - 0.01 K/uL    NEUTROPHILS 70 40 - 73 %    LYMPHOCYTES 21 21 - 52 %    MONOCYTES 8 3 - 10 %    EOSINOPHILS 1 0 - 5 %    BASOPHILS 1 0 - 2 %    IMMATURE GRANULOCYTES 0 0.0 - 0.5 %    ABS. NEUTROPHILS 3.6 1.8 - 8.0 K/UL    ABS. LYMPHOCYTES 1.1 0.9 - 3.6 K/UL    ABS. MONOCYTES 0.4 0.05 - 1.2 K/UL    ABS. EOSINOPHILS 0.1 0.0 - 0.4 K/UL    ABS. BASOPHILS 0.0 0.0 - 0.1 K/UL    ABS. IMM. GRANS. 0.0 0.00 - 0.04 K/UL    DF AUTOMATED     METABOLIC PANEL, COMPREHENSIVE    Collection Time: 11/27/21  3:10 PM   Result Value Ref Range    Sodium 127 (L) 136 - 145 mmol/L    Potassium 4.9 3.5 - 5.5 mmol/L    Chloride 95 (L) 100 - 111 mmol/L    CO2 25 21 - 32 mmol/L    Anion gap 7 3.0 - 18 mmol/L    Glucose 586 (HH) 74 - 99 mg/dL    BUN 32 (H) 7.0 - 18 MG/DL    Creatinine 1.45 (H) 0.6 - 1.3 MG/DL    BUN/Creatinine ratio 22 (H) 12 - 20      GFR est AA 60 (L) >60 ml/min/1.73m2    GFR est non-AA 49 (L) >60 ml/min/1.73m2    Calcium 9.0 8.5 - 10.1 MG/DL    Bilirubin, total 0.2 0.2 - 1.0 MG/DL    ALT (SGPT) 39 16 - 61 U/L    AST (SGOT) 12 10 - 38 U/L    Alk.  phosphatase 130 (H) 45 - 117 U/L    Protein, total 7.2 6.4 - 8.2 g/dL    Albumin 3.6 3.4 - 5.0 g/dL    Globulin 3.6 2.0 - 4.0 g/dL    A-G Ratio 1.0 0.8 - 1.7     PTT    Collection Time: 11/27/21  3:10 PM   Result Value Ref Range    aPTT 28.1 23.0 - 36.4 SEC   PROTHROMBIN TIME + INR    Collection Time: 11/27/21  3:10 PM   Result Value Ref Range    Prothrombin time 12.1 11.5 - 15.2 sec    INR 0.9 0.8 - 1.2 MAGNESIUM    Collection Time: 11/27/21  3:10 PM   Result Value Ref Range    Magnesium 2.3 1.6 - 2.6 mg/dL   PHOSPHORUS    Collection Time: 11/27/21  3:10 PM   Result Value Ref Range    Phosphorus 4.2 2.5 - 4.9 MG/DL   HEMOGLOBIN A1C WITH EAG    Collection Time: 11/27/21  3:10 PM   Result Value Ref Range    Hemoglobin A1c >14.0 (H) 4.2 - 5.6 %    Est. average glucose Cannot be calculated mg/dL   GLUCOSE, POC    Collection Time: 11/27/21  4:47 PM   Result Value Ref Range    Glucose (POC) 474 (HH) 70 - 110 mg/dL   GLUCOSTABILIZER    Collection Time: 11/27/21  5:04 PM   Result Value Ref Range    Glucose 474 mg/dL    Insulin order 8.3 units/hour    Insulin adminstered 8.3 units/hour    Multiplier 0.020     Low target 140 mg/dL    High target 180 mg/dL    D50 order 0.0 ml    D50 administered 0.00 ml    Minutes until next BG 60 min    Order initials atf     Administered initials atf    GLUCOSE, POC    Collection Time: 11/27/21  6:00 PM   Result Value Ref Range    Glucose (POC) 574 (HH) 70 - 110 mg/dL   GLUCOSTABILIZER    Collection Time: 11/27/21  6:05 PM   Result Value Ref Range    Glucose 574 mg/dL    Insulin order 15.4 units/hour    Insulin adminstered 15.4 units/hour    Multiplier 0.030     Low target 140 mg/dL    High target 180 mg/dL    D50 order 0.0 ml    D50 administered 0.00 ml    Minutes until next BG 60 min    Order initials atf     Administered initials atf    MAGNESIUM    Collection Time: 11/27/21  6:40 PM   Result Value Ref Range    Magnesium 2.2 1.6 - 2.6 mg/dL   CBC WITH AUTOMATED DIFF    Collection Time: 11/27/21  6:40 PM   Result Value Ref Range    WBC 4.5 (L) 4.6 - 13.2 K/uL    RBC 3.88 (L) 4.35 - 5.65 M/uL    HGB 11.7 (L) 13.0 - 16.0 g/dL    HCT 33.5 (L) 36.0 - 48.0 %    MCV 86.3 78.0 - 100.0 FL    MCH 30.2 24.0 - 34.0 PG    MCHC 34.9 31.0 - 37.0 g/dL    RDW 13.6 11.6 - 14.5 %    PLATELET 818 787 - 915 K/uL    MPV 12.1 (H) 9.2 - 11.8 FL    NRBC 0.0 0  WBC    ABSOLUTE NRBC 0.00 0.00 - 0.01 K/uL NEUTROPHILS 69 40 - 73 %    LYMPHOCYTES 23 21 - 52 %    MONOCYTES 7 3 - 10 %    EOSINOPHILS 1 0 - 5 %    BASOPHILS 0 0 - 2 %    IMMATURE GRANULOCYTES 0 0.0 - 0.5 %    ABS. NEUTROPHILS 3.1 1.8 - 8.0 K/UL    ABS. LYMPHOCYTES 1.0 0.9 - 3.6 K/UL    ABS. MONOCYTES 0.3 0.05 - 1.2 K/UL    ABS. EOSINOPHILS 0.0 0.0 - 0.4 K/UL    ABS. BASOPHILS 0.0 0.0 - 0.1 K/UL    ABS. IMM.  GRANS. 0.0 0.00 - 0.04 K/UL    DF AUTOMATED     PTT    Collection Time: 11/27/21  6:40 PM   Result Value Ref Range    aPTT 28.8 23.0 - 36.4 SEC   GLUCOSE, POC    Collection Time: 11/27/21  7:11 PM   Result Value Ref Range    Glucose (POC) 442 (HH) 70 - 110 mg/dL   GLUCOSTABILIZER    Collection Time: 11/27/21  7:11 PM   Result Value Ref Range    Glucose 442 mg/dL    Insulin order 7.6 units/hour    Insulin adminstered 7.6 units/hour    Multiplier 0.020     Low target 140 mg/dL    High target 180 mg/dL    D50 order 0.0 ml    D50 administered 0.00 ml    Minutes until next BG 60 min    Order initials atf     Administered initials atf    GLUCOSE, POC    Collection Time: 11/27/21  8:17 PM   Result Value Ref Range    Glucose (POC) 246 (H) 70 - 110 mg/dL   GLUCOSTABILIZER    Collection Time: 11/27/21  8:20 PM   Result Value Ref Range    Glucose 246 mg/dL    Insulin order 1.9 units/hour    Insulin adminstered 1.9 units/hour    Multiplier 0.010     Low target 140 mg/dL    High target 180 mg/dL    D50 order 0.0 ml    D50 administered 0.00 ml    Minutes until next BG 60 min    Order initials AO     Administered initials AO    GLUCOSE, POC    Collection Time: 11/27/21  9:24 PM   Result Value Ref Range    Glucose (POC) 190 (H) 70 - 110 mg/dL   GLUCOSTABILIZER    Collection Time: 11/27/21  9:28 PM   Result Value Ref Range    Glucose 190 mg/dL    Insulin order 2.6 units/hour    Insulin adminstered 2.6 units/hour    Multiplier 0.020     Low target 140 mg/dL    High target 180 mg/dL    D50 order 0.0 ml    D50 administered 0.00 ml    Minutes until next BG 60 min    Order initials AO     Administered initials AO    GLUCOSE, POC    Collection Time: 11/27/21 10:34 PM   Result Value Ref Range    Glucose (POC) 288 (H) 70 - 110 mg/dL   GLUCOSTABILIZER    Collection Time: 11/27/21 10:35 PM   Result Value Ref Range    Glucose 288 mg/dL    Insulin order 6.8 units/hour    Insulin adminstered 6.8 units/hour    Multiplier 0.030     Low target 140 mg/dL    High target 180 mg/dL    D50 order 0.0 ml    D50 administered 0.00 ml    Minutes until next BG 60 min    Order initials AO     Administered initials AO    GLUCOSE, POC    Collection Time: 11/27/21 11:43 PM   Result Value Ref Range    Glucose (POC) 72 70 - 110 mg/dL   PTT    Collection Time: 11/28/21  1:30 AM   Result Value Ref Range    aPTT 58.4 (H) 23.0 - 36.4 SEC   GLUCOSE, POC    Collection Time: 11/28/21  2:17 AM   Result Value Ref Range    Glucose (POC) 222 (H) 70 - 756 mg/dL   METABOLIC PANEL, BASIC    Collection Time: 11/28/21  5:22 AM   Result Value Ref Range    Sodium 135 (L) 136 - 145 mmol/L    Potassium 4.3 3.5 - 5.5 mmol/L    Chloride 103 100 - 111 mmol/L    CO2 24 21 - 32 mmol/L    Anion gap 8 3.0 - 18 mmol/L    Glucose 246 (H) 74 - 99 mg/dL    BUN 21 (H) 7.0 - 18 MG/DL    Creatinine 1.06 0.6 - 1.3 MG/DL    BUN/Creatinine ratio 20 12 - 20      GFR est AA >60 >60 ml/min/1.73m2    GFR est non-AA >60 >60 ml/min/1.73m2    Calcium 8.6 8.5 - 10.1 MG/DL   MAGNESIUM    Collection Time: 11/28/21  5:22 AM   Result Value Ref Range    Magnesium 2.0 1.6 - 2.6 mg/dL   CBC WITH AUTOMATED DIFF    Collection Time: 11/28/21  5:22 AM   Result Value Ref Range    WBC 6.4 4.6 - 13.2 K/uL    RBC 3.96 (L) 4.35 - 5.65 M/uL    HGB 11.8 (L) 13.0 - 16.0 g/dL    HCT 34.0 (L) 36.0 - 48.0 %    MCV 85.9 78.0 - 100.0 FL    MCH 29.8 24.0 - 34.0 PG    MCHC 34.7 31.0 - 37.0 g/dL    RDW 13.4 11.6 - 14.5 %    PLATELET 131 367 - 423 K/uL    MPV 11.7 9.2 - 11.8 FL    NRBC 0.0 0  WBC    ABSOLUTE NRBC 0.00 0.00 - 0.01 K/uL    NEUTROPHILS 65 40 - 73 %    LYMPHOCYTES 25 21 - 52 %    MONOCYTES 7 3 - 10 %    EOSINOPHILS 1 0 - 5 %    BASOPHILS 1 0 - 2 %    IMMATURE GRANULOCYTES 1 (H) 0.0 - 0.5 %    ABS. NEUTROPHILS 4.1 1.8 - 8.0 K/UL    ABS. LYMPHOCYTES 1.6 0.9 - 3.6 K/UL    ABS. MONOCYTES 0.5 0.05 - 1.2 K/UL    ABS. EOSINOPHILS 0.1 0.0 - 0.4 K/UL    ABS. BASOPHILS 0.0 0.0 - 0.1 K/UL    ABS. IMM. GRANS. 0.1 (H) 0.00 - 0.04 K/UL    DF AUTOMATED           Radiologic Studies -   DUPLEX LOWER EXT ARTERY RIGHT         CTA ABD ART W RUNOFF W WO CONT    (Results Pending)           Medical Decision Making   I am the first provider for this patient. I reviewed the vital signs, available nursing notes, past medical history, past surgical history, family history and social history. Vital Signs-Reviewed the patient's vital signs. Records Reviewed: Nursing Notes, Old Medical Records, Previous Radiology Studies and Previous Laboratory Studies (Time of Review: 2:03 PM)      Provider Notes (Medical Decision Making):   MDM  Number of Diagnoses or Management Options  Arterial occlusion, lower extremity ([facility] [address]  Hyperglycemia  Diagnosis management comments:  26-year-old male presenting with right foot pain. On exam he has diminished pulses and slightly cooler sensation. Certainly consider arterial occlusion, peripheral neuropathy, arterial insufficiency, etc.    Arterial Doppler shows absence of flow. I discussed with vascular surgery, Dr. Cory Meyers. He recommended getting a CTA, would likely see the patient outpatient, advised aspirin for now. CTA shows potentially an acute occlusion. Consistent with the patient's presentation. He is also got marked hyperglycemia so insulin drip was started glucose stabilizer protocol. Spoke again with vascular surgery, Dr. Cory Meyers, who now recommended observation of the foot, admission. Recommends full anticoagulation. I have also ordered aspirin for the patient. We will monitor the patient closely while he is under our care.   Notified by the nurse that he is very noncompliant he keeps disconnecting his heparin drip he was actually caught smoking tobacco in our room. I have ordered diabetic diet for him. I expressed importance of the patient staying here and cooperating with the care at the risk of potentially losing his foot. He is agreeable for now with the treatment plan. Discussed with Dr. Higinio Bella agrees with the plan. ED Course: Progress Notes, Reevaluation, and Consults:  ED Course as of 11/28/21 0727   Sat Nov 27, 2021   1511 Per vascular tech, there is no flow in the DP or PT. Will page vascular surgery. [PIOTR]   [address] [facility] ED for vascular surgery. They have  [PIOTR]   [phone] with Vascular surgery, Dr. Elida Singh, reviewed the case. He advised me that it was more of a chronic issue and to place the patient on aspirin and to have the patient in the office on Monday for angiogram he would like to get a CTA with runoff if we can. [PIOTR]   1082 Filling defect and expansion of the right tibioperitoneal trunk, approx 2 cm segment, suggestive of acute thrombus. Right anterior tibial artery patent. Reconstitution of the peroneal artery and posterior tibial artery. Multilevel peripheral vascular atheroscolerotic disease. CRUZ narrowing, could be due to atherosclerotic disease. No acute intrabdominal or pelvic process. [PIOTR]   1931 Spoke with vascular surgery again. Recommends admission for observation and monitoring of the foot. We will continue local stabilizer as well. [PIOTR]   Sun Nov 28, 2021   0226 CTA ABD ART W RUNOFF W WO CONT [KB]      ED Course User Index  [PIOTR] Jovanna Stokes DO  [KB] Daren Gonzalez MD       Procedures    Critical Care Time: CRITICAL CARE NOTE:    I have spent 40 minutes of critical care time involved in lab review, consultations with specialist, family decision-making, and documentation. During this entire length of time I was immediately available to the patient. Critical Care:   The reason for providing this level of medical care for this critically ill patient was due a critical illness that impaired one or more vital organ systems such that there was a high probability of imminent or life threatening deterioration in the patients condition. This care involved high complexity decision making to assess, manipulate, and support vital system functions, to treat this vital organ system failure and to prevent further life threatening deterioration of the patients condition. Time is exclusive of procedural and teaching time. Julian Gabriel DO      Diagnosis     Clinical Impression:   1. Arterial occlusion, lower extremity ([address]    2. Hyperglycemia        Disposition: Admit    Follow-up Information    None          Patient's Medications   Start Taking    No medications on file   Continue Taking    BLOOD-GLUCOSE METER MONITORING KIT    Blood glucose kit that insurance will cover - Check blood glucose twice a day once in the morning fasting and once after a meal.    BUPROPION SR (WELLBUTRIN SR) 150 MG SR TABLET    ONE TABLET BY MOUTH ONCE A DAY    DULAGLUTIDE (TRULICITY) 4.96 EO/0.4 ML SUB-Q PEN    INJECT 0.75 MG UNDER THE SKIN ONCE WEEKLY    ERGOCALCIFEROL (ERGOCALCIFEROL) 1,250 MCG (50,000 UNIT) CAPSULE    TAKE ONE CAPSULE BY MOUTH ONCE WEEKLY  Indications: vitamin D deficiency (high dose therapy)    FLASH GLUCOSE SCANNING READER (FREESTYLE BRENT 2 READER) MISC    Use as directed to check blood sugar at least 3 times daily    FLASH GLUCOSE SENSOR (FREESTYLE BRENT 2 SENSOR) KIT    Apply 1 sensor every 14 days to check blood sugar as directed at least 3 times daily.     GLUCOSE BLOOD VI TEST STRIPS (ASCENSIA AUTODISC VI, ONE TOUCH ULTRA TEST VI) STRIP    Provide test strip and glucose meter that insurance will cover - Twice daily-  fasting in the morning and once two hours after a meal.    INSULIN GLARGINE (BASAGLAR KWIKPEN U-100 INSULIN) 100 UNIT/ML (3 ML) INPN    INJECT 50 UNITS UNDER THE SKIN DAILY    INSULIN NEEDLES, DISPOSABLE, 31 GAUGE X 5/16\" NDLE    Pen needles for Bydureon syringe    LANCETS MISC    Please provide lancets for blood glucose meter insurance will cover    METFORMIN (GLUCOPHAGE) 1,000 MG TABLET    TAKE ONE TABLET BY MOUTH TWICE A DAY WITH A MEAL    MULTIVITAMIN (ONE A DAY) TABLET    Take 1 Tablet by mouth daily. SIMVASTATIN (ZOCOR) 20 MG TABLET    Take 1 Tablet by mouth nightly. TRAZODONE (DESYREL) 50 MG TABLET    Take 1 Tablet by mouth nightly. These Medications have changed    No medications on file   Stop Taking    No medications on file       Shira Iniguez DO   Emergency Medicine   November 28, 2021, 2:03 PM     This note is dictated utilizing Dragon voice recognition software. Unfortunately this leads to occasional typographical errors using the voice recognition. I apologize in advance if the situation occurs. If questions occur please do not hesitate to contact me directly.     Shira Iniguez, DO

## 2021-11-27 NOTE — ED NOTES
Pt changing into gown at this time after being told he was being admitted. 20 ga PIV inserted into LFA, labs drawn, blood flushed easily with 10 ml of NS. Waiting on PTT to initiate heparin gtt.

## 2021-11-27 NOTE — Clinical Note
Vessel: right CFA, PFA, SFA, popliteal, PTA, peroneal and MICHAEL. Power injection to the artery. Multiple views taken. PSI = 600. Rate of rise = 0.5 sec. Injection rate = 4 mL/sec. Total injected volume = 8 mL.

## 2021-11-27 NOTE — Clinical Note
Peripheral Lesion 1. Spine appears normal, range of motion is not limited, no muscle or joint tenderness

## 2021-11-27 NOTE — Clinical Note
Left femoral vein. Accessed successfully. Femoral access needle used. Using ultrasound guidance. Number of attempts =  3.  Out and holding pressure

## 2021-11-27 NOTE — Clinical Note
TRANSFER - IN REPORT:     Verbal report received from: Ritesh Enrique RN. Report consisted of patient's Situation, Background, Assessment and   Recommendations(SBAR). Opportunity for questions and clarification was provided. Assessment completed upon patient's arrival to unit and care assumed. Patient transported with a Cardiac Cath Tech / Patient Care Tech.

## 2021-11-27 NOTE — Clinical Note
Left femoral artery. Accessed unsuccessfully. Femoral access needle used. Using ultrasound guidance. Number of attempts =  2.  Out and holding pressure

## 2021-11-27 NOTE — Clinical Note
Left femoral artery. Accessed successfully. Micropuncture needle used. Using ultrasound guidance.  Number of attempts =  1.

## 2021-11-27 NOTE — Clinical Note
Peripheral Lesion 1. Balloon inflated using single inflation technique. Inflation#1: Pressure = 12 pearl; Duration = 120 sec.

## 2021-11-27 NOTE — Clinical Note
Vessel: bilateral iliac. Power injection to the aorta, artery. Single view taken. PSI = 1000. Rate of rise = 0.5 sec. Injection rate = 8 mL/sec. Total injected volume = 12 mL.

## 2021-11-27 NOTE — Clinical Note
TRANSFER - OUT REPORT:     Verbal report given to: Damon Casas RN. Report consisted of patient's Situation, Background, Assessment and   Recommendations(SBAR). Opportunity for questions and clarification was provided. Patient transported with a Cardiac Cath Tech / Patient Care Tech. Patient transported to: holding area.

## 2021-11-27 NOTE — Clinical Note
Peripheral Lesion 3. Balloon inflated using single inflation technique. Inflation#1: Pressure = 12 pearl; Duration = 120 sec.    SFA and pop

## 2021-11-27 NOTE — Clinical Note
Mechanical thrombectomy. Aspiration catheter inserted. Pass #: 1. Total fluid removed = 48 mL. Aspiration duration = 48 sec.

## 2021-11-27 NOTE — ED NOTES
Attempted to locate right dorsalis pedis pulse with no success via doppler or palpation. Pt updated on status. Provider aware. Duplex being performed at this time.

## 2021-11-27 NOTE — ED TRIAGE NOTES
Patient c/o right foot pain and numbness x 3 days. Denies injury. States hx of diabetic neuropathy. Advises that he has taken gabapentin in past for neuropathic pain, but currently is not prescribed.

## 2021-11-28 LAB
ANION GAP SERPL CALC-SCNC: 8 MMOL/L (ref 3–18)
APTT PPP: 58.4 SEC (ref 23–36.4)
APTT PPP: 88.3 SEC (ref 23–36.4)
BASOPHILS # BLD: 0 K/UL (ref 0–0.1)
BASOPHILS NFR BLD: 1 % (ref 0–2)
BUN SERPL-MCNC: 21 MG/DL (ref 7–18)
BUN/CREAT SERPL: 20 (ref 12–20)
CALCIUM SERPL-MCNC: 8.6 MG/DL (ref 8.5–10.1)
CHLORIDE SERPL-SCNC: 103 MMOL/L (ref 100–111)
CO2 SERPL-SCNC: 24 MMOL/L (ref 21–32)
CREAT SERPL-MCNC: 1.06 MG/DL (ref 0.6–1.3)
DIFFERENTIAL METHOD BLD: ABNORMAL
EOSINOPHIL # BLD: 0.1 K/UL (ref 0–0.4)
EOSINOPHIL NFR BLD: 1 % (ref 0–5)
ERYTHROCYTE [DISTWIDTH] IN BLOOD BY AUTOMATED COUNT: 13.4 % (ref 11.6–14.5)
GLUCOSE BLD STRIP.AUTO-MCNC: 219 MG/DL (ref 70–110)
GLUCOSE BLD STRIP.AUTO-MCNC: 220 MG/DL (ref 70–110)
GLUCOSE BLD STRIP.AUTO-MCNC: 222 MG/DL (ref 70–110)
GLUCOSE BLD STRIP.AUTO-MCNC: 311 MG/DL (ref 70–110)
GLUCOSE BLD STRIP.AUTO-MCNC: 357 MG/DL (ref 70–110)
GLUCOSE SERPL-MCNC: 246 MG/DL (ref 74–99)
HCT VFR BLD AUTO: 34 % (ref 36–48)
HGB BLD-MCNC: 11.8 G/DL (ref 13–16)
IMM GRANULOCYTES # BLD AUTO: 0.1 K/UL (ref 0–0.04)
IMM GRANULOCYTES NFR BLD AUTO: 1 % (ref 0–0.5)
LYMPHOCYTES # BLD: 1.6 K/UL (ref 0.9–3.6)
LYMPHOCYTES NFR BLD: 25 % (ref 21–52)
MAGNESIUM SERPL-MCNC: 2 MG/DL (ref 1.6–2.6)
MCH RBC QN AUTO: 29.8 PG (ref 24–34)
MCHC RBC AUTO-ENTMCNC: 34.7 G/DL (ref 31–37)
MCV RBC AUTO: 85.9 FL (ref 78–100)
MONOCYTES # BLD: 0.5 K/UL (ref 0.05–1.2)
MONOCYTES NFR BLD: 7 % (ref 3–10)
NEUTS SEG # BLD: 4.1 K/UL (ref 1.8–8)
NEUTS SEG NFR BLD: 65 % (ref 40–73)
NRBC # BLD: 0 K/UL (ref 0–0.01)
NRBC BLD-RTO: 0 PER 100 WBC
PLATELET # BLD AUTO: 229 K/UL (ref 135–420)
PMV BLD AUTO: 11.7 FL (ref 9.2–11.8)
POTASSIUM SERPL-SCNC: 4.3 MMOL/L (ref 3.5–5.5)
RBC # BLD AUTO: 3.96 M/UL (ref 4.35–5.65)
RIGHT CFA DIST SYS PSV: 120.5 CM/S
RIGHT DIST ATA VELOCITY: 12.3 CM/S
RIGHT DIST PTA PSV: 13.2 CM/S
RIGHT MID ATA VELOCITY: 10.2 CM/S
RIGHT PERONEAL DIST VELOCITY: 8.4 CM/S
RIGHT PERONEAL MID SYS PSV: 13.2 CM/S
RIGHT PERONEAL PROX SYS PSV: 16.6 CM/S
RIGHT POP A DIST VEL RATIO: 16.73
RIGHT POP A MID VEL RATIO: 0.29
RIGHT POP A PROX VEL RATIO: 1.18
RIGHT POPLITEAL DIST SYS PSV: 247.6 CM/S
RIGHT POPLITEAL MID SYS PSV: 14.8 CM/S
RIGHT POPLITEAL PROX SYS PSV: 51.5 CM/S
RIGHT PROX ATA VELOCITY: 15.2 CM/S
RIGHT PROX PFA A PSV: 106 CM/S
RIGHT PROX PTA PSV: 11 CM/S
RIGHT PTA MID SYS PSV: 14.7 CM/S
RIGHT SFA DIST VEL RATIO: 0.74
RIGHT SFA MID VEL RATIO: 1.3
RIGHT SFA PROX VEL RATIO: 0.4
RIGHT SUPER FEMORAL DIST SYS PSV: 43.8 CM/S
RIGHT SUPER FEMORAL MID SYS PSV: 59.2 CM/S
RIGHT SUPER FEMORAL PROX SYS PSV: 46.8 CM/S
SODIUM SERPL-SCNC: 135 MMOL/L (ref 136–145)
WBC # BLD AUTO: 6.4 K/UL (ref 4.6–13.2)

## 2021-11-28 PROCEDURE — 85025 COMPLETE CBC W/AUTO DIFF WBC: CPT

## 2021-11-28 PROCEDURE — 85730 THROMBOPLASTIN TIME PARTIAL: CPT

## 2021-11-28 PROCEDURE — 96376 TX/PRO/DX INJ SAME DRUG ADON: CPT

## 2021-11-28 PROCEDURE — 74011636637 HC RX REV CODE- 636/637: Performed by: STUDENT IN AN ORGANIZED HEALTH CARE EDUCATION/TRAINING PROGRAM

## 2021-11-28 PROCEDURE — 74011250637 HC RX REV CODE- 250/637: Performed by: EMERGENCY MEDICINE

## 2021-11-28 PROCEDURE — 83735 ASSAY OF MAGNESIUM: CPT

## 2021-11-28 PROCEDURE — 82962 GLUCOSE BLOOD TEST: CPT

## 2021-11-28 PROCEDURE — 65660000000 HC RM CCU STEPDOWN

## 2021-11-28 PROCEDURE — 74011250636 HC RX REV CODE- 250/636: Performed by: EMERGENCY MEDICINE

## 2021-11-28 PROCEDURE — 74011636637 HC RX REV CODE- 636/637: Performed by: EMERGENCY MEDICINE

## 2021-11-28 PROCEDURE — 74011250637 HC RX REV CODE- 250/637: Performed by: STUDENT IN AN ORGANIZED HEALTH CARE EDUCATION/TRAINING PROGRAM

## 2021-11-28 PROCEDURE — 80048 BASIC METABOLIC PNL TOTAL CA: CPT

## 2021-11-28 PROCEDURE — 2709999900 HC NON-CHARGEABLE SUPPLY

## 2021-11-28 PROCEDURE — 96375 TX/PRO/DX INJ NEW DRUG ADDON: CPT

## 2021-11-28 PROCEDURE — 99221 1ST HOSP IP/OBS SF/LOW 40: CPT | Performed by: STUDENT IN AN ORGANIZED HEALTH CARE EDUCATION/TRAINING PROGRAM

## 2021-11-28 PROCEDURE — 96366 THER/PROPH/DIAG IV INF ADDON: CPT

## 2021-11-28 RX ORDER — INSULIN LISPRO 100 [IU]/ML
INJECTION, SOLUTION INTRAVENOUS; SUBCUTANEOUS
Status: DISCONTINUED | OUTPATIENT
Start: 2021-11-28 | End: 2021-12-01 | Stop reason: HOSPADM

## 2021-11-28 RX ORDER — ASPIRIN 325 MG
325 TABLET ORAL DAILY
Status: DISCONTINUED | OUTPATIENT
Start: 2021-11-28 | End: 2021-11-30

## 2021-11-28 RX ORDER — HEPARIN SODIUM 1000 [USP'U]/ML
3000 INJECTION, SOLUTION INTRAVENOUS; SUBCUTANEOUS
Status: COMPLETED | OUTPATIENT
Start: 2021-11-28 | End: 2021-11-28

## 2021-11-28 RX ORDER — BUPROPION HYDROCHLORIDE 150 MG/1
150 TABLET, EXTENDED RELEASE ORAL DAILY
Status: DISCONTINUED | OUTPATIENT
Start: 2021-11-28 | End: 2021-12-01 | Stop reason: HOSPADM

## 2021-11-28 RX ORDER — INSULIN GLARGINE 100 [IU]/ML
35 INJECTION, SOLUTION SUBCUTANEOUS
Status: DISCONTINUED | OUTPATIENT
Start: 2021-11-28 | End: 2021-11-29

## 2021-11-28 RX ORDER — ATORVASTATIN CALCIUM 40 MG/1
80 TABLET, FILM COATED ORAL
Status: DISCONTINUED | OUTPATIENT
Start: 2021-11-28 | End: 2021-12-01 | Stop reason: HOSPADM

## 2021-11-28 RX ADMIN — INSULIN LISPRO 15 UNITS: 100 INJECTION, SOLUTION INTRAVENOUS; SUBCUTANEOUS at 22:18

## 2021-11-28 RX ADMIN — HEPARIN SODIUM 3000 UNITS: 1000 INJECTION, SOLUTION INTRAVENOUS; SUBCUTANEOUS at 02:06

## 2021-11-28 RX ADMIN — INSULIN LISPRO 6 UNITS: 100 INJECTION, SOLUTION INTRAVENOUS; SUBCUTANEOUS at 17:12

## 2021-11-28 RX ADMIN — BUPROPION HYDROCHLORIDE 150 MG: 150 TABLET, EXTENDED RELEASE ORAL at 12:47

## 2021-11-28 RX ADMIN — TRAZODONE HYDROCHLORIDE 50 MG: 100 TABLET ORAL at 22:00

## 2021-11-28 RX ADMIN — HEPARIN SODIUM 14 UNITS/KG/HR: 10000 INJECTION, SOLUTION INTRAVENOUS at 18:15

## 2021-11-28 RX ADMIN — INSULIN GLARGINE 35 UNITS: 100 INJECTION, SOLUTION SUBCUTANEOUS at 23:10

## 2021-11-28 RX ADMIN — ASPIRIN 325 MG ORAL TABLET 325 MG: 325 PILL ORAL at 08:12

## 2021-11-28 RX ADMIN — INSULIN LISPRO 6 UNITS: 100 INJECTION, SOLUTION INTRAVENOUS; SUBCUTANEOUS at 12:46

## 2021-11-28 RX ADMIN — ATORVASTATIN CALCIUM 80 MG: 40 TABLET, FILM COATED ORAL at 23:09

## 2021-11-28 RX ADMIN — INSULIN LISPRO 9 UNITS: 100 INJECTION, SOLUTION INTRAVENOUS; SUBCUTANEOUS at 08:11

## 2021-11-28 NOTE — ED NOTES
POC glucose 72. Informed Dr. Juan Adame. Orders given to stop Insulin drip. Provided patient with a small sandwich.

## 2021-11-28 NOTE — ED NOTES
Patient medicated per MAR. Insulin rate adjusted per glucostabilizer. Patient asking for food. Updated on plan of care.

## 2021-11-28 NOTE — ED NOTES
Pt was again encouraged to not manipulate his IV Heparin drip, tubing, pump. He verbalized understanding. Pt resting on hospital bed. Call bell within easy reach. Pt instructed to call for assistance as needed.

## 2021-11-28 NOTE — ED NOTES
Pt lying on hospital bed, awake & alert. Heparin gtt infusing to R forearm IV site (without sx's of infiltration noted to site) @ 11.4ml/hr (14 units/kg/hr). Pt encouraged to refrain from manipulating IV drip, tubing, and pump. He verbalized understanding. VS as noted. Call bell within easy reach. Pt assisted with TV remote. Awaiting lunch tray from [facility].

## 2021-11-28 NOTE — ED NOTES
Pt seen walking out of room and into hallway. Pt states he wants to go home. Dr Delvalle Fall to bedside to discuss the risks associated with leaving the hospital against medical advice. Pt agreeable with staying in ED to wait for [facility] at this time. Family member at bedside. Will continue to monitor. Urine noted on floor next to bed and in kick bucket. Pt states \"Oh I poured some water in there. \"  Pt was advised that he should always void in urinal.  Urinal was placed on bedside table. Pt was instructed to call for assistance as needed; he verbalized understanding. Call bell within easy reach.

## 2021-11-28 NOTE — ED NOTES
Insulin rate adjusted per glucostabilizer. Patient repeatedly asking for food. Updated on plan of care.

## 2021-11-28 NOTE — ED NOTES
Patient disconnected IV Heparin and walked out into hallway to talk with Provider. Heparin drip reconnected and Patient strongly encouraged to keep his Heparin drip connected and risk of further deterioration if he keeps removing it. Provider now at bedside to talk with patient.

## 2021-11-28 NOTE — ED NOTES
Assuming care of patient. Patient medicated per MAR. Provided patient with 3 cups of ice water. Updated patient on plan of care. Patient asking for food. Patient ate [employer] prior to shift change.

## 2021-11-28 NOTE — ED NOTES
Patient found with IV Heparin disconnected and patient holding in his hand. Patient admits he removed it \"because it was beeping and I had to go to the bathroom\". Patient states he had just disconnected it \"a minute ago\" Heparin drip reconnected and patient instructed the importance to not disconnect and to call for assistance if he needs . Provider informed.

## 2021-11-28 NOTE — ED NOTES
Pt found wandering in hallway, stating he was looking for the kitchen. Pt has taken himself off the monitor and had disconnected his Heparin drip. Pt escorted back to room, place back on monitor and Heparin drip restarted.  Door to room left open

## 2021-11-28 NOTE — ED NOTES
Insulin rate adjusted per glucostabilizer. Patient asking for food again. Updated patient on plan of care.

## 2021-11-28 NOTE — ED NOTES
Change of shift report given to [facility] who will now assume care of pt. Pt insulin gtt adjusted per glucostabilizer. Pt and daughter updated on poc.

## 2021-11-28 NOTE — ED NOTES
Bedside report given/started with Aleta Mane; noting heavy smell of cigarette smoke in patient's room. Patient admits to smoking in room. Instructed no smoking policy and patient's cigarettes and lighter removed and placed at Nurse's station. Provider informed.

## 2021-11-29 ENCOUNTER — APPOINTMENT (OUTPATIENT)
Dept: VASCULAR SURGERY | Age: 61
DRG: 181 | End: 2021-11-29
Attending: SURGERY
Payer: COMMERCIAL

## 2021-11-29 LAB
APTT PPP: 78.1 SEC (ref 23–36.4)
ATRIAL RATE: 94 BPM
BASOPHILS # BLD: 0 K/UL (ref 0–0.1)
BASOPHILS NFR BLD: 1 % (ref 0–2)
CALCULATED P AXIS, ECG09: 39 DEGREES
CALCULATED R AXIS, ECG10: 44 DEGREES
CALCULATED T AXIS, ECG11: 90 DEGREES
DIAGNOSIS, 93000: NORMAL
DIFFERENTIAL METHOD BLD: ABNORMAL
EOSINOPHIL # BLD: 0.1 K/UL (ref 0–0.4)
EOSINOPHIL NFR BLD: 1 % (ref 0–5)
ERYTHROCYTE [DISTWIDTH] IN BLOOD BY AUTOMATED COUNT: 13.1 % (ref 11.6–14.5)
GLUCOSE BLD STRIP.AUTO-MCNC: 126 MG/DL (ref 70–110)
GLUCOSE BLD STRIP.AUTO-MCNC: 234 MG/DL (ref 70–110)
GLUCOSE BLD STRIP.AUTO-MCNC: 297 MG/DL (ref 70–110)
GLUCOSE BLD STRIP.AUTO-MCNC: 418 MG/DL (ref 70–110)
GLUCOSE BLD STRIP.AUTO-MCNC: 447 MG/DL (ref 70–110)
GLUCOSE BLD STRIP.AUTO-MCNC: 453 MG/DL (ref 70–110)
GLUCOSE BLD STRIP.AUTO-MCNC: 464 MG/DL (ref 70–110)
HCT VFR BLD AUTO: 37 % (ref 36–48)
HGB BLD-MCNC: 11.6 G/DL (ref 13–16)
IMM GRANULOCYTES # BLD AUTO: 0 K/UL (ref 0–0.04)
IMM GRANULOCYTES NFR BLD AUTO: 1 % (ref 0–0.5)
LEFT ABI: 1.27
LEFT ARM BP: 114 MMHG
LEFT POSTERIOR TIBIAL: 141 MMHG
LEFT TBI: 0.71
LEFT TOE PRESSURE: 81 MMHG
LYMPHOCYTES # BLD: 1.4 K/UL (ref 0.9–3.6)
LYMPHOCYTES NFR BLD: 26 % (ref 21–52)
MCH RBC QN AUTO: 28.2 PG (ref 24–34)
MCHC RBC AUTO-ENTMCNC: 31.4 G/DL (ref 31–37)
MCV RBC AUTO: 90 FL (ref 78–100)
MONOCYTES # BLD: 0.6 K/UL (ref 0.05–1.2)
MONOCYTES NFR BLD: 11 % (ref 3–10)
NEUTS SEG # BLD: 3.2 K/UL (ref 1.8–8)
NEUTS SEG NFR BLD: 60 % (ref 40–73)
NRBC # BLD: 0 K/UL (ref 0–0.01)
NRBC BLD-RTO: 0 PER 100 WBC
P-R INTERVAL, ECG05: 158 MS
PLATELET # BLD AUTO: 228 K/UL (ref 135–420)
PMV BLD AUTO: 12.2 FL (ref 9.2–11.8)
Q-T INTERVAL, ECG07: 370 MS
QRS DURATION, ECG06: 98 MS
QTC CALCULATION (BEZET), ECG08: 462 MS
RBC # BLD AUTO: 4.11 M/UL (ref 4.35–5.65)
RIGHT ABI: 0
RIGHT ARM BP: 114 MMHG
RIGHT POSTERIOR TIBIAL: 0 MMHG
VAS LEFT DORSALIS PEDIS BP: 145 MMHG
VAS RIGHT DORSALIS PEDIS BP: 0 MMHG
VENTRICULAR RATE, ECG03: 94 BPM
WBC # BLD AUTO: 5.2 K/UL (ref 4.6–13.2)

## 2021-11-29 PROCEDURE — 74011250636 HC RX REV CODE- 250/636: Performed by: STUDENT IN AN ORGANIZED HEALTH CARE EDUCATION/TRAINING PROGRAM

## 2021-11-29 PROCEDURE — 74011250637 HC RX REV CODE- 250/637: Performed by: EMERGENCY MEDICINE

## 2021-11-29 PROCEDURE — 74011636637 HC RX REV CODE- 636/637: Performed by: STUDENT IN AN ORGANIZED HEALTH CARE EDUCATION/TRAINING PROGRAM

## 2021-11-29 PROCEDURE — 65660000000 HC RM CCU STEPDOWN

## 2021-11-29 PROCEDURE — 74011000258 HC RX REV CODE- 258: Performed by: INTERNAL MEDICINE

## 2021-11-29 PROCEDURE — 36415 COLL VENOUS BLD VENIPUNCTURE: CPT

## 2021-11-29 PROCEDURE — 82962 GLUCOSE BLOOD TEST: CPT

## 2021-11-29 PROCEDURE — 74011250637 HC RX REV CODE- 250/637: Performed by: INTERNAL MEDICINE

## 2021-11-29 PROCEDURE — 74011636637 HC RX REV CODE- 636/637: Performed by: EMERGENCY MEDICINE

## 2021-11-29 PROCEDURE — 97162 PT EVAL MOD COMPLEX 30 MIN: CPT

## 2021-11-29 PROCEDURE — 85730 THROMBOPLASTIN TIME PARTIAL: CPT

## 2021-11-29 PROCEDURE — 74011250637 HC RX REV CODE- 250/637: Performed by: STUDENT IN AN ORGANIZED HEALTH CARE EDUCATION/TRAINING PROGRAM

## 2021-11-29 PROCEDURE — 99232 SBSQ HOSP IP/OBS MODERATE 35: CPT | Performed by: INTERNAL MEDICINE

## 2021-11-29 PROCEDURE — 93922 UPR/L XTREMITY ART 2 LEVELS: CPT

## 2021-11-29 PROCEDURE — 2709999900 HC NON-CHARGEABLE SUPPLY

## 2021-11-29 PROCEDURE — 85025 COMPLETE CBC W/AUTO DIFF WBC: CPT

## 2021-11-29 PROCEDURE — 74011250636 HC RX REV CODE- 250/636

## 2021-11-29 PROCEDURE — 74011250636 HC RX REV CODE- 250/636: Performed by: INTERNAL MEDICINE

## 2021-11-29 PROCEDURE — 93005 ELECTROCARDIOGRAM TRACING: CPT

## 2021-11-29 RX ORDER — HALOPERIDOL 5 MG/ML
2 INJECTION INTRAMUSCULAR ONCE
Status: DISCONTINUED | OUTPATIENT
Start: 2021-11-29 | End: 2021-11-29

## 2021-11-29 RX ORDER — HEPARIN SODIUM 10000 [USP'U]/100ML
12-25 INJECTION, SOLUTION INTRAVENOUS
Status: DISCONTINUED | OUTPATIENT
Start: 2021-11-29 | End: 2021-11-30

## 2021-11-29 RX ORDER — LORAZEPAM 1 MG/1
1 TABLET ORAL
Status: DISCONTINUED | OUTPATIENT
Start: 2021-11-29 | End: 2021-12-01 | Stop reason: HOSPADM

## 2021-11-29 RX ORDER — LORAZEPAM 2 MG/ML
1 INJECTION INTRAMUSCULAR
Status: DISCONTINUED | OUTPATIENT
Start: 2021-11-29 | End: 2021-12-01 | Stop reason: HOSPADM

## 2021-11-29 RX ORDER — SODIUM CHLORIDE 0.9 % (FLUSH) 0.9 %
5-40 SYRINGE (ML) INJECTION EVERY 8 HOURS
Status: DISCONTINUED | OUTPATIENT
Start: 2021-11-29 | End: 2021-12-01 | Stop reason: HOSPADM

## 2021-11-29 RX ORDER — LORAZEPAM 1 MG/1
2 TABLET ORAL
Status: DISCONTINUED | OUTPATIENT
Start: 2021-11-29 | End: 2021-12-01 | Stop reason: HOSPADM

## 2021-11-29 RX ORDER — HALOPERIDOL 5 MG/ML
2 INJECTION INTRAMUSCULAR ONCE
Status: COMPLETED | OUTPATIENT
Start: 2021-11-29 | End: 2021-11-29

## 2021-11-29 RX ORDER — LORAZEPAM 2 MG/ML
2 INJECTION INTRAMUSCULAR
Status: DISCONTINUED | OUTPATIENT
Start: 2021-11-29 | End: 2021-12-01 | Stop reason: HOSPADM

## 2021-11-29 RX ORDER — INSULIN GLARGINE 100 [IU]/ML
45 INJECTION, SOLUTION SUBCUTANEOUS
Status: DISCONTINUED | OUTPATIENT
Start: 2021-11-29 | End: 2021-12-01 | Stop reason: HOSPADM

## 2021-11-29 RX ORDER — SODIUM CHLORIDE 0.9 % (FLUSH) 0.9 %
5-40 SYRINGE (ML) INJECTION AS NEEDED
Status: DISCONTINUED | OUTPATIENT
Start: 2021-11-29 | End: 2021-12-01 | Stop reason: HOSPADM

## 2021-11-29 RX ORDER — HALOPERIDOL 5 MG/ML
INJECTION INTRAMUSCULAR
Status: DISPENSED
Start: 2021-11-29 | End: 2021-11-29

## 2021-11-29 RX ORDER — SODIUM CHLORIDE 0.9 % (FLUSH) 0.9 %
5-40 SYRINGE (ML) INJECTION AS NEEDED
Status: DISCONTINUED | OUTPATIENT
Start: 2021-11-29 | End: 2021-11-30 | Stop reason: SDUPTHER

## 2021-11-29 RX ORDER — SODIUM CHLORIDE 0.9 % (FLUSH) 0.9 %
5-40 SYRINGE (ML) INJECTION EVERY 8 HOURS
Status: DISCONTINUED | OUTPATIENT
Start: 2021-11-29 | End: 2021-11-30 | Stop reason: SDUPTHER

## 2021-11-29 RX ORDER — LORAZEPAM 2 MG/ML
3 INJECTION INTRAMUSCULAR
Status: DISCONTINUED | OUTPATIENT
Start: 2021-11-29 | End: 2021-12-01 | Stop reason: HOSPADM

## 2021-11-29 RX ORDER — LORAZEPAM 1 MG/1
1 TABLET ORAL ONCE
Status: COMPLETED | OUTPATIENT
Start: 2021-11-29 | End: 2021-11-29

## 2021-11-29 RX ADMIN — LORAZEPAM 1 MG: 1 TABLET ORAL at 01:27

## 2021-11-29 RX ADMIN — INSULIN LISPRO 9 UNITS: 100 INJECTION, SOLUTION INTRAVENOUS; SUBCUTANEOUS at 12:30

## 2021-11-29 RX ADMIN — INSULIN LISPRO 15 UNITS: 100 INJECTION, SOLUTION INTRAVENOUS; SUBCUTANEOUS at 21:06

## 2021-11-29 RX ADMIN — INSULIN LISPRO 6 UNITS: 100 INJECTION, SOLUTION INTRAVENOUS; SUBCUTANEOUS at 08:15

## 2021-11-29 RX ADMIN — HALOPERIDOL LACTATE 2 MG: 5 INJECTION, SOLUTION INTRAMUSCULAR at 04:26

## 2021-11-29 RX ADMIN — ATORVASTATIN CALCIUM 80 MG: 40 TABLET, FILM COATED ORAL at 23:14

## 2021-11-29 RX ADMIN — Medication 10 ML: at 23:14

## 2021-11-29 RX ADMIN — THIAMINE HYDROCHLORIDE 500 MG: 100 INJECTION, SOLUTION INTRAMUSCULAR; INTRAVENOUS at 23:21

## 2021-11-29 RX ADMIN — ASPIRIN 325 MG ORAL TABLET 325 MG: 325 PILL ORAL at 11:18

## 2021-11-29 RX ADMIN — INSULIN GLARGINE 45 UNITS: 100 INJECTION, SOLUTION SUBCUTANEOUS at 23:13

## 2021-11-29 RX ADMIN — THIAMINE HYDROCHLORIDE 500 MG: 100 INJECTION, SOLUTION INTRAMUSCULAR; INTRAVENOUS at 18:11

## 2021-11-29 RX ADMIN — HEPARIN SODIUM AND DEXTROSE 14 UNITS/KG/HR: 10000; 5 INJECTION INTRAVENOUS at 17:03

## 2021-11-29 RX ADMIN — BUPROPION HYDROCHLORIDE 150 MG: 150 TABLET, EXTENDED RELEASE ORAL at 11:18

## 2021-11-29 RX ADMIN — Medication 10 ML: at 23:18

## 2021-11-29 RX ADMIN — LORAZEPAM 1 MG: 1 TABLET ORAL at 23:15

## 2021-11-29 RX ADMIN — Medication 10 ML: at 13:46

## 2021-11-29 NOTE — H&P
History and Physical    Patient: Winter Mcguire               Sex: male          DOA: 11/27/2021       YOB: 1960      Age:  59 y.o.        LOS:  LOS: 1 day        HPI:     Winter Mcguire is a 59 y.o. male with type 2 diabetes mellitus, hyperlipidemia, and CAD w/ PCI who is now admitted for a potential right lower extremity arterial occlusion. Patient states that roughly a month ago he was putting corn into buckets when his right lower extremity began hurting. The pain is progressed and is now debilitating patient's ability to work. He describes extreme pain in the right foot and posterior calf when exerting the right lower extremity. Endorses numbness/tingling distal to right mid shin. Patient presented to ER on 27 November 2021. A CTA showed possible occlusion within the right lower extremity. Vascular surgery was consulted and a heparin drip was initiated. Of note, patient states that his left distal lower extremity also hurts but not nearly to the extent of the right lower extremity. Patient continues to smoke cigarettes. He has no other acute medical concerns at this time. Denies fevers, chest pain, shortness of breath, cough, abdominal pain, hematochezia, hematuria, and new rashes. ER Course:   -Duplex lower extremity artery right -absence of flow observed in foot  -CTA with runoff to lower extremities -shows potentially an acute occlusion, official read still pending    Notable initial lab findings: WBC 5.2, hemoglobin 12.4, INR 0.9, sodium 127, glucose 586, BUN/creatinine 32/1.45, hemoglobin A1c >14    Past Medical History:   Diagnosis Date    Alcohol use     Fri-Sun one fifth; Mon-Thur: Pint of liquor    CAD (coronary artery disease)     Cardiac angioplasty with stent 07/29/2009    2.5 x 13 Cypher stent to CX.  Cardiac cath 11/09/2011    RCA patent. LM patent. LAD patent. mD1 55%. CX patent. Prior stent patent. Edison 85% (2.5 x 15-mm Xience stent, resid 0%). LVEDP 12. EF 40-45%. High lateral hypk (RI distribution).  Cardiac inferior STEMI 2009    Constipation     Current smoker     contemplating stopping    Diabetes ([facility] [address]     type 2-insulin dependent    Diabetic neuropathy (HCC)     GERD (gastroesophageal reflux disease)     HTN (hypertension)     Hyperlipidemia     Lacunar infarction ([facility] [address]     Migraine     Myocardial infarction ([facility] [address]     Vitamin D deficiency      Past Surgical History:   Procedure Laterality Date    COLONOSCOPY N/A 10/28/2020    COLONOSCOPY w/polypectomy performed by Kyle Santillan MD at [address]  07/29/2009    HX HEART CATHETERIZATION  8/30/2011    HX HEART CATHETERIZATION  11/09/2011    HX WISDOM TEETH EXTRACTION      x4      Family History   Problem Relation Age of Onset    Hypertension Mother     Heart Attack Mother     Diabetes Mother     Hypertension Father     Heart Attack Father     Diabetes Father     Diabetes Sister     Hypertension Sister     Stroke Sister     Diabetes Brother     Hypertension Brother     Stroke Brother     No Known Problems Maternal Grandmother     No Known Problems Maternal Grandfather     No Known Problems Paternal Grandmother     No Known Problems Paternal Grandfather     No Known Problems Brother     Heart Disease Other     Kidney Disease Other      Social History     Tobacco Use    Smoking status: Current Every Day Smoker     Packs/day: 0.50     Years: 1.00     Pack years: 0.50     Types: Cigarettes    Smokeless tobacco: Never Used   Substance Use Topics    Alcohol use: Yes     Alcohol/week: 4.2 standard drinks     Types: 5 Shots of liquor per week     Comment: rare      Prior to Admission medications    Medication Sig Start Date End Date Taking?  Authorizing Provider   buPROPion SR [facility] SR) 150 mg SR tablet ONE TABLET BY MOUTH ONCE A DAY 11/24/21   Manual Read B, NP   traZODone (DESYREL) 50 mg tablet Take 1 Tablet by mouth nightly. 11/1/21   Jean All JACLYN, NP   dulaglutide (Trulicity) 8.09 TV/6.8 mL sub-q pen INJECT 0.75 MG UNDER THE SKIN ONCE WEEKLY 11/1/21   Jean All JACLYN, NP   simvastatin (Zocor) 20 mg tablet Take 1 Tablet by mouth nightly. 11/1/21   Jean All JACLYN, NP   metFORMIN (GLUCOPHAGE) 1,000 mg tablet TAKE ONE TABLET BY MOUTH TWICE A DAY WITH A MEAL 11/1/21   Jean All JACLYN, NP   lancets misc Please provide lancets for blood glucose meter insurance will cover 11/1/21   Jean Ga ANAYA, NP   glucose blood VI test strips (ASCENSIA AUTODISC VI, ONE TOUCH ULTRA TEST VI) strip Provide test strip and glucose meter that insurance will cover - Twice daily-  fasting in the morning and once two hours after a meal. 11/1/21   Jean All JACLYN, NP   flash glucose sensor (FreeStyle Cox 2 Sensor) kit Apply 1 sensor every 14 days to check blood sugar as directed at least 3 times daily. 11/1/21   Jean ANAYA, NP   flash glucose scanning reader (FreeStyle Ezio 2 [place]) misc Use as directed to check blood sugar at least 3 times daily 11/1/21   Jean All JACLYN, NP   ergocalciferol (ERGOCALCIFEROL) 1,250 mcg (50,000 unit) capsule TAKE ONE CAPSULE BY MOUTH ONCE WEEKLY  Indications: vitamin D deficiency (high dose therapy) 11/1/21   Jean All JACLYN, NP   insulin glargine (Basaglar KwikPen U-100 Insulin) 100 unit/mL (3 mL) inpn INJECT 50 UNITS UNDER THE SKIN DAILY 11/1/21   Jean All B, NP   Insulin Needles, Disposable, 31 gauge x 5/16\" ndle Pen needles for Bydureon syringe 11/1/21   Jean All B, NP   multivitamin (ONE A DAY) tablet Take 1 Tablet by mouth daily.  11/1/21   Jean All JACLYN, NP   Blood-Glucose Meter monitoring kit Blood glucose kit that insurance will cover - Check blood glucose twice a day once in the morning fasting and once after a meal. 5/5/20   Jean All JACLYN, NP        Allergies   Allergen Reactions    Niacin Itching       Review of Systems:    Negative Unless BOLDED    Constitutional: Fever, chills,diaphoresis. HENT: Negative for congestion, rhinorrhea, sore throat and trouble swallowing. Eyes: Negative for visual disturbance. Respiratory: Cough,shortness of breath, wheezing. Cardiovascular: Chest pain, palpitations. Gastrointestinal: Abdominal pain, blood in stool, constipation, diarrhea, nausea and vomiting. Endocrine: Polyuria. Genitourinary: Difficulty urinating and dysuria. Musculoskeletal: Arthralgias, right distal lower extremity pain, and neck stiffness. Skin: Pallor, rash. Neurological: Dizziness, weakness, numbness and headaches. Hematological: Bruise/bleed easily   Psychiatric/Behavioral: Confusion, dysphoric mood, hallucinations  All other systems reviewed and are negative.     Physical Exam:      Vitals:    21 1803 21 1844 21 1853 21   BP: (!) 162/89  (!) 159/98 (!) 143/85   Pulse: 91 93 95 91   Resp: 17 18 18 20   Temp:    99 °F (37.2 °C)   SpO2: 100% 99%  99%   Weight:       Height:          Temp (24hrs), Av.7 °F (37.1 °C), Min:98.1 °F (36.7 °C), Max:99 °F (37.2 °C)      General:   awake alert and oriented   Skin:   no rashes or skin lesions noted on limited exam   HEENT:  Normocephalic, atraumatic, grossly PERRL, EOMI, no scleral icterus or pallor; no conjunctival hemmohage; facemask in place       Lungs:   non-labored, bilaterally clear to auscultation- no crackles wheezes rales or rhonchi   Heart:  RRR, s1 and s2; no murmurs rubs or gallops, no edema, radial pulses intact bilaterally   Abdomen: soft, non-distended, active bowel sounds, Non-tender to palpation   Genitourinary:  deferred   Extremities:   Right leg below mid calf including entirety of foot painful to palpation and color to touch when compared to the left; patient still able to move right distal leg, but does cause him pain; grossly full ROM of all large joints to the upper and lower extremities; muscle mass appropriate for age Neurologic:   Numb/painful to palpation over right distal lower extremity, otherwise no other sensory abnormalities; did not perform muscle strength on right distal lower extremity due to pain; grossly 5/5 strength in all other extremities. Speech appropirate. Cranial nerves grossly intact   Psychiatric:   appropriate and interactive.        Labs Reviewed:    Recent Results (from the past 24 hour(s))   GLUCOSE, POC    Collection Time: 11/27/21  9:24 PM   Result Value Ref Range    Glucose (POC) 190 (H) 70 - 110 mg/dL   GLUCOSTABILIZER    Collection Time: 11/27/21  9:28 PM   Result Value Ref Range    Glucose 190 mg/dL    Insulin order 2.6 units/hour    Insulin adminstered 2.6 units/hour    Multiplier 0.020     Low target 140 mg/dL    High target 180 mg/dL    D50 order 0.0 ml    D50 administered 0.00 ml    Minutes until next BG 60 min    Order initials AO     Administered initials AO    GLUCOSE, POC    Collection Time: 11/27/21 10:34 PM   Result Value Ref Range    Glucose (POC) 288 (H) 70 - 110 mg/dL   GLUCOSTABILIZER    Collection Time: 11/27/21 10:35 PM   Result Value Ref Range    Glucose 288 mg/dL    Insulin order 6.8 units/hour    Insulin adminstered 6.8 units/hour    Multiplier 0.030     Low target 140 mg/dL    High target 180 mg/dL    D50 order 0.0 ml    D50 administered 0.00 ml    Minutes until next BG 60 min    Order initials AO     Administered initials AO    GLUCOSE, POC    Collection Time: 11/27/21 11:43 PM   Result Value Ref Range    Glucose (POC) 72 70 - 110 mg/dL   PTT    Collection Time: 11/28/21  1:30 AM   Result Value Ref Range    aPTT 58.4 (H) 23.0 - 36.4 SEC   GLUCOSE, POC    Collection Time: 11/28/21  2:17 AM   Result Value Ref Range    Glucose (POC) 222 (H) 70 - 960 mg/dL   METABOLIC PANEL, BASIC    Collection Time: 11/28/21  5:22 AM   Result Value Ref Range    Sodium 135 (L) 136 - 145 mmol/L    Potassium 4.3 3.5 - 5.5 mmol/L    Chloride 103 100 - 111 mmol/L    CO2 24 21 - 32 mmol/L    Anion gap 8 3.0 - 18 mmol/L    Glucose 246 (H) 74 - 99 mg/dL    BUN 21 (H) 7.0 - 18 MG/DL    Creatinine 1.06 0.6 - 1.3 MG/DL    BUN/Creatinine ratio 20 12 - 20      GFR est AA >60 >60 ml/min/1.73m2    GFR est non-AA >60 >60 ml/min/1.73m2    Calcium 8.6 8.5 - 10.1 MG/DL   MAGNESIUM    Collection Time: 11/28/21  5:22 AM   Result Value Ref Range    Magnesium 2.0 1.6 - 2.6 mg/dL   CBC WITH AUTOMATED DIFF    Collection Time: 11/28/21  5:22 AM   Result Value Ref Range    WBC 6.4 4.6 - 13.2 K/uL    RBC 3.96 (L) 4.35 - 5.65 M/uL    HGB 11.8 (L) 13.0 - 16.0 g/dL    HCT 34.0 (L) 36.0 - 48.0 %    MCV 85.9 78.0 - 100.0 FL    MCH 29.8 24.0 - 34.0 PG    MCHC 34.7 31.0 - 37.0 g/dL    RDW 13.4 11.6 - 14.5 %    PLATELET 528 130 - 448 K/uL    MPV 11.7 9.2 - 11.8 FL    NRBC 0.0 0  WBC    ABSOLUTE NRBC 0.00 0.00 - 0.01 K/uL    NEUTROPHILS 65 40 - 73 %    LYMPHOCYTES 25 21 - 52 %    MONOCYTES 7 3 - 10 %    EOSINOPHILS 1 0 - 5 %    BASOPHILS 1 0 - 2 %    IMMATURE GRANULOCYTES 1 (H) 0.0 - 0.5 %    ABS. NEUTROPHILS 4.1 1.8 - 8.0 K/UL    ABS. LYMPHOCYTES 1.6 0.9 - 3.6 K/UL    ABS. MONOCYTES 0.5 0.05 - 1.2 K/UL    ABS. EOSINOPHILS 0.1 0.0 - 0.4 K/UL    ABS. BASOPHILS 0.0 0.0 - 0.1 K/UL    ABS. IMM.  GRANS. 0.1 (H) 0.00 - 0.04 K/UL    DF AUTOMATED     PTT    Collection Time: 11/28/21  8:00 AM   Result Value Ref Range    aPTT 88.3 (H) 23.0 - 36.4 SEC   GLUCOSE, POC    Collection Time: 11/28/21 12:34 PM   Result Value Ref Range    Glucose (POC) 220 (H) 70 - 110 mg/dL   GLUCOSE, POC    Collection Time: 11/28/21  5:05 PM   Result Value Ref Range    Glucose (POC) 219 (H) 70 - 110 mg/dL      Imaging:  CT Results  (Last 48 hours)    None           CXR Results  (Last 48 hours)    None           Assessment/Plan     -Peripheral vascular disease, likely occlusion of right lower extremity  -Coronary artery disease with history of PCI -echo from November 2020 shows ejection fraction 50 to 55% with no wall motion abnormalities  -Hypertension  -Type 2 diabetes mellitus -uncontrolled - HbA1c >14; prescribed outpatient insulin (glargine 50 units daily); admitted to noncompliance   -Tobacco use -needs to quit cigarettes, currently on bupropion    PLAN:    -Vascular surgery (Dr. Harman Fuentes) consulted by the emergency department, IP consult placed in EMR  -CTA official read pending  -Heparin drip  -Increasing Lantus to 35 units (home dose is 50)  -Correctional Insulin  -Atorvastatin 80mg PO QHS  -Trazodone 50mg PO QHS  -PT/OT  -Bupropion 150 mg daily    Activity: Bedrest with bedside commode  Diet: N.p.o. Antibiotics: None  DVT prophylaxis: Heparin drip  CODE status: Full    Disposition: Remain inpatient for likely vascular procedure, estimated patient to remain hospitalized for over 2 days    Signed By: Arnoldo Delvalle MD   [facility]    November 28, 2021      Dragon voice recognition software was used for parts of this note. Unintended errors may have occurred.

## 2021-11-29 NOTE — ROUTINE PROCESS
Patient is alert and oriented to self, frequently reminded/ reoriented to place, time and purpose. Patient found disconnecting Heparin drip. Discuss the importance of heparin and monitoring of blood levels. Patient alert and cooperative and apologizes. [id number] Patient pulled IV out. 0093 Dr Ralph called concerning patient's confusion to place,time, purpose and restlessness. Patient attempted to leave room, reoriented to surroundings. 0130 Ativan 1 mg po for restlessness.

## 2021-11-29 NOTE — ED NOTES
Nursing report given to [facility], RN from [facility] [address].

## 2021-11-29 NOTE — PROGRESS NOTES
Problem: Mobility Impaired (Adult and Pediatric)  Goal: *Acute Goals and Plan of Care (Insert Text)  Description: Physical Therapy Goals  Initiated 11/29/2021 and to be accomplished within 7 day(s)  1. Patient will move from supine to sit and sit to supine  in bed with modified independence. 2.  Patient will transfer from bed to chair and chair to bed with modified independence using the least restrictive device. 3.  Patient will perform sit to stand with modified independence. 4.  Patient will ambulate with modified independence for 150 feet with the least restrictive device. 5.  Patient will ascend/descend 3 stairs with handrail(s) with modified independence. PLOF: Independent. Lives in one story home with 3 steps to enter. Outcome: Progressing Towards Goal   PHYSICAL THERAPY EVALUATION    Patient: Lilian Quintero (62 y.o. male)  Date: 11/29/2021  Primary Diagnosis: Arterial occlusion, lower extremity (HCC) [I70.209]  Procedure(s) (LRB):  ANGIOGRAPHY LOWER EXT BILAT (N/A)     Precautions: Fall  ASSESSMENT :  Sitter at bedside. Supervision for supine to sit. Noted sweat pants to be wet. Good seated balance to don sock to RLE and change out of soiled pants into hospital gown. Supervision for sit to stand. Standing at ww with good balance. Self selects to maintain NWB RLE d/t pain. Amb 3ft with NWB RLE and ww with supervision. Returned to seated EOB. Supervision for sit to supine. Seated in bed with HOB elevated. Educated on need for RN assistance with mobility; verbalized understanding. Call bell in reach. Per chart, planning for vascular procedure 11/30/2021. Will follow up for PT post procedure to ensure safety with mobility for discharge to home. Patient will benefit from skilled intervention to address the above impairments.   Patient's rehabilitation potential is considered to be Fair  Factors which may influence rehabilitation potential include:   []         None noted  []         Mental ability/status  [x]         Medical condition  []         Home/family situation and support systems  []         Safety awareness  []         Pain tolerance/management  []         Other:      PLAN :  Recommendations and Planned Interventions:   [x]           Bed Mobility Training             [x]    Neuromuscular Re-Education  [x]           Transfer Training                   []    Orthotic/Prosthetic Training  [x]           Gait Training                          []    Modalities  [x]           Therapeutic Exercises           []    Edema Management/Control  [x]           Therapeutic Activities            [x]    Family Training/Education  [x]           Patient Education  []           Other (comment):    Frequency/Duration: Patient will be followed by physical therapy 1-2 times a week to address goals. Discharge Recommendations: Home Health  Further Equipment Recommendations for Discharge: rolling walker     SUBJECTIVE:   Patient stated I can't put weight on it.     OBJECTIVE DATA SUMMARY:     Past Medical History:   Diagnosis Date    Alcohol use     Fri-Sun one fifth; Mon-Thur: Pint of liquor    CAD (coronary artery disease)     Cardiac angioplasty with stent 07/29/2009    2.5 x 13 Cypher stent to CX. Cardiac cath 11/09/2011    RCA patent. LM patent. LAD patent. mD1 55%. CX patent. Prior stent patent. Edison 85% (2.5 x 15-mm Xience stent, resid 0%). LVEDP 12. EF 40-45%. High lateral hypk (RI distribution).       Cardiac inferior STEMI 2009    Constipation     Current smoker     contemplating stopping    Diabetes ([facility] [address]     type 2-insulin dependent    Diabetic neuropathy (HCC)     GERD (gastroesophageal reflux disease)     HTN (hypertension)     Hyperlipidemia     Lacunar infarction [facility])     Migraine     Myocardial infarction [facility])     Vitamin D deficiency      Past Surgical History:   Procedure Laterality Date    COLONOSCOPY N/A 10/28/2020    COLONOSCOPY w/polypectomy performed by Fiorella Burch MD at [facility]    HX CORONARY STENT PLACEMENT  07/29/2009    HX HEART CATHETERIZATION  8/30/2011    HX HEART CATHETERIZATION  11/09/2011    HX WISDOM TEETH EXTRACTION      x4     Barriers to Learning/Limitations: None  Compensate with: Visual Cues, Verbal Cues, Tactile Cues and Kinesthetic Cues    Home Situation:  Home Situation  Home Environment: Private residence  # Steps to Enter: 3  One/Two Story Residence: One story  Living Alone: No  Support Systems: Spouse/Significant Other, Other Family Member(s)  Patient Expects to be Discharged to[de-identified] House  Current DME Used/Available at Home: None    Critical Behavior:  Neurologic State: Alert  Orientation Level: Oriented to person  Cognition: Follows commands     Psychosocial  Patient Behaviors: Cooperative    Strength:    Manual Muscle Testing (LE)         R     L    Hip Flexion:   4/5 4/5  Knee EXT:   4/5 4/5  Knee FLEX:   4/5 4/5  Ankle DF:   4/5 4/5  _________________________________________________   Range Of Motion:  BLE AROM WFL   Functional Mobility:  Bed Mobility:  Supine to Sit: Supervision  Sit to Supine: Supervision  Transfers:  Sit to Stand: Supervision  Stand to Sit: Supervision  Balance:   Sitting: Intact  Standing: Impaired  Standing - Static: Good  Standing - Dynamic : Good  Ambulation/Gait Training:  Distance (ft): 3 Feet (ft)   Assistive Device: Walker, rolling  Ambulation - Level of Assistance: Supervision  Neuro Re-education:  Seated EOB 5 minutes    Pain:  Pain level pre-treatment: 0/10   Pain level post-treatment: 0/10     Activity Tolerance:   Fair    After treatment:   []         Patient left in no apparent distress sitting up in chair  [x]         Patient left in no apparent distress in bed  [x]         Call bell left within reach  [x]         Nursing notified  [x]         Sitter present  []         Bed alarm activated  []         SCDs applied    COMMUNICATION/EDUCATION:   [x]         Role of physical therapy and plan of care in the acute care setting. [x]         Fall prevention education was provided and the patient/caregiver indicated understanding. [x]         Patient/family have participated as able in goal setting and plan of care. []         Patient/family agree to work toward stated goals and plan of care. []         Patient understands intent and goals of therapy, but is neutral about his/her participation. []         Patient is unable to participate in goal setting/plan of care: ongoing with therapy staff.     Thank you for this referral.  Jaja Linn, PT   Time Calculation: 14 mins    Eval Complexity: History: MEDIUM  Complexity : 1-2 comorbidities / personal factors will impact the outcome/ POC Exam:MEDIUM Complexity : 3 Standardized tests and measures addressing body structure, function, activity limitation and / or participation in recreation  Presentation: MEDIUM Complexity : Evolving with changing characteristics  Clinical Decision Making:Medium Complexity    Clinical judgement; ROM, MMT, functional mobility Overall Complexity:MEDIUM

## 2021-11-29 NOTE — ROUTINE PROCESS
TRANSFER - IN REPORT:  Telephone  report received from [address] [place] [address] RN(name) on Vignesh Massey  being received from [facility](unit) for routine progression of care      Report consisted of patients Situation, Background, Assessment and   Recommendations(SBAR). Information from the following report(s) SBAR, Kardex, ED Summary, MAR, Recent Results and Cardiac Rhythm NSR was reviewed with the receiving nurse. Opportunity for questions and clarification was provided. 2136 Assessment completed upon patients arrival to unit and care assumed. Dr Ralph notified of patient's arrival. Patient oriented to unit/room, call light and urinal in reach. Patient states he left a black suit case at [facility], called ER no bag found. Patient informed to check with family member at home. Patient provided with meal.  2206 Dr Ralph in to see patient.

## 2021-11-29 NOTE — PROGRESS NOTES
conducted an initial consultation and Spiritual Assessment for Oxana Peng, who is a 59 y.o.,male. Patients Primary Language is: [place]. According to the patients EMR Mandaen Affiliation is: [facility].     The reason the Patient came to the hospital is:   Patient Active Problem List    Diagnosis Date Noted    Arterial occlusion, lower extremity ([facility] [address] 11/27/2021    Mild nonproliferative diabetic retinopathy of left eye without macular edema associated with type 2 diabetes mellitus ([address] 12/18/2020    Hypertensive retinopathy 12/18/2020    Nuclear sclerosis of both eyes 12/18/2020    Otalgia 05/08/2019    Other fatigue 05/08/2019    Type 2 diabetes with nephropathy ([facility] [address] 04/04/2019    Other insomnia 07/02/2018    Vitamin D deficiency 04/15/2018    Diabetic neuropathy ([facility] [address] 03/27/2018    Mediastinal adenopathy 06/25/2015    Tobacco dependence 06/16/2015    Other and unspecified alcohol dependence, continuous drinking behavior 06/16/2015    Coronary atherosclerosis of native coronary artery 12/31/2013    Tobacco use disorder 12/09/2011    Hyperlipidemia     HTN (hypertension)     Insulin dependent diabetes mellitus         The  provided the following Interventions:  Initiated a relationship of care and support. Provided information about Spiritual Care Services. Chart reviewed. The following outcomes where achieved:  Patient expressed gratitude for 's visit. Assessment:  Patient does not have any Restorationist/cultural needs that will affect patients preferences in health care. Plan:  Chaplains will continue to follow and will provide pastoral care on an as needed/requested basis.  recommends bedside caregivers page  on duty if patient shows signs of acute spiritual or emotional distress.     [address]  ([phone])

## 2021-11-29 NOTE — ED NOTES
Received nursing report from [facility]. Patient A/O x 4, resting comfortably on stretcher. Patient denies any complaints of chest pain, SOB, N/V, abdominal pain. Patient complaining of discomfort to right ankle and foot. Faint pedal pulse palpated. Right foot cool, skin color appropriate. Patient able to move toes. Will continue to closely monitor patient.

## 2021-11-29 NOTE — ED NOTES
Patient ambulated around ED. Patient is alert to self, situation. Patient denies complaints of dizziness and weakness. Awaiting transport.

## 2021-11-29 NOTE — ROUTINE PROCESS
Patient with increase agitation, remains confuse. Dr Ralph called and daughter Toby Melissa called with message left. Dr Ralph in at [facility]. Patient given Haldol 2mg., Code Jarred called, security at [address] Patient sitting up in w/c at desk. 2700 Patient positioned in bed, remains in street clothes, drowsy. Bed alarm on.

## 2021-11-29 NOTE — PROGRESS NOTES
[facility]  [facility]    Patient: Magaly Lopez Age: 59 y.o. : 1960 MR#: [id number] SSN: [id number]  Date/Time: 2021    Subjective:   Pt seen with sitter at bedside. Pt somnolent, however per sitter, pt woke up and ate his meal. Pt is oriented to self and place, did not know the year. S/p haldol due to agitation this am.  Assessment/Plan:   58-year-old male with multiple medical problems including type 2 diabetes mellitus, dyslipidemia, coronary artery disease admitted after he presented with complaints of pain in right foot. -PAD with subacute distal popliteal artery occlusion/ischemia, vascular surgery consultant input noted, appreciated assistance.    -Acute encephalopathy: Cause unclear. Patient may reports he drinks alcohol/hard liquor.    -Hyperglycemia: Likely has poorly controlled diabetes his A1c is greater than 14    -Acute kidney injury: Likely due to prerenal azotemia from volume loss due to hyperglycemia. Improved this morning. HISTORY OF:  -Type 2 diabetes mellitus: Poorly controlled, A1c greater than 14, came in with elevated blood sugars.   Patient on Lantus  -Coronary artery disease status post PCI in the past  -Dyslipidemia  -Tobacco dependence    Impression:  -Work-up for encephalopathy, there is some degree of concern for alcohol withdrawal, plan to place on alcohol withdrawal pathway, monitor for other causes of encephalopathy, check urine drug screen, monitor for signs and symptoms of infection, hold possible culprit meds such as trazodone, Wellbutrin.  -Plan to give high-dose thiamine to treat possibility of Warnicke's encephalopathy  -Angiography planned for tomorrow after consent  -Continue heparin drip  -Continue statin  -Continue current insulin regimen with close monitoring of his blood sugars    -Additional Notes:      Case discussed with:  [x]Patient  []Family  []Nursing  []Case Management  DVT Prophylaxis: []Lovenox  []Hep SQ  []SCDs  []Coumadin   [x]On Heparin gtt    Objective:   VS:   Visit Vitals  /85 (BP 1 Location: Left upper arm, BP Patient Position: At rest)   Pulse 88   Temp 98.3 °F (36.8 °C)   Resp 18   Ht 5' 9\" (1.753 m)   Wt 76.8 kg (169 lb 4.8 oz)   SpO2 98%   BMI 25.00 kg/m²      Tmax/24hrs: Temp (24hrs), Av.5 °F (36.9 °C), Min:98 °F (36.7 °C), Max:99 °F (37.2 °C)    Input/Output:     Intake/Output Summary (Last 24 hours) at 2021 1314  Last data filed at 2021 0459  Gross per 24 hour   Intake 854 ml   Output 975 ml   Net -121 ml       General: Somnolent, easily wakes up, in no acute distress.   Oriented to self place, did not know the year  Cardiovascular: Regular rate rhythm no murmurs  Pulmonary:  clear to auscultation bilaterally   GI: Abdomen is soft, nontender, nondistended  Extremities: Without edema  Additional:      Labs:    Recent Results (from the past 24 hour(s))   GLUCOSE, POC    Collection Time: 21  5:05 PM   Result Value Ref Range    Glucose (POC) 219 (H) 70 - 110 mg/dL   GLUCOSE, POC    Collection Time: 21  8:44 PM   Result Value Ref Range    Glucose (POC) 311 (H) 70 - 110 mg/dL   GLUCOSE, POC    Collection Time: 21  9:31 PM   Result Value Ref Range    Glucose (POC) 357 (H) 70 - 110 mg/dL   EKG, 12 LEAD, INITIAL    Collection Time: 21  3:38 AM   Result Value Ref Range    Ventricular Rate 94 BPM    Atrial Rate 94 BPM    P-R Interval 158 ms    QRS Duration 98 ms    Q-T Interval 370 ms    QTC Calculation (Bezet) 462 ms    Calculated P Axis 39 degrees    Calculated R Axis 44 degrees    Calculated T Axis 90 degrees    Diagnosis       Normal sinus rhythm  Nonspecific T wave abnormality  Prolonged QT  Abnormal ECG  When compared with ECG of 07-MAY-2018 16:56,  T wave inversion no longer evident in Inferior leads  Confirmed by Crystal Daniels MD, ----- ([id number]) on 2021 12:44:09 PM     CBC WITH AUTOMATED DIFF    Collection Time: 21  3:40 AM Result Value Ref Range    WBC 5.2 4.6 - 13.2 K/uL    RBC 4.11 (L) 4.35 - 5.65 M/uL    HGB 11.6 (L) 13.0 - 16.0 g/dL    HCT 37.0 36.0 - 48.0 %    MCV 90.0 78.0 - 100.0 FL    MCH 28.2 24.0 - 34.0 PG    MCHC 31.4 31.0 - 37.0 g/dL    RDW 13.1 11.6 - 14.5 %    PLATELET 539 399 - 084 K/uL    MPV 12.2 (H) 9.2 - 11.8 FL    NRBC 0.0 0  WBC    ABSOLUTE NRBC 0.00 0.00 - 0.01 K/uL    NEUTROPHILS 60 40 - 73 %    LYMPHOCYTES 26 21 - 52 %    MONOCYTES 11 (H) 3 - 10 %    EOSINOPHILS 1 0 - 5 %    BASOPHILS 1 0 - 2 %    IMMATURE GRANULOCYTES 1 (H) 0.0 - 0.5 %    ABS. NEUTROPHILS 3.2 1.8 - 8.0 K/UL    ABS. LYMPHOCYTES 1.4 0.9 - 3.6 K/UL    ABS. MONOCYTES 0.6 0.05 - 1.2 K/UL    ABS. EOSINOPHILS 0.1 0.0 - 0.4 K/UL    ABS. BASOPHILS 0.0 0.0 - 0.1 K/UL    ABS. IMM.  GRANS. 0.0 0.00 - 0.04 K/UL    DF AUTOMATED     PTT    Collection Time: 11/29/21  3:40 AM   Result Value Ref Range    aPTT 78.1 (H) 23.0 - 36.4 SEC   GLUCOSE, POC    Collection Time: 11/29/21  8:47 AM   Result Value Ref Range    Glucose (POC) 234 (H) 70 - 110 mg/dL   ANKLE BRACHIAL INDEX    Collection Time: 11/29/21 10:41 AM   Result Value Ref Range    Left arm  mmHg    Left posterior tibial 141 mmHg    Left dorsalis pedis  mmHg    Left toe pressure 81 mmHg    Right arm  mmHg    Right posterior tibial 0 mmHg    Right dorsalis pedis BP 0 mmHg    Left AVILA 1.27     Right AVILA 0.00     Left TBI 0.71    GLUCOSE, POC    Collection Time: 11/29/21 11:58 AM   Result Value Ref Range    Glucose (POC) 297 (H) 70 - 110 mg/dL     Additional Data Reviewed:      Signed By: Bradly Oreilly MD     November 29, 2021 1:14 PM

## 2021-11-29 NOTE — DIABETES MGMT
Diabetes/ Glycemic Control Plan of Care    Recommendations: increase lantus to 45 units daily and add mealtime humalog 3 units      Assessment:   Uncontrolled DM with A1c >14%, hyperlipidemia, and CAD, PAD  Admitted with RLE occlusion of the distal popliteal artery   mg/dl this am  Acute encephalopathy, A&O x 2, sitter at bedside   He states he can not recall his home DM medications -  When asked if he was taking his DM medications and monitoring BG, he responded 'yes'  He used 36 units of corrective humalog yesterday - recommend increasing basal   Will continue to monitor    DX:   1. Arterial occlusion, lower extremity ([address]     2. Hyperglycemia     3. PAD (peripheral artery disease) ([facility]  INVASIVE VASCULAR PROCEDURE    INVASIVE VASCULAR PROCEDURE      Fasting/ Morning blood glucose:   Lab Results   Component Value Date/Time    Glucose 246 (H) 11/28/2021 05:22 AM    Glucose (POC) 297 (H) 11/29/2021 11:58 AM     IV Fluids containing dextrose: none  Steroids: none      Blood glucose values: Within target range (70-180mg/dL): No    Current insulin orders:   lantus 35 units daily  Corrective humalog, very insulin resistant, 4 times daily     Total Daily Dose previous 24 hours = 71 units   35 units lantus  36 units humalog     Current A1c:   Lab Results   Component Value Date/Time    Hemoglobin A1c >14.0 (H) 11/27/2021 03:10 PM    Hemoglobin A1c (POC) 7.0 01/13/2021 04:12 PM    Hemoglobin A1c, External 7.8 01/28/2015 11:15 PM      Adequate glycemic control PTA: No    Nutrition/Diet:   Active Orders   Diet    ADULT DIET Regular; Low Fat/Low Chol/High Fiber/2 gm Na      Meal Intake:  No data found. Supplement Intake:  No data found.     Home diabetes medications:   Key Antihyperglycemic Medications             dulaglutide (Trulicity) 8.98 VS/5.0 mL sub-q pen INJECT 0.75 MG UNDER THE SKIN ONCE WEEKLY    metFORMIN (GLUCOPHAGE) 1,000 mg tablet TAKE ONE TABLET BY MOUTH TWICE A DAY WITH A MEAL    insulin glargine (Basaglar KwikPen U-100 Insulin) 100 unit/mL (3 mL) inpn INJECT 50 UNITS UNDER THE SKIN DAILY        Plan/Goals:   Blood glucose will be within target of 70 - 180 mg/dl within 72 hours  Reinforce dietary and medication compliance at home.        Education:  [] Refer to Diabetes Education Record                       [x] Education not indicated at this time   Confused - unable to reach daughter    Mely Rao [facility] RN [address]  Pager [phone]  Office [phone]

## 2021-11-29 NOTE — PROGRESS NOTES
Paged by nursing that patient is delirious. I went to the floor to assess. Patient is fully clothed and attempting to wander outside. He keeps stating \"I want to check on my vehicle. \" He is completely unaware that he just arrived to the hospital overnight via ambulance. Patient is in a different state of mind than when I first interviewed him. Security is at bedside peacefully preventing patient from navigating down the halls. Patient has been non-combative, just attempting to slowly walk through people. Given that patient is anticoagulated on heparin and is currently delirious, this represents quite a fall risk danger to patient. Giving a 1x dose of Haldol 2mg IV and will continue to assess. Patient now in wheelchair with nursing so that he may see out the door/window for his vehicle.

## 2021-11-29 NOTE — PROGRESS NOTES
Physician Progress Note      Charlene Sanchez  CSN #:                  [id number]  :                       1960  ADMIT DATE:       2021 12:02 PM  [address] [place] DATE:  RESPONDING  PROVIDER #:        Sarina Chester MD          QUERY TEXT:    Dear  Hospitalist  Patient admitted with arterial  occlusion. Pt noted to have documented uncontrolled diabetes in H&P  dated . Please document in progress notes and discharge summary further specificity regarding the control status of DM: The medical record reflects the following:  Risk Factors: non compliance  noted;  smoker;  Clinical Indicators: glucose  586  with  HGB A1C  on  admit. improved  only  to   246   on    Treatment: -Increasing Lantus to 35 units (home dose is 50)  -Correctional Insulin    Thank you,   Wanda Delgado   RN   CCDS  x [phone]  Options provided:  -- Uncontrolled DM with hyperglycemia  -- Other - I will add my own diagnosis  -- Disagree - Not applicable / Not valid  -- Disagree - Clinically unable to determine / Unknown  -- Refer to Clinical Documentation Reviewer    PROVIDER RESPONSE TEXT:    This patient has uncontrolled DM with hyperglycemia.     Query created by: Odessa Aguilar on 2021 1:14 PM      Electronically signed by:  Sarina Chester MD 2021 1:45 PM

## 2021-11-29 NOTE — ROUTINE PROCESS
Bedside and Verbal shift change report given to  [address] [place] [address] (oncoming nurse) by Saturnino Bronson (offgoing nurse). Report included the following information SBAR, Kardex, MAR, Recent Results and Cardiac Rhythm SR. Patient quietly resting, call light in reach and bed alarm on. Sitter at [facility].

## 2021-11-30 LAB
ACT BLD: 125 SECS (ref 79–138)
ACT BLD: 219 SECS (ref 79–138)
ACT BLD: 219 SECS (ref 79–138)
ACT BLD: 230 SECS (ref 79–138)
APTT PPP: 47.9 SEC (ref 23–36.4)
GLUCOSE BLD STRIP.AUTO-MCNC: 188 MG/DL (ref 70–110)
GLUCOSE BLD STRIP.AUTO-MCNC: 405 MG/DL (ref 70–110)
GLUCOSE BLD STRIP.AUTO-MCNC: 414 MG/DL (ref 70–110)
GLUCOSE BLD STRIP.AUTO-MCNC: 430 MG/DL (ref 70–110)
GLUCOSE BLD STRIP.AUTO-MCNC: 467 MG/DL (ref 70–110)

## 2021-11-30 PROCEDURE — 74011636637 HC RX REV CODE- 636/637: Performed by: STUDENT IN AN ORGANIZED HEALTH CARE EDUCATION/TRAINING PROGRAM

## 2021-11-30 PROCEDURE — 74011000258 HC RX REV CODE- 258: Performed by: INTERNAL MEDICINE

## 2021-11-30 PROCEDURE — 04CM3ZZ EXTIRPATION OF MATTER FROM RIGHT POPLITEAL ARTERY, PERCUTANEOUS APPROACH: ICD-10-PCS | Performed by: SURGERY

## 2021-11-30 PROCEDURE — C1769 GUIDE WIRE: HCPCS | Performed by: SURGERY

## 2021-11-30 PROCEDURE — C1894 INTRO/SHEATH, NON-LASER: HCPCS | Performed by: SURGERY

## 2021-11-30 PROCEDURE — C1757 CATH, THROMBECTOMY/EMBOLECT: HCPCS | Performed by: SURGERY

## 2021-11-30 PROCEDURE — 74011250637 HC RX REV CODE- 250/637: Performed by: STUDENT IN AN ORGANIZED HEALTH CARE EDUCATION/TRAINING PROGRAM

## 2021-11-30 PROCEDURE — 77030013744: Performed by: SURGERY

## 2021-11-30 PROCEDURE — 74011250636 HC RX REV CODE- 250/636: Performed by: INTERNAL MEDICINE

## 2021-11-30 PROCEDURE — 76937 US GUIDE VASCULAR ACCESS: CPT | Performed by: SURGERY

## 2021-11-30 PROCEDURE — C1760 CLOSURE DEV, VASC: HCPCS | Performed by: SURGERY

## 2021-11-30 PROCEDURE — 74011000636 HC RX REV CODE- 636: Performed by: SURGERY

## 2021-11-30 PROCEDURE — 82962 GLUCOSE BLOOD TEST: CPT

## 2021-11-30 PROCEDURE — B41F1ZZ FLUOROSCOPY OF RIGHT LOWER EXTREMITY ARTERIES USING LOW OSMOLAR CONTRAST: ICD-10-PCS | Performed by: SURGERY

## 2021-11-30 PROCEDURE — C1724 CATH, TRANS ATHEREC,ROTATION: HCPCS | Performed by: SURGERY

## 2021-11-30 PROCEDURE — 74011250636 HC RX REV CODE- 250/636: Performed by: SURGERY

## 2021-11-30 PROCEDURE — 85347 COAGULATION TIME ACTIVATED: CPT

## 2021-11-30 PROCEDURE — 77030013519 HC DEV INFL BASIX MRTM -B: Performed by: SURGERY

## 2021-11-30 PROCEDURE — 99153 MOD SED SAME PHYS/QHP EA: CPT | Performed by: SURGERY

## 2021-11-30 PROCEDURE — 65270000029 HC RM PRIVATE

## 2021-11-30 PROCEDURE — 75710 ARTERY X-RAYS ARM/LEG: CPT | Performed by: SURGERY

## 2021-11-30 PROCEDURE — 77030004561 HC CATH ANGI DX COBRA ANGI -B: Performed by: SURGERY

## 2021-11-30 PROCEDURE — 75625 CONTRAST EXAM ABDOMINL AORTA: CPT | Performed by: SURGERY

## 2021-11-30 PROCEDURE — 36415 COLL VENOUS BLD VENIPUNCTURE: CPT

## 2021-11-30 PROCEDURE — 99232 SBSQ HOSP IP/OBS MODERATE 35: CPT | Performed by: INTERNAL MEDICINE

## 2021-11-30 PROCEDURE — 37225 HC ARTHERC FEMPOP UNI +/-PTA: CPT | Performed by: SURGERY

## 2021-11-30 PROCEDURE — 2709999900 HC NON-CHARGEABLE SUPPLY: Performed by: SURGERY

## 2021-11-30 PROCEDURE — C1725 CATH, TRANSLUMIN NON-LASER: HCPCS | Performed by: SURGERY

## 2021-11-30 PROCEDURE — 99152 MOD SED SAME PHYS/QHP 5/>YRS: CPT | Performed by: SURGERY

## 2021-11-30 PROCEDURE — 77030008584 HC TOOL GDWRE DEV TERU -A: Performed by: SURGERY

## 2021-11-30 PROCEDURE — C1887 CATHETER, GUIDING: HCPCS | Performed by: SURGERY

## 2021-11-30 PROCEDURE — 85730 THROMBOPLASTIN TIME PARTIAL: CPT

## 2021-11-30 PROCEDURE — 37184 PRIM ART M-THRMBC 1ST VSL: CPT | Performed by: SURGERY

## 2021-11-30 PROCEDURE — 74011250637 HC RX REV CODE- 250/637: Performed by: SURGERY

## 2021-11-30 PROCEDURE — 74011000250 HC RX REV CODE- 250: Performed by: SURGERY

## 2021-11-30 PROCEDURE — 047M3ZZ DILATION OF RIGHT POPLITEAL ARTERY, PERCUTANEOUS APPROACH: ICD-10-PCS | Performed by: SURGERY

## 2021-11-30 RX ORDER — HEPARIN SODIUM 200 [USP'U]/100ML
INJECTION, SOLUTION INTRAVENOUS
Status: COMPLETED | OUTPATIENT
Start: 2021-11-30 | End: 2021-11-30

## 2021-11-30 RX ORDER — FENTANYL CITRATE 50 UG/ML
INJECTION, SOLUTION INTRAMUSCULAR; INTRAVENOUS AS NEEDED
Status: DISCONTINUED | OUTPATIENT
Start: 2021-11-30 | End: 2021-11-30 | Stop reason: HOSPADM

## 2021-11-30 RX ORDER — LIDOCAINE HYDROCHLORIDE 10 MG/ML
INJECTION, SOLUTION EPIDURAL; INFILTRATION; INTRACAUDAL; PERINEURAL AS NEEDED
Status: DISCONTINUED | OUTPATIENT
Start: 2021-11-30 | End: 2021-11-30 | Stop reason: HOSPADM

## 2021-11-30 RX ORDER — GUAIFENESIN 100 MG/5ML
81 LIQUID (ML) ORAL DAILY
Status: DISCONTINUED | OUTPATIENT
Start: 2021-12-01 | End: 2021-12-01 | Stop reason: HOSPADM

## 2021-11-30 RX ORDER — HEPARIN SODIUM 1000 [USP'U]/ML
INJECTION, SOLUTION INTRAVENOUS; SUBCUTANEOUS AS NEEDED
Status: DISCONTINUED | OUTPATIENT
Start: 2021-11-30 | End: 2021-11-30 | Stop reason: HOSPADM

## 2021-11-30 RX ORDER — VERAPAMIL HYDROCHLORIDE 2.5 MG/ML
INJECTION, SOLUTION INTRAVENOUS AS NEEDED
Status: DISCONTINUED | OUTPATIENT
Start: 2021-11-30 | End: 2021-11-30 | Stop reason: HOSPADM

## 2021-11-30 RX ORDER — HEPARIN SODIUM 10000 [USP'U]/100ML
500 INJECTION, SOLUTION INTRAVENOUS CONTINUOUS
Status: DISCONTINUED | OUTPATIENT
Start: 2021-11-30 | End: 2021-12-01 | Stop reason: HOSPADM

## 2021-11-30 RX ORDER — CHOLECALCIFEROL (VITAMIN D3) 125 MCG
5 CAPSULE ORAL
Status: DISCONTINUED | OUTPATIENT
Start: 2021-11-30 | End: 2021-12-01 | Stop reason: HOSPADM

## 2021-11-30 RX ORDER — CLOPIDOGREL 300 MG/1
300 TABLET, FILM COATED ORAL
Status: COMPLETED | OUTPATIENT
Start: 2021-11-30 | End: 2021-11-30

## 2021-11-30 RX ORDER — MIDAZOLAM HYDROCHLORIDE 1 MG/ML
INJECTION, SOLUTION INTRAMUSCULAR; INTRAVENOUS AS NEEDED
Status: DISCONTINUED | OUTPATIENT
Start: 2021-11-30 | End: 2021-11-30 | Stop reason: HOSPADM

## 2021-11-30 RX ORDER — NITROGLYCERIN 40 MG/100ML
INJECTION INTRAVENOUS
Status: COMPLETED | OUTPATIENT
Start: 2021-11-30 | End: 2021-11-30

## 2021-11-30 RX ORDER — MORPHINE SULFATE 10 MG/ML
INJECTION, SOLUTION INTRAMUSCULAR; INTRAVENOUS AS NEEDED
Status: DISCONTINUED | OUTPATIENT
Start: 2021-11-30 | End: 2021-11-30 | Stop reason: HOSPADM

## 2021-11-30 RX ORDER — CLOPIDOGREL BISULFATE 75 MG/1
75 TABLET ORAL DAILY
Status: DISCONTINUED | OUTPATIENT
Start: 2021-12-01 | End: 2021-12-01 | Stop reason: HOSPADM

## 2021-11-30 RX ORDER — HEPARIN SODIUM 10000 [USP'U]/100ML
500 INJECTION, SOLUTION INTRAVENOUS CONTINUOUS
Status: DISCONTINUED | OUTPATIENT
Start: 2021-11-30 | End: 2021-11-30

## 2021-11-30 RX ADMIN — INSULIN LISPRO 15 UNITS: 100 INJECTION, SOLUTION INTRAVENOUS; SUBCUTANEOUS at 21:57

## 2021-11-30 RX ADMIN — Medication 10 ML: at 06:01

## 2021-11-30 RX ADMIN — INSULIN GLARGINE 45 UNITS: 100 INJECTION, SOLUTION SUBCUTANEOUS at 21:57

## 2021-11-30 RX ADMIN — INSULIN LISPRO 15 UNITS: 100 INJECTION, SOLUTION INTRAVENOUS; SUBCUTANEOUS at 16:03

## 2021-11-30 RX ADMIN — THIAMINE HYDROCHLORIDE 500 MG: 100 INJECTION, SOLUTION INTRAMUSCULAR; INTRAVENOUS at 21:58

## 2021-11-30 RX ADMIN — Medication 5 MG: at 22:34

## 2021-11-30 RX ADMIN — Medication 10 ML: at 22:41

## 2021-11-30 RX ADMIN — CLOPIDOGREL BISULFATE 300 MG: 300 TABLET, FILM COATED ORAL at 14:01

## 2021-11-30 RX ADMIN — HEPARIN SODIUM AND DEXTROSE 500 UNITS/HR: 10000; 5 INJECTION INTRAVENOUS at 20:45

## 2021-11-30 RX ADMIN — ATORVASTATIN CALCIUM 80 MG: 40 TABLET, FILM COATED ORAL at 21:57

## 2021-11-30 RX ADMIN — Medication 10 ML: at 06:00

## 2021-11-30 NOTE — PROGRESS NOTES
Patient's PTT 47, Cath lab nurse Taya Mann RN given report. No chgs to Heparin drip. Patient NPO since 0100, quietly resting. Awaken for bath.

## 2021-11-30 NOTE — PROGRESS NOTES
0700 TRANSFER - IN REPORT:    Verbal report received from Carlos Baltazar RN (name) on Alfred Mederos  being received from 2S (unit) for ordered procedure      Report consisted of patients Situation, Background, Assessment and   Recommendations(SBAR). Information from the following report(s) SBAR, Kardex, Intake/Output, MAR, Recent Results and Pre Procedure Checklist was reviewed with the receiving nurse. Opportunity for questions and clarification was provided.

## 2021-11-30 NOTE — PROGRESS NOTES
TRANSFER - OUT REPORT:    Verbal report given to Karyna Pineda on Oxana Saline  being transferred to CVT Stepdown for routine progression of care       Report consisted of patients Situation, Background, Assessment and   Recommendations(SBAR). Information from the following report(s) SBAR, Procedure Summary and MAR was reviewed with the receiving nurse. Lines:   Peripheral IV 11/29/21 Anterior; Left; Proximal Forearm (Active)   Site Assessment Clean, dry, & intact 11/30/21 0800   Phlebitis Assessment 0 11/30/21 0800   Infiltration Assessment 0 11/30/21 0800   Dressing Status Clean, dry, & intact 11/30/21 0800   Dressing Type Tape; Transparent 11/30/21 0800   Hub Color/Line Status Blue; Infusing 11/30/21 0800   Action Taken Open ports on tubing capped 11/30/21 0800   Alcohol Cap Used Yes 11/30/21 0800       Peripheral IV 11/29/21 Left Antecubital (Active)   Site Assessment Clean, dry, & intact 11/30/21 0800   Phlebitis Assessment 0 11/30/21 0800   Infiltration Assessment 0 11/30/21 0800   Dressing Status Clean, dry, & intact 11/30/21 0800   Dressing Type Transparent 11/30/21 0800   Hub Color/Line Status Patent 11/30/21 0800   Action Taken Open ports on tubing capped 11/30/21 0800   Alcohol Cap Used Yes 11/30/21 0800        Opportunity for questions and clarification was provided.       Patient transported with:   [facility]

## 2021-11-30 NOTE — PROGRESS NOTES
TRANSFER - IN REPORT:    Verbal report received from Sultana Carrasco RN(name) on Rivas Climes  being received from Cath lab(unit) for routine progression of care      Report consisted of patients Situation, Background, Assessment and   Recommendations(SBAR). Information from the following report(s) SBAR, OR Summary, MAR and Cardiac Rhythm SR was reviewed with the receiving nurse. Opportunity for questions and clarification was provided. Assessment completed upon patients arrival to unit and care assumed. 1900   Bedside, Verbal, and Written shift change report given to Ghazal Lyons RN (oncoming nurse) by Ephraim Lange RN (offgoing nurse).  Report included the following information SBAR, Kardex, Intake/Output, MAR, Recent Results, and Cardiac Rhythm SR.

## 2021-11-30 NOTE — PROGRESS NOTES
[facility] Hospitalist Group  Progress Note    Patient: Rivas Pappas Age: 59 y.o. : 1960 MR#: [id number] SSN: [id number]  Date/Time: 2021    Subjective:   Patient seen immediately postop after vascular procedure. He was somewhat sleepy but answers some questions  Assessment/Plan:   58-year-old male with multiple medical problems including type 2 diabetes mellitus, dyslipidemia, coronary artery disease admitted after he presented with complaints of pain in right foot. -PAD with subacute distal popliteal artery occlusion/ischemia, vascular surgery consultant input noted, appreciated assistance.    -Acute encephalopathy: Cause unclear. Patient may reports he drinks alcohol/hard liquor. Seen in the postop state and able to decipher if his somnolence is due to that versus continued encephalopathy.    -Hyperglycemia: Likely has poorly controlled diabetes his A1c is greater than 14    -Acute kidney injury: Likely due to prerenal azotemia from volume loss due to hyperglycemia. Continues to improve. HISTORY OF:  -Type 2 diabetes mellitus: Poorly controlled, A1c greater than 14, came in with elevated blood sugars. Patient on Lantus  -Coronary artery disease status post PCI in the past  -Dyslipidemia  -Tobacco dependence    Impression:  -For encephalopathy continue to, hold possible culprit meds such as trazodone, Wellbutrin.  Team to monitor for alcohol withdrawal if present.  -Continue to give high-dose thiamine to treat possibility of Warnicke's encephalopathy  -Heparin drip  -Continue statin  -May need to start insulin drip, continue current insulin regimen with close monitoring of his blood sugars    -Additional Notes:      Case discussed with:  [x]Patient  []Family  []Nursing  []Case Management  DVT Prophylaxis:  []Lovenox  []Hep SQ  []SCDs  []Coumadin   [x]On Heparin gtt    Objective:   VS:   Visit Vitals  BP (!) 185/98   Pulse 88   Temp 98.4 °F (36.9 °C)   Resp 24   Ht 5' 9\" (1.753 m)   Wt 76.2 kg (168 lb 1.6 oz)   SpO2 100%   BMI 24.82 kg/m²      Tmax/24hrs: Temp (24hrs), Av.4 °F (36.9 °C), Min:97.8 °F (36.6 °C), Max:98.9 °F (37.2 °C)    Input/Output:     Intake/Output Summary (Last 24 hours) at 2021 1711  Last data filed at 2021 1319  Gross per 24 hour   Intake 1371.42 ml   Output 2150 ml   Net -778.58 ml       General: Somnolent, easily wakes up, in no acute distress.   Oriented to self place, did not know the year  Cardiovascular: Regular rate rhythm no murmurs  Pulmonary:  clear to auscultation bilaterally   GI: Abdomen is soft, nontender, nondistended  Extremities: Without edema  Additional:      Labs:    Recent Results (from the past 24 hour(s))   GLUCOSE, POC    Collection Time: 21  9:00 PM   Result Value Ref Range    Glucose (POC) 464 (HH) 70 - 110 mg/dL   GLUCOSE, POC    Collection Time: 21  9:03 PM   Result Value Ref Range    Glucose (POC) 453 (HH) 70 - 110 mg/dL   GLUCOSE, POC    Collection Time: 21  9:22 PM   Result Value Ref Range    Glucose (POC) 447 (HH) 70 - 110 mg/dL   GLUCOSE, POC    Collection Time: 21  9:35 PM   Result Value Ref Range    Glucose (POC) 418 (HH) 70 - 110 mg/dL   PTT    Collection Time: 21  3:35 AM   Result Value Ref Range    aPTT 47.9 (H) 23.0 - 36.4 SEC   GLUCOSE, POC    Collection Time: 21  8:13 AM   Result Value Ref Range    Glucose (POC) 188 (H) 70 - 110 mg/dL   POC ACTIVATED CLOTTING TIME    Collection Time: 21  9:40 AM   Result Value Ref Range    Activated Clotting Time (POC) 125 79 - 138 SECS   POC ACTIVATED CLOTTING TIME    Collection Time: 21  9:55 AM   Result Value Ref Range    Activated Clotting Time (POC) 219 (H) 79 - 138 SECS   POC ACTIVATED CLOTTING TIME    Collection Time: 21 10:15 AM   Result Value Ref Range    Activated Clotting Time (POC) 219 (H) 79 - 138 SECS   POC ACTIVATED CLOTTING TIME    Collection Time: 21 11:00 AM   Result Value Ref Range Activated Clotting Time (POC) 230 (H) 79 - 138 SECS   GLUCOSE, POC    Collection Time: 11/30/21  3:44 PM   Result Value Ref Range    Glucose (POC) 405 (HH) 70 - 110 mg/dL   GLUCOSE, POC    Collection Time: 11/30/21  3:45 PM   Result Value Ref Range    Glucose (POC) 467 (HH) 70 - 110 mg/dL     Additional Data Reviewed:      Signed By: Nat Real MD     November 30, 2021 1:14 PM

## 2021-11-30 NOTE — DIABETES MGMT
Diabetes Plan of Care    Assessment:  Has been in cath lab today for vascular procedure  BG this am 188 mg/dl but did not receive any insulin coverage.   No further BG monitoring today -  Just spoke with cath lab to please check BG now   Plan to transfer to T-SD        Your A1C  was   Lab Results   Component Value Date/Time    Hemoglobin A1c >14.0 (H) 11/27/2021 03:10 PM    Hemoglobin A1c (POC) 7.0 01/13/2021 04:12 PM    Hemoglobin A1c, External 7.8 01/28/2015 11:15 PM     Current DM insulin regime:  45 units lantus nightly  Corrective humalog, very insulin resistant, 4 times daily     TDD previous day = 75 units  45 units lantus  30 units humalog    Diet             Sai Vallejo  Pager [phone]  Office [phone]

## 2021-11-30 NOTE — PROGRESS NOTES
Pt BG reported to be 464 at 2100, recheck performed and BG was 453 at 2103. 15 units of insulin given 2106. MD made aware, post treatment BG recheck was 447 at 2123.  Will continue to monitor

## 2021-11-30 NOTE — OP NOTES
Operative Report    Preop Diagnosis: RLE subacute on chronic ischemia    Postop Diagnosis: Same    Surgeon: Andreia Dias MD    Procedure:   1. U/s guided access of the L CFA  2. Aortogram  3. Selective RLE angiography  4. Angiojet thrombectomy (w/o tPA) of the distal R popliteal artery  5. Diamondback atherectomy (1.5) of the R popliteal artery  6. PTA of the popliteal artery with a 4 x 60 DCB and a 5 x 60 DCB, followed by a 5 x 40 Chocolate balloon  7. Successful deployment of the Proglide closure device to the L groin    Indications: 60 y/o M smoker w/ DM2 found to have RLE resting ischemia and subacute insitu thrombosis of the R distal popliteal artery. RLE angiography with possible intervention was recommended. Risks, benefits, and alternatives were discussed including but not limited to bleeding, infection, pain, worsening ischemia, need for further procedures. I discussed w/ the pt and his daughter, Michael Goncalves. She provided informed consent due to patient's recent intermittent encephalopathy, and she gave consent to the procedure after the opportunity to ask questions. Procedure: The pt was brought to the cardiac cath lab and placed supine on the table. Bilateral groins were prepped and draped in the usual sterile fashion. Timeout was performed. IV sedation was administered. The L CFA was accessed with an 18g Cook needle under ultrasound guidance. Wire access was visualized under fluoroscopy. A 5F sheath was placed. Aortogram demonstrated a patent aorta, bilateral ROSLYN, IIA, EIA. The RLE was selected and a series of images were obtained. The R CFA and PFA were patent with minimal disease. The SFA was diffusely diseased, without flow-limiting stenosis. There was occlusion of the mid-distal popliteal artery. There was minimal distal reconstitution, primarily consisting of small collaterals. A 45cm 6F sheath was placed over the bifurcation.  IV heparin was given and re-dosed to maintain an ACT of approx 2.5x normal. The popliteal occlusion was easily crossed, with wire access into the PT artery, confirmed with catheter angiography. Due to the subacute nature of the patient's pain, and the appearance of the lesion on CTA, I performed angiojet (without tPA) using the Solvent device to remove any possible acute thrombus. Repeat imaging demonstrated no change in the occlusion. An 014 Viper wire was positioned in the mid PT artery. The CSI Diamondback device (1.5) was used to perform atherectomy of the distal and mid popliteal artery. Next, this region was treated with a 4 x 60 DCB distally, and a 5 x 60 DCB in the mid-prox popliteal artery. Repeat imaging initially demonstrated patency of the distal popliteal artery, with minimal tibial artery flow beyond the TP trunk. The tibial flow did not improve after 600mcg of nitroglycerin, and on repeat imaging, the distal popliteal artery appeared to have recoiled. This was treated again with a 5 x 40 Chocolate balloon, with marked improvement. The tibial flow remained poor. There was no apparent reconstitution of any named arteries at the foot. At this point, I did not pursue any further tibial intervention. It seemed that he has chronic widespread tibial disease, and that this is likely a contributor to his popliteal artery occlusion. He was not overtly ischemic preop, further supporting the chronicity of this condition. The procedure was concluded, with plans to maintain anticoagulation and monitor the R foot closely for viability. The 6F sheath was removed and the access site was successfully closed with a Proglide closure device. Manual pressure was briefly held and a DSD was applied.        Jaden Zazueta MD  Vascular Surgeon

## 2021-11-30 NOTE — PROGRESS NOTES
Vascular Surgery     Delayed entry    I spoke with pt this afternoon. He was much more alert than this AM. He noted \"soreness\" of his R foot, with some numbness. He is motor and sensory intact on my exam. His calf is soft and nontender. I discussed my recommendation for RLE angiogram with intervention. He is amenable to this procedure. I will plan to call his daughter Priyanka tomorrow AM to discuss the procedure and to obtain formal consent. Continue heparin gtt. ASA 3, Mallampati 3.      Ernesto Hernández MD  Vascular Surgeon

## 2021-11-30 NOTE — PROGRESS NOTES
Vascular Surgery    Pt doing well postop. RLE with continued pain in the foot. There is a multiphasic signal of the PT artery. The calf is very soft, both medially and anteriorly. No signs of compartment syndrome. The foot is motor and sensory intact. Plan to continue heparin at 500 units/hr, plavix, aspirin. Will continue to monitor viability of the foot, and having a signal is certainly promising.      Isela Patel MD  Vascular Surgeon

## 2021-12-01 ENCOUNTER — APPOINTMENT (OUTPATIENT)
Dept: VASCULAR SURGERY | Age: 61
DRG: 181 | End: 2021-12-01
Attending: SURGERY
Payer: COMMERCIAL

## 2021-12-01 VITALS
SYSTOLIC BLOOD PRESSURE: 118 MMHG | DIASTOLIC BLOOD PRESSURE: 84 MMHG | WEIGHT: 168.1 LBS | HEIGHT: 69 IN | HEART RATE: 82 BPM | RESPIRATION RATE: 19 BRPM | OXYGEN SATURATION: 100 % | TEMPERATURE: 98.8 F | BODY MASS INDEX: 24.9 KG/M2

## 2021-12-01 LAB
ANION GAP SERPL CALC-SCNC: 2 MMOL/L (ref 3–18)
APTT PPP: 29.3 SEC (ref 23–36.4)
APTT PPP: 30.2 SEC (ref 23–36.4)
APTT PPP: 30.3 SEC (ref 23–36.4)
BASOPHILS # BLD: 0 K/UL (ref 0–0.1)
BASOPHILS NFR BLD: 1 % (ref 0–2)
BUN SERPL-MCNC: 21 MG/DL (ref 7–18)
BUN/CREAT SERPL: 19 (ref 12–20)
CALCIUM SERPL-MCNC: 9 MG/DL (ref 8.5–10.1)
CHLORIDE SERPL-SCNC: 105 MMOL/L (ref 100–111)
CO2 SERPL-SCNC: 24 MMOL/L (ref 21–32)
CREAT SERPL-MCNC: 1.09 MG/DL (ref 0.6–1.3)
DIFFERENTIAL METHOD BLD: ABNORMAL
EOSINOPHIL # BLD: 0.1 K/UL (ref 0–0.4)
EOSINOPHIL NFR BLD: 1 % (ref 0–5)
ERYTHROCYTE [DISTWIDTH] IN BLOOD BY AUTOMATED COUNT: 13.8 % (ref 11.6–14.5)
GLUCOSE BLD STRIP.AUTO-MCNC: 130 MG/DL (ref 70–110)
GLUCOSE BLD STRIP.AUTO-MCNC: 170 MG/DL (ref 70–110)
GLUCOSE BLD STRIP.AUTO-MCNC: 184 MG/DL (ref 70–110)
GLUCOSE SERPL-MCNC: 313 MG/DL (ref 74–99)
HCT VFR BLD AUTO: 34.3 % (ref 36–48)
HGB BLD-MCNC: 11.2 G/DL (ref 13–16)
IMM GRANULOCYTES # BLD AUTO: 0 K/UL (ref 0–0.04)
IMM GRANULOCYTES NFR BLD AUTO: 0 % (ref 0–0.5)
LYMPHOCYTES # BLD: 0.9 K/UL (ref 0.9–3.6)
LYMPHOCYTES NFR BLD: 15 % (ref 21–52)
MCH RBC QN AUTO: 29.1 PG (ref 24–34)
MCHC RBC AUTO-ENTMCNC: 32.7 G/DL (ref 31–37)
MCV RBC AUTO: 89.1 FL (ref 78–100)
MONOCYTES # BLD: 0.5 K/UL (ref 0.05–1.2)
MONOCYTES NFR BLD: 9 % (ref 3–10)
NEUTS SEG # BLD: 4.2 K/UL (ref 1.8–8)
NEUTS SEG NFR BLD: 74 % (ref 40–73)
NRBC # BLD: 0 K/UL (ref 0–0.01)
NRBC BLD-RTO: 0 PER 100 WBC
PLATELET # BLD AUTO: 199 K/UL (ref 135–420)
PMV BLD AUTO: 11.3 FL (ref 9.2–11.8)
POTASSIUM SERPL-SCNC: 4.1 MMOL/L (ref 3.5–5.5)
RBC # BLD AUTO: 3.85 M/UL (ref 4.35–5.65)
SODIUM SERPL-SCNC: 131 MMOL/L (ref 136–145)
WBC # BLD AUTO: 5.6 K/UL (ref 4.6–13.2)

## 2021-12-01 PROCEDURE — 97535 SELF CARE MNGMENT TRAINING: CPT

## 2021-12-01 PROCEDURE — 82962 GLUCOSE BLOOD TEST: CPT

## 2021-12-01 PROCEDURE — 99232 SBSQ HOSP IP/OBS MODERATE 35: CPT | Performed by: INTERNAL MEDICINE

## 2021-12-01 PROCEDURE — 97165 OT EVAL LOW COMPLEX 30 MIN: CPT

## 2021-12-01 PROCEDURE — 36415 COLL VENOUS BLD VENIPUNCTURE: CPT

## 2021-12-01 PROCEDURE — 85025 COMPLETE CBC W/AUTO DIFF WBC: CPT

## 2021-12-01 PROCEDURE — 80048 BASIC METABOLIC PNL TOTAL CA: CPT

## 2021-12-01 PROCEDURE — 74011636637 HC RX REV CODE- 636/637: Performed by: STUDENT IN AN ORGANIZED HEALTH CARE EDUCATION/TRAINING PROGRAM

## 2021-12-01 PROCEDURE — 74011250637 HC RX REV CODE- 250/637: Performed by: SURGERY

## 2021-12-01 PROCEDURE — 85730 THROMBOPLASTIN TIME PARTIAL: CPT

## 2021-12-01 PROCEDURE — 2709999900 HC NON-CHARGEABLE SUPPLY

## 2021-12-01 PROCEDURE — 93926 LOWER EXTREMITY STUDY: CPT

## 2021-12-01 PROCEDURE — 99239 HOSP IP/OBS DSCHRG MGMT >30: CPT | Performed by: INTERNAL MEDICINE

## 2021-12-01 RX ORDER — CLOPIDOGREL BISULFATE 75 MG/1
75 TABLET ORAL DAILY
Qty: 30 TABLET | Refills: 0 | Status: SHIPPED | OUTPATIENT
Start: 2021-12-02 | End: 2022-01-01

## 2021-12-01 RX ORDER — LANOLIN ALCOHOL/MO/W.PET/CERES
100 CREAM (GRAM) TOPICAL DAILY
Qty: 30 TABLET | Refills: 0 | Status: SHIPPED | OUTPATIENT
Start: 2021-12-01 | End: 2021-12-31

## 2021-12-01 RX ORDER — OXYCODONE AND ACETAMINOPHEN 5; 325 MG/1; MG/1
1 TABLET ORAL
Status: DISCONTINUED | OUTPATIENT
Start: 2021-12-01 | End: 2021-12-01 | Stop reason: HOSPADM

## 2021-12-01 RX ORDER — GUAIFENESIN 100 MG/5ML
81 LIQUID (ML) ORAL DAILY
Qty: 30 TABLET | Refills: 0 | Status: SHIPPED | OUTPATIENT
Start: 2021-12-02 | End: 2022-01-01

## 2021-12-01 RX ORDER — INSULIN GLARGINE 100 [IU]/ML
45 INJECTION, SOLUTION SUBCUTANEOUS DAILY
Qty: 30 ADJUSTABLE DOSE PRE-FILLED PEN SYRINGE | Refills: 1 | Status: SHIPPED | OUTPATIENT
Start: 2021-12-01 | End: 2022-06-22

## 2021-12-01 RX ORDER — ATORVASTATIN CALCIUM 80 MG/1
80 TABLET, FILM COATED ORAL
Qty: 30 TABLET | Refills: 0 | Status: SHIPPED | OUTPATIENT
Start: 2021-12-01 | End: 2021-12-31

## 2021-12-01 RX ADMIN — INSULIN LISPRO 3 UNITS: 100 INJECTION, SOLUTION INTRAVENOUS; SUBCUTANEOUS at 11:33

## 2021-12-01 RX ADMIN — Medication 10 ML: at 05:07

## 2021-12-01 RX ADMIN — ASPIRIN 81 MG CHEWABLE TABLET 81 MG: 81 TABLET CHEWABLE at 08:03

## 2021-12-01 RX ADMIN — INSULIN LISPRO 3 UNITS: 100 INJECTION, SOLUTION INTRAVENOUS; SUBCUTANEOUS at 07:54

## 2021-12-01 RX ADMIN — Medication 10 ML: at 13:12

## 2021-12-01 RX ADMIN — CLOPIDOGREL BISULFATE 75 MG: 75 TABLET ORAL at 08:03

## 2021-12-01 NOTE — PROGRESS NOTES
Patient's daughter Tyler Amanda) took the pt's phone with her and stated that she would be switching the sim cards and returning back shortly. Pt's daughter did return but phone was unaccounted for and daughter left before we were able to address.  Pt's daughter was called but no answer will continue to monitor

## 2021-12-01 NOTE — PROGRESS NOTES
Vascular Surgery    Pt seen again this afternoon. Pain of R foot improved. Duplex demonstrates patent popliteal artery with 3 tibial artery patency. There was no detectable toe pressure. His intervention was successful, although he has extensive small vessel disease distally. I recommend lifelong ASA and plavix. I advised that he never smoke cigarettes again. He is stable for discharge from a vascular standpoint. He will need to follow up in 2 weeks in clinic.      Jaison Davila MD  Vascular Surgeon

## 2021-12-01 NOTE — PROGRESS NOTES
Problem: Self Care Deficits Care Plan (Adult)  Goal: *Acute Goals and Plan of Care (Insert Text)  Outcome: Resolved/Met       OCCUPATIONAL THERAPY EVALUATION/DISCHARGE    Patient: eMche Hoffman (26 y.o. male)  Date: 12/1/2021  Primary Diagnosis: Arterial occlusion, lower extremity ([address] [I70.209]  Procedure(s) (LRB):  ANGIOGRAPHY LOWER EXT BILAT (Left)  ATHERECTOMY PERIPHERAL ARTERY (N/A)  THROMBECTOMY PERIPHERAL ARTERY (N/A)  ANGIOPLASTY PERIPHERAL ARTERY (N/A) 1 Day Post-Op   Precautions: Fall (NWB RLE)  PLOF: Patient was independent with self-care and functional mobility PTA. ASSESSMENT AND RECOMMENDATIONS:  Patient cleared to participate in OT evaluation by RN. Upon entering the room, patient was supine in bed, alert, and agreeable to participate in OT evaluation. Patient educated on the role of OT, evaluation process, and safety during this admission with patient verbalizing understanding. Patient is independent - modified independent with basic self-care seated; supervision standing. Patient modified independent with bed mobility and supervision with functional transfers using rolling walker. Patient able to maintain NWB at this time using RW. The patient presents with good static standing and fair dynamic standing balance, however will defer to PT for functional balance and functional mobility tasks. Based on the objective data described below, the patient presents with no deficits that impede pt function with ADLs. OT to d/c from caseload at this time. Skilled occupational therapy is not indicated at this time. Discharge Recommendations: Home with increased family/friend supervision d/t intermittent confusion and Home Health Safety Eval    Further Equipment Recommendations for Discharge: shower chair and rolling walker      SUBJECTIVE:   Patient stated Arun Cai you the one to send me home?     OBJECTIVE DATA SUMMARY:     Past Medical History:   Diagnosis Date    Alcohol use     Fri-Sun one fifth; Mon-Thur: Pint of liquor    CAD (coronary artery disease)     Cardiac angioplasty with stent 07/29/2009    2.5 x 13 Cypher stent to CX. Cardiac cath 11/09/2011    RCA patent. LM patent. LAD patent. mD1 55%. CX patent. Prior stent patent. Edison 85% (2.5 x 15-mm Xience stent, resid 0%). LVEDP 12. EF 40-45%. High lateral hypk (RI distribution). Cardiac inferior STEMI 2009    Constipation     Current smoker     contemplating stopping    Diabetes ([facility] [address]     type 2-insulin dependent    Diabetic neuropathy (HCC)     GERD (gastroesophageal reflux disease)     HTN (hypertension)     Hyperlipidemia     Lacunar infarction ([facility] [address]     Migraine     Myocardial infarction [facility])     Vitamin D deficiency      Past Surgical History:   Procedure Laterality Date    COLONOSCOPY N/A 10/28/2020    COLONOSCOPY w/polypectomy performed by Juan Hudson MD at [place] [address]  07/29/2009    HX HEART CATHETERIZATION  8/30/2011    HX HEART CATHETERIZATION  11/09/2011    HX WISDOM TEETH EXTRACTION      x4     Barriers to Learning/Limitations: yes;  altered mental status (i.e. intermittent Confusion)  Compensate with: visual, verbal, tactile, kinesthetic cues/model    Home Situation:   Home Situation  Home Environment: Private residence  # Steps to Enter: 3  One/Two Story Residence: One story  Living Alone: No  Support Systems: Child(kenzie), Friend/Neighbor  Patient Expects to be Discharged to Cor[de-identified]ration  Current DME Used/Available at Home: None  Tub or Shower Type: Tub/Shower combination  [x]     Right hand dominant   []     Left hand dominant    Cognitive/Behavioral Status:  Neurologic State: Alert  Orientation Level: Oriented to person; Oriented to place;  Disoriented to time; Disoriented to situation  Cognition: Follows commands  Safety/Judgement: Fall prevention    Skin: Intact  Edema: None noted    Vision/Perceptual:                           Acuity: Within Defined Limits         Coordination: BUE     Fine Motor Skills-Upper: Left Intact; Right Intact    Gross Motor Skills-Upper: Left Intact; Right Intact    Balance:  Sitting: Intact  Standing: Impaired; With support  Standing - Static: Good  Standing - Dynamic : Fair    Strength: BUE    Strength: Generally decreased, functional              Tone & Sensation: BUE    Tone: Normal  Sensation: Intact        Range of Motion: BUE    AROM: Within functional limits      Functional Mobility and Transfers for ADLs:  Bed Mobility:     Supine to Sit: Modified independent  Sit to Supine: Modified independent  Scooting: Modified independent  Transfers:  Sit to Stand: Supervision  Stand to Sit: Supervision          ADL Assessment:  Feeding: Independent    Oral Facial Hygiene/Grooming: Independent    Bathing: Modified independent; Supervision (sup standing)    Upper Body Dressing: Independent    Lower Body Dressing: Modified independent; Supervision (supervision standing)    Toileting: Modified independent         ADL Intervention:     Upper Body Dressing Assistance  Dressing Assistance: [id number] [facility] [id number]: Independent    Lower Body Dressing Assistance  Dressing Assistance: Modified independent  Socks: Modified independent  Leg Crossed Method Used: Yes  Position Performed: Seated edge of bed      Cognitive Retraining  Safety/Judgement: Fall prevention      Pain:  Pain level pre-treatment: 8/10 , RLE  Pain level post-treatment: 8/10   Pain Intervention(s): Medication (see MAR); Response to intervention: Nurse notified, See doc flow    Activity Tolerance:   Good    Please refer to the flowsheet for vital signs taken during this treatment.   After treatment:   []  Patient left in no apparent distress sitting up in chair  [x]  Patient left in no apparent distress in bed  [x]  Call bell left within reach  [x]  Nursing notified  []  Caregiver present  [x]  Bed alarm activated on zone 2    COMMUNICATION/EDUCATION:   [x]      Role of Occupational Therapy in the acute care setting  [x]      Home safety education was provided and the patient/caregiver indicated understanding. [x]      Patient/family have participated as able and agree with findings and recommendations. []      Patient is unable to participate in plan of care at this time. Thank you for this referral.  Lorin Srinivasan OTR/L  Time Calculation: 17 mins      Eval Complexity: History: MEDIUM Complexity : Expanded review of history including physical, cognitive and psychosocial  history ; Examination: LOW Complexity : 1-3 performance deficits relating to physical, cognitive , or psychosocial skils that result in activity limitations and / or participation restrictions ;    Decision Making:LOW Complexity : No comorbidities that affect functional and no verbal or physical assistance needed to complete eval tasks

## 2021-12-01 NOTE — DISCHARGE INSTRUCTIONS
Patient Education        Learning About Femoropopliteal Bypass Surgery for Peripheral Arterial Disease  What is femoropopliteal bypass surgery? A femoropopliteal (fem-pop) bypass is surgery to change the flow of your blood so it goes around blocked blood vessels. To do this surgery, your doctor will use something called a graft. The graft can be a vein taken from another place in your leg. Or it can be a man-made blood vessel. The doctor sews the graft onto your femoral and popliteal arteries. Then your blood goes through this new graft vessel instead of the blocked one. How is the surgery done? You may be asleep during the surgery. Or you may get medicine to numb your lower body and prevent pain. The doctor will make a cut (incision) in your thigh. The doctor may make another cut in the inside of your calf just below the knee. If one of your veins is being used for a graft, the doctor will make another cut in your leg to remove this vein. The doctor then connects one end of the graft to the femoral artery in your thigh. The other end is connected to the popliteal artery above or below your knee. After the graft is in place and the blood is flowing through it, the doctor will close the incisions with stitches or staples. What can you expect after the surgery? You will probably stay 2 to 4 days in the hospital.  Aidee Levin will have some pain from the incisions. This usually gets better after about 1 week. Your leg may be swollen at first. This is normal. It may last 2 or 3 months. You will need to take it easy for at least 2 to 6 weeks at home. It may take 6 to 12 weeks to fully recover. You will probably need to take at least 2 to 6 weeks off from work. It depends on the type of work you do and how you feel. Follow-up care is a key part of your treatment and safety. Be sure to make and go to all appointments, and call your doctor if you are having problems.  It's also a good idea to know your test results and keep a list of the medicines you take. Where can you learn more? Go to http://www.gray.com/  Enter [id number] in the search box to learn more about \"Learning About Femoropopliteal Bypass Surgery for Peripheral Arterial Disease. \"  Current as of: April 29, 2021               Content Version: 13.0  © 2006-2021 [employer]. Care instructions adapted under license by [employer] (which disclaims liability or warranty for this information). If you have questions about a medical condition or this instruction, always ask your healthcare professional. [place] [address] any warranty or liability for your use of this information. Patient Education        Femoropopliteal Bypass: What to Expect at Home  Your Recovery  Femoropopliteal bypass surgery is used to bypass diseased blood vessels above or below the knee. Your doctor used something called a graft to make the blood go around (bypass) the blocked part of your blood vessel. You will have some pain from the cuts (incisions) the doctor made. This usually gets better after about 1 week. Your doctor will give you pain medicine for this. You can expect your leg to be swollen at first. This is a normal part of recovery and may last 2 or 3 months. You will have stitches or staples in the incisions. If you have stitches, they may dissolve on their own. Or your doctor may take them out 7 to 14 days after your surgery. You will need to take it easy for 2 to 6 weeks at home. It may take 6 to 12 weeks to fully recover. After surgery, blood may flow better throughout your leg, which can decrease leg pain, numbness, and cramping. You will need to have regular checkups with your doctor to make sure the graft is working. This care sheet gives you a general idea about how long it will take for you to recover. But each person recovers at a different pace.  Follow the steps below to get better as quickly as possible. How can you care for yourself at home? Activity    · Rest when you feel tired. Getting enough sleep will help you recover.     · Try to walk each day or as often as your doctor tells you. Start by walking a little more than you did the day before. Bit by bit, increase the amount you walk. Walking boosts blood flow and helps prevent pneumonia and constipation.     · Avoid strenuous activities, such as bicycle riding, jogging, weight lifting, or aerobic exercise, until your doctor says it is okay.     · Ask your doctor when you can drive again.     · If you work, you will probably need to take 2 to 6 weeks off, depending on your job.     · You may shower, if your doctor says it is okay. Do not take a bath for the first 2 weeks, or until your doctor tells you it is okay. Diet    · You can eat your normal diet. If your stomach is upset, try bland, low-fat foods like plain rice, broiled chicken, toast, and yogurt.     · Drink plenty of fluids (unless your doctor tells you not to).     · You may notice that your bowel movements are not regular right after your surgery. This is common. You may want to take a fiber supplement every day. If you have not had a bowel movement after a couple of days, ask your doctor about taking a mild laxative. Medicines    · Your doctor will tell you if and when you can restart your medicines. He or she will also give you instructions about taking any new medicines.     · If you take aspirin or some other blood thinner, ask your doctor if and when to start taking it again. Make sure that you understand exactly what your doctor wants you to do.     · Be safe with medicines. Take your medicines exactly as prescribed. Call your doctor if you think you are having a problem with your medicine.     · Take pain medicines exactly as directed. ? If the doctor gave you a prescription medicine for pain, take it as prescribed.   ? If you are not taking a prescription pain medicine, ask your doctor if you can take an over-the-counter medicine.     · If you think your pain medicine is making you sick to your stomach:  ? Take your medicine after meals (unless your doctor has told you not to). ? Ask your doctor for a different pain medicine.     · If your doctor prescribed antibiotics, take them as directed. Do not stop taking them just because you feel better. You need to take the full course of antibiotics.     · Your doctor may prescribe a blood thinner when you go home. This helps prevent blood clots. Be sure you get instructions about how to take your medicine safely. Blood thinners can cause serious bleeding problems. Incision care    · If you have bandages on the incisions, follow your doctor's instructions about changing them.     · If you have strips of tape on the incisions, leave the tape on for a week or until it falls off.     · Wash the area daily with warm, soapy water, and pat it dry. Don't use hydrogen peroxide or alcohol, which can slow healing. You may cover the area with a gauze bandage if it weeps or rubs against clothing. Change the bandage every day.     · Keep the area clean and dry. Elevation    · Prop up your leg on a pillow anytime you sit or lie down for the first 3 days. Try to keep it above the level of your heart. This will help reduce swelling. Follow-up care is a key part of your treatment and safety. Be sure to make and go to all appointments, and call your doctor if you are having problems. It's also a good idea to know your test results and keep a list of the medicines you take. When should you call for help? Call 911 anytime you think you may need emergency care. For example, call if:    · You passed out (lost consciousness).     · You have trouble breathing.    Call your doctor now or seek immediate medical care if:    · You have severe pain in your leg, or it becomes cold, pale, blue, tingly, or numb.     · You have pain that does not get better after you take pain medicine.     · You have loose stitches, or your incisions come open.     · You are bleeding a lot from the incisions.     · You have signs of infection, such as:  ? Increased pain, swelling, warmth, or redness. ? Red streaks leading from the incision. ? Pus draining from the incision. ? A fever.     · You are sick to your stomach or cannot keep fluids down. Watch closely for any changes in your health, and be sure to contact your doctor if:    · You do not get better as expected. Where can you learn more? Go to http://www.gray.com/  Enter C721 in the search box to learn more about \"Femoropopliteal Bypass: What to Expect at Home. \"  Current as of: April 29, 2021               Content Version: 13.0  © 2006-2021 [employer]. Care instructions adapted under license by [employer] (which disclaims liability or warranty for this information). If you have questions about a medical condition or this instruction, always ask your healthcare professional. [place] [address] any warranty or liability for your use of this information. Patient Education        Learning About Peripheral Arterial Disease (PAD)  What is peripheral arterial disease? Peripheral arterial disease (PAD) is narrowing or blockage of arteries that causes poor blood flow to your arms and legs. PAD is most common in the legs. The most common cause of PAD is the buildup of plaque on the inside of arteries. Over time, plaque builds up in the walls of the arteries, including those that supply blood to your legs. If you have PAD, you're likely to have plaque in other arteries in your body. This raises your risk of a heart attack and stroke. Medicines and lifestyle changes may lower your risk of heart attack and stroke. They may also help if you have symptoms. In some cases, surgery or other treatment is needed.   Peripheral arterial disease is also called peripheral vascular disease. What are the symptoms? Many people who have PAD don't have symptoms. If you have symptoms, they may include a tight, aching, or squeezing pain in your calf, thigh, or buttock. This pain is called intermittent claudication. It usually happens after you have walked a certain distance. The pain goes away if you stop walking. As PAD gets worse, you may have pain in your foot or toe when you aren't walking. Other symptoms may include weak or tired legs. You might have trouble walking or balancing. If PAD gets worse, you may have other symptoms caused by poor blood flow to your legs and feet. These symptoms aren't common. They may include cold or numb feet or toes, sores that are slow to heal, or leg or foot pain when you're at rest.  How can you prevent PAD? · Quit smoking. Quitting smoking is one of the best things you can do to help prevent PAD. If you need help quitting, talk to your doctor about stop-smoking programs and medicines. These can increase your chances of quitting for good. · Stay at a healthy weight. · Manage other health problems, including diabetes, high blood pressure, and high cholesterol. · Be physically active. Try to do moderate activity at least 2½ hours a week. Or try to do vigorous activity at least 1¼ hours a week. You may want to walk or try other activities, such as running, swimming, cycling, or playing tennis or team sports. · Eat a variety of heart-healthy foods. ? Eat fruits, vegetables, whole grains, beans, and other high-fiber foods. ? Eat lean proteins, such as seafood, lean meats, beans, nuts, and soy products. ? Eat healthy fats, such as canola and olive oil. ? Choose foods that are low in saturated fat and avoid trans fat. ? Limit sodium and alcohol. ? Limit drinks and foods with added sugar. How is PAD treated? Treatment for PAD focuses on relieving symptoms and lowering your risk of heart attack and stroke.  Making healthy lifestyle changes can help you lower this risk. · If you smoke, quit. Quitting is the best thing you can do when you have PAD. Medicines and counseling can help you quit for good. · Get regular exercise (if your doctor says it's safe). Try walking, swimming, or biking for at least 30 minutes on most, if not all, days of the week. If you have leg symptoms when you exercise, your doctor might recommend a specialized exercise program that may relieve symptoms. The goal is to be able to walk farther without pain. · Eat heart-healthy foods, such as vegetables, fruits, nuts, beans, fish, and whole grains. Limit foods that have a lot of salt, fat, and sugar. · Stay at a healthy weight. Lose weight if you need to. You may need medicines to help lower your risk of heart attack and stroke. These include medicine to prevent blood clots, improve cholesterol, or lower blood pressure. You also may take a medicine that can help ease pain while you are walking. People who have severe PAD may have bypass surgery or other procedures (such as angioplasty) to restore proper blood flow to the legs. Follow-up care is a key part of your treatment and safety. Be sure to make and go to all appointments, and call your doctor if you are having problems. It's also a good idea to know your test results and keep a list of the medicines you take. Where can you learn more? Go to http://www.gray.com/  Enter W837 in the search box to learn more about \"Learning About Peripheral Arterial Disease (PAD). \"  Current as of: April 29, 2021               Content Version: 13.0  © 2006-2021 [employer]. Care instructions adapted under license by [employer] (which disclaims liability or warranty for this information).  If you have questions about a medical condition or this instruction, always ask your healthcare professional. [address] any warranty or liability for your use of this information. Patient Education      Aspirin (Oc Extra Strength, Oc Aspirin Children's, Bufferin, Bufferin Low Dose) - (By mouth)   Why this medicine is used:   Treats pain, fever, and inflammation. May also reduce the risk of heart attack. Contact a nurse or doctor right away if you have:  · Bloody vomit or vomit that looks like coffee grounds  · Blood in urine or bloody or black, tarry stools  · Wheezing or trouble breathing     Common side effects:  · Upset stomach  © 2017 [employer] Information is for End User's use only and may not be sold, redistributed or otherwise used for commercial purposes. Patient Education      Atorvastatin (Lipitor) - (By mouth)   Why this medicine is used:   Treats high cholesterol and triglyceride levels. Reduces the risk of angina, stroke, heart attack, or certain heart and blood vessel problems. Contact a nurse or doctor right away if you have:  · Severe headache, confusion, trouble speaking  · Dark urine or pale stools  · Yellow skin or eyes  · Nausea, vomiting, loss of appetite, stomach pain  · Muscle pain, tenderness, or weakness; unusual tiredness     Common side effects:  · Diarrhea  · Joint pain  © 2017 [employer] Information is for End User's use only and may not be sold, redistributed or otherwise used for commercial purposes. Patient Education      Clopidogrel (Plavix) - (By mouth)   Why this medicine is used:   Helps prevent stroke, heart attack, and other heart problems.   Contact a nurse or doctor right away if you have:  · Sudden or severe headache  · Bloody vomit or vomit that looks like coffee grounds; bloody or black, tarry stools  · Bleeding that does not stop or bruises that do not heal  · Dark urine or pale stools, nausea, loss of appetite, stomach pain,  · Yellow skin or eyes     Common side effects:  · Minor bleeding or bruising  © 2017 [employer] Information is for End User's use only and may not be sold, redistributed or otherwise used for commercial purposes. Patient Education      Thiamine (Vitamin B-1) ([facility] Vitamin B1, [employer]'s Blend Vitamin B-1, [employer] Vitamin B-1, [facility] Vitamin B-1) - (By mouth)   Why this medicine is used: Thiamine, or Vitamin B-1, is used to treat thiamine-deficiency . Contact a nurse or doctor right away if you have:  · Swelling of face, lips or eyelids, trouble breathing  · Itching   © 2017 [facility][note 3] [address] is for End User's use only and may not be sold, redistributed or otherwise used for commercial purposes.

## 2021-12-01 NOTE — PROGRESS NOTES
Attempted to see pt for initial assessment ut pt off the floor at this time.           TAI BrightN RN  Care Management  Pager: [phone]

## 2021-12-01 NOTE — DIABETES MGMT
Diabetes/ Glycemic Control Plan of Care  Recommendations:   Blood glucose much improved this morning. Noted heparin infusion containing dextrose switched to heparin with NS  Continue basal and corrective insulin coverage as ordered  Will continue inpatient monitoring. Assessment:   DX:   1. Arterial occlusion, lower extremity ([address]     2. Hyperglycemia     3. PAD (peripheral artery disease) (HCC)  INVASIVE VASCULAR PROCEDURE    INVASIVE VASCULAR PROCEDURE      Fasting/ Morning blood glucose:   Lab Results   Component Value Date/Time    Glucose 313 (H) 12/01/2021 01:08 AM    Glucose (POC) 184 (H) 12/01/2021 11:28 AM     IV Fluids containing dextrose:   None   Steroids:   none     Blood glucose values:   Results for Joslyn Murray (MRN [id number]) as of 12/1/2021 15:46   Ref. Range 11/30/2021 15:45 11/30/2021 21:21 11/30/2021 21:22 12/1/2021 07:37 12/1/2021 11:28   GLUCOSE,FAST - POC Latest Ref Range: 70 - 110 mg/dL 467 (HH) 414 (HH) 430 (HH) 170 (H) 184 (H)     Within target range (70-180mg/dL):  progressing towards goal  Current insulin orders:  Lantus 45 units every bedtime. Lispro corrective insulin coverage AC&HS  Total Daily Dose previous 24 hours =  75 units   Current A1c:   Lab Results   Component Value Date/Time    Hemoglobin A1c >14.0 (H) 11/27/2021 03:10 PM    Hemoglobin A1c (POC) 7.0 01/13/2021 04:12 PM    Hemoglobin A1c, External 7.8 01/28/2015 11:15 PM      equivalent  to ave Blood Glucose of 355 mg/dl for 2-3 months prior to admission  Adequate glycemic control PTA:   No   Nutrition/Diet:   Active Orders   Diet    ADULT DIET Regular; 4 carb choices (60 gm/meal); No Concentrated Sweets      Meal Intake:  Patient Vitals for the past 168 hrs:   % Diet Eaten   12/01/21 1317 76 - 100%   12/01/21 0846 76 - 100%     Supplement Intake:  No data found.     Home diabetes medications:   Key Antihyperglycemic Medications             dulaglutide (Trulicity) 7.23 BF/6.7 mL sub-q pen INJECT 0.75 MG UNDER THE SKIN ONCE WEEKLY    metFORMIN (GLUCOPHAGE) 1,000 mg tablet TAKE ONE TABLET BY MOUTH TWICE A DAY WITH A MEAL    insulin glargine (Basaglar KwikPen U-100 Insulin) 100 unit/mL (3 mL) inpn INJECT 50 UNITS UNDER THE SKIN DAILY        Plan/Goals:   Blood glucose will be within target of 70 - 180 mg/dl within 72 hours  Reinforce dietary and medication compliance at home.        [place] [employer] [facility]

## 2021-12-01 NOTE — PROGRESS NOTES
Reason for Admission:  Arterial occlusion, lower extremity ([facility] [address] [I70.209]                 RUR Score:    13           Plan for utilizing home health: Yes                       Likelihood of Readmission:   LOW                         Transition of Care Plan:              Initial assessment completed with patient. Cognitive status of patient: oriented to time, place, person and situation. Face sheet information confirmed:  yes. The patient designates his daughter Kalpesh Blank and his girlfriend Barbara Whyte to participate in his discharge plan and to receive any needed information. This patient lives in a duplex home with girlfriend. Patient is not able to navigate steps as needed. Prior to hospitalization, patient was considered to be independent with ADLs/IADLS : yes . Patient has a current ACP document on file: no      Healthcare Decision Maker:     Click here to complete [address] including selection of the Healthcare Decision Maker Relationship (ie \"Primary\")    The daughter will be available to transport patient home upon discharge. The patient already has [address] equipment available in the home. Patient is not currently active with home health. Patient has not stayed in a skilled nursing facility or rehab. Was  stay within last 60 days : no. This patient is on dialysis :no      List of available Home Health agencies were provided and reviewed with the patient prior to discharge. Freedom of choice signed: yes, for any accepting home health agency. Currently, the discharge plan is Home with [address] [place] [address] Malcolm Ram. The patient states that he can obtain his medications from the pharmacy, and take his medications as directed. Patient's current insurance is Medicaid       Care Management Interventions  PCP Verified by CM: Yes  Palliative Care Criteria Met (RRAT>21 & CHF Dx)?: No  Mode of Transport at Discharge:  Other (see comment) (family)  Transition of Care Consult (CM Consult): Discharge Planning  Physical Therapy Consult: Yes  Occupational Therapy Consult: Yes  Support Systems: Child(kenzie), Friend/Neighbor  Confirm Follow Up Transport: Family  The Patient and/or Patient Representative was Provided with a Choice of Provider and Agrees with the Discharge Plan?: Yes  Freedom of Choice List was Provided with Basic Dialogue that Supports the Patient's Individualized Plan of Care/Goals, Treatment Preferences and Shares the Quality Data Associated with the Providers?: Yes  Discharge Location  Discharge Placement: Home with home health        JH Talamantes RN  Care Management  Pager: [phone]

## 2021-12-01 NOTE — PROGRESS NOTES
Bedside shift change report given to Sherie Mckeon RN (oncoming nurse) by Glenda Elizalde RN (offgoing nurse). Report included the following information SBAR, Procedure Summary, Intake/Output, MAR, Recent Results and Cardiac Rhythm NSR. Patient resting comfortably, bed in lowest position, and call bell within reach. 2030 Doppler check completed. No abnormalities. Extremity is soft to the touch, no signs of compartment syndrome. 2120 HS BG check was 414. Recheck was 430. MD aware. 2230 Doppler check completed. No abnormalities. Extremity is soft to the touch, no signs of compartment syndrome. 0030 Doppler check completed. No abnormalities. Extremity is soft to the touch, no signs of compartment syndrome. Patient complaining of left foot being cold. Upon examination, patient's left foot was warm to the touch, pulses present on doppler. Patient confirms that he has sensation in the foot. Patient explained that his foot has been cold like this prior to admission. 0150 Patient pulled off heart monitor leads, disconnected Heparin, and climbed out of bed onto bedside commode. Patient was attempting to put shoes on and was \"trying to get dressed\". RN reoriented the patient and assisted ambulating patient back to bed. Educated the patient on bed rest status and call bell usage. Educated patient on importance of continuous heparin infusion. Bed in lowest position, call bell within reach, and bed alarm on. 0230 Doppler check completed. No abnormalities. Extremity is soft to the touch, no signs of compartment syndrome. 7063 Patient pulled off leads and was attempting to get out of bed. Patient disoriented to time, oriented to person, place, and situation. Reorientation provided. Patient placed back on heart monitor. Bed in lowest position, call bell within reach, and bed alarm on.    0430 Doppler check completed. No abnormalities. Extremity is soft to the touch, no signs of compartment syndrome.

## 2021-12-01 NOTE — DISCHARGE SUMMARY
[facility]  [facility]       Patient: Magaly Lopez Age: 59 y.o. : 1960 MR#: [id number] [id number]: [id number]  PCP on record: Milton Vargas NP  Admit date: 2021  Discharge date: 2021    Consults:  -MARIA R Hilario,-vascular surgery  -   Procedures:on  by vascular surgeon    1. U/s guided access of the L CFA  2. Aortogram  3. Selective RLE angiography  4. Angiojet thrombectomy (w/o tPA) of the distal R popliteal artery  5. Diamondback atherectomy (1.5) of the R popliteal artery  6. PTA of the popliteal artery with a 4 x 60 DCB and a 5 x 60 DCB, followed by a 5 x 40 Chocolate balloon  7. Successful deployment of the Proglide closure device to the L groin  -     Significant Diagnostic Studies: -CTA abdominal aorta with bilateral lower extremity runoff:    IMPRESSION     Occlusion of the right popliteal artery which is most likely embolic and poor  perfusion of the calf arteries as a result.     Additional vascular incidentals as above.     Wet read by Dr. Tessa Mckeon        -  Right lower ext arterial duplex on 21:  Interpretation Summary       · Observed absence of flow posterior tibial at posterior foot within right lower extremity. · Observed absence of flow dorsalis pedis top of foot within right lower extremity. · Unable to obtain doppler distal dorsalis pedis between first and second digit within right lower extremity. · PPG observed lack of flow in the first digit within right lower extremity. Plaquing with stenotic velocities observed in the distal popliteal (Image 9). Absence of flow observed in the distal pop to tibioperoneal trunk within the right lower extremity. Reconstitutes as monophasic flow distal tibioperoneal trunk within the right lower extremity. Collateral observed in the posterior foot below ankle.           -Ankle brachial index 21:    Interpretation Summary    No obtainable flow detected within the digits of the right lower extremity. Absence of flow observed in the dorsalis pedis of right lower extremity. Right posterior tibial/collateral at malleolus (due to ultrasound image) barely audible monophasic flow, AVILA attempted twice and unsuccessful.        Normal amplitude waveforms obtained within the digits of the left lower extremity. Left ankle brachial image was within normal limits, may be unreliable due to monophasic arterial waveforms observed. Left digital brachial index was within normal limits.     Recommend vascular surgery consult.        Discharge Diagnoses: -PAD w/ subacute distal popliteal artery occlusion/ischemia  -Acute encephalopathy  -DM2 uncontrolled  -CARI                                            Patient Active Problem List   Diagnosis Code    Hyperlipidemia E78.5    HTN (hypertension) I10    Insulin dependent diabetes mellitus [id number]    Tobacco use disorder F17.200    Coronary atherosclerosis of native coronary artery I25.10    Tobacco dependence F17.200    Other and unspecified alcohol dependence, continuous drinking behavior F10.20    Mediastinal adenopathy R59.0    Diabetic neuropathy ([address] E11.40    Vitamin D deficiency E55.9    Other insomnia G47.09    Type 2 diabetes with nephropathy ([address] E11.21    Otalgia H92.09    Other fatigue R53.83    Mild nonproliferative diabetic retinopathy of left eye without macular edema associated with type 2 diabetes mellitus ([address] T07.0737    Hypertensive retinopathy H35.039    Nuclear sclerosis of both eyes H25.13    Arterial occlusion, lower extremity ([address] I70.209       Hospital Course by Problem   57-year-old male with multiple medical problems including type 2 diabetes mellitus, dyslipidemia, coronary artery disease admitted after he presented with complaints of pain in right foot.     -PAD with subacute distal popliteal artery occlusion/ischemia, vascular surgery  Status post aortogram, selective right lower extremity angiography, thrombectomy of the distal right popliteal artery, atherectomy of the right popliteal artery, PTA of the popliteal artery on 11/30. Patient with continued right lower extremity pain, continues on heparin drip. On DAPT. Plans for continued heparin drip and additional studies due to continued complaints of lower extremity pain after vascular procedure. However patient insisted on being discharged and I was contacted by nursing the vascular surgery has approved discharge as long as patient continued to take his DAPT and statin. Confirm that patient has insurance and confirmed which pharmacy he would like his medications prescribed to him. Patient will be discharged with home health consult for skilled nursing/medication education.       -Acute encephalopathy: Had significant improvement through the course of his stay. Initiated on high-dose thiamine to treat possibility of Wernicke's encephalopathy, discharged on thiamine. Was monitored for alcohol withdrawal.     -Hyperglycemia: Likely has poorly controlled diabetes his A1c is greater than 14. Was treated with  Lantus and corrective insulin.     -Acute kidney injury: Likely due to prerenal azotemia from volume loss due to hyperglycemia. Resolved        HISTORY OF:  -Type 2 diabetes mellitus: Poorly controlled, A1c greater than 14, came in with elevated blood sugars. Patient on Lantus  -Coronary artery disease status post PCI in the past -Dyslipidemia  -Tobacco dependence     As stated above patient was insisted upon being discharged despite recommendations made for continued heparin drip and further studies due to continued symptom of pain in the extremity. However I was contacted by nursing that vascular surgery has approved to discharge on the condition that patient took his DAPT and statin. These medications have been prescribed. Today's examination of the patient revealed:     Subjective:   Patient seen in the morning of date of discharge. He reported some right lower extremity pain, appeared comfortable and was in no acute distress  Objective:   VS:   Visit Vitals  /84   Pulse 82   Temp 98.8 °F (37.1 °C)   Resp 19   Ht 5' 9\" (1.753 m)   Wt 76.2 kg (168 lb 1.6 oz)   SpO2 100%   BMI 24.82 kg/m²      Tmax/24hrs: Temp (24hrs), Av.5 °F (36.9 °C), Min:97.9 °F (36.6 °C), Max:98.8 °F (37.1 °C)     Input/Output:     Intake/Output Summary (Last 24 hours) at 2021 1652  Last data filed at 2021 1610  Gross per 24 hour   Intake 2154.62 ml   Output 2800 ml   Net -645.38 ml       General: Alert and awake, in no acute distress, appears comfortable cardiovascular: Regular rate rhythm no murmurs  Pulmonary:  clear to auscultation bilaterally   GI: Abdomen is soft, nontender, nondistended  Extremities: Without edema, warm to touch    Labs:    Recent Results (from the past 24 hour(s))   GLUCOSE, POC    Collection Time: 21  9:21 PM   Result Value Ref Range    Glucose (POC) 414 (HH) 70 - 110 mg/dL   GLUCOSE, POC    Collection Time: 21  9:22 PM   Result Value Ref Range    Glucose (POC) 430 (HH) 70 - 110 mg/dL   PTT    Collection Time: 21  1:08 AM   Result Value Ref Range    aPTT 30.2 23.0 - 36.4 SEC   CBC WITH AUTOMATED DIFF    Collection Time: 21  1:08 AM   Result Value Ref Range    WBC 5.6 4.6 - 13.2 K/uL    RBC 3.85 (L) 4.35 - 5.65 M/uL    HGB 11.2 (L) 13.0 - 16.0 g/dL    HCT 34.3 (L) 36.0 - 48.0 %    MCV 89.1 78.0 - 100.0 FL    MCH 29.1 24.0 - 34.0 PG    MCHC 32.7 31.0 - 37.0 g/dL    RDW 13.8 11.6 - 14.5 %    PLATELET 207 377 - 078 K/uL    MPV 11.3 9.2 - 11.8 FL    NRBC 0.0 0  WBC    ABSOLUTE NRBC 0.00 0.00 - 0.01 K/uL    NEUTROPHILS 74 (H) 40 - 73 %    LYMPHOCYTES 15 (L) 21 - 52 %    MONOCYTES 9 3 - 10 %    EOSINOPHILS 1 0 - 5 %    BASOPHILS 1 0 - 2 %    IMMATURE GRANULOCYTES 0 0.0 - 0.5 %    ABS. NEUTROPHILS 4.2 1.8 - 8.0 K/UL    ABS. LYMPHOCYTES 0.9 0.9 - 3.6 K/UL    ABS. MONOCYTES 0.5 0.05 - 1.2 K/UL    ABS. EOSINOPHILS 0.1 0.0 - 0.4 K/UL    ABS. BASOPHILS 0.0 0.0 - 0.1 K/UL    ABS. IMM.  GRANS. 0.0 0.00 - 0.04 K/UL    DF AUTOMATED     METABOLIC PANEL, BASIC    Collection Time: 12/01/21  1:08 AM   Result Value Ref Range    Sodium 131 (L) 136 - 145 mmol/L    Potassium 4.1 3.5 - 5.5 mmol/L    Chloride 105 100 - 111 mmol/L    CO2 24 21 - 32 mmol/L    Anion gap 2 (L) 3.0 - 18 mmol/L    Glucose 313 (H) 74 - 99 mg/dL    BUN 21 (H) 7.0 - 18 MG/DL    Creatinine 1.09 0.6 - 1.3 MG/DL    BUN/Creatinine ratio 19 12 - 20      GFR est AA >60 >60 ml/min/1.73m2    GFR est non-AA >60 >60 ml/min/1.73m2    Calcium 9.0 8.5 - 10.1 MG/DL   PTT    Collection Time: 12/01/21  6:44 AM   Result Value Ref Range    aPTT 30.3 23.0 - 36.4 SEC   GLUCOSE, POC    Collection Time: 12/01/21  7:37 AM   Result Value Ref Range    Glucose (POC) 170 (H) 70 - 110 mg/dL   DUPLEX LOW EXT ARTERY RIGHT W AVILA    Collection Time: 12/01/21 11:05 AM   Result Value Ref Range    Right CFA dist sys PSV 82.7 cm/s    Right Prox PFA A PSV 84.4 cm/s    Right super femoral dist sys PSV 87.6 cm/s    Right super femoral mid sys PSV 78.0 cm/s    Right super femoral prox sys PSV 82.8 cm/s    Right popliteal dist sys PSV 63.4 cm/s    Right popliteal mid sys PSV 44.3 cm/s    Right popliteal prox sys .6 cm/s    Right Dist PTA PSV 18.2 cm/s    Right mid PTA sys PSV 27.0 cm/s    Right Prox PTA PSV 36.0 cm/s    Right Dist Peroneal Velocity 17.7 cm/s    Right mid peroneal sys PSV 22.2 cm/s    Right prox peroneal sys PSV 97.8 cm/s    Right Dist MICHAEL Velocity 23.0 cm/s    Right Mid MICHAEL Velocity 20.8 cm/s    Right Prox MICHAEL Velocity 48.8 cm/s    Right SFA Prox Mookie Ratio 1.0     Right SFA Mid Mookie Ratio 0.9     Right SFA Dist Mookie Ratio 1.12     Right Pop A Prox Mookie Ratio 1.24     Right Pop A Mid Mookie Ratio 0.41     Right Pop A Dist Mookie Ratio 1.43     Right arm  mmHg    Right posterior tibial 53 mmHg    Right dorsalis pedis BP 60 mmHg    Right toe pressure 0 mmHg    Right AVILA 0.47 Right TBI 0.00    GLUCOSE, POC    Collection Time: 12/01/21 11:28 AM   Result Value Ref Range    Glucose (POC) 184 (H) 70 - 110 mg/dL   PTT    Collection Time: 12/01/21 12:40 PM   Result Value Ref Range    aPTT 29.3 23.0 - 36.4 SEC   GLUCOSE, POC    Collection Time: 12/01/21  4:03 PM   Result Value Ref Range    Glucose (POC) 130 (H) 70 - 110 mg/dL     Additional Data Reviewed:     Condition on discharge:stable   Disposition:    []Home   [x]Home with Home Health   []SNF/NH   []Rehab   []Home with family   []Alternate Facility:____________________      Discharge Medications:     Current Discharge Medication List      START taking these medications    Details   aspirin 81 mg chewable tablet Take 1 Tablet by mouth daily for 30 days. Qty: 30 Tablet, Refills: 0      atorvastatin (LIPITOR) 80 mg tablet Take 1 Tablet by mouth nightly for 30 days. Qty: 30 Tablet, Refills: 0      clopidogreL (PLAVIX) 75 mg tab Take 1 Tablet by mouth daily for 30 days. Qty: 30 Tablet, Refills: 0      thiamine HCL (B-1) 100 mg tablet Take 1 Tablet by mouth daily for 30 days. Qty: 30 Tablet, Refills: 0         CONTINUE these medications which have CHANGED    Details   insulin glargine (Basaglar KwikPen U-100 Insulin) 100 unit/mL (3 mL) inpn 45 Units by SubCUTAneous route daily. INJECT 50 UNITS UNDER THE SKIN DAILY  Qty: 30 Adjustable Dose Pre-filled Pen Syringe, Refills: 1         CONTINUE these medications which have NOT CHANGED    Details   Blood-Glucose Meter monitoring kit Blood glucose kit that insurance will cover - Check blood glucose twice a day once in the morning fasting and once after a meal.  Qty: 1 Kit, Refills: 0    Associated Diagnoses: Type 2 diabetes mellitus with diabetic neuropathy, with long-term current use of insulin (HCC)      buPROPion SR (WELLBUTRIN SR) 150 mg SR tablet ONE TABLET BY MOUTH ONCE A DAY  Qty: 90 Tablet, Refills: 0    Associated Diagnoses: Mild depression ([address]  Tobacco use disorder      traZODone (DESYREL) 50 mg tablet Take 1 Tablet by mouth nightly. Qty: 90 Tablet, Refills: 1    Associated Diagnoses: Insomnia, unspecified type; Mild depression ([facility])      dulaglutide (Trulicity) 7.54 VANCE/3.3 mL sub-q pen INJECT 0.75 MG UNDER THE SKIN ONCE WEEKLY  Qty: 2 mL, Refills: 0    Comments: Appointment needed prior to any additional refills. Associated Diagnoses: Type 2 diabetes mellitus with diabetic neuropathy, with long-term current use of insulin ([facility])      simvastatin (Zocor) 20 mg tablet Take 1 Tablet by mouth nightly. Qty: 90 Tablet, Refills: 1    Associated Diagnoses: Hypercholesterolemia      metFORMIN (GLUCOPHAGE) 1,000 mg tablet TAKE ONE TABLET BY MOUTH TWICE A DAY WITH A MEAL  Qty: 180 Tablet, Refills: 2    Associated Diagnoses: Type 2 diabetes mellitus with diabetic neuropathy, with long-term current use of insulin ([facility])      lancets misc Please provide lancets for blood glucose meter insurance will cover  Qty: 100 Lancet, Refills: 3    Associated Diagnoses: Type 2 diabetes mellitus with diabetic neuropathy, with long-term current use of insulin ([facility])      glucose blood VI test strips (ASCENSIA AUTODISC VI, ONE TOUCH ULTRA TEST VI) strip Provide test strip and glucose meter that insurance will cover - Twice daily-  fasting in the morning and once two hours after a meal.  Qty: 100 Strip, Refills: 10    Associated Diagnoses: Type 2 diabetes mellitus with diabetic neuropathy, with long-term current use of insulin ([facility])      flash glucose sensor (FreeStyle Ezio 2 Sensor) kit Apply 1 sensor every 14 days to check blood sugar as directed at least 3 times daily.   Qty: 2 Kit, Refills: 5    Associated Diagnoses: Type 2 diabetes mellitus with diabetic neuropathy, with long-term current use of insulin ([facility])      flash glucose scanning reader (FreeStyle Ezio 2 Morrison) misc Use as directed to check blood sugar at least 3 times daily  Qty: 1 Each, Refills: 0    Associated Diagnoses: Type 2 diabetes mellitus with diabetic neuropathy, with long-term current use of insulin (HCC)      ergocalciferol (ERGOCALCIFEROL) 1,250 mcg (50,000 unit) capsule TAKE ONE CAPSULE BY MOUTH ONCE WEEKLY  Indications: vitamin D deficiency (high dose therapy)  Qty: 4 Capsule, Refills: 6    Associated Diagnoses: Vitamin D deficiency      Insulin Needles, Disposable, 31 gauge x 5/16\" ndle Pen needles for Bydureon syringe  Qty: 100 Each, Refills: 0    Associated Diagnoses: Type 2 diabetes mellitus with diabetic neuropathy, with long-term current use of insulin (HCC)      multivitamin (ONE A DAY) tablet Take 1 Tablet by mouth daily. Qty: 90 Tablet, Refills: 3    Associated Diagnoses: Fatigue, unspecified type               Follow-up Appointments:   1. Your PCP: Rubia Perez NP, within 7-10days  2.  Vascular surgeon in 3-7 days    >30 minutes spent coordinating this discharge (review instructions/follow-up, prescriptions, preparing report for sign off)    Signed:  Baldemar Hobson MD  12/1/2021  4:52 PM

## 2021-12-01 NOTE — PROGRESS NOTES
Vascular Surgery    No events o/n. Still with R foot pain. No calf pain. Blood pressure 133/85, pulse 85, temperature 98.7 °F (37.1 °C), resp. rate 14, height 5' 9\" (1.753 m), weight 168 lb 1.6 oz (76.2 kg), SpO2 100 %. Appears uncomfortable  Breathing comfortably  RLE warm  Tender to palpation in the toes and dorsal foot. Motor and sensory are intact. The PT signal which sounded arterial yesterday, now seems venous. There is a peroneal artery signal that sounds arterial. No AT or DP signal appreciated. L groin is soft, no hematoma, bandage intact. Lab Results   Component Value Date/Time    WBC 5.6 12/01/2021 01:08 AM    Hemoglobin (POC) 14.2 07/03/2019 09:44 AM    HGB 11.2 (L) 12/01/2021 01:08 AM    HCT 34.3 (L) 12/01/2021 01:08 AM    PLATELET 500 52/26/6302 01:08 AM    MCV 89.1 12/01/2021 01:08 AM     Lab Results   Component Value Date/Time    Sodium 131 (L) 12/01/2021 01:08 AM    Potassium 4.1 12/01/2021 01:08 AM    Chloride 105 12/01/2021 01:08 AM    CO2 24 12/01/2021 01:08 AM    Anion gap 2 (L) 12/01/2021 01:08 AM    Glucose 313 (H) 12/01/2021 01:08 AM    BUN 21 (H) 12/01/2021 01:08 AM    Creatinine 1.09 12/01/2021 01:08 AM    BUN/Creatinine ratio 19 12/01/2021 01:08 AM    GFR est AA >60 12/01/2021 01:08 AM    GFR est non-AA >60 12/01/2021 01:08 AM    Calcium 9.0 12/01/2021 01:08 AM             IMPRESSION:  POD 1 s/p RLE popliteal atherectomy and PTA for rest pain. Still with RLE pedal pain and his pedal signal exam is inconsistent. PLAN:   Continue ASA and plavix  Continue heparin gtt at 500 unit/hr  Will obtain RLE arterial duplex with attempted AVILA and toe pressure today. Pt may eat, and he can be OOB as tolerated.        Chepe Ash MD  Vascular Surgeon

## 2021-12-01 NOTE — PROGRESS NOTES
Bedside, Verbal, and Written shift change report given to Prince Shadi RN (oncoming nurse) by Burt Carlson RN (offgoing nurse). Report included the following information SBAR, Kardex, Intake/Output, MAR, Recent Results, and Cardiac Rhythm SR.    0700 doppler checks performed, no abnormalities. Extremity is soft and no complaints of any pain. Heparin drip verified with off going nurse. Pt oriented to self and place. 0900 doppler checks performed, no abnormalities. Extremity is soft. Patient complains of room being very cold. Extra blankets and socks supplied. 1000 pt gone to vascular for duplex. 1115 pt back from vascular     1145 heparin stopped per MD    1720 I have reviewed discharge instructions with the patient. The patient verbalized understanding. AVS done, IVs removed, patient belongings packed.

## 2021-12-01 NOTE — PROGRESS NOTES
[facility] Hospitalist Group  Progress Note    Patient: Melinda Oreilly Age: 59 y.o. : 1960 MR#: [id number] SSN: [id number]  Date/Time: 2021    Subjective:   Patient appears to be in good spirits. He reported continued right lower extremity pain. Was later called by nursing with patient continuing to report right lower extremity pain. Assessment/Plan:   68-year-old male with multiple medical problems including type 2 diabetes mellitus, dyslipidemia, coronary artery disease admitted after he presented with complaints of pain in right foot. -PAD with subacute distal popliteal artery occlusion/ischemia, vascular surgery  Status post aortogram, selective right lower extremity angiography, thrombectomy of the distal right popliteal artery, atherectomy of the right popliteal artery, PTA of the popliteal artery on . Patient with continued right lower extremity pain, continues on heparin drip. On DAPT. Plans for continued dilation management per vascular surgery noted. Appreciate assistance.    -Acute encephalopathy: Much improved today. On high-dose thiamine. Being monitored for alcohol withdrawal    -Hyperglycemia: Likely has poorly controlled diabetes his A1c is greater than 14. Blood sugars better controlled. On Lantus and corrective insulin.    -Acute kidney injury: Likely due to prerenal azotemia from volume loss due to hyperglycemia. Continues to improve. HISTORY OF:  -Type 2 diabetes mellitus: Poorly controlled, A1c greater than 14, came in with elevated blood sugars. Patient on Lantus  -Coronary artery disease status post PCI in the past  -Dyslipidemia  -Tobacco dependence    Impression:  -For encephalopathy continue to, hold possible culprit meds such as trazodone, Wellbutrin.   Plan to monitor for alcohol withdrawal if present.  -Continue to give high-dose thiamine to treat possibility of Warnicke's encephalopathy  -Heparin drip per vascular surgeon  -Continue statin  -Further eval and management of arterial disease per vascular surgeon.    -Additional Notes:      Case discussed with:  [x]Patient  []Family  [x]Nursing  []Case Management  DVT Prophylaxis:  []Lovenox  []Hep SQ  []SCDs  []Coumadin   [x]On Heparin gtt    Objective:   VS:   Visit Vitals  /88   Pulse 81   Temp 98.6 °F (37 °C)   Resp 21   Ht 5' 9\" (1.753 m)   Wt 76.2 kg (168 lb 1.6 oz)   SpO2 99%   BMI 24.82 kg/m²      Tmax/24hrs: Temp (24hrs), Av.4 °F (36.9 °C), Min:97.9 °F (36.6 °C), Max:98.7 °F (37.1 °C)    Input/Output:     Intake/Output Summary (Last 24 hours) at 2021 1554  Last data filed at 2021 1417  Gross per 24 hour   Intake 2108.21 ml   Output 2600 ml   Net -491.79 ml       General: Alert and awake, in no acute distress, appears comfortable cardiovascular: Regular rate rhythm no murmurs  Pulmonary:  clear to auscultation bilaterally   GI: Abdomen is soft, nontender, nondistended  Extremities: Without edema, warm to touch  Additional:      Labs:    Recent Results (from the past 24 hour(s))   GLUCOSE, POC    Collection Time: 21  9:21 PM   Result Value Ref Range    Glucose (POC) 414 (HH) 70 - 110 mg/dL   GLUCOSE, POC    Collection Time: 21  9:22 PM   Result Value Ref Range    Glucose (POC) 430 (HH) 70 - 110 mg/dL   PTT    Collection Time: 21  1:08 AM   Result Value Ref Range    aPTT 30.2 23.0 - 36.4 SEC   CBC WITH AUTOMATED DIFF    Collection Time: 21  1:08 AM   Result Value Ref Range    WBC 5.6 4.6 - 13.2 K/uL    RBC 3.85 (L) 4.35 - 5.65 M/uL    HGB 11.2 (L) 13.0 - 16.0 g/dL    HCT 34.3 (L) 36.0 - 48.0 %    MCV 89.1 78.0 - 100.0 FL    MCH 29.1 24.0 - 34.0 PG    MCHC 32.7 31.0 - 37.0 g/dL    RDW 13.8 11.6 - 14.5 %    PLATELET 146 144 - 430 K/uL    MPV 11.3 9.2 - 11.8 FL    NRBC 0.0 0  WBC    ABSOLUTE NRBC 0.00 0.00 - 0.01 K/uL    NEUTROPHILS 74 (H) 40 - 73 %    LYMPHOCYTES 15 (L) 21 - 52 %    MONOCYTES 9 3 - 10 %    EOSINOPHILS 1 0 - 5 %    BASOPHILS 1 0 - 2 %    IMMATURE GRANULOCYTES 0 0.0 - 0.5 %    ABS. NEUTROPHILS 4.2 1.8 - 8.0 K/UL    ABS. LYMPHOCYTES 0.9 0.9 - 3.6 K/UL    ABS. MONOCYTES 0.5 0.05 - 1.2 K/UL    ABS. EOSINOPHILS 0.1 0.0 - 0.4 K/UL    ABS. BASOPHILS 0.0 0.0 - 0.1 K/UL    ABS. IMM.  GRANS. 0.0 0.00 - 0.04 K/UL    DF AUTOMATED     METABOLIC PANEL, BASIC    Collection Time: 12/01/21  1:08 AM   Result Value Ref Range    Sodium 131 (L) 136 - 145 mmol/L    Potassium 4.1 3.5 - 5.5 mmol/L    Chloride 105 100 - 111 mmol/L    CO2 24 21 - 32 mmol/L    Anion gap 2 (L) 3.0 - 18 mmol/L    Glucose 313 (H) 74 - 99 mg/dL    BUN 21 (H) 7.0 - 18 MG/DL    Creatinine 1.09 0.6 - 1.3 MG/DL    BUN/Creatinine ratio 19 12 - 20      GFR est AA >60 >60 ml/min/1.73m2    GFR est non-AA >60 >60 ml/min/1.73m2    Calcium 9.0 8.5 - 10.1 MG/DL   PTT    Collection Time: 12/01/21  6:44 AM   Result Value Ref Range    aPTT 30.3 23.0 - 36.4 SEC   GLUCOSE, POC    Collection Time: 12/01/21  7:37 AM   Result Value Ref Range    Glucose (POC) 170 (H) 70 - 110 mg/dL   DUPLEX LOW EXT ARTERY RIGHT W AVILA    Collection Time: 12/01/21 11:05 AM   Result Value Ref Range    Right CFA dist sys PSV 82.7 cm/s    Right Prox PFA A PSV 84.4 cm/s    Right super femoral dist sys PSV 87.6 cm/s    Right super femoral mid sys PSV 78.0 cm/s    Right super femoral prox sys PSV 82.8 cm/s    Right popliteal dist sys PSV 63.4 cm/s    Right popliteal mid sys PSV 44.3 cm/s    Right popliteal prox sys .6 cm/s    Right Dist PTA PSV 18.2 cm/s    Right mid PTA sys PSV 27.0 cm/s    Right Prox PTA PSV 36.0 cm/s    Right Dist Peroneal Velocity 17.7 cm/s    Right mid peroneal sys PSV 22.2 cm/s    Right prox peroneal sys PSV 97.8 cm/s    Right Dist IMCHAEL Velocity 23.0 cm/s    Right Mid MICHAEL Velocity 20.8 cm/s    Right Prox MICHAEL Velocity 48.8 cm/s    Right SFA Prox Mookie Ratio 1.0     Right SFA Mid Mookie Ratio 0.9     Right SFA Dist Mookie Ratio 1.12     Right Pop A Prox Mookie Ratio 1.24     Right Pop A Mid Mookie Ratio 0.41     Right Pop A Dist Mookie Ratio 1.43     Right arm  mmHg    Right posterior tibial 53 mmHg    Right dorsalis pedis BP 60 mmHg    Right toe pressure 0 mmHg    Right AVILA 0.47     Right TBI 0.00    GLUCOSE, POC    Collection Time: 12/01/21 11:28 AM   Result Value Ref Range    Glucose (POC) 184 (H) 70 - 110 mg/dL   PTT    Collection Time: 12/01/21 12:40 PM   Result Value Ref Range    aPTT 29.3 23.0 - 36.4 SEC     Additional Data Reviewed:      Signed By: Trevor aPtel MD     December 1, 2021 1:14 PM

## 2021-12-01 NOTE — PROGRESS NOTES
Received call from Dr Shaheed Cerda. She is discharging patient and had concerns about his meds being covered. Told her that Medicaid should cover the Plavix but if they dont, have patient call unit tomorrow so she can order an alternative. Sent referral out for home health via [facility]. [facility] is the only agency that accepts [employer] [facility] (that I'm aware of). Left voicemail for CVT CM to follow up tomorrow.   Verna Larsen RN - Outcomes Manager  [phone]

## 2021-12-02 LAB
RIGHT ABI: 0.47
RIGHT ARM BP: 128 MMHG
RIGHT CFA DIST SYS PSV: 82.7 CM/S
RIGHT DIST ATA VELOCITY: 23 CM/S
RIGHT DIST PTA PSV: 18.2 CM/S
RIGHT MID ATA VELOCITY: 20.8 CM/S
RIGHT PERONEAL DIST VELOCITY: 17.7 CM/S
RIGHT PERONEAL MID SYS PSV: 22.2 CM/S
RIGHT PERONEAL PROX SYS PSV: 97.8 CM/S
RIGHT POP A DIST VEL RATIO: 1.43
RIGHT POP A MID VEL RATIO: 0.41
RIGHT POP A PROX VEL RATIO: 1.24
RIGHT POPLITEAL DIST SYS PSV: 63.4 CM/S
RIGHT POPLITEAL MID SYS PSV: 44.3 CM/S
RIGHT POPLITEAL PROX SYS PSV: 108.6 CM/S
RIGHT POSTERIOR TIBIAL: 53 MMHG
RIGHT PROX ATA VELOCITY: 48.8 CM/S
RIGHT PROX PFA A PSV: 84.4 CM/S
RIGHT PROX PTA PSV: 36 CM/S
RIGHT PTA MID SYS PSV: 27 CM/S
RIGHT SFA DIST VEL RATIO: 1.12
RIGHT SFA MID VEL RATIO: 0.9
RIGHT SFA PROX VEL RATIO: 1
RIGHT SUPER FEMORAL DIST SYS PSV: 87.6 CM/S
RIGHT SUPER FEMORAL MID SYS PSV: 78 CM/S
RIGHT SUPER FEMORAL PROX SYS PSV: 82.8 CM/S
RIGHT TBI: 0
RIGHT TOE PRESSURE: 0 MMHG
VAS RIGHT DORSALIS PEDIS BP: 60 MMHG

## 2021-12-02 NOTE — PROGRESS NOTES
[facility] accepted pt for home health. 1626: H&P and discharge summary sent to [address] [place] [address] in [place] as requested.       TAI FletcherN RN  Care Management  Pager: [phone]

## 2021-12-06 ENCOUNTER — APPOINTMENT (OUTPATIENT)
Dept: VASCULAR SURGERY | Age: 61
End: 2021-12-06
Attending: PHYSICIAN ASSISTANT
Payer: COMMERCIAL

## 2021-12-06 ENCOUNTER — HOSPITAL ENCOUNTER (EMERGENCY)
Age: 61
Discharge: LEFT BEFORE DISCHARGE | End: 2021-12-07
Attending: STUDENT IN AN ORGANIZED HEALTH CARE EDUCATION/TRAINING PROGRAM
Payer: COMMERCIAL

## 2021-12-06 VITALS
WEIGHT: 170 LBS | DIASTOLIC BLOOD PRESSURE: 80 MMHG | HEIGHT: 69 IN | HEART RATE: 100 BPM | SYSTOLIC BLOOD PRESSURE: 109 MMHG | OXYGEN SATURATION: 100 % | TEMPERATURE: 98.7 F | BODY MASS INDEX: 25.18 KG/M2 | RESPIRATION RATE: 18 BRPM

## 2021-12-06 DIAGNOSIS — Z53.21 ELOPED FROM EMERGENCY DEPARTMENT: ICD-10-CM

## 2021-12-06 DIAGNOSIS — M79.671 RIGHT FOOT PAIN: Primary | ICD-10-CM

## 2021-12-06 LAB
ALBUMIN SERPL-MCNC: 3.7 G/DL (ref 3.4–5)
ALBUMIN/GLOB SERPL: 0.9 {RATIO} (ref 0.8–1.7)
ALP SERPL-CCNC: 114 U/L (ref 45–117)
ALT SERPL-CCNC: 35 U/L (ref 16–61)
ANION GAP SERPL CALC-SCNC: 7 MMOL/L (ref 3–18)
AST SERPL-CCNC: 30 U/L (ref 10–38)
BILIRUB SERPL-MCNC: 0.2 MG/DL (ref 0.2–1)
BUN SERPL-MCNC: 26 MG/DL (ref 7–18)
BUN/CREAT SERPL: 22 (ref 12–20)
CALCIUM SERPL-MCNC: 9 MG/DL (ref 8.5–10.1)
CHLORIDE SERPL-SCNC: 107 MMOL/L (ref 100–111)
CO2 SERPL-SCNC: 23 MMOL/L (ref 21–32)
CREAT SERPL-MCNC: 1.2 MG/DL (ref 0.6–1.3)
GLOBULIN SER CALC-MCNC: 4 G/DL (ref 2–4)
GLUCOSE SERPL-MCNC: 145 MG/DL (ref 74–99)
INR PPP: 0.9 (ref 0.8–1.2)
POTASSIUM SERPL-SCNC: 4.6 MMOL/L (ref 3.5–5.5)
PROT SERPL-MCNC: 7.7 G/DL (ref 6.4–8.2)
PROTHROMBIN TIME: 12.3 SEC (ref 11.5–15.2)
SODIUM SERPL-SCNC: 137 MMOL/L (ref 136–145)

## 2021-12-06 PROCEDURE — 99281 EMR DPT VST MAYX REQ PHY/QHP: CPT

## 2021-12-06 PROCEDURE — 80053 COMPREHEN METABOLIC PANEL: CPT

## 2021-12-06 PROCEDURE — 85610 PROTHROMBIN TIME: CPT

## 2021-12-06 RX ORDER — HEPARIN SODIUM 10000 [USP'U]/100ML
18-36 INJECTION, SOLUTION INTRAVENOUS
Status: DISCONTINUED | OUTPATIENT
Start: 2021-12-06 | End: 2021-12-07 | Stop reason: HOSPADM

## 2021-12-06 RX ORDER — HEPARIN SODIUM 1000 [USP'U]/ML
80 INJECTION, SOLUTION INTRAVENOUS; SUBCUTANEOUS ONCE
Status: ACTIVE | OUTPATIENT
Start: 2021-12-06 | End: 2021-12-07

## 2021-12-06 NOTE — ED NOTES
R foot pain x 2 weeks. Admitted on 11/27 for arterial occlusion, had surgery. Dc'd on Plavix and ASA. Foot is warm, not discolored, faint DP pulse. I performed a brief evaluation, including history and physical, of the patient here in triage and I have determined that pt will need further treatment and evaluation from the main side ER physician. I have placed initial orders to help in expediting patients care.      December 06, 2021 at 4:31 PM - JESSICA Davila        Visit Vitals  /80   Pulse 100   Temp 98.7 °F (37.1 °C)   Resp 18   Ht 5' 9\" (1.753 m)   Wt 77.1 kg (170 lb)   SpO2 100%   BMI 25.10 kg/m²

## 2021-12-06 NOTE — ED PROVIDER NOTES
EMERGENCY DEPARTMENT HISTORY AND PHYSICAL EXAM    5:55 PM      Date: 12/6/2021  Patient Name: Keerthi Cates    History of Presenting Illness     Chief Complaint   Patient presents with    Foot Pain     right          History Provided By: Patient  Location/Duration/Severity/Modifying factors   58-year-old male history of diabetes and prior right lower extremity ischemia who is in today with increased foot pain. Patient discharge hospital approximately a week and a half ago from for pain where he underwent an aortogram and RLE angiogram,  was found to have an occlusion of the right popliteal artery with poor perfusion of the calf arteries as result. He had an atherectomy of the right popliteal artery     He was discharged home with instructions to quit smoking, control his diabetes on dual antiplatelet therapy and a statin was seen by Dr. Adair Rubi (Vascular Surgeon). He has not been compliant with his medication, is here today with increased pain          PCP: Cosmo Mahoney NP    Current Outpatient Medications   Medication Sig Dispense Refill    insulin glargine (Basaglar KwikPen U-100 Insulin) 100 unit/mL (3 mL) inpn 45 Units by SubCUTAneous route daily. INJECT 50 UNITS UNDER THE SKIN DAILY 30 Adjustable Dose Pre-filled Pen Syringe 1    aspirin 81 mg chewable tablet Take 1 Tablet by mouth daily for 30 days. 30 Tablet 0    atorvastatin (LIPITOR) 80 mg tablet Take 1 Tablet by mouth nightly for 30 days. 30 Tablet 0    clopidogreL (PLAVIX) 75 mg tab Take 1 Tablet by mouth daily for 30 days. 30 Tablet 0    thiamine HCL (B-1) 100 mg tablet Take 1 Tablet by mouth daily for 30 days. 30 Tablet 0    buPROPion SR (WELLBUTRIN SR) 150 mg SR tablet ONE TABLET BY MOUTH ONCE A DAY 90 Tablet 0    traZODone (DESYREL) 50 mg tablet Take 1 Tablet by mouth nightly.  90 Tablet 1    dulaglutide (Trulicity) 5.39 DK/4.6 mL sub-q pen INJECT 0.75 MG UNDER THE SKIN ONCE WEEKLY 2 mL 0    simvastatin (Zocor) 20 mg tablet Take 1 Tablet by mouth nightly. 90 Tablet 1    metFORMIN (GLUCOPHAGE) 1,000 mg tablet TAKE ONE TABLET BY MOUTH TWICE A DAY WITH A MEAL 180 Tablet 2    lancets misc Please provide lancets for blood glucose meter insurance will cover 100 Lancet 3    glucose blood VI test strips (ASCENSIA AUTODISC VI, ONE TOUCH ULTRA TEST VI) strip Provide test strip and glucose meter that insurance will cover - Twice daily-  fasting in the morning and once two hours after a meal. 100 Strip 10    flash glucose sensor (FreeStyle Ezio 2 Sensor) kit Apply 1 sensor every 14 days to check blood sugar as directed at least 3 times daily. 2 Kit 5    flash glucose scanning reader (FreeStyle Ezio 2 Pike) misc Use as directed to check blood sugar at least 3 times daily 1 Each 0    ergocalciferol (ERGOCALCIFEROL) 1,250 mcg (50,000 unit) capsule TAKE ONE CAPSULE BY MOUTH ONCE WEEKLY  Indications: vitamin D deficiency (high dose therapy) 4 Capsule 6    Insulin Needles, Disposable, 31 gauge x 5/16\" ndle Pen needles for Bydureon syringe 100 Each 0    multivitamin (ONE A DAY) tablet Take 1 Tablet by mouth daily. 90 Tablet 3    Blood-Glucose Meter monitoring kit Blood glucose kit that insurance will cover - Check blood glucose twice a day once in the morning fasting and once after a meal. 1 Kit 0       Past History     Past Medical History:  Past Medical History:   Diagnosis Date    Alcohol use     Fri-Sun one fifth; Mon-Thur: Pint of liquor    CAD (coronary artery disease)     Cardiac angioplasty with stent 07/29/2009    2.5 x 13 Cypher stent to CX.  Cardiac cath 11/09/2011    RCA patent. LM patent. LAD patent. mD1 55%. CX patent. Prior stent patent. Edison 85% (2.5 x 15-mm Xience stent, resid 0%). LVEDP 12. EF 40-45%. High lateral hypk (RI distribution).       Cardiac inferior STEMI 2009    Constipation     Current smoker     contemplating stopping    Diabetes ([facility]     type 2-insulin dependent    Diabetic neuropathy ([facility]     GERD (gastroesophageal reflux disease)     HTN (hypertension)     Hyperlipidemia     Lacunar infarction ([facility]     Migraine     Myocardial infarction ([facility]     Vitamin D deficiency        Past Surgical History:  Past Surgical History:   Procedure Laterality Date    COLONOSCOPY N/A 10/28/2020    COLONOSCOPY w/polypectomy performed by Yojana Julian MD at [address]  07/29/2009    HX HEART CATHETERIZATION  8/30/2011    HX HEART CATHETERIZATION  11/09/2011    HX WISDOM TEETH EXTRACTION      x4       Family History:  Family History   Problem Relation Age of Onset    Hypertension Mother     Heart Attack Mother     Diabetes Mother     Hypertension Father     Heart Attack Father     Diabetes Father     Diabetes Sister     Hypertension Sister     Stroke Sister     Diabetes Brother     Hypertension Brother     Stroke Brother     No Known Problems Maternal Grandmother     No Known Problems Maternal Grandfather     No Known Problems Paternal Grandmother     No Known Problems Paternal Grandfather     No Known Problems Brother     Heart Disease Other     Kidney Disease Other        Social History:  Social History     Tobacco Use    Smoking status: Current Every Day Smoker     Packs/day: 0.50     Years: 1.00     Pack years: 0.50     Types: Cigarettes    Smokeless tobacco: Never Used   Substance Use Topics    Alcohol use: Yes     Alcohol/week: 4.2 standard drinks     Types: 5 Shots of liquor per week     Comment: rare    Drug use: Not Currently     Types: Marijuana, Cocaine     Comment: none       Allergies: Allergies   Allergen Reactions    Niacin Itching         Review of Systems       Review of Systems   HENT: Negative for sneezing. Eyes: Negative for visual disturbance. Respiratory: Negative for shortness of breath and wheezing. Cardiovascular: Negative for chest pain.    Gastrointestinal: Negative for abdominal pain, diarrhea and nausea. Genitourinary: Negative for dysuria. Musculoskeletal: Positive for arthralgias and myalgias. Negative for joint swelling. Skin: Negative for pallor. Neurological: Negative for numbness. Physical Exam     Visit Vitals  /80   Pulse 100   Temp 98.7 °F (37.1 °C)   Resp 18   Ht 5' 9\" (1.753 m)   Wt 77.1 kg (170 lb)   SpO2 100%   BMI 25.10 kg/m²         Physical Exam  Vitals and nursing note reviewed. Constitutional:       General: He is not in acute distress. Appearance: Normal appearance. He is not ill-appearing or toxic-appearing. HENT:      Head: Normocephalic. Eyes:      Pupils: Pupils are equal, round, and reactive to light. Cardiovascular:      Rate and Rhythm: Normal rate. Pulses:           Radial pulses are 2+ on the right side and 2+ on the left side. Popliteal pulses are 2+ on the right side and 2+ on the left side. Dorsalis pedis pulses are 0 on the right side and 2+ on the left side. Posterior tibial pulses are 0 on the right side and 2+ on the left side. Heart sounds: No murmur heard. Pulmonary:      Effort: Pulmonary effort is normal. No respiratory distress. Breath sounds: Normal breath sounds. No wheezing. Abdominal:      General: Abdomen is flat. Palpations: Abdomen is soft. Tenderness: There is no abdominal tenderness. Musculoskeletal:         General: Normal range of motion. Right foot: Normal range of motion. No deformity. Feet:      Right foot:      Skin integrity: Skin integrity normal.      Toenail Condition: Right toenails are abnormally thick and long. Fungal disease present. Left foot:      Skin integrity: Skin integrity normal.      Toenail Condition: Left toenails are abnormally thick. Fungal disease present. Skin:     General: Skin is warm. Neurological:      General: No focal deficit present. Mental Status: He is alert and oriented to person, place, and time.       Sensory: No sensory deficit. Motor: No weakness. Gait: Gait normal.           Diagnostic Study Results     Labs -  No results found for this or any previous visit (from the past 12 hour(s)). Radiologic Studies -   DUPLEX LOWER EXT ARTERY RIGHT    (Results Pending)         Medical Decision Making   I am the first provider for this patient. I reviewed the vital signs, available nursing notes, past medical history, past surgical history, family history and social history. Vital Signs-Reviewed the patient's vital signs. EKG:     Records Reviewed: Nursing Notes and Old Medical Records (Time of Review: 5:55 PM)    ED Course: Progress Notes, Reevaluation, and Consults:         Provider Notes (Medical Decision Making):   MDM  Number of Diagnoses or Management Options  Diagnosis management comments: 49-year-old male history of diabetes and prior right lower extremity ischemia who is in today with increased foot pain. Patient discharge hospital approximately a week and a half ago from for pain where he underwent an aortogram and RLE angiogram,  was found to have an occlusion of the right popliteal artery with poor perfusion of the calf arteries as result. He had an atherectomy of the right popliteal artery     He was discharged home with instructions to quit smoking, control his diabetes on dual antiplatelet therapy and a statin was seen by Dr. Jada Frazier (Vascular Surgeon). He has not been compliant with his medication, is here today with increased pain    Vital signs normal  PE: Bilateral feet warm, no edema, no overlying erythema or induration, patient has sensation intact to light touch in all dermatomes and is able to wiggle toes.    Myself, the attending and triage were unable to obtain DP and PT pulses, attempted Doppler pulses and was only able to get a pulse on the right popliteal artery, no PT or DP pulses  Concern for limb ischemia  Reportedly the lavender top hemolyzed, attempts to obtain repeat draw unsuccessful as patient was unable to be located    Arterial ultrasound ordered  Heparin drip ordered    Vascular surgery paged, no response  Vascular surgery re-paged  Reportedly the phone number for vascular surgery was incorrect, went to a different group in [phone] [place] [address] with a Dr. Deuce Carreon of [facility] who informed me that his group does not take call for [facility] view. Vascular tech, nursing staff approached me around 630 to 7:00 reporting that patient has disappeared and they are unable to find him    Multiple attempts to locate the patient in the emergency department, bathroom, waiting rooms, patient appears to have eloped  -Patient did not receive vascular ultrasound nor the heparin drip     Vascular surgery Dr. Andie Graham  - called back around 7:20 PM immediately after patient was determined to have eloped  -He states he has familiar with this patient's case and would be happy to receive phone call about him if patient returns  -The way to reach vascular surgery is to call [phone] (), ask them to page vascular surgeon on call, who will then call the vascular surgeon  - informed about patient in case patient returns with increased pain      Patient was noted to have eloped at 7:30 PM Patient was unable to be found by staff. Case turned over to [address] [facility]. \\    Procedures    Critical Care Time:       Diagnosis     Clinical Impression: No diagnosis found. Disposition: Eloped    Follow-up Information    None          Patient's Medications   Start Taking    No medications on file   Continue Taking    ASPIRIN 81 MG CHEWABLE TABLET    Take 1 Tablet by mouth daily for 30 days. ATORVASTATIN (LIPITOR) 80 MG TABLET    Take 1 Tablet by mouth nightly for 30 days.     BLOOD-GLUCOSE METER MONITORING KIT    Blood glucose kit that insurance will cover - Check blood glucose twice a day once in the morning fasting and once after a meal.    BUPROPION SR (WELLBUTRIN SR) 150 MG SR TABLET    ONE TABLET BY MOUTH ONCE A DAY    CLOPIDOGREL (PLAVIX) 75 MG TAB    Take 1 Tablet by mouth daily for 30 days. DULAGLUTIDE (TRULICITY) 9.93 ZZ/4.1 ML SUB-Q PEN    INJECT 0.75 MG UNDER THE SKIN ONCE WEEKLY    ERGOCALCIFEROL (ERGOCALCIFEROL) 1,250 MCG (50,000 UNIT) CAPSULE    TAKE ONE CAPSULE BY MOUTH ONCE WEEKLY  Indications: vitamin D deficiency (high dose therapy)    FLASH GLUCOSE SCANNING READER (FREESTYLE BRENT 2 READER) MISC    Use as directed to check blood sugar at least 3 times daily    FLASH GLUCOSE SENSOR (FREESTYLE BRENT 2 SENSOR) KIT    Apply 1 sensor every 14 days to check blood sugar as directed at least 3 times daily. GLUCOSE BLOOD VI TEST STRIPS (ASCENSIA AUTODISC VI, ONE TOUCH ULTRA TEST VI) STRIP    Provide test strip and glucose meter that insurance will cover - Twice daily-  fasting in the morning and once two hours after a meal.    INSULIN GLARGINE (BASAGLAR KWIKPEN U-100 INSULIN) 100 UNIT/ML (3 ML) INPN    45 Units by SubCUTAneous route daily. INJECT 50 UNITS UNDER THE SKIN DAILY    INSULIN NEEDLES, DISPOSABLE, 31 GAUGE X 5/16\" NDLE    Pen needles for Bydureon syringe    LANCETS MISC    Please provide lancets for blood glucose meter insurance will cover    METFORMIN (GLUCOPHAGE) 1,000 MG TABLET    TAKE ONE TABLET BY MOUTH TWICE A DAY WITH A MEAL    MULTIVITAMIN (ONE A DAY) TABLET    Take 1 Tablet by mouth daily. SIMVASTATIN (ZOCOR) 20 MG TABLET    Take 1 Tablet by mouth nightly. THIAMINE HCL (B-1) 100 MG TABLET    Take 1 Tablet by mouth daily for 30 days. TRAZODONE (DESYREL) 50 MG TABLET    Take 1 Tablet by mouth nightly. These Medications have changed    No medications on file   Stop Taking    No medications on file     Disclaimer: Sections of this note are dictated using utilizing voice recognition software. Minor typographical errors may be present. If questions arise, please do not hesitate to contact me or call our department.

## 2021-12-07 ENCOUNTER — APPOINTMENT (OUTPATIENT)
Dept: GENERAL RADIOLOGY | Age: 61
End: 2021-12-07
Attending: EMERGENCY MEDICINE
Payer: COMMERCIAL

## 2021-12-07 ENCOUNTER — HOSPITAL ENCOUNTER (EMERGENCY)
Age: 61
Discharge: HOME | End: 2021-12-07
Attending: EMERGENCY MEDICINE
Payer: COMMERCIAL

## 2021-12-07 ENCOUNTER — APPOINTMENT (OUTPATIENT)
Dept: VASCULAR SURGERY | Age: 61
End: 2021-12-07
Attending: EMERGENCY MEDICINE
Payer: COMMERCIAL

## 2021-12-07 VITALS
HEIGHT: 69 IN | TEMPERATURE: 98.1 F | SYSTOLIC BLOOD PRESSURE: 112 MMHG | WEIGHT: 170 LBS | RESPIRATION RATE: 16 BRPM | OXYGEN SATURATION: 99 % | DIASTOLIC BLOOD PRESSURE: 76 MMHG | BODY MASS INDEX: 25.18 KG/M2 | HEART RATE: 80 BPM

## 2021-12-07 DIAGNOSIS — I73.9 PERIPHERAL ARTERIAL DISEASE (HCC): Primary | ICD-10-CM

## 2021-12-07 DIAGNOSIS — M79.604 ACUTE LEG PAIN, RIGHT: ICD-10-CM

## 2021-12-07 LAB
ANION GAP SERPL CALC-SCNC: 7 MMOL/L (ref 3–18)
APTT PPP: 31 SEC (ref 23–36.4)
ATRIAL RATE: 97 BPM
BASOPHILS # BLD: 0 K/UL (ref 0–0.1)
BASOPHILS NFR BLD: 1 % (ref 0–2)
BNP SERPL-MCNC: 117 PG/ML (ref 0–900)
BUN SERPL-MCNC: 21 MG/DL (ref 7–18)
BUN/CREAT SERPL: 21 (ref 12–20)
CALCIUM SERPL-MCNC: 8.7 MG/DL (ref 8.5–10.1)
CALCULATED P AXIS, ECG09: 56 DEGREES
CALCULATED R AXIS, ECG10: 63 DEGREES
CALCULATED T AXIS, ECG11: 62 DEGREES
CHLORIDE SERPL-SCNC: 106 MMOL/L (ref 100–111)
CO2 SERPL-SCNC: 23 MMOL/L (ref 21–32)
CREAT SERPL-MCNC: 1.01 MG/DL (ref 0.6–1.3)
DIAGNOSIS, 93000: NORMAL
DIFFERENTIAL METHOD BLD: ABNORMAL
EOSINOPHIL # BLD: 0.2 K/UL (ref 0–0.4)
EOSINOPHIL NFR BLD: 3 % (ref 0–5)
ERYTHROCYTE [DISTWIDTH] IN BLOOD BY AUTOMATED COUNT: 13.3 % (ref 11.6–14.5)
GLUCOSE SERPL-MCNC: 201 MG/DL (ref 74–99)
HCT VFR BLD AUTO: 32.7 % (ref 36–48)
HGB BLD-MCNC: 10.8 G/DL (ref 13–16)
IMM GRANULOCYTES # BLD AUTO: 0.1 K/UL (ref 0–0.04)
IMM GRANULOCYTES NFR BLD AUTO: 1 % (ref 0–0.5)
INR PPP: 1 (ref 0.8–1.2)
LACTATE BLD-SCNC: 0.89 MMOL/L (ref 0.4–2)
LYMPHOCYTES # BLD: 1.1 K/UL (ref 0.9–3.6)
LYMPHOCYTES NFR BLD: 22 % (ref 21–52)
MCH RBC QN AUTO: 29.8 PG (ref 24–34)
MCHC RBC AUTO-ENTMCNC: 33 G/DL (ref 31–37)
MCV RBC AUTO: 90.1 FL (ref 78–100)
MONOCYTES # BLD: 0.5 K/UL (ref 0.05–1.2)
MONOCYTES NFR BLD: 10 % (ref 3–10)
NEUTS SEG # BLD: 3 K/UL (ref 1.8–8)
NEUTS SEG NFR BLD: 63 % (ref 40–73)
NRBC # BLD: 0 K/UL (ref 0–0.01)
NRBC BLD-RTO: 0 PER 100 WBC
P-R INTERVAL, ECG05: 168 MS
PLATELET # BLD AUTO: 296 K/UL (ref 135–420)
PMV BLD AUTO: 10.1 FL (ref 9.2–11.8)
POTASSIUM SERPL-SCNC: 4.9 MMOL/L (ref 3.5–5.5)
PROTHROMBIN TIME: 12.9 SEC (ref 11.5–15.2)
Q-T INTERVAL, ECG07: 364 MS
QRS DURATION, ECG06: 94 MS
QTC CALCULATION (BEZET), ECG08: 462 MS
RBC # BLD AUTO: 3.63 M/UL (ref 4.35–5.65)
SODIUM SERPL-SCNC: 136 MMOL/L (ref 136–145)
TROPONIN I SERPL-MCNC: <0.02 NG/ML (ref 0–0.04)
VENTRICULAR RATE, ECG03: 97 BPM
WBC # BLD AUTO: 4.8 K/UL (ref 4.6–13.2)

## 2021-12-07 PROCEDURE — 99283 EMERGENCY DEPT VISIT LOW MDM: CPT

## 2021-12-07 PROCEDURE — 96375 TX/PRO/DX INJ NEW DRUG ADDON: CPT

## 2021-12-07 PROCEDURE — 96374 THER/PROPH/DIAG INJ IV PUSH: CPT

## 2021-12-07 PROCEDURE — 83880 ASSAY OF NATRIURETIC PEPTIDE: CPT

## 2021-12-07 PROCEDURE — 85610 PROTHROMBIN TIME: CPT

## 2021-12-07 PROCEDURE — 71045 X-RAY EXAM CHEST 1 VIEW: CPT

## 2021-12-07 PROCEDURE — 74011250636 HC RX REV CODE- 250/636: Performed by: EMERGENCY MEDICINE

## 2021-12-07 PROCEDURE — 85730 THROMBOPLASTIN TIME PARTIAL: CPT

## 2021-12-07 PROCEDURE — 74011250637 HC RX REV CODE- 250/637: Performed by: EMERGENCY MEDICINE

## 2021-12-07 PROCEDURE — 93926 LOWER EXTREMITY STUDY: CPT

## 2021-12-07 PROCEDURE — 83605 ASSAY OF LACTIC ACID: CPT

## 2021-12-07 PROCEDURE — 73630 X-RAY EXAM OF FOOT: CPT

## 2021-12-07 PROCEDURE — 85025 COMPLETE CBC W/AUTO DIFF WBC: CPT

## 2021-12-07 PROCEDURE — 84484 ASSAY OF TROPONIN QUANT: CPT

## 2021-12-07 PROCEDURE — 93005 ELECTROCARDIOGRAM TRACING: CPT

## 2021-12-07 PROCEDURE — 80048 BASIC METABOLIC PNL TOTAL CA: CPT

## 2021-12-07 RX ORDER — ONDANSETRON 2 MG/ML
4 INJECTION INTRAMUSCULAR; INTRAVENOUS ONCE
Status: COMPLETED | OUTPATIENT
Start: 2021-12-07 | End: 2021-12-07

## 2021-12-07 RX ORDER — INSULIN LISPRO 100 [IU]/ML
INJECTION, SOLUTION INTRAVENOUS; SUBCUTANEOUS EVERY 6 HOURS
Status: DISCONTINUED | OUTPATIENT
Start: 2021-12-07 | End: 2021-12-07 | Stop reason: HOSPADM

## 2021-12-07 RX ORDER — HYDROMORPHONE HYDROCHLORIDE 1 MG/ML
1 INJECTION, SOLUTION INTRAMUSCULAR; INTRAVENOUS; SUBCUTANEOUS ONCE
Status: COMPLETED | OUTPATIENT
Start: 2021-12-07 | End: 2021-12-07

## 2021-12-07 RX ORDER — DEXTROSE 50 % IN WATER (D50W) INTRAVENOUS SYRINGE
25-50 AS NEEDED
Status: DISCONTINUED | OUTPATIENT
Start: 2021-12-07 | End: 2021-12-07 | Stop reason: HOSPADM

## 2021-12-07 RX ORDER — MAGNESIUM SULFATE 100 %
4 CRYSTALS MISCELLANEOUS AS NEEDED
Status: DISCONTINUED | OUTPATIENT
Start: 2021-12-07 | End: 2021-12-07 | Stop reason: HOSPADM

## 2021-12-07 RX ORDER — GUAIFENESIN 100 MG/5ML
324 LIQUID (ML) ORAL
Status: COMPLETED | OUTPATIENT
Start: 2021-12-07 | End: 2021-12-07

## 2021-12-07 RX ADMIN — ONDANSETRON 4 MG: 2 INJECTION INTRAMUSCULAR; INTRAVENOUS at 11:00

## 2021-12-07 RX ADMIN — ASPIRIN 81 MG CHEWABLE TABLET 324 MG: 81 TABLET CHEWABLE at 11:02

## 2021-12-07 RX ADMIN — HYDROMORPHONE HYDROCHLORIDE 1 MG: 1 INJECTION, SOLUTION INTRAMUSCULAR; INTRAVENOUS; SUBCUTANEOUS at 11:01

## 2021-12-07 NOTE — ED NOTES
Received call from vascular who reports not a large change compared December 1st, reports there is severely decreased flow (only perineal is open). We have reached out to vascular surgery multiple times during the shift, I sent him a message also through [employer]. 1451: Discussed the case with vascular surgeon, Dr. Chan Wolfe, reports he will send down his PA to evaluate the patient in the ER.    1522: The PA from vascular surgery came down and evaluated the patient. States her vascular standpoint patient can be discharged home. They state the office will reach out to him within the next week to establish an outpatient angiogram with Dr. Tilman Hammans. Patient remained stable throughout their ED course. I discussed relevant findings with patient. My plan is to discharge home with return precautions and PCP follow-up within two days. Additional verbal discharge instructions were given and discussed with the patient. Patient expressed understanding, agrees to plan, and all of their questions were answered.

## 2021-12-07 NOTE — DIABETES MGMT
Diabetes/ Glycemic Control Plan of Care    12/07: Patient in ED and currently located in the general waiting room area. Noted A1c of 14% (11/27/2021). Pending full assessment of home diabetes management and educational needs when patient privacy is secured. Recommendations:   1.) correctional lispro insulin as ordered. 2.) cont to monitor and consider adding basal lantus insulin 10 units daily if BG values above target. Assessment:   DX:   1. Peripheral arterial disease ([address]     2. Acute leg pain, right        Fasting/ Morning blood glucose:   Lab Results   Component Value Date/Time    Glucose 201 (H) 12/07/2021 10:41 AM    Glucose (POC) 130 (H) 12/01/2021 04:03 PM     IV Fluids containing dextrose: None. Steroids:   None. Blood glucose values: Within target range (70-180mg/dL):  No.    Current insulin orders:   Correctional lispro insulin every 6 hours while patient is NPO status. Normal sensitivity dose    Correctional lispro insulin   Total Daily Dose previous 24 hours: 12/06/2021  None. Current A1c:   Lab Results   Component Value Date/Time    Hemoglobin A1c >14.0 (H) 11/27/2021 03:10 PM    Hemoglobin A1c (POC) 7.0 01/13/2021 04:12 PM    Hemoglobin A1c, External 7.8 01/28/2015 11:15 PM     equivalent  to ave Blood Glucose of 360 mg/dl for 2-3 months prior to admission    Adequate glycemic control PTA: No.    Nutrition/Diet:   Active Orders   There are no active orders of the following types: Diet. Meal Intake:  No data found. Supplement Intake:  No data found. Home diabetes medications: Verified the following with patient on 12/07:  Lantus pen insulin 52 units daily. Last taken on 17/89/7365  Trulicity SC injection once a week  Metformin 1000 mg 2 times daily with meals  Key Antihyperglycemic Medications             insulin glargine (Basaglar KwikPen U-100 Insulin) 100 unit/mL (3 mL) inpn 45 Units by SubCUTAneous route daily.  INJECT 50 UNITS UNDER THE SKIN DAILY    dulaglutide (Trulicity) 8.70 GQ/5.4 mL sub-q pen INJECT 0.75 MG UNDER THE SKIN ONCE WEEKLY    metFORMIN (GLUCOPHAGE) 1,000 mg tablet TAKE ONE TABLET BY MOUTH TWICE A DAY WITH A MEAL          Plan/Goals:   Blood glucose will be within target of 70 - 180 mg/dl within 72 hours    Education: Pending full assessment of home diabetes management and education needs. Unable to do this at this time because patient is still located in the waiting room area with no privacy.    [] Refer to Diabetes Education Record   [] Education not indicated at this time     Sebastian Loredo, RN  Pager: [phone]

## 2021-12-07 NOTE — ED PROVIDER NOTES
EMERGENCY DEPARTMENT HISTORY AND PHYSICAL EXAM      Date: December 7, 2021  Patient Name: Fabrice Espinal    History of Presenting Illness     Chief Complaint   Patient presents with    Foot Pain       History Provided By: Patient    Chief Complaint: Right leg pain    Additional History (Context): Fabrice Espinal is a 59 y.o. male who presents with worsening right leg pain. Patient was admitted to the hospital November 27 through December 1 for acute on chronic limb ischemia and had right lower extremity angiography and distal right popliteal arterial thrombectomy, was discharged home after improvement, however for the last couple days has had increasing leg pain and is sensation of the extremity feeling full. Pain is aching, worse with attempted exertion, radiates from the foot up into the leg, feels like his previous vascular issues. No history of trauma or fall, denies any fevers, chills, sweats, abdominal pain, nausea, vomiting. Begin a diagnostic evaluation in the emergency department last night, however eloped prior to completing care. Presents to the emergency department with peripheral IV still in place from last night. Admit date: 11/27/2021  Discharge date: 12/1/2021     Consults:  -MARIA R Herr,-vascular surgery  -   Procedures:on 11/30 by vascular surgeon     1. U/s guided access of the L CFA  2. Aortogram  3. Selective RLE angiography  4. Angiojet thrombectomy (w/o tPA) of the distal R popliteal artery  5. Diamondback atherectomy (1.5) of the R popliteal artery  6. PTA of the popliteal artery with a 4 x 60 DCB and a 5 x 60 DCB, followed by a 5 x 40 Chocolate balloon  7. Successful deployment of the Proglide closure device to the L groin  Significant Diagnostic Studies: -CTA abdominal aorta with bilateral lower extremity runoff:  IMPRESSION  Occlusion of the right popliteal artery which is most likely embolic and poor  perfusion of the calf arteries as a result.   Additional vascular incidentals as above. PCP: Mercedes Yin NP    Current Facility-Administered Medications   Medication Dose Route Frequency Provider Last Rate Last Admin    insulin lispro (HUMALOG) injection   SubCUTAneous Q6H Tracie Ramirez MD        glucose chewable tablet 16 g  4 Tablet Oral PRN Tracie Ramirez MD        glucagon [facility]) injection 1 mg  1 mg IntraMUSCular PRN Tracie Ramirez MD        dextrose (D50W) injection syrg 12.5-25 g  25-50 mL IntraVENous PRN Tracie Ramirez MD         Current Outpatient Medications   Medication Sig Dispense Refill    insulin glargine (Basaglar KwikPen U-100 Insulin) 100 unit/mL (3 mL) inpn 45 Units by SubCUTAneous route daily. INJECT 50 UNITS UNDER THE SKIN DAILY 30 Adjustable Dose Pre-filled Pen Syringe 1    aspirin 81 mg chewable tablet Take 1 Tablet by mouth daily for 30 days. 30 Tablet 0    atorvastatin (LIPITOR) 80 mg tablet Take 1 Tablet by mouth nightly for 30 days. 30 Tablet 0    clopidogreL (PLAVIX) 75 mg tab Take 1 Tablet by mouth daily for 30 days. 30 Tablet 0    thiamine HCL (B-1) 100 mg tablet Take 1 Tablet by mouth daily for 30 days. 30 Tablet 0    buPROPion SR (WELLBUTRIN SR) 150 mg SR tablet ONE TABLET BY MOUTH ONCE A DAY 90 Tablet 0    traZODone (DESYREL) 50 mg tablet Take 1 Tablet by mouth nightly. 90 Tablet 1    dulaglutide (Trulicity) 6.43 PM/3.7 mL sub-q pen INJECT 0.75 MG UNDER THE SKIN ONCE WEEKLY 2 mL 0    simvastatin (Zocor) 20 mg tablet Take 1 Tablet by mouth nightly.  90 Tablet 1    metFORMIN (GLUCOPHAGE) 1,000 mg tablet TAKE ONE TABLET BY MOUTH TWICE A DAY WITH A MEAL 180 Tablet 2    lancets misc Please provide lancets for blood glucose meter insurance will cover 100 Lancet 3    glucose blood VI test strips (ASCENSIA AUTODISC VI, ONE TOUCH ULTRA TEST VI) strip Provide test strip and glucose meter that insurance will cover - Twice daily-  fasting in the morning and once two hours after a meal. 100 Strip 10    flash glucose sensor (FreeStyle Ezio 2 Sensor) kit Apply 1 sensor every 14 days to check blood sugar as directed at least 3 times daily. 2 Kit 5    flash glucose scanning reader (FreeStyle Ezio 2 [place]) misc Use as directed to check blood sugar at least 3 times daily 1 Each 0    ergocalciferol (ERGOCALCIFEROL) 1,250 mcg (50,000 unit) capsule TAKE ONE CAPSULE BY MOUTH ONCE WEEKLY  Indications: vitamin D deficiency (high dose therapy) 4 Capsule 6    Insulin Needles, Disposable, 31 gauge x 5/16\" ndle Pen needles for Bydureon syringe 100 Each 0    multivitamin (ONE A DAY) tablet Take 1 Tablet by mouth daily. 90 Tablet 3    Blood-Glucose Meter monitoring kit Blood glucose kit that insurance will cover - Check blood glucose twice a day once in the morning fasting and once after a meal. 1 Kit 0       Past History     Past Medical History:  Past Medical History:   Diagnosis Date    Alcohol use     Fri-Sun one fifth; Mon-Thur: Pint of liquor    CAD (coronary artery disease)     Cardiac angioplasty with stent 07/29/2009    2.5 x 13 Cypher stent to CX.  Cardiac cath 11/09/2011    RCA patent. LM patent. LAD patent. mD1 55%. CX patent. Prior stent patent. Edison 85% (2.5 x 15-mm Xience stent, resid 0%). LVEDP 12. EF 40-45%. High lateral hypk (RI distribution).       Cardiac inferior STEMI 2009    Constipation     Current smoker     contemplating stopping    Diabetes ([address]     type 2-insulin dependent    Diabetic neuropathy (HCC)     GERD (gastroesophageal reflux disease)     HTN (hypertension)     Hyperlipidemia     Lacunar infarction ([address]     Migraine     Myocardial infarction ([address]     Vitamin D deficiency        Past Surgical History:  Past Surgical History:   Procedure Laterality Date    COLONOSCOPY N/A 10/28/2020    COLONOSCOPY w/polypectomy performed by Francisco J Mullins MD at [address]  07/29/2009    HX HEART CATHETERIZATION  8/30/2011    [facility] [id number] 11/09/2011    HX WISDOM TEETH EXTRACTION      x4       Family History:  Family History   Problem Relation Age of Onset    Hypertension Mother     Heart Attack Mother     Diabetes Mother     Hypertension Father     Heart Attack Father     Diabetes Father     Diabetes Sister     Hypertension Sister     Stroke Sister     Diabetes Brother     Hypertension Brother     Stroke Brother     No Known Problems Maternal Grandmother     No Known Problems Maternal Grandfather     No Known Problems Paternal Grandmother     No Known Problems Paternal Grandfather     No Known Problems Brother     Heart Disease Other     Kidney Disease Other        Social History:  Social History     Tobacco Use    Smoking status: Current Every Day Smoker     Packs/day: 0.50     Years: 1.00     Pack years: 0.50     Types: Cigarettes    Smokeless tobacco: Never Used   Substance Use Topics    Alcohol use: Yes     Alcohol/week: 4.2 standard drinks     Types: 5 Shots of liquor per week     Comment: rare    Drug use: Not Currently     Types: Marijuana, Cocaine     Comment: none       Allergies: Allergies   Allergen Reactions    Niacin Itching         Review of Systems   Review of Systems   Constitutional: Negative for chills, fatigue and fever. HENT: Negative for congestion, rhinorrhea, sore throat and trouble swallowing. Eyes: Negative for discharge, redness and itching. Respiratory: Negative for cough, shortness of breath, wheezing and stridor. Cardiovascular: Negative for chest pain, palpitations and leg swelling. Gastrointestinal: Negative for abdominal pain, blood in stool, diarrhea, nausea and vomiting. Genitourinary: Negative for decreased urine volume, difficulty urinating, dysuria, flank pain, frequency, hematuria and urgency. Musculoskeletal: Positive for gait problem and myalgias. Negative for arthralgias, back pain, neck pain and neck stiffness. Skin: Negative for rash.    Neurological: Negative for syncope and light-headedness. Psychiatric/Behavioral: Negative for behavioral problems, confusion, self-injury, sleep disturbance and suicidal ideas. All other systems reviewed and are negative. Physical Exam     Vitals:    12/07/21 1008   BP: 118/78   Pulse: 87   Resp: 18   Temp: 98.2 °F (36.8 °C)   SpO2: 100%   Weight: 77.1 kg (170 lb)   Height: 5' 9\" (1.753 m)     Physical Exam  Vitals and nursing note reviewed. Constitutional:       General: He is in acute distress. Appearance: He is well-developed and normal weight. He is ill-appearing. He is not toxic-appearing. HENT:      Head: Normocephalic and atraumatic. Nose: Nose normal.      Mouth/Throat:      Mouth: Mucous membranes are moist.      Pharynx: Oropharynx is clear. Eyes:      Extraocular Movements: Extraocular movements intact. Conjunctiva/sclera: Conjunctivae normal.      Pupils: Pupils are equal, round, and reactive to light. Cardiovascular:      Rate and Rhythm: Normal rate and regular rhythm. Heart sounds: Normal heart sounds. No murmur heard. Pulmonary:      Effort: Pulmonary effort is normal.      Breath sounds: Normal breath sounds. No wheezing. Abdominal:      General: Bowel sounds are normal.      Palpations: Abdomen is soft. Tenderness: There is no abdominal tenderness. There is no right CVA tenderness, guarding or rebound. Musculoskeletal:         General: Tenderness present. No swelling, deformity or signs of injury. Normal range of motion. Cervical back: Normal range of motion and neck supple. Right lower leg: No edema. Left lower leg: No edema. Comments: Right leg with no edema, is mildly and diffusely tender to palpation from the foot and ankle up to the knee, I do not appreciate a distinct posterior tibial or dorsal pedis pulse. His right toe metatarsophalangeal joint is slightly erythematous and blanching.   Sensation intact to light touch throughout, although he does have a burning paresthesia of the entire lower leg. Skin:     General: Skin is warm and dry. Capillary Refill: Capillary refill takes less than 2 seconds. Findings: No rash. Neurological:      Mental Status: He is alert and oriented to person, place, and time. Psychiatric:         Behavior: Behavior normal.           Diagnostic Study Results     Labs -     Recent Results (from the past 12 hour(s))   EKG, 12 LEAD, INITIAL    Collection Time: 12/07/21 10:28 AM   Result Value Ref Range    Ventricular Rate 97 BPM    Atrial Rate 97 BPM    P-R Interval 168 ms    QRS Duration 94 ms    Q-T Interval 364 ms    QTC Calculation (Bezet) 462 ms    Calculated P Axis 56 degrees    Calculated R Axis 63 degrees    Calculated T Axis 62 degrees    Diagnosis       Normal sinus rhythm  Nonspecific ST abnormality  When compared with ECG of 29-NOV-2021 03:38,  No significant change was found  Confirmed by Gary Archie ([id number]) on 12/7/2021 11:09:37 AM     CBC WITH AUTOMATED DIFF    Collection Time: 12/07/21 10:41 AM   Result Value Ref Range    WBC 4.8 4.6 - 13.2 K/uL    RBC 3.63 (L) 4.35 - 5.65 M/uL    HGB 10.8 (L) 13.0 - 16.0 g/dL    HCT 32.7 (L) 36.0 - 48.0 %    MCV 90.1 78.0 - 100.0 FL    MCH 29.8 24.0 - 34.0 PG    MCHC 33.0 31.0 - 37.0 g/dL    RDW 13.3 11.6 - 14.5 %    PLATELET 001 921 - 679 K/uL    MPV 10.1 9.2 - 11.8 FL    NRBC 0.0 0  WBC    ABSOLUTE NRBC 0.00 0.00 - 0.01 K/uL    NEUTROPHILS 63 40 - 73 %    LYMPHOCYTES 22 21 - 52 %    MONOCYTES 10 3 - 10 %    EOSINOPHILS 3 0 - 5 %    BASOPHILS 1 0 - 2 %    IMMATURE GRANULOCYTES 1 (H) 0.0 - 0.5 %    ABS. NEUTROPHILS 3.0 1.8 - 8.0 K/UL    ABS. LYMPHOCYTES 1.1 0.9 - 3.6 K/UL    ABS. MONOCYTES 0.5 0.05 - 1.2 K/UL    ABS. EOSINOPHILS 0.2 0.0 - 0.4 K/UL    ABS. BASOPHILS 0.0 0.0 - 0.1 K/UL    ABS. IMM.  GRANS. 0.1 (H) 0.00 - 0.04 K/UL    DF AUTOMATED     PROTHROMBIN TIME + INR    Collection Time: 12/07/21 10:41 AM   Result Value Ref Range    Prothrombin time 12.9 11.5 - 15.2 sec    INR 1.0 0.8 - 1.2     METABOLIC PANEL, BASIC    Collection Time: 12/07/21 10:41 AM   Result Value Ref Range    Sodium 136 136 - 145 mmol/L    Potassium 4.9 3.5 - 5.5 mmol/L    Chloride 106 100 - 111 mmol/L    CO2 23 21 - 32 mmol/L    Anion gap 7 3.0 - 18 mmol/L    Glucose 201 (H) 74 - 99 mg/dL    BUN 21 (H) 7.0 - 18 MG/DL    Creatinine 1.01 0.6 - 1.3 MG/DL    BUN/Creatinine ratio 21 (H) 12 - 20      GFR est AA >60 >60 ml/min/1.73m2    GFR est non-AA >60 >60 ml/min/1.73m2    Calcium 8.7 8.5 - 10.1 MG/DL   TROPONIN I    Collection Time: 12/07/21 10:41 AM   Result Value Ref Range    Troponin-I, QT <0.02 0.0 - 0.045 NG/ML   NT-PRO BNP    Collection Time: 12/07/21 10:41 AM   Result Value Ref Range    NT pro- 0 - 900 PG/ML   PTT    Collection Time: 12/07/21 10:41 AM   Result Value Ref Range    aPTT 31.0 23.0 - 36.4 SEC   POC LACTIC ACID    Collection Time: 12/07/21 11:40 AM   Result Value Ref Range    Lactic Acid (POC) 0.89 0.40 - 2.00 mmol/L       Radiologic Studies -   XR CHEST PORT   Final Result      No acute findings. XR FOOT RT MIN 3 V   Final Result      No clearly acute findings. First MTP degenerative changes including mild bunion   deformity. DUPLEX LOW EXT ARTERY RIGHT W AVILA    (Results Pending)     CT Results  (Last 48 hours)    None        CXR Results  (Last 48 hours)               12/07/21 1132  XR CHEST PORT Final result    Impression:      No acute findings. Narrative:  EXAMINATION: Chest single view       INDICATION: Right foot pain       COMPARISON: 10/21/2021       FINDINGS: Single frontal view the chest obtained. Mediastinal silhouette and   pulmonary vasculature unremarkable. Minimal aortic arch calcifications. No   consolidation. No evidence of pneumothorax. No acute osseous findings. Chest x-ray interpretation by me shows no acute cardiopulmonary disease. Foot x-ray, right, interpretation by me shows no acute fracture or dislocation.     Medical Decision Making   I am the first provider for this patient. I reviewed the vital signs, available nursing notes, past medical history, past surgical history, family history and social history. Vital Signs-Reviewed the patient's vital signs. EKG: Interpreted by the EP. Time Interpreted: 10:28 AM   Rate: 97 bpm   Rhythm: Normal sinus rhythm   Interpretation: Nonspecific ST/T changes; motion artifact. Records Reviewed: Nursing Notes and Old Medical Records    ED Course:   12:50 PM: Patient feels slightly better after initial treatment, still has significant pain and sensation of coldness of his foot. Disposition:  Pending at time of turnover    Provider Notes (Medical Decision Making):   78-year-old gentleman with known severe peripheral arterial disease who comes in with acutely worsening pain of his right foot and I am not able to appreciate a pulse. This is concerning for acute vascular occlusion or insufficiency. His post-procedural duplex ultrasound on December 1 also had decreased flow, so I did not begin heparin today. We will wait for PVL duplex ultrasound and formal vascular surgery consult to direct management and disposition. At time of turnover, both PVL and vascular surgery consult are pending. Diagnosis     Clinical Impression:   1. Peripheral arterial disease ([address]    2. Acute leg pain, right      2:04 PM : Pt care transferred to Dr. Otha Phoenix, ED provider. History of patient complaint(s), available diagnostic reports and current treatment plan has been discussed thoroughly. Bedside rounding on patient occured : yes . Intended disposition of patient : anticipate admission  Pending diagnostics reports and/or labs (please list): PVL duplex ultrasound, vascular surgery consult    Dr. Cm Loyola assistance in completion of this plan is greatly appreciated but it should be noted that I will be the provider of record for this patient.

## 2021-12-07 NOTE — DISCHARGE INSTRUCTIONS
You were evaluated by the vascular surgery physician assistant in the ER, you were offered an angiogram to be done within the next week as an outpatient. They report their office will call you to schedule an appointment with Dr. Nils Osgood within the next week.

## 2021-12-08 LAB
LEFT ABI: 1
LEFT ARM BP: 120 MMHG
LEFT POSTERIOR TIBIAL: 120 MMHG
RIGHT ABI: 0.42
RIGHT ARM BP: 118 MMHG
RIGHT CFA DIST SYS PSV: 70.2 CM/S
RIGHT DIST ATA VELOCITY: 50 CM/S
RIGHT DIST PTA PSV: 0 CM/S
RIGHT MID ATA VELOCITY: 17.7 CM/S
RIGHT PERONEAL DIST VELOCITY: 23.8 CM/S
RIGHT PERONEAL MID SYS PSV: 43 CM/S
RIGHT PERONEAL PROX SYS PSV: 37.8 CM/S
RIGHT POP A DIST VEL RATIO: 1.04
RIGHT POP A MID VEL RATIO: 1.1
RIGHT POP A PROX VEL RATIO: 0.34
RIGHT POPLITEAL DIST SYS PSV: 38.3 CM/S
RIGHT POPLITEAL MID SYS PSV: 36.7 CM/S
RIGHT POPLITEAL PROX SYS PSV: 33.4 CM/S
RIGHT POSTERIOR TIBIAL: 50 MMHG
RIGHT PROX ATA VELOCITY: 93.6 CM/S
RIGHT PROX PFA A PSV: 47.3 CM/S
RIGHT PROX PTA PSV: 60.7 CM/S
RIGHT PTA MID SYS PSV: 36.5 CM/S
RIGHT SFA DIST VEL RATIO: 1.41
RIGHT SFA MID VEL RATIO: 1.1
RIGHT SFA PROX VEL RATIO: 0.9
RIGHT SUPER FEMORAL DIST SYS PSV: 96.9 CM/S
RIGHT SUPER FEMORAL MID SYS PSV: 68.8 CM/S
RIGHT SUPER FEMORAL PROX SYS PSV: 61 CM/S
VAS LEFT DORSALIS PEDIS BP: 118 MMHG

## 2021-12-15 ENCOUNTER — OFFICE VISIT (OUTPATIENT)
Dept: VASCULAR SURGERY | Age: 61
End: 2021-12-15
Payer: COMMERCIAL

## 2021-12-15 VITALS
BODY MASS INDEX: 25.18 KG/M2 | DIASTOLIC BLOOD PRESSURE: 84 MMHG | RESPIRATION RATE: 17 BRPM | HEIGHT: 69 IN | SYSTOLIC BLOOD PRESSURE: 126 MMHG | WEIGHT: 170 LBS

## 2021-12-15 DIAGNOSIS — I70.261 ATHEROSCLEROSIS OF NATIVE ARTERY OF RIGHT LOWER EXTREMITY WITH GANGRENE (HCC): Primary | ICD-10-CM

## 2021-12-15 PROCEDURE — 99214 OFFICE O/P EST MOD 30 MIN: CPT | Performed by: PHYSICIAN ASSISTANT

## 2021-12-15 RX ORDER — OXYCODONE AND ACETAMINOPHEN 5; 325 MG/1; MG/1
1 TABLET ORAL
Qty: 15 TABLET | Refills: 0 | Status: SHIPPED | OUTPATIENT
Start: 2021-12-15 | End: 2021-12-18

## 2021-12-15 NOTE — PROGRESS NOTES
12/15/21        Berry Gan        History and Physical    Berry Gan is a pleasant but unfortunate 59 y.o. 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 Patient also has noted ischemic changes to his first second and third digits of his right foot. No open lesions or skin ulcerations noted. Patient reports that he was recently in the ER for pain issues, reports episodic 7 out of 10 right lower extremity foot pain. At that time imaging was performed and showed that the popliteal occlusion which brought him in the hospital 2 weeks ago is open. Patient is status post thrombectomy of a blocked popliteal artery. Unfortunately imaging also shows severe stenosis below the knee area. Severe tibial disease noted. Patient states that he ambulates daily 6-8 blocks, despite having chronic constant pain. He reports that he is taking Tylenol with little relief. He denies any recent ulcerations skin rashes or skin lesions but states that his right foot feels cold all the time. He does report some numbness and tingling especially when he is nothanging his leg over the edge. Patient reports some mild to moderate rest pain but gets relief by hanging his foot off the bed. He denies any chest pain or shortness of breath. Denies any dyspnea on exertion. States that he continues to be compliant with taking his aspirin and Plavix medications. Continues to take his medications as recommended by family doctor including his blood sugar medications. Patient does not check blood sugars daily. Unfortunately Michelle Lin continues to smoke about 6 to 8 cigarettes a day. Once again patient was instructed on the importance of quitting in the face of his vascular disease. Patient states that he is not ready to quit but he will cut back.       The most recent PVL performed post thrombectomy was reviewed and discussed with the patient:   Findings show the popliteal segment, slightly open but as stated above severe tibial disease. · Severe peripheral arterial disease in the right infrapopliteal vessels at rest. Disease noted within the tibial arteries. · No evidence of significant arterial disease within the left lower extremity at rest.  · Triphasic Doppler waveforms in the right distal common femoral, proximal superficial femoral, middle superficial femoral and distal superficial femoral artery. · Biphasic Doppler waveforms in the right profunda femoris, proximal popliteal, mid popliteal and distal popliteal artery. · Monophasic Doppler waveforms in the right anterior tibial, peroneal and dorsalis pedis artery. · The right distal postior tibial artery has no detectable flow. · The right proximal anterior tibial artery demonstrates severe (76-99%) stenosis. Lower Extremity Arterial Findings      Right Lower Arterial    The right distal superficial femoral artery demonstrates <50% stenosis. The right anterior tibial artery demonstrates severe (76-99%) stenosis. The right posterior tibial artery is occluded. Monophasic Doppler waveforms in the right anterior tibial, peroneal and dorsalis pedis artery. Biphasic Doppler waveforms in the right profunda femoris, proximal popliteal, distal popliteal and posterior tibial artery. Triphasic Doppler waveforms in the right distal common femoral, proximal superficial femoral, middle superficial femoral and distal superficial femoral artery. AVILA    The right resting AVILA is severely decreased.  The left resting AVILA is normal.       Right Lower Arterial Measurements     PSV Mookie Ratio   CFA Dist 70.2 cm/s          PFA Prox 47.3 cm/s          SFA Prox 61 cm/s       0.9          SFA Mid 68.8 cm/s       1.1          SFA Dist 96.9 cm/s       1.41          Pop Prox 33.4 cm/s       0.34          Pop Mid 36.7 cm/s       1.1          Pop Dist 38.3 cm/s       1.04          PTA Prox 60.7 cm/s          PTA Mid 36.5 cm/s PTA Dist 0 cm/s          MICHAEL Prox 93.6 cm/s          MICHAEL Mid 17.7 cm/s          MICHAEL Dist 50 cm/s          Peroneal Prox 37.8 cm/s          Peroneal Mid 43 cm/s          Peroneal Dist 23.8 cm/s            Arterial Pressure Measurements     Right Left   Brachial  mmHg       120 mmHg         Post Tibial BP 50 mmHg       120 mmHg         Dorsalis Pedis BP     118 mmHg         AVILA 0.42        1                                      Comparison Study Information      Prior Study    The exam was compared to the study performed on 12/1/2021. No change within the right ankle/brachial index. Patient still has severe arterial disease in the right lower extremity. The distal posterior tibial artery has again occluded. Left ankle/brachial index is still within normal range. Physical Exam:    Visit Vitals  /84 (BP 1 Location: Left upper arm, BP Patient Position: Sitting)   Resp 17   Ht 5' 9\" (1.753 m)   Wt 170 lb (77.1 kg)   BMI 25.10 kg/m²      HEENT-PERRLA exactly movements intact, no scleral icterus, mucous membranes pink and moist  Neck-no JVD, no carotid bruits  Heart-regular rate and rhythm no murmurs, rubs or gallops  Chest-clear to auscultation bilaterally no wheezes, rhonchi or rubs  Abdomen-soft, nontender, no palpable organomegaly or masses, no palpable pulsatile masses  Extremities-no clubbing, cyanosis or edema. No wounds or gangrenous changes to the toes or feet. Skin is intact. Feet are pink warm and well-perfused bilaterally  Pulses-carotid, radial, brachial, femoral 2+ bilaterally. Bilateral doppler signals dorsalis pedis, posterior tibial       Past Medical History:   Diagnosis Date    Alcohol use     Fri-Sun one fifth; Mon-Thur: Pint of liquor    CAD (coronary artery disease)     Cardiac angioplasty with stent 07/29/2009    2.5 x 13 Cypher stent to CX.  Cardiac cath 11/09/2011    RCA patent. LM patent. LAD patent. mD1 55%. CX patent. Prior stent patent.   [place] 85% (2.5 x 15-mm Xience stent, resid 0%). LVEDP 12. EF 40-45%. High lateral hypk (RI distribution).  Cardiac inferior STEMI 2009    Constipation     Current smoker     contemplating stopping    Diabetes ([facility]     type 2-insulin dependent    Diabetic neuropathy (HCC)     GERD (gastroesophageal reflux disease)     HTN (hypertension)     Hyperlipidemia     Lacunar infarction ([facility]     Migraine     Myocardial infarction ([facility]     Vitamin D deficiency      Past Surgical History:   Procedure Laterality Date    COLONOSCOPY N/A 10/28/2020    COLONOSCOPY w/polypectomy performed by Birdia Severance, MD at [address]  07/29/2009    HX HEART CATHETERIZATION  8/30/2011    HX HEART CATHETERIZATION  11/09/2011    HX WISDOM TEETH EXTRACTION      x4     Patient Active Problem List   Diagnosis Code    Hyperlipidemia E78.5    HTN (hypertension) I10    Insulin dependent diabetes mellitus [id number]    Tobacco use disorder F17.200    Coronary atherosclerosis of native coronary artery I25.10    Tobacco dependence F17.200    Other and unspecified alcohol dependence, continuous drinking behavior F10.20    Mediastinal adenopathy R59.0    Diabetic neuropathy ([facility] E11.40    Vitamin D deficiency E55.9    Other insomnia G47.09    Type 2 diabetes with nephropathy (HCC) E11.21    Otalgia H92.09    Other fatigue R53.83    Mild nonproliferative diabetic retinopathy of left eye without macular edema associated with type 2 diabetes mellitus ([facility] [address] [facility] J95.7887    Hypertensive retinopathy H35.039    Nuclear sclerosis of both eyes H25.13    Arterial occlusion, lower extremity (HCC) I70.209     Current Outpatient Medications   Medication Sig Dispense Refill    oxyCODONE-acetaminophen (PERCOCET) 5-325 mg per tablet Take 1 Tablet by mouth every six (6) hours as needed for Pain for up to 3 days. Max Daily Amount: 4 Tablets.  15 Tablet 0    insulin glargine (Basaglar KwikPen U-100 Insulin) 100 unit/mL (3 mL) inpn 45 Units by SubCUTAneous route daily. INJECT 50 UNITS UNDER THE SKIN DAILY 30 Adjustable Dose Pre-filled Pen Syringe 1    aspirin 81 mg chewable tablet Take 1 Tablet by mouth daily for 30 days. 30 Tablet 0    atorvastatin (LIPITOR) 80 mg tablet Take 1 Tablet by mouth nightly for 30 days. 30 Tablet 0    clopidogreL (PLAVIX) 75 mg tab Take 1 Tablet by mouth daily for 30 days. 30 Tablet 0    thiamine HCL (B-1) 100 mg tablet Take 1 Tablet by mouth daily for 30 days. 30 Tablet 0    buPROPion SR (WELLBUTRIN SR) 150 mg SR tablet ONE TABLET BY MOUTH ONCE A DAY 90 Tablet 0    traZODone (DESYREL) 50 mg tablet Take 1 Tablet by mouth nightly. 90 Tablet 1    dulaglutide (Trulicity) 3.09 YC/7.2 mL sub-q pen INJECT 0.75 MG UNDER THE SKIN ONCE WEEKLY 2 mL 0    simvastatin (Zocor) 20 mg tablet Take 1 Tablet by mouth nightly. 90 Tablet 1    metFORMIN (GLUCOPHAGE) 1,000 mg tablet TAKE ONE TABLET BY MOUTH TWICE A DAY WITH A MEAL 180 Tablet 2    lancets misc Please provide lancets for blood glucose meter insurance will cover 100 Lancet 3    glucose blood VI test strips (ASCENSIA AUTODISC VI, ONE TOUCH ULTRA TEST VI) strip Provide test strip and glucose meter that insurance will cover - Twice daily-  fasting in the morning and once two hours after a meal. 100 Strip 10    flash glucose sensor (FreeStyle Ezio 2 Sensor) kit Apply 1 sensor every 14 days to check blood sugar as directed at least 3 times daily. 2 Kit 5    flash glucose scanning reader (FreeStyle Ezio 2 [place]) misc Use as directed to check blood sugar at least 3 times daily 1 Each 0    ergocalciferol (ERGOCALCIFEROL) 1,250 mcg (50,000 unit) capsule TAKE ONE CAPSULE BY MOUTH ONCE WEEKLY  Indications: vitamin D deficiency (high dose therapy) 4 Capsule 6    Insulin Needles, Disposable, 31 gauge x 5/16\" ndle Pen needles for Bydureon syringe 100 Each 0    multivitamin (ONE A DAY) tablet Take 1 Tablet by mouth daily.  90 Tablet 3    Blood-Glucose Meter monitoring kit Blood glucose kit that insurance will cover - Check blood glucose twice a day once in the morning fasting and once after a meal. 1 Kit 0     Allergies   Allergen Reactions    Niacin Itching     Social History     Socioeconomic History    Marital status: SINGLE     Spouse name: Not on file    Number of children: Not on file    Years of education: Not on file    Highest education level: Not on file   Occupational History    Not on file   Tobacco Use    Smoking status: Current Every Day Smoker     Packs/day: 0.50     Years: 1.00     Pack years: 0.50     Types: Cigarettes    Smokeless tobacco: Never Used   Substance and Sexual Activity    Alcohol use: Yes     Alcohol/week: 4.2 standard drinks     Types: 5 Shots of liquor per week     Comment: rare    Drug use: Not Currently     Types: Marijuana, Cocaine     Comment: none    Sexual activity: Yes   Other Topics Concern    Not on file   Social History Narrative     at [employer]. Social Determinants of Health     Financial Resource Strain:     Difficulty of Paying Living Expenses: Not on file   Food Insecurity:     Worried About Running Out of Food in the Last Year: Not on file    Mara of Food in the Last Year: Not on file   Transportation Needs:     Lack of Transportation (Medical): Not on file    Lack of Transportation (Non-Medical):  Not on file   Physical Activity:     Days of Exercise per Week: Not on file    Minutes of Exercise per Session: Not on file   Stress:     Feeling of Stress : Not on file   Social Connections:     Frequency of Communication with Friends and Family: Not on file    Frequency of Social Gatherings with Friends and Family: Not on file    Attends Synagogue Services: Not on file    Active Member of Clubs or Organizations: Not on file    Attends Club or Organization Meetings: Not on file    Marital Status: Not on file   Intimate Partner Violence:     Fear of Current or Ex-Partner: Not on file    Emotionally Abused: Not on file    Physically Abused: Not on file    Sexually Abused: Not on file   Housing Stability:     Unable to Pay for Housing in the Last Year: Not on file    Number of Places Lived in the Last Year: Not on file    Unstable Housing in the Last Year: Not on file     Family History   Problem Relation Age of Onset    Hypertension Mother     Heart Attack Mother     Diabetes Mother     Hypertension Father     Heart Attack Father     Diabetes Father     Diabetes Sister     Hypertension Sister     Stroke Sister     Diabetes Brother     Hypertension Brother     Stroke Brother     No Known Problems Maternal Grandmother     No Known Problems Maternal Grandfather     No Known Problems Paternal Grandmother     No Known Problems Paternal Grandfather     No Known Problems Brother     Heart Disease Other     Kidney Disease Other        Impression and Plan:  Rivas Pappas is a 59 y.o. male with known acute on chronic peripheral vascular disease. Patient is also a continued tobacco abuser. Patient instructed on the signs and symptoms of claudication and acute limb ischemia. As stated above previsit imaging shows continued low of the popliteal artery which was thrombectomized last month. Unfortunately patient continues to show severe tibial disease. Patient to continue present Rx, instructed on the importance of continuing with aspirin and Plavix. Patient states he understands willing to continue. Dr. Annmarie Valentin brought into the room to evaluate patient. Positive Doppler signals noted, monophasic. Long discussion with patient about smoking cessation in the face of arterial disease. Patient warned of major sequelae such as MI, stroke, and acute limb loss if he continues to use nicotine in the face of his disease.   Patient encouraged to ambulate daily, instructed to discuss with his family doctor about physical therapy program.  Per Dr. Vikki Schirmer patient will continue with antiplatelet medications and increase his daily activity as tolerated. Follow-up in 1 month with repeat bilateral AVILA/PVR imaging. Patient given a prescription for Percocet 5 and 325 #15 instructed to take 1 p.o. every 6 hours as needed for pain. Pain management consult, for long-term chronic pain issues. Given above findings Dr. Vikki Schirmer is instructed the patient that we will postpone his upcoming angio, we will continue to monitor him closely, and patient was warned that if he does not make lifestyle changes there is a high risk that he can lead to amputation. Patient states he understands above, is willing to be compliant with above recommendations, and is willing to continue to increase his activity as tolerated. Patient offered both psychological and pharmacological assistance with smoking cessation but declines at this time. He said he is down to 4 cigarettes a day and will try to quit completely within the next month. As stated above follow-up in 1 month with Dr. Vikki Schirmer with repeat imaging. Patient encouraged to call sooner if he has any questions or issues or return to the ER if he has any further pain management issues. We will discuss details with my attending. We reviewed the plan with the patient and the patient understands. Follow-up and Dispositions    · Return in about 4 weeks (around 1/12/2022).          Iqra Hoffmann PA-C

## 2021-12-15 NOTE — PATIENT INSTRUCTIONS
Stopping Smoking: Care Instructions  Your Care Instructions     Cigarette smokers crave the nicotine in cigarettes. Giving it up is much harder than simply changing a habit. Your body has to stop craving the nicotine. It is hard to quit, but you can do it. There are many tools that people use to quit smoking. You may find that combining tools works best for you. There are several steps to quitting. First you get ready to quit. Then you get support to help you. After that, you learn new skills and behaviors to become a nonsmoker. For many people, a necessary step is getting and using medicine. Your doctor will help you set up the plan that best meets your needs. You may want to attend a smoking cessation program to help you quit smoking. When you choose a program, look for one that has proven success. Ask your doctor for ideas. You will greatly increase your chances of success if you take medicine as well as get counseling or join a cessation program.  Some of the changes you feel when you first quit tobacco are uncomfortable. Your body will miss the nicotine at first, and you may feel short-tempered and grumpy. You may have trouble sleeping or concentrating. Medicine can help you deal with these symptoms. You may struggle with changing your smoking habits and rituals. The last step is the tricky one: Be prepared for the smoking urge to continue for a time. This is a lot to deal with, but keep at it. You will feel better. Follow-up care is a key part of your treatment and safety. Be sure to make and go to all appointments, and call your doctor if you are having problems. It's also a good idea to know your test results and keep a list of the medicines you take. How can you care for yourself at home? · Ask your family, friends, and coworkers for support. You have a better chance of quitting if you have help and support.   · Join a support group, such as Nicotine Anonymous, for people who are trying to quit smoking. · Consider signing up for a smoking cessation program, such as the American [employer] Association's Freedom from Smoking program.  · Get text messaging support. Go to the website at www.smokefree. gov to sign up for the [facility] program.  · Set a quit date. Pick your date carefully so that it is not right in the middle of a big deadline or stressful time. Once you quit, do not even take a puff. Get rid of all ashtrays and lighters after your last cigarette. Clean your house and your clothes so that they do not smell of smoke. · Learn how to be a nonsmoker. Think about ways you can avoid those things that make you reach for a cigarette. ? Avoid situations that put you at greatest risk for smoking. For some people, it is hard to have a drink with friends without smoking. For others, they might skip a coffee break with coworkers who smoke. ? Change your daily routine. Take a different route to work or eat a meal in a different place. · Cut down on stress. Calm yourself or release tension by doing an activity you enjoy, such as reading a book, taking a hot bath, or gardening. · Talk to your doctor or pharmacist about nicotine replacement therapy, which replaces the nicotine in your body. You still get nicotine but you do not use tobacco. Nicotine replacement products help you slowly reduce the amount of nicotine you need. These products come in several forms, many of them available over-the-counter:  ? Nicotine patches  ? Nicotine gum and lozenges  ? Nicotine inhaler  · Ask your doctor about bupropion (Wellbutrin) or varenicline (Chantix), which are prescription medicines. They do not contain nicotine. They help you by reducing withdrawal symptoms, such as stress and anxiety. · Some people find hypnosis, acupuncture, and massage helpful for ending the smoking habit. · Eat a healthy diet and get regular exercise. Having healthy habits will help your body move past its craving for nicotine.   · Be prepared to keep trying. Most people are not successful the first few times they try to quit. Do not get mad at yourself if you smoke again. Make a list of things you learned and think about when you want to try again, such as next week, next month, or next year. Where can you learn more? Go to http://www.gray.com/  Enter [id number] in the search box to learn more about \"Stopping Smoking: Care Instructions. \"  Current as of: February 11, 2021               Content Version: 13.0  © 6763-9240 [employer]. Care instructions adapted under license by [employer] (which disclaims liability or warranty for this information). If you have questions about a medical condition or this instruction, always ask your healthcare professional. [address] any warranty or liability for your use of this information. Learning About Peripheral Arterial Disease (PAD)  What is peripheral arterial disease? Peripheral arterial disease (PAD) is narrowing or blockage of arteries that causes poor blood flow to your arms and legs. PAD is most common in the legs. The most common cause of PAD is the buildup of plaque on the inside of arteries. Over time, plaque builds up in the walls of the arteries, including those that supply blood to your legs. If you have PAD, you're likely to have plaque in other arteries in your body. This raises your risk of a heart attack and stroke. Medicines and lifestyle changes may lower your risk of heart attack and stroke. They may also help if you have symptoms. In some cases, surgery or other treatment is needed. Peripheral arterial disease is also called peripheral vascular disease. What are the symptoms? Many people who have PAD don't have symptoms. If you have symptoms, they may include a tight, aching, or squeezing pain in your calf, thigh, or buttock. This pain is called intermittent claudication.  It usually happens after you have walked a certain distance. The pain goes away if you stop walking. As PAD gets worse, you may have pain in your foot or toe when you aren't walking. Other symptoms may include weak or tired legs. You might have trouble walking or balancing. If PAD gets worse, you may have other symptoms caused by poor blood flow to your legs and feet. These symptoms aren't common. They may include cold or numb feet or toes, sores that are slow to heal, or leg or foot pain when you're at rest.  How can you prevent PAD? · Quit smoking. Quitting smoking is one of the best things you can do to help prevent PAD. If you need help quitting, talk to your doctor about stop-smoking programs and medicines. These can increase your chances of quitting for good. · Stay at a healthy weight. · Manage other health problems, including diabetes, high blood pressure, and high cholesterol. · Be physically active. Try to do moderate activity at least 2½ hours a week. Or try to do vigorous activity at least 1¼ hours a week. You may want to walk or try other activities, such as running, swimming, cycling, or playing tennis or team sports. · Eat a variety of heart-healthy foods. ? Eat fruits, vegetables, whole grains, beans, and other high-fiber foods. ? Eat lean proteins, such as seafood, lean meats, beans, nuts, and soy products. ? Eat healthy fats, such as canola and olive oil. ? Choose foods that are low in saturated fat and avoid trans fat. ? Limit sodium and alcohol. ? Limit drinks and foods with added sugar. How is PAD treated? Treatment for PAD focuses on relieving symptoms and lowering your risk of heart attack and stroke. Making healthy lifestyle changes can help you lower this risk. · If you smoke, quit. Quitting is the best thing you can do when you have PAD. Medicines and counseling can help you quit for good. · Get regular exercise (if your doctor says it's safe).  Try walking, swimming, or biking for at least 30 minutes on most, if not all, days of the week. If you have leg symptoms when you exercise, your doctor might recommend a specialized exercise program that may relieve symptoms. The goal is to be able to walk farther without pain. · Eat heart-healthy foods, such as vegetables, fruits, nuts, beans, fish, and whole grains. Limit foods that have a lot of salt, fat, and sugar. · Stay at a healthy weight. Lose weight if you need to. You may need medicines to help lower your risk of heart attack and stroke. These include medicine to prevent blood clots, improve cholesterol, or lower blood pressure. You also may take a medicine that can help ease pain while you are walking. People who have severe PAD may have bypass surgery or other procedures (such as angioplasty) to restore proper blood flow to the legs. Follow-up care is a key part of your treatment and safety. Be sure to make and go to all appointments, and call your doctor if you are having problems. It's also a good idea to know your test results and keep a list of the medicines you take. Where can you learn more? Go to http://www.gray.com/  Enter Y729 in the search box to learn more about \"Learning About Peripheral Arterial Disease (PAD). \"  Current as of: April 29, 2021               Content Version: 13.0  © 2006-2021 [employer]. Care instructions adapted under license by [employer] (which disclaims liability or warranty for this information). If you have questions about a medical condition or this instruction, always ask your healthcare professional. [place] [address] any warranty or liability for your use of this information.

## 2021-12-15 NOTE — PROGRESS NOTES
1. Have you been to an emergency room or urgent care clinic since your last visit? No    Hospitalized since your last visit? If yes, where, when, and reason for visit? No  2. Have you seen or consulted any other health care providers outside of the [facility] since your last visit including any procedures, health maintenance items. If yes, where, when and reason for visit?  No

## 2021-12-22 ENCOUNTER — HOSPITAL ENCOUNTER (EMERGENCY)
Age: 61
Discharge: HOME | End: 2021-12-22
Attending: STUDENT IN AN ORGANIZED HEALTH CARE EDUCATION/TRAINING PROGRAM
Payer: COMMERCIAL

## 2021-12-22 ENCOUNTER — APPOINTMENT (OUTPATIENT)
Dept: VASCULAR SURGERY | Age: 61
End: 2021-12-22
Attending: EMERGENCY MEDICINE
Payer: COMMERCIAL

## 2021-12-22 VITALS
HEIGHT: 69 IN | HEART RATE: 89 BPM | RESPIRATION RATE: 16 BRPM | TEMPERATURE: 98.2 F | BODY MASS INDEX: 25.18 KG/M2 | OXYGEN SATURATION: 100 % | SYSTOLIC BLOOD PRESSURE: 120 MMHG | DIASTOLIC BLOOD PRESSURE: 83 MMHG | WEIGHT: 170 LBS

## 2021-12-22 DIAGNOSIS — I77.9 PERIPHERAL ARTERIAL OCCLUSIVE DISEASE (HCC): Primary | ICD-10-CM

## 2021-12-22 DIAGNOSIS — I70.209 ARTERIAL OCCLUSION, LOWER EXTREMITY (HCC): ICD-10-CM

## 2021-12-22 DIAGNOSIS — I70.209 ARTERIAL OCCLUSION, LOWER EXTREMITY (HCC): Primary | ICD-10-CM

## 2021-12-22 DIAGNOSIS — Z72.0 TOBACCO ABUSE: ICD-10-CM

## 2021-12-22 DIAGNOSIS — R73.9 HYPERGLYCEMIA: ICD-10-CM

## 2021-12-22 LAB
ADMINISTERED INITIALS, ADMINIT: NORMAL
ADMINISTERED INITIALS, ADMINIT: NORMAL
ALBUMIN SERPL-MCNC: 4 G/DL (ref 3.4–5)
ALBUMIN/GLOB SERPL: 1 {RATIO} (ref 0.8–1.7)
ALP SERPL-CCNC: 168 U/L (ref 45–117)
ALT SERPL-CCNC: 45 U/L (ref 16–61)
ANION GAP SERPL CALC-SCNC: 10 MMOL/L (ref 3–18)
AST SERPL-CCNC: 30 U/L (ref 10–38)
BASOPHILS # BLD: 0 K/UL (ref 0–0.1)
BASOPHILS NFR BLD: 1 % (ref 0–2)
BILIRUB SERPL-MCNC: 0.4 MG/DL (ref 0.2–1)
BUN SERPL-MCNC: 28 MG/DL (ref 7–18)
BUN/CREAT SERPL: 19 (ref 12–20)
CALCIUM SERPL-MCNC: 9.8 MG/DL (ref 8.5–10.1)
CHLORIDE SERPL-SCNC: 91 MMOL/L (ref 100–111)
CO2 SERPL-SCNC: 24 MMOL/L (ref 21–32)
CREAT SERPL-MCNC: 1.45 MG/DL (ref 0.6–1.3)
D50 ADMINISTERED, D50ADM: 0 ML
D50 ADMINISTERED, D50ADM: 0 ML
D50 ORDER, D50ORD: 0 ML
D50 ORDER, D50ORD: 0 ML
DIFFERENTIAL METHOD BLD: ABNORMAL
EOSINOPHIL # BLD: 0.1 K/UL (ref 0–0.4)
EOSINOPHIL NFR BLD: 1 % (ref 0–5)
ERYTHROCYTE [DISTWIDTH] IN BLOOD BY AUTOMATED COUNT: 12.6 % (ref 11.6–14.5)
GLOBULIN SER CALC-MCNC: 3.9 G/DL (ref 2–4)
GLUCOSE BLD STRIP.AUTO-MCNC: 326 MG/DL (ref 70–110)
GLUCOSE BLD STRIP.AUTO-MCNC: 452 MG/DL (ref 70–110)
GLUCOSE BLD STRIP.AUTO-MCNC: 474 MG/DL (ref 70–110)
GLUCOSE SERPL-MCNC: 711 MG/DL (ref 74–99)
GLUCOSE, GLC: 326 MG/DL
GLUCOSE, GLC: 452 MG/DL
HCT VFR BLD AUTO: 37.1 % (ref 36–48)
HGB BLD-MCNC: 12.4 G/DL (ref 13–16)
HIGH TARGET, HITG: 180 MG/DL
HIGH TARGET, HITG: 180 MG/DL
IMM GRANULOCYTES # BLD AUTO: 0 K/UL (ref 0–0.04)
IMM GRANULOCYTES NFR BLD AUTO: 1 % (ref 0–0.5)
INSULIN ADMINSTERED, INSADM: 2.7 UNITS/HOUR
INSULIN ADMINSTERED, INSADM: 7.8 UNITS/HOUR
INSULIN ORDER, INSORD: 2.7 UNITS/HOUR
INSULIN ORDER, INSORD: 7.8 UNITS/HOUR
LOW TARGET, LOT: 140 MG/DL
LOW TARGET, LOT: 140 MG/DL
LYMPHOCYTES # BLD: 1.1 K/UL (ref 0.9–3.6)
LYMPHOCYTES NFR BLD: 21 % (ref 21–52)
MCH RBC QN AUTO: 28.8 PG (ref 24–34)
MCHC RBC AUTO-ENTMCNC: 33.4 G/DL (ref 31–37)
MCV RBC AUTO: 86.1 FL (ref 78–100)
MINUTES UNTIL NEXT BG, NBG: 60 MIN
MINUTES UNTIL NEXT BG, NBG: 60 MIN
MONOCYTES # BLD: 0.5 K/UL (ref 0.05–1.2)
MONOCYTES NFR BLD: 9 % (ref 3–10)
MULTIPLIER, MUL: 0.01
MULTIPLIER, MUL: 0.02
NEUTS SEG # BLD: 3.5 K/UL (ref 1.8–8)
NEUTS SEG NFR BLD: 68 % (ref 40–73)
NRBC # BLD: 0 K/UL (ref 0–0.01)
NRBC BLD-RTO: 0 PER 100 WBC
ORDER INITIALS, ORDINIT: NORMAL
ORDER INITIALS, ORDINIT: NORMAL
PLATELET # BLD AUTO: 216 K/UL (ref 135–420)
PMV BLD AUTO: 12.2 FL (ref 9.2–11.8)
POTASSIUM SERPL-SCNC: 5.8 MMOL/L (ref 3.5–5.5)
PROT SERPL-MCNC: 7.9 G/DL (ref 6.4–8.2)
RBC # BLD AUTO: 4.31 M/UL (ref 4.35–5.65)
SODIUM SERPL-SCNC: 125 MMOL/L (ref 136–145)
WBC # BLD AUTO: 5.2 K/UL (ref 4.6–13.2)

## 2021-12-22 PROCEDURE — 74011250636 HC RX REV CODE- 250/636: Performed by: EMERGENCY MEDICINE

## 2021-12-22 PROCEDURE — 74011636637 HC RX REV CODE- 636/637: Performed by: EMERGENCY MEDICINE

## 2021-12-22 PROCEDURE — 74011000258 HC RX REV CODE- 258: Performed by: EMERGENCY MEDICINE

## 2021-12-22 PROCEDURE — 80053 COMPREHEN METABOLIC PANEL: CPT

## 2021-12-22 PROCEDURE — 85025 COMPLETE CBC W/AUTO DIFF WBC: CPT

## 2021-12-22 PROCEDURE — 96376 TX/PRO/DX INJ SAME DRUG ADON: CPT

## 2021-12-22 PROCEDURE — 82962 GLUCOSE BLOOD TEST: CPT

## 2021-12-22 PROCEDURE — 96365 THER/PROPH/DIAG IV INF INIT: CPT

## 2021-12-22 PROCEDURE — 99284 EMERGENCY DEPT VISIT MOD MDM: CPT

## 2021-12-22 PROCEDURE — 96361 HYDRATE IV INFUSION ADD-ON: CPT

## 2021-12-22 PROCEDURE — 93922 UPR/L XTREMITY ART 2 LEVELS: CPT

## 2021-12-22 RX ORDER — MAGNESIUM SULFATE 100 %
4 CRYSTALS MISCELLANEOUS AS NEEDED
Status: DISCONTINUED | OUTPATIENT
Start: 2021-12-22 | End: 2021-12-22 | Stop reason: HOSPADM

## 2021-12-22 RX ORDER — DEXTROSE 50 % IN WATER (D50W) INTRAVENOUS SYRINGE
25-50 AS NEEDED
Status: DISCONTINUED | OUTPATIENT
Start: 2021-12-22 | End: 2021-12-22 | Stop reason: HOSPADM

## 2021-12-22 RX ADMIN — SODIUM CHLORIDE 2.7 UNITS/HR: 9 INJECTION, SOLUTION INTRAVENOUS at 15:54

## 2021-12-22 RX ADMIN — SODIUM CHLORIDE 7.5 UNITS/HR: 9 INJECTION, SOLUTION INTRAVENOUS at 14:45

## 2021-12-22 RX ADMIN — SODIUM CHLORIDE 1000 ML: 900 INJECTION, SOLUTION INTRAVENOUS at 13:02

## 2021-12-22 RX ADMIN — SODIUM CHLORIDE 1000 ML: 900 INJECTION, SOLUTION INTRAVENOUS at 13:19

## 2021-12-22 RX ADMIN — INSULIN HUMAN 10 UNITS: 100 INJECTION, SOLUTION PARENTERAL at 13:17

## 2021-12-22 NOTE — DIABETES MGMT
Diabetes/ Glycemic Control Plan of Care    12/22: Seen patient in ED. Noted potassium level of 5.8 and lab BG of 711  IVF NS 1000 ml x2. Patient is not sure if he took his long acting insulin yesterday, 12/21/2021. Discussed insulin drip recommendation with Migdalia Acuña NP, and obtained order. Recommendations:   1.) follow regular insulin drip protocol via GlucoStabilizer. 2.) follow criteria for transition to SC insulin:  Stable/no increase in BG values x4 hours  Stable/no increase in insulin drip req x4 hours  3.) administer first dose of daily lantus insulin at least 2 hours before d/c insulin drip  4.) add correctional lispro after d/c GlucoStabilizer    Assessment:   DX: No diagnosis found. 12/22/2021: Patient to ED with report of right foot pain. Noted PMH including diabetes, hypertension, severe known peripheral arterial disease, significant stenosi in the popliteal status thrombectomy last month. Patient is a smoker 6-4 cigarettes per day. Fasting/ Morning blood glucose:   Lab Results   Component Value Date/Time    Glucose 711 (HH) 12/22/2021 12:05 PM    Glucose (POC) 130 (H) 12/01/2021 04:03 PM     IV Fluids containing dextrose: None. Steroids:   None. Blood glucose values:     Pending POC BG readings. Lab BG 12/22/2021: 711     Within target range (70-180mg/dL):  No.    Current insulin orders:  Regular insulin 10 units IV x1 dose given at 1317  Regular insulin drip via [address]. Total Daily Dose previous 24 hours: N/A. Patient presented to ED today, 12/22/2021.     Current A1c:   Lab Results   Component Value Date/Time    Hemoglobin A1c >14.0 (H) 11/27/2021 03:10 PM    Hemoglobin A1c (POC) 7.0 01/13/2021 04:12 PM    Hemoglobin A1c, External 7.8 01/28/2015 11:15 PM      equivalent  to ave Blood Glucose of 360 mg/dl for 2-3 months prior to admission  Adequate glycemic control PTA: No.    Nutrition/Diet:   Active Orders   Diet    DIET NPO      Meal Intake:  No data found.  Supplement Intake:  No data found. Home diabetes medications: 12/22: Patient not able to recall list of his home diabetes medications at this time for verification. Key Antihyperglycemic Medications             insulin glargine (Basaglar KwikPen U-100 Insulin) 100 unit/mL (3 mL) inpn 45 Units by SubCUTAneous route daily. INJECT 50 UNITS UNDER THE SKIN DAILY    dulaglutide (Trulicity) 0.59 EK/9.4 mL sub-q pen INJECT 0.75 MG UNDER THE SKIN ONCE WEEKLY    metFORMIN (GLUCOPHAGE) 1,000 mg tablet TAKE ONE TABLET BY MOUTH TWICE A DAY WITH A MEAL          Plan/Goals:   Blood glucose will be within target of 70 - 180 mg/dl within 72 hours  Reinforce dietary and medication compliance at home. *  Education: Pending assessment of home diabetes management and educational needs.    [] Refer to Diabetes Education Record   [] Education not indicated at this time     Lynne Epley, RN  Pager: [phone]

## 2021-12-22 NOTE — ED PROVIDER NOTES
EMERGENCY DEPARTMENT HISTORY AND PHYSICAL EXAM    Date: 12/22/2021  Patient Name: Demetrius Encarnacion    History of Presenting Illness     Chief Complaint   Patient presents with    Foot Pain         History Provided By: Patient    Additional History (Context): Demetrius Encarnacion is a 59 y.o. male with diabetes, hypertension and Severe, known peripheral arterial disease who presents with right foot pain. Diagnosed and seen by vascular surgery SEGUNDO Hoffmann in the office on the 15th of this month with significant severe stenosis distal to the nee; the previously occluded popliteal is no patent s/p thrombectomypopliteal despite having a thrombectomy performed last month. It is reoccluded per office notes. Patient says he is still continuing to smoke 6 to 4 cigarettes a day and does tolerate activity despite the pain. Office note plan at that time was to repeat imaging ABIs PVRs in 1 month and reassess and has been referred to pain management. For him, he says his pain is also accompanied by coolness in the right foot. PCP: Fabiana Friend, NP    Current Facility-Administered Medications   Medication Dose Route Frequency Provider Last Rate Last Admin    glucose chewable tablet 16 g  4 Tablet Oral PRN Merayr Naranjo PA        glucagon (GLUCAGEN) injection 1 mg  1 mg IntraMUSCular PRN Merary Naranjo PA        dextrose (D50W) injection syrg 12.5-25 g  25-50 mL IntraVENous PRN Merary Naranjo PA        insulin regular (MYXREDLIN, NOVOLIN, HUMULIN) 100 units/100 ml NS infusion (premix)  0-50 Units/hr IntraVENous TITRATE Merary Naranjo PA 2.7 mL/hr at 12/22/21 1554 2.7 Units/hr at 12/22/21 1554     Current Outpatient Medications   Medication Sig Dispense Refill    insulin glargine (Basaglar KwikPen U-100 Insulin) 100 unit/mL (3 mL) inpn 45 Units by SubCUTAneous route daily.  INJECT 50 UNITS UNDER THE SKIN DAILY 30 Adjustable Dose Pre-filled Pen Syringe 1    aspirin 81 mg chewable tablet Take 1 Tablet by mouth daily for 30 days. 30 Tablet 0    atorvastatin (LIPITOR) 80 mg tablet Take 1 Tablet by mouth nightly for 30 days. 30 Tablet 0    clopidogreL (PLAVIX) 75 mg tab Take 1 Tablet by mouth daily for 30 days. 30 Tablet 0    thiamine HCL (B-1) 100 mg tablet Take 1 Tablet by mouth daily for 30 days. 30 Tablet 0    buPROPion SR (WELLBUTRIN SR) 150 mg SR tablet ONE TABLET BY MOUTH ONCE A DAY 90 Tablet 0    traZODone (DESYREL) 50 mg tablet Take 1 Tablet by mouth nightly. 90 Tablet 1    dulaglutide (Trulicity) 9.81 EB/3.2 mL sub-q pen INJECT 0.75 MG UNDER THE SKIN ONCE WEEKLY 2 mL 0    simvastatin (Zocor) 20 mg tablet Take 1 Tablet by mouth nightly. 90 Tablet 1    metFORMIN (GLUCOPHAGE) 1,000 mg tablet TAKE ONE TABLET BY MOUTH TWICE A DAY WITH A MEAL 180 Tablet 2    lancets misc Please provide lancets for blood glucose meter insurance will cover 100 Lancet 3    glucose blood VI test strips (ASCENSIA AUTODISC VI, ONE TOUCH ULTRA TEST VI) strip Provide test strip and glucose meter that insurance will cover - Twice daily-  fasting in the morning and once two hours after a meal. 100 Strip 10    flash glucose sensor (FreeStyle Ezio 2 Sensor) kit Apply 1 sensor every 14 days to check blood sugar as directed at least 3 times daily. 2 Kit 5    flash glucose scanning reader (FreeStyle Ezio 2 [place]) misc Use as directed to check blood sugar at least 3 times daily 1 Each 0    ergocalciferol (ERGOCALCIFEROL) 1,250 mcg (50,000 unit) capsule TAKE ONE CAPSULE BY MOUTH ONCE WEEKLY  Indications: vitamin D deficiency (high dose therapy) 4 Capsule 6    Insulin Needles, Disposable, 31 gauge x 5/16\" ndle Pen needles for Bydureon syringe 100 Each 0    multivitamin (ONE A DAY) tablet Take 1 Tablet by mouth daily.  90 Tablet 3    Blood-Glucose Meter monitoring kit Blood glucose kit that insurance will cover - Check blood glucose twice a day once in the morning fasting and once after a meal. 1 Kit 0       Past History     Past Medical History:  Past Medical History:   Diagnosis Date    Alcohol use     Fri-Sun one fifth; Mon-Thur: Pint of liquor    CAD (coronary artery disease)     Cardiac angioplasty with stent 07/29/2009    2.5 x 13 Cypher stent to CX.  Cardiac cath 11/09/2011    RCA patent. LM patent. LAD patent. mD1 55%. CX patent. Prior stent patent. Edison 85% (2.5 x 15-mm Xience stent, resid 0%). LVEDP 12. EF 40-45%. High lateral hypk (RI distribution).       Cardiac inferior STEMI 2009    Constipation     Current smoker     contemplating stopping    Diabetes ([address]     type 2-insulin dependent    Diabetic neuropathy (HCC)     GERD (gastroesophageal reflux disease)     HTN (hypertension)     Hyperlipidemia     [facility]     Migraine     Myocardial infarction ([facility]     Vitamin D deficiency        Past Surgical History:  Past Surgical History:   Procedure Laterality Date    COLONOSCOPY N/A 10/28/2020    COLONOSCOPY w/polypectomy performed by Mirta Walker MD at [address]  07/29/2009    HX HEART CATHETERIZATION  8/30/2011    HX HEART CATHETERIZATION  11/09/2011    HX WISDOM TEETH EXTRACTION      x4       Family History:  Family History   Problem Relation Age of Onset    Hypertension Mother     Heart Attack Mother     Diabetes Mother     Hypertension Father     Heart Attack Father     Diabetes Father     Diabetes Sister     Hypertension Sister     Stroke Sister     Diabetes Brother     Hypertension Brother     Stroke Brother     No Known Problems Maternal Grandmother     No Known Problems Maternal Grandfather     No Known Problems Paternal Grandmother     No Known Problems Paternal Grandfather     No Known Problems Brother     Heart Disease Other     Kidney Disease Other        Social History:  Social History     Tobacco Use    Smoking status: Current Every Day Smoker     Packs/day: 0.50     Years: 1.00     Pack years: 0.50     Types: Cigarettes    Smokeless tobacco: Never Used   Substance Use Topics    Alcohol use: Yes     Alcohol/week: 4.2 standard drinks     Types: 5 Shots of liquor per week     Comment: rare    Drug use: Not Currently     Types: Marijuana, Cocaine     Comment: none       Allergies: Allergies   Allergen Reactions    Niacin Itching         Review of Systems   Review of Systems   Constitutional: Negative for fatigue and fever. HENT: Negative. Eyes: Negative. Respiratory: Negative for shortness of breath. Cardiovascular: Negative for chest pain. Gastrointestinal: Negative for nausea. Endocrine: Negative. Genitourinary: Negative. Musculoskeletal: Positive for arthralgias and gait problem. Skin: Negative. Allergic/Immunologic: Negative. Hematological: Does not bruise/bleed easily. Psychiatric/Behavioral: Negative. All Other Systems Negative  Physical Exam     Vitals:    12/22/21 1152   BP: 120/83   Pulse: 89   Resp: 16   Temp: 98.2 °F (36.8 °C)   SpO2: 100%   Weight: 77.1 kg (170 lb)   Height: 5' 9\" (1.753 m)     Physical Exam  Vitals and nursing note reviewed. Constitutional:       General: He is not in acute distress. Appearance: He is well-developed. He is not ill-appearing, toxic-appearing or diaphoretic. HENT:      Head: Normocephalic and atraumatic. Neck:      Thyroid: No thyromegaly. Vascular: No carotid bruit. Trachea: No tracheal deviation. Cardiovascular:      Rate and Rhythm: Normal rate and regular rhythm. Heart sounds: Normal heart sounds. No murmur heard. No friction rub. No gallop. Pulmonary:      Effort: Pulmonary effort is normal. No respiratory distress. Breath sounds: Normal breath sounds. No stridor. No wheezing or rales. Chest:      Chest wall: No tenderness. Abdominal:      General: There is no distension. Palpations: Abdomen is soft. There is no mass. Tenderness: There is no abdominal tenderness.  There is no guarding or rebound. Musculoskeletal:         General: Tenderness present. Cervical back: Normal range of motion and neck supple. Comments: Right foot: First MTP joint tenderness darkened. Slightly cool to touch. Skin:     General: Skin is warm and dry. Coloration: Skin is not pale. Neurological:      Mental Status: He is alert. Psychiatric:         Speech: Speech normal.         Behavior: Behavior normal.         Thought Content: Thought content normal.         Judgment: Judgment normal.          Diagnostic Study Results     Labs -     Recent Results (from the past 12 hour(s))   CBC WITH AUTOMATED DIFF    Collection Time: 12/22/21 12:05 PM   Result Value Ref Range    WBC 5.2 4.6 - 13.2 K/uL    RBC 4.31 (L) 4.35 - 5.65 M/uL    HGB 12.4 (L) 13.0 - 16.0 g/dL    HCT 37.1 36.0 - 48.0 %    MCV 86.1 78.0 - 100.0 FL    MCH 28.8 24.0 - 34.0 PG    MCHC 33.4 31.0 - 37.0 g/dL    RDW 12.6 11.6 - 14.5 %    PLATELET 233 502 - 480 K/uL    MPV 12.2 (H) 9.2 - 11.8 FL    NRBC 0.0 0  WBC    ABSOLUTE NRBC 0.00 0.00 - 0.01 K/uL    NEUTROPHILS 68 40 - 73 %    LYMPHOCYTES 21 21 - 52 %    MONOCYTES 9 3 - 10 %    EOSINOPHILS 1 0 - 5 %    BASOPHILS 1 0 - 2 %    IMMATURE GRANULOCYTES 1 (H) 0.0 - 0.5 %    ABS. NEUTROPHILS 3.5 1.8 - 8.0 K/UL    ABS. LYMPHOCYTES 1.1 0.9 - 3.6 K/UL    ABS. MONOCYTES 0.5 0.05 - 1.2 K/UL    ABS. EOSINOPHILS 0.1 0.0 - 0.4 K/UL    ABS. BASOPHILS 0.0 0.0 - 0.1 K/UL    ABS. IMM.  GRANS. 0.0 0.00 - 0.04 K/UL    DF AUTOMATED     METABOLIC PANEL, COMPREHENSIVE    Collection Time: 12/22/21 12:05 PM   Result Value Ref Range    Sodium 125 (L) 136 - 145 mmol/L    Potassium 5.8 (H) 3.5 - 5.5 mmol/L    Chloride 91 (L) 100 - 111 mmol/L    CO2 24 21 - 32 mmol/L    Anion gap 10 3.0 - 18 mmol/L    Glucose 711 (HH) 74 - 99 mg/dL    BUN 28 (H) 7.0 - 18 MG/DL    Creatinine 1.45 (H) 0.6 - 1.3 MG/DL    BUN/Creatinine ratio 19 12 - 20      GFR est AA 60 (L) >60 ml/min/1.73m2    GFR est non-AA 49 (L) >60 ml/min/1.73m2 Calcium 9.8 8.5 - 10.1 MG/DL    Bilirubin, total 0.4 0.2 - 1.0 MG/DL    ALT (SGPT) 45 16 - 61 U/L    AST (SGOT) 30 10 - 38 U/L    Alk. phosphatase 168 (H) 45 - 117 U/L    Protein, total 7.9 6.4 - 8.2 g/dL    Albumin 4.0 3.4 - 5.0 g/dL    Globulin 3.9 2.0 - 4.0 g/dL    A-G Ratio 1.0 0.8 - 1.7     ANKLE BRACHIAL INDEX    Collection Time: 12/22/21  2:13 PM   Result Value Ref Range    Left arm  mmHg    Left posterior tibial 140 mmHg    Left dorsalis pedis BP 99 mmHg    Right arm  mmHg    Right posterior tibial 91 mmHg    Right dorsalis pedis BP 65 mmHg    Left AVILA 1.08     Right AVILA 0.70    GLUCOSE, POC    Collection Time: 12/22/21  2:32 PM   Result Value Ref Range    Glucose (POC) 452 (HH) 70 - 110 mg/dL   GLUCOSE, POC    Collection Time: 12/22/21  2:34 PM   Result Value Ref Range    Glucose (POC) 474 (HH) 70 - 110 mg/dL   GLUCOSTABILIZER    Collection Time: 12/22/21  2:44 PM   Result Value Ref Range    Glucose 452 mg/dL    Insulin order 7.8 units/hour    Insulin adminstered 7.8 units/hour    Multiplier 0.020     Low target 140 mg/dL    High target 180 mg/dL    D50 order 0.0 ml    D50 administered 0.00 ml    Minutes until next BG 60 min    Order initials kac     Administered initials ka    GLUCOSE, POC    Collection Time: 12/22/21  3:48 PM   Result Value Ref Range    Glucose (POC) 326 (H) 70 - 110 mg/dL   GLUCOSTABILIZER    Collection Time: 12/22/21  3:53 PM   Result Value Ref Range    Glucose 326 mg/dL    Insulin order 2.7 units/hour    Insulin adminstered 2.7 units/hour    Multiplier 0.010     Low target 140 mg/dL    High target 180 mg/dL    D50 order 0.0 ml    D50 administered 0.00 ml    Minutes until next BG 60 min    Order initials kac     Administered initials ka        Radiologic Studies -   ANKLE BRACHIAL INDEX           CT Results  (Last 48 hours)    None        CXR Results  (Last 48 hours)    None            Medical Decision Making   I am the first provider for this patient.     I reviewed the vital signs, available nursing notes, past medical history, past surgical history, family history and social history. Vital Signs-Reviewed the patient's vital signs. Records Reviewed: Nursing Notes, Old Medical Records, Previous Radiology Studies and Previous Laboratory Studies    Procedures:  Procedures    Provider Notes (Medical Decision Making): spoke with JESSICA Hoffmann; pt has severe PAD. Pt being referred to PM.  Will correct his BS (down to 326 and not wanting to stay longer) then plan for d/c and f/up with vascular to resume his angiogram which was cancelled. MED RECONCILIATION:  Current Facility-Administered Medications   Medication Dose Route Frequency    glucose chewable tablet 16 g  4 Tablet Oral PRN    glucagon (GLUCAGEN) injection 1 mg  1 mg IntraMUSCular PRN    dextrose (D50W) injection syrg 12.5-25 g  25-50 mL IntraVENous PRN    insulin regular (MYXREDLIN, NOVOLIN, HUMULIN) 100 units/100 ml NS infusion (premix)  0-50 Units/hr IntraVENous TITRATE     Current Outpatient Medications   Medication Sig    insulin glargine (Basaglar KwikPen U-100 Insulin) 100 unit/mL (3 mL) inpn 45 Units by SubCUTAneous route daily. INJECT 50 UNITS UNDER THE SKIN DAILY    aspirin 81 mg chewable tablet Take 1 Tablet by mouth daily for 30 days.  atorvastatin (LIPITOR) 80 mg tablet Take 1 Tablet by mouth nightly for 30 days.  clopidogreL (PLAVIX) 75 mg tab Take 1 Tablet by mouth daily for 30 days.  thiamine HCL (B-1) 100 mg tablet Take 1 Tablet by mouth daily for 30 days.  buPROPion SR (WELLBUTRIN SR) 150 mg SR tablet ONE TABLET BY MOUTH ONCE A DAY    traZODone (DESYREL) 50 mg tablet Take 1 Tablet by mouth nightly.  dulaglutide (Trulicity) 4.42 SH/4.2 mL sub-q pen INJECT 0.75 MG UNDER THE SKIN ONCE WEEKLY    simvastatin (Zocor) 20 mg tablet Take 1 Tablet by mouth nightly.     metFORMIN (GLUCOPHAGE) 1,000 mg tablet TAKE ONE TABLET BY MOUTH TWICE A DAY WITH A MEAL    lancets misc Please provide lancets for blood glucose meter insurance will cover    glucose blood VI test strips (ASCENSIA AUTODISC VI, ONE TOUCH ULTRA TEST VI) strip Provide test strip and glucose meter that insurance will cover - Twice daily-  fasting in the morning and once two hours after a meal.    flash glucose sensor (FreeStyle Ezio 2 Sensor) kit Apply 1 sensor every 14 days to check blood sugar as directed at least 3 times daily.  flash glucose scanning reader (FreeStyle Ezio 2 [place]) misc Use as directed to check blood sugar at least 3 times daily    ergocalciferol (ERGOCALCIFEROL) 1,250 mcg (50,000 unit) capsule TAKE ONE CAPSULE BY MOUTH ONCE WEEKLY  Indications: vitamin D deficiency (high dose therapy)    Insulin Needles, Disposable, 31 gauge x 5/16\" ndle Pen needles for Bydureon syringe    multivitamin (ONE A DAY) tablet Take 1 Tablet by mouth daily.  Blood-Glucose Meter monitoring kit Blood glucose kit that insurance will cover - Check blood glucose twice a day once in the morning fasting and once after a meal.       Disposition:  home    DISCHARGE NOTE:   4:02 PM    Pt has been reexamined. Patient has no new complaints, changes, or physical findings. Care plan outlined and precautions discussed. Results of labs, PVD were reviewed with the patient. All medications were reviewed with the patient. All of pt's questions and concerns were addressed. Patient was instructed and agrees to follow up with vascular surgery, as well as to return to the ED upon further deterioration. Patient is ready to go home.     Follow-up Information     Follow up With Specialties Details Why Contact Info    Wild Hoffmann PA-C Physician Assistant Schedule an appointment as soon as possible for a visit in 1 day  [address] [facility] [address]  [phone]      [facility] EMERGENCY DEPT Emergency Medicine  If symptoms worsen return immediately [address]  [phone]          Current Discharge Medication List            Core Measures:    Critical Care Time:   Critical Care Time:   I have spent 35 minutes of critical care time involved in lab review, consultations with specialist, family decision-making, and documentation. During this entire length of time I was immediately available to the patient.     Critical Care: The reason for providing this level of medical care for this critically ill patient was due a critical illness that impaired one or more vital organ systems such that there was a high probability of imminent or life threatening deterioration in the patients condition. This care involved high complexity decision making to assess, manipulate, and support vital system functions, to treat this degreee vital organ system failure and to prevent further life threatening deterioration of the patients condition. Diagnosis     Clinical Impression:   1. Peripheral arterial occlusive disease ([address]    2. Hyperglycemia    3.  Tobacco abuse

## 2021-12-22 NOTE — ED TRIAGE NOTES
After looking into chart, patient HAS seen a surgeon, had a popliteal clot, and received vascular intervention. Smokes 4 cigarettes per day and takes blood thinners.
Right foot pain. Hx of poor circulation. Right foot is cooler to touch than the left. Has not seen a vascular surgeon.
Hold oral hypoglycemics. Will check blood sugars and sliding scale coverage.

## 2021-12-23 LAB
LEFT ABI: 1.08
LEFT ARM BP: 130 MMHG
LEFT POSTERIOR TIBIAL: 140 MMHG
RIGHT ABI: 0.7
RIGHT ARM BP: 122 MMHG
RIGHT POSTERIOR TIBIAL: 91 MMHG
VAS LEFT DORSALIS PEDIS BP: 99 MMHG
VAS RIGHT DORSALIS PEDIS BP: 65 MMHG

## 2021-12-26 DIAGNOSIS — E11.40 TYPE 2 DIABETES MELLITUS WITH DIABETIC NEUROPATHY, WITH LONG-TERM CURRENT USE OF INSULIN (HCC): ICD-10-CM

## 2021-12-26 DIAGNOSIS — Z79.4 TYPE 2 DIABETES MELLITUS WITH DIABETIC NEUROPATHY, WITH LONG-TERM CURRENT USE OF INSULIN (HCC): ICD-10-CM

## 2021-12-27 RX ORDER — DULAGLUTIDE 0.75 MG/.5ML
INJECTION, SOLUTION SUBCUTANEOUS
Qty: 2 ML | Refills: 0 | Status: SHIPPED | OUTPATIENT
Start: 2021-12-27 | End: 2022-02-07

## 2022-01-04 ENCOUNTER — HOSPITAL ENCOUNTER (EMERGENCY)
Age: 62
Discharge: LEFT BEFORE DISCHARGE | End: 2022-01-05
Attending: EMERGENCY MEDICINE

## 2022-01-05 ENCOUNTER — HOSPITAL ENCOUNTER (EMERGENCY)
Age: 62
Discharge: HOME | End: 2022-01-05
Attending: STUDENT IN AN ORGANIZED HEALTH CARE EDUCATION/TRAINING PROGRAM
Payer: COMMERCIAL

## 2022-01-05 ENCOUNTER — APPOINTMENT (OUTPATIENT)
Dept: VASCULAR SURGERY | Age: 62
End: 2022-01-05
Attending: EMERGENCY MEDICINE
Payer: COMMERCIAL

## 2022-01-05 ENCOUNTER — APPOINTMENT (OUTPATIENT)
Dept: GENERAL RADIOLOGY | Age: 62
End: 2022-01-05
Attending: EMERGENCY MEDICINE
Payer: COMMERCIAL

## 2022-01-05 VITALS
HEIGHT: 69 IN | TEMPERATURE: 97.6 F | HEART RATE: 65 BPM | DIASTOLIC BLOOD PRESSURE: 78 MMHG | SYSTOLIC BLOOD PRESSURE: 105 MMHG | OXYGEN SATURATION: 97 % | WEIGHT: 170 LBS | BODY MASS INDEX: 25.18 KG/M2 | RESPIRATION RATE: 18 BRPM

## 2022-01-05 DIAGNOSIS — I73.9 PERIPHERAL VASCULAR DISEASE OF EXTREMITY WITH CLAUDICATION (HCC): ICD-10-CM

## 2022-01-05 DIAGNOSIS — L03.031 INFECTION OF NAILBED OF TOE OF RIGHT FOOT: ICD-10-CM

## 2022-01-05 DIAGNOSIS — E11.65 UNCONTROLLED TYPE 2 DIABETES MELLITUS WITH HYPERGLYCEMIA (HCC): Primary | ICD-10-CM

## 2022-01-05 LAB
ALBUMIN SERPL-MCNC: 3.6 G/DL (ref 3.4–5)
ALBUMIN/GLOB SERPL: 1 {RATIO} (ref 0.8–1.7)
ALP SERPL-CCNC: 109 U/L (ref 45–117)
ALT SERPL-CCNC: 68 U/L (ref 16–61)
ANION GAP SERPL CALC-SCNC: 10 MMOL/L (ref 3–18)
AST SERPL-CCNC: 29 U/L (ref 10–38)
BASOPHILS # BLD: 0 K/UL (ref 0–0.1)
BASOPHILS NFR BLD: 1 % (ref 0–2)
BILIRUB SERPL-MCNC: 0.7 MG/DL (ref 0.2–1)
BUN SERPL-MCNC: 27 MG/DL (ref 7–18)
BUN/CREAT SERPL: 19 (ref 12–20)
CALCIUM SERPL-MCNC: 9.4 MG/DL (ref 8.5–10.1)
CHLORIDE SERPL-SCNC: 96 MMOL/L (ref 100–111)
CO2 SERPL-SCNC: 22 MMOL/L (ref 21–32)
CREAT SERPL-MCNC: 1.42 MG/DL (ref 0.6–1.3)
DIFFERENTIAL METHOD BLD: ABNORMAL
EOSINOPHIL # BLD: 0 K/UL (ref 0–0.4)
EOSINOPHIL NFR BLD: 0 % (ref 0–5)
ERYTHROCYTE [DISTWIDTH] IN BLOOD BY AUTOMATED COUNT: 13 % (ref 11.6–14.5)
GLOBULIN SER CALC-MCNC: 3.5 G/DL (ref 2–4)
GLUCOSE SERPL-MCNC: 648 MG/DL (ref 74–99)
HCT VFR BLD AUTO: 38.2 % (ref 36–48)
HGB BLD-MCNC: 12.7 G/DL (ref 13–16)
IMM GRANULOCYTES # BLD AUTO: 0 K/UL (ref 0–0.04)
IMM GRANULOCYTES NFR BLD AUTO: 1 % (ref 0–0.5)
LYMPHOCYTES # BLD: 0.9 K/UL (ref 0.9–3.6)
LYMPHOCYTES NFR BLD: 22 % (ref 21–52)
MCH RBC QN AUTO: 28.9 PG (ref 24–34)
MCHC RBC AUTO-ENTMCNC: 33.2 G/DL (ref 31–37)
MCV RBC AUTO: 87 FL (ref 78–100)
MONOCYTES # BLD: 0.3 K/UL (ref 0.05–1.2)
MONOCYTES NFR BLD: 8 % (ref 3–10)
NEUTS SEG # BLD: 2.7 K/UL (ref 1.8–8)
NEUTS SEG NFR BLD: 69 % (ref 40–73)
NRBC # BLD: 0 K/UL (ref 0–0.01)
NRBC BLD-RTO: 0 PER 100 WBC
PLATELET # BLD AUTO: 262 K/UL (ref 135–420)
PMV BLD AUTO: 11.9 FL (ref 9.2–11.8)
POTASSIUM SERPL-SCNC: 5 MMOL/L (ref 3.5–5.5)
PROT SERPL-MCNC: 7.1 G/DL (ref 6.4–8.2)
RBC # BLD AUTO: 4.39 M/UL (ref 4.35–5.65)
SODIUM SERPL-SCNC: 128 MMOL/L (ref 136–145)
WBC # BLD AUTO: 4 K/UL (ref 4.6–13.2)

## 2022-01-05 PROCEDURE — 74011250636 HC RX REV CODE- 250/636: Performed by: PHYSICIAN ASSISTANT

## 2022-01-05 PROCEDURE — 80053 COMPREHEN METABOLIC PANEL: CPT

## 2022-01-05 PROCEDURE — 96361 HYDRATE IV INFUSION ADD-ON: CPT

## 2022-01-05 PROCEDURE — 99282 EMERGENCY DEPT VISIT SF MDM: CPT

## 2022-01-05 PROCEDURE — 93926 LOWER EXTREMITY STUDY: CPT

## 2022-01-05 PROCEDURE — 85025 COMPLETE CBC W/AUTO DIFF WBC: CPT

## 2022-01-05 PROCEDURE — 73630 X-RAY EXAM OF FOOT: CPT

## 2022-01-05 PROCEDURE — 96374 THER/PROPH/DIAG INJ IV PUSH: CPT

## 2022-01-05 PROCEDURE — 74011636637 HC RX REV CODE- 636/637: Performed by: PHYSICIAN ASSISTANT

## 2022-01-05 RX ORDER — DEXTROSE 50 % IN WATER (D50W) INTRAVENOUS SYRINGE
12.5-25 AS NEEDED
Status: DISCONTINUED | OUTPATIENT
Start: 2022-01-05 | End: 2022-01-05 | Stop reason: HOSPADM

## 2022-01-05 RX ORDER — MAGNESIUM SULFATE 100 %
16 CRYSTALS MISCELLANEOUS AS NEEDED
Status: DISCONTINUED | OUTPATIENT
Start: 2022-01-05 | End: 2022-01-05 | Stop reason: HOSPADM

## 2022-01-05 RX ORDER — CEPHALEXIN 500 MG/1
500 CAPSULE ORAL 4 TIMES DAILY
Qty: 28 CAPSULE | Refills: 0 | Status: SHIPPED | OUTPATIENT
Start: 2022-01-05 | End: 2022-01-12

## 2022-01-05 RX ADMIN — SODIUM CHLORIDE 1000 ML: 900 INJECTION, SOLUTION INTRAVENOUS at 15:52

## 2022-01-05 RX ADMIN — Medication 10 UNITS: at 15:52

## 2022-01-05 NOTE — DISCHARGE INSTRUCTIONS
Dr. Raheel Dupree wanted to see you for follow up on or around 1/12/2022. Please call their office and verify you have an appointment, or make one  Check your blood sugar more regularly, avoid excess sugar intake, drink plenty of fluids.  Your blood sugar is very elevated, and this can contribute to the pain in your leg

## 2022-01-05 NOTE — ED PROVIDER NOTES
EMERGENCY DEPARTMENT HISTORY AND PHYSICAL EXAM    Date: 1/5/2022  Patient Name: Louise Ford    History of Presenting Illness     Chief Complaint   Patient presents with    Foot Pain         History Provided By: Patient    Chief Complaint: Right foot pain  Duration: 1 month  Timing:    Location: Worse in the great toe, diffuse foot  Quality:   Severity:   Modifying Factors: Poor circulation  Associated Symptoms: none       Additional History (Context): Louise Ford is a 59 y.o. male with a history of peripheral artery disease presents for evaluation of same in the right foot. Also has some drainage from the right great toe. Pain with walking, patient states it feels like a \"block of ice\". Appears to have been scheduled for procedure with vascular surgery twice last month but has not had procedure. Asking for to get fixed today. PCP: Felix Nielsen NP    Current Facility-Administered Medications   Medication Dose Route Frequency Provider Last Rate Last Admin    dextrose (D50W) injection syrg 12.5-25 g  12.5-25 g IntraVENous PRN Reuben Mendoza PA-C        glucagon (GLUCAGEN) injection 1 mg  1 mg IntraMUSCular PRN Reuben Mendoza PA-C        glucose chewable tablet 16 g  16 g Oral PRN Sandie Coronel PA-C         Current Outpatient Medications   Medication Sig Dispense Refill    cephALEXin (Keflex) 500 mg capsule Take 1 Capsule by mouth four (4) times daily for 7 days. 28 Capsule 0    Trulicity 1.53 TW/9.8 mL sub-q pen INJECT 0.75 MG UNDER THE SKIN ONCE WEEKLY 2 mL 0    insulin glargine (Basaglar KwikPen U-100 Insulin) 100 unit/mL (3 mL) inpn 45 Units by SubCUTAneous route daily. INJECT 50 UNITS UNDER THE SKIN DAILY 30 Adjustable Dose Pre-filled Pen Syringe 1    buPROPion SR (WELLBUTRIN SR) 150 mg SR tablet ONE TABLET BY MOUTH ONCE A DAY 90 Tablet 0    traZODone (DESYREL) 50 mg tablet Take 1 Tablet by mouth nightly.  90 Tablet 1    simvastatin (Zocor) 20 mg tablet Take 1 Tablet by mouth nightly. 90 Tablet 1    metFORMIN (GLUCOPHAGE) 1,000 mg tablet TAKE ONE TABLET BY MOUTH TWICE A DAY WITH A MEAL 180 Tablet 2    lancets misc Please provide lancets for blood glucose meter insurance will cover 100 Lancet 3    glucose blood VI test strips (ASCENSIA AUTODISC VI, ONE TOUCH ULTRA TEST VI) strip Provide test strip and glucose meter that insurance will cover - Twice daily-  fasting in the morning and once two hours after a meal. 100 Strip 10    flash glucose sensor (FreeStyle Ezio 2 Sensor) kit Apply 1 sensor every 14 days to check blood sugar as directed at least 3 times daily. 2 Kit 5    flash glucose scanning reader (FreeStyle Ezio 2 Suches) misc Use as directed to check blood sugar at least 3 times daily 1 Each 0    ergocalciferol (ERGOCALCIFEROL) 1,250 mcg (50,000 unit) capsule TAKE ONE CAPSULE BY MOUTH ONCE WEEKLY  Indications: vitamin D deficiency (high dose therapy) 4 Capsule 6    Insulin Needles, Disposable, 31 gauge x 5/16\" ndle Pen needles for Bydureon syringe 100 Each 0    multivitamin (ONE A DAY) tablet Take 1 Tablet by mouth daily. 90 Tablet 3    Blood-Glucose Meter monitoring kit Blood glucose kit that insurance will cover - Check blood glucose twice a day once in the morning fasting and once after a meal. 1 Kit 0       Past History     Past Medical History:  Past Medical History:   Diagnosis Date    Alcohol use     Fri-Sun one fifth; Mon-Thur: Pint of liquor    CAD (coronary artery disease)     Cardiac angioplasty with stent 07/29/2009    2.5 x 13 Cypher stent to CX.  Cardiac cath 11/09/2011    RCA patent. LM patent. LAD patent. mD1 55%. CX patent. Prior stent patent. Edison 85% (2.5 x 15-mm Xience stent, resid 0%). LVEDP 12. EF 40-45%. High lateral hypk (RI distribution).       Cardiac inferior STEMI 2009    Constipation     Current smoker     contemplating stopping    Diabetes ([facility] [address]     type 2-insulin dependent    Diabetic neuropathy (HCC)     GERD (gastroesophageal reflux disease)     HTN (hypertension)     Hyperlipidemia     [facility] [address]     Migraine     Myocardial infarction ([facility] [address]     Vitamin D deficiency        Past Surgical History:  Past Surgical History:   Procedure Laterality Date    COLONOSCOPY N/A 10/28/2020    COLONOSCOPY w/polypectomy performed by Seamus Seaman MD at [facility] ENDOSCOPY    HX CORONARY STENT PLACEMENT  07/29/2009    HX HEART CATHETERIZATION  8/30/2011    HX HEART CATHETERIZATION  11/09/2011    HX WISDOM TEETH EXTRACTION      x4       Family History:  Family History   Problem Relation Age of Onset    Hypertension Mother     Heart Attack Mother     Diabetes Mother     Hypertension Father     Heart Attack Father     Diabetes Father     Diabetes Sister     Hypertension Sister     Stroke Sister     Diabetes Brother     Hypertension Brother     Stroke Brother     No Known Problems Maternal Grandmother     No Known Problems Maternal Grandfather     No Known Problems Paternal Grandmother     No Known Problems Paternal Grandfather     No Known Problems Brother     Heart Disease Other     Kidney Disease Other        Social History:  Social History     Tobacco Use    Smoking status: Current Every Day Smoker     Packs/day: 0.50     Years: 1.00     Pack years: 0.50     Types: Cigarettes    Smokeless tobacco: Never Used   Substance Use Topics    Alcohol use: Yes     Alcohol/week: 4.2 standard drinks     Types: 5 Shots of liquor per week     Comment: rare    Drug use: Not Currently     Types: Marijuana, Cocaine     Comment: none       Allergies: Allergies   Allergen Reactions    Niacin Itching         Review of Systems   Review of Systems   Constitutional: Negative for chills, fatigue and fever. HENT: Negative. Respiratory: Negative. Cardiovascular: Negative. Gastrointestinal: Negative. Genitourinary: Negative.     Musculoskeletal: Positive for arthralgias, gait problem and joint swelling. Skin: Positive for wound. Neurological: Positive for numbness. Negative for dizziness, weakness and headaches. Hematological: Negative. All Other Systems Negative  Physical Exam     Vitals:    01/05/22 1116 01/05/22 1435   BP: 128/66 105/78   Pulse: 87 65   Resp: 18 18   Temp: 98.4 °F (36.9 °C) 97.6 °F (36.4 °C)   SpO2: 100% 97%   Weight: 77.1 kg (170 lb)    Height: 5' 9\" (1.753 m)      Physical Exam  Constitutional:       General: He is not in acute distress. Appearance: Normal appearance. He is normal weight. He is not ill-appearing or toxic-appearing. HENT:      Head: Normocephalic and atraumatic. Mouth/Throat:      Mouth: Mucous membranes are moist.      Pharynx: Oropharynx is clear. Eyes:      Extraocular Movements: Extraocular movements intact. Conjunctiva/sclera: Conjunctivae normal.   Cardiovascular:      Rate and Rhythm: Normal rate and regular rhythm. Pulses: Normal pulses. Heart sounds: Normal heart sounds. Comments: Had to get peripheral pulse in the right foot with doppler, but present  Foot does not feel cold, but is not as warm as the rest of the leg    Pulmonary:      Effort: Pulmonary effort is normal.   Skin:     General: Skin is warm. Coloration: Skin is mottled (right lower leg, foot slightly cooler than the rest of the leg). Neurological:      General: No focal deficit present. Mental Status: He is alert. Mental status is at baseline. Gait: Gait abnormal (antalgic gait, right side). Psychiatric:         Mood and Affect: Mood normal.         Behavior: Behavior normal.         Thought Content:  Thought content normal.         Judgment: Judgment normal.           Diagnostic Study Results     Labs -     Recent Results (from the past 12 hour(s))   DUPLEX LOW EXT ARTERY RIGHT W AVILA    Collection Time: 01/05/22  2:09 PM   Result Value Ref Range    Right CFA dist sys .3 cm/s    Right Prox PFA A PSV 81.2 cm/s    Right super femoral dist sys .7 cm/s    Right super femoral mid sys PSV 69.9 cm/s    Right super femoral prox sys PSV 86.0 cm/s    Right popliteal dist sys PSV 35.3 cm/s    Right popliteal mid sys PSV 61.2 cm/s    Right popliteal prox sys .9 cm/s    Right Dist PTA PSV 38.7 cm/s    Right mid PTA sys PSV 53.0 cm/s    Right Prox PTA PSV 83.2 cm/s    Right Dist Peroneal Velocity 13.5 cm/s    Right mid peroneal sys PSV 75.4 cm/s    Right prox peroneal sys PSV 25.6 cm/s    Right Dist MICHAEL Velocity 23.4 cm/s    Right Mid IMCHAEL Velocity 57.0 cm/s    Right Prox MICHAEL Velocity 41.9 cm/s    Left arm  mmHg    Left posterior tibial 133 mmHg    Left dorsalis pedis  mmHg    Right arm  mmHg    Right posterior tibial 70 mmHg    Right dorsalis pedis BP 42 mmHg    Right SFA Prox Mookie Ratio 0.7     Right SFA Mid Mookie Ratio 0.8     Right SFA Dist Mookie Ratio 1.78     Right Pop A Prox Mookie Ratio 0.84     Right Pop A Mid Mookie Ratio 0.58     Right Pop A Dist Mookie Ratio 0.58     Left AVILA 1.04     Right AVILA 0.55    CBC WITH AUTOMATED DIFF    Collection Time: 01/05/22  2:20 PM   Result Value Ref Range    WBC 4.0 (L) 4.6 - 13.2 K/uL    RBC 4.39 4.35 - 5.65 M/uL    HGB 12.7 (L) 13.0 - 16.0 g/dL    HCT 38.2 36.0 - 48.0 %    MCV 87.0 78.0 - 100.0 FL    MCH 28.9 24.0 - 34.0 PG    MCHC 33.2 31.0 - 37.0 g/dL    RDW 13.0 11.6 - 14.5 %    PLATELET 885 239 - 588 K/uL    MPV 11.9 (H) 9.2 - 11.8 FL    NRBC 0.0 0  WBC    ABSOLUTE NRBC 0.00 0.00 - 0.01 K/uL    NEUTROPHILS 69 40 - 73 %    LYMPHOCYTES 22 21 - 52 %    MONOCYTES 8 3 - 10 %    EOSINOPHILS 0 0 - 5 %    BASOPHILS 1 0 - 2 %    IMMATURE GRANULOCYTES 1 (H) 0.0 - 0.5 %    ABS. NEUTROPHILS 2.7 1.8 - 8.0 K/UL    ABS. LYMPHOCYTES 0.9 0.9 - 3.6 K/UL    ABS. MONOCYTES 0.3 0.05 - 1.2 K/UL    ABS. EOSINOPHILS 0.0 0.0 - 0.4 K/UL    ABS. BASOPHILS 0.0 0.0 - 0.1 K/UL    ABS. IMM.  GRANS. 0.0 0.00 - 0.04 K/UL    DF AUTOMATED     METABOLIC PANEL, COMPREHENSIVE    Collection Time: 01/05/22 2:20 PM   Result Value Ref Range    Sodium 128 (L) 136 - 145 mmol/L    Potassium 5.0 3.5 - 5.5 mmol/L    Chloride 96 (L) 100 - 111 mmol/L    CO2 22 21 - 32 mmol/L    Anion gap 10 3.0 - 18 mmol/L    Glucose 648 (HH) 74 - 99 mg/dL    BUN 27 (H) 7.0 - 18 MG/DL    Creatinine 1.42 (H) 0.6 - 1.3 MG/DL    BUN/Creatinine ratio 19 12 - 20      GFR est AA >60 >60 ml/min/1.73m2    GFR est non-AA 50 (L) >60 ml/min/1.73m2    Calcium 9.4 8.5 - 10.1 MG/DL    Bilirubin, total 0.7 0.2 - 1.0 MG/DL    ALT (SGPT) 68 (H) 16 - 61 U/L    AST (SGOT) 29 10 - 38 U/L    Alk. phosphatase 109 45 - 117 U/L    Protein, total 7.1 6.4 - 8.2 g/dL    Albumin 3.6 3.4 - 5.0 g/dL    Globulin 3.5 2.0 - 4.0 g/dL    A-G Ratio 1.0 0.8 - 1.7         Radiologic Studies -   DUPLEX LOW EXT ARTERY RIGHT W AVILA         XR FOOT RT MIN 3 V   Final Result   No radiographic finding for an acute osseous process. CT Results  (Last 48 hours)    None        CXR Results  (Last 48 hours)    None            Medical Decision Making   I am the first provider for this patient. I reviewed the vital signs, available nursing notes, past medical history, past surgical history, family history and social history. Vital Signs-Reviewed the patient's vital signs. Records Reviewed: Nursing Notes and Old Medical Records     Procedures: None   Procedures    Provider Notes (Medical Decision Making):     Pending imaging. Pt has had several appts for angiogram with vascular but none completed. Reviewed current results with previous- appears to be unchanged, has monophasic flow to the DP and PT per vascular tech, no flow to two toes. This was noted on two previous scans. No new changes. Possible paronchyia/infection at the toenail laterally with mild drainage. No abscess, fluctuance at the nailbed noted currently, toe is tender to touch. No signs of osteo on imaging.      1635: Reviewing note from 12/15- vascular surgery postponed it for 1 month, pt to have an appointment in January. I attempted to call their office but answering service picked up. Pt states he doesn't know when his appointment is, but is wanting to get it fixed. Discussed with pt that he needs to work on managing his BS. Does not check at home, is non compliant with medication. Pt informed the nurses that he wanted to leave so he could go home and eat, but his BS is over 600. Is willing to stay and get IV fluids, will recheck his sugar. 1720: Pt demanded that his IV be taken out, he wants to leave. Has not yet had sugar retested. Wants to go home and eat. Also wants his paperwork. Pt needs to set up appointment with vascular for follow up. MED RECONCILIATION:  Current Facility-Administered Medications   Medication Dose Route Frequency    dextrose (D50W) injection syrg 12.5-25 g  12.5-25 g IntraVENous PRN    glucagon (GLUCAGEN) injection 1 mg  1 mg IntraMUSCular PRN    glucose chewable tablet 16 g  16 g Oral PRN     Current Outpatient Medications   Medication Sig    cephALEXin (Keflex) 500 mg capsule Take 1 Capsule by mouth four (4) times daily for 7 days.  Trulicity 8.14 BP/7.0 mL sub-q pen INJECT 0.75 MG UNDER THE SKIN ONCE WEEKLY    insulin glargine (Basaglar KwikPen U-100 Insulin) 100 unit/mL (3 mL) inpn 45 Units by SubCUTAneous route daily. INJECT 50 UNITS UNDER THE SKIN DAILY    buPROPion SR (WELLBUTRIN SR) 150 mg SR tablet ONE TABLET BY MOUTH ONCE A DAY    traZODone (DESYREL) 50 mg tablet Take 1 Tablet by mouth nightly.  simvastatin (Zocor) 20 mg tablet Take 1 Tablet by mouth nightly.     metFORMIN (GLUCOPHAGE) 1,000 mg tablet TAKE ONE TABLET BY MOUTH TWICE A DAY WITH A MEAL    lancets misc Please provide lancets for blood glucose meter insurance will cover    glucose blood VI test strips (ASCENSIA AUTODISC VI, ONE TOUCH ULTRA TEST VI) strip Provide test strip and glucose meter that insurance will cover - Twice daily-  fasting in the morning and once two hours after a meal.    flash glucose sensor (FreeStyle Ezio 2 Sensor) kit Apply 1 sensor every 14 days to check blood sugar as directed at least 3 times daily.  flash glucose scanning reader (FreeStyle Ezio 2 [place]) misc Use as directed to check blood sugar at least 3 times daily    ergocalciferol (ERGOCALCIFEROL) 1,250 mcg (50,000 unit) capsule TAKE ONE CAPSULE BY MOUTH ONCE WEEKLY  Indications: vitamin D deficiency (high dose therapy)    Insulin Needles, Disposable, 31 gauge x 5/16\" ndle Pen needles for Bydureon syringe    multivitamin (ONE A DAY) tablet Take 1 Tablet by mouth daily.  Blood-Glucose Meter monitoring kit Blood glucose kit that insurance will cover - Check blood glucose twice a day once in the morning fasting and once after a meal.       Disposition:  Home     DISCHARGE NOTE:   Pt has been reexamined. Patient has no new complaints, changes, or physical findings. Care plan outlined and precautions discussed. Results of workup were reviewed with the patient. All medications were reviewed with the patient. All of pt's questions and concerns were addressed. Patient was instructed and agrees to follow up with PCP as well as to return to the ED upon further deterioration. Patient is ready to go home. Follow-up Information     Follow up With Specialties Details Why Contact Info    Joselyn Babcock MD Vascular Surgery Schedule an appointment as soon as possible for a visit   [address] [facility] [address]  [phone]      [facility] EMERGENCY DEPT Emergency Medicine  If symptoms worsen [address] [facility] [address]  [phone]          Current Discharge Medication List      START taking these medications    Details   cephALEXin (Keflex) 500 mg capsule Take 1 Capsule by mouth four (4) times daily for 7 days. Qty: 28 Capsule, Refills: 0  Start date: 1/5/2022, End date: 1/12/2022                 Diagnosis     Clinical Impression:   1.  Uncontrolled type 2 diabetes mellitus with hyperglycemia ([address]    2. Peripheral vascular disease of extremity with claudication ([address]    3. Infection of nailbed of toe of right foot          \"Please note that this dictation was completed with [employer], the computer voice recognition software. Quite often unanticipated grammatical, syntax, homophones, and other interpretive errors are inadvertently transcribed by the computer software. Please disregard these errors. Please excuse any errors that have escaped final proofreading. \"

## 2022-01-05 NOTE — ED NOTES
C/o of right foot pain. May have paronychia but he says his pain is all over and has severe popliteal peripheral arterial disease. I performed a brief evaluation, including history and physical, of the patient here in triage and I have determined that pt will need further treatment and evaluation from the main side ER physician. I have placed initial orders to help in expediting patients care.      January 05, 2022 at [address] [facility] JESSICA Scott        Visit Vitals  /66   Pulse 87   Temp 98.4 °F (36.9 °C)   Resp 18   Ht 5' 9\" (1.753 m)   Wt 77.1 kg (170 lb)   SpO2 100%   BMI 25.10 kg/m²

## 2022-01-06 ENCOUNTER — TELEPHONE (OUTPATIENT)
Dept: VASCULAR SURGERY | Age: 62
End: 2022-01-06

## 2022-01-06 LAB
LEFT ABI: 1.04
LEFT ARM BP: 126 MMHG
LEFT POSTERIOR TIBIAL: 133 MMHG
RIGHT ABI: 0.55
RIGHT ARM BP: 128 MMHG
RIGHT CFA DIST SYS PSV: 118.3 CM/S
RIGHT DIST ATA VELOCITY: 23.4 CM/S
RIGHT DIST PTA PSV: 38.7 CM/S
RIGHT MID ATA VELOCITY: 57 CM/S
RIGHT PERONEAL DIST VELOCITY: 13.5 CM/S
RIGHT PERONEAL MID SYS PSV: 75.4 CM/S
RIGHT PERONEAL PROX SYS PSV: 25.6 CM/S
RIGHT POP A DIST VEL RATIO: 0.58
RIGHT POP A MID VEL RATIO: 0.58
RIGHT POP A PROX VEL RATIO: 0.84
RIGHT POPLITEAL DIST SYS PSV: 35.3 CM/S
RIGHT POPLITEAL MID SYS PSV: 61.2 CM/S
RIGHT POPLITEAL PROX SYS PSV: 104.9 CM/S
RIGHT POSTERIOR TIBIAL: 70 MMHG
RIGHT PROX ATA VELOCITY: 41.9 CM/S
RIGHT PROX PFA A PSV: 81.2 CM/S
RIGHT PROX PTA PSV: 83.2 CM/S
RIGHT PTA MID SYS PSV: 53 CM/S
RIGHT SFA DIST VEL RATIO: 1.78
RIGHT SFA MID VEL RATIO: 0.8
RIGHT SFA PROX VEL RATIO: 0.7
RIGHT SUPER FEMORAL DIST SYS PSV: 124.7 CM/S
RIGHT SUPER FEMORAL MID SYS PSV: 69.9 CM/S
RIGHT SUPER FEMORAL PROX SYS PSV: 86 CM/S
VAS LEFT DORSALIS PEDIS BP: 118 MMHG
VAS RIGHT DORSALIS PEDIS BP: 42 MMHG

## 2022-01-06 NOTE — TELEPHONE ENCOUNTER
----- Message from [address] sent at 1/6/2022 11:50 AM EST -----  Pt called and does not understand why his surgeries keep getting cancelled, wants to know why he needs to come in for apts again, expresses that he is getting no satisfaction. Please advise.    [phone]

## 2022-01-06 NOTE — TELEPHONE ENCOUNTER
Called and left message regarding surgery advised to call back. Discussed pt with Dr. Yousif Arreguin and Manny Case the PA and pt needs to have right leg angio due to pt had study in the ED yesterday and showed significant change in blood flow. Gave to veronica to schedule . Contacted pt and let him know .

## 2022-01-07 ENCOUNTER — PATIENT MESSAGE (OUTPATIENT)
Dept: NEUROLOGY | Age: 62
End: 2022-01-07

## 2022-01-11 ENCOUNTER — HOSPITAL ENCOUNTER (OUTPATIENT)
Age: 62
Setting detail: OUTPATIENT SURGERY
Discharge: HOME | End: 2022-01-11
Attending: STUDENT IN AN ORGANIZED HEALTH CARE EDUCATION/TRAINING PROGRAM | Admitting: STUDENT IN AN ORGANIZED HEALTH CARE EDUCATION/TRAINING PROGRAM
Payer: COMMERCIAL

## 2022-01-11 VITALS
HEART RATE: 74 BPM | OXYGEN SATURATION: 100 % | BODY MASS INDEX: 23.7 KG/M2 | SYSTOLIC BLOOD PRESSURE: 112 MMHG | RESPIRATION RATE: 16 BRPM | DIASTOLIC BLOOD PRESSURE: 68 MMHG | WEIGHT: 160 LBS | HEIGHT: 69 IN

## 2022-01-11 DIAGNOSIS — I73.9 PAD (PERIPHERAL ARTERY DISEASE) (HCC): ICD-10-CM

## 2022-01-11 DIAGNOSIS — I70.261 ATHEROSCLEROSIS OF NATIVE ARTERY OF RIGHT LOWER EXTREMITY WITH GANGRENE (HCC): ICD-10-CM

## 2022-01-11 DIAGNOSIS — I70.261 ATHEROSCLEROSIS OF NATIVE ARTERY OF RIGHT LOWER EXTREMITY WITH GANGRENE (HCC): Primary | ICD-10-CM

## 2022-01-11 LAB
ACT BLD: 231 SECS (ref 79–138)
CA-I BLD-MCNC: 1.25 MMOL/L (ref 1.12–1.32)
CHLORIDE BLD-SCNC: 93 MMOL/L (ref 100–108)
CREAT UR-MCNC: 1.1 MG/DL (ref 0.6–1.3)
GLUCOSE BLD STRIP.AUTO-MCNC: 509 MG/DL (ref 74–106)
POTASSIUM BLD-SCNC: 4 MMOL/L (ref 3.5–5.5)
SODIUM BLD-SCNC: 132 MMOL/L (ref 136–145)

## 2022-01-11 PROCEDURE — C1725 CATH, TRANSLUMIN NON-LASER: HCPCS | Performed by: STUDENT IN AN ORGANIZED HEALTH CARE EDUCATION/TRAINING PROGRAM

## 2022-01-11 PROCEDURE — C1760 CLOSURE DEV, VASC: HCPCS | Performed by: STUDENT IN AN ORGANIZED HEALTH CARE EDUCATION/TRAINING PROGRAM

## 2022-01-11 PROCEDURE — C1894 INTRO/SHEATH, NON-LASER: HCPCS | Performed by: STUDENT IN AN ORGANIZED HEALTH CARE EDUCATION/TRAINING PROGRAM

## 2022-01-11 PROCEDURE — C1769 GUIDE WIRE: HCPCS | Performed by: STUDENT IN AN ORGANIZED HEALTH CARE EDUCATION/TRAINING PROGRAM

## 2022-01-11 PROCEDURE — 37228 HC PTA TIB/PER UNI INIT: CPT | Performed by: STUDENT IN AN ORGANIZED HEALTH CARE EDUCATION/TRAINING PROGRAM

## 2022-01-11 PROCEDURE — 76937 US GUIDE VASCULAR ACCESS: CPT | Performed by: STUDENT IN AN ORGANIZED HEALTH CARE EDUCATION/TRAINING PROGRAM

## 2022-01-11 PROCEDURE — 74011000250 HC RX REV CODE- 250: Performed by: STUDENT IN AN ORGANIZED HEALTH CARE EDUCATION/TRAINING PROGRAM

## 2022-01-11 PROCEDURE — 75625 CONTRAST EXAM ABDOMINL AORTA: CPT | Performed by: STUDENT IN AN ORGANIZED HEALTH CARE EDUCATION/TRAINING PROGRAM

## 2022-01-11 PROCEDURE — 77030004561 HC CATH ANGI DX COBRA ANGI -B: Performed by: STUDENT IN AN ORGANIZED HEALTH CARE EDUCATION/TRAINING PROGRAM

## 2022-01-11 PROCEDURE — 77030013744: Performed by: STUDENT IN AN ORGANIZED HEALTH CARE EDUCATION/TRAINING PROGRAM

## 2022-01-11 PROCEDURE — 85347 COAGULATION TIME ACTIVATED: CPT | Performed by: STUDENT IN AN ORGANIZED HEALTH CARE EDUCATION/TRAINING PROGRAM

## 2022-01-11 PROCEDURE — 99153 MOD SED SAME PHYS/QHP EA: CPT | Performed by: STUDENT IN AN ORGANIZED HEALTH CARE EDUCATION/TRAINING PROGRAM

## 2022-01-11 PROCEDURE — 99152 MOD SED SAME PHYS/QHP 5/>YRS: CPT | Performed by: STUDENT IN AN ORGANIZED HEALTH CARE EDUCATION/TRAINING PROGRAM

## 2022-01-11 PROCEDURE — 85347 COAGULATION TIME ACTIVATED: CPT

## 2022-01-11 PROCEDURE — 74011636637 HC RX REV CODE- 636/637: Performed by: STUDENT IN AN ORGANIZED HEALTH CARE EDUCATION/TRAINING PROGRAM

## 2022-01-11 PROCEDURE — 80047 BASIC METABLC PNL IONIZED CA: CPT

## 2022-01-11 PROCEDURE — C1887 CATHETER, GUIDING: HCPCS | Performed by: STUDENT IN AN ORGANIZED HEALTH CARE EDUCATION/TRAINING PROGRAM

## 2022-01-11 PROCEDURE — 75710 ARTERY X-RAYS ARM/LEG: CPT | Performed by: STUDENT IN AN ORGANIZED HEALTH CARE EDUCATION/TRAINING PROGRAM

## 2022-01-11 PROCEDURE — 74011250636 HC RX REV CODE- 250/636: Performed by: STUDENT IN AN ORGANIZED HEALTH CARE EDUCATION/TRAINING PROGRAM

## 2022-01-11 PROCEDURE — 74011000636 HC RX REV CODE- 636: Performed by: STUDENT IN AN ORGANIZED HEALTH CARE EDUCATION/TRAINING PROGRAM

## 2022-01-11 DEVICE — ANGIO-SEAL VIP VASCULAR CLOSURE DEVICE
Type: IMPLANTABLE DEVICE | Status: FUNCTIONAL
Brand: ANGIO-SEAL

## 2022-01-11 RX ORDER — FENTANYL CITRATE 50 UG/ML
INJECTION, SOLUTION INTRAMUSCULAR; INTRAVENOUS AS NEEDED
Status: DISCONTINUED | OUTPATIENT
Start: 2022-01-11 | End: 2022-01-11 | Stop reason: HOSPADM

## 2022-01-11 RX ORDER — HEPARIN SODIUM 200 [USP'U]/100ML
INJECTION, SOLUTION INTRAVENOUS
Status: COMPLETED | OUTPATIENT
Start: 2022-01-11 | End: 2022-01-11

## 2022-01-11 RX ORDER — HEPARIN SODIUM 1000 [USP'U]/ML
INJECTION, SOLUTION INTRAVENOUS; SUBCUTANEOUS AS NEEDED
Status: DISCONTINUED | OUTPATIENT
Start: 2022-01-11 | End: 2022-01-11 | Stop reason: HOSPADM

## 2022-01-11 RX ORDER — MIDAZOLAM HYDROCHLORIDE 1 MG/ML
INJECTION, SOLUTION INTRAMUSCULAR; INTRAVENOUS AS NEEDED
Status: DISCONTINUED | OUTPATIENT
Start: 2022-01-11 | End: 2022-01-11 | Stop reason: HOSPADM

## 2022-01-11 RX ORDER — PROTAMINE SULFATE 10 MG/ML
INJECTION, SOLUTION INTRAVENOUS AS NEEDED
Status: DISCONTINUED | OUTPATIENT
Start: 2022-01-11 | End: 2022-01-11 | Stop reason: HOSPADM

## 2022-01-11 RX ORDER — LIDOCAINE HYDROCHLORIDE 10 MG/ML
INJECTION, SOLUTION EPIDURAL; INFILTRATION; INTRACAUDAL; PERINEURAL AS NEEDED
Status: DISCONTINUED | OUTPATIENT
Start: 2022-01-11 | End: 2022-01-11 | Stop reason: HOSPADM

## 2022-01-11 RX ADMIN — Medication 10 UNITS: at 08:21

## 2022-01-11 NOTE — Clinical Note
TRANSFER - OUT REPORT:     Verbal report given to: Bethany Hammonds RN. Report consisted of patient's Situation, Background, Assessment and   Recommendations(SBAR). Opportunity for questions and clarification was provided. Patient transported with a Registered Nurse. Patient transported to: holding area.

## 2022-01-11 NOTE — DISCHARGE INSTRUCTIONS
HEART CATHETERIZATION/ANGIOGRAPHY DISCHARGE INSTRUCTIONS    1. Check puncture site frequently for swelling or bleeding. If there is any bleeding, lie down and apply pressure over the area with a clean towel or washcloth. Notify your doctor for any redness, swelling, drainage, or oozing from the puncture site. Notify your doctor for any fever or chills. 2. If the extremity becomes cold, numb, or painful call Dr. Erika Reyanga 3. Activity should be limited for the next 48 hours. Climb stairs as little as possible and avoid any stooping, bending, or strenuous activity for 48 hours. No heavy lifting (anything over 10 pounds) for 3 days. 4. You may resume your usual diet. Drink more fluids than usual.  5. Have a responsible person drive you home and stay with you for at least 24 hours after your heart catheterization/angiography. You may remove bandage from your Left and Groin  DISCHARGE SUMMARY from Nurse    PATIENT INSTRUCTIONS:    After general anesthesia or intravenous sedation, for 24 hours or while taking prescription Narcotics:  · Limit your activities  · Do not drive and operate hazardous machinery  · Do not make important personal or business decisions  · Do  not drink alcoholic beverages  · If you have not urinated within 8 hours after discharge, please contact your surgeon on call.     Report the following to your surgeon:  · Excessive pain, swelling, redness or odor of or around the surgical area  · Temperature over 100.5  · Nausea and vomiting lasting longer than 4 hours or if unable to take medications  · Any signs of decreased circulation or nerve impairment to extremity: change in color, persistent  numbness, tingling, coldness or increase pain  · Any questions    What to do at Home:  Recommended activity: Activity as tolerated and no driving for today,     These are general instructions for a healthy lifestyle:    No smoking/ No tobacco products/ Avoid exposure to second hand smoke  Surgeon Claudia Marcano Warning:  Quitting smoking now greatly reduces serious risk to your health. Obesity, smoking, and sedentary lifestyle greatly increases your risk for illness    A healthy diet, regular physical exercise & weight monitoring are important for maintaining a healthy lifestyle    You may be retaining fluid if you have a history of heart failure or if you experience any of the following symptoms:  Weight gain of 3 pounds or more overnight or 5 pounds in a week, increased swelling in our hands or feet or shortness of breath while lying flat in bed. Please call your doctor as soon as you notice any of these symptoms; do not wait until your next office visit. The discharge information has been reviewed with the patient. The patient verbalized understanding. Discharge medications reviewed with the patient and appropriate educational materials and side effects teaching were provided. 6. ___________________________________________________________________________________________________________________________________ in 24 hours. You may shower in 24 hours. No tub baths, hot tubs, or swimming for 1 week. Do not place any lotions, creams, powders, or ointments over puncture site for 1 week. You may place a clean band-aid over the puncture site each day for 5 days. Change daily. I have read the above instructions and have had the opportunity to ask questions.       Patient: ________________________   Date: 1/11/2022    Witness: _______________________   Date: 1/11/2022

## 2022-01-11 NOTE — Clinical Note
Contrast Dose Calculator:   Patient's age: 59.   Patient's sex: Male. Patient weight (kg) = 73. Creatinine level (mg/dL) = 1.1. Creatinine clearance (mL/min): 70.97. Contrast concentration (mg/mL) = 300. MACD = 300 mL. Max Contrast dose per Creatinine Cl calculator = 159.69 mL.

## 2022-01-11 NOTE — Clinical Note
Sheath #1: sheath exchanged for [facility] W/JORDAN [id number] -- BRITE TIP - ORDER AS EA.

## 2022-01-11 NOTE — Clinical Note
TRANSFER - IN REPORT:     Verbal report received from: Dannie Haas RN. Report consisted of patient's Situation, Background, Assessment and   Recommendations(SBAR). Opportunity for questions and clarification was provided. Assessment completed upon patient's arrival to unit and care assumed. Patient transported with a Registered Nurse.

## 2022-01-11 NOTE — PROGRESS NOTES
Dr. Bibi Salazar called and post angio, requesting bilateral vein mapping for surgical planning . Bilateral vein mapping placed per verbal order from Dr. Bibi Salazar.

## 2022-01-11 NOTE — Clinical Note
Vessel: left iliac, CFA, PFA, SFA, popliteal, PTA, peroneal, MICHAEL and dorsalis pedis. Hand injection to the artery. Multiple views taken.  5cc injections

## 2022-01-11 NOTE — OP NOTES
OPERATIVE NOTE    DATE OF PROCEDURE: 1/11/22    SURGEON: Erwin Guerrero MD    ASSISTANT(S): None    PREOPERATIVE DIAGNOSIS: Ischemic right foot with rest pain  POSTOPERATIVE DIAGNOSIS: same    PROCEDURE PERFORMED: US Guided left femoral access, aortogram, selective catheterization of right iliac artery, selective catheterization of right SFA, selective catheterization of posterior tibial artery, balloon angioplasty of right posterior tibial artery with 2mm balloon    ANESTHESIA: Moderate Sedation    EBL: Minimal    FLUIDS: Minimal    BLOOD ADMINISTERED: None    DRAINS/TUBES: None    IMPLANTS: None    COMPLICATIONS: None    FINDINGS: Occluded runoff with reconstitution of PT just above the ankle    OPERATIVE INDICATIONS: Ischemic rest pain     DESCRIPTION OF PROCEDURE:      The patient was brought to the cath lab after the risks and benefits of the procedure were explained to the patient. Time out was performed and moderate sedation was administered. Procedure began with US guided left common femoral access with a micro needle, micro wire was advanced after arterial blood return. Micro sheath was placed and a MyScreen wire was advanced to the aorta. Next an omniflush catheter was placed in the aorta and aortogram was performed which showed patent and disease free aorta, bilateral ROSLYN and EIA. I then selected the right external iliac and advanced the omniflush catheter into the right external iliac artery. I then performed femoral subtracted run which showed patent and disease free common femoral with patent main profunda and SFA. I then performed lower extremity angiogram which showed minimal collateralization of the geniculates and patent popliteal artery with patent origin of right PT which occludes mid calf. I next administered sytemic heparin and placed a 6fr x 70 sheath into the distal SFA over the wire. I next used a quick cross and a 0.014 thruway which allowed me to cross the lesion.  I next perfomed a 2mm x 220 balloon angioplasty of the PT which had no improvement on the occlusion. I perfomed series of ankle run and distal calf run and identified a posterior tibial artery just above the ankle. I contemplated further ballooning however at this point the next best option would be a popliteal to posterior tibial artery bypass if patient has usable GSV. Next I withdrew the sheath into the left external iliac artery over the wire and performed an access run which showed good caliber vessel and access for angioseal closure. I then used a 6fr angioseal to close the left femoral artery. Patient continued to have distal pulses on the left. Protamine was administered for an ACT of 230.       Lilly Milligan MD

## 2022-01-11 NOTE — PROGRESS NOTES
TRANSFER - IN REPORT:    Verbal report received from [facility]  on [address]  being received from Vascular lab  for routine post - op      Report consisted of patients Situation, Background, Assessment and   Recommendations(SBAR). Information from the following report(s) SBAR, Procedure Summary and MAR was reviewed with the receiving nurse. Opportunity for questions and clarification was provided. Assessment completed upon patients arrival to unit and care assumed.

## 2022-01-11 NOTE — PROGRESS NOTES
TRANSFER - OUT REPORT:    Verbal report given to [facility]  on Nadya Jacobs  being transferred to [facility] for ordered procedure       Report consisted of patients Situation, Background, Assessment and   Recommendations(SBAR). Information from the following report(s) SBAR, Procedure Summary and MAR was reviewed with the receiving nurse. Lines:       Opportunity for questions and clarification was provided.       Patient transported with:   [email or web address]

## 2022-01-11 NOTE — Clinical Note
Sheath #1: Sheath: inserted. Sheath inserted/placed in the left femoral  artery.  Micropuncture sheath inserted

## 2022-01-11 NOTE — H&P
Vascular Surgery H&P      Patient: Ky Monreal MRN: [id number]  CSN: [id number]      YOB: 1960    Age: 59 y.o. Sex: male      DOA: 1/11/2022       HPI:     Ky Monreal is a 59 y.o. male who is here for right lower extremity angiogram. Patient has been suffering with right lower extremity rest pain and has undergone diagnostic right leg angiogram on 11/30/21 at which point there was no named vessel below the knee and total popliteal occlusion. I reviewed the images and the patient is in severe pain. My options at this point is to see if I can identify any vessels that are open below the knee to revascularize either endovascularly or via a bypass. If no vessels are identified then his next option would be an amputation for resolution of the rest pain. Patient understands the risks and the benefits and I have very clearly stated the above options. On my examination the right foot is cold with ischemic changes noted on the toes, he has motor loss of his toes but sensory is intact in the foot. No dopplerable foot pulses in the right foot. Past Medical History:   Diagnosis Date    Alcohol use     Fri-Sun one fifth; Mon-Thur: Pint of liquor    CAD (coronary artery disease)     Cardiac angioplasty with stent 07/29/2009    2.5 x 13 Cypher stent to CX.  Cardiac cath 11/09/2011    RCA patent. LM patent. LAD patent. mD1 55%. CX patent. Prior stent patent. Edison 85% (2.5 x 15-mm Xience stent, resid 0%). LVEDP 12. EF 40-45%. High lateral hypk (RI distribution).       Cardiac inferior STEMI 2009    Constipation     Current smoker     contemplating stopping    Diabetes ([facility] [address]     type 2-insulin dependent    Diabetic neuropathy (HCC)     GERD (gastroesophageal reflux disease)     HTN (hypertension)     Hyperlipidemia     Lacunar infarction ([facility] [address]     Migraine     Myocardial infarction ([facility] [address]     Vitamin D deficiency        Past Surgical History:   Procedure Laterality Date  COLONOSCOPY N/A 10/28/2020    COLONOSCOPY w/polypectomy performed by Giovany Hwang MD at [facility] ENDOSCOPY    HX CORONARY STENT PLACEMENT  07/29/2009    HX HEART CATHETERIZATION  8/30/2011    HX HEART CATHETERIZATION  11/09/2011    HX WISDOM TEETH EXTRACTION      x4       Family History   Problem Relation Age of Onset    Hypertension Mother     Heart Attack Mother     Diabetes Mother     Hypertension Father     Heart Attack Father     Diabetes Father     Diabetes Sister     Hypertension Sister     Stroke Sister     Diabetes Brother     Hypertension Brother     Stroke Brother     No Known Problems Maternal Grandmother     No Known Problems Maternal Grandfather     No Known Problems Paternal Grandmother     No Known Problems Paternal Grandfather     No Known Problems Brother     Heart Disease Other     Kidney Disease Other        Social History     Socioeconomic History    Marital status: SINGLE   Tobacco Use    Smoking status: Current Every Day Smoker     Packs/day: 0.50     Years: 1.00     Pack years: 0.50     Types: Cigarettes    Smokeless tobacco: Never Used   Substance and Sexual Activity    Alcohol use: Yes     Alcohol/week: 4.2 standard drinks     Types: 5 Shots of liquor per week     Comment: rare    Drug use: Not Currently     Types: Marijuana, Cocaine     Comment: none    Sexual activity: Yes   Social History Narrative     at [email or web address]. Prior to Admission medications    Medication Sig Start Date End Date Taking? Authorizing Provider   cephALEXin (Keflex) 500 mg capsule Take 1 Capsule by mouth four (4) times daily for 7 days. 1/5/22 1/12/22 Yes Reuben Aguero, WHITNEY   Trulicity 3.51 EI/1.9 mL sub-q pen INJECT 0.75 MG UNDER THE SKIN ONCE WEEKLY 12/27/21  Yes Ramesh Cancino B, NP   insulin glargine (Basaglar KwikPen U-100 Insulin) 100 unit/mL (3 mL) inpn 45 Units by SubCUTAneous route daily.  INJECT 50 UNITS UNDER THE SKIN DAILY 12/1/21 Yes Michele Gomez MD   buPROPion SR [facility] SR) 150 mg SR tablet ONE TABLET BY MOUTH ONCE A DAY 11/24/21  Yes Valerie ANAYA NP   traZODone (DESYREL) 50 mg tablet Take 1 Tablet by mouth nightly. 11/1/21  Yes Valerie ANAYA NP   simvastatin (Zocor) 20 mg tablet Take 1 Tablet by mouth nightly. 11/1/21  Yes Joshua Villalobos NP   metFORMIN (GLUCOPHAGE) 1,000 mg tablet TAKE ONE TABLET BY MOUTH TWICE A DAY WITH A MEAL 11/1/21  Yes Joshua Villalobos NP   lancets misc Please provide lancets for blood glucose meter insurance will cover 11/1/21  Yes Valerie ANAYA NP   glucose blood VI test strips (ASCENSIA AUTODISC VI, ONE TOUCH ULTRA TEST VI) strip Provide test strip and glucose meter that insurance will cover - Twice daily-  fasting in the morning and once two hours after a meal. 11/1/21  Yes Valerie ANAYA NP   flash glucose sensor (FreeStyle Ezio 2 Sensor) kit Apply 1 sensor every 14 days to check blood sugar as directed at least 3 times daily. 11/1/21  Yes Valerie ANAYA NP   flash glucose scanning reader (FreeStyle Ezio 2 [place]) misc Use as directed to check blood sugar at least 3 times daily 11/1/21  Yes Valerie ANAYA NP   ergocalciferol (ERGOCALCIFEROL) 1,250 mcg (50,000 unit) capsule TAKE ONE CAPSULE BY MOUTH ONCE WEEKLY  Indications: vitamin D deficiency (high dose therapy) 11/1/21  Yes Valerie ANAYA NP   Insulin Needles, Disposable, 31 gauge x 5/16\" ndle Pen needles for Bydureon syringe 11/1/21  Yes Valerie ANAYA NP   multivitamin (ONE A DAY) tablet Take 1 Tablet by mouth daily.  11/1/21  Yes Joshua Villalobos NP   Blood-Glucose Meter monitoring kit Blood glucose kit that insurance will cover - Check blood glucose twice a day once in the morning fasting and once after a meal. 5/5/20  Yes Joshua Villalobos NP       Allergies   Allergen Reactions    Niacin Itching       Review of Systems  Review of Systems - Negative except HPI      Physical Exam:      Visit Vitals  /74   Pulse 72   Resp 28   Ht 5' 9\" (1.753 m)   Wt 160 lb (72.6 kg)   SpO2 99%   BMI 23.63 kg/m²       Physical Exam:  Pertinent items are noted in HPI. Data Review:    CBC:   Lab Results   Component Value Date/Time    WBC 4.0 (L) 01/05/2022 02:20 PM    RBC 4.39 01/05/2022 02:20 PM    HGB 12.7 (L) 01/05/2022 02:20 PM    HCT 38.2 01/05/2022 02:20 PM    PLATELET 578 68/51/1290 02:20 PM      BMP:   Lab Results   Component Value Date/Time    Glucose 648 (HH) 01/05/2022 02:20 PM    Sodium 128 (L) 01/05/2022 02:20 PM    Potassium 5.0 01/05/2022 02:20 PM    Chloride 96 (L) 01/05/2022 02:20 PM    CO2 22 01/05/2022 02:20 PM    BUN 27 (H) 01/05/2022 02:20 PM    Creatinine 1.42 (H) 01/05/2022 02:20 PM    Calcium 9.4 01/05/2022 02:20 PM         Assessment/Plan     57yo M here for right leg angiogram with possible revascularization with moderate sedation    Active Problems:    * No active hospital problems.  Alla Cortez MD  January 11, 2022

## 2022-01-14 ENCOUNTER — HOSPITAL ENCOUNTER (OUTPATIENT)
Dept: LAB | Age: 62
Discharge: HOME | End: 2022-01-14
Payer: COMMERCIAL

## 2022-01-14 ENCOUNTER — OFFICE VISIT (OUTPATIENT)
Dept: VASCULAR SURGERY | Age: 62
End: 2022-01-14

## 2022-01-14 VITALS
DIASTOLIC BLOOD PRESSURE: 70 MMHG | BODY MASS INDEX: 23.71 KG/M2 | HEART RATE: 83 BPM | HEIGHT: 69 IN | SYSTOLIC BLOOD PRESSURE: 130 MMHG | WEIGHT: 160.05 LBS | OXYGEN SATURATION: 99 %

## 2022-01-14 DIAGNOSIS — I73.9 PAD (PERIPHERAL ARTERY DISEASE) (HCC): ICD-10-CM

## 2022-01-14 DIAGNOSIS — I73.9 PAD (PERIPHERAL ARTERY DISEASE) (HCC): Primary | ICD-10-CM

## 2022-01-14 LAB
ALBUMIN SERPL-MCNC: 3.1 G/DL (ref 3.4–5)
ALBUMIN/GLOB SERPL: 1 {RATIO} (ref 0.8–1.7)
ALP SERPL-CCNC: 102 U/L (ref 45–117)
ALT SERPL-CCNC: 29 U/L (ref 16–61)
ANION GAP SERPL CALC-SCNC: 8 MMOL/L (ref 3–18)
APTT PPP: 22.3 SEC (ref 23–36.4)
AST SERPL-CCNC: 20 U/L (ref 10–38)
BASOPHILS # BLD: 0 K/UL (ref 0–0.1)
BASOPHILS NFR BLD: 1 % (ref 0–2)
BILIRUB SERPL-MCNC: 0.2 MG/DL (ref 0.2–1)
BUN SERPL-MCNC: 12 MG/DL (ref 7–18)
BUN/CREAT SERPL: 11 (ref 12–20)
CALCIUM SERPL-MCNC: 7.9 MG/DL (ref 8.5–10.1)
CHLORIDE SERPL-SCNC: 106 MMOL/L (ref 100–111)
CO2 SERPL-SCNC: 26 MMOL/L (ref 21–32)
CREAT SERPL-MCNC: 1.06 MG/DL (ref 0.6–1.3)
DIFFERENTIAL METHOD BLD: ABNORMAL
EOSINOPHIL # BLD: 0 K/UL (ref 0–0.4)
EOSINOPHIL NFR BLD: 1 % (ref 0–5)
ERYTHROCYTE [DISTWIDTH] IN BLOOD BY AUTOMATED COUNT: 13 % (ref 11.6–14.5)
GLOBULIN SER CALC-MCNC: 3 G/DL (ref 2–4)
GLUCOSE SERPL-MCNC: 292 MG/DL (ref 74–99)
HCT VFR BLD AUTO: 33.4 % (ref 36–48)
HGB BLD-MCNC: 11 G/DL (ref 13–16)
IMM GRANULOCYTES # BLD AUTO: 0 K/UL (ref 0–0.04)
IMM GRANULOCYTES NFR BLD AUTO: 0 % (ref 0–0.5)
INR PPP: 1 (ref 0.8–1.2)
LEFT GSV ANKLE DIAM: 0.21 CM
LEFT GSV AT KNEE DIAM: 0.2 CM
LEFT GSV BK MID DIAM: 0.13 CM
LEFT GSV BK PROX DIAM: 0.12 CM
LEFT GSV JUNC DIAM: 0.68 CM
LEFT GSV THIGH DIST DIAM: 0.21 CM
LEFT GSV THIGH MID DIAM: 0.2 CM
LEFT GSV THIGH PROX DIAM: 0.19 CM
LYMPHOCYTES # BLD: 0.9 K/UL (ref 0.9–3.6)
LYMPHOCYTES NFR BLD: 22 % (ref 21–52)
MCH RBC QN AUTO: 29.6 PG (ref 24–34)
MCHC RBC AUTO-ENTMCNC: 32.9 G/DL (ref 31–37)
MCV RBC AUTO: 90 FL (ref 78–100)
MONOCYTES # BLD: 0.2 K/UL (ref 0.05–1.2)
MONOCYTES NFR BLD: 5 % (ref 3–10)
NEUTS SEG # BLD: 3 K/UL (ref 1.8–8)
NEUTS SEG NFR BLD: 71 % (ref 40–73)
NRBC # BLD: 0 K/UL (ref 0–0.01)
NRBC BLD-RTO: 0 PER 100 WBC
PLATELET # BLD AUTO: 181 K/UL (ref 135–420)
PMV BLD AUTO: 12.4 FL (ref 9.2–11.8)
POTASSIUM SERPL-SCNC: 4 MMOL/L (ref 3.5–5.5)
PROT SERPL-MCNC: 6.1 G/DL (ref 6.4–8.2)
PROTHROMBIN TIME: 13.2 SEC (ref 11.5–15.2)
RBC # BLD AUTO: 3.71 M/UL (ref 4.35–5.65)
RIGHT GSV ANKLE DIAM: 0.41 CM
RIGHT GSV AT KNEE DIAM: 0.34 CM
RIGHT GSV BK MID DIAM: 0.26 CM
RIGHT GSV BK PROX DIAM: 0.38 CM
RIGHT GSV JUNC DIAM: 0.84 CM
RIGHT GSV THIGH DIST DIAM: 0.35 CM
RIGHT GSV THIGH MID DIAM: 0.37 CM
RIGHT GSV THIGH PROX DIAM: 0.55 CM
SODIUM SERPL-SCNC: 140 MMOL/L (ref 136–145)
WBC # BLD AUTO: 4.3 K/UL (ref 4.6–13.2)

## 2022-01-14 PROCEDURE — U0003 INFECTIOUS AGENT DETECTION BY NUCLEIC ACID (DNA OR RNA); SEVERE ACUTE RESPIRATORY SYNDROME CORONAVIRUS 2 (SARS-COV-2) (CORONAVIRUS DISEASE [COVID-19]), AMPLIFIED PROBE TECHNIQUE, MAKING USE OF HIGH THROUGHPUT TECHNOLOGIES AS DESCRIBED BY CMS-2020-01-R: HCPCS

## 2022-01-14 PROCEDURE — 85730 THROMBOPLASTIN TIME PARTIAL: CPT

## 2022-01-14 PROCEDURE — 80053 COMPREHEN METABOLIC PANEL: CPT

## 2022-01-14 PROCEDURE — 85610 PROTHROMBIN TIME: CPT

## 2022-01-14 PROCEDURE — 99214 OFFICE O/P EST MOD 30 MIN: CPT | Performed by: PHYSICIAN ASSISTANT

## 2022-01-14 PROCEDURE — 36415 COLL VENOUS BLD VENIPUNCTURE: CPT

## 2022-01-14 PROCEDURE — 85025 COMPLETE CBC W/AUTO DIFF WBC: CPT

## 2022-01-14 RX ORDER — OXYCODONE AND ACETAMINOPHEN 5; 325 MG/1; MG/1
1 TABLET ORAL
Qty: 28 TABLET | Refills: 0 | Status: SHIPPED | OUTPATIENT
Start: 2022-01-14 | End: 2022-01-21

## 2022-01-14 NOTE — PROGRESS NOTES
1. Have you been to an emergency room or urgent care clinic since your last visit? Yes, [facility]     Hospitalized since your last visit? If yes, where, when, and reason for visit? No  2. Have you seen or consulted any other health care providers outside of the [facility] since your last visit including any procedures, health maintenance items. If yes, where, when and reason for visit?  Yes, [facility]

## 2022-01-15 LAB — SARS-COV-2, COV2NT: NOT DETECTED

## 2022-01-16 ENCOUNTER — ANESTHESIA EVENT (OUTPATIENT)
Dept: CARDIOTHORACIC SURGERY | Age: 62
End: 2022-01-16
Payer: COMMERCIAL

## 2022-01-17 ENCOUNTER — ANESTHESIA (OUTPATIENT)
Dept: CARDIOTHORACIC SURGERY | Age: 62
End: 2022-01-17
Payer: COMMERCIAL

## 2022-01-17 ENCOUNTER — HOSPITAL ENCOUNTER (OUTPATIENT)
Age: 62
Discharge: HOME | End: 2022-01-17
Attending: STUDENT IN AN ORGANIZED HEALTH CARE EDUCATION/TRAINING PROGRAM | Admitting: STUDENT IN AN ORGANIZED HEALTH CARE EDUCATION/TRAINING PROGRAM
Payer: COMMERCIAL

## 2022-01-17 VITALS
OXYGEN SATURATION: 98 % | HEART RATE: 72 BPM | TEMPERATURE: 97.9 F | SYSTOLIC BLOOD PRESSURE: 131 MMHG | WEIGHT: 161 LBS | DIASTOLIC BLOOD PRESSURE: 78 MMHG | RESPIRATION RATE: 16 BRPM | HEIGHT: 69 IN | BODY MASS INDEX: 23.85 KG/M2

## 2022-01-17 LAB
COVID-19 RAPID TEST, COVR: NOT DETECTED
GLUCOSE BLD STRIP.AUTO-MCNC: 269 MG/DL (ref 70–110)
SOURCE, COVRS: NORMAL

## 2022-01-17 PROCEDURE — 74011250636 HC RX REV CODE- 250/636: Performed by: NURSE ANESTHETIST, CERTIFIED REGISTERED

## 2022-01-17 PROCEDURE — 77030040922 HC BLNKT HYPOTHRM STRY -A: Performed by: STUDENT IN AN ORGANIZED HEALTH CARE EDUCATION/TRAINING PROGRAM

## 2022-01-17 PROCEDURE — 77030002996 HC SUT SLK J&J -A: Performed by: ANESTHESIOLOGY

## 2022-01-17 PROCEDURE — 2709999900 HC NON-CHARGEABLE SUPPLY: Performed by: STUDENT IN AN ORGANIZED HEALTH CARE EDUCATION/TRAINING PROGRAM

## 2022-01-17 PROCEDURE — 77030010512 HC APPL CLP LIG J&J -C: Performed by: STUDENT IN AN ORGANIZED HEALTH CARE EDUCATION/TRAINING PROGRAM

## 2022-01-17 PROCEDURE — 77030031139 HC SUT VCRL2 J&J -A: Performed by: STUDENT IN AN ORGANIZED HEALTH CARE EDUCATION/TRAINING PROGRAM

## 2022-01-17 PROCEDURE — 77030018836 HC SOL IRR NACL ICUM -A: Performed by: STUDENT IN AN ORGANIZED HEALTH CARE EDUCATION/TRAINING PROGRAM

## 2022-01-17 PROCEDURE — 76210000020 HC REC RM PH II FIRST 0.5 HR: Performed by: STUDENT IN AN ORGANIZED HEALTH CARE EDUCATION/TRAINING PROGRAM

## 2022-01-17 PROCEDURE — 77030008500 HC TBNG CONN PRSS BD -A: Performed by: ANESTHESIOLOGY

## 2022-01-17 PROCEDURE — 76060000034 HC ANESTHESIA 1.5 TO 2 HR: Performed by: STUDENT IN AN ORGANIZED HEALTH CARE EDUCATION/TRAINING PROGRAM

## 2022-01-17 PROCEDURE — 77030002933 HC SUT MCRYL J&J -A: Performed by: STUDENT IN AN ORGANIZED HEALTH CARE EDUCATION/TRAINING PROGRAM

## 2022-01-17 PROCEDURE — 77030010509 HC AIRWY LMA MSK TELE -A: Performed by: ANESTHESIOLOGY

## 2022-01-17 PROCEDURE — 74011250637 HC RX REV CODE- 250/637: Performed by: NURSE ANESTHETIST, CERTIFIED REGISTERED

## 2022-01-17 PROCEDURE — 74011250636 HC RX REV CODE- 250/636: Performed by: STUDENT IN AN ORGANIZED HEALTH CARE EDUCATION/TRAINING PROGRAM

## 2022-01-17 PROCEDURE — 77030010506 HC ADH BIOGLU CRYO -G: Performed by: STUDENT IN AN ORGANIZED HEALTH CARE EDUCATION/TRAINING PROGRAM

## 2022-01-17 PROCEDURE — 74011000250 HC RX REV CODE- 250: Performed by: ANESTHESIOLOGY

## 2022-01-17 PROCEDURE — 77030002987 HC SUT PROL J&J -B: Performed by: STUDENT IN AN ORGANIZED HEALTH CARE EDUCATION/TRAINING PROGRAM

## 2022-01-17 PROCEDURE — 77030013797 HC KT TRNSDUC PRSSR EDWD -A: Performed by: ANESTHESIOLOGY

## 2022-01-17 PROCEDURE — 77030013079 HC BLNKT BAIR HGGR 3M -A: Performed by: ANESTHESIOLOGY

## 2022-01-17 PROCEDURE — 01924 ANES THER IVNTL RAD ARTL NOS: CPT | Performed by: ANESTHESIOLOGY

## 2022-01-17 PROCEDURE — 77030019605: Performed by: STUDENT IN AN ORGANIZED HEALTH CARE EDUCATION/TRAINING PROGRAM

## 2022-01-17 PROCEDURE — 77030005401 HC CATH RAD ARRO -A: Performed by: ANESTHESIOLOGY

## 2022-01-17 PROCEDURE — 77030040830 HC CATH URETH FOL MDII -A: Performed by: STUDENT IN AN ORGANIZED HEALTH CARE EDUCATION/TRAINING PROGRAM

## 2022-01-17 PROCEDURE — 77030018719 HC DRSG PTCH ANTIMIC J&J -A: Performed by: ANESTHESIOLOGY

## 2022-01-17 PROCEDURE — 76010000129 HC CV SURG 1.5 TO 2 HR: Performed by: STUDENT IN AN ORGANIZED HEALTH CARE EDUCATION/TRAINING PROGRAM

## 2022-01-17 PROCEDURE — 77030002996 HC SUT SLK J&J -A: Performed by: STUDENT IN AN ORGANIZED HEALTH CARE EDUCATION/TRAINING PROGRAM

## 2022-01-17 PROCEDURE — C1757 CATH, THROMBECTOMY/EMBOLECT: HCPCS | Performed by: STUDENT IN AN ORGANIZED HEALTH CARE EDUCATION/TRAINING PROGRAM

## 2022-01-17 PROCEDURE — 76210000006 HC OR PH I REC 0.5 TO 1 HR: Performed by: STUDENT IN AN ORGANIZED HEALTH CARE EDUCATION/TRAINING PROGRAM

## 2022-01-17 PROCEDURE — 82962 GLUCOSE BLOOD TEST: CPT

## 2022-01-17 PROCEDURE — 74011636637 HC RX REV CODE- 636/637: Performed by: NURSE ANESTHETIST, CERTIFIED REGISTERED

## 2022-01-17 PROCEDURE — 87635 SARS-COV-2 COVID-19 AMP PRB: CPT

## 2022-01-17 PROCEDURE — 74011250636 HC RX REV CODE- 250/636: Performed by: ANESTHESIOLOGY

## 2022-01-17 RX ORDER — HEPARIN SODIUM 200 [USP'U]/100ML
INJECTION, SOLUTION INTRAVENOUS
Status: DISCONTINUED
Start: 2022-01-17 | End: 2022-01-17 | Stop reason: HOSPADM

## 2022-01-17 RX ORDER — DEXTROSE 50 % IN WATER (D50W) INTRAVENOUS SYRINGE
25-50 AS NEEDED
Status: DISCONTINUED | OUTPATIENT
Start: 2022-01-17 | End: 2022-01-17 | Stop reason: HOSPADM

## 2022-01-17 RX ORDER — INSULIN LISPRO 100 [IU]/ML
INJECTION, SOLUTION INTRAVENOUS; SUBCUTANEOUS ONCE
Status: DISCONTINUED | OUTPATIENT
Start: 2022-01-17 | End: 2022-01-17 | Stop reason: HOSPADM

## 2022-01-17 RX ORDER — ONDANSETRON 2 MG/ML
INJECTION INTRAMUSCULAR; INTRAVENOUS AS NEEDED
Status: DISCONTINUED | OUTPATIENT
Start: 2022-01-17 | End: 2022-01-17 | Stop reason: HOSPADM

## 2022-01-17 RX ORDER — CEFAZOLIN SODIUM 1 G/3ML
INJECTION, POWDER, FOR SOLUTION INTRAMUSCULAR; INTRAVENOUS AS NEEDED
Status: DISCONTINUED | OUTPATIENT
Start: 2022-01-17 | End: 2022-01-17 | Stop reason: HOSPADM

## 2022-01-17 RX ORDER — LIDOCAINE HYDROCHLORIDE 10 MG/ML
INJECTION, SOLUTION EPIDURAL; INFILTRATION; INTRACAUDAL; PERINEURAL
Status: DISCONTINUED
Start: 2022-01-17 | End: 2022-01-17 | Stop reason: WASHOUT

## 2022-01-17 RX ORDER — FENTANYL CITRATE 50 UG/ML
INJECTION, SOLUTION INTRAMUSCULAR; INTRAVENOUS AS NEEDED
Status: DISCONTINUED | OUTPATIENT
Start: 2022-01-17 | End: 2022-01-17 | Stop reason: HOSPADM

## 2022-01-17 RX ORDER — FAMOTIDINE 20 MG/1
20 TABLET, FILM COATED ORAL ONCE
Status: COMPLETED | OUTPATIENT
Start: 2022-01-17 | End: 2022-01-17

## 2022-01-17 RX ORDER — CHLORHEXIDINE GLUCONATE 4 G/100ML
SOLUTION TOPICAL
Status: DISCONTINUED
Start: 2022-01-17 | End: 2022-01-17 | Stop reason: HOSPADM

## 2022-01-17 RX ORDER — SODIUM CHLORIDE 0.9 % (FLUSH) 0.9 %
5-40 SYRINGE (ML) INJECTION AS NEEDED
Status: DISCONTINUED | OUTPATIENT
Start: 2022-01-17 | End: 2022-01-17 | Stop reason: HOSPADM

## 2022-01-17 RX ORDER — PROPOFOL 10 MG/ML
INJECTION, EMULSION INTRAVENOUS AS NEEDED
Status: DISCONTINUED | OUTPATIENT
Start: 2022-01-17 | End: 2022-01-17 | Stop reason: HOSPADM

## 2022-01-17 RX ORDER — HEPARIN SODIUM 200 [USP'U]/100ML
INJECTION, SOLUTION INTRAVENOUS
Status: COMPLETED | OUTPATIENT
Start: 2022-01-17 | End: 2022-01-17

## 2022-01-17 RX ORDER — SODIUM CHLORIDE 0.9 % (FLUSH) 0.9 %
5-40 SYRINGE (ML) INJECTION EVERY 8 HOURS
Status: DISCONTINUED | OUTPATIENT
Start: 2022-01-17 | End: 2022-01-17 | Stop reason: HOSPADM

## 2022-01-17 RX ORDER — MAGNESIUM SULFATE 100 %
4 CRYSTALS MISCELLANEOUS AS NEEDED
Status: DISCONTINUED | OUTPATIENT
Start: 2022-01-17 | End: 2022-01-17 | Stop reason: HOSPADM

## 2022-01-17 RX ORDER — INSULIN LISPRO 100 [IU]/ML
INJECTION, SOLUTION INTRAVENOUS; SUBCUTANEOUS ONCE
Status: COMPLETED | OUTPATIENT
Start: 2022-01-17 | End: 2022-01-17

## 2022-01-17 RX ORDER — SODIUM CHLORIDE, SODIUM LACTATE, POTASSIUM CHLORIDE, CALCIUM CHLORIDE 600; 310; 30; 20 MG/100ML; MG/100ML; MG/100ML; MG/100ML
75 INJECTION, SOLUTION INTRAVENOUS CONTINUOUS
Status: DISCONTINUED | OUTPATIENT
Start: 2022-01-17 | End: 2022-01-17 | Stop reason: HOSPADM

## 2022-01-17 RX ORDER — MIDAZOLAM HYDROCHLORIDE 1 MG/ML
INJECTION, SOLUTION INTRAMUSCULAR; INTRAVENOUS AS NEEDED
Status: DISCONTINUED | OUTPATIENT
Start: 2022-01-17 | End: 2022-01-17 | Stop reason: HOSPADM

## 2022-01-17 RX ORDER — LIDOCAINE HYDROCHLORIDE 20 MG/ML
INJECTION, SOLUTION EPIDURAL; INFILTRATION; INTRACAUDAL; PERINEURAL AS NEEDED
Status: DISCONTINUED | OUTPATIENT
Start: 2022-01-17 | End: 2022-01-17 | Stop reason: HOSPADM

## 2022-01-17 RX ORDER — DEXAMETHASONE SODIUM PHOSPHATE 4 MG/ML
INJECTION, SOLUTION INTRA-ARTICULAR; INTRALESIONAL; INTRAMUSCULAR; INTRAVENOUS; SOFT TISSUE AS NEEDED
Status: DISCONTINUED | OUTPATIENT
Start: 2022-01-17 | End: 2022-01-17 | Stop reason: HOSPADM

## 2022-01-17 RX ADMIN — MIDAZOLAM HYDROCHLORIDE 2 MG: 2 INJECTION, SOLUTION INTRAMUSCULAR; INTRAVENOUS at 08:56

## 2022-01-17 RX ADMIN — FAMOTIDINE 20 MG: 20 TABLET ORAL at 08:29

## 2022-01-17 RX ADMIN — FENTANYL CITRATE 50 MCG: 50 INJECTION, SOLUTION INTRAMUSCULAR; INTRAVENOUS at 09:33

## 2022-01-17 RX ADMIN — MIDAZOLAM HYDROCHLORIDE 2 MG: 2 INJECTION, SOLUTION INTRAMUSCULAR; INTRAVENOUS at 09:18

## 2022-01-17 RX ADMIN — Medication 9 UNITS: at 08:29

## 2022-01-17 RX ADMIN — FENTANYL CITRATE 100 MCG: 50 INJECTION, SOLUTION INTRAMUSCULAR; INTRAVENOUS at 10:05

## 2022-01-17 RX ADMIN — CEFAZOLIN SODIUM 2 G: 1 INJECTION, POWDER, FOR SOLUTION INTRAMUSCULAR; INTRAVENOUS at 09:16

## 2022-01-17 RX ADMIN — DEXAMETHASONE SODIUM PHOSPHATE 4 MG: 4 INJECTION, SOLUTION INTRAMUSCULAR; INTRAVENOUS at 08:56

## 2022-01-17 RX ADMIN — FENTANYL CITRATE 100 MCG: 50 INJECTION, SOLUTION INTRAMUSCULAR; INTRAVENOUS at 09:06

## 2022-01-17 RX ADMIN — SODIUM CHLORIDE, SODIUM LACTATE, POTASSIUM CHLORIDE, AND CALCIUM CHLORIDE 75 ML/HR: 600; 310; 30; 20 INJECTION, SOLUTION INTRAVENOUS at 08:29

## 2022-01-17 RX ADMIN — LIDOCAINE HYDROCHLORIDE 40 MG: 20 INJECTION, SOLUTION EPIDURAL; INFILTRATION; INTRACAUDAL; PERINEURAL at 08:56

## 2022-01-17 RX ADMIN — PROPOFOL 150 MG: 10 INJECTION, EMULSION INTRAVENOUS at 08:56

## 2022-01-17 RX ADMIN — ONDANSETRON 4 MG: 2 INJECTION INTRAMUSCULAR; INTRAVENOUS at 08:56

## 2022-01-17 NOTE — ANESTHESIA POSTPROCEDURE EVALUATION
Procedure(s):  RIGHT LEG  POSTERIOR TIBIAL  VEIN EXPOSURE.    general    Anesthesia Post Evaluation      Multimodal analgesia: multimodal analgesia used between 6 hours prior to anesthesia start to PACU discharge  Patient location during evaluation: PACU  Patient participation: complete - patient participated  Level of consciousness: awake  Pain score: 4  Pain management: adequate  Airway patency: patent  Anesthetic complications: no  Cardiovascular status: acceptable  Respiratory status: acceptable  Hydration status: acceptable  Post anesthesia nausea and vomiting:  none  Final Post Anesthesia Temperature Assessment:  Normothermia (36.0-37.5 degrees C)      INITIAL Post-op Vital signs:   Vitals Value Taken Time   /75 01/17/22 1037   Temp 36.6 °C (97.9 °F) 01/17/22 1037   Pulse 78 01/17/22 1037   Resp 12 01/17/22 1037   SpO2 99 % 01/17/22 1037

## 2022-01-17 NOTE — ANESTHESIA PREPROCEDURE EVALUATION
Relevant Problems   NEUROLOGY   (+) Other and unspecified alcohol dependence, continuous drinking behavior      CARDIOVASCULAR   (+) Arterial occlusion, lower extremity (HCC)   (+) Coronary atherosclerosis of native coronary artery   (+) HTN (hypertension)      ENDOCRINE   (+) Insulin dependent diabetes mellitus   (+) Type 2 diabetes with nephropathy (HCC)      HEMATOLOGY   (+) Mediastinal adenopathy       Anesthetic History   No history of anesthetic complications            Review of Systems / Medical History  Patient summary reviewed and pertinent labs reviewed    Pulmonary          Smoker         Neuro/Psych         Headaches and psychiatric history    Comments: Neuropathy  alcoholism Cardiovascular    Hypertension          Past MI, CAD and PAD    Exercise tolerance: <4 METS     GI/Hepatic/Renal     GERD           Endo/Other    Diabetes: poorly controlled, type 2    Anemia     Other Findings              Physical Exam    Airway  Mallampati: III  TM Distance: > 6 cm  Neck ROM: normal range of motion   Mouth opening: Normal     Cardiovascular  Regular rate and rhythm,  S1 and S2 normal,  no murmur, click, rub, or gallop             Dental    Dentition: Poor dentition     Pulmonary      Decreased breath sounds: bilateral           Abdominal  GI exam deferred       Other Findings            Anesthetic Plan    ASA: 4  Anesthesia type: general          Induction: Intravenous  Anesthetic plan and risks discussed with: Patient

## 2022-01-17 NOTE — H&P
Vascular Surgery H&P      Patient: Nieves Jeff MRN: [id number]  [facility]: [id number]      YOB: 1960    Age: 59 y.o. Sex: male      DOA: 1/17/2022       HPI:     Nieves Jeff is a 59 y.o. male who presents for urgent right lower extremity bypass for ischemic rest pain. Patient has been suffering with ischemic right foot. He has had 2 angiograms for revascularization options and was offered a right below the knee amputation for rest pain. I performed a right lower extremity angiogram last week and there is a possibility of posterior tibial artery open by the ankle. I recommended a right popliteal artery to posterior tibial artery bypass with ipsilateral GSV. I discussed with the patient the risk that after I expose the posterior tibial artery, it may not be suitable for a bypass but given that only option is a amputation, patient is willing to allow me to attempt a bypass with vein. Past Medical History:   Diagnosis Date    Alcohol use     Fri-Sun one fifth; Mon-Thur: Pint of liquor    CAD (coronary artery disease)     Cardiac angioplasty with stent 07/29/2009    2.5 x 13 Cypher stent to CX.  Cardiac cath 11/09/2011    RCA patent. LM patent. LAD patent. mD1 55%. CX patent. Prior stent patent. Edison 85% (2.5 x 15-mm Xience stent, resid 0%). LVEDP 12. EF 40-45%. High lateral hypk (RI distribution).       Cardiac inferior STEMI 2009    Constipation     Current smoker     contemplating stopping    Diabetes ([address]     type 2-insulin dependent    Diabetic neuropathy (HCC)     GERD (gastroesophageal reflux disease)     HTN (hypertension)     Hyperlipidemia     Lacunar infarction ([address]     Migraine     Myocardial infarction ([address]     Vitamin D deficiency        Past Surgical History:   Procedure Laterality Date    COLONOSCOPY N/A 10/28/2020    COLONOSCOPY w/polypectomy performed by Francisco Rincon MD at [address]   07/29/2009    HX HEART CATHETERIZATION  8/30/2011    HX HEART CATHETERIZATION  11/09/2011    HX WISDOM TEETH EXTRACTION      x4       Family History   Problem Relation Age of Onset    Hypertension Mother     Heart Attack Mother     Diabetes Mother     Hypertension Father     Heart Attack Father     Diabetes Father     Diabetes Sister     Hypertension Sister     Stroke Sister     Diabetes Brother     Hypertension Brother     Stroke Brother     No Known Problems Maternal Grandmother     No Known Problems Maternal Grandfather     No Known Problems Paternal Grandmother     No Known Problems Paternal Grandfather     No Known Problems Brother     Heart Disease Other     Kidney Disease Other        Social History     Socioeconomic History    Marital status: SINGLE   Tobacco Use    Smoking status: Current Every Day Smoker     Packs/day: 0.50     Years: 1.00     Pack years: 0.50     Types: Cigarettes    Smokeless tobacco: Never Used   Vaping Use    Vaping Use: Never used   Substance and Sexual Activity    Alcohol use: Yes     Alcohol/week: 4.2 standard drinks     Types: 5 Shots of liquor per week     Comment: rare    Drug use: Not Currently     Types: Marijuana, Cocaine     Comment: none    Sexual activity: Yes   Social History Narrative     at "[employer] Prior to Admission medications    Medication Sig Start Date End Date Taking? Authorizing Provider   oxyCODONE-acetaminophen (Endocet) 5-325 mg per tablet Take 1 Tablet by mouth every six (6) hours as needed for Pain for up to 7 days. Max Daily Amount: 4 Tablets. 1/14/22 1/21/22  Mahendra Hoffmann PA-C   Trulicity 1.18 DE/6.0 mL sub-q pen INJECT 0.75 MG UNDER THE SKIN ONCE WEEKLY 12/27/21   Ana ANAYA NP   insulin glargine (Basaglar KwikPen U-100 Insulin) 100 unit/mL (3 mL) inpn 45 Units by SubCUTAneous route daily.  INJECT 50 UNITS UNDER THE SKIN DAILY 12/1/21   Jenna Perez MD   buPROPion SR [facility] SR) 150 mg SR tablet ONE TABLET BY MOUTH ONCE A DAY 11/24/21   Lea ANAYA NP   traZODone (DESYREL) 50 mg tablet Take 1 Tablet by mouth nightly. 11/1/21   Lea ANAYA NP   simvastatin (Zocor) 20 mg tablet Take 1 Tablet by mouth nightly. 11/1/21   Lea ANAYA NP   metFORMIN (GLUCOPHAGE) 1,000 mg tablet TAKE ONE TABLET BY MOUTH TWICE A DAY WITH A MEAL 11/1/21   Lea ANAYA NP   lancets misc Please provide lancets for blood glucose meter insurance will cover 11/1/21   Lea ANAYA NP   glucose blood VI test strips (ASCENSIA AUTODISC VI, ONE TOUCH ULTRA TEST VI) strip Provide test strip and glucose meter that insurance will cover - Twice daily-  fasting in the morning and once two hours after a meal. 11/1/21   Lea ANAYA NP   flash glucose sensor (FreeStyle Cox 2 Sensor) kit Apply 1 sensor every 14 days to check blood sugar as directed at least 3 times daily. 11/1/21   Lea ANAYA NP   flash glucose scanning reader (FreeStyle Ezio 2 [place]) misc Use as directed to check blood sugar at least 3 times daily 11/1/21   Lea ANAYA NP   ergocalciferol (ERGOCALCIFEROL) 1,250 mcg (50,000 unit) capsule TAKE ONE CAPSULE BY MOUTH ONCE WEEKLY  Indications: vitamin D deficiency (high dose therapy) 11/1/21   Lea ANAYA NP   Insulin Needles, Disposable, 31 gauge x 5/16\" ndle Pen needles for Bydureon syringe 11/1/21   Lea ANAYA NP   multivitamin (ONE A DAY) tablet Take 1 Tablet by mouth daily.  11/1/21   Lea ANAYA NP   Blood-Glucose Meter monitoring kit Blood glucose kit that insurance will cover - Check blood glucose twice a day once in the morning fasting and once after a meal. 5/5/20   Lea ANAYA NP       Allergies   Allergen Reactions    Niacin Itching       Review of Systems  Review of Systems - Negative except HPI      Physical Exam:      Visit Vitals  /81 (BP 1 Location: Right upper arm, BP Patient Position: At rest)   Pulse 80   Temp 98.8 °F (37.1 °C)   Resp 18   Ht 5' 9\" (1.753 m)   Wt 161 lb (73 kg)   SpO2 100%   BMI 23.78 kg/m²       Physical Exam:  Pertinent items are noted in HPI. Data Review:    CBC:   Lab Results   Component Value Date/Time    WBC 4.3 (L) 01/14/2022 01:41 PM    RBC 3.71 (L) 01/14/2022 01:41 PM    HGB 11.0 (L) 01/14/2022 01:41 PM    HCT 33.4 (L) 01/14/2022 01:41 PM    PLATELET 872 65/63/6236 01:41 PM      BMP:   Lab Results   Component Value Date/Time    Glucose 292 (H) 01/14/2022 01:41 PM    Sodium 140 01/14/2022 01:41 PM    Potassium 4.0 01/14/2022 01:41 PM    Chloride 106 01/14/2022 01:41 PM    CO2 26 01/14/2022 01:41 PM    BUN 12 01/14/2022 01:41 PM    Creatinine 1.06 01/14/2022 01:41 PM    Calcium 7.9 (L) 01/14/2022 01:41 PM         Assessment/Plan     59yo M here for right popliteal artery to posterior tibial artery bypass with ipsilateral vein. Active Problems:    * No active hospital problems.  Juan Lozoya MD  January 17, 2022

## 2022-01-17 NOTE — DISCHARGE INSTRUCTIONS
Patient Education        Peripheral Arterial Disease (PAD): Care Instructions  Overview  Peripheral arterial disease (PAD) occurs when the blood vessels (arteries) that supply blood to the legs, belly, pelvis, arms, or neck get narrow or blocked. This reduces blood flow to that area. The legs are affected most often. PAD is often caused by fatty buildup (plaque) in the arteries. This buildup is also called \"hardening\" of the arteries. Your risk of PAD increases if you smoke or have high cholesterol, high blood pressure, diabetes, or a family history of PAD. Many people don't have symptoms. If you do have symptoms, you may have weak or tired legs, difficulty walking or balancing, or pain. If you have pain, you might feel a tight, aching, or squeezing pain in the calf, foot, thigh, or buttock that occurs during exercise. The pain usually gets worse during exercise and goes away when you rest. If PAD gets worse, you may have symptoms of poor blood flow, such as leg pain when you rest.  Medicines and lifestyle changes may help your symptoms and lower your risk of heart attack and stroke. In some cases, surgery or other treatment is needed. It is important that you follow up with your doctor. Follow-up care is a key part of your treatment and safety. Be sure to make and go to all appointments, and call your doctor if you are having problems. It's also a good idea to know your test results and keep a list of the medicines you take. How can you care for yourself at home? · Do not smoke. Smoking can make PAD worse. If you need help quitting, talk to your doctor about stop-smoking programs and medicines. These can increase your chances of quitting for good. · Take your medicines exactly as prescribed. Call your doctor if you think you are having a problem with your medicine. · If you take a blood thinner, such as aspirin, be sure to get instructions about how to take your medicine safely.  Blood thinners can cause serious bleeding problems. · Ask your doctor if a cardiac rehab program is right for you. Cardiac rehab can help you make lifestyle changes. In cardiac rehab, a team of health professionals provides education and support to help you make new, healthy habits. · Eat heart-healthy foods such as fruits, vegetables, whole grains, fish, lean meats, and low-fat or nonfat dairy foods. Limit sodium, sugar, and alcohol. · If your doctor recommends it, get more exercise. Walking is a good choice. Bit by bit, increase the amount you walk every day. Try for at least 30 minutes on most days of the week. If you have symptoms when you exercise, ask your doctor about a special exercise program that may help relieve your symptoms. · Stay at a healthy weight. Lose weight if you need to. · Take good care of your feet. ? Treat cuts and scrapes on your legs right away. Poor blood flow prevents (or slows) quick healing of even small cuts or scrapes. This is even more important if you have diabetes. ? Avoid shoes that are too tight or that rub your feet. Shoes should be comfortable and fit well. ? Avoid socks or stockings that are tight enough to leave elastic-band marks on your legs. Tight socks can make circulation problems worse. ? Keep your feet clean and moisturized to prevent drying and cracking. Place cotton or lamb's wool between your toes to prevent rubbing and to absorb moisture. ? If you have a sore on your leg or foot, keep it dry and cover it with a nonstick bandage until you see your doctor. When should you call for help? Call 911 anytime you think you may need emergency care. For example, call if:    · You have symptoms of a heart attack. These may include:  ? Chest pain or pressure, or a strange feeling in the chest.  ? Sweating. ? Shortness of breath. ? Nausea or vomiting. ? Pain, pressure, or a strange feeling in the back, neck, jaw, or upper belly or in one or both shoulders or arms.   ? Lightheadedness or sudden weakness. ? A fast or irregular heartbeat. After you call 911, the  may tell you to chew 1 adult-strength or 2 to 4 low-dose aspirin. Wait for an ambulance. Do not try to drive yourself.    · You have sudden, severe leg pain, and your leg is cool and pale.     · You have symptoms of a stroke. These may include:  ? Sudden numbness, tingling, weakness, or loss of movement in your face, arm, or leg, especially on only one side of your body. ? Sudden vision changes. ? Sudden trouble speaking. ? Sudden confusion or trouble understanding simple statements. ? Sudden problems with walking or balance. ? A sudden, severe headache that is different from past headaches. Call your doctor now or seek immediate medical care if:    · You have leg pain that does not go away even if you rest.     · Your leg pain changes or gets worse. For example, if you have more pain with normal activity or the same pain with decreased activity, you should call.     · You have cold or numb feet or toes.     · You have leg or foot sores that are slow to heal.     · The skin on your legs or feet changes color.     · You have an open sore on your leg or foot that is infected. Signs of infection include:  ? Increased pain, swelling, warmth, or redness. ? Red streaks leading from the sore. ? Pus draining from the sore. ? A fever. Watch closely for changes in your health, and be sure to contact your doctor if you have any problems. Where can you learn more? Go to http://www.gray.com/  Enter H735 in the search box to learn more about \"Peripheral Arterial Disease (PAD): Care Instructions. \"  Current as of: April 29, 2021               Content Version: 13.0  © 1023-8200 [employer]. Care instructions adapted under license by [employer] (which disclaims liability or warranty for this information).  If you have questions about a medical condition or this instruction, always ask your healthcare professional. [address] any warranty or liability for your use of this information. DISCHARGE SUMMARY from Nurse    PATIENT INSTRUCTIONS:    After general anesthesia or intravenous sedation, for 24 hours or while taking prescription Narcotics:  · Limit your activities  · Do not drive and operate hazardous machinery  · Do not make important personal or business decisions  · Do  not drink alcoholic beverages  · If you have not urinated within 8 hours after discharge, please contact your surgeon on call. Report the following to your surgeon:  · Excessive pain, swelling, redness or odor of or around the surgical area  · Temperature over 100.5  · Nausea and vomiting lasting longer than 4 hours or if unable to take medications  · Any signs of decreased circulation or nerve impairment to extremity: change in color, persistent  numbness, tingling, coldness or increase pain  · Any questions    What to do at Home:      *  Please give a list of your current medications to your Primary Care Provider. *  Please update this list whenever your medications are discontinued, doses are      changed, or new medications (including over-the-counter products) are added. *  Please carry medication information at all times in case of emergency situations. These are general instructions for a healthy lifestyle:    No smoking/ No tobacco products/ Avoid exposure to second hand smoke  Surgeon General's Warning:  Quitting smoking now greatly reduces serious risk to your health.     Obesity, smoking, and sedentary lifestyle greatly increases your risk for illness    A healthy diet, regular physical exercise & weight monitoring are important for maintaining a healthy lifestyle    You may be retaining fluid if you have a history of heart failure or if you experience any of the following symptoms:  Weight gain of 3 pounds or more overnight or 5 pounds in a week, increased swelling in our hands or feet or shortness of breath while lying flat in bed. Please call your doctor as soon as you notice any of these symptoms; do not wait until your next office visit. The discharge information has been reviewed with the patient. The patient verbalized understanding. Discharge medications reviewed with the patient and appropriate educational materials and side effects teaching were provided.   ___________________________________________________________________________________________________________________________________

## 2022-01-17 NOTE — OP NOTES
OPERATIVE NOTE    DATE OF PROCEDURE: 01/17/2022    SURGEON: Ashley Peng MD    ASSISTANT(S): Sander Loja SA    PREOPERATIVE DIAGNOSIS: Right leg ischemic rest pain  POSTOPERATIVE DIAGNOSIS: same    PROCEDURE PERFORMED: Right posterior tibial artery exposure    ANESTHESIA: general anesthesia    EBL: Minimal    FLUIDS: Minimal    BLOOD ADMINISTERED: None    DRAINS/TUBES: None    IMPLANTS: None    COMPLICATIONS: None    FINDINGS: Right posterior tibial artery exposed, inadequate caliber and very small. Not usable for the bypass. Procedure aborted. OPERATIVE INDICATIONS: Right leg rest pain    DESCRIPTION OF PROCEDURE:      The patient was brought to the operating room after the risk and benefits of the procedure were explained. We had discussed that I would first expose the posterior tibial artery and if I am able to perform the bypass I would proceed. Patient was prepped and draped in the usual sterile manner after general anesthesia was administered. Time out was performed. I had marked the GSV prior to the prep. I began my procedure by first exposing the right posterior tibial artery. I made a 6cm incision 2cm medial to the right tibia in the right leg using a #15 blade. I sharply carried my incision to the fascia. I sharply divided the fascia and exposed the posterior tibial artery neurovascular bundle. I identified the posterior tibial artery and circumferentially dissected it. It was diminutive and had no flow. I dissected distally but there was no usable posterior tibial artery for the bypass. I decided at this point to abort the procedure as there was no target for the bypass. Hemostasis was obtained and the skin was closed with a 3-0 nylon suture. Sterile dressings were applied. Patient was extubated and transported to the PACU.      Ashley Peng MD

## 2022-02-02 ENCOUNTER — OFFICE VISIT (OUTPATIENT)
Dept: VASCULAR SURGERY | Age: 62
End: 2022-02-02
Payer: COMMERCIAL

## 2022-02-02 VITALS
RESPIRATION RATE: 20 BRPM | HEART RATE: 90 BPM | DIASTOLIC BLOOD PRESSURE: 70 MMHG | SYSTOLIC BLOOD PRESSURE: 110 MMHG | OXYGEN SATURATION: 98 %

## 2022-02-02 DIAGNOSIS — I70.261 ATHEROSCLEROSIS OF NATIVE ARTERY OF RIGHT LOWER EXTREMITY WITH GANGRENE (HCC): Primary | ICD-10-CM

## 2022-02-02 PROCEDURE — 99213 OFFICE O/P EST LOW 20 MIN: CPT | Performed by: STUDENT IN AN ORGANIZED HEALTH CARE EDUCATION/TRAINING PROGRAM

## 2022-02-02 NOTE — PROGRESS NOTES
1. Have you been to an emergency room or urgent care clinic since your last visit? No     Hospitalized since your last visit? If yes, where, when, and reason for visit? No     2. Have you seen or consulted any other health care providers outside of the [facility] since your last visit including any procedures, health maintenance items. If yes, where, when and reason for visit?  Yes ; unknown             3 most recent PHQ Screens 2/2/2022   PHQ Not Done -   Little interest or pleasure in doing things Several days   Feeling down, depressed, irritable, or hopeless Several days   Total Score PHQ 2 2   Trouble falling or staying asleep, or sleeping too much -   Feeling tired or having little energy -   Poor appetite, weight loss, or overeating -   Feeling bad about yourself - or that you are a failure or have let yourself or your family down -   Trouble concentrating on things such as school, work, reading, or watching TV -   Moving or speaking so slowly that other people could have noticed; or the opposite being so fidgety that others notice -   Thoughts of being better off dead, or hurting yourself in some way -   PHQ 9 Score -   How difficult have these problems made it for you to do your work, take care of your home and get along with others -

## 2022-02-02 NOTE — PROGRESS NOTES
02/02/22        Wilver Lopez        History and Physical    Wilver Lopez is a 59 y.o. male who is here for evaluation of right leg ischemic rest pain. Patient has been known to our office and has underwent right lower extremity angiogram twice in order to revascularize the leg. He had single vessel runoff with the PT which occluded mid calf and no other runoff. There was a possible PT target for bypass and I explored that but the artery was not useable. At this point the only option for his rest pain is for a below the knee amputation. I discussed at length with him and the daughter the plan and offered an option second opinion and take a few days to think about the option. His pain is unbearable and he will call to let us know what he decided. All questions were answered. Physical Exam:    Visit Vitals  /70 (BP 1 Location: Left upper arm, BP Patient Position: Sitting, BP Cuff Size: Adult)   Pulse 90   Resp 20   SpO2 98%      HEENT-PERRLA exactly movements intact, no scleral icterus, mucous membranes pink and moist  Neck-no JVD, no carotid bruits  Heart-regular rate and rhythm no murmurs, rubs or gallops  Chest-clear to auscultation bilaterally no wheezes, rhonchi or rubs  Abdomen-soft, nontender, no palpable organomegaly or masses, no palpable pulsatile masses  Extremities-no clubbing, cyanosis or edema. No wounds or gangrenous changes to the toes or feet. Skin is intact. Feet are pink warm and well-perfused bilaterally  Pulses-carotid, radial, brachial, femoral 2+ bilaterally. No doppler signals right foot. Past Medical History:   Diagnosis Date    Alcohol use     Fri-Sun one fifth; Mon-Thur: Pint of liquor    CAD (coronary artery disease)     Cardiac angioplasty with stent 07/29/2009    2.5 x 13 Cypher stent to CX.  Cardiac cath 11/09/2011    RCA patent. LM patent. LAD patent. mD1 55%. CX patent. Prior stent patent. Edison 85% (2.5 x 15-mm Xience stent, resid 0%).   LVEDP 12.  EF 40-45%. High lateral hypk (RI distribution).  Cardiac inferior STEMI 2009    Constipation     Current smoker     contemplating stopping    Diabetes ([facility]     type 2-insulin dependent    Diabetic neuropathy (HCC)     GERD (gastroesophageal reflux disease)     HTN (hypertension)     Hyperlipidemia     [facility]     Migraine     Myocardial infarction ([facility]     Vitamin D deficiency      Past Surgical History:   Procedure Laterality Date    COLONOSCOPY N/A 10/28/2020    COLONOSCOPY w/polypectomy performed by Israel Courtney MD at [facility] [address]  07/29/2009    HX HEART CATHETERIZATION  8/30/2011    HX HEART CATHETERIZATION  11/09/2011    HX WISDOM TEETH EXTRACTION      x4     Patient Active Problem List   Diagnosis Code    Hyperlipidemia E78.5    HTN (hypertension) I10    Insulin dependent diabetes mellitus [id number]    Tobacco use disorder F17.200    Coronary atherosclerosis of native coronary artery I25.10    Tobacco dependence F17.200    Other and unspecified alcohol dependence, continuous drinking behavior F10.20    Mediastinal adenopathy R59.0    Diabetic neuropathy ([facility] E11.40    Vitamin D deficiency E55.9    Other insomnia G47.09    Type 2 diabetes with nephropathy (HCC) E11.21    Otalgia H92.09    Other fatigue R53.83    Mild nonproliferative diabetic retinopathy of left eye without macular edema associated with type 2 diabetes mellitus ([facility] B73.3532    Hypertensive retinopathy H35.039    Nuclear sclerosis of both eyes H25.13    Arterial occlusion, lower extremity (HCC) I70.209     Current Outpatient Medications   Medication Sig Dispense Refill    Trulicity 3.71 FG/8.1 mL sub-q pen INJECT 0.75 MG UNDER THE SKIN ONCE WEEKLY 2 mL 0    insulin glargine (Basaglar KwikPen U-100 Insulin) 100 unit/mL (3 mL) inpn 45 Units by SubCUTAneous route daily.  INJECT 50 UNITS UNDER THE SKIN DAILY 30 Adjustable Dose Pre-filled Pen Syringe 1    buPROPion SR (WELLBUTRIN SR) 150 mg SR tablet ONE TABLET BY MOUTH ONCE A DAY 90 Tablet 0    traZODone (DESYREL) 50 mg tablet Take 1 Tablet by mouth nightly. 90 Tablet 1    simvastatin (Zocor) 20 mg tablet Take 1 Tablet by mouth nightly. 90 Tablet 1    metFORMIN (GLUCOPHAGE) 1,000 mg tablet TAKE ONE TABLET BY MOUTH TWICE A DAY WITH A MEAL 180 Tablet 2    lancets misc Please provide lancets for blood glucose meter insurance will cover 100 Lancet 3    glucose blood VI test strips (ASCENSIA AUTODISC VI, ONE TOUCH ULTRA TEST VI) strip Provide test strip and glucose meter that insurance will cover - Twice daily-  fasting in the morning and once two hours after a meal. 100 Strip 10    flash glucose sensor (FreeStyle Ezio 2 Sensor) kit Apply 1 sensor every 14 days to check blood sugar as directed at least 3 times daily. 2 Kit 5    flash glucose scanning reader (FreeStyle Ezio 2 Vale) misc Use as directed to check blood sugar at least 3 times daily 1 Each 0    ergocalciferol (ERGOCALCIFEROL) 1,250 mcg (50,000 unit) capsule TAKE ONE CAPSULE BY MOUTH ONCE WEEKLY  Indications: vitamin D deficiency (high dose therapy) 4 Capsule 6    Insulin Needles, Disposable, 31 gauge x 5/16\" ndle Pen needles for Bydureon syringe 100 Each 0    multivitamin (ONE A DAY) tablet Take 1 Tablet by mouth daily.  90 Tablet 3    Blood-Glucose Meter monitoring kit Blood glucose kit that insurance will cover - Check blood glucose twice a day once in the morning fasting and once after a meal. 1 Kit 0     Allergies   Allergen Reactions    Niacin Itching     Social History     Socioeconomic History    Marital status: SINGLE     Spouse name: Not on file    Number of children: Not on file    Years of education: Not on file    Highest education level: Not on file   Occupational History    Not on file   Tobacco Use    Smoking status: Current Every Day Smoker     Packs/day: 0.50     Years: 1.00     Pack years: 0.50     Types: Cigarettes    Smokeless tobacco: Never Used   Vaping Use    Vaping Use: Never used   Substance and Sexual Activity    Alcohol use: Yes     Alcohol/week: 4.2 standard drinks     Types: 5 Shots of liquor per week     Comment: rare    Drug use: Not Currently     Types: Marijuana, Cocaine     Comment: none    Sexual activity: Yes   Other Topics Concern    Not on file   Social History Narrative     at [employer]. Social Determinants of Health     Financial Resource Strain:     Difficulty of Paying Living Expenses: Not on file   Food Insecurity:     Worried About Running Out of Food in the Last Year: Not on file    Mara of Food in the Last Year: Not on file   Transportation Needs:     Lack of Transportation (Medical): Not on file    Lack of Transportation (Non-Medical):  Not on file   Physical Activity:     Days of Exercise per Week: Not on file    Minutes of Exercise per Session: Not on file   Stress:     Feeling of Stress : Not on file   Social Connections:     Frequency of Communication with Friends and Family: Not on file    Frequency of Social Gatherings with Friends and Family: Not on file    Attends Islam Services: Not on file    Active Member of [address] [place] [address] [employer] or Organizations: Not on file    Attends Club or Organization Meetings: Not on file    Marital Status: Not on file   Intimate Partner Violence:     Fear of Current or Ex-Partner: Not on file    Emotionally Abused: Not on file    Physically Abused: Not on file    Sexually Abused: Not on file   Housing Stability:     Unable to Pay for Housing in the Last Year: Not on file    Number of [place] in the Last Year: Not on file    Unstable Housing in the Last Year: Not on file     Family History   Problem Relation Age of Onset    Hypertension Mother     Heart Attack Mother     Diabetes Mother     Hypertension Father     Heart Attack Father     Diabetes Father     Diabetes Sister     Hypertension Sister     Stroke Sister     Diabetes Brother     Hypertension Brother     Stroke Brother     No Known Problems Maternal Grandmother     No Known Problems Maternal Grandfather     No Known Problems Paternal Grandmother     No Known Problems Paternal Grandfather     No Known Problems Brother     Heart Disease Other     Kidney Disease Other        Impression and Plan:  Ky Monreal is a 59 y.o. male with right leg ischemia with no revascularization options. - recommended BKA once patient is ready  - offered an option to get a second opinion if there are any concerns. We reviewed the plan with the patient and the patient understands.         Jeferson Lewis MD

## 2022-02-03 DIAGNOSIS — I73.9 PAD (PERIPHERAL ARTERY DISEASE) (HCC): ICD-10-CM

## 2022-02-03 DIAGNOSIS — I73.9 PVD (PERIPHERAL VASCULAR DISEASE) (HCC): Primary | ICD-10-CM

## 2022-02-04 ENCOUNTER — HOSPITAL ENCOUNTER (OUTPATIENT)
Dept: GENERAL RADIOLOGY | Age: 62
Discharge: HOME | End: 2022-02-04
Payer: COMMERCIAL

## 2022-02-04 ENCOUNTER — HOSPITAL ENCOUNTER (OUTPATIENT)
Dept: PREADMISSION TESTING | Age: 62
Discharge: HOME | End: 2022-02-04
Payer: COMMERCIAL

## 2022-02-04 DIAGNOSIS — I73.9 PAD (PERIPHERAL ARTERY DISEASE) (HCC): ICD-10-CM

## 2022-02-04 LAB
ATRIAL RATE: 83 BPM
CALCULATED P AXIS, ECG09: 41 DEGREES
CALCULATED R AXIS, ECG10: 44 DEGREES
CALCULATED T AXIS, ECG11: 69 DEGREES
DIAGNOSIS, 93000: NORMAL
P-R INTERVAL, ECG05: 162 MS
Q-T INTERVAL, ECG07: 364 MS
QRS DURATION, ECG06: 90 MS
QTC CALCULATION (BEZET), ECG08: 427 MS
VENTRICULAR RATE, ECG03: 83 BPM

## 2022-02-04 PROCEDURE — 93005 ELECTROCARDIOGRAM TRACING: CPT

## 2022-02-04 PROCEDURE — U0003 INFECTIOUS AGENT DETECTION BY NUCLEIC ACID (DNA OR RNA); SEVERE ACUTE RESPIRATORY SYNDROME CORONAVIRUS 2 (SARS-COV-2) (CORONAVIRUS DISEASE [COVID-19]), AMPLIFIED PROBE TECHNIQUE, MAKING USE OF HIGH THROUGHPUT TECHNOLOGIES AS DESCRIBED BY CMS-2020-01-R: HCPCS

## 2022-02-04 PROCEDURE — 71046 X-RAY EXAM CHEST 2 VIEWS: CPT

## 2022-02-05 LAB — SARS-COV-2, COV2NT: NOT DETECTED

## 2022-02-07 ENCOUNTER — ANESTHESIA EVENT (OUTPATIENT)
Dept: CARDIOTHORACIC SURGERY | Age: 62
DRG: 305 | End: 2022-02-07
Payer: COMMERCIAL

## 2022-02-07 DIAGNOSIS — E11.40 TYPE 2 DIABETES MELLITUS WITH DIABETIC NEUROPATHY, WITH LONG-TERM CURRENT USE OF INSULIN (HCC): ICD-10-CM

## 2022-02-07 DIAGNOSIS — Z79.4 TYPE 2 DIABETES MELLITUS WITH DIABETIC NEUROPATHY, WITH LONG-TERM CURRENT USE OF INSULIN (HCC): ICD-10-CM

## 2022-02-07 RX ORDER — DULAGLUTIDE 0.75 MG/.5ML
INJECTION, SOLUTION SUBCUTANEOUS
Qty: 2 ML | Refills: 0 | Status: SHIPPED | OUTPATIENT
Start: 2022-02-07 | End: 2022-06-22 | Stop reason: ALTCHOICE

## 2022-02-08 ENCOUNTER — HOSPITAL ENCOUNTER (INPATIENT)
Age: 62
LOS: 7 days | Discharge: NURSING, REHAB OR LONG-TERM CARE | DRG: 305 | End: 2022-02-15
Attending: STUDENT IN AN ORGANIZED HEALTH CARE EDUCATION/TRAINING PROGRAM | Admitting: STUDENT IN AN ORGANIZED HEALTH CARE EDUCATION/TRAINING PROGRAM
Payer: COMMERCIAL

## 2022-02-08 ENCOUNTER — ANESTHESIA (OUTPATIENT)
Dept: CARDIOTHORACIC SURGERY | Age: 62
DRG: 305 | End: 2022-02-08
Payer: COMMERCIAL

## 2022-02-08 PROBLEM — Z89.519 S/P BKA (BELOW KNEE AMPUTATION) (HCC): Status: ACTIVE | Noted: 2022-02-08

## 2022-02-08 LAB
ALBUMIN SERPL-MCNC: 2.8 G/DL (ref 3.4–5)
ALBUMIN/GLOB SERPL: 0.8 {RATIO} (ref 0.8–1.7)
ALP SERPL-CCNC: 75 U/L (ref 45–117)
ALT SERPL-CCNC: 19 U/L (ref 16–61)
ANION GAP SERPL CALC-SCNC: 6 MMOL/L (ref 3–18)
AST SERPL-CCNC: 20 U/L (ref 10–38)
BASOPHILS # BLD: 0 K/UL (ref 0–0.1)
BASOPHILS NFR BLD: 0 % (ref 0–2)
BILIRUB SERPL-MCNC: <0.1 MG/DL (ref 0.2–1)
BUN SERPL-MCNC: 12 MG/DL (ref 7–18)
BUN/CREAT SERPL: 10 (ref 12–20)
CALCIUM SERPL-MCNC: 8.5 MG/DL (ref 8.5–10.1)
CHLORIDE SERPL-SCNC: 102 MMOL/L (ref 100–111)
CO2 SERPL-SCNC: 27 MMOL/L (ref 21–32)
CREAT SERPL-MCNC: 1.19 MG/DL (ref 0.6–1.3)
DIFFERENTIAL METHOD BLD: ABNORMAL
EOSINOPHIL # BLD: 0 K/UL (ref 0–0.4)
EOSINOPHIL NFR BLD: 0 % (ref 0–5)
ERYTHROCYTE [DISTWIDTH] IN BLOOD BY AUTOMATED COUNT: 13.2 % (ref 11.6–14.5)
GLOBULIN SER CALC-MCNC: 3.4 G/DL (ref 2–4)
GLUCOSE BLD STRIP.AUTO-MCNC: 104 MG/DL (ref 70–110)
GLUCOSE BLD STRIP.AUTO-MCNC: 155 MG/DL (ref 70–110)
GLUCOSE SERPL-MCNC: 242 MG/DL (ref 74–99)
HBA1C MFR BLD: 13.8 % (ref 4.2–5.6)
HCT VFR BLD AUTO: 31.3 % (ref 36–48)
HGB BLD-MCNC: 9.9 G/DL (ref 13–16)
IMM GRANULOCYTES # BLD AUTO: 0 K/UL (ref 0–0.04)
IMM GRANULOCYTES NFR BLD AUTO: 0 % (ref 0–0.5)
LYMPHOCYTES # BLD: 0.5 K/UL (ref 0.9–3.6)
LYMPHOCYTES NFR BLD: 6 % (ref 21–52)
MCH RBC QN AUTO: 28.2 PG (ref 24–34)
MCHC RBC AUTO-ENTMCNC: 31.6 G/DL (ref 31–37)
MCV RBC AUTO: 89.2 FL (ref 78–100)
MONOCYTES # BLD: 0.4 K/UL (ref 0.05–1.2)
MONOCYTES NFR BLD: 5 % (ref 3–10)
NEUTS SEG # BLD: 8.1 K/UL (ref 1.8–8)
NEUTS SEG NFR BLD: 89 % (ref 40–73)
NRBC # BLD: 0 K/UL (ref 0–0.01)
NRBC BLD-RTO: 0 PER 100 WBC
PLATELET # BLD AUTO: 272 K/UL (ref 135–420)
PMV BLD AUTO: 9.9 FL (ref 9.2–11.8)
POTASSIUM SERPL-SCNC: 4.1 MMOL/L (ref 3.5–5.5)
PROT SERPL-MCNC: 6.2 G/DL (ref 6.4–8.2)
RBC # BLD AUTO: 3.51 M/UL (ref 4.35–5.65)
SODIUM SERPL-SCNC: 135 MMOL/L (ref 136–145)
WBC # BLD AUTO: 9.1 K/UL (ref 4.6–13.2)

## 2022-02-08 PROCEDURE — 74011250637 HC RX REV CODE- 250/637: Performed by: NURSE ANESTHETIST, CERTIFIED REGISTERED

## 2022-02-08 PROCEDURE — 74011000258 HC RX REV CODE- 258: Performed by: ANESTHESIOLOGY

## 2022-02-08 PROCEDURE — 77030006835 HC BLD SAW SAG STRY -B: Performed by: STUDENT IN AN ORGANIZED HEALTH CARE EDUCATION/TRAINING PROGRAM

## 2022-02-08 PROCEDURE — 77030002916 HC SUT ETHLN J&J -A: Performed by: STUDENT IN AN ORGANIZED HEALTH CARE EDUCATION/TRAINING PROGRAM

## 2022-02-08 PROCEDURE — 74011250636 HC RX REV CODE- 250/636: Performed by: STUDENT IN AN ORGANIZED HEALTH CARE EDUCATION/TRAINING PROGRAM

## 2022-02-08 PROCEDURE — 65270000029 HC RM PRIVATE

## 2022-02-08 PROCEDURE — 77030031139 HC SUT VCRL2 J&J -A: Performed by: STUDENT IN AN ORGANIZED HEALTH CARE EDUCATION/TRAINING PROGRAM

## 2022-02-08 PROCEDURE — 74011250636 HC RX REV CODE- 250/636: Performed by: NURSE ANESTHETIST, CERTIFIED REGISTERED

## 2022-02-08 PROCEDURE — 74011250636 HC RX REV CODE- 250/636: Performed by: ANESTHESIOLOGY

## 2022-02-08 PROCEDURE — 85025 COMPLETE CBC W/AUTO DIFF WBC: CPT

## 2022-02-08 PROCEDURE — 77030038692 HC WND DEB SYS IRMX -B: Performed by: STUDENT IN AN ORGANIZED HEALTH CARE EDUCATION/TRAINING PROGRAM

## 2022-02-08 PROCEDURE — 76060000034 HC ANESTHESIA 1.5 TO 2 HR: Performed by: STUDENT IN AN ORGANIZED HEALTH CARE EDUCATION/TRAINING PROGRAM

## 2022-02-08 PROCEDURE — 0Y6H0Z1 DETACHMENT AT RIGHT LOWER LEG, HIGH, OPEN APPROACH: ICD-10-PCS | Performed by: STUDENT IN AN ORGANIZED HEALTH CARE EDUCATION/TRAINING PROGRAM

## 2022-02-08 PROCEDURE — 74011636637 HC RX REV CODE- 636/637: Performed by: ANESTHESIOLOGY

## 2022-02-08 PROCEDURE — 88307 TISSUE EXAM BY PATHOLOGIST: CPT

## 2022-02-08 PROCEDURE — 76010000129 HC CV SURG 1.5 TO 2 HR: Performed by: STUDENT IN AN ORGANIZED HEALTH CARE EDUCATION/TRAINING PROGRAM

## 2022-02-08 PROCEDURE — 77030002996 HC SUT SLK J&J -A: Performed by: STUDENT IN AN ORGANIZED HEALTH CARE EDUCATION/TRAINING PROGRAM

## 2022-02-08 PROCEDURE — 74011250637 HC RX REV CODE- 250/637: Performed by: PHYSICIAN ASSISTANT

## 2022-02-08 PROCEDURE — 2709999900 HC NON-CHARGEABLE SUPPLY: Performed by: STUDENT IN AN ORGANIZED HEALTH CARE EDUCATION/TRAINING PROGRAM

## 2022-02-08 PROCEDURE — 74011000250 HC RX REV CODE- 250: Performed by: ANESTHESIOLOGY

## 2022-02-08 PROCEDURE — 77030008683 HC TU ET CUF COVD -A: Performed by: ANESTHESIOLOGY

## 2022-02-08 PROCEDURE — 36415 COLL VENOUS BLD VENIPUNCTURE: CPT

## 2022-02-08 PROCEDURE — L1830 KO IMMOB CANVAS LONG PRE OTS: HCPCS | Performed by: STUDENT IN AN ORGANIZED HEALTH CARE EDUCATION/TRAINING PROGRAM

## 2022-02-08 PROCEDURE — 77030018836 HC SOL IRR NACL ICUM -A: Performed by: STUDENT IN AN ORGANIZED HEALTH CARE EDUCATION/TRAINING PROGRAM

## 2022-02-08 PROCEDURE — 77030040922 HC BLNKT HYPOTHRM STRY -A: Performed by: STUDENT IN AN ORGANIZED HEALTH CARE EDUCATION/TRAINING PROGRAM

## 2022-02-08 PROCEDURE — 82962 GLUCOSE BLOOD TEST: CPT

## 2022-02-08 PROCEDURE — 77030008462 HC STPLR SKN PROX J&J -A: Performed by: STUDENT IN AN ORGANIZED HEALTH CARE EDUCATION/TRAINING PROGRAM

## 2022-02-08 PROCEDURE — 01482 ANES OPN LWR L/A/F RAD RESCJ: CPT | Performed by: ANESTHESIOLOGY

## 2022-02-08 PROCEDURE — 83036 HEMOGLOBIN GLYCOSYLATED A1C: CPT

## 2022-02-08 PROCEDURE — 77030003028 HC SUT VCRL J&J -A: Performed by: STUDENT IN AN ORGANIZED HEALTH CARE EDUCATION/TRAINING PROGRAM

## 2022-02-08 PROCEDURE — 74011250636 HC RX REV CODE- 250/636: Performed by: PHYSICIAN ASSISTANT

## 2022-02-08 PROCEDURE — 77030002948: Performed by: STUDENT IN AN ORGANIZED HEALTH CARE EDUCATION/TRAINING PROGRAM

## 2022-02-08 PROCEDURE — 74011000250 HC RX REV CODE- 250: Performed by: STUDENT IN AN ORGANIZED HEALTH CARE EDUCATION/TRAINING PROGRAM

## 2022-02-08 PROCEDURE — 80053 COMPREHEN METABOLIC PANEL: CPT

## 2022-02-08 PROCEDURE — 76210000000 HC OR PH I REC 2 TO 2.5 HR: Performed by: STUDENT IN AN ORGANIZED HEALTH CARE EDUCATION/TRAINING PROGRAM

## 2022-02-08 RX ORDER — FAMOTIDINE 20 MG/1
20 TABLET, FILM COATED ORAL ONCE
Status: COMPLETED | OUTPATIENT
Start: 2022-02-08 | End: 2022-02-08

## 2022-02-08 RX ORDER — MORPHINE SULFATE 5 MG/ML
2 INJECTION, SOLUTION INTRAVENOUS
Status: DISCONTINUED | OUTPATIENT
Start: 2022-02-08 | End: 2022-02-08 | Stop reason: CLARIF

## 2022-02-08 RX ORDER — GABAPENTIN 100 MG/1
200 CAPSULE ORAL 3 TIMES DAILY
Status: DISCONTINUED | OUTPATIENT
Start: 2022-02-08 | End: 2022-02-09

## 2022-02-08 RX ORDER — MIDAZOLAM HYDROCHLORIDE 1 MG/ML
INJECTION, SOLUTION INTRAMUSCULAR; INTRAVENOUS AS NEEDED
Status: DISCONTINUED | OUTPATIENT
Start: 2022-02-08 | End: 2022-02-08 | Stop reason: HOSPADM

## 2022-02-08 RX ORDER — ROCURONIUM BROMIDE 10 MG/ML
INJECTION, SOLUTION INTRAVENOUS AS NEEDED
Status: DISCONTINUED | OUTPATIENT
Start: 2022-02-08 | End: 2022-02-08 | Stop reason: HOSPADM

## 2022-02-08 RX ORDER — PROPOFOL 10 MG/ML
INJECTION, EMULSION INTRAVENOUS AS NEEDED
Status: DISCONTINUED | OUTPATIENT
Start: 2022-02-08 | End: 2022-02-08 | Stop reason: HOSPADM

## 2022-02-08 RX ORDER — INSULIN LISPRO 100 [IU]/ML
INJECTION, SOLUTION INTRAVENOUS; SUBCUTANEOUS ONCE
Status: COMPLETED | OUTPATIENT
Start: 2022-02-08 | End: 2022-02-08

## 2022-02-08 RX ORDER — ONDANSETRON 2 MG/ML
4 INJECTION INTRAMUSCULAR; INTRAVENOUS ONCE
Status: COMPLETED | OUTPATIENT
Start: 2022-02-08 | End: 2022-02-08

## 2022-02-08 RX ORDER — ONDANSETRON 2 MG/ML
INJECTION INTRAMUSCULAR; INTRAVENOUS AS NEEDED
Status: DISCONTINUED | OUTPATIENT
Start: 2022-02-08 | End: 2022-02-08 | Stop reason: HOSPADM

## 2022-02-08 RX ORDER — MAGNESIUM SULFATE 100 %
4 CRYSTALS MISCELLANEOUS AS NEEDED
Status: DISCONTINUED | OUTPATIENT
Start: 2022-02-08 | End: 2022-02-08 | Stop reason: HOSPADM

## 2022-02-08 RX ORDER — INSULIN LISPRO 100 [IU]/ML
INJECTION, SOLUTION INTRAVENOUS; SUBCUTANEOUS ONCE
Status: DISCONTINUED | OUTPATIENT
Start: 2022-02-08 | End: 2022-02-08 | Stop reason: HOSPADM

## 2022-02-08 RX ORDER — INSULIN GLARGINE 100 [IU]/ML
45 INJECTION, SOLUTION SUBCUTANEOUS DAILY
Status: DISCONTINUED | OUTPATIENT
Start: 2022-02-09 | End: 2022-02-15 | Stop reason: HOSPADM

## 2022-02-08 RX ORDER — SUCCINYLCHOLINE CHLORIDE 20 MG/ML
INJECTION INTRAMUSCULAR; INTRAVENOUS AS NEEDED
Status: DISCONTINUED | OUTPATIENT
Start: 2022-02-08 | End: 2022-02-08 | Stop reason: HOSPADM

## 2022-02-08 RX ORDER — HYDROMORPHONE HYDROCHLORIDE 1 MG/ML
0.5 INJECTION, SOLUTION INTRAMUSCULAR; INTRAVENOUS; SUBCUTANEOUS
Status: DISCONTINUED | OUTPATIENT
Start: 2022-02-08 | End: 2022-02-08 | Stop reason: RX

## 2022-02-08 RX ORDER — FENTANYL CITRATE 50 UG/ML
INJECTION, SOLUTION INTRAMUSCULAR; INTRAVENOUS AS NEEDED
Status: DISCONTINUED | OUTPATIENT
Start: 2022-02-08 | End: 2022-02-08 | Stop reason: HOSPADM

## 2022-02-08 RX ORDER — OXYCODONE AND ACETAMINOPHEN 5; 325 MG/1; MG/1
1 TABLET ORAL
Status: DISCONTINUED | OUTPATIENT
Start: 2022-02-08 | End: 2022-02-09

## 2022-02-08 RX ORDER — BUPROPION HYDROCHLORIDE 150 MG/1
150 TABLET, EXTENDED RELEASE ORAL DAILY
Status: DISCONTINUED | OUTPATIENT
Start: 2022-02-09 | End: 2022-02-15 | Stop reason: HOSPADM

## 2022-02-08 RX ORDER — MORPHINE SULFATE 2 MG/ML
2 INJECTION, SOLUTION INTRAMUSCULAR; INTRAVENOUS
Status: DISCONTINUED | OUTPATIENT
Start: 2022-02-08 | End: 2022-02-08

## 2022-02-08 RX ORDER — SODIUM CHLORIDE 0.9 % (FLUSH) 0.9 %
5-40 SYRINGE (ML) INJECTION EVERY 8 HOURS
Status: DISCONTINUED | OUTPATIENT
Start: 2022-02-08 | End: 2022-02-08 | Stop reason: HOSPADM

## 2022-02-08 RX ORDER — SODIUM CHLORIDE, SODIUM LACTATE, POTASSIUM CHLORIDE, CALCIUM CHLORIDE 600; 310; 30; 20 MG/100ML; MG/100ML; MG/100ML; MG/100ML
50 INJECTION, SOLUTION INTRAVENOUS CONTINUOUS
Status: DISCONTINUED | OUTPATIENT
Start: 2022-02-08 | End: 2022-02-08 | Stop reason: HOSPADM

## 2022-02-08 RX ORDER — METFORMIN HYDROCHLORIDE 500 MG/1
1000 TABLET ORAL DAILY
Status: DISCONTINUED | OUTPATIENT
Start: 2022-02-09 | End: 2022-02-11

## 2022-02-08 RX ORDER — ALBUTEROL SULFATE 0.83 MG/ML
2.5 SOLUTION RESPIRATORY (INHALATION) AS NEEDED
Status: DISCONTINUED | OUTPATIENT
Start: 2022-02-08 | End: 2022-02-08 | Stop reason: HOSPADM

## 2022-02-08 RX ORDER — LIDOCAINE HYDROCHLORIDE 10 MG/ML
0.1 INJECTION, SOLUTION EPIDURAL; INFILTRATION; INTRACAUDAL; PERINEURAL AS NEEDED
Status: DISCONTINUED | OUTPATIENT
Start: 2022-02-08 | End: 2022-02-08 | Stop reason: HOSPADM

## 2022-02-08 RX ORDER — LIDOCAINE HYDROCHLORIDE 20 MG/ML
INJECTION, SOLUTION EPIDURAL; INFILTRATION; INTRACAUDAL; PERINEURAL AS NEEDED
Status: DISCONTINUED | OUTPATIENT
Start: 2022-02-08 | End: 2022-02-08 | Stop reason: HOSPADM

## 2022-02-08 RX ORDER — DEXTROSE 50 % IN WATER (D50W) INTRAVENOUS SYRINGE
25-50 AS NEEDED
Status: DISCONTINUED | OUTPATIENT
Start: 2022-02-08 | End: 2022-02-08 | Stop reason: HOSPADM

## 2022-02-08 RX ORDER — MORPHINE SULFATE 5 MG/ML
2 INJECTION, SOLUTION INTRAVENOUS
Status: DISCONTINUED | OUTPATIENT
Start: 2022-02-08 | End: 2022-02-08 | Stop reason: ALTCHOICE

## 2022-02-08 RX ORDER — SODIUM CHLORIDE 0.9 % (FLUSH) 0.9 %
5-40 SYRINGE (ML) INJECTION AS NEEDED
Status: DISCONTINUED | OUTPATIENT
Start: 2022-02-08 | End: 2022-02-08 | Stop reason: HOSPADM

## 2022-02-08 RX ORDER — NALOXONE HYDROCHLORIDE 0.4 MG/ML
0.1 INJECTION, SOLUTION INTRAMUSCULAR; INTRAVENOUS; SUBCUTANEOUS AS NEEDED
Status: DISCONTINUED | OUTPATIENT
Start: 2022-02-08 | End: 2022-02-08 | Stop reason: HOSPADM

## 2022-02-08 RX ORDER — HYDROMORPHONE HYDROCHLORIDE 1 MG/ML
0.5 INJECTION, SOLUTION INTRAMUSCULAR; INTRAVENOUS; SUBCUTANEOUS
Status: DISPENSED | OUTPATIENT
Start: 2022-02-08 | End: 2022-02-11

## 2022-02-08 RX ORDER — NEOSTIGMINE METHYLSULFATE 1 MG/ML
INJECTION, SOLUTION INTRAVENOUS AS NEEDED
Status: DISCONTINUED | OUTPATIENT
Start: 2022-02-08 | End: 2022-02-08 | Stop reason: HOSPADM

## 2022-02-08 RX ORDER — ERGOCALCIFEROL 1.25 MG/1
50000 CAPSULE ORAL DAILY
Status: DISCONTINUED | OUTPATIENT
Start: 2022-02-09 | End: 2022-02-14

## 2022-02-08 RX ORDER — HYDROMORPHONE HYDROCHLORIDE 1 MG/ML
1 INJECTION, SOLUTION INTRAMUSCULAR; INTRAVENOUS; SUBCUTANEOUS
Status: DISCONTINUED | OUTPATIENT
Start: 2022-02-08 | End: 2022-02-09

## 2022-02-08 RX ORDER — HYDROMORPHONE HYDROCHLORIDE 2 MG/ML
0.5 INJECTION, SOLUTION INTRAMUSCULAR; INTRAVENOUS; SUBCUTANEOUS
Status: DISCONTINUED | OUTPATIENT
Start: 2022-02-08 | End: 2022-02-08 | Stop reason: HOSPADM

## 2022-02-08 RX ORDER — ATORVASTATIN CALCIUM 20 MG/1
20 TABLET, FILM COATED ORAL DAILY
Status: DISCONTINUED | OUTPATIENT
Start: 2022-02-09 | End: 2022-02-15 | Stop reason: HOSPADM

## 2022-02-08 RX ORDER — FENTANYL CITRATE 50 UG/ML
25 INJECTION, SOLUTION INTRAMUSCULAR; INTRAVENOUS AS NEEDED
Status: DISCONTINUED | OUTPATIENT
Start: 2022-02-08 | End: 2022-02-08 | Stop reason: HOSPADM

## 2022-02-08 RX ORDER — TRAZODONE HYDROCHLORIDE 50 MG/1
50 TABLET ORAL
Status: DISCONTINUED | OUTPATIENT
Start: 2022-02-08 | End: 2022-02-15 | Stop reason: HOSPADM

## 2022-02-08 RX ORDER — DEXAMETHASONE SODIUM PHOSPHATE 4 MG/ML
INJECTION, SOLUTION INTRA-ARTICULAR; INTRALESIONAL; INTRAMUSCULAR; INTRAVENOUS; SOFT TISSUE AS NEEDED
Status: DISCONTINUED | OUTPATIENT
Start: 2022-02-08 | End: 2022-02-08 | Stop reason: HOSPADM

## 2022-02-08 RX ORDER — THERA TABS 400 MCG
1 TAB ORAL DAILY
Status: DISCONTINUED | OUTPATIENT
Start: 2022-02-09 | End: 2022-02-15 | Stop reason: HOSPADM

## 2022-02-08 RX ORDER — SODIUM CHLORIDE, SODIUM LACTATE, POTASSIUM CHLORIDE, CALCIUM CHLORIDE 600; 310; 30; 20 MG/100ML; MG/100ML; MG/100ML; MG/100ML
75 INJECTION, SOLUTION INTRAVENOUS CONTINUOUS
Status: DISCONTINUED | OUTPATIENT
Start: 2022-02-08 | End: 2022-02-08 | Stop reason: HOSPADM

## 2022-02-08 RX ADMIN — DEXAMETHASONE SODIUM PHOSPHATE 4 MG: 4 INJECTION, SOLUTION INTRAMUSCULAR; INTRAVENOUS at 07:57

## 2022-02-08 RX ADMIN — MIDAZOLAM HYDROCHLORIDE 2 MG: 2 INJECTION, SOLUTION INTRAMUSCULAR; INTRAVENOUS at 07:57

## 2022-02-08 RX ADMIN — HYDROMORPHONE HYDROCHLORIDE 0.5 MG: 2 INJECTION, SOLUTION INTRAMUSCULAR; INTRAVENOUS; SUBCUTANEOUS at 10:25

## 2022-02-08 RX ADMIN — PHENYLEPHRINE HYDROCHLORIDE 100 MCG: 10 INJECTION INTRAVENOUS at 08:40

## 2022-02-08 RX ADMIN — Medication 3 MG: at 09:40

## 2022-02-08 RX ADMIN — OXYCODONE HYDROCHLORIDE AND ACETAMINOPHEN 1 TABLET: 5; 325 TABLET ORAL at 18:16

## 2022-02-08 RX ADMIN — GABAPENTIN 200 MG: 100 CAPSULE ORAL at 21:37

## 2022-02-08 RX ADMIN — FENTANYL CITRATE 50 MCG: 50 INJECTION, SOLUTION INTRAMUSCULAR; INTRAVENOUS at 08:50

## 2022-02-08 RX ADMIN — WATER 2 G: 1 INJECTION INTRAMUSCULAR; INTRAVENOUS; SUBCUTANEOUS at 08:07

## 2022-02-08 RX ADMIN — PHENYLEPHRINE HYDROCHLORIDE 100 MCG: 10 INJECTION INTRAVENOUS at 09:11

## 2022-02-08 RX ADMIN — SUCCINYLCHOLINE CHLORIDE 100 MG: 20 INJECTION, SOLUTION INTRAMUSCULAR; INTRAVENOUS at 07:57

## 2022-02-08 RX ADMIN — ONDANSETRON 4 MG: 2 INJECTION INTRAMUSCULAR; INTRAVENOUS at 10:24

## 2022-02-08 RX ADMIN — LIDOCAINE HYDROCHLORIDE 40 MG: 20 INJECTION, SOLUTION EPIDURAL; INFILTRATION; INTRACAUDAL; PERINEURAL at 07:57

## 2022-02-08 RX ADMIN — FENTANYL CITRATE 100 MCG: 50 INJECTION, SOLUTION INTRAMUSCULAR; INTRAVENOUS at 07:57

## 2022-02-08 RX ADMIN — Medication 3 UNITS: at 11:03

## 2022-02-08 RX ADMIN — FAMOTIDINE 20 MG: 20 TABLET ORAL at 07:24

## 2022-02-08 RX ADMIN — MORPHINE SULFATE 2 MG: 2 INJECTION, SOLUTION INTRAMUSCULAR; INTRAVENOUS at 18:43

## 2022-02-08 RX ADMIN — ONDANSETRON 4 MG: 2 INJECTION INTRAMUSCULAR; INTRAVENOUS at 07:57

## 2022-02-08 RX ADMIN — HYDROMORPHONE HYDROCHLORIDE 0.5 MG: 2 INJECTION, SOLUTION INTRAMUSCULAR; INTRAVENOUS; SUBCUTANEOUS at 10:35

## 2022-02-08 RX ADMIN — PHENYLEPHRINE HYDROCHLORIDE 100 MCG: 10 INJECTION INTRAVENOUS at 08:01

## 2022-02-08 RX ADMIN — SODIUM CHLORIDE, SODIUM LACTATE, POTASSIUM CHLORIDE, AND CALCIUM CHLORIDE 50 ML/HR: 600; 310; 30; 20 INJECTION, SOLUTION INTRAVENOUS at 07:10

## 2022-02-08 RX ADMIN — PHENYLEPHRINE HYDROCHLORIDE 100 MCG: 10 INJECTION INTRAVENOUS at 08:59

## 2022-02-08 RX ADMIN — PHENYLEPHRINE HYDROCHLORIDE 100 MCG: 10 INJECTION INTRAVENOUS at 08:07

## 2022-02-08 RX ADMIN — OXYCODONE HYDROCHLORIDE AND ACETAMINOPHEN 1 TABLET: 5; 325 TABLET ORAL at 13:45

## 2022-02-08 RX ADMIN — PROPOFOL 150 MG: 10 INJECTION, EMULSION INTRAVENOUS at 07:57

## 2022-02-08 RX ADMIN — FENTANYL CITRATE 100 MCG: 50 INJECTION, SOLUTION INTRAMUSCULAR; INTRAVENOUS at 08:12

## 2022-02-08 RX ADMIN — HYDROMORPHONE HYDROCHLORIDE 1 MG: 1 INJECTION, SOLUTION INTRAMUSCULAR; INTRAVENOUS; SUBCUTANEOUS at 20:58

## 2022-02-08 RX ADMIN — PHENYLEPHRINE HYDROCHLORIDE 100 MCG: 10 INJECTION INTRAVENOUS at 08:27

## 2022-02-08 RX ADMIN — GABAPENTIN 200 MG: 100 CAPSULE ORAL at 15:02

## 2022-02-08 RX ADMIN — PHENYLEPHRINE HYDROCHLORIDE 100 MCG: 10 INJECTION INTRAVENOUS at 09:21

## 2022-02-08 RX ADMIN — PHENYLEPHRINE HYDROCHLORIDE 100 MCG: 10 INJECTION INTRAVENOUS at 08:18

## 2022-02-08 RX ADMIN — ROCURONIUM BROMIDE 30 MG: 50 INJECTION INTRAVENOUS at 08:00

## 2022-02-08 RX ADMIN — MORPHINE SULFATE 2 MG: 2 INJECTION, SOLUTION INTRAMUSCULAR; INTRAVENOUS at 15:02

## 2022-02-08 RX ADMIN — TRAZODONE HYDROCHLORIDE 50 MG: 50 TABLET ORAL at 21:37

## 2022-02-08 NOTE — ACP (ADVANCE CARE PLANNING)
conducted a pre-surgery visit with Ky Monreal, who is a 59 y.o.,male. The  provided the following Interventions:  Initiated a relationship of care and support. Offered prayer and assurance of continued prayers on patient's behalf. There is no advance directive present. Plan:  Chaplains will continue to follow and will provide pastoral care on an as needed/requested basis.  recommends bedside caregivers page  on duty if patient shows signs of acute spiritual or emotional distress.     Reiseñor 3   Board Certified [address] [place] [address]   [phone]

## 2022-02-08 NOTE — ANESTHESIA POSTPROCEDURE EVALUATION
Procedure(s):  RIGHT LEG BELOW KNEE AMPUTATION (BKA). MAC    Anesthesia Post Evaluation      Multimodal analgesia: multimodal analgesia used between 6 hours prior to anesthesia start to PACU discharge  Patient location during evaluation: PACU  Patient participation: complete - patient participated  Level of consciousness: awake  Pain score: 5  Airway patency: patent  Anesthetic complications: no  Cardiovascular status: acceptable  Respiratory status: acceptable  Hydration status: acceptable  Post anesthesia nausea and vomiting:  none  Final Post Anesthesia Temperature Assessment:  Normothermia (36.0-37.5 degrees C)      INITIAL Post-op Vital signs:   Vitals Value Taken Time   /83 02/08/22 1019   Temp 36.9 °C (98.4 °F) 02/08/22 0957   Pulse 78 02/08/22 1020   Resp 18 02/08/22 1020   SpO2 100 % 02/08/22 1020   Vitals shown include unvalidated device data.

## 2022-02-08 NOTE — PERIOP NOTES
UDS waived per Dr. Shawnee Ulloa, Informed SHanane Byrd RN,  R/T order for surgical consent and Antibiotic

## 2022-02-08 NOTE — BRIEF OP NOTE
Brief Postoperative Note    Patient: Louise Ford  YOB: 1960  MRN: [id number]    Date of Procedure: 2/8/2022     Pre-Op Diagnosis: Peripheral arterial disease ([facility] [I73.9]  Ischemic leg [I99.8]    Post-Op Diagnosis: Same as preoperative diagnosis.       Procedure(s):  RIGHT LEG BELOW KNEE AMPUTATION (BKA)    Surgeon(s):  Ej Ham MD    Surgical Assistant: Surg Asst-1: Mejia Garza    Anesthesia: General     Estimated Blood Loss (mL): 173    Complications: None    Specimens:   ID Type Source Tests Collected by Time Destination   1 : RIGHT LEG Preservative Leg, Right  Ej Ham MD 2/8/2022 5338 Pathology        Implants: * No implants in log *    Drains: * No LDAs found *    Findings: Uncomplicated right leg below knee amputation    Electronically Signed by Ramesh Beltran MD on 2/8/2022 at 11:19 AM

## 2022-02-08 NOTE — PROGRESS NOTES
conducted a Follow up consultation and Spiritual Assessment for Daksha Vargas, who is a 59 y.o.,male. The  provided the following Interventions:  Continued the relationship of care and support. Listened empathically. Offered prayer and assurance of continued prayer on patients behalf. Chart reviewed. The following outcomes were achieved:  Patient expressed gratitude for 's visit. Assessment:  There are no further spiritual or Anabaptist issues which require Spiritual Care Services interventions at this time. Plan:  Chaplains will continue to follow and will provide pastoral care on an as needed/requested basis.  recommends bedside caregivers page  on duty if patient shows signs of acute spiritual or emotional distress.       Staff fabrizio Kathleen MDIV  Board Certified [facility]5 Fred Rivas  [facility]: [phone]  [facility]: [phone]

## 2022-02-08 NOTE — PERIOP NOTES
Assumed care of pt from OR via bed. Attached to monitor. VSS. OR, MAR and anesthesia report appreciated. Will monitor. 1300  TRANSFER - OUT REPORT:    Verbal report given to Emily Mcconnell RN (name) on Rory Teresa  being transferred to [address] [facility] (unit) for routine post - op       Report consisted of patients Situation, Background, Assessment and   Recommendations(SBAR). Information from the following report(s) SBAR, OR Summary, Intake/Output, MAR, Recent Results and Cardiac Rhythm sinus rhythm was reviewed with the receiving nurse. Lines:   Peripheral IV 02/08/22 Left;Posterior Hand (Active)   Site Assessment Clean, dry, & intact 02/08/22 0700   Phlebitis Assessment 0 02/08/22 0700   Infiltration Assessment 0 02/08/22 0700   Dressing Status Clean, dry, & intact 02/08/22 0700   Dressing Type Tape;Transparent 02/08/22 0700   Hub Color/Line Status Pink; Infusing 02/08/22 0700        Opportunity for questions and clarification was provided.       Patient transported with:   [employer]

## 2022-02-08 NOTE — H&P
Vascular Surgery Consult      Patient: Mabel Frederick MRN: [id number]  CSN: [id number]      YOB: 1960    Age: 59 y.o. Sex: male      DOA: 2/8/2022       HPI:     Mabel Frederick is a 59 y.o. male with critical right leg ischemia. No revascularization options available. Patient has debilitating rest pain of the right lower extremity and the only option for pain management currently is right leg below the knee amputation. Past Medical History:   Diagnosis Date    Alcohol use     Fri-Sun one fifth; Mon-Thur: Pint of liquor    CAD (coronary artery disease)     Cardiac angioplasty with stent 07/29/2009    2.5 x 13 Cypher stent to CX.  Cardiac cath 11/09/2011    RCA patent. LM patent. LAD patent. mD1 55%. CX patent. Prior stent patent. Edison 85% (2.5 x 15-mm Xience stent, resid 0%). LVEDP 12. EF 40-45%. High lateral hypk (RI distribution).       Cardiac inferior STEMI 2009    Constipation     Current smoker     contemplating stopping    Diabetes ([facility]     type 2-insulin dependent    Diabetic neuropathy (HCC)     GERD (gastroesophageal reflux disease)     HTN (hypertension)     Hyperlipidemia     Lacunar infarction ([facility]     Migraine     Myocardial infarction ([facility]     Vitamin D deficiency        Past Surgical History:   Procedure Laterality Date    COLONOSCOPY N/A 10/28/2020    COLONOSCOPY w/polypectomy performed by Vidya Diamond MD at [facility]COPY    HX CORONARY STENT PLACEMENT  07/29/2009    HX HEART CATHETERIZATION  8/30/2011    HX HEART CATHETERIZATION  11/09/2011    HX WISDOM TEETH EXTRACTION      x4    VASCULAR SURGERY PROCEDURE UNLIST      Multiple vascular surgeries       Family History   Problem Relation Age of Onset    Hypertension Mother     Heart Attack Mother     Diabetes Mother     Hypertension Father     Heart Attack Father     Diabetes Father     Diabetes Sister     Hypertension Sister     Stroke Sister     Diabetes Brother     Hypertension Brother     Stroke Brother     No Known Problems Maternal Grandmother     No Known Problems Maternal Grandfather     No Known Problems Paternal Grandmother     No Known Problems Paternal Grandfather     No Known Problems Brother     Heart Disease Other     Kidney Disease Other        Social History     Socioeconomic History    Marital status: SINGLE   Tobacco Use    Smoking status: Current Every Day Smoker     Packs/day: 0.50     Years: 1.00     Pack years: 0.50     Types: Cigarettes    Smokeless tobacco: Never Used   Vaping Use    Vaping Use: Never used   Substance and Sexual Activity    Alcohol use: Yes     Alcohol/week: 4.2 standard drinks     Types: 5 Shots of liquor per week    Drug use: Not Currently     Types: Marijuana, Cocaine     Comment: none    Sexual activity: Yes   Social History Narrative     at [employer]. Prior to Admission medications    Medication Sig Start Date End Date Taking? Authorizing Provider   Trulicity 8.77 YH/4.8 mL sub-q pen INJECT 0.75 MG UNDER THE SKIN ONCE WEEKLY 2/7/22  Yes Lia ANAYA NP   insulin glargine (Basaglar KwikPen U-100 Insulin) 100 unit/mL (3 mL) inpn 45 Units by SubCUTAneous route daily. INJECT 50 UNITS UNDER THE SKIN DAILY 12/1/21  Yes Tram Cabrera MD   buPROPion SR [facility] SR) 150 mg SR tablet ONE TABLET BY MOUTH ONCE A DAY 11/24/21  Yes Lia ANAYA NP   simvastatin (Zocor) 20 mg tablet Take 1 Tablet by mouth nightly. 11/1/21  Yes Lia ANAYA NP   metFORMIN (GLUCOPHAGE) 1,000 mg tablet TAKE ONE TABLET BY MOUTH TWICE A DAY WITH A MEAL 11/1/21  Yes Lia ANAYA NP   ergocalciferol (ERGOCALCIFEROL) 1,250 mcg (50,000 unit) capsule TAKE ONE CAPSULE BY MOUTH ONCE WEEKLY  Indications: vitamin D deficiency (high dose therapy) 11/1/21  Yes Lia ANAYA NP   multivitamin (ONE A DAY) tablet Take 1 Tablet by mouth daily.  11/1/21  Yes Vipin Cooper NP   traZODone (DESYREL) 50 mg tablet Take 1 Tablet by mouth nightly. Patient not taking: Reported on 2/8/2022 11/1/21   Vandana ANAYA NP   glucose blood VI test strips (ASCENSIA AUTODISC VI, ONE TOUCH ULTRA TEST VI) strip Provide test strip and glucose meter that insurance will cover - Twice daily-  fasting in the morning and once two hours after a meal. 11/1/21   Vandana ANAYA NP   flash glucose sensor (FreeStyle FRANCESCO [facility] 2 Sensor) kit Apply 1 sensor every 14 days to check blood sugar as directed at least 3 times daily. 11/1/21   Dieudonne Carter NP       Allergies   Allergen Reactions    Niacin Itching       Review of Systems  Review of Systems - Negative except HPI      Physical Exam:      Visit Vitals  BP (!) 145/91 (BP 1 Location: Right arm, BP Patient Position: At rest)   Pulse 80   Temp 98.7 °F (37.1 °C)   Resp 20   Ht 5' 9\" (1.753 m)   Wt 170 lb 3.2 oz (77.2 kg)   SpO2 98%   BMI 25.13 kg/m²       Physical Exam:  Pertinent items are noted in HPI. Data Review:    CBC:   Lab Results   Component Value Date/Time    WBC 4.3 (L) 01/14/2022 01:41 PM    RBC 3.71 (L) 01/14/2022 01:41 PM    HGB 11.0 (L) 01/14/2022 01:41 PM    HCT 33.4 (L) 01/14/2022 01:41 PM    PLATELET 457 40/25/7915 01:41 PM      BMP:   Lab Results   Component Value Date/Time    Glucose 292 (H) 01/14/2022 01:41 PM    Sodium 140 01/14/2022 01:41 PM    Potassium 4.0 01/14/2022 01:41 PM    Chloride 106 01/14/2022 01:41 PM    CO2 26 01/14/2022 01:41 PM    BUN 12 01/14/2022 01:41 PM    Creatinine 1.06 01/14/2022 01:41 PM    Calcium 7.9 (L) 01/14/2022 01:41 PM         Assessment/Plan     57yo M here for right leg below knee amputation. Active Problems:    * No active hospital problems.  Niki Mackey MD  February 8, 2022

## 2022-02-08 NOTE — ANESTHESIA PREPROCEDURE EVALUATION
Relevant Problems   No relevant active problems       Anesthetic History   No history of anesthetic complications            Review of Systems / Medical History  Patient summary reviewed and pertinent labs reviewed    Pulmonary    COPD: mild      Smoker         Neuro/Psych         Psychiatric history    Comments: etoh  neuropathy Cardiovascular    Hypertension: poorly controlled          Past MI, CAD and hyperlipidemia    Exercise tolerance: <4 METS  Comments: EF 50%   GI/Hepatic/Renal     GERD    Renal disease: CRI       Endo/Other    Diabetes: poorly controlled, type 2    Anemia     Other Findings              Physical Exam    Airway  Mallampati: III  TM Distance: > 6 cm  Neck ROM: normal range of motion   Mouth opening: Normal     Cardiovascular  Regular rate and rhythm,  S1 and S2 normal,  no murmur, click, rub, or gallop             Dental    Dentition: Poor dentition     Pulmonary      Decreased breath sounds: bilateral           Abdominal  GI exam deferred       Other Findings            Anesthetic Plan    ASA: 4  Anesthesia type: MAC          Induction: Intravenous  Anesthetic plan and risks discussed with: Patient

## 2022-02-09 LAB
AMPHET UR QL SCN: NEGATIVE
BARBITURATES UR QL SCN: NEGATIVE
BENZODIAZ UR QL: POSITIVE
CANNABINOIDS UR QL SCN: NEGATIVE
COCAINE UR QL SCN: NEGATIVE
GLUCOSE BLD STRIP.AUTO-MCNC: 198 MG/DL (ref 70–110)
GLUCOSE BLD STRIP.AUTO-MCNC: 289 MG/DL (ref 70–110)
GLUCOSE BLD STRIP.AUTO-MCNC: 338 MG/DL (ref 70–110)
GLUCOSE BLD STRIP.AUTO-MCNC: 347 MG/DL (ref 70–110)
GLUCOSE BLD STRIP.AUTO-MCNC: 561 MG/DL (ref 70–110)
HDSCOM,HDSCOM: ABNORMAL
METHADONE UR QL: NEGATIVE
OPIATES UR QL: POSITIVE
PCP UR QL: NEGATIVE

## 2022-02-09 PROCEDURE — 97535 SELF CARE MNGMENT TRAINING: CPT

## 2022-02-09 PROCEDURE — 65270000029 HC RM PRIVATE

## 2022-02-09 PROCEDURE — 97161 PT EVAL LOW COMPLEX 20 MIN: CPT

## 2022-02-09 PROCEDURE — 82962 GLUCOSE BLOOD TEST: CPT

## 2022-02-09 PROCEDURE — 97165 OT EVAL LOW COMPLEX 30 MIN: CPT

## 2022-02-09 PROCEDURE — 74011250637 HC RX REV CODE- 250/637: Performed by: PHYSICIAN ASSISTANT

## 2022-02-09 PROCEDURE — 74011636637 HC RX REV CODE- 636/637: Performed by: PHYSICIAN ASSISTANT

## 2022-02-09 PROCEDURE — 80307 DRUG TEST PRSMV CHEM ANLYZR: CPT

## 2022-02-09 PROCEDURE — 74011636637 HC RX REV CODE- 636/637: Performed by: STUDENT IN AN ORGANIZED HEALTH CARE EDUCATION/TRAINING PROGRAM

## 2022-02-09 PROCEDURE — 97530 THERAPEUTIC ACTIVITIES: CPT

## 2022-02-09 PROCEDURE — 2709999900 HC NON-CHARGEABLE SUPPLY

## 2022-02-09 PROCEDURE — 74011250636 HC RX REV CODE- 250/636: Performed by: PHYSICIAN ASSISTANT

## 2022-02-09 PROCEDURE — 74011250636 HC RX REV CODE- 250/636: Performed by: STUDENT IN AN ORGANIZED HEALTH CARE EDUCATION/TRAINING PROGRAM

## 2022-02-09 RX ORDER — OXYCODONE AND ACETAMINOPHEN 5; 325 MG/1; MG/1
1 TABLET ORAL
Status: DISCONTINUED | OUTPATIENT
Start: 2022-02-09 | End: 2022-02-15 | Stop reason: HOSPADM

## 2022-02-09 RX ORDER — HYDROMORPHONE HYDROCHLORIDE 1 MG/ML
1 INJECTION, SOLUTION INTRAMUSCULAR; INTRAVENOUS; SUBCUTANEOUS
Status: DISPENSED | OUTPATIENT
Start: 2022-02-09 | End: 2022-02-11

## 2022-02-09 RX ORDER — GABAPENTIN 300 MG/1
300 CAPSULE ORAL 3 TIMES DAILY
Status: DISCONTINUED | OUTPATIENT
Start: 2022-02-09 | End: 2022-02-15 | Stop reason: HOSPADM

## 2022-02-09 RX ORDER — DEXTROSE 50 % IN WATER (D50W) INTRAVENOUS SYRINGE
25-50 AS NEEDED
Status: DISCONTINUED | OUTPATIENT
Start: 2022-02-09 | End: 2022-02-15 | Stop reason: HOSPADM

## 2022-02-09 RX ORDER — INSULIN LISPRO 100 [IU]/ML
INJECTION, SOLUTION INTRAVENOUS; SUBCUTANEOUS
Status: DISCONTINUED | OUTPATIENT
Start: 2022-02-09 | End: 2022-02-15 | Stop reason: HOSPADM

## 2022-02-09 RX ORDER — MAGNESIUM SULFATE 100 %
4 CRYSTALS MISCELLANEOUS AS NEEDED
Status: DISCONTINUED | OUTPATIENT
Start: 2022-02-09 | End: 2022-02-15 | Stop reason: HOSPADM

## 2022-02-09 RX ORDER — OXYCODONE AND ACETAMINOPHEN 5; 325 MG/1; MG/1
2 TABLET ORAL
Status: DISCONTINUED | OUTPATIENT
Start: 2022-02-09 | End: 2022-02-15 | Stop reason: HOSPADM

## 2022-02-09 RX ADMIN — Medication 3 UNITS: at 16:23

## 2022-02-09 RX ADMIN — HYDROMORPHONE HYDROCHLORIDE 0.5 MG: 1 INJECTION, SOLUTION INTRAMUSCULAR; INTRAVENOUS; SUBCUTANEOUS at 01:24

## 2022-02-09 RX ADMIN — HYDROMORPHONE HYDROCHLORIDE 1 MG: 1 INJECTION, SOLUTION INTRAMUSCULAR; INTRAVENOUS; SUBCUTANEOUS at 10:48

## 2022-02-09 RX ADMIN — Medication 45 UNITS: at 08:32

## 2022-02-09 RX ADMIN — THERA TABS 1 TABLET: TAB at 08:32

## 2022-02-09 RX ADMIN — HYDROMORPHONE HYDROCHLORIDE 1 MG: 1 INJECTION, SOLUTION INTRAMUSCULAR; INTRAVENOUS; SUBCUTANEOUS at 14:21

## 2022-02-09 RX ADMIN — Medication 12 UNITS: at 21:43

## 2022-02-09 RX ADMIN — OXYCODONE HYDROCHLORIDE AND ACETAMINOPHEN 2 TABLET: 5; 325 TABLET ORAL at 18:27

## 2022-02-09 RX ADMIN — OXYCODONE HYDROCHLORIDE AND ACETAMINOPHEN 2 TABLET: 5; 325 TABLET ORAL at 12:34

## 2022-02-09 RX ADMIN — BUPROPION HYDROCHLORIDE 150 MG: 150 TABLET, EXTENDED RELEASE ORAL at 08:32

## 2022-02-09 RX ADMIN — Medication 9 UNITS: at 11:09

## 2022-02-09 RX ADMIN — OXYCODONE HYDROCHLORIDE AND ACETAMINOPHEN 1 TABLET: 5; 325 TABLET ORAL at 08:32

## 2022-02-09 RX ADMIN — ATORVASTATIN CALCIUM 20 MG: 20 TABLET, FILM COATED ORAL at 08:32

## 2022-02-09 RX ADMIN — GABAPENTIN 200 MG: 100 CAPSULE ORAL at 08:32

## 2022-02-09 RX ADMIN — GABAPENTIN 300 MG: 300 CAPSULE ORAL at 21:43

## 2022-02-09 RX ADMIN — HYDROMORPHONE HYDROCHLORIDE 0.5 MG: 1 INJECTION, SOLUTION INTRAMUSCULAR; INTRAVENOUS; SUBCUTANEOUS at 20:18

## 2022-02-09 RX ADMIN — METFORMIN HYDROCHLORIDE 1000 MG: 500 TABLET ORAL at 08:32

## 2022-02-09 RX ADMIN — TRAZODONE HYDROCHLORIDE 50 MG: 50 TABLET ORAL at 21:43

## 2022-02-09 RX ADMIN — GABAPENTIN 300 MG: 300 CAPSULE ORAL at 16:23

## 2022-02-09 RX ADMIN — HYDROMORPHONE HYDROCHLORIDE 1 MG: 1 INJECTION, SOLUTION INTRAMUSCULAR; INTRAVENOUS; SUBCUTANEOUS at 06:12

## 2022-02-09 RX ADMIN — ERGOCALCIFEROL 50000 UNITS: 1.25 CAPSULE ORAL at 08:32

## 2022-02-09 NOTE — OP NOTES
OPERATIVE NOTE    DATE OF PROCEDURE: 2/8/22    SURGEON: Alexandra Bryan MD    ASSISTANT(S): Sander Loja SA    PREOPERATIVE DIAGNOSIS: Right leg critical limb ischemia  POSTOPERATIVE DIAGNOSIS: same    PROCEDURE PERFORMED: Right leg below the knee amputation    ANESTHESIA: general anesthesia    EBL: 150    FLUIDS: See anesthesia    BLOOD ADMINISTERED: None    DRAINS/TUBES: None    IMPLANTS: None    COMPLICATIONS: None    FINDINGS: Uncomplicated right leg below the knee amputation    OPERATIVE INDICATIONS: Rest pain of right leg with no revascularization options    DESCRIPTION OF PROCEDURE:      The patient was brought to the operating room after informed consent was obtained and risks and benefits of the procedure were explained. I had discussed the amputation with the patient a week prior and offered him a second opinion regarding the BKA as his pain was intolerable and no options were available for a bypass. Patient was in agreement, right leg was marked prior to procedure. General anesthesia was administered and patient was placed in a supine position with right leg prepped and draped in the usual sterile manner. Tourniquet was applied in the thigh prior to prepping. Time out was performed. I began the procedure by identifying the tibial tuberosity and marked my incision for a posterior flap. Using a #10 blade, I carried my incision circumferentially through the soft tissue. Using electrocautery, I first circumferentially dissected the tibia follow by fibula. Right greater saphenous vein was ligated. I encountered anterior tibial artery bundle in the lateral compartment which was ligated with a 3-0 silk suture. Next I placed a lap pad circumferentially under the tibia and used an electric saw to amputate the tibia. I next amputated 2cm of distal tibia for exposure. I next proceeded to dissect the fibula and using a bone cutter I divided the fibula.  Next I used an amputation knife and amputated the remaining specimen. At this point I inflated the tourniquet for 3 mins to 250mmHg and obtained hemostasis by ligating the tibioperoneal trunk, small venous branches all with 3-0 silk suture. I next debulked the posterior flap and irrigated with iricept. I had excellent hemostasis and used a rasp to smoothen out the tibia and fibula. I next proceeded to close the BKA wound first by approximating the fascia with 0 vicryl using interrupted buried figure of '8' sutures with no fascial defects. I next closed the skin with interrupted 3-0 nylon vertical mattress sutures. Following this, I placed xeroform, abd, kerlix and a lose ace wrap for dressing. Patient was extubated and a epstein was placed. Patient tolerated the procedure well and was transported to PACU in stable condition.      Alexandra Bryan MD       made my incision 5 cm distal. I carried the incision across the anterior leg and created

## 2022-02-09 NOTE — PROGRESS NOTES
Reason for Admission:  Peripheral arterial disease ([facility] [address] [I73.9]  Ischemic leg [I99.8]  S/P BKA (below knee amputation) ([facility]) [Z89.519]                 RUR Score:    17%            Plan for utilizing home health:    Not at this time                      Likelihood of Readmission:   Moderate                         Do you (patient/family) have any concerns for transition/discharge?  yes, new BKA, home not handicapped accessible    Transition of Care Plan:       Initial assessment completed with patient and spouse/SO. Cognitive status of patient: oriented to time, place, person and situation. Face sheet information confirmed:  yes. The patient designates Sabas Da Silva and girlfriend, Marni Kaplan to participate in his discharge plan and to receive any needed information. This patient lives in a single family home with girlfriend. Patient is not able to navigate steps as needed. Prior to hospitalization, patient was considered to be independent with ADLs/IADLS : yes . Patient has a current ACP document on file: no      Healthcare Decision Maker:     Click here to complete [address] including selection of the Healthcare Decision Maker Relationship (ie \"Primary\")    The girlfriend will be available to transport patient home upon discharge. The patient has no DME available in the home. Patient is not currently active with home health. Patient has not stayed in a skilled nursing facility or rehab. This patient is on dialysis :no    Currently, the discharge plan is Acute Rehab. The patient states that he can obtain his medications from the pharmacy, and take his medications as directed. Patient's current insurance is [facility] [employer] Medicaid. Care Management Interventions  PCP Verified by CM:  Yes  Mode of Transport at Discharge: Self  Transition of Care Consult (CM Consult): Discharge [address]: Spouse/Significant Other,Child(kenzie)  Confirm Follow Up Transport: Family  Discharge Location  Patient Expects to be Discharged to[de-identified] Rehab hospital/unit acute        Lucina Parry RN - Outcomes Manager  [phone]

## 2022-02-09 NOTE — PROGRESS NOTES
Admit Date: 2/8/2022    POD 1 Day Post-Op    Procedure:  Procedure(s):  RIGHT LEG BELOW KNEE AMPUTATION (BKA)     Patient: Kaye Ortiz  YOB: 1960  MRN: [id number]     Date of Procedure: 2/8/2022      Pre-Op Diagnosis: Peripheral arterial disease ([facility] [address] [I73.9]  Ischemic leg [I99.8]     Post-Op Diagnosis: Same as preoperative diagnosis.       Procedure(s):  RIGHT LEG BELOW KNEE AMPUTATION (BKA)     Surgeon(s):  Leonor Chairez MD     Surgical Assistant: Surg Asst-1: Clarice Lewis     Anesthesia: General      Estimated Blood Loss (mL): 056     Complications: None     Specimens:   ID Type Source Tests Collected by Time Destination   1 : RIGHT LEG Preservative Leg, Right   Leonor Chairez MD 2/8/2022 [address] [facility]         Implants: * No implants in log *     Drains: * No LDAs found *     Findings: Uncomplicated right leg below knee amputation     Electronically Signed by Erwin Guerrero MD on 2/8/2022 at 11:19 AM         Subjective:     [place] is awake alert and out of bed in a chair. Answers all questions appropriately. Moves all extremities to command. Including his right BKA stump. Patient states that he was able to get out of bed to chair by himself. Patient is postop day #1 from a right lower extremity below the knee amputation. Still with mild to moderate to at times severe pain. Adequate relief with IV pain medication only. Tolerating his diet with no nausea vomiting. Was able to void without any issues this morning. Actively passing gas but no BM. Patient is weak but comfortable, stating that he would like his pain under better control. Patient is reporting some moderate phantom pain. States it feels like his toes are burning. Patient was instructed on about the possibility of going home but after long conversation about proper wound healing and prosthetic placement patient is willing to follow all doctors recommendations at this point.   Denies any chest pain or shortness of breath. Denies any dyspnea on exertion. Denies any fever or chills. Overall patient feels that he is weak in his head is still foggy from anesthesia but patient does feel stronger. Objective:     Blood pressure (!) 149/81, pulse 86, temperature 99 °F (37.2 °C), resp. rate 18, height 5' 9\" (1.753 m), weight 169 lb 3.2 oz (76.7 kg), SpO2 98 %. Temp (24hrs), Av.3 °F (36.8 °C), Min:97.6 °F (36.4 °C), Max:99 °F (37.2 °C)      Physical Exam:  Constitutional:  Patient is well developed, well nourished, and not distressed. Answers all questions appropriately and moves all extremities to command. Complaining of 8 out of 10 incisional pain. Adequate relief with IV pain meds  HEENT: atraumatic, normocephalic, wearing a mask. Eyes:   Cunjunctivae clear, no scleral icterus  Neck:   No JVD present. No adenopathy. No carotid bruits or thrills noted bilaterally. Cardiovascular:  Normal rate, regular rhythm, normal heart sounds. No murmur heard. Pulmonary/Chest: Effort normal . No wheezes, rales or rhonchi noted. Extremities: Right lower extremity bandage with some old bloody drainage noted but no active bleeding. Tender to touch. Right lower extremity warm to touch no calf tenderness to palpation. Neurological:  he  is alert and oriented x3 . Gait normal. Motor & sensory grossly intact in all 4 limbs. Psych: Appropriate mood and affect. Skin:  Skin is warm and dry. No ulcerations. Capillary refill <3 sec bilaterally. No ulcerations or rashes noted.    Labs:   Recent Results (from the past 24 hour(s))   GLUCOSE, POC    Collection Time: 22 10:32 AM   Result Value Ref Range    Glucose (POC) 155 (H) 70 - 110 mg/dL   CBC WITH AUTOMATED DIFF    Collection Time: 22  4:05 PM   Result Value Ref Range    WBC 9.1 4.6 - 13.2 K/uL    RBC 3.51 (L) 4.35 - 5.65 M/uL    HGB 9.9 (L) 13.0 - 16.0 g/dL    HCT 31.3 (L) 36.0 - 48.0 %    MCV 89.2 78.0 - 100.0 FL    MCH 28.2 24.0 - 34.0 PG    MCHC 31.6 31.0 - 37.0 g/dL    RDW 13.2 11.6 - 14.5 %    PLATELET 862 742 - 273 K/uL    MPV 9.9 9.2 - 11.8 FL    NRBC 0.0 0  WBC    ABSOLUTE NRBC 0.00 0.00 - 0.01 K/uL    NEUTROPHILS 89 (H) 40 - 73 %    LYMPHOCYTES 6 (L) 21 - 52 %    MONOCYTES 5 3 - 10 %    EOSINOPHILS 0 0 - 5 %    BASOPHILS 0 0 - 2 %    IMMATURE GRANULOCYTES 0 0.0 - 0.5 %    ABS. NEUTROPHILS 8.1 (H) 1.8 - 8.0 K/UL    ABS. LYMPHOCYTES 0.5 (L) 0.9 - 3.6 K/UL    ABS. MONOCYTES 0.4 0.05 - 1.2 K/UL    ABS. EOSINOPHILS 0.0 0.0 - 0.4 K/UL    ABS. BASOPHILS 0.0 0.0 - 0.1 K/UL    ABS. IMM. GRANS. 0.0 0.00 - 0.04 K/UL    DF AUTOMATED     METABOLIC PANEL, COMPREHENSIVE    Collection Time: 02/08/22  4:05 PM   Result Value Ref Range    Sodium 135 (L) 136 - 145 mmol/L    Potassium 4.1 3.5 - 5.5 mmol/L    Chloride 102 100 - 111 mmol/L    CO2 27 21 - 32 mmol/L    Anion gap 6 3.0 - 18 mmol/L    Glucose 242 (H) 74 - 99 mg/dL    BUN 12 7.0 - 18 MG/DL    Creatinine 1.19 0.6 - 1.3 MG/DL    BUN/Creatinine ratio 10 (L) 12 - 20      GFR est AA >60 >60 ml/min/1.73m2    GFR est non-AA >60 >60 ml/min/1.73m2    Calcium 8.5 8.5 - 10.1 MG/DL    Bilirubin, total <0.1 (L) 0.2 - 1.0 MG/DL    ALT (SGPT) 19 16 - 61 U/L    AST (SGOT) 20 10 - 38 U/L    Alk. phosphatase 75 45 - 117 U/L    Protein, total 6.2 (L) 6.4 - 8.2 g/dL    Albumin 2.8 (L) 3.4 - 5.0 g/dL    Globulin 3.4 2.0 - 4.0 g/dL    A-G Ratio 0.8 0.8 - 1.7     GLUCOSE, POC    Collection Time: 02/09/22  8:37 AM   Result Value Ref Range    Glucose (POC) 338 (H) 70 - 110 mg/dL       Data Review labs noted H&H stable  Nursing notes and notations appreciated    Assessment:     Active Problems:    S/P BKA (below knee amputation) ([facility] (2/8/2022)        Plan/Recommendations/Medical Decision Making:     Continue present Rx -continue meds as ordered. Adjust pain medications, Long discussion with patient patient encouraged to take oral pain medicines for pain control at this point.   Will use IV pain medicine for breakthrough pain only.  Percocet 5 and 325 1 p.o. every 4 hours as needed for mild pain  Percocet 5 and 325 2 p.o. every 4 hours as needed for moderate pain  Morphine 2 mg IV every 4 hours as needed for breakthrough pain  Increase Gabapentin to 300mg TID  Increase diet and activity as tolerated  PT/OT consult and treat  Case management consult for skilled nursing facility placement  Long discussion with patient about home versus skilled nursing facility post amputation. Patient instructed on the importance of getting proper treatment prior to going home to avoid any falls. Patient states he is agreeable on this visit and will pursue skilled nursing facility treatment. Will contact [facility] to inquire about stump , also to initiate contact for prosthetic care. We will continue to monitor labs and vitals  Leave dressing alone for the next 48 hours only to be changed by vascular on Friday. Reinforce as needed only  We will discuss details with my attending.

## 2022-02-09 NOTE — PROGRESS NOTES
Problem: Mobility Impaired (Adult and Pediatric)  Goal: *Acute Goals and Plan of Care (Insert Text)  Description: Physical Therapy Goals  Initiated 2/9/2022 and to be accomplished within 7 day(s)  1. Patient will move from supine to sit and sit to supine  in bed with modified independence. 2.  Patient will transfer from bed to chair and chair to bed with modified independence using the least restrictive device. 3.  Patient will perform sit to stand with modified independence. 4.  Patient will ambulate with modified independence for 50 feet with the least restrictive device. 5.  Patient will ascend/descend 3 stairs with B handrail(s) with modified independence. PLOF: Pt lives with his spouse in a h with 3 CATHIE. Wade with a RW PTA. Outcome: Progressing Towards Goal   PHYSICAL THERAPY EVALUATION    Patient: Pasqual Barthel (29 y.o. male)  Date: 2/9/2022  Primary Diagnosis: Peripheral arterial disease ([facility] [I73.9]  Ischemic leg [I99.8]  S/P BKA (below knee amputation) ([facility] [Z89.519]  Procedure(s) (LRB):  RIGHT LEG BELOW KNEE AMPUTATION (BKA) (Right) 1 Day Post-Op   Precautions:  Fall,Skin (R BKA)    ASSESSMENT :  Based on the objective data described below, the patient presents with generalized weakness, decreased endurance, and pain. Pt found semi-reclined in bed, in NAD, willing to work with PT. He reports phantom pain, R LE. Sup-sit with Wade. Pt stood with SBA and RW and amb 3 hops to sit in recliner with Diana for navigation. Edu pt on preventing contractures with prone positioning/keeping bed flat in bed for periods of time. Pt was left with B LE's elevated, call bell nearby, all needs met. Recommend inpatient rehab for prosthetic training. Patient will benefit from skilled intervention to address the above impairments.   Patient's rehabilitation potential is considered to be Good  Factors which may influence rehabilitation potential include:   []         None noted  []         Mental ability/status  [x]         Medical condition  [x]         Home/family situation and support systems  [x]         Safety awareness  []         Pain tolerance/management  []         Other:      PLAN :  Recommendations and Planned Interventions:   [x]           Bed Mobility Training             [x]    Neuromuscular Re-Education  [x]           Transfer Training                   []    Orthotic/Prosthetic Training  [x]           Gait Training                          []    Modalities  [x]           Therapeutic Exercises           []    Edema Management/Control  [x]           Therapeutic Activities            [x]    Family Training/Education  [x]           Patient Education  []           Other (comment):    Frequency/Duration: Patient will be followed by physical therapy 1-2 times per day/4-7 days per week to address goals. Discharge Recommendations: Inpatient rehab   Further Equipment Recommendations for Discharge: prosthetic device     SUBJECTIVE:   Patient stated my right toes are hurting.     OBJECTIVE DATA SUMMARY:     Past Medical History:   Diagnosis Date    Alcohol use     Fri-Sun one fifth; Mon-Thur: Pint of liquor    CAD (coronary artery disease)     Cardiac angioplasty with stent 07/29/2009    2.5 x 13 Cypher stent to CX.  Cardiac cath 11/09/2011    RCA patent. LM patent. LAD patent. mD1 55%. CX patent. Prior stent patent. Edison 85% (2.5 x 15-mm Xience stent, resid 0%). LVEDP 12. EF 40-45%. High lateral hypk (RI distribution).       Cardiac inferior STEMI 2009    Constipation     Current smoker     contemplating stopping    Diabetes ([facility]     type 2-insulin dependent    Diabetic neuropathy (HCC)     GERD (gastroesophageal reflux disease)     HTN (hypertension)     Hyperlipidemia     Lacunar infarction ([facility]     Migraine     Myocardial infarction ([facility] [address]     Vitamin D deficiency      Past Surgical History:   Procedure Laterality Date    COLONOSCOPY N/A 10/28/2020    COLONOSCOPY w/polypectomy performed by Ricardo Kamraa MD at [facility] ENDOSCOPY    HX CORONARY STENT PLACEMENT  07/29/2009    HX HEART CATHETERIZATION  8/30/2011    HX HEART CATHETERIZATION  11/09/2011    HX WISDOM TEETH EXTRACTION      x4    VASCULAR SURGERY PROCEDURE UNLIST      Multiple vascular surgeries     Barriers to Learning/Limitations: None  Compensate with: N/A  Home Situation:  Home Situation  Home Environment: Private residence  # Steps to Enter: 3  One/Two Story Residence: One story  Living Alone: No  Support Systems: Spouse/Significant Other  Patient Expects to be Discharged to[de-identified] Home with home health  Current DME Used/Available at Home: Walker, rolling  Critical Behavior:  Neurologic State: Alert  Orientation Level: Oriented X4  Cognition: Follows commands  Safety/Judgement: Fall prevention  Psychosocial  Patient Behaviors: Calm; Cooperative        Skin Integrity: Incision (comment)  Skin Integumentary  Skin Integrity: Incision (comment)     Strength:    Strength: Generally decreased, functional       Tone & Sensation:   Tone: Normal    Sensation: Intact        Range Of Motion:  AROM: Generally decreased, functional       Functional Mobility:  Bed Mobility:     Supine to Sit: Supervision     Scooting: Supervision  Transfers:  Sit to Stand: Stand-by assistance  Stand to Sit: Contact guard assistance        Bed to Chair: Minimum assistance              Balance:   Sitting: Intact; With support  Standing: Impaired; With support  Standing - Static: Good  Standing - Dynamic : Fair      Pain:  Pain level pre-treatment: 8/10   Pain level post-treatment: 8/10   Pain Intervention(s) : Medication (see MAR); Rest, Ice, Repositioning  Response to intervention: Nurse notified, See doc flow    Activity Tolerance:   Pt tolerated mobility well  Please refer to the flowsheet for vital signs taken during this treatment.   After treatment:   [x]         Patient left in no apparent distress sitting up in chair  []         Patient left in no apparent distress in bed  [x]         Call bell left within reach  [x]         Nursing notified  []         Caregiver present  []         Bed alarm activated  []         SCDs applied    COMMUNICATION/EDUCATION:   [x]         Role of Physical Therapy in the acute care setting. [x]         Fall prevention education was provided and the patient/caregiver indicated understanding. [x]         Patient/family have participated as able in goal setting and plan of care. []         Patient/family agree to work toward stated goals and plan of care. []         Patient understands intent and goals of therapy, but is neutral about his/her participation. []         Patient is unable to participate in goal setting/plan of care: ongoing with therapy staff.  []         Other:     Thank you for this referral.  Naldo Wisdom   Time Calculation: 18 mins      Eval Complexity: History: LOW Complexity : Zero comorbidities / personal factors that will impact the outcome / POCExam:LOW Complexity : 1-2 Standardized tests and measures addressing body structure, function, activity limitation and / or participation in recreation  Presentation: LOW Complexity : Stable, uncomplicated  Clinical Decision Making:Low Complexity    Overall Complexity:LOW

## 2022-02-09 NOTE — PROGRESS NOTES
Progress Note:  Entered room for requested pain medication and patient had unwrapped ace wrap from stump. Asked patient to wait for surgeon to take ff dressing and not touch. Rewrapped dressing some drainage noted on dressing. No further changes, Pain medication change due to c/o increasing pain. No change in pain level per patient. But patient sleeping during rounding. No s/s of  Acute distress or discomfort. Small amount of breakthru drainage on dressing.    Patient Vitals for the past 12 hrs:   Temp Pulse Resp BP SpO2   02/09/22 0336 98.9 °F (37.2 °C) 97 18 (!) 161/79 99 %   02/08/22 2300 97.6 °F (36.4 °C) 88 18 (!) 153/74 95 %   02/08/22 2022 97.9 °F (36.6 °C) 85 18 (!) 153/86 100 % Physical Examination:  GENERAL:               Alert, Oriented x3 , No acute distress.    HEENT:                    Right Lower lip swelling present , improved from yesterday, no stridor, no problems speaking  PULM:                     Bilateral air entry, Clear to auscultation bilaterally, no significant sputum production, No Rales, No Rhonchi, No Wheezing  CVS:                         S1, S2,  No Murmur  ABD:                        Soft, nondistended, nontender, normoactive bowel sounds,   Vascular:                Warm Extremities,  NEURO:                  Alert, oriented, interactive, nonfocal, follows commands  PSYC:                      Paranoid ,  no  Insight.        Assessment   Lip swelling either anaphylaxis (food - Strawberries Blackberries, cranberry /Medicine -Cholchicine) vs angioedema(Ace)   Psychosis / Paranoia   Initial complaint of knee pain (she is not complaining at this time)  HTN, CAD, DM2, RA, Obesity    Plan  Transfer to [facility] as swelling improved  Swelling improving despite patient refusing Steroids, pepcid, benadryl  Psyc f/u  1:1  hold ace, cholerine at this time.   Can transfer to [facility] d/w dr Maxwell who will accept pt upon transfer Physical Examination:  GENERAL:               Alert, Oriented x3 , No acute distress.    HEENT:                    Right Lower lip swelling present , improved from yesterday, no stridor, no problems speaking  PULM:                     Bilateral air entry, Clear to auscultation bilaterally, no significant sputum production, No Rales, No Rhonchi, No Wheezing  CVS:                         S1, S2,  No Murmur  ABD:                        Soft, nondistended, nontender, normoactive bowel sounds,   Vascular:                Warm Extremities,  NEURO:                  Alert, oriented, interactive, nonfocal, follows commands  PSYC:                      Paranoid ,  no  Insight.        Assessment   Lip swelling either anaphylaxis (food - Strawberries Blackberries, cranberry /Medicine -Cholchicine) vs angioedema(Ace)   Psychosis / Paranoia   Initial complaint of knee pain (she is not complaining at this time)  HTN, CAD, DM2, RA, Obesity    Plan  Transfer to [facility] as swelling improved  Swelling improving despite patient refusing Steroids, pepcid, benadryl  Psyc f/u  1:1  hold ace, cholerine at this time.   Can transfer to [facility] d/w dr Maxwell who will accept pt upon transfer  pt will need f/u with allergist to evaluate cause of allergy

## 2022-02-09 NOTE — PROGRESS NOTES
Problem: Self Care Deficits Care Plan (Adult)  Goal: *Acute Goals and Plan of Care (Insert Text)  Description: Occupational Therapy Goals  Initiated 2/9/2022 within 7 day(s). 1.  Patient will perform lower body dressing with supervision/set-up. 2.  Patient will perform bathing with supervision/set-up. 3.  Patient will perform toilet transfers with supervision/set-up. 4.  Patient will perform all aspects of toileting with supervision/set-up. 5.  Patient will participate in upper extremity therapeutic exercise/activities with supervision/set-up for 8 minutes. 6.  Patient will utilize energy conservation techniques during functional activities with verbal cues. 7.  Patient will be independent with desensitization strategies for RLE to activity tolerance for completion of ADLs. Prior Level of Function: Pt reports being Mod Ind for ADls and functional mobility. Outcome: Progressing Towards Goal  OCCUPATIONAL THERAPY EVALUATION    Patient: Sarah Ingram (1 y.o. male)  Date: 2/9/2022  Primary Diagnosis: Peripheral arterial disease ([facility] [I73.9]  Ischemic leg [I99.8]  S/P BKA (below knee amputation) ([facility] [Z89.519]  Procedure(s) (LRB):  RIGHT LEG BELOW KNEE AMPUTATION (BKA) (Right) 1 Day Post-Op   Precautions:   Fall,Skin (R BKA)    ASSESSMENT :  Based on the objective data described below, the patient presents with decreased  functional activity tolerance, generalized weakness, phantom and incisional pain, decreased functional mobility and standing balance, limiting pt's participation and independence with ADLs, requiring increased assistance from others. Pt is motivated to participate in OT. Pt educated on desensitization strategies and positioning of residual RLE to facilitate safe healing, decrease pain , and improve tolerance of activity. Pt verbalized understanding.  Pt educated on adaptive strategies for LB ADLs in sitting and standing, pt demos good understanding, required SBA to doff/don sock on LLE, and Min A to simulate pulling up underwear. Pt benefits from [address] for functional txfr with FWW simulating toilet txfr due to increased pain and pt reporting drowsiness from pain medication. Pt returned to sit in the recliner at the end of the session. Patient will benefit from skilled intervention to address the above impairments. Patient's rehabilitation potential is considered to be Fair  Factors which may influence rehabilitation potential include:   []             None noted  []             Mental ability/status  [x]             Medical condition  [x]             Home/family situation and support systems  []             Safety awareness  []             Pain tolerance/management  []             Other:      PLAN :  Recommendations and Planned Interventions:   [x]               Self Care Training                  [x]      Therapeutic Activities  [x]               Functional Mobility Training   [x]      Cognitive Retraining  [x]               Therapeutic Exercises           [x]      Endurance Activities  [x]               Balance Training                    []      Neuromuscular Re-Education  []               Visual/Perceptual Training     [x]      Home Safety Training  [x]               Patient Education                   [x]      Family Training/Education  []               Other (comment):    Frequency/Duration: Patient will be followed by occupational therapy 1-2 times/day, 3-5 days/week to address goals. Discharge Recommendations: Inpatient Rehab  Further Equipment Recommendations for Discharge: bedside commode and rolling walker     SUBJECTIVE:   Patient stated Roman Infante will get there.     OBJECTIVE DATA SUMMARY:     Past Medical History:   Diagnosis Date    Alcohol use     Fri-Sun one fifth; Mon-Thur: Pint of liquor    CAD (coronary artery disease)     Cardiac angioplasty with stent 07/29/2009    2.5 x 13 Cypher stent to CX. Cardiac cath 11/09/2011    RCA patent. LM patent. LAD patent. mD1 55%. CX patent. Prior stent patent. Edison 85% (2.5 x 15-mm Xience stent, resid 0%). LVEDP 12. EF 40-45%. High lateral hypk (RI distribution). Cardiac inferior STEMI 2009    Constipation     Current smoker     contemplating stopping    Diabetes ([facility] [address] [address]     type 2-insulin dependent    Diabetic neuropathy (HCC)     GERD (gastroesophageal reflux disease)     HTN (hypertension)     Hyperlipidemia     Lacunar infarction ([facility]     Migraine     Myocardial infarction [facility])     Vitamin D deficiency      Past Surgical History:   Procedure Laterality Date    COLONOSCOPY N/A 10/28/2020    COLONOSCOPY w/polypectomy performed by Prasanth Santos MD at [place] [id number]  07/29/2009    HX HEART CATHETERIZATION  8/30/2011    HX HEART CATHETERIZATION  11/09/2011    HX WISDOM TEETH EXTRACTION      x4    VASCULAR SURGERY PROCEDURE UNLIST      Multiple vascular surgeries     Barriers to Learning/Limitations: None  Compensate with: visual, verbal, tactile, kinesthetic cues/model    Home Situation:   Home Situation  Home Environment: Private residence  # Steps to Enter: 3  One/Two Story Residence: One story  Living Alone: No  Support Systems: Spouse/Significant Other,Child(kenzie)  Patient Expects to be Discharged to[de-identified] Rehab hospital/unit acute  Current DME Used/Available at Home: Shower chair,Cane, straight,Walker, rolling  Tub or Shower Type: Tub (pt reports he has walk-in tub)  [x]  Right hand dominant   []  Left hand dominant    Cognitive/Behavioral Status:  Neurologic State: Alert  Orientation Level: Oriented X4  Cognition: Follows commands  Safety/Judgement: Awareness of environment; Fall prevention    Skin: visible skin intact  Edema: none noted    Vision/Perceptual:       Appears intact  Coordination: BUE  Coordination: Generally decreased, functional  Fine Motor Skills-Upper: Left Intact; Right Intact    Gross Motor Skills-Upper: Left Intact; Right Intact    Balance:  Sitting: Intact  Standing: Impaired; With support  Standing - Static: Good  Standing - Dynamic : Fair    Strength: BUE  Strength: Within functional limits   Tone & Sensation: BUE  Tone: Normal  Sensation: Intact   Range of Motion: BUE  AROM: Within functional limits   Functional Mobility and Transfers for ADLs:  Bed Mobility:     Supine to Sit: Supervision     Scooting: Supervision  Transfers:  Sit to Stand: Contact guard assistance  Stand to Sit: Contact guard assistance     Bed to Chair: Minimum assistance   Toilet Transfer : Minimum assistance (simulated with FWW)    ADL Assessment:   Feeding: Setup  Oral Facial Hygiene/Grooming: Setup  Bathing: Minimum assistance  Upper Body Dressing: Supervision  Lower Body Dressing: Minimum assistance  Toileting: Minimum assistance   ADL Intervention:   Cognitive Retraining  Safety/Judgement: Awareness of environment; Fall prevention    Pain:  Pain level pre-treatment: 10/10 pt received pain meds  Pain level post-treatment: 3/10    Activity Tolerance:   Fair  Please refer to the flowsheet for vital signs taken during this treatment. After treatment:   [x] Patient left in no apparent distress sitting up in chair  [] Patient left in no apparent distress in bed  [x] Call bell left within reach  [x] Nursing notified  [] Caregiver present  [] Bed alarm activated    COMMUNICATION/EDUCATION:   [x] Role of Occupational Therapy in the acute care setting  [x] Home safety education was provided and the patient/caregiver indicated understanding. [x] Patient/family have participated as able in goal setting and plan of care. [] Patient/family agree to work toward stated goals and plan of care. [] Patient understands intent and goals of therapy, but is neutral about his/her participation. [] Patient is unable to participate in goal setting and plan of care.     Thank you for this referral.  Marzena Lambert OTR/L  Time Calculation: 23 mins    Eval Complexity: History: LOW Complexity : Brief history review ; Examination: LOW Complexity : 1-3 performance deficits relating to physical, cognitive , or psychosocial skils that result in activity limitations and / or participation restrictions ;    Decision Making:LOW Complexity : No comorbidities that affect functional and no verbal or physical assistance needed to complete eval tasks

## 2022-02-09 NOTE — DIABETES MGMT
Diabetes Patient/Family Education Record    Factors That May Influence Patients Ability to Learn or Comply with Recommendations   []   Language barrier    []   Cultural needs   []   Motivation    []   Cognitive limitation    []   Physical   [x]   Education    []   Physiological factors   []   Hearing/vision/speaking impairment   []   Catholic beliefs    []   Financial factors   []  Other:   []  No factors identified at this time. Person Instructed:   [x]   Patient   []   Family   []  Other     Preference for Learning:   [x]   Verbal   []   Written   []  Demonstration     Level of Comprehension & Competence:    [x]  Good                                      [] Fair                                     []  Poor                             []  Needs Reinforcement   [x]  Teach back completed    Education Component:  Patient is status post right below knee amputation on 2/08/2022. Patient receptive and actively participated during eduction. [x]  Medication management, including how to administer insulin (if appropriate) and potential medication interactions: Patient reported taking the following diabetes medications at home as prescribed:  Basaglar (lantus) insulin 52 units twice daily: every morning and bedtime  Metformin 1000 mg 2 times daily with meals  Trulicity  7.53 mg SC injection weekly (Thursday)     [x]  Nutritional management - [x] Obtained usual meal pattern: Patient did not know about carbohydrates and portion control. He was also drinking fruit juices and regular beverages. Educated patient:    [x]   Basic carbohydrate counting: starches, fruits, dairy  []  Plate method  [x]  Limit concentrated sweets and avoid sweetened beverages: Patient plan to switch to sugar free beverages. [x]  Portion control  [x]    Avoid skipping meals     [x]  Exercise: Patient is status post right below knee amputation on 2/08/2022.  Addressed the importance of fall prevention and injury. Initiated discussion to follow PT and OT recommendations. [x]  Signs, symptoms, and treatment of hyperglycemia and hypoglycemia: Discussed high blood glucose in detail with patient. Assessed that patient only with very minimal knowledge/understanding of diabetes management. Patient stated that he takes his diabetes medications but he did not know about carbohydrate management. He was drinking fruit juices and regular beverages. He did not know about the recommended portion control of cabs (starches, fruits, dairy) and high sugar contents in regular beverages and fruit juices. [x] Prevention, recognition and treatment of hyperglycemia and hypoglycemia: Discussed low BG less than 70 in detail with patient. He reported not experiencing hypoglycemia and his blood sugar at home have always been high at home. Educated patient about hypoglycemia: symptoms, treatment and prevention. [x]  Importance of blood glucose monitoring: Patient reported checking his blood sugar 2 times daily instead of 3x daily. He did not know about the recommended fasting BG range. Explained how persistent high blood sugar can delay wound healing and risks of potential complications of uncontrolled diabetes. See list below discussed with patient. Patient receptive to learning:  [x] Blood Glucose targets   []   Provided patient with blood glucose meter  [x]  Has glucometer and supplies at home: Patient reported. []  Instruction on use of the blood glucose meter and recommended monitoring schedule   [x]  Discuss the importance of HbA1C monitoring. Patient stated that he did not know about A1c. Educated patient. Patient's A1c is 13.8% (2/08/2022). This is equivalent to average glucose of 349 mg/dl for the past 2-3 months. Patient stated that his blood sugar at home has always been high. Educated patient about A1c and the recommended A1c level of less than 7%.   Educated patient that high blood sugar will potentially delay wound healing. []  Sick day guidelines   []  Proper use and disposal of lancets, needles, syringes or insulin pens (if appropriate)   [x]  Potential long-term complications (retinopathy, kidney disease, neuropathy, foot care)   [x] Information about whom to contact in case of emergency or for more information    [x]  Goal:  Patient/family will demonstrate understanding of Diabetes Self- Management Skills by: 2/16/2022  Plan for post-discharge education or self-management support:    [] Outpatient class schedule provided            [] Patient Declined    [] Scheduled for outpatient classes (date) _______    [] Written information provided  Verify: [x] Prior to admission Diabetes medications    Does patient understand how diabetes medications work? Yes. Does patient have difficulty obtaining diabetes medications and testing supplies?  No.    Vladislav Guerrero RN [facility]  Pager: [phone]

## 2022-02-10 LAB
GLUCOSE BLD STRIP.AUTO-MCNC: 180 MG/DL (ref 70–110)
GLUCOSE BLD STRIP.AUTO-MCNC: 180 MG/DL (ref 70–110)
GLUCOSE BLD STRIP.AUTO-MCNC: 273 MG/DL (ref 70–110)
GLUCOSE BLD STRIP.AUTO-MCNC: 307 MG/DL (ref 70–110)

## 2022-02-10 PROCEDURE — 2709999900 HC NON-CHARGEABLE SUPPLY

## 2022-02-10 PROCEDURE — 97110 THERAPEUTIC EXERCISES: CPT

## 2022-02-10 PROCEDURE — 82962 GLUCOSE BLOOD TEST: CPT

## 2022-02-10 PROCEDURE — 74011250637 HC RX REV CODE- 250/637: Performed by: PHYSICIAN ASSISTANT

## 2022-02-10 PROCEDURE — 74011250636 HC RX REV CODE- 250/636: Performed by: PHYSICIAN ASSISTANT

## 2022-02-10 PROCEDURE — 77030037878 HC DRSG MEPILEX >48IN BORD MOLN -B

## 2022-02-10 PROCEDURE — 97530 THERAPEUTIC ACTIVITIES: CPT

## 2022-02-10 PROCEDURE — 74011636637 HC RX REV CODE- 636/637: Performed by: STUDENT IN AN ORGANIZED HEALTH CARE EDUCATION/TRAINING PROGRAM

## 2022-02-10 PROCEDURE — 97535 SELF CARE MNGMENT TRAINING: CPT

## 2022-02-10 PROCEDURE — 65270000029 HC RM PRIVATE

## 2022-02-10 PROCEDURE — 74011636637 HC RX REV CODE- 636/637: Performed by: PHYSICIAN ASSISTANT

## 2022-02-10 RX ADMIN — METFORMIN HYDROCHLORIDE 1000 MG: 500 TABLET ORAL at 08:11

## 2022-02-10 RX ADMIN — TRAZODONE HYDROCHLORIDE 50 MG: 50 TABLET ORAL at 22:28

## 2022-02-10 RX ADMIN — HYDROMORPHONE HYDROCHLORIDE 1 MG: 1 INJECTION, SOLUTION INTRAMUSCULAR; INTRAVENOUS; SUBCUTANEOUS at 10:52

## 2022-02-10 RX ADMIN — HYDROMORPHONE HYDROCHLORIDE 1 MG: 1 INJECTION, SOLUTION INTRAMUSCULAR; INTRAVENOUS; SUBCUTANEOUS at 08:10

## 2022-02-10 RX ADMIN — Medication 3 UNITS: at 22:04

## 2022-02-10 RX ADMIN — OXYCODONE AND ACETAMINOPHEN 1 TABLET: 5; 325 TABLET ORAL at 09:39

## 2022-02-10 RX ADMIN — Medication 3 UNITS: at 06:39

## 2022-02-10 RX ADMIN — Medication 12 UNITS: at 17:04

## 2022-02-10 RX ADMIN — OXYCODONE HYDROCHLORIDE AND ACETAMINOPHEN 2 TABLET: 5; 325 TABLET ORAL at 22:28

## 2022-02-10 RX ADMIN — GABAPENTIN 300 MG: 300 CAPSULE ORAL at 08:11

## 2022-02-10 RX ADMIN — GABAPENTIN 300 MG: 300 CAPSULE ORAL at 21:23

## 2022-02-10 RX ADMIN — OXYCODONE HYDROCHLORIDE AND ACETAMINOPHEN 2 TABLET: 5; 325 TABLET ORAL at 14:53

## 2022-02-10 RX ADMIN — ERGOCALCIFEROL 50000 UNITS: 1.25 CAPSULE ORAL at 08:11

## 2022-02-10 RX ADMIN — Medication 9 UNITS: at 11:37

## 2022-02-10 RX ADMIN — THERA TABS 1 TABLET: TAB at 08:11

## 2022-02-10 RX ADMIN — HYDROMORPHONE HYDROCHLORIDE 1 MG: 1 INJECTION, SOLUTION INTRAMUSCULAR; INTRAVENOUS; SUBCUTANEOUS at 05:48

## 2022-02-10 RX ADMIN — HYDROMORPHONE HYDROCHLORIDE 1 MG: 1 INJECTION, SOLUTION INTRAMUSCULAR; INTRAVENOUS; SUBCUTANEOUS at 12:41

## 2022-02-10 RX ADMIN — OXYCODONE HYDROCHLORIDE AND ACETAMINOPHEN 2 TABLET: 5; 325 TABLET ORAL at 00:12

## 2022-02-10 RX ADMIN — ATORVASTATIN CALCIUM 20 MG: 20 TABLET, FILM COATED ORAL at 08:10

## 2022-02-10 RX ADMIN — BUPROPION HYDROCHLORIDE 150 MG: 150 TABLET, EXTENDED RELEASE ORAL at 08:10

## 2022-02-10 RX ADMIN — Medication 45 UNITS: at 08:11

## 2022-02-10 RX ADMIN — GABAPENTIN 300 MG: 300 CAPSULE ORAL at 17:04

## 2022-02-10 RX ADMIN — OXYCODONE HYDROCHLORIDE AND ACETAMINOPHEN 2 TABLET: 5; 325 TABLET ORAL at 18:11

## 2022-02-10 NOTE — PROGRESS NOTES
Problem: Falls - Risk of  Goal: *Absence of Falls  Description: Document [place] Marking Fall Risk and appropriate interventions in the flowsheet. Outcome: Progressing Towards Goal  Note: Fall Risk Interventions:  Mobility Interventions: Assess mobility with egress test,Communicate number of staff needed for ambulation/transfer,OT consult for ADLs,Patient to call before getting OOB,PT Consult for mobility concerns,PT Consult for assist device competence,Strengthening exercises (ROM-active/passive),Utilize walker, cane, or other assistive device         Medication Interventions: Assess postural VS orthostatic hypotension,Evaluate medications/consider consulting pharmacy,Patient to call before getting OOB    Elimination Interventions: Call light in reach,Elevated toilet seat,Patient to call for help with toileting needs,Stay With Me (per policy),Toilet paper/wipes in reach,Toileting schedule/hourly rounds,Urinal in reach              Problem: Patient Education: Go to Patient Education Activity  Goal: Patient/Family Education  Outcome: Progressing Towards Goal     Problem: Diabetes Self-Management  Goal: *Disease process and treatment process  Description: Define diabetes and identify own type of diabetes; list 3 options for treating diabetes. Outcome: Progressing Towards Goal  Goal: *Incorporating nutritional management into lifestyle  Description: Describe effect of type, amount and timing of food on blood glucose; list 3 methods for planning meals. Outcome: Progressing Towards Goal  Goal: *Incorporating physical activity into lifestyle  Description: State effect of exercise on blood glucose levels. Outcome: Progressing Towards Goal  Goal: *Developing strategies to promote health/change behavior  Description: Define the ABC's of diabetes; identify appropriate screenings, schedule and personal plan for screenings.   Outcome: Progressing Towards Goal  Goal: *Using medications safely  Description: State effect of diabetes medications on diabetes; name diabetes medication taking, action and side effects. Outcome: Progressing Towards Goal  Goal: *Monitoring blood glucose, interpreting and using results  Description: Identify recommended blood glucose targets  and personal targets. Outcome: Progressing Towards Goal  Goal: *Prevention, detection, treatment of acute complications  Description: List symptoms of hyper- and hypoglycemia; describe how to treat low blood sugar and actions for lowering  high blood glucose level. Outcome: Progressing Towards Goal  Goal: *Prevention, detection and treatment of chronic complications  Description: Define the natural course of diabetes and describe the relationship of blood glucose levels to long term complications of diabetes.   Outcome: Progressing Towards Goal  Goal: *Developing strategies to address psychosocial issues  Description: Describe feelings about living with diabetes; identify support needed and support network  Outcome: Progressing Towards Goal  Goal: *Insulin pump training  Outcome: Progressing Towards Goal  Goal: *Sick day guidelines  Outcome: Progressing Towards Goal  Goal: *Patient Specific Goal (EDIT GOAL, INSERT TEXT)  Outcome: Progressing Towards Goal     Problem: Patient Education: Go to Patient Education Activity  Goal: Patient/Family Education  Outcome: Progressing Towards Goal     Problem: Patient Education: Go to Patient Education Activity  Goal: Patient/Family Education  Outcome: Progressing Towards Goal     Problem: Patient Education: Go to Patient Education Activity  Goal: Patient/Family Education  Outcome: Progressing Towards Goal     Problem: Amputation  Goal: *Progressive mobility and function  Outcome: Progressing Towards Goal  Goal: *Optimal pain control at patient's stated goal  Outcome: Progressing Towards Goal  Goal: *Absence of infection signs and symptoms  Outcome: Progressing Towards Goal  Goal: *Optimal residual limb healing  Outcome: Progressing Towards Goal     Problem: Patient Education: Go to Patient Education Activity  Goal: Patient/Family Education  Outcome: Progressing Towards Goal     Problem: Pain  Goal: *Control of Pain  Outcome: Progressing Towards Goal  Goal: *PALLIATIVE CARE:  Alleviation of Pain  Outcome: Progressing Towards Goal     Problem: Patient Education: Go to Patient Education Activity  Goal: Patient/Family Education  Outcome: Progressing Towards Goal

## 2022-02-10 NOTE — PROGRESS NOTES
Problem: Self Care Deficits Care Plan (Adult)  Goal: *Acute Goals and Plan of Care (Insert Text)  Description: Occupational Therapy Goals  Initiated 2/9/2022 within 7 day(s). 1.  Patient will perform lower body dressing with supervision/set-up. 2.  Patient will perform bathing with supervision/set-up. 3.  Patient will perform toilet transfers with supervision/set-up. 4.  Patient will perform all aspects of toileting with supervision/set-up. 5.  Patient will participate in upper extremity therapeutic exercise/activities with supervision/set-up for 8 minutes. 6.  Patient will utilize energy conservation techniques during functional activities with verbal cues. 7.  Patient will be independent with desensitization strategies for RLE to activity tolerance for completion of ADLs. Prior Level of Function: Pt reports being Mod Ind for ADls and functional mobility. Outcome: Progressing Towards Goal   OCCUPATIONAL THERAPY TREATMENT    Patient: Jaimee Patel (82 y.o. male)  Date: 2/10/2022  Diagnosis: Peripheral arterial disease (HCC) [I73.9]  Ischemic leg [I99.8]  S/P BKA (below knee amputation) ([facility] [Z89.519] <principal problem not specified>  Procedure(s) (LRB):  RIGHT LEG BELOW KNEE AMPUTATION (BKA) (Right) 2 Days Post-Op  Precautions: Fall,Skin (R BKA)  PLOF: Pt reports being Mod Ind for ADls and functional mobility. Chart, occupational therapy assessment, plan of care, and goals were reviewed. ASSESSMENT:  PT co tx to maximize pt safety and participation. Pt presented supine in bed upon therapist arrival and agreeable for participation. Educated on  R LE movement and tactile desensitization in the meantime for assisting with pain control. He transitioned to EOB Supervision in prep for functional task. STS transfer MIN A with RW requiring mod vc's for proper hand placement. Pt able to hop w/ SPT to chair w/ CGA utilizing RW, simulating toilet transfer.  Pt able to stand ~ 2.5 mins CGA with B UE support on RW before requiring seated rest break for ADL carryover. Once sitting in chair he performed oral care with S/U. Pt was left sitting in chair at end of tx session with B LE's elevated and all needs left within reach. RN made aware of pt's participation and position. Progression toward goals:  []          Improving appropriately and progressing toward goals  [x]          Improving slowly and progressing toward goals  []          Not making progress toward goals and plan of care will be adjusted     PLAN:  Patient continues to benefit from skilled intervention to address the above impairments. Continue treatment per established plan of care. Discharge Recommendations:  Rehab   Further Equipment Recommendations for Discharge:  BSC, Shower chair, RW, W/C     SUBJECTIVE:   Patient stated  I will sit up awhile. \"     OBJECTIVE DATA SUMMARY:   Cognitive/Behavioral Status:  Neurologic State: Alert  Orientation Level: Oriented X4  Cognition: Follows commands  Safety/Judgement: Awareness of environment,Fall prevention    Functional Mobility and Transfers for ADLs:   Bed Mobility:     Supine to Sit: Supervision     Scooting: Supervision   Transfers:  Sit to Stand: Minimum assistance  Stand to Sit: Contact guard assistance     Bed to Chair: Contact guard assistance (x 5 ft to turn and sit up in chair )      Balance:  Sitting: Intact  Standing: Impaired; With support  Standing - Static: Good  Standing - Dynamic : Fair    ADL Intervention:       Grooming  Position Performed: Seated in chair  Brushing Teeth: Set-up    Pain:  Pain level pre-treatment: 0/10   Pain level post-treatment: 0/10    Activity Tolerance:    Fair   Please refer to the flowsheet for vital signs taken during this treatment.   After treatment:   [x]  Patient left in no apparent distress sitting up in chair  []  Patient left in no apparent distress in bed  [x]  Call bell left within reach  [x]  Nursing notified  []  Caregiver present  []  Bed alarm activated    COMMUNICATION/EDUCATION:   [x] Role of Occupational Therapy in the acute care setting  [] Home safety education was provided and the patient/caregiver indicated understanding. [x] Patient/family have participated as able in working towards goals and plan of care. [x] Patient/family agree to work toward stated goals and plan of care. [] Patient understands intent and goals of therapy, but is neutral about his/her participation. [] Patient is unable to participate in goal setting and plan of care.       Thank you for this referral.  RADHA Merrill  Time Calculation: 23 mins

## 2022-02-10 NOTE — PROGRESS NOTES
Problem: Falls - Risk of  Goal: *Absence of Falls  Description: Document [place] Fail Fall Risk and appropriate interventions in the flowsheet. Outcome: Progressing Towards Goal  Note: Fall Risk Interventions:  Mobility Interventions: Bed/chair exit alarm         Medication Interventions: Teach patient to arise slowly    Elimination Interventions: Call light in reach              Problem: Patient Education: Go to Patient Education Activity  Goal: Patient/Family Education  Outcome: Progressing Towards Goal     Problem: Diabetes Self-Management  Goal: *Disease process and treatment process  Description: Define diabetes and identify own type of diabetes; list 3 options for treating diabetes. Outcome: Progressing Towards Goal  Goal: *Incorporating nutritional management into lifestyle  Description: Describe effect of type, amount and timing of food on blood glucose; list 3 methods for planning meals. Outcome: Progressing Towards Goal  Goal: *Incorporating physical activity into lifestyle  Description: State effect of exercise on blood glucose levels. Outcome: Progressing Towards Goal  Goal: *Developing strategies to promote health/change behavior  Description: Define the ABC's of diabetes; identify appropriate screenings, schedule and personal plan for screenings. Outcome: Progressing Towards Goal  Goal: *Using medications safely  Description: State effect of diabetes medications on diabetes; name diabetes medication taking, action and side effects. Outcome: Progressing Towards Goal  Goal: *Monitoring blood glucose, interpreting and using results  Description: Identify recommended blood glucose targets  and personal targets. Outcome: Progressing Towards Goal  Goal: *Prevention, detection, treatment of acute complications  Description: List symptoms of hyper- and hypoglycemia; describe how to treat low blood sugar and actions for lowering  high blood glucose level.   Outcome: Progressing Towards Goal  Goal: *Prevention, detection and treatment of chronic complications  Description: Define the natural course of diabetes and describe the relationship of blood glucose levels to long term complications of diabetes.   Outcome: Progressing Towards Goal  Goal: *Developing strategies to address psychosocial issues  Description: Describe feelings about living with diabetes; identify support needed and support network  Outcome: Progressing Towards Goal  Goal: *Insulin pump training  Outcome: Progressing Towards Goal  Goal: *Sick day guidelines  Outcome: Progressing Towards Goal  Goal: *Patient Specific Goal (EDIT GOAL, INSERT TEXT)  Outcome: Progressing Towards Goal     Problem: Patient Education: Go to Patient Education Activity  Goal: Patient/Family Education  Outcome: Progressing Towards Goal     Problem: Patient Education: Go to Patient Education Activity  Goal: Patient/Family Education  Outcome: Progressing Towards Goal     Problem: Patient Education: Go to Patient Education Activity  Goal: Patient/Family Education  Outcome: Progressing Towards Goal     Problem: Amputation  Goal: *Progressive mobility and function  Outcome: Progressing Towards Goal  Goal: *Optimal pain control at patient's stated goal  Outcome: Progressing Towards Goal  Goal: *Absence of infection signs and symptoms  Outcome: Progressing Towards Goal  Goal: *Optimal residual limb healing  Outcome: Progressing Towards Goal     Problem: Patient Education: Go to Patient Education Activity  Goal: Patient/Family Education  Outcome: Progressing Towards Goal     Problem: Pain  Goal: *Control of Pain  Outcome: Progressing Towards Goal  Goal: *PALLIATIVE CARE:  Alleviation of Pain  Outcome: Progressing Towards Goal     Problem: Patient Education: Go to Patient Education Activity  Goal: Patient/Family Education  Outcome: Progressing Towards Goal

## 2022-02-10 NOTE — ROUTINE PROCESS
Received report from [facility]. Pt AAOx3, NAD, breathing non labored, on room air, HOB up. IV site clean, dry and intact. Right leg stump on a pillow w/ dressing in place. Bed at the lowest level on lock position, call bell w/i reach. Pt was found on the floor when bed alarm went off. Bedside and Verbal shift change report given to [facility] (oncoming nurse) by me (offgoing nurse). Report included the following information SBAR, Kardex, Procedure Summary, Intake/Output, MAR and Recent Results.

## 2022-02-10 NOTE — PROGRESS NOTES
Fall Assessment  [facility]      Patient: Johana Lomeli MRN: [id number]   SSN: [id number]  YOB: 1960   Age: 59 y.o. Sex: male    Admit date: 2/8/2022 Length of stay:  LOS: 2 days    PCP: Vipin Cooper NP PP: Ischemic Leg s/p Rt BKA     Subjective:      Called to bedside by nursing staff for fall evaluation. Saw pt at beside, pt was back in bed. Per pt, he was trying to go the bathroom and fell to the left side of the bed hitting the left side of his hip. Pt endorse minimal pain and denied hitting head or LOC. Also denied hitting any other part of body. Reported that he has pain in the amputated leg where amputation was performed    Objective:   Vital Signs:  Visit Vitals  BP (!) 152/83   Pulse 81   Temp 98.9 °F (37.2 °C)   Resp 19   Ht 5' 9\" (1.753 m)   Wt 76.7 kg (169 lb 3.2 oz)   SpO2 97%   BMI 24.99 kg/m²       Physical Exam:   General: well-appearing, NAD, AOx3 able to follow commands  HEENT: Conjunctiva pink, sclera anicteric. PERRL. EOMI   CV: RRR  , 2/6 systolic murmur, no rubs, no gallops  RESP: Unlabored breathing. Lungs CTAB, no wheezes, rales or rhonchi appreciated. Equal expansion bilaterally. ABD:  Soft, nontender, nondistended. No hepatosplenomegaly. MS:  No joint deformity or instability. No atrophy. Slight tenderness noted on lateral left hip where pt reported hitting the floor. Neuro:  CN II-XII intact. 4/5 strength bilateral upper extremities and left lower extremities  Ext:  No edema. 2+ radial and dp pulses bilaterally. Skin: Warm & dry. No rashes, lesions, or ulcers. Good turgor. Psych: normal mood and affect     Assessment & Plan:   59 y.o. yo male admitted for right BKA s/p fall in house. Patient was without complications caused from this event. Per patient report, nursing, and my assessment, patient is stable and at baseline condition as prior to the fall.  There are no new neurologic findings on my physical exam, no neck tenderness to palpation, nor are there structural abnormalities. Pt reports no change in mental status/function. Pt has baseline ROM, muscle strength, and is without new paraesthesias. Lastly, there is no lesion or gross trauma with inspection of the skin and area of contact with the floor. It is this writers assessment that the patient is without complications caused from this event. I recommend the patient be on fall precautions, if agreed upon by the primary physician. Nursing has been instructed to notify the attending of this event and my clearance of this patient.      Le Valladares MD, PGY-1   [facility]   February 10, 2022, 5:43 AM

## 2022-02-10 NOTE — PROGRESS NOTES
Vascular Surgery Progress      Patient: Ky Monreal MRN: [id number]  CSN: [id number]      YOB: 1960    Age: 59 y.o. Sex: male      DOA: 2/8/2022    Attending Attestation  Patient seen and evaluated today. Doing well from BKA and is now POD#2. No active issues. Prosthetist evaluated the patient and information has been provided. The dressings are intact with no saturation or concern for bleeding. I performed physical therapy showing knee extension maneuvers in order to prevent contractures. Patient plan for discharge to rehab facility. Jeferson Lewis MD         HPI:     Ky Monreal is a 59 y.o. male  2 days s/p a R BKA by Dr. Swati Irizarry 2/8/22 for  critical right leg ischemia after a failed bypass. Today he endorses improvement in his pain prior to the procedure. He has some post operative incisional pain which is controlled by narcotics. Tolerating his diet with no nausea vomiting. Was able to void without any issues this morning. The patient was made aware of the importance of flexing his right knee. [employer] spoke with him yesterday and will order a RDD. The wound dressing is clean and intact. Past Medical History:   Diagnosis Date    Alcohol use     Fri-Sun one fifth; Mon-Thur: Pint of liquor    CAD (coronary artery disease)     Cardiac angioplasty with stent 07/29/2009    2.5 x 13 Cypher stent to CX. Cardiac cath 11/09/2011    RCA patent. LM patent. LAD patent. mD1 55%. CX patent. Prior stent patent. Edison 85% (2.5 x 15-mm Xience stent, resid 0%). LVEDP 12. EF 40-45%. High lateral hypk (RI distribution).       Cardiac inferior STEMI 2009    Constipation     Current smoker     contemplating stopping    Diabetes ([facility]     type 2-insulin dependent    Diabetic neuropathy (HCC)     GERD (gastroesophageal reflux disease)     HTN (hypertension)     Hyperlipidemia     Lacunar infarction ([facility]     Migraine     Myocardial infarction ([facility]     Vitamin D deficiency Past Surgical History:   Procedure Laterality Date    COLONOSCOPY N/A 10/28/2020    COLONOSCOPY w/polypectomy performed by Taylor Pedro MD at [facility] ENDOSCOPY    HX CORONARY STENT PLACEMENT  07/29/2009    HX HEART CATHETERIZATION  8/30/2011    HX HEART CATHETERIZATION  11/09/2011    HX WISDOM TEETH EXTRACTION      x4    VASCULAR SURGERY PROCEDURE UNLIST      Multiple vascular surgeries       Family History   Problem Relation Age of Onset    Hypertension Mother     Heart Attack Mother     Diabetes Mother     Hypertension Father     Heart Attack Father     Diabetes Father     Diabetes Sister     Hypertension Sister     Stroke Sister     Diabetes Brother     Hypertension Brother     Stroke Brother     No Known Problems Maternal Grandmother     No Known Problems Maternal Grandfather     No Known Problems Paternal Grandmother     No Known Problems Paternal Grandfather     No Known Problems Brother     Heart Disease Other     Kidney Disease Other        Social History     Socioeconomic History    Marital status: SINGLE   Tobacco Use    Smoking status: Current Every Day Smoker     Packs/day: 0.50     Years: 1.00     Pack years: 0.50     Types: Cigarettes    Smokeless tobacco: Never Used   Vaping Use    Vaping Use: Never used   Substance and Sexual Activity    Alcohol use: Yes     Alcohol/week: 4.2 standard drinks     Types: 5 Shots of liquor per week    Drug use: Not Currently     Types: Marijuana, Cocaine     Comment: none    Sexual activity: Yes   Social History Narrative     at [employer]. Prior to Admission medications    Medication Sig Start Date End Date Taking? Authorizing Provider   Trulicity 6.92 PI/6.7 mL sub-q pen INJECT 0.75 MG UNDER THE SKIN ONCE WEEKLY 2/7/22  Yes Valerie ANAYA NP   insulin glargine (Basaglar KwikPen U-100 Insulin) 100 unit/mL (3 mL) inpn 45 Units by SubCUTAneous route daily.  INJECT 50 UNITS UNDER THE SKIN DAILY 12/1/21  Yes Michele Gomez MD   buPROPion SR (WELLBUTRIN SR) 150 mg SR tablet ONE TABLET BY MOUTH ONCE A DAY 11/24/21  Yes Silvestre ANAYA NP   simvastatin (Zocor) 20 mg tablet Take 1 Tablet by mouth nightly. 11/1/21  Yes Silvestre ANAYA NP   metFORMIN (GLUCOPHAGE) 1,000 mg tablet TAKE ONE TABLET BY MOUTH TWICE A DAY WITH A MEAL 11/1/21  Yes Silvestre ANAYA NP   ergocalciferol (ERGOCALCIFEROL) 1,250 mcg (50,000 unit) capsule TAKE ONE CAPSULE BY MOUTH ONCE WEEKLY  Indications: vitamin D deficiency (high dose therapy) 11/1/21  Yes Silvestre ANAYA NP   multivitamin (ONE A DAY) tablet Take 1 Tablet by mouth daily. 11/1/21  Yes Silvestre ANAYA NP   traZODone (DESYREL) 50 mg tablet Take 1 Tablet by mouth nightly. Patient not taking: Reported on 2/8/2022 11/1/21   Silvestre ANAYA NP   glucose blood VI test strips (ASCENSIA AUTODISC VI, ONE TOUCH ULTRA TEST VI) strip Provide test strip and glucose meter that insurance will cover - Twice daily-  fasting in the morning and once two hours after a meal.  Patient not taking: Reported on 2/8/2022 11/1/21   Silvestre ANAYA NP   flash glucose sensor (FreeStyle Cox 2 Sensor) kit Apply 1 sensor every 14 days to check blood sugar as directed at least 3 times daily. Patient not taking: Reported on 2/8/2022 11/1/21   Silvestre ANAYA NP       Allergies   Allergen Reactions    Niacin Itching       Review of Systems  Review of Systems - Negative except HPI      Physical Exam:      Visit Vitals  /75 (BP 1 Location: Left upper arm, BP Patient Position: At rest)   Pulse 88   Temp 98.9 °F (37.2 °C)   Resp 20   Ht 5' 9\" (1.753 m)   Wt 163 lb 4.8 oz (74.1 kg)   SpO2 98%   BMI 24.12 kg/m²       Physical Exam:  Pertinent items are noted in HPI.     Data Review:    CBC:   Lab Results   Component Value Date/Time    WBC 9.1 02/08/2022 04:05 PM    RBC 3.51 (L) 02/08/2022 04:05 PM    HGB 9.9 (L) 02/08/2022 04:05 PM    HCT 31.3 (L) 02/08/2022 04:05 PM    PLATELET 652 65/50/0074 04:05 PM      BMP: Lab Results   Component Value Date/Time    Glucose 242 (H) 02/08/2022 04:05 PM    Sodium 135 (L) 02/08/2022 04:05 PM    Potassium 4.1 02/08/2022 04:05 PM    Chloride 102 02/08/2022 04:05 PM    CO2 27 02/08/2022 04:05 PM    BUN 12 02/08/2022 04:05 PM    Creatinine 1.19 02/08/2022 04:05 PM    Calcium 8.5 02/08/2022 04:05 PM         Assessment/Plan     57yo M 2 days s/p  right leg below knee amputation  Pt will undergo dressing change tomorrow. Discharge to SNF planned for tomorrow after dressing change by vascular team.    Pt needs to flex Right knee flush with the bed. The bed should not be propped up at  The lower extremities to encourage this. He has been made aware that this will help aid in the use of his prosthetic and prevent contracture. Nursing team has been made aware of the importance of the bed position. PT/OT order placed yesterday concerning knee movement and prepartion for prosthesis.      Sandra Lucas [facility]  Vascular Nurse Orentitioner  [phone]      Active Problems:    S/P BKA (below knee amputation) ([address] (2/8/2022)        Sandra Lucas NP  February 10, 2022

## 2022-02-10 NOTE — PROGRESS NOTES
ARU/IPR REFERRAL CONTACT NOTE  [id number] [place] [address] for Physical Rehabilitation    RE: Garima Tyler     Thank you for the opportunity to review this patient's case for admission to [id number] [place] [address] for Physical Rehabilitation. Based on our pre-admission screening:     [x ] Our Team/Medical Director is following this case. Comments: Patient would need to be off IV pain meds for 24 hours with pain controlled on po meds and would have to have a safe disposition. I will follow for medical stability,therapy progress and disposition. If patient meets the criteria for ARU this would be contingent on insurance authorization    Again, Thank you for this referral. Should you have any questions please do not hesitate to call. Sincerely,  Eben Tobar. Bridgett Jacobs, [address]   Bridgett Jacobs RN  Admissions [facility] for Physical Rehabilitation  [phone]

## 2022-02-10 NOTE — PROGRESS NOTES
Problem: Mobility Impaired (Adult and Pediatric)  Goal: *Acute Goals and Plan of Care (Insert Text)  Description: Physical Therapy Goals  Initiated 2/9/2022 and to be accomplished within 7 day(s)  1. Patient will move from supine to sit and sit to supine  in bed with modified independence. 2.  Patient will transfer from bed to chair and chair to bed with modified independence using the least restrictive device. 3.  Patient will perform sit to stand with modified independence. 4.  Patient will ambulate with modified independence for 50 feet with the least restrictive device. 5.  Patient will ascend/descend 3 stairs with B handrail(s) with modified independence. PLOF: Pt lives with his spouse in a h with 3 CATHIE. Wade with a RW PTA. Outcome: Progressing Towards Goal     PHYSICAL THERAPY TREATMENT    Patient: Reji Ponce (75 y.o. male)  Date: 2/10/2022  Diagnosis: Peripheral arterial disease (HCC) [I73.9]  Ischemic leg [I99.8]  S/P BKA (below [facility]) ([facility] [Z89.519] <principal problem not specified>  Procedure(s) (LRB):  RIGHT LEG BELOW KNEE AMPUTATION (BKA) (Right) 2 Days Post-Op  Precautions: Fall,Skin (R BKA)  PLOF: see above     ASSESSMENT:  Pt seen in bed in NAD and agreeable. Pt is POD 2 for R TKA. Pt mobilize to EOB and given RW in prep for standing, cues for anterior weight shifting and hand placement for safe transitions. Once standing, pt is able to hop for SPT to turn and sit into chair with completing R hip flex/abd raises in standing and tactile cues to facilitate R neutral positioning of leg at rest as pt holding it in flexion when up. Pt then positioned in recliner with BLE elevated. Pt noted to have decreased knee extension and educated in positioning to keep pillow out from under knee to decrease contracture risk as well as educated in quad sets to facilitate extension, demonstrated understanding.  Pt also reminded of tactile stimulation to limb to assist with desensitization. Recommend ARU at discharge at this time, left in chair with all needs met and call bell within reach. Progression toward goals:   [x]      Improving appropriately and progressing toward goals  []      Improving slowly and progressing toward goals  []      Not making progress toward goals and plan of care will be adjusted     PLAN:  Patient continues to benefit from skilled intervention to address the above impairments. Continue treatment per established plan of care. Discharge Recommendations:  Inpatient Rehab  Further Equipment Recommendations for Discharge:  RW and WC      SUBJECTIVE:   Patient stated Loya Passer will get up and move.     OBJECTIVE DATA SUMMARY:   Critical Behavior:  Neurologic State: Alert  Orientation Level: Oriented X4  Cognition: Follows commands  Safety/Judgement: Awareness of environment,Fall prevention  Functional Mobility Training:  Bed Mobility:     Supine to Sit: Supervision     Scooting: Supervision         Transfers:  Sit to Stand: Minimum assistance  Stand to Sit: Contact guard assistance        Bed to Chair: Contact guard assistance (x 5 ft to turn and sit up in chair )       Balance:  Sitting: Intact  Standing: Impaired; With support  Standing - Static: Good  Standing - Dynamic : Fair    Pain:  Pain level pre-treatment: 0/10  Pain level post-treatment: 0/10   Pain Intervention(s): Medication (see MAR); Rest, Ice, Repositioning   Response to intervention: Nurse notified    Activity Tolerance:   Good    Please refer to the flowsheet for vital signs taken during this treatment. After treatment:   [x] Patient left in no apparent distress sitting up in chair  [] Patient left in no apparent distress in bed  [x] Call bell left within reach  [x] Nursing notified  [] Caregiver present  [] Bed alarm activated  [] SCDs applied      COMMUNICATION/EDUCATION:   [x]         Role of Physical Therapy in the acute care setting.   [x]         Fall prevention education was provided and the patient/caregiver indicated understanding. [x]         Patient/family have participated as able in working toward goals and plan of care. [x]         Patient/family agree to work toward stated goals and plan of care. []         Patient understands intent and goals of therapy, but is neutral about his/her participation.   []         Patient is unable to participate in stated goals/plan of care: ongoing with therapy staff.  []         Other:        Keri Samayoa   Time Calculation: 23 mins

## 2022-02-11 LAB
GLUCOSE BLD STRIP.AUTO-MCNC: 144 MG/DL (ref 70–110)
GLUCOSE BLD STRIP.AUTO-MCNC: 148 MG/DL (ref 70–110)
GLUCOSE BLD STRIP.AUTO-MCNC: 335 MG/DL (ref 70–110)
GLUCOSE BLD STRIP.AUTO-MCNC: 405 MG/DL (ref 70–110)
GLUCOSE BLD STRIP.AUTO-MCNC: 405 MG/DL (ref 70–110)

## 2022-02-11 PROCEDURE — 65270000029 HC RM PRIVATE

## 2022-02-11 PROCEDURE — 82962 GLUCOSE BLOOD TEST: CPT

## 2022-02-11 PROCEDURE — 74011250637 HC RX REV CODE- 250/637: Performed by: PHYSICIAN ASSISTANT

## 2022-02-11 PROCEDURE — 97530 THERAPEUTIC ACTIVITIES: CPT

## 2022-02-11 PROCEDURE — 97535 SELF CARE MNGMENT TRAINING: CPT

## 2022-02-11 PROCEDURE — 74011636637 HC RX REV CODE- 636/637: Performed by: PHYSICIAN ASSISTANT

## 2022-02-11 PROCEDURE — 74011250636 HC RX REV CODE- 250/636: Performed by: PHYSICIAN ASSISTANT

## 2022-02-11 PROCEDURE — 74011636637 HC RX REV CODE- 636/637: Performed by: STUDENT IN AN ORGANIZED HEALTH CARE EDUCATION/TRAINING PROGRAM

## 2022-02-11 RX ADMIN — ERGOCALCIFEROL 50000 UNITS: 1.25 CAPSULE ORAL at 09:18

## 2022-02-11 RX ADMIN — OXYCODONE HYDROCHLORIDE AND ACETAMINOPHEN 2 TABLET: 5; 325 TABLET ORAL at 10:58

## 2022-02-11 RX ADMIN — GABAPENTIN 300 MG: 300 CAPSULE ORAL at 09:18

## 2022-02-11 RX ADMIN — OXYCODONE HYDROCHLORIDE AND ACETAMINOPHEN 2 TABLET: 5; 325 TABLET ORAL at 14:58

## 2022-02-11 RX ADMIN — BUPROPION HYDROCHLORIDE 150 MG: 150 TABLET, EXTENDED RELEASE ORAL at 09:18

## 2022-02-11 RX ADMIN — Medication 45 UNITS: at 09:00

## 2022-02-11 RX ADMIN — OXYCODONE HYDROCHLORIDE AND ACETAMINOPHEN 2 TABLET: 5; 325 TABLET ORAL at 04:24

## 2022-02-11 RX ADMIN — HYDROMORPHONE HYDROCHLORIDE 1 MG: 1 INJECTION, SOLUTION INTRAMUSCULAR; INTRAVENOUS; SUBCUTANEOUS at 08:12

## 2022-02-11 RX ADMIN — GABAPENTIN 300 MG: 300 CAPSULE ORAL at 17:39

## 2022-02-11 RX ADMIN — GABAPENTIN 300 MG: 300 CAPSULE ORAL at 21:58

## 2022-02-11 RX ADMIN — ATORVASTATIN CALCIUM 20 MG: 20 TABLET, FILM COATED ORAL at 09:18

## 2022-02-11 RX ADMIN — METFORMIN HYDROCHLORIDE 1000 MG: 500 TABLET ORAL at 09:18

## 2022-02-11 RX ADMIN — OXYCODONE HYDROCHLORIDE AND ACETAMINOPHEN 2 TABLET: 5; 325 TABLET ORAL at 21:58

## 2022-02-11 RX ADMIN — Medication 15 UNITS: at 21:57

## 2022-02-11 RX ADMIN — THERA TABS 1 TABLET: TAB at 09:18

## 2022-02-11 RX ADMIN — TRAZODONE HYDROCHLORIDE 50 MG: 50 TABLET ORAL at 21:58

## 2022-02-11 RX ADMIN — Medication 12 UNITS: at 17:40

## 2022-02-11 NOTE — PROGRESS NOTES
Problem: Falls - Risk of  Goal: *Absence of Falls  Description: Document Halie Felix Fall Risk and appropriate interventions in the flowsheet. Outcome: Progressing Towards Goal  Note: Fall Risk Interventions:  Mobility Interventions: Bed/chair exit alarm         Medication Interventions: Patient to call before getting OOB    Elimination Interventions: Call light in reach              Problem: Patient Education: Go to Patient Education Activity  Goal: Patient/Family Education  Outcome: Progressing Towards Goal     Problem: Diabetes Self-Management  Goal: *Disease process and treatment process  Description: Define diabetes and identify own type of diabetes; list 3 options for treating diabetes. Outcome: Progressing Towards Goal  Goal: *Incorporating nutritional management into lifestyle  Description: Describe effect of type, amount and timing of food on blood glucose; list 3 methods for planning meals. Outcome: Progressing Towards Goal  Goal: *Incorporating physical activity into lifestyle  Description: State effect of exercise on blood glucose levels. Outcome: Progressing Towards Goal  Goal: *Developing strategies to promote health/change behavior  Description: Define the ABC's of diabetes; identify appropriate screenings, schedule and personal plan for screenings. Outcome: Progressing Towards Goal  Goal: *Using medications safely  Description: State effect of diabetes medications on diabetes; name diabetes medication taking, action and side effects. Outcome: Progressing Towards Goal  Goal: *Monitoring blood glucose, interpreting and using results  Description: Identify recommended blood glucose targets  and personal targets. Outcome: Progressing Towards Goal  Goal: *Prevention, detection, treatment of acute complications  Description: List symptoms of hyper- and hypoglycemia; describe how to treat low blood sugar and actions for lowering  high blood glucose level.   Outcome: Progressing Towards Goal  Goal: *Prevention, detection and treatment of chronic complications  Description: Define the natural course of diabetes and describe the relationship of blood glucose levels to long term complications of diabetes.   Outcome: Progressing Towards Goal  Goal: *Developing strategies to address psychosocial issues  Description: Describe feelings about living with diabetes; identify support needed and support network  Outcome: Progressing Towards Goal  Goal: *Insulin pump training  Outcome: Progressing Towards Goal  Goal: *Sick day guidelines  Outcome: Progressing Towards Goal  Goal: *Patient Specific Goal (EDIT GOAL, INSERT TEXT)  Outcome: Progressing Towards Goal     Problem: Patient Education: Go to Patient Education Activity  Goal: Patient/Family Education  Outcome: Progressing Towards Goal     Problem: Patient Education: Go to Patient Education Activity  Goal: Patient/Family Education  Outcome: Progressing Towards Goal     Problem: Patient Education: Go to Patient Education Activity  Goal: Patient/Family Education  Outcome: Progressing Towards Goal     Problem: Amputation  Goal: *Progressive mobility and function  Outcome: Progressing Towards Goal  Goal: *Optimal pain control at patient's stated goal  Outcome: Progressing Towards Goal  Goal: *Absence of infection signs and symptoms  Outcome: Progressing Towards Goal  Goal: *Optimal residual limb healing  Outcome: Progressing Towards Goal     Problem: Patient Education: Go to Patient Education Activity  Goal: Patient/Family Education  Outcome: Progressing Towards Goal     Problem: Pain  Goal: *Control of Pain  Outcome: Progressing Towards Goal  Goal: *PALLIATIVE CARE:  Alleviation of Pain  Outcome: Progressing Towards Goal     Problem: Patient Education: Go to Patient Education Activity  Goal: Patient/Family Education  Outcome: Progressing Towards Goal

## 2022-02-11 NOTE — PROGRESS NOTES
Problem: Self Care Deficits Care Plan (Adult)  Goal: *Acute Goals and Plan of Care (Insert Text)  Description: Occupational Therapy Goals  Initiated 2/9/2022 within 7 day(s). 1.  Patient will perform lower body dressing with supervision/set-up. 2.  Patient will perform bathing with supervision/set-up. 3.  Patient will perform toilet transfers with supervision/set-up. 4.  Patient will perform all aspects of toileting with supervision/set-up. 5.  Patient will participate in upper extremity therapeutic exercise/activities with supervision/set-up for 8 minutes. 6.  Patient will utilize energy conservation techniques during functional activities with verbal cues. 7.  Patient will be independent with desensitization strategies for RLE to activity tolerance for completion of ADLs. Prior Level of Function: Pt reports being Mod Ind for ADls and functional mobility. Outcome: Progressing Towards Goal   OCCUPATIONAL THERAPY TREATMENT    Patient: Jelena Lind (89 y.o. male)  Date: 2/11/2022  Diagnosis: Peripheral arterial disease (HCC) [I73.9]  Ischemic leg [I99.8]  S/P BKA [facility]below [facility] [Z89.519] <principal problem not specified>  Procedure(s) (LRB):  RIGHT LEG BELOW KNEE AMPUTATION (BKA) (Right) 3 Days Post-Op  Precautions: Fall,Skin (R BKA)  PLOF:  Pt reports being Mod Ind for ADls and functional mobility. Chart, occupational therapy assessment, plan of care, and goals were reviewed. ASSESSMENT:  Pt presented supine in bed upon therapist arrival, pillow observed under right residual limb, and agreeable to work with OT. Pt educated on importance of not putting pillow under R knee to promote knee extension, pt verbalized understanding. Pt requesting EOB tx this date 2/2 increased R residual limb pain 2/2 bandage change this morning. He came to EOB from supine with Supervision and demo G balance. Pt performed early morning ADL's while sitting EOB with Supervision, see below.  Pt tolerated ~ 15 mins sitting EOB before requesting to return back to supine. Pt returned back to supine with Supervision and was positioned for comfort. Pt was left with HOB elevated, bed alarm active, RN present, and all needs left within reach. Progression toward goals:  []          Improving appropriately and progressing toward goals  [x]          Improving slowly and progressing toward goals  []          Not making progress toward goals and plan of care will be adjusted     PLAN:  Patient continues to benefit from skilled intervention to address the above impairments. Continue treatment per established plan of care. Discharge Recommendations: Rehab   Further Equipment Recommendations for Discharge:  BSC, RW, W/C     SUBJECTIVE:   Patient stated  They changed my bandages this morning. \"     OBJECTIVE DATA SUMMARY:   Cognitive/Behavioral Status:  Neurologic State: Alert  Orientation Level: Oriented X4  Cognition: Poor safety awareness  Safety/Judgement: Awareness of environment,Fall prevention    Functional Mobility and Transfers for ADLs:   Bed Mobility:     Supine to Sit: Supervision  Sit to Supine: Supervision  Scooting: Supervision       Balance:  Sitting: Intact    ADL Intervention:       Grooming  Position Performed: Seated edge of bed  Brushing Teeth: Set-up    Upper Body Bathing  Bathing Assistance: Set-up  Position Performed: Seated edge of bed    Lower Body Bathing  Perineal  : Supervision  Position Performed: Other (sitting EOB w/ weight shifting L/R)  Cues: Verbal cues provided    Upper Body [address]: Stand-by assistance    Pain:  Pain level pre-treatment: 0/10   Pain level post-treatment: 0/10  Pain Intervention(s): Medication (see MAR); Rest,  Repositioning  Response to intervention: Nurse notified, See doc flow    Activity Tolerance:    Fair   Please refer to the flowsheet for vital signs taken during this treatment.   After treatment:   []  Patient left in no apparent distress sitting up in chair  [x]  Patient left in no apparent distress in bed  [x]  Call bell left within reach  [x]  Nursing notified  [x]  RN present  [x]  Bed alarm activated    COMMUNICATION/EDUCATION:   [x] Role of Occupational Therapy in the acute care setting  [] Home safety education was provided and the patient/caregiver indicated understanding. [x] Patient/family have participated as able in working towards goals and plan of care. [x] Patient/family agree to work toward stated goals and plan of care. [] Patient understands intent and goals of therapy, but is neutral about his/her participation. [] Patient is unable to participate in goal setting and plan of care.       Thank you for this referral.  RADHA Rodriguez  Time Calculation: 27 mins

## 2022-02-11 NOTE — PROGRESS NOTES
Chart reviewed. Pt being evaluated for admission to ARU. Needs to be off IV pain meds for at least 24hrs. May discharge over weekend but more likely on Monday.   Sagrario Mireles RN - Outcomes Manager  [phone]

## 2022-02-11 NOTE — ROUTINE PROCESS
Bedside verbal report given to [address] [place] [address], (oncoming nurse) from Joni Cormier RN (outgoing nurse)    Report consisted of patients Situation, Background, Assessment and   Recommendations(SBAR). Information from the following report(s): Kardex, MAR and Recent Results was reviewed with the oncoming nurse. Opportunity for questions and clarification was provided.

## 2022-02-11 NOTE — DIABETES MGMT
Diabetes/ Glycemic Control Plan of Care    Recommendations:   1. Suggest discontinuing metformin as in-patient (per protocol) but resume at discharge. 2. Suggest discontinuing Trulicity as in-patient (per protocol) but also, resume at discharge. Trulicity is not on [facility] formulary. 3. Continue 45 units Lantus/day and corrective lispro, very insulin resistant dosing ACHS  4. Should pt continue to have prandial blood glucose excursions, can consider initiating scheduled meal time insulin starting with 4 units lispro TID AC (weight based dose - 0.05 units/kg). Pt required 27 units corrective lispro yesterday. 1831  Addendum:  Order obtained to discontinue metformin and Trulicity. Assessment:   Pt with known history of T2DM (poorly controlled with A1C - 13.8%; home regimen of basal insulin, metformin and weekly GLP-1 RA)  Past medical history also includes Peripheral artery disease, ischemic leg, s/p  BKA on 2/08/2022. Fasting Blood glucose this morning - 148 mg/dL (in target range). Yesterday's FBG - 180 mg/dL. Prandial blood glucose excursions noted (2/10/2021  POC blood glucose readings - 273, 307, 180 mg/dL). Fasting/ Morning blood glucose:   Lab Results   Component Value Date/Time    Glucose 242 (H) 02/08/2022 04:05 PM    Glucose (POC) 148 (H) 02/11/2022 07:01 AM    Glucose,  ([facility]) 01/11/2022 07:31 AM     IV Fluids containing dextrose:  None    Steroids:   None    Blood glucose values:   2/11:  POC - 148  2/10:  POC - 180, 273, 307, 180          Within target range (70-180mg/dL): No    Current insulin orders:   Basal lantus insulin 45 units daily every morning  Metformin 1000 mg BID with meals  Correctional lispro insulin.  Very resistant dose  [not in hospital formularly]Trulicity 0.13 mg SC injection weekly    Total Daily Dose previous 24 hours:  72 units (45 Lantus, 27 corrective lispro)    Current A1c:   Lab Results   Component Value Date/Time    Hemoglobin A1c 13.8 (H) 02/08/2022 07:10 AM    Hemoglobin A1c (POC) 7.0 01/13/2021 04:12 PM    Hemoglobin A1c, External 7.8 01/28/2015 11:15 PM      equivalent  to ave Blood Glucose of 349 mg/dl for 2-3 months prior to admission    Adequate glycemic control PTA: No    Nutrition/Diet:   Active Orders   Diet    ADULT DIET Regular; 4 carb choices (60 gm/meal); Low Fat/Low Chol/High Fiber/2 gm Na      Meal Intake:  Patient Vitals for the past 168 hrs:   % Diet Eaten   02/09/22 1236 76 - 100%     Supplement Intake:  No data found. Home diabetes medications:   Patient reported on 2/09/2022:  [employer] (lantus) pen insulin 52  Units twice daily: morning and bedtime  Metformin 1000 mg 2 times daily with meals  Trulicity 3.74 mg SC injection weekly (Thursday)    Noted last FP visit 11-1-2021 basal insulin as follows:       Key Antihyperglycemic Medications             Trulicity 5.91 DF/4.4 mL sub-q pen (Taking) INJECT 0.75 MG UNDER THE SKIN ONCE WEEKLY    insulin glargine (Basaglar KwikPen U-100 Insulin) 100 unit/mL (3 mL) inpn (Taking) 45 Units by SubCUTAneous route daily. INJECT 50 UNITS UNDER THE SKIN DAILY    metFORMIN (GLUCOPHAGE) 1,000 mg tablet (Taking) TAKE ONE TABLET BY MOUTH TWICE A DAY WITH A MEAL    Trulicity 1.15 DS/1.5 mL sub-q pen (Discontinued) INJECT 0.75 MG UNDER THE SKIN ONCE WEEKLY          Plan/Goals:   Blood glucose will be within target of 70 - 180 mg/dl within 72 hours  Reinforce dietary and medication compliance at home.          Education:  [x] Refer to Diabetes Education Record: 2/09/2022                       [] Education not indicated at this time     Celio Martinez, [address] [facility]  Pager: [phone]

## 2022-02-11 NOTE — PROGRESS NOTES
Admit Date: 2/8/2022    POD 3 Day Post-Op    Procedure:  Procedure(s):  RIGHT LEG BELOW KNEE AMPUTATION (BKA)     Patient: Luke Chaney  YOB: 1960  MRN: [id number]     Date of Procedure: 2/8/2022      Pre-Op Diagnosis: Peripheral arterial disease ([facility] [I73.9]  Ischemic leg [I99.8]     Post-Op Diagnosis: Same as preoperative diagnosis.       Procedure(s):  RIGHT LEG BELOW KNEE AMPUTATION (BKA)     Surgeon(s):  Erik Figueroa MD     Surgical Assistant: Surg Asst-1: Shea Huggins     Anesthesia: General      Estimated Blood Loss (mL): 931     Complications: None     Specimens:   ID Type Source Tests Collected by Time Destination   1 : RIGHT LEG Preservative Leg, Right   Erik Figueroa MD 2/8/2022 0805 Pathology         Implants: * No implants in log *     Drains: * No LDAs found *     Findings: Uncomplicated right leg below knee amputation     Electronically Signed by Alexandra Bryan MD on 2/8/2022 at 11:19 AM         Subjective:     Mary Queen is awake alert and laying in bed comfortable. Janie Funk Answers all questions appropriately. Moves all extremities to command. Including his right BKA stump. Patient states he had a good night, ate all his breakfast and has a good appetite, is still complaining of some mild incisional pain. Patient is postop day #3 from a right lower extremity below the knee amputation. Still with mild to moderate pain, but not really requiring much pain medication. Tolerating his diet with no nausea vomiting. Appetite increased. Was able to void without any issues this morning. Actively passing gas but no BM. Patient is weak but comfortable. Patient is reporting some moderate phantom pain. States it feels like his toes are burning. Patient reports that he understands he has to go to rehab prior to discharge to home, states that he fell yesterday and got really scared. Denies hitting his stump but it just scared him. Denies any chest pain or shortness of breath. Denies any dyspnea on exertion. Denies any fever or chills. Overall patient feels that he is weak in his head is still foggy  but patient does feel stronger. Objective:     Blood pressure (!) 143/82, pulse 74, temperature 98.5 °F (36.9 °C), resp. rate 18, height 5' 9\" (1.753 m), weight 163 lb 4.8 oz (74.1 kg), SpO2 97 %. Temp (24hrs), Av.3 °F (36.8 °C), Min:98 °F (36.7 °C), Max:98.9 °F (37.2 °C)      Physical Exam:  Constitutional:  Patient is well developed, well nourished, and not distressed. Answers all questions appropriately and moves all extremities to command. Complaining of 8 out of 10 incisional pain. Adequate relief with IV pain meds  HEENT: atraumatic, normocephalic, wearing a mask. Eyes:   Cunjunctivae clear, no scleral icterus  Neck:   No JVD present. No adenopathy. No carotid bruits or thrills noted bilaterally. Cardiovascular:  Normal rate, regular rhythm, normal heart sounds. No murmur heard. Pulmonary/Chest: Effort normal . No wheezes, rales or rhonchi noted. Extremities: Right lower extremity sling removed, incision still very raw, no active drainage noted, suture line still intact. No surrounding erythema noted but very tender and edematous. .  Tender to touch. Right lower extremity warm to touch no calf tenderness to palpation. Neurological:  he  is alert and oriented x3 . Gait normal. Motor & sensory grossly intact in all 4 limbs. Psych: Appropriate mood and affect. Skin:  Skin is warm and dry. No ulcerations. Capillary refill <3 sec bilaterally. No ulcerations or rashes noted.    Labs:   Recent Results (from the past 24 hour(s))   GLUCOSE, POC    Collection Time: 02/10/22  4:24 PM   Result Value Ref Range    Glucose (POC) 307 (H) 70 - 110 mg/dL   GLUCOSE, POC    Collection Time: 02/10/22  9:22 PM   Result Value Ref Range    Glucose (POC) 180 (H) 70 - 110 mg/dL   GLUCOSE, POC    Collection Time: 22  7:01 AM   Result Value Ref Range    Glucose (POC) 148 (H) 70 - 110 mg/dL   GLUCOSE, POC    Collection Time: 02/11/22 10:58 AM   Result Value Ref Range    Glucose (POC) 144 (H) 70 - 110 mg/dL       Data Review labs noted H&H stable  Nursing notes and notations appreciated    Assessment:     Active Problems:    S/P BKA (below knee amputation) ([facility] (2/8/2022)        Plan/Recommendations/Medical Decision Making:     Continue present Rx -continue meds as ordered. Adjust pain medications, Long discussion with patient patient encouraged to take oral pain medicines for pain control at this point. Will use IV pain medicine for breakthrough pain only. Given a one-time dose of IV narcotic for primary initial dressing change. Patient instructed that our goal from now on is for him to get oral pain medicine for dressing changes. Continue with Gabapentin to 300mg TID  Increase diet and activity as tolerated  PT/OT consult and treat  Case management consult for skilled nursing facility placement  Long discussion with patient about home versus skilled nursing facility post amputation. Patient instructed on the importance of getting proper treatment prior to going home to avoid any falls. Patient states he is agreeable on this visit and will pursue skilled nursing facility treatment. Will contact reach prosthetics to inquire about stump , also to initiate contact for prosthetic care. We will continue to monitor labs and vitals  Daily dressing changes, apply Xeroform to the incisional area, cover with 4 x 4's, ABD pads, wrapped with Kerlix and Ace bandage. We will discuss details with my attending.

## 2022-02-11 NOTE — PROGRESS NOTES
ARU/IPR REFERRAL CONTACT NOTE  [address] [place] [address] for Physical Rehabilitation    RE: Cade Crowder     Thank you for the opportunity to review this patient's case for admission to [address] [place] [address] for Physical Rehabilitation. Based on our pre-admission screening:     [x ] Our Team/Medical Director is following this case. Comments: Patient would need to be off IV pain meds for 24 hours with pain controlled on po meds to be considered for ARU. I will follow for medical stability,therapy progress and pain control. If patient meets the criteria for ARU this would be contingent on insurance authorization      Again, Thank you for this referral. Should you have any questions please do not hesitate to call. Sincerely,  [address] Lucilla Blizzard, [address]   Lucilla Blizzard, RN  Admissions [facility] for Physical Rehabilitation  [phone]

## 2022-02-11 NOTE — PROGRESS NOTES
Physician Progress Note      Tohmas Cho  CSN #:                  [id number]  :                       1960  ADMIT DATE:       2022 5:37 AM  DISCH DATE:  RESPONDING  PROVIDER #:        Timothy Spring MD          QUERY TEXT:    Pt admitted with Right leg critical limb ischemia. Patient is reporting some moderate phantom pain. If possible, please document in progress notes and discharge summary if you are evaluating and/or treating any of the following: The medical record reflects the following:  Risk Factors: 59 y.o. male with critical right leg ischemia. No revascularization options available. Patient has debilitating rest pain of the right lower extremity and the only option for pain management currently is right leg below the knee amputation. Clinical Indicators: Per  Vas progress note -  Patient is reporting some moderate phantom pain. States it feels like his toes are burning. Treatment:  Increase Gabapentin to 300mg TID, Adjust pain medications, Long discussion with patient patient encouraged to take oral pain medicines for pain control at this point. Will use IV pain medicine for breakthrough pain only. Thank you,  Michelle Metzger RN, [email or web address]  .  Options provided:  -- Phantom limb syndrome with pain ruled in  -- Phantom limb syndrome with pain ruled out  -- Other - I will add my own diagnosis  -- Disagree - Not applicable / Not valid  -- Disagree - Clinically unable to determine / Unknown  -- Refer to Clinical Documentation Reviewer    PROVIDER RESPONSE TEXT:    This patient has Phantom limb syndrome with pain ruled in.     Query created by: Deng Montgomery on 2/10/2022 2:41 PM      Electronically signed by:  Timothy Spring MD 2022 3:25 PM

## 2022-02-12 LAB
GLUCOSE BLD STRIP.AUTO-MCNC: 255 MG/DL (ref 70–110)
GLUCOSE BLD STRIP.AUTO-MCNC: 304 MG/DL (ref 70–110)
GLUCOSE BLD STRIP.AUTO-MCNC: 323 MG/DL (ref 70–110)
GLUCOSE BLD STRIP.AUTO-MCNC: 429 MG/DL (ref 70–110)

## 2022-02-12 PROCEDURE — 97110 THERAPEUTIC EXERCISES: CPT

## 2022-02-12 PROCEDURE — 74011636637 HC RX REV CODE- 636/637: Performed by: PHYSICIAN ASSISTANT

## 2022-02-12 PROCEDURE — 2709999900 HC NON-CHARGEABLE SUPPLY

## 2022-02-12 PROCEDURE — 74011636637 HC RX REV CODE- 636/637: Performed by: STUDENT IN AN ORGANIZED HEALTH CARE EDUCATION/TRAINING PROGRAM

## 2022-02-12 PROCEDURE — 97535 SELF CARE MNGMENT TRAINING: CPT

## 2022-02-12 PROCEDURE — 65270000029 HC RM PRIVATE

## 2022-02-12 PROCEDURE — 74011250637 HC RX REV CODE- 250/637: Performed by: PHYSICIAN ASSISTANT

## 2022-02-12 PROCEDURE — 82962 GLUCOSE BLOOD TEST: CPT

## 2022-02-12 RX ADMIN — Medication 12 UNITS: at 11:43

## 2022-02-12 RX ADMIN — Medication 12 UNITS: at 16:54

## 2022-02-12 RX ADMIN — TRAZODONE HYDROCHLORIDE 50 MG: 50 TABLET ORAL at 22:09

## 2022-02-12 RX ADMIN — OXYCODONE HYDROCHLORIDE AND ACETAMINOPHEN 2 TABLET: 5; 325 TABLET ORAL at 03:06

## 2022-02-12 RX ADMIN — Medication 15 UNITS: at 22:08

## 2022-02-12 RX ADMIN — Medication 6 UNITS: at 06:40

## 2022-02-12 RX ADMIN — GABAPENTIN 300 MG: 300 CAPSULE ORAL at 22:09

## 2022-02-12 RX ADMIN — GABAPENTIN 300 MG: 300 CAPSULE ORAL at 08:16

## 2022-02-12 RX ADMIN — OXYCODONE HYDROCHLORIDE AND ACETAMINOPHEN 2 TABLET: 5; 325 TABLET ORAL at 08:19

## 2022-02-12 RX ADMIN — GABAPENTIN 300 MG: 300 CAPSULE ORAL at 16:55

## 2022-02-12 RX ADMIN — OXYCODONE HYDROCHLORIDE AND ACETAMINOPHEN 2 TABLET: 5; 325 TABLET ORAL at 11:31

## 2022-02-12 RX ADMIN — OXYCODONE HYDROCHLORIDE AND ACETAMINOPHEN 2 TABLET: 5; 325 TABLET ORAL at 19:33

## 2022-02-12 RX ADMIN — ERGOCALCIFEROL 50000 UNITS: 1.25 CAPSULE ORAL at 08:15

## 2022-02-12 RX ADMIN — THERA TABS 1 TABLET: TAB at 08:15

## 2022-02-12 RX ADMIN — BUPROPION HYDROCHLORIDE 150 MG: 150 TABLET, EXTENDED RELEASE ORAL at 08:16

## 2022-02-12 RX ADMIN — Medication 45 UNITS: at 08:15

## 2022-02-12 RX ADMIN — ATORVASTATIN CALCIUM 20 MG: 20 TABLET, FILM COATED ORAL at 08:15

## 2022-02-12 NOTE — ROUTINE PROCESS
Bedside shift change report given to Joao Hodges RN (oncoming nurse) by Minna Scanlon RN (offgoing nurse). Report included the following information SBAR, Kardex and MAR.

## 2022-02-12 NOTE — ROUTINE PROCESS
Bedside verbal report given to Mechelle Hernandez (oncoming nurse) from [facility] GIL STEWART RN (outgoing nurse)    Report consisted of patients Situation, Background, Assessment and   Recommendations(SBAR). Information from the following report(s): Kardex, MAR and Recent Results was reviewed with the oncoming nurse. Opportunity for questions and clarification was provided.

## 2022-02-12 NOTE — ROUTINE PROCESS
Bedside and Verbal shift change report given to ANITHA James (oncoming nurse) by Debi Juan (offgoing nurse). Report included the following information SBAR, Kardex, Procedure Summary, Intake/Output, MAR and Recent Results.

## 2022-02-12 NOTE — PROGRESS NOTES
Doing well  Dressing clean and dry  Ok for rehab when bed available. Instructed to call immediately if any new distortion, blurring, decreased vision or eye pain.

## 2022-02-12 NOTE — PROGRESS NOTES
PHYSICAL THERAPY TREATMENT    Patient: Julia Valladares (28 y.o. male)  Date: 2/12/2022  Diagnosis: Peripheral arterial disease ([facility] [I73.9]  Ischemic leg [I99.8]  S/P BKA (below knee amputation) ([facility] [Z89.519] <principal problem not specified>  Procedure(s) (LRB):  RIGHT LEG BELOW KNEE AMPUTATION (BKA) (Right) 4 Days Post-Op  Precautions: Fall,Skin (R BKA)  PLOF: No change    ASSESSMENT:  Pt found lying supine in bed upon arrival, pleasant and agreeable to skilled PT services. Nurse cleared patient for mobility; pt in NAD. Patient participated in gen therex to include carryout of LLE AP, BLEs QS c 3 sec ct c focus on R knee extension, heel slides and HS sets c 3 sec ct to LLE, BLE hip flexion routine, hip abd/add slides and hip add isometrics c 3 sec ct 2x10 ea-- to increase strength, ROM and tolerance to activity in prep for improved levels of functional mobility. Patient educated on importance of completing activity OOB as often as possible with assistance in order to reduce onset of stiffness, to promote strengthening and circulation-- verbalized understanding, however, continued to decline sitting EOB/static standing. Pt also educated on importance of ceasing placement of pillow to posterior region of RLE-- instructed pt on placing pillow roll at end of limb to promote extension c demonstration provided-- verbalized complete understanding with good teach back. Pt remained in bed at conclusion of session c all needs met and call bell within reach. Progression toward goals:  []      Improving appropriately and progressing toward goals  [x]      Improving slowly and progressing toward goals  []      Not making progress toward goals and plan of care will be adjusted     PLAN:  Patient continues to benefit from skilled intervention to address the above impairments. Continue treatment per established plan of care.   Discharge Recommendations: tbd  Further Equipment Recommendations for Discharge:  tbd SUBJECTIVE:   Patient stated I don't want to do any walking today.     OBJECTIVE DATA SUMMARY:   Critical Behavior:  Neurologic State: Alert  Orientation Level: Oriented X4  Cognition: Poor safety awareness  Safety/Judgement: Awareness of environment,Fall prevention    Therapeutic Exercises:   See narrative for full list of exercises        Pain:  Pain level pre-treatment: 5/10  Pain level post-treatment: 5/10   Pain Intervention(s): Medication (see MAR); Rest, Ice, Repositioning  Response to intervention: Nurse notified, See doc flow    Activity Tolerance:   Pt fatigues easily with exertion, however, able to manage routine without any adverse reaction  Please refer to the flowsheet for vital signs taken during this treatment. After treatment:   [] Patient left in no apparent distress sitting up in chair  [x] Patient left in no apparent distress in bed  [x] Call bell left within reach  [x] Nursing notified  [] Caregiver present  [] Bed alarm activated  [] SCDs applied      COMMUNICATION/EDUCATION:   [x]         Role of Physical Therapy in the acute care setting. [x]         Fall prevention education was provided and the patient/caregiver indicated understanding. [x]         Patient/family have participated as able in working toward goals and plan of care. []         Patient/family agree to work toward stated goals and plan of care. []         Patient understands intent and goals of therapy, but is neutral about his/her participation. []         Patient is unable to participate in stated goals/plan of care: ongoing with therapy staff.  []         Other:        Dillon Zapata, RICHIE   Time Calculation: 23 mins

## 2022-02-12 NOTE — PROGRESS NOTES
Problem: Self Care Deficits Care Plan (Adult)  Goal: *Acute Goals and Plan of Care (Insert Text)  Description: Occupational Therapy Goals  Initiated 2/9/2022 within 7 day(s). 1.  Patient will perform lower body dressing with supervision/set-up. 2.  Patient will perform bathing with supervision/set-up. 3.  Patient will perform toilet transfers with supervision/set-up. 4.  Patient will perform all aspects of toileting with supervision/set-up. 5.  Patient will participate in upper extremity therapeutic exercise/activities with supervision/set-up for 8 minutes. 6.  Patient will utilize energy conservation techniques during functional activities with verbal cues. 7.  Patient will be independent with desensitization strategies for RLE to activity tolerance for completion of ADLs. Prior Level of Function: Pt reports being Mod Ind for ADls and functional mobility. Outcome: Progressing Towards Goal   OCCUPATIONAL THERAPY TREATMENT    Patient: Marlen Chadwick (9 y.o. male)  Date: 2/12/2022  Diagnosis: Peripheral arterial disease ([facility] [I73.9]  Ischemic leg [I99.8]  S/P BKA (below knee amputation) ([facility] [Z89.519] <principal problem not specified>  Procedure(s) (LRB):  RIGHT LEG BELOW KNEE AMPUTATION (BKA) (Right) 4 Days Post-Op  Precautions: Fall,Skin (R BKA)  PLOF: Pt reports being Mod Ind for ADls and functional mobility. Chart, occupational therapy assessment, plan of care, and goals were reviewed. ASSESSMENT:  Pt presented sitting upright on toilet upon therapist arrival. Pt able to complete toileting ADL with Supervision while seated. RN assisted pt back to EOB with CGA utilizing RW ~ 10 ft. Pt reports 9/10 pain requesting bed level tx at this time, RN aware of pain level. Pt able to return self back to supine with Supervision. He engaged in [employer] [address] with green t-band to improve strength and endurance needed for self cares and was encouraged to perform throughout the day.  Pt was left with HOB elevated, bed alarm active, and all needs left within reach. RN made aware of pt's participation and position. Progression toward goals:  []          Improving appropriately and progressing toward goals  [x]          Improving slowly and progressing toward goals  []          Not making progress toward goals and plan of care will be adjusted     PLAN:  Patient continues to benefit from skilled intervention to address the above impairments. Continue treatment per established plan of care. Discharge Recommendations: Rehab   Further Equipment Recommendations for Discharge:  RW, W/C, BSC     SUBJECTIVE:   Patient stated  I am hurting today. \"     OBJECTIVE DATA SUMMARY:   Cognitive/Behavioral Status:  Neurologic State: Alert  Orientation Level: Oriented X4  Cognition: Poor safety awareness  Safety/Judgement: Awareness of environment,Fall prevention    Functional Mobility and Transfers for ADLs:   Bed Mobility:     Supine to Sit: Supervision  Sit to Supine: Supervision        Balance:  Sitting: Intact  Standing: Impaired  Standing - Static: Good  Standing - Dynamic : Fair (+)    ADL Intervention:     Toileting  Toileting Assistance: Supervision (seated on toilet)         UE Therapeutic Exercises:   BUE ex with green t-band 4 x 20 reaching in mult planes to increase strength and ROM for ADL carryover. Pain:  Pain level pre-treatment: 9/10   Pain level post-treatment: 9/10  Pain Intervention(s): Medication (see MAR); Rest, Ice, Repositioning  Response to intervention: Nurse notified, See doc flow    Activity Tolerance:    Fair   Please refer to the flowsheet for vital signs taken during this treatment.   After treatment:   []  Patient left in no apparent distress sitting up in chair  [x]  Patient left in no apparent distress in bed  [x]  Call bell left within reach  [x]  Nursing notified  []  Caregiver present  [x]  Bed alarm activated    COMMUNICATION/EDUCATION:   [x] Role of Occupational Therapy in the acute care setting  [] Home safety education was provided and the patient/caregiver indicated understanding. [x] Patient/family have participated as able in working towards goals and plan of care. [x] Patient/family agree to work toward stated goals and plan of care. [] Patient understands intent and goals of therapy, but is neutral about his/her participation. [] Patient is unable to participate in goal setting and plan of care.       Thank you for this referral.  RADHA Cedeno  Time Calculation: 23 mins

## 2022-02-13 LAB
GLUCOSE BLD STRIP.AUTO-MCNC: 177 MG/DL (ref 70–110)
GLUCOSE BLD STRIP.AUTO-MCNC: 193 MG/DL (ref 70–110)
GLUCOSE BLD STRIP.AUTO-MCNC: 265 MG/DL (ref 70–110)
GLUCOSE BLD STRIP.AUTO-MCNC: 331 MG/DL (ref 70–110)

## 2022-02-13 PROCEDURE — 65270000029 HC RM PRIVATE

## 2022-02-13 PROCEDURE — 74011636637 HC RX REV CODE- 636/637: Performed by: STUDENT IN AN ORGANIZED HEALTH CARE EDUCATION/TRAINING PROGRAM

## 2022-02-13 PROCEDURE — 74011250637 HC RX REV CODE- 250/637: Performed by: PHYSICIAN ASSISTANT

## 2022-02-13 PROCEDURE — 2709999900 HC NON-CHARGEABLE SUPPLY

## 2022-02-13 PROCEDURE — 74011636637 HC RX REV CODE- 636/637: Performed by: PHYSICIAN ASSISTANT

## 2022-02-13 PROCEDURE — 82962 GLUCOSE BLOOD TEST: CPT

## 2022-02-13 RX ADMIN — OXYCODONE HYDROCHLORIDE AND ACETAMINOPHEN 2 TABLET: 5; 325 TABLET ORAL at 00:06

## 2022-02-13 RX ADMIN — OXYCODONE HYDROCHLORIDE AND ACETAMINOPHEN 2 TABLET: 5; 325 TABLET ORAL at 15:19

## 2022-02-13 RX ADMIN — THERA TABS 1 TABLET: TAB at 08:26

## 2022-02-13 RX ADMIN — Medication 3 UNITS: at 12:30

## 2022-02-13 RX ADMIN — OXYCODONE HYDROCHLORIDE AND ACETAMINOPHEN 2 TABLET: 5; 325 TABLET ORAL at 04:01

## 2022-02-13 RX ADMIN — OXYCODONE HYDROCHLORIDE AND ACETAMINOPHEN 2 TABLET: 5; 325 TABLET ORAL at 19:38

## 2022-02-13 RX ADMIN — TRAZODONE HYDROCHLORIDE 50 MG: 50 TABLET ORAL at 21:50

## 2022-02-13 RX ADMIN — Medication 45 UNITS: at 08:27

## 2022-02-13 RX ADMIN — Medication 3 UNITS: at 06:55

## 2022-02-13 RX ADMIN — BUPROPION HYDROCHLORIDE 150 MG: 150 TABLET, EXTENDED RELEASE ORAL at 08:26

## 2022-02-13 RX ADMIN — ATORVASTATIN CALCIUM 20 MG: 20 TABLET, FILM COATED ORAL at 08:26

## 2022-02-13 RX ADMIN — GABAPENTIN 300 MG: 300 CAPSULE ORAL at 08:26

## 2022-02-13 RX ADMIN — ERGOCALCIFEROL 50000 UNITS: 1.25 CAPSULE ORAL at 08:26

## 2022-02-13 RX ADMIN — Medication 9 UNITS: at 19:14

## 2022-02-13 RX ADMIN — Medication 12 UNITS: at 21:50

## 2022-02-13 RX ADMIN — GABAPENTIN 300 MG: 300 CAPSULE ORAL at 15:19

## 2022-02-13 RX ADMIN — GABAPENTIN 300 MG: 300 CAPSULE ORAL at 21:50

## 2022-02-13 RX ADMIN — OXYCODONE HYDROCHLORIDE AND ACETAMINOPHEN 2 TABLET: 5; 325 TABLET ORAL at 23:30

## 2022-02-13 NOTE — ROUTINE PROCESS
Bedside and Verbal shift change report given to ANITHA James (oncoming nurse) by Viraj Harmon (offgoing nurse). Report included the following information SBAR, Kardex, Intake/Output, MAR and Recent Results.

## 2022-02-14 LAB
GLUCOSE BLD STRIP.AUTO-MCNC: 171 MG/DL (ref 70–110)
GLUCOSE BLD STRIP.AUTO-MCNC: 196 MG/DL (ref 70–110)
GLUCOSE BLD STRIP.AUTO-MCNC: 338 MG/DL (ref 70–110)
GLUCOSE BLD STRIP.AUTO-MCNC: 89 MG/DL (ref 70–110)

## 2022-02-14 PROCEDURE — 82962 GLUCOSE BLOOD TEST: CPT

## 2022-02-14 PROCEDURE — 74011636637 HC RX REV CODE- 636/637: Performed by: SURGERY

## 2022-02-14 PROCEDURE — 65270000029 HC RM PRIVATE

## 2022-02-14 PROCEDURE — 74011636637 HC RX REV CODE- 636/637: Performed by: STUDENT IN AN ORGANIZED HEALTH CARE EDUCATION/TRAINING PROGRAM

## 2022-02-14 PROCEDURE — 97530 THERAPEUTIC ACTIVITIES: CPT

## 2022-02-14 PROCEDURE — 74011250637 HC RX REV CODE- 250/637: Performed by: PHYSICIAN ASSISTANT

## 2022-02-14 PROCEDURE — 74011636637 HC RX REV CODE- 636/637: Performed by: PHYSICIAN ASSISTANT

## 2022-02-14 PROCEDURE — 97535 SELF CARE MNGMENT TRAINING: CPT

## 2022-02-14 PROCEDURE — 97116 GAIT TRAINING THERAPY: CPT

## 2022-02-14 RX ORDER — INSULIN LISPRO 100 [IU]/ML
0.05 INJECTION, SOLUTION INTRAVENOUS; SUBCUTANEOUS
Status: DISCONTINUED | OUTPATIENT
Start: 2022-02-14 | End: 2022-02-15 | Stop reason: HOSPADM

## 2022-02-14 RX ORDER — ERGOCALCIFEROL 1.25 MG/1
50000 CAPSULE ORAL
Status: DISCONTINUED | OUTPATIENT
Start: 2022-02-21 | End: 2022-02-15 | Stop reason: HOSPADM

## 2022-02-14 RX ADMIN — OXYCODONE HYDROCHLORIDE AND ACETAMINOPHEN 2 TABLET: 5; 325 TABLET ORAL at 09:04

## 2022-02-14 RX ADMIN — TRAZODONE HYDROCHLORIDE 50 MG: 50 TABLET ORAL at 21:18

## 2022-02-14 RX ADMIN — GABAPENTIN 300 MG: 300 CAPSULE ORAL at 17:28

## 2022-02-14 RX ADMIN — OXYCODONE HYDROCHLORIDE AND ACETAMINOPHEN 2 TABLET: 5; 325 TABLET ORAL at 21:18

## 2022-02-14 RX ADMIN — OXYCODONE HYDROCHLORIDE AND ACETAMINOPHEN 2 TABLET: 5; 325 TABLET ORAL at 03:29

## 2022-02-14 RX ADMIN — Medication 3 UNITS: at 17:36

## 2022-02-14 RX ADMIN — THERA TABS 1 TABLET: TAB at 09:00

## 2022-02-14 RX ADMIN — Medication 4 UNITS: at 17:34

## 2022-02-14 RX ADMIN — BUPROPION HYDROCHLORIDE 150 MG: 150 TABLET, EXTENDED RELEASE ORAL at 09:13

## 2022-02-14 RX ADMIN — Medication 3 UNITS: at 06:42

## 2022-02-14 RX ADMIN — ATORVASTATIN CALCIUM 20 MG: 20 TABLET, FILM COATED ORAL at 09:00

## 2022-02-14 RX ADMIN — GABAPENTIN 300 MG: 300 CAPSULE ORAL at 21:18

## 2022-02-14 RX ADMIN — Medication 15 UNITS: at 13:12

## 2022-02-14 RX ADMIN — Medication 45 UNITS: at 09:00

## 2022-02-14 RX ADMIN — ERGOCALCIFEROL 50000 UNITS: 1.25 CAPSULE ORAL at 09:00

## 2022-02-14 RX ADMIN — GABAPENTIN 300 MG: 300 CAPSULE ORAL at 09:12

## 2022-02-14 RX ADMIN — OXYCODONE HYDROCHLORIDE AND ACETAMINOPHEN 2 TABLET: 5; 325 TABLET ORAL at 14:11

## 2022-02-14 NOTE — PROGRESS NOTES
CM notified by Aicha Higginbotham at [facility] the patient is being submitted for insurance authorization for placement.  will continue to monitor and assist with transition of care needs.     JH Alberts, RN  Care Management  Pager # [phone]

## 2022-02-14 NOTE — PROGRESS NOTES
ARU/IPR REFERRAL CONTACT NOTE  [id number] [place] [address] for Physical Rehabilitation    RE: Oscar Moyer    Referral received to review this patient's case for admission to [id number] [place] [address] for Physical Rehabilitation. Current status reviewed with team and patient meets criteria for admission to [facility] for Physical Rehabilitation pending authorization from [facility]. . Case has been opened and is pending review. Writer will notify  of status/determination once obtained. Thank you for this referral.  Should you have any questions please do not hesitate to call. Sincerely,  Lobito JonesRN      Elvira WILSON  [address] [place] [address] Physical Rehabilitation  [phone]

## 2022-02-14 NOTE — DIABETES MGMT
Diabetes/ Glycemic Control Plan of Care    Recommendations:   1. Suggest initiating scheduled meal time insulin starting with 4 units lispro TID AC (weight based dose - 0.05 units/kg). Pt required 27 units corrective lispro yesterday. 2. Continue 45 units Lantus/day and corrective lispro, very insulin resistant dosing [facility]    1411 Addendum:  Order obtained/entered for 4 units lispro TID AC. Discussed with nursing. Assessment:   Pt with known history of T2DM (poorly controlled with A1C - 13.8%; home regimen of basal insulin, metformin and weekly GLP-1 RA)  Past medical history also includes peripheral artery disease, ischemic leg, s/p  BKA on 2/08/2022. Fasting Blood glucose this morning - 171 mg/dL (in target range). Yesterday's fasting blood glucose - 177  mg/dL. Prandial blood glucose excursions noted - 2/13:  193, 265, 331 mg/dL and today pre-lunch BG was 338 mg/d. Fasting/ Morning blood glucose:   Lab Results   Component Value Date/Time    Glucose 242 (H) 02/08/2022 04:05 PM    Glucose (POC) 338 (H) 02/14/2022 01:09 PM    Glucose,  ([facility]) 01/11/2022 07:31 AM     IV Fluids containing dextrose:  None    Steroids:   None    Blood glucose values:   2/14:  POC - 171, 338  2/13:  POC - 177, 193, 265, 331          Within target range (70-180mg/dL): No    Current insulin orders:   45 units Lantus every morning  Correctional lispro insulin. Very resistant dose    Total Daily Dose previous 24 hours:  72 units (45 Lantus, 27 corrective lispro)    Current A1c:   Lab Results   Component Value Date/Time    Hemoglobin A1c 13.8 (H) 02/08/2022 07:10 AM    Hemoglobin A1c (POC) 7.0 01/13/2021 04:12 PM    Hemoglobin A1c, External 7.8 01/28/2015 11:15 PM      equivalent  to ave Blood Glucose of 349 mg/dl for 2-3 months prior to admission    Adequate glycemic control PTA: No    Nutrition/Diet:   Active Orders   Diet    ADULT DIET Regular; 4 carb choices (60 gm/meal);  Low Fat/Low Chol/High Fiber/2 gm Na      Meal Intake:  Patient Vitals for the past 168 hrs:   % Diet Eaten   02/14/22 1225 76 - 100%   02/13/22 1722 76 - 100%   02/13/22 1230 76 - 100%   02/13/22 0754 76 - 100%   02/09/22 1236 76 - 100%     Supplement Intake:  No data found. Home diabetes medications:   Patient reported on 2/09/2022:  Marva Acuña (lantus) pen insulin 52  Units twice daily: morning and bedtime  Metformin 1000 mg 2 times daily with meals  Trulicity 1.67 mg SC injection weekly (Thursday)    Noted last FP visit 11-1-2021 basal insulin as follows:       Key Antihyperglycemic Medications             Trulicity 9.22 GT/4.1 mL sub-q pen (Taking) INJECT 0.75 MG UNDER THE SKIN ONCE WEEKLY    insulin glargine (Basaglar KwikPen U-100 Insulin) 100 unit/mL (3 mL) inpn (Taking) 45 Units by SubCUTAneous route daily. INJECT 50 UNITS UNDER THE SKIN DAILY    metFORMIN (GLUCOPHAGE) 1,000 mg tablet (Taking) TAKE ONE TABLET BY MOUTH TWICE A DAY WITH A MEAL    Trulicity 7.63 SK/8.0 mL sub-q pen (Discontinued) INJECT 0.75 MG UNDER THE SKIN ONCE WEEKLY          Plan/Goals:   Blood glucose will be within target of 70 - 180 mg/dl within 72 hours  Reinforce dietary and medication compliance at home.          Education:  [x] Refer to Diabetes Education Record: 2/09/2022                       [] Education not indicated at this time     Angie Banda MS, RD, CDCES  Diabetes and Glycemic Control   [employer]

## 2022-02-14 NOTE — ROUTINE PROCESS
Bedside shift change report given to rafael (oncoming nurse) by Yajaira Jeffrey (offgoing nurse). Report included the following information SBAR, Kardex and MAR.

## 2022-02-14 NOTE — PROGRESS NOTES
Problem: Mobility Impaired (Adult and Pediatric)  Goal: *Acute Goals and Plan of Care (Insert Text)  Description: Physical Therapy Goals  Initiated 2/9/2022 and to be accomplished within 7 day(s)  1. Patient will move from supine to sit and sit to supine  in bed with modified independence. 2.  Patient will transfer from bed to chair and chair to bed with modified independence using the least restrictive device. 3.  Patient will perform sit to stand with modified independence. 4.  Patient will ambulate with modified independence for 50 feet with the least restrictive device. 5.  Patient will ascend/descend 3 stairs with B handrail(s) with modified independence. PLOF: Pt lives with his spouse in a h with 3 CATHIE. Wade with a RW PTA. Outcome: Progressing Towards Goal    PHYSICAL THERAPY TREATMENT    Patient: Prieto Phillip (38 y.o. male)  Date: 2/14/2022  Diagnosis: Peripheral arterial disease ([facility]) [I73.9]  Ischemic leg [I99.8]  S/P BKA (below [facility]putation[facility] ([facility] [Z89.519] <principal problem not specified>  Procedure(s) (LRB):  RIGHT LEG BELOW KNEE AMPUTATION (BKA) (Right) 6 Days Post-Op  Precautions: Fall,Skin (R BKA)      ASSESSMENT:  Patient willing to work with PT. Patient with new  and brace that maintain knee in extension. Supervision for functional transfers OOB, including SPT from bed to chair. He ambulates 2 laps in the room hopping on left LE using RW with decreased step clearance. Patient transfers to the chair at end of session. Call bell in reach and needs addressed. Progression toward goals:   []      Improving appropriately and progressing toward goals  [x]      Improving slowly and progressing toward goals  []      Not making progress toward goals and plan of care will be adjusted     PLAN:  Patient continues to benefit from skilled intervention to address the above impairments. Continue treatment per established plan of care.   Discharge Recommendations: Rehab  Further Equipment Recommendations for Discharge:  wheel chair and rolling walker     SUBJECTIVE:   Patient stated I am tired today .     OBJECTIVE DATA SUMMARY:   Critical Behavior:  Neurologic State: Alert  Orientation Level: Oriented X4  Cognition: Poor safety awareness  Safety/Judgement: Awareness of environment,Fall prevention  Functional Mobility Training:  Bed Mobility:     Supine to Sit: Modified independent     Scooting: Modified independent         Transfers:  Sit to Stand: Supervision  Stand to Sit: Supervision  Stand Pivot Transfers: Supervision         Balance:  Sitting: Intact  Standing: Impaired; With support    Ambulation/Gait Training:  Distance (ft): 40 Feet (ft)  Assistive Device: Walker, rolling  Ambulation - Level of Assistance: Stand-by assistance  Gait Abnormalities: Decreased step clearance                       Pain:  Pain level pre-treatment: 5/10  Pain level post-treatment: 5/10   Pain Intervention(s): Medication (see MAR); Rest, Ice, Repositioning   Response to intervention: Nurse notified, See doc flow    Activity Tolerance:      Good  Please refer to the flowsheet for vital signs taken during this treatment. After treatment:   [x] Patient left in no apparent distress sitting up in chair  [] Patient left in no apparent distress in bed  [x] Call bell left within reach  [x] Nursing notified  [] Caregiver present  [] Bed alarm activated  [] SCDs applied      COMMUNICATION/EDUCATION:   []         Role of Physical Therapy in the acute care setting. [x]         Fall prevention education was provided and the patient/caregiver indicated understanding. [x]         Patient/family have participated as able in working toward goals and plan of care. [x]         Patient/family agree to work toward stated goals and plan of care. []         Patient understands intent and goals of therapy, but is neutral about his/her participation.   []         Patient is unable to participate in stated goals/plan of care: ongoing with therapy staff.  []         Other:        Zuleyma Second, PT   Time Calculation: 16 mins

## 2022-02-14 NOTE — PROGRESS NOTES
CM called ARU. Spoke with Lottie Morales. Lottie Morales will review chart and call CM back.  will continue to monitor and assist with transition of care needs.     JH Bowen, RN  Care Management  Pager # [phone]

## 2022-02-14 NOTE — PROGRESS NOTES
Admit Date: 2022    POD 6 Days Post-Op    Procedure:  Procedure(s):  RIGHT LEG BELOW KNEE AMPUTATION (BKA)    Subjective:     Patient seen, chart reviewed, all acute events noted. Patient is awake and resting comfortably in bed. Still with mild to moderate incisional pain but adequate relief with oral pain medicines. Tolerating his diet with no nausea vomiting. Last BM was yesterday morning, loose. Patient is anxiously awaiting transfer to rehab facility. Patient states that if he cannot go to rehab he is hoping to go home. Long discussion with patient about the need for inpatient rehab with a fresh amputation. Patient agreeable but frustrated with having to wait. Patient states the prosthetic people came in and introduced themselves and have him in a stump  at this point. Objective:     Blood pressure 110/67, pulse 76, temperature 98.1 °F (36.7 °C), resp. rate 18, height 5' 9\" (1.753 m), weight 169 lb 3.2 oz (76.7 kg), SpO2 98 %. Temp (24hrs), Av °F (36.7 °C), Min:97.6 °F (36.4 °C), Max:98.5 °F (36.9 °C)      Physical Exam: Constitutional:  Patient is well developed, well nourished, and not distressed. Frustrated and anxious about pending discharge. HEENT: atraumatic, normocephalic, wearing a mask. Eyes:   Cunjunctivae clear, no scleral icterus  Neck:   No JVD present. No adenopathy. No carotid bruits or thrills noted bilaterally. Cardiovascular:  Normal rate, regular rhythm, normal heart sounds. No murmur heard. Pulmonary/Chest: Effort normal . No wheezes, rales or rhonchi noted. Extremities: Right lower extremity BKA incision clean dry,  to touch. Not as angry looking on this visit. No surrounding erythema, some mild serosanguineous drainage. Left lower extremity with normal range of motion. Palpable pedal pulse. No calf tenderness to palpation. Neurovascularly intact bilaterally. Neurological:  he  is alert and oriented x3 .  Gait normal. Motor & sensory grossly intact in all 4 limbs. Psych: Appropriate mood and affect. Skin:  Skin is warm and dry. No ulcerations. Capillary refill <3 sec bilaterally. No ulcerations or rashes noted. Labs:   Recent Results (from the past 24 hour(s))   GLUCOSE, POC    Collection Time: 02/13/22 11:55 AM   Result Value Ref Range    Glucose (POC) 193 (H) 70 - 110 mg/dL   GLUCOSE, POC    Collection Time: 02/13/22  4:21 PM   Result Value Ref Range    Glucose (POC) 265 (H) 70 - 110 mg/dL   GLUCOSE, POC    Collection Time: 02/13/22  9:43 PM   Result Value Ref Range    Glucose (POC) 331 (H) 70 - 110 mg/dL   GLUCOSE, POC    Collection Time: 02/14/22  6:19 AM   Result Value Ref Range    Glucose (POC) 171 (H) 70 - 110 mg/dL       Data Review reviewed  Consultants documentation, Nursing documentation and I & O    Assessment:     Active Problems:    S/P BKA (below knee amputation) ([facility] (2/8/2022)        Plan/Recommendations/Medical Decision Making:     Continue present treatment -maintain meds as ordered, wound care as ordered, patient encouraged to continue with oral pain medicine and to wean himself with a goal to be off all pain medicine within the next few weeks. Continue with gabapentin as ordered. Patient agreeable. Increase activity with PT/OT as tolerated. Encouragement given patient encouraged to be patient and understanding as to his upcoming care. Patient and the need for assistance with a fresh amputation. Patient warned of falls and incisional issues. Patient encouraged to be compliant with above recommendations. Patient applauded for being accepting and encouraged to continue to do so  Disposition per primary  We will follow as an outpatient. Incisional staples and sutures to be removed 17 to 21 days post surgery. We will follow up as an outpatient in the vascular clinic.

## 2022-02-14 NOTE — PROGRESS NOTES
Problem: Self Care Deficits Care Plan (Adult)  Goal: *Acute Goals and Plan of Care (Insert Text)  Description: Occupational Therapy Goals  Initiated 2/9/2022 within 7 day(s). 1.  Patient will perform lower body dressing with supervision/set-up. 2.  Patient will perform bathing with supervision/set-up. 3.  Patient will perform toilet transfers with supervision/set-up. 4.  Patient will perform all aspects of toileting with supervision/set-up. 5.  Patient will participate in upper extremity therapeutic exercise/activities with supervision/set-up for 8 minutes. 6.  Patient will utilize energy conservation techniques during functional activities with verbal cues. 7.  Patient will be independent with desensitization strategies for RLE to activity tolerance for completion of ADLs. Prior Level of Function: Pt reports being Mod Ind for ADls and functional mobility. Outcome: Progressing Towards Goal   OCCUPATIONAL THERAPY TREATMENT    Patient: Jhonny Trinidad (56 y.o. male)  Date: 2/14/2022  Diagnosis: Peripheral arterial disease (HCC) [I73.9]  Ischemic leg [I99.8]  S/P BKA (below knee amputation) ([facility] [Z89.519] <principal problem not specified>  Procedure(s) (LRB):  RIGHT LEG BELOW KNEE AMPUTATION (BKA) (Right) 6 Days Post-Op  Precautions: Fall,Skin (R BKA)  PLOF: Pt reports being Mod Ind for ADls and functional mobility. Chart, occupational therapy assessment, plan of care, and goals were reviewed. ASSESSMENT:  Pt presented supine in bed upon therapist arrival, R  and brace intact, and agreeable to work with OT. Pt reports 10/10 pain however wanting to sit EOB and performed early morning ADL's, see below. Pt tolerating sitting EOB ~ 15 mins with G balance. STS transfer Supervision and hopped along EOB towards HOB SBA with RW. Pt returned back to supine with MOD I and positioned self for comfort. He was left with HOB elevated, bed alarm active, and all needs left within reach.  RN made aware of pt's participation and position. Progression toward goals:  []          Improving appropriately and progressing toward goals  [x]          Improving slowly and progressing toward goals  []          Not making progress toward goals and plan of care will be adjusted     PLAN:  Patient continues to benefit from skilled intervention to address the above impairments. Continue treatment per established plan of care. Discharge Recommendations:  Rehab   Further Equipment Recommendations for Discharge:  RW, W/C     SUBJECTIVE:   Patient stated  I just want to go home. \"     OBJECTIVE DATA SUMMARY:   Cognitive/Behavioral Status:  Neurologic State: Alert  Orientation Level: Oriented X4  Cognition: Poor safety awareness  Safety/Judgement: Awareness of environment,Fall prevention    Functional Mobility and Transfers for ADLs:   Bed Mobility:     Supine to Sit: Modified independent     Scooting: Modified independent   Transfers:  Sit to Stand: Supervision  Stand to Sit: Supervision      Balance:  Sitting: Intact  Standing: Impaired; With support    ADL Intervention:       Grooming  Position Performed: Seated edge of bed  Washing Face: Set-up    Upper Body Bathing  Bathing Assistance: Set-up  Position Performed: Seated edge of bed    Lower Body Bathing  Perineal  : Supervision  Position Performed: Other (L/R)  Cues: Verbal cues provided  Lower Body : Supervision  Position Performed: Seated edge of bed    Upper Body [address]: Set-up    Lower Body Dressing Assistance  Socks: Supervision (L LE)  Leg Crossed Method Used: Yes  Position Performed: Seated edge of bed  Cues: Verbal cues provided      Pain:  Pain level pre-treatment: 10/10   Pain level post-treatment: 10/10  Pain Intervention(s): Medication (see MAR); Rest,  Repositioning  Response to intervention: Nurse notified, See doc flow    Activity Tolerance:    Fair   Please refer to the flowsheet for vital signs taken during this treatment.   After treatment:   []  Patient left in no apparent distress sitting up in chair  [x]  Patient left in no apparent distress in bed  [x]  Call bell left within reach  [x]  Nursing notified  []  Caregiver present  [x]  Bed alarm activated    COMMUNICATION/EDUCATION:   [x] Role of Occupational Therapy in the acute care setting  [] Home safety education was provided and the patient/caregiver indicated understanding. [x] Patient/family have participated as able in working towards goals and plan of care. [x] Patient/family agree to work toward stated goals and plan of care. [] Patient understands intent and goals of therapy, but is neutral about his/her participation. [] Patient is unable to participate in goal setting and plan of care.       Thank you for this referral.  RADHA Yuan  Time Calculation: 25 mins

## 2022-02-15 ENCOUNTER — HOSPITAL ENCOUNTER (INPATIENT)
Age: 62
LOS: 15 days | Discharge: HOME HEALTH | DRG: 862 | End: 2022-03-02
Attending: INTERNAL MEDICINE | Admitting: INTERNAL MEDICINE
Payer: COMMERCIAL

## 2022-02-15 VITALS
WEIGHT: 169.2 LBS | DIASTOLIC BLOOD PRESSURE: 74 MMHG | HEIGHT: 69 IN | TEMPERATURE: 98.2 F | HEART RATE: 68 BPM | SYSTOLIC BLOOD PRESSURE: 123 MMHG | BODY MASS INDEX: 25.06 KG/M2 | RESPIRATION RATE: 18 BRPM | OXYGEN SATURATION: 98 %

## 2022-02-15 DIAGNOSIS — G47.00 INSOMNIA, UNSPECIFIED TYPE: Chronic | ICD-10-CM

## 2022-02-15 DIAGNOSIS — E11.65 POORLY CONTROLLED TYPE 2 DIABETES MELLITUS (HCC): ICD-10-CM

## 2022-02-15 DIAGNOSIS — D62 ACUTE POSTOPERATIVE ANEMIA DUE TO EXPECTED BLOOD LOSS: ICD-10-CM

## 2022-02-15 DIAGNOSIS — E11.42 DIABETIC POLYNEUROPATHY ASSOCIATED WITH TYPE 2 DIABETES MELLITUS (HCC): ICD-10-CM

## 2022-02-15 DIAGNOSIS — Z78.9 IMPAIRED MOBILITY AND ADLS: ICD-10-CM

## 2022-02-15 DIAGNOSIS — I70.229 CRITICAL ISCHEMIA OF LOWER EXTREMITY (HCC): ICD-10-CM

## 2022-02-15 DIAGNOSIS — Z74.09 IMPAIRED MOBILITY AND ADLS: ICD-10-CM

## 2022-02-15 DIAGNOSIS — Z89.511 STATUS POST BELOW KNEE AMPUTATION OF RIGHT LOWER EXTREMITY (HCC): Primary | ICD-10-CM

## 2022-02-15 LAB
COVID-19 RAPID TEST, COVR: NOT DETECTED
GLUCOSE BLD STRIP.AUTO-MCNC: 173 MG/DL (ref 70–110)
GLUCOSE BLD STRIP.AUTO-MCNC: 213 MG/DL (ref 70–110)
GLUCOSE BLD STRIP.AUTO-MCNC: 334 MG/DL (ref 70–110)
SOURCE, COVRS: NORMAL

## 2022-02-15 PROCEDURE — 74011636637 HC RX REV CODE- 636/637: Performed by: SURGERY

## 2022-02-15 PROCEDURE — 97110 THERAPEUTIC EXERCISES: CPT

## 2022-02-15 PROCEDURE — 82962 GLUCOSE BLOOD TEST: CPT

## 2022-02-15 PROCEDURE — 2709999900 HC NON-CHARGEABLE SUPPLY

## 2022-02-15 PROCEDURE — 65310000000 HC RM PRIVATE REHAB

## 2022-02-15 PROCEDURE — 87635 SARS-COV-2 COVID-19 AMP PRB: CPT

## 2022-02-15 PROCEDURE — 74011636637 HC RX REV CODE- 636/637: Performed by: STUDENT IN AN ORGANIZED HEALTH CARE EDUCATION/TRAINING PROGRAM

## 2022-02-15 PROCEDURE — 74011636637 HC RX REV CODE- 636/637: Performed by: PHYSICIAN ASSISTANT

## 2022-02-15 PROCEDURE — 74011250637 HC RX REV CODE- 250/637: Performed by: PHYSICIAN ASSISTANT

## 2022-02-15 PROCEDURE — 77030027138 HC INCENT SPIROMETER -A

## 2022-02-15 PROCEDURE — 97530 THERAPEUTIC ACTIVITIES: CPT

## 2022-02-15 PROCEDURE — 74011250637 HC RX REV CODE- 250/637: Performed by: INTERNAL MEDICINE

## 2022-02-15 RX ORDER — INSULIN LISPRO 100 [IU]/ML
INJECTION, SOLUTION INTRAVENOUS; SUBCUTANEOUS
Status: DISCONTINUED | OUTPATIENT
Start: 2022-02-15 | End: 2022-02-28

## 2022-02-15 RX ORDER — ENOXAPARIN SODIUM 100 MG/ML
40 INJECTION SUBCUTANEOUS EVERY 24 HOURS
Status: DISCONTINUED | OUTPATIENT
Start: 2022-02-16 | End: 2022-03-02 | Stop reason: HOSPADM

## 2022-02-15 RX ORDER — OXYCODONE HYDROCHLORIDE 5 MG/1
10 TABLET ORAL
Status: DISCONTINUED | OUTPATIENT
Start: 2022-02-15 | End: 2022-02-18

## 2022-02-15 RX ORDER — BUPROPION HYDROCHLORIDE 150 MG/1
150 TABLET, EXTENDED RELEASE ORAL DAILY
Status: DISCONTINUED | OUTPATIENT
Start: 2022-02-16 | End: 2022-03-02 | Stop reason: HOSPADM

## 2022-02-15 RX ORDER — INSULIN GLARGINE 100 [IU]/ML
45 INJECTION, SOLUTION SUBCUTANEOUS
Status: DISCONTINUED | OUTPATIENT
Start: 2022-02-16 | End: 2022-02-17

## 2022-02-15 RX ORDER — ATORVASTATIN CALCIUM 20 MG/1
20 TABLET, FILM COATED ORAL DAILY
Status: DISCONTINUED | OUTPATIENT
Start: 2022-02-16 | End: 2022-03-02 | Stop reason: HOSPADM

## 2022-02-15 RX ORDER — BISACODYL 5 MG
10 TABLET, DELAYED RELEASE (ENTERIC COATED) ORAL
Status: DISCONTINUED | OUTPATIENT
Start: 2022-02-15 | End: 2022-03-02 | Stop reason: HOSPADM

## 2022-02-15 RX ORDER — ACETAMINOPHEN 325 MG/1
650 TABLET ORAL 3 TIMES DAILY
Status: DISCONTINUED | OUTPATIENT
Start: 2022-02-16 | End: 2022-03-02 | Stop reason: HOSPADM

## 2022-02-15 RX ORDER — METFORMIN HYDROCHLORIDE 500 MG/1
500 TABLET ORAL 2 TIMES DAILY WITH MEALS
Status: DISCONTINUED | OUTPATIENT
Start: 2022-02-15 | End: 2022-02-16

## 2022-02-15 RX ORDER — INSULIN LISPRO 100 [IU]/ML
0.05 INJECTION, SOLUTION INTRAVENOUS; SUBCUTANEOUS
Status: DISCONTINUED | OUTPATIENT
Start: 2022-02-16 | End: 2022-02-16

## 2022-02-15 RX ORDER — ACETAMINOPHEN 325 MG/1
650 TABLET ORAL
Status: DISCONTINUED | OUTPATIENT
Start: 2022-02-15 | End: 2022-03-02 | Stop reason: HOSPADM

## 2022-02-15 RX ORDER — GABAPENTIN 400 MG/1
400 CAPSULE ORAL 3 TIMES DAILY
Status: DISCONTINUED | OUTPATIENT
Start: 2022-02-15 | End: 2022-02-22

## 2022-02-15 RX ORDER — METHOCARBAMOL 500 MG/1
500 TABLET, FILM COATED ORAL 3 TIMES DAILY
Status: DISCONTINUED | OUTPATIENT
Start: 2022-02-15 | End: 2022-03-02 | Stop reason: HOSPADM

## 2022-02-15 RX ORDER — TRAZODONE HYDROCHLORIDE 50 MG/1
50 TABLET ORAL
Status: DISCONTINUED | OUTPATIENT
Start: 2022-02-15 | End: 2022-02-16

## 2022-02-15 RX ORDER — THERA TABS 400 MCG
1 TAB ORAL DAILY
Status: DISCONTINUED | OUTPATIENT
Start: 2022-02-16 | End: 2022-03-02 | Stop reason: HOSPADM

## 2022-02-15 RX ADMIN — TRAZODONE HYDROCHLORIDE 50 MG: 50 TABLET ORAL at 21:02

## 2022-02-15 RX ADMIN — ATORVASTATIN CALCIUM 20 MG: 20 TABLET, FILM COATED ORAL at 09:08

## 2022-02-15 RX ADMIN — THERA TABS 1 TABLET: TAB at 09:08

## 2022-02-15 RX ADMIN — Medication 45 UNITS: at 09:00

## 2022-02-15 RX ADMIN — Medication 4 UNITS: at 12:00

## 2022-02-15 RX ADMIN — OXYCODONE HYDROCHLORIDE AND ACETAMINOPHEN 2 TABLET: 5; 325 TABLET ORAL at 12:07

## 2022-02-15 RX ADMIN — GABAPENTIN 400 MG: 400 CAPSULE ORAL at 21:02

## 2022-02-15 RX ADMIN — GABAPENTIN 300 MG: 300 CAPSULE ORAL at 17:13

## 2022-02-15 RX ADMIN — METFORMIN HYDROCHLORIDE 500 MG: 500 TABLET ORAL at 19:31

## 2022-02-15 RX ADMIN — GABAPENTIN 300 MG: 300 CAPSULE ORAL at 09:08

## 2022-02-15 RX ADMIN — Medication 12 UNITS: at 12:09

## 2022-02-15 RX ADMIN — BUPROPION HYDROCHLORIDE 150 MG: 150 TABLET, EXTENDED RELEASE ORAL at 09:08

## 2022-02-15 RX ADMIN — Medication 4 UNITS: at 17:14

## 2022-02-15 RX ADMIN — OXYCODONE HYDROCHLORIDE AND ACETAMINOPHEN 2 TABLET: 5; 325 TABLET ORAL at 06:28

## 2022-02-15 RX ADMIN — OXYCODONE 10 MG: 5 TABLET ORAL at 19:31

## 2022-02-15 RX ADMIN — METHOCARBAMOL TABLETS 500 MG: 500 TABLET, COATED ORAL at 19:30

## 2022-02-15 RX ADMIN — Medication 3 UNITS: at 06:31

## 2022-02-15 RX ADMIN — Medication 4 UNITS: at 12:10

## 2022-02-15 RX ADMIN — Medication 6 UNITS: at 16:30

## 2022-02-15 NOTE — REHAB NOTE
SHIFT CHANGE NOTE FOR [facility]    Bedside and Verbal shift change report given to Martha RN (oncoming nurse) by Tammy Mack RN (offgoing nurse). Report included the following information SBAR, Kardex, MAR and Recent Results. Situation:   Code Status: Full Code   Hospital Day: 0   Problem List:   Hospital Problems  Date Reviewed: 1/14/2022          Codes Class Noted POA    Status post below knee amputation of right lower extremity ([facility] ICD-10-CM: Z89.511  ICD-9-CM: V49.75  2/15/2022 Unknown              Background:   Past Medical History:   Past Medical History:   Diagnosis Date    Alcohol use     Fri-Sun one fifth; Mon-Thur: Pint of liquor    CAD (coronary artery disease)     Cardiac angioplasty with stent 07/29/2009    2.5 x 13 Cypher stent to CX.  Cardiac cath 11/09/2011    RCA patent. LM patent. LAD patent. mD1 55%. CX patent. Prior stent patent. Edison 85% (2.5 x 15-mm Xience stent, resid 0%). LVEDP 12. EF 40-45%. High lateral hypk (RI distribution).  Cardiac inferior STEMI 2009    Constipation     Current smoker     contemplating stopping    Diabetes ([facility]     type 2-insulin dependent    Diabetic neuropathy (HCC)     GERD (gastroesophageal reflux disease)     HTN (hypertension)     Hyperlipidemia     Lacunar infarction ([facility]     Migraine     My[facility] ([facility]     Vitamin D deficiency         Assessment:   Changes in Assessment throughout shift:       Patient has a central line: no Reasons if yes: Insertion date: Last dressing date:   Patient has Epstein Cath: no Reasons if yes:     Insertion date:  Shift epstein care completed: NO     Last Vitals:     Vitals:    02/15/22 1834   BP: 116/73   Pulse: 81   Resp: 17   Temp: 97.8 °F (36.6 °C)   SpO2: 98%   Weight: 78.2 kg (172 lb 8 oz)   Height: 5' 9\" (1.753 m)        PAIN    Pain Assessment    Pain Intensity 1: 8 (02/15/22 1848) Pain Intensity 1: 2 (12/29/14 1105)    Pain Location 1: Foot Pain Location 1: Abdomen    Pain Intervention(s) 1: Repositioned Pain Intervention(s) 1: Medication (see MAR)  Patient Stated Pain Goal: 2 Patient Stated Pain Goal: 0  o Intervention effective:   o Other actions taken for pain: Repositioned     Skin Assessment  Skin color    Condition/Temperature    Integrity Skin Integrity: Incision (comment)  Turgor    Weekly Pressure Ulcer Documentation  Pressure  Injury Documentation: No Pressure Injury Noted-Pressure Ulcer Prevention Initiated  Wound Prevention & Protection Methods  Orientation of wound Orientation of Wound Prevention: Posterior  Location of Prevention Location of Wound Prevention: Sacrum/Coccyx  Dressing Present Dressing Present : No  Dressing Status    Wound Offloading Wound Offloading (Prevention Methods): Bed, pressure reduction mattress     INTAKE/OUPUT      Recommendations:  1. Patient needs and requests: tbd    2. Pending tests/procedures: labs     3. Functional Level/Equipment: Partial (one person) /      Fall Precautions:   Fall risk precautions were reinforced with the patient; he was instructed to call for help prior to getting up. The following fall risk precautions were continued: bed/ chair alarms, door signage, yellow bracelet and socks as well as update of the Ricky Lenzelda tool in the patient's room. Loly Score: 4    HEALS Safety Check    A safety check occurred in the patient's room between off going nurse and oncoming nurse listed above.     The safety check included the below items  Area Items   H  High Alert Medications - Verify all high alert medication drips (heparin, PCA, etc.)   E  Equipment - Suction is set up for ALL patients (with yanker)  - Red plugs utilized for all equipment (IV pumps, etc.)  - WOWs wiped down at end of shift.  - Room stocked with oxygen, suction, and other unit-specific supplies   A  Alarms - Bed alarm is set for fall risk patients  - Ensure chair alarm is in place and activated if patient is up in a chair   L  Lines - Check IV for any infiltration  - Gallardo bag is empty if patient has a Gallardo   - Tubing and IV bags are labeled   S  Safety   - Room is clean, patient is clean, and equipment is clean. - Hallways are clear from equipment besides carts. - Fall bracelet on for fall risk patients  - Ensure room is clear and free of clutter  - Suction is set up for ALL patients (with yanker)  - Hallways are clear from equipment besides carts.    - Isolation precautions followed, supplies available outside room, sign posted     Rosalva Hutson RN

## 2022-02-15 NOTE — DIABETES MGMT
Diabetes/ Glycemic Control Plan of Care    Recommendations:   1. Continue 45 units Lantus/day, 4 units meal time lispro TID and corrective lispro, very insulin resistant dosing ACHS  2. Ensure pt receives meal time lispro to prevent prandial blood glucose excursions. Assessment:   Pt with known history of T2DM (poorly controlled with A1C - 13.8%; home regimen of basal insulin, metformin and weekly GLP-1 RA)  Past medical history also includes peripheral artery disease, ischemic leg, s/p  BKA on 2/08/2022. Fasting Blood glucose this morning - 173 mg/dL (in target range). Yesterday's fasting blood glucose - 171  mg/dL. Pt was started on scheduled meal time lispro at dinner last night to address prandial blood glucose excursions. Noted improved HS BG reading last night (89 mg/dL) after receiving supper dose of meal time insulin. Pt did not receive his breakfast dose of scheduled meal time lispro this morning, unsure why it was not administered. Lunch blood glucose now 334 mg/dL. Noted pending discharge to ARU. Fasting/ Morning blood glucose:   Lab Results   Component Value Date/Time    Glucose 242 (H) 02/08/2022 04:05 PM    Glucose (POC) 334 (H) 02/15/2022 11:31 AM    Glucose,  ([facility]) 01/11/2022 07:31 AM     IV Fluids containing dextrose:  None    Steroids:   None    Blood glucose values:   2/15:  POC - 173, 334 (noted pt did not receive meal time lispro at breakfast meal per [facility])  2/14:  POC - 171, 338, 196, 89  (started meal time lispro at dinner meal)  2/13:  POC - 177, 193, 265, 331          Within target range (70-180mg/dL): No    Current insulin orders:   45 units Lantus every morning  4 units meal time lispro TID AC  Correctional lispro insulin.  Very resistant dose ACHS    Total Daily Dose previous 24 hours:  70 units (45 Lantus, 4 meal time lispro, 21 corrective lispro)    Current A1c:   Lab Results   Component Value Date/Time    Hemoglobin A1c 13.8 (H) 02/08/2022 07:10 AM    Hemoglobin A1c (POC) 7.0 01/13/2021 04:12 PM    Hemoglobin A1c, External 7.8 01/28/2015 11:15 PM      equivalent  to ave Blood Glucose of 349 mg/dl for 2-3 months prior to admission    Adequate glycemic control PTA: No    Nutrition/Diet:   Active Orders   Diet    ADULT DIET Regular; 4 carb choices (60 gm/meal); Low Fat/Low Chol/High Fiber/2 gm Na      Meal Intake:  Patient Vitals for the past 168 hrs:   % Diet Eaten   02/14/22 1225 76 - 100%   02/13/22 1722 76 - 100%   02/13/22 1230 76 - 100%   02/13/22 0754 76 - 100%   02/09/22 1236 76 - 100%     Supplement Intake:  No data found. Home diabetes medications:   Patient reported on 2/09/2022:  Bula [place] (lantus) pen insulin 52  Units twice daily: morning and bedtime  Metformin 1000 mg 2 times daily with meals  Trulicity 5.85 mg SC injection weekly (Thursday)    Noted last FP visit 11-1-2021 basal insulin as follows:       Key Antihyperglycemic Medications             Trulicity 9.96 VU/0.1 mL sub-q pen (Taking) INJECT 0.75 MG UNDER THE SKIN ONCE WEEKLY    insulin glargine (Basaglar KwikPen U-100 Insulin) 100 unit/mL (3 mL) inpn (Taking) 45 Units by SubCUTAneous route daily. INJECT 50 UNITS UNDER THE SKIN DAILY    metFORMIN (GLUCOPHAGE) 1,000 mg tablet (Taking) TAKE ONE TABLET BY MOUTH TWICE A DAY WITH A MEAL    Trulicity 4.24 FF/2.8 mL sub-q pen (Discontinued) INJECT 0.75 MG UNDER THE SKIN ONCE WEEKLY          Plan/Goals:   Blood glucose will be within target of 70 - 180 mg/dl within 72 hours  Reinforce dietary and medication compliance at home.          Education:  [x] Refer to Diabetes Education Record: 2/09/2022                       [] Education not indicated at this time     Rafi Parnell MS, RD, [facility]  Diabetes and Glycemic Control   [employer]

## 2022-02-15 NOTE — PROGRESS NOTES
Problem: Mobility Impaired (Adult and Pediatric)  Goal: *Acute Goals and Plan of Care (Insert Text)  Description: Physical Therapy Goals  Initiated 2/9/2022 and to be accomplished within 7 day(s)  1. Patient will move from supine to sit and sit to supine  in bed with modified independence. 2.  Patient will transfer from bed to chair and chair to bed with modified independence using the least restrictive device. 3.  Patient will perform sit to stand with modified independence. 4.  Patient will ambulate with modified independence for 50 feet with the least restrictive device. 5.  Patient will ascend/descend 3 stairs with B handrail(s) with modified independence. PLOF: Pt lives with his spouse in a [address] [place] [address] with 3 CATHIE. Wade with a RW PTA. Outcome: Progressing Towards Goal   PHYSICAL THERAPY TREATMENT    Patient: Herson Gómez (11 y.o. male)  Date: 2/15/2022  Diagnosis: Peripheral arterial disease ([facility] [I73.9]  Ischemic leg [I99.8]  S/P BKA (below knee [facility]putation) [facility] [Z89.519] <principal problem not specified>  Procedure(s) (LRB):  RIGHT LEG BELOW KNEE AMPUTATION (BKA) (Right) 7 Days Post-Op  Precautions: Fall,Skin (R BKA)      ASSESSMENT:  Patient agreeable to treatment, per nurse ok to participate in therapy. Patient reporting worsening pain over course of treatment session and nurse notified. Patient denying phantom pain but reporting incisional pain. Difficulty tolerating supine therex and standing at RW due to pain, unable to tolerate gait today due to pain. Vascular team visited patient during session to tell patient that he would be discharging to ARU today. Patient educated regarding goals of therapy on ARU.    Progression toward goals:   []      Improving appropriately and progressing toward goals  [x]      Improving slowly and progressing toward goals  []      Not making progress toward goals and plan of care will be adjusted     PLAN:  Patient continues to benefit from skilled intervention to address the above impairments. Continue treatment per established plan of care. Discharge Recommendations:  IRF  Further Equipment Recommendations for Discharge:  TBD, patient will need w/c, RW     SUBJECTIVE:   Patient stated ok.     OBJECTIVE DATA SUMMARY:   Critical Behavior:  Neurologic State: Alert  Orientation Level: Oriented X4  Cognition: Follows commands  Safety/Judgement: Awareness of environment,Fall prevention  Functional Mobility Training:  Bed Mobility:     Supine to Sit: Supervision  Sit to Supine: Supervision  Scooting: Supervision         Transfers:  Sit to Stand: Contact guard assistance  Stand to Sit: Contact guard assistance  Stand Pivot Transfers:   (NT)     Bed to Chair:  (NT)                    Balance:  Sitting: Impaired  Sitting - Static: Good (unsupported)  Sitting - Dynamic: Good (unsupported)  Standing: Impaired  Standing - Static: Fair (requiring UE support of RW)  Standing - Dynamic : Fair (requiring use of RW)   Range Of Motion:   AROM: Generally decreased, functional               PROM: Generally decreased, functional               Posture:   Posture (WDL): Exceptions to WDL   Posture Assessment: Rounded shoulders   Wheelchair Mobility:          Ambulation/Gait Training:  Distance (ft):  (NT due to pain)     Ambulation - Level of Assistance:  (NT due to pain)     Gait Description (WDL): Exceptions to WDL  Gait Abnormalities:  (NT due to pain report)       Therapeutic Exercises:       EXERCISE   Sets   Reps   Active Active Assist   Passive Self ROM   Comments   Ankle Pumps    [] [] [] []    Quad Sets/Glut Sets 10 2  [x] [] [] [] Hold for 5 secs   Hamstring Sets   [] [] [] []    Short Arc Quads   [] [] [] []    Heel Slides   [] [] [] []    Straight Leg Raises   [] [] [] []    Hip Add   [] [] [] [] Hold for 5 secs, w/ pillow squeeze   Long Arc Quads   [] [] [] []    Seated Marching   [] [] [] []    Standing Marching   [] [] [] []    Single limb bridges  10 3 [x] [] [] [] Cues for proper technique, pillow under right thigh       Pain:  Nurse notified that patient requesting pain medication    Activity Tolerance:   Fair to poor due to pain  After treatment:   [] Patient left in no apparent distress sitting up in chair  [x] Patient left in no apparent distress in bed  [x] Call bell left within reach  [x] Nursing notified  [] Caregiver present  [] Bed alarm activated  [] SCDs applied      COMMUNICATION/EDUCATION:   [x]         Role of Physical Therapy in the acute care setting. [x]         Fall prevention education was provided and the patient/caregiver indicated understanding. [x]         Patient/family have participated as able in working toward goals and plan of care. []         Patient/family agree to work toward stated goals and plan of care. []         Patient understands intent and goals of therapy, but is neutral about his/her participation.   []         Patient is unable to participate in stated goals/plan of care: ongoing with therapy staff.  []         Other:        Gwyndolyn Collet, PT   Time Calculation: 24 mins

## 2022-02-15 NOTE — PROGRESS NOTES
Problem: Falls - Risk of  Goal: *Absence of Falls  Description: Document Alana Octavio Fall Risk and appropriate interventions in the flowsheet. Outcome: Progressing Towards Goal  Note: Fall Risk Interventions:  Mobility Interventions: Bed/chair exit alarm         Medication Interventions: Bed/chair exit alarm,Evaluate medications/consider consulting pharmacy,Patient to call before getting OOB    Elimination Interventions: Call light in reach,Bed/chair exit alarm,Patient to call for help with toileting needs    History of Falls Interventions: Bed/chair exit alarm,Evaluate medications/consider consulting pharmacy         Problem: Patient Education: Go to Patient Education Activity  Goal: Patient/Family Education  Outcome: Progressing Towards Goal     Problem: Diabetes Self-Management  Goal: *Disease process and treatment process  Description: Define diabetes and identify own type of diabetes; list 3 options for treating diabetes. Outcome: Progressing Towards Goal  Goal: *Incorporating nutritional management into lifestyle  Description: Describe effect of type, amount and timing of food on blood glucose; list 3 methods for planning meals. Outcome: Progressing Towards Goal  Goal: *Incorporating physical activity into lifestyle  Description: State effect of exercise on blood glucose levels. Outcome: Progressing Towards Goal  Goal: *Developing strategies to promote health/change behavior  Description: Define the ABC's of diabetes; identify appropriate screenings, schedule and personal plan for screenings. Outcome: Progressing Towards Goal  Goal: *Using medications safely  Description: State effect of diabetes medications on diabetes; name diabetes medication taking, action and side effects. Outcome: Progressing Towards Goal  Goal: *Monitoring blood glucose, interpreting and using results  Description: Identify recommended blood glucose targets  and personal targets.   Outcome: Progressing Towards Goal  Goal: *Prevention, detection, treatment of acute complications  Description: List symptoms of hyper- and hypoglycemia; describe how to treat low blood sugar and actions for lowering  high blood glucose level. Outcome: Progressing Towards Goal  Goal: *Prevention, detection and treatment of chronic complications  Description: Define the natural course of diabetes and describe the relationship of blood glucose levels to long term complications of diabetes.   Outcome: Progressing Towards Goal  Goal: *Developing strategies to address psychosocial issues  Description: Describe feelings about living with diabetes; identify support needed and support network  Outcome: Progressing Towards Goal  Goal: *Insulin pump training  Outcome: Progressing Towards Goal  Goal: *Sick day guidelines  Outcome: Progressing Towards Goal  Goal: *Patient Specific Goal (EDIT GOAL, INSERT TEXT)  Outcome: Progressing Towards Goal     Problem: Patient Education: Go to Patient Education Activity  Goal: Patient/Family Education  Outcome: Progressing Towards Goal

## 2022-02-15 NOTE — PROGRESS NOTES
Problem: Falls - Risk of  Goal: *Absence of Falls  Description: Document Ledesma Blades Fall Risk and appropriate interventions in the flowsheet. Outcome: Progressing Towards Goal  Note: Fall Risk Interventions:  Mobility Interventions: Patient to call before getting OOB         Medication Interventions: Patient to call before getting OOB    Elimination Interventions: Call light in reach,Elevated toilet seat,Patient to call for help with toileting needs,Stay With Me (per policy)              Problem: Patient Education: Go to Patient Education Activity  Goal: Patient/Family Education  Outcome: Progressing Towards Goal     Problem: Diabetes Self-Management  Goal: *Disease process and treatment process  Description: Define diabetes and identify own type of diabetes; list 3 options for treating diabetes. Outcome: Progressing Towards Goal  Goal: *Incorporating nutritional management into lifestyle  Description: Describe effect of type, amount and timing of food on blood glucose; list 3 methods for planning meals. Outcome: Progressing Towards Goal  Goal: *Incorporating physical activity into lifestyle  Description: State effect of exercise on blood glucose levels. Outcome: Progressing Towards Goal  Goal: *Developing strategies to promote health/change behavior  Description: Define the ABC's of diabetes; identify appropriate screenings, schedule and personal plan for screenings. Outcome: Progressing Towards Goal  Goal: *Using medications safely  Description: State effect of diabetes medications on diabetes; name diabetes medication taking, action and side effects. Outcome: Progressing Towards Goal  Goal: *Monitoring blood glucose, interpreting and using results  Description: Identify recommended blood glucose targets  and personal targets.   Outcome: Progressing Towards Goal  Goal: *Prevention, detection, treatment of acute complications  Description: List symptoms of hyper- and hypoglycemia; describe how to treat low blood sugar and actions for lowering  high blood glucose level. Outcome: Progressing Towards Goal  Goal: *Prevention, detection and treatment of chronic complications  Description: Define the natural course of diabetes and describe the relationship of blood glucose levels to long term complications of diabetes.   Outcome: Progressing Towards Goal  Goal: *Developing strategies to address psychosocial issues  Description: Describe feelings about living with diabetes; identify support needed and support network  Outcome: Progressing Towards Goal  Goal: *Insulin pump training  Outcome: Progressing Towards Goal  Goal: *Sick day guidelines  Outcome: Progressing Towards Goal  Goal: *Patient Specific Goal (EDIT GOAL, INSERT TEXT)  Outcome: Progressing Towards Goal     Problem: Patient Education: Go to Patient Education Activity  Goal: Patient/Family Education  Outcome: Progressing Towards Goal     Problem: Patient Education: Go to Patient Education Activity  Goal: Patient/Family Education  Outcome: Progressing Towards Goal     Problem: Patient Education: Go to Patient Education Activity  Goal: Patient/Family Education  Outcome: Progressing Towards Goal     Problem: Amputation  Goal: *Progressive mobility and function  Outcome: Progressing Towards Goal  Goal: *Optimal pain control at patient's stated goal  Outcome: Progressing Towards Goal  Goal: *Absence of infection signs and symptoms  Outcome: Progressing Towards Goal  Goal: *Optimal residual limb healing  Outcome: Progressing Towards Goal     Problem: Patient Education: Go to Patient Education Activity  Goal: Patient/Family Education  Outcome: Progressing Towards Goal     Problem: Pain  Goal: *Control of Pain  Outcome: Progressing Towards Goal  Goal: *PALLIATIVE CARE:  Alleviation of Pain  Outcome: Progressing Towards Goal     Problem: Patient Education: Go to Patient Education Activity  Goal: Patient/Family Education  Outcome: Progressing Towards Goal

## 2022-02-15 NOTE — PROGRESS NOTES
ARU/IPR REFERRAL CONTACT NOTE  [address] [place] [address] for Physical Rehabilitation          RE: Gerry Babcock      Thank you for the opportunity to review this patient's case for admission to [address] [place] [address] for Physical Rehabilitation. Based on our pre-admission screening patient meets criteria for admission to [facility] for Physical Rehabilitation. Plan for admission today to room 177 at 4pm.  Will need d/c summary/med rec completed and report called to [phone] prior to patient's arrival.    Again, Thank you for this referral. Should you have any questions please do not hesitate to call. Sincerely,  Yulisa Pizarro.  [facility] for Physical Rehabilitation  [phone]

## 2022-02-15 NOTE — DISCHARGE SUMMARY
Discharge Summary  Date: 2/15/2022  Patient Name: Daksha Vargas YOB: 1960 Age: 59 y.o. Admit Date: 2/8/2022  Discharge Date: 6/24/2020  Discharge Condition: Stable    Discharge Diagnosis  Active Problems: S/P BKA (below knee amputation) ([facility])Resolved Problems: * No resolved hospital problems. [facility] Stay  Narrative of Hospital Course:  Daksha Vargas is a 59year old male s/p R BKA by Dr. Raimundo Pate 2/8/2022 for Right lower extremity limb ischemia after a failed bypass attempt. No major complications were noted during the stay. Prosthetics were consulted and the patient received a consultation and stump . Consultants:  None    Surgeries/procedures Performed:  R uncomplicated BKA 2/7/0879     Treatments:   Acetaminophen w/ Codeine  Dilaudid  Discharge Plan:  Short-Term Acute Care    Hospital/Incidental Findings Requiring Follow Up:Suture/ Staple removal    Patient Instructions:    Diet: Regular Diet    Activity:Activity as Tolerated and No Heavy Lifting  For number of days (if applicable): Other Instructions:    Provider Follow-Up:   No orders of the defined types were placed in this encounter. Significant Diagnostic Studies:    Recent Labs:        Discharge Medications    Current Discharge Medication List  No current facility-administered medications on file prior to encounter. Current Outpatient Medications on File Prior to Encounter   Medication Sig Dispense Refill    insulin glargine (Basaglar KwikPen U-100 Insulin) 100 unit/mL (3 mL) inpn 45 Units by SubCUTAneous route daily. INJECT 50 UNITS UNDER THE SKIN DAILY 30 Adjustable Dose Pre-filled Pen Syringe 1    buPROPion SR (WELLBUTRIN SR) 150 mg SR tablet ONE TABLET BY MOUTH ONCE A DAY 90 Tablet 0    simvastatin (Zocor) 20 mg tablet Take 1 Tablet by mouth nightly.  90 Tablet 1    metFORMIN (GLUCOPHAGE) 1,000 mg tablet TAKE ONE TABLET BY MOUTH TWICE A DAY WITH A MEAL 180 Tablet 2    ergocalciferol (ERGOCALCIFEROL) 1,250 mcg (50,000 unit) capsule TAKE ONE CAPSULE BY MOUTH ONCE WEEKLY  Indications: vitamin D deficiency (high dose therapy) 4 Capsule 6    multivitamin (ONE A DAY) tablet Take 1 Tablet by mouth daily. 90 Tablet 3    traZODone (DESYREL) 50 mg tablet Take 1 Tablet by mouth nightly. (Patient not taking: Reported on 2/8/2022) 90 Tablet 1    glucose blood VI test strips (ASCENSIA AUTODISC VI, ONE TOUCH ULTRA TEST VI) strip Provide test strip and glucose meter that insurance will cover - Twice daily-  fasting in the morning and once two hours after a meal. (Patient not taking: Reported on 2/8/2022) 100 Strip 10    flash glucose sensor (FreeStyle Ezio 2 Sensor) kit Apply 1 sensor every 14 days to check blood sugar as directed at least 3 times daily. (Patient not taking: Reported on 2/8/2022) 2 Kit 5     Time Spent on Discharge:30 minutes were spent in patient examination, evaluation, counseling as well as medication reconciliation, prescriptions for required medications, discharge plan, and follow up.     Electronically signed by Beck Smith NP on 2/15/22 at 10:07 AM

## 2022-02-15 NOTE — PROGRESS NOTES
Discharging to [address] [place] [address] at 4 pm today. Rapid COVID test, ordered. COVID results must be available before discharge. Call report to ARU, #[phone]. ARU room-177  RN, Janice updated.         TAI BestN, RN  Care Management  Pager # [phone]

## 2022-02-15 NOTE — DISCHARGE INSTRUCTIONS
DISCHARGE SUMMARY from Nurse    PATIENT INSTRUCTIONS:    After general anesthesia or intravenous sedation, for 24 hours or while taking prescription Narcotics:  · Limit your activities  · Do not drive and operate hazardous machinery  · Do not make important personal or business decisions  · Do  not drink alcoholic beverages  · If you have not urinated within 8 hours after discharge, please contact your surgeon on call. Report the following to your surgeon:  · Excessive pain, swelling, redness or odor of or around the surgical area  · Temperature over 100.5  · Nausea and vomiting lasting longer than 4 hours or if unable to take medications  · Any signs of decreased circulation or nerve impairment to extremity: change in color, persistent  numbness, tingling, coldness or increase pain  · Any questions    What to do at Home:  Recommended activity: {discharge activity:1 as tolerated    If you experience any of the following symptoms elevated temperature notify doctor    DISCHARGE SUMMARY from Nurse    PATIENT INSTRUCTIONS:    After general anesthesia or intravenous sedation, for 24 hours or while taking prescription Narcotics:  · Limit your activities  · Do not drive and operate hazardous machinery  · Do not make important personal or business decisions  · Do  not drink alcoholic beverages  · If you have not urinated within 8 hours after discharge, please contact your surgeon on call.     Report the following to your surgeon:  · Excessive pain, swelling, redness or odor of or around the surgical area  · Temperature over 100.5  · Nausea and vomiting lasting longer than 4 hours or if unable to take medications  · Any signs of decreased circulation or nerve impairment to extremity: change in color, persistent  numbness, tingling, coldness or increase pain  · Any questions    What to do at Home:  Recommended activity: Activity as tolerated,     If you experience any of the following symptoms elevated temperature notify doctor    *  Please give a list of your current medications to your Primary Care Provider. Patient {ARMBANDS:87687}      *  Please update this list whenever your medications are discontinued, doses are      changed, or new medications (including over-the-counter products) are added. *  Please carry medication information at all times in case of emergency situations. These are general instructions for a healthy lifestyle:    No smoking/ No tobacco products/ Avoid exposure to second hand smoke  Surgeon General's Warning:  Quitting smoking now greatly reduces serious risk to your health. Obesity, smoking, and sedentary lifestyle greatly increases your risk for illness    A healthy diet, regular physical exercise & weight monitoring are important for maintaining a healthy lifestyle    You may be retaining fluid if you have a history of heart failure or if you experience any of the following symptoms:  Weight gain of 3 pounds or more overnight or 5 pounds in a week, increased swelling in our hands or feet or shortness of breath while lying flat in bed. Please call your doctor as soon as you notice any of these symptoms; do not wait until your next office visit. The discharge information has been reviewed with the {PATIENT PARENT GUARDIAN:33672}. The {PATIENT PARENT GUARDIAN:19243} verbalized understanding. Discharge medications reviewed with the {Dishcarge meds reviewed [facility]:75482} and appropriate educational materials and side effects teaching were provided. ___________________________________________________________________________________________________________________________________  *  Please give a list of your current medications to your Primary Care Provider. *  Please update this list whenever your medications are discontinued, doses are      changed, or new medications (including over-the-counter products) are added.     *  Please carry medication information at all times in case of emergency situations. These are general instructions for a healthy lifestyle:    No smoking/ No tobacco products/ Avoid exposure to second hand smoke  Surgeon General's Warning:  Quitting smoking now greatly reduces serious risk to your health. Obesity, smoking, and sedentary lifestyle greatly increases your risk for illness    A healthy diet, regular physical exercise & weight monitoring are important for maintaining a healthy lifestyle    You may be retaining fluid if you have a history of heart failure or if you experience any of the following symptoms:  Weight gain of 3 pounds or more overnight or 5 pounds in a week, increased swelling in our hands or feet or shortness of breath while lying flat in bed. Please call your doctor as soon as you notice any of these symptoms; do not wait until your next office visit. The discharge information has been reviewed with the {PATIENT PARENT GUARDIAN:49556}. The {PATIENT PARENT GUARDIAN:35895} verbalized understanding. Discharge medications reviewed with the {Dishcarge meds reviewed [facility]:84516} and appropriate educational materials and side effects teaching were provided. ___________________________________________________________________________________________________________________________________-Continue present treatment -maintain meds as ordered, wound care as ordered, patient encouraged to continue with oral pain medicine and to wean himself with a goal to be off all pain medicine within the next few weeks. Continue with gabapentin as ordered.    -Increase activity with PT/OT as tolerated. -Patient and the need for assistance with a fresh amputation. Patient warned of falls and incisional issues. Patient encouraged to be compliant with above recommendations.   Patient applauded for being accepting and encouraged to continue to do so  -Disposition per primary  - Incisional staples and sutures to be removed 17 to 21 days post surgery.  -We will follow up as an outpatient in the vascular clinic or will visit in acute rehab once transferred

## 2022-02-15 NOTE — PROGRESS NOTES
Patient accepted to [address] today. Rapid COVID test needed. Elvira in [place] notifying attending.  will continue to monitor and assist with transition of care needs.     JH Castro, RN  Care Management  Pager # [phone]

## 2022-02-15 NOTE — PROGRESS NOTES
Bedside and Verbal shift change report given to [facility] (oncoming nurse) by Theressa Hodgkin RN (offgoing nurse). Report included the following information SBAR, Kardex, Intake/Output and MAR.

## 2022-02-16 PROBLEM — Z78.9 IMPAIRED MOBILITY AND ADLS: Status: ACTIVE | Noted: 2022-02-08

## 2022-02-16 PROBLEM — G47.00 INSOMNIA: Chronic | Status: ACTIVE | Noted: 2018-07-02

## 2022-02-16 PROBLEM — E67.3 HIGH VITAMIN D LEVEL: Chronic | Status: ACTIVE | Noted: 2022-02-16

## 2022-02-16 PROBLEM — Z89.511 STATUS POST BELOW KNEE AMPUTATION OF RIGHT LOWER EXTREMITY (HCC): Status: ACTIVE | Noted: 2022-02-08

## 2022-02-16 PROBLEM — Z86.39 HISTORY OF VITAMIN D DEFICIENCY: Status: ACTIVE | Noted: 2018-04-15

## 2022-02-16 PROBLEM — Z74.09 IMPAIRED MOBILITY AND ADLS: Status: ACTIVE | Noted: 2022-02-08

## 2022-02-16 PROBLEM — D62 ACUTE POSTOPERATIVE ANEMIA DUE TO EXPECTED BLOOD LOSS: Status: ACTIVE | Noted: 2022-02-08

## 2022-02-16 PROBLEM — E11.21 DIABETIC NEPHROPATHY ASSOCIATED WITH TYPE 2 DIABETES MELLITUS (HCC): Chronic | Status: ACTIVE | Noted: 2019-04-04

## 2022-02-16 PROBLEM — I70.229 CRITICAL ISCHEMIA OF LOWER EXTREMITY (HCC): Status: ACTIVE | Noted: 2022-02-08

## 2022-02-16 LAB
25(OH)D3 SERPL-MCNC: 105.7 NG/ML (ref 30–100)
ANION GAP SERPL CALC-SCNC: 7 MMOL/L (ref 3–18)
BASOPHILS # BLD: 0 K/UL (ref 0–0.1)
BASOPHILS NFR BLD: 1 % (ref 0–2)
BUN SERPL-MCNC: 22 MG/DL (ref 7–18)
BUN/CREAT SERPL: 19 (ref 12–20)
CALCIUM SERPL-MCNC: 8.7 MG/DL (ref 8.5–10.1)
CHLORIDE SERPL-SCNC: 99 MMOL/L (ref 100–111)
CO2 SERPL-SCNC: 26 MMOL/L (ref 21–32)
CREAT SERPL-MCNC: 1.17 MG/DL (ref 0.6–1.3)
DIFFERENTIAL METHOD BLD: ABNORMAL
EOSINOPHIL # BLD: 0.2 K/UL (ref 0–0.4)
EOSINOPHIL NFR BLD: 5 % (ref 0–5)
ERYTHROCYTE [DISTWIDTH] IN BLOOD BY AUTOMATED COUNT: 12.9 % (ref 11.6–14.5)
FERRITIN SERPL-MCNC: 404 NG/ML (ref 8–388)
FOLATE SERPL-MCNC: 12.2 NG/ML (ref 3.1–17.5)
GLUCOSE BLD STRIP.AUTO-MCNC: 118 MG/DL (ref 70–110)
GLUCOSE BLD STRIP.AUTO-MCNC: 127 MG/DL (ref 70–110)
GLUCOSE BLD STRIP.AUTO-MCNC: 204 MG/DL (ref 70–110)
GLUCOSE BLD STRIP.AUTO-MCNC: 297 MG/DL (ref 70–110)
GLUCOSE SERPL-MCNC: 350 MG/DL (ref 74–99)
HCT VFR BLD AUTO: 28.6 % (ref 36–48)
HGB BLD-MCNC: 8.9 G/DL (ref 13–16)
IMM GRANULOCYTES # BLD AUTO: 0.1 K/UL (ref 0–0.04)
IMM GRANULOCYTES NFR BLD AUTO: 2 % (ref 0–0.5)
IRON SATN MFR SERPL: 18 % (ref 20–50)
IRON SERPL-MCNC: 41 UG/DL (ref 50–175)
LYMPHOCYTES # BLD: 1 K/UL (ref 0.9–3.6)
LYMPHOCYTES NFR BLD: 25 % (ref 21–52)
MAGNESIUM SERPL-MCNC: 2.2 MG/DL (ref 1.6–2.6)
MCH RBC QN AUTO: 27.8 PG (ref 24–34)
MCHC RBC AUTO-ENTMCNC: 31.1 G/DL (ref 31–37)
MCV RBC AUTO: 89.4 FL (ref 78–100)
MONOCYTES # BLD: 0.4 K/UL (ref 0.05–1.2)
MONOCYTES NFR BLD: 11 % (ref 3–10)
NEUTS SEG # BLD: 2.2 K/UL (ref 1.8–8)
NEUTS SEG NFR BLD: 57 % (ref 40–73)
NRBC # BLD: 0 K/UL (ref 0–0.01)
NRBC BLD-RTO: 0 PER 100 WBC
PLATELET # BLD AUTO: 311 K/UL (ref 135–420)
PMV BLD AUTO: 9.8 FL (ref 9.2–11.8)
POTASSIUM SERPL-SCNC: 4.6 MMOL/L (ref 3.5–5.5)
RBC # BLD AUTO: 3.2 M/UL (ref 4.35–5.65)
RETICS/RBC NFR AUTO: 2.1 % (ref 0.5–2.5)
SODIUM SERPL-SCNC: 132 MMOL/L (ref 136–145)
TIBC SERPL-MCNC: 234 UG/DL (ref 250–450)
VIT B12 SERPL-MCNC: 574 PG/ML (ref 211–911)
WBC # BLD AUTO: 3.9 K/UL (ref 4.6–13.2)

## 2022-02-16 PROCEDURE — 97165 OT EVAL LOW COMPLEX 30 MIN: CPT

## 2022-02-16 PROCEDURE — 97116 GAIT TRAINING THERAPY: CPT

## 2022-02-16 PROCEDURE — 74011636637 HC RX REV CODE- 636/637: Performed by: INTERNAL MEDICINE

## 2022-02-16 PROCEDURE — 2709999900 HC NON-CHARGEABLE SUPPLY

## 2022-02-16 PROCEDURE — 97162 PT EVAL MOD COMPLEX 30 MIN: CPT

## 2022-02-16 PROCEDURE — 97150 GROUP THERAPEUTIC PROCEDURES: CPT

## 2022-02-16 PROCEDURE — 74011250637 HC RX REV CODE- 250/637: Performed by: INTERNAL MEDICINE

## 2022-02-16 PROCEDURE — 83540 ASSAY OF IRON: CPT

## 2022-02-16 PROCEDURE — 97110 THERAPEUTIC EXERCISES: CPT

## 2022-02-16 PROCEDURE — 85025 COMPLETE CBC W/AUTO DIFF WBC: CPT

## 2022-02-16 PROCEDURE — 97535 SELF CARE MNGMENT TRAINING: CPT

## 2022-02-16 PROCEDURE — 97530 THERAPEUTIC ACTIVITIES: CPT

## 2022-02-16 PROCEDURE — 83735 ASSAY OF MAGNESIUM: CPT

## 2022-02-16 PROCEDURE — 65310000000 HC RM PRIVATE REHAB

## 2022-02-16 PROCEDURE — 82728 ASSAY OF FERRITIN: CPT

## 2022-02-16 PROCEDURE — 74011250636 HC RX REV CODE- 250/636: Performed by: INTERNAL MEDICINE

## 2022-02-16 PROCEDURE — 82962 GLUCOSE BLOOD TEST: CPT

## 2022-02-16 PROCEDURE — 80048 BASIC METABOLIC PNL TOTAL CA: CPT

## 2022-02-16 PROCEDURE — 85045 AUTOMATED RETICULOCYTE COUNT: CPT

## 2022-02-16 PROCEDURE — 82607 VITAMIN B-12: CPT

## 2022-02-16 PROCEDURE — 97542 WHEELCHAIR MNGMENT TRAINING: CPT

## 2022-02-16 PROCEDURE — 99223 1ST HOSP IP/OBS HIGH 75: CPT | Performed by: INTERNAL MEDICINE

## 2022-02-16 PROCEDURE — 36415 COLL VENOUS BLD VENIPUNCTURE: CPT

## 2022-02-16 PROCEDURE — 82306 VITAMIN D 25 HYDROXY: CPT

## 2022-02-16 RX ORDER — MAGNESIUM SULFATE 100 %
4 CRYSTALS MISCELLANEOUS AS NEEDED
Status: DISCONTINUED | OUTPATIENT
Start: 2022-02-16 | End: 2022-03-02 | Stop reason: HOSPADM

## 2022-02-16 RX ORDER — INSULIN LISPRO 100 [IU]/ML
0.06 INJECTION, SOLUTION INTRAVENOUS; SUBCUTANEOUS
Status: DISCONTINUED | OUTPATIENT
Start: 2022-02-16 | End: 2022-02-17

## 2022-02-16 RX ORDER — SENNOSIDES 8.6 MG/1
1 TABLET ORAL DAILY
Status: DISCONTINUED | OUTPATIENT
Start: 2022-02-17 | End: 2022-02-18

## 2022-02-16 RX ORDER — IRON POLYSACCHARIDE COMPLEX 150 MG
1 CAPSULE ORAL 2 TIMES DAILY
Status: DISCONTINUED | OUTPATIENT
Start: 2022-02-16 | End: 2022-03-02 | Stop reason: HOSPADM

## 2022-02-16 RX ORDER — METFORMIN HYDROCHLORIDE 850 MG/1
850 TABLET ORAL 2 TIMES DAILY WITH MEALS
Status: DISCONTINUED | OUTPATIENT
Start: 2022-02-16 | End: 2022-02-18

## 2022-02-16 RX ORDER — LANOLIN ALCOHOL/MO/W.PET/CERES
200 CREAM (GRAM) TOPICAL DAILY
Status: DISCONTINUED | OUTPATIENT
Start: 2022-02-17 | End: 2022-03-02 | Stop reason: HOSPADM

## 2022-02-16 RX ORDER — GUAIFENESIN 100 MG/5ML
81 LIQUID (ML) ORAL
Status: DISCONTINUED | OUTPATIENT
Start: 2022-02-17 | End: 2022-03-02 | Stop reason: HOSPADM

## 2022-02-16 RX ORDER — DEXTROSE MONOHYDRATE 100 MG/ML
0-250 INJECTION, SOLUTION INTRAVENOUS AS NEEDED
Status: DISCONTINUED | OUTPATIENT
Start: 2022-02-16 | End: 2022-03-02 | Stop reason: HOSPADM

## 2022-02-16 RX ADMIN — ENOXAPARIN SODIUM 40 MG: 100 INJECTION SUBCUTANEOUS at 05:10

## 2022-02-16 RX ADMIN — GABAPENTIN 400 MG: 400 CAPSULE ORAL at 17:27

## 2022-02-16 RX ADMIN — THERA TABS 1 TABLET: TAB at 09:02

## 2022-02-16 RX ADMIN — ATORVASTATIN CALCIUM 20 MG: 20 TABLET, FILM COATED ORAL at 09:02

## 2022-02-16 RX ADMIN — Medication 150 MG: at 12:50

## 2022-02-16 RX ADMIN — Medication 45 UNITS: at 08:30

## 2022-02-16 RX ADMIN — ACETAMINOPHEN 650 MG: 325 TABLET ORAL at 17:27

## 2022-02-16 RX ADMIN — OXYCODONE 10 MG: 5 TABLET ORAL at 18:44

## 2022-02-16 RX ADMIN — OXYCODONE 10 MG: 5 TABLET ORAL at 12:57

## 2022-02-16 RX ADMIN — Medication 4 UNITS: at 08:32

## 2022-02-16 RX ADMIN — METHOCARBAMOL TABLETS 500 MG: 500 TABLET, COATED ORAL at 15:03

## 2022-02-16 RX ADMIN — GABAPENTIN 400 MG: 400 CAPSULE ORAL at 23:19

## 2022-02-16 RX ADMIN — Medication 5 UNITS: at 12:32

## 2022-02-16 RX ADMIN — OXYCODONE 10 MG: 5 TABLET ORAL at 08:58

## 2022-02-16 RX ADMIN — BUPROPION HYDROCHLORIDE 150 MG: 150 TABLET, EXTENDED RELEASE ORAL at 09:02

## 2022-02-16 RX ADMIN — Medication 5 UNITS: at 16:49

## 2022-02-16 RX ADMIN — Medication 150 MG: at 17:27

## 2022-02-16 RX ADMIN — ACETAMINOPHEN 650 MG: 325 TABLET ORAL at 09:02

## 2022-02-16 RX ADMIN — METHOCARBAMOL TABLETS 500 MG: 500 TABLET, COATED ORAL at 21:42

## 2022-02-16 RX ADMIN — METHOCARBAMOL TABLETS 500 MG: 500 TABLET, COATED ORAL at 09:02

## 2022-02-16 RX ADMIN — METFORMIN HYDROCHLORIDE 850 MG: 850 TABLET ORAL at 17:27

## 2022-02-16 RX ADMIN — ACETAMINOPHEN 650 MG: 325 TABLET ORAL at 12:50

## 2022-02-16 RX ADMIN — OXYCODONE 10 MG: 5 TABLET ORAL at 23:19

## 2022-02-16 RX ADMIN — METFORMIN HYDROCHLORIDE 500 MG: 500 TABLET ORAL at 09:01

## 2022-02-16 RX ADMIN — Medication 6 UNITS: at 08:32

## 2022-02-16 RX ADMIN — OXYCODONE 10 MG: 5 TABLET ORAL at 02:06

## 2022-02-16 RX ADMIN — BISACODYL 10 MG: 5 TABLET, COATED ORAL at 05:09

## 2022-02-16 RX ADMIN — GABAPENTIN 400 MG: 400 CAPSULE ORAL at 09:02

## 2022-02-16 NOTE — CONSULTS
Comprehensive Nutrition Assessment    Type and Reason for Visit: Initial,Positive nutrition screen,Consult    Nutrition Recommendations/Plan:   - Add thiamine and oral nutrition supplements: Glucerna Shake TID. Nutrition Assessment:  Patient tolerating oral diet with no nausea/vomiting and consuming most of recent meals plus additional fruits observed by nursing at bedside this morning with hyperglycemia on insulin regimen. Malnutrition Assessment:  Malnutrition Status: At risk for malnutrition (specify) (history of weight loss, alcohol use and uncontrolled diabetes s/p amputation)      Estimated Daily Nutrient Needs:  Energy (kcal): 5016-1387; Weight Used for Energy Requirements: Admission  Protein (g): 62-94; Weight Used for Protein Requirements: Admission (0.8-1.2)  Fluid (ml/day): 2510-6807; Method Used for Fluid Requirements: 1 ml/kcal    Nutrition Related Findings:  Last BM 2/14 (loose). Pertinent Medications: dulcolax (given today), lantus, SSI, metformin, iron polysaccharides 150 mg BID, theragran. Vitamin D 25-Hydroxy: 105.7 ng/mL High (2/15/22), 18.9 ng/mL (8/19/20). Noted list of home medications to include ergocalciferol 50,000 units weekly last prescribed on 11/1/21). Lab Results   Component Value Date/Time    Iron 41 (L) 02/16/2022 06:26 AM    TIBC 234 (L) 02/16/2022 06:26 AM    Iron % saturation 18 (L) 02/16/2022 06:26 AM    Ferritin 404 (H) 02/16/2022 06:26 AM      Lab Results   Component Value Date/Time    Vitamin B12 574 02/16/2022 06:26 AM    Folate 12.2 02/16/2022 06:26 AM      Wounds:    Surgical incision (right leg incision due to recent amputation, BKA)       Current Nutrition Therapies:  ADULT ORAL NUTRITION SUPPLEMENT Breakfast, Lunch, Dinner; Diabetic Supplement  ADULT DIET Regular; 4 carb choices (60 gm/meal);  Low Fat/Low Chol/High Fiber/SEEMA    Anthropometric Measures:  · Height:  5' 9\" (175.3 cm)  · Current Body Wt:  78.2 kg (172 lb 6.4 oz)   · Admission Body Wt:  172 lb 6.4 oz    · Usual Body Wt:  76.7 kg (169 lb) (post amputation weight measured on 2/14/22)     · Ideal Body Wt:  160 lbs:  107.7 %   · Adjusted Body Weight:  182.6; Weight Adjustment for: Amputation   · Adjusted BMI:  26.9    · BMI Category: Overweight (BMI 25.0-29. 9)       Nutrition Diagnosis:   · Increased nutrient needs related to catabolic illness,increased demand for energy/nutrients as evidenced by wounds,lab values    · Unintended weight loss related to catabolic illness,inadequate protein-energy intake,increased demand for energy/nutrients,endocrine dysfunction as evidenced by weight loss,lab values      Nutrition Interventions:   Food and/or Nutrient Delivery: Continue current diet,Vitamin supplement,Mineral supplement,Start oral nutrition supplement  Nutrition Education and Counseling: No recommendations at this time,Education not indicated  Coordination of Nutrition Care: Continue to monitor while inpatient    Goals:  Nutritional needs will be met through adequate oral intake or nutrition support by next follow up date       Nutrition Monitoring and Evaluation:   Behavioral-Environmental Outcomes: None identified  Food/Nutrient Intake Outcomes: Food and nutrient intake,Supplement intake,Vitamin/mineral intake  Physical Signs/Symptoms Outcomes: Biochemical data,Meal time behavior,Nutrition focused physical findings    Discharge Planning:    Continue current diet,Continue oral nutrition supplement     Electronically signed by Sidney Pedraza RD, [address]  on 2/16/2022 at 11:55 AM    Contact: [phone]

## 2022-02-16 NOTE — PROGRESS NOTES
Problem: Falls - Risk of  Goal: *Absence of Falls  Description: Document Su Skill Fall Risk and appropriate interventions in the flowsheet. Outcome: Progressing Towards Goal  Note: Fall Risk Interventions:  Mobility Interventions: Bed/chair exit alarm,Patient to call before getting OOB         Medication Interventions: Bed/chair exit alarm,Evaluate medications/consider consulting pharmacy,Patient to call before getting OOB    Elimination Interventions: Call light in reach,Bed/chair exit alarm,Patient to call for help with toileting needs    History of Falls Interventions: Bed/chair exit alarm,Evaluate medications/consider consulting pharmacy         Problem: Patient Education: Go to Patient Education Activity  Goal: Patient/Family Education  Outcome: Progressing Towards Goal     Problem: Diabetes Self-Management  Goal: *Disease process and treatment process  Description: Define diabetes and identify own type of diabetes; list 3 options for treating diabetes. Outcome: Progressing Towards Goal  Goal: *Incorporating nutritional management into lifestyle  Description: Describe effect of type, amount and timing of food on blood glucose; list 3 methods for planning meals. Outcome: Progressing Towards Goal  Goal: *Incorporating physical activity into lifestyle  Description: State effect of exercise on blood glucose levels. Outcome: Progressing Towards Goal  Goal: *Developing strategies to promote health/change behavior  Description: Define the ABC's of diabetes; identify appropriate screenings, schedule and personal plan for screenings. Outcome: Progressing Towards Goal  Goal: *Using medications safely  Description: State effect of diabetes medications on diabetes; name diabetes medication taking, action and side effects. Outcome: Progressing Towards Goal  Goal: *Monitoring blood glucose, interpreting and using results  Description: Identify recommended blood glucose targets  and personal targets.   Outcome: Progressing Towards Goal  Goal: *Prevention, detection, treatment of acute complications  Description: List symptoms of hyper- and hypoglycemia; describe how to treat low blood sugar and actions for lowering  high blood glucose level. Outcome: Progressing Towards Goal  Goal: *Prevention, detection and treatment of chronic complications  Description: Define the natural course of diabetes and describe the relationship of blood glucose levels to long term complications of diabetes.   Outcome: Progressing Towards Goal  Goal: *Developing strategies to address psychosocial issues  Description: Describe feelings about living with diabetes; identify support needed and support network  Outcome: Progressing Towards Goal  Goal: *Insulin pump training  Outcome: Progressing Towards Goal  Goal: *Sick day guidelines  Outcome: Progressing Towards Goal  Goal: *Patient Specific Goal (EDIT GOAL, INSERT TEXT)  Outcome: Progressing Towards Goal     Problem: Patient Education: Go to Patient Education Activity  Goal: Patient/Family Education  Outcome: Progressing Towards Goal

## 2022-02-16 NOTE — H&P
[facility] PHYSICAL [address], [address]     INPATIENT REHABILITATION  HISTORY AND PHYSICAL    Name: Crystal Granados CSN: [id number]   Age: 59 y.o. MRN: [id number]   Sex: male Admit Date: 2/15/2022     PCP: Alfredo Duckworth NP       Primary Rehabilitation Impairment Category (BREN): Amputation, lower extremity    Impairment Group Label: Unilateral lower extremity below the knee    Etiologic Diagnosis: Critical ischemia of right lower extremity      Subjective:     Patient seen and examined. History of the Present Illness: The patient is a 23-year-old Black male with multiple medical comorbidities who was admitted to [facility] on 2/8/2022 for an elective right below-the-knee amputation. On 2/2/2022, the patient was seen at the office of Vascular Surgery (Dr. Beata Adame) for evaluation of right leg ischemic rest pain. Dr. Erika Espana recommended a right below-the-knee amputation. COVID-19 rapid test ([facility], [facility]) (2/4/2022): Not detected    On 2/8/2022, the patient was admitted under the service of Vascular Surgery (Dr. Beata Adame). Excerpt (HPI) from the H&P by Dr. Beata Adame:  \"Bryce Perez is a 59 y.o. male with critical right leg ischemia. No revascularization options available. Patient has debilitating rest pain of the right lower extremity and the only option for pain management currently is right leg below the knee amputation. \"    S/P Right below-the-knee amputation (2/8/2022 - Dr. Beata Adame)    The patient tolerated the procedure well with no intraoperative complications. The patient developed acute postoperative blood loss anemia but no blood transfusion was given.     CBC  Recent Labs     02/08/22  1605   WBC 9.1   HGB 9.9*   HCT 31.3*          Electrolytes  Recent Labs     02/08/22  1605   *   K 4.1      CA 8.5       Renal Function  Recent Labs     02/08/22  1605 BUN 12   CREA 1.19   CO2 27       Liver Function  Recent Labs     02/08/22  1605   TBILI <0.1*   TP 6.2*   ALB 2.8*   GLOB 3.4   ALT 19   AST 20   AP 75       COVID-19 rapid test ([facility], [id number]) (2/15/2022): Not detected    [facility] from the Discharge Summary by Tristin Gonsalves NP:  [facility] Course:  Nieves Jeff is a 59year old male s/p R BKA by Dr. Christopher Ventura 2/8/2022 for Right lower extremity limb ischemia after a failed bypass attempt. No major complications were noted during the stay. Prosthetics were consulted and the patient received a consultation and stump . \"    Pain was controlled with Percocet 5/325. No DVT prophylaxis was used. The patient had remained hemodynamically stable but due to the above events, the patient was noted to be generally weak and with impaired mobility and ADLs. Patient was felt to be a good candidate for acute inpatient rehabilitation. Upon evaluation by Physical Therapy and Occupational Therapy, the patient was recommended for acute inpatient rehabilitation. The patient was discharged and was subsequently admitted to the [facility] for intensive rehabilitation to help recover strength, function and mobility. Past Medical History:  Past Medical History:   Diagnosis Date    Acute postoperative anemia due to expected blood loss 2/8/2022    Arterial occlusion, lower extremity ([facility] 11/27/2021    Cardiac cath 11/09/2011    RCA patent. LM patent. LAD patent. mD1 55%. CX patent. Prior stent patent. Edison 85% (2.5 x 15-mm Xience stent, resid 0%). LVEDP 12. EF 40-45%. High lateral hypk (RI distribution).       Chronic alcohol use     Fri-Sun one fifth; Mon-Thur: Pint of liquor    Constipation     Coronary artery disease involving native coronary artery of native heart     Critical ischemia of lower extremity ([facility] 2/8/2022    Depression     Diabetic nephropathy associated with type 2 diabetes mellitus ([facility] 4/4/2019    Diabetic neuropathy associated with type 2 diabetes mellitus ([facility]     Gastroesophageal reflux disease     High vitamin D level 2/16/2022    Vitamin D 25-Hydroxy (2/16/2022) = 105.7    History of acute inferior wall myocardial infarction 2009    History of coronary angioplasty with insertion of stent 07/29/2009    2.5 x 13 Cypher stent to CX. 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 without macular edema associated with type 2 diabetes mellitus ([facility] 12/18/2020    Nuclear sclerosis of both eyes 12/18/2020    Otalgia 05/08/2019    Poorly controlled type 2 diabetes mellitus (HCC)     HbA1c (2/8/2022) = 13.8    Tobacco use disorder     contemplating stopping       Past Surgical History:  Past Surgical History:   Procedure Laterality Date    COLONOSCOPY N/A 10/28/2020    COLONOSCOPY w/polypectomy performed by García Livingston MD at [facility] ENDOSCOPY    HX [address] Right 02/08/2022    S/P Right below-the-knee amputation (2/8/2022 - Dr. Sarai Quevedo)    HX CORONARY STENT PLACEMENT  07/29/2009    HX HEART CATHETERIZATION  8/30/2011    HX HEART CATHETERIZATION  11/09/2011    HX WISDOM TEETH EXTRACTION      x4    VASCULAR SURGERY PROCEDURE UNLIST      Multiple vascular surgeries       Allergies: Allergies   Allergen Reactions    Niacin Itching       Social History: The patient is single, lives with a lady friend in a 1-story house with a 3-step entry in [place], [place]. He smokes 3/4 pack of cigarettes per day. He drinks alcoholic beverages once a week. He denied any illicit drug use. He is unemployed.       Family History: Both parents are . Family History   Problem Relation Age of Onset    Hypertension Mother     Heart Attack Mother     Diabetes Mother     Hypertension Father     Heart Attack Father     Diabetes Father     Diabetes Sister     Hypertension Sister     Stroke Sister     Diabetes Brother     Hypertension Brother     Stroke Brother     No Known Problems Maternal Grandmother     No Known Problems Maternal Grandfather     No Known Problems Paternal Grandmother     No Known Problems Paternal Grandfather     No Known Problems Brother     Heart Disease Other     Kidney Disease Other        Home Medications (from the H&P dated 2022 prepared by Dr. Teresa Lin):  Prior to Admission medications    Medication Sig Start Date End Date Taking? Authorizing Provider   Trulicity 9.36 LV/6.7 mL sub-q pen INJECT 0.75 MG UNDER THE SKIN ONCE WEEKLY 22   Yes Lars ANAYA NP   insulin glargine (Basaglar KwikPen U-100 Insulin) 100 unit/mL (3 mL) inpn 45 Units by SubCUTAneous route daily. INJECT 50 UNITS UNDER THE SKIN DAILY 21   Yes Ria Gipson MD   buPROPion SR [facility]) 150 mg SR tablet ONE TABLET BY MOUTH ONCE A DAY 21   Yes Lars ANAYA NP   simvastatin (Zocor) 20 mg tablet Take 1 Tablet by mouth nightly. 21   Yes Lars ANAYA NP   metFORMIN (GLUCOPHAGE) 1,000 mg tablet TAKE ONE TABLET BY MOUTH TWICE A DAY WITH A MEAL 21   Yes Lars ANAYA NP   ergocalciferol (ERGOCALCIFEROL) 1,250 mcg (50,000 unit) capsule TAKE ONE CAPSULE BY MOUTH ONCE WEEKLY  Indications: vitamin D deficiency (high dose therapy) 21   Yes Lars ANAYA NP   multivitamin (ONE A DAY) tablet Take 1 Tablet by mouth daily. 21   Yes Lars ANAYA NP   traZODone (DESYREL) 50 mg tablet Take 1 Tablet by mouth nightly.   Patient not taking: Reported on 2022     Lars ANAYA NP   glucose blood VI test strips (ASCENSIA AUTODISC VI, ONE TOUCH ULTRA TEST VI) strip Provide test strip and glucose meter that insurance will cover - Twice daily-  fasting in the morning and once two hours after a meal. 11/1/21     Zen ANAYA NP   flash glucose sensor (FreeStyle Ezio 2 Sensor) kit Apply 1 sensor every 14 days to check blood sugar as directed at least 3 times daily. 11/1/21     Bob Orozco NP       Home Medications ( [x] Verbally confirmed with patient    [] From medication bottles brought by patient     [] From list provided by patient):  Medication Sig   Trulicity 8.31 VM/2.8 mL sub-q pen INJECT 0.75 MG UNDER THE SKIN ONCE WEEKLY   insulin glargine (Basaglar KwikPen U-100 Insulin) 100 unit/mL (3 mL) inpn 45 Units by SubCUTAneous route daily. buPROPion SR (WELLBUTRIN SR) 150 mg SR tablet ONE TABLET BY MOUTH ONCE A DAY   simvastatin (Zocor) 20 mg tablet Take 1 Tablet by mouth nightly. metFORMIN (GLUCOPHAGE) 1,000 mg tablet TAKE ONE TABLET BY MOUTH TWICE A DAY WITH A MEAL   ergocalciferol (ERGOCALCIFEROL) 1,250 mcg (50,000 unit) capsule TAKE ONE CAPSULE BY MOUTH ONCE WEEKLY  Indications: vitamin D deficiency (high dose therapy)   multivitamin (ONE A DAY) tablet Take 1 Tablet by mouth daily. aspirin 81 mg tablet Take 1 Tablet by mouth once daily. glucose blood VI test strips (ASCENSIA AUTODISC VI, ONE TOUCH ULTRA TEST VI) strip Provide test strip and glucose meter that insurance will cover - Twice daily-  fasting in the morning and once two hours after a meal.   flash glucose sensor (FreeStyle Ezio 2 Sensor) kit Apply 1 sensor every 14 days to check blood sugar as directed at least 3 times daily. Transfer Medications (from the Discharge Summary prepared by Trini Regan NP):  Prior to Admission Medications   Prescriptions Last Dose Informant Patient Reported? Taking?    Trulicity 9.51 PG/0.2 mL sub-q pen   No No   Sig: INJECT 0.75 MG UNDER THE SKIN ONCE WEEKLY   buPROPion SR (WELLBUTRIN SR) 150 mg SR tablet   No No   Sig: ONE TABLET BY MOUTH ONCE A DAY   ergocalciferol (ERGOCALCIFEROL) 1,250 mcg (50,000 unit) capsule   No No   Sig: TAKE ONE CAPSULE BY MOUTH ONCE WEEKLY  Indications: vitamin D deficiency (high dose therapy)   flash glucose sensor (FreeStyle Ezio 2 Sensor) kit   No No   Sig: Apply 1 sensor every 14 days to check blood sugar as directed at least 3 times daily. Patient not taking: Reported on 2022   glucose blood VI test strips (ASCENSIA AUTODISC VI, ONE TOUCH ULTRA TEST VI) strip   No No   Sig: Provide test strip and glucose meter that insurance will cover - Twice daily-  fasting in the morning and once two hours after a meal.   Patient not taking: Reported on 2022   insulin glargine (Basaglar KwikPen U-100 Insulin) 100 unit/mL (3 mL) inpn   No No   Si Units by SubCUTAneous route daily. INJECT 50 UNITS UNDER THE SKIN DAILY   metFORMIN (GLUCOPHAGE) 1,000 mg tablet   No No   Sig: TAKE ONE TABLET BY MOUTH TWICE A DAY WITH A MEAL   multivitamin (ONE A DAY) tablet   No No   Sig: Take 1 Tablet by mouth daily. simvastatin (Zocor) 20 mg tablet   No No   Sig: Take 1 Tablet by mouth nightly. traZODone (DESYREL) 50 mg tablet   No No   Sig: Take 1 Tablet by mouth nightly. Patient not taking: Reported on 2022      Facility-Administered Medications: None       Review Of Systems:   CONSTITUTIONAL: No weight loss. EYES: No blurred vision and no eye discharge. ENT: No nasal discharge. No ear pain. CARDIOVASCULAR: No chest pain and no diaphoresis. RESPIRATORY: No cough, no hemoptysis. GI: No vomiting, no diarrhea   : No urinary frequency and no dysuria. MUSCULOSKELETAL: Significant for acute postoperative musculoskeletal pain. SKIN: No rashes. NEURO: No dizziness, no numbness. ENDOCRINE: No polyphagia and no polydipsia. HEMATOLOGY: Significant for acute postoperative anemia.       Objective:     Vital Signs:  Patient Vitals for the past 24 hrs:   BP Temp Pulse Resp SpO2 Height Weight   22 1149      5' 9\" (1.753 m)    02/16/22 0728 127/89 99.1 °F (37.3 °C) 73 19 100 %     02/15/22 2030 120/75 98.2 °F (36.8 °C) 79 18 97 %     02/15/22 1834 116/73 97.8 °F (36.6 °C) 81 17 98 % 5' 9\" (1.753 m) 78.2 kg (172 lb 8 oz)        Body mass index is 25.47 kg/m². Physical Examination:  GENERAL SURVEY: Patient is awake, alert, oriented x 3, sitting comfortably on the chair, not in acute respiratory distress. HEENT: pale palpebral conjunctivae, anicteric sclerae, no nasoaural discharge, moist oral mucosa  NECK: supple, no jugular venous distention, no palpable lymph nodes  CHEST/LUNGS: symmetrical chest expansion, good air entry, clear breath sounds  HEART: adynamic precordium, good S1 S2, no S3, regular rhythm, no murmurs  ABDOMEN: flat, bowel sounds appreciated, soft, non-tender  EXTREMITIES: (+) right BKA stump, pale nailbeds, no edema, full and equal pulses, no calf tenderness   NEUROLOGICAL EXAM: The patient is awake, alert and oriented x3, able to answer questions fairly appropriately, able to follow 1 and 2 step commands. Able to tell time from the wall clock. Cranial nerves II-XII are grossly intact. No gross sensory deficit. Motor strength is 4/5 on BUE and BLE.     Legend:  0/5   No palpable muscle contraction  1/5   Palpable muscle contraction, no joint movement  2-/5  Less than full range of motion in gravity eliminated position  2/5   Able to complete full range of motion in gravity eliminated position  2+/5 Able to initiate movement against gravity  3-/5  More than half but not full range of motion against gravity  3/5   Able to complete full range of motion against gravity  3+/5 Completes full range of motion against gravity with minimal resistance  4-/5  Completes full range of motion against gravity with minimal resistance  4/5   Completes full range of motion against gravity with moderate resistance  5/5   Completes full range of motion against gravity with maximum resistance     Incision(s): covered, clean, dry, and intact       Current Medications:  Current Facility-Administered Medications   Medication Dose Route Frequency    glucose chewable tablet 16 g  4 Tablet Oral PRN    glucagon (GLUCAGEN) injection 1 mg  1 mg IntraMUSCular PRN    dextrose 10% infusion 0-250 mL  0-250 mL IntraVENous PRN    iron polysaccharides (NIFEREX) capsule 150 mg  1 Capsule Oral BID    insulin lispro (HUMALOG) injection 5 Units  0.06 Units/kg SubCUTAneous TIDAC    metFORMIN (GLUCOPHAGE) tablet 850 mg  850 mg Oral BID WITH MEALS    [START ON 2/17/2022] thiamine HCL (B-1) tablet 200 mg  200 mg Oral DAILY    [START ON 2/17/2022] aspirin chewable tablet 81 mg  81 mg Oral DAILY WITH BREAKFAST    [START ON 2/17/2022] senna (SENOKOT) tablet 8.6 mg  1 Tablet Oral DAILY    atorvastatin (LIPITOR) tablet 20 mg  20 mg Oral DAILY    buPROPion SR (WELLBUTRIN SR) tablet 150 mg  150 mg Oral DAILY    gabapentin (NEURONTIN) capsule 400 mg  400 mg Oral TID    insulin glargine (LANTUS) injection 45 Units  45 Units SubCUTAneous ACB    therapeutic multivitamin (THERAGRAN) tablet 1 Tablet  1 Tablet Oral DAILY    acetaminophen (TYLENOL) tablet 650 mg  650 mg Oral TID    oxyCODONE IR (ROXICODONE) tablet 10 mg  10 mg Oral Q4H PRN    methocarbamoL (ROBAXIN) tablet 500 mg  500 mg Oral TID    acetaminophen (TYLENOL) tablet 650 mg  650 mg Oral Q4H PRN    bisacodyL (DULCOLAX) tablet 10 mg  10 mg Oral Q48H PRN    enoxaparin (LOVENOX) injection 40 mg  40 mg SubCUTAneous Q24H    insulin lispro (HUMALOG) injection   SubCUTAneous TIDAC       Functional Assessment:     Occupational Therapy   Prior Level of Function  Pre-Admission Screen  Post-Admission Evaluation   Eating   Independent Eating   Supervision Eating  Functional Level: 5   Grooming   Independent Grooming   Supervision Grooming  Functional Level: 5   Upper Body Dressing   Independent Upper Body Dressing   Supervision Upper Body Dressing  Functional Level: 5 (setup)   Lower Body Dressing   Independent Lower Body Dressing   Supervision Lower Body Dressing  Functional Level: 3   Bladder Management   Independent Bladder Management   Supervision Toileting  Functional Level:  (Pt declined)   Bowel Management   Independent Bowel Management   Supervision      Physical Therapy   Prior Level of Function  Pre-Admission Screen  Post-Admission Evaluation   Ambulation   Independent Ambulation   Supervision Gait  Amount of Assistance: 4 (Minimal assistance)  Distance (ft): 50 Feet (ft)  Assistive Device: Gait belt,Walker, rolling   Bed Mobility   Independent Bed Mobility   Modified Independent Bed/Mat Mobility  Rolling Right : 6 (Modified independent)  Rolling Left : 6 (Modified independent)  Supine to Sit : 6 (Modified independent)  Sit to Supine : 6 (Modified independent)   Supine to Sit   Independent Supine to Sit   Modified Independent Bed/Mat Mobility  Rolling Right : 6 (Modified independent)  Rolling Left : 6 (Modified independent)  Supine to Sit : 6 (Modified independent)  Sit to Supine : 6 (Modified independent)   Sit to Stand   Independent Sit to Stand   Contact Guard Assist Bed/Mat Mobility  Rolling Right : 6 (Modified independent)  Rolling Left : 6 (Modified independent)  Supine to Sit : 6 (Modified independent)  Sit to Supine : 6 (Modified independent)   Bed/Chair Transfers   Independent Bed/Chair Transfers   Minimal Assist Transfers  Transfer Type:  Other  Other: stand step with RW  Transfer Assistance : 4 (Contact guard assistance)  Sit to Stand Assistance: Contact guard assistance  Car Transfers:  (CGA)  Car Type: car transfer    Toilet Transfers   Independent Toilet Transfers   Minimal Assist Toilet Transfers  Toilet Transfer Score: 5 (CGA)     Speech and Language Pathology  Post-Admission Evaluation     Comprehension (Native Language)  Primary Mode of Comprehension: Auditory  Score: 5     Expression (Native Language)  Primary Mode of Expression: Verbal  Score: 5     Social Interaction/Pragmatics  Score: 5     Problem Solving  Score: 5     Memory  Score: 5       Legend:   7 - Independent   6 - Modified Independent   5 - Standby Assistance / Supervision / Set-up   4 - Minimum Assistance / Contact Guard Assistance   3 - Moderate Assistance   2 - Maximum Assistance   1 - Total Assistance / Dependent       Labs on Admission:  Recent Results (from the past 24 hour(s))   GLUCOSE, POC    Collection Time: 02/15/22  4:33 PM   Result Value Ref Range    Glucose (POC) 213 (H) 70 - 110 mg/dL   CBC WITH AUTOMATED DIFF    Collection Time: 02/16/22  6:26 AM   Result Value Ref Range    WBC 3.9 (L) 4.6 - 13.2 K/uL    RBC 3.20 (L) 4.35 - 5.65 M/uL    HGB 8.9 (L) 13.0 - 16.0 g/dL    HCT 28.6 (L) 36.0 - 48.0 %    MCV 89.4 78.0 - 100.0 FL    MCH 27.8 24.0 - 34.0 PG    MCHC 31.1 31.0 - 37.0 g/dL    RDW 12.9 11.6 - 14.5 %    PLATELET 381 925 - 916 K/uL    MPV 9.8 9.2 - 11.8 FL    NRBC 0.0 0  WBC    ABSOLUTE NRBC 0.00 0.00 - 0.01 K/uL    NEUTROPHILS 57 40 - 73 %    LYMPHOCYTES 25 21 - 52 %    MONOCYTES 11 (H) 3 - 10 %    EOSINOPHILS 5 0 - 5 %    BASOPHILS 1 0 - 2 %    IMMATURE GRANULOCYTES 2 (H) 0.0 - 0.5 %    ABS. NEUTROPHILS 2.2 1.8 - 8.0 K/UL    ABS. LYMPHOCYTES 1.0 0.9 - 3.6 K/UL    ABS. MONOCYTES 0.4 0.05 - 1.2 K/UL    ABS. EOSINOPHILS 0.2 0.0 - 0.4 K/UL    ABS. BASOPHILS 0.0 0.0 - 0.1 K/UL    ABS. IMM.  GRANS. 0.1 (H) 0.00 - 0.04 K/UL    DF AUTOMATED     FERRITIN    Collection Time: 02/16/22  6:26 AM   Result Value Ref Range    Ferritin 404 (H) 8 - 388 NG/ML   IRON PROFILE    Collection Time: 02/16/22  6:26 AM   Result Value Ref Range    Iron 41 (L) 50 - 175 ug/dL    TIBC 234 (L) 250 - 450 ug/dL    Iron % saturation 18 (L) 20 - 50 %   MAGNESIUM    Collection Time: 02/16/22  6:26 AM   Result Value Ref Range    Magnesium 2.2 1.6 - 2.6 mg/dL   METABOLIC PANEL, BASIC    Collection Time: 02/16/22  6:26 AM   Result Value Ref Range    Sodium 132 (L) 136 - 145 mmol/L    Potassium 4.6 3.5 - 5.5 mmol/L Chloride 99 (L) 100 - 111 mmol/L    CO2 26 21 - 32 mmol/L    Anion gap 7 3.0 - 18 mmol/L    Glucose 350 (H) 74 - 99 mg/dL    BUN 22 (H) 7.0 - 18 MG/DL    Creatinine 1.17 0.6 - 1.3 MG/DL    BUN/Creatinine ratio 19 12 - 20      GFR est AA >60 >60 ml/min/1.73m2    GFR est non-AA >60 >60 ml/min/1.73m2    Calcium 8.7 8.5 - 10.1 MG/DL   RETICULOCYTE COUNT    Collection Time: 02/16/22  6:26 AM   Result Value Ref Range    Reticulocyte count 2.1 0.5 - 2.5 %   VITAMIN D, 25 HYDROXY    Collection Time: 02/16/22  6:26 AM   Result Value Ref Range    Vitamin D 25-Hydroxy 105.7 (H) 30 - 100 ng/mL   VITAMIN B12 & FOLATE    Collection Time: 02/16/22  6:26 AM   Result Value Ref Range    Vitamin B12 574 211 - 911 pg/mL    Folate 12.2 3.10 - 17.50 ng/mL   GLUCOSE, POC    Collection Time: 02/16/22  7:38 AM   Result Value Ref Range    Glucose (POC) 297 (H) 70 - 110 mg/dL   GLUCOSE, POC    Collection Time: 02/16/22 12:08 PM   Result Value Ref Range    Glucose (POC) 127 (H) 70 - 110 mg/dL       Estimated Glomerular Filtration Rate:  On admission, estimated GFR based on a Creatinine of 1.17 mg/dl:   Using CKD-EPI = 76.4 mL/min/1.73m2   Using MDRD = 81 mL/min/1.73m2      Assessment:     Primary Rehabilitation Diagnosis  1. Impaired Mobility and ADLs  2. S/P Right below-the-knee amputation (2/8/2022 - Dr. Maynard Oppenheim)  3.  History of critical ischemia of the right lower extremity    Comorbidities  Patient Active Problem List   Diagnosis Code    Hyperlipidemia E78.5    Tobacco use disorder F17.200    Mediastinal adenopathy R59.0    History of vitamin D deficiency Z86.39    Insomnia G47.00    Diabetic nephropathy associated with type 2 diabetes mellitus ([facility] E11.21    Otalgia H92.09    Mild nonproliferative diabetic retinopathy of left eye without macular edema associated with type 2 diabetes mellitus ([facility] V92.1247    Hypertensive retinopathy H35.039    Nuclear sclerosis of both eyes H25.13    [facility] lower [facility] ([facility] I70.209    Impaired mobility and ADLs Z74.09, Z78.9    Status post below knee amputation of right lower extremity (HCC) Z89.511    Chronic alcohol use Z72.89    History of coronary angioplasty with insertion of stent Z95.5    Coronary artery disease involving native coronary artery of native heart I25.10    Constipation K59.00    Poorly controlled type 2 diabetes mellitus ([facility]) E11.65    Long term current use of insulin ([facility]) Z79.4    Migraine headache G43.909    Diabetic neuropathy associated with type 2 diabetes mellitus ([facility]) E11.40    Gastroesophageal reflux disease K21.9    History of essential hypertension Z86.79    History of lacunar cerebrovascular accident Z80.78    History of acute inferior wall myocardial infarction I25.2    Critical ischemia of lower extremity ([facility]) I70.229    Acute postoperative anemia due to expected blood loss D62    Depression F32. A    High vitamin D level E67.3       Willingness to participate in the program: Good      Rehabilitation Potential: Good      Plan:     1. Medical Issues being followed closely:    [x]  Fall and safety precautions     [x]  Wound Care     [x]  Bowel and Bladder Function     [x]  Fluid Electrolyte and Nutrition Balance     [x]  Pain Control      2. Issues that 24 hour rehabilitation nursing is following:    [x]  Fall and safety precautions     [x]  Wound Care     [x]  Bowel and Bladder Function     [x]  Fluid Electrolyte and Nutrition Balance     [x]  Pain Control      [x]  Assistance with and education on in-room safety with transfers to and from the bed, wheelchair, toilet and shower. 3. Acute rehabilitation plan of care:    [x]  Patient to be evaluated and treated by:           [x]  Physical Therapy           [x]  Occupational Therapy           []  Speech Therapy     []  Hold Rehab until further notice     5. Medications:    [x]  MAR Reviewed     [x]  Continue Present Medications     6.  Chemical DVT Prophylaxis:      [x] Enoxaparin     []  Unfractionated Heparin     []  Warfarin     []  NOAC     []  Aspirin     []  None     7. Mechanical DVT Prophylaxis:      []  FLEX Stockings     []  Sequential Compression Device     [x]  None     8. GI Prophylaxis:      []  PPI     []  H2 Blocker     [x]  None / Not indicated     9. Code status:    [x]  Full code     []  Partial code     []  Do not intubate     []  Do not resuscitate     10. Diet:  Specifications  4 carb choices (60 gm/meal), Low fat/Low cholesterol/High fiber/SEEMA   Solids (consistency)  Regular   Liquids (consistency)  Thin   Fluid Restriction  None     11. COVID-19:  Laboratory testing COVID-19 rapid test ([facility], [facility]) (2/4/2022): Not detected  COVID-19 rapid test ([facility] NOW, [facility]) (2/15/2022): Not detected   Precautions None   Vaccine to be given this admission No      12. Rehabilitation program and expectations from patient, as well as medical issues discussed with the patient. MEDICAL PLAN:  > S/P Right below-the-knee amputation (2/8/2022 - Dr. Araceli Pineda); History of critical ischemia of the right lower extremity    > Staples to be removed on 3/8/2022    > Acute postoperative blood loss anemia   > Hgb/Hct (2/16/2022, on admission to the ARU) = 8.9/28.6   > Anemia work-up (2/16/2022) showed serum iron 14, TIBC 234, iron % saturation 18, ferritin 404, reticulocyte count 2.1   > Iron polysaccharides 150 mg PO BID    > Constipation   > Senna 1 tab PO once daily    > Coronary artery disease; History of inferior wall myocardial infarction; History of coronary angioplasty with stent placement   > Not on any ACE-I or beta-blockers   > Aspirin 81 mg PO daily with breakfast   > Atorvastatin 20 mg PO once daily    > Depression   > Discontinue Trazodone 50 mg PO q HS (re: patient says he has not been taking this prior to admission)   > Bupropion  mg PO once daily    > High vitamin D level;  History of vitamin D deficiency   > Prior to admission to [facility], patient was on Ergocalciferol 50,000 units PO q 7 days   > Vitamin D 25-Hydroxy (2/16/2022) = 105.7   > Discontinue Ergocalciferol 50,000 units PO q 7 days     > Hyperlipidemia   > Atorvastatin 20 mg PO once daily    > Insomnia   > Discontinue Trazodone 50 mg PO q HS (re: patient says he has not been taking this prior to admission)    > Type 2 diabetes mellitus, poorly controlled, with diabetic nephropathy, diabetic neuropathy, diabetic retinopathy, with long-term current use of insulin   > HbA1c (2/8/2022) = 13.8   > Vitamin B12 (2/16/2022) = 574   > Increase Metformin from 500 mg to 850 mg PO BID with meals   > Insulin glargine 45 units SC daily before breakfast   > Increase Insulin lispro from 4 units to 5 units SC TID AC   > Insulin lispro sliding scale SC TID AC only    > Analgesia   > Acetaminophen 650 mg PO TID (8AM, 12PM, 4PM)   > Acetaminophen 650 mg PO q 4 hr PRN for pain level 4/10 or lesser (from 8PM to 4AM only)   > Increase Gabapentin from 300 mg to 400 mg PO TID (8AM, 2PM, 8PM)   > Methocarbamol 500 mg PO TID (8AM, 2PM, 8PM)   > Oxycodone 10 mg PO q 4 hr PRN for pain level 5/10 or greater       PRECAUTIONS:   1. Safety/fall precautions. 2. Deep venous thrombosis precautions. POTENTIAL BARRIERS TO DISCHARGE:   1. Risk for falls. 2. Family limited in ability to provide constant care. 3. Limited community resources. Personal Protective Equipment (N95 face mask and eye goggles) was used while interacting with the patient. Patient was using a surgical mask. Total clinical care time is 75 minutes, including review of chart including all labs, radiology, past medical history, and discussion with patient. Greater than 50% of my time was spent in coordination of care and counseling.       Signed:    Makayla Murillo MD    February 16, 2022

## 2022-02-16 NOTE — PROGRESS NOTES
[x] Psychology  [] Social Work [] Recreational Therapy    INTERVENTION  UNITS/TIME OF SERVICE   Assessment  February 16, 2022   Supportive Counseling    Orientation    Discharge Planning    Resource Linkage              Progress/Current Status    Patient seen for Psychological Evaluation as requested on admission to ARU by Dr. Chevy Escobar. Patient found sitting upright in wheelchair in bedroom and responsive to me on contact, not appearing to be in any distress. Patient reported that he had been working up until recently and that need for surgery was unexpected. He insists that he is motivated to improve and is hopeful for prosthetic training. He will require further education to better understand parameters of recovery and identification of realistic goals. It is suspected that he may not have been compliant with all aspects of healthcare recommendation, especially given his diagnosis for Diabetes and the many complications occurring for him, related to same. Reduction of risk for further complication will need to be reinforced. At this time, he is denying feeling anxious nor depressed and states; \"I have God on my side; I'll take it day to day. \"  But, he is Rx Wellbutrin for mood stability and Trazodone for sleep management. Patient will be monitored for emotional and/or behavioral difficulties that could emerge and will be encouraged to persevere in his therapy efforts.      Cortney Camarena, PHD 2/16/2022 11:15 AM

## 2022-02-16 NOTE — ROUTINE PROCESS
[facility]    Bedside and Verbal shift change report given to Marcia RN (oncoming nurse) by Avril Mccray RN (offgoing nurse). Report included the following information SBAR, Kardex, MAR and Recent Results. Situation:   Code Status: Full Code   Hospital Day: 1   Problem List:   Hospital Problems  Date Reviewed: 1/14/2022          Codes Class Noted POA    Poorly controlled type 2 diabetes mellitus ([facility] [address] ICD-10-CM: E11.65  ICD-9-CM: 250.00  Unknown Yes    Overview Signed 2/16/2022 12:50 AM by Cade Paige MD     HbA1c (2/8/2022) = 13.8             Long term current use of insulin (HCC) ICD-10-CM: Z79.4  ICD-9-CM: V58.67  Unknown Yes        Impaired mobility and ADLs ICD-10-CM: Z74.09, Z78.9  ICD-9-CM: V49.89  2/8/2022 Yes        * (Principal) Status post below knee amputation of right lower extremity ([facility] [address] ICD-10-CM: Z89.511  ICD-9-CM: V49.75  2/8/2022 Yes    Overview Signed 2/16/2022 12:47 AM by Cade Paige MD     S/P Right below-the-knee amputation (2/8/2022 - Dr. Kailey Villa)             Critical ischemia of lower extremity [facility]) ICD-10-CM: T93.057  ICD-9-CM: 459.9  2/8/2022 No        Acute postoperative anemia due to expected blood loss ICD-10-CM: D62  ICD-9-CM: 285.1  2/8/2022 Yes              Background:   Past Medical History:   Past Medical History:   Diagnosis Date    Acute postoperative anemia due to expected blood loss 2/8/2022    Arterial occlusion, lower extremity ([facility] [address] 11/27/2021    Cardiac cath 11/09/2011    RCA patent. LM patent. LAD patent. mD1 55%. CX patent. Prior stent patent. Edison 85% (2.5 x 15-mm Xience stent, resid 0%). LVEDP 12. EF 40-45%. High lateral hypk (RI distribution).       Chronic alcohol use     Fri-Sun one fifth; Mon-Thur: Pint of liquor    Constipation     Coronary artery disease involving native coronary artery of native heart     [facility]    Depression     Diabetic nephropathy associated with type 2 diabetes mellitus ([facility] 4/4/2019    [facility] with [facility]     Gastroesophageal reflux disease     History of acute inferior wall myocardial infarction 2009    History of coronary angioplasty with insertion of stent 07/29/2009    2.5 x 13 Cypher stent to CX.       History of essential hypertension     History of lacunar cerebrovascular accident     Hyperlipidemia     Hypertensive retinopathy 12/18/2020    Insomnia 7/2/2018    Long term current use of insulin (HCC)     Mediastinal adenopathy 06/25/2015    Migraine headache     Mild nonproliferative diabetic retinopathy of left eye without macular edema associated with type 2 diabetes mellitus ([facility] 12/18/2020    Nuclear sclerosis of both eyes 12/18/2020    Otalgia 05/08/2019    Poorly controlled type 2 diabetes mellitus (HCC)     HbA1c (2/8/2022) = 13.8    Tobacco use disorder     contemplating stopping    Vitamin D deficiency         Assessment:   Changes in Assessment throughout shift: No change to previous assessment     Patient has a central line: no Reasons if yes:  n/a  Insertion date: n/a Last dressing date: n/a   Patient has Gallardo Cath: no Reasons if yes:  n/a   Insertion date: n/a      Last Vitals:     Vitals:    02/15/22 1834 02/15/22 2030   BP: 116/73 120/75   Pulse: 81 79   Resp: 17 18   Temp: 97.8 °F (36.6 °C) 98.2 °F (36.8 °C)   SpO2: 98% 97%   Weight: 78.2 kg (172 lb 8 oz)    Height: 5' 9\" (1.753 m)         PAIN    Pain Assessment    Pain Intensity 1: 0 (02/16/22 0400) Pain Intensity 1: 2 (12/29/14 1105)    Pain Location 1: Leg Pain Location 1: Abdomen    Pain Intervention(s) 1: Medication (see MAR) Pain Intervention(s) 1: Medication (see MAR)  Patient Stated Pain Goal: 1 Patient Stated Pain Goal: 0  o Intervention effective: yes  o Other actions taken for pain: Medication (see MAR)     Skin Assessment  Skin color    Condition/Temperature    Integrity Skin Integrity: Incision (comment) (s/p incision right BKA)  Turgor    Weekly Pressure Ulcer Documentation  Pressure  Injury Documentation: No Pressure Injury Noted-Pressure Ulcer Prevention Initiated  Wound Prevention & Protection Methods  Orientation of wound Orientation of Wound Prevention: Posterior  Location of Prevention Location of Wound Prevention: Sacrum/Coccyx  Dressing Present Dressing Present : No  Dressing Status    Wound Offloading Wound Offloading (Prevention Methods): Bed, pressure reduction mattress     INTAKE/OUPUT  Date 02/15/22 0700 - 02/16/22 0659 02/16/22 0700 - 02/17/22 0659   Shift 5014-9164 8874-1972 24 Hour Total 1702-6762 2387-6366 24 Hour Total   INTAKE   P.O.  300 300        P. O.  300 300      Shift Total(mL/kg)  300(3.8) 300(3.8)      OUTPUT   Urine(mL/kg/hr)  1400 1400        Urine Voided  1400 1400        Urine Occurrence(s)  2 x 2 x      Emesis/NG output           Emesis Occurrence(s)  0 x 0 x      Stool           Stool Occurrence(s)  0 x 0 x      Shift Total(mL/kg)  1400(17.9) 1400(17.9)      NET  -1100 -1100      Weight (kg) 78.2 78.2 78.2 78.2 78.2 78.2       Recommendations:  1. Patient needs and requests: pain management; reposition; toileting; bowel regimen    2. Pending tests/procedures: routine labs     3. Functional Level/Equipment: Partial (one person) / Bed (comment)    Fall Precautions:   Fall risk precautions were reinforced with the patient; he was instructed to call for help prior to getting up. The following fall risk precautions were continued: bed/ chair alarms, door signage, yellow bracelet and socks as well as update of the [employer] Aren tool in the patient's room. Loly Score: 4    HEALS Safety Check    A safety check occurred in the patient's room between off going nurse and oncoming nurse listed above.     The safety check included the below items  Area Items   H  High Alert Medications - Verify all high alert medication drips (heparin, PCA, etc.)   E  Equipment - Suction is set up for ALL patients (with eliel)  - Red plugs utilized for all equipment (IV pumps, etc.)  - WOWs wiped down at end of shift.  - Room stocked with oxygen, suction, and other unit-specific supplies   A  Alarms - Bed alarm is set for fall risk patients  - Ensure chair alarm is in place and activated if patient is up in a chair   L  Lines - Check IV for any infiltration  - Gallardo bag is empty if patient has a Gallardo   - Tubing and IV bags are labeled   S  Safety   - Room is clean, patient is clean, and equipment is clean. - Hallways are clear from equipment besides carts. - Fall bracelet on for fall risk patients  - Ensure room is clear and free of clutter  - Suction is set up for ALL patients (with eliel)  - Hallways are clear from equipment besides carts.    - Isolation precautions followed, supplies available outside room, sign posted     Cecelia Angulo RN

## 2022-02-16 NOTE — REHAB NOTE
Pt seems not compliant with the prescribed diet. Pt has a basket of fruits at bedside. Educate pt about diabetic diet and limit sugar intake.  Pt verbalized understanding

## 2022-02-16 NOTE — PROGRESS NOTES
Problem: Self Care Deficits Care Plan (Adult)  Goal: *Therapy Goal (Edit Goal, Insert Text)  Description: Occupational Therapy Goals   Long Term Goals  Initiated 22 and to be accomplished within 2 week(s)  1. Pt will perform self-feeding independently. 2. Pt will perform grooming independently. 3. Pt will perform UB bathing with Joseph. 4. Pt will perform LB bathing with Joseph. 5. Pt will perform tub/shower transfer with Joseph. 6. Pt will perform UB dressing with Joseph. 7. Pt will perform LB dressing with Joseph. 8. Pt will perform toileting task with Joseph. 9. Pt will perform toilet transfer with Joseph. Short Term Goals   Initiated 22 and to be accomplished within 7 day(s), reassess 22  1. Pt will perform self-feeding with Joseph. 2. Pt will perform grooming with Joseph. 3. Pt will perform UB bathing with supervision. 4. Pt will perform LB bathing with supervision. 5. Pt will perform tub/shower transfer with SBA. 6. Pt will perform UB dressing with Joseph. 7. Pt will perform LB dressing with Diana. 8. Pt will perform toileting task with CGA. 9. Pt will perform toilet transfer with SBA. Outcome: Progressing Towards Goal  Goal: Interventions  Outcome: Progressing Towards Goal   OCCUPATIONAL THERAPY EVALUATION    Patient: Jayy Schafer 59 y.o. Date: 2022  Primary Diagnosis: Status post below knee amputation of right lower extremity ([facility] [Z89.511]    Patient identified with name and : yes    Past Medical History:   Past Medical History:   Diagnosis Date    Acute postoperative anemia due to expected blood loss 2022    Arterial occlusion, lower extremity ([facility] 2021    Cardiac cath 2011    RCA patent. LM patent. LAD patent. mD1 55%. CX patent. Prior stent patent. Edison 85% (2.5 x 15-mm Xience stent, resid 0%). LVEDP 12. EF 40-45%. High lateral hypk (RI distribution).       Chronic alcohol use     Fri-Sun one fifth; Mon-Thur: Pint of liquor    Constipation  Coronary artery disease involving native coronary artery of native heart     Critical ischemia of lower extremity ([facility] 2/8/2022    Depression     Diabetic nephropathy associated with type 2 diabetes mellitus ([facility] 4/4/2019    Diabetic neuropathy associated with type 2 diabetes mellitus ([facility]     Gastroesophageal reflux disease     High vitamin D level 2/16/2022    Vitamin D 25-Hydroxy (2/16/2022) = 105.7    History of acute inferior wall myocardial infarction 2009    History of coronary angioplasty with insertion of stent 07/29/2009    2.5 x 13 Cypher stent to CX. 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 without macular edema associated with type 2 diabetes mellitus ([facility] 12/18/2020    Nuclear sclerosis of both eyes 12/18/2020    Otalgia 05/08/2019    Poorly controlled type 2 diabetes mellitus (HCC)     HbA1c (2/8/2022) = 13.8    Tobacco use disorder     contemplating stopping     Precautions: falls    Time In: 0802  Time Out[de-identified] 9568    Time In: 1330  Time Out[de-identified] 1401    Pain:  Pt reports 8-9/10 pain or discomfort prior to treatment. (R leg)  Pt reports -/10 pain or discomfort post treatment. RN notified, pain medications administered per nursing, pt agreeable to engage in OT as tolerated     SUBJECTIVE:   Patient stated I live with my girlfriend.     OBJECTIVE DATA SUMMARY:       [x]  Right hand dominant   []  Left hand dominant    Therapy Diagnosis:   Difficulty with ADLs  [x]     Difficulty with functional transfers  [x]     Difficulty with ambulation  [x]     Difficulty with IADLs  [x]       Problem List:    Decreased strength B UE  []     Decreased strength trunk/core  []     Decreased AROM   []     Decreased endurance  [x]     Decreased balance sitting  []     Decreased balance standing  [x]     Pain   [x]     Decreased PROM  []       Functional Limitations:   Decreased independence with ADL  [x]     Decreased independence with functional transfers  [x]     Decreased independence with ambulation  [x]     Decreased independence with IADL  [x]       Previous Functional Level: Pre-Morbid Level of Function: Independent with all ADL/ADL's    Home Environment: Home Situation  Home Environment: Private residence  # Steps to Enter: 3  Rails to Enter: Yes  Hand Rails : Bilateral  One/Two Story Residence: One story  Living Alone: No  Support Systems: Child(kenzie),Spouse/Significant Other  Patient Expects to be Discharged to[de-identified] Home  Current DME Used/Available at Home: None  Tub or Shower Type: Shower    Barriers to Learning/Limitations: yes;  cognitive  Compensate with: visual, verbal, tactile, kinesthetic cues/model    Outcome Measures:      MMT Initial Assessment   Right Upper Extremity  Left Upper Extremity    UE AROM [facility] WF   Shoulder flexion 3+/5 3+/5   Shoulder extension 3+/5 3+/5   Shoulder ABDuction     Shoulder ADDUction     Elbow Flexion 4-/5 4-/5   Elbow Extension 4-/5 4-/5   Wrist Extension/Flexion          0/5 No palpable muscle contraction  1/5 Palpable muscle contraction, no joint movement  2-/5 Less than full range of motion in gravity eliminated position  2/5 Able to complete full range of motion in gravity eliminated position  2+/5 Able to initiate movement against gravity  3-/5 More than half but not full range of motion against gravity  3/5 Able to complete full range of motion against gravity  3+/5 Completes full range of motion against gravity with minimal resistance  4-/5 Completes full range of motion against gravity with minimal resistance  4/5 Completes full range of motion against gravity with moderate resistance  5/5 Completes full range of motion against gravity with maximum resistance    Sensation: Impaired  Coordination: Intact    FIM SCORES Initial Assessment   Bladder - level of assist     Bladder - accident frequency score     Bowel - level of assist     Bowel - accident frequency score     Please see IRC Interdisciplinary Eval: Coordination/Balance Section for details regarding FIM score description.     COGNITION/PERCEPTION Initial Assessment   Reading Status Literate   Writing Status WFL   Arousal/Alertness Generalized responses   Orientation Level Oriented X4   Visual Fields WFL   Praxis WFL   Body Scheme       COMPREHENSION MODE Initial Assessment   Primary Mode of Comprehension Auditory   Hearing Aide None   Corrective Lenses     Score 5     EXPRESSION Initial Assessment   Primary Mode of Expression Verbal   Score 5   Comments Aprropriate     SOCIAL INTERACTION/PRAGMATICS Initial Assessment   Score 5   Comments       PROBLEM SOLVING Initial Assessment   Score 5   Comments Requires VC's for problem solving and safety     MEMORY Initial Assessment   Score 5   Comments Appears intact     EATING Initial Assessment   Functional Level 5   Comments       GROOMING Initial Assessment   Functional Level 5    Oral Hygiene FIM:5   Tasks completed by patient Washed face,Washed hands   Comments       BATHING Initial Assessment   Functional Level 4   Body parts patient bathed Abdomen,Arm, left,Arm, right,Buttocks,Chest,Lower leg and foot, right,Loly area,Thigh, left,Thigh, right   Comments Pt performed UB bathing with Malik to wash back and LB showering using compensatory strategies to wash buttocks in seated position and Malik to wash LLE     TUB/SHOWER TRANSFER INDEPENDENCE Initial Assessment   Score 5 (CGA with grab bar)   Comments       UPPER BODY DRESSING/UNDRESSING Initial Assessment   Functional Level 5 (setup)   Items applied/Steps completed Pullover (4 steps)   Comments Pt performed UB dressing with setup       LOWER BODY DRESSING/UNDRESSING Initial Assessment   Functional Level 3    Sock and/or Shoe Management FIM:3   Items applied/Steps completed Elastic waist pants (3 steps),Sock, left (1 step),Underpants (3 steps)   Comments Pt performed LB dressing with modA to don LLE sock and pull pants up over R hip and standing at grab abr for increased stability     TOILETING Initial Assessment   Functional Level  (Pt declined)   Comments       TOILET TRANSFER INDEPENDENCE Initial Assessment   Transfer score 5 (CGA)   Comments CGA with FWW     INSTRUMENTAL ADL Initial Assessment (PLOF)   Meal preparation Independent   Homemaking Independent   Medicine Management Independent   Financial Management Independent        ASSESSMENT :  Based on the objective data described above, the patient presents with impaired standing balance, impaired cognition, impaired memory, impaired BUE strength, impaired safety, decreased activity tolerance. Pt required VC's and simplified instruction for safety. Pt would benefit from skilled occupational therapy in order to improve independent functional mobility/ADLs,/IADLs within the home. Interventions may include range of motion (AROM, PROM B UE), motor function (B UE/ strengthening/coordination), activity tolerance (vitals, oxygen saturation levels), balance training, ADL/IADL training and functional transfer training. Please see IRC; Interdisciplinary Eval, Care Plan, and Patient Education for further information regarding occupational therapy examination and plan of care.       PLAN :  Recommendations and Planned Interventions:  [x]               Self Care Training                   [x]        Therapeutic Activities  [x]               Functional Mobility Training    [x]        Cognitive Retraining  [x]               Therapeutic Exercises            [x]        Endurance Activities  [x]               Balance Training                     []        Neuromuscular Re-Education  [x]               Visual/Perceptual Training      [x]   Home Safety Training  [x]               Patient Education                    [x]        Family Training/Education  []               Other (comment):    Frequency/Duration: Patient will be followed by occupational therapy 1-2 times per day/4-7 days per week to address goals. Discharge Recommendations: Home Health  Further Equipment Recommendations for Discharge: bedside commode and transfer bench TBD on progress     Please refer to the flow sheet for vital signs taken during this treatment. After treatment:   [] Patient left in no apparent distress sitting up in chair  [x] Patient left in no apparent distress in bed  [x] Call bell left within reach  [] Nursing notified  [] Caregiver present  [] Bed alarm activated    COMMUNICATION/EDUCATION:   [x] Home safety education was provided and the patient/caregiver indicated understanding. [] Patient/family have participated as able in goal setting and plan of care. [x] Patient/family agree to work toward stated goals and plan of care. [] Patient understands intent and goals of therapy, but is neutral about his/her participation. [] Patient is unable to participate in goal setting and plan of care. Order received from MD for occupational therapy services and chart reviewed. Pt to be seen 5 times per week for 3 hours of total therapy per day for 2 weeks.   Thank you for the referral.    Thank you for this referral.  Ellen Mcgarry, OT

## 2022-02-16 NOTE — INTERDISCIPLINARY ROUNDS
[facility] PHYSICAL REHABILITATION  [address] [place] [address], [facility], [address]    INPATIENT REHABILITATION  PRE-TEAM CONFERENCE SUMMARY     Date of Conference: 2/17/2022    Patient Information:        Name: Jhonny Trinidad Age / Sex: 59 y.o. / male   CSN: [id number] MRN: [id number]   6 [facility] Date: 2/15/2022 Length of Stay: 1 days     Primary Rehabilitation Diagnosis  1. Impaired Mobility and ADLs  2. S/P Right below-the-knee amputation (2/8/2022 - Dr. Ladonna Jacobs)  3.  History of critical ischemia of the right lower extremity    Comorbidities  Patient Active Problem List   Diagnosis Code    Hyperlipidemia E78.5    Tobacco use disorder F17.200    Mediastinal adenopathy R59.0    History of vitamin D deficiency Z86.39    Insomnia G47.00    Diabetic nephropathy associated with type 2 diabetes mellitus ([address] E11.21    Otalgia H92.09    Mild nonproliferative diabetic retinopathy of left eye without macular edema associated with type 2 diabetes mellitus ([address] U87.8772    Hypertensive retinopathy H35.039    Nuclear sclerosis of both eyes H25.13    Arterial occlusion, lower extremity (HCC) I70.209    Impaired mobility and ADLs Z74.09, Z78.9    Status post below knee amputation of right lower extremity (HCC) Z89.511    Chronic alcohol use Z72.89    History of coronary angioplasty with insertion of stent Z95.5    Coronary artery disease involving native coronary artery of native heart I25.10    Constipation K59.00    Poorly controlled type 2 diabetes mellitus (HCC) E11.65    Long term current use of insulin (HCC) Z79.4    Migraine headache G43.909    Diabetic neuropathy associated with type 2 diabetes mellitus (HCC) E11.40    Gastroesophageal reflux disease K21.9    History of essential hypertension Z86.79    History of lacunar cerebrovascular accident Z80.78    History of acute inferior wall myocardial infarction I25.2    Critical ischemia of lower extremity (HCC) I70.229    Acute postoperative anemia due to expected blood loss D62    Depression F32. A    High vitamin D level E67.3          Therapy:     FIM SCORES Initial Assessment Weekly Progress Assessment 2/16/2022   Eating Functional Level: 5 Feeding/Eating Assistance: 5 (Supervision/setup)   Swallowing     Grooming 55 Grooming Assistance : 5 ([address], Upper: 4 (Minimal assistance)  Position Performed: Seated edge of bed  Comments: Pt performed UB showering with Diana to wash back4  Bathing Assistance, Lower : 4 (Minimal assistance)  Position Performed: Seated edge of bed  Comments: Pt performed LB showering from tub transfer bench with Diana in seated position using compensatory strategies to wash buttocks and Diana to wash lower LLE leg/foot   Upper Body Dressing Functional Level: 5 (setup)  Items Applied/Steps Completed: Pullover (4 steps)  Comments: Pt performed UB dressing with setup5 Dressing Assistance : 5 (Supervision) (s/u)5   Lower Body Dressing Functional Level: 3  Items Applied/Steps Completed: Elastic waist pants (3 steps),Sock, left (1 step),Underpants (3 steps)  Comments: Pt performed LB dressing with modA to don LLE sock and pull pants up over R hip and standing at grab abr for increased stability3 Dressing Assistance : 3 (Moderate assistance)3   Toileting Functional Level:  (Pt declined)4  4   Bladder 1 1   Bowel  0 0   Toilet Transfer [place] Toilet Transfer Score: 5 (CGA)4 (CGA) 4 (Contact guard assistance)4 (CGA)   Tub/Shower Transfer [place] Tub or Shower Type: Shower  Tub/Shower Transfer Score: 5 (CGA with grab bar)4 (CGA) Shower  4 (Contact guard assistance)4 (CGA)   Comprehension Primary Mode of Comprehension: Auditory  Score: 55 Auditory5  5   Expression Primary Mode of Expression: Verbal  Score: 55 Verbal  55   Social Interaction Score: 55 55   Problem Solving Score: 55 55   Memory Score: 55 55     FIM SCORES Initial Assessment Weekly Progress Assessment 2/16/2022 Bed/Chair/Wheelchair Transfers Transfer Type: Other  Other: stand step with RW  Transfer Assistance : 4 (Contact guard assistance)  Sit to Stand Assistance: Contact guard assistance  Car Transfers:  (CGA)  Car Type: car transfer  Transfer Type:  Other  Other: stand step with RW  Transfer Assistance : 4 (Contact guard assistance)  Sit to Stand Assistance: Contact guard assistance  Car Transfers:  (CGA)  Car Type: car transfer    Bed Mobility Rolling Right 6 (Modified independent)   Rolling Left 6 (Modified independent)   Supine to Sit 6 (Modified independent)   Sit to Stand Contact guard assistance   Sit to Supine 6 (Modified independent)    Rolling Right   6 (Modified independent)   Rolling Left   6 (Modified independent)   Supine to Sit   6 (Modified independent)   Sit to Stand   Contact guard assistance   Sit to Supine   6 (Modified independent)      Locomotion (W/C) Able to Propel (ft): 240 feet  Functional Level: 4  Wheelchair Setup Assist Required : 3 (Moderate assistance)  Wheelchair Management: Manages left brake,Manages right brake Function 4  Setup Assistance  3 (Moderate assistance)      Locomotion (W/C distance)   240 feet   Locomotion (Walk) 4 (Minimal assistance) 4 (Minimal assistance)  Gait belt;Walker, rolling   Locomotion (Walk dist.) 50 Feet (ft) 50 Feet (ft)   Steps/Stairs Steps/Stairs Ambulated (#): 3 (4\" steps)  Level of Assist : 3 (Moderate assistance)  Rail Use: Both Steps/Stairs Ambulated (#): 3 (4\" steps)  Level of Assist : 3 (Moderate assistance)  Rail Use: Both         Nursing:     Neuro:   AAA&O x 4           Respiratory:   [x] WNL   [] O2 LPM:   Other:  Peripheral Vascular:   [] TEDS present   [] Edema present ____ Grade   Cardiac:   [x] WNL   [] Other  Genitourinary:   [x] continent   [] incontinent   [] epstein  Abdominal ___2/12____ LBM  GI: _______ Diet ______ Liquids _____ tube feeds  Musculoskeletal: ____ ROM Transfers _____ Assistive Device Used  ____ Level of Assistance  Skin Integumentary:   [x] Intact   [] Not Intact   __________Preventative Measures  Details______________________________________________________________  Pain: [x] Controlled   [] Not Controlled   Pain Meds:   [] Scheduled   [x] PRN        Interdisciplinary Team Goals:     1. Discipline  Physical Therapy    Goal  Encourage pt to perform functional transfers at a supervision level with minimal cuing for safety. Barrier  Impaired safety awareness, Impaired functional strength, Impaired balance    Intervention  Education, Transfer training, Strength and Balance training    Goal written by:   Shyla Evans PT, DPT     2. Discipline  Occupational Therapy    Goal  Pt will perform LB dressing with Diana and AE    Barrier  impaired cognition, impaired safety, impaired problem solving, impaired standing balance, impaired BUE strength    Intervention  ADL training, transfer training, BUE strengthening, AE training    Goal written by:  Devi Valentin MSOTR/L     3. Discipline  Speech Therapy    Goal      Barrier      Intervention      Goal written by:       4. Discipline  Nursing    Goal  pt will achieve timely wound healing and be afebrile    Barrier  diabetes; environmental exposure; uncontrolled blood glucose    Intervention Assess wound for s/s of infxn; monitor VS; maintain aseptic dressing change as ordered    Goal written by:  Francis Peres RN     5. Discipline  Clinical Psychology    Goal  Increase insight about healthcare compliance    Barrier  Uncertain non compliance complicating his stability    Intervention  Patient education and reinforcement of need for compliance    Goal written by:  Syl Macias, PhD         Disposition / Discharge Planning:      Follow-up services:  [x] Physical Therapy             [x] Occupational Therapy       [] Speech Therapy           [] Skilled Nursing      [] Medical Social Worker   [] Aide        [] Outpatient      [] vs   [x] Home Health  [] vs       [] to progress to outpatient       [] with 24-hour supervision       [] with 24-hour assistance   [] Lan TURCIOS recommendations:  w/c, RW   Estimated discharge date:  tbd   Discharge Location:  [x] Home  [] versus    [] [place]    [] [address]   [] Other:           Electronic Signatures:      Signature Date Signed   Physical Therapist    Letitia Hernandez PT, DPT  2/16/2022   Occupational Therapist   Madie Segal MSOTR/L  2/16/22   Speech Therapist         Recreational Therapist    Beau Goldberg, CTRS 2/16/2022   Nursing    Corinna Stephen RN  2/16/2022   Clinical Psychologist    Anita Ventura, PhD  2/16/2022    Physician    Germán Soria MD   2/16/2022        Archana Kinney, MSW  2/16/2022     Opportunity to share with family/caregiver[] YES [] NO    Relationship to patient____________________________________________________      The above information has been reviewed with the patient in a language that they can understand. Opportunity for comments and questions has been provided and a signed attestation has been scanned into the \"media tab\" of the EMR.       Patient Signature: ______________________________________________________    Date Signed: __________________________________________________________

## 2022-02-16 NOTE — PROGRESS NOTES
Problem: Falls - Risk of  Goal: *Absence of Falls  Description: Document Bishop Grimes Fall Risk and appropriate interventions in the flowsheet. Outcome: Progressing Towards Goal  Note: Fall Risk Interventions:  Mobility Interventions: Bed/chair exit alarm         Medication Interventions: Bed/chair exit alarm    Elimination Interventions: Call light in reach    History of Falls Interventions: Bed/chair exit alarm         Problem: Patient Education: Go to Patient Education Activity  Goal: Patient/Family Education  Outcome: Progressing Towards Goal     Problem: Diabetes Self-Management  Goal: *Disease process and treatment process  Description: Define diabetes and identify own type of diabetes; list 3 options for treating diabetes. Outcome: Progressing Towards Goal  Goal: *Incorporating nutritional management into lifestyle  Description: Describe effect of type, amount and timing of food on blood glucose; list 3 methods for planning meals. Outcome: Progressing Towards Goal  Goal: *Incorporating physical activity into lifestyle  Description: State effect of exercise on blood glucose levels. Outcome: Progressing Towards Goal  Goal: *Developing strategies to promote health/change behavior  Description: Define the ABC's of diabetes; identify appropriate screenings, schedule and personal plan for screenings. Outcome: Progressing Towards Goal  Goal: *Using medications safely  Description: State effect of diabetes medications on diabetes; name diabetes medication taking, action and side effects. Outcome: Progressing Towards Goal  Goal: *Monitoring blood glucose, interpreting and using results  Description: Identify recommended blood glucose targets  and personal targets. Outcome: Progressing Towards Goal  Goal: *Prevention, detection, treatment of acute complications  Description: List symptoms of hyper- and hypoglycemia; describe how to treat low blood sugar and actions for lowering  high blood glucose level.   Outcome: Progressing Towards Goal  Goal: *Prevention, detection and treatment of chronic complications  Description: Define the natural course of diabetes and describe the relationship of blood glucose levels to long term complications of diabetes.   Outcome: Progressing Towards Goal  Goal: *Developing strategies to address psychosocial issues  Description: Describe feelings about living with diabetes; identify support needed and support network  Outcome: Progressing Towards Goal  Goal: *Insulin pump training  Outcome: Progressing Towards Goal  Goal: *Sick day guidelines  Outcome: Progressing Towards Goal  Goal: *Patient Specific Goal (EDIT GOAL, INSERT TEXT)  Outcome: Progressing Towards Goal     Problem: Patient Education: Go to Patient Education Activity  Goal: Patient/Family Education  Outcome: Progressing Towards Goal     Problem: Patient Education: Go to Patient Education Activity  Goal: Patient/Family Education  Outcome: Progressing Towards Goal

## 2022-02-16 NOTE — PROGRESS NOTES
Problem: Mobility Impaired (Adult and Pediatric)  Goal: *Acute Goals and Plan of Care (Insert Text)  Description: Physical Therapy Short Term Goals  Initiated 2/16/2022 and to be accomplished within 7 day(s) (2/23/2022)  1. Patient will move from supine to sit and sit to supine , scoot up and down, and roll side to side in bed with independence. 2.  Patient will transfer from bed to chair and chair to bed with supervision/set-up using the least restrictive device. 3.  Patient will perform sit to stand with supervision/set-up. 4.  Patient will ambulate with supervision/set-up for 50 feet with the least restrictive device. 5.  Patient will ascend/descend 3 stairs with 1 handrail(s) with minimal assistance/contact guard assist.    Physical Therapy Long Term Goals  Initiated 2/16/2022 and to be accomplished within 14 day(s) (3/02/2022)  1. Patient will move from supine to sit and sit to supine , scoot up and down, and roll side to side in bed with independence. 2.  Patient will transfer from bed to chair and chair to bed with modified independence using the least restrictive device. 3.  Patient will perform sit to stand with modified independence. 4.  Patient will ambulate with modified independence for 50 feet with the least restrictive device. 5.  Patient will ascend/descend 3 stairs with 1 handrail(s) or bumping up on steps with supervision/set-up. Outcome: Progressing Towards Goal  PHYSICAL THERAPY EVALUATION    Patient: Scott Hunt (89 y.o. male)  Date: 2/16/2022  Diagnosis: Status post below knee amputation of right lower extremity ([facility]) [Z89.511] Status post below knee amputation of right lower extremity ([facility] [address]  Precautions:  Fall Risk Precautions  Chart, physical therapy assessment, plan of care and goals were reviewed.     Time in:1104  Time out:1210    Patient seen for: Balance activities;Gait training;Patient education;Transfer training     Time in:1400  Time out:1430    Patient seen for: Balance activities; Patient education;Transfer training;30 minute group activity to promote peer learning and engagement as well as addressing static standing balance and functional strengthening    Pain:  Pt pain was reported as ongoing right residual limb pain pre-treatment. Pt pain was reported as right residual limb pain post-treatment. Intervention: Pt reports he received medication prior to treatment. Pt educated re: positioning right LE. Patient identified with name and : yes    SUBJECTIVE:     Patient asks, Ayana Masker do you want me to do?   Pt is pleasant and agreeable appearing receptive to education re: POC and purpose of skilled PT intervention. Pt states, \"I've never put it on myself,\" re: residual limb guard and PT educated pt re: donning limb guard for safety with functional mobility. Patient's Goal for Physical Therapy: \"to get back to doing things. \"    OBJECTIVE DATA SUMMARY:     Past Medical History:   Diagnosis Date    Acute postoperative anemia due to expected blood loss 2022    Arterial occlusion, lower extremity ([facility] [address] 2021    Cardiac cath 2011    RCA patent. LM patent. LAD patent. mD1 55%. CX patent. Prior stent patent. Edison 85% (2.5 x 15-mm Xience stent, resid 0%). LVEDP 12. EF 40-45%. High lateral hypk (RI distribution).       Chronic alcohol use     Fri-Sun one fifth; Mon-Thur: Pint of liquor    Constipation     Coronary artery disease involving native coronary artery of native heart     Critical ischemia of lower extremity ([facility] [address] 2022    Depression     Diabetic nephropathy associated with type 2 diabetes mellitus ([facility] [address] 2019    Diabetic neuropathy associated with type 2 diabetes mellitus ([facility] [address]     Gastroesophageal reflux disease     High vitamin D level 2022    Vitamin D 25-Hydroxy (2022) = 105.7    History of acute inferior wall myocardial infarction     History of coronary angioplasty with insertion of stent 2009 2.5 x 13 Cypher stent to CX. 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 without macular edema associated with type 2 diabetes mellitus ([facility] [address] 12/18/2020    Nuclear sclerosis of both eyes 12/18/2020    Otalgia 05/08/2019    Poorly controlled type 2 diabetes mellitus ([facility] [address]     HbA1c (2/8/2022) = 13.8    Tobacco use disorder     contemplating stopping     Past Surgical History:   Procedure Laterality Date    COLONOSCOPY N/A 10/28/2020    COLONOSCOPY w/polypectomy performed by Valdene Shone, MD at [facility] ENDOSCOPY    HX [address] Right 02/08/2022    S/P Right below-the-knee amputation (2/8/2022 - Dr. Teresa Lin)    HX CORONARY STENT PLACEMENT  07/29/2009    HX HEART CATHETERIZATION  8/30/2011    HX HEART CATHETERIZATION  11/09/2011    HX WISDOM TEETH EXTRACTION      x4    VASCULAR SURGERY PROCEDURE UNLIST      Multiple vascular surgeries       Problem List:    Decreased strength B LE  [x]     Decreased strength trunk/core  []     Decreased AROM   []     Decreased PROM  []    Decreased endurance  [x]     Decreased balance sitting  []     Decreased balance standing  [x]     Pain   [x]     Slow ambulation velocity  [x]    Decreased coordination  []    Decreased safety awareness  [x]      Functional Limitations:   Decreased independence with bed mobility  [x]     Decreased independence with functional transfers  [x]     Decreased independence with ambulation  [x]     Decreased independence with stair negotiation  [x]       Previous Functional Level: Per pt he reports having been functionally independent prior to hospitalization.     Home Environment: Home Environment: Private residence  # Steps to Enter: 3  Rails to Enter: Yes  Hand Rails : Bilateral  One/Two Story Residence: One story  Living Alone: No  Support Systems: Child(kenzie),Spouse/Significant Other  Patient Expects to be Discharged to[de-identified] Home  Current DME Used/Available at Home: None  Tub or Shower Type: Shower    Barriers to Learning/Limitations: yes;  emotional and physical  Compensate with: visual, verbal, tactile, kinesthetic cues/model         Outcome Measures: Mobility assessment     MMT Initial Assessment: Not formally assessed on evaluation   Right Lower Extremity Left Lower Extremity   Hip Flexion       Knee Extension       Knee Flexion       Ankle Dorsiflexion       0/5 No palpable muscle contraction  1/5 Palpable muscle contraction, no joint movement  2-/5 Less than full range of motion in gravity eliminated position  2/5 Able to complete full range of motion in gravity eliminated position  2+/5 Able to initiate movement against gravity  3-/5 More than half but not full range of motion against gravity  3/5 Able to complete full range of motion against gravity  3+/5 Completes full range of motion against gravity with minimal resistance  4-/5 Completes full range of motion against gravity with minimal-moderate resistance  4/5 Completes full range of motion against gravity with moderate resistance  4+/5 Completes full range of motion against gravity with moderate-maximum resistance  5/5 Completes full range of motion against gravity with maximum resistance        GROSS ASSESSMENT Initial Assessment 2/16/2022   AROM Generally decreased, functional   Strength Generally decreased, functional   Coordination Within functional limits   Tone Normal   Sensation Impaired   PROM Generally decreased, functional       POSTURE Initial Assessment 2/16/2022   Posture (WDL) Exceptions to [facility]   Posture Assessment Rounded shoulders       BALANCE Initial Assessment 2/16/2022    Sitting - Static: Good (unsupported)  Sitting - Dynamic: Good (unsupported)  Standing - Static: Fair  Standing - Dynamic : Impaired       BED/CHAIR/WHEELCHAIR TRANSFERS Initial Assessment 2/16/2022   Rolling Right 6 (Modified independent) with increased time and verbal cues to avoid pulling on bed rails   Rolling Left 6 (Modified independent) with increased time and verbal cues to avoid pulling on bed rails   Supine to Sit 6 (Modified independent) with increased time and verbal cues to avoid pulling on bed rails   Sit to Stand Contact guard assistance for balance and safety with verbal cuing for safe hand placement. With stand to sit, pt moves quickly and impulsively requiring maximal verbal reminders to back up to w/c or seat prior to attempting to sit down. Sit to Supine 6 (Modified independent) with increased time and verbal cues to avoid pulling on bed rails   Transfer Assist Score 4 (Contact guard assistance)   Transfer Type Other   Comments Pt requires CGA for stand step transfers utilizing RW bed <> w/c and to car transfer  with verbal cues for safety and RW management as pt attempts to lift RW as opposed to rolling walker to advance. Car Transfer  (CGA)   Car Type car transfer        [facility] MOBILITY/MANAGEMENT Initial Assessment 2/16/2022   Able to Propel 240 feet   Assist Level 4 - minimal assistance for straight path negotiation and negotiating obstacles with pt utilizing B UEs to propel w/c.    Curbs/ramps assistance required NT   Wheelchair set up assistance required 3 (Moderate assistance) for residual limb rest and left foot rest   Wheelchair management Manages left brake,Manages right brake       GAIT Initial Assessment 2/16/2022   Gait Description (WDL) Exceptions to WDL   Gait Abnormalities Decreased step clearance       WALKING INDEPENDENCE Initial Assessment 2/16/2022   Assistive device Gait belt,Walker, rolling   Ambulation assistance - level surface 4 (Minimal assistance)   Distance 50 Feet (ft) x 1 trial, 17 Feet x 2 trials   Comments  Pt ambulates with decreased left foot clearance as he fatigues and increased rounded shoulder posture requiring CGA for safety and verbal cues for safe RW management. Ambulation assistance - unlevel surface  NT       STEPS/STAIRS Initial Assessment 2/16/2022   Steps/Stairs ambulated 3 (4\" steps)   Rail Use Both   Assistance Level 3 (Moderate assistance)   Comments  Pt requires moderate lifting assistance for ascending stairs and minimal assistance for balance with descending stairs. Curbs/Ramps  NT     Group Activity:    Pt participated in group activity standing for one trial of 4 minutes and one trial of 4 minutes 39 seconds with B UE support on RW and intermittent single UE reaching/fine motor task requiring CGA for balance and safety as well as moderate verbal cuing to avoid weight bearing with hand on table secondary to safety concerns. ASSESSMENT :  Based on the objective data described above, the patient presents with impaired functional strength and balance s/p right BKA limiting safety and independence with mobility. Patient will benefit from skilled intervention to address the above impairments. Patient's rehabilitation potential is considered to be Good  Factors which may influence rehabilitation potential include:   []         None noted  []         Mental ability/status  [x]         Medical condition  []         Home/family situation and support systems  [x]         Safety awareness  [x]         Pain tolerance/management  []         Other:      PLAN :  Please refer to POC for details re: long term goals    Order received from MD for physical therapy services and chart reviewed. Pt to be seen 5 times per week for 3 hours of total therapy per day for 2 weeks. Thank you for the referral.    Pt would benefit from skilled physical therapy in order to improve independent functional mobility within the home.  Interventions may include range of motion (AROM, PROM B LE/trunk), motor function (B LE/trunk strengthening/coordination), activity tolerance (vitals, oxygen saturation levels), bed mobility training, balance activities, gait training (progressive ambulation program), wheelchair management and functional transfer training. Discharge Recommendations: Home Physical Therapy  Further Equipment Recommendations for Discharge: rolling walker and wheelchair 18 inch       Activity Tolerance:   Good  Please refer to the flowsheet for vital signs taken during this treatment. After treatment:   [] Patient left in no apparent distress in bed  [x] Patient left in no apparent distress sitting up in chair  [] Patient left in no apparent distress in w/c mobilizing under own power  [] Patient left in no apparent distress dining area  [] Patient left in no apparent distress mobilizing under own power  [x] Call bell left within reach  [] Nursing notified  [x] Caregiver present  [] Bed alarm activated   [x] Chair alarm activated. COMMUNICATION/EDUCATION:   [x]         Fall prevention education was provided and the patient/caregiver indicated understanding. [x]         Patient/family have participated as able in goal setting and plan of care. [x]         Patient/family agree to work toward stated goals and plan of care. []         Patient understands intent and goals of therapy, but is neutral about his/her participation. []         Patient is unable to participate in goal setting and plan of care.     Thank you for this referral.  Nitesh Yoo, PT, DPT  2/16/2022

## 2022-02-17 LAB
BASOPHILS # BLD: 0 K/UL (ref 0–0.1)
BASOPHILS NFR BLD: 1 % (ref 0–2)
DIFFERENTIAL METHOD BLD: ABNORMAL
EOSINOPHIL # BLD: 0.2 K/UL (ref 0–0.4)
EOSINOPHIL NFR BLD: 4 % (ref 0–5)
ERYTHROCYTE [DISTWIDTH] IN BLOOD BY AUTOMATED COUNT: 13.1 % (ref 11.6–14.5)
GLUCOSE BLD STRIP.AUTO-MCNC: 116 MG/DL (ref 70–110)
GLUCOSE BLD STRIP.AUTO-MCNC: 218 MG/DL (ref 70–110)
GLUCOSE BLD STRIP.AUTO-MCNC: 255 MG/DL (ref 70–110)
GLUCOSE BLD STRIP.AUTO-MCNC: 84 MG/DL (ref 70–110)
HCT VFR BLD AUTO: 27.8 % (ref 36–48)
HGB BLD-MCNC: 8.7 G/DL (ref 13–16)
IMM GRANULOCYTES # BLD AUTO: 0.1 K/UL (ref 0–0.04)
IMM GRANULOCYTES NFR BLD AUTO: 1 % (ref 0–0.5)
LYMPHOCYTES # BLD: 1.1 K/UL (ref 0.9–3.6)
LYMPHOCYTES NFR BLD: 22 % (ref 21–52)
MCH RBC QN AUTO: 28 PG (ref 24–34)
MCHC RBC AUTO-ENTMCNC: 31.3 G/DL (ref 31–37)
MCV RBC AUTO: 89.4 FL (ref 78–100)
MONOCYTES # BLD: 0.5 K/UL (ref 0.05–1.2)
MONOCYTES NFR BLD: 10 % (ref 3–10)
NEUTS SEG # BLD: 3.1 K/UL (ref 1.8–8)
NEUTS SEG NFR BLD: 62 % (ref 40–73)
NRBC # BLD: 0 K/UL (ref 0–0.01)
NRBC BLD-RTO: 0 PER 100 WBC
PLATELET # BLD AUTO: 304 K/UL (ref 135–420)
PMV BLD AUTO: 9.9 FL (ref 9.2–11.8)
RBC # BLD AUTO: 3.11 M/UL (ref 4.35–5.65)
WBC # BLD AUTO: 5 K/UL (ref 4.6–13.2)

## 2022-02-17 PROCEDURE — 65310000000 HC RM PRIVATE REHAB

## 2022-02-17 PROCEDURE — 97110 THERAPEUTIC EXERCISES: CPT

## 2022-02-17 PROCEDURE — 97150 GROUP THERAPEUTIC PROCEDURES: CPT

## 2022-02-17 PROCEDURE — 74011636637 HC RX REV CODE- 636/637: Performed by: INTERNAL MEDICINE

## 2022-02-17 PROCEDURE — 74011250636 HC RX REV CODE- 250/636: Performed by: INTERNAL MEDICINE

## 2022-02-17 PROCEDURE — 74011250637 HC RX REV CODE- 250/637: Performed by: INTERNAL MEDICINE

## 2022-02-17 PROCEDURE — 2709999900 HC NON-CHARGEABLE SUPPLY

## 2022-02-17 PROCEDURE — 99232 SBSQ HOSP IP/OBS MODERATE 35: CPT | Performed by: INTERNAL MEDICINE

## 2022-02-17 PROCEDURE — 97116 GAIT TRAINING THERAPY: CPT

## 2022-02-17 PROCEDURE — 82962 GLUCOSE BLOOD TEST: CPT

## 2022-02-17 PROCEDURE — 97530 THERAPEUTIC ACTIVITIES: CPT

## 2022-02-17 PROCEDURE — 85025 COMPLETE CBC W/AUTO DIFF WBC: CPT

## 2022-02-17 PROCEDURE — 36415 COLL VENOUS BLD VENIPUNCTURE: CPT

## 2022-02-17 RX ORDER — INSULIN GLARGINE 100 [IU]/ML
50 INJECTION, SOLUTION SUBCUTANEOUS
Status: DISCONTINUED | OUTPATIENT
Start: 2022-02-18 | End: 2022-02-18

## 2022-02-17 RX ORDER — INSULIN GLARGINE 100 [IU]/ML
45 INJECTION, SOLUTION SUBCUTANEOUS DAILY
Status: DISCONTINUED | OUTPATIENT
Start: 2022-02-17 | End: 2022-02-17

## 2022-02-17 RX ORDER — INSULIN LISPRO 100 [IU]/ML
0.05 INJECTION, SOLUTION INTRAVENOUS; SUBCUTANEOUS
Status: DISCONTINUED | OUTPATIENT
Start: 2022-02-17 | End: 2022-02-20

## 2022-02-17 RX ADMIN — METFORMIN HYDROCHLORIDE 850 MG: 850 TABLET ORAL at 10:07

## 2022-02-17 RX ADMIN — ACETAMINOPHEN 650 MG: 325 TABLET ORAL at 10:07

## 2022-02-17 RX ADMIN — OXYCODONE 10 MG: 5 TABLET ORAL at 06:08

## 2022-02-17 RX ADMIN — ATORVASTATIN CALCIUM 20 MG: 20 TABLET, FILM COATED ORAL at 10:07

## 2022-02-17 RX ADMIN — Medication 150 MG: at 10:07

## 2022-02-17 RX ADMIN — GABAPENTIN 400 MG: 400 CAPSULE ORAL at 10:07

## 2022-02-17 RX ADMIN — ACETAMINOPHEN 650 MG: 325 TABLET ORAL at 13:41

## 2022-02-17 RX ADMIN — OXYCODONE 10 MG: 5 TABLET ORAL at 10:07

## 2022-02-17 RX ADMIN — Medication 4 UNITS: at 17:38

## 2022-02-17 RX ADMIN — THERA TABS 1 TABLET: TAB at 10:07

## 2022-02-17 RX ADMIN — OXYCODONE 10 MG: 5 TABLET ORAL at 14:17

## 2022-02-17 RX ADMIN — ENOXAPARIN SODIUM 40 MG: 100 INJECTION SUBCUTANEOUS at 06:08

## 2022-02-17 RX ADMIN — Medication 5 UNITS: at 10:15

## 2022-02-17 RX ADMIN — SENNOSIDES 8.6 MG: 8.6 TABLET, COATED ORAL at 10:07

## 2022-02-17 RX ADMIN — GABAPENTIN 400 MG: 400 CAPSULE ORAL at 22:08

## 2022-02-17 RX ADMIN — GABAPENTIN 400 MG: 400 CAPSULE ORAL at 16:16

## 2022-02-17 RX ADMIN — ASPIRIN 81 MG 81 MG: 81 TABLET ORAL at 10:08

## 2022-02-17 RX ADMIN — METHOCARBAMOL TABLETS 500 MG: 500 TABLET, COATED ORAL at 13:40

## 2022-02-17 RX ADMIN — BUPROPION HYDROCHLORIDE 150 MG: 150 TABLET, EXTENDED RELEASE ORAL at 10:08

## 2022-02-17 RX ADMIN — METHOCARBAMOL TABLETS 500 MG: 500 TABLET, COATED ORAL at 20:48

## 2022-02-17 RX ADMIN — Medication 6 UNITS: at 10:16

## 2022-02-17 RX ADMIN — Medication 200 MG: at 10:07

## 2022-02-17 RX ADMIN — Medication 150 MG: at 17:37

## 2022-02-17 RX ADMIN — Medication 45 UNITS: at 10:16

## 2022-02-17 RX ADMIN — ACETAMINOPHEN 650 MG: 325 TABLET ORAL at 16:17

## 2022-02-17 RX ADMIN — METFORMIN HYDROCHLORIDE 850 MG: 850 TABLET ORAL at 16:16

## 2022-02-17 RX ADMIN — METHOCARBAMOL TABLETS 500 MG: 500 TABLET, COATED ORAL at 08:00

## 2022-02-17 NOTE — PROGRESS NOTES
[facility] PHYSICAL REHABILITATION  [address] [place] [address], [address]     INPATIENT REHABILITATION  DAILY PROGRESS NOTE     Date: 2/17/2022    Name: Adam Valladares Age / Sex: 59 y.o. / male   CSN: [id number] MRN: [id number]   [facility] Date: 2/15/2022 Length of Stay: 2 days     Primary Rehabilitation Diagnosis: Impaired Mobility and ADLs secondary to:  1. S/P Right below-the-knee amputation (2/8/2022 - Dr. Vivienne Leslie)  2. History of critical ischemia of the right lower extremity      Subjective:     Patient seen and examined. Blood pressure controlled. Blood glucose fairly controlled. Team conference was held at bedside this PM.     Patient's Complaint:   No significant medical complaints    Pain Control: stable, mild-to-moderate joint symptoms intermittently, reasonably well controlled by current meds      Objective:     Vital Signs:  Patient Vitals for the past 24 hrs:   BP Temp Pulse Resp SpO2 Height   02/17/22 0852 119/69 98.7 °F (37.1 °C) 76 19 99 %    02/16/22 2100 121/72 97.5 °F (36.4 °C) 87 18 97 %    02/16/22 1615 (!) 147/82 98.3 °F (36.8 °C) 78 19 99 %    02/16/22 1149      5' 9\" (1.753 m)        Physical Examination:  GENERAL SURVEY: Patient is awake, alert, oriented x 3, sitting comfortably on the chair, not in acute respiratory distress. HEENT: pale palpebral conjunctivae, anicteric sclerae, no nasoaural discharge, moist oral mucosa  NECK: supple, no jugular venous distention, no palpable lymph nodes  CHEST/LUNGS: symmetrical chest expansion, good air entry, clear breath sounds  HEART: adynamic precordium, good S1 S2, no S3, regular rhythm, no murmurs  ABDOMEN: flat, bowel sounds appreciated, soft, non-tender  EXTREMITIES: (+) right BKA stump, pale nailbeds, no edema, full and equal pulses, no calf tenderness   NEUROLOGICAL EXAM: The patient is awake, alert and oriented x3, able to answer questions fairly appropriately, able to follow 1 and 2 step commands.   Able to tell time from the wall clock. Cranial nerves II-XII are grossly intact. No gross sensory deficit. Motor strength is 4/5 on BUE and BLE. Incision(s): covered, clean, dry, and intact       Current Medications:  Current Facility-Administered Medications   Medication Dose Route Frequency    insulin glargine (LANTUS) injection 45 Units  45 Units SubCUTAneous DAILY    glucose chewable tablet 16 g  4 Tablet Oral PRN    glucagon (GLUCAGEN) injection 1 mg  1 mg IntraMUSCular PRN    dextrose 10% infusion 0-250 mL  0-250 mL IntraVENous PRN    iron polysaccharides (NIFEREX) capsule 150 mg  1 Capsule Oral BID    insulin lispro (HUMALOG) injection 5 Units  0.06 Units/kg SubCUTAneous TIDAC    metFORMIN (GLUCOPHAGE) tablet 850 mg  850 mg Oral BID WITH MEALS    thiamine HCL (B-1) tablet 200 mg  200 mg Oral DAILY    aspirin chewable tablet 81 mg  81 mg Oral DAILY WITH BREAKFAST    senna (SENOKOT) tablet 8.6 mg  1 Tablet Oral DAILY    atorvastatin (LIPITOR) tablet 20 mg  20 mg Oral DAILY    buPROPion SR (WELLBUTRIN SR) tablet 150 mg  150 mg Oral DAILY    gabapentin (NEURONTIN) capsule 400 mg  400 mg Oral TID    therapeutic multivitamin (THERAGRAN) tablet 1 Tablet  1 Tablet Oral DAILY    acetaminophen (TYLENOL) tablet 650 mg  650 mg Oral TID    oxyCODONE IR (ROXICODONE) tablet 10 mg  10 mg Oral Q4H PRN    methocarbamoL (ROBAXIN) tablet 500 mg  500 mg Oral TID    acetaminophen (TYLENOL) tablet 650 mg  650 mg Oral Q4H PRN    bisacodyL (DULCOLAX) tablet 10 mg  10 mg Oral Q48H PRN    enoxaparin (LOVENOX) injection 40 mg  40 mg SubCUTAneous Q24H    insulin lispro (HUMALOG) injection   SubCUTAneous TIDAC       Allergies:   Allergies   Allergen Reactions    Niacin Itching       Functional Progress:    PHYSICAL THERAPY    ON ADMISSION MOST RECENT   Wheelchair Mobility/Management  Able to Propel (ft): 240 feet  Functional Level: 4  Wheelchair Setup Assist Required : 3 (Moderate assistance)  Wheelchair Management: Manages left brake,Manages right brake Wheelchair Mobility/Management  Able to Propel (ft): 250 feet  Functional Level: 5  Wheelchair Setup Assist Required : 4 (Minimal assistance)  Wheelchair Management: Manages left brake,Manages right brake,Manages left footrest,Manages right footrest     Gait  Amount of Assistance: 4 (Minimal assistance)  Distance (ft): 50 Feet (ft)  Assistive Device: Gait belt,Walker, rolling Gait  Amount of Assistance: 4 (Minimal assistance)  Distance (ft): 68 Feet (ft)  Assistive Device: Gait belt,Walker, rolling     Balance-Sitting/Standing  Sitting - Static: Good (unsupported)  Sitting - Dynamic: Good (unsupported)  Standing - Static: Fair  Standing - Dynamic : Impaired Balance-Sitting/Standing  Sitting - Static: Good (unsupported)  Sitting - Dynamic: Good (unsupported)  Standing - Static: Fair  Standing - Dynamic : Impaired     Bed/Mat Mobility  Rolling Right : 6 (Modified independent)  Rolling Left : 6 (Modified independent)  Supine to Sit : 6 (Modified independent)  Sit to Supine : 6 (Modified independent) Bed/Mat Mobility  Rolling Right : 6 (Modified independent)  Rolling Left : 6 (Modified independent)  Supine to Sit : 6 (Modified independent)  Sit to Supine : 6 (Modified independent)     Transfers  Transfer Type: Other  Other: stand step with RW  Transfer Assistance : 4 (Contact guard assistance)  Sit to Stand Assistance: Contact guard assistance  Car Transfers:  (CGA)  Car Type: car transfer  Transfers  Transfer Type:  Other  Other: stand step with RW  Transfer Assistance : 4 (Contact guard assistance)  Sit to Stand Assistance: Contact guard assistance  Car Transfers:  (CGA)  Car Type: car transfer      Steps or Stairs  Steps/Stairs Ambulated (#): 3 (4\" steps)  Level of Assist : 3 (Moderate assistance)  Rail Use: Both Steps or Stairs  Steps/Stairs Ambulated (#): 3 (4\" steps)  Level of Assist : 3 (Moderate assistance)  Rail Use: Both         Lab/Data Review:  Recent Results (from the past 24 hour(s))   GLUCOSE, POC    Collection Time: 02/16/22 12:08 PM   Result Value Ref Range    Glucose (POC) 127 (H) 70 - 110 mg/dL   GLUCOSE, POC    Collection Time: 02/16/22  4:30 PM   Result Value Ref Range    Glucose (POC) 118 (H) 70 - 110 mg/dL   GLUCOSE, POC    Collection Time: 02/16/22  9:26 PM   Result Value Ref Range    Glucose (POC) 204 (H) 70 - 110 mg/dL   CBC WITH AUTOMATED DIFF    Collection Time: 02/17/22  5:02 AM   Result Value Ref Range    WBC 5.0 4.6 - 13.2 K/uL    RBC 3.11 (L) 4.35 - 5.65 M/uL    HGB 8.7 (L) 13.0 - 16.0 g/dL    HCT 27.8 (L) 36.0 - 48.0 %    MCV 89.4 78.0 - 100.0 FL    MCH 28.0 24.0 - 34.0 PG    MCHC 31.3 31.0 - 37.0 g/dL    RDW 13.1 11.6 - 14.5 %    PLATELET 262 330 - 982 K/uL    MPV 9.9 9.2 - 11.8 FL    NRBC 0.0 0  WBC    ABSOLUTE NRBC 0.00 0.00 - 0.01 K/uL    NEUTROPHILS 62 40 - 73 %    LYMPHOCYTES 22 21 - 52 %    MONOCYTES 10 3 - 10 %    EOSINOPHILS 4 0 - 5 %    BASOPHILS 1 0 - 2 %    IMMATURE GRANULOCYTES 1 (H) 0.0 - 0.5 %    ABS. NEUTROPHILS 3.1 1.8 - 8.0 K/UL    ABS. LYMPHOCYTES 1.1 0.9 - 3.6 K/UL    ABS. MONOCYTES 0.5 0.05 - 1.2 K/UL    ABS. EOSINOPHILS 0.2 0.0 - 0.4 K/UL    ABS. BASOPHILS 0.0 0.0 - 0.1 K/UL    ABS. IMM. GRANS. 0.1 (H) 0.00 - 0.04 K/UL    DF AUTOMATED         Assessment:     Primary Rehabilitation Diagnosis  1. Impaired Mobility and ADLs  2. S/P Right below-the-knee amputation (2/8/2022 - Dr. Augusto Gotti)  3.  History of critical ischemia of the right lower extremity    Comorbidities  Patient Active Problem List   Diagnosis Code    Hyperlipidemia E78.5    Tobacco use disorder F17.200    Mediastinal adenopathy R59.0    History of vitamin D deficiency Z86.39    Insomnia G47.00    Diabetic nephropathy associated with type 2 diabetes mellitus ([address] E11.21    Otalgia H92.09    Mild nonproliferative diabetic retinopathy of left eye without macular edema associated with type 2 diabetes mellitus ([facility] [address] P02.0373    Hypertensive retinopathy H35.039    Nuclear sclerosis of both eyes H25.13    Arterial occlusion, lower extremity (HCC) I70.209    Impaired mobility and ADLs Z74.09, Z78.9    Status post below knee amputation of right lower extremity ([facility]) Z89.511    Chronic alcohol use Z72.89    History of coronary angioplasty with insertion of stent Z95.5    Coronary artery disease involving native coronary artery of native heart I25.10    Constipation K59.00    Poorly controlled type 2 diabetes mellitus ([facility]) E11.65    Long term current use of insulin ([facility]) Z79.4    Migraine headache G43.909    Diabetic neuropathy associated with type 2 diabetes mellitus ([facility]) E11.40    Gastroesophageal reflux disease K21.9    History of essential hypertension Z86.79    History of lacunar cerebrovascular accident Z80.78    History of acute inferior wall myocardial infarction I25.2    Critical ischemia of lower extremity ([facility]) I70.229    Acute postoperative anemia due to expected blood loss D62    Depression F32. A    High vitamin D level E67.3       Plan:     1. Justification for continued stay: Good progression towards established rehabilitation goals. 2. Medical Issues being followed closely:    [x]  Fall and safety precautions     [x]  Wound Care     [x]  Bowel and Bladder Function     [x]  Fluid Electrolyte and Nutrition Balance     [x]  Pain Control      3. Issues that 24 hour rehabilitation nursing is following:    [x]  Fall and safety precautions     [x]  Wound Care     [x]  Bowel and Bladder Function     [x]  Fluid Electrolyte and Nutrition Balance     [x]  Pain Control      [x]  Assistance with and education on in-room safety with transfers to and from the bed, wheelchair, toilet and shower. 4. Acute rehabilitation plan of care:    [x]  Continue current care and rehab. [x]  Physical Therapy           [x]  Occupational Therapy           []  Speech Therapy     []  Hold Rehab until further notice     5.  Medications:    [x]  MAR Reviewed     [x]  Continue Present Medications     6. Chemical DVT Prophylaxis:      [x]  Enoxaparin     []  Unfractionated Heparin     []  Warfarin     []  NOAC     []  Aspirin     []  None     7. Mechanical DVT Prophylaxis:      []  FLEX Stockings     []  Sequential Compression Device     [x]  None     8. GI Prophylaxis:      []  PPI     []  H2 Blocker     [x]  None / Not indicated     9. Code status:    [x]  Full code     []  Partial code     []  Do not intubate     []  Do not resuscitate     10. Diet:  Specifications  4 carb choices (60 gm/meal), Low fat/Low cholesterol/High fiber/SEEMA   Solids (consistency)  Regular   Liquids (consistency)  Thin   Fluid Restriction  None      11. COVID-19:  Laboratory testing COVID-19 rapid test ([facility] NOW, [facility]) (2/4/2022): Not detected  COVID-19 rapid test [facility] (2/15/2022): Not detected   Precautions None   Vaccine to be given this admission No      12. Orders:   > S/P Right below-the-knee amputation (2/8/2022 - Dr. Pandora Rubinstein); History of critical ischemia of the right lower extremity    > Staples to be removed on 3/8/2022    > Acute postoperative blood loss anemia   > Hgb/Hct (2/16/2022, on admission to the ARU) = 8.9/28.6   > Anemia work-up (2/16/2022) showed serum iron 14, TIBC 234, iron % saturation 18, ferritin 404, reticulocyte count 2.1   > On 2/16/2022, started Iron polysaccharides 150 mg PO BID   > Hgb/Hct (2/17/2022) = 8.7/27.8   > Continue Iron polysaccharides 150 mg PO BID    > Constipation   > Start Senna 1 tab PO once daily    > Coronary artery disease; History of inferior wall myocardial infarction;  History of coronary angioplasty with stent placement   > Not on any ACE-I or beta-blockers   > Continue:    > Aspirin 81 mg PO daily with breakfast    > Atorvastatin 20 mg PO once daily    > Depression   > On 2/16/2022, discontinued Trazodone 50 mg PO q HS (re: patient says he has not been taking this prior to admission)   > Continue Bupropion  mg PO once daily    > High vitamin D level; History of vitamin D deficiency   > Prior to admission to [facility], patient was on Ergocalciferol 50,000 units PO q 7 days   > Vitamin D 25-Hydroxy (2/16/2022) = 105.7   > On 2/16/2022, discontinued Ergocalciferol 50,000 units PO q 7 days     > Hyperlipidemia   > Continue Atorvastatin 20 mg PO once daily    > Insomnia   > On 2/16/2022, discontinued Trazodone 50 mg PO q HS (re: patient says he has not been taking this prior to admission)    > Type 2 diabetes mellitus, poorly controlled, with diabetic nephropathy, diabetic neuropathy, diabetic retinopathy, with long-term current use of insulin   > HbA1c (2/8/2022) = 13.8   > Vitamin B12 (2/16/2022) = 574   > On 2/16/2022:    > Increased Metformin from 500 mg to 850 mg PO BID with meals    > Increased Insulin lispro from 4 units to 5 units SC TID AC   > Continue:    > Metformin 850 mg PO BID with meals    > Increase Insulin glargine from 45 units to 50 units SC daily before breakfast    > Decrease Insulin lispro from 5 units to 4 units SC TID AC    > Insulin lispro sliding scale SC TID AC only    > Analgesia   > On 2/16/2022:    > Started:     > Acetaminophen 650 mg PO TID (8AM, 12PM, 4PM)     > Methocarbamol 500 mg PO TID (8AM, 2PM, 8PM)    > Increased Gabapentin from 300 mg to 400 mg PO TID (8AM, 2PM, 8PM)   > Continue:    > Acetaminophen 650 mg PO TID (8AM, 12PM, 4PM)    > Acetaminophen 650 mg PO q 4 hr PRN for pain level 4/10 or lesser (from 8PM to 4AM only)    > Gabapentin 400 mg PO TID (8AM, 2PM, 8PM)    > Methocarbamol 500 mg PO TID (8AM, 2PM, 8PM)    > Oxycodone 10 mg PO q 4 hr PRN for pain level 5/10 or greater       12. Personal Protective Equipment (N95 face mask and eye goggles) was used while interacting with the patient. Patient was using a surgical mask. 15. Patient's progress in rehabilitation and medical issues discussed with the patient. All questions answered to the best of my ability.  Care plan discussed with patient and nurse. 14. Total clinical care time is 30 minutes, including review of chart including all labs, radiology, past medical history, and discussion with patient. Greater than 50% of my time was spent in coordination of care and counseling.       Signed:    Hardeep Petty MD    February 17, 2022

## 2022-02-17 NOTE — PROGRESS NOTES
Problem: Self Care Deficits Care Plan (Adult)  Goal: *Therapy Goal (Edit Goal, Insert Text)  Description: Occupational Therapy Goals   Long Term Goals  Initiated 22 and to be accomplished within 2 week(s)  1. Pt will perform self-feeding independently. 2. Pt will perform grooming independently. 3. Pt will perform UB bathing with Joseph. 4. Pt will perform LB bathing with Joseph. 5. Pt will perform tub/shower transfer with Joseph. 6. Pt will perform UB dressing with Joseph. 7. Pt will perform LB dressing with Joseph. 8. Pt will perform toileting task with Joseph. 9. Pt will perform toilet transfer with Joseph. Short Term Goals   Initiated 22 and to be accomplished within 7 day(s), reassess 22  1. Pt will perform self-feeding with Joseph. 2. Pt will perform grooming with Joseph. 3. Pt will perform UB bathing with supervision. 4. Pt will perform LB bathing with supervision. 5. Pt will perform tub/shower transfer with SBA. 6. Pt will perform UB dressing with Joseph. 7. Pt will perform LB dressing with Diana. 8. Pt will perform toileting task with CGA. 9. Pt will perform toilet transfer with SBA. Outcome: Progressing Towards Goal  Goal: Interventions  Outcome: Progressing Towards Goal   Occupational Therapy TREATMENT    Patient: Daren Barthel   59 y.o. Patient identified with name and : yes    Date: 2022    First Tx Session  Time In: 8641  Time Out[de-identified] 36    Second Tx Session  Time In: 1330  Time Out[de-identified] 5625    Diagnosis: Status post below knee amputation of right lower extremity ([facility] [address] [Z89.511]   Precautions: Fall  Chart, occupational therapy assessment, plan of care, and goals were reviewed. Pain:  Pt reports 7/10 pain or discomfort prior to treatment. Pt reports -/10 pain or discomfort post treatment. Intervention Provided: Reported to RN, reported pt received pain medications prior      SUBJECTIVE:   Patient stated I forgot how to do puzzles.     OBJECTIVE DATA SUMMARY: THERAPEUTIC ACTIVITY Daily Assessment    Pt engaged in BUE functional reach task in dynamic standing reaching across table top to sort puzzle pieces, manipulate and complete puzzle. Pt demonstrated standing balance of F and tolerance of 9 minutes 14 sec. sec. With activity and modA for problem solving and sorting pieces. Pt completed remainder of task in seated position due to fatigue. Pt engaged in static standing activity that involved grasping small manipulatives with clothespin with max resistance and 1# wrist weights to increase challenge. Pt engaged in group activity which involved playing card game at tabletop in seated position, sorting and dealing cards across table top. Pt required min-mod VC's for problem solving with game and following instructions. TOILETING Daily Assessment     Pt declined. LOWER BODY DRESSING Daily Assessment     Pt required maxA to don RLE clam shell due to confusion and difficulty due to velcro straps being pulled off. MOBILITY/TRANSFERS Daily Assessment     Pt performed functional mobility from pt room to therapy gym with supervision. Pt performed functional transfer with CGA for safety. ASSESSMENT:  Pt demonstrates confusion and memory deficits with functional activity as well as impaired STR. Pt continues to make progress toward goals and is improving standing tolerance and balance with functional transfers. Progression toward goals:  [x]          Improving appropriately and progressing toward goals  []          Improving slowly and progressing toward goals  []          Not making progress toward goals and plan of care will be adjusted     PLAN:  Patient continues to benefit from skilled intervention to address the above impairments. Continue treatment per established plan of care.   Discharge Recommendations:  Home Health with support  Further Equipment Recommendations for Discharge: bedside commode and transfer bench TBD on progress     Activity Tolerance:  good      Estimated LOS:3/2/22    Please refer to the flowsheet for vital signs taken during this treatment. After treatment:   [x]  Patient left in no apparent distress sitting up in chair   []  Patient left in no apparent distress in bed  [x]  Call bell left within reach  [x]  Nursing notified  []  Caregiver present  []  Bed alarm activated    COMMUNICATION/EDUCATION:   [] Home safety education was provided and the patient/caregiver indicated understanding. [] Patient/family have participated as able in goal setting and plan of care. [x] Patient/family agree to work toward stated goals and plan of care. [] Patient understands intent and goals of therapy, but is neutral about his/her participation. [] Patient is unable to participate in goal setting and plan of care.       Nga Solis, OT

## 2022-02-17 NOTE — REHAB NOTE
[facility]    Bedside and Verbal shift change report given to Martha RN (oncoming nurse) by Sony Epps RN (offgoing nurse). Report included the following information SBAR, Kardex, MAR and Recent Results. Situation:   Code Status: Full Code   Hospital Day: 1   Problem List:   Hospital Problems  Date Reviewed: 2/16/2022          Codes Class Noted POA    Poorly controlled type 2 diabetes mellitus ([facility] ICD-10-CM: E11.65  ICD-9-CM: 250.00  Unknown Yes    Overview Signed 2/16/2022 12:50 AM by Laura Irene MD     HbA1c (2/8/2022) = 13.8             Long term current use of insulin (HCC) ICD-10-CM: Z79.4  ICD-9-CM: V58.67  Unknown Yes        High vitamin D level (Chronic) ICD-10-CM: E67.3  ICD-9-CM: 278.4  2/16/2022 Yes    Overview Signed 2/16/2022  9:52 AM by Laura Irene MD     Vitamin D 25-Hydroxy (2/16/2022) = 105.7             Impaired mobility and ADLs ICD-10-CM: Z74.09, Z78.9  ICD-9-CM: V49.89  2/8/2022 Yes        * (Principal) Status post below knee amputation of right lower extremity ([facility] ICD-10-CM: Z89.511  ICD-9-CM: V49.75  2/8/2022 Yes    Overview Signed 2/16/2022 12:47 AM by Laura Irene MD     S/P Right below-the-knee amputation (2/8/2022 - Dr. Maynard Oppenheim)             Critical ischemia of lower extremity [facility]) ICD-10-CM: I36.227  ICD-9-CM: 459.9  2/8/2022 No        Acute postoperative anemia due to expected blood loss ICD-10-CM: D62  ICD-9-CM: 285.1  2/8/2022 Yes              Background:   Past Medical History:   Past Medical History:   Diagnosis Date    Acute postoperative anemia due to expected blood loss 2/8/2022    Arterial occlusion, lower extremity ([facility] 11/27/2021    Cardiac cath 11/09/2011    RCA patent. LM patent. LAD patent. mD1 55%. CX patent. Prior stent patent. Edison 85% (2.5 x 15-mm Xience stent, resid 0%). LVEDP 12. EF 40-45%. High lateral hypk (RI distribution).       Chronic alcohol use     Fri-Sun one fifth; Mon-Thur: Pint of liquor    Constipation     Coronary artery disease involving native coronary artery of native heart     Critical ischemia of lower extremity ([facility] 2/8/2022    Depression     Diabetic nephropathy associated with type 2 diabetes mellitus ([facility] 4/4/2019    Diabetic neuropathy associated with type 2 diabetes mellitus ([facility]     Gastroesophageal reflux disease     High vitamin D level 2/16/2022    Vitamin D 25-Hydroxy (2/16/2022) = 105.7    History of acute inferior wall myocardial infarction 2009    History of coronary angioplasty with insertion of stent 07/29/2009    2.5 x 13 Cypher stent to CX. 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 without macular edema associated with type 2 diabetes mellitus ([facility] 12/18/2020    Nuclear sclerosis of both eyes 12/18/2020    Otalgia 05/08/2019    Poorly controlled type 2 diabetes mellitus (HCC)     HbA1c (2/8/2022) = 13.8    Tobacco use disorder     contemplating stopping        Assessment:   Changes in Assessment throughout shift:       Patient has a central line: no Reasons if yes: Insertion date: Last dressing date:   Patient has Epstein Cath: no Reasons if yes:     Insertion date:  Shift epstein care completed: NO     Last Vitals:     Vitals:    02/15/22 2030 02/16/22 0728 02/16/22 1149 02/16/22 1615   BP: 120/75 127/89  (!) 147/82   Pulse: 79 73  78   Resp: 18 19  19   Temp: 98.2 °F (36.8 °C) 99.1 °F (37.3 °C)  98.3 °F (36.8 °C)   SpO2: 97% 100%  99%   Weight:       Height:   5' 9\" (1.753 m)         PAIN    Pain Assessment    Pain Intensity 1: 8 (02/16/22 1902) Pain Intensity 1: 2 (12/29/14 1105)    Pain Location 1: Leg Pain Location 1: Abdomen    Pain Intervention(s) 1: Medication (see MAR) Pain Intervention(s) 1: Medication (see MAR)  Patient Stated Pain Goal: 0 Patient Stated Pain Goal: 0  o Intervention effective:   o Other actions taken for pain: Medication (see MAR)     Skin Assessment  Skin color    Condition/Temperature    Integrity Skin Integrity: Incision (comment)  Turgor    Weekly Pressure Ulcer Documentation  Pressure  Injury Documentation: No Pressure Injury Noted-Pressure Ulcer Prevention Initiated  Wound Prevention & Protection Methods  Orientation of wound Orientation of Wound Prevention: Posterior  Location of Prevention Location of Wound Prevention: Sacrum/Coccyx  Dressing Present Dressing Present : No  Dressing Status    Wound Offloading Wound Offloading (Prevention Methods): Bed, pressure reduction mattress     INTAKE/OUPUT  Date 02/15/22 1900 - 02/16/22 0659 02/16/22 0700 - 02/17/22 0659   Shift 7670-6819 24 Hour Total 2080-1778 7971-0225 24 Hour Total   INTAKE   P.O. 400 400 720  720     P. O. 400 400 720  720   Shift Total(mL/kg) 400(5.1) 400(5.1) 720(9.2)  720(9.2)   OUTPUT   Urine(mL/kg/hr) 1900 1900        Urine Voided 1900 1900        Urine Occurrence(s) 3 x 3 x 2 x  2 x   Emesis/NG output          Emesis Occurrence(s) 0 x 0 x      Stool          Stool Occurrence(s) 0 x 0 x 1 x  1 x   Shift Total(mL/kg) 1900(24.3) 1900(24.3)      NET -1500 -1500 720  720   Weight (kg) 78.2 78.2 78.2 78.2 78.2       Recommendations:  1. Patient needs and requests: tbd    2. Pending tests/procedures: labs     3. Functional Level/Equipment:   / Bed (comment)    Fall Precautions:   Fall risk precautions were reinforced with the patient; he was instructed to call for help prior to getting up. The following fall risk precautions were continued: bed/ chair alarms, door signage, yellow bracelet and socks as well as update of the [place] tool in the patient's room.    Loly Score:      HEALS Safety Check    A safety check occurred in the patient's room between off going nurse and oncoming nurse listed above. The safety check included the below items  Area Items   H  High Alert Medications - Verify all high alert medication drips (heparin, PCA, etc.)   E  Equipment - Suction is set up for ALL patients (with eliel)  - Red plugs utilized for all equipment (IV pumps, etc.)  - WOWs wiped down at end of shift.  - Room stocked with oxygen, suction, and other unit-specific supplies   A  Alarms - Bed alarm is set for fall risk patients  - Ensure chair alarm is in place and activated if patient is up in a chair   L  Lines - Check IV for any infiltration  - Gallardo bag is empty if patient has a Gallardo   - Tubing and IV bags are labeled   S  Safety   - Room is clean, patient is clean, and equipment is clean. - Hallways are clear from equipment besides carts. - Fall bracelet on for fall risk patients  - Ensure room is clear and free of clutter  - Suction is set up for ALL patients (with eliel)  - Hallways are clear from equipment besides carts.    - Isolation precautions followed, supplies available outside room, sign posted     Almas Traylor RN

## 2022-02-17 NOTE — ROUTINE PROCESS
Patient keep asking for radha crackers and soda overnight. Explained to him about the importance of his diet and healing process of his incision/surgery  \"What I'm going to do, I'm Hungry\", per patient.

## 2022-02-17 NOTE — PROGRESS NOTES
Pt is a 59year old male admitted to ARU for a right BKA. Pt is alert and oriented, alone in the room. Pt was initially confused about his address but confirmed his address as  [address], [address] Pt states that he lives with his \"lady friend\" - Karyn - in a 1 level apartment with 3 steps to enter with a tub shower. Pt acknowledges that the home is a rental and declines to discuss requesting a ramp or rails from the landlord. Pt states that prior to admission he was able to self care and notes that he has no history with DME, home health, outpatient therapy and SNF. Pt states that he does not have a POA and notes that his daughter, Leela Lund ([phone]), is his NOK contact. Pt states that he lives with Fifi Rubin ([phone]), who will be his helper when he goes home. Pt consents to jaymie calling to schedule caregiver education. Pt states that he is unable to recall he name of his PCP but the record reflects Daniela Ceballos NP. Pt was unable to recall his community pharmacy stating it was a grocery store on [address]. Sw asked the pt if he was referring to [facility] [place] [facility] and he twice said no. Pt's record reflects that he does use [facility] on [address]. Pt states that he has received 3 doses of a COVID vaccine but was unable to recall which version he received. Sw offered the choice between [employer] and pt was not able to recall. Pt confirms his insurance as [address] . Jaymie reviewed insurance updates, dc planning, dc date, team conference and caregiver education. Pt indicated understanding and denied questions. Jaymie called pt's friend, Iwona Bob, to schedule caregiver education. She was unable to look her at calendar and stated she would ask for sw when she came to the unit today. Jaymie will follow.

## 2022-02-17 NOTE — INTERDISCIPLINARY ROUNDS
[facility] PHYSICAL REHABILITATION  [address] [place] [address], [facility], [address]    INPATIENT REHABILITATION  TEAM CONFERENCE SUMMARY     Date of Conference: 2/17/2022    Patient Information:        Name: Herson Gómez Age / Sex: 59 y.o. / male   CSN: [id number] MRN: [id number]   [facility] Date: 2/15/2022 Length of Stay: 2 days     Primary Rehabilitation Diagnosis  1. Impaired Mobility and ADLs  2. S/P Right below-the-knee amputation (2/8/2022 - Dr. Sarai Quevedo)  3.  History of critical ischemia of the right lower extremity    Comorbidities  Patient Active Problem List   Diagnosis Code    Hyperlipidemia E78.5    Tobacco use disorder F17.200    Mediastinal adenopathy R59.0    History of vitamin D deficiency Z86.39    Insomnia G47.00    Diabetic nephropathy associated with type 2 diabetes mellitus ([facility] E11.21    Otalgia H92.09    Mild nonproliferative diabetic retinopathy of left eye without macular edema associated with type 2 diabetes mellitus ([facility] R82.1091    Hypertensive retinopathy H35.039    Nuclear sclerosis of both eyes H25.13    Arterial occlusion, lower extremity (HCC) I70.209    Impaired mobility and ADLs Z74.09, Z78.9    Status post below knee amputation of right lower extremity (HCC) Z89.511    Chronic alcohol use Z72.89    History of coronary angioplasty with insertion of stent Z95.5    Coronary artery disease involving native coronary artery of native heart I25.10    Constipation K59.00    Poorly controlled type 2 diabetes mellitus (HCC) E11.65    Long term current use of insulin (HCC) Z79.4    Migraine headache G43.909    Diabetic neuropathy associated with type 2 diabetes mellitus (HCC) E11.40    Gastroesophageal reflux disease K21.9    History of essential hypertension Z86.79    History of lacunar cerebrovascular accident Z80.78    History of acute inferior wall myocardial infarction I25.2    Critical ischemia of lower extremity (HCC) I70.229    Acute postoperative anemia due to expected blood loss D62    Depression F32. A    High vitamin D level E67.3          Therapy:     FIM SCORES Initial Assessment Weekly Progress Assessment 2/17/2022   Eating Functional Level: 5     Swallowing     Grooming 55  5   Bathing 44  4      Upper Body Dressing Functional Level: 5 (setup)  Items Applied/Steps Completed: Pullover (4 steps)  Comments: Pt performed UB dressing with setup5  5   Lower Body Dressing Functional Level: 3  Items Applied/Steps Completed: Elastic waist pants (3 steps),Sock, left (1 step),Underpants (3 steps)  Comments: Pt performed LB dressing with modA to don LLE sock and pull pants up over R hip and standing at grab abr for increased stability3  3   Toileting Functional Level:  (Pt declined)4  4   Bladder 1 0   Bowel  0 0   Toilet Transfer Smyth Toilet Transfer Score: 5 (CGA)4 (CGA)  4 (CGA)   Tub/Shower Transfer Smyth Tub or Shower Type: Shower  Tub/Shower Transfer Score: 5 (CGA with grab bar)4 (CGA)     4 (CGA)   Comprehension Primary Mode of Comprehension: Auditory  Score: 55  5      Expression Primary Mode of Expression: Verbal  Score: 55     5   Social Interaction Score: 55  5   Problem Solving Score: 55  5   Memory Score: 55  5     FIM SCORES Initial Assessment Weekly Progress Assessment 2/17/2022   Bed/Chair/Wheelchair Transfers Transfer Type: Other  Other: stand step with RW  Transfer Assistance : 4 (Contact guard assistance)  Sit to Stand Assistance: Contact guard assistance  Car Transfers:  (CGA)  Car Type: car transfer  Transfer Type:  Other  Other: stand step with RW  Transfer Assistance : 4 (Contact guard assistance)  Sit to Stand Assistance: Contact guard assistance   Bed Mobility Rolling Right 6 (Modified independent)   Rolling Left 6 (Modified independent)   Supine to Sit 6 (Modified independent)   Sit to Stand Contact guard assistance   Sit to Supine 6 (Modified independent)    Rolling Right   6 (Modified independent) Rolling Left   6 (Modified independent)   Supine to Sit   6 (Modified independent)   Sit to Stand   Contact guard assistance   Sit to Supine   6 (Modified independent)      Locomotion (W/C) Able to Propel (ft): 240 feet  Functional Level: 4  Wheelchair Setup Assist Required : 3 (Moderate assistance)  Wheelchair Management: Manages left brake,Manages right brake Function 5  Setup Assistance  4 (Minimal assistance)      Locomotion (W/C distance)   250 feet   Locomotion (Walk) 4 (Minimal assistance) 4 (Minimal assistance)  Gait belt;Walker, rolling   Locomotion (Walk dist.) 50 Feet (ft) 68 Feet (ft)   Steps/Stairs Steps/Stairs Ambulated (#): 3 (4\" steps)  Level of Assist : 3 (Moderate assistance)  Rail Use: Both Steps/Stairs Ambulated (#): 3 (4\" steps)  Level of Assist : 3 (Moderate assistance)  Rail Use: Both         Nursing:     Neuro:   AAA&O x 4           Respiratory:   [x] WNL   [] O2 LPM:   Other:  Peripheral Vascular:   [] TEDS present   [] Edema present ____ Grade   Cardiac:   [x] WNL   [] Other  Genitourinary:   [x] continent   [] incontinent   [] epstein  Abdominal ___2/12____ LBM  GI: _______ Diet ______ Liquids _____ tube feeds  Musculoskeletal: ____ ROM Transfers _____ Assistive Device Used  ____ Level of Assistance  Skin Integumentary:   [x] Intact   [] Not Intact   __________Preventative Measures  Details______________________________________________________________  Pain: [x] Controlled   [] Not Controlled   Pain Meds:   [] Scheduled   [x] PRN        Interdisciplinary Team Goals:     1. Discipline  Physical Therapy    Goal  Encourage pt to perform functional transfers at a supervision level with minimal cuing for safety. Barrier  Impaired safety awareness, Impaired functional strength, Impaired balance    Intervention  Education, Transfer training, Strength and Balance training    Goal written by:   Susana Bruce PT, DPT     2.  Discipline  Occupational Therapy    Goal  Pt will perform LB dressing with Diana and AE    Barrier  impaired cognition, impaired safety, impaired problem solving, impaired standing balance, impaired BUE strength    Intervention  ADL training, transfer training, BUE strengthening, AE training    Goal written by:  Venancio Calzada MSOTR/L     3. Discipline  Speech Therapy    Goal      Barrier      Intervention      Goal written by:       4. Discipline  Nursing    Goal  pt will achieve timely wound healing and be afebrile    Barrier  diabetes; environmental exposure; uncontrolled blood glucose    Intervention Assess wound for s/s of infxn; monitor VS; maintain aseptic dressing change as ordered    Goal written by:  Hammad Barclay RN     5. Discipline  Clinical Psychology    Goal  Increase insight about healthcare compliance    Barrier  Uncertain non compliance complicating his stability    Intervention  Patient education and reinforcement of need for compliance    Goal written by:  Jon Porras, PhD         Disposition / Discharge Planning:      Follow-up services:  [x] Physical Therapy             [x] Occupational Therapy       [] Speech Therapy           [] Skilled Nursing      [] Medical Social Worker   [] Aide        [] Outpatient      [] vs   [x] Home Health  [] vs       [] to progress to outpatient       [] with 24-hour supervision       [] with 24-hour assistance   [] [place]   [facility] recommendations:  w/c, RW   Estimated discharge date:  tbd   Discharge Location:  [x] Home  [] versus    [] [place] [facility]    [] [address]   [] Other:           Interdisciplinary team rounds were held this PM with the following team members:       Name   Physical Therapist    Woody Moraes PT, DPT     Occupational Therapist    Dennys Goldstein, MSOTR/L   Recreational Therapist    Osmin Sloan, [address] [place] [address], RN     Physician    Gabino Lemus MD        BRIAN Peace          Signed:  Gabino Lemus MD    February 17, 2022

## 2022-02-17 NOTE — ROUTINE PROCESS
SHIFT CHANGE NOTE FOR [facility]    Bedside and Verbal shift change report given to [address] (oncoming nurse) by Martinez Bella RN (offgoing nurse). Report included the following information SBAR, Kardex, MAR and Recent Results. Situation:   Code Status: Full Code   Hospital Day: 2   Problem List:   Hospital Problems  Date Reviewed: 2/16/2022          Codes Class Noted POA    Poorly controlled type 2 diabetes mellitus ([facility] ICD-10-CM: E11.65  ICD-9-CM: 250.00  Unknown Yes    Overview Signed 2/16/2022 12:50 AM by Adrian Townsend MD     HbA1c (2/8/2022) = 13.8             Long term current use of insulin (HCC) ICD-10-CM: Z79.4  ICD-9-CM: V58.67  Unknown Yes        High vitamin D level (Chronic) ICD-10-CM: E67.3  ICD-9-CM: 278.4  2/16/2022 Yes    Overview Signed 2/16/2022  9:52 AM by Adrina Townsend MD     Vitamin D 25-Hydroxy (2/16/2022) = 105.7             Impaired mobility and ADLs ICD-10-CM: Z74.09, Z78.9  ICD-9-CM: V49.89  2/8/2022 Yes        * (Principal) Status post below knee amputation of right lower extremity ([facility] [address] ICD-10-CM: Z89.511  ICD-9-CM: V49.75  2/8/2022 Yes    Overview Signed 2/16/2022 12:47 AM by Adrian Townsend MD     S/P Right below-the-knee amputation (2/8/2022 - Dr. Jude Hood)             Critical ischemia of lower extremity [facility]) ICD-10-CM: N94.285  ICD-9-CM: 459.9  2/8/2022 No        Acute postoperative anemia due to expected blood loss ICD-10-CM: D62  ICD-9-CM: 285.1  2/8/2022 Yes              Background:   Past Medical History:   Past Medical History:   Diagnosis Date    Acute postoperative anemia due to expected blood loss 2/8/2022    Arterial occlusion, lower extremity ([facility] [address] 11/27/2021    Cardiac cath 11/09/2011    RCA patent. LM patent. LAD patent. mD1 55%. CX patent. Prior stent patent. Edison 85% (2.5 x 15-mm Xience stent, resid 0%). LVEDP 12. EF 40-45%. High lateral hypk (RI distribution).       Chronic alcohol use     Fri-Sun one fifth; Mon-Thur: Pint of liquor  Constipation     Coronary artery disease involving native coronary artery of native heart     Critical ischemia of lower extremity ([facility] [address] 2/8/2022    Depression     Diabetic nephropathy associated with type 2 diabetes mellitus ([facility] 4/4/2019    Diabetic neuropathy associated with type 2 diabetes mellitus ([facility]     Gastroesophageal reflux disease     High vitamin D level 2/16/2022    Vitamin D 25-Hydroxy (2/16/2022) = 105.7    History of acute inferior wall myocardial infarction 2009    History of coronary angioplasty with insertion of stent 07/29/2009    2.5 x 13 Cypher stent to CX.      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 without macular edema associated with type 2 diabetes mellitus ([facility] 12/18/2020    Nuclear sclerosis of both eyes 12/18/2020    Otalgia 05/08/2019    Poorly controlled type 2 diabetes mellitus (HCC)     HbA1c (2/8/2022) = 13.8    Tobacco use disorder     contemplating stopping        Assessment:   Changes in Assessment throughout shift: No change to previous assessment     Patient has a central line: no Reasons if yes:  n/a  Insertion date: n/a Last dressing date: n/a   Patient has Gallardo Cath: no Reasons if yes:  n/a   Insertion date: n/a      Last Vitals:     Vitals:    02/16/22 0728 02/16/22 1149 02/16/22 1615 02/16/22 2100   BP: 127/89  (!) 147/82 121/72   Pulse: 73  78 87   Resp: 19 19 18   Temp: 99.1 °F (37.3 °C)  98.3 °F (36.8 °C) 97.5 °F (36.4 °C)   SpO2: 100%  99% 97%   Weight:       Height:  5' 9\" (1.753 m)          PAIN    Pain Assessment    Pain Intensity 1: 8 (02/17/22 5580) Pain Intensity 1: 2 (12/29/14 1105)    Pain Location 1: Leg Pain Location 1: Abdomen    Pain Intervention(s) 1: Medication (see MAR) Pain Intervention(s) 1: Medication (see MAR)  Patient Stated Pain Goal: 0 Patient Stated Pain Goal: 0  o Intervention effective: yes  o Other actions taken for pain: Medication (see MAR)     Skin Assessment  Skin color    Condition/Temperature    Integrity Skin Integrity: Incision (comment) (right bka)  Turgor    Weekly Pressure Ulcer Documentation  Pressure  Injury Documentation: No Pressure Injury Noted-Pressure Ulcer Prevention Initiated  Wound Prevention & Protection Methods  Orientation of wound Orientation of Wound Prevention: Posterior  Location of Prevention Location of Wound Prevention: Sacrum/Coccyx  Dressing Present Dressing Present : No  Dressing Status    Wound Offloading Wound Offloading (Prevention Methods): Bed, pressure reduction mattress     INTAKE/OUPUT  Date 02/16/22 0700 - 02/17/22 0659 02/17/22 0700 - 02/18/22 0659   Shift 9760-0125 0129-5047 24 Hour Total 1402-2873 0823-7385 24 Hour Total   INTAKE   P.O.         P. O.       Shift Total(mL/kg) 720(9.2) 350(4.5) 1070(13.7)      OUTPUT   Urine(mL/kg/hr)  400(0.4) 400(0.2)        Urine Voided  400 400        Urine Occurrence(s) 2 x 2 x 4 x      Emesis/NG output           Emesis Occurrence(s)  0 x 0 x      Stool           Stool Occurrence(s) 1 x 2 x 3 x      Shift Total(mL/kg)  400(5.1) 400(5.1)       -50 670      Weight (kg) 78.2 78.2 78.2 78.2 78.2 78.2       Recommendations:  1. Patient needs and requests: pain management; reposition; toileting; bowel regimen    2. Pending tests/procedures: routine labs     3. Functional Level/Equipment: Partial (one person) / Bed (comment)    Fall Precautions:   Fall risk precautions were reinforced with the patient; he was instructed to call for help prior to getting up.  The following fall risk precautions were continued: bed/ chair alarms, door signage, yellow bracelet and socks as well as update of the Amy Ground tool in the patient's room. Loly Score: 4    HEALS Safety Check    A safety check occurred in the patient's room between off going nurse and oncoming nurse listed above. The safety check included the below items  Area Items   H  High Alert Medications - Verify all high alert medication drips (heparin, PCA, etc.)   E  Equipment - Suction is set up for ALL patients (with eliel)  - Red plugs utilized for all equipment (IV pumps, etc.)  - WOWs wiped down at end of shift.  - Room stocked with oxygen, suction, and other unit-specific supplies   A  Alarms - Bed alarm is set for fall risk patients  - Ensure chair alarm is in place and activated if patient is up in a chair   L  Lines - Check IV for any infiltration  - Gallardo bag is empty if patient has a Gallardo   - Tubing and IV bags are labeled   S  Safety   - Room is clean, patient is clean, and equipment is clean. - Hallways are clear from equipment besides carts. - Fall bracelet on for fall risk patients  - Ensure room is clear and free of clutter  - Suction is set up for ALL patients (with eliel)  - Hallways are clear from equipment besides carts.    - Isolation precautions followed, supplies available outside room, sign posted     Tony Ferraro RN

## 2022-02-17 NOTE — PROGRESS NOTES
Problem: Mobility Impaired (Adult and Pediatric)  Goal: *Acute Goals and Plan of Care (Insert Text)  Description: Physical Therapy Short Term Goals  Initiated 2/16/2022 and to be accomplished within 7 day(s) (2/23/2022)  1. Patient will move from supine to sit and sit to supine , scoot up and down, and roll side to side in bed with independence. 2.  Patient will transfer from bed to chair and chair to bed with supervision/set-up using the least restrictive device. 3.  Patient will perform sit to stand with supervision/set-up. 4.  Patient will ambulate with supervision/set-up for 50 feet with the least restrictive device. 5.  Patient will ascend/descend 3 stairs with 1 handrail(s) with minimal assistance/contact guard assist.    Physical Therapy Long Term Goals  Initiated 2/16/2022 and to be accomplished within 14 day(s) (3/02/2022)  1. Patient will move from supine to sit and sit to supine , scoot up and down, and roll side to side in bed with independence. 2.  Patient will transfer from bed to chair and chair to bed with modified independence using the least restrictive device. 3.  Patient will perform sit to stand with modified independence. 4.  Patient will ambulate with modified independence for 50 feet with the least restrictive device. 5.  Patient will ascend/descend 3 stairs with 1 handrail(s) or bumping up on steps with supervision/set-up. Outcome: Progressing Towards Goal    PHYSICAL THERAPY TREATMENT    Patient: Reji Ponce (63 y.o. male)  Date: 2/17/2022  Diagnosis: Status post below knee amputation of right lower extremity ([facility]) [Z89.511] Status post below knee amputation of right lower extremity ([facility] [address] [facility]  Precautions: Fall Risk Precautions  Chart, physical therapy assessment, plan of care and goals were reviewed. Time [id number]  Time VAM:4833    Patient seen for: Balance activities;Gait training;Patient education;Transfer training; Therapeutic exercise    Pain:  Pt pain was reported as 7/10 right residual limb pain pre-treatment. Pt pain was reported as 7/10 post-treatment. Intervention: Educated pt re: positioning and offered rest breaks as needed. Patient identified with name and : yes    SUBJECTIVE:      Pt is pleasant but reserved throughout treatment session. Pt notes, \"I'm just tired,\" re: how he felt with household ambulation. OBJECTIVE DATA SUMMARY:    Objective:     BED/MAT MOBILITY Daily Assessment     Rolling Right : 6 (Modified independent)  Rolling Left : 6 (Modified independent)  Supine to Sit : 6 (Modified independent)  Sit to Supine : 6 (Modified independent)   With increased time to perform on mat table. TRANSFERS Daily Assessment     Transfer Type: Other  Other: stand step with RW  Transfer Assistance : 4 (Contact guard assistance)   Squat pivot: CGA for safety with verbal cuing for hand placement. Sit to Stand Assistance: Contact guard assistance   Pt requires CGA for safety and balance with functional transfers with verbal reminders for safe hand placement utilizing RW and to feel surface behind LEs prior to attempting to sit. GAIT Daily Assessment    Gait Description (WDL) Exceptions to WDL    Gait Abnormalities Decreased step clearance    Assistive device Gait belt;Walker, rolling    Ambulation assistance - level surface 4 (Minimal assistance)    Distance 68 Feet (ft)    Ambulation assistance- uneven surface  NT    Comments Pt requires minimal assistance for balance and safety with RW management ambulating with decreased left foot clearance and decreased step length with fatigue.           BALANCE Daily Assessment     Sitting - Static: Good (unsupported)  Sitting - Dynamic: Good (unsupported)  Standing - Static: Fair  Standing - Dynamic : Impaired   Pt participated in 30 minute group balance activity standing for one trial of 2 minutes 55 seconds, one trial of 4 minutes 10 seconds and one trial of 5 minutes 7 seconds with CGA for balance and safety with pt participating in forward reaching task with single UE support on RW. Pt requires maximal verbal and manual cues for posture during activity as pt demonstrates rounded shoulder with B UE weight bearing. WHEELCHAIR MOBILITY Daily Assessment     Able to Propel (ft): 250 feet  Functional Level: 5  Wheelchair Setup Assist Required : 4 (Minimal assistance)  Wheelchair Management: Manages left brake;Manages right brake;Manages left footrest;Manages right footrest   Pt requires minimal assistance in narrow spaces to negotiate obstacles but otherwise propels w/c at a supervision level over level surfaces utilizing B UEs. Pt requires education and intermittent hand over hand assist for managing residual limb rest and left leg rest.        THERAPEUTIC EXERCISES Daily Assessment     Prone LE Strength Trainin Sets of 10 Repetitions  Glut Sets x 5\" holds  3 Sets of 10 Repetitions  Right and Left Hip Extension with tactile and verbal cuing    Supine LE Strength Training:  3 Sets of 10 Repetitions  Right and Left SLR Hip Flexion with maximal cues for quality      ASSESSMENT:  Pt is progressing with functional mobility demonstrating improved standing tolerance and ability to participate in prone therapeutic exercises. However, pt continues to demonstrate decreased safety awareness requiring frequent verbal cues for safety with functional transfers. Progression toward goals:  []      Improving appropriately and progressing toward goals  [x]      Improving slowly and progressing toward goals  []      Not making progress toward goals and plan of care will be adjusted      PLAN:  Patient continues to benefit from skilled intervention to address the above impairments. Continue treatment per established plan of care.   Emphasize functional strengthening to promote increased safety and independence with mobility  Discharge Recommendations:  Home Physical Therapy  Further Equipment Recommendations for Discharge:  rolling walker and wheelchair 18 inch      Estimated Discharge Date: 03/02/2022    Activity Tolerance:   Good  Please refer to the flowsheet for vital signs taken during this treatment.     After treatment:   [] Patient left in no apparent distress in bed  [x] Patient left in no apparent distress sitting up in chair  [] Patient left in no apparent distress in w/c mobilizing under own power  [] Patient left in no apparent distress dining area  [] Patient left in no apparent distress mobilizing under own power  [x] Call bell left within reach  [] Nursing notified  [] Caregiver present  [] Bed alarm activated   [] Chair alarm activated      Estephania Rainey, PT, DPT  2/17/2022

## 2022-02-17 NOTE — DIABETES MGMT
Diabetes/ Glycemic Control Plan of Care    Patient is status post right below knee amputation (BKA) on 2/08/2022). Uncontrolled T2DM with A1c of 13.8 (2/08/2022)    Completed assessment of home diabetes management and education on 2/09/2022. Patient with limited knowledge of diabetes management based on assessment. Recommendations:   1.) increase basal lantus insulin insulin from 45 to 50 units daily. 2/17:  Patient seen in acute rehab unit today and noted fasting BG before breakfast elevated. 2/16: 297  2/17: 255    Assessment:   Peripheral artery disease, ischemic leg  Status post right below knee amputation (BKA) on 2/08/2022)  T2DM with A1c of 13.8 (2/08/2022)    Fasting/ Morning blood glucose:   Lab Results   Component Value Date/Time    Glucose 350 (H) 02/16/2022 06:26 AM    Glucose (POC) 84 02/17/2022 12:23 PM    Glucose,  ([facility]) 01/11/2022 07:31 AM     IV Fluids containing dextrose:  None    Steroids:   None    Blood glucose values: Within target range (70-180mg/dL): No    Current insulin orders:   Basal lantus insulin 45 units daily every morning  Metformin 850 mg BID with meals  Correctional lispro insulin\  Mealtime lispro insulin 5 units TID AC    Total Daily Dose previous 24 hours:  65 units  Lantus: 45 units  Lispro: 20 units    Current A1c:   Lab Results   Component Value Date/Time    Hemoglobin A1c 13.8 (H) 02/08/2022 07:10 AM    Hemoglobin A1c (POC) 7.0 01/13/2021 04:12 PM    Hemoglobin A1c, External 7.8 01/28/2015 11:15 PM      equivalent  to ave Blood Glucose of 349 mg/dl for 2-3 months prior to admission    Adequate glycemic control PTA: No    Nutrition/Diet:   Active Orders   Diet    ADULT DIET Regular; 5 carb choices (75 gm/meal);  Low Fat/Low Chol/High Fiber/SEEMA      Meal Intake:  Patient Vitals for the past 168 hrs:   % Diet Eaten   02/17/22 1308 76 - 100%   02/17/22 1004 76 - 100%   02/16/22 1317 76 - 100%   02/16/22 0936 76 - 100%     Supplement Intake:  No data found.    Home diabetes medications: Patient reported on 2/17/2022:  Ruben Julia (lantus) pen insulin 50 Units daily  Metformin 1000 mg 2 times daily with meals  Trulicity 5.99 mg SC injection weekly (Thursday)    Key Antihyperglycemic Medications             Trulicity 3.77 RZ/9.1 mL sub-q pen INJECT 0.75 MG UNDER THE SKIN ONCE WEEKLY    insulin glargine (Basaglar KwikPen U-100 Insulin) 100 unit/mL (3 mL) inpn 45 Units by SubCUTAneous route daily. INJECT 50 UNITS UNDER THE SKIN DAILY    metFORMIN (GLUCOPHAGE) 1,000 mg tablet TAKE ONE TABLET BY MOUTH TWICE A DAY WITH A MEAL          Plan/Goals:   Blood glucose will be within target of 70 - 180 mg/dl within 72 hours  Reinforce dietary and medication compliance at home.          Education:  [x] Refer to Diabetes Education Record: 2/09/2022                       [] Education not indicated at this time     Jude Peters RN [facility]  Pager: [phone]

## 2022-02-17 NOTE — PROGRESS NOTES
Problem: Falls - Risk of  Goal: *Absence of Falls  Description: Document Sofia Fothergill Fall Risk and appropriate interventions in the flowsheet. Outcome: Progressing Towards Goal  Note: Fall Risk Interventions:  Mobility Interventions: Bed/chair exit alarm         Medication Interventions: Bed/chair exit alarm    Elimination Interventions: Call light in reach    History of Falls Interventions: Bed/chair exit alarm         Problem: Patient Education: Go to Patient Education Activity  Goal: Patient/Family Education  Outcome: Progressing Towards Goal     Problem: Diabetes Self-Management  Goal: *Disease process and treatment process  Description: Define diabetes and identify own type of diabetes; list 3 options for treating diabetes. Outcome: Progressing Towards Goal  Goal: *Incorporating nutritional management into lifestyle  Description: Describe effect of type, amount and timing of food on blood glucose; list 3 methods for planning meals. Outcome: Progressing Towards Goal  Goal: *Incorporating physical activity into lifestyle  Description: State effect of exercise on blood glucose levels. Outcome: Progressing Towards Goal  Goal: *Developing strategies to promote health/change behavior  Description: Define the ABC's of diabetes; identify appropriate screenings, schedule and personal plan for screenings. Outcome: Progressing Towards Goal  Goal: *Using medications safely  Description: State effect of diabetes medications on diabetes; name diabetes medication taking, action and side effects. Outcome: Progressing Towards Goal  Goal: *Monitoring blood glucose, interpreting and using results  Description: Identify recommended blood glucose targets  and personal targets. Outcome: Progressing Towards Goal  Goal: *Prevention, detection, treatment of acute complications  Description: List symptoms of hyper- and hypoglycemia; describe how to treat low blood sugar and actions for lowering  high blood glucose level.   Outcome: Progressing Towards Goal  Goal: *Prevention, detection and treatment of chronic complications  Description: Define the natural course of diabetes and describe the relationship of blood glucose levels to long term complications of diabetes.   Outcome: Progressing Towards Goal  Goal: *Developing strategies to address psychosocial issues  Description: Describe feelings about living with diabetes; identify support needed and support network  Outcome: Progressing Towards Goal  Goal: *Insulin pump training  Outcome: Progressing Towards Goal  Goal: *Sick day guidelines  Outcome: Progressing Towards Goal  Goal: *Patient Specific Goal (EDIT GOAL, INSERT TEXT)  Outcome: Progressing Towards Goal     Problem: Patient Education: Go to Patient Education Activity  Goal: Patient/Family Education  Outcome: Progressing Towards Goal     Problem: Patient Education: Go to Patient Education Activity  Goal: Patient/Family Education  Outcome: Progressing Towards Goal     Problem: Nutrition Deficit  Goal: *Optimize nutritional status  Outcome: Progressing Towards Goal     Problem: Patient Education: Go to Patient Education Activity  Goal: Patient/Family Education  Outcome: Progressing Towards Goal     Problem: Patient Education: Go to Patient Education Activity  Goal: Patient/Family Education  Outcome: Progressing Towards Goal

## 2022-02-17 NOTE — REHAB NOTE
[facility] PHYSICAL [address], [address]  OVERALL PLAN OF CARE    Name: Reji Ponce CSN: [id number]   Age: 59 y.o. MRN: [id number]   Sex: male Admit Date: 2/15/2022     Primary Rehabilitation Diagnosis  1. Impaired Mobility and ADLs  2. S/P Right below-the-knee amputation (2/8/2022 - Dr. Santiago Banda)  3. History of critical ischemia of the right lower extremity    Comorbidities  Patient Active Problem List   Diagnosis Code    Hyperlipidemia E78.5    Tobacco use disorder F17.200    Mediastinal adenopathy R59.0    History of vitamin D deficiency Z86.39    Insomnia G47.00    Diabetic nephropathy associated with type 2 diabetes mellitus ([facility] E11.21    Otalgia H92.09    Mild nonproliferative diabetic retinopathy of left eye without macular edema associated with type 2 diabetes mellitus ([facility] H12.2943    Hypertensive retinopathy H35.039    Nuclear sclerosis of both eyes H25.13    Arterial occlusion, lower extremity (HCC) I70.209    Impaired mobility and ADLs Z74.09, Z78.9    Status post below knee amputation of right lower extremity (HCC) Z89.511    Chronic alcohol use Z72.89    History of coronary angioplasty with insertion of stent Z95.5    Coronary artery disease involving native coronary artery of native heart I25.10    Constipation K59.00    Poorly controlled type 2 diabetes mellitus (HCC) E11.65    Long term current use of insulin (HCC) Z79.4    Migraine headache G43.909    Diabetic neuropathy associated with type 2 diabetes mellitus (HCC) E11.40    Gastroesophageal reflux disease K21.9    History of essential hypertension Z86.79    History of lacunar cerebrovascular accident Z80.78    History of acute inferior wall myocardial infarction I25.2    Critical ischemia of lower extremity (HCC) I70.229    Acute postoperative anemia due to expected blood loss D62    Depression F32. A    High vitamin D level E67.3 ANTICIPATED INTERVENTIONS THAT SUPPORT THE MEDICAL NECESSITY OF THIS ADMISSION:    I. Physical Therapy              A. Intensity: 1.5 hour per day              B. Frequency: 5 times per week              C. Duration: 2 weeks              D. Long Term Goals:    1. Patient will move from supine to sit and sit to supine , scoot up and down, and roll side to side in bed with independence. 2.  Patient will transfer from bed to chair and chair to bed with modified independence using the least restrictive device. 3.  Patient will perform sit to stand with modified independence. 4.  Patient will ambulate with modified independence for 50 feet with the least restrictive device. 5.  Patient will ascend/descend 3 stairs with 1 handrail(s) or bumping up on steps with supervision/set-up. E. Interventions: Interventions may include range of motion (AROM, PROM B LE/trunk), motor function (B LE/trunk strengthening/coordination), activity tolerance (vitals, oxygen saturation levels), bed mobility training, balance activities, gait training (progressive ambulation program), wheelchair management and functional transfer training. II. Occupational Therapy              A. Intensity: 1.5 hour per day              B. Frequency: 5 times per week              C. Duration: 2 weeks              D. Long Term Goals:    1. Pt will perform self-feeding independently. 2. Pt will perform grooming independently. 3. Pt will perform UB bathing with Joseph. 4. Pt will perform LB bathing with Joseph. 5. Pt will perform tub/shower transfer with Joseph. 6. Pt will perform UB dressing with Joseph. 7. Pt will perform LB dressing with Joseph. 8. Pt will perform toileting task with Joseph. 9. Pt will perform toilet transfer with Joseph.    E. Interventions: Interventions may include range of motion (AROM, PROM B UE), motor function (B UE/ strengthening/coordination), activity tolerance (vitals, oxygen saturation levels), balance training, ADL/IADL training and functional transfer training. PHYSICIAN'S ASSESSMENT OF FINDINGS:    Are the established goals sufficient for achieving the optimal level of function? [x]  Yes      []  No    What changes would you recommend to the goals as written? None      Are the interventions noted sufficient for achieving the optimal level of function? [x]  Yes      []  No    What changes would you recommend to the interventions noted? If therapy staff is unable to provide 3 hr of total therapy per day in 5 days due to medical issues or decreased patient tolerance, may modify treatment schedule to 15 hr/week.       Estimated length of stay: 2 weeks      Medical rehabilitation prognosis:    []  Excellent     [x]  Good     []  Fair     []  Guarded       Discharge Destination:     [x]  Home     []  [address]     []  [place]     []  [address]     []  [place]     []  Other:       Signed:    Jeff Lunsford MD    February 17, 2022

## 2022-02-17 NOTE — PROGRESS NOTES
conducted an initial consultation and Spiritual Assessment for Kaye Ortiz, who is a 59 y.o.,male. Patients Primary Language is: [place].    According to the patients EMR Confucianist Affiliation is: [facility].     The reason the Patient came to the hospital is:   Patient Active Problem List    Diagnosis Date Noted    High vitamin D level 02/16/2022    Chronic alcohol use     Coronary artery disease involving native coronary artery of native heart     Constipation     Poorly controlled type 2 diabetes mellitus ([address]     Long term current use of insulin ([address]     Migraine headache     Diabetic neuropathy associated with type 2 diabetes mellitus ([address]     Gastroesophageal reflux disease     History of essential hypertension     History of lacunar cerebrovascular accident     Depression     Impaired mobility and ADLs 02/08/2022    Status post below knee amputation of right lower extremity ([address] 02/08/2022    Critical ischemia of lower extremity ([address] 02/08/2022    Acute postoperative anemia due to expected blood loss 02/08/2022    Arterial occlusion, lower extremity ([address] 11/27/2021    Mild nonproliferative diabetic retinopathy of left eye without macular edema associated with type 2 diabetes mellitus ([address] 12/18/2020    Hypertensive retinopathy 12/18/2020    Nuclear sclerosis of both eyes 12/18/2020    Otalgia 05/08/2019    Diabetic nephropathy associated with type 2 diabetes mellitus ([address] 04/04/2019    Insomnia 07/02/2018    History of vitamin D deficiency 04/15/2018    Mediastinal adenopathy 06/25/2015    Tobacco use disorder 12/09/2011    Hyperlipidemia     History of coronary angioplasty with insertion of stent 07/29/2009    History of acute inferior wall myocardial infarction 2009        The  provided the following Interventions:   attempted twice  to visit with this patient and initiate a relationship of care and support with patient  But at each attempt found the patient out of the room and busy with staff. And unable to communicate with me. There is no advance directive on file for this patient. .  Provided information about Spiritual Care Services. Offered prayer  on patients behalf. .  Assessment:  Patient does not have any Presybeterian/cultural needs that will affect patients preferences in health care. There are no further spiritual or [place]ybeterian issues which require Spiritual Care Services interventions at this time. Plan:  Chaplains will continue to follow and will provide pastoral care on an as needed/requested basis    . Ezra French   Spiritual Care   [phone]

## 2022-02-17 NOTE — CONSULTS
[facility] PSYCHOLOGICAL SCREENING    Assessment Initiated:  February 16, 2022    Rehab Diagnosis:  Right BKA    Pertinent Physical/Psychiatric History:     Patient Active Problem List   Diagnosis Code    Hyperlipidemia E78.5    Tobacco use disorder F17.200    Mediastinal adenopathy R59.0    History of vitamin D deficiency Z86.39    Insomnia G47.00    Diabetic nephropathy associated with type 2 diabetes mellitus ([facility] E11.21    Otalgia H92.09    Mild nonproliferative diabetic retinopathy of left eye without macular edema associated with type 2 diabetes mellitus ([facility] Z17.6118    Hypertensive retinopathy H35.039    Nuclear sclerosis of both eyes H25.13    Arterial occlusion, lower extremity (HCC) I70.209    Impaired mobility and ADLs Z74.09, Z78.9    Status post below knee amputation of right lower extremity ([facility]) Z89.511    Chronic alcohol use Z72.89    History of coronary angioplasty with insertion of stent Z95.5    Coronary artery disease involving native coronary artery of native heart I25.10    Constipation K59.00    Poorly controlled type 2 diabetes mellitus ([facility]) E11.65    Long term current use of insulin ([facility]) Z79.4    Migraine headache G43.909    Diabetic neuropathy associated with type 2 diabetes mellitus ([facility]) E11.40    Gastroesophageal reflux disease K21.9    History of essential hypertension Z86.79    History of lacunar cerebrovascular accident Z80.78    History of acute inferior wall myocardial infarction I25.2    Critical ischemia of lower extremity ([facility]) I70.229    Acute postoperative anemia due to expected blood loss D62    Depression F32. A    High vitamin D level E67.3       Patient denies history of mental health services. He is not aware that he is already Rx Wellbutrin and Trazodone, and actually denying acute feelings of emotional distress. He acknowledges a strong Episcopalian marco.   Patient was smoking 3/4 pack of cigarettes per day before surgery; he acknowledged heavy alcohol consumption ending approximately one year ago and still drinking, but less; and, he vaguely admitted to other, past substance use, but nothing currently. OBJECTIVE  GENERAL OBSERVATIONS  Willingness to participate in program: [x] good   [] fair [] indifferent [] poor    General Appearance:  Patient observed casually and appropriately dressed and groomed, sitting upright in wheelchair in bedroom and cooperative on contact, not in any distress    Sensory Impairments:  Patient has satisfactory auditory reception and comprehension and responds to inquiry with intelligibility.   Patient is able to voluntarily move all extremities    Moravian Affiliation:  Maron Aschoff    Admission Assessment  Discharge Status   [x] alert  [] lethargic  [] difficult to arouse  [] fluctuating  [] other: Level of Consciousness [x] alert  [] lethargic  [] difficult to arouse  [] fluctuating  [] other:   [x] person  [x] place  [x] time  [x] situation Oriented [x] person  [x] place  [x] time  [x] situation   [x] within normal limits  [] impaired       [] mild        [] moderate        [] severe Attention [x] within normal limits  [] impaired       [] mild        [] moderate        [] severe   [] within normal limits  [x] impaired       [x] mild        [] moderate        [] severe Memory [] within normal limits  [x] impaired       [x] mild        [] moderate        [] severe   [x] appropriate to situation  [] depressed  [] anxious  [] angry   [] fearful  [] emotionally labile  [x] other: Patient is denying acute feelings of emotional distress and no lability is observed Mood [] appropriate to situation  [x] depressed  [] anxious  [] angry   [] fearful  [] emotionally labile  [x] other: Patient declined to consider further mood stabilizing medication   [x] appropriate  [] flat  [] inappropriate to content of speech Affect [x] appropriate  [] flat  [] inappropriate to content of speech   [x] appropriate  [] aggressive/agitated  [] withdrawn  [] inappropriate  [x] other: He presents as calm and composed, appearing neither restless nor agitated and insisting that he understands safety recommendations with use of call bell Behavior [x] appropriate  [] aggressive/agitated  [] withdrawn  [] inappropriate  [] other:   [] good  [x] limited  [] denial  [] none Insight Into Illness [] good  [x] limited  [] denial  [] none   [x] intact  [x] impaired       [x] mild        [] moderate        [] severe       Describe: Patient will seemingly benefit from cues and prompts with recall of novel and/or complex instruction Cognition [x] intact  [x] impaired       [x] mild        [] moderate        [] severe       Describe:    [x] coping  [] demonstrates poor adjustment  [] undetermined       As evidenced by: Patient is motivated to improve and begin prosthetic training Patient Adjustment to Disability [x] coping  [] demonstrates poor adjustment  [] undetermined       As evidenced by:    [] coping  [] demonstrates poor adjustment  [x] undetermined      As evidenced by: Not available on contact Family Adjustment to Disability [] coping  [] demonstrates poor adjustment  [x] undetermined      As evidenced by:      ASSESSMENT  Clinical Impression:  Patient is a pleasant and cooperative, 59year old, single and living with girlfriend, 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 He resides in [place] residence that is one level with three steps to enter. Patient remains in contact with two adult daughters. He describes day to day circumstances that preceded need for sudden surgery as apparently uneventful, and that he had not been aware of eventual need for an amputation. Yet, there may well have been history of medical noncompliance, as patient does have diagnosis for Diabetes with accompanying retinopathy and neuropathy. 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   Regardless, he insists that he is now motivated to regain function and return to independence, and is looking forward to prosthetic training. Naturally, he will benefit from further education to improve his insight about health care compliance in order to minimize risk for further crisis. Emotionally, he presents as calm and composed and not observed in any distress. In fact, he is seemingly quite complacent and not exhibiting any indications of worry, even on this, his first day on ARU. He denies any history of mental health services. Interestingly, he is not aware that he is already Rx Wellbutrin for mood stability and Trazodone for sleep. Patient insists: \"I have God on my side; I take it day to day. \"  He will be monitored for any possible, emergent, emotional and/or behavioral difficulty on ARU and be encouraged to persevere in his therapy effort. Cognitively, patient is entirely alert and oriented. He is found to have some difficulty with immediate recall of new information provided to him; thus, he will benefit from cues and prompts for maximum recall of novel and/or complex information, as well as possible problem solving with same under current circumstances. Patient Strengths:  Alert, oriented, pleasant, cooperative, motivated to improve and looking forward to prosthetic training    Patient Preferences:  Patient expects to discharge to home with his girlfriend    Rehab Potential:  Good    Educational Needs: Under each heading list the specific items in which the patient or family will need education/training.  Example: hip precautions, use of walker, ADL equipment, neglect, judgment, adjustment, etc.     Special considerations or accommodations for teaching:  [x] Yes     [] No     [] NA  If Yes, explain: Safety and pace in recovery and self concept and esteem with traumatic loss Discharge Status    Completed Demonstrated/ Verbalized Understanding    Yes No Yes No   Info regarding disability:  [] [] [] []   Adjustment:  [] [] [] []   Cognition:  [] [] [] []   Other: [] [] [] []   Other: [] [] [] []   If education not completed, explain: [] [] [] []     PLAN  Problem: Limited insight about recovery  Long Term Goal: Increase insight about same  Intervention: Patient education  At Discharge  LTG Achieved: [x] Yes [] No If not achieved, explain:    Problem: Forced dependency versus independence  Long Term Goal: Accept short term dependency  Intervention: Patient education and support , as needed  At Discharge  LTG Achieved: [x] Yes [] No If not achieved, explain:    Problem: Self concept and self esteem  Long Term Goal: Maintain self esteem  Intervention: Positive reinforcement and ego support  At Discharge  LTG Achieved: [x] Yes [] No If not achieved, explain:    Problem: Safety and pace  Long Term Goal: Maximize safety awareness and pace in recovery  Intervention: Patient education and behavioral redirection  At Discharge  LTG Achieved: [x] Yes [] No If not achieved, explain:    Problem: Stress with traumatic loss of limb  Long Term Goal: Minimize stress and distress feelings  Intervention: Support  and behavioral redirection, as needed  At Discharge  LTG Achieved: [x] Yes [] No If not achieved, explain:    Chavez Arceo, [facility]  2/17/2022 8:13 AM    DISCHARGE STATUS    Clinical Impressions: Patient discharged to home during my absence from ARU. Earlier on ARU, he had declined the possibility of introduction of anti depressant Rx as he reported to Dr. Atif Maxwell that he was coping satisfactorily. He acknowledged that he had not been taking some medications as earlier recommended. While on ARU, patient had been educated about the importance of better healthcare management in order to maintain improved medical status. He was responsive and seemed to understand reasons for same.       Follow-up Services Recommended Purpose                 Chavez Arceo, PHD  Discharge Date/Time:

## 2022-02-17 NOTE — WOUND CARE
Physical Exam   Focused assessment   Patient received sitting up in bed. A & O x 3, reports pain to stump. Rating at 8/10, \"tooth ache\" like and relieved \"a little\" with meds. Right BKA incision, edges well approximated, mild erythema jerome-wound. No induration, fluctuation or crepitus noted. Moderate serosanguinous drainage noted to old dressing. Topical treatment protocol in place as follows:   Clean right BKA incision with saline or wound spray then apply Xeroform followed by non-stick pads. Wrap with kerlex and light ace wrap and sleeve to secure. Patient to wear stump protector when out of bed. Wound care daily. Care discussed with primary nurse, Lucita Fleischer RN. Care turned over to nursing staff at this time. Tyrel LUJANN, RN, [facility], [facility]

## 2022-02-18 LAB
GLUCOSE BLD STRIP.AUTO-MCNC: 123 MG/DL (ref 70–110)
GLUCOSE BLD STRIP.AUTO-MCNC: 144 MG/DL (ref 70–110)
GLUCOSE BLD STRIP.AUTO-MCNC: 161 MG/DL (ref 70–110)
GLUCOSE BLD STRIP.AUTO-MCNC: 99 MG/DL (ref 70–110)

## 2022-02-18 PROCEDURE — 2709999900 HC NON-CHARGEABLE SUPPLY

## 2022-02-18 PROCEDURE — 97530 THERAPEUTIC ACTIVITIES: CPT

## 2022-02-18 PROCEDURE — 74011250637 HC RX REV CODE- 250/637: Performed by: INTERNAL MEDICINE

## 2022-02-18 PROCEDURE — 97116 GAIT TRAINING THERAPY: CPT

## 2022-02-18 PROCEDURE — 82962 GLUCOSE BLOOD TEST: CPT

## 2022-02-18 PROCEDURE — 65310000000 HC RM PRIVATE REHAB

## 2022-02-18 PROCEDURE — 97110 THERAPEUTIC EXERCISES: CPT

## 2022-02-18 PROCEDURE — 74011636637 HC RX REV CODE- 636/637: Performed by: INTERNAL MEDICINE

## 2022-02-18 PROCEDURE — 74011250636 HC RX REV CODE- 250/636: Performed by: INTERNAL MEDICINE

## 2022-02-18 PROCEDURE — 97763 ORTHC/PROSTC MGMT SBSQ ENC: CPT

## 2022-02-18 PROCEDURE — 96105 ASSESSMENT OF APHASIA: CPT

## 2022-02-18 PROCEDURE — 99232 SBSQ HOSP IP/OBS MODERATE 35: CPT | Performed by: INTERNAL MEDICINE

## 2022-02-18 PROCEDURE — 96125 COGNITIVE TEST BY HC PRO: CPT

## 2022-02-18 PROCEDURE — 97535 SELF CARE MNGMENT TRAINING: CPT

## 2022-02-18 PROCEDURE — 92522 EVALUATE SPEECH PRODUCTION: CPT

## 2022-02-18 RX ORDER — INSULIN GLARGINE 100 [IU]/ML
45 INJECTION, SOLUTION SUBCUTANEOUS
Status: DISCONTINUED | OUTPATIENT
Start: 2022-02-19 | End: 2022-02-19

## 2022-02-18 RX ORDER — METFORMIN HYDROCHLORIDE 500 MG/1
1000 TABLET ORAL 2 TIMES DAILY WITH MEALS
Status: DISCONTINUED | OUTPATIENT
Start: 2022-02-18 | End: 2022-03-02 | Stop reason: HOSPADM

## 2022-02-18 RX ORDER — OXYCODONE HYDROCHLORIDE 5 MG/1
7.5 TABLET ORAL
Status: DISCONTINUED | OUTPATIENT
Start: 2022-02-18 | End: 2022-02-21

## 2022-02-18 RX ORDER — INSULIN GLARGINE 100 [IU]/ML
20 INJECTION, SOLUTION SUBCUTANEOUS ONCE
Status: COMPLETED | OUTPATIENT
Start: 2022-02-18 | End: 2022-02-18

## 2022-02-18 RX ADMIN — ACETAMINOPHEN 650 MG: 325 TABLET ORAL at 13:05

## 2022-02-18 RX ADMIN — Medication 150 MG: at 09:14

## 2022-02-18 RX ADMIN — GABAPENTIN 400 MG: 400 CAPSULE ORAL at 15:24

## 2022-02-18 RX ADMIN — OXYCODONE 10 MG: 5 TABLET ORAL at 09:15

## 2022-02-18 RX ADMIN — ATORVASTATIN CALCIUM 20 MG: 20 TABLET, FILM COATED ORAL at 09:15

## 2022-02-18 RX ADMIN — METHOCARBAMOL TABLETS 500 MG: 500 TABLET, COATED ORAL at 13:06

## 2022-02-18 RX ADMIN — METHOCARBAMOL TABLETS 500 MG: 500 TABLET, COATED ORAL at 09:14

## 2022-02-18 RX ADMIN — ACETAMINOPHEN 650 MG: 325 TABLET ORAL at 09:14

## 2022-02-18 RX ADMIN — Medication 4 UNITS: at 17:24

## 2022-02-18 RX ADMIN — Medication 50 UNITS: at 08:57

## 2022-02-18 RX ADMIN — ASPIRIN 81 MG 81 MG: 81 TABLET ORAL at 09:15

## 2022-02-18 RX ADMIN — Medication 20 UNITS: at 21:52

## 2022-02-18 RX ADMIN — BUPROPION HYDROCHLORIDE 150 MG: 150 TABLET, EXTENDED RELEASE ORAL at 09:15

## 2022-02-18 RX ADMIN — Medication 200 MG: at 09:14

## 2022-02-18 RX ADMIN — GABAPENTIN 400 MG: 400 CAPSULE ORAL at 22:00

## 2022-02-18 RX ADMIN — Medication 4 UNITS: at 08:57

## 2022-02-18 RX ADMIN — THERA TABS 1 TABLET: TAB at 09:15

## 2022-02-18 RX ADMIN — ACETAMINOPHEN 650 MG: 325 TABLET ORAL at 15:24

## 2022-02-18 RX ADMIN — Medication 4 UNITS: at 12:45

## 2022-02-18 RX ADMIN — METHOCARBAMOL TABLETS 500 MG: 500 TABLET, COATED ORAL at 20:00

## 2022-02-18 RX ADMIN — SENNOSIDES 8.6 MG: 8.6 TABLET, COATED ORAL at 09:14

## 2022-02-18 RX ADMIN — Medication 150 MG: at 18:26

## 2022-02-18 RX ADMIN — OXYCODONE 7.5 MG: 5 TABLET ORAL at 21:53

## 2022-02-18 RX ADMIN — OXYCODONE 7.5 MG: 5 TABLET ORAL at 15:24

## 2022-02-18 RX ADMIN — METFORMIN HYDROCHLORIDE 850 MG: 850 TABLET ORAL at 09:15

## 2022-02-18 RX ADMIN — METFORMIN HYDROCHLORIDE 1000 MG: 500 TABLET ORAL at 18:26

## 2022-02-18 RX ADMIN — GABAPENTIN 400 MG: 400 CAPSULE ORAL at 09:15

## 2022-02-18 RX ADMIN — ENOXAPARIN SODIUM 40 MG: 100 INJECTION SUBCUTANEOUS at 06:05

## 2022-02-18 NOTE — ROUTINE PROCESS
SHIFT CHANGE NOTE FOR [facility]    Bedside and Verbal shift change report given to Love Kessler RN (oncoming nurse) by Berry Cui RN (offgoing nurse). Report included the following information SBAR, Kardex, MAR and Recent Results. Situation:   Code Status: Full Code   Hospital Day: 3   Problem List:   Hospital Problems  Date Reviewed: 2/18/2022          Codes Class Noted POA    Poorly controlled type 2 diabetes mellitus ([facility] ICD-10-CM: E11.65  ICD-9-CM: 250.00  Unknown Yes    Overview Signed 2/16/2022 12:50 AM by Blu Garner MD     HbA1c (2/8/2022) = 13.8             Long term current use of insulin (HCC) ICD-10-CM: Z79.4  ICD-9-CM: V58.67  Unknown Yes        High vitamin D level (Chronic) ICD-10-CM: E67.3  ICD-9-CM: 278.4  2/16/2022 Yes    Overview Signed 2/16/2022  9:52 AM by Blu Garner MD     Vitamin D 25-Hydroxy (2/16/2022) = 105.7             Impaired mobility and ADLs ICD-10-CM: Z74.09, Z78.9  ICD-9-CM: V49.89  2/8/2022 Yes        * (Principal) Status post below knee amputation of right lower extremity ([facility] ICD-10-CM: Z89.511  ICD-9-CM: V49.75  2/8/2022 Yes    Overview Signed 2/16/2022 12:47 AM by Blu Garner MD     S/P Right below-the-knee amputation (2/8/2022 - Dr. Mila Wilson)             Critical ischemia of lower extremity [facility]) ICD-10-CM: O81.258  ICD-9-CM: 459.9  2/8/2022 No        Acute postoperative anemia due to expected blood loss ICD-10-CM: D62  ICD-9-CM: 285.1  2/8/2022 Yes              Background:   Past Medical History:   Past Medical History:   Diagnosis Date    Acute postoperative anemia due to expected blood loss 2/8/2022    Arterial occlusion, lower extremity ([facility] 11/27/2021    Cardiac cath 11/09/2011    RCA patent. LM patent. LAD patent. mD1 55%. CX patent. Prior stent patent. Edison 85% (2.5 x 15-mm Xience stent, resid 0%). LVEDP 12. EF 40-45%. High lateral hypk (RI distribution).       Chronic alcohol use     Fri-Sun one fifth; Mon-Thur: Pint of liquor  Constipation     Coronary artery disease involving native coronary artery of native heart     Critical ischemia of lower extremity ([facility] 2/8/2022    Depression     Diabetic nephropathy associated with type 2 diabetes mellitus ([facility] 4/4/2019    Diabetic neuropathy associated with type 2 diabetes mellitus [facility]     Gastroesophageal reflux disease     High vitamin D level 2/16/2022    Vitamin D 25-Hydroxy (2/16/2022) = 105.7    History of acute inferior wall myocardial infarction 2009    History of coronary angioplasty with insertion of stent 07/29/2009    2.5 x 13 Cypher stent to CX.      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 without macular edema associated with type 2 diabetes mellitus ([facility] 12/18/2020    Nuclear sclerosis of both eyes 12/18/2020    Otalgia 05/08/2019    Poorly controlled type 2 diabetes mellitus (HCC)     HbA1c (2/8/2022) = 13.8    Tobacco use disorder     contemplating stopping        Assessment:   Changes in Assessment throughout shift: No change to previous assessment     Patient has a central line: no Reasons if yes: na  Insertion date:na Last dressing date:na   Patient has Epstein Cath: no Reasons if yes: na   Insertion date:na  Shift epstein care completed: NO     Last Vitals:     Vitals:    02/17/22 1603 02/17/22 1930 02/18/22 0800 02/18/22 1600   BP: 131/76 136/79 (!) 142/90 115/69   Pulse: 69 92 80 73   Resp: 18 18 18 18   Temp: 98.7 °F (37.1 °C) 98.3 °F (36.8 °C) 98 °F (36.7 °C) 97.9 °F (36.6 °C)   SpO2: 99% 95% 100% 100%   Weight:       Height:            PAIN    Pain Assessment    Pain Intensity 1: 0 (02/18/22 1624) Pain Intensity 1: 2 (12/29/14 8485)    Pain Location 1: Leg Pain Location 1: Abdomen    Pain Intervention(s) 1: Medication (see MAR) Pain Intervention(s) 1: Medication (see MAR)  Patient Stated Pain Goal: 0 Patient Stated Pain Goal: 0  o Intervention effective: yes  o Other actions taken for pain: Medication (see MAR)     Skin Assessment  Skin color    Condition/Temperature    Integrity Skin Integrity: Incision (comment)  Turgor    Weekly Pressure Ulcer Documentation  Pressure  Injury Documentation: No Pressure Injury Noted-Pressure Ulcer Prevention Initiated  Wound Prevention & Protection Methods  Orientation of wound Orientation of Wound Prevention: Posterior  Location of Prevention Location of Wound Prevention: Sacrum/Coccyx  Dressing Present Dressing Present : No  Dressing Status    Wound Offloading Wound Offloading (Prevention Methods): Bed, pressure redistribution/air,Repositioning,Turning     INTAKE/OUPUT  Date 02/17/22 0700 - 02/18/22 0659 02/18/22 0700 - 02/19/22 0659   Shift 4355-3183 4651-0705 24 Hour Total 6118-3610 2661-7698 24 Hour Total   INTAKE   P.O. 240  240 1320  1320     P. O. 240  240 1320  1320   Shift Total(mL/kg) 240(3.1)  240(3.1) 1320(16.9)  1320(16.9)   OUTPUT   Urine(mL/kg/hr)  250(0.3) 250(0.1) 800  800     Urine Voided  250 250 800  800     Urine Occurrence(s) 2 x 3 x 5 x 4 x  4 x   Stool           Stool Occurrence(s) 2 x 4 x 6 x      Shift Total(mL/kg)  250(3.2) 250(3.2) 800(10.2)  800(10.2)    -250 -10 520  520   Weight (kg) 78.2 78.2 78.2 78.2 78.2 78.2       Recommendations:  1. Patient needs and requests: na    2. Pending tests/procedures: na     3. Functional Level/Equipment:   /      Fall Precautions:   Fall risk precautions were reinforced with the patient; he was instructed to call for help prior to getting up. The following fall risk precautions were continued: bed/ chair alarms, door signage, yellow bracelet and socks as well as update of the VIDDIX tool in the patient's room.    VIDDIX Score: 4    HEALS Safety Check    A safety check occurred in the patient's room between off going nurse and oncoming nurse listed above. The safety check included the below items  Area Items   H  High Alert Medications - Verify all high alert medication drips (heparin, PCA, etc.)   E  Equipment - Suction is set up for ALL patients (with eliel)  - Red plugs utilized for all equipment (IV pumps, etc.)  - WOWs wiped down at end of shift.  - Room stocked with oxygen, suction, and other unit-specific supplies   A  Alarms - Bed alarm is set for fall risk patients  - Ensure chair alarm is in place and activated if patient is up in a chair   L  Lines - Check IV for any infiltration  - Gallardo bag is empty if patient has a Gallardo   - Tubing and IV bags are labeled   S  Safety   - Room is clean, patient is clean, and equipment is clean. - Hallways are clear from equipment besides carts. - Fall bracelet on for fall risk patients  - Ensure room is clear and free of clutter  - Suction is set up for ALL patients (with eliel)  - Hallways are clear from equipment besides carts.    - Isolation precautions followed, supplies available outside room, sign posted     Ai Garrido RN

## 2022-02-18 NOTE — PROGRESS NOTES
[facility] PHYSICAL REHABILITATION  [address] [place] [address], [address]     INPATIENT REHABILITATION  DAILY PROGRESS NOTE     Date: 2/18/2022    Name: Wilver Lopez Age / Sex: 59 y.o. / male   CSN: [id number] MRN: [id number]   [facility] [place] Date: 2/15/2022 Length of Stay: 3 days     Primary Rehabilitation Diagnosis: Impaired Mobility and ADLs secondary to:  1. S/P Right below-the-knee amputation (2/8/2022 - Dr. Maureen Claire)  2. History of critical ischemia of the right lower extremity      Subjective:     Patient seen and examined. Blood pressure controlled. Blood glucose better controlled. Six recorded bowel movements yesterday as per I&O chart. Patient's Complaint:   No significant medical complaints    Pain Control: stable, mild-to-moderate joint symptoms intermittently, reasonably well controlled by current meds      Objective:     Vital Signs:  Patient Vitals for the past 24 hrs:   BP Temp Pulse Resp SpO2   02/18/22 0800 (!) 142/90 98 °F (36.7 °C) 80 18 100 %   02/17/22 1930 136/79 98.3 °F (36.8 °C) 92 18 95 %   02/17/22 1603 131/76 98.7 °F (37.1 °C) 69 18 99 %        Physical Examination:  GENERAL SURVEY: Patient is awake, alert, oriented x 3, sitting comfortably on the chair, not in acute respiratory distress. HEENT: pale palpebral conjunctivae, anicteric sclerae, no nasoaural discharge, moist oral mucosa  NECK: supple, no jugular venous distention, no palpable lymph nodes  CHEST/LUNGS: symmetrical chest expansion, good air entry, clear breath sounds  HEART: adynamic precordium, good S1 S2, no S3, regular rhythm, no murmurs  ABDOMEN: flat, bowel sounds appreciated, soft, non-tender  EXTREMITIES: (+) right BKA stump, pale nailbeds, no edema, full and equal pulses, no calf tenderness   NEUROLOGICAL EXAM: The patient is awake, alert and oriented x3, able to answer questions fairly appropriately, able to follow 1 and 2 step commands. Able to tell time from the wall clock.   Cranial nerves II-XII are grossly intact. No gross sensory deficit. Motor strength is 4/5 on BUE and BLE. Incision(s): covered, clean, dry, and intact       Current Medications:  Current Facility-Administered Medications   Medication Dose Route Frequency    insulin glargine (LANTUS) injection 50 Units  50 Units SubCUTAneous ACB    insulin lispro (HUMALOG) injection 4 Units  0.05 Units/kg SubCUTAneous TIDAC    glucose chewable tablet 16 g  4 Tablet Oral PRN    glucagon (GLUCAGEN) injection 1 mg  1 mg IntraMUSCular PRN    dextrose 10% infusion 0-250 mL  0-250 mL IntraVENous PRN    iron polysaccharides (NIFEREX) capsule 150 mg  1 Capsule Oral BID    metFORMIN (GLUCOPHAGE) tablet 850 mg  850 mg Oral BID WITH MEALS    thiamine HCL (B-1) tablet 200 mg  200 mg Oral DAILY    aspirin chewable tablet 81 mg  81 mg Oral DAILY WITH BREAKFAST    senna (SENOKOT) tablet 8.6 mg  1 Tablet Oral DAILY    atorvastatin (LIPITOR) tablet 20 mg  20 mg Oral DAILY    buPROPion SR (WELLBUTRIN SR) tablet 150 mg  150 mg Oral DAILY    gabapentin (NEURONTIN) capsule 400 mg  400 mg Oral TID    therapeutic multivitamin (THERAGRAN) tablet 1 Tablet  1 Tablet Oral DAILY    acetaminophen (TYLENOL) tablet 650 mg  650 mg Oral TID    oxyCODONE IR (ROXICODONE) tablet 10 mg  10 mg Oral Q4H PRN    methocarbamoL (ROBAXIN) tablet 500 mg  500 mg Oral TID    acetaminophen (TYLENOL) tablet 650 mg  650 mg Oral Q4H PRN    bisacodyL (DULCOLAX) tablet 10 mg  10 mg Oral Q48H PRN    enoxaparin (LOVENOX) injection 40 mg  40 mg SubCUTAneous Q24H    insulin lispro (HUMALOG) injection   SubCUTAneous TIDAC       Allergies:   Allergies   Allergen Reactions    Niacin Itching       Lab/Data Review:  Recent Results (from the past 24 hour(s))   GLUCOSE, POC    Collection Time: 02/17/22 12:23 PM   Result Value Ref Range    Glucose (POC) 84 70 - 110 mg/dL   GLUCOSE, POC    Collection Time: 02/17/22  4:31 PM   Result Value Ref Range    Glucose (POC) 116 (H) 70 - 110 mg/dL GLUCOSE, POC    Collection Time: 02/17/22  9:52 PM   Result Value Ref Range    Glucose (POC) 218 (H) 70 - 110 mg/dL   GLUCOSE, POC    Collection Time: 02/18/22  8:05 AM   Result Value Ref Range    Glucose (POC) 144 (H) 70 - 110 mg/dL       Assessment:     Primary Rehabilitation Diagnosis  1. Impaired Mobility and ADLs  2. S/P Right below-the-knee amputation (2/8/2022 - Dr. Isadora Habermann)  3. History of critical ischemia of the right lower extremity    Comorbidities  Patient Active Problem List   Diagnosis Code    Hyperlipidemia E78.5    Tobacco use disorder F17.200    Mediastinal adenopathy R59.0    History of vitamin D deficiency Z86.39    Insomnia G47.00    Diabetic nephropathy associated with type 2 diabetes mellitus ([facility] E11.21    Otalgia H92.09    Mild nonproliferative diabetic retinopathy of left eye without macular edema associated with type 2 diabetes mellitus ([facility] Q68.0041    Hypertensive retinopathy H35.039    Nuclear sclerosis of both eyes H25.13    Arterial occlusion, lower extremity (HCC) I70.209    Impaired mobility and ADLs Z74.09, Z78.9    Status post below knee amputation of right lower extremity (HCC) Z89.511    Chronic alcohol use Z72.89    History of coronary angioplasty with insertion of stent Z95.5    Coronary artery disease involving native coronary artery of native heart I25.10    Constipation K59.00    Poorly controlled type 2 diabetes mellitus (HCC) E11.65    Long term current use of insulin ([facility]) Z79.4    Migraine headache G43.909    Diabetic neuropathy associated with type 2 diabetes mellitus (HCC) E11.40    Gastroesophageal reflux disease K21.9    History of essential hypertension Z86.79    History of lacunar cerebrovascular accident Z80.78    History of acute inferior wall myocardial infarction I25.2    Critical ischemia of lower extremity (HCC) I70.229    Acute postoperative anemia due to expected blood loss D62    Depression F32. A    High vitamin D level E67.3       Plan:     1. Justification for continued stay: Good progression towards established rehabilitation goals. 2. Medical Issues being followed closely:    [x]  Fall and safety precautions     [x]  Wound Care     [x]  Bowel and Bladder Function     [x]  Fluid Electrolyte and Nutrition Balance     [x]  Pain Control      3. Issues that 24 hour rehabilitation nursing is following:    [x]  Fall and safety precautions     [x]  Wound Care     [x]  Bowel and Bladder Function     [x]  Fluid Electrolyte and Nutrition Balance     [x]  Pain Control      [x]  Assistance with and education on in-room safety with transfers to and from the bed, wheelchair, toilet and shower. 4. Acute rehabilitation plan of care:    [x]  Continue current care and rehab. [x]  Physical Therapy           [x]  Occupational Therapy           []  Speech Therapy     []  Hold Rehab until further notice     5. Medications:    [x]  MAR Reviewed     [x]  Continue Present Medications     6. Chemical DVT Prophylaxis:      [x]  Enoxaparin     []  Unfractionated Heparin     []  Warfarin     []  NOAC     []  Aspirin     []  None     7. Mechanical DVT Prophylaxis:      []  FLEX Stockings     []  Sequential Compression Device     [x]  None     8. GI Prophylaxis:      []  PPI     []  H2 Blocker     [x]  None / Not indicated     9. Code status:    [x]  Full code     []  Partial code     []  Do not intubate     []  Do not resuscitate     10. Diet:  Specifications  4 carb choices (60 gm/meal), Low fat/Low cholesterol/High fiber/SEEMA   Solids (consistency)  Regular   Liquids (consistency)  Thin   Fluid Restriction  None      11. COVID-19:  Laboratory testing COVID-19 rapid test ([facility], [facility]) (2/4/2022): Not detected  COVID-19 rapid test ([facility], [facility]) (2/15/2022): Not detected   Precautions None   Vaccine to be given this admission No      12.  Orders:   > S/P Right below-the-knee amputation (2/8/2022 - Dr. Nighat Richards); History of critical ischemia of the right lower extremity    > Staples to be removed on 3/8/2022    > Acute postoperative blood loss anemia   > Hgb/Hct (2/16/2022, on admission to the ARU) = 8.9/28.6   > Anemia work-up (2/16/2022) showed serum iron 14, TIBC 234, iron % saturation 18, ferritin 404, reticulocyte count 2.1   > On 2/16/2022, started Iron polysaccharides 150 mg PO BID   > Hgb/Hct (2/17/2022) = 8.7/27.8   > Continue Iron polysaccharides 150 mg PO BID    > Constipation   > On 2/17/2022, started Senna 1 tab PO once daily   > Discontinue Senna 1 tab PO once daily    > Coronary artery disease; History of inferior wall myocardial infarction; History of coronary angioplasty with stent placement   > Not on any ACE-I or beta-blockers   > Continue:    > Aspirin 81 mg PO daily with breakfast    > Atorvastatin 20 mg PO once daily    > Depression   > On 2/16/2022, discontinued Trazodone 50 mg PO q HS (re: patient says he has not been taking this prior to admission)   > Continue Bupropion  mg PO once daily    > High vitamin D level;  History of vitamin D deficiency   > Prior to [facility] to [facility], patient was on Ergocalciferol 50,000 units PO q 7 days   > Vitamin D 25-Hydroxy (2/16/2022) = 105.7   > On 2/16/2022, discontinued Ergocalciferol 50,000 units PO q 7 days     > Hyperlipidemia   > Continue Atorvastatin 20 mg PO once daily    > Insomnia   > On 2/16/2022, discontinued Trazodone 50 mg PO q HS (re: patient says he has not been taking this prior to admission)    > Type 2 diabetes mellitus, poorly controlled, with diabetic nephropathy, diabetic neuropathy, diabetic retinopathy, with long-term current use of insulin   > HbA1c (2/8/2022) = 13.8   > Vitamin B12 (2/16/2022) = 574   > On 2/16/2022:    > Increased Metformin from 500 mg to 850 mg PO BID with meals    > Increased Insulin lispro from 4 units to 5 units SC TID AC   > On 2/17/2022:    > Increased Insulin glargine from 45 units to 50 units SC daily before breakfast    > Decreased Insulin lispro from 5 units to 4 units SC TID AC   > Insulin glargine 20 units SC x 1 dose this 9PM   > Continue:    > Increase Metformin from 850 mg to 1000 mg PO BID with meals    > Change Insulin glargine from 50 units SC daily before breakfast to 45 units SC q HS    > Insulin lispro 4 units SC TID AC    > Insulin lispro sliding scale SC TID AC only    > Analgesia   > On 2/16/2022:    > Started:     > Acetaminophen 650 mg PO TID (8AM, 12PM, 4PM)     > Methocarbamol 500 mg PO TID (8AM, 2PM, 8PM)    > Increased Gabapentin from 300 mg to 400 mg PO TID (8AM, 2PM, 8PM)   > Continue:    > Acetaminophen 650 mg PO TID (8AM, 12PM, 4PM)    > Acetaminophen 650 mg PO q 4 hr PRN for pain level 4/10 or lesser (from 8PM to 4AM only)    > Gabapentin 400 mg PO TID (8AM, 2PM, 8PM)    > Methocarbamol 500 mg PO TID (8AM, 2PM, 8PM)    > Decrease Oxycodone from 10 mg to 7.5 mg PO q 4 hr PRN for pain level 5/10 or greater       12. Personal Protective Equipment (N95 face mask and eye goggles) was used while interacting with the patient. Patient was using a surgical mask. 15. Patient's progress in rehabilitation and medical issues discussed with the patient. All questions answered to the best of my ability. Care plan discussed with patient and nurse. 14. Total clinical care time is 30 minutes, including review of chart including all labs, radiology, past medical history, and discussion with patient. Greater than 50% of my time was spent in coordination of care and counseling.       Signed:    Sarmad Scanlon MD    February 18, 2022

## 2022-02-18 NOTE — PROGRESS NOTES
Problem: Self Care Deficits Care Plan (Adult)  Goal: *Therapy Goal (Edit Goal, Insert Text)  Description: Occupational Therapy Goals   Long Term Goals  Initiated 22 and to be accomplished within 2 week(s)  1. Pt will perform self-feeding independently. 2. Pt will perform grooming independently. 3. Pt will perform UB bathing with Joseph. 4. Pt will perform LB bathing with Joseph. 5. Pt will perform tub/shower transfer with Joseph. 6. Pt will perform UB dressing with Joseph. 7. Pt will perform LB dressing with Joseph. 8. Pt will perform toileting task with Joseph. 9. Pt will perform toilet transfer with Joseph. Short Term Goals   Initiated 22 and to be accomplished within 7 day(s), reassess 22  1. Pt will perform self-feeding with Joseph. 2. Pt will perform grooming with Joseph. 3. Pt will perform UB bathing with supervision. 4. Pt will perform LB bathing with supervision. 5. Pt will perform tub/shower transfer with SBA. 6. Pt will perform UB dressing with Joseph. 7. Pt will perform LB dressing with Diana. 8. Pt will perform toileting task with CGA. 9. Pt will perform toilet transfer with SBA.    2022 1330 by Halbert Scheuermann, OT  Outcome: Progressing Towards Goal  2022 1022 by Halbert Scheuermann, OT  Outcome: Progressing Towards Goal   Occupational Therapy TREATMENT    Patient: Louise Ford   59 y.o. Patient identified with name and : yes    Date: 2022    First Tx Session  Time In: 11:10 AM  Time Out[de-identified] 12:33      Diagnosis: Status post below knee amputation of right lower extremity ([facility] [address] [Z89.511]   Precautions: Fall  Chart, occupational therapy assessment, plan of care, and goals were reviewed. Pain:  Pt reports 9/10 pain or discomfort prior to treatment. Pt reports 5/10 pain or discomfort post treatment.    Intervention Provided: ADL (LBD, toileting), TE (UB and trunk), IADL, functional t/f, TA      SUBJECTIVE:   Patient stated jaime, I'm feeling a little dizzy.    OBJECTIVE DATA SUMMARY:     THERAPEUTIC ACTIVITY Daily Assessment    Sequencing; orthosis donning/doffing Pt. Played 5 rounds of connect 4 in order to increase attention to details and promote sequencing skills to be applied during ADLs. With 2/5 games successful. Therapist guided pt through donning/doffing protective RLE orthosis. Pt required min A for success. THERAPEUTIC EXERCISE Daily Assessment   UB strengthening, trunk strengthening and seated balance Pt. Performed bicep curls (5lb), shoulder flexion (2lb), red web (hand strengthening), 3x10; chair pushups (2x10), 3x10. Min Verbal cues for correct movement patterns. Resident c/o some dizziness at [facility], therapist reclined pt and took blood pressure of 113/69. Dizziness resolved after short break. Pt. Was able to bend forward and pick item off of ground 2x15 with good seated balance and support from UE. Pt. Was able to roll w/c from therapy gym to room with S, with therapist providing encouragement of participation in skilled OT services, and rationale to increase pts participation in treatment. IADL Daily Assessment   Bed making- Min A  Pt. Is able to make his bed from his w/c with min A d/t fatigue. Therapist provided verbal cues, encouragement, and some min physical assistance for desired outcome.       GROOMING Daily Assessment    Grooming  Grooming Assistance : 5 (Stand-by assistance)  Comments:  (Seated handwashing at sink)     TOILETING Daily Assessment    Toileting  Toileting Assistance (FIM Score): 5 (Supervision)  Adaptive Equipment: Elevated seat     LOWER BODY DRESSING Daily Assessment    Lower Body Dressing   Dressing Assistance : 6 (Modified independent) (L sock donning and doffing)  Position Performed: Bending forward method (seated at edge of mat)     MOBILITY/TRANSFERS Daily Assessment    Functional Transfers  Toilet Transfer : Elevated seat;Squat pivot transfer  Amount of Assistance Required: 5 (stand-by assistance) (Verbal cues for safety for correct w/c placement)  W/c<>chair with min A. Therapist provided direction on proper chair set up, as pt. Observed to set w/c chair up too far from recliner, also made a 360 degree turn during t/f.        ASSESSMENT:  Pt. Was active in skilled OT services, however required education to rational to increase understanding. Pt. Participated in UB strengthening to increase t/f safety, functional transfers at toilet, mat, and recliner with CGA, requires cues for correct w/c placement and sequencing. Progression toward goals:  [x]          Improving appropriately and progressing toward goals  []          Improving slowly and progressing toward goals  []          Not making progress toward goals and plan of care will be adjusted     PLAN:  Patient continues to benefit from skilled intervention to address the above impairments. Continue treatment per established plan of care. Discharge Recommendations: To Be Determined  Further Equipment Recommendations for Discharge:  N/A     Activity Tolerance:  Pt. Demonstrated fair activity tolerance today, with some rest breaks and one episode of dizziness at [facility]. Estimated LOS:3/2/2022    Please refer to the flowsheet for vital signs taken during this treatment. After treatment:   [x]  Patient left in no apparent distress sitting up in chair   []  Patient left in no apparent distress in bed  [x]  Call bell left within reach  []  Nursing notified  []  Caregiver present  []  Bed alarm activated    COMMUNICATION/EDUCATION:   [] Home safety education was provided and the patient/caregiver indicated understanding. [] Patient/family have participated as able in goal setting and plan of care. [] Patient/family agree to work toward stated goals and plan of care. [x] Patient understands intent and goals of therapy, but is neutral about his/her participation. [] Patient is unable to participate in goal setting and plan of care.       Griselda Younger, OT

## 2022-02-18 NOTE — PROGRESS NOTES
Problem: Mobility Impaired (Adult and Pediatric)  Goal: *Acute Goals and Plan of Care (Insert Text)  Description: Physical Therapy Short Term Goals  Initiated 2/16/2022 and to be accomplished within 7 day(s) (2/23/2022)  1. Patient will move from supine to sit and sit to supine , scoot up and down, and roll side to side in bed with independence. 2.  Patient will transfer from bed to chair and chair to bed with supervision/set-up using the least restrictive device. 3.  Patient will perform sit to stand with supervision/set-up. 4.  Patient will ambulate with supervision/set-up for 50 feet with the least restrictive device. 5.  Patient will ascend/descend 3 stairs with 1 handrail(s) with minimal assistance/contact guard assist.    Physical Therapy Long Term Goals  Initiated 2/16/2022 and to be accomplished within 14 day(s) (3/02/2022)  1. Patient will move from supine to sit and sit to supine , scoot up and down, and roll side to side in bed with independence. 2.  Patient will transfer from bed to chair and chair to bed with modified independence using the least restrictive device. 3.  Patient will perform sit to stand with modified independence. 4.  Patient will ambulate with modified independence for 50 feet with the least restrictive device. 5.  Patient will ascend/descend 3 stairs with 1 handrail(s) or bumping up on steps with supervision/set-up. Outcome: Progressing Towards Goal   PHYSICAL THERAPY TREATMENT    Patient: Jelena Lind (45 y.o. male)  Date: 2/18/2022  Diagnosis: Status post below knee amputation of right lower extremity ([facility]) [Z89.511] Status post below knee amputation of right lower extremity ([facility] [address]  Precautions: Fall  Chart, physical therapy assessment, plan of care and goals were reviewed. Time in: 1035  Time out: 1100  Pt seen for: bed mobility, txfr training, w/c mobility    Time In:1340  Time LPB:[id number]    Patient seen for: Transfer training;Gait training; Wheelchair mobility; Therapeutic exercise (bed mobility)    Pain:  Pt pain was reported as 9/10 in AM, 7/10 in PM pre-treatment. Pt pain was reported as 9/10 in AM, 7/10 in PM post-treatment. Intervention: nurse already admin pain meds. Patient identified with name and : yes     SUBJECTIVE:      Pt initially declined first session attempt with c/o pain and pt stating \"I just don't feel like doing anything. \" Pt repeated \"Lord help me\" over and over and was tearful. Pt eventually agreed to get out of bed and participate. Pt agreeable to PM PT session but states \"I was just about to get a good nap. How long do I have you for? \"    OBJECTIVE DATA SUMMARY:    Objective:     BED/MAT MOBILITY Daily Assessment     Supine to Sit : 6 (Modified independent)  Sit to Supine : 6 (Modified independent)  Pt presents supine in bed in AM and performed supine<->sit on right side of bed with HOB elevated Joseph. During PM session, pt performed bed mobility on right side of mat table Joseph. TRANSFERS Daily Assessment     Transfer Type: Other  Other: squat pivot  Transfer Assistance : 4 (Contact guard assistance)  Sit to Stand Assistance: Contact guard assistance  Pt performed squat pivot txfr bed to w/c to right side with SBA and pt not requiring v/c for proper hand placement. At start of PM session, pt presents sitting up in recliner and performed squat pivot txfr recliner to w/c to right side with SBA/CGA for safety due to pt hopped left foot with wt bearing on BUE on arm rests. Pt performed sit to stand from w/c to RW with SBA/CGA for safety and v/c for proper hand placement with stand to sit. Pt performed squat pivot txfr w/c to recliner to left side with CGA for safety and balance,at end of session.          GAIT Daily Assessment    Gait Description (WDL)      Gait Abnormalities      Assistive device Walker, rolling;Gait belt    Ambulation assistance - level surface 4 (Contact guard assistance)    Distance 65 Feet (ft) (x2 trials)    Ambulation assistance- uneven surface      Comments Pt ambulated 2x65ft with RW and CGA with w/c follow. Pt required initial v/c to prevent increased step length. Pt demo'd BUE and left LE fatigue and needed to sit for safety. BALANCE Daily Assessment     Sitting - Static: Good (unsupported)  Sitting - Dynamic: Good (unsupported)        WHEELCHAIR MOBILITY Daily Assessment     Able to Propel (ft): 248 feet  Functional Level:  (supervision)  Curbs/Ramps Assist Required (FIM Score): 0 (Not tested)  Wheelchair Setup Assist Required : 4 (Minimal assistance)  Wheelchair Management: Manages left brake;Manages right brake  Pt propelled w/c from room to gym for AM and PM sessions with BUE and supervision. THERAPEUTIC EXERCISES Daily Assessment       Supine right LE hip and knee flexion, quad set and hip abd 2x10, BLE SAQ 2x10 with 2# left LE        ASSESSMENT:  Pt required increased encouragement to participate in first session due to increased pain. Pt demo'd decreased motivation and verbalized wanting to be finished with therapy. Progression toward goals:  [x]      Improving appropriately and progressing toward goals  []      Improving slowly and progressing toward goals  []      Not making progress toward goals and plan of care will be adjusted      PLAN:  Patient continues to benefit from skilled intervention to address the above impairments. Continue treatment per established plan of care. Discharge Recommendations:  Home Health  Further Equipment Recommendations for Discharge:  rolling walker and wheelchair 18 inch      Estimated Discharge Date: 3/2/2022    Activity Tolerance:   Fair  Please refer to the flowsheet for vital signs taken during this treatment.     After treatment:   [] Patient left in no apparent distress in bed  [x] Patient left in no apparent distress sitting up in chair  [] Patient left in no apparent distress in w/c mobilizing under own power  [] Patient left in no apparent distress dining area  [] Patient left in no apparent distress mobilizing under own power  [x] Call bell left within reach  [] Nursing notified  [] Caregiver present  [] Bed alarm activated   [x] Chair alarm activated      Dulce Vázquze, RICHIE  2/18/2022

## 2022-02-18 NOTE — ROUTINE PROCESS
0800 Pt. Awake sitting up in bed no change in assessment pt. Reported to be feeling fine. 0930 Pt. Sitting up in  Chair eating breakfast.  1200 with therapy. 1330 able to transfer from bed to chair with assist.  1500 no change in assessment. 1600 Dressing changed by Physician assist.. Incision and sutures intact no drainage . 1800 Pt .sitting up in chair eating dinner.

## 2022-02-18 NOTE — ROUTINE PROCESS
SHIFT CHANGE NOTE FOR [facility]    Bedside and Verbal shift change report given to Aurea Roman RN (oncoming nurse) by Luis Enrique Machuca RN (offgoing nurse). Report included the following information SBAR, Kardex, MAR and Recent Results. Situation:   Code Status: Full Code   Hospital Day: 3   Problem List:   Hospital Problems  Date Reviewed: 2/17/2022          Codes Class Noted POA    Poorly controlled type 2 diabetes mellitus [facility] ICD-10-CM: E11.65  ICD-9-CM: 250.00  Unknown Yes    Overview Signed 2/16/2022 12:50 AM by Allan Lange MD     HbA1c (2/8/2022) = 13.8             Long term current use of insulin (HCC) ICD-10-CM: Z79.4  ICD-9-CM: V58.67  Unknown Yes        High vitamin D level (Chronic) ICD-10-CM: E67.3  ICD-9-CM: 278.4  2/16/2022 Yes    Overview Signed 2/16/2022  9:52 AM by Allan Lange MD     Vitamin D 25-Hydroxy (2/16/2022) = 105.7             Impaired mobility and ADLs ICD-10-CM: Z74.09, Z78.9  ICD-9-CM: V49.89  2/8/2022 Yes        * (Principal) Status post below knee amputation of right lower extremity ([facility] [address] ICD-10-CM: Z89.511  ICD-9-CM: V49.75  2/8/2022 Yes    Overview Signed 2/16/2022 12:47 AM by Allan Lange MD     S/P Right below-the-knee amputation (2/8/2022 - Dr. Khushbu Arenas)             Critical ischemia of lower extremity [facility]) ICD-10-CM: T64.961  ICD-9-CM: 459.9  2/8/2022 No        Acute postoperative anemia due to expected blood loss ICD-10-CM: D62  ICD-9-CM: 285.1  2/8/2022 Yes              Background:   Past Medical History:   Past Medical History:   Diagnosis Date    Acute postoperative anemia due to expected blood loss 2/8/2022    Arterial occlusion, lower extremity ([facility] [address] 11/27/2021    Cardiac cath 11/09/2011    RCA patent. LM patent. LAD patent. mD1 55%. CX patent. Prior stent patent. Edison 85% (2.5 x 15-mm Xience stent, resid 0%). LVEDP 12. EF 40-45%. High lateral hypk (RI distribution).       Chronic alcohol use     Fri-Sun one fifth; Mon-Thur: Pint of liquor    Constipation     Coronary artery disease involving native coronary artery of native heart     Critical ischemia of lower extremity ([facility] [address] 2/8/2022    Depression     Diabetic nephropathy associated with type 2 diabetes mellitus ([facility] [address] 4/4/2019    [facility]     Gastroesophageal reflux disease     High vitamin D level 2/16/2022    Vitamin D 25-Hydroxy (2/16/2022) = 105.7    History of acute inferior wall myocardial infarction 2009    History of coronary angioplasty with insertion of stent 07/29/2009    2.5 x 13 Cypher stent to CX.      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 without macular edema associated with type 2 diabetes mellitus ([facility] [address] 12/18/2020    Nuclear sclerosis of both eyes 12/18/2020    Otalgia 05/08/2019    Poorly controlled type 2 diabetes mellitus (HCC)     HbA1c (2/8/2022) = 13.8    Tobacco use disorder     contemplating stopping        Assessment:   Changes in Assessment throughout shift: No change to previous assessment     Patient has a central line: no Patient has Gallardo Cath: no    Last Vitals:     Vitals:    02/16/22 2100 02/17/22 0852 02/17/22 1603 02/17/22 1930   BP: 121/72 119/69 131/76 136/79   Pulse: 87 76 69 92   Resp: 18 19 18 18   Temp: 97.5 °F (36.4 °C) 98.7 °F (37.1 °C) 98.7 °F (37.1 °C) 98.3 °F (36.8 °C)   SpO2: 97% 99% 99% 95%   Weight:       Height:            PAIN    Pain Assessment    Pain Intensity 1: 0 (02/18/22 0420) Pain Intensity 1: 2 (12/29/14 1105)    Pain Location 1: Knee Pain Location 1: Abdomen    Pain Intervention(s) 1: Medication (see MAR) Pain Intervention(s) 1: Medication (see MAR)  Patient Stated Pain Goal: 0 Patient Stated Pain Goal: 0  o Intervention effective: yes  o Other actions taken for pain:       Skin Assessment  Skin color    Condition/Temperature    Integrity Skin Integrity: Incision (comment)  Turgor    Weekly Pressure Ulcer Documentation  Pressure  Injury Documentation: No Pressure Injury Noted-Pressure Ulcer Prevention Initiated  Wound Prevention & Protection Methods  Orientation of wound Orientation of Wound Prevention: Posterior  Location of Prevention Location of Wound Prevention: Sacrum/Coccyx  Dressing Present Dressing Present : No  Dressing Status    Wound Offloading Wound Offloading (Prevention Methods): Bed, pressure redistribution/air,Repositioning,Turning     INTAKE/OUPUT  Date 02/17/22 0700 - 02/18/22 0659 02/18/22 0700 - 02/19/22 0659   Shift 1536-0315 6765-8417 24 Hour Total 2071-8980 9550-0199 24 Hour Total   INTAKE   P.O. 240  240        P. O. 240  240      Shift Total(mL/kg) 240(3.1)  240(3.1)      OUTPUT   Urine(mL/kg/hr)  250(0.3) 250(0.1)        Urine Voided  250 250        Urine Occurrence(s) 2 x 3 x 5 x      Stool           Stool Occurrence(s) 2 x 4 x 6 x      Shift Total(mL/kg)  250(3.2) 250(3.2)       -250 -10      Weight (kg) 78.2 78.2 78.2 78.2 78.2 78.2       Recommendations:  1. Patient needs and requests: none    2. Pending tests/procedures: none     3. Functional Level/Equipment:   /      Fall Precautions:   Fall risk precautions were reinforced with the patient; he was instructed to call for help prior to getting up. The following fall risk precautions were continued: bed/ chair alarms, door signage, yellow bracelet and socks as well as update of the Ledesma Blades tool in the patient's room. Loly Score:      HEALS Safety Check    A safety check occurred in the patient's room between off going nurse and oncoming nurse listed above.     The safety check included the below items  Area Items   H  High Alert Medications - Verify all high alert medication drips (heparin, PCA, etc.)   E  Equipment - Suction is set up for ALL patients (with eliel)  - Red plugs utilized for all equipment (IV pumps, etc.)  - WOWs wiped down at end of shift.  - Room stocked with oxygen, suction, and other unit-specific supplies   A  Alarms - Bed alarm is set for fall risk patients  - Ensure chair alarm is in place and activated if patient is up in a chair   L  Lines - Check IV for any infiltration  - Gallardo bag is empty if patient has a Gallardo   - Tubing and IV bags are labeled   S  Safety   - Room is clean, patient is clean, and equipment is clean. - Hallways are clear from equipment besides carts. - Fall bracelet on for fall risk patients  - Ensure room is clear and free of clutter  - Suction is set up for ALL patients (with eliel)  - Hallways are clear from equipment besides carts.    - Isolation precautions followed, supplies available outside room, sign posted     Donna Tripp RN

## 2022-02-18 NOTE — PROGRESS NOTES
Problem: Falls - Risk of  Goal: *Absence of Falls  Description: Document [place] Fail Fall Risk and appropriate interventions in the flowsheet. Outcome: Progressing Towards Goal  Note: Fall Risk Interventions:  Mobility Interventions: OT consult for ADLs,PT Consult for mobility concerns,Assess mobility with egress test         Medication Interventions: Bed/chair exit alarm    Elimination Interventions: Bed/chair exit alarm,Call light in reach,Patient to call for help with toileting needs,Urinal in reach    History of Falls Interventions: Bed/chair exit alarm,Evaluate medications/consider consulting pharmacy,Room close to nurse's station         Problem: Patient Education: Go to Patient Education Activity  Goal: Patient/Family Education  Outcome: Progressing Towards Goal     Problem: Diabetes Self-Management  Goal: *Disease process and treatment process  Description: Define diabetes and identify own type of diabetes; list 3 options for treating diabetes. Outcome: Progressing Towards Goal  Goal: *Incorporating nutritional management into lifestyle  Description: Describe effect of type, amount and timing of food on blood glucose; list 3 methods for planning meals. Outcome: Progressing Towards Goal  Goal: *Incorporating physical activity into lifestyle  Description: State effect of exercise on blood glucose levels. Outcome: Progressing Towards Goal  Goal: *Developing strategies to promote health/change behavior  Description: Define the ABC's of diabetes; identify appropriate screenings, schedule and personal plan for screenings. Outcome: Progressing Towards Goal  Goal: *Using medications safely  Description: State effect of diabetes medications on diabetes; name diabetes medication taking, action and side effects. Outcome: Progressing Towards Goal  Goal: *Monitoring blood glucose, interpreting and using results  Description: Identify recommended blood glucose targets  and personal targets.   Outcome: Progressing Towards Goal  Goal: *Prevention, detection, treatment of acute complications  Description: List symptoms of hyper- and hypoglycemia; describe how to treat low blood sugar and actions for lowering  high blood glucose level. Outcome: Progressing Towards Goal  Goal: *Prevention, detection and treatment of chronic complications  Description: Define the natural course of diabetes and describe the relationship of blood glucose levels to long term complications of diabetes.   Outcome: Progressing Towards Goal  Goal: *Developing strategies to address psychosocial issues  Description: Describe feelings about living with diabetes; identify support needed and support network  Outcome: Progressing Towards Goal  Goal: *Insulin pump training  Outcome: Progressing Towards Goal  Goal: *Sick day guidelines  Outcome: Progressing Towards Goal  Goal: *Patient Specific Goal (EDIT GOAL, INSERT TEXT)  Outcome: Progressing Towards Goal     Problem: Patient Education: Go to Patient Education Activity  Goal: Patient/Family Education  Outcome: Progressing Towards Goal     Problem: Nutrition Deficit  Goal: *Optimize nutritional status  Outcome: Progressing Towards Goal

## 2022-02-18 NOTE — PROGRESS NOTES
Problem: Neurolinguistics Impaired (Adult)  Goal: *Speech Goal: (INSERT TEXT)  Description: Long term goals  Patient will:  1. Be oriented x 3 and recall events of the day, supervision. 2. Recall 3 related words after 5 minutes with supervision and mnemonic training. 3. Perform functional problem solving tasks with 90% accuracy. 4. Recall strategies learned in OT/PT sessions (provided by the therapists) with min assist-supervision. Short term goals (by 2/25/22)  Patient will:  1. Be oriented x 3 and recall events of the day, min assist.  2. Recall 2 related words after 5 minutes with min assist and mnemonic training. 3. Perform functional problem solving tasks with 70-80% accuracy. 4. Recall strategies learned in OT/PT sessions (provided by the therapists) with mod cues. Note:   Speech LAnguage Pathology evaluation    Patient: Luke Chaney (1 y.o. male)  Date: 2/18/2022  Primary Diagnosis: Status post below knee amputation of right lower extremity ([facility] [address] [Z89.511]        Precautions:    Fall  Time in: 1400  Time Out:  1500    Pain:   No report of pain during evaluation    SUBJECTIVE:   Patient stated I'm just doing what I need to do to move on. OBJECTIVE:     Past Medical History:   Diagnosis Date    Acute postoperative anemia due to expected blood loss 2/8/2022    Arterial occlusion, lower extremity ([address] 11/27/2021    Cardiac cath 11/09/2011    RCA patent. LM patent. LAD patent. mD1 55%. CX patent. Prior stent patent. Edison 85% (2.5 x 15-mm Xience stent, resid 0%). LVEDP 12. EF 40-45%. High lateral hypk (RI distribution).       Chronic alcohol use     Fri-Sun one fifth; Mon-Thur: Pint of liquor    Constipation     Coronary artery disease involving native coronary artery of native heart     Critical ischemia of lower extremity ([address] 2/8/2022    Depression     Diabetic nephropathy associated with type 2 diabetes mellitus ([address] 4/4/2019    Diabetic neuropathy associated with type 2 diabetes mellitus ([facility] [address]     Gastroesophageal reflux disease     High vitamin D level 2/16/2022    Vitamin D 25-Hydroxy (2/16/2022) = 105.7    History of acute inferior wall myocardial infarction 2009    History of coronary angioplasty with insertion of stent 07/29/2009    2.5 x 13 Cypher stent to CX.       History of essential hypertension     History of lacunar cerebrovascular accident     History of vitamin D deficiency 4/15/2018    Hyperlipidemia     Hypertensive retinopathy 12/18/2020    Insomnia 7/2/2018    Long term current use of insulin ([facility] [address]     Mediastinal adenopathy 06/25/2015    Migraine headache     Mild nonproliferative diabetic retinopathy of left eye without macular edema associated with type 2 diabetes mellitus ([facility] [address] 12/18/2020    Nuclear sclerosis of both eyes 12/18/2020    Otalgia 05/08/2019    Poorly controlled type 2 diabetes mellitus ([facility] [address]     HbA1c (2/8/2022) = 13.8    Tobacco use disorder     contemplating stopping     Past Surgical History:   Procedure Laterality Date    COLONOSCOPY N/A 10/28/2020    COLONOSCOPY w/polypectomy performed by Rios Friend MD at [facility] Right 02/08/2022    S/P Right below-the-knee amputation (2/8/2022 - Dr. Violetta Smart)    HX CORONARY STENT PLACEMENT  07/29/2009    HX HEART CATHETERIZATION  8/30/2011    HX HEART CATHETERIZATION  11/09/2011    HX WISDOM TEETH EXTRACTION      x4    VASCULAR SURGERY PROCEDURE UNLIST      Multiple vascular surgeries     Prior Level of Function/Home Situation: Independent prior to acute hospitalization  Home Situation  Home Environment: Private residence  # Steps to Enter: 1  Rails to Enter: Yes  Hand Rails : Bilateral  One/Two Story Residence: One story  Living Alone: No (lives with girlfriend)  Support Systems: Spouse/Significant Other,Child(kenzie)  Patient Expects to be Discharged to[de-identified] Home  Current DME Used/Available at Home: None  Tub or Shower Type: Shower  Mental Status:  Neurologic State: Irene Larry open to voice  Orientation Level: Oriented X4  Cognition: Follows commands,Memory loss  Perception: Appears intact  Perseveration: No perseveration noted  Safety/Judgement: Awareness of environment  Motor Speech:  Oral-Motor Structure/Motor Speech  Face: No impairment  Labial: No impairment  Dentition: Natural  Oral Hygiene: WFL  Lingual: No impairment  Velum: No impairment  Mandible: No impairment  Dysarthric Characteristics: None  Intelligibility: No impairment  Intonation: Patient with relatively flat affect. Rate: WFL  Prosody: Limited  Overall Impairment Severity: Minimal  Language Comprehension and Expression:  Auditory Comprehension  Auditory Impairment: No   Hearing Aid: None  Verbal Expression  Primary Mode of Expression: Verbal  Initiation: No impairment  Naming: No impairment  Sentence Formulation: No impairment  Conversation: No impairment  Overall Impairment: None  Reading Comprehension  Visual Impairment: No impairment  Pre-Morbid Reading Status: Literate (Stopped shcool in the 11th grade.)  Scanning/Tracking : No impairment  Words : Yes  Sentence: No Impairment  Paragraph :  (Difficulty with more complex information.)  Oral Reading: No impairment  Interfering Components: Attention to detail;Processing time  Effective Techniques: Large print  Overall Impairment Severity: Mild  Written Expression  Pre-Morbid Dominant Hand: Right  Pre-Morbid Writing Skills: Functional  Legibility :  (Not assessed this date.)  Neuro-Linguistics:  Verbal Reasoning Tasks: No Impairment  Verbal Problem Solving: Impaired  Verbal Organization: No Impairment  Thought Organization: WFL  Mathematical:  (DNT)  Memory: Impaired  Attention : No Impairement  Pragmatics:  Pragmatics Impairment: Inconsistent eye contact;Monotone  Pragmatics Impairment Severity: Mild  Voice: Within functional limits for patient's age and gender.     After treatment:   [x]              Patient left in no apparent distress sitting up in chair  []              Patient left in no apparent distress in bed  [x]              Call bell left within reach  [x]              Nursing notified  []              Caregiver present  []              Bed alarm activated    ASSESSMENT:   Based on the objective data described below, the patient presents with mild cognitive deficits and flat affect following surgical procedure and a history of prior right lacunar infarct. Patient will benefit from skilled intervention to address the above impairments. Patients rehabilitation potential is considered to be Good  Factors which may influence rehabilitation potential include:   []              None noted  [x]              Mental ability/status  [x]              Medical condition  [x]              Home/family situation and support systems  []              Safety awareness  [x]              Pain tolerance/management  []              Other:     PLAN:   Recommendations and Planned Interventions:  Patient will follow to address noted cognitive per the above plan of care. Encouragement and identification of activities of interest to him will be an ongoing aspect of care. Frequency/Duration: Patient will be followed by speech-language pathology 1-2 times per day/4-7 days per week to address goals. Discharge Recommendations: Outpatient    Estimated LOS: Through 3/2/22    COMMUNICATION/EDUCATION:   [x]  Patient/family have participated as able in goal setting and plan of care. [x]  Patient/family agree to work toward stated goals and plan of care. []  Patient understands intent and goals of therapy, but is neutral about his/her participation. []  Patient is unable to participate in goal setting and plan of care.     Thank you for this referral.  Laura Bar, LOI  Time calculation:  61 Minutes

## 2022-02-19 LAB
GLUCOSE BLD STRIP.AUTO-MCNC: 110 MG/DL (ref 70–110)
GLUCOSE BLD STRIP.AUTO-MCNC: 134 MG/DL (ref 70–110)
GLUCOSE BLD STRIP.AUTO-MCNC: 172 MG/DL (ref 70–110)
GLUCOSE BLD STRIP.AUTO-MCNC: 74 MG/DL (ref 70–110)

## 2022-02-19 PROCEDURE — 65310000000 HC RM PRIVATE REHAB

## 2022-02-19 PROCEDURE — 82962 GLUCOSE BLOOD TEST: CPT

## 2022-02-19 PROCEDURE — 74011250637 HC RX REV CODE- 250/637: Performed by: INTERNAL MEDICINE

## 2022-02-19 PROCEDURE — 74011636637 HC RX REV CODE- 636/637: Performed by: INTERNAL MEDICINE

## 2022-02-19 PROCEDURE — 74011250636 HC RX REV CODE- 250/636: Performed by: INTERNAL MEDICINE

## 2022-02-19 PROCEDURE — 2709999900 HC NON-CHARGEABLE SUPPLY

## 2022-02-19 RX ORDER — INSULIN GLARGINE 100 [IU]/ML
35 INJECTION, SOLUTION SUBCUTANEOUS
Status: DISCONTINUED | OUTPATIENT
Start: 2022-02-19 | End: 2022-02-26

## 2022-02-19 RX ADMIN — ACETAMINOPHEN 650 MG: 325 TABLET ORAL at 16:55

## 2022-02-19 RX ADMIN — METHOCARBAMOL TABLETS 500 MG: 500 TABLET, COATED ORAL at 21:21

## 2022-02-19 RX ADMIN — ENOXAPARIN SODIUM 40 MG: 100 INJECTION SUBCUTANEOUS at 06:33

## 2022-02-19 RX ADMIN — Medication 4 UNITS: at 17:23

## 2022-02-19 RX ADMIN — GABAPENTIN 400 MG: 400 CAPSULE ORAL at 10:10

## 2022-02-19 RX ADMIN — OXYCODONE 7.5 MG: 5 TABLET ORAL at 10:10

## 2022-02-19 RX ADMIN — METFORMIN HYDROCHLORIDE 1000 MG: 500 TABLET ORAL at 18:33

## 2022-02-19 RX ADMIN — THERA TABS 1 TABLET: TAB at 10:10

## 2022-02-19 RX ADMIN — ACETAMINOPHEN 650 MG: 325 TABLET ORAL at 12:26

## 2022-02-19 RX ADMIN — Medication 150 MG: at 10:10

## 2022-02-19 RX ADMIN — GABAPENTIN 400 MG: 400 CAPSULE ORAL at 16:55

## 2022-02-19 RX ADMIN — BUPROPION HYDROCHLORIDE 150 MG: 150 TABLET, EXTENDED RELEASE ORAL at 10:10

## 2022-02-19 RX ADMIN — ATORVASTATIN CALCIUM 20 MG: 20 TABLET, FILM COATED ORAL at 10:10

## 2022-02-19 RX ADMIN — ACETAMINOPHEN 650 MG: 325 TABLET ORAL at 10:10

## 2022-02-19 RX ADMIN — Medication 35 UNITS: at 20:29

## 2022-02-19 RX ADMIN — METFORMIN HYDROCHLORIDE 1000 MG: 500 TABLET ORAL at 10:10

## 2022-02-19 RX ADMIN — Medication 150 MG: at 18:33

## 2022-02-19 RX ADMIN — METHOCARBAMOL TABLETS 500 MG: 500 TABLET, COATED ORAL at 15:02

## 2022-02-19 RX ADMIN — OXYCODONE 7.5 MG: 5 TABLET ORAL at 04:13

## 2022-02-19 RX ADMIN — GABAPENTIN 400 MG: 400 CAPSULE ORAL at 21:21

## 2022-02-19 RX ADMIN — Medication 200 MG: at 10:10

## 2022-02-19 RX ADMIN — OXYCODONE 7.5 MG: 5 TABLET ORAL at 23:32

## 2022-02-19 RX ADMIN — METHOCARBAMOL TABLETS 500 MG: 500 TABLET, COATED ORAL at 10:10

## 2022-02-19 RX ADMIN — Medication 4 UNITS: at 12:26

## 2022-02-19 RX ADMIN — ASPIRIN 81 MG 81 MG: 81 TABLET ORAL at 10:10

## 2022-02-19 RX ADMIN — OXYCODONE 7.5 MG: 5 TABLET ORAL at 18:46

## 2022-02-19 NOTE — PROGRESS NOTES
Problem: Falls - Risk of  Goal: *Absence of Falls  Description: Document Mendoza Wan Fall Risk and appropriate interventions in the flowsheet. Outcome: Progressing Towards Goal  Note: Fall Risk Interventions:  Mobility Interventions: Assess mobility with egress test,PT Consult for mobility concerns,Utilize walker, cane, or other assistive device         Medication Interventions: Bed/chair exit alarm    Elimination Interventions: Bed/chair exit alarm,Call light in reach,Patient to call for help with toileting needs,Urinal in reach    History of Falls Interventions: Bed/chair exit alarm         Problem: Patient Education: Go to Patient Education Activity  Goal: Patient/Family Education  Outcome: Progressing Towards Goal     Problem: Diabetes Self-Management  Goal: *Disease process and treatment process  Description: Define diabetes and identify own type of diabetes; list 3 options for treating diabetes. Outcome: Progressing Towards Goal  Goal: *Incorporating nutritional management into lifestyle  Description: Describe effect of type, amount and timing of food on blood glucose; list 3 methods for planning meals. Outcome: Progressing Towards Goal  Goal: *Incorporating physical activity into lifestyle  Description: State effect of exercise on blood glucose levels. Outcome: Progressing Towards Goal  Goal: *Developing strategies to promote health/change behavior  Description: Define the ABC's of diabetes; identify appropriate screenings, schedule and personal plan for screenings. Outcome: Progressing Towards Goal  Goal: *Using medications safely  Description: State effect of diabetes medications on diabetes; name diabetes medication taking, action and side effects. Outcome: Progressing Towards Goal  Goal: *Monitoring blood glucose, interpreting and using results  Description: Identify recommended blood glucose targets  and personal targets.   Outcome: Progressing Towards Goal  Goal: *Prevention, detection, treatment of acute complications  Description: List symptoms of hyper- and hypoglycemia; describe how to treat low blood sugar and actions for lowering  high blood glucose level. Outcome: Progressing Towards Goal  Goal: *Prevention, detection and treatment of chronic complications  Description: Define the natural course of diabetes and describe the relationship of blood glucose levels to long term complications of diabetes. Outcome: Progressing Towards Goal  Goal: *Developing strategies to address psychosocial issues  Description: Describe feelings about living with diabetes; identify support needed and support network  Outcome: Progressing Towards Goal  Goal: *Insulin pump training  Outcome: Progressing Towards Goal  Goal: *Sick day guidelines  Outcome: Progressing Towards Goal  Goal: *Patient Specific Goal (EDIT GOAL, INSERT TEXT)  Outcome: Progressing Towards Goal     Problem: Patient Education: Go to Patient Education Activity  Goal: Patient/Family Education  Outcome: Progressing Towards Goal     Problem: Nutrition Deficit  Goal: *Optimize nutritional status  Outcome: Progressing Towards Goal     Problem: Pressure Injury - Risk of  Goal: *Prevention of pressure injury  Description: Document Mayito Scale and appropriate interventions in the flowsheet.   Outcome: Progressing Towards Goal  Note: Pressure Injury Interventions:  Sensory Interventions: Assess changes in LOC,Minimize linen layers,Keep linens dry and wrinkle-free    Moisture Interventions: Absorbent underpads    Activity Interventions: Increase time out of bed,PT/OT evaluation    Mobility Interventions: HOB 30 degrees or less,PT/OT evaluation    Nutrition Interventions: Document food/fluid/supplement intake    Friction and Shear Interventions: HOB 30 degrees or less,Minimize layers                Problem: Patient Education: Go to Patient Education Activity  Goal: Patient/Family Education  Outcome: Progressing Towards Goal     Problem: Patient Education: Go to Patient Education Activity  Goal: Patient/Family Education  Outcome: Progressing Towards Goal

## 2022-02-19 NOTE — PROGRESS NOTES
[facility] PHYSICAL REHABILITATION  [address] [place] [address], [address]     INPATIENT REHABILITATION  DAILY PROGRESS NOTE     Date: 2/19/2022    Name: Ric Burnett Age / Sex: 59 y.o. / male   CSN: [id number] MRN: [id number]   [facility] Date: 2/15/2022 Length of Stay: 4 days     Primary Rehabilitation Diagnosis: Impaired Mobility and ADLs secondary to:  1. S/P Right below-the-knee amputation (2/8/2022 - Dr. Mary Kate Adhikari)  2. History of critical ischemia of the right lower extremity      Subjective:     No new issues or problems reported. Blood pressure controlled.    Blood glucose controlled but low this AM.       Objective:     Vital Signs:  Patient Vitals for the past 24 hrs:   BP Temp Pulse Resp SpO2   02/19/22 0724 129/83 97.6 °F (36.4 °C) 75 18 100 %   02/18/22 1935 132/74 97.9 °F (36.6 °C) 90 18 98 %   02/18/22 1600 115/69 97.9 °F (36.6 °C) 73 18 100 %        Current Medications:  Current Facility-Administered Medications   Medication Dose Route Frequency    oxyCODONE IR (ROXICODONE) tablet 7.5 mg  7.5 mg Oral Q4H PRN    insulin glargine (LANTUS) injection 45 Units  45 Units SubCUTAneous QHS    metFORMIN (GLUCOPHAGE) tablet 1,000 mg  1,000 mg Oral BID WITH MEALS    insulin lispro (HUMALOG) injection 4 Units  0.05 Units/kg SubCUTAneous TIDAC    glucose chewable tablet 16 g  4 Tablet Oral PRN    glucagon (GLUCAGEN) injection 1 mg  1 mg IntraMUSCular PRN    dextrose 10% infusion 0-250 mL  0-250 mL IntraVENous PRN    iron polysaccharides (NIFEREX) capsule 150 mg  1 Capsule Oral BID    thiamine HCL (B-1) tablet 200 mg  200 mg Oral DAILY    aspirin chewable tablet 81 mg  81 mg Oral DAILY WITH BREAKFAST    atorvastatin (LIPITOR) tablet 20 mg  20 mg Oral DAILY    buPROPion SR (WELLBUTRIN SR) tablet 150 mg  150 mg Oral DAILY    gabapentin (NEURONTIN) capsule 400 mg  400 mg Oral TID    therapeutic multivitamin (THERAGRAN) tablet 1 Tablet  1 Tablet Oral DAILY    acetaminophen (TYLENOL) tablet 650 mg  650 mg Oral TID    methocarbamoL (ROBAXIN) tablet 500 mg  500 mg Oral TID    acetaminophen (TYLENOL) tablet 650 mg  650 mg Oral Q4H PRN    bisacodyL (DULCOLAX) tablet 10 mg  10 mg Oral Q48H PRN    enoxaparin (LOVENOX) injection 40 mg  40 mg SubCUTAneous Q24H    insulin lispro (HUMALOG) injection   SubCUTAneous TIDAC       Allergies: Allergies   Allergen Reactions    Niacin Itching       Lab/Data Review:  Recent Results (from the past 24 hour(s))   GLUCOSE, POC    Collection Time: 02/18/22 12:32 PM   Result Value Ref Range    Glucose (POC) 99 70 - 110 mg/dL   GLUCOSE, POC    Collection Time: 02/18/22  5:09 PM   Result Value Ref Range    Glucose (POC) 123 (H) 70 - 110 mg/dL   GLUCOSE, POC    Collection Time: 02/18/22  9:45 PM   Result Value Ref Range    Glucose (POC) 161 (H) 70 - 110 mg/dL   GLUCOSE, POC    Collection Time: 02/19/22  7:26 AM   Result Value Ref Range    Glucose (POC) 74 70 - 110 mg/dL       Assessment:     Primary Rehabilitation Diagnosis  1. Impaired Mobility and ADLs  2. S/P Right below-the-knee amputation (2/8/2022 - Dr. Dory Wilhelm)  3.  History of critical ischemia of the right lower extremity    Comorbidities  Patient Active Problem List   Diagnosis Code    Hyperlipidemia E78.5    Tobacco use disorder F17.200    Mediastinal adenopathy R59.0    History of vitamin D deficiency Z86.39    Insomnia G47.00    Diabetic nephropathy associated with type 2 diabetes mellitus ([facility] [address] E11.21    Otalgia H92.09    Mild nonproliferative diabetic retinopathy of left eye without macular edema associated with type 2 diabetes mellitus ([facility] [address] T92.8840    Hypertensive retinopathy H35.039    Nuclear sclerosis of both eyes H25.13    Arterial occlusion, lower extremity (HCC) I70.209    Impaired mobility and ADLs Z74.09, Z78.9    Status post below knee amputation of right lower extremity (HCC) Z89.511    Chronic alcohol use Z72.89    History of coronary angioplasty with insertion of stent Z95.5    Coronary artery disease involving native coronary artery of native heart I25.10    Constipation K59.00    Poorly controlled type 2 diabetes mellitus ([facility]) E11.65    Long term current use of insulin ([facility]) Z79.4    Migraine headache G43.909    Diabetic neuropathy associated with type 2 diabetes mellitus ([facility]) E11.40    Gastroesophageal reflux disease K21.9    History of essential hypertension Z86.79    History of lacunar cerebrovascular accident Z80.78    History of acute inferior wall myocardial infarction I25.2    Critical ischemia of lower extremity ([facility]) I70.229    Acute postoperative anemia due to expected blood loss D62    Depression F32. A    High vitamin D level E67.3       Plan:     1. Justification for continued stay: Good progression towards established rehabilitation goals. 2. Medical Issues being followed closely:    [x]  Fall and safety precautions     [x]  Wound Care     [x]  Bowel and Bladder Function     [x]  Fluid Electrolyte and Nutrition Balance     [x]  Pain Control      3. Issues that 24 hour rehabilitation nursing is following:    [x]  Fall and safety precautions     [x]  Wound Care     [x]  Bowel and Bladder Function     [x]  Fluid Electrolyte and Nutrition Balance     [x]  Pain Control      [x]  Assistance with and education on in-room safety with transfers to and from the bed, wheelchair, toilet and shower. 4. Acute rehabilitation plan of care:    [x]  Continue current care and rehab. [x]  Physical Therapy           [x]  Occupational Therapy           []  Speech Therapy     []  Hold Rehab until further notice     5. Medications:    [x]  MAR Reviewed     [x]  Continue Present Medications     6. Chemical DVT Prophylaxis:      [x]  Enoxaparin     []  Unfractionated Heparin     []  Warfarin     []  NOAC     []  Aspirin     []  None     7. Mechanical DVT Prophylaxis:      []  FLEX Stockings     []  Sequential Compression Device     [x]  None     8.  GI Prophylaxis:      []  PPI     []  H2 Blocker     [x]  None / Not indicated     9. Code status:    [x]  Full code     []  Partial code     []  Do not intubate     []  Do not resuscitate     10. Diet:  Specifications  4 carb choices (60 gm/meal), Low fat/Low cholesterol/High fiber/SEEMA   Solids (consistency)  Regular   Liquids (consistency)  Thin   Fluid Restriction  None      11. COVID-19:  Laboratory testing COVID-19 rapid test ([facility], [facility]) (2/4/2022): Not detected  COVID-19 rapid test ([facility], [facility]) (2/15/2022): Not detected   Precautions None   Vaccine to be given this admission No      12. Orders:   > S/P Right below-the-knee amputation (2/8/2022 - Dr. Mary Kate Adhikari); History of critical ischemia of the right lower extremity    > Staples to be removed on 3/8/2022    > Acute postoperative blood loss anemia   > Hgb/Hct (2/16/2022, on admission to the ARU) = 8.9/28.6   > Anemia work-up (2/16/2022) showed serum iron 14, TIBC 234, iron % saturation 18, ferritin 404, reticulocyte count 2.1   > On 2/16/2022, started Iron polysaccharides 150 mg PO BID   > Hgb/Hct (2/17/2022) = 8.7/27.8   > Continue Iron polysaccharides 150 mg PO BID    > Constipation   > On 2/17/2022, started Senna 1 tab PO once daily   > On 2/18/2022, discontinued Senna 1 tab PO once daily    > Coronary artery disease; History of inferior wall myocardial infarction; History of coronary angioplasty with stent placement   > Not on any ACE-I or beta-blockers   > Continue:    > Aspirin 81 mg PO daily with breakfast    > Atorvastatin 20 mg PO once daily    > Depression   > On 2/16/2022, discontinued Trazodone 50 mg PO q HS (re: patient says he has not been taking this prior to admission)   > Continue Bupropion  mg PO once daily    > High vitamin D level;  History of vitamin D deficiency   > Prior to admission to [facility], patient was on Ergocalciferol 50,000 units PO q 7 days   > Vitamin D 25-Hydroxy (2/16/2022) = 105.7   > On 2/16/2022, discontinued Ergocalciferol 50,000 units PO q 7 days     > Hyperlipidemia   > Continue Atorvastatin 20 mg PO once daily    > Insomnia   > On 2/16/2022, discontinued Trazodone 50 mg PO q HS (re: patient says he has not been taking this prior to admission)    > Type 2 diabetes mellitus, poorly controlled, with diabetic nephropathy, diabetic neuropathy, diabetic retinopathy, with long-term current use of insulin   > HbA1c (2/8/2022) = 13.8   > Vitamin B12 (2/16/2022) = 574   > On 2/16/2022:    > Increased Metformin from 500 mg to 850 mg PO BID with meals    > Increased Insulin lispro from 4 units to 5 units SC TID AC   > On 2/17/2022:    > Increased Insulin glargine from 45 units to 50 units SC daily before breakfast    > Decreased Insulin lispro from 5 units to 4 units SC TID AC   > On 2/18/2022:    > Patient was given Insulin glargine 20 units SC x 1 dose at 9PM    > Increased Metformin from 850 mg to 1000 mg PO BID with meals   > Continue:    > Metformin 1000 mg PO BID with meals    > Change Insulin glargine from 50 units SC daily before breakfast to 35 units SC q HS    > Insulin lispro 4 units SC TID AC    > Insulin lispro sliding scale SC TID AC only    > Analgesia   > On 2/16/2022:    > Started:     > Acetaminophen 650 mg PO TID (8AM, 12PM, 4PM)     > Methocarbamol 500 mg PO TID (8AM, 2PM, 8PM)    > Increased Gabapentin from 300 mg to 400 mg PO TID (8AM, 2PM, 8PM)   > On 2/18/2022, decreased Oxycodone from 10 mg to 7.5 mg PO q 4 hr PRN for pain level 5/10 or greater    > Continue:    > Acetaminophen 650 mg PO TID (8AM, 12PM, 4PM)    > Acetaminophen 650 mg PO q 4 hr PRN for pain level 4/10 or lesser (from 8PM to 4AM only)    > Gabapentin 400 mg PO TID (8AM, 2PM, 8PM)    > Methocarbamol 500 mg PO TID (8AM, 2PM, 8PM)    > Oxycodone 7.5 mg PO q 4 hr PRN for pain level 5/10 or greater       Signed:    Carol Lin MD    February 19, 2022

## 2022-02-19 NOTE — ROUTINE PROCESS
SHIFT CHANGE NOTE FOR [facility]    Bedside and Verbal shift change report given to Mayra Marin RN (oncoming nurse) by Diane Shay RN (offgoing nurse). Report included the following information SBAR, Kardex, MAR and Recent Results. Situation:   Code Status: Full Code   Hospital Day: 4   Problem List:   Hospital Problems  Date Reviewed: 2/18/2022          Codes Class Noted POA    Poorly controlled type 2 diabetes mellitus ([facility] ICD-10-CM: E11.65  ICD-9-CM: 250.00  Unknown Yes    Overview Signed 2/16/2022 12:50 AM by Ynes Shaffer MD     HbA1c (2/8/2022) = 13.8             Long term current use of insulin (HCC) ICD-10-CM: Z79.4  ICD-9-CM: V58.67  Unknown Yes        High vitamin D level (Chronic) ICD-10-CM: E67.3  ICD-9-CM: 278.4  2/16/2022 Yes    Overview Signed 2/16/2022  9:52 AM by Ynes Shaffer MD     Vitamin D 25-Hydroxy (2/16/2022) = 105.7             Impaired mobility and ADLs ICD-10-CM: Z74.09, Z78.9  ICD-9-CM: V49.89  2/8/2022 Yes        * (Principal) Status post below knee amputation of right lower extremity ([facility] ICD-10-CM: Z89.511  ICD-9-CM: V49.75  2/8/2022 Yes    Overview Signed 2/16/2022 12:47 AM by Ynes Shaffer MD     S/P Right below-the-knee amputation (2/8/2022 - Dr. Violetta Smart)             Critical ischemia of lower extremity [facility]) ICD-10-CM: I72.085  ICD-9-CM: 459.9  2/8/2022 No        Acute postoperative anemia due to expected blood loss ICD-10-CM: D62  ICD-9-CM: 285.1  2/8/2022 Yes              Background:   Past Medical History:   Past Medical History:   Diagnosis Date    Acute postoperative anemia due to expected blood loss 2/8/2022    Arterial occlusion, lower extremity ([facility] 11/27/2021    Cardiac cath 11/09/2011    RCA patent. LM patent. LAD patent. mD1 55%. CX patent. Prior stent patent. Edison 85% (2.5 x 15-mm Xience stent, resid 0%). LVEDP 12. EF 40-45%. High lateral hypk (RI distribution).       Chronic alcohol use     Fri-Sun one fifth; Mon-Thur: Pint of liquor    Constipation     Coronary artery disease involving native coronary artery of native heart     Critical ischemia of lower extremity ([facility] 2/8/2022    Depression     Diabetic nephropathy associated with type 2 diabetes mellitus ([facility] 4/4/2019    Diabetic neuropathy associated with type 2 diabetes mellitus ([facility]     Gastroesophageal reflux disease     High vitamin D level 2/16/2022    Vitamin D 25-Hydroxy (2/16/2022) = 105.7    History of acute inferior wall myocardial infarction 2009    History of coronary angioplasty with insertion of stent 07/29/2009    2.5 x 13 Cypher stent to CX.      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 without macular edema associated with type 2 diabetes mellitus ([facility] 12/18/2020    Nuclear sclerosis of both eyes 12/18/2020    Otalgia 05/08/2019    Poorly controlled type 2 diabetes mellitus (HCC)     HbA1c (2/8/2022) = 13.8    Tobacco use disorder     contemplating stopping        Assessment:   Changes in Assessment throughout shift: No change to previous assessment     Patient has a central line: no Patient has Gallardo Cath: no    Last Vitals:     Vitals:    02/17/22 1930 02/18/22 0800 02/18/22 1600 02/18/22 1935   BP: 136/79 (!) 142/90 115/69 132/74   Pulse: 92 80 73 90   Resp: 18 18 18 18   Temp: 98.3 °F (36.8 °C) 98 °F (36.7 °C) 97.9 °F (36.6 °C) 97.9 °F (36.6 °C)   SpO2: 95% 100% 100% 98%   Weight:       Height:            PAIN    Pain Assessment    Pain Intensity 1: 0 (02/19/22 0500) Pain Intensity 1: 2 (12/29/14 1105)    Pain Location 1: Leg Pain Location 1: Abdomen    Pain Intervention(s) 1: Medication (see MAR) Pain Intervention(s) 1: Medication (see MAR)  Patient Stated Pain Goal: 0 Patient Stated Pain Goal: 0  o Intervention effective: yes  o Other actions taken for pain: Medication (see MAR)     Skin Assessment  Skin color    Condition/Temperature    Integrity Skin Integrity: Incision (comment)  Turgor    Weekly Pressure Ulcer Documentation  Pressure  Injury Documentation: No Pressure Injury Noted-Pressure Ulcer Prevention Initiated  Wound Prevention & Protection Methods  Orientation of wound Orientation of Wound Prevention: Posterior  Location of Prevention Location of Wound Prevention: Sacrum/Coccyx  Dressing Present Dressing Present : No  Dressing Status    Wound Offloading Wound Offloading (Prevention Methods): Bed, pressure redistribution/air     INTAKE/OUPUT  Date 02/18/22 0700 - 02/19/22 0659 02/19/22 0700 - 02/20/22 0659   Shift 6357-3896 4268-3886 24 Hour Total 8853-2723 0364-1866 24 Hour Total   INTAKE   P.O. 1320  1320        P. O. 1320  1320      Shift Total(mL/kg) 1320(16.9)  1320(16.9)      OUTPUT   Urine(mL/kg/hr) 800(0.9) 300(0.3) 1100(0.6)        Urine Voided         Urine Occurrence(s) 4 x 2 x 6 x      Stool           Stool Occurrence(s)  2 x 2 x      Shift Total(mL/kg) 800(10.2) 300(3.8) 1100(14.1)       -300 220      Weight (kg) 78.2 78.2 78.2 78.2 78.2 78.2       Recommendations:  1. Patient needs and requests: none    2. Pending tests/procedures: none     3. Functional Level/Equipment: Partial (one person) /      Fall Precautions:   Fall risk precautions were reinforced with the patient; he was instructed to call for help prior to getting up. The following fall risk precautions were continued: bed/ chair alarms, door signage, yellow bracelet and socks as well as update of the [employer] tool in the patient's room. Loly Score: 4    HEALS Safety Check    A safety check occurred in the patient's room between off going nurse and oncoming nurse listed above.     The safety check included the below items  Area Items   H  High Alert Medications - Verify all high alert medication drips (heparin, PCA, etc.)   E  Equipment - Suction is set up for ALL patients (with eliel)  - Red plugs utilized for all equipment (IV pumps, etc.)  - WOWs wiped down at end of shift.  - Room stocked with oxygen, suction, and other unit-specific supplies   A  Alarms - Bed alarm is set for fall risk patients  - Ensure chair alarm is in place and activated if patient is up in a chair   L  Lines - Check IV for any infiltration  - Gallardo bag is empty if patient has a Gallardo   - Tubing and IV bags are labeled   S  Safety   - Room is clean, patient is clean, and equipment is clean. - Hallways are clear from equipment besides carts. - Fall bracelet on for fall risk patients  - Ensure room is clear and free of clutter  - Suction is set up for ALL patients (with eliel)  - Hallways are clear from equipment besides carts.    - Isolation precautions followed, supplies available outside room, sign posted     Kerrie Hanna RN

## 2022-02-19 NOTE — PROGRESS NOTES
Problem: Mobility Impaired (Adult and Pediatric)  Goal: *Acute Goals and Plan of Care (Insert Text)  Description: Physical Therapy Short Term Goals  Initiated 2/16/2022 and to be accomplished within 7 day(s) (2/23/2022)  1. Patient will move from supine to sit and sit to supine , scoot up and down, and roll side to side in bed with independence. 2.  Patient will transfer from bed to chair and chair to bed with supervision/set-up using the least restrictive device. 3.  Patient will perform sit to stand with supervision/set-up. 4.  Patient will ambulate with supervision/set-up for 50 feet with the least restrictive device. 5.  Patient will ascend/descend 3 stairs with 1 handrail(s) with minimal assistance/contact guard assist.    Physical Therapy Long Term Goals  Initiated 2/16/2022 and to be accomplished within 14 day(s) (3/02/2022)  1. Patient will move from supine to sit and sit to supine , scoot up and down, and roll side to side in bed with independence. 2.  Patient will transfer from bed to chair and chair to bed with modified independence using the least restrictive device. 3.  Patient will perform sit to stand with modified independence. 4.  Patient will ambulate with modified independence for 50 feet with the least restrictive device. 5.  Patient will ascend/descend 3 stairs with 1 handrail(s) or bumping up on steps with supervision/set-up. Outcome: Progressing Towards Goal   PHYSICAL THERAPY TREATMENT    Patient: Daksha Vargas (31 y.o. male)  Date: 2/19/2022  Diagnosis: Status post below knee amputation of right lower extremity ([facility]) [Z89.511] Status post below knee amputation of right lower extremity ([facility]  Precautions: Fall  Chart, physical therapy assessment, plan of care and goals were reviewed. Time In:1330  Time Out:1500    Patient seen for: Gait training;Patient education; Therapeutic exercise;Transfer training;Balance activities; Wheelchair mobility    Pain:  Patient reporting 6/10 pain right residual limb    Patient identified with name and : yes    SUBJECTIVE:      Patient agreeable to treatment. Observed to have impaired short term recall as PT informed patient earlier in the day he would have PT in the afternoon, but patient was surprised to see PT upon arrival in PM.     OBJECTIVE DATA SUMMARY:    Objective:     BED/MAT MOBILITY Daily Assessment     Supine to Sit : 6 (Modified independent)  Sit to Supine : 6 (Modified independent)      TRANSFERS Daily Assessment     Transfer Type: Other  Other: squat pivot  Transfer Assistance : 5 (Stand-by assistance)  Sit to Stand Assistance: Contact guard assistance (CG/SBA)    CG/SBA with transfers with RW, needing cues for slowing down and for safer approach to transfer surfaces. *Patient needing cues for keeping limb protector brace donned whenever transferring, including when transferring into and out of bed. Patient educated on purpose of limb protector to provide limb protection from any bumps during all transfers, including those into bed. Patient will require ongoing reinforcement of this education*        GAIT Daily Assessment    Gait Description (WDL) Exceptions to WDL    Gait Abnormalities  (hop to/step to pattern s/p right BKA)    Assistive device Walker, rolling;Gait belt    Ambulation assistance - level surface 4 (Contact guard assistance)    Distance 65 Feet (ft) (patient performing x 2 bouts, fatigued after both bouts)    Ambulation assistance- uneven surface  (NT)    Comments Gait with RW needing CGA for safety, cues for not stepping too close to front of RW.          BALANCE Daily Assessment     Sitting - Static: Good (unsupported)  Sitting - Dynamic: Good (unsupported)  Standing - Static: Fair  Standing - Dynamic : Impaired    Seated balance activity during group therapy session ([id number]), emphasis on improved anterior weightshift of trunk and improved trunk control without UE support and only left LE support on ground during ball tossing/catching and balloon tapping. Performing for 2 minute durations. WHEELCHAIR MOBILITY Daily Assessment     Able to Propel (ft): 250 feet  Functional Level:  (distant supervision)  Curbs/Ramps Assist Required (FIM Score): 0 (Not tested)  Wheelchair Setup Assist Required : 4 (Minimal assistance)  Wheelchair Management: Manages left brake;Manages right brake (assistance with left leg rest required)    Performing with bilat UE to propel, over even surfaces. THERAPEUTIC EXERCISES Daily Assessment       Supine therex:  -single limb bridges left LE with bolster under right residual limb 3 x 10 reps  -SAQ right limb 3 x 10 reps with 3 sec holds  -quad sets 3 x 10 reps with 3 sec holds  -left LE AROM hip abd/add 3 x 10     Prone lying with emphasis on proper hip/pelvis positioning for maximizing benefit of improved hip flexor stretch and ability to perform hip extension. Prone exercises:  -knee flex bilat 3 x 10 reps with tactile cues at times needed  -assisted right limb extension 3 x 10 reps    Seated w/c pushups 3 x 10 reps to improve functional UE and left LE strength for ease of stairs training. ASSESSMENT:  Patient would continue to benefit from skilled PT to improve ability to perform safe functional mobility, including transfers, gait, negotiation of steps to enter home. Patient with decreased safety awareness needing reinforcement of safe mobility techniques and use of limb protector. Progression toward goals:  []      Improving appropriately and progressing toward goals  [x]      Improving slowly and progressing toward goals  []      Not making progress toward goals and plan of care will be adjusted      PLAN:  Patient continues to benefit from skilled intervention to address the above impairments. Continue treatment per established plan of care.   Discharge Recommendations:  Home Health  Further Equipment Recommendations for Discharge:  rolling walker, wheelchair due to significant Ue/left LE fatigue observed with attempts at household gait distances.        Estimated Discharge Date: 3/2/2022    Activity Tolerance:   Fair to good depending on fatigue, pain    After treatment:   [x] Patient left in no apparent distress in bed  [] Patient left in no apparent distress sitting up in chair  [] Patient left in no apparent distress in w/c mobilizing under own power  [] Patient left in no apparent distress dining area  [] Patient left in no apparent distress mobilizing under own power  [x] Call bell left within reach  [x] Nursing notified  [] Caregiver present  [x] Bed alarm activated   [] Chair alarm activated      Liset Polo, PT  2/19/2022

## 2022-02-19 NOTE — ROUTINE PROCESS
0800 Pt. Awake sitting up in chair no change in assessment pt. Reported to be feeling fine. 0930 Pt. Sitting up in chair eating breakfast.  1200 with therapy  1330 able to transfer from bed to chair with assist.  1500 no change in assessment  1800 Pt. Sitting up in chair eating dinner.

## 2022-02-20 LAB
GLUCOSE BLD STRIP.AUTO-MCNC: 107 MG/DL (ref 70–110)
GLUCOSE BLD STRIP.AUTO-MCNC: 162 MG/DL (ref 70–110)
GLUCOSE BLD STRIP.AUTO-MCNC: 173 MG/DL (ref 70–110)
GLUCOSE BLD STRIP.AUTO-MCNC: 75 MG/DL (ref 70–110)

## 2022-02-20 PROCEDURE — 97530 THERAPEUTIC ACTIVITIES: CPT

## 2022-02-20 PROCEDURE — 82962 GLUCOSE BLOOD TEST: CPT

## 2022-02-20 PROCEDURE — 97116 GAIT TRAINING THERAPY: CPT

## 2022-02-20 PROCEDURE — 65310000000 HC RM PRIVATE REHAB

## 2022-02-20 PROCEDURE — 97110 THERAPEUTIC EXERCISES: CPT

## 2022-02-20 PROCEDURE — 74011250636 HC RX REV CODE- 250/636: Performed by: INTERNAL MEDICINE

## 2022-02-20 PROCEDURE — 74011636637 HC RX REV CODE- 636/637: Performed by: INTERNAL MEDICINE

## 2022-02-20 PROCEDURE — 74011250637 HC RX REV CODE- 250/637: Performed by: INTERNAL MEDICINE

## 2022-02-20 RX ORDER — INSULIN LISPRO 100 [IU]/ML
0.04 INJECTION, SOLUTION INTRAVENOUS; SUBCUTANEOUS
Status: DISCONTINUED | OUTPATIENT
Start: 2022-02-20 | End: 2022-02-23

## 2022-02-20 RX ADMIN — Medication 150 MG: at 17:32

## 2022-02-20 RX ADMIN — GABAPENTIN 400 MG: 400 CAPSULE ORAL at 17:32

## 2022-02-20 RX ADMIN — Medication 150 MG: at 09:44

## 2022-02-20 RX ADMIN — ACETAMINOPHEN 650 MG: 325 TABLET ORAL at 17:32

## 2022-02-20 RX ADMIN — Medication 3 UNITS: at 17:32

## 2022-02-20 RX ADMIN — ACETAMINOPHEN 650 MG: 325 TABLET ORAL at 09:45

## 2022-02-20 RX ADMIN — Medication 35 UNITS: at 20:08

## 2022-02-20 RX ADMIN — ASPIRIN 81 MG 81 MG: 81 TABLET ORAL at 09:44

## 2022-02-20 RX ADMIN — ENOXAPARIN SODIUM 40 MG: 100 INJECTION SUBCUTANEOUS at 06:00

## 2022-02-20 RX ADMIN — METFORMIN HYDROCHLORIDE 1000 MG: 500 TABLET ORAL at 09:44

## 2022-02-20 RX ADMIN — OXYCODONE 7.5 MG: 5 TABLET ORAL at 20:04

## 2022-02-20 RX ADMIN — THERA TABS 1 TABLET: TAB at 09:44

## 2022-02-20 RX ADMIN — GABAPENTIN 400 MG: 400 CAPSULE ORAL at 21:19

## 2022-02-20 RX ADMIN — ACETAMINOPHEN 650 MG: 325 TABLET ORAL at 12:24

## 2022-02-20 RX ADMIN — Medication 4 UNITS: at 12:25

## 2022-02-20 RX ADMIN — GABAPENTIN 400 MG: 400 CAPSULE ORAL at 09:44

## 2022-02-20 RX ADMIN — METHOCARBAMOL TABLETS 500 MG: 500 TABLET, COATED ORAL at 14:08

## 2022-02-20 RX ADMIN — METFORMIN HYDROCHLORIDE 1000 MG: 500 TABLET ORAL at 17:32

## 2022-02-20 RX ADMIN — METHOCARBAMOL TABLETS 500 MG: 500 TABLET, COATED ORAL at 09:44

## 2022-02-20 RX ADMIN — ATORVASTATIN CALCIUM 20 MG: 20 TABLET, FILM COATED ORAL at 09:44

## 2022-02-20 RX ADMIN — Medication 4 UNITS: at 09:43

## 2022-02-20 RX ADMIN — Medication 200 MG: at 09:44

## 2022-02-20 RX ADMIN — OXYCODONE 7.5 MG: 5 TABLET ORAL at 11:34

## 2022-02-20 RX ADMIN — METHOCARBAMOL TABLETS 500 MG: 500 TABLET, COATED ORAL at 21:19

## 2022-02-20 RX ADMIN — BUPROPION HYDROCHLORIDE 150 MG: 150 TABLET, EXTENDED RELEASE ORAL at 09:44

## 2022-02-20 RX ADMIN — OXYCODONE 7.5 MG: 5 TABLET ORAL at 07:18

## 2022-02-20 NOTE — PROGRESS NOTES
Problem: Falls - Risk of  Goal: *Absence of Falls  Description: Document Aneesh Salinas Fall Risk and appropriate interventions in the flowsheet. Outcome: Progressing Towards Goal  Note: Fall Risk Interventions:  Mobility Interventions: Assess mobility with egress test,Bed/chair exit alarm,Patient to call before getting OOB,Utilize walker, cane, or other assistive device         Medication Interventions: Bed/chair exit alarm,Patient to call before getting OOB,Teach patient to arise slowly    Elimination Interventions: Bed/chair exit alarm,Call light in reach,Patient to call for help with toileting needs,Urinal in reach,Stay With Me (per policy),Toilet paper/wipes in reach    History of Falls Interventions: Bed/chair exit alarm,Door open when patient unattended,Room close to nurse's station         Problem: Patient Education: Go to Patient Education Activity  Goal: Patient/Family Education  Outcome: Progressing Towards Goal     Problem: Diabetes Self-Management  Goal: *Disease process and treatment process  Description: Define diabetes and identify own type of diabetes; list 3 options for treating diabetes. Outcome: Progressing Towards Goal  Goal: *Incorporating nutritional management into lifestyle  Description: Describe effect of type, amount and timing of food on blood glucose; list 3 methods for planning meals. Outcome: Progressing Towards Goal  Goal: *Incorporating physical activity into lifestyle  Description: State effect of exercise on blood glucose levels. Outcome: Progressing Towards Goal  Goal: *Developing strategies to promote health/change behavior  Description: Define the ABC's of diabetes; identify appropriate screenings, schedule and personal plan for screenings. Outcome: Progressing Towards Goal  Goal: *Using medications safely  Description: State effect of diabetes medications on diabetes; name diabetes medication taking, action and side effects.   Outcome: Progressing Towards Goal  Goal: *Monitoring blood glucose, interpreting and using results  Description: Identify recommended blood glucose targets  and personal targets. Outcome: Progressing Towards Goal  Goal: *Prevention, detection, treatment of acute complications  Description: List symptoms of hyper- and hypoglycemia; describe how to treat low blood sugar and actions for lowering  high blood glucose level. Outcome: Progressing Towards Goal  Goal: *Prevention, detection and treatment of chronic complications  Description: Define the natural course of diabetes and describe the relationship of blood glucose levels to long term complications of diabetes. Outcome: Progressing Towards Goal  Goal: *Developing strategies to address psychosocial issues  Description: Describe feelings about living with diabetes; identify support needed and support network  Outcome: Progressing Towards Goal  Goal: *Insulin pump training  Outcome: Progressing Towards Goal     Problem: Patient Education: Go to Patient Education Activity  Goal: Patient/Family Education  Outcome: Progressing Towards Goal     Problem: Pressure Injury - Risk of  Goal: *Prevention of pressure injury  Description: Document Mayito Scale and appropriate interventions in the flowsheet.   Outcome: Progressing Towards Goal  Note: Pressure Injury Interventions:  Sensory Interventions: Assess changes in LOC,Check visual cues for pain,Keep linens dry and wrinkle-free,Minimize linen layers    Moisture Interventions: Absorbent underpads,Minimize layers,Apply protective barrier, creams and emollients    Activity Interventions: Increase time out of bed,Pressure redistribution bed/mattress(bed type),Chair cushion    Mobility Interventions: Pressure redistribution bed/mattress (bed type),HOB 30 degrees or less    Nutrition Interventions: Document food/fluid/supplement intake    Friction and Shear Interventions: HOB 30 degrees or less,Lift sheet,Minimize layers,Apply protective barrier, creams and emollients Problem: Patient Education: Go to Patient Education Activity  Goal: Patient/Family Education  Outcome: Progressing Towards Goal

## 2022-02-20 NOTE — PROGRESS NOTES
SHIFT CHANGE NOTE FOR [facility]    Bedside and Verbal shift change report given to Rashad Meyer RN (oncoming nurse) by Andree Jennings RN (offgoing nurse). Report included the following information SBAR, Kardex, MAR and Recent Results. Situation:   Code Status: Full Code   Hospital Day: 5   Problem List:   Hospital Problems  Date Reviewed: 2/19/2022          Codes Class Noted POA    Poorly controlled type 2 diabetes mellitus ([facility] ICD-10-CM: E11.65  ICD-9-CM: 250.00  Unknown Yes    Overview Signed 2/16/2022 12:50 AM by Luis E Norton MD     HbA1c (2/8/2022) = 13.8             Long term current use of insulin (HCC) ICD-10-CM: Z79.4  ICD-9-CM: V58.67  Unknown Yes        High vitamin D level (Chronic) ICD-10-CM: E67.3  ICD-9-CM: 278.4  2/16/2022 Yes    Overview Signed 2/16/2022  9:52 AM by Luis E Norton MD     Vitamin D 25-Hydroxy (2/16/2022) = 105.7             Impaired mobility and ADLs ICD-10-CM: Z74.09, Z78.9  ICD-9-CM: V49.89  2/8/2022 Yes        * (Principal) Status post below knee [facility] ICD-10-CM: Z89.511  ICD-9-CM: V49.75  2/8/2022 Yes    Overview Signed 2/16/2022 12:47 AM by Luis E Norton MD     S/P Right below-the-knee amputation (2/8/2022 - Dr. Alicia Boateng)             Critical ischemia of lower extremity [facility]) ICD-10-CM: N52.366  ICD-9-CM: 459.9  2/8/2022 No        Acute postoperative anemia due to expected blood loss ICD-10-CM: D62  ICD-9-CM: 285.1  2/8/2022 Yes              Background:   Past Medical History:   Past Medical History:   Diagnosis Date    Acute postoperative anemia due to expected blood loss 2/8/2022    Arterial occlusion, lower extremity ([facility] 11/27/2021    Cardiac cath 11/09/2011    RCA patent. LM patent. LAD patent. mD1 55%. CX patent. Prior stent patent. Edison 85% (2.5 x 15-mm Xience stent, resid 0%). LVEDP 12. EF 40-45%. High lateral hypk (RI distribution).       Chronic alcohol use     Fri-Sun one fifth; Mon-Thur: Pint of liquor    Constipation     Coronary artery disease involving native coronary artery of native heart     Critical ischemia of lower extremity ([facility] 2/8/2022    Depression     Diabetic nephropathy associated with type 2 diabetes mellitus ([facility] 4/4/2019    [facility]abetic [facility] with [facility]     Gastroesophageal reflux disease     High vitamin D level 2/16/2022    Vitamin D 25-Hydroxy (2/16/2022) = 105.7    History of acute inferior wall myocardial infarction 2009    History of coronary angioplasty with insertion of stent 07/29/2009    2.5 x 13 Cypher stent to CX. 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 without macular edema associated with type 2 diabetes mellitus ([facility] 12/18/2020    Nuclear sclerosis of both eyes 12/18/2020    Otalgia 05/08/2019    Poorly controlled type 2 diabetes mellitus (HCC)     HbA1c (2/8/2022) = 13.8    Tobacco use disorder     contemplating stopping        Assessment:   Changes in Assessment throughout shift: No change to previous assessment     Patient has a central line: no Reasons if yes: Insertion date: Last dressing date:   Patient has Epstein Cath:  Reasons if yes:     Insertion date:  Shift epstein care completed:      Last Vitals:     Vitals:    02/18/22 1935 02/19/22 0724 02/19/22 1544 02/19/22 2000   BP: 132/74 129/83 133/80 (!) 141/82   Pulse: 90 75 85 91   Resp: 18 18 18 16   Temp: 97.9 °F (36.6 °C) 97.6 °F (36.4 °C) 97.4 °F (36.3 °C)    SpO2: 98% 100% 97% 97%   Weight:       Height:            PAIN    Pain Assessment    Pain Intensity 1: 0 (02/20/22 0302) Pain Intensity 1: 2 (12/29/14 1105)    Pain Location 1: Leg Pain Location 1: Abdomen    Pain Intervention(s) 1: Medication (see MAR) Pain Intervention(s) 1: Medication (see MAR)  Patient Stated Pain Goal: 0 Patient Stated Pain Goal: 0  o Intervention effective: yes  o Other actions taken for pain: Medication (see MAR)     Skin Assessment  Skin color    Condition/Temperature    Integrity Skin Integrity: Incision (comment),Wound (add Wound LDA)  Turgor    Weekly Pressure Ulcer Documentation  Pressure  Injury Documentation: No Pressure Injury Noted-Pressure Ulcer Prevention Initiated  Wound Prevention & Protection Methods  Orientation of wound Orientation of Wound Prevention: Posterior  Location of Prevention Location of Wound Prevention: Sacrum/Coccyx  Dressing Present Dressing Present : No  Dressing Status    Wound Offloading Wound Offloading (Prevention Methods): Bed, pressure reduction mattress     INTAKE/OUPUT  Date 02/19/22 0700 - 02/20/22 0659 02/20/22 0700 - 02/21/22 0659   Shift 9073-3250 2127-6695 24 Hour Total 1153-9551 9873-0439 24 Hour Total   INTAKE   P.O. 8946 641 5725        P. O. 4625 050 3759      Shift Total(mL/kg) 1320(16.9) 840(10.7) 9085(11.8)      OUTPUT   Urine(mL/kg/hr) 700(0.7) 900 1600        Urine Voided         Urine Occurrence(s) 5 x 1 x 6 x      Stool           Stool Occurrence(s) 0 x 1 x 1 x      Shift Total(mL/kg) 700(8.9) 900(11.5) 1600(20.4)       -60 560      Weight (kg) 78.2 78.2 78.2 78.2 78.2 78.2       Recommendations:  1. Patient needs and requests: none at this time    2. Pending tests/procedures: none at this time     3. Functional Level/Equipment: Partial (one person) / Wheelchair    Fall Precautions:   Fall risk precautions were reinforced with the patient; he was instructed to call for help prior to getting up. The following fall risk precautions were continued: bed/ chair alarms, door signage, yellow bracelet and socks as well as update of the [place] tool in the patient's room.    Loly Score: 4    HEALS Safety Check    A safety check occurred in the patient's room between off going nurse and oncoming nurse listed above. The safety check included the below items  Area Items   H  High Alert Medications - Verify all high alert medication drips (heparin, PCA, etc.)   E  Equipment - Suction is set up for ALL patients (with eliel)  - Red plugs utilized for all equipment (IV pumps, etc.)  - WOWs wiped down at end of shift.  - Room stocked with oxygen, suction, and other unit-specific supplies   A  Alarms - Bed alarm is set for fall risk patients  - Ensure chair alarm is in place and activated if patient is up in a chair   L  Lines - Check IV for any infiltration  - Gallardo bag is empty if patient has a Gallardo   - Tubing and IV bags are labeled   S  Safety   - Room is clean, patient is clean, and equipment is clean. - Hallways are clear from equipment besides carts. - Fall bracelet on for fall risk patients  - Ensure room is clear and free of clutter  - Suction is set up for ALL patients (with eliel)  - Hallways are clear from equipment besides carts.    - Isolation precautions followed, supplies available outside room, sign posted     Lay Emery RN

## 2022-02-20 NOTE — ROUTINE PROCESS
SHIFT CHANGE NOTE FOR [facility]    Bedside and Verbal shift change report given to Abhishek Cook RN (oncoming nurse) by Berry Cui RN (offgoing nurse). Report included the following information SBAR, Kardex, MAR and Recent Results. Situation:   Code Status: Full Code   Hospital Day: 4   Problem List:   Hospital Problems  Date Reviewed: 2/19/2022          Codes Class Noted POA    Poorly controlled type 2 diabetes mellitus ([facility] ICD-10-CM: E11.65  ICD-9-CM: 250.00  Unknown Yes    Overview Signed 2/16/2022 12:50 AM by Blu Garner MD     HbA1c (2/8/2022) = 13.8             Long term current use of insulin (HCC) ICD-10-CM: Z79.4  ICD-9-CM: V58.67  Unknown Yes        High vitamin D level (Chronic) ICD-10-CM: E67.3  ICD-9-CM: 278.4  2/16/2022 Yes    Overview Signed 2/16/2022  9:52 AM by Blu Garner MD     Vitamin D 25-Hydroxy (2/16/2022) = 105.7             Impaired mobility and ADLs ICD-10-CM: Z74.09, Z78.9  ICD-9-CM: V49.89  2/8/2022 Yes        * (Principal) Status post below knee amputation of right lower extremity ([facility] ICD-10-CM: Z89.511  ICD-9-CM: V49.75  2/8/2022 Yes    Overview Signed 2/16/2022 12:47 AM by Blu Garner MD     S/P Right below-the-knee amputation (2/8/2022 - Dr. Mila Wilson)             Critical ischemia of lower extremity [facility]) ICD-10-CM: L09.525  ICD-9-CM: 459.9  2/8/2022 No        Acute postoperative anemia due to expected blood loss ICD-10-CM: D62  ICD-9-CM: 285.1  2/8/2022 Yes              Background:   Past Medical History:   Past Medical History:   Diagnosis Date    Acute postoperative anemia due to expected blood loss 2/8/2022    Arterial occlusion, lower extremity ([facility] 11/27/2021    Cardiac cath 11/09/2011    RCA patent. LM patent. LAD patent. mD1 55%. CX patent. Prior stent patent. Edison 85% (2.5 x 15-mm Xience stent, resid 0%). LVEDP 12. EF 40-45%. High lateral hypk (RI distribution).       Chronic alcohol use     Fri-Sun one fifth; Mon-Thur: Pint of liquor  Constipation     Coronary artery disease involving native coronary artery of native heart     Critical ischemia of lower extremity ([facility] 2/8/2022    Depression     Diabetic nephropathy associated with type 2 diabetes mellitus ([facility] 4/4/2019    Diabetic neuropathy associated with type 2 diabetes mellitus [facility]     Gastroesophageal reflux disease     High vitamin D level 2/16/2022    Vitamin D 25-Hydroxy (2/16/2022) = 105.7    History of acute inferior wall myocardial infarction 2009    History of coronary angioplasty with insertion of stent 07/29/2009    2.5 x 13 Cypher stent to CX.      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 without macular edema associated with type 2 diabetes mellitus ([facility] 12/18/2020    Nuclear sclerosis of both eyes 12/18/2020    Otalgia 05/08/2019    Poorly controlled type 2 diabetes mellitus (HCC)     HbA1c (2/8/2022) = 13.8    Tobacco use disorder     contemplating stopping        Assessment:   Changes in Assessment throughout shift: No change to previous assessment     Patient has a central line: no Reasons if yes: na  Insertion date:na Last dressing date:na   Patient has Epstein Cath: no Reasons if yes: na   Insertion date:na  Shift epstein care completed: NO     Last Vitals:     Vitals:    02/18/22 1600 02/18/22 1935 02/19/22 0724 02/19/22 1544   BP: 115/69 132/74 129/83 133/80   Pulse: 73 90 75 85   Resp: 18 18 18 18   Temp: 97.9 °F (36.6 °C) 97.9 °F (36.6 °C) 97.6 °F (36.4 °C) 97.4 °F (36.3 °C)   SpO2: 100% 98% 100% 97%   Weight:       Height:            PAIN    Pain Assessment    Pain Intensity 1: 9 (02/19/22 1846) Pain Intensity 1: 2 (12/29/14 1655)    Pain Location 1: Leg Pain Location 1: Abdomen    Pain Intervention(s) 1: Medication (see MAR) Pain Intervention(s) 1: Medication (see MAR)  Patient Stated Pain Goal: 0 Patient Stated Pain Goal: 0  o Intervention effective: yes  o Other actions taken for pain: Medication (see MAR)     Skin Assessment  Skin color    Condition/Temperature    Integrity Skin Integrity: Incision (comment)  Turgor    Weekly Pressure Ulcer Documentation  Pressure  Injury Documentation: No Pressure Injury Noted-Pressure Ulcer Prevention Initiated  Wound Prevention & Protection Methods  Orientation of wound Orientation of Wound Prevention: Posterior  Location of Prevention Location of Wound Prevention: Sacrum/Coccyx  Dressing Present Dressing Present : No  Dressing Status    Wound Offloading Wound Offloading (Prevention Methods): Bed, pressure redistribution/air     INTAKE/OUPUT  Date 02/18/22 1900 - 02/19/22 0659 02/19/22 0700 - 02/20/22 0659   Shift 4432-0100 24 Hour Total 1919-0528 9623-6105 24 Hour Total   INTAKE   P.O.  1320 1320  1320     P. O.  1320 1320  1320   Shift Total(mL/kg)  1320(16.9) 1320(16.9)  1320(16.9)   OUTPUT   Urine(mL/kg/hr) 300 1100 700(0.7)  700     Urine Voided 300 1100 700  700     Urine Occurrence(s) 2 x 6 x 5 x  5 x   Stool          Stool Occurrence(s) 2 x 2 x 0 x  0 x   Shift Total(mL/kg) 300(3.8) 1100(14.1) 700(8.9)  700(8.9)   NET -300 220 620  620   Weight (kg) 78.2 78.2 78.2 78.2 78.2       Recommendations:  1. Patient needs and requests: na    2. Pending tests/procedures: na     3. Functional Level/Equipment: Partial (one person) /      Fall Precautions:   Fall risk precautions were reinforced with the patient; he was instructed to call for help prior to getting up. The following fall risk precautions were continued: bed/ chair alarms, door signage, yellow bracelet and socks as well as update of the [employer] [place] tool in the patient's room.    Loly Score: 4    HEALS Safety Check    A safety check occurred in the patient's room between off going nurse and oncoming nurse listed above. The safety check included the below items  Area Items   H  High Alert Medications - Verify all high alert medication drips (heparin, PCA, etc.)   E  Equipment - Suction is set up for ALL patients (with eliel)  - Red plugs utilized for all equipment (IV pumps, etc.)  - WOWs wiped down at end of shift.  - Room stocked with oxygen, suction, and other unit-specific supplies   A  Alarms - Bed alarm is set for fall risk patients  - Ensure chair alarm is in place and activated if patient is up in a chair   L  Lines - Check IV for any infiltration  - Gallardo bag is empty if patient has a Gallardo   - Tubing and IV bags are labeled   S  Safety   - Room is clean, patient is clean, and equipment is clean. - Hallways are clear from equipment besides carts. - Fall bracelet on for fall risk patients  - Ensure room is clear and free of clutter  - Suction is set up for ALL patients (with eliel)  - Hallways are clear from equipment besides carts.    - Isolation precautions followed, supplies available outside room, sign posted     Caty Lilly RN

## 2022-02-20 NOTE — PROGRESS NOTES
SHIFT CHANGE NOTE FOR [facility]    Bedside and Verbal shift change report given CRISTINA RN (oncoming nurse) by Jr Perry RN (offgoing nurse). Report included the following information SBAR, Kardex, MAR and Recent Results. Situation:   Code Status: Full Code   Hospital Day: 5   Problem List:   Hospital Problems  Date Reviewed: 2/20/2022          Codes Class Noted POA    Poorly controlled type 2 diabetes mellitus ([facility] ICD-10-CM: E11.65  ICD-9-CM: 250.00  Unknown Yes    Overview Signed 2/16/2022 12:50 AM by Cecelia Malone MD     HbA1c (2/8/2022) = 13.8             Long term current use of insulin (HCC) ICD-10-CM: Z79.4  ICD-9-CM: V58.67  Unknown Yes        High vitamin D level (Chronic) ICD-10-CM: E67.3  ICD-9-CM: 278.4  2/16/2022 Yes    Overview Signed 2/16/2022  9:52 AM by Cecelia Malone MD     Vitamin D 25-Hydroxy (2/16/2022) = 105.7             Impaired mobility and ADLs ICD-10-CM: Z74.09, Z78.9  ICD-9-CM: V49.89  2/8/2022 Yes        * (Principal) Status post below knee amputation of right lower extremity [facility] ICD-10-CM: Z89.511  ICD-9-CM: V49.75  2/8/2022 Yes    Overview Signed 2/16/2022 12:47 AM by Cecelia Malone MD     S/P Right below-the-knee amputation (2/8/2022 - Dr. Keily Gardner)             Critical ischemia of lower extremity [facility]) ICD-10-CM: F98.157  ICD-9-CM: 459.9  2/8/2022 No        Acute postoperative anemia due to expected blood loss ICD-10-CM: D62  ICD-9-CM: 285.1  2/8/2022 Yes              Background:   Past Medical History:   Past Medical History:   Diagnosis Date    Acute postoperative anemia due to expected blood loss 2/8/2022    Arterial occlusion, lower extremity ([facility] 11/27/2021    Cardiac cath 11/09/2011    RCA patent. LM patent. LAD patent. mD1 55%. CX patent. Prior stent patent. Edison 85% (2.5 x 15-mm Xience stent, resid 0%). LVEDP 12. EF 40-45%. High lateral hypk (RI distribution).       Chronic alcohol use     Fri-Sun one fifth; Mon-Thur: Pint of liquor    Constipation     Coronary artery disease involving native coronary artery of native heart     Critical ischemia of lower extremity ([facility] 2/8/2022    Depression     Diabetic nephropathy associated with type 2 diabetes mellitus ([facility] 4/4/2019    Diabetic [facility] with type 2 [facility]     Gastroesophageal reflux disease     High vitamin D level 2/16/2022    Vitamin D 25-Hydroxy (2/16/2022) = 105.7    History of acute inferior wall myocardial infarction 2009    History of coronary angioplasty with insertion of stent 07/29/2009    2.5 x 13 Cypher stent to CX. 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 without macular edema associated with type 2 diabetes mellitus ([facility] 12/18/2020    Nuclear sclerosis of both eyes 12/18/2020    Otalgia 05/08/2019    Poorly controlled type 2 diabetes mellitus (HCC)     HbA1c (2/8/2022) = 13.8    Tobacco use disorder     contemplating stopping        Assessment:   Changes in Assessment throughout shift: No change to previous assessment     Patient has a central line: no Reasons if yes: Insertion date: Last dressing date:   Patient has Epstein Cath:  Reasons if yes:     Insertion date:  Shift epstein care completed:      Last Vitals:     Vitals:    02/19/22 1544 02/19/22 2000 02/20/22 0806 02/20/22 1531   BP: 133/80 (!) 141/82 109/65 124/76   Pulse: 85 91 70 69   Resp: 18 16 18 18   Temp: 97.4 °F (36.3 °C) 97.7 °F (36.5 °C) 98.5 °F (36.9 °C) 98.2 °F (36.8 °C)   SpO2: 97% 97% 99% 100%   Weight:       Height:            PAIN    Pain Assessment    Pain Intensity 1: 6 (02/20/22 1733) Pain Intensity 1: 2 (12/29/14 1105)    Pain Location 1: Leg Pain Location 1: Abdomen    Pain Intervention(s) 1: Medication (see MAR) Pain Intervention(s) 1: Medication (see MAR)  Patient Stated Pain Goal: 0 Patient Stated Pain Goal: 0  o Intervention effective: yes  o Other actions taken for pain: Medication (see MAR)     Skin Assessment  Skin color    Condition/Temperature    Integrity Skin Integrity: Incision (comment)  Turgor    Weekly Pressure Ulcer Documentation  Pressure  Injury Documentation: No Pressure Injury Noted-Pressure Ulcer Prevention Initiated  Wound Prevention & Protection Methods  Orientation of wound Orientation of Wound Prevention: Posterior  Location of Prevention Location of Wound Prevention: Sacrum/Coccyx  Dressing Present Dressing Present : No  Dressing Status    Wound Offloading Wound Offloading (Prevention Methods): Bed, pressure redistribution/air     INTAKE/OUPUT  Date 02/19/22 0700 - 02/20/22 0659 02/20/22 0700 - 02/21/22 0659   Shift 2916-5485 0725-9873 24 Hour Total 2138-3694 6034-0134 24 Hour Total   INTAKE   P.O. 1320 1080 2400 480  480     P.O. 1320 1080 2400 480  480   Shift Total(mL/kg) 1320(16.9) 1080(13.8) 2400(30.7) 480(6.1)  480(6.1)   OUTPUT   Urine(mL/kg/hr) 700(0.7) 1600(1.7) 2300(1.2) 400  400     Urine Voided 700 1600 2300 400  400     Urine Occurrence(s) 5 x 2 x 7 x 1 x  1 x   Emesis/NG output           Emesis Occurrence(s)  0 x 0 x 0 x  0 x   Stool           Stool Occurrence(s) 0 x 1 x 1 x 0 x  0 x   Shift Total(mL/kg) 700(8.9) 1600(20.4) 2300(29.4) 400(5.1)  400(5.1)    -520 100 80  80   Weight (kg) 78.2 78.2 78.2 78.2 78.2 78.2       Recommendations:  1. Patient needs and requests: none at this time    2. Pending tests/procedures: none at this time     3. Functional Level/Equipment: Partial (one person) / Wheelchair    Fall Precautions:   Fall risk precautions were reinforced with the patient; he was instructed to call for help prior to getting up.  The following fall risk precautions were continued: bed/ chair alarms, door signage, yellow bracelet and socks as well as update of the [place] tool in the patient's room. Loly Score: 4    HEALS Safety Check    A safety check occurred in the patient's room between off going nurse and oncoming nurse listed above. The safety check included the below items  Area Items   H  High Alert Medications - Verify all high alert medication drips (heparin, PCA, etc.)   E  Equipment - Suction is set up for ALL patients (with eliel)  - Red plugs utilized for all equipment (IV pumps, etc.)  - WOWs wiped down at end of shift.  - Room stocked with oxygen, suction, and other unit-specific supplies   A  Alarms - Bed alarm is set for fall risk patients  - Ensure chair alarm is in place and activated if patient is up in a chair   L  Lines - Check IV for any infiltration  - Gallardo bag is empty if patient has a Gallardo   - Tubing and IV bags are labeled   S  Safety   - Room is clean, patient is clean, and equipment is clean. - Hallways are clear from equipment besides carts. - Fall bracelet on for fall risk patients  - Ensure room is clear and free of clutter  - Suction is set up for ALL patients (with eliel)  - Hallways are clear from equipment besides carts.    - Isolation precautions followed, supplies available outside room, sign posted     Mayra Marin RN

## 2022-02-20 NOTE — PROGRESS NOTES
Problem: Mobility Impaired (Adult and Pediatric)  Goal: *Acute Goals and Plan of Care (Insert Text)  Description: Physical Therapy Short Term Goals  Initiated 2022 and to be accomplished within 7 day(s) (2022)  1. Patient will move from supine to sit and sit to supine , scoot up and down, and roll side to side in bed with independence. 2.  Patient will transfer from bed to chair and chair to bed with supervision/set-up using the least restrictive device. 3.  Patient will perform sit to stand with supervision/set-up. 4.  Patient will ambulate with supervision/set-up for 50 feet with the least restrictive device. 5.  Patient will ascend/descend 3 stairs with 1 handrail(s) with minimal assistance/contact guard assist.    Physical Therapy Long Term Goals  Initiated 2022 and to be accomplished within 14 day(s) (3/02/2022)  1. Patient will move from supine to sit and sit to supine , scoot up and down, and roll side to side in bed with independence. 2.  Patient will transfer from bed to chair and chair to bed with modified independence using the least restrictive device. 3.  Patient will perform sit to stand with modified independence. 4.  Patient will ambulate with modified independence for 50 feet with the least restrictive device. 5.  Patient will ascend/descend 3 stairs with 1 handrail(s) or bumping up on steps with supervision/set-up. Outcome: Progressing Towards Goal   PHYSICAL THERAPY TREATMENT    Patient: Julia Valladares (53 y.o. male)  Date: 2022  Diagnosis: Status post below knee amputation of right lower extremity ([facility]) [Z89.511] Status post below knee amputation of right lower extremity [facility])  Precautions: Fall  Chart, physical therapy assessment, plan of care and goals were reviewed. Time [id number]  Time GO    Patient seen for: Transfer training; Wheelchair mobility;Gait training; Therapeutic exercise (stair training)    Pain:  Pt pain was reported as 9/10 pre-treatment. Pt pain was reported as 8/10 post-treatment. Intervention: pt received pain meds at start of session    Patient identified with name and : yes     SUBJECTIVE:      Pt reports pain in distal residual limb at start of session. During supine exercises, pt c/o pain in hamstrings when placed in stretch. Pt reports at end of session that pain meds started to kick in but the pain didn't go down because of supine exercises. Discussed entry steps into home with pt and pt's response was \"I'll figure it out. \" Pt educated that is not a safe option and he needs to speak with his significant other to plan for safe entry/exit from home for safe d/c home. In PM, PTA spoke with pt's girlfriend and educated her on importance of having a plan and without rails, hoping on steps is not an option. Educated her on option of bumping pt up steps in w/c or obtaining a portable ramp due to only 2 small steps up to door. OBJECTIVE DATA SUMMARY:    Objective:     BED/MAT MOBILITY Daily Assessment     Supine to Sit : 6 (Modified independent)  Sit to Supine : 6 (Modified independent)   Pt performed bed mobility on right side of mat table Joseph, to include rolling supine<->prone. TRANSFERS Daily Assessment     Transfer Type: Other  Other: squat pivot  Transfer Assistance : 5 (Stand-by assistance)  Sit to Stand Assistance: Contact guard assistance   Pt presents sitting up in recliner and reports need to use toilet. Pt performed squat pivot txfr recliner to w/c to right side with SBA. Pt performed stand pivot txfr w/c<->toilet with use of wall bar and CGA with v/c for safe technique due to pt turned complete [place] w/c to toilet. Pt performed sit to stand from w/c with SBA/CGA to RW. Pt performed squat pivot txfr w/c<->mat table with SBA and pt performed set up of w/c without cues. At end of session, pt performed squat pivot w/c to recliner to left side with SBA.         GAIT Daily Assessment    Gait Description (WDL)      Gait Abnormalities      Assistive device Walker, rolling;Gait belt    Ambulation assistance - level surface 4 (Contact guard assistance)    Distance 75 Feet (ft)    Ambulation assistance- uneven surface      Comments Pt ambulated 75ft with RW and CGA, using hopping/step to pattern. STEPS/STAIRS Daily Assessment     Steps/Stairs ambulated 4 (6\" steps)    Assistance Required 3 (Moderate assistance)    Rail Use Both    Comments   Pt negotiated up and down 4(2x2) 6\" steps with BHR and mod assist to ascend 1st step but min assist for balance and safety to ascend second step and for descent. Curbs/Ramps  NT        BALANCE Daily Assessment     Sitting - Static: Good (unsupported)  Sitting - Dynamic: Good (unsupported)  Standing - Static: Fair  Standing - Dynamic : Impaired        WHEELCHAIR MOBILITY Daily Assessment     Able to Propel (ft): 250 feet  Functional Level:  (distant supervision)  Curbs/Ramps Assist Required (FIM Score): 0 (Not tested)  Wheelchair Setup Assist Required : 4 (Minimal assistance)  Wheelchair Management: Manages left brake;Manages right brake   Pt propelled w/c from room to gym with BUE with distant supervision for navigation of narrowed hallways. THERAPEUTIC EXERCISES Daily Assessment       Supine BLE SLR and hip abd 2x10, prone x5 min for prolonged hip and knee flexors. ASSESSMENT:  Pt limited with exercise tolerance due to pain. Pt required mod assist for safety and stability for ascending steps with BHR. Pt's girlfriend educated on options for safe entry/exit from home. Progression toward goals:  []      Improving appropriately and progressing toward goals  [x]      Improving slowly and progressing toward goals  []      Not making progress toward goals and plan of care will be adjusted      PLAN:  Patient continues to benefit from skilled intervention to address the above impairments. Continue treatment per established plan of care.   Discharge Recommendations:  Home Health  Further Equipment Recommendations for Discharge:  rolling walker and wheelchair 18 inch      Estimated Discharge Date: 3/2/2022    Activity Tolerance:   fair  Please refer to the flowsheet for vital signs taken during this treatment.     After treatment:   [] Patient left in no apparent distress in bed  [x] Patient left in no apparent distress sitting up in chair  [] Patient left in no apparent distress in w/c mobilizing under own power  [] Patient left in no apparent distress dining area  [] Patient left in no apparent distress mobilizing under own power  [x] Call bell left within reach  [] Nursing notified  [] Caregiver present  [] Bed alarm activated   [x] Chair alarm activated      Rosy Yi, PTA  2/20/2022

## 2022-02-20 NOTE — PROGRESS NOTES
[facility] PHYSICAL REHABILITATION  [address] [place] [address] [address]     INPATIENT REHABILITATION  DAILY PROGRESS NOTE     Date: 2/20/2022    Name: Louise Ford Age / Sex: 59 y.o. / male   CSN: [id number] MRN: [id number]   [facility] Date: 2/15/2022 Length of Stay: 5 days     Primary Rehabilitation Diagnosis: Impaired Mobility and ADLs secondary to:  1. S/P Right below-the-knee amputation (2/8/2022 - Dr. Rand Huntley)  2. History of critical ischemia of the right lower extremity      Subjective:     No new issues or problems reported. Blood pressure controlled. Blood glucose controlled.        Objective:     Vital Signs:  Patient Vitals for the past 24 hrs:   BP Temp Pulse Resp SpO2   02/20/22 0806 109/65 98.5 °F (36.9 °C) 70 18 99 %   02/19/22 2000 (!) 141/82 97.7 °F (36.5 °C) 91 16 97 %   02/19/22 1544 133/80 97.4 °F (36.3 °C) 85 18 97 %        Current Medications:  Current Facility-Administered Medications   Medication Dose Route Frequency    insulin glargine (LANTUS) injection 35 Units  35 Units SubCUTAneous QHS    oxyCODONE IR (ROXICODONE) tablet 7.5 mg  7.5 mg Oral Q4H PRN    metFORMIN (GLUCOPHAGE) tablet 1,000 mg  1,000 mg Oral BID WITH MEALS    insulin lispro (HUMALOG) injection 4 Units  0.05 Units/kg SubCUTAneous TIDAC    glucose chewable tablet 16 g  4 Tablet Oral PRN    glucagon (GLUCAGEN) injection 1 mg  1 mg IntraMUSCular PRN    dextrose 10% infusion 0-250 mL  0-250 mL IntraVENous PRN    iron polysaccharides (NIFEREX) capsule 150 mg  1 Capsule Oral BID    thiamine HCL (B-1) tablet 200 mg  200 mg Oral DAILY    aspirin chewable tablet 81 mg  81 mg Oral DAILY WITH BREAKFAST    atorvastatin (LIPITOR) tablet 20 mg  20 mg Oral DAILY    buPROPion SR (WELLBUTRIN SR) tablet 150 mg  150 mg Oral DAILY    gabapentin (NEURONTIN) capsule 400 mg  400 mg Oral TID    therapeutic multivitamin (THERAGRAN) tablet 1 Tablet  1 Tablet Oral DAILY    acetaminophen (TYLENOL) tablet 650 mg 650 mg Oral TID    methocarbamoL (ROBAXIN) tablet 500 mg  500 mg Oral TID    acetaminophen (TYLENOL) tablet 650 mg  650 mg Oral Q4H PRN    bisacodyL (DULCOLAX) tablet 10 mg  10 mg Oral Q48H PRN    enoxaparin (LOVENOX) injection 40 mg  40 mg SubCUTAneous Q24H    insulin lispro (HUMALOG) injection   SubCUTAneous TIDAC       Allergies: Allergies   Allergen Reactions    Niacin Itching       Lab/Data Review:  Recent Results (from the past 24 hour(s))   GLUCOSE, POC    Collection Time: 02/19/22  4:40 PM   Result Value Ref Range    Glucose (POC) 134 (H) 70 - 110 mg/dL   GLUCOSE, POC    Collection Time: 02/19/22  8:28 PM   Result Value Ref Range    Glucose (POC) 172 (H) 70 - 110 mg/dL   GLUCOSE, POC    Collection Time: 02/20/22  7:38 AM   Result Value Ref Range    Glucose (POC) 173 (H) 70 - 110 mg/dL   GLUCOSE, POC    Collection Time: 02/20/22 11:53 AM   Result Value Ref Range    Glucose (POC) 75 70 - 110 mg/dL       Assessment:     Primary Rehabilitation Diagnosis  1. Impaired Mobility and ADLs  2. S/P Right below-the-knee amputation (2/8/2022 - Dr. Alicia Boateng)  3.  History of critical ischemia of the right lower extremity    Comorbidities  Patient Active Problem List   Diagnosis Code    Hyperlipidemia E78.5    Tobacco use disorder F17.200    Mediastinal adenopathy R59.0    History of vitamin D deficiency Z86.39    Insomnia G47.00    Diabetic nephropathy associated with type 2 diabetes mellitus ([facility] E11.21    Otalgia H92.09    Mild nonproliferative diabetic retinopathy of left eye without macular edema associated with type 2 diabetes mellitus ([facility] H81.0013    Hypertensive retinopathy H35.039    Nuclear sclerosis of both eyes H25.13    Arterial occlusion, lower extremity (HCC) I70.209    Impaired mobility and ADLs Z74.09, Z78.9    Status post below knee amputation of right lower extremity (HCC) Z89.511    Chronic alcohol use Z72.89    History of coronary angioplasty with insertion of stent Z95.5  Coronary artery disease involving native coronary artery of native heart I25.10    Constipation K59.00    Poorly controlled type 2 diabetes mellitus ([facility]) E11.65    Long term current use of insulin ([facility]) Z79.4    Migraine headache G43.909    Diabetic neuropathy associated with type 2 diabetes mellitus ([facility]) E11.40    Gastroesophageal reflux disease K21.9    History of essential hypertension Z86.79    History of lacunar cerebrovascular accident Z80.78    History of acute inferior wall myocardial infarction I25.2    Critical ischemia of lower extremity ([facility]) I70.229    Acute postoperative anemia due to expected blood loss D62    Depression F32. A    High vitamin D level E67.3       Plan:     1. Justification for continued stay: Good progression towards established rehabilitation goals. 2. Medical Issues being followed closely:    [x]  Fall and safety precautions     [x]  Wound Care     [x]  Bowel and Bladder Function     [x]  Fluid Electrolyte and Nutrition Balance     [x]  Pain Control      3. Issues that 24 hour rehabilitation nursing is following:    [x]  Fall and safety precautions     [x]  Wound Care     [x]  Bowel and Bladder Function     [x]  Fluid Electrolyte and Nutrition Balance     [x]  Pain Control      [x]  Assistance with and education on in-room safety with transfers to and from the bed, wheelchair, toilet and shower. 4. Acute rehabilitation plan of care:    [x]  Continue current care and rehab. [x]  Physical Therapy           [x]  Occupational Therapy           []  Speech Therapy     []  Hold Rehab until further notice     5. Medications:    [x]  MAR Reviewed     [x]  Continue Present Medications     6. Chemical DVT Prophylaxis:      [x]  Enoxaparin     []  Unfractionated Heparin     []  Warfarin     []  NOAC     []  Aspirin     []  None     7. Mechanical DVT Prophylaxis:      []  FLEX Stockings     []  Sequential Compression Device     [x]  None     8.  GI Prophylaxis: []  PPI     []  H2 Blocker     [x]  None / Not indicated     9. Code status:    [x]  Full code     []  Partial code     []  Do not intubate     []  Do not resuscitate     10. Diet:  Specifications  4 carb choices (60 gm/meal), Low fat/Low cholesterol/High fiber/SEEMA   Solids (consistency)  Regular   Liquids (consistency)  Thin   Fluid Restriction  None      11. COVID-19:  Laboratory testing COVID-19 rapid test ([facility], [facility]) (2/4/2022): Not detected  COVID-19 rapid test ([facility], [facility]) (2/15/2022): Not detected   Precautions None   Vaccine to be given this admission No      12. Orders:   > S/P Right below-the-knee amputation (2/8/2022 - Dr. Pandora Rubinstein); History of critical ischemia of the right lower extremity    > Staples to be removed on 3/8/2022    > Acute postoperative blood loss anemia   > Hgb/Hct (2/16/2022, on admission to the ARU) = 8.9/28.6   > Anemia work-up (2/16/2022) showed serum iron 14, TIBC 234, iron % saturation 18, ferritin 404, reticulocyte count 2.1   > On 2/16/2022, started Iron polysaccharides 150 mg PO BID   > Hgb/Hct (2/17/2022) = 8.7/27.8   > Continue Iron polysaccharides 150 mg PO BID    > Constipation   > On 2/17/2022, started Senna 1 tab PO once daily   > On 2/18/2022, discontinued Senna 1 tab PO once daily    > Coronary artery disease; History of inferior wall myocardial infarction; History of coronary angioplasty with stent placement   > Not on any ACE-I or beta-blockers   > Continue:    > Aspirin 81 mg PO daily with breakfast    > Atorvastatin 20 mg PO once daily    > Depression   > On 2/16/2022, discontinued Trazodone 50 mg PO q HS (re: patient says he has not been taking this prior to admission)   > Continue Bupropion  mg PO once daily    > High vitamin D level;  History of vitamin D deficiency   > Prior to admission to [facility], patient was on Ergocalciferol 50,000 units PO q 7 days   > Vitamin D 25-Hydroxy (2/16/2022) = 105.7   > On 2/16/2022, discontinued Ergocalciferol 50,000 units PO q 7 days     > Hyperlipidemia   > Continue Atorvastatin 20 mg PO once daily    > Insomnia   > On 2/16/2022, discontinued Trazodone 50 mg PO q HS (re: patient says he has not been taking this prior to admission)    > Type 2 diabetes mellitus, poorly controlled, with diabetic nephropathy, diabetic neuropathy, diabetic retinopathy, with long-term current use of insulin   > HbA1c (2/8/2022) = 13.8   > Vitamin B12 (2/16/2022) = 574   > On 2/16/2022:    > Increased Metformin from 500 mg to 850 mg PO BID with meals    > Increased Insulin lispro from 4 units to 5 units SC TID AC   > On 2/17/2022:    > Increased Insulin glargine from 45 units to 50 units SC daily before breakfast    > Decreased Insulin lispro from 5 units to 4 units SC TID AC   > On 2/18/2022:    > Patient was given Insulin glargine 20 units SC x 1 dose at 9PM    > Increased Metformin from 850 mg to 1000 mg PO BID with meals   > On 2/19/2022, changed Insulin glargine from 50 units SC daily before breakfast to 35 units SC q HS   > Continue:    > Metformin 1000 mg PO BID with meals    > Insulin glargine 35 units SC q HS    > Decrease Insulin lispro from 4 units to 3 units SC TID AC    > Insulin lispro sliding scale SC TID AC only    > Analgesia   > On 2/16/2022:    > Started:     > Acetaminophen 650 mg PO TID (8AM, 12PM, 4PM)     > Methocarbamol 500 mg PO TID (8AM, 2PM, 8PM)    > Increased Gabapentin from 300 mg to 400 mg PO TID (8AM, 2PM, 8PM)   > On 2/18/2022, decreased Oxycodone from 10 mg to 7.5 mg PO q 4 hr PRN for pain level 5/10 or greater    > Continue:    > Acetaminophen 650 mg PO TID (8AM, 12PM, 4PM)    > Acetaminophen 650 mg PO q 4 hr PRN for pain level 4/10 or lesser (from 8PM to 4AM only)    > Gabapentin 400 mg PO TID (8AM, 2PM, 8PM)    > Methocarbamol 500 mg PO TID (8AM, 2PM, 8PM)    > Oxycodone 7.5 mg PO q 4 hr PRN for pain level 5/10 or greater       Signed:    Vandana Davalos MD    February 20, 2022

## 2022-02-21 LAB
ANION GAP SERPL CALC-SCNC: 4 MMOL/L (ref 3–18)
BASOPHILS # BLD: 0 K/UL (ref 0–0.1)
BASOPHILS NFR BLD: 1 % (ref 0–2)
BUN SERPL-MCNC: 20 MG/DL (ref 7–18)
BUN/CREAT SERPL: 17 (ref 12–20)
CALCIUM SERPL-MCNC: 8.7 MG/DL (ref 8.5–10.1)
CHLORIDE SERPL-SCNC: 106 MMOL/L (ref 100–111)
CO2 SERPL-SCNC: 27 MMOL/L (ref 21–32)
CREAT SERPL-MCNC: 1.18 MG/DL (ref 0.6–1.3)
DIFFERENTIAL METHOD BLD: ABNORMAL
EOSINOPHIL # BLD: 0.3 K/UL (ref 0–0.4)
EOSINOPHIL NFR BLD: 7 % (ref 0–5)
ERYTHROCYTE [DISTWIDTH] IN BLOOD BY AUTOMATED COUNT: 13.2 % (ref 11.6–14.5)
GLUCOSE BLD STRIP.AUTO-MCNC: 118 MG/DL (ref 70–110)
GLUCOSE BLD STRIP.AUTO-MCNC: 133 MG/DL (ref 70–110)
GLUCOSE BLD STRIP.AUTO-MCNC: 157 MG/DL (ref 70–110)
GLUCOSE BLD STRIP.AUTO-MCNC: 268 MG/DL (ref 70–110)
GLUCOSE SERPL-MCNC: 194 MG/DL (ref 74–99)
HCT VFR BLD AUTO: 27.8 % (ref 36–48)
HGB BLD-MCNC: 8.5 G/DL (ref 13–16)
IMM GRANULOCYTES # BLD AUTO: 0 K/UL (ref 0–0.04)
IMM GRANULOCYTES NFR BLD AUTO: 1 % (ref 0–0.5)
LYMPHOCYTES # BLD: 1.1 K/UL (ref 0.9–3.6)
LYMPHOCYTES NFR BLD: 33 % (ref 21–52)
MCH RBC QN AUTO: 27.3 PG (ref 24–34)
MCHC RBC AUTO-ENTMCNC: 30.6 G/DL (ref 31–37)
MCV RBC AUTO: 89.4 FL (ref 78–100)
MONOCYTES # BLD: 0.5 K/UL (ref 0.05–1.2)
MONOCYTES NFR BLD: 14 % (ref 3–10)
NEUTS SEG # BLD: 1.5 K/UL (ref 1.8–8)
NEUTS SEG NFR BLD: 44 % (ref 40–73)
NRBC # BLD: 0 K/UL (ref 0–0.01)
NRBC BLD-RTO: 0 PER 100 WBC
PLATELET # BLD AUTO: 311 K/UL (ref 135–420)
PMV BLD AUTO: 10.2 FL (ref 9.2–11.8)
POTASSIUM SERPL-SCNC: 4.9 MMOL/L (ref 3.5–5.5)
RBC # BLD AUTO: 3.11 M/UL (ref 4.35–5.65)
SODIUM SERPL-SCNC: 137 MMOL/L (ref 136–145)
WBC # BLD AUTO: 3.4 K/UL (ref 4.6–13.2)

## 2022-02-21 PROCEDURE — 2709999900 HC NON-CHARGEABLE SUPPLY

## 2022-02-21 PROCEDURE — 97116 GAIT TRAINING THERAPY: CPT

## 2022-02-21 PROCEDURE — 80048 BASIC METABOLIC PNL TOTAL CA: CPT

## 2022-02-21 PROCEDURE — 97530 THERAPEUTIC ACTIVITIES: CPT

## 2022-02-21 PROCEDURE — 85025 COMPLETE CBC W/AUTO DIFF WBC: CPT

## 2022-02-21 PROCEDURE — 92507 TX SP LANG VOICE COMM INDIV: CPT

## 2022-02-21 PROCEDURE — 74011636637 HC RX REV CODE- 636/637: Performed by: INTERNAL MEDICINE

## 2022-02-21 PROCEDURE — 82962 GLUCOSE BLOOD TEST: CPT

## 2022-02-21 PROCEDURE — 36415 COLL VENOUS BLD VENIPUNCTURE: CPT

## 2022-02-21 PROCEDURE — 65310000000 HC RM PRIVATE REHAB

## 2022-02-21 PROCEDURE — 74011250636 HC RX REV CODE- 250/636: Performed by: INTERNAL MEDICINE

## 2022-02-21 PROCEDURE — 74011250637 HC RX REV CODE- 250/637: Performed by: INTERNAL MEDICINE

## 2022-02-21 PROCEDURE — 99232 SBSQ HOSP IP/OBS MODERATE 35: CPT | Performed by: INTERNAL MEDICINE

## 2022-02-21 PROCEDURE — 97110 THERAPEUTIC EXERCISES: CPT

## 2022-02-21 PROCEDURE — 97535 SELF CARE MNGMENT TRAINING: CPT

## 2022-02-21 RX ORDER — OXYCODONE HYDROCHLORIDE 5 MG/1
5 TABLET ORAL
Status: DISCONTINUED | OUTPATIENT
Start: 2022-02-21 | End: 2022-03-02 | Stop reason: HOSPADM

## 2022-02-21 RX ORDER — LANOLIN ALCOHOL/MO/W.PET/CERES
3 CREAM (GRAM) TOPICAL
Status: DISCONTINUED | OUTPATIENT
Start: 2022-02-21 | End: 2022-03-02 | Stop reason: HOSPADM

## 2022-02-21 RX ADMIN — ACETAMINOPHEN 650 MG: 325 TABLET ORAL at 17:14

## 2022-02-21 RX ADMIN — ACETAMINOPHEN 650 MG: 325 TABLET ORAL at 12:36

## 2022-02-21 RX ADMIN — METFORMIN HYDROCHLORIDE 1000 MG: 500 TABLET ORAL at 09:35

## 2022-02-21 RX ADMIN — METHOCARBAMOL TABLETS 500 MG: 500 TABLET, COATED ORAL at 21:53

## 2022-02-21 RX ADMIN — BUPROPION HYDROCHLORIDE 150 MG: 150 TABLET, EXTENDED RELEASE ORAL at 09:35

## 2022-02-21 RX ADMIN — GABAPENTIN 400 MG: 400 CAPSULE ORAL at 17:14

## 2022-02-21 RX ADMIN — THERA TABS 1 TABLET: TAB at 09:35

## 2022-02-21 RX ADMIN — Medication 150 MG: at 09:35

## 2022-02-21 RX ADMIN — GABAPENTIN 400 MG: 400 CAPSULE ORAL at 09:35

## 2022-02-21 RX ADMIN — Medication 3 UNITS: at 12:35

## 2022-02-21 RX ADMIN — OXYCODONE 7.5 MG: 5 TABLET ORAL at 11:19

## 2022-02-21 RX ADMIN — OXYCODONE 7.5 MG: 5 TABLET ORAL at 17:14

## 2022-02-21 RX ADMIN — Medication 3 UNITS: at 08:38

## 2022-02-21 RX ADMIN — ASPIRIN 81 MG 81 MG: 81 TABLET ORAL at 09:35

## 2022-02-21 RX ADMIN — OXYCODONE 7.5 MG: 5 TABLET ORAL at 07:05

## 2022-02-21 RX ADMIN — Medication 3 MG: at 19:14

## 2022-02-21 RX ADMIN — Medication 150 MG: at 18:45

## 2022-02-21 RX ADMIN — GABAPENTIN 400 MG: 400 CAPSULE ORAL at 21:53

## 2022-02-21 RX ADMIN — Medication 35 UNITS: at 21:56

## 2022-02-21 RX ADMIN — ACETAMINOPHEN 650 MG: 325 TABLET ORAL at 04:53

## 2022-02-21 RX ADMIN — OXYCODONE 7.5 MG: 5 TABLET ORAL at 01:44

## 2022-02-21 RX ADMIN — Medication 3 UNITS: at 17:16

## 2022-02-21 RX ADMIN — METHOCARBAMOL TABLETS 500 MG: 500 TABLET, COATED ORAL at 09:35

## 2022-02-21 RX ADMIN — EPOETIN ALFA-EPBX 10000 UNITS: 10000 INJECTION, SOLUTION INTRAVENOUS; SUBCUTANEOUS at 17:15

## 2022-02-21 RX ADMIN — ATORVASTATIN CALCIUM 20 MG: 20 TABLET, FILM COATED ORAL at 09:35

## 2022-02-21 RX ADMIN — Medication 200 MG: at 09:34

## 2022-02-21 RX ADMIN — METFORMIN HYDROCHLORIDE 1000 MG: 500 TABLET ORAL at 18:44

## 2022-02-21 RX ADMIN — METHOCARBAMOL TABLETS 500 MG: 500 TABLET, COATED ORAL at 13:10

## 2022-02-21 RX ADMIN — ACETAMINOPHEN 650 MG: 325 TABLET ORAL at 09:34

## 2022-02-21 RX ADMIN — ENOXAPARIN SODIUM 40 MG: 100 INJECTION SUBCUTANEOUS at 07:04

## 2022-02-21 NOTE — PROGRESS NOTES
[x] Psychology  [] Social Work [] Recreational Therapy    INTERVENTION  UNITS/TIME OF SERVICE   Assessment    Supportive Counseling  February 18, 2022   Orientation    Discharge Planning    Resource Linkage              Progress/Current Status    Late entry for individual support  and follow up with patient on ARU on above referenced date to discuss his integration into treatment milieu. He was found sitting upright in wheelchair in bedroom and responsive on my contact. However, he was observed to have difficulty in sustaining his concentration and even sometimes interacting, as if he was feeling very tired. While he offered no particular complaints about his current status, he also lacked insight about various issues related to his recovery. He essentially offered simple Yes/No responses to inquiry with no self reflection nor insight. In turn, patient was encouraged to try and maintain his focus on day to day, immediate goals for himself, rather than focusing on discharge and even longer term. For instance, several times he spontaneously stated that he was wanting to discharge as soon as possible. At this time, he did not  report nor display symptoms of acute pain nor discomfort while seated. Patient will continue to be monitored for any emotional and/or behavioral difficulties while on ARU and be encouraged to focus on treatment need, rather than discharge.     Melchor Casas, [facility] 2/21/2022 8:32 AM

## 2022-02-21 NOTE — PROGRESS NOTES
Problem: Falls - Risk of  Goal: *Absence of Falls  Description: Document [facility] Fall Risk and appropriate interventions in the flowsheet. Outcome: Progressing Towards Goal  Note: Fall Risk Interventions:  Mobility Interventions: Assess mobility with egress test,Bed/chair exit alarm,Patient to call before getting OOB,Utilize walker, cane, or other assistive device         Medication Interventions: Bed/chair exit alarm,Patient to call before getting OOB,Teach patient to arise slowly    Elimination Interventions: Bed/chair exit alarm,Call light in reach,Patient to call for help with toileting needs,Urinal in reach,Stay With Me (per policy),Toilet paper/wipes in reach    History of Falls Interventions: Bed/chair exit alarm,Door open when patient unattended,Room close to nurse's station         Problem: Patient Education: Go to Patient Education Activity  Goal: Patient/Family Education  Outcome: Progressing Towards Goal     Problem: Diabetes Self-Management  Goal: *Disease process and treatment process  Description: Define diabetes and identify own type of diabetes; list 3 options for treating diabetes. Outcome: Progressing Towards Goal  Goal: *Incorporating nutritional management into lifestyle  Description: Describe effect of type, amount and timing of food on blood glucose; list 3 methods for planning meals. Outcome: Progressing Towards Goal  Goal: *Incorporating physical activity into lifestyle  Description: State effect of exercise on blood glucose levels. Outcome: Progressing Towards Goal  Goal: *Developing strategies to promote health/change behavior  Description: Define the ABC's of diabetes; identify appropriate screenings, schedule and personal plan for screenings. Outcome: Progressing Towards Goal  Goal: *Using medications safely  Description: State effect of diabetes medications on diabetes; name diabetes medication taking, action and side effects.   Outcome: Progressing Towards Goal  Goal: *Monitoring blood glucose, interpreting and using results  Description: Identify recommended blood glucose targets  and personal targets. Outcome: Progressing Towards Goal  Goal: *Prevention, detection, treatment of acute complications  Description: List symptoms of hyper- and hypoglycemia; describe how to treat low blood sugar and actions for lowering  high blood glucose level. Outcome: Progressing Towards Goal  Goal: *Prevention, detection and treatment of chronic complications  Description: Define the natural course of diabetes and describe the relationship of blood glucose levels to long term complications of diabetes. Outcome: Progressing Towards Goal  Goal: *Developing strategies to address psychosocial issues  Description: Describe feelings about living with diabetes; identify support needed and support network  Outcome: Progressing Towards Goal     Problem: Patient Education: Go to Patient Education Activity  Goal: Patient/Family Education  Outcome: Progressing Towards Goal     Problem: Pressure Injury - Risk of  Goal: *Prevention of pressure injury  Description: Document Mayito Scale and appropriate interventions in the flowsheet.   Outcome: Progressing Towards Goal  Note: Pressure Injury Interventions:  Sensory Interventions: Assess changes in LOC,Check visual cues for pain,Keep linens dry and wrinkle-free,Minimize linen layers    Moisture Interventions: Absorbent underpads,Minimize layers,Apply protective barrier, creams and emollients    Activity Interventions: Increase time out of bed,Pressure redistribution bed/mattress(bed type),Chair cushion    Mobility Interventions: Pressure redistribution bed/mattress (bed type),HOB 30 degrees or less    Nutrition Interventions: Document food/fluid/supplement intake    Friction and Shear Interventions: HOB 30 degrees or less,Lift sheet,Minimize layers,Apply protective barrier, creams and emollients                Problem: Patient Education: Go to Patient Education Activity  Goal: Patient/Family Education  Outcome: Progressing Towards Goal

## 2022-02-21 NOTE — INTERDISCIPLINARY ROUNDS
[address]  [address] [place] [address], [address]     INPATIENT REHABILITATION  DISCHARGE RECOMMENDATION SHEET    Date: 2/21/2022     Name: Louise Ford Age / Sex: 59 y.o. / male   CSN: [id number] MRN: [id number]   [facility] Date: 2/15/2022 Discharge Date: 3/2/2022        [facility] WALKING AIDS FOLLOW-UP SERVICES      Height  []  Straight cane  [x] DMV Handicap Placard    []  #14 ½ leo height  []  Forearm crutches OUTPATIENT    []  Leo height  []  Axillary crutches  []  PT    [x]  Standard height  []  LBQC  []  OT    []  Reclining high back  []  SBQC  []  ST       Weight  HOME HEALTH    [x]  Standard weight WALKERS  [x]  PT to progress to Outpatient PT    []  Lightweight  []  Standard height  [x]  OT    []  High-strength lightweight  []  Small adult  [x]  ST    []  Heavy Duty   []  Tall walker  [x]  Nursing    []  Patient is unable to self-propel a standard weight wheelchair  [x]  Rolling walker with 5 single fixed wheels  [x]  Wound care  Location of wound:s/p R BKA  Specify needs: Inspect incision/wound routinely, keep clean, dry , and due dressing changes with xerofoam, kerlix, ace wrap. Remove sutures as scheduled.       [x]  Anticoagulation management       Width  []  Bariatric walker  []  Diabetes management    []  Narrow (16)   []  Insulin management    [x]  Standard (18) ACCESSORIES  []  No insulin management    []  Wide (20)  []  Rear sure glide brakes  []  BP management    []  Other  []  10 rear wheel brake  []  CHF Telehealth       Arms  []  Tall leg extensions  []  Post-CABG care/monitoring    []  Standard  []  Other:  []  Colostomy care    []  Desk/Tray   []  Tube feeding    [x]  Removable BATHROOM EQUIPMENT  []  PICC line care      Foot/Leg Rests  [x]  Bedside commode  []  Gallardo catheter care    [x]  Removable  []  Extra wide bedside commode  []  Other:     [x]  Elevating   [x]  Right residual limb rest  []  3:1 commode WITH drop arms  []  Medical Social Worker      Other  [x]  Tub transfer bench  []  Aide    []  Brake extensions  []  Shower chair     []  Padded gloves       [x]  Antitippers           CUSHIONS OTHER     []  Foam cushion  []  Seat belt     [x]  Gel cushion  [x]  Gait belt     []  Elizebeth Rasher II  []  Transfer board - Size:     []  Roho  []  Oxygen     []  Other  []  CPM      []  [address]  []  Ramp     []  Hospital bed  []  Hip kit     []  Special mattress  []  Reacher     []  Trapeze bar       Preferred Local Pharmacy (not mail-order):     Physical Therapy Sunny Courser PT, DPT   Occupational Therapy Eugenio  MSOTR/L    Speech-Language Therapy Michelle De Los Santos [address]    Nursing Verito Whelan RN

## 2022-02-21 NOTE — ROUTINE PROCESS
0800 Pt. Awake sitting up in bed no change in assessment pt. Reported to be feeling fine. 0930 Pt. Sitting up in chair eating breakfast.  1200 with therapy. 1330 able to transfer from bed to chair with assist.  1500 no change in assessment. Dressing changed to right bka incision . No drainage incision and sutures intact. 1800 Pt. Sitting up in chair eating dinner.

## 2022-02-21 NOTE — PROGRESS NOTES
[facility]    Bedside and Verbal shift change report given to Blaise Mills, RN (oncoming nurse) by Luis Daniel Goldstein RN (offgoing nurse). Report included the following information SBAR, Kardex, MAR and Recent Results. Situation:   Code Status: Full Code   Hospital Day: 6   Problem List:   Hospital Problems  Date Reviewed: 2/20/2022          Codes Class Noted POA    Poorly controlled type 2 diabetes mellitus ([facility] ICD-10-CM: E11.65  ICD-9-CM: 250.00  Unknown Yes    Overview Signed 2/16/2022 12:50 AM by Kely Krueger MD     HbA1c (2/8/2022) = 13.8             Long term current use of insulin (HCC) ICD-10-CM: Z79.4  ICD-9-CM: V58.67  Unknown Yes        High vitamin D level (Chronic) ICD-10-CM: E67.3  ICD-9-CM: 278.4  2/16/2022 Yes    Overview Signed 2/16/2022  9:52 AM by Kely Krueger MD     Vitamin D 25-Hydroxy (2/16/2022) = 105.7             Impaired mobility and ADLs ICD-10-CM: Z74.09, Z78.9  ICD-9-CM: V49.89  2/8/2022 Yes        * (Principal) Status post below knee amputation of right lower extremity ([facility] ICD-10-CM: Z89.511  ICD-9-CM: V49.75  2/8/2022 Yes    Overview Signed 2/16/2022 12:47 AM by Kely Krueger MD     S/P Right below-the-knee amputation (2/8/2022 - Dr. Ashvin Lion)             Critical ischemia of lower extremity [facility]) ICD-10-CM: V97.674  ICD-9-CM: 459.9  2/8/2022 No        Acute postoperative anemia due to expected blood loss ICD-10-CM: D62  ICD-9-CM: 285.1  2/8/2022 Yes              Background:   Past Medical History:   Past Medical History:   Diagnosis Date    Acute postoperative anemia due to expected blood loss 2/8/2022    Arterial occlusion, lower extremity ([facility] 11/27/2021    Cardiac cath 11/09/2011    RCA patent. LM patent. LAD patent. mD1 55%. CX patent. Prior stent patent. Edison 85% (2.5 x 15-mm Xience stent, resid 0%). LVEDP 12. EF 40-45%. High lateral hypk (RI distribution).       Chronic alcohol use     Fri-Sun one fifth; Mon-Thur: Pint of liquor    Constipation     Coronary artery disease involving native coronary artery of native heart     Critical ischemia of lower extremity ([facility] 2/8/2022    Depression     Diabetic nephropathy associated with type 2 diabetes mellitus ([facility] 4/4/2019    Diabetic neuropathy associated with type 2 diabetes mellitus [facility]     Gastroesophageal reflux disease     High vitamin D level 2/16/2022    Vitamin D 25-Hydroxy (2/16/2022) = 105.7    History of acute inferior wall myocardial infarction 2009    History of coronary angioplasty with insertion of stent 07/29/2009    2.5 x 13 Cypher stent to CX. 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 without macular edema associated with type 2 diabetes mellitus ([facility] 12/18/2020    Nuclear sclerosis of both eyes 12/18/2020    Otalgia 05/08/2019    Poorly controlled type 2 diabetes mellitus (HCC)     HbA1c (2/8/2022) = 13.8    Tobacco use disorder     contemplating stopping        Assessment:   Changes in Assessment throughout shift: No change to previous assessment     Patient has a central line: no Reasons if yes: Insertion date: Last dressing date:   Patient has Epstein Cath:  Reasons if yes:     Insertion date:  Shift epstein care completed:      Last Vitals:     Vitals:    02/19/22 2000 02/20/22 0806 02/20/22 1531 02/20/22 1935   BP: (!) 141/82 109/65 124/76 132/68   Pulse: 91 70 69 76   Resp: 16 18 18 16   Temp: 97.7 °F (36.5 °C) 98.5 °F (36.9 °C) 98.2 °F (36.8 °C) 98.6 °F (37 °C)   SpO2: 97% 99% 100% 100%   Weight:       Height:            PAIN    Pain Assessment    Pain Intensity 1: 0 (02/21/22 0240) Pain Intensity 1: 2 (12/29/14 1105)    Pain Location 1: Leg Pain Location 1: Abdomen    Pain Intervention(s) 1: Medication (see MAR) Pain Intervention(s) 1: Medication (see MAR)  Patient Stated Pain Goal: 0 Patient Stated Pain Goal: 0  o Intervention effective: yes  o Other actions taken for pain: Medication (see MAR)     Skin Assessment  Skin color    Condition/Temperature    Integrity Skin Integrity: Incision (comment),Wound (add Wound LDA)  Turgor    Weekly Pressure Ulcer Documentation  Pressure  Injury Documentation: No Pressure Injury Noted-Pressure Ulcer Prevention Initiated  Wound Prevention & Protection Methods  Orientation of wound Orientation of Wound Prevention: Posterior  Location of Prevention Location of Wound Prevention: Sacrum/Coccyx  Dressing Present Dressing Present : No  Dressing Status    Wound Offloading Wound Offloading (Prevention Methods): Bed, pressure reduction mattress     INTAKE/OUPUT  Date 02/20/22 0700 - 02/21/22 0659 02/21/22 0700 - 02/22/22 0659   Shift 9551-0028 3574-6550 24 Hour Total 9025-2808 2772-7593 24 Hour Total   INTAKE   P.O. 480 1320 1800        P. O. 480 1320 1800      Shift Total(mL/kg) 480(6.1) 1320(16.9) 1800(23)      OUTPUT   Urine(mL/kg/hr) 400(0.4) 1500 1900        Urine Voided 400 1500 1900        Urine Occurrence(s) 1 x 0 x 1 x      Emesis/NG output           Emesis Occurrence(s) 0 x  0 x      Stool           Stool Occurrence(s) 0 x 1 x 1 x      Shift Total(mL/kg) 400(5.1) 1500(19.2) 1900(24.3)      NET 80 -180 -100      Weight (kg) 78.2 78.2 78.2 78.2 78.2 78.2       Recommendations:  1. Patient needs and requests: dressing change    2. Pending tests/procedures: none at this time     3. Functional Level/Equipment: Partial (one person) / Tran Dessert; Wheelchair    Fall Precautions:   Fall risk precautions were reinforced with the patient; he was instructed to call for help prior to getting up.  The following fall risk precautions were continued: bed/ chair alarms, door signage, yellow bracelet and socks as well as update of the Halie Don tool in the patient's room. Loly Score: 4    HEALS Safety Check    A safety check occurred in the patient's room between off going nurse and oncoming nurse listed above. The safety check included the below items  Area Items   H  High Alert Medications - Verify all high alert medication drips (heparin, PCA, etc.)   E  Equipment - Suction is set up for ALL patients (with eliel)  - Red plugs utilized for all equipment (IV pumps, etc.)  - WOWs wiped down at end of shift.  - Room stocked with oxygen, suction, and other unit-specific supplies   A  Alarms - Bed alarm is set for fall risk patients  - Ensure chair alarm is in place and activated if patient is up in a chair   L  Lines - Check IV for any infiltration  - Gallardo bag is empty if patient has a Gallardo   - Tubing and IV bags are labeled   S  Safety   - Room is clean, patient is clean, and equipment is clean. - Hallways are clear from equipment besides carts. - Fall bracelet on for fall risk patients  - Ensure room is clear and free of clutter  - Suction is set up for ALL patients (with eliel)  - Hallways are clear from equipment besides carts.    - Isolation precautions followed, supplies available outside room, sign posted     Bi Salazar RN

## 2022-02-21 NOTE — PROGRESS NOTES
[facility] PHYSICAL REHABILITATION  [address] [place] [address], [address]     INPATIENT REHABILITATION  DAILY PROGRESS NOTE     Date: 2/21/2022    Name: Chirag Latif Age / Sex: 59 y.o. / male   CSN: [id number] MRN: [id number]   [facility] Date: 2/15/2022 Length of Stay: 6 days     Primary Rehabilitation Diagnosis: Impaired Mobility and ADLs secondary to:  1. S/P Right below-the-knee amputation (2/8/2022 - Dr. Peg Lee)  2. History of critical ischemia of the right lower extremity      Subjective:     Patient seen and examined. Blood pressure controlled. Blood glucose controlled. Patient's Complaint:   Has problem sleeping    Pain Control: stable, mild-to-moderate joint symptoms intermittently, reasonably well controlled by current meds      Objective:     Vital Signs:  Patient Vitals for the past 24 hrs:   BP Temp Pulse Resp SpO2   02/21/22 0800 108/69 97.4 °F (36.3 °C) 67 18 99 %   02/20/22 1935 132/68 98.6 °F (37 °C) 76 16 100 %   02/20/22 1531 124/76 98.2 °F (36.8 °C) 69 18 100 %        Physical Examination:  GENERAL SURVEY: Patient is awake, alert, oriented x 3, sitting comfortably on the chair, not in acute respiratory distress. HEENT: pale palpebral conjunctivae, anicteric sclerae, no nasoaural discharge, moist oral mucosa  NECK: supple, no jugular venous distention, no palpable lymph nodes  CHEST/LUNGS: symmetrical chest expansion, good air entry, clear breath sounds  HEART: adynamic precordium, good S1 S2, no S3, regular rhythm, no murmurs  ABDOMEN: flat, bowel sounds appreciated, soft, non-tender  EXTREMITIES: (+) right BKA stump, pale nailbeds, no edema, full and equal pulses, no calf tenderness   NEUROLOGICAL EXAM: The patient is awake, alert and oriented x3, able to answer questions fairly appropriately, able to follow 1 and 2 step commands. Able to tell time from the wall clock. Cranial nerves II-XII are grossly intact. No gross sensory deficit.   Motor strength is 4/5 on BUE and BLE. Incision(s): covered, clean, dry, and intact       Current Medications:  Current Facility-Administered Medications   Medication Dose Route Frequency    insulin lispro (HUMALOG) injection 3 Units  0.04 Units/kg SubCUTAneous TIDAC    insulin glargine (LANTUS) injection 35 Units  35 Units SubCUTAneous QHS    oxyCODONE IR (ROXICODONE) tablet 7.5 mg  7.5 mg Oral Q4H PRN    metFORMIN (GLUCOPHAGE) tablet 1,000 mg  1,000 mg Oral BID WITH MEALS    glucose chewable tablet 16 g  4 Tablet Oral PRN    glucagon (GLUCAGEN) injection 1 mg  1 mg IntraMUSCular PRN    dextrose 10% infusion 0-250 mL  0-250 mL IntraVENous PRN    iron polysaccharides (NIFEREX) capsule 150 mg  1 Capsule Oral BID    thiamine HCL (B-1) tablet 200 mg  200 mg Oral DAILY    aspirin chewable tablet 81 mg  81 mg Oral DAILY WITH BREAKFAST    atorvastatin (LIPITOR) tablet 20 mg  20 mg Oral DAILY    buPROPion SR (WELLBUTRIN SR) tablet 150 mg  150 mg Oral DAILY    gabapentin (NEURONTIN) capsule 400 mg  400 mg Oral TID    therapeutic multivitamin (THERAGRAN) tablet 1 Tablet  1 Tablet Oral DAILY    acetaminophen (TYLENOL) tablet 650 mg  650 mg Oral TID    methocarbamoL (ROBAXIN) tablet 500 mg  500 mg Oral TID    acetaminophen (TYLENOL) tablet 650 mg  650 mg Oral Q4H PRN    bisacodyL (DULCOLAX) tablet 10 mg  10 mg Oral Q48H PRN    enoxaparin (LOVENOX) injection 40 mg  40 mg SubCUTAneous Q24H    insulin lispro (HUMALOG) injection   SubCUTAneous TIDAC       Allergies:   Allergies   Allergen Reactions    Niacin Itching       Lab/Data Review:  Recent Results (from the past 24 hour(s))   GLUCOSE, POC    Collection Time: 02/20/22  4:59 PM   Result Value Ref Range    Glucose (POC) 162 (H) 70 - 110 mg/dL   GLUCOSE, POC    Collection Time: 02/20/22  8:07 PM   Result Value Ref Range    Glucose (POC) 107 70 - 979 mg/dL   METABOLIC PANEL, BASIC    Collection Time: 02/21/22  6:08 AM   Result Value Ref Range    Sodium 137 136 - 145 mmol/L    Potassium 4.9 3.5 - 5.5 mmol/L    Chloride 106 100 - 111 mmol/L    CO2 27 21 - 32 mmol/L    Anion gap 4 3.0 - 18 mmol/L    Glucose 194 (H) 74 - 99 mg/dL    BUN 20 (H) 7.0 - 18 MG/DL    Creatinine 1.18 0.6 - 1.3 MG/DL    BUN/Creatinine ratio 17 12 - 20      GFR est AA >60 >60 ml/min/1.73m2    GFR est non-AA >60 >60 ml/min/1.73m2    Calcium 8.7 8.5 - 10.1 MG/DL   CBC WITH AUTOMATED DIFF    Collection Time: 02/21/22  6:08 AM   Result Value Ref Range    WBC 3.4 (L) 4.6 - 13.2 K/uL    RBC 3.11 (L) 4.35 - 5.65 M/uL    HGB 8.5 (L) 13.0 - 16.0 g/dL    HCT 27.8 (L) 36.0 - 48.0 %    MCV 89.4 78.0 - 100.0 FL    MCH 27.3 24.0 - 34.0 PG    MCHC 30.6 (L) 31.0 - 37.0 g/dL    RDW 13.2 11.6 - 14.5 %    PLATELET 371 560 - 007 K/uL    MPV 10.2 9.2 - 11.8 FL    NRBC 0.0 0  WBC    ABSOLUTE NRBC 0.00 0.00 - 0.01 K/uL    NEUTROPHILS 44 40 - 73 %    LYMPHOCYTES 33 21 - 52 %    MONOCYTES 14 (H) 3 - 10 %    EOSINOPHILS 7 (H) 0 - 5 %    BASOPHILS 1 0 - 2 %    IMMATURE GRANULOCYTES 1 (H) 0.0 - 0.5 %    ABS. NEUTROPHILS 1.5 (L) 1.8 - 8.0 K/UL    ABS. LYMPHOCYTES 1.1 0.9 - 3.6 K/UL    ABS. MONOCYTES 0.5 0.05 - 1.2 K/UL    ABS. EOSINOPHILS 0.3 0.0 - 0.4 K/UL    ABS. BASOPHILS 0.0 0.0 - 0.1 K/UL    ABS. IMM. GRANS. 0.0 0.00 - 0.04 K/UL    DF AUTOMATED     GLUCOSE, POC    Collection Time: 02/21/22  7:59 AM   Result Value Ref Range    Glucose (POC) 133 (H) 70 - 110 mg/dL   GLUCOSE, POC    Collection Time: 02/21/22 12:21 PM   Result Value Ref Range    Glucose (POC) 157 (H) 70 - 110 mg/dL       Assessment:     Primary Rehabilitation Diagnosis  1. Impaired Mobility and ADLs  2. S/P Right below-the-knee amputation (2/8/2022 - Dr. Janice Howell)  3.  History of critical ischemia of the right lower extremity    Comorbidities  Patient Active Problem List   Diagnosis Code    Hyperlipidemia E78.5    Tobacco use disorder F17.200    Mediastinal adenopathy R59.0    History of vitamin D deficiency Z86.39    Insomnia G47.00    Diabetic nephropathy associated with type 2 diabetes mellitus ([facility]) E11.21    Otalgia H92.09    Mild nonproliferative diabetic retinopathy of left eye without macular edema associated with type 2 diabetes mellitus ([facility] [address] O63.1174    Hypertensive retinopathy H35.039    Nuclear sclerosis of both eyes H25.13    Arterial occlusion, lower extremity (HCC) I70.209    Impaired mobility and ADLs Z74.09, Z78.9    Status post below knee amputation of right lower extremity ([facility]) Z89.511    Chronic alcohol use Z72.89    History of coronary angioplasty with insertion of stent Z95.5    Coronary artery disease involving native coronary artery of native heart I25.10    Constipation K59.00    Poorly controlled type 2 diabetes mellitus ([facility]) E11.65    Long term current use of insulin ([facility]) Z79.4    Migraine headache G43.909    Diabetic neuropathy associated with type 2 diabetes mellitus ([facility]) E11.40    Gastroesophageal reflux disease K21.9    History of essential hypertension Z86.79    History of lacunar cerebrovascular accident Z80.78    History of acute inferior wall myocardial infarction I25.2    Critical ischemia of lower extremity ([facility]) I70.229    Acute postoperative anemia due to expected blood loss D62    Depression F32. A    High vitamin D level E67.3       Plan:     1. Justification for continued stay: Good progression towards established rehabilitation goals. 2. Medical Issues being followed closely:    [x]  Fall and safety precautions     [x]  Wound Care     [x]  Bowel and Bladder Function     [x]  Fluid Electrolyte and Nutrition Balance     [x]  Pain Control      3. Issues that 24 hour rehabilitation nursing is following:    [x]  Fall and safety precautions     [x]  Wound Care     [x]  Bowel and Bladder Function     [x]  Fluid Electrolyte and Nutrition Balance     [x]  Pain Control      [x]  Assistance with and education on in-room safety with transfers to and from the bed, wheelchair, toilet and shower.       4. Acute rehabilitation plan of care:    [x]  Continue current care and rehab. [x]  Physical Therapy           [x]  Occupational Therapy           []  Speech Therapy     []  Hold Rehab until further notice     5. Medications:    [x]  MAR Reviewed     [x]  Continue Present Medications     6. Chemical DVT Prophylaxis:      [x]  Enoxaparin     []  Unfractionated Heparin     []  Warfarin     []  NOAC     []  Aspirin     []  None     7. Mechanical DVT Prophylaxis:      []  FLEX Stockings     []  Sequential Compression Device     [x]  None     8. GI Prophylaxis:      []  PPI     []  H2 Blocker     [x]  None / Not indicated     9. Code status:    [x]  Full code     []  Partial code     []  Do not intubate     []  Do not resuscitate     10. Diet:  Specifications  4 carb choices (60 gm/meal), Low fat/Low cholesterol/High fiber/SEEMA   Solids (consistency)  Regular   Liquids (consistency)  Thin   Fluid Restriction  None      11. COVID-19:  Laboratory testing COVID-19 rapid test ([facility], [facility]) (2/4/2022): Not detected  COVID-19 rapid test [facility] (2/15/2022): Not detected   Precautions None   Vaccine to be given this admission No      12. Orders:   > S/P Right below-the-knee amputation (2/8/2022 - Dr. Arely Oseguera);  History of critical ischemia of the right lower extremity    > Staples to be removed on 3/8/2022    > Acute postoperative blood loss anemia   > Hgb/Hct (2/16/2022, on admission to the ARU) = 8.9/28.6   > Anemia work-up (2/16/2022) showed serum iron 14, TIBC 234, iron % saturation 18, ferritin 404, reticulocyte count 2.1   > On 2/16/2022, started Iron polysaccharides 150 mg PO BID      02/21/22  0608 02/17/22  0502 02/16/22  0626   HGB 8.5* 8.7* 8.9*   HCT 27.8* 27.8* 28.6*       > Epoetin lico 10,000 units SC x 1 dose today   > Continue Iron polysaccharides 150 mg PO BID    > Constipation   > On 2/17/2022, started Senna 1 tab PO once daily   > On 2/18/2022, discontinued Senna 1 tab PO once daily    > Coronary artery disease; History of inferior wall myocardial infarction; History of coronary angioplasty with stent placement   > Not on any ACE-I or beta-blockers   > Continue:    > Aspirin 81 mg PO daily with breakfast    > Atorvastatin 20 mg PO once daily    > Depression   > On 2/16/2022, discontinued Trazodone 50 mg PO q HS (re: patient says he has not been taking this prior to admission)   > Continue Bupropion  mg PO once daily    > High vitamin D level;  History of vitamin D deficiency   > Prior to admission to [facility], patient was on Ergocalciferol 50,000 units PO q 7 days   > Vitamin D 25-Hydroxy (2/16/2022) = 105.7   > On 2/16/2022, discontinued Ergocalciferol 50,000 units PO q 7 days     > Hyperlipidemia   > Continue Atorvastatin 20 mg PO once daily    > Insomnia   > On 2/16/2022, discontinued Trazodone 50 mg PO q HS (re: patient says he has not been taking this prior to admission)    > Type 2 diabetes mellitus, poorly controlled, with diabetic nephropathy, diabetic neuropathy, diabetic retinopathy, with long-term current use of insulin   > HbA1c (2/8/2022) = 13.8   > Vitamin B12 (2/16/2022) = 574   > On 2/16/2022:    > Increased Metformin from 500 mg to 850 mg PO BID with meals    > Increased Insulin lispro from 4 units to 5 units SC TID AC   > On 2/17/2022:    > Increased Insulin glargine from 45 units to 50 units SC daily before breakfast    > Decreased Insulin lispro from 5 units to 4 units SC TID AC   > On 2/18/2022:    > Patient was given Insulin glargine 20 units SC x 1 dose at 9PM    > Increased Metformin from 850 mg to 1000 mg PO BID with meals   > On 2/19/2022, changed Insulin glargine from 50 units SC daily before breakfast to 35 units SC q HS   > On 2/22/2022, decreased Insulin lispro from 4 units to 3 units SC TID AC   > Continue:    > Metformin 1000 mg PO BID with meals    > Insulin glargine 35 units SC q HS    > Insulin lispro 3 units SC TID AC    > Insulin lispro sliding scale SC TID AC only    > Difficulty sleeping   > Start Melatonin 3 mg PO daily after dinner    > Analgesia   > On 2/16/2022:    > Started:     > Acetaminophen 650 mg PO TID (8AM, 12PM, 4PM)     > Methocarbamol 500 mg PO TID (8AM, 2PM, 8PM)    > Increased Gabapentin from 300 mg to 400 mg PO TID (8AM, 2PM, 8PM)   > On 2/18/2022, decreased Oxycodone from 10 mg to 7.5 mg PO q 4 hr PRN for pain level 5/10 or greater    > Continue:    > Acetaminophen 650 mg PO TID (8AM, 12PM, 4PM)    > Acetaminophen 650 mg PO q 4 hr PRN for pain level 4/10 or lesser (from 8PM to 4AM only)    > Gabapentin 400 mg PO TID (8AM, 2PM, 8PM)    > Methocarbamol 500 mg PO TID (8AM, 2PM, 8PM)    > Decrease Oxycodone from 7.5 mg to 5 mg PO q 4 hr PRN for pain level 5/10 or greater       12. Personal Protective Equipment (N95 face mask and eye goggles) was used while interacting with the patient. Patient was using a surgical mask. 15. Patient's progress in rehabilitation and medical issues discussed with the patient. All questions answered to the best of my ability. Care plan discussed with patient and nurse. 14. Total clinical care time is 30 minutes, including review of chart including all labs, radiology, past medical history, and discussion with patient. Greater than 50% of my time was spent in coordination of care and counseling.       Signed:    Akanksha Quijano MD    February 21, 2022

## 2022-02-21 NOTE — PROGRESS NOTES
Problem: Mobility Impaired (Adult and Pediatric)  Goal: *Acute Goals and Plan of Care (Insert Text)  Description: Physical Therapy Short Term Goals  Initiated 2/16/2022 and to be accomplished within 7 day(s) (2/23/2022)  1. Patient will move from supine to sit and sit to supine , scoot up and down, and roll side to side in bed with independence. 2.  Patient will transfer from bed to chair and chair to bed with supervision/set-up using the least restrictive device. 3.  Patient will perform sit to stand with supervision/set-up. 4.  Patient will ambulate with supervision/set-up for 50 feet with the least restrictive device. 5.  Patient will ascend/descend 3 stairs with 1 handrail(s) with minimal assistance/contact guard assist.    Physical Therapy Long Term Goals  Initiated 2/16/2022 and to be accomplished within 14 day(s) (3/02/2022)  1. Patient will move from supine to sit and sit to supine , scoot up and down, and roll side to side in bed with independence. 2.  Patient will transfer from bed to chair and chair to bed with modified independence using the least restrictive device. 3.  Patient will perform sit to stand with modified independence. 4.  Patient will ambulate with modified independence for 50 feet with the least restrictive device. 5.  Patient will ascend/descend 3 stairs with 1 handrail(s) or bumping up on steps with supervision/set-up. Outcome: Progressing Towards Goal    PHYSICAL THERAPY TREATMENT    Patient: Lucila Mathis (40 y.o. male)  Date: 2/21/2022  Diagnosis: Status post below knee amputation of right lower extremity ([facility]) [Z89.511] Status post below knee am[facility] right lower [facility]  Precautions: Fall  Chart, physical therapy assessment, plan of care and goals were reviewed. Time In:0900  Time ROS:8432    Patient seen for: Balance activities; Patient education;Transfer training;Gait training; Wheelchair mobility     Time In:1430  Time Out:1530    Patient seen for: Balance activities; Patient education;Transfer training;Family training;Gait training; Wheelchair mobility    Pain:  Pt pain was reported as no c/o pain pre-treatment. Pt pain was reported as no c/o pain post-treatment. Intervention: N/A    Patient identified with name and : yes    SUBJECTIVE:       Pt states, \"it was my first time putting it on,\" re: residual limb guard. Reminded pt re: appropriate donning of residual limb guard at start of treatment session as pt had places some of the straps in the wrong places. Pt seen with his significant other during second treatment session for family education opportunity. PT educated pt and his significant other re: current recommendations for safe d/c home with 24 hour assistance and home health therapy as well as recommendations for w/c and RW. PT then revisited topic of safe home entry reiterating that pt would either need B handrails or a ramp. When PT initially asked if the patient or his significant other had spoken to the owner of their rental re: installing rails or setting up a ramp, both parties looked at each other without answering. Eventually, Mr. Isak May stated, \"I talked to my friend, he can put up rails. \"  PT again emphasized importance of appropriate equipment for safe d/c home and strongly recommended that either the patient or his significant other or both speak with the owner of their home and make sure rails can be installed prior to pt's scheduled d/c date in order to promote safe home entry. OBJECTIVE DATA SUMMARY:    Objective:     BED/MAT MOBILITY Daily Assessment     Rolling Right : 6 (Modified independent)  Rolling Left : 6 (Modified independent)  Supine to Sit : 6 (Modified independent)  Sit to Supine : 6 (Modified independent)   Pt performed mobility on mat table with increased time. TRANSFERS Daily Assessment     Transfer Type:  Other  Other: stand step with RW  Transfer Assistance : 4 (Contact guard assistance)  Sit to Stand Assistance: Contact guard assistance   Pt requires CGA for safety with functional transfers as pt at times moves quickly requiring reminders for safe hand placement with transition from standing at RW to sitting. During second treatment session, pt's significant other was educated on PT recommendations for close supervision to [address] for safe transfers and performed return demonstration with direct supervision from PT. GAIT Daily Assessment    Gait Description (WDL) Exceptions to WDL    Gait Abnormalities Decreased step clearance    Assistive device Gait belt;Walker, rolling    Ambulation assistance - level surface 4 (Contact guard assistance)    Distance 75 Feet (ft) during first treatment session  40 Feet x 2 trials during second treatment session    Ambulation assistance- uneven surface  NT    Comments Pt ambulates demonstrating adequate foot clearance with minimal verbal cuing for posture or forward gaze x 45 feet. However, as pt fatigues, pt requires moderate verbal cues to attend to safe step length and foot clearance. During family training opportunity, pt ambulated once with PT while PT educated pt's significant other re: safe guarding techniques and one trial with pt's significant other assisting pt with supervision from PT.        STEPS/STAIRS Daily Assessment     Steps/Stairs ambulated 4 (6\")    Assistance Required 3 (Moderate assistance) (for first step ascending, minimal assistance following)    Rail Use Both    Comments Pt required moderate assistance to clear first step and minimal assistance for ascending step after as well as minimal assistance for slow controlled descent with descending stairs with verbal cues for weight bearing through B UEs.     Curbs/Ramps  NT        BALANCE Daily Assessment     Sitting - Static: Good (unsupported)  Sitting - Dynamic: Good (unsupported)  Standing - Static: Fair  Standing - Dynamic : Impaired        WHEELCHAIR MOBILITY Daily Assessment     Able to Propel (ft): 250 feet  Functional Level: 6  Wheelchair Setup Assist Required : 4 (Minimal assistance)  Wheelchair Management: Manages left brake;Manages right brake;Manages left footrest;Manages right footrest     During second treatment session, pt propelled w/c over concrete sidewalk of varying grades x 200 feet with close supervision and verbal cues for safe and efficient w/c propulsion. THERAPEUTIC EXERCISES Daily Assessment     Standing LE Strengthening:  10 Repetitions sit <> stand w/c <> RW at a supervision level with minimal to moderate verbal cues for safety. Prone LE Strength Training:  3 Sets of 10 Repetitions  Right and Left Hip Extension    Supine LE Strength Training:  3 Sets of 10 Repetitions  Bridging with B knees on bolster        ASSESSMENT:  Pt is progressing with functional mobility requiring decreased physical assistance. However, pt demonstrates inconsistent carryover for safety with functional transfes and gait and will benefit from ongoing skilled PT intervention to address impairments. Progression toward goals:  []      Improving appropriately and progressing toward goals  [x]      Improving slowly and progressing toward goals  []      Not making progress toward goals and plan of care will be adjusted      PLAN:  Patient continues to benefit from skilled intervention to address the above impairments. Continue treatment per established plan of care. Emphasize functional strength training to promote increased safety and independence with mobility. \Discharge Recommendations:  Home Physical Therapy with 24 hour assistance  Further Equipment Recommendations for Discharge:  gait belt, rolling walker, wheelchair 18 inch and ramp or handrails for safe home entry      Estimated Discharge Date: 3/02/2022    Activity Tolerance:   Good  Please refer to the flowsheet for vital signs taken during this treatment.     After treatment:   [] Patient left in no apparent distress in bed  [] Patient left in no apparent distress sitting up in chair after first trial.  [x] Patient left in no apparent distress in w/c mobilizing under own power  [] Patient left in no apparent distress dining area  [] Patient left in no apparent distress mobilizing under own power  [] Call bell left within reach  [] Nursing notified  [] Caregiver present  [] Bed alarm activated   [x] Chair alarm activated      Tiffanie Kerr PT, DPT  2/21/2022

## 2022-02-21 NOTE — PROGRESS NOTES
Problem: Neurolinguistics Impaired (Adult)  Goal: *Speech Goal: (INSERT TEXT)  Description: Long term goals  Patient will:  1. Be oriented x 3 and recall events of the day, supervision. 2. Recall 3 related words after 5 minutes with supervision and mnemonic training. 3. Perform functional problem solving tasks with 90% accuracy. 4. Recall strategies learned in OT/PT sessions (provided by the therapists) with min assist-supervision. Short term goals (by 2/25/22)  Patient will:  1. Be oriented x 3 and recall events of the day, min assist.  2. Recall 2 related words after 5 minutes with min assist and mnemonic training. 3. Perform functional problem solving tasks with 70-80% accuracy. 4. Recall strategies learned in OT/PT sessions (provided by the therapists) with mod cues. Note:   Speech language pathology treatment    Patient: Julia Valladares (89 y.o. male)  Date: 2/21/2022  Diagnosis: Status post below knee amputation of right lower extremity ([facility]) [Z89.511] Status post below knee amputation of right lower extremity ([facility] [address]       Time in: 1530  Time Out:  1600    Pain:  Pre-tx:  No report of pain  Post tx: No report of pain    SUBJECTIVE:   Patient stated I like to watch [employer]. .    OBJECTIVE:   Mental Status:  Mr. Camron Arellano continues to present as quite subdued and or detatched. Treatment & Interventions:  Patient was seen in his room with his significant other this afternoon. SLP discussed goals established after initial evaluation and requested additional information re: his interests. In talking with this significant other before he returned to the room, it was learned that his family has been concerned that he is inconsistent in his responses and that \"Sometimes he thinks that he is back home in [place]". Neuro-Linguistics:   Orientation:  Min assist  Recent memory: Min assist  Personal information: He provided different ages for his adult daughters than on Friday.     Response & Tolerance to Activities:  SLP was able to talk with Mr. Michael Hurtado significant other this afternoon. This was helpful in discovering that his is indeed different from his baseline level of cognitive functioning. SLP will proceed with the above goals, trying as much as possible to present tasks which may be of interest to him. Pain:  Pain Scale 1: Numeric (0 - 10)  Pain Orientation 1: Right  Pain Description 1: Aching  After treatment:   [x]       Patient left in no apparent distress sitting up in chair  []       Patient left in no apparent distress in bed  [x]       Call bell left within reach  []       Nursing notified  [x]       Caregiver present  []       Bed alarm activated    ASSESSMENT:   Progression toward goals:  []       Improving appropriately and progressing toward goals  [x]       Improving slowly and progressing toward goals  []       Not making progress toward goals and plan of care will be adjusted    PLAN:   Patient continues to benefit from skilled intervention to address the above impairments. Continue treatment per established plan of care.   Discharge Recommendations:  Outpatient    Estimated LOS: Through 3/2/22    LOI Sommers  Time Calculation: 30 mins

## 2022-02-21 NOTE — INTERDISCIPLINARY ROUNDS
[facility] PHYSICAL REHABILITATION  [address] [place] [address], [address]    INPATIENT REHABILITATION  PRE-TEAM CONFERENCE SUMMARY     Date of Conference: 2/22/2022    Patient Information:        Name: Sergio Jeffrey Age / Sex: 59 y.o. / male   CSN: [id number] MRN: [id number]   [facility] Date: 2/15/2022 Length of Stay: 6 days     Primary Rehabilitation Diagnosis  1. Impaired Mobility and ADLs  2. S/P Right below-the-knee amputation (2/8/2022 - Dr. Mary Butt)  3.  History of critical ischemia of the right lower extremity    Comorbidities  Patient Active Problem List   Diagnosis Code    Hyperlipidemia E78.5    Tobacco use disorder F17.200    Mediastinal adenopathy R59.0    History of vitamin D deficiency Z86.39    Insomnia G47.00    Diabetic nephropathy associated with type 2 diabetes mellitus ([address] E11.21    Otalgia H92.09    Mild nonproliferative diabetic retinopathy of left eye without macular edema associated with type 2 diabetes mellitus ([address] I28.6641    Hypertensive retinopathy H35.039    Nuclear sclerosis of both eyes H25.13    Arterial occlusion, lower extremity (HCC) I70.209    Impaired mobility and ADLs Z74.09, Z78.9    Status post below knee amputation of right lower extremity (HCC) Z89.511    Chronic alcohol use Z72.89    History of coronary angioplasty with insertion of stent Z95.5    Coronary artery disease involving native coronary artery of native heart I25.10    Constipation K59.00    Poorly controlled type 2 diabetes mellitus (HCC) E11.65    Long term current use of insulin ([facility]) Z79.4    Migraine headache G43.909    Diabetic neuropathy associated with type 2 diabetes mellitus ([facility]) E11.40    Gastroesophageal reflux disease K21.9    History of essential hypertension Z86.79    History of lacunar cerebrovascular accident Z80.78    History of acute inferior wall myocardial infarction I25.2    Critical ischemia of lower extremity ([facility]) I70.229    Acute postoperative anemia due to expected blood loss D62    Depression F32. A    High vitamin D level E67.3          Therapy:     FIM SCORES Initial Assessment Weekly Progress Assessment 2/21/2022   Eating Functional Level: 5  6   Swallowing     Grooming 5 Grooming Assistance : 5 (Supervision)   Bathing 4 Bathing Assistance, Upper: 5 (Supervision)  Position Performed: Seated in chair  Adaptive Equipment: Tub bench  Comments: Pt performed UB showering from tub transfer bench in seated position with superviasion and setup and min VC's  Bathing Assistance, Lower : 5 (Stand-by assistance)  Position Performed: Seated in chair  Adaptive Equipment: Tub bench  Comments: Pt performed LB showering from tub transfer bench in seated position using compensatory strategies for work simplification and safety with min VC's   Upper Body Dressing Functional Level: 5 (setup)  Items Applied/Steps Completed: Pullover (4 steps)  Comments: Pt performed UB dressing with setup Dressing Assistance : 5 (Supervision)  Comments: Pt performed UB dressing in seated position with supervision/Joseph. Lower Body Dressing Functional Level: 3  Items Applied/Steps Completed: Elastic waist pants (3 steps),Sock, left (1 step),Underpants (3 steps)  Comments: Pt performed LB dressing with modA to don LLE sock and pull pants up over R hip and standing at [facility] abr for increased stability Dressing Assistance : 5 (Stand-by assistance)  Leg Crossed Method Used: Yes  Position Performed: Bending forward method  Comments: Pt performed LB dressing at [facility] for UE support with SBA to pull up over buttocks.  Pt demosntrated abiltiy to don LLE sock in seated position with supervision/Joseph   Toileting Functional Level:  (Pt declined) Toileting Assistance (FIM Score):  (Pt declined.)   Bladder 1 0   Bowel  0 0   Toilet Transfer [place] Toilet Transfer Score: 5 (CGA)  NT this date, (SBA)   Tub/Shower Transfer [place] Tub or Shower Type: Shower  Tub/Shower Transfer Score: 5 (CGA with grab bar) Shower  5 (Stand-by assistance)   Comprehension Primary Mode of Comprehension: Auditory  Score: 5 Auditory  5   Expression Primary Mode of Expression: Verbal  Score: 5 Verbal  5   Social Interaction Score: 5 5   Problem Solving Score: 5 4   Memory Score: 5 4     FIM SCORES Initial Assessment Weekly Progress Assessment 2/21/2022   Bed/Chair/Wheelchair Transfers Transfer Type: Other  Other: stand step with RW  Transfer Assistance : 4 (Contact guard assistance)  Sit to Stand Assistance: Contact guard assistance  Car Transfers:  (CGA)  Car Type: car transfer  Transfer Type:  Other  Other: stand step with RW  Transfer Assistance : 4 (Contact guard assistance)  Sit to Stand Assistance: Contact guard assistance   Bed Mobility Rolling Right 6 (Modified independent)   Rolling Left 6 (Modified independent)   Supine to Sit 6 (Modified independent)   Sit to Stand Contact guard assistance   Sit to Supine 6 (Modified independent)    Rolling Right   6 (Modified independent)   Rolling Left   6 (Modified independent)   Supine to Sit   6 (Modified independent)   Sit to Stand   Contact guard assistance   Sit to Supine   6 (Modified independent)      Locomotion (W/C) Able to Propel (ft): 240 feet  Functional Level: 4  Curbs/Ramps Assist Required (FIM Score): 0 (Not tested)  Wheelchair Setup Assist Required : 3 (Moderate assistance)  Wheelchair Management: Manages left brake,Manages right brake Function 6  Setup Assistance  4 (Minimal assistance)      Locomotion (W/C distance)   250 feet   Locomotion (Walk) 4 (Minimal assistance) 4 (Contact guard assistance)  Gait belt;Walker, rolling   Locomotion (Walk dist.) 50 Feet (ft) 75 Feet (ft)   Steps/Stairs Steps/Stairs Ambulated (#): 3 (4\" steps)  Level of Assist : 3 (Moderate assistance)  Rail Use: Both Steps/Stairs Ambulated (#): 4 (6\" steps)  Level of Assist : 3 (Moderate assistance)  Rail Use: Both         Nursing:     Neuro:   AAA&O x 4 Respiratory:   [x] WNL   [] O2 LPM:   Other:  Peripheral Vascular:   [] TEDS present   [] Edema present ____ Grade   Cardiac:   [x] WNL   [] Other  Genitourinary:   [x] continent   [] incontinent   [] epstein  Abdominal _2/21______ LBM  GI: _reg______ Diet _thin_____ Liquids _____ tube feeds  Musculoskeletal: ____ ROM Transfers _____ Assistive Device Used  _mod___ Level of Assistance  Skin Integumentary:   [x] Intact   [] Not Intact   __________Preventative Measures  Details______________________________________________________________  Pain: [x] Controlled   [] Not Controlled   Pain Meds:   [] Scheduled   [] PRN        Interdisciplinary Team Goals:     1. Discipline  Physical Therapy    Goal  Encourage pt to perform all functional transfers at a distant supervision level without verbal cues for safety    Barrier  Impaired carryover/safety awareness, Impaired strength, Impaired balance    Intervention  Education, Transfer training, Strength training, Balance training    Goal written by:   Enoch Phalen PT, DPT     2. Discipline  Occupational Therapy    Goal  Pt will perform shower transfer with AE and supervision with 2 VC's. Barrier  confusion, impaired memory and recall, impaired initiation of tasks and safety    Intervention  AE training, transfer training, ADL training, safety    Goal written by:  Mela Kirkland MSOTR/L     3. Discipline  Speech Therapy    Goal  Patient will be oriented x 3 and recall events of the day, supervision. Barrier  cognitive-linguistic deficits, family reports some level of disorientation    Intervention  cognitive retraining    Goal written by:  Zach Fuentes, [facility]-SLP     4. Discipline  Nursing    Goal  Pt. Incision/wound will remain intact , free from infection by discharge from rehab. Barrier  Status post below Knee amputation of right lower ext. Intervention  Inspect Incision/wound routinely, keep clean, dry and due proper skin care , dressing changes as ordered. Goal written by:  Kaila Ayala, RN     5. Discipline  Clinical Psychology    Goal  Address any emergent concerns during continued recovery effort    Barrier  Stress with traumatic loss and recovery    Intervention  Patient education and support     Goal written by:  Genna Freire, PhD         Disposition / Discharge Planning: Follow-up services:  [x] Physical Therapy             [x] Occupational Therapy       [x] Speech Therapy           [] Skilled Nursing      [] Medical Social Worker   [] Aide        [] Outpatient      [] vs   [x] Home Health  [] vs       [] to progress to outpatient       [] with 24-hour supervision       [x] with 24-hour assistance   [] [place]   [facility] recommendations: Transfer bench, 3:1 elisa  RW, w/c (18\"), B handrails or ramp for safe home entry   Estimated discharge date:     Discharge Location:  [x] Home  [] versus    [] [place]    [] [address]   [] Other:           Electronic Signatures:      Signature Date Signed   Physical Therapist    Latia Weinberg PT, DPT  2/21/2022   Occupational Therapist    Wyvonne Brittle MSOTR/L  2/21/2022   Speech Therapist    Yuki Crawford, [facility]  2/22/22   Recreational Therapist    Giselle Camejo, CTRS 2/21/2022   [facility], RN  2/21/2022   Clinical Psychologist    Genna Freire, PhD  2/21/22    Physician    Ted Arita MD   2/21/2022               Opportunity to share with family/caregiver[] YES [] NO    Relationship to patient____________________________________________________      The above information has been reviewed with the patient in a language that they can understand. Opportunity for comments and questions has been provided and a signed attestation has been scanned into the \"media tab\" of the EMR.       Patient Signature: ______________________________________________________    Date Signed: __________________________________________________________

## 2022-02-21 NOTE — PROGRESS NOTES
Problem: Self Care Deficits Care Plan (Adult)  Goal: *Therapy Goal (Edit Goal, Insert Text)  Description: Occupational Therapy Goals   Long Term Goals  Initiated 22 and to be accomplished within 2 week(s)  1. Pt will perform self-feeding independently. 2. Pt will perform grooming independently. 3. Pt will perform UB bathing with Joseph. 4. Pt will perform LB bathing with Joseph. 5. Pt will perform tub/shower transfer with Joseph. 6. Pt will perform UB dressing with Joseph. 7. Pt will perform LB dressing with Joseph. 8. Pt will perform toileting task with Joseph. 9. Pt will perform toilet transfer with Joseph. Short Term Goals   Initiated 22 and to be accomplished within 7 day(s), reassess 22  1. Pt will perform self-feeding with Joseph. 2. Pt will perform grooming with Joseph. 3. Pt will perform UB bathing with supervision. 4. Pt will perform LB bathing with supervision. 5. Pt will perform tub/shower transfer with SBA. 6. Pt will perform UB dressing with Joseph. 7. Pt will perform LB dressing with Diana. 8. Pt will perform toileting task with CGA. 9. Pt will perform toilet transfer with SBA. Occupational Therapy TREATMENT    Patient: Ric Burnett   59 y.o. Patient identified with name and : yes    Date: 2022    First Tx Session  Time In: 0700  Time Out[de-identified] 6961    Second Tx Session  Time In: 1400  Time Out[de-identified] 1430    Diagnosis: Status post below knee amputation of right lower extremity ([facility] [address] [Z89.511]   Precautions: Fall  Chart, occupational therapy assessment, plan of care, and goals were reviewed. Pain:  Pt reports-/10 pain or discomfort prior to treatment. Pt reports -/10 pain or discomfort post treatment.    Intervention Provided:       SUBJECTIVE:   Patient stated I can just get up\"    OBJECTIVE DATA SUMMARY:       GROOMING Daily Assessment    Grooming  Grooming Assistance : 5 (Supervision)     UPPER BODY BATHING Daily Assessment    Upper Body Bathing  Bathing Assistance, Upper: 5 (Supervision)  Position Performed: Seated in chair  Adaptive Equipment: Tub bench  Comments: Pt performed UB showering from tub transfer bench in seated position with superviasion and setup and min VC's     LOWER BODY BATHING Daily Assessment    Lower Body Bathing  Bathing Assistance, Lower : 5 (Stand-by assistance)  Position Performed: Seated in chair  Adaptive Equipment: Tub bench  Comments: Pt performed LB showering from tub transfer bench in seated position using compensatory strategies for work simplification and safety with min VC's     TOILETING Daily Assessment    Toileting  Toileting Assistance (FIM Score):  (Pt declined.)     UPPER BODY DRESSING Daily Assessment    Upper Body Dressing   Dressing Assistance : 5 (Supervision)  Comments: Pt performed UB dressing in seated position with supervision/Joseph. LOWER BODY DRESSING Daily Assessment    Lower Body Dressing   Dressing Assistance : 5 (Stand-by assistance)  Leg Crossed Method Used: Yes  Position Performed: Bending forward method  Comments: Pt performed LB dressing at [facility] for UE support with SBA to pull up over buttocks. Pt demosntrated abiltiy to don LLE sock in seated position with supervision/Joseph     MOBILITY/TRANSFERS Daily Assessment    Functional Transfers  Tub or Shower Type: Shower  Amount of Assistance Required: 5 (Stand-by assistance)  Adaptive Equipment: Grab bars; Tub transfer bench       ASSESSMENT:  Pt is making progress toward goals however requires VC's for safety and sequencing due to confusion. Pt is making good progress with self cares and improved stability for functional mobility. Pt now demonstrates ability to perform self cares with supervision/SBA in sitting and standing. Continued OT necessary to increased independence with self cares to Joseph level.  Family education performed with pt and pt girlfriend, Mo for increased safety and independence with showering and UB/LB self cares, safety for functional mobility and donning clam shell prior to OOB activity. Pt has met 5/9 STG's at this time. Progression toward goals:  []          Improving appropriately and progressing toward goals  [x]          Improving slowly and progressing toward goals  []          Not making progress toward goals and plan of care will be adjusted     PLAN:  Patient continues to benefit from skilled intervention to address the above impairments. Continue treatment per established plan of care. Discharge Recommendations:  Home Health with support  Further Equipment Recommendations for Discharge: bedside commode and transfer bench TBD on progress     Activity Tolerance:  good      Estimated LOS:3/4/2022    Please refer to the flowsheet for vital signs taken during this treatment. After treatment:   [x]  Patient left in no apparent distress sitting up in chair   []  Patient left in no apparent distress in bed  [x]  Call bell left within reach  []  Nursing notified  []  Caregiver present  []  Bed alarm activated    COMMUNICATION/EDUCATION:   [] Home safety education was provided and the patient/caregiver indicated understanding. [] Patient/family have participated as able in goal setting and plan of care. [x] Patient/family agree to work toward stated goals and plan of care. [] Patient understands intent and goals of therapy, but is neutral about his/her participation. [] Patient is unable to participate in goal setting and plan of care.       Abelardo Lee OT         Outcome: Progressing Towards Goal  Goal: Interventions  Outcome: Progressing Towards Goal

## 2022-02-22 LAB
GLUCOSE BLD STRIP.AUTO-MCNC: 229 MG/DL (ref 70–110)
GLUCOSE BLD STRIP.AUTO-MCNC: 249 MG/DL (ref 70–110)
GLUCOSE BLD STRIP.AUTO-MCNC: 95 MG/DL (ref 70–110)
GLUCOSE BLD STRIP.AUTO-MCNC: 98 MG/DL (ref 70–110)

## 2022-02-22 PROCEDURE — 97150 GROUP THERAPEUTIC PROCEDURES: CPT

## 2022-02-22 PROCEDURE — 92507 TX SP LANG VOICE COMM INDIV: CPT

## 2022-02-22 PROCEDURE — 82962 GLUCOSE BLOOD TEST: CPT

## 2022-02-22 PROCEDURE — 65310000000 HC RM PRIVATE REHAB

## 2022-02-22 PROCEDURE — 97530 THERAPEUTIC ACTIVITIES: CPT

## 2022-02-22 PROCEDURE — 74011636637 HC RX REV CODE- 636/637: Performed by: INTERNAL MEDICINE

## 2022-02-22 PROCEDURE — 74011250636 HC RX REV CODE- 250/636: Performed by: INTERNAL MEDICINE

## 2022-02-22 PROCEDURE — 2709999900 HC NON-CHARGEABLE SUPPLY

## 2022-02-22 PROCEDURE — 97116 GAIT TRAINING THERAPY: CPT

## 2022-02-22 PROCEDURE — 74011250637 HC RX REV CODE- 250/637: Performed by: INTERNAL MEDICINE

## 2022-02-22 PROCEDURE — 97110 THERAPEUTIC EXERCISES: CPT

## 2022-02-22 PROCEDURE — 99232 SBSQ HOSP IP/OBS MODERATE 35: CPT | Performed by: INTERNAL MEDICINE

## 2022-02-22 PROCEDURE — 97535 SELF CARE MNGMENT TRAINING: CPT

## 2022-02-22 RX ORDER — GABAPENTIN 300 MG/1
300 CAPSULE ORAL 3 TIMES DAILY
Status: DISCONTINUED | OUTPATIENT
Start: 2022-02-22 | End: 2022-03-02 | Stop reason: HOSPADM

## 2022-02-22 RX ADMIN — OXYCODONE 5 MG: 5 TABLET ORAL at 08:01

## 2022-02-22 RX ADMIN — METHOCARBAMOL TABLETS 500 MG: 500 TABLET, COATED ORAL at 13:58

## 2022-02-22 RX ADMIN — GABAPENTIN 400 MG: 400 CAPSULE ORAL at 08:01

## 2022-02-22 RX ADMIN — Medication 150 MG: at 17:03

## 2022-02-22 RX ADMIN — Medication 1 UNITS: at 16:46

## 2022-02-22 RX ADMIN — Medication 3 UNITS: at 16:47

## 2022-02-22 RX ADMIN — GABAPENTIN 300 MG: 300 CAPSULE ORAL at 21:14

## 2022-02-22 RX ADMIN — Medication 3 UNITS: at 08:00

## 2022-02-22 RX ADMIN — ENOXAPARIN SODIUM 40 MG: 100 INJECTION SUBCUTANEOUS at 05:42

## 2022-02-22 RX ADMIN — Medication 35 UNITS: at 21:16

## 2022-02-22 RX ADMIN — BUPROPION HYDROCHLORIDE 150 MG: 150 TABLET, EXTENDED RELEASE ORAL at 08:01

## 2022-02-22 RX ADMIN — ACETAMINOPHEN 650 MG: 325 TABLET ORAL at 16:44

## 2022-02-22 RX ADMIN — METFORMIN HYDROCHLORIDE 1000 MG: 500 TABLET ORAL at 16:44

## 2022-02-22 RX ADMIN — GABAPENTIN 300 MG: 300 CAPSULE ORAL at 16:44

## 2022-02-22 RX ADMIN — Medication 3 UNITS: at 12:54

## 2022-02-22 RX ADMIN — METFORMIN HYDROCHLORIDE 1000 MG: 500 TABLET ORAL at 08:01

## 2022-02-22 RX ADMIN — ASPIRIN 81 MG 81 MG: 81 TABLET ORAL at 07:59

## 2022-02-22 RX ADMIN — Medication 150 MG: at 08:01

## 2022-02-22 RX ADMIN — OXYCODONE 5 MG: 5 TABLET ORAL at 12:18

## 2022-02-22 RX ADMIN — Medication 200 MG: at 08:01

## 2022-02-22 RX ADMIN — ACETAMINOPHEN 650 MG: 325 TABLET ORAL at 12:18

## 2022-02-22 RX ADMIN — OXYCODONE 5 MG: 5 TABLET ORAL at 16:44

## 2022-02-22 RX ADMIN — Medication 3 MG: at 17:03

## 2022-02-22 RX ADMIN — ACETAMINOPHEN 650 MG: 325 TABLET ORAL at 07:59

## 2022-02-22 RX ADMIN — OXYCODONE 5 MG: 5 TABLET ORAL at 04:15

## 2022-02-22 RX ADMIN — METHOCARBAMOL TABLETS 500 MG: 500 TABLET, COATED ORAL at 08:01

## 2022-02-22 RX ADMIN — ATORVASTATIN CALCIUM 20 MG: 20 TABLET, FILM COATED ORAL at 08:01

## 2022-02-22 RX ADMIN — THERA TABS 1 TABLET: TAB at 08:01

## 2022-02-22 RX ADMIN — METHOCARBAMOL TABLETS 500 MG: 500 TABLET, COATED ORAL at 21:14

## 2022-02-22 NOTE — PROGRESS NOTES
Nutrition Assessment     Type and Reason for Visit: Reassess,Positive nutrition screen,Consult    Nutrition Recommendations/Plan:   - Add double vegetables BID and double protein once daily in diet order  - discontinue duplicate oral nutrition supplement order  - add daily HS snack.   - Continue all other nutrition interventions. Encourage continued po intake of meals and supplements      Nutrition Assessment:  Pt seen per nursing request due to pt reporting still being hungry after meal and supplement intake. Receiving supplements TID; noted duplicate order, will discontinue. Noted order for HS snack in meal order; will remove and place in separate snack order. Pt agreeable to double vegetables BID and double protein once daily. Malnutrition Assessment:  Malnutrition Status: At risk for malnutrition (specify) (history of weight loss, alcohol use and uncontrolled diabetes s/p amputation)     Estimated Daily Nutrient Needs:  Energy (kcal):  8502-6613  Protein (g):  62-94       Fluid (ml/day):  0182-5860    Nutrition Related Findings:  BM 2/21. Pertinent meds:  Lantus, SSI, metformin, MVI, thiamine. Current Nutrition Therapies:  ADULT ORAL NUTRITION SUPPLEMENT Breakfast, Lunch, Dinner; Diabetic Supplement  ADULT ORAL NUTRITION SUPPLEMENT Breakfast, Dinner, Lunch; Diabetic Supplement  ADULT DIET Regular; 5 carb choices (75 gm/meal);  Low Fat/Low Chol/High Fiber/SEEMA    Anthropometric Measures:  · Height:  5' 9\" (175.3 cm)  · Current Body Wt:  78.2 kg (172 lb 6.4 oz)  · BMI: 25.4    Nutrition Diagnosis:   · Increased nutrient needs related to catabolic illness,increased demand for energy/nutrients as evidenced by wounds,lab values    · Unintended weight loss related to catabolic illness,inadequate protein-energy intake,increased demand for energy/nutrients,endocrine dysfunction as evidenced by weight loss,lab values      Nutrition Intervention:  Food and/or Nutrient Delivery: Modify current diet,Snacks (specify),Vitamin supplement,Continue oral nutrition supplement (discontinue duplicate supplement order)  Nutrition Education and Counseling: No recommendations at this time,Education not indicated  Coordination of Nutrition Care: Continue to monitor while inpatient    Goals:  Nutritional needs will be met through adequate oral intake or nutrition support by next follow up date       Nutrition Monitoring and Evaluation:   Behavioral-Environmental Outcomes: None identified  Food/Nutrient Intake Outcomes: Food and nutrient intake,Supplement intake,Vitamin/mineral intake  Physical Signs/Symptoms Outcomes: Biochemical data,Meal time behavior    Discharge Planning:    Continue current diet,Continue oral nutrition supplement     Electronically signed by Marquis Quan RD on 2/22/2022 at 3:08 PM    Contact Number: [phone]

## 2022-02-22 NOTE — PROGRESS NOTES
[facility] PHYSICAL REHABILITATION  [address] [place] [address], [address]     INPATIENT REHABILITATION  DAILY PROGRESS NOTE     Date: 2/22/2022    Name: Kaye Ortiz Age / Sex: 59 y.o. / male   CSN: [id number] MRN: [id number]   [facility] Date: 2/15/2022 Length of Stay: 7 days     Primary Rehabilitation Diagnosis: Impaired Mobility and ADLs secondary to:  1. S/P Right below-the-knee amputation (2/8/2022 - Dr. Violetta Smart)  2. History of critical ischemia of the right lower extremity      Subjective:     Patient seen and examined. Blood pressure controlled. Blood glucose controlled. Team conference was held at bedside this PM.     Patient's Complaint:   No significant medical complaints    Pain Control: stable, mild-to-moderate joint symptoms intermittently, reasonably well controlled by current meds      Objective:     Vital Signs:  Patient Vitals for the past 24 hrs:   BP Temp Pulse Resp SpO2   02/22/22 0747 112/65 98.7 °F (37.1 °C) 69 19 99 %   02/21/22 1928 109/65 98.1 °F (36.7 °C) 79 18 97 %   02/21/22 1542 109/69 97.6 °F (36.4 °C) 73 18 97 %        Physical Examination:  GENERAL SURVEY: Patient is awake, alert, oriented x 3, sitting comfortably on the chair, not in acute respiratory distress. HEENT: pale palpebral conjunctivae, anicteric sclerae, no nasoaural discharge, moist oral mucosa  NECK: supple, no jugular venous distention, no palpable lymph nodes  CHEST/LUNGS: symmetrical chest expansion, good air entry, clear breath sounds  HEART: adynamic precordium, good S1 S2, no S3, regular rhythm, no murmurs  ABDOMEN: flat, bowel sounds appreciated, soft, non-tender  EXTREMITIES: (+) right BKA stump, pale nailbeds, no edema, full and equal pulses, no calf tenderness   NEUROLOGICAL EXAM: The patient is awake, alert and oriented x3, able to answer questions fairly appropriately, able to follow 1 and 2 step commands. Able to tell time from the wall clock. Cranial nerves II-XII are grossly intact. No gross sensory deficit. Motor strength is 4/5 on BUE and BLE. Incision(s): covered, clean, dry, and intact       Current Medications:  Current Facility-Administered Medications   Medication Dose Route Frequency    melatonin tablet 3 mg  3 mg Oral PCD    oxyCODONE IR (ROXICODONE) tablet 5 mg  5 mg Oral Q4H PRN    insulin lispro (HUMALOG) injection 3 Units  0.04 Units/kg SubCUTAneous TIDAC    insulin glargine (LANTUS) injection 35 Units  35 Units SubCUTAneous QHS    metFORMIN (GLUCOPHAGE) tablet 1,000 mg  1,000 mg Oral BID WITH MEALS    glucose chewable tablet 16 g  4 Tablet Oral PRN    glucagon (GLUCAGEN) injection 1 mg  1 mg IntraMUSCular PRN    dextrose 10% infusion 0-250 mL  0-250 mL IntraVENous PRN    iron polysaccharides (NIFEREX) capsule 150 mg  1 Capsule Oral BID    thiamine HCL (B-1) tablet 200 mg  200 mg Oral DAILY    aspirin chewable tablet 81 mg  81 mg Oral DAILY WITH BREAKFAST    atorvastatin (LIPITOR) tablet 20 mg  20 mg Oral DAILY    buPROPion SR (WELLBUTRIN SR) tablet 150 mg  150 mg Oral DAILY    gabapentin (NEURONTIN) capsule 400 mg  400 mg Oral TID    therapeutic multivitamin (THERAGRAN) tablet 1 Tablet  1 Tablet Oral DAILY    acetaminophen (TYLENOL) tablet 650 mg  650 mg Oral TID    methocarbamoL (ROBAXIN) tablet 500 mg  500 mg Oral TID    acetaminophen (TYLENOL) tablet 650 mg  650 mg Oral Q4H PRN    bisacodyL (DULCOLAX) tablet 10 mg  10 mg Oral Q48H PRN    enoxaparin (LOVENOX) injection 40 mg  40 mg SubCUTAneous Q24H    insulin lispro (HUMALOG) injection   SubCUTAneous TIDAC       Allergies:   Allergies   Allergen Reactions    Niacin Itching       Functional Progress:    PHYSICAL THERAPY    ON ADMISSION MOST RECENT   Wheelchair Mobility/Management  Able to Propel (ft): 240 feet  Functional Level: 4  Curbs/Ramps Assist Required (FIM Score): 0 (Not tested)  Wheelchair Setup Assist Required : 3 (Moderate assistance)  Wheelchair Management: Manages left brake,Manages right brake Wheelchair Mobility/Management  Able to Propel (ft): 250 feet  Functional Level: 6 (over level surfaces)  Curbs/Ramps Assist Required (FIM Score): 0 (Not tested)  Wheelchair Setup Assist Required : 4 (Minimal assistance)  Wheelchair Management: Manages left brake,Manages right brake,Manages left footrest,Manages right footrest     Gait  Amount of Assistance: 4 (Minimal assistance)  Distance (ft): 50 Feet (ft)  Assistive Device: Gait belt,Walker, rolling Gait  Amount of Assistance: 5 (Supervision/setup) (close supervision)  Distance (ft): 80 Feet (ft)  Assistive Device: Gait belt,Walker, rolling     Balance-Sitting/Standing  Sitting - Static: Good (unsupported)  Sitting - Dynamic: Good (unsupported)  Standing - Static: Fair  Standing - Dynamic : Impaired Balance-Sitting/Standing  Sitting - Static: Good (unsupported)  Sitting - Dynamic: Good (unsupported)  Standing - Static: Fair  Standing - Dynamic : Impaired     Bed/Mat Mobility  Rolling Right : 6 (Modified independent)  Rolling Left : 6 (Modified independent)  Supine to Sit : 6 (Modified independent)  Sit to Supine : 6 (Modified independent) Bed/Mat Mobility  Rolling Right : 6 (Modified independent)  Rolling Left : 6 (Modified independent)  Supine to Sit : 6 (Modified independent)  Sit to Supine : 6 (Modified independent)     Transfers  Transfer Type:  Other  Other: stand step with RW  Transfer Assistance : 4 (Contact guard assistance)  Sit to Stand Assistance: Contact guard assistance  Car Transfers:  (CGA)  Car Type: car transfer  Transfers  Transfer Type: Lateral pivot  Other: stand step with RW  Transfer Assistance : 5 (Supervision/setup) (close supervision)  Sit to Stand Assistance: Supervision (close supervision)  Car Transfers:  (CGA)  Car Type: car transfer      Steps or Stairs  Steps/Stairs Ambulated (#): 3 (4\" steps)  Level of Assist : 3 (Moderate assistance)  Rail Use: Both Steps or Stairs  Steps/Stairs Ambulated (#): 4 (6\")  Level of Assist : 3 (Moderate assistance) (for first step ascending, minimal assistance following)  Rail Use: Both         Lab/Data Review:  Recent Results (from the past 24 hour(s))   GLUCOSE, POC    Collection Time: 02/21/22 12:21 PM   Result Value Ref Range    Glucose (POC) 157 (H) 70 - 110 mg/dL   GLUCOSE, POC    Collection Time: 02/21/22  4:42 PM   Result Value Ref Range    Glucose (POC) 118 (H) 70 - 110 mg/dL   GLUCOSE, POC    Collection Time: 02/21/22  9:56 PM   Result Value Ref Range    Glucose (POC) 268 (H) 70 - 110 mg/dL   GLUCOSE, POC    Collection Time: 02/22/22  7:46 AM   Result Value Ref Range    Glucose (POC) 95 70 - 110 mg/dL       Assessment:     Primary Rehabilitation Diagnosis  1. Impaired Mobility and ADLs  2. S/P Right below-the-knee amputation (2/8/2022 - Dr. Augusto Gotti)  3.  History of critical ischemia of the right lower extremity    Comorbidities  Patient Active Problem List   Diagnosis Code    Hyperlipidemia E78.5    Tobacco use disorder F17.200    Mediastinal adenopathy R59.0    History of vitamin D deficiency Z86.39    Insomnia G47.00    Diabetic nephropathy associated with type 2 diabetes mellitus ([facility] [address] E11.21    Otalgia H92.09    Mild nonproliferative diabetic retinopathy of left eye without macular edema associated with type 2 diabetes mellitus ([facility] [address] Q13.7968    Hypertensive retinopathy H35.039    Nuclear sclerosis of both eyes H25.13    Arterial occlusion, lower extremity (HCC) I70.209    Impaired mobility and ADLs Z74.09, Z78.9    Status post below knee amputation of right lower extremity ([facility]) Z89.511    Chronic alcohol use Z72.89    History of coronary angioplasty with insertion of stent Z95.5    Coronary artery disease involving native coronary artery of native heart I25.10    Constipation K59.00    Poorly controlled type 2 diabetes mellitus (HCC) E11.65    Long term current use of insulin ([facility]) Z79.4    Migraine headache G43.909    Diabetic neuropathy associated with type 2 diabetes mellitus (HCC) E11.40    Gastroesophageal reflux disease K21.9    History of essential hypertension Z86.79    History of lacunar cerebrovascular accident Z80.78    History of acute inferior wall myocardial infarction I25.2    Critical ischemia of lower extremity (HCC) I70.229    Acute postoperative anemia due to expected blood loss D62    Depression F32. A    High vitamin D level E67.3       Plan:     1. Justification for continued stay: Good progression towards established rehabilitation goals. 2. Medical Issues being followed closely:    [x]  Fall and safety precautions     [x]  Wound Care     [x]  Bowel and Bladder Function     [x]  Fluid Electrolyte and Nutrition Balance     [x]  Pain Control      3. Issues that 24 hour rehabilitation nursing is following:    [x]  Fall and safety precautions     [x]  Wound Care     [x]  Bowel and Bladder Function     [x]  Fluid Electrolyte and Nutrition Balance     [x]  Pain Control      [x]  Assistance with and education on in-room safety with transfers to and from the bed, wheelchair, toilet and shower. 4. Acute rehabilitation plan of care:    [x]  Continue current care and rehab. [x]  Physical Therapy           [x]  Occupational Therapy           []  Speech Therapy     []  Hold Rehab until further notice     5. Medications:    [x]  MAR Reviewed     [x]  Continue Present Medications     6. Chemical DVT Prophylaxis:      [x]  Enoxaparin     []  Unfractionated Heparin     []  Warfarin     []  NOAC     []  Aspirin     []  None     7. Mechanical DVT Prophylaxis:      []  FLEX Stockings     []  Sequential Compression Device     [x]  None     8. GI Prophylaxis:      []  PPI     []  H2 Blocker     [x]  None / Not indicated     9. Code status:    [x]  Full code     []  Partial code     []  Do not intubate     []  Do not resuscitate     10.  Diet:  Specifications  4 carb choices (60 gm/meal), Low fat/Low cholesterol/High fiber/SEEMA   Solids (consistency)  Regular   Liquids (consistency)  Thin   Fluid Restriction  None      11. COVID-19:  Laboratory testing COVID-19 rapid test ([facility], [facility]) (2/4/2022): Not detected  COVID-19 rapid test ([facility], [facility]) (2/15/2022): Not detected   Precautions None   Vaccine to be given this admission No      12. Orders:   > S/P Right below-the-knee amputation (2/8/2022 - Dr. Nighat Richards); History of critical ischemia of the right lower extremity    > Staples to be removed on 3/8/2022    > Acute postoperative blood loss anemia   > Hgb/Hct (2/16/2022, on admission to the ARU) = 8.9/28.6   > Anemia work-up (2/16/2022) showed serum iron 14, TIBC 234, iron % saturation 18, ferritin 404, reticulocyte count 2.1   > On 2/16/2022, started Iron polysaccharides 150 mg PO BID      02/21/22  0608 02/17/22  0502 02/16/22  0626   HGB 8.5* 8.7* 8.9*   HCT 27.8* 27.8* 28.6*       > On 2/21/2022, patient was given Epoetin lico 10,000 units SC x 1 dose   > Continue Iron polysaccharides 150 mg PO BID    > Constipation   > On 2/17/2022, started Senna 1 tab PO once daily   > On 2/18/2022, discontinued Senna 1 tab PO once daily    > Coronary artery disease; History of inferior wall myocardial infarction; History of coronary angioplasty with stent placement   > Not on any ACE-I or beta-blockers   > Continue:    > Aspirin 81 mg PO daily with breakfast    > Atorvastatin 20 mg PO once daily    > Depression   > On 2/16/2022, discontinued Trazodone 50 mg PO q HS (re: patient says he has not been taking this prior to admission)   > Continue Bupropion  mg PO once daily    > High vitamin D level;  History of vitamin D deficiency   > Prior to admission to [facility], patient was on Ergocalciferol 50,000 units PO q 7 days   > Vitamin D 25-Hydroxy (2/16/2022) = 105.7   > On 2/16/2022, discontinued Ergocalciferol 50,000 units PO q 7 days     > Hyperlipidemia   > Continue Atorvastatin 20 mg PO once daily    > Type 2 diabetes mellitus, poorly controlled, with diabetic nephropathy, diabetic neuropathy, diabetic retinopathy, with long-term current use of insulin   > HbA1c (2/8/2022) = 13.8   > Vitamin B12 (2/16/2022) = 574   > On 2/16/2022:    > Increased Metformin from 500 mg to 850 mg PO BID with meals    > Increased Insulin lispro from 4 units to 5 units SC TID AC   > On 2/17/2022:    > Increased Insulin glargine from 45 units to 50 units SC daily before breakfast    > Decreased Insulin lispro from 5 units to 4 units SC TID AC   > On 2/18/2022:    > Patient was given Insulin glargine 20 units SC x 1 dose at 9PM    > Increased Metformin from 850 mg to 1000 mg PO BID with meals   > On 2/19/2022, changed Insulin glargine from 50 units SC daily before breakfast to 35 units SC q HS   > On 2/22/2022, decreased Insulin lispro from 4 units to 3 units SC TID AC   > Continue:    > Metformin 1000 mg PO BID with meals    > Insulin glargine 35 units SC q HS    > Insulin lispro 3 units SC TID AC    > Insulin lispro sliding scale SC TID AC only    > Difficulty sleeping   > On 2/16/2022, discontinued Trazodone 50 mg PO q HS (re: patient says he has not been taking this prior to admission)   > On 2/21/2022, patient reported difficulty sleeping; started Melatonin 3 mg PO daily after dinner   > Continue Melatonin 3 mg PO daily after dinner    > Depressed mood   > Patient was seen by Clinical Psychologist and patient was felt to have a depressed mood   > Option of starting an antidepressant was presented and the patient politely declined it at this time, stating he is still able to cope with his present condition    > Analgesia   > On 2/16/2022:    > Started:     > Acetaminophen 650 mg PO TID (8AM, 12PM, 4PM)     > Methocarbamol 500 mg PO TID (8AM, 2PM, 8PM)    > Increased Gabapentin from 300 mg to 400 mg PO TID (8AM, 2PM, 8PM)   > On 2/18/2022, decreased Oxycodone from 10 mg to 7.5 mg PO q 4 hr PRN for pain level 5/10 or greater    > On 2/21/2022, decreased Oxycodone from 7.5 mg to 5 mg PO q 4 hr PRN for pain level 5/10 or greater    > Continue:    > Acetaminophen 650 mg PO TID (8AM, 12PM, 4PM)    > Acetaminophen 650 mg PO q 4 hr PRN for pain level 4/10 or lesser (from 8PM to 4AM only)    > Decrease Gabapentin from 400 mg to 300 mg PO TID (8AM, 2PM, 8PM)    > Methocarbamol 500 mg PO TID (8AM, 2PM, 8PM)    > Oxycodone 5 mg PO q 4 hr PRN for pain level 5/10 or greater       12. Personal Protective Equipment (N95 face mask and eye goggles) was used while interacting with the patient. Patient was using a surgical mask. 15. Patient's progress in rehabilitation and medical issues discussed with the patient. All questions answered to the best of my ability. Care plan discussed with patient and nurse. 14. Total clinical care time is 30 minutes, including review of chart including all labs, radiology, past medical history, and discussion with patient. Greater than 50% of my time was spent in coordination of care and counseling.       Signed:    Rory Contreras MD    February 22, 2022

## 2022-02-22 NOTE — PROGRESS NOTES
Recreation Therapy Progress Note    Patient Name: Jayy Schafer  Patient Age: 59 y.o. Past Medical History:   Past Medical History:   Diagnosis Date    Acute postoperative anemia due to expected blood loss 2/8/2022    Arterial occlusion, lower extremity ([address] 11/27/2021    Cardiac cath 11/09/2011    RCA patent. LM patent. LAD patent. mD1 55%. CX patent. Prior stent patent. Edison 85% (2.5 x 15-mm Xience stent, resid 0%). LVEDP 12. EF 40-45%. High lateral hypk (RI distribution).  Chronic alcohol use     Fri-Sun one fifth; Mon-Thur: Pint of liquor    Constipation     Coronary artery disease involving native coronary artery of native heart     Critical ischemia of lower extremity ([address] 2/8/2022    Depression     Diabetic nephropathy associated with type 2 diabetes mellitus ([address] 4/4/2019    Diabetic neuropathy associated with type 2 diabetes mellitus ([address]     Gastroesophageal reflux disease     High vitamin D level 2/16/2022    Vitamin D 25-Hydroxy (2/16/2022) = 105.7    History of acute inferior wall myocardial infarction 2009    History of coronary angioplasty with insertion of stent 07/29/2009    2.5 x 13 Cypher stent to CX.      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 without macular edema associated with type 2 diabetes mellitus ([address] 12/18/2020    Nuclear sclerosis of both eyes 12/18/2020    Otalgia 05/08/2019    Poorly controlled type 2 diabetes mellitus (HCC)     HbA1c (2/8/2022) = 13.8    Tobacco use disorder     contemplating stopping       Medical Diagnosis:  Status post below knee amputation of right lower extremity ([address] [Z89.511] Status post below knee amputation of right lower extremity ([facility] [address]         Patient identified with name and : yes  Time in:1135  Time out:1200    Mr. Dawkins was seen for recreation therapy session on conjunction with PT to participated in card game. Pt asked why he would need to stand during the game and was provided education on how the activity would address static standing balance and tolerance to activity. Mr. Rosalinda Rios initially declined the offer of a card curtis while standing stating that he will hold the cards in his hand. He required increase education and encouragement from the PT to utilize the card curtis when attempting to stand without using RW. Pt was provide max vc to maintain one hand on RW for safety throughout the session. When maintaining hold on RW he was able to demonstrate functional reaching for playing cards with SBA/S. Pt tolerated four standing trials ranging from 90 seconds to 3 minutes and a single standing trial of 7 minutes during game play.         Maureen Hanna, CTRS  2022

## 2022-02-22 NOTE — PROGRESS NOTES
Problem: Mobility Impaired (Adult and Pediatric)  Goal: *Acute Goals and Plan of Care (Insert Text)  Description: Physical Therapy Short Term Goals  Initiated 2/16/2022 and to be accomplished within 7 day(s) (2/23/2022)  1. Patient will move from supine to sit and sit to supine , scoot up and down, and roll side to side in bed with independence. 2.  Patient will transfer from bed to chair and chair to bed with supervision/set-up using the least restrictive device. 3.  Patient will perform sit to stand with supervision/set-up. 4.  Patient will ambulate with supervision/set-up for 50 feet with the least restrictive device. 5.  Patient will ascend/descend 3 stairs with 1 handrail(s) with minimal assistance/contact guard assist.    Physical Therapy Long Term Goals  Initiated 2/16/2022 and to be accomplished within 14 day(s) (3/02/2022)  1. Patient will move from supine to sit and sit to supine , scoot up and down, and roll side to side in bed with independence. 2.  Patient will transfer from bed to chair and chair to bed with modified independence using the least restrictive device. 3.  Patient will perform sit to stand with modified independence. 4.  Patient will ambulate with modified independence for 50 feet with the least restrictive device. 5.  Patient will ascend/descend 3 stairs with 1 handrail(s) or bumping up on steps with supervision/set-up. Outcome: Progressing Towards Goal    PHYSICAL THERAPY TREATMENT    Patient: Wilver Lopez (11 y.o. male)  Date: 2/22/2022  Diagnosis: Status post below knee amputation of right lower extremity ([facility]) [Z89.511] Status post below knee amputation of right lower extremity [facility])  Precautions: Fall Risk Precautions  Chart, physical therapy assessment, plan of care and goals were reviewed. Time In:0800  Time Out:0901    Patient seen for: Balance activities;Gait training;Patient education;Transfer training; Therapeutic exercise; Wheelchair mobility Time In:1130  Time Out:1200    Patient seen for: Balance activities; Patient education;Transfer training;30 minute group activity to address standing balance and safety awareness while promoting peer learning and engagement. Pain:  Pt pain was reported as no c/o pain pre-treatment. Pt pain was reported as no c/o pain post-treatment. Intervention: N/A    Patient identified with name and : yes    SUBJECTIVE:      Pt is agreeable to participate in skilled PT intervention but appears disengaged at times and requires maximal cuing for attention to quality of movement and safety. Pt demonstrates poor safety awareness noting, \"I want to sit down,\" and attempting to sit in w/c prior to backing up to w/c during group activity. OBJECTIVE DATA SUMMARY:    Objective:     BED/MAT MOBILITY Daily Assessment     Rolling Right : 6 (Modified independent)  Rolling Left : 6 (Modified independent)  Supine to Sit : 6 (Modified independent)  Sit to Supine : 6 (Modified independent)   With increased time to perform on mat table. TRANSFERS Daily Assessment     Transfer Type: Lateral pivot  Transfer Assistance : 5 (Supervision/setup) (close supervision)  Sit to Stand Assistance: Supervision (close supervision)   Pt requires moderate verbal cues for safety with functional transfers. With distraction, pt requires increased cuing to maximal verbal cues for safety during group activity.         GAIT Daily Assessment    Gait Description (WDL) Exceptions to WDL    Gait Abnormalities Decreased step clearance    Assistive device Gait belt;Walker, rolling    Ambulation assistance - level surface 5 (Supervision/setup) (close supervision)    Distance 80 Feet (ft) x 2 trials    Ambulation assistance- uneven surface  NT    Comments Pt ambulates with rounded shoulder posture and decreased foot clearance as he fatigues requiring minimal verbal cuing for safety with negotiating turns as pt moves quickly pivoting at times on left LE.    During second gait trial, after demonstration and education, pt demonstrates foot clearance with negotiating turns and consistent foot clearance throughout. BALANCE Daily Assessment     Sitting - Static: Good (unsupported)  Sitting - Dynamic: Good (unsupported)  Standing - Static: Fair  Standing - Dynamic : Impaired        WHEELCHAIR MOBILITY Daily Assessment     Functional Level: 6 (over level surfaces)   Pt propels w/c with modified independence on unit but requires maximal verbal cues for w/c parts management to unlock and manage leg rests. THERAPEUTIC EXERCISES Daily Assessment     Prone Strength Training:  3 Sets of 10 Repetitions:  Right and Left Hip Extension  Supine Strength Training:  3 Sets of 10 Repetitions  Right and Left SLR Hip Flexion  Bridging with B LEs on bolster  Sidelying Strength Training:  Right and Left Hip Abd with moderate manual cues for right hip abd and minimal assistance for left hip abd      Group Activity:  Patient participated in 30 minute group activity standing for one trial of 3 minutes 23 seconds, one trial of 2 minutes, one trial of 7 minutes 23 seconds, one trial of 1 minute 20 seconds and one trial of 2 minutes 10 seconds with close supervision in standing with UE support on RW and intermittent single UE reach/fine motor task. Pt requires maximal cuing/reminders to maintain one UE support on RW during activity for safety and balance and maximal cues for safety when distracted. ASSESSMENT:  Pt is progressing with improved quality of gait within treatment session and requiring decreased physical assistance. However, pt demonstrates poor safety awareness and will benefit from ongoing skilled intervention and repetition of safe movement patterns for improved carryover.   Progression toward goals:  []      Improving appropriately and progressing toward goals  [x]      Improving slowly and progressing toward goals  []      Not making progress toward goals and plan of care will be adjusted      PLAN:  Patient continues to benefit from skilled intervention to address the above impairments. Continue treatment per established plan of care. Emphasize repetition of safe and efficient movement patterns to promote increased safety and independence with mobility. Discharge Recommendations:  Home Physical Therapy to progress to Outpatient Physical Therapy  Further Equipment Recommendations for Discharge:  gait belt, rolling walker and wheelchair 18 inch      Estimated Discharge Date:3/02/2022    Activity Tolerance:   Good  Please refer to the flowsheet for vital signs taken during this treatment.     After treatment:   [] Patient left in no apparent distress in bed  [x] Patient left in no apparent distress sitting up in chair after first treatment session  [x] Patient left in no apparent distress in w/c mobilizing under own power after second treatment session  [] Patient left in no apparent distress dining area  [] Patient left in no apparent distress mobilizing under own power  [x] Call bell left within reach  [] Nursing notified  [] Caregiver present  [] Bed alarm activated   [x] Chair alarm activated      Rebecca Tomlinson PT, DPT  2/22/2022

## 2022-02-22 NOTE — INTERDISCIPLINARY ROUNDS
[facility] PHYSICAL REHABILITATION  [address] [place] [address], [address]    INPATIENT REHABILITATION  TEAM CONFERENCE SUMMARY     Date of Conference: 2/22/2022    Patient Information:        Name: Crystal Granados Age / Sex: 59 y.o. / male   CSN: [id number] MRN: [id number]   [facility] Date: 2/15/2022 Length of Stay: 7 days     Primary Rehabilitation Diagnosis  1. Impaired Mobility and ADLs  2. S/P Right below-the-knee amputation (2/8/2022 - Dr. Beata Adame)  3.  History of critical ischemia of the right lower extremity    Comorbidities  Patient Active Problem List   Diagnosis Code    Hyperlipidemia E78.5    Tobacco use disorder F17.200    Mediastinal adenopathy R59.0    History of vitamin D deficiency Z86.39    Insomnia G47.00    Diabetic nephropathy associated with type 2 diabetes mellitus ([facility] [address] E11.21    Otalgia H92.09    Mild nonproliferative diabetic retinopathy of left eye without macular edema associated with type 2 diabetes mellitus ([facility] [address] Z45.1989    Hypertensive retinopathy H35.039    Nuclear sclerosis of both eyes H25.13    Arterial occlusion, lower extremity (HCC) I70.209    Impaired mobility and ADLs Z74.09, Z78.9    Status post below knee amputation of right lower extremity (HCC) Z89.511    Chronic alcohol use Z72.89    History of coronary angioplasty with insertion of stent Z95.5    Coronary artery disease involving native coronary artery of native heart I25.10    Constipation K59.00    Poorly controlled type 2 diabetes mellitus (HCC) E11.65    Long term current use of insulin ([facility]) Z79.4    Migraine headache G43.909    Diabetic neuropathy associated with type 2 diabetes mellitus (HCC) E11.40    Gastroesophageal reflux disease K21.9    History of essential hypertension Z86.79    History of lacunar cerebrovascular accident Z80.78    History of acute inferior wall myocardial infarction I25.2    Critical ischemia of lower extremity (HCC) I70.229    Acute postoperative anemia due to expected blood loss D62    Depression F32. A    High vitamin D level E67.3          Therapy:     FIM SCORES Initial Assessment Weekly Progress Assessment 2/22/2022   Eating Functional Level: 5  6   Swallowing     Grooming 5     Bathing 4        Upper Body Dressing Functional Level: 5 (setup)  Items Applied/Steps Completed: Pullover (4 steps)  Comments: Pt performed UB dressing with setup     Lower Body Dressing Functional Level: 3  Items Applied/Steps Completed: Elastic waist pants (3 steps),Sock, left (1 step),Underpants (3 steps)  Comments: Pt performed LB dressing with modA to don LLE sock and pull pants up over R hip and standing at grab abr for increased stability     Toileting Functional Level:  (Pt declined)     Bladder 1 0   Bowel  0 0   Toilet Transfer [place]ont Toilet Transfer Score: 5 (CGA)  NT this date, (SBA)   Tub/Shower Transfer Lamont Tub or Shower Type: Shower  Tub/Shower Transfer Score: 5 (CGA with grab bar)        Comprehension Primary Mode of Comprehension: Auditory  Score: 5        Expression Primary Mode of Expression: Verbal  Score: 5        Social Interaction Score: 5     Problem Solving Score: 5 4   Memory Score: 5 4     FIM SCORES Initial Assessment Weekly Progress Assessment 2/22/2022   Bed/Chair/Wheelchair Transfers Transfer Type:  Other  Other: stand step with RW  Transfer Assistance : 4 (Contact guard assistance)  Sit to Stand Assistance: Contact guard assistance  Car Transfers:  (CGA)  Car Type: car transfer  Transfer Type: Lateral pivot  Transfer Assistance : 5 (Supervision/setup) (close supervision)  Sit to Stand Assistance: Supervision (close supervision)   Bed Mobility Rolling Right 6 (Modified independent)   Rolling Left 6 (Modified independent)   Supine to Sit 6 (Modified independent)   Sit to Stand Contact guard assistance   Sit to Supine 6 (Modified independent)    Rolling Right   6 (Modified independent)   Rolling Left   6 (Modified independent)   Supine to Sit   6 (Modified independent)   Sit to Stand   Supervision (close supervision)   Sit to Supine   6 (Modified independent)      Locomotion (W/C) Able to Propel (ft): 240 feet  Functional Level: 4  Curbs/Ramps Assist Required (FIM Score): 0 (Not tested)  Wheelchair Setup Assist Required : 3 (Moderate assistance)  Wheelchair Management: Manages left brake,Manages right brake Function 6 (over level surfaces)  Setup Assistance         Locomotion (W/C distance)       Locomotion (Walk) 4 (Minimal assistance) 5 (Supervision/setup) (close supervision)  Gait belt;Walker, rolling   Locomotion (Walk dist.) 50 Feet (ft) 80 Feet (ft)   Steps/Stairs Steps/Stairs Ambulated (#): 3 (4\" steps)  Level of Assist : 3 (Moderate assistance)  Rail Use: Both Steps/Stairs Ambulated (#): 4 (6\" steps)  Level of Assist : 3 (Moderate assistance)  Rail Use: Both         Nursing:     Neuro:   AAA&O x 4           Respiratory:   [x] WNL   [] O2 LPM:   Other:  Peripheral Vascular:   [] TEDS present   [] Edema present ____ Grade   Cardiac:   [x] WNL   [] Other  Genitourinary:   [x] continent   [] incontinent   [] epstein  Abdominal _2/21______ LBM  GI: _reg______ Diet _thin_____ Liquids _____ tube feeds  Musculoskeletal: ____ ROM Transfers _____ Assistive Device Used  _mod___ Level of Assistance  Skin Integumentary:   [x] Intact   [] Not Intact   __________Preventative Measures  Details______________________________________________________________  Pain: [x] Controlled   [] Not Controlled   Pain Meds:   [] Scheduled   [] PRN        Interdisciplinary Team Goals:     1.  Discipline  Physical Therapy    Goal  Encourage pt to perform all functional transfers at a distant supervision level without verbal cues for safety    Barrier  Impaired carryover/safety awareness, Impaired strength, Impaired balance    Intervention  Education, Transfer training, Strength training, Balance training    Goal written by:   Nitesh Yoo PT, DPT 2. Discipline  Occupational Therapy    Goal  Pt will perform shower transfer with AE and supervision with 2 VC's. Barrier  confusion, impaired memory and recall, impaired initiation of tasks and safety    Intervention  AE training, transfer training, ADL training, safety    Goal written by:  Eugenio Moraes MSOTR/L     3. Discipline  Speech Therapy    Goal  Patient will be oriented x 3 and recall events of the day, supervision. Barrier  cognitive-linguistic deficits, family reports some level of disorientation    Intervention  cognitive retraining    Goal written by:  Gisel Christensen CCC-SLP     4. Discipline  Nursing    Goal  Pt. Incision/wound will remain intact , free from infection by discharge from rehab. Barrier  Status post below Knee amputation of right lower ext. Intervention  Inspect Incision/wound routinely, keep clean, dry and due proper skin care , dressing changes as ordered. Goal written by:  Verito Whelan RN     5. Discipline  Clinical Psychology    Goal  Address any emergent concerns during continued recovery effort    Barrier  Stress with traumatic loss and recovery    Intervention  Patient education and support     Goal written by:  Mariana Mendez, PhD         Disposition / Discharge Planning:      Follow-up services:  [x] Physical Therapy             [x] Occupational Therapy       [x] Speech Therapy           [] Skilled Nursing      [] Medical Social Worker   [] Aide        [] Outpatient      [] vs   [x] Home Health  [] vs       [] to progress to outpatient       [] with 24-hour supervision       [x] with 24-hour assistance   [] Lan TURCIOS recommendations: Transfer bench, 3:1 elisa CARTER, w/c (18\"), B handrails or ramp for safe home entry   Estimated discharge date:  3/2/2022   Discharge Location:  [x] Home  [] versus    [] Lan Segovia    [] [address]   [] Other:           Interdisciplinary team rounds were held this PM with the following team members:       Name   Physical Therapist    Jamiie Cheek, PT, DPT     Occupational Therapist    Nenita Rubio MS, OTR/L   Speech Therapist    Yani Dexter [facility]   Recreational Therapist    Arron Barajas, [facility] MSN RN [facility]     Physician    Lula Capellan MD        Snow Rodríguez, MSW          Signed:  Lula Capellan MD    February 22, 2022

## 2022-02-22 NOTE — PROGRESS NOTES
Problem: Falls - Risk of  Goal: *Absence of Falls  Description: Document Halie Don Fall Risk and appropriate interventions in the flowsheet. Outcome: Progressing Towards Goal  Note: Fall Risk Interventions:  Mobility Interventions: Assess mobility with egress test,Bed/chair exit alarm,Patient to call before getting OOB,Utilize walker, cane, or other assistive device         Medication Interventions: Bed/chair exit alarm,Patient to call before getting OOB,Teach patient to arise slowly    Elimination Interventions: Bed/chair exit alarm,Call light in reach,Patient to call for help with toileting needs,Urinal in reach,Stay With Me (per policy),Toilet paper/wipes in reach    History of Falls Interventions: Bed/chair exit alarm,Door open when patient unattended,Room close to nurse's station         Problem: Patient Education: Go to Patient Education Activity  Goal: Patient/Family Education  Outcome: Progressing Towards Goal     Problem: Diabetes Self-Management  Goal: *Disease process and treatment process  Description: Define diabetes and identify own type of diabetes; list 3 options for treating diabetes. Outcome: Progressing Towards Goal  Goal: *Incorporating nutritional management into lifestyle  Description: Describe effect of type, amount and timing of food on blood glucose; list 3 methods for planning meals. Outcome: Progressing Towards Goal  Goal: *Incorporating physical activity into lifestyle  Description: State effect of exercise on blood glucose levels. Outcome: Progressing Towards Goal  Goal: *Developing strategies to promote health/change behavior  Description: Define the ABC's of diabetes; identify appropriate screenings, schedule and personal plan for screenings. Outcome: Progressing Towards Goal  Goal: *Using medications safely  Description: State effect of diabetes medications on diabetes; name diabetes medication taking, action and side effects.   Outcome: Progressing Towards Goal  Goal: *Monitoring blood glucose, interpreting and using results  Description: Identify recommended blood glucose targets  and personal targets. Outcome: Progressing Towards Goal  Goal: *Prevention, detection, treatment of acute complications  Description: List symptoms of hyper- and hypoglycemia; describe how to treat low blood sugar and actions for lowering  high blood glucose level. Outcome: Progressing Towards Goal  Goal: *Prevention, detection and treatment of chronic complications  Description: Define the natural course of diabetes and describe the relationship of blood glucose levels to long term complications of diabetes. Outcome: Progressing Towards Goal  Goal: *Developing strategies to address psychosocial issues  Description: Describe feelings about living with diabetes; identify support needed and support network  Outcome: Progressing Towards Goal  Goal: *Patient Specific Goal (EDIT GOAL, INSERT TEXT)  Outcome: Progressing Towards Goal     Problem: Patient Education: Go to Patient Education Activity  Goal: Patient/Family Education  Outcome: Progressing Towards Goal     Problem: Pressure Injury - Risk of  Goal: *Prevention of pressure injury  Description: Document Mayito Scale and appropriate interventions in the flowsheet.   Outcome: Progressing Towards Goal  Note: Pressure Injury Interventions:  Sensory Interventions: Assess changes in LOC,Check visual cues for pain,Keep linens dry and wrinkle-free,Minimize linen layers    Moisture Interventions: Absorbent underpads,Minimize layers,Apply protective barrier, creams and emollients    Activity Interventions: Increase time out of bed,Pressure redistribution bed/mattress(bed type),Chair cushion    Mobility Interventions: Pressure redistribution bed/mattress (bed type),HOB 30 degrees or less    Nutrition Interventions: Document food/fluid/supplement intake    Friction and Shear Interventions: HOB 30 degrees or less,Lift sheet,Minimize layers,Apply protective barrier, creams and emollients                Problem: Patient Education: Go to Patient Education Activity  Goal: Patient/Family Education  Outcome: Progressing Towards Goal

## 2022-02-22 NOTE — PROGRESS NOTES
Problem: Self Care Deficits Care Plan (Adult)  Goal: *Therapy Goal (Edit Goal, Insert Text)  Description: Occupational Therapy Goals   Long Term Goals  Initiated 22 and to be accomplished within 2 week(s)  1. Pt will perform self-feeding independently. 2. Pt will perform grooming independently. 3. Pt will perform UB bathing with Joseph. 4. Pt will perform LB bathing with Joseph. 5. Pt will perform tub/shower transfer with Joseph. 6. Pt will perform UB dressing with Joseph. 7. Pt will perform LB dressing with Joseph. 8. Pt will perform toileting task with Joseph. 9. Pt will perform toilet transfer with Joseph. Short Term Goals   Initiated 22 and to be accomplished within 7 day(s), reassess 22  1. Pt will perform self-feeding with Joseph. 2. Pt will perform grooming with Joseph. 3. Pt will perform UB bathing with supervision. 4. Pt will perform LB bathing with supervision. 5. Pt will perform tub/shower transfer with SBA. 6. Pt will perform UB dressing with Joseph. 7. Pt will perform LB dressing with Diana. 8. Pt will perform toileting task with CGA. 9. Pt will perform toilet transfer with SBA. Outcome: Progressing Towards Goal  Goal: Interventions  Outcome: Progressing Towards Goal   Occupational Therapy TREATMENT    Patient: Wilver Lopez   59 y.o. Patient identified with name and : yes  Date: 2022    First Tx Session  Time In: 1005  Time Out[de-identified] 6035        Diagnosis: Status post below knee amputation of right lower extremity [facility]) [Z89.511]   Precautions: Fall  Chart, occupational therapy assessment, plan of care, and goals were reviewed. Pain:  Pt reports -/10 pain or discomfort prior to treatment. Pt reports 9/10 pain or discomfort post treatment. Intervention Provided: Nearing end of treatment pt reported 9/10 pain in RLE, reported to nursing, pt unable to receive pain meds until 1200      SUBJECTIVE:   Patient stated I can't see it.     OBJECTIVE DATA SUMMARY: THERAPEUTIC ACTIVITY Daily Assessment    Pt completed FM task in dynamic standing which involved grasping small manipulatives with fine pincer grasp to play Perfection and 2 # wrist weights for added challenge. Pt demonstrated good problem solving with task using trial and error method to match shapes to corresponding slot with minimal VC's to match shapes. Pt demonstrated standing tolerance of 4 minutes and 26 sec.with no LOB and SBA for safety. THERAPEUTIC EXERCISE Daily Assessment    Pt performed BUE strengthening with 4# dowel in all planes (shoulder flexion/extension, bicep curls, chest press, rowing x2) 6x15 reps, 3 sets. Pt completed BUE strengthening with Power Trainer for 12 continuous minutes against min-mod resistance to increase BUE strength and activity tolerance. IADL Daily Assessment    Pt engaged in simulated medication management task which involved reading instructions on pill bottle and sorting pills in pill box according to instruction. Pt demonstrated increased difficulty and confusion with task reporting what is this?, I can't see it (pointing to pill box, days). Pt provided simplified instruction multiple attempts. Pt required max verbal and visual cues for sequencing and assistance to open pill box tabs. Pt completed task with 70% accuracy and max VC's following simplified instruction. Pt will require assistance with sorting pills in pill box due to confusion at home. MOBILITY/TRANSFERS Daily Assessment    Pt performed functional mobility self propelling w/c from pt room to gym and back x2 with supervision/Joseph. ASSESSMENT:  Pt reported difficulty reading fine print on pill box with simulated medication management task however demonstrated ability to complete task with VC's for problem solving. Pt will require assistance with medication management due to decreased safety and confusion.  Pt is making progress with BUE strengthening and increasing activity tolerance for self cares and functional mobility. Progression toward goals:  []          Improving appropriately and progressing toward goals  [x]          Improving slowly and progressing toward goals  []          Not making progress toward goals and plan of care will be adjusted     PLAN:  Patient continues to benefit from skilled intervention to address the above impairments. Continue treatment per established plan of care. Discharge Recommendations: Home Health with support  Further Equipment Recommendations for Discharge: bedside commode and transfer bench TBD on progress     Activity Tolerance:  good      Estimated LOS:3/2/22    Please refer to the flowsheet for vital signs taken during this treatment. After treatment:   [x]  Patient left in no apparent distress sitting up in chair   []  Patient left in no apparent distress in bed  [x]  Call bell left within reach  []  Nursing notified  []  Caregiver present  []  Bed alarm activated    COMMUNICATION/EDUCATION:   [] Home safety education was provided and the patient/caregiver indicated understanding. [] Patient/family have participated as able in goal setting and plan of care. [x] Patient/family agree to work toward stated goals and plan of care. [] Patient understands intent and goals of therapy, but is neutral about his/her participation. [] Patient is unable to participate in goal setting and plan of care.       Aretha Landeros, OT

## 2022-02-22 NOTE — PROGRESS NOTES
Problem: Neurolinguistics Impaired (Adult)  Goal: *Speech Goal: (INSERT TEXT)  Description: Long term goals  Patient will:  1. Be oriented x 3 and recall events of the day, supervision. 2. Recall 3 related words after 5 minutes with supervision and mnemonic training. 3. Perform functional problem solving tasks with 90% accuracy. 4. Recall strategies learned in OT/PT sessions (provided by the therapists) with min assist-supervision. Short term goals (by 2/25/22)  Patient will:  1. Be oriented x 3 and recall events of the day, min assist.  2. Recall 2 related words after 5 minutes with min assist and mnemonic training. 3. Perform functional problem solving tasks with 70-80% accuracy. 4. Recall strategies learned in OT/PT sessions (provided by the therapists) with mod cues. Note:   Speech language pathology treatment    Patient: Devyn Turk (4 y.o. male)  Date: 2/22/2022  Diagnosis: Status post below knee amputation of right lower extremity ([facility]) [Z89.511] Status post below knee amputation of right lower extremity ([facility] [address]       Time in: 1400  Time Out:  1430    Pain:  Pre-tx:  1  Post tx: 1    SUBJECTIVE:   Patient stated I don't know why you're asking me to do this. OBJECTIVE:   Mental Status:  Mr. Mu Barrera was alert, but subdued when seen this afternoon. He is not aware of cognitive difficulty reported by his therapists and significant other. Treatment & Interventions:   Patient was seen in his room for a thirty minute session this afternoon. The following treatment tasks were presented:  Neuro-Linguistics:   Orientation:  Min-mod assist  Recent memory: Mod-max assist  Recall 2 words: Total assist  ID 1st needed action: 70% accuracy  Determine causes: 80% accuracy  ID more difficult task: 90% accuracy  Rational for above: 50% accuracy  Naming vegetables: Patient named 4, then was unable to think of any more.     Response & Tolerance to Activities:  Mr. Mu Barrera demonstrates moderate cognitive impairment to testing and reports of others. He does not appear to perceive difficulty and questions intervention. SLP will ask for specific task steps that it would be help to reinforce in sessions to provide more concrete instruction. Pain:  Pain Scale 1: Numeric (0 - 10)  Pain Orientation 1: Right  Pain Description 1: Aching  After treatment:   []       Patient left in no apparent distress sitting up in chair  [x]       Patient left in no apparent distress in bed  [x]       Call bell left within reach  [x]       Nursing notified  []       Caregiver present  []       Bed alarm activated    ASSESSMENT:   Progression toward goals:  []       Improving appropriately and progressing toward goals  [x]       Improving slowly and progressing toward goals  []       Not making progress toward goals and plan of care will be adjusted    PLAN:   Patient continues to benefit from skilled intervention to address the above impairments. Continue treatment per established plan of care.   Discharge Recommendations:  Home Health    Estimated LOS: Through 3/2/22    LOI Renee  Time Calculation: 30 mins

## 2022-02-22 NOTE — PROGRESS NOTES
SHIFT CHANGE NOTE FOR [facility]    Bedside and Verbal shift change report given to Marcia RN (oncoming nurse) by Maddi Koroma RN (offgoing nurse). Report included the following information SBAR, Kardex, MAR and Recent Results. Situation:   Code Status: Full Code   Hospital Day: 7   Problem List:   Hospital Problems  Date Reviewed: 2/21/2022          Codes Class Noted POA    Poorly controlled type 2 diabetes mellitus ([facility] ICD-10-CM: E11.65  ICD-9-CM: 250.00  Unknown Yes    Overview Signed 2/16/2022 12:50 AM by Cecelia Malone MD     HbA1c (2/8/2022) = 13.8             Long term current use of insulin (HCC) ICD-10-CM: Z79.4  ICD-9-CM: V58.67  Unknown Yes        High vitamin D level (Chronic) ICD-10-CM: E67.3  ICD-9-CM: 278.4  2/16/2022 Yes    Overview Signed 2/16/2022  9:52 AM by Cecelia Malone MD     Vitamin D 25-Hydroxy (2/16/2022) = 105.7             Impaired mobility and ADLs ICD-10-CM: Z74.09, Z78.9  ICD-9-CM: V49.89  2/8/2022 Yes        * (Principal) Status post below knee amputation of right lower extremity ([facility] ICD-10-CM: Z89.511  ICD-9-CM: V49.75  2/8/2022 Yes    Overview Signed 2/16/2022 12:47 AM by Cecelia Malone MD     S/P Right below-the-knee amputation (2/8/2022 - Dr. Keily Gardner)             Critical ischemia of lower extremity [facility]) ICD-10-CM: P14.010  ICD-9-CM: 459.9  2/8/2022 No        Acute postoperative anemia due to expected blood loss ICD-10-CM: D62  ICD-9-CM: 285.1  2/8/2022 Yes              Background:   Past Medical History:   Past Medical History:   Diagnosis Date    Acute postoperative anemia due to expected blood loss 2/8/2022    Arterial occlusion, lower extremity ([facility] [address] 11/27/2021    Cardiac cath 11/09/2011    RCA patent. LM patent. LAD patent. mD1 55%. CX patent. Prior stent patent. Edison 85% (2.5 x 15-mm Xience stent, resid 0%). LVEDP 12. EF 40-45%. High lateral hypk (RI distribution).       Chronic alcohol use     Fri-Sun one fifth; Mon-Thur: Pint of liquor    Constipation     Coronary artery disease involving native coronary artery of native heart     Critical ischemia of lower extremity ([facility] [address] 2/8/2022    Depression     Diabetic nephropathy associated with type 2 diabetes mellitus ([facility] [address] 4/4/2019    Diabetic neuropathy associated with type 2 diabetes mellitus ([facility]     Gastroesophageal reflux disease     High vitamin D level 2/16/2022    Vitamin D 25-Hydroxy (2/16/2022) = 105.7    History of acute inferior wall myocardial infarction 2009    History of coronary angioplasty with insertion of stent 07/29/2009    2.5 x 13 Cypher stent to CX. 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 without macular edema associated with type 2 diabetes mellitus ([facility] 12/18/2020    Nuclear sclerosis of both eyes 12/18/2020    Otalgia 05/08/2019    Poorly controlled type 2 diabetes mellitus (HCC)     HbA1c (2/8/2022) = 13.8    Tobacco use disorder     contemplating stopping        Assessment:   Changes in Assessment throughout shift: No change to previous assessment     Patient has a central line: no Reasons if yes: Insertion date: Last dressing date:   Patient has Epstein Cath:  Reasons if yes:     Insertion date:  Shift epstein care completed:      Last Vitals:     Vitals:    02/20/22 1935 02/21/22 0800 02/21/22 1542 02/21/22 1928   BP: 132/68 108/69 109/69 109/65   Pulse: 76 67 73 79   Resp: 16 18 18 18   Temp: 98.6 °F (37 °C) 97.4 °F (36.3 °C) 97.6 °F (36.4 °C) 98.1 °F (36.7 °C)   SpO2: 100% 99% 97% 97%   Weight:       Height:            PAIN    Pain Assessment    Pain Intensity 1: 0 (02/22/22 0500) Pain Intensity 1: 2 (12/29/14 1105)    Pain Location 1: Leg Pain Location 1: Abdomen    Pain Intervention(s) 1: Medication (see MAR) Pain Intervention(s) 1: Medication (see MAR)  Patient Stated Pain Goal: 0 Patient Stated Pain Goal: 0  o Intervention effective: yes  o Other actions taken for pain: Medication (see MAR)     Skin Assessment  Skin color    Condition/Temperature    Integrity Skin Integrity: Incision (comment),Wound (add Wound LDA)  Turgor    Weekly Pressure Ulcer Documentation  Pressure  Injury Documentation: No Pressure Injury Noted-Pressure Ulcer Prevention Initiated  Wound Prevention & Protection Methods  Orientation of wound Orientation of Wound Prevention: Posterior  Location of Prevention Location of Wound Prevention: Sacrum/Coccyx  Dressing Present Dressing Present : No  Dressing Status    Wound Offloading Wound Offloading (Prevention Methods): Bed, pressure reduction mattress     INTAKE/OUPUT  Date 02/21/22 0700 - 02/22/22 0659 02/22/22 0700 - 02/23/22 0659   Shift 9664-2353 8383-7784 24 Hour Total 8397-5216 3640-6111 24 Hour Total   INTAKE   P.O. 1780 1080 2860        P.O. 1780 1080 2860      Shift Total(mL/kg) 9005(17.1) 4797(26.4) 4307(90.6)      OUTPUT   Urine(mL/kg/hr) 850(0.9) 1500 2350        Urine Voided 850 1500 2350        Urine Occurrence(s) 2 x 0 x 2 x      Stool           Stool Occurrence(s)  0 x 0 x      Shift Total(mL/kg) 850(10.9) 1500(19.2) 2350(30)       -420 510      Weight (kg) 78.2 78.2 78.2 78.2 78.2 78.2       Recommendations:  1. Patient needs and requests: dressing change    2. Pending tests/procedures: none at this time     3. Functional Level/Equipment: Partial (one person) / Wheelchair;Walker    Fall Precautions:   Fall risk precautions were reinforced with the patient; he was instructed to call for help prior to getting up. The following fall risk precautions were continued: bed/ chair alarms, door signage, yellow bracelet and socks as well as update of the Milk A Deal tool in the patient's room.    Milk A Deal Score: 3    HEALS Safety Check    A safety check occurred in the patient's room between off going nurse and oncoming nurse listed above. The safety check included the below items  Area Items   H  High Alert Medications - Verify all high alert medication drips (heparin, PCA, etc.)   E  Equipment - Suction is set up for ALL patients (with eliel)  - Red plugs utilized for all equipment (IV pumps, etc.)  - WOWs wiped down at end of shift.  - Room stocked with oxygen, suction, and other unit-specific supplies   A  Alarms - Bed alarm is set for fall risk patients  - Ensure chair alarm is in place and activated if patient is up in a chair   L  Lines - Check IV for any infiltration  - Gallardo bag is empty if patient has a Gallardo   - Tubing and IV bags are labeled   S  Safety   - Room is clean, patient is clean, and equipment is clean. - Hallways are clear from equipment besides carts. - Fall bracelet on for fall risk patients  - Ensure room is clear and free of clutter  - Suction is set up for ALL patients (with eliel)  - Hallways are clear from equipment besides carts.    - Isolation precautions followed, supplies available outside room, sign posted     Elenita Gates RN

## 2022-02-22 NOTE — ROUTINE PROCESS
SHIFT CHANGE NOTE FOR [facility]    Bedside and Verbal shift change report given to Jorje Osborn RN (oncoming nurse) by Rebbecca Cogan, RN (offgoing nurse). Report included the following information SBAR, Kardex, MAR and Recent Results. Situation:   Code Status: Full Code   Hospital Day: 6   Problem List:   Hospital Problems  Date Reviewed: 2/21/2022          Codes Class Noted POA    Poorly controlled type 2 diabetes mellitus ([facility] [address] ICD-10-CM: E11.65  ICD-9-CM: 250.00  Unknown Yes    Overview Signed 2/16/2022 12:50 AM by Vincent Walton MD     HbA1c (2/8/2022) = 13.8             Long term current use of insulin (HCC) ICD-10-CM: Z79.4  ICD-9-CM: V58.67  Unknown Yes        High vitamin D level (Chronic) ICD-10-CM: E67.3  ICD-9-CM: 278.4  2/16/2022 Yes    Overview Signed 2/16/2022  9:52 AM by Vincent Walton MD     Vitamin D 25-Hydroxy (2/16/2022) = 105.7             Impaired mobility and ADLs ICD-10-CM: Z74.09, Z78.9  ICD-9-CM: V49.89  2/8/2022 Yes        * (Principal) Status post below knee amputation of right lower extremity ([facility] [address] ICD-10-CM: Z89.511  ICD-9-CM: V49.75  2/8/2022 Yes    Overview Signed 2/16/2022 12:47 AM by Vincent Walton MD     S/P Right below-the-knee amputation (2/8/2022 - Dr. Vivienne Leslie)             Critical ischemia of lower extremity [facility]) ICD-10-CM: O18.284  ICD-9-CM: 459.9  2/8/2022 No        Acute postoperative anemia due to expected blood loss ICD-10-CM: D62  ICD-9-CM: 285.1  2/8/2022 Yes              Background:   Past Medical History:   Past Medical History:   Diagnosis Date    Acute postoperative anemia due to expected blood loss 2/8/2022    Arterial occlusion, lower extremity ([facility] [address] 11/27/2021    Cardiac cath 11/09/2011    RCA patent. LM patent. LAD patent. mD1 55%. CX patent. Prior stent patent. Edison 85% (2.5 x 15-mm Xience stent, resid 0%). LVEDP 12. EF 40-45%. High lateral hypk (RI distribution).       Chronic alcohol use     Fri-Sun one fifth; Mon-Thur: Pint of liquor  Constipation     Coronary artery disease involving native coronary artery of native heart     Critical ischemia of lower extremity ([facility] [address] 2/8/2022    Depression     Diabetic nephropathy associated with type 2 diabetes mellitus ([facility] [address] 4/4/2019    Diabetic neuropathy [facility] with type [facility]     Gastroesophageal reflux disease     High vitamin D level 2/16/2022    Vitamin D 25-Hydroxy (2/16/2022) = 105.7    History of acute inferior wall myocardial infarction 2009    History of coronary angioplasty with insertion of stent 07/29/2009    2.5 x 13 Cypher stent to CX.      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 without macular edema associated with type 2 diabetes mellitus ([facility] [address] 12/18/2020    Nuclear sclerosis of both eyes 12/18/2020    Otalgia 05/08/2019    Poorly controlled type 2 diabetes mellitus (HCC)     HbA1c (2/8/2022) = 13.8    Tobacco use disorder     contemplating stopping        Assessment:   Changes in Assessment throughout shift: No change to previous assessment     Patient has a central line: no Reasons if yes: na  Insertion date:na Last dressing date:na   Patient has Epstein Cath: no Reasons if yes: na   Insertion date:na  Shift epstein care completed: NO     Last Vitals:     Vitals:    02/20/22 1531 02/20/22 1935 02/21/22 0800 02/21/22 1542   BP: 124/76 132/68 108/69 109/69   Pulse: 69 76 67 73   Resp: 18 16 18 18   Temp: 98.2 °F (36.8 °C) 98.6 °F (37 °C) 97.4 °F (36.3 °C) 97.6 °F (36.4 °C)   SpO2: 100% 100% 99% 97%   Weight:       Height:            PAIN    Pain Assessment    Pain Intensity 1: 0 (02/21/22 1815) Pain Intensity 1: 2 (12/29/14 7525)    Pain Location 1: Leg Pain Location 1: Abdomen    Pain Intervention(s) 1: Medication (see MAR) Pain Intervention(s) 1: Medication (see MAR)  Patient Stated Pain Goal: 0 Patient Stated Pain Goal: 0  o Intervention effective: yes  o Other actions taken for pain: Medication (see MAR)     Skin Assessment  Skin color    Condition/Temperature    Integrity Skin Integrity: Incision (comment),Wound (add Wound LDA)  Turgor    Weekly Pressure Ulcer Documentation  Pressure  Injury Documentation: No Pressure Injury Noted-Pressure Ulcer Prevention Initiated  Wound Prevention & Protection Methods  Orientation of wound Orientation of Wound Prevention: Posterior  Location of Prevention Location of Wound Prevention: Sacrum/Coccyx  Dressing Present Dressing Present : No  Dressing Status    Wound Offloading Wound Offloading (Prevention Methods): Bed, pressure reduction mattress     INTAKE/OUPUT  Date 02/20/22 1900 - 02/21/22 0659 02/21/22 0700 - 02/22/22 0659   Shift 8264-2889 24 Hour Total 0095-1199 4008-6456 24 Hour Total   INTAKE   P.O. 1800 2280 1780  1780     P. O. 1800 2280 1780  1780   Shift Total(mL/kg) 1800(23) 2280(29.1) 1780(22.7)  1780(22.7)   OUTPUT   Urine(mL/kg/hr) 2200 2600 850(0.9)  850     Urine Voided 2200 2600 850  850     Urine Occurrence(s) 0 x 1 x 2 x  2 x   Emesis/NG output          Emesis Occurrence(s)  0 x      Stool          Stool Occurrence(s) 1 x 1 x      Shift Total(mL/kg) 9829(44.6) 6889(01.7) 850(10.9)  850(10.9)   NET -400 -320 930  930   Weight (kg) 78.2 78.2 78.2 78.2 78.2       Recommendations:  1. Patient needs and requests: na    2. Pending tests/procedures: na     3. Functional Level/Equipment: Partial (one person) /      Fall Precautions:   Fall risk precautions were reinforced with the patient; he was instructed to call for help prior to getting up.  The following fall risk precautions were continued: bed/ chair alarms, door signage, yellow bracelet and socks as well as update of the Bret Fail tool in the patient's room. Loly Score: 4    HEALS Safety Check    A safety check occurred in the patient's room between off going nurse and oncoming nurse listed above. The safety check included the below items  Area Items   H  High Alert Medications - Verify all high alert medication drips (heparin, PCA, etc.)   E  Equipment - Suction is set up for ALL patients (with eliel)  - Red plugs utilized for all equipment (IV pumps, etc.)  - WOWs wiped down at end of shift.  - Room stocked with oxygen, suction, and other unit-specific supplies   A  Alarms - Bed alarm is set for fall risk patients  - Ensure chair alarm is in place and activated if patient is up in a chair   L  Lines - Check IV for any infiltration  - Gallardo bag is empty if patient has a Gallardo   - Tubing and IV bags are labeled   S  Safety   - Room is clean, patient is clean, and equipment is clean. - Hallways are clear from equipment besides carts. - Fall bracelet on for fall risk patients  - Ensure room is clear and free of clutter  - Suction is set up for ALL patients (with eliel)  - Hallways are clear from equipment besides carts.    - Isolation precautions followed, supplies available outside room, sign posted     Letitia Acosta RN

## 2022-02-23 LAB
GLUCOSE BLD STRIP.AUTO-MCNC: 141 MG/DL (ref 70–110)
GLUCOSE BLD STRIP.AUTO-MCNC: 182 MG/DL (ref 70–110)
GLUCOSE BLD STRIP.AUTO-MCNC: 230 MG/DL (ref 70–110)
GLUCOSE BLD STRIP.AUTO-MCNC: 245 MG/DL (ref 70–110)

## 2022-02-23 PROCEDURE — 92507 TX SP LANG VOICE COMM INDIV: CPT

## 2022-02-23 PROCEDURE — 97116 GAIT TRAINING THERAPY: CPT

## 2022-02-23 PROCEDURE — 97530 THERAPEUTIC ACTIVITIES: CPT

## 2022-02-23 PROCEDURE — 65310000000 HC RM PRIVATE REHAB

## 2022-02-23 PROCEDURE — 97110 THERAPEUTIC EXERCISES: CPT

## 2022-02-23 PROCEDURE — 74011636637 HC RX REV CODE- 636/637: Performed by: INTERNAL MEDICINE

## 2022-02-23 PROCEDURE — 2709999900 HC NON-CHARGEABLE SUPPLY

## 2022-02-23 PROCEDURE — 74011250636 HC RX REV CODE- 250/636: Performed by: INTERNAL MEDICINE

## 2022-02-23 PROCEDURE — 74011250637 HC RX REV CODE- 250/637: Performed by: INTERNAL MEDICINE

## 2022-02-23 PROCEDURE — 99232 SBSQ HOSP IP/OBS MODERATE 35: CPT | Performed by: INTERNAL MEDICINE

## 2022-02-23 PROCEDURE — 82962 GLUCOSE BLOOD TEST: CPT

## 2022-02-23 RX ORDER — INSULIN LISPRO 100 [IU]/ML
0.05 INJECTION, SOLUTION INTRAVENOUS; SUBCUTANEOUS
Status: DISCONTINUED | OUTPATIENT
Start: 2022-02-23 | End: 2022-02-27

## 2022-02-23 RX ADMIN — GABAPENTIN 300 MG: 300 CAPSULE ORAL at 21:04

## 2022-02-23 RX ADMIN — Medication 1 UNITS: at 08:15

## 2022-02-23 RX ADMIN — ATORVASTATIN CALCIUM 20 MG: 20 TABLET, FILM COATED ORAL at 08:15

## 2022-02-23 RX ADMIN — ACETAMINOPHEN 650 MG: 325 TABLET ORAL at 11:22

## 2022-02-23 RX ADMIN — OXYCODONE 5 MG: 5 TABLET ORAL at 22:43

## 2022-02-23 RX ADMIN — Medication 200 MG: at 08:15

## 2022-02-23 RX ADMIN — METHOCARBAMOL TABLETS 500 MG: 500 TABLET, COATED ORAL at 14:20

## 2022-02-23 RX ADMIN — OXYCODONE 5 MG: 5 TABLET ORAL at 05:38

## 2022-02-23 RX ADMIN — ENOXAPARIN SODIUM 40 MG: 100 INJECTION SUBCUTANEOUS at 05:40

## 2022-02-23 RX ADMIN — ASPIRIN 81 MG 81 MG: 81 TABLET ORAL at 08:16

## 2022-02-23 RX ADMIN — OXYCODONE 5 MG: 5 TABLET ORAL at 00:28

## 2022-02-23 RX ADMIN — METHOCARBAMOL TABLETS 500 MG: 500 TABLET, COATED ORAL at 20:01

## 2022-02-23 RX ADMIN — THERA TABS 1 TABLET: TAB at 08:15

## 2022-02-23 RX ADMIN — OXYCODONE 5 MG: 5 TABLET ORAL at 09:12

## 2022-02-23 RX ADMIN — OXYCODONE 5 MG: 5 TABLET ORAL at 13:01

## 2022-02-23 RX ADMIN — GABAPENTIN 300 MG: 300 CAPSULE ORAL at 08:14

## 2022-02-23 RX ADMIN — METHOCARBAMOL TABLETS 500 MG: 500 TABLET, COATED ORAL at 08:34

## 2022-02-23 RX ADMIN — Medication 35 UNITS: at 20:03

## 2022-02-23 RX ADMIN — METFORMIN HYDROCHLORIDE 1000 MG: 500 TABLET ORAL at 08:14

## 2022-02-23 RX ADMIN — Medication 150 MG: at 17:09

## 2022-02-23 RX ADMIN — METFORMIN HYDROCHLORIDE 1000 MG: 500 TABLET ORAL at 16:19

## 2022-02-23 RX ADMIN — Medication 3 UNITS: at 12:42

## 2022-02-23 RX ADMIN — ACETAMINOPHEN 650 MG: 325 TABLET ORAL at 16:18

## 2022-02-23 RX ADMIN — GABAPENTIN 300 MG: 300 CAPSULE ORAL at 16:19

## 2022-02-23 RX ADMIN — Medication 3 UNITS: at 08:15

## 2022-02-23 RX ADMIN — Medication 150 MG: at 08:14

## 2022-02-23 RX ADMIN — Medication 1 UNITS: at 17:10

## 2022-02-23 RX ADMIN — Medication 4 UNITS: at 17:09

## 2022-02-23 RX ADMIN — BUPROPION HYDROCHLORIDE 150 MG: 150 TABLET, EXTENDED RELEASE ORAL at 08:15

## 2022-02-23 RX ADMIN — OXYCODONE 5 MG: 5 TABLET ORAL at 17:12

## 2022-02-23 RX ADMIN — ACETAMINOPHEN 650 MG: 325 TABLET ORAL at 08:14

## 2022-02-23 RX ADMIN — Medication 3 MG: at 17:09

## 2022-02-23 NOTE — PROGRESS NOTES
Problem: Self Care Deficits Care Plan (Adult)  Goal: *Therapy Goal (Edit Goal, Insert Text)  Description: Occupational Therapy Goals   Long Term Goals  Initiated 22 and to be accomplished within 2 week(s)  1. Pt will perform self-feeding independently. 2. Pt will perform grooming independently. 3. Pt will perform UB bathing with Joseph. 4. Pt will perform LB bathing with Joseph. 5. Pt will perform tub/shower transfer with Joseph. 6. Pt will perform UB dressing with Joseph. 7. Pt will perform LB dressing with Joseph. 8. Pt will perform toileting task with Joseph. 9. Pt will perform toilet transfer with Joseph. Short Term Goals   Initiated 22 and to be accomplished within 7 day(s), reassess 22  1. Pt will perform self-feeding with Joseph. 2. Pt will perform grooming with Joseph. 3. Pt will perform UB bathing with supervision. 4. Pt will perform LB bathing with supervision. 5. Pt will perform tub/shower transfer with SBA. 6. Pt will perform UB dressing with Joseph. 7. Pt will perform LB dressing with Diana. 8. Pt will perform toileting task with CGA. 9. Pt will perform toilet transfer with SBA. Outcome: Progressing Towards Goal  Goal: Interventions  Outcome: Progressing Towards Goal   Occupational Therapy TREATMENT    Patient: Lucila Mathis   59 y.o. Patient identified with name and : yes    Date: 2022    First Tx Session  Time In: 0900  Time Out[de-identified] 0850        Diagnosis: Status post below knee amputation of right lower extremity [facility]) [Z89.511]   Precautions: Fall  Chart, occupational therapy assessment, plan of care, and goals were reviewed. Pain:  Pt reports 9/10 pain or discomfort prior to treatment. (R leg)  Pt reports -/10 pain or discomfort post treatment. Intervention Provided: Reported to nursing, RN administered pain med      SUBJECTIVE:   Patient reported showering this morning at sink with Joseph from w/c, dirty towel in w/c and at sink.     OBJECTIVE DATA SUMMARY: THERAPEUTIC ACTIVITY Daily Assessment    Pt engaged in dynamic standing activity which involved reaching overhead and outside OTIS and across midline while securing and releasing token in vertical slot to play game of Connect 4 with therapist. Pt required SBA for safety and min VC's due to impaired STR of instruction. Pt lost 3/4 games and demonstrated standing tolerance of 1 min. 3 sec. And 5 min. 8 sec. THERAPEUTIC EXERCISE Daily Assessment    Pt completed BUE strengthening with 5# free weight alternating hands 3x15 reps, 2 sets (bicep curls, chest press, shoulder flexion/extension) with RB's in between sets. Pt completed BUE strengthening with Power Trainer for continuous 15 minutes  To increase pt BUE strength and activity tolerance for self cares and functional mobility. UPPER BODY BATHING Daily Assessment    Upper Body Bathing  Bathing Assistance, Upper:  (Ptdeclined, reported washing at sink with Joseph prior to arri) arrival.     LOWER BODY BATHING Daily Assessment    Lower Body Bathing  Bathing Assistance, Lower :  (Ptdeclined, reported washing at sink with Joseph prior to arri)     TOILETING Daily Assessment    Pt declined     UPPER BODY DRESSING Daily Assessment    Upper Body Dressing   Comments: Pt reported performing with Joseph this morning. OTR noted pt shirt inside out     MOBILITY/TRANSFERS Daily Assessment     Pt performed recliner to w/c transfer with supervision and good safety. ASSESSMENT:  Pt is making progress and demonstrating increased standing tolerance. OTR encouraged self cares and toileting for sake of OT however pt declined this date. Pt is increasing BUE strength and activity tolerance for self cares and functional mobility. Pt is improving safety with sit to stand and w/c management requiring decreased cueing this date however inconsistent with progress.     Progression toward goals:  []          Improving appropriately and progressing toward goals  [x]          Improving slowly and progressing toward goals  []          Not making progress toward goals and plan of care will be adjusted     PLAN:  Patient continues to benefit from skilled intervention to address the above impairments. Continue treatment per established plan of care. Discharge Recommendations:  Home Health with support  Further Equipment Recommendations for Discharge: bedside commode and transfer bench TBD on progress     Activity Tolerance:  good      Estimated LOS: ~ DC 3/2/22    Please refer to the flowsheet for vital signs taken during this treatment. After treatment:   [x]  Patient left in no apparent distress sitting up in chair   []  Patient left in no apparent distress in bed  [x]  Call bell left within reach  [x]  Nursing notified  []  Caregiver present  []  Bed alarm activated    COMMUNICATION/EDUCATION:   [] Home safety education was provided and the patient/caregiver indicated understanding. [] Patient/family have participated as able in goal setting and plan of care. [x] Patient/family agree to work toward stated goals and plan of care. [] Patient understands intent and goals of therapy, but is neutral about his/her participation. [] Patient is unable to participate in goal setting and plan of care.       Gerardo Paige, OT

## 2022-02-23 NOTE — PROGRESS NOTES
SHIFT CHANGE NOTE FOR [facility]    Bedside and Verbal shift change report given to ANITHA Kennedy (oncoming nurse) by Jacob Lim RN (offgoing nurse). Report included the following information SBAR, Kardex, MAR and Recent Results. Situation:   Code Status: Full Code   Hospital Day: 8   Problem List:   Hospital Problems  Date Reviewed: 2/22/2022          Codes Class Noted POA    Poorly controlled type 2 diabetes mellitus ([facility] ICD-10-CM: E11.65  ICD-9-CM: 250.00  Unknown Yes    Overview Signed 2/16/2022 12:50 AM by Abel Latif MD     HbA1c (2/8/2022) = 13.8             Long term current use of insulin (HCC) ICD-10-CM: Z79.4  ICD-9-CM: V58.67  Unknown Yes        High vitamin D level (Chronic) ICD-10-CM: E67.3  ICD-9-CM: 278.4  2/16/2022 Yes    Overview Signed 2/16/2022  9:52 AM by Abel Latif MD     Vitamin D 25-Hydroxy (2/16/2022) = 105.7             Impaired mobility and ADLs ICD-10-CM: Z74.09, Z78.9  ICD-9-CM: V49.89  2/8/2022 Yes        * (Principal) Status post below knee amputation of right lower extremity ([facility] ICD-10-CM: Z89.511  ICD-9-CM: V49.75  2/8/2022 Yes    Overview Signed 2/16/2022 12:47 AM by Abel Latif MD     S/P Right below-the-knee amputation (2/8/2022 - Dr. Gennaro Arreola)             Critical ischemia of lower extremity [facility]) ICD-10-CM: Z72.243  ICD-9-CM: 459.9  2/8/2022 No        Acute postoperative anemia due to expected blood loss ICD-10-CM: D62  ICD-9-CM: 285.1  2/8/2022 Yes              Background:   Past Medical History:   Past Medical History:   Diagnosis Date    Acute postoperative anemia due to expected blood loss 2/8/2022    Arterial occlusion, lower extremity ([facility] 11/27/2021    Cardiac cath 11/09/2011    RCA patent. LM patent. LAD patent. mD1 55%. CX patent. Prior stent patent. Edison 85% (2.5 x 15-mm Xience stent, resid 0%). LVEDP 12. EF 40-45%. High lateral hypk (RI distribution).       Chronic alcohol use     Fri-Sun one fifth; Mon-Thur: Pint of liquor    Constipation     Coronary artery disease involving native coronary artery of native heart     Critical ischemia of lower extremity ([facility] 2/8/2022    Depression     Diabetic nephropathy associated with type 2 diabetes mellitus ([facility] 4/4/2019    Diabetic neuropathy associated with type 2 diabetes mellitus ([facility]     Gastroesophageal reflux disease     High vitamin D level 2/16/2022    Vitamin D 25-Hydroxy (2/16/2022) = 105.7    History of acute inferior wall myocardial infarction 2009    History of coronary angioplasty with insertion of stent 07/29/2009    2.5 x 13 Cypher stent to CX. 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 without macular edema associated with type 2 diabetes mellitus ([facility] 12/18/2020    Nuclear sclerosis of both eyes 12/18/2020    Otalgia 05/08/2019    Poorly controlled type 2 diabetes mellitus (HCC)     HbA1c (2/8/2022) = 13.8    Tobacco use disorder     contemplating stopping        Assessment:   Changes in Assessment throughout shift: No change to previous assessment     Patient has a central line: no Reasons if yes: Insertion date: Last dressing date:   Patient has Epstein Cath:  Reasons if yes:     Insertion date:  Shift epstein care completed:      Last Vitals:     Vitals:    02/22/22 0747 02/22/22 1511 02/22/22 1610 02/22/22 1945   BP: 112/65  117/69 (!) 144/76   Pulse: 69  82 72   Resp: 19 18 18   Temp: 98.7 °F (37.1 °C)  99 °F (37.2 °C) 98.6 °F (37 °C)   SpO2: 99%  99% 97%   Weight:       Height:  5' 9\" (1.753 m)          PAIN    Pain Assessment    Pain Intensity 1: 0 (02/23/22 0500) Pain Intensity 1: 2 (12/29/14 1105)    Pain Location 1: Leg Pain Location 1: Abdomen    Pain Intervention(s) 1: Medication (see MAR) Pain Intervention(s) 1: Medication (see MAR)  Patient Stated Pain Goal: 0 Patient Stated Pain Goal: 0  o Intervention effective: yes  o Other actions taken for pain: Medication (see MAR)     Skin Assessment  Skin color    Condition/Temperature    Integrity Skin Integrity: Incision (comment),Wound (add Wound LDA)  Turgor    Weekly Pressure Ulcer Documentation  Pressure  Injury Documentation: No Pressure Injury Noted-Pressure Ulcer Prevention Initiated  Wound Prevention & Protection Methods  Orientation of wound Orientation of Wound Prevention: Posterior  Location of Prevention Location of Wound Prevention: Sacrum/Coccyx  Dressing Present Dressing Present : No  Dressing Status    Wound Offloading Wound Offloading (Prevention Methods): Bed, pressure reduction mattress     INTAKE/OUPUT  Date 02/22/22 0700 - 02/23/22 0659 02/23/22 0700 - 02/24/22 0659   Shift 4471-9212 5292-0354 24 Hour Total 4701-4706 3384-6897 24 Hour Total   INTAKE   P.O.         P. O.       Shift Total(mL/kg) 720(9.2) 720(9.2) 1440(18.4)      OUTPUT   Urine(mL/kg/hr)  1200 1200        Urine Voided  1200 1200        Urine Occurrence(s) 3 x 0 x 3 x      Stool           Stool Occurrence(s) 0 x 0 x 0 x      Shift Total(mL/kg)  1200(15.3) 1200(15.3)       -480 240      Weight (kg) 78.2 78.2 78.2 78.2 78.2 78.2       Recommendations:  1. Patient needs and requests: none at this time    2. Pending tests/procedures: none at this time     3. Functional Level/Equipment: Partial (one person) / Wheelchair    Fall Precautions:   Fall risk precautions were reinforced with the patient; he was instructed to call for help prior to getting up. The following fall risk precautions were continued: bed/ chair alarms, door signage, yellow bracelet and socks as well as update of the Ricky Lente tool in the patient's room.    Loly Score: 3    HEALS Safety Check    A safety check occurred in the patient's room between off going nurse and oncoming nurse listed above. The safety check included the below items  Area Items   H  High Alert Medications - Verify all high alert medication drips (heparin, PCA, etc.)   E  Equipment - Suction is set up for ALL patients (with eliel)  - Red plugs utilized for all equipment (IV pumps, etc.)  - WOWs wiped down at end of shift.  - Room stocked with oxygen, suction, and other unit-specific supplies   A  Alarms - Bed alarm is set for fall risk patients  - Ensure chair alarm is in place and activated if patient is up in a chair   L  Lines - Check IV for any infiltration  - Gallardo bag is empty if patient has a Gallardo   - Tubing and IV bags are labeled   S  Safety   - Room is clean, patient is clean, and equipment is clean. - Hallways are clear from equipment besides carts. - Fall bracelet on for fall risk patients  - Ensure room is clear and free of clutter  - Suction is set up for ALL patients (with eliel)  - Hallways are clear from equipment besides carts.    - Isolation precautions followed, supplies available outside room, sign posted     Lay Emery RN

## 2022-02-23 NOTE — ROUTINE PROCESS
SHIFT CHANGE NOTE FOR [facility]    Bedside and Verbal shift change report given to Gabriel Patino RN (oncoming nurse) by Cnostance Jean Baptiste RN (offgoing nurse). Report included the following information SBAR, Kardex, MAR and Recent Results. Situation:   Code Status: Full Code   Hospital Day: 7   Problem List:   Hospital Problems  Date Reviewed: 2/22/2022          Codes Class Noted POA    Poorly controlled type 2 diabetes mellitus ([facility] ICD-10-CM: E11.65  ICD-9-CM: 250.00  Unknown Yes    Overview Signed 2/16/2022 12:50 AM by Cecelia Malone MD     HbA1c (2/8/2022) = 13.8             Long term current use of insulin (HCC) ICD-10-CM: Z79.4  ICD-9-CM: V58.67  Unknown Yes        High vitamin D level (Chronic) ICD-10-CM: E67.3  ICD-9-CM: 278.4  2/16/2022 Yes    Overview Signed 2/16/2022  9:52 AM by Cecelia Malone MD     Vitamin D 25-Hydroxy (2/16/2022) = 105.7             Impaired mobility and ADLs ICD-10-CM: Z74.09, Z78.9  ICD-9-CM: V49.89  2/8/2022 Yes        * (Principal) Status post below knee amputation of right lower extremity ([facility] ICD-10-CM: Z89.511  ICD-9-CM: V49.75  2/8/2022 Yes    Overview Signed 2/16/2022 12:47 AM by Cecelia Malone MD     S/P Right below-the-knee amputation (2/8/2022 - Dr. Keily Gardner)             Critical ischemia of lower extremity [facility]) ICD-10-CM: A28.550  ICD-9-CM: 459.9  2/8/2022 No        Acute postoperative anemia due to expected blood loss ICD-10-CM: D62  ICD-9-CM: 285.1  2/8/2022 Yes              Background:   Past Medical History:   Past Medical History:   Diagnosis Date    Acute postoperative anemia due to expected blood loss 2/8/2022    Arterial occlusion, lower extremity ([facility] 11/27/2021    Cardiac cath 11/09/2011    RCA patent. LM patent. LAD patent. mD1 55%. CX patent. Prior stent patent. Edison 85% (2.5 x 15-mm Xience stent, resid 0%). LVEDP 12. EF 40-45%. High lateral hypk (RI distribution).       Chronic alcohol use     Fri-Sun one fifth; Mon-Thur: Pint of liquor    Constipation     Coronary artery disease involving native coronary artery of native heart     Critical ischemia of lower extremity ([facility] 2/8/2022    Depression     Diabetic nephropathy associated with type 2 diabetes mellitus ([facility] 4/4/2019    Diabetic neuropathy associated with type 2 diabetes mellitus ([facility]     Gastroesophageal reflux disease     High vitamin D level 2/16/2022    Vitamin D 25-Hydroxy (2/16/2022) = 105.7    History of acute inferior wall myocardial infarction 2009    History of coronary angioplasty with insertion of stent 07/29/2009    2.5 x 13 Cypher stent to CX.      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 without macular edema associated with type 2 diabetes mellitus ([facility] 12/18/2020    Nuclear sclerosis of both eyes 12/18/2020    Otalgia 05/08/2019    Poorly controlled type 2 diabetes mellitus (HCC)     HbA1c (2/8/2022) = 13.8    Tobacco use disorder     contemplating stopping        Assessment:   Changes in Assessment throughout shift: No change to previous assessment     Patient has a central line: no Reasons if yes: na  Insertion date:na Last dressing date:na   Patient has Epstein Cath: no Reasons if yes: na   Insertion date:na  Shift epstein care completed: NO     Last Vitals:     Vitals:    02/21/22 1928 02/22/22 0747 02/22/22 1511 02/22/22 1610   BP: 109/65 112/65  117/69   Pulse: 79 69  82   Resp: 18 19 18   Temp: 98.1 °F (36.7 °C) 98.7 °F (37.1 °C)  99 °F (37.2 °C)   SpO2: 97% 99%  99%   Weight:       Height:   5' 9\" (1.753 m)         PAIN    Pain Assessment    Pain Intensity 1: 3 (02/22/22 1743) Pain Intensity 1: 2 (12/29/14 1105)    Pain Location 1: Leg Pain Location 1: Abdomen    Pain Intervention(s) 1: Medication (see MAR) Pain Intervention(s) 1: Medication (see MAR)  Patient Stated Pain Goal: 0 Patient Stated Pain Goal: 0  o Intervention effective: yes  o Other actions taken for pain: Medication (see MAR)     Skin Assessment  Skin color    Condition/Temperature    Integrity Skin Integrity: Incision (comment)  Turgor    Weekly Pressure Ulcer Documentation  Pressure  Injury Documentation: No Pressure Injury Noted-Pressure Ulcer Prevention Initiated  Wound Prevention & Protection Methods  Orientation of wound Orientation of Wound Prevention: Posterior  Location of Prevention Location of Wound Prevention: Sacrum/Coccyx  Dressing Present Dressing Present : No  Dressing Status    Wound Offloading Wound Offloading (Prevention Methods): Bed, pressure reduction mattress     INTAKE/OUPUT  Date 02/21/22 1900 - 02/22/22 0659 02/22/22 0700 - 02/23/22 0659   Shift 0302-9585 24 Hour Total 3902-9256 6906-2256 24 Hour Total   INTAKE   P.O. 1080 2860 720  720     P. O. 1080 2860 720  720   Shift Total(mL/kg) 1080(13.8) 2860(36.6) 720(9.2)  720(9.2)   OUTPUT   Urine(mL/kg/hr) 1500 2350        Urine Voided 1500 2350        Urine Occurrence(s) 0 x 2 x 3 x  3 x   Stool          Stool Occurrence(s) 0 x 0 x 0 x  0 x   Shift Total(mL/kg) 1500(19.2) 2350(30)      NET -420 510 720  720   Weight (kg) 78.2 78.2 78.2 78.2 78.2       Recommendations:  1. Patient needs and requests: na    2. Pending tests/procedures: na     3. Functional Level/Equipment: Partial (one person) / Bed (comment)    Fall Precautions:   Fall risk precautions were reinforced with the patient; he was instructed to call for help prior to getting up. The following fall risk precautions were continued: bed/ chair alarms, door signage, yellow bracelet and socks as well as update of the Juliene Savage tool in the patient's room.    Loly Score: 3    HEALS Safety Check    A safety check occurred in the patient's room between off going nurse and oncoming nurse listed above. The safety check included the below items  Area Items   H  High Alert Medications - Verify all high alert medication drips (heparin, PCA, etc.)   E  Equipment - Suction is set up for ALL patients (with eliel)  - Red plugs utilized for all equipment (IV pumps, etc.)  - WOWs wiped down at end of shift.  - Room stocked with oxygen, suction, and other unit-specific supplies   A  Alarms - Bed alarm is set for fall risk patients  - Ensure chair alarm is in place and activated if patient is up in a chair   L  Lines - Check IV for any infiltration  - Gallardo bag is empty if patient has a Gallardo   - Tubing and IV bags are labeled   S  Safety   - Room is clean, patient is clean, and equipment is clean. - Hallways are clear from equipment besides carts. - Fall bracelet on for fall risk patients  - Ensure room is clear and free of clutter  - Suction is set up for ALL patients (with eliel)  - Hallways are clear from equipment besides carts.    - Isolation precautions followed, supplies available outside room, sign posted     Constance Jean Baptiste RN

## 2022-02-23 NOTE — PROGRESS NOTES
Problem: Falls - Risk of  Goal: *Absence of Falls  Description: Document Tameka Pierre Fall Risk and appropriate interventions in the flowsheet. Outcome: Progressing Towards Goal  Note: Fall Risk Interventions:  Mobility Interventions: Bed/chair exit alarm,Patient to call before getting OOB,Utilize walker, cane, or other assistive device,Utilize gait belt for transfers/ambulation         Medication Interventions: Bed/chair exit alarm,Patient to call before getting OOB,Teach patient to arise slowly    Elimination Interventions: Bed/chair exit alarm,Call light in reach,Patient to call for help with toileting needs,Urinal in reach,Stay With Me (per policy)    History of Falls Interventions: Bed/chair exit alarm,Door open when patient unattended,Room close to nurse's station         Problem: Patient Education: Go to Patient Education Activity  Goal: Patient/Family Education  Outcome: Progressing Towards Goal     Problem: Diabetes Self-Management  Goal: *Disease process and treatment process  Description: Define diabetes and identify own type of diabetes; list 3 options for treating diabetes. Outcome: Progressing Towards Goal  Goal: *Incorporating nutritional management into lifestyle  Description: Describe effect of type, amount and timing of food on blood glucose; list 3 methods for planning meals. Outcome: Progressing Towards Goal  Goal: *Incorporating physical activity into lifestyle  Description: State effect of exercise on blood glucose levels. Outcome: Progressing Towards Goal  Goal: *Developing strategies to promote health/change behavior  Description: Define the ABC's of diabetes; identify appropriate screenings, schedule and personal plan for screenings. Outcome: Progressing Towards Goal  Goal: *Using medications safely  Description: State effect of diabetes medications on diabetes; name diabetes medication taking, action and side effects.   Outcome: Progressing Towards Goal  Goal: *Monitoring blood glucose, interpreting and using results  Description: Identify recommended blood glucose targets  and personal targets. Outcome: Progressing Towards Goal  Goal: *Prevention, detection, treatment of acute complications  Description: List symptoms of hyper- and hypoglycemia; describe how to treat low blood sugar and actions for lowering  high blood glucose level. Outcome: Progressing Towards Goal  Goal: *Prevention, detection and treatment of chronic complications  Description: Define the natural course of diabetes and describe the relationship of blood glucose levels to long term complications of diabetes. Outcome: Progressing Towards Goal  Goal: *Developing strategies to address psychosocial issues  Description: Describe feelings about living with diabetes; identify support needed and support network  Outcome: Progressing Towards Goal  Goal: *Patient Specific Goal (EDIT GOAL, INSERT TEXT)  Outcome: Progressing Towards Goal     Problem: Patient Education: Go to Patient Education Activity  Goal: Patient/Family Education  Outcome: Progressing Towards Goal     Problem: Pressure Injury - Risk of  Goal: *Prevention of pressure injury  Description: Document Mayito Scale and appropriate interventions in the flowsheet.   Outcome: Progressing Towards Goal  Note: Pressure Injury Interventions:  Sensory Interventions: Assess changes in LOC    Moisture Interventions: Absorbent underpads,Maintain skin hydration (lotion/cream),Minimize layers    Activity Interventions: Chair cushion,Increase time out of bed,Pressure redistribution bed/mattress(bed type)    Mobility Interventions: Chair cushion,HOB 30 degrees or less,Pressure redistribution bed/mattress (bed type)    Nutrition Interventions: Document food/fluid/supplement intake    Friction and Shear Interventions: HOB 30 degrees or less,Minimize layers                Problem: Patient Education: Go to Patient Education Activity  Goal: Patient/Family Education  Outcome: Progressing Towards Goal

## 2022-02-23 NOTE — PROGRESS NOTES
Problem: Neurolinguistics Impaired (Adult)  Goal: *Speech Goal: (INSERT TEXT)  Description: Long term goals  Patient will:  1. Be oriented x 3 and recall events of the day, supervision. 2. Recall 3 related words after 5 minutes with supervision and mnemonic training. 3. Perform functional problem solving tasks with 90% accuracy. 4. Recall strategies learned in OT/PT sessions (provided by the therapists) with min assist-supervision. Short term goals (by 2/25/22)  Patient will:  1. Be oriented x 3 and recall events of the day, min assist.  2. Recall 2 related words after 5 minutes with min assist and mnemonic training. 3. Perform functional problem solving tasks with 70-80% accuracy. 4. Recall strategies learned in OT/PT sessions (provided by the therapists) with mod cues. Note:   Speech language pathology treatment    Patient: Johana Lomeli (75 y.o. male)  Date: 2/23/2022  Diagnosis: Status post below knee amputation of right lower extremity ([facility]) [Z89.511] Status post below knee amputation of right lower extremity ([facility] [address]       Time in: 1030  Time Out:  1100    Pain:  Pre-tx:  No report of pain  Post tx: No report of pain    SUBJECTIVE:   Patient stated Tj Nuñez went to the gym, this morning\". OBJECTIVE:   Mental Status:  Mr. Puneet Marino was alert and interactive this morning. He was a bit more amenable to treatment this morning. Treatment & Interventions:  Patient was seen as he was sitting in there room recliner this am.  The following treatment tasks were presented:  Neuro-Linguistics:   Orientation:  Min assist  Recent memory: Supervision  Recall 2 words: Min assist  Reasoning tasks: 85% accuracy  Functional problems: 85% accuracy     Response & Tolerance to Activities:  Mr. Puneet Marino was more accepting of treatment this morning. He was engaged and cooperative.     Pain:  Pain Scale 1: Numeric (0 - 10)  Pain Orientation 1: Right  Pain Description 1: Aching  After treatment:   [x]       Patient left in no apparent distress sitting up in chair  []       Patient left in no apparent distress in bed  [x]       Call bell left within reach  [x]       Nursing notified  []       Caregiver present  []       Bed alarm activated    ASSESSMENT:   Progression toward goals:  []       Improving appropriately and progressing toward goals  [x]       Improving slowly and progressing toward goals  []       Not making progress toward goals and plan of care will be adjusted    PLAN:   Patient continues to benefit from skilled intervention to address the above impairments. Continue treatment per established plan of care. Discharge Recommendations:  Home Health    Estimated LOS: Through 3/2/22    LOI Renee  Time Calculation: 30 mins          Problem: Neurolinguistics Impaired (Adult)  Goal: *Speech Goal: (INSERT TEXT)  Description: Long term goals  Patient will:  1. Be oriented x 3 and recall events of the day, supervision. 2. Recall 3 related words after 5 minutes with supervision and mnemonic training. 3. Perform functional problem solving tasks with 90% accuracy. 4. Recall strategies learned in OT/PT sessions (provided by the therapists) with min assist-supervision. Short term goals (by 2/25/22)  Patient will:  1. Be oriented x 3 and recall events of the day, min assist.  2. Recall 2 related words after 5 minutes with min assist and mnemonic training. 3. Perform functional problem solving tasks with 70-80% accuracy. 4. Recall strategies learned in OT/PT sessions (provided by the therapists) with mod cues.     Note:   Speech language pathology treatment    Patient: Julia Valladares (12 y.o. male)  Date: 2/23/2022  Diagnosis: Status post below knee amputation of right lower extremity ([facility]) [Z89.511] Status post below knee amputation of right lower extremity ([facility] [address]       Time in: 1030  Time Out:  1100    Pain:  Pre-tx:  No report of pain  Post tx: No report of pain    SUBJECTIVE:   Patient stated Gladys Huang went to the gym, this morning\". OBJECTIVE:   Mental Status:  Mr. Alissa Lambert was alert and interactive this morning. He was a bit more amenable to treatment this morning. Treatment & Interventions:  Patient was seen as he was sitting in there room recliner this am.  The following treatment tasks were presented:  Neuro-Linguistics:   Orientation:  Min assist  Recent memory: Supervision  Recall 2 words: Min assist  Reasoning tasks: 85% accuracy  Functional problems: 85% accuracy     Response & Tolerance to Activities:  Mr. Alissa Lambert was more accepting of treatment this morning. He was engaged and cooperative. Pain:  Pain Scale 1: Numeric (0 - 10)  Pain Orientation 1: Right  Pain Description 1: Aching  After treatment:   [x]       Patient left in no apparent distress sitting up in chair  []       Patient left in no apparent distress in bed  [x]       Call bell left within reach  [x]       Nursing notified  []       Caregiver present  []       Bed alarm activated    ASSESSMENT:   Progression toward goals:  []       Improving appropriately and progressing toward goals  [x]       Improving slowly and progressing toward goals  []       Not making progress toward goals and plan of care will be adjusted    PLAN:   Patient continues to benefit from skilled intervention to address the above impairments. Continue treatment per established plan of care. Discharge Recommendations:  Home Health    Estimated LOS: Through 3/2/22    LOI Vilchis  Time Calculation: 30 mins          Problem: Neurolinguistics Impaired (Adult)  Goal: *Speech Goal: (INSERT TEXT)  Description: Long term goals  Patient will:  1. Be oriented x 3 and recall events of the day, supervision. 2. Recall 3 related words after 5 minutes with supervision and mnemonic training. 3. Perform functional problem solving tasks with 90% accuracy. 4. Recall strategies learned in OT/PT sessions (provided by the therapists) with min assist-supervision.     Short term goals (by 2/25/22)  Patient will:  1. Be oriented x 3 and recall events of the day, min assist.  2. Recall 2 related words after 5 minutes with min assist and mnemonic training. 3. Perform functional problem solving tasks with 70-80% accuracy. 4. Recall strategies learned in OT/PT sessions (provided by the therapists) with mod cues. Note:   Speech language pathology treatment    Patient: Sameera Cook (28 y.o. male)  Date: 2/23/2022  Diagnosis: Status post below knee amputation of right lower extremity ([facility]) [Z89.511] Status post below knee amputation of right lower extremity ([facility] [address]       Time in: 1030  Time Out:  1100    Pain:  Pre-tx:  No report of pain  Post tx: No report of pain    SUBJECTIVE:   Patient stated Winslow Liner went to the gym, this morning\". OBJECTIVE:   Mental Status:  Mr. Antony Kang was alert and interactive this morning. He was a bit more amenable to treatment this morning. Treatment & Interventions:  Patient was seen as he was sitting in there room recliner this am.  The following treatment tasks were presented:  Neuro-Linguistics:   Orientation:  Min assist  Recent memory: Supervision  Recall 2 words: Min assist  Reasoning tasks: 85% accuracy  Functional problems: 85% accuracy     Response & Tolerance to Activities:  Mr. Antony Kang was more accepting of treatment this morning. He was engaged and cooperative.     Pain:  Pain Scale 1: Numeric (0 - 10)  Pain Orientation 1: Right  Pain Description 1: Aching  After treatment:   [x]       Patient left in no apparent distress sitting up in chair  []       Patient left in no apparent distress in bed  [x]       Call bell left within reach  [x]       Nursing notified  []       Caregiver present  []       Bed alarm activated    ASSESSMENT:   Progression toward goals:  []       Improving appropriately and progressing toward goals  [x]       Improving slowly and progressing toward goals  []       Not making progress toward goals and plan of care will be adjusted    PLAN: Patient continues to benefit from skilled intervention to address the above impairments. Continue treatment per established plan of care.   Discharge Recommendations:  Home Health    Estimated LOS: Through 3/2/22    LOI Don  Time Calculation: 30 mins

## 2022-02-23 NOTE — PROGRESS NOTES
[facility] PHYSICAL REHABILITATION  [address] [place] [address], [address]     INPATIENT REHABILITATION  DAILY PROGRESS NOTE     Date: 2/23/2022    Name: Herson Gómez Age / Sex: 59 y.o. / male   CSN: [id number] MRN: [id number]   [facility] Date: 2/15/2022 Length of Stay: 8 days     Primary Rehabilitation Diagnosis: Impaired Mobility and ADLs secondary to:  1. S/P Right below-the-knee amputation (2/8/2022 - Dr. Saari Quevedo)  2. History of critical ischemia of the right lower extremity      Subjective:     Patient seen and examined. Blood pressure controlled. Blood glucose fairly controlled. Patient's Complaint:   No significant medical complaints    Pain Control: stable, mild-to-moderate joint symptoms intermittently, reasonably well controlled by current meds      Objective:     Vital Signs:  Patient Vitals for the past 24 hrs:   BP Temp Pulse Resp SpO2 Height   02/23/22 0752 107/62 98.2 °F (36.8 °C) 72 16 98 %    02/22/22 1945 (!) 144/76 98.6 °F (37 °C) 72 18 97 %    02/22/22 1610 117/69 99 °F (37.2 °C) 82 18 99 %    02/22/22 1511      5' 9\" (1.753 m)        Physical Examination:  GENERAL SURVEY: Patient is awake, alert, oriented x 3, sitting comfortably on the chair, not in acute respiratory distress. HEENT: pale palpebral conjunctivae, anicteric sclerae, no nasoaural discharge, moist oral mucosa  NECK: supple, no jugular venous distention, no palpable lymph nodes  CHEST/LUNGS: symmetrical chest expansion, good air entry, clear breath sounds  HEART: adynamic precordium, good S1 S2, no S3, regular rhythm, no murmurs  ABDOMEN: flat, bowel sounds appreciated, soft, non-tender  EXTREMITIES: (+) right BKA stump, pale nailbeds, no edema, full and equal pulses, no calf tenderness   NEUROLOGICAL EXAM: The patient is awake, alert and oriented x3, able to answer questions fairly appropriately, able to follow 1 and 2 step commands. Able to tell time from the wall clock.   Cranial nerves II-XII are grossly intact. No gross sensory deficit. Motor strength is 4/5 on BUE and BLE. Incision(s): covered, clean, dry, and intact       Current Medications:  Current Facility-Administered Medications   Medication Dose Route Frequency    gabapentin (NEURONTIN) capsule 300 mg  300 mg Oral TID    melatonin tablet 3 mg  3 mg Oral PCD    oxyCODONE IR (ROXICODONE) tablet 5 mg  5 mg Oral Q4H PRN    insulin lispro (HUMALOG) injection 3 Units  0.04 Units/kg SubCUTAneous TIDAC    insulin glargine (LANTUS) injection 35 Units  35 Units SubCUTAneous QHS    metFORMIN (GLUCOPHAGE) tablet 1,000 mg  1,000 mg Oral BID WITH MEALS    glucose chewable tablet 16 g  4 Tablet Oral PRN    glucagon (GLUCAGEN) injection 1 mg  1 mg IntraMUSCular PRN    dextrose 10% infusion 0-250 mL  0-250 mL IntraVENous PRN    iron polysaccharides (NIFEREX) capsule 150 mg  1 Capsule Oral BID    thiamine HCL (B-1) tablet 200 mg  200 mg Oral DAILY    aspirin chewable tablet 81 mg  81 mg Oral DAILY WITH BREAKFAST    atorvastatin (LIPITOR) tablet 20 mg  20 mg Oral DAILY    buPROPion SR (WELLBUTRIN SR) tablet 150 mg  150 mg Oral DAILY    therapeutic multivitamin (THERAGRAN) tablet 1 Tablet  1 Tablet Oral DAILY    acetaminophen (TYLENOL) tablet 650 mg  650 mg Oral TID    methocarbamoL (ROBAXIN) tablet 500 mg  500 mg Oral TID    acetaminophen (TYLENOL) tablet 650 mg  650 mg Oral Q4H PRN    bisacodyL (DULCOLAX) tablet 10 mg  10 mg Oral Q48H PRN    enoxaparin (LOVENOX) injection 40 mg  40 mg SubCUTAneous Q24H    insulin lispro (HUMALOG) injection   SubCUTAneous TIDAC       Allergies:   Allergies   Allergen Reactions    Niacin Itching       Functional Progress:    OCCUPATIONAL THERAPY    ON ADMISSION MOST RECENT   Eating  Functional Level: 5   Eating  Functional Level: 6     Grooming  Functional Level: 5   Grooming  Functional Level: 5     Bathing  Functional Level: 4   Bathing  Functional Level: 4     Upper Body Dressing  Functional Level: 5 (setup)   Upper Body Dressing  Functional Level: 5     Lower Body Dressing  Functional Level: 3   Lower Body Dressing  Functional Level: 3     Toileting  Functional Level:  (Pt declined)   Toileting  Functional Level:  (Pt declined)     Toilet Transfers  Toilet Transfer Score: 5 (CGA)   Toilet Transfers  Toilet Transfer Score: 5 (CGA)     Tub /Shower Transfers  Tub/Shower Transfer Score: 5 (CGA with grab bar)   Tub/Shower Transfers  Tub/Shower Transfer Score: 5 (CGA with grab bar)       Legend:   7 - Independent   6 - Modified Independent   5 - Standby Assistance / Supervision / Set-up   4 - Minimum Assistance / Contact Guard Assistance   3 - Moderate Assistance   2 - Maximum Assistance   1 - Total Assistance / Dependent       Lab/Data Review:  Recent Results (from the past 24 hour(s))   GLUCOSE, POC    Collection Time: 02/22/22  4:46 PM   Result Value Ref Range    Glucose (POC) 249 (H) 70 - 110 mg/dL   GLUCOSE, POC    Collection Time: 02/22/22  9:15 PM   Result Value Ref Range    Glucose (POC) 229 (H) 70 - 110 mg/dL   GLUCOSE, POC    Collection Time: 02/23/22  7:53 AM   Result Value Ref Range    Glucose (POC) 245 (H) 70 - 110 mg/dL   GLUCOSE, POC    Collection Time: 02/23/22 11:24 AM   Result Value Ref Range    Glucose (POC) 182 (H) 70 - 110 mg/dL       Assessment:     Primary Rehabilitation Diagnosis  1. Impaired Mobility and ADLs  2. S/P Right below-the-knee amputation (2/8/2022 - Dr. Pandora Rubinstein)  3.  History of critical ischemia of the right lower extremity    Comorbidities  Patient Active Problem List   Diagnosis Code    Hyperlipidemia E78.5    Tobacco use disorder F17.200    Mediastinal adenopathy R59.0    History of vitamin D deficiency Z86.39    Insomnia G47.00    Diabetic nephropathy associated with type 2 diabetes mellitus ([facility] [address] E11.21    Otalgia H92.09    Mild nonproliferative diabetic retinopathy of left eye without macular edema associated with type 2 diabetes mellitus ([facility] [address] K56.9731    Hypertensive retinopathy H35.039    Nuclear sclerosis of both eyes H25.13    Arterial occlusion, lower extremity ([facility]) I70.209    Impaired mobility and ADLs Z74.09, Z78.9    Status post below knee amputation of right lower extremity ([facility]) Z89.511    Chronic alcohol use Z72.89    History of coronary angioplasty with insertion of stent Z95.5    Coronary artery disease involving native coronary artery of native heart I25.10    Constipation K59.00    Poorly controlled type 2 diabetes mellitus ([facility]) E11.65    Long term current use of insulin ([facility]) Z79.4    Migraine headache G43.909    Diabetic neuropathy associated with type 2 diabetes mellitus ([facility]) E11.40    Gastroesophageal reflux disease K21.9    History of essential hypertension Z86.79    History of lacunar cerebrovascular accident Z80.78    History of acute inferior wall myocardial infarction I25.2    Critical ischemia of lower extremity ([facility]) I70.229    Acute postoperative anemia due to expected blood loss D62    Depression F32. A    High vitamin D level E67.3       Plan:     1. Justification for continued stay: Good progression towards established rehabilitation goals. 2. Medical Issues being followed closely:    [x]  Fall and safety precautions     [x]  Wound Care     [x]  Bowel and Bladder Function     [x]  Fluid Electrolyte and Nutrition Balance     [x]  Pain Control      3. Issues that 24 hour rehabilitation nursing is following:    [x]  Fall and safety precautions     [x]  Wound Care     [x]  Bowel and Bladder Function     [x]  Fluid Electrolyte and Nutrition Balance     [x]  Pain Control      [x]  Assistance with and education on in-room safety with transfers to and from the bed, wheelchair, toilet and shower. 4. Acute rehabilitation plan of care:    [x]  Continue current care and rehab. [x]  Physical Therapy           [x]  Occupational Therapy           []  Speech Therapy     []  Hold Rehab until further notice     5. Medications:    [x]  MAR Reviewed     [x]  Continue Present Medications     6. Chemical DVT Prophylaxis:      [x]  Enoxaparin     []  Unfractionated Heparin     []  Warfarin     []  NOAC     []  Aspirin     []  None     7. Mechanical DVT Prophylaxis:      []  FLEX Stockings     []  Sequential Compression Device     [x]  None     8. GI Prophylaxis:      []  PPI     []  H2 Blocker     [x]  None / Not indicated     9. Code status:    [x]  Full code     []  Partial code     []  Do not intubate     []  Do not resuscitate     10. Diet:  Specifications  4 carb choices (60 gm/meal), Low fat/Low cholesterol/High fiber/SEEMA   Solids (consistency)  Regular   Liquids (consistency)  Thin   Fluid Restriction  None      11. COVID-19:  Laboratory testing COVID-19 rapid test ([facility], [facility]) (2/4/2022): Not detected  COVID-19 rapid test [facility] (2/15/2022): Not detected   Precautions None   Vaccine to be given this admission No      12. Orders:   > S/P Right below-the-knee amputation (2/8/2022 - Dr. Kailey Villa); History of critical ischemia of the right lower extremity    > Staples to be removed on 3/8/2022    > Acute postoperative blood loss anemia   > Hgb/Hct (2/16/2022, on admission to the ARU) = 8.9/28.6   > Anemia work-up (2/16/2022) showed serum iron 14, TIBC 234, iron % saturation 18, ferritin 404, reticulocyte count 2.1   > On 2/16/2022, started Iron polysaccharides 150 mg PO BID      02/21/22  0608 02/17/22  0502 02/16/22  0626   HGB 8.5* 8.7* 8.9*   HCT 27.8* 27.8* 28.6*       > On 2/21/2022, patient was given Epoetin lico 10,000 units SC x 1 dose   > Continue Iron polysaccharides 150 mg PO BID    > Constipation   > On 2/17/2022, started Senna 1 tab PO once daily   > On 2/18/2022, discontinued Senna 1 tab PO once daily    > Coronary artery disease; History of inferior wall myocardial infarction;  History of coronary angioplasty with stent placement   > Not on any ACE-I or beta-blockers   > Continue:    > Aspirin 81 mg PO daily with breakfast    > Atorvastatin 20 mg PO once daily    > Depression   > On 2/16/2022, discontinued Trazodone 50 mg PO q HS (re: patient says he has not been taking this prior to admission)   > Continue Bupropion  mg PO once daily    > High vitamin D level;  History of vitamin D deficiency   > Prior to admission to [facility], patient was on Ergocalciferol 50,000 units PO q 7 days   > Vitamin D 25-Hydroxy (2/16/2022) = 105.7   > On 2/16/2022, discontinued Ergocalciferol 50,000 units PO q 7 days     > Hyperlipidemia   > Continue Atorvastatin 20 mg PO once daily    > Type 2 diabetes mellitus, poorly controlled, with diabetic nephropathy, diabetic neuropathy, diabetic retinopathy, with long-term current use of insulin   > HbA1c (2/8/2022) = 13.8   > Vitamin B12 (2/16/2022) = 574   > On 2/16/2022:    > Increased Metformin from 500 mg to 850 mg PO BID with meals    > Increased Insulin lispro from 4 units to 5 units SC TID AC   > On 2/17/2022:    > Increased Insulin glargine from 45 units to 50 units SC daily before breakfast    > Decreased Insulin lispro from 5 units to 4 units SC TID AC   > On 2/18/2022:    > Patient was given Insulin glargine 20 units SC x 1 dose at 9PM    > Increased Metformin from 850 mg to 1000 mg PO BID with meals   > On 2/19/2022, changed Insulin glargine from 50 units SC daily before breakfast to 35 units SC q HS   > On 2/22/2022, decreased Insulin lispro from 4 units to 3 units SC TID AC   > Continue:    > Metformin 1000 mg PO BID with meals    > Insulin glargine 35 units SC q HS    > Increase Insulin lispro from 3 units to 4 units SC TID AC    > Insulin lispro sliding scale SC TID AC only    > Difficulty sleeping   > On 2/16/2022, discontinued Trazodone 50 mg PO q HS (re: patient says he has not been taking this prior to admission)   > On 2/21/2022, patient reported difficulty sleeping; started Melatonin 3 mg PO daily after dinner   > Continue Melatonin 3 mg PO daily after dinner    > Depressed mood   > Patient was seen by Clinical Psychologist and patient was felt to have a depressed mood   > Option of starting an antidepressant was presented and the patient Laura Monge declined it at this time, stating he is still able to cope with his present condition    > Analgesia   > On 2/16/2022:    > Started:     > Acetaminophen 650 mg PO TID (8AM, 12PM, 4PM)     > Methocarbamol 500 mg PO TID (8AM, 2PM, 8PM)    > Increased Gabapentin from 300 mg to 400 mg PO TID (8AM, 2PM, 8PM)   > On 2/18/2022, decreased Oxycodone from 10 mg to 7.5 mg PO q 4 hr PRN for pain level 5/10 or greater    > On 2/21/2022, decreased Oxycodone from 7.5 mg to 5 mg PO q 4 hr PRN for pain level 5/10 or greater    > On 2/22/2022, decreased Gabapentin from 400 mg to 300 mg PO TID (8AM, 2PM, 8PM)   > Continue:    > Acetaminophen 650 mg PO TID (8AM, 12PM, 4PM)    > Acetaminophen 650 mg PO q 4 hr PRN for pain level 4/10 or lesser (from 8PM to 4AM only)    > Gabapentin 300 mg PO TID (8AM, 2PM, 8PM)    > Methocarbamol 500 mg PO TID (8AM, 2PM, 8PM)    > Oxycodone 5 mg PO q 4 hr PRN for pain level 5/10 or greater       12. Personal Protective Equipment (N95 face mask and eye goggles) was used while interacting with the patient. Patient was using a surgical mask. 15. Patient's progress in rehabilitation and medical issues discussed with the patient. All questions answered to the best of my ability. Care plan discussed with patient and nurse. 14. Total clinical care time is 30 minutes, including review of chart including all labs, radiology, past medical history, and discussion with patient. Greater than 50% of my time was spent in coordination of care and counseling.       Signed:    Torey Bullard MD    February 23, 2022

## 2022-02-23 NOTE — PROGRESS NOTES
Problem: Mobility Impaired (Adult and Pediatric)  Goal: *Acute Goals and Plan of Care (Insert Text)  Description: Physical Therapy Short Term Goals  Initiated 2/16/2022 and to be accomplished within 7 day(s) (2/23/2022)  1. Patient will move from supine to sit and sit to supine , scoot up and down, and roll side to side in bed with independence. MET 2/23/2022  2. Patient will transfer from bed to chair and chair to bed with supervision/set-up using the least restrictive device. MET 2/23/2022  3. Patient will perform sit to stand with supervision/set-up. MET 2/23/2022  4. Patient will ambulate with supervision/set-up for 50 feet with the least restrictive device. MET 2/23/2022  5. Patient will ascend/descend 3 stairs with 1 handrail(s) with minimal assistance/contact guard assist.  Not Met - pt needs UE support on B handrails    Physical Therapy Long Term Goals  Initiated 2/16/2022 and to be accomplished within 14 day(s) (3/02/2022)  1. Patient will move from supine to sit and sit to supine , scoot up and down, and roll side to side in bed with independence. 2.  Patient will transfer from bed to chair and chair to bed with modified independence using the least restrictive device. 3.  Patient will perform sit to stand with modified independence. 4.  Patient will ambulate with modified independence for 50 feet with the least restrictive device. 5.  Patient will ascend/descend 3 stairs with 1 handrail(s) or bumping up on steps with supervision/set-up. Outcome: Progressing Towards Goal    PHYSICAL THERAPY WEEKLY PROGRESS NOTE    Patient: Jelena Lind (15 y.o. male)  Date: 2/23/2022  Diagnosis: Status post below knee amputation of right lower extremity ([facility]) [Z89.511] Status post below knee amputation of right lower extremity [facility])  Precautions: Fall Risk Precautions  Chart, physical therapy assessment, plan of care and goals were reviewed.       Time in:1330  Time out:1430    Patient seen for: Balance activities;Gait training;Patient education;Transfer training; Wheelchair mobility; Therapeutic exercise      Pain:  Pt pain was reported as no c/o pain during session. Patient identified with name and : yes    SUBJECTIVE:     Pt is pleasant and cooperative but continues to appear distant/disengaged at times. Pt states, \"I don't know how to put that on,\" re: residual limb guard despite having participated in training during multiple sessions. OBJECTIVE DATA SUMMARY:     PT printed out pictures of residual limb guard and limb sleeve for patient to follow along with to properly don residual limb guard. GROSS ASSESSMENT Weekly Progress Assessment 2022   AROM Within functional limits   Strength Generally decreased, functional   Coordination Within functional limits   Tone Normal   Sensation Intact   PROM Within functional limits       POSTURE Weekly Progress Assessment 2022   Posture (WDL) Exceptions to [facility] [place] [facility]   Posture Assessment Rounded shoulders       BALANCE Weekly Progress Assessment 2022    Sitting - Static: Good (unsupported)  Sitting - Dynamic: Good (unsupported)  Standing - Static: Fair  Standing - Dynamic : Impaired     BED/CHAIR/WHEELCHAIR TRANSFERS Initial Assessment Weekly Progress Assessment 2022   Rolling Right 6 (Modified independent) 7 (Independent)   Rolling Left 6 (Modified independent) 7 (Independent)   Supine to Sit 6 (Modified independent) 7 (Independent)   Sit to Stand Contact guard assistance Supervision  Pt requires supervision for safety due to decreased attention to situation and environment at times. Sit to Supine 6 (Modified independent) 7 (Independent)   Transfer Type Other Lateral pivot   Transfer Assistance Needed 4 (Contact guard assistance) 5 (Supervision/setup) Pt requires supervision for safety due to decreased attention to situation and environment at times.   With return transfer from w/c to recliner chair, pt initiates transfer without first removing residual limb rest or moving limb rest to the side requiring maximal reminders for safe w/c set up for safe transfer. Car Transfer  (CGA) Supervision for safety performing stand step transfer with RW with maximal verbal cues for safe hand placement as pt reaches out to balance on car door. Pt is able to manage B LEs into and out of car  without assistance. Car Type car transfer  Car Transfer Trainer       [facility] MOBILITY/MANAGEMENT Initial Assessment Weekly Progress Assessment 2/23/2022   Able to Propel (dist) 240 feet 260 feet   Assistance Required 4 Modified independent over level surfaces. Curbs/ramps assistance required 0 (Not tested) NT   Wheelchair set up assistance required 3 (Moderate assistance) Supervision   Wheelchair management Manages left brake,Manages right brake Manages left brake;Manages right brake;Manages left footrest;Manages right footrest       GAIT Weekly Progress Assessment 2/23/2022   Gait Description (WDL) Exceptions to WDL   Gait Abnormalities Decreased step clearance       WALKING INDEPENDENCE Initial Assessment Weekly Progress Assessment 2/23/2022   Assistive device Gait belt,Walker, rolling Walker, rolling   Ambulation assistance - level surface 4 (Minimal assistance) 5 (Supervision/setup) (close supervision)   Distance 50 Feet (ft) 150 Feet (ft), household distances within gym   Comments   Pt ambulates with minimal verbal cuing progressing to moderate verbal cues for posture as pt fatigues with verbal cues to slow pace for safety. Ambulation assistance - unlevel surfaces  (NT)  NT       STEPS/STAIRS Initial Assessment Weekly Progress Assessment 2/23/2022   Steps/Stairs ambulated 3 (4\" steps) 4 (6\")   Assistance Required   4 (Minimal assistance)   Pt requires minimal assistance for balance and to support his weight with ascending stairs and CGA to minimal steadying assistance for descending stairs.    Rail Use Both Both   Curbs/Ramps    NT ASSESSMENT:  Pt is progressing with functional mobility achieving 4/5 short term goals but has not achieved goals for stair negotiation due to functional weakness and decreased safety awareness. Progression toward goals:  [x]      Improving appropriately and progressing toward goals  []      Improving slowly and progressing toward goals  []      Not making progress toward goals and plan of care will be adjusted     PLAN:  Patient continues to benefit from skilled intervention to address the above impairments. Continue treatment per established plan of care. Emphasize functional strengthening with repetition of safe and efficient movement patterns to promote carryover. Discharge Recommendations:  Home Physical Therapy with 24 hour assistance  Further Equipment Recommendations for Discharge:  gait belt, rolling walker, wheelchair 18 inch and ramp     Estimated Discharge Date: 3/02/2022    Activity Tolerance:   Good  Please refer to the flowsheet for vital signs taken during this treatment.   After treatment:   [] Patient left in no apparent distress in bed  [x] Patient left in no apparent distress sitting up in chair  [] Patient left in no apparent distress in w/c mobilizing under own power  [] Patient left in no apparent distress dining area  [] Patient left in no apparent distress mobilizing under own power  [x] Call bell left within reach  [] Nursing notified  [] Caregiver present  [] Bed alarm activated   [x] Chair alarm activated      Héctor Rodriguez PT, DPT  2/23/2022

## 2022-02-24 LAB
BASOPHILS # BLD: 0 K/UL (ref 0–0.1)
BASOPHILS NFR BLD: 1 % (ref 0–2)
DIFFERENTIAL METHOD BLD: ABNORMAL
EOSINOPHIL # BLD: 0.2 K/UL (ref 0–0.4)
EOSINOPHIL NFR BLD: 6 % (ref 0–5)
ERYTHROCYTE [DISTWIDTH] IN BLOOD BY AUTOMATED COUNT: 13.6 % (ref 11.6–14.5)
GLUCOSE BLD STRIP.AUTO-MCNC: 153 MG/DL (ref 70–110)
GLUCOSE BLD STRIP.AUTO-MCNC: 158 MG/DL (ref 70–110)
GLUCOSE BLD STRIP.AUTO-MCNC: 196 MG/DL (ref 70–110)
GLUCOSE BLD STRIP.AUTO-MCNC: 225 MG/DL (ref 70–110)
HCT VFR BLD AUTO: 28.9 % (ref 36–48)
HGB BLD-MCNC: 9.1 G/DL (ref 13–16)
IMM GRANULOCYTES # BLD AUTO: 0 K/UL (ref 0–0.04)
IMM GRANULOCYTES NFR BLD AUTO: 1 % (ref 0–0.5)
LYMPHOCYTES # BLD: 1.2 K/UL (ref 0.9–3.6)
LYMPHOCYTES NFR BLD: 36 % (ref 21–52)
MCH RBC QN AUTO: 28 PG (ref 24–34)
MCHC RBC AUTO-ENTMCNC: 31.5 G/DL (ref 31–37)
MCV RBC AUTO: 88.9 FL (ref 78–100)
MONOCYTES # BLD: 0.4 K/UL (ref 0.05–1.2)
MONOCYTES NFR BLD: 12 % (ref 3–10)
NEUTS SEG # BLD: 1.4 K/UL (ref 1.8–8)
NEUTS SEG NFR BLD: 44 % (ref 40–73)
NRBC # BLD: 0 K/UL (ref 0–0.01)
NRBC BLD-RTO: 0 PER 100 WBC
PLATELET # BLD AUTO: 327 K/UL (ref 135–420)
PMV BLD AUTO: 10.5 FL (ref 9.2–11.8)
RBC # BLD AUTO: 3.25 M/UL (ref 4.35–5.65)
WBC # BLD AUTO: 3.2 K/UL (ref 4.6–13.2)

## 2022-02-24 PROCEDURE — 65310000000 HC RM PRIVATE REHAB

## 2022-02-24 PROCEDURE — 74011250637 HC RX REV CODE- 250/637: Performed by: INTERNAL MEDICINE

## 2022-02-24 PROCEDURE — 97535 SELF CARE MNGMENT TRAINING: CPT

## 2022-02-24 PROCEDURE — 36415 COLL VENOUS BLD VENIPUNCTURE: CPT

## 2022-02-24 PROCEDURE — 97530 THERAPEUTIC ACTIVITIES: CPT

## 2022-02-24 PROCEDURE — 74011250636 HC RX REV CODE- 250/636: Performed by: INTERNAL MEDICINE

## 2022-02-24 PROCEDURE — 92507 TX SP LANG VOICE COMM INDIV: CPT

## 2022-02-24 PROCEDURE — 82962 GLUCOSE BLOOD TEST: CPT

## 2022-02-24 PROCEDURE — 2709999900 HC NON-CHARGEABLE SUPPLY

## 2022-02-24 PROCEDURE — 74011636637 HC RX REV CODE- 636/637: Performed by: INTERNAL MEDICINE

## 2022-02-24 PROCEDURE — 97116 GAIT TRAINING THERAPY: CPT

## 2022-02-24 PROCEDURE — 99232 SBSQ HOSP IP/OBS MODERATE 35: CPT | Performed by: INTERNAL MEDICINE

## 2022-02-24 PROCEDURE — 97110 THERAPEUTIC EXERCISES: CPT

## 2022-02-24 PROCEDURE — 85025 COMPLETE CBC W/AUTO DIFF WBC: CPT

## 2022-02-24 RX ADMIN — GABAPENTIN 300 MG: 300 CAPSULE ORAL at 09:15

## 2022-02-24 RX ADMIN — OXYCODONE 5 MG: 5 TABLET ORAL at 16:19

## 2022-02-24 RX ADMIN — Medication 200 MG: at 09:15

## 2022-02-24 RX ADMIN — METHOCARBAMOL TABLETS 500 MG: 500 TABLET, COATED ORAL at 09:14

## 2022-02-24 RX ADMIN — Medication 150 MG: at 09:15

## 2022-02-24 RX ADMIN — OXYCODONE 5 MG: 5 TABLET ORAL at 21:22

## 2022-02-24 RX ADMIN — OXYCODONE 5 MG: 5 TABLET ORAL at 12:10

## 2022-02-24 RX ADMIN — METFORMIN HYDROCHLORIDE 1000 MG: 500 TABLET ORAL at 17:56

## 2022-02-24 RX ADMIN — Medication 4 UNITS: at 08:51

## 2022-02-24 RX ADMIN — Medication 3 MG: at 17:56

## 2022-02-24 RX ADMIN — Medication 1 UNITS: at 08:51

## 2022-02-24 RX ADMIN — ACETAMINOPHEN 650 MG: 325 TABLET ORAL at 16:19

## 2022-02-24 RX ADMIN — OXYCODONE 5 MG: 5 TABLET ORAL at 08:31

## 2022-02-24 RX ADMIN — THERA TABS 1 TABLET: TAB at 09:14

## 2022-02-24 RX ADMIN — GABAPENTIN 300 MG: 300 CAPSULE ORAL at 16:19

## 2022-02-24 RX ADMIN — METHOCARBAMOL TABLETS 500 MG: 500 TABLET, COATED ORAL at 21:23

## 2022-02-24 RX ADMIN — Medication 4 UNITS: at 17:22

## 2022-02-24 RX ADMIN — Medication 35 UNITS: at 21:23

## 2022-02-24 RX ADMIN — ATORVASTATIN CALCIUM 20 MG: 20 TABLET, FILM COATED ORAL at 09:15

## 2022-02-24 RX ADMIN — ACETAMINOPHEN 650 MG: 325 TABLET ORAL at 12:10

## 2022-02-24 RX ADMIN — OXYCODONE 5 MG: 5 TABLET ORAL at 04:06

## 2022-02-24 RX ADMIN — BUPROPION HYDROCHLORIDE 150 MG: 150 TABLET, EXTENDED RELEASE ORAL at 09:15

## 2022-02-24 RX ADMIN — ENOXAPARIN SODIUM 40 MG: 100 INJECTION SUBCUTANEOUS at 06:14

## 2022-02-24 RX ADMIN — METHOCARBAMOL TABLETS 500 MG: 500 TABLET, COATED ORAL at 13:06

## 2022-02-24 RX ADMIN — ACETAMINOPHEN 650 MG: 325 TABLET ORAL at 09:15

## 2022-02-24 RX ADMIN — Medication 150 MG: at 17:56

## 2022-02-24 RX ADMIN — ASPIRIN 81 MG 81 MG: 81 TABLET ORAL at 09:15

## 2022-02-24 RX ADMIN — METFORMIN HYDROCHLORIDE 1000 MG: 500 TABLET ORAL at 09:15

## 2022-02-24 RX ADMIN — GABAPENTIN 300 MG: 300 CAPSULE ORAL at 21:22

## 2022-02-24 RX ADMIN — Medication 4 UNITS: at 12:21

## 2022-02-24 NOTE — PROGRESS NOTES
[facility]    Bedside and Verbal shift change report given to Blaise Mills, RN (oncoming nurse) by Luis Daniel Goldstein RN (offgoing nurse). Report included the following information SBAR, Kardex, MAR and Recent Results. Situation:   Code Status: Full Code   Hospital Day: 9   Problem List:   Hospital Problems  Date Reviewed: 2/23/2022          Codes Class Noted POA    Poorly controlled type 2 diabetes mellitus ([facility] ICD-10-CM: E11.65  ICD-9-CM: 250.00  Unknown Yes    Overview Signed 2/16/2022 12:50 AM by Kely Krueger MD     HbA1c (2/8/2022) = 13.8             Long term current use of insulin (HCC) ICD-10-CM: Z79.4  ICD-9-CM: V58.67  Unknown Yes        High vitamin D level (Chronic) ICD-10-CM: E67.3  ICD-9-CM: 278.4  2/16/2022 Yes    Overview Signed 2/16/2022  9:52 AM by Kely Krueger MD     Vitamin D 25-Hydroxy (2/16/2022) = 105.7             Impaired mobility and ADLs ICD-10-CM: Z74.09, Z78.9  ICD-9-CM: V49.89  2/8/2022 Yes        * (Principal) Status post below knee amputation of right lower extremity ([facility] ICD-10-CM: Z89.511  ICD-9-CM: V49.75  2/8/2022 Yes    Overview Signed 2/16/2022 12:47 AM by Kely Krueger MD     S/P Right below-the-knee amputation (2/8/2022 - Dr. Ashvin Lion)             Critical ischemia of lower extremity [facility]) ICD-10-CM: P54.723  ICD-9-CM: 459.9  2/8/2022 No        Acute postoperative anemia due to expected blood loss ICD-10-CM: D62  ICD-9-CM: 285.1  2/8/2022 Yes              Background:   Past Medical History:   Past Medical History:   Diagnosis Date    Acute postoperative anemia due to expected blood loss 2/8/2022    Arterial occlusion, lower extremity ([facility] 11/27/2021    Cardiac cath 11/09/2011    RCA patent. LM patent. LAD patent. mD1 55%. CX patent. Prior stent patent. Edison 85% (2.5 x 15-mm Xience stent, resid 0%). LVEDP 12. EF 40-45%. High lateral hypk (RI distribution).       Chronic alcohol use     Fri-Sun one fifth; Mon-Thur: Pint of liquor    Constipation     Coronary artery disease involving native coronary artery of native heart     Critical ischemia of lower extremity ([facility] 2/8/2022    Depression     Diabetic nephropathy associated with type 2 diabetes mellitus ([facility] 4/4/2019    Diabetic neuropathy associated with type 2 diabetes mellitus [facility]     Gastroesophageal reflux disease     High vitamin D level 2/16/2022    Vitamin D 25-Hydroxy (2/16/2022) = 105.7    History of acute inferior wall myocardial infarction 2009    History of coronary angioplasty with insertion of stent 07/29/2009    2.5 x 13 Cypher stent to CX. 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 without macular edema associated with type 2 diabetes mellitus ([facility] 12/18/2020    Nuclear sclerosis of both eyes 12/18/2020    Otalgia 05/08/2019    Poorly controlled type 2 diabetes mellitus (HCC)     HbA1c (2/8/2022) = 13.8    Tobacco use disorder     contemplating stopping        Assessment:   Changes in Assessment throughout shift: No change to previous assessment     Patient has a central line: no Reasons if yes: Insertion date: Last dressing date:   Patient has Epstein Cath: NO  Reasons if yes:     Insertion date:  Shift epstein care completed:      Last Vitals:     Vitals:    02/22/22 1945 02/23/22 0752 02/23/22 1518 02/23/22 2005   BP: (!) 144/76 107/62 120/72 138/77   Pulse: 72 72 76 84   Resp: 18 16 18 18   Temp: 98.6 °F (37 °C) 98.2 °F (36.8 °C) 98.1 °F (36.7 °C) 98.3 °F (36.8 °C)   SpO2: 97% 98% 98% 98%   Weight:       Height:            PAIN    Pain Assessment    Pain Intensity 1: 0 (02/24/22 0503) Pain Intensity 1: 2 (12/29/14 1105)    Pain Location 1: Leg Pain Location 1: Abdomen    Pain Intervention(s) 1: Medication (see MAR) Pain Intervention(s) 1: Medication (see MAR)  Patient Stated Pain Goal: 0 Patient Stated Pain Goal: 0  o Intervention effective: yes  o Other actions taken for pain: Medication (see MAR)     Skin Assessment  Skin color    Condition/Temperature    Integrity Skin Integrity: Incision (comment),Wound (add Wound LDA)  Turgor    Weekly Pressure Ulcer Documentation  Pressure  Injury Documentation: No Pressure Injury Noted-Pressure Ulcer Prevention Initiated  Wound Prevention & Protection Methods  Orientation of wound Orientation of Wound Prevention: Posterior  Location of Prevention Location of Wound Prevention: Sacrum/Coccyx  Dressing Present Dressing Present : No  Dressing Status    Wound Offloading Wound Offloading (Prevention Methods): Bed, pressure reduction mattress     INTAKE/OUPUT  Date 02/23/22 0700 - 02/24/22 0659 02/24/22 0700 - 02/25/22 0659   Shift 2750-6021 2886-8949 24 Hour Total 0549-3710 1183-5171 24 Hour Total   INTAKE   P.O.         P. O.       Shift Total(mL/kg) 720(9.2) 960(12.3) 1680(21.5)      OUTPUT   Urine(mL/kg/hr)  1400 1400        Urine Voided  1400 1400        Urine Occurrence(s) 3 x 0 x 3 x      Stool           Stool Occurrence(s) 0 x 0 x 0 x      Shift Total(mL/kg)  1400(17.9) 1400(17.9)       -440 280      Weight (kg) 78.2 78.2 78.2 78.2 78.2 78.2       Recommendations:  1. Patient needs and requests: none at this time    2. Pending tests/procedures: none at this time     3. Functional Level/Equipment: Partial (one person) / Mary Jane Rosales; Wheelchair    Fall Precautions:   Fall risk precautions were reinforced with the patient; he was instructed to call for help prior to getting up. The following fall risk precautions were continued: bed/ chair alarms, door signage, yellow bracelet and socks as well as update of the Leslie Fothergill tool in the patient's room.    Loly Score: 3    HEALS Safety Check    A safety check occurred in the patient's room between off going nurse and oncoming nurse listed above. The safety check included the below items  Area Items   H  High Alert Medications - Verify all high alert medication drips (heparin, PCA, etc.)   E  Equipment - Suction is set up for ALL patients (with eliel)  - Red plugs utilized for all equipment (IV pumps, etc.)  - WOWs wiped down at end of shift.  - Room stocked with oxygen, suction, and other unit-specific supplies   A  Alarms - Bed alarm is set for fall risk patients  - Ensure chair alarm is in place and activated if patient is up in a chair   L  Lines - Check IV for any infiltration  - Gallardo bag is empty if patient has a Gallardo   - Tubing and IV bags are labeled   S  Safety   - Room is clean, patient is clean, and equipment is clean. - Hallways are clear from equipment besides carts. - Fall bracelet on for fall risk patients  - Ensure room is clear and free of clutter  - Suction is set up for ALL patients (with eliel)  - Hallways are clear from equipment besides carts.    - Isolation precautions followed, supplies available outside room, sign posted     Abundio Rosales RN

## 2022-02-24 NOTE — PROGRESS NOTES
Problem: Falls - Risk of  Goal: *Absence of Falls  Description: Document Alana Octavio Fall Risk and appropriate interventions in the flowsheet. Outcome: Progressing Towards Goal  Note: Fall Risk Interventions:  Mobility Interventions: Bed/chair exit alarm,Patient to call before getting OOB,Utilize walker, cane, or other assistive device,Utilize gait belt for transfers/ambulation         Medication Interventions: Bed/chair exit alarm,Patient to call before getting OOB,Teach patient to arise slowly    Elimination Interventions: Bed/chair exit alarm,Call light in reach,Patient to call for help with toileting needs,Urinal in reach,Stay With Me (per policy)    History of Falls Interventions: Bed/chair exit alarm,Door open when patient unattended,Room close to nurse's station         Problem: Patient Education: Go to Patient Education Activity  Goal: Patient/Family Education  Outcome: Progressing Towards Goal     Problem: Diabetes Self-Management  Goal: *Disease process and treatment process  Description: Define diabetes and identify own type of diabetes; list 3 options for treating diabetes. Outcome: Progressing Towards Goal  Goal: *Incorporating nutritional management into lifestyle  Description: Describe effect of type, amount and timing of food on blood glucose; list 3 methods for planning meals. Outcome: Progressing Towards Goal  Goal: *Incorporating physical activity into lifestyle  Description: State effect of exercise on blood glucose levels. Outcome: Progressing Towards Goal  Goal: *Developing strategies to promote health/change behavior  Description: Define the ABC's of diabetes; identify appropriate screenings, schedule and personal plan for screenings. Outcome: Progressing Towards Goal  Goal: *Using medications safely  Description: State effect of diabetes medications on diabetes; name diabetes medication taking, action and side effects.   Outcome: Progressing Towards Goal  Goal: *Monitoring blood glucose, interpreting and using results  Description: Identify recommended blood glucose targets  and personal targets. Outcome: Progressing Towards Goal  Goal: *Prevention, detection, treatment of acute complications  Description: List symptoms of hyper- and hypoglycemia; describe how to treat low blood sugar and actions for lowering  high blood glucose level. Outcome: Progressing Towards Goal  Goal: *Prevention, detection and treatment of chronic complications  Description: Define the natural course of diabetes and describe the relationship of blood glucose levels to long term complications of diabetes. Outcome: Progressing Towards Goal  Goal: *Developing strategies to address psychosocial issues  Description: Describe feelings about living with diabetes; identify support needed and support network  Outcome: Progressing Towards Goal  Goal: *Insulin pump training  Outcome: Progressing Towards Goal  Goal: *Sick day guidelines  Outcome: Progressing Towards Goal  Goal: *Patient Specific Goal (EDIT GOAL, INSERT TEXT)  Outcome: Progressing Towards Goal     Problem: Patient Education: Go to Patient Education Activity  Goal: Patient/Family Education  Outcome: Progressing Towards Goal     Problem: Pressure Injury - Risk of  Goal: *Prevention of pressure injury  Description: Document Mayito Scale and appropriate interventions in the flowsheet.   Outcome: Progressing Towards Goal  Note: Pressure Injury Interventions:  Sensory Interventions: Assess changes in LOC    Moisture Interventions: Absorbent underpads    Activity Interventions: Chair cushion,Increase time out of bed,Pressure redistribution bed/mattress(bed type)    Mobility Interventions: Chair cushion,HOB 30 degrees or less,Pressure redistribution bed/mattress (bed type)    Nutrition Interventions: Document food/fluid/supplement intake    Friction and Shear Interventions: HOB 30 degrees or less,Lift sheet,Minimize layers                Problem: Patient Education: Go to Patient Education Activity  Goal: Patient/Family Education  Outcome: Progressing Towards Goal

## 2022-02-24 NOTE — PROGRESS NOTES
[facility] PHYSICAL REHABILITATION  [address] [place] [address], [address]     INPATIENT REHABILITATION  DAILY PROGRESS NOTE     Date: 2/24/2022    Name: Johana Lomeli Age / Sex: 59 y.o. / male   CSN: [id number] MRN: [id number]   [facility] Date: 2/15/2022 Length of Stay: 9 days     Primary Rehabilitation Diagnosis: Impaired Mobility and ADLs secondary to:  1. S/P Right below-the-knee amputation (2/8/2022 - Dr. Maynard Oppenheim)  2. History of critical ischemia of the right lower extremity      Subjective:     Patient seen and examined. Blood pressure controlled. Blood glucose better controlled. Patient's Complaint:   No significant medical complaints    Pain Control: stable, mild-to-moderate joint symptoms intermittently, reasonably well controlled by current meds      Objective:     Vital Signs:  Patient Vitals for the past 24 hrs:   BP Temp Pulse Resp SpO2   02/24/22 0728 107/67 99.1 °F (37.3 °C) 80 19 98 %   02/23/22 2005 138/77 98.3 °F (36.8 °C) 84 18 98 %   02/23/22 1518 120/72 98.1 °F (36.7 °C) 76 18 98 %        Physical Examination:  GENERAL SURVEY: Patient is awake, alert, oriented x 3, sitting comfortably on the chair, not in acute respiratory distress. HEENT: pale palpebral conjunctivae, anicteric sclerae, no nasoaural discharge, moist oral mucosa  NECK: supple, no jugular venous distention, no palpable lymph nodes  CHEST/LUNGS: symmetrical chest expansion, good air entry, clear breath sounds  HEART: adynamic precordium, good S1 S2, no S3, regular rhythm, no murmurs  ABDOMEN: flat, bowel sounds appreciated, soft, non-tender  EXTREMITIES: (+) right BKA stump, pale nailbeds, no edema, full and equal pulses, no calf tenderness   NEUROLOGICAL EXAM: The patient is awake, alert and oriented x3, able to answer questions fairly appropriately, able to follow 1 and 2 step commands. Able to tell time from the wall clock. Cranial nerves II-XII are grossly intact. No gross sensory deficit.   Motor strength is 4/5 on BUE and BLE. Incision(s): covered, clean, dry, and intact       Current Medications:  Current Facility-Administered Medications   Medication Dose Route Frequency    insulin lispro (HUMALOG) injection 4 Units  0.05 Units/kg SubCUTAneous TIDAC    gabapentin (NEURONTIN) capsule 300 mg  300 mg Oral TID    melatonin tablet 3 mg  3 mg Oral PCD    oxyCODONE IR (ROXICODONE) tablet 5 mg  5 mg Oral Q4H PRN    insulin glargine (LANTUS) injection 35 Units  35 Units SubCUTAneous QHS    metFORMIN (GLUCOPHAGE) tablet 1,000 mg  1,000 mg Oral BID WITH MEALS    glucose chewable tablet 16 g  4 Tablet Oral PRN    glucagon (GLUCAGEN) injection 1 mg  1 mg IntraMUSCular PRN    dextrose 10% infusion 0-250 mL  0-250 mL IntraVENous PRN    iron polysaccharides (NIFEREX) capsule 150 mg  1 Capsule Oral BID    thiamine HCL (B-1) tablet 200 mg  200 mg Oral DAILY    aspirin chewable tablet 81 mg  81 mg Oral DAILY WITH BREAKFAST    atorvastatin (LIPITOR) tablet 20 mg  20 mg Oral DAILY    buPROPion SR (WELLBUTRIN SR) tablet 150 mg  150 mg Oral DAILY    therapeutic multivitamin (THERAGRAN) tablet 1 Tablet  1 Tablet Oral DAILY    acetaminophen (TYLENOL) tablet 650 mg  650 mg Oral TID    methocarbamoL (ROBAXIN) tablet 500 mg  500 mg Oral TID    acetaminophen (TYLENOL) tablet 650 mg  650 mg Oral Q4H PRN    bisacodyL (DULCOLAX) tablet 10 mg  10 mg Oral Q48H PRN    enoxaparin (LOVENOX) injection 40 mg  40 mg SubCUTAneous Q24H    insulin lispro (HUMALOG) injection   SubCUTAneous TIDAC       Allergies:   Allergies   Allergen Reactions    Niacin Itching       Functional Progress:    PHYSICAL THERAPY    ON ADMISSION MOST RECENT   Wheelchair Mobility/Management  Able to Propel (ft): 240 feet  Functional Level: 4  Curbs/Ramps Assist Required (FIM Score): 0 (Not tested)  Wheelchair Setup Assist Required : 3 (Moderate assistance)  Wheelchair Management: Manages left brake,Manages right brake Wheelchair Mobility/Management  Able to Propel (ft): 260 feet  Functional Level: 6  Curbs/Ramps Assist Required (FIM Score): 0 (Not tested)  Wheelchair Setup Assist Required : 5 (Supervision/setup)  Wheelchair Management: Manages left brake,Manages right brake,Manages left footrest,Manages right footrest     Gait  Amount of Assistance: 4 (Minimal assistance)  Distance (ft): 50 Feet (ft)  Assistive Device: Gait belt,Walker, rolling Gait  Amount of Assistance: 5 (Supervision/setup) (close supervision)  Distance (ft): 150 Feet (ft)  Assistive Device: Walker, rolling     Balance-Sitting/Standing  Sitting - Static: Good (unsupported)  Sitting - Dynamic: Good (unsupported)  Standing - Static: Fair  Standing - Dynamic : Impaired Balance-Sitting/Standing  Sitting - Static: Good (unsupported)  Sitting - Dynamic: Good (unsupported)  Standing - Static: Fair  Standing - Dynamic : Impaired     Bed/Mat Mobility  Rolling Right : 6 (Modified independent)  Rolling Left : 6 (Modified independent)  Supine to Sit : 6 (Modified independent)  Sit to Supine : 6 (Modified independent) Bed/Mat Mobility  Rolling Right : 7 (Independent)  Rolling Left : 7 (Independent)  Supine to Sit : 7 (Independent)  Sit to Supine : 7 (Independent)     Transfers  Transfer Type:  Other  Other: stand step with RW  Transfer Assistance : 4 (Contact guard assistance)  Sit to Stand Assistance: Contact guard assistance  Car Transfers:  (CGA)  Car Type: car transfer  Transfers  Transfer Type: Lateral pivot  Other: stand step with RW  Transfer Assistance : 5 (Supervision/setup)  Sit to Stand Assistance: Supervision  Car Transfers: Supervision  Car Type: Car Transfer Trainer     Steps or Stairs  Steps/Stairs Ambulated (#): 3 (4\" steps)  Level of Assist : 3 (Moderate assistance)  Rail Use: Both Steps or Stairs  Steps/Stairs Ambulated (#): 4 (6\")  Level of Assist : 4 (Minimal assistance)  Rail Use: Both         Lab/Data Review:  Recent Results (from the past 24 hour(s)) GLUCOSE, POC    Collection Time: 02/23/22  4:27 PM   Result Value Ref Range    Glucose (POC) 230 (H) 70 - 110 mg/dL   GLUCOSE, POC    Collection Time: 02/23/22  8:03 PM   Result Value Ref Range    Glucose (POC) 141 (H) 70 - 110 mg/dL   CBC WITH AUTOMATED DIFF    Collection Time: 02/24/22  7:09 AM   Result Value Ref Range    WBC 3.2 (L) 4.6 - 13.2 K/uL    RBC 3.25 (L) 4.35 - 5.65 M/uL    HGB 9.1 (L) 13.0 - 16.0 g/dL    HCT 28.9 (L) 36.0 - 48.0 %    MCV 88.9 78.0 - 100.0 FL    MCH 28.0 24.0 - 34.0 PG    MCHC 31.5 31.0 - 37.0 g/dL    RDW 13.6 11.6 - 14.5 %    PLATELET 938 252 - 150 K/uL    MPV 10.5 9.2 - 11.8 FL    NRBC 0.0 0  WBC    ABSOLUTE NRBC 0.00 0.00 - 0.01 K/uL    NEUTROPHILS 44 40 - 73 %    LYMPHOCYTES 36 21 - 52 %    MONOCYTES 12 (H) 3 - 10 %    EOSINOPHILS 6 (H) 0 - 5 %    BASOPHILS 1 0 - 2 %    IMMATURE GRANULOCYTES 1 (H) 0.0 - 0.5 %    ABS. NEUTROPHILS 1.4 (L) 1.8 - 8.0 K/UL    ABS. LYMPHOCYTES 1.2 0.9 - 3.6 K/UL    ABS. MONOCYTES 0.4 0.05 - 1.2 K/UL    ABS. EOSINOPHILS 0.2 0.0 - 0.4 K/UL    ABS. BASOPHILS 0.0 0.0 - 0.1 K/UL    ABS. IMM. GRANS. 0.0 0.00 - 0.04 K/UL    DF AUTOMATED     GLUCOSE, POC    Collection Time: 02/24/22  7:58 AM   Result Value Ref Range    Glucose (POC) 225 (H) 70 - 110 mg/dL   GLUCOSE, POC    Collection Time: 02/24/22 12:12 PM   Result Value Ref Range    Glucose (POC) 158 (H) 70 - 110 mg/dL       Assessment:     Primary Rehabilitation Diagnosis  1. Impaired Mobility and ADLs  2. S/P Right below-the-knee amputation (2/8/2022 - Dr. Alicia Boateng)  3.  History of critical ischemia of the right lower extremity    Comorbidities  Patient Active Problem List   Diagnosis Code    Hyperlipidemia E78.5    Tobacco use disorder F17.200    Mediastinal adenopathy R59.0    History of vitamin D deficiency Z86.39    Insomnia G47.00    Diabetic nephropathy associated with type 2 diabetes mellitus ([facility] [address] E11.21    Otalgia H92.09    Mild nonproliferative diabetic retinopathy of left eye without macular edema associated with type 2 diabetes mellitus ([facility] [address] L76.1551    Hypertensive retinopathy H35.039    Nuclear sclerosis of both eyes H25.13    Arterial occlusion, lower extremity ([facility]) I70.209    Impaired mobility and ADLs Z74.09, Z78.9    Status post below knee amputation of right lower extremity ([facility]) Z89.511    Chronic alcohol use Z72.89    History of coronary angioplasty with insertion of stent Z95.5    Coronary artery disease involving native coronary artery of native heart I25.10    Constipation K59.00    Poorly controlled type 2 diabetes mellitus ([facility]) E11.65    Long term current use of insulin ([facility]) Z79.4    Migraine headache G43.909    Diabetic neuropathy associated with type 2 diabetes mellitus ([facility]) E11.40    Gastroesophageal reflux disease K21.9    History of essential hypertension Z86.79    History of lacunar cerebrovascular accident Z80.78    History of acute inferior wall myocardial infarction I25.2    Critical ischemia of lower extremity ([facility]) I70.229    Acute postoperative anemia due to expected blood loss D62    Depression F32. A    High vitamin D level E67.3       Plan:     1. Justification for continued stay: Good progression towards established rehabilitation goals. 2. Medical Issues being followed closely:    [x]  Fall and safety precautions     [x]  Wound Care     [x]  Bowel and Bladder Function     [x]  Fluid Electrolyte and Nutrition Balance     [x]  Pain Control      3. Issues that 24 hour rehabilitation nursing is following:    [x]  Fall and safety precautions     [x]  Wound Care     [x]  Bowel and Bladder Function     [x]  Fluid Electrolyte and Nutrition Balance     [x]  Pain Control      [x]  Assistance with and education on in-room safety with transfers to and from the bed, wheelchair, toilet and shower. 4. Acute rehabilitation plan of care:    [x]  Continue current care and rehab.            [x]  Physical Therapy           [x]  Occupational Therapy           []  Speech Therapy     []  Hold Rehab until further notice     5. Medications:    [x]  MAR Reviewed     [x]  Continue Present Medications     6. Chemical DVT Prophylaxis:      [x]  Enoxaparin     []  Unfractionated Heparin     []  Warfarin     []  NOAC     []  Aspirin     []  None     7. Mechanical DVT Prophylaxis:      []  FLEX Stockings     []  Sequential Compression Device     [x]  None     8. GI Prophylaxis:      []  PPI     []  H2 Blocker     [x]  None / Not indicated     9. Code status:    [x]  Full code     []  Partial code     []  Do not intubate     []  Do not resuscitate     10. Diet:  Specifications  4 carb choices (60 gm/meal), Low fat/Low cholesterol/High fiber/SEEMA   Solids (consistency)  Regular   Liquids (consistency)  Thin   Fluid Restriction  None      11. COVID-19:  Laboratory testing COVID-19 rapid test ([facility], [facility]) (2/4/2022): Not detected  COVID-19 rapid test ([facility], [facility]) (2/15/2022): Not detected   Precautions None   Vaccine to be given this admission No      12. Orders:   > S/P Right below-the-knee amputation (2/8/2022 - Dr. Teresa Lin); History of critical ischemia of the right lower extremity    > Staples to be removed on 3/8/2022    > Acute postoperative blood loss anemia   > Hgb/Hct (2/16/2022, on admission to the ARU) = 8.9/28.6   > Anemia work-up (2/16/2022) showed serum iron 14, TIBC 234, iron % saturation 18, ferritin 404, reticulocyte count 2.1   > On 2/16/2022, started Iron polysaccharides 150 mg PO BID      02/21/22  0608 02/17/22  0502 02/16/22  0626   HGB 8.5* 8.7* 8.9*   HCT 27.8* 27.8* 28.6*       > On 2/21/2022, patient was given Epoetin lico 10,000 units SC x 1 dose   > Continue Iron polysaccharides 150 mg PO BID    > Constipation   > On 2/17/2022, started Senna 1 tab PO once daily   > On 2/18/2022, discontinued Senna 1 tab PO once daily    > Coronary artery disease; History of inferior wall myocardial infarction;  History of coronary angioplasty with stent placement   > Not on any ACE-I or beta-blockers   > Continue:    > Aspirin 81 mg PO daily with breakfast    > Atorvastatin 20 mg PO once daily    > Depression   > On 2/16/2022, discontinued Trazodone 50 mg PO q HS (re: patient says he has not been taking this prior to admission)   > Continue Bupropion  mg PO once daily    > High vitamin D level;  History of vitamin D deficiency   > Prior to admission to [facility], patient was on Ergocalciferol 50,000 units PO q 7 days   > Vitamin D 25-Hydroxy (2/16/2022) = 105.7   > On 2/16/2022, discontinued Ergocalciferol 50,000 units PO q 7 days     > Hyperlipidemia   > Continue Atorvastatin 20 mg PO once daily    > Type 2 diabetes mellitus, poorly controlled, with diabetic nephropathy, diabetic neuropathy, diabetic retinopathy, with long-term current use of insulin   > HbA1c (2/8/2022) = 13.8   > Vitamin B12 (2/16/2022) = 574   > On 2/16/2022:    > Increased Metformin from 500 mg to 850 mg PO BID with meals    > Increased Insulin lispro from 4 units to 5 units SC TID AC   > On 2/17/2022:    > Increased Insulin glargine from 45 units to 50 units SC daily before breakfast    > Decreased Insulin lispro from 5 units to 4 units SC TID AC   > On 2/18/2022:    > Patient was given Insulin glargine 20 units SC x 1 dose at 9PM    > Increased Metformin from 850 mg to 1000 mg PO BID with meals   > On 2/19/2022, changed Insulin glargine from 50 units SC daily before breakfast to 35 units SC q HS   > On 2/22/2022, decreased Insulin lispro from 4 units to 3 units SC TID AC   > On 2/23/2022, increased Insulin lispro from 3 units to 4 units SC TID AC   > Continue:    > Metformin 1000 mg PO BID with meals    > Insulin glargine 35 units SC q HS    > Insulin lispro 4 units SC TID AC    > Insulin lispro sliding scale SC TID AC only    > Difficulty sleeping   > On 2/16/2022, discontinued Trazodone 50 mg PO q HS (re: patient says he has not been taking this prior to admission)   > On 2/21/2022, patient reported difficulty sleeping; started Melatonin 3 mg PO daily after dinner   > Continue Melatonin 3 mg PO daily after dinner    > Depressed mood   > Patient was seen by Clinical Psychologist and patient was felt to have a depressed mood   > Option of starting an antidepressant was presented and the patient politely declined it at this time, stating he is still able to cope with his present condition    > Analgesia   > On 2/16/2022:    > Started:     > Acetaminophen 650 mg PO TID (8AM, 12PM, 4PM)     > Methocarbamol 500 mg PO TID (8AM, 2PM, 8PM)    > Increased Gabapentin from 300 mg to 400 mg PO TID (8AM, 2PM, 8PM)   > On 2/18/2022, decreased Oxycodone from 10 mg to 7.5 mg PO q 4 hr PRN for pain level 5/10 or greater    > On 2/21/2022, decreased Oxycodone from 7.5 mg to 5 mg PO q 4 hr PRN for pain level 5/10 or greater    > On 2/22/2022, decreased Gabapentin from 400 mg to 300 mg PO TID (8AM, 2PM, 8PM)   > Continue:    > Acetaminophen 650 mg PO TID (8AM, 12PM, 4PM)    > Acetaminophen 650 mg PO q 4 hr PRN for pain level 4/10 or lesser (from 8PM to 4AM only)    > Gabapentin 300 mg PO TID (8AM, 2PM, 8PM)    > Methocarbamol 500 mg PO TID (8AM, 2PM, 8PM)    > Oxycodone 5 mg PO q 4 hr PRN for pain level 5/10 or greater       12. Personal Protective Equipment (N95 face mask and eye goggles) was used while interacting with the patient. Patient was using a surgical mask. 15. Patient's progress in rehabilitation and medical issues discussed with the patient. All questions answered to the best of my ability. Care plan discussed with patient and nurse. 14. Total clinical care time is 30 minutes, including review of chart including all labs, radiology, past medical history, and discussion with patient. Greater than 50% of my time was spent in coordination of care and counseling.       Signed:    Yumiko Lebron MD    February 24, 2022

## 2022-02-24 NOTE — PROGRESS NOTES
Problem: Mobility Impaired (Adult and Pediatric)  Goal: *Acute Goals and Plan of Care (Insert Text)  Description: Physical Therapy Short Term Goals  Initiated 2/16/2022, reassessed 2/23/2022 and to be progressed to long term goals  1. Patient will move from supine to sit and sit to supine , scoot up and down, and roll side to side in bed with independence. MET 2/23/2022  2. Patient will transfer from bed to chair and chair to bed with supervision/set-up using the least restrictive device. MET 2/23/2022  3. Patient will perform sit to stand with supervision/set-up. MET 2/23/2022  4. Patient will ambulate with supervision/set-up for 50 feet with the least restrictive device. MET 2/23/2022  5. Patient will ascend/descend 3 stairs with 1 handrail(s) with minimal assistance/contact guard assist.  Not Met - pt needs UE support on B handrails    Physical Therapy Long Term Goals  Initiated 2/16/2022 and to be accomplished within 14 day(s) (3/02/2022)  1. Patient will move from supine to sit and sit to supine , scoot up and down, and roll side to side in bed with independence. 2.  Patient will transfer from bed to chair and chair to bed with modified independence using the least restrictive device. 3.  Patient will perform sit to stand with modified independence. 4.  Patient will ambulate with modified independence for 50 feet with the least restrictive device. 5.  Patient will ascend/descend 3 stairs with 1 handrail(s) or bumping up on steps with supervision/set-up. Outcome: Progressing Towards Goal   PHYSICAL THERAPY TREATMENT    Patient: Russel Garber (22 y.o. male)  Date: 2/24/2022  Diagnosis: Status post below knee amputation of right lower extremity ([facility]) [Z89.511] Status post below knee amputation of right lower extremity ([facility] [address]  Precautions: Fall  Chart, physical therapy assessment, plan of care and goals were reviewed.     Time In:1432  Time [id number]    Patient seen for: Wheelchair mobility;Gait training;Transfer training; Therapeutic exercise (stair training, bed mobility)    Pain:  Pt pain was reported as 8/10 pre-treatment. Pt pain was reported as not rated and no c/o post-treatment. Intervention: nurse reports pt not due for pain meds until 4pm    Patient identified with name and : yes     SUBJECTIVE:      Pt reports right residual limb pain at start of session but no reports at end of session and pt tolerated all exercises without increased c/o. OBJECTIVE DATA SUMMARY:    Objective:     BED/MAT MOBILITY Daily Assessment     Rolling Right : 7 (Independent)  Rolling Left : 7 (Independent)  Supine to Sit : 7 (Independent)  Sit to Supine : 7 (Independent)  Pt performed bed mobility on right side of mat table to include supine<->prone, independently. TRANSFERS Daily Assessment     Transfer Type: Lateral pivot  Transfer Assistance : 5 (Supervision/setup)  Sit to Stand Assistance: Supervision  Pt performed squat pivot txfr w/c<->mat table with supervision and pt performed w/c set up without assistance. Pt performed sit to stand from w/c with Supervision and pt pushing up from B arm rests. GAIT Daily Assessment    Gait Description (WDL) Exceptions to WDL    Gait Abnormalities      Assistive device Walker, rolling    Ambulation assistance - level surface 5 (Supervision/setup)    Distance 80 Feet (ft) (x2 trials)    Ambulation assistance- uneven surface      Comments Pt ambulated 2x80ft with RW and Supervision. Pt demo'd good foot clearance and control. STEPS/STAIRS Daily Assessment     Steps/Stairs ambulated 6 (6\" steps)    Assistance Required 4 (Minimal assistance)    Rail Use Both    Comments  Pt negotiated up and down 6 6\" steps with BHR and min assist for safety and balance, primarily for first step.      Curbs/Ramps  NT        BALANCE Daily Assessment     Sitting - Static: Good (unsupported)  Sitting - Dynamic: Good (unsupported)  Standing - Static: Fair  Standing - Dynamic : Impaired        WHEELCHAIR MOBILITY Daily Assessment     Able to Propel (ft): 260 feet  Functional Level:  (Joseph)  Curbs/Ramps Assist Required (FIM Score): 0 (Not tested)  Wheelchair Setup Assist Required : 6 (Modified independent)  Wheelchair Management: Manages left brake;Manages right brake;Manages left footrest;Manages right footrest  Pt propelled w/c to and from gym with BUE Joseph. THERAPEUTIC EXERCISES Daily Assessment       Seated left LE LAQ and hip flexion 2x15 with 2#  Supine BLE hip/knee flexion, hip abd and SAQ 2x15 with 2# on left LE, bridging with right LE over bolster 3x10, left sidelying right hip extension 2x15, prone right hip extension 3x10      ASSESSMENT:  Pt is making progress with strength and tolerance. Pt is performing txfrs and gait at Supervision level. Progression toward goals:  []      Improving appropriately and progressing toward goals  [x]      Improving slowly and progressing toward goals  []      Not making progress toward goals and plan of care will be adjusted      PLAN:  Patient continues to benefit from skilled intervention to address the above impairments. Continue treatment per established plan of care. Discharge Recommendations:  Home Health  Further Equipment Recommendations for Discharge:  rolling walker and wheelchair 18 inch      Estimated Discharge Date: 3/2/2022    Activity Tolerance:   Fair+  Please refer to the flowsheet for vital signs taken during this treatment.     After treatment:   [] Patient left in no apparent distress in bed  [] Patient left in no apparent distress sitting up in chair  [x] Patient left in no apparent distress in w/c mobilizing under own power  [] Patient left in no apparent distress dining area  [] Patient left in no apparent distress mobilizing under own power  [] Call bell left within reach  [] Nursing notified  [] Caregiver present  [] Bed alarm activated   [x] Chair alarm activated      Lavon Dow PTA  2/24/2022

## 2022-02-24 NOTE — ROUTINE PROCESS
0800 Pt. Awake sitting up in bed no change in assessment pt. Reported to be feeling fine. 0930 PT. Sitting up in bed eating breakfast.  1000 vascular Physician assist. Arrived changed dressing stated no xerofoam needed. Incision intact no drainage. 1200 with therapy. 1330 able to transfer from bed to chair with assist  1500 no change in assessment. 1800 Pt. Sitting up in chair eating dinner.

## 2022-02-24 NOTE — ROUTINE PROCESS
[facility]    Bedside and Verbal shift change report given to Dannielle José RN (oncoming nurse) by Harrison Palencia RN (offgoing nurse). Report included the following information SBAR, Kardex, MAR and Recent Results. Situation:   Code Status: Full Code   Hospital Day: 8   Problem List:   Hospital Problems  Date Reviewed: 2/23/2022          Codes Class Noted POA    Poorly controlled type 2 diabetes mellitus ([facility] ICD-10-CM: E11.65  ICD-9-CM: 250.00  Unknown Yes    Overview Signed 2/16/2022 12:50 AM by Ynes Shaffer MD     HbA1c (2/8/2022) = 13.8             Long term current use of insulin (HCC) ICD-10-CM: Z79.4  ICD-9-CM: V58.67  Unknown Yes        High vitamin D level (Chronic) ICD-10-CM: E67.3  ICD-9-CM: 278.4  2/16/2022 Yes    Overview Signed 2/16/2022  9:52 AM by Ynes Shaffer MD     Vitamin D 25-Hydroxy (2/16/2022) = 105.7             Impaired mobility and ADLs ICD-10-CM: Z74.09, Z78.9  ICD-9-CM: V49.89  2/8/2022 Yes        * (Principal) Status post below knee amputation of right lower extremity ([facility] ICD-10-CM: Z89.511  ICD-9-CM: V49.75  2/8/2022 Yes    Overview Signed 2/16/2022 12:47 AM by Ynes Shaffer MD     S/P Right below-the-knee amputation (2/8/2022 - Dr. Violetta Smart)             Critical ischemia of lower extremity [facility]) ICD-10-CM: Q31.012  ICD-9-CM: 459.9  2/8/2022 No        Acute postoperative anemia due to expected blood loss ICD-10-CM: D62  ICD-9-CM: 285.1  2/8/2022 Yes              Background:   Past Medical History:   Past Medical History:   Diagnosis Date    Acute postoperative anemia due to expected blood loss 2/8/2022    Arterial occlusion, lower extremity ([facility] 11/27/2021    Cardiac cath 11/09/2011    RCA patent. LM patent. LAD patent. mD1 55%. CX patent. Prior stent patent. Edison 85% (2.5 x 15-mm Xience stent, resid 0%). LVEDP 12. EF 40-45%. High lateral hypk (RI distribution).       Chronic alcohol use     Fri-Sun one fifth; Mon-Thur: Pint of liquor    Constipation     Coronary artery disease involving native coronary artery of native heart     Critical ischemia of lower extremity ([facility] 2/8/2022    Depression     Diabetic nephropathy associated with type 2 diabetes mellitus ([facility] 4/4/2019    Diabetic neuropathy associated with type 2 diabetes mellitus ([facility]     Gastroesophageal reflux disease     High vitamin D level 2/16/2022    Vitamin D 25-Hydroxy (2/16/2022) = 105.7    History of acute inferior wall myocardial infarction 2009    History of coronary angioplasty with insertion of stent 07/29/2009    2.5 x 13 Cypher stent to CX.      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 without macular edema associated with type 2 diabetes mellitus ([facility] 12/18/2020    Nuclear sclerosis of both eyes 12/18/2020    Otalgia 05/08/2019    Poorly controlled type 2 diabetes mellitus (HCC)     HbA1c (2/8/2022) = 13.8    Tobacco use disorder     contemplating stopping        Assessment:   Changes in Assessment throughout shift: No change to previous assessment     Patient has a central line: no Reasons if yes: na  Insertion date:na Last dressing date:na   Patient has Epstein Cath: no Reasons if yes: na   Insertion date:na  Shift epstein care completed: NO     Last Vitals:     Vitals:    02/22/22 1610 02/22/22 1945 02/23/22 0752 02/23/22 1518   BP: 117/69 (!) 144/76 107/62 120/72   Pulse: 82 72 72 76   Resp: 18 18 16 18   Temp: 99 °F (37.2 °C) 98.6 °F (37 °C) 98.2 °F (36.8 °C) 98.1 °F (36.7 °C)   SpO2: 99% 97% 98% 98%   Weight:       Height:            PAIN    Pain Assessment    Pain Intensity 1: 3 (02/23/22 1810) Pain Intensity 1: 2 (12/29/14 1795)    Pain Location 1: Leg Pain Location 1: Abdomen    Pain Intervention(s) 1: Medication (see MAR) Pain Intervention(s) 1: Medication (see MAR)  Patient Stated Pain Goal: 0 Patient Stated Pain Goal: 0  o Intervention effective: yes  o Other actions taken for pain: Medication (see MAR)     Skin Assessment  Skin color    Condition/Temperature    Integrity Skin Integrity: Incision (comment)  Turgor    Weekly Pressure Ulcer Documentation  Pressure  Injury Documentation: No Pressure Injury Noted-Pressure Ulcer Prevention Initiated  Wound Prevention & Protection Methods  Orientation of wound Orientation of Wound Prevention: Posterior  Location of Prevention Location of Wound Prevention: Sacrum/Coccyx  Dressing Present Dressing Present : No  Dressing Status    Wound Offloading Wound Offloading (Prevention Methods): Bed, pressure reduction mattress     INTAKE/OUPUT  Date 02/22/22 1900 - 02/23/22 0659 02/23/22 0700 - 02/24/22 0659   Shift 0163-8220 24 Hour Total 0768-6184 8249-7069 24 Hour Total   INTAKE   P.O. 960 1680 720  720     P. O. 960 1680 720  720   Shift Total(mL/kg) 960(12.3) 1680(21.5) 720(9.2)  720(9.2)   OUTPUT   Urine(mL/kg/hr) 1950 1950        Urine Voided 1950 1950        Urine Occurrence(s) 0 x 3 x 3 x  3 x   Stool          Stool Occurrence(s) 0 x 0 x 0 x  0 x   Shift Total(mL/kg) 1950(24.9) 1950(24.9)      NET -990 -270 720  720   Weight (kg) 78.2 78.2 78.2 78.2 78.2       Recommendations:  1. Patient needs and requests: na    2. Pending tests/procedures: na     3. Functional Level/Equipment: Partial (one person) /      Fall Precautions:   Fall risk precautions were reinforced with the patient; he was instructed to call for help prior to getting up. The following fall risk precautions were continued: bed/ chair alarms, door signage, yellow bracelet and socks as well as update of the Alana Nap tool in the patient's room.    Loly Score: 3    HEALS Safety Check    A safety check occurred in the patient's room between off going nurse and oncoming nurse listed above. The safety check included the below items  Area Items   H  High Alert Medications - Verify all high alert medication drips (heparin, PCA, etc.)   E  Equipment - Suction is set up for ALL patients (with eliel)  - Red plugs utilized for all equipment (IV pumps, etc.)  - WOWs wiped down at end of shift.  - Room stocked with oxygen, suction, and other unit-specific supplies   A  Alarms - Bed alarm is set for fall risk patients  - Ensure chair alarm is in place and activated if patient is up in a chair   L  Lines - Check IV for any infiltration  - Gallardo bag is empty if patient has a Gallardo   - Tubing and IV bags are labeled   S  Safety   - Room is clean, patient is clean, and equipment is clean. - Hallways are clear from equipment besides carts. - Fall bracelet on for fall risk patients  - Ensure room is clear and free of clutter  - Suction is set up for ALL patients (with eliel)  - Hallways are clear from equipment besides carts.    - Isolation precautions followed, supplies available outside room, sign posted     Dunia Kaplan RN

## 2022-02-24 NOTE — ROUTINE PROCESS
SHIFT CHANGE NOTE FOR [facility]    Bedside and Verbal shift change report given to Luana Crespo RN (oncoming nurse) by Jenise Loera RN (offgoing nurse). Report included the following information SBAR, Kardex, MAR and Recent Results. Situation:   Code Status: Full Code   Hospital Day: 9   Problem List:   Hospital Problems  Date Reviewed: 2/24/2022          Codes Class Noted POA    Poorly controlled type 2 diabetes mellitus ([facility] ICD-10-CM: E11.65  ICD-9-CM: 250.00  Unknown Yes    Overview Signed 2/16/2022 12:50 AM by Елена Huang MD     HbA1c (2/8/2022) = 13.8             Long term current use of insulin (HCC) ICD-10-CM: Z79.4  ICD-9-CM: V58.67  Unknown Yes        High vitamin D level (Chronic) ICD-10-CM: E67.3  ICD-9-CM: 278.4  2/16/2022 Yes    Overview Signed 2/16/2022  9:52 AM by Елена Huang MD     Vitamin D 25-Hydroxy (2/16/2022) = 105.7             Impaired mobility and ADLs ICD-10-CM: Z74.09, Z78.9  ICD-9-CM: V49.89  2/8/2022 Yes        * (Principal) Status post below knee amputation of right lower extremity ([facility] ICD-10-CM: Z89.511  ICD-9-CM: V49.75  2/8/2022 Yes    Overview Signed 2/16/2022 12:47 AM by Елена Huang MD     S/P Right below-the-knee amputation (2/8/2022 - Dr. Declan Schilling)             Critical ischemia of lower extremity [facility]) ICD-10-CM: Y23.644  ICD-9-CM: 459.9  2/8/2022 No        Acute postoperative anemia due to expected blood loss ICD-10-CM: D62  ICD-9-CM: 285.1  2/8/2022 Yes              Background:   Past Medical History:   Past Medical History:   Diagnosis Date    Acute postoperative anemia due to expected blood loss 2/8/2022    Arterial occlusion, lower extremity ([facility] 11/27/2021    Cardiac cath 11/09/2011    RCA patent. LM patent. LAD patent. mD1 55%. CX patent. Prior stent patent. Edison 85% (2.5 x 15-mm Xience stent, resid 0%). LVEDP 12. EF 40-45%. High lateral hypk (RI distribution).       Chronic alcohol use     Fri-Sun one fifth; Mon-Thur: Pint of liquor    Constipation     Coronary artery disease involving native coronary artery of native heart     Critical ischemia of lower extremity ([facility] 2/8/2022    Depression     Diabetic nephropathy associated with type 2 diabetes mellitus ([facility] 4/4/2019    Diabetic neuropathy associated with type 2 diabetes mellitus ([facility]     Gastroesophageal reflux disease     High vitamin D level 2/16/2022    Vitamin D 25-Hydroxy (2/16/2022) = 105.7    History of acute inferior wall myocardial infarction 2009    History of coronary angioplasty with insertion of stent 07/29/2009    2.5 x 13 Cypher stent to CX.      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 without macular edema associated with type 2 diabetes mellitus ([facility] 12/18/2020    Nuclear sclerosis of both eyes 12/18/2020    Otalgia 05/08/2019    Poorly controlled type 2 diabetes mellitus (HCC)     HbA1c (2/8/2022) = 13.8    Tobacco use disorder     contemplating stopping        Assessment:   Changes in Assessment throughout shift: No change to previous assessment     Patient has a central line: no Reasons if yes: na  Insertion date:na Last dressing date:na   Patient has Epstein Cath: no Reasons if yes: na   Insertion date:na  Shift epstein care completed: NO     Last Vitals:     Vitals:    02/23/22 1518 02/23/22 2005 02/24/22 0728 02/24/22 1650   BP: 120/72 138/77 107/67 128/75   Pulse: 76 84 80 78   Resp: 18 18 19 19   Temp: 98.1 °F (36.7 °C) 98.3 °F (36.8 °C) 99.1 °F (37.3 °C) 98.6 °F (37 °C)   SpO2: 98% 98% 98% 99%   Weight:       Height:            PAIN    Pain Assessment    Pain Intensity 1: 0 (02/24/22 1650) Pain Intensity 1: 2 (12/29/14 4557)    Pain Location 1: Leg Pain Location 1: Abdomen    Pain Intervention(s) 1: Medication (see MAR) Pain Intervention(s) 1: Medication (see MAR)  Patient Stated Pain Goal: 0 Patient Stated Pain Goal: 0  o Intervention effective: yes  o Other actions taken for pain: Medication (see MAR)     Skin Assessment  Skin color    Condition/Temperature    Integrity Skin Integrity: Incision (comment)  Turgor    Weekly Pressure Ulcer Documentation  Pressure  Injury Documentation: No Pressure Injury Noted-Pressure Ulcer Prevention Initiated (Right bka)  Wound Prevention & Protection Methods  Orientation of wound Orientation of Wound Prevention: Posterior  Location of Prevention Location of Wound Prevention: Sacrum/Coccyx  Dressing Present Dressing Present : No  Dressing Status    Wound Offloading Wound Offloading (Prevention Methods): Bed, pressure reduction mattress     INTAKE/OUPUT  Date 02/23/22 0700 - 02/24/22 0659 02/24/22 0700 - 02/25/22 0659   Shift 2102-5067 7572-7461 24 Hour Total 2445-6490 9272-5950 24 Hour Total   INTAKE   P.O.  1320  1320     P. O.  1320  1320   Shift Total(mL/kg) 720(9.2) 960(12.3) 1680(21.5) 1320(16.9)  1320(16.9)   OUTPUT   Urine(mL/kg/hr)  1400(1.5) 1400(0.7) 200  200     Urine Voided  1400 1400 200  200     Urine Occurrence(s) 3 x 0 x 3 x 4 x  4 x   Stool           Stool Occurrence(s) 0 x 0 x 0 x      Shift Total(mL/kg)  1400(17.9) 1400(17.9) 200(2.6)  200(2.6)    -  1120   Weight (kg) 78.2 78.2 78.2 78.2 78.2 78.2       Recommendations:  1. Patient needs and requests: na    2. Pending tests/procedures: na     3. Functional Level/Equipment:   / Wheelchair    Fall Precautions:   Fall risk precautions were reinforced with the patient; he was instructed to call for help prior to getting up.  The following fall risk precautions were continued: bed/ chair alarms, door signage, yellow bracelet and socks as well as update of the [employer] tool in the patient's room. Loly Score: 3    HEALS Safety Check    A safety check occurred in the patient's room between off going nurse and oncoming nurse listed above. The safety check included the below items  Area Items   H  High Alert Medications - Verify all high alert medication drips (heparin, PCA, etc.)   E  Equipment - Suction is set up for ALL patients (with eliel)  - Red plugs utilized for all equipment (IV pumps, etc.)  - WOWs wiped down at end of shift.  - Room stocked with oxygen, suction, and other unit-specific supplies   A  Alarms - Bed alarm is set for fall risk patients  - Ensure chair alarm is in place and activated if patient is up in a chair   L  Lines - Check IV for any infiltration  - Gallardo bag is empty if patient has a Gallardo   - Tubing and IV bags are labeled   S  Safety   - Room is clean, patient is clean, and equipment is clean. - Hallways are clear from equipment besides carts. - Fall bracelet on for fall risk patients  - Ensure room is clear and free of clutter  - Suction is set up for ALL patients (with eliel)  - Hallways are clear from equipment besides carts.    - Isolation precautions followed, supplies available outside room, sign posted     Meghan Awan, RN

## 2022-02-24 NOTE — PROGRESS NOTES
[x] Psychology  [] Social Work [] Recreational Therapy    INTERVENTION  UNITS/TIME OF SERVICE   Assessment    Supportive Counseling  February 24, 2022   Orientation    Discharge Planning    Resource Linkage              Progress/Current Status    Patient seen for individual support  and follow up this morning on ARU and in anticipation of his scheduled discharge to home in several days. Patient was found sitting upright in wheelchair in bedroom and trying to don clam shell on residual limb; however, he acknowledged that he was having difficulty in remembering how to do so and that he would need further education in therapy, today. He reported that he wanted to discharge from ARU several days ago because of his desire to return to home, but described that he came to realize that continuing in therapy and being further educated will allow him to be more self sufficient when he does leave hospital.  Of course, he understands that he will continue to have therapy services post ARU and insists that he is entirely motivated to continue and is looking forward to prosthetic training. Patient not in any distress this morning and presenting as positive in his thinking and focused on day to day goals for himself.     Juliane Solo, THE [facility] 2/24/2022 9:54 AM

## 2022-02-24 NOTE — PROGRESS NOTES
Problem: Self Care Deficits Care Plan (Adult)  Goal: *Therapy Goal (Edit Goal, Insert Text)  Description: Occupational Therapy Goals   Long Term Goals  Initiated 22 and to be accomplished within 2 week(s)  1. Pt will perform self-feeding independently. 2. Pt will perform grooming independently. 3. Pt will perform UB bathing with Joseph. 4. Pt will perform LB bathing with Joseph. 5. Pt will perform tub/shower transfer with Joseph. 6. Pt will perform UB dressing with Joseph. Goal met 2022  7. Pt will perform LB dressing with Joseph. 8. Pt will perform toileting task with Joseph. 9. Pt will perform toilet transfer with Joseph.    hort Term Goals   Initiated 22 and to be accomplished within 7 day(s), reassess 22  1. Pt will perform self-feeding with Joseph. 2. Pt will perform grooming with Joseph. Goal met 2022  3. Pt will perform UB bathing with supervision. 4. Pt will perform LB bathing with supervision. 5. Pt will perform tub/shower transfer with SBA. 6. Pt will perform UB dressing with Joseph. Goal met 2022  7. Pt will perform LB dressing with Diana. Goal met 2022  8. Pt will perform toileting task with CGA. 9. Pt will perform toilet transfer with SBA. Outcome: Progressing Towards Goal  Goal: Interventions  Outcome: Progressing Towards Goal       OT WEEKLY PROGRESS NOTE  Patient Name:Bryce Dawkins      Time In: 7413  Time Out: 1101    Time In: 1330  Time Out: 1400    Medical Diagnosis:  Status post below knee amputation of right lower extremity ([facility] [Z89.511] Status post below knee amputation of right lower extremity (HCC)     Pain at start of tx:0/10 pain or discomfort. Pain at stop of tx:-/10 pain or discomfort. Patient identified with name and :yes  Subjective: \"What are we going to do today? \"         Objective:       Outcome Measures:      AROM: [facility]      COGNITION/PERCEPTION Initial Assessment Weekly Progress Assessment 2022   Premorbid Reading Status Literate (Stopped shcool in the 11th grade.)  Literate   Premorbid Writing Status Functional  WFL   Arousal/Alertness Generalized responses  Alert   Orientation Level Oriented X4  Oriented x4   Visual Fields       Praxis       Body Scheme       COMPREHENSION MODE Initial Assessment Weekly Progress Assessment 2/24/2022   Primary Mode of Comprehension Auditory Auditory   Hearing Aide None  None   Corrective Lenses   None    Score 5 5     EXPRESSION Initial Assessment Weekly Progress Assessment 2/24/2022   Primary Mode of Expression Verbal Verbal   Score 5 5   Comments         SOCIAL INTERACTION/ PRAGMATICS Initial Assessment Weekly Progress Assessment 2/24/2022   Score 5 5   Comments         PROBLEM SOLVING Initial Assessment Weekly Progress Assessment 2/24/2022   Score 5 5   Comments         MEMORY Initial Assessment Weekly Progress Assessment 2/24/2022   Score 5 5   Comments         EATING Initial Assessment Weekly Progress Assessment 2/24/2022   Functional Level 5  6   Comments         GROOMING Initial Assessment Weekly Progress Assessment 2/24/2022   Functional Level 5  6   Tasks completed by patient Washed face,Washed hands     Comments         BATHING Initial Assessment Weekly Progress Assessment 2/24/2022   Functional Level 4 Pt declined this date (frequently declines)    Pt report  6      Body parts patient bathed Abdomen,Arm, left,Arm, right,Buttocks,Chest,Lower leg and foot, right,Loly area,Thigh, left,Thigh, right     Comments Pt performed UB bathing with Malik to wash back and LB showering using compensatory strategies to wash buttocks in seated position and Malik to wash LLE  Pt reported performing UB/LB bathing at sink with Joseph     TUB/SHOWER TRANSFER INDEPENDENCE Initial Assessment Weekly Progress Assessment 2/24/2022   Score 5 (CGA with grab bar)  5    Comments         UPPER BODY DRESSING/UNDRESSING Initial Assessment Weekly Progress Assessment 2/24/2022   Functional Level 5 (setup)  6   Items applied/Steps completed Pullover (4 steps)     Comments Pt performed UB dressing with setup       LOWER BODY DRESSING/UNDRESSING Initial Assessment Weekly Progress Assessment 2/24/2022   Functional Level 3 5    Items applied/Steps completed Elastic waist pants (3 steps),Sock, left (1 step),Underpants (3 steps)     Comments Pt performed LB dressing with modA to don LLE sock and pull pants up over R hip and standing at grab abr for increased stability Pt declined this date however reported performing UB/LB dressing at sink side with Joseph. TOILETING Initial Assessment Weekly Progress Assessment 2/24/2022   Functional Level  (Pt declined)  Pt declined   Comments         TOILET TRANSFER INDEPENDENCE Initial Assessment Weekly Progress Assessment 2/24/2022   Transfer score 5 (CGA)  Pt declined this date  5    Comments                  ASSESSMENT:  Pt declining self cares for sake of OT reporting performed earlier this morning with Joseph. Pt is increasing BUE strength and stability for self cares and completed 15 continuous minutes on Power Trainer against mod resistance with distant supervision. Pt engaged in dynamic standing activity which involved playing game of [employer] (card game) at table top demonstrating ability to sort and deal cards and standing balance of F+ and standing tolerance of 12 minutes with SBA. Second session pt performed BUE strengthening with 5# free weight in 3 directions (bicep curls, chest press, shoulder flexion/extrension) 15x4. Pt performed BUE strengthening with green therabar supination/pronation, IR/ER 4x20 reps with RB'a prn. Pt has met 3/9 STG's at this time due to inability to assess self cares this date. Continued OT necessary to facilitate independence and safety and assess self cares.     Progression toward goals:  [x]          Improving appropriately and progressing toward goals  []          Improving slowly and progressing toward goals  []          Not making progress toward goals and plan of care will be adjusted     PLAN:  Patient continues to benefit from skilled intervention to address the above impairments. Continue treatment per established plan of care. Discharge Recommendations:  Home Health with support  Further Equipment Recommendations for Discharge: bedside commode and transfer bench TBD on progress     Please refer to the flow sheet for vital signs taken during this treatment. After treatment:   [x]  Patient left in no apparent distress sitting up in chair  []  Patient left in no apparent distress in bed  [x]  Call bell left within reach  []  Nursing notified  []  Caregiver present  []  Bed alarm activated    COMMUNICATION/EDUCATION:   [] Home safety education was provided and the patient/caregiver indicated understanding. [] Patient/family have participated as able in goal setting and plan of care. [x] Patient/family agree to work toward stated goals and plan of care. [] Patient understands intent and goals of therapy, but is neutral about his/her participation. [] Patient is unable to participate in goal setting and plan of care. Plan of Care: Please see Care Plan for updated STG/LTGs.    Family Training:    Estimated LOS: ~ 3/2/22    Lucio May OT  2/24/2022

## 2022-02-24 NOTE — PROGRESS NOTES
Problem: Neurolinguistics Impaired (Adult)  Goal: *Speech Goal: (INSERT TEXT)  Description: Long term goals  Patient will:  1. Be oriented x 3 and recall events of the day, supervision. 2. Recall 3 related words after 5 minutes with supervision and mnemonic training. 3. Perform functional problem solving tasks with 90% accuracy. 4. Recall strategies learned in OT/PT sessions (provided by the therapists) with min assist-supervision. Short term goals (by 2/25/22)  Patient will:  1. Be oriented x 3 and recall events of the day, min assist.  2. Recall 2 related words after 5 minutes with min assist and mnemonic training. 3. Perform functional problem solving tasks with 70-80% accuracy. 4. Recall strategies learned in OT/PT sessions (provided by the therapists) with mod cues. Variance Patient/Family Decision  Impact: Minimal  Note:   Speech language pathology treatment    Patient: Mabel Frederick (29 y.o. male)  Date: 2/24/2022  Diagnosis: Status post below knee amputation of right lower extremity ([facility]) [Z89.511] Status post below knee amputation of right lower extremity ([facility] [address]       Time in: 1140 1400  Time Out:  1150 1430    Pain:  Pre-tx:  9 in morning session/5 this afternoon  Post tx: 9 in morning session/5 this afternoon    SUBJECTIVE:   Patient stated I need some pain medicine . OBJECTIVE:   Mental Status:  Mr. Shan Ernandez was awake and alert in treatment sessions today. Treatment & Interventions:   Patient was seen in his room for therapy. The following treatment tasks were presented:  Neuro-Linguistics:   Orientation:  Min assist  Recent memory: Supervision  Recall 2 words: Min assist  Name item in set: 85% accuracy for naming 1 time when given a category. ID advantages: 90% accuracy  ID Disadvantages: 65% accuracy    Response & Tolerance to Activities:  Mr. Dawkins's significant other was present in this afternoon's session.   She appeared pleased with his responses as he was doing well.    Pain:  Pain Scale 1: Numeric (0 - 10)  Pain Orientation 1: Right  Pain Description 1: Aching  After treatment:   [x]       Patient left in no apparent distress sitting up in chair  []       Patient left in no apparent distress in bed  [x]       Call bell left within reach  [x]       Nursing notified  []       Caregiver present  []       Bed alarm activated    ASSESSMENT:   Progression toward goals:  []       Improving appropriately and progressing toward goals  [x]       Improving slowly and progressing toward goals  []       Not making progress toward goals and plan of care will be adjusted    PLAN:   Patient continues to benefit from skilled intervention to address the above impairments. Continue treatment per established plan of care.   Discharge Recommendations:  Home Health    Estimated LOS: Through 3/2/22    LOI Jacob  Time Calculation: 10 mins

## 2022-02-25 LAB
GLUCOSE BLD STRIP.AUTO-MCNC: 121 MG/DL (ref 70–110)
GLUCOSE BLD STRIP.AUTO-MCNC: 132 MG/DL (ref 70–110)
GLUCOSE BLD STRIP.AUTO-MCNC: 150 MG/DL (ref 70–110)
GLUCOSE BLD STRIP.AUTO-MCNC: 150 MG/DL (ref 70–110)

## 2022-02-25 PROCEDURE — 74011250636 HC RX REV CODE- 250/636: Performed by: INTERNAL MEDICINE

## 2022-02-25 PROCEDURE — 92507 TX SP LANG VOICE COMM INDIV: CPT

## 2022-02-25 PROCEDURE — 97530 THERAPEUTIC ACTIVITIES: CPT

## 2022-02-25 PROCEDURE — 97110 THERAPEUTIC EXERCISES: CPT

## 2022-02-25 PROCEDURE — 97535 SELF CARE MNGMENT TRAINING: CPT

## 2022-02-25 PROCEDURE — 2709999900 HC NON-CHARGEABLE SUPPLY

## 2022-02-25 PROCEDURE — 97116 GAIT TRAINING THERAPY: CPT

## 2022-02-25 PROCEDURE — 74011250637 HC RX REV CODE- 250/637: Performed by: INTERNAL MEDICINE

## 2022-02-25 PROCEDURE — 99232 SBSQ HOSP IP/OBS MODERATE 35: CPT | Performed by: INTERNAL MEDICINE

## 2022-02-25 PROCEDURE — 82962 GLUCOSE BLOOD TEST: CPT

## 2022-02-25 PROCEDURE — 74011636637 HC RX REV CODE- 636/637: Performed by: INTERNAL MEDICINE

## 2022-02-25 PROCEDURE — 65310000000 HC RM PRIVATE REHAB

## 2022-02-25 RX ADMIN — ACETAMINOPHEN 650 MG: 325 TABLET ORAL at 09:20

## 2022-02-25 RX ADMIN — Medication 35 UNITS: at 21:21

## 2022-02-25 RX ADMIN — METHOCARBAMOL TABLETS 500 MG: 500 TABLET, COATED ORAL at 21:20

## 2022-02-25 RX ADMIN — GABAPENTIN 300 MG: 300 CAPSULE ORAL at 09:20

## 2022-02-25 RX ADMIN — OXYCODONE 5 MG: 5 TABLET ORAL at 09:20

## 2022-02-25 RX ADMIN — Medication 4 UNITS: at 09:12

## 2022-02-25 RX ADMIN — ACETAMINOPHEN 650 MG: 325 TABLET ORAL at 17:06

## 2022-02-25 RX ADMIN — METFORMIN HYDROCHLORIDE 1000 MG: 500 TABLET ORAL at 09:20

## 2022-02-25 RX ADMIN — Medication 3 MG: at 17:50

## 2022-02-25 RX ADMIN — Medication 4 UNITS: at 17:06

## 2022-02-25 RX ADMIN — GABAPENTIN 300 MG: 300 CAPSULE ORAL at 21:20

## 2022-02-25 RX ADMIN — THERA TABS 1 TABLET: TAB at 09:20

## 2022-02-25 RX ADMIN — METFORMIN HYDROCHLORIDE 1000 MG: 500 TABLET ORAL at 17:50

## 2022-02-25 RX ADMIN — Medication 200 MG: at 09:20

## 2022-02-25 RX ADMIN — OXYCODONE 5 MG: 5 TABLET ORAL at 17:06

## 2022-02-25 RX ADMIN — ENOXAPARIN SODIUM 40 MG: 100 INJECTION SUBCUTANEOUS at 05:09

## 2022-02-25 RX ADMIN — METHOCARBAMOL TABLETS 500 MG: 500 TABLET, COATED ORAL at 13:18

## 2022-02-25 RX ADMIN — OXYCODONE 5 MG: 5 TABLET ORAL at 12:40

## 2022-02-25 RX ADMIN — ACETAMINOPHEN 650 MG: 325 TABLET ORAL at 12:39

## 2022-02-25 RX ADMIN — BUPROPION HYDROCHLORIDE 150 MG: 150 TABLET, EXTENDED RELEASE ORAL at 09:20

## 2022-02-25 RX ADMIN — METHOCARBAMOL TABLETS 500 MG: 500 TABLET, COATED ORAL at 09:20

## 2022-02-25 RX ADMIN — ATORVASTATIN CALCIUM 20 MG: 20 TABLET, FILM COATED ORAL at 09:20

## 2022-02-25 RX ADMIN — GABAPENTIN 300 MG: 300 CAPSULE ORAL at 17:06

## 2022-02-25 RX ADMIN — OXYCODONE 5 MG: 5 TABLET ORAL at 05:09

## 2022-02-25 RX ADMIN — ASPIRIN 81 MG 81 MG: 81 TABLET ORAL at 09:20

## 2022-02-25 RX ADMIN — Medication 150 MG: at 09:20

## 2022-02-25 RX ADMIN — Medication 150 MG: at 17:50

## 2022-02-25 RX ADMIN — OXYCODONE 5 MG: 5 TABLET ORAL at 21:20

## 2022-02-25 RX ADMIN — Medication 4 UNITS: at 12:39

## 2022-02-25 NOTE — ROUTINE PROCESS
SHIFT CHANGE NOTE FOR [facility]    Bedside and Verbal shift change report given to Jr Perry RN (oncoming nurse) by John Hansen RN (offgoing nurse). Report included the following information SBAR, Kardex, MAR and Recent Results. Situation:   Code Status: Full Code   Hospital Day: 10   Problem List:   Hospital Problems  Date Reviewed: 2/24/2022          Codes Class Noted POA    Poorly controlled type 2 diabetes mellitus ([facility] ICD-10-CM: E11.65  ICD-9-CM: 250.00  Unknown Yes    Overview Signed 2/16/2022 12:50 AM by Ceeclia Malone MD     HbA1c (2/8/2022) = 13.8             Long term current use of insulin (HCC) ICD-10-CM: Z79.4  ICD-9-CM: V58.67  Unknown Yes        High vitamin D level (Chronic) ICD-10-CM: E67.3  ICD-9-CM: 278.4  2/16/2022 Yes    Overview Signed 2/16/2022  9:52 AM by Cecelia Malone MD     Vitamin D 25-Hydroxy (2/16/2022) = 105.7             Impaired mobility and ADLs ICD-10-CM: Z74.09, Z78.9  ICD-9-CM: V49.89  2/8/2022 Yes        * (Principal) Status post below knee amputation of right lower extremity ([facility] ICD-10-CM: Z89.511  ICD-9-CM: V49.75  2/8/2022 Yes    Overview Signed 2/16/2022 12:47 AM by Cecelia Malone MD     S/P Right below-the-knee amputation (2/8/2022 - Dr. Keily Gardner)             Critical ischemia of lower extremity [facility]) ICD-10-CM: H21.233  ICD-9-CM: 459.9  2/8/2022 No        Acute postoperative anemia due to expected blood loss ICD-10-CM: D62  ICD-9-CM: 285.1  2/8/2022 Yes              Background:   Past Medical History:   Past Medical History:   Diagnosis Date    Acute postoperative anemia due to expected blood loss 2/8/2022    Arterial occlusion, lower extremity ([facility] [address] 11/27/2021    Cardiac cath 11/09/2011    RCA patent. LM patent. LAD patent. mD1 55%. CX patent. Prior stent patent. Edison 85% (2.5 x 15-mm Xience stent, resid 0%). LVEDP 12. EF 40-45%. High lateral hypk (RI distribution).       Chronic alcohol use     Fri-Sun one fifth; Mon-Thur: Pint of liquor  Constipation     Coronary artery disease involving native coronary artery of native heart     Critical ischemia of lower extremity ([facility] [address] 2/8/2022    Depression     Diabetic nephropathy associated with type 2 diabetes mellitus ([facility] [address] 4/4/2019    Diabetic [facility] with [facility]     Gastroesophageal reflux disease     High vitamin D level 2/16/2022    Vitamin D 25-Hydroxy (2/16/2022) = 105.7    History of acute inferior wall myocardial infarction 2009    History of coronary angioplasty with insertion of stent 07/29/2009    2.5 x 13 Cypher stent to CX.      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 without macular edema associated with type 2 diabetes mellitus ([facility] [address] 12/18/2020    Nuclear sclerosis of both eyes 12/18/2020    Otalgia 05/08/2019    Poorly controlled type 2 diabetes mellitus (HCC)     HbA1c (2/8/2022) = 13.8    Tobacco use disorder     contemplating stopping        Assessment:   Changes in Assessment throughout shift: No change to previous assessment     Patient has a central line: no Reasons if yes: na  Insertion date:na Last dressing date:na   Patient has Epstein Cath: no Reasons if yes: na   Insertion date:na  Shift epstein care completed: NO     Last Vitals:     Vitals:    02/23/22 2005 02/24/22 0728 02/24/22 1650 02/24/22 2125   BP: 138/77 107/67 128/75 112/65   Pulse: 84 80 78 79   Resp: 18 19 19 18   Temp: 98.3 °F (36.8 °C) 99.1 °F (37.3 °C) 98.6 °F (37 °C) 97.6 °F (36.4 °C)   SpO2: 98% 98% 99% 98%   Weight:       Height:            PAIN    Pain Assessment    Pain Intensity 1: 0 (02/25/22 0545) Pain Intensity 1: 2 (12/29/14 9873)    Pain Location 1: Leg Pain Location 1: Abdomen    Pain Intervention(s) 1: Medication (see MAR) Pain Intervention(s) 1: Medication (see MAR)  Patient Stated Pain Goal: 0 Patient Stated Pain Goal: 0  o Intervention effective: yes  o Other actions taken for pain: Medication (see MAR)     Skin Assessment  Skin color    Condition/Temperature    Integrity Skin Integrity: Incision (comment)  Turgor    Weekly Pressure Ulcer Documentation  Pressure  Injury Documentation: No Pressure Injury Noted-Pressure Ulcer Prevention Initiated  Wound Prevention & Protection Methods  Orientation of wound Orientation of Wound Prevention: Posterior  Location of Prevention Location of Wound Prevention: Sacrum/Coccyx  Dressing Present Dressing Present : No  Dressing Status    Wound Offloading Wound Offloading (Prevention Methods): Bed, pressure reduction mattress     INTAKE/OUPUT  Date 02/24/22 0700 - 02/25/22 0659 02/25/22 0700 - 02/26/22 0659   Shift 6425-2418 4295-7868 24 Hour Total 2751-0463 3641-5738 24 Hour Total   INTAKE   P.O. 1320  1320        P. O. 1320  1320      Shift Total(mL/kg) 1320(16.9)  1320(16.9)      OUTPUT   Urine(mL/kg/hr) 200(0.2)  200        Urine Voided 200  200        Urine Occurrence(s) 4 x  4 x      Shift Total(mL/kg) 200(2.6)  200(2.6)      NET 1120  1120      Weight (kg) 78.2 78.2 78.2 78.2 78.2 78.2       Recommendations:  1. Patient needs and requests: assist with toileting, pain management, dressing change     2. Pending tests/procedures: na     3. Functional Level/Equipment:   / Wheelchair    Fall Precautions:   Fall risk precautions were reinforced with the patient; he was instructed to call for help prior to getting up. The following fall risk precautions were continued: bed/ chair alarms, door signage, yellow bracelet and socks as well as update of the Amy Ground tool in the patient's room.    Loly Score: 3    HEALS Safety Check    A safety check occurred in the patient's room between off going nurse and oncoming nurse listed above. The safety check included the below items  Area Items   H  High Alert Medications - Verify all high alert medication drips (heparin, PCA, etc.)   E  Equipment - Suction is set up for ALL patients (with eliel)  - Red plugs utilized for all equipment (IV pumps, etc.)  - WOWs wiped down at end of shift.  - Room stocked with oxygen, suction, and other unit-specific supplies   A  Alarms - Bed alarm is set for fall risk patients  - Ensure chair alarm is in place and activated if patient is up in a chair   L  Lines - Check IV for any infiltration  - Gallardo bag is empty if patient has a Gallardo   - Tubing and IV bags are labeled   S  Safety   - Room is clean, patient is clean, and equipment is clean. - Hallways are clear from equipment besides carts. - Fall bracelet on for fall risk patients  - Ensure room is clear and free of clutter  - Suction is set up for ALL patients (with eliel)  - Hallways are clear from equipment besides carts.    - Isolation precautions followed, supplies available outside room, sign posted     Latia Madrigal RN

## 2022-02-25 NOTE — PROGRESS NOTES
[facility] PHYSICAL REHABILITATION  [address] [place] [address], [address]     INPATIENT REHABILITATION  DAILY PROGRESS NOTE     Date: 2/25/2022    Name: Jhonny Trinidad Age / Sex: 59 y.o. / male   CSN: [id number] MRN: [id number]   [facility] Date: 2/15/2022 Length of Stay: 10 days     Primary Rehabilitation Diagnosis: Impaired Mobility and ADLs secondary to:  1. S/P Right below-the-knee amputation (2/8/2022 - Dr. Ladonna Jcaobs)  2. History of critical ischemia of the right lower extremity      Subjective:     Patient seen and examined. Blood pressure controlled. Blood glucose controlled. Patient's Complaint:   No significant medical complaints    Pain Control: stable, mild-to-moderate joint symptoms intermittently, reasonably well controlled by current meds      Objective:     Vital Signs:  Patient Vitals for the past 24 hrs:   BP Temp Pulse Resp SpO2   02/25/22 0811 127/74 97.9 °F (36.6 °C) 73 18 99 %   02/24/22 2125 112/65 97.6 °F (36.4 °C) 79 18 98 %   02/24/22 1650 128/75 98.6 °F (37 °C) 78 19 99 %        Physical Examination:  GENERAL SURVEY: Patient is awake, alert, oriented x 3, sitting comfortably on the chair, not in acute respiratory distress. HEENT: pale palpebral conjunctivae, anicteric sclerae, no nasoaural discharge, moist oral mucosa  NECK: supple, no jugular venous distention, no palpable lymph nodes  CHEST/LUNGS: symmetrical chest expansion, good air entry, clear breath sounds  HEART: adynamic precordium, good S1 S2, no S3, regular rhythm, no murmurs  ABDOMEN: flat, bowel sounds appreciated, soft, non-tender  EXTREMITIES: (+) right BKA stump, pale nailbeds, no edema, full and equal pulses, no calf tenderness   NEUROLOGICAL EXAM: The patient is awake, alert and oriented x3, able to answer questions fairly appropriately, able to follow 1 and 2 step commands. Able to tell time from the wall clock. Cranial nerves II-XII are grossly intact. No gross sensory deficit.   Motor strength is 4/5 on BUE and BLE. Incision(s): covered, clean, dry, and intact       Current Medications:  Current Facility-Administered Medications   Medication Dose Route Frequency    insulin lispro (HUMALOG) injection 4 Units  0.05 Units/kg SubCUTAneous TIDAC    gabapentin (NEURONTIN) capsule 300 mg  300 mg Oral TID    melatonin tablet 3 mg  3 mg Oral PCD    oxyCODONE IR (ROXICODONE) tablet 5 mg  5 mg Oral Q4H PRN    insulin glargine (LANTUS) injection 35 Units  35 Units SubCUTAneous QHS    metFORMIN (GLUCOPHAGE) tablet 1,000 mg  1,000 mg Oral BID WITH MEALS    glucose chewable tablet 16 g  4 Tablet Oral PRN    glucagon (GLUCAGEN) injection 1 mg  1 mg IntraMUSCular PRN    dextrose 10% infusion 0-250 mL  0-250 mL IntraVENous PRN    iron polysaccharides (NIFEREX) capsule 150 mg  1 Capsule Oral BID    thiamine HCL (B-1) tablet 200 mg  200 mg Oral DAILY    aspirin chewable tablet 81 mg  81 mg Oral DAILY WITH BREAKFAST    atorvastatin (LIPITOR) tablet 20 mg  20 mg Oral DAILY    buPROPion SR (WELLBUTRIN SR) tablet 150 mg  150 mg Oral DAILY    therapeutic multivitamin (THERAGRAN) tablet 1 Tablet  1 Tablet Oral DAILY    acetaminophen (TYLENOL) tablet 650 mg  650 mg Oral TID    methocarbamoL (ROBAXIN) tablet 500 mg  500 mg Oral TID    acetaminophen (TYLENOL) tablet 650 mg  650 mg Oral Q4H PRN    bisacodyL (DULCOLAX) tablet 10 mg  10 mg Oral Q48H PRN    enoxaparin (LOVENOX) injection 40 mg  40 mg SubCUTAneous Q24H    insulin lispro (HUMALOG) injection   SubCUTAneous TIDAC       Allergies:   Allergies   Allergen Reactions    Niacin Itching       Lab/Data Review:  Recent Results (from the past 24 hour(s))   GLUCOSE, POC    Collection Time: 02/24/22  5:12 PM   Result Value Ref Range    Glucose (POC) 153 (H) 70 - 110 mg/dL   GLUCOSE, POC    Collection Time: 02/24/22  9:22 PM   Result Value Ref Range    Glucose (POC) 196 (H) 70 - 110 mg/dL   GLUCOSE, POC    Collection Time: 02/25/22  9:05 AM   Result Value Ref Range Glucose (POC) 150 (H) 70 - 110 mg/dL   GLUCOSE, POC    Collection Time: 02/25/22 12:16 PM   Result Value Ref Range    Glucose (POC) 121 (H) 70 - 110 mg/dL       Assessment:     Primary Rehabilitation Diagnosis  1. Impaired Mobility and ADLs  2. S/P Right below-the-knee amputation (2/8/2022 - Dr. Khushbu Arenas)  3. History of critical ischemia of the right lower extremity    Comorbidities  Patient Active Problem List   Diagnosis Code    Hyperlipidemia E78.5    Tobacco use disorder F17.200    Mediastinal adenopathy R59.0    History of vitamin D deficiency Z86.39    Insomnia G47.00    Diabetic nephropathy associated with type 2 diabetes mellitus ([facility] [address] E11.21    Otalgia H92.09    Mild nonproliferative diabetic retinopathy of left eye without macular edema associated with type 2 diabetes mellitus ([facility] [address] R90.8765    Hypertensive retinopathy H35.039    Nuclear sclerosis of both eyes H25.13    Arterial occlusion, lower extremity (HCC) I70.209    Impaired mobility and ADLs Z74.09, Z78.9    Status post below knee amputation of right lower extremity (HCC) Z89.511    Chronic alcohol use Z72.89    History of coronary angioplasty with insertion of stent Z95.5    Coronary artery disease involving native coronary artery of native heart I25.10    Constipation K59.00    Poorly controlled type 2 diabetes mellitus ([facility]) E11.65    Long term current use of insulin ([facility]) Z79.4    Migraine headache G43.909    Diabetic neuropathy associated with type 2 diabetes mellitus ([facility]) E11.40    Gastroesophageal reflux disease K21.9    History of essential hypertension Z86.79    History of lacunar cerebrovascular accident Z80.78    History of acute inferior wall myocardial infarction I25.2    Critical ischemia of lower extremity (HCC) I70.229    Acute postoperative anemia due to expected blood loss D62    Depression F32. A    High vitamin D level E67.3       Plan:     1.  Justification for continued stay: Good progression towards established rehabilitation goals. 2. Medical Issues being followed closely:    [x]  Fall and safety precautions     [x]  Wound Care     [x]  Bowel and Bladder Function     [x]  Fluid Electrolyte and Nutrition Balance     [x]  Pain Control      3. Issues that 24 hour rehabilitation nursing is following:    [x]  Fall and safety precautions     [x]  Wound Care     [x]  Bowel and Bladder Function     [x]  Fluid Electrolyte and Nutrition Balance     [x]  Pain Control      [x]  Assistance with and education on in-room safety with transfers to and from the bed, wheelchair, toilet and shower. 4. Acute rehabilitation plan of care:    [x]  Continue current care and rehab. [x]  Physical Therapy           [x]  Occupational Therapy           []  Speech Therapy     []  Hold Rehab until further notice     5. Medications:    [x]  MAR Reviewed     [x]  Continue Present Medications     6. Chemical DVT Prophylaxis:      [x]  Enoxaparin     []  Unfractionated Heparin     []  Warfarin     []  NOAC     []  Aspirin     []  None     7. Mechanical DVT Prophylaxis:      []  FLEX Stockings     []  Sequential Compression Device     [x]  None     8. GI Prophylaxis:      []  PPI     []  H2 Blocker     [x]  None / Not indicated     9. Code status:    [x]  Full code     []  Partial code     []  Do not intubate     []  Do not resuscitate     10. Diet:  Specifications  4 carb choices (60 gm/meal), Low fat/Low cholesterol/High fiber/SEEMA   Solids (consistency)  Regular   Liquids (consistency)  Thin   Fluid Restriction  None      11. COVID-19:  Laboratory testing COVID-19 rapid test ([facility], [facility]) (2/4/2022): Not detected  COVID-19 rapid test ([facility], [facility]) (2/15/2022): Not detected   Precautions None   Vaccine to be given this admission No      12. Orders:   > S/P Right below-the-knee amputation (2/8/2022 - Dr. Sarai Quevedo);  History of critical ischemia of the right lower extremity    > Staples to be removed on 3/8/2022    > Acute postoperative blood loss anemia   > Hgb/Hct (2/16/2022, on admission to the ARU) = 8.9/28.6   > Anemia work-up (2/16/2022) showed serum iron 14, TIBC 234, iron % saturation 18, ferritin 404, reticulocyte count 2.1   > On 2/16/2022, started Iron polysaccharides 150 mg PO BID      02/21/22  0608 02/17/22  0502 02/16/22  0626   HGB 8.5* 8.7* 8.9*   HCT 27.8* 27.8* 28.6*       > On 2/21/2022, patient was given Epoetin lico 10,000 units SC x 1 dose   > Continue Iron polysaccharides 150 mg PO BID    > Constipation   > On 2/17/2022, started Senna 1 tab PO once daily   > On 2/18/2022, discontinued Senna 1 tab PO once daily    > Coronary artery disease; History of inferior wall myocardial infarction; History of coronary angioplasty with stent placement   > Not on any ACE-I or beta-blockers   > Continue:    > Aspirin 81 mg PO daily with breakfast    > Atorvastatin 20 mg PO once daily    > Depression   > On 2/16/2022, discontinued Trazodone 50 mg PO q HS (re: patient says he has not been taking this prior to admission)   > Continue Bupropion  mg PO once daily    > High vitamin D level;  History of vitamin D deficiency   > Prior to [facility] to [facility], patient was on Ergocalciferol 50,000 units PO q 7 days   > Vitamin D 25-Hydroxy (2/16/2022) = 105.7   > On 2/16/2022, discontinued Ergocalciferol 50,000 units PO q 7 days     > Hyperlipidemia   > Continue Atorvastatin 20 mg PO once daily    > Type 2 diabetes mellitus, poorly controlled, with diabetic nephropathy, diabetic neuropathy, diabetic retinopathy, with long-term current use of insulin   > HbA1c (2/8/2022) = 13.8   > Vitamin B12 (2/16/2022) = 574   > On 2/16/2022:    > Increased Metformin from 500 mg to 850 mg PO BID with meals    > Increased Insulin lispro from 4 units to 5 units SC TID AC   > On 2/17/2022:    > Increased Insulin glargine from 45 units to 50 units SC daily before breakfast    > Decreased Insulin lispro from 5 units to 4 units SC TID AC   > On 2/18/2022:    > Patient was given Insulin glargine 20 units SC x 1 dose at 9PM    > Increased Metformin from 850 mg to 1000 mg PO BID with meals   > On 2/19/2022, changed Insulin glargine from 50 units SC daily before breakfast to 35 units SC q HS   > On 2/22/2022, decreased Insulin lispro from 4 units to 3 units SC TID AC   > On 2/23/2022, increased Insulin lispro from 3 units to 4 units SC TID AC   > Continue:    > Metformin 1000 mg PO BID with meals    > Insulin glargine 35 units SC q HS    > Insulin lispro 4 units SC TID AC    > Insulin lispro sliding scale SC TID AC only    > Difficulty sleeping   > On 2/16/2022, discontinued Trazodone 50 mg PO q HS (re: patient says he has not been taking this prior to admission)   > On 2/21/2022, patient reported difficulty sleeping; started Melatonin 3 mg PO daily after dinner   > Continue Melatonin 3 mg PO daily after dinner    > Depressed mood   > Patient was seen by Clinical Psychologist and patient was felt to have a depressed mood   > Option of starting an antidepressant was presented and the patient politely declined it at this time, stating he is still able to cope with his present condition    > Analgesia   > On 2/16/2022:    > Started:     > Acetaminophen 650 mg PO TID (8AM, 12PM, 4PM)     > Methocarbamol 500 mg PO TID (8AM, 2PM, 8PM)    > Increased Gabapentin from 300 mg to 400 mg PO TID (8AM, 2PM, 8PM)   > On 2/18/2022, decreased Oxycodone from 10 mg to 7.5 mg PO q 4 hr PRN for pain level 5/10 or greater    > On 2/21/2022, decreased Oxycodone from 7.5 mg to 5 mg PO q 4 hr PRN for pain level 5/10 or greater    > On 2/22/2022, decreased Gabapentin from 400 mg to 300 mg PO TID (8AM, 2PM, 8PM)   > Continue:    > Acetaminophen 650 mg PO TID (8AM, 12PM, 4PM)    > Acetaminophen 650 mg PO q 4 hr PRN for pain level 4/10 or lesser (from 8PM to 4AM only)    > Gabapentin 300 mg PO TID (8AM, 2PM, 8PM)    > Methocarbamol 500 mg PO TID (8AM, 2PM, 8PM)    > Oxycodone 5 mg PO q 4 hr PRN for pain level 5/10 or greater       12. Personal Protective Equipment (N95 face mask and eye goggles) was used while interacting with the patient. Patient was using a surgical mask. 15. Patient's progress in rehabilitation and medical issues discussed with the patient. All questions answered to the best of my ability. Care plan discussed with patient and nurse. 14. Total clinical care time is 30 minutes, including review of chart including all labs, radiology, past medical history, and discussion with patient. Greater than 50% of my time was spent in coordination of care and counseling.       Signed:    Lydia Hunt MD    February 25, 2022

## 2022-02-25 NOTE — PROGRESS NOTES
Problem: Falls - Risk of  Goal: *Absence of Falls  Description: Document Gallo Craft Fall Risk and appropriate interventions in the flowsheet. Outcome: Progressing Towards Goal  Note: Fall Risk Interventions:  Mobility Interventions: Bed/chair exit alarm         Medication Interventions: Bed/chair exit alarm    Elimination Interventions: Bed/chair exit alarm    History of Falls Interventions: Bed/chair exit alarm         Problem: Patient Education: Go to Patient Education Activity  Goal: Patient/Family Education  Outcome: Progressing Towards Goal     Problem: Diabetes Self-Management  Goal: *Disease process and treatment process  Description: Define diabetes and identify own type of diabetes; list 3 options for treating diabetes. Outcome: Progressing Towards Goal  Goal: *Incorporating nutritional management into lifestyle  Description: Describe effect of type, amount and timing of food on blood glucose; list 3 methods for planning meals. Outcome: Progressing Towards Goal  Goal: *Incorporating physical activity into lifestyle  Description: State effect of exercise on blood glucose levels. Outcome: Progressing Towards Goal  Goal: *Developing strategies to promote health/change behavior  Description: Define the ABC's of diabetes; identify appropriate screenings, schedule and personal plan for screenings. Outcome: Progressing Towards Goal  Goal: *Using medications safely  Description: State effect of diabetes medications on diabetes; name diabetes medication taking, action and side effects. Outcome: Progressing Towards Goal  Goal: *Monitoring blood glucose, interpreting and using results  Description: Identify recommended blood glucose targets  and personal targets. Outcome: Progressing Towards Goal  Goal: *Prevention, detection, treatment of acute complications  Description: List symptoms of hyper- and hypoglycemia; describe how to treat low blood sugar and actions for lowering  high blood glucose level.   Outcome: Progressing Towards Goal  Goal: *Developing strategies to address psychosocial issues  Description: Describe feelings about living with diabetes; identify support needed and support network  Outcome: Progressing Towards Goal  Goal: *Insulin pump training  Outcome: Progressing Towards Goal  Goal: *Sick day guidelines  Outcome: Progressing Towards Goal     Problem: Nutrition Deficit  Goal: *Optimize nutritional status  Outcome: Progressing Towards Goal     Problem: Patient Education: Go to Patient Education Activity  Goal: Patient/Family Education  Outcome: Progressing Towards Goal     Problem: Pressure Injury - Risk of  Goal: *Prevention of pressure injury  Description: Document Mayito Scale and appropriate interventions in the flowsheet.   Outcome: Progressing Towards Goal  Note: Pressure Injury Interventions:  Sensory Interventions: Assess changes in LOC    Moisture Interventions: Absorbent underpads    Activity Interventions: Chair cushion    Mobility Interventions: Chair cushion    Nutrition Interventions: Document food/fluid/supplement intake    Friction and Shear Interventions: HOB 30 degrees or less                Problem: Patient Education: Go to Patient Education Activity  Goal: Patient/Family Education  Outcome: Progressing Towards Goal     Problem: Patient Education: Go to Patient Education Activity  Goal: Patient/Family Education  Outcome: Progressing Towards Goal

## 2022-02-25 NOTE — PROGRESS NOTES
Problem: Neurolinguistics Impaired (Adult)  Goal: *Speech Goal: (INSERT TEXT)  Description: Long term goals  Patient will:  1. Be oriented x 3 and recall events of the day, supervision. 2. Recall 3 related words after 5 minutes with supervision and mnemonic training. 3. Perform functional problem solving tasks with 90% accuracy. 4. Recall strategies learned in OT/PT sessions (provided by the therapists) with min assist-supervision. Short term goals (by 22)  Patient will:  1. Be oriented x 3 and recall events of the day, min assist.  2. Recall 2 related words after 5 minutes with min assist and mnemonic training. 3. Perform functional problem solving tasks with 70-80% accuracy. 4. Recall strategies learned in OT/PT sessions (provided by the therapists) with mod cues. Note:   Speech language pathology treatment    Patient: Luke Chaney (44 y.o. male)  Date: 2022  Diagnosis: Status post below knee amputation of right lower extremity ([facility]) [Z89.511] Status post below knee amputation of right lower extremity ([facility] [address]       Time in: 1030  Time Out:  1100    Pain:  Pre-tx:  No report of pain  Post tx: No report of pain    SUBJECTIVE:   Patient stated I cook a lot at home. OBJECTIVE:   Mental Status:  Mr. Mu Barrera was awake and alert and participated a bit better in treatment tasks today. Treatment & Interventions:   Patient was seen in his room for two therapy sessions today. The following treatment tasks were presented:  Neuro-Linguistics:   Orientation:  Supervision  Recent memory: Mod assist  Associations:  70% accuracy (company->product)  Sequencing tasks: 80% accuracy  Reasonin% accuracy (routine tasks)  Voicing opinions: 80% of questions received response (vs \"I don't know. \")  Divergent naming:   Round things: Patient listed 4   Fast food: 4 different restaurants provided.     Response & Tolerance to Activities:  Mr. Mu Barrera was less guarded this afternoon; participation improved. Pain:  Pain Scale 1: Numeric (0 - 10)  Pain Orientation 1: Right  Pain Description 1: Aching  After treatment:   [x]       Patient left in no apparent distress sitting up in chair  []       Patient left in no apparent distress in bed  [x]       Call bell left within reach  []       Nursing notified  [x]       Caregiver present (girlfriend arrived toward the end of the session)  []       Bed alarm activated    ASSESSMENT:   Progression toward goals:  []       Improving appropriately and progressing toward goals  [x]       Improving slowly and progressing toward goals  []       Not making progress toward goals and plan of care will be adjusted    PLAN:   Patient continues to benefit from skilled intervention to address the above impairments. Continue treatment per established plan of care.   Discharge Recommendations:  Home Health    Estimated LOS: Through 3/2/22    LOI Lopez  Time Calculation: 25 mins

## 2022-02-25 NOTE — PROGRESS NOTES
Problem: Self Care Deficits Care Plan (Adult)  Goal: *Therapy Goal (Edit Goal, Insert Text)  Description: Long Term Goals  Initiated 22 and to be accomplished within 2 week(s)  1. Pt will perform self-feeding independently. 2. Pt will perform grooming independently. 3. Pt will perform UB bathing with Joseph. 4. Pt will perform LB bathing with Joseph. 5. Pt will perform tub/shower transfer with Joseph. 6. Pt will perform UB dressing with Joseph. Goal met 2022  7. Pt will perform LB dressing with Joseph. 8. Pt will perform toileting task with Joseph. 9. Pt will perform toilet transfer with Joseph.    hort Term Goals   Initiated 22 and to be accomplished within 7 day(s), reassess 22  1. Pt will perform self-feeding with Joseph. 2. Pt will perform grooming with Joseph. Goal met 2022  3. Pt will perform UB bathing with supervision. 4. Pt will perform LB bathing with supervision. 5. Pt will perform tub/shower transfer with SBA. 6. Pt will perform UB dressing with Joseph. Goal met 2022  7. Pt will perform LB dressing with Diana. Goal met 2022  8. Pt will perform toileting task with CGA. 9. Pt will perform toilet transfer with SBA. Outcome: Progressing Towards Goal  Goal: Interventions  Outcome: Progressing Towards Goal   Occupational Therapy TREATMENT    Patient: Ky Monreal   59 y.o. Patient identified with name and : yes    Date: 2022    First Tx Session  Time In: 931  Time Out[de-identified] 5029        Diagnosis: Status post below knee amputation of right lower extremity [facility]) [Z89.511]   Precautions: Fall  Chart, occupational therapy assessment, plan of care, and goals were reviewed. Pain:  Pt reports 0/10 pain or discomfort prior to treatment. Pt reports -/10 pain or discomfort post treatment. Intervention Provided:       SUBJECTIVE:   Patient stated Francheska Sanchez are we doing today? Trung Leblanc    OBJECTIVE DATA SUMMARY:     THERAPEUTIC EXERCISE Daily Assessment    Pt completed JOSE MIGUEL strengthening with Power Trainer for 15 continuous minutes to increase pt BUE strength and activity tolerance for self cares and functional mobility. Pt completed BUE strengthening with 5# free weight bicep curls 15 reps, 3 sets. UPPER BODY BATHING Daily Assessment     Pt performed UB/LB bathing via sponge bath at sink side from w/c level and standing using counter as UE support with distant supervision and good safety. LOWER BODY BATHING Daily Assessment      Pt performed LB bathing via sponge bath at sink side from w/c level and standing using counter as UE support with distant supervision and good safety. TOILETING Daily Assessment     Pt performed toileting in standing to urinate with w/c in locked position behind pt and using wall as UE support with R hand. Pt performed toileting with distant supervision     UPPER BODY DRESSING Daily Assessment     Pt completed UB dressing with distant supervision from w/c level following setup. LOWER BODY DRESSING Daily Assessment     Pt performed LB dressing from w/c level to thread LLE foot through shorts and in standing using counter as UE support to pull over buttocks with distant supervision. Pt donned RLE clam shell with Diana increasing to Joseph x2 with repetition to increase carryover. MOBILITY/TRANSFERS Daily Assessment     Pt performed sit to stand consistently with distant supervision and intermittent verbal prompts for safety. Pt performed functional mobility with w/c self propelling from room to gym and back to room with Joseph. ASSESSMENT:  Pt is increasing independence and safety with self cares increasing to distant supervision/Joseph. Pt demonstrated improved safety with functional mobility and BUE strength and activity tolerance. Pt continues to demonstrate slow initiation and confusion.    Progression toward goals:  []          Improving appropriately and progressing toward goals  [x]          Improving slowly and progressing toward goals  []          Not making progress toward goals and plan of care will be adjusted     PLAN:  Patient continues to benefit from skilled intervention to address the above impairments. Continue treatment per established plan of care. Discharge Recommendations:  Home Health with support  Further Equipment Recommendations for Discharge: bedside commode and transfer bench TBD on progress     Activity Tolerance:  good      Estimated LOS:~ DC 3/2/22    Please refer to the flowsheet for vital signs taken during this treatment. After treatment:   [x]  Patient left in no apparent distress sitting up in chair   []  Patient left in no apparent distress in bed  [x]  Call bell left within reach  []  Nursing notified  []  Caregiver present  []  Bed alarm activated    COMMUNICATION/EDUCATION:   [x] Home safety education was provided and the patient/caregiver indicated understanding. [] Patient/family have participated as able in goal setting and plan of care. [x] Patient/family agree to work toward stated goals and plan of care. [] Patient understands intent and goals of therapy, but is neutral about his/her participation. [] Patient is unable to participate in goal setting and plan of care.       Rayna Hammans, OT

## 2022-02-25 NOTE — ROUTINE PROCESS
0800 Pt. Awake sitting up in bed no change in assessment pt. Reported to be feeling fine. 0930 Pt. Sitting up in  Chair eating breakfast.  1200 with therapy. 1330 able to transfer from bed to chair with assist.  1500 no change in assessment. 1800 Pt. Sitting up in chair eating dinner.

## 2022-02-25 NOTE — PROGRESS NOTES
Problem: Mobility Impaired (Adult and Pediatric)  Goal: *Acute Goals and Plan of Care (Insert Text)  Description: Physical Therapy Short Term Goals  Initiated 2/16/2022, reassessed 2/23/2022 and to be progressed to long term goals  1. Patient will move from supine to sit and sit to supine , scoot up and down, and roll side to side in bed with independence. MET 2/23/2022  2. Patient will transfer from bed to chair and chair to bed with supervision/set-up using the least restrictive device. MET 2/23/2022  3. Patient will perform sit to stand with supervision/set-up. MET 2/23/2022  4. Patient will ambulate with supervision/set-up for 50 feet with the least restrictive device. MET 2/23/2022  5. Patient will ascend/descend 3 stairs with 1 handrail(s) with minimal assistance/contact guard assist.  Not Met - pt needs UE support on B handrails    Physical Therapy Long Term Goals  Initiated 2/16/2022 and to be accomplished within 14 day(s) (3/02/2022)  1. Patient will move from supine to sit and sit to supine , scoot up and down, and roll side to side in bed with independence. 2.  Patient will transfer from bed to chair and chair to bed with modified independence using the least restrictive device. 3.  Patient will perform sit to stand with modified independence. 4.  Patient will ambulate with modified independence for 50 feet with the least restrictive device. 5.  Patient will ascend/descend 3 stairs with 1 handrail(s) or bumping up on steps with supervision/set-up. Outcome: Progressing Towards Goal    PHYSICAL THERAPY TREATMENT    Patient: Danna Dennis (12 y.o. male)  Date: 2/25/2022  Diagnosis: Status post below knee amputation of right lower extremity ([facility]) [Z89.511] Status post below knee amputation of right lower extremity [facility])  Precautions: Fall Risk Precautions  Chart, physical therapy assessment, plan of care and goals were reviewed.     Time In:0800  Time [id number]    Patient seen for: Balance activities;Gait training;Patient education;Transfer training; Therapeutic exercise    Pain:  Pt pain was reported as \"the same,\" pre-treatment. Pt pain was reported as no c/o pain post-treatment. Intervention: offered rest breaks as needed and educated re: positioning. Patient identified with name and : yes    SUBJECTIVE:      Pt states, \"I can't do that;I havien't done that,\" re: donning limbguard on residual limb. Pt was reoriented to visual handouts he had received two days ago as well as verbally reminded of education and re-educated. Pt continuously states, \"I don't know how to do this,\" while demonstrating minimal effort with donning brace and requiring maximal education as to importance of increased independence with use of limbguard for safety at home. When PT inquired if pt had made any further progress with d/c planning for safe home entry re: installing railings or obtaining a ramp and speaking with home owner to proceed with plans pt asks, \"what do I need? \"  Pt demonstrates poor recall and poor insight noting he has not made any further advancement with planning for safe d/c home. With maximal re-education, pt states, \"I'll make some calls today. \"  Overall, pt appears unconcerned during discussion. OBJECTIVE DATA SUMMARY:    Objective:       BED/MAT MOBILITY Daily Assessment     Rolling Right : 7 (Independent)  Rolling Left : 7 (Independent)  Supine to Sit : 7 (Independent)  Sit to Supine : 7 (Independent)      TRANSFERS Daily Assessment     Transfer Type: Lateral pivot  Transfer Assistance : 5 (Supervision/setup)  Sit to Stand Assistance: Supervision   Pt requires supervision for safety due to poor attention to quality and decreased eccentric control with sitting.         GAIT Daily Assessment    Gait Description (WDL) Exceptions to WDL    Gait Abnormalities  Decreased Left foot clearance    Assistive device Gait belt;Walker, rolling    Ambulation assistance - level surface 5 (Supervision/setup)    Distance 150 Feet (ft)    Ambulation assistance- uneven surface  NT due to inclimate weather    Comments Pt requires supervision for safety with minimal verbal cuing for foot clearance with fatigue/ambulation 100 feet. BALANCE Daily Assessment     Sitting - Static: Good (unsupported)  Sitting - Dynamic: Good (unsupported)  Standing - Static: Fair  Standing - Dynamic : Impaired        WHEELCHAIR MOBILITY Daily Assessment              THERAPEUTIC EXERCISES Daily Assessment     Prone LE Strength Training  3 Sets of 10 Repetitions:  Right and Left Hip Extension with maximal verbal reminders for technique. Pt participated in fall recovery training transferring from standing to sit on 18\" mat and then transferring to floor utilizing triceps dip/lowering. To return to mat from floor, pt again utilized triceps lift weight bearing through B hands on 18\" mat table and through left LE on floor to lift his hips to sit back on mat table with close supervision. Pt donned limbguard at start of treatment with close supervision and maximal encouragement with direction to utilize visual aides. Pt required minimal assistance for managing limb sleeve. Pt also doffed and donned limbguard within treatment session again at a supervision level with moderate cuing and maximal reminders for untwisting straps on limbsleeve. ASSESSMENT:  Pt has progressed well to a supervision level but appears limited by poor insight and higher level reasoning skills demonstrating poor safety awareness. Pt progressed to participate in fall recovery training demonstrating ability to transfer from floor to mat table at a supervision level.   Progression toward goals:  []      Improving appropriately and progressing toward goals  [x]      Improving slowly and progressing toward goals  []      Not making progress toward goals and plan of care will be adjusted      PLAN:  Patient continues to benefit from skilled intervention to address the above impairments. Continue treatment per established plan of care. Continue with functional strengthening and repetition of safe and efficient movement patterns to promote improved safety and carryover. Discharge Recommendations:  Home Physical Therapy  Further Equipment Recommendations for Discharge:  gait belt, rolling walker and wheelchair 18 inch      Estimated Discharge Date: 3/02/2022    Activity Tolerance:   Good  Please refer to the flowsheet for vital signs taken during this treatment.     After treatment:   [] Patient left in no apparent distress in bed  [x] Patient left in no apparent distress sitting up in chair  [] Patient left in no apparent distress in w/c mobilizing under own power  [] Patient left in no apparent distress dining area  [] Patient left in no apparent distress mobilizing under own power  [x] Call bell left within reach  [] Nursing notified  [] Caregiver present  [] Bed alarm activated   [x] Chair alarm activated      Kelsy Eastman, PT, DPT  2/25/2022

## 2022-02-26 LAB
GLUCOSE BLD STRIP.AUTO-MCNC: 100 MG/DL (ref 70–110)
GLUCOSE BLD STRIP.AUTO-MCNC: 155 MG/DL (ref 70–110)
GLUCOSE BLD STRIP.AUTO-MCNC: 204 MG/DL (ref 70–110)
GLUCOSE BLD STRIP.AUTO-MCNC: 66 MG/DL (ref 70–110)
GLUCOSE BLD STRIP.AUTO-MCNC: 96 MG/DL (ref 70–110)

## 2022-02-26 PROCEDURE — 74011636637 HC RX REV CODE- 636/637: Performed by: INTERNAL MEDICINE

## 2022-02-26 PROCEDURE — 82962 GLUCOSE BLOOD TEST: CPT

## 2022-02-26 PROCEDURE — 65310000000 HC RM PRIVATE REHAB

## 2022-02-26 PROCEDURE — 74011250637 HC RX REV CODE- 250/637: Performed by: INTERNAL MEDICINE

## 2022-02-26 PROCEDURE — 74011250636 HC RX REV CODE- 250/636: Performed by: INTERNAL MEDICINE

## 2022-02-26 PROCEDURE — 97110 THERAPEUTIC EXERCISES: CPT

## 2022-02-26 RX ORDER — INSULIN GLARGINE 100 [IU]/ML
30 INJECTION, SOLUTION SUBCUTANEOUS
Status: DISCONTINUED | OUTPATIENT
Start: 2022-02-26 | End: 2022-02-27

## 2022-02-26 RX ADMIN — ACETAMINOPHEN 650 MG: 325 TABLET ORAL at 12:15

## 2022-02-26 RX ADMIN — GABAPENTIN 300 MG: 300 CAPSULE ORAL at 08:43

## 2022-02-26 RX ADMIN — ASPIRIN 81 MG 81 MG: 81 TABLET ORAL at 08:43

## 2022-02-26 RX ADMIN — OXYCODONE 5 MG: 5 TABLET ORAL at 18:10

## 2022-02-26 RX ADMIN — OXYCODONE 5 MG: 5 TABLET ORAL at 14:19

## 2022-02-26 RX ADMIN — ACETAMINOPHEN 650 MG: 325 TABLET ORAL at 08:42

## 2022-02-26 RX ADMIN — Medication 4 UNITS: at 17:14

## 2022-02-26 RX ADMIN — Medication 3 MG: at 17:15

## 2022-02-26 RX ADMIN — GABAPENTIN 300 MG: 300 CAPSULE ORAL at 21:05

## 2022-02-26 RX ADMIN — METFORMIN HYDROCHLORIDE 1000 MG: 500 TABLET ORAL at 17:15

## 2022-02-26 RX ADMIN — ENOXAPARIN SODIUM 40 MG: 100 INJECTION SUBCUTANEOUS at 06:45

## 2022-02-26 RX ADMIN — OXYCODONE 5 MG: 5 TABLET ORAL at 02:19

## 2022-02-26 RX ADMIN — ACETAMINOPHEN 650 MG: 325 TABLET ORAL at 16:00

## 2022-02-26 RX ADMIN — OXYCODONE 5 MG: 5 TABLET ORAL at 10:30

## 2022-02-26 RX ADMIN — METHOCARBAMOL TABLETS 500 MG: 500 TABLET, COATED ORAL at 21:05

## 2022-02-26 RX ADMIN — Medication 150 MG: at 17:15

## 2022-02-26 RX ADMIN — THERA TABS 1 TABLET: TAB at 08:43

## 2022-02-26 RX ADMIN — Medication 4 UNITS: at 08:45

## 2022-02-26 RX ADMIN — INSULIN GLARGINE 30 UNITS: 100 INJECTION, SOLUTION SUBCUTANEOUS at 21:04

## 2022-02-26 RX ADMIN — OXYCODONE 5 MG: 5 TABLET ORAL at 06:44

## 2022-02-26 RX ADMIN — Medication 1 UNITS: at 17:14

## 2022-02-26 RX ADMIN — Medication 200 MG: at 08:42

## 2022-02-26 RX ADMIN — METFORMIN HYDROCHLORIDE 1000 MG: 500 TABLET ORAL at 08:43

## 2022-02-26 RX ADMIN — ATORVASTATIN CALCIUM 20 MG: 20 TABLET, FILM COATED ORAL at 08:43

## 2022-02-26 RX ADMIN — METHOCARBAMOL TABLETS 500 MG: 500 TABLET, COATED ORAL at 14:19

## 2022-02-26 RX ADMIN — BUPROPION HYDROCHLORIDE 150 MG: 150 TABLET, EXTENDED RELEASE ORAL at 08:42

## 2022-02-26 RX ADMIN — Medication 150 MG: at 08:43

## 2022-02-26 RX ADMIN — GABAPENTIN 300 MG: 300 CAPSULE ORAL at 16:00

## 2022-02-26 RX ADMIN — METHOCARBAMOL TABLETS 500 MG: 500 TABLET, COATED ORAL at 08:43

## 2022-02-26 NOTE — PROGRESS NOTES
Problem: Self Care Deficits Care Plan (Adult)  Goal: *Therapy Goal (Edit Goal, Insert Text)  Description: Long Term Goals  Initiated 22 and to be accomplished within 2 week(s)  1. Pt will perform self-feeding independently. 2. Pt will perform grooming independently. 3. Pt will perform UB bathing with Joseph. 4. Pt will perform LB bathing with Joseph. 5. Pt will perform tub/shower transfer with Joseph. 6. Pt will perform UB dressing with Joseph. Goal met 2022  7. Pt will perform LB dressing with Joseph. 8. Pt will perform toileting task with Joseph. 9. Pt will perform toilet transfer with Joseph.    hort Term Goals   Initiated 22 and to be accomplished within 7 day(s), reassess 22  1. Pt will perform self-feeding with Joseph. 2. Pt will perform grooming with Joseph. Goal met 2022  3. Pt will perform UB bathing with supervision. 4. Pt will perform LB bathing with supervision. 5. Pt will perform tub/shower transfer with SBA. 6. Pt will perform UB dressing with Joseph. Goal met 2022  7. Pt will perform LB dressing with Diana. Goal met 2022  8. Pt will perform toileting task with CGA. 9. Pt will perform toilet transfer with SBA. Outcome: Progressing Towards Goal   Occupational Therapy TREATMENT    Patient: Daksha Vargas   59 y.o. Patient identified with name and : yes    Date: 2022    First Tx Session  Time In: 10:00  Time Out[de-identified] 11:00      Diagnosis: Status post below knee amputation of right lower extremity [facility]) [Z89.511]   Precautions: Fall  Chart, occupational therapy assessment, plan of care, and goals were reviewed. Pain:  Pt reports 2/10 pain or discomfort prior to treatment. Pt reports 2/10 pain or discomfort post treatment. Intervention Provided: na;  he said he felt pain in his R foot      SUBJECTIVE:   Patient stated I am being discharged on Tuesday.     OBJECTIVE DATA SUMMARY:     THERAPEUTIC EXERCISE Daily Assessment   Tx in his room He completed B AROM stretching 10 reps x8 exercises, green & red digitflex 30 reps 4 way and switched hands to do the same B hands;  2-lbs dowel 15 reps x 10 exercises seated; red flexbar 5 way 2 minutes each direction. ASSESSMENT:  Good tx participation & motivation noted this am.  He completed all requested tasks w/o difficulty. He did experience a 1x pain in his R foot during tx 2/10. He stated that his lady friend was going to visit today and he was happy to have tx earlier today. Good strength noted in B hands & UE. Progression toward goals:  [x]          Improving appropriately and progressing toward goals  []          Improving slowly and progressing toward goals  []          Not making progress toward goals and plan of care will be adjusted     PLAN:  Patient continues to benefit from skilled intervention to address the above impairments. Continue treatment per established plan of care. Discharge Recommendations:  Home Health  Further Equipment Recommendations for Discharge:  N/A     Activity Tolerance:  Good     Estimated LOS: per poc    Please refer to the flowsheet for vital signs taken during this treatment. After treatment:   [x]  Patient left in no apparent distress sitting up in chair   []  Patient left in no apparent distress in bed  [x]  Call bell left within reach  []  Nursing notified  []  Caregiver present  []  Bed alarm activated    COMMUNICATION/EDUCATION:   [] Home safety education was provided and the patient/caregiver indicated understanding. [x] Patient/family have participated as able in goal setting and plan of care. [] Patient/family agree to work toward stated goals and plan of care. [] Patient understands intent and goals of therapy, but is neutral about his/her participation. [] Patient is unable to participate in goal setting and plan of care.       Augusto Tran, OTR/L

## 2022-02-26 NOTE — PROGRESS NOTES
[facility] PHYSICAL REHABILITATION  [address] [place] [address], [address]     INPATIENT REHABILITATION  DAILY PROGRESS NOTE     Date: 2/26/2022    Name: Rory Teresa Age / Sex: 59 y.o. / male   CSN: [id number] MRN: [id number]   [address] [place] Date: 2/15/2022 Length of Stay: 11 days     Primary Rehabilitation Diagnosis: Impaired Mobility and ADLs secondary to:  1. S/P Right below-the-knee amputation (2/8/2022 - Dr. Teresa Lin)  2. History of critical ischemia of the right lower extremity      Subjective:     No new issues or problems reported. Blood pressure controlled. Blood glucose low before lunch.        Objective:     Vital Signs:  Patient Vitals for the past 24 hrs:   BP Temp Pulse Resp SpO2   02/26/22 0747 137/85 97.7 °F (36.5 °C) 73 16 100 %   02/25/22 2120 126/77 98.8 °F (37.1 °C) 79 16 99 %   02/25/22 1556 125/71 98.4 °F (36.9 °C) 68 16 99 %        Current Medications:  Current Facility-Administered Medications   Medication Dose Route Frequency    insulin lispro (HUMALOG) injection 4 Units  0.05 Units/kg SubCUTAneous TIDAC    gabapentin (NEURONTIN) capsule 300 mg  300 mg Oral TID    melatonin tablet 3 mg  3 mg Oral PCD    oxyCODONE IR (ROXICODONE) tablet 5 mg  5 mg Oral Q4H PRN    insulin glargine (LANTUS) injection 35 Units  35 Units SubCUTAneous QHS    metFORMIN (GLUCOPHAGE) tablet 1,000 mg  1,000 mg Oral BID WITH MEALS    glucose chewable tablet 16 g  4 Tablet Oral PRN    glucagon (GLUCAGEN) injection 1 mg  1 mg IntraMUSCular PRN    dextrose 10% infusion 0-250 mL  0-250 mL IntraVENous PRN    iron polysaccharides (NIFEREX) capsule 150 mg  1 Capsule Oral BID    thiamine HCL (B-1) tablet 200 mg  200 mg Oral DAILY    aspirin chewable tablet 81 mg  81 mg Oral DAILY WITH BREAKFAST    atorvastatin (LIPITOR) tablet 20 mg  20 mg Oral DAILY    buPROPion SR (WELLBUTRIN SR) tablet 150 mg  150 mg Oral DAILY    therapeutic multivitamin (THERAGRAN) tablet 1 Tablet  1 Tablet Oral DAILY  acetaminophen (TYLENOL) tablet 650 mg  650 mg Oral TID    methocarbamoL (ROBAXIN) tablet 500 mg  500 mg Oral TID    acetaminophen (TYLENOL) tablet 650 mg  650 mg Oral Q4H PRN    bisacodyL (DULCOLAX) tablet 10 mg  10 mg Oral Q48H PRN    enoxaparin (LOVENOX) injection 40 mg  40 mg SubCUTAneous Q24H    insulin lispro (HUMALOG) injection   SubCUTAneous TIDAC       Allergies: Allergies   Allergen Reactions    Niacin Itching       Lab/Data Review:  Recent Results (from the past 24 hour(s))   GLUCOSE, POC    Collection Time: 02/25/22  4:44 PM   Result Value Ref Range    Glucose (POC) 150 (H) 70 - 110 mg/dL   GLUCOSE, POC    Collection Time: 02/25/22  9:24 PM   Result Value Ref Range    Glucose (POC) 132 (H) 70 - 110 mg/dL   GLUCOSE, POC    Collection Time: 02/26/22  8:01 AM   Result Value Ref Range    Glucose (POC) 100 70 - 110 mg/dL   GLUCOSE, POC    Collection Time: 02/26/22 12:10 PM   Result Value Ref Range    Glucose (POC) 66 (L) 70 - 110 mg/dL   GLUCOSE, POC    Collection Time: 02/26/22 12:26 PM   Result Value Ref Range    Glucose (POC) 96 70 - 110 mg/dL       Assessment:     Primary Rehabilitation Diagnosis  1. Impaired Mobility and ADLs  2. S/P Right below-the-knee amputation (2/8/2022 - Dr. Randall Rausch)  3.  History of critical ischemia of the right lower extremity    Comorbidities  Patient Active Problem List   Diagnosis Code    Hyperlipidemia E78.5    Tobacco use disorder F17.200    Mediastinal adenopathy R59.0    History of vitamin D deficiency Z86.39    Insomnia G47.00    Diabetic nephropathy associated with type 2 diabetes mellitus ([facility] E11.21    Otalgia H92.09    Mild nonproliferative diabetic retinopathy of left eye without macular edema associated with type 2 diabetes mellitus ([facility] N83.7834    Hypertensive retinopathy H35.039    Nuclear sclerosis of both eyes H25.13    Arterial occlusion, lower extremity (HCC) I70.209    Impaired mobility and ADLs Z74.09, Z78.9    Status post below knee amputation of right lower extremity ([facility]) Z89.511    Chronic alcohol use Z72.89    History of coronary angioplasty with insertion of stent Z95.5    Coronary artery disease involving native coronary artery of native heart I25.10    Constipation K59.00    Poorly controlled type 2 diabetes mellitus ([facility]) E11.65    Long term current use of insulin ([facility]) Z79.4    Migraine headache G43.909    Diabetic neuropathy associated with type 2 diabetes mellitus ([facility]) E11.40    Gastroesophageal reflux disease K21.9    History of essential hypertension Z86.79    History of lacunar cerebrovascular accident Z80.78    History of acute inferior wall myocardial infarction I25.2    Critical ischemia of lower extremity ([facility]) I70.229    Acute postoperative anemia due to expected blood loss D62    Depression F32. A    High vitamin D level E67.3       Plan:     1. Justification for continued stay: Good progression towards established rehabilitation goals. 2. Medical Issues being followed closely:    [x]  Fall and safety precautions     [x]  Wound Care     [x]  Bowel and Bladder Function     [x]  Fluid Electrolyte and Nutrition Balance     [x]  Pain Control      3. Issues that 24 hour rehabilitation nursing is following:    [x]  Fall and safety precautions     [x]  Wound Care     [x]  Bowel and Bladder Function     [x]  Fluid Electrolyte and Nutrition Balance     [x]  Pain Control      [x]  Assistance with and education on in-room safety with transfers to and from the bed, wheelchair, toilet and shower. 4. Acute rehabilitation plan of care:    [x]  Continue current care and rehab. [x]  Physical Therapy           [x]  Occupational Therapy           []  Speech Therapy     []  Hold Rehab until further notice     5. Medications:    [x]  MAR Reviewed     [x]  Continue Present Medications     6.  Chemical DVT Prophylaxis:      [x]  Enoxaparin     []  Unfractionated Heparin     []  Warfarin     []  NOAC []  Aspirin     []  None     7. Mechanical DVT Prophylaxis:      []  FLEX Stockings     []  Sequential Compression Device     [x]  None     8. GI Prophylaxis:      []  PPI     []  H2 Blocker     [x]  None / Not indicated     9. Code status:    [x]  Full code     []  Partial code     []  Do not intubate     []  Do not resuscitate     10. Diet:  Specifications  4 carb choices (60 gm/meal), Low fat/Low cholesterol/High fiber/SEEMA   Solids (consistency)  Regular   Liquids (consistency)  Thin   Fluid Restriction  None      11. COVID-19:  Laboratory testing COVID-19 rapid test ([facility], [facility]) (2/4/2022): Not detected  COVID-19 rapid test ([facility]) (2/15/2022): Not detected   Precautions None   Vaccine to be given this admission No      12. Orders:   > S/P Right below-the-knee amputation (2/8/2022 - Dr. Randall Rausch); History of critical ischemia of the right lower extremity    > Staples to be removed on 3/8/2022    > Acute postoperative blood loss anemia   > Hgb/Hct (2/16/2022, on admission to the ARU) = 8.9/28.6   > Anemia work-up (2/16/2022) showed serum iron 14, TIBC 234, iron % saturation 18, ferritin 404, reticulocyte count 2.1   > On 2/16/2022, started Iron polysaccharides 150 mg PO BID      02/21/22  0608 02/17/22  0502 02/16/22  0626   HGB 8.5* 8.7* 8.9*   HCT 27.8* 27.8* 28.6*       > On 2/21/2022, patient was given Epoetin lico 10,000 units SC x 1 dose   > Continue Iron polysaccharides 150 mg PO BID    > Constipation   > On 2/17/2022, started Senna 1 tab PO once daily   > On 2/18/2022, discontinued Senna 1 tab PO once daily    > Coronary artery disease; History of inferior wall myocardial infarction;  History of coronary angioplasty with stent placement   > Not on any ACE-I or beta-blockers   > Continue:    > Aspirin 81 mg PO daily with breakfast    > Atorvastatin 20 mg PO once daily    > Depression   > On 2/16/2022, discontinued Trazodone 50 mg PO q HS (re: patient says he has not been taking this prior to admission)   > Continue Bupropion  mg PO once daily    > High vitamin D level;  History of vitamin D deficiency   > Prior to [facility], patient was on Ergocalciferol 50,000 units PO q 7 days   > Vitamin D 25-Hydroxy (2/16/2022) = 105.7   > On 2/16/2022, discontinued Ergocalciferol 50,000 units PO q 7 days     > Hyperlipidemia   > Continue Atorvastatin 20 mg PO once daily    > Type 2 diabetes mellitus, poorly controlled, with diabetic nephropathy, diabetic neuropathy, diabetic retinopathy, with long-term current use of insulin   > HbA1c (2/8/2022) = 13.8   > Vitamin B12 (2/16/2022) = 574   > On 2/16/2022:    > Increased Metformin from 500 mg to 850 mg PO BID with meals    > Increased Insulin lispro from 4 units to 5 units SC TID AC   > On 2/17/2022:    > Increased Insulin glargine from 45 units to 50 units SC daily before breakfast    > Decreased Insulin lispro from 5 units to 4 units SC TID AC   > On 2/18/2022:    > Patient was given Insulin glargine 20 units SC x 1 dose at 9PM    > Increased Metformin from 850 mg to 1000 mg PO BID with meals   > On 2/19/2022, changed Insulin glargine from 50 units SC daily before breakfast to 35 units SC q HS   > On 2/22/2022, decreased Insulin lispro from 4 units to 3 units SC TID AC   > On 2/23/2022, increased Insulin lispro from 3 units to 4 units SC TID AC   > Continue:    > Metformin 1000 mg PO BID with meals    > Decrease Insulin glargine from 35 units to 30 units SC q HS    > Insulin lispro 4 units SC TID AC    > Insulin lispro sliding scale SC TID AC only    > Difficulty sleeping   > On 2/16/2022, discontinued Trazodone 50 mg PO q HS (re: patient says he has not been taking this prior to admission)   > On 2/21/2022, patient reported difficulty sleeping; started Melatonin 3 mg PO daily after dinner   > Continue Melatonin 3 mg PO daily after dinner    > Depressed mood   > Patient was seen by Clinical Psychologist and patient was felt to have a depressed mood   > Option of starting an antidepressant was presented and the patient politely declined it at this time, stating he is still able to cope with his present condition    > Analgesia   > On 2/16/2022:    > Started:     > Acetaminophen 650 mg PO TID (8AM, 12PM, 4PM)     > Methocarbamol 500 mg PO TID (8AM, 2PM, 8PM)    > Increased Gabapentin from 300 mg to 400 mg PO TID (8AM, 2PM, 8PM)   > On 2/18/2022, decreased Oxycodone from 10 mg to 7.5 mg PO q 4 hr PRN for pain level 5/10 or greater    > On 2/21/2022, decreased Oxycodone from 7.5 mg to 5 mg PO q 4 hr PRN for pain level 5/10 or greater    > On 2/22/2022, decreased Gabapentin from 400 mg to 300 mg PO TID (8AM, 2PM, 8PM)   > Continue:    > Acetaminophen 650 mg PO TID (8AM, 12PM, 4PM)    > Acetaminophen 650 mg PO q 4 hr PRN for pain level 4/10 or lesser (from 8PM to 4AM only)    > Gabapentin 300 mg PO TID (8AM, 2PM, 8PM)    > Methocarbamol 500 mg PO TID (8AM, 2PM, 8PM)    > Oxycodone 5 mg PO q 4 hr PRN for pain level 5/10 or greater       Signed:    Diane Mccoy MD    February 26, 2022

## 2022-02-26 NOTE — ROUTINE PROCESS
SHIFT CHANGE NOTE FOR [facility]    Bedside and Verbal shift change report given to ANITHA Louie (oncoming nurse) by Kaylin Nelson RN (offgoing nurse). Report included the following information SBAR, Kardex, MAR and Recent Results. Situation:   Code Status: Full Code   Hospital Day: 11   Problem List:   Hospital Problems  Date Reviewed: 2/26/2022          Codes Class Noted POA    Poorly controlled type 2 diabetes mellitus ([facility] ICD-10-CM: E11.65  ICD-9-CM: 250.00  Unknown Yes    Overview Signed 2/16/2022 12:50 AM by Adi Salazar MD     HbA1c (2/8/2022) = 13.8             Long term current use of insulin (HCC) ICD-10-CM: Z79.4  ICD-9-CM: V58.67  Unknown Yes        High vitamin D level (Chronic) ICD-10-CM: E67.3  ICD-9-CM: 278.4  2/16/2022 Yes    Overview Signed 2/16/2022  9:52 AM by Adi Salazar MD     Vitamin D 25-Hydroxy (2/16/2022) = 105.7             Impaired mobility and ADLs ICD-10-CM: Z74.09, Z78.9  ICD-9-CM: V49.89  2/8/2022 Yes        * (Principal) Status post below knee amputation of right lower extremity ([facility] ICD-10-CM: Z89.511  ICD-9-CM: V49.75  2/8/2022 Yes    Overview Signed 2/16/2022 12:47 AM by Adi Salazar MD     S/P Right below-the-knee amputation (2/8/2022 - Dr. Michelle Brito)             Critical ischemia of lower extremity [facility]) ICD-10-CM: X97.023  ICD-9-CM: 459.9  2/8/2022 No        Acute postoperative anemia due to expected blood loss ICD-10-CM: D62  ICD-9-CM: 285.1  2/8/2022 Yes              Background:   Past Medical History:   Past Medical History:   Diagnosis Date    Acute postoperative anemia due to expected blood loss 2/8/2022    Arterial occlusion, lower extremity ([facility] 11/27/2021    Cardiac cath 11/09/2011    RCA patent. LM patent. LAD patent. mD1 55%. CX patent. Prior stent patent. Edison 85% (2.5 x 15-mm Xience stent, resid 0%). LVEDP 12. EF 40-45%. High lateral hypk (RI distribution).       Chronic alcohol use     Fri-Sun one fifth; Mon-Thur: Pint of liquor  Constipation     Coronary artery disease involving native coronary artery of native heart     Critical ischemia of lower extremity ([facility] 2/8/2022    Depression     Diabetic nephropathy associated with type 2 diabetes mellitus ([facility] 4/4/2019    Diabetic neuropathy associated with type 2 diabetes mellitus ([facility]     Gastroesophageal reflux disease     High vitamin D level 2/16/2022    Vitamin D 25-Hydroxy (2/16/2022) = 105.7    History of acute inferior wall myocardial infarction 2009    History of coronary angioplasty with insertion of stent 07/29/2009    2.5 x 13 Cypher stent to CX.      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 without macular edema associated with type 2 diabetes mellitus ([facility] 12/18/2020    Nuclear sclerosis of both eyes 12/18/2020    Otalgia 05/08/2019    Poorly controlled type 2 diabetes mellitus (HCC)     HbA1c (2/8/2022) = 13.8    Tobacco use disorder     contemplating stopping        Assessment:   Changes in Assessment throughout shift: No change to previous assessment     Patient has a central line: no Reasons if yes: n  Insertion date:na Last dressing date:na   Patient has Epstein Cath: no Reasons if yes: na   Insertion date:na  Shift epstein care completed: NO     Last Vitals:     Vitals:    02/25/22 1556 02/25/22 2120 02/26/22 0747 02/26/22 1546   BP: 125/71 126/77 137/85 114/68   Pulse: 68 79 73 62   Resp: 16 16 16 14   Temp: 98.4 °F (36.9 °C) 98.8 °F (37.1 °C) 97.7 °F (36.5 °C) 97.4 °F (36.3 °C)   SpO2: 99% 99% 100% 100%   Weight:       Height:            PAIN    Pain Assessment    Pain Intensity 1: 8 (02/26/22 1068) Pain Intensity 1: 2 (12/29/14 5129)    Pain Location 1: Leg Pain Location 1: Abdomen    Pain Intervention(s) 1: Medication (see MAR) Pain Intervention(s) 1: Medication (see MAR)  Patient Stated Pain Goal: 0 Patient Stated Pain Goal: 0  o Intervention effective: yes  o Other actions taken for pain: Medication (see MAR)     Skin Assessment  Skin color    Condition/Temperature    Integrity Skin Integrity: Incision (comment)  Turgor    Weekly Pressure Ulcer Documentation  Pressure  Injury Documentation: No Pressure Injury Noted-Pressure Ulcer Prevention Initiated  Wound Prevention & Protection Methods  Orientation of wound Orientation of Wound Prevention: Posterior  Location of Prevention Location of Wound Prevention: Sacrum/Coccyx  Dressing Present Dressing Present : No  Dressing Status    Wound Offloading Wound Offloading (Prevention Methods): Bed, pressure redistribution/air,Bed, pressure reduction mattress,Repositioning     INTAKE/OUPUT  Date 02/25/22 1900 - 02/26/22 0659 02/26/22 0700 - 02/27/22 0659   Shift 6102-3690 24 Hour Total 1225-7822 1409-2118 24 Hour Total   INTAKE   P.O.  1200 480  480     P. O.  1200 480  480   Shift Total(mL/kg)  1200(15.3) 480(6.1)  480(6.1)   OUTPUT   Urine(mL/kg/hr)  1000        Urine Voided  1000        Urine Occurrence(s) 3 x 9 x 4 x  4 x   Stool          Stool Occurrence(s) 0 x 0 x 0 x  0 x   Shift Total(mL/kg)  1000(12.8)      NET  200 480  480   Weight (kg) 78.2 78.2 78.2 78.2 78.2       Recommendations:  1. Patient needs and requests: pain management    2. Pending tests/procedures: none     3. Functional Level/Equipment: Partial (one person) / Bed (comment); Wheelchair;Stabilization belt    Fall Precautions:   Fall risk precautions were reinforced with the patient; he was instructed to call for help prior to getting up. The following fall risk precautions were continued: bed/ chair alarms, door signage, yellow bracelet and socks as well as update of the [place] tool in the patient's room.    [employer] Score: 3    HEALS Safety Check    A safety check occurred in the patient's room between off going nurse and oncoming nurse listed above. The safety check included the below items  Area Items   H  High Alert Medications - Verify all high alert medication drips (heparin, PCA, etc.)   E  Equipment - Suction is set up for ALL patients (with eliel)  - Red plugs utilized for all equipment (IV pumps, etc.)  - WOWs wiped down at end of shift.  - Room stocked with oxygen, suction, and other unit-specific supplies   A  Alarms - Bed alarm is set for fall risk patients  - Ensure chair alarm is in place and activated if patient is up in a chair   L  Lines - Check IV for any infiltration  - Gallardo bag is empty if patient has a Gallardo   - Tubing and IV bags are labeled   S  Safety   - Room is clean, patient is clean, and equipment is clean. - Hallways are clear from equipment besides carts. - Fall bracelet on for fall risk patients  - Ensure room is clear and free of clutter  - Suction is set up for ALL patients (with eliel)  - Hallways are clear from equipment besides carts.    - Isolation precautions followed, supplies available outside room, sign posted     Marcio Marin RN

## 2022-02-26 NOTE — ROUTINE PROCESS
SHIFT CHANGE NOTE FOR [facility]    Bedside and Verbal shift change report given to Phan Cook, RN (oncoming nurse) by Stevie Castañeda RN (offgoing nurse). Report included the following information SBAR, Kardex, MAR and Recent Results. Situation:   Code Status: Full Code   Hospital Day: 11   Problem List:   Hospital Problems  Date Reviewed: 2/25/2022          Codes Class Noted POA    Poorly controlled type 2 diabetes mellitus ([facility] ICD-10-CM: E11.65  ICD-9-CM: 250.00  Unknown Yes    Overview Signed 2/16/2022 12:50 AM by Samantha Traylor MD     HbA1c (2/8/2022) = 13.8             Long term current use of insulin (HCC) ICD-10-CM: Z79.4  ICD-9-CM: V58.67  Unknown Yes        High vitamin D level (Chronic) ICD-10-CM: E67.3  ICD-9-CM: 278.4  2/16/2022 Yes    Overview Signed 2/16/2022  9:52 AM by Samantha Traylor MD     Vitamin D 25-Hydroxy (2/16/2022) = 105.7             Impaired mobility and ADLs ICD-10-CM: Z74.09, Z78.9  ICD-9-CM: V49.89  2/8/2022 Yes        * (Principal) Status post below knee amputation of right lower extremity ([facility] [address] ICD-10-CM: Z89.511  ICD-9-CM: V49.75  2/8/2022 Yes    Overview Signed 2/16/2022 12:47 AM by Samantha Traylor MD     S/P Right below-the-knee amputation (2/8/2022 - Dr. Mcgee Fees)             Critical ischemia of lower extremity [facility]) ICD-10-CM: T26.187  ICD-9-CM: 459.9  2/8/2022 No        Acute postoperative anemia due to expected blood loss ICD-10-CM: D62  ICD-9-CM: 285.1  2/8/2022 Yes              Background:   Past Medical History:   Past Medical History:   Diagnosis Date    Acute postoperative anemia due to expected blood loss 2/8/2022    Arterial occlusion, lower extremity ([facility] [address] 11/27/2021    Cardiac cath 11/09/2011    RCA patent. LM patent. LAD patent. mD1 55%. CX patent. Prior stent patent. Edison 85% (2.5 x 15-mm Xience stent, resid 0%). LVEDP 12. EF 40-45%. High lateral hypk (RI distribution).       Chronic alcohol use     Fri-Sun one fifth; Mon-Thur: Pint of liquor  Constipation     Coronary artery disease involving native coronary artery of native heart     Critical ischemia of lower extremity ([facility] 2/8/2022    Depression     Diabetic nephropathy associated with type 2 diabetes mellitus ([facility] 4/4/2019    Diabetic neuropathy associated with type 2 diabetes mellitus ([facility]     Gastroesophageal reflux disease     High vitamin D level 2/16/2022    Vitamin D 25-Hydroxy (2/16/2022) = 105.7    History of acute inferior wall myocardial infarction 2009    History of coronary angioplasty with insertion of stent 07/29/2009    2.5 x 13 Cypher stent to CX.      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 without macular edema associated with type 2 diabetes mellitus ([facility] 12/18/2020    Nuclear sclerosis of both eyes 12/18/2020    Otalgia 05/08/2019    Poorly controlled type 2 diabetes mellitus (HCC)     HbA1c (2/8/2022) = 13.8    Tobacco use disorder     contemplating stopping        Assessment:   Changes in Assessment throughout shift:       Patient has a central line: no Reasons if yes: n  Insertion date:na Last dressing date:na   Patient has Epstein Cath: no Reasons if yes: na   Insertion date:na  Shift epstein care completed: NO     Last Vitals:     Vitals:    02/24/22 2125 02/25/22 0811 02/25/22 1556 02/25/22 2120   BP: 112/65 127/74 125/71 126/77   Pulse: 79 73 68 79   Resp: 18 18 16 16   Temp: 97.6 °F (36.4 °C) 97.9 °F (36.6 °C) 98.4 °F (36.9 °C) 98.8 °F (37.1 °C)   SpO2: 98% 99% 99% 99%   Weight:       Height:            PAIN    Pain Assessment    Pain Intensity 1: 0 (02/26/22 0400) Pain Intensity 1: 2 (12/29/14 1105)    Pain Location 1: Leg Pain Location 1: Abdomen    Pain Intervention(s) 1: Medication (see MAR) Pain Intervention(s) 1: Medication (see MAR)  Patient Stated Pain Goal: 0 Patient Stated Pain Goal: 0  o Intervention effective: yes  o Other actions taken for pain: Medication (see MAR)     Skin Assessment  Skin color    Condition/Temperature    Integrity Skin Integrity: Incision (comment)  Turgor    Weekly Pressure Ulcer Documentation  Pressure  Injury Documentation: No Pressure Injury Noted-Pressure Ulcer Prevention Initiated  Wound Prevention & Protection Methods  Orientation of wound Orientation of Wound Prevention: Posterior  Location of Prevention Location of Wound Prevention: Buttocks,Sacrum/Coccyx  Dressing Present Dressing Present : No  Dressing Status    Wound Offloading Wound Offloading (Prevention Methods): Bed, pressure redistribution/air     INTAKE/OUPUT  Date 02/25/22 0700 - 02/26/22 0659 02/26/22 0700 - 02/27/22 0659   Shift 2532-2036 0777-9125 24 Hour Total 0003-4702 0160-7743 24 Hour Total   INTAKE   P.O. 1200  1200        P. O. 1200  1200      Shift Total(mL/kg) 1200(15.3)  1200(15.3)      OUTPUT   Urine(mL/kg/hr) 1000(1.1)  1000        Urine Voided 1000  1000        Urine Occurrence(s) 6 x  6 x      Shift Total(mL/kg) 1000(12.8)  1000(12.8)        200      Weight (kg) 78.2 78.2 78.2 78.2 78.2 78.2       Recommendations:  1. Patient needs and requests: pain management    2. Pending tests/procedures: none     3. Functional Level/Equipment:   / Wheelchair    Fall Precautions:   Fall risk precautions were reinforced with the patient; he was instructed to call for help prior to getting up. The following fall risk precautions were continued: bed/ chair alarms, door signage, yellow bracelet and socks as well as update of the Beulah Shouts tool in the patient's room. Loly Score: 3    HEALS Safety Check    A safety check occurred in the patient's room between off going nurse and oncoming nurse listed above.     The safety check included the below items  Area Items   H  High Alert Medications - Verify all high alert medication drips (heparin, PCA, etc.)   E  Equipment - Suction is set up for ALL patients (with eliel)  - Red plugs utilized for all equipment (IV pumps, etc.)  - WOWs wiped down at end of shift.  - Room stocked with oxygen, suction, and other unit-specific supplies   A  Alarms - Bed alarm is set for fall risk patients  - Ensure chair alarm is in place and activated if patient is up in a chair   L  Lines - Check IV for any infiltration  - Gallardo bag is empty if patient has a Gallardo   - Tubing and IV bags are labeled   S  Safety   - Room is clean, patient is clean, and equipment is clean. - Hallways are clear from equipment besides carts. - Fall bracelet on for fall risk patients  - Ensure room is clear and free of clutter  - Suction is set up for ALL patients (with eliel)  - Hallways are clear from equipment besides carts.    - Isolation precautions followed, supplies available outside room, sign posted     Alfred Chaves RN

## 2022-02-27 LAB
GLUCOSE BLD STRIP.AUTO-MCNC: 102 MG/DL (ref 70–110)
GLUCOSE BLD STRIP.AUTO-MCNC: 152 MG/DL (ref 70–110)
GLUCOSE BLD STRIP.AUTO-MCNC: 161 MG/DL (ref 70–110)
GLUCOSE BLD STRIP.AUTO-MCNC: 81 MG/DL (ref 70–110)

## 2022-02-27 PROCEDURE — 74011250636 HC RX REV CODE- 250/636: Performed by: INTERNAL MEDICINE

## 2022-02-27 PROCEDURE — 65310000000 HC RM PRIVATE REHAB

## 2022-02-27 PROCEDURE — 74011250637 HC RX REV CODE- 250/637: Performed by: INTERNAL MEDICINE

## 2022-02-27 PROCEDURE — 82962 GLUCOSE BLOOD TEST: CPT

## 2022-02-27 PROCEDURE — 77030041076 HC DRSG AG OPTICELL MDII -A

## 2022-02-27 PROCEDURE — 74011636637 HC RX REV CODE- 636/637: Performed by: INTERNAL MEDICINE

## 2022-02-27 PROCEDURE — 2709999900 HC NON-CHARGEABLE SUPPLY

## 2022-02-27 RX ORDER — INSULIN LISPRO 100 [IU]/ML
0.04 INJECTION, SOLUTION INTRAVENOUS; SUBCUTANEOUS
Status: DISCONTINUED | OUTPATIENT
Start: 2022-02-27 | End: 2022-02-28

## 2022-02-27 RX ORDER — INSULIN GLARGINE 100 [IU]/ML
0.3 INJECTION, SOLUTION SUBCUTANEOUS
Status: DISCONTINUED | OUTPATIENT
Start: 2022-02-27 | End: 2022-02-28

## 2022-02-27 RX ADMIN — GABAPENTIN 300 MG: 300 CAPSULE ORAL at 21:47

## 2022-02-27 RX ADMIN — METHOCARBAMOL TABLETS 500 MG: 500 TABLET, COATED ORAL at 21:47

## 2022-02-27 RX ADMIN — OXYCODONE 5 MG: 5 TABLET ORAL at 18:01

## 2022-02-27 RX ADMIN — METHOCARBAMOL TABLETS 500 MG: 500 TABLET, COATED ORAL at 14:16

## 2022-02-27 RX ADMIN — METFORMIN HYDROCHLORIDE 1000 MG: 500 TABLET ORAL at 08:21

## 2022-02-27 RX ADMIN — Medication 150 MG: at 18:01

## 2022-02-27 RX ADMIN — Medication 3 UNITS: at 18:01

## 2022-02-27 RX ADMIN — GABAPENTIN 300 MG: 300 CAPSULE ORAL at 08:21

## 2022-02-27 RX ADMIN — ASPIRIN 81 MG 81 MG: 81 TABLET ORAL at 08:21

## 2022-02-27 RX ADMIN — GABAPENTIN 300 MG: 300 CAPSULE ORAL at 16:00

## 2022-02-27 RX ADMIN — METFORMIN HYDROCHLORIDE 1000 MG: 500 TABLET ORAL at 18:01

## 2022-02-27 RX ADMIN — THERA TABS 1 TABLET: TAB at 08:21

## 2022-02-27 RX ADMIN — ENOXAPARIN SODIUM 40 MG: 100 INJECTION SUBCUTANEOUS at 05:13

## 2022-02-27 RX ADMIN — OXYCODONE 5 MG: 5 TABLET ORAL at 10:31

## 2022-02-27 RX ADMIN — ACETAMINOPHEN 650 MG: 325 TABLET ORAL at 08:20

## 2022-02-27 RX ADMIN — Medication 3 MG: at 18:01

## 2022-02-27 RX ADMIN — OXYCODONE 5 MG: 5 TABLET ORAL at 02:47

## 2022-02-27 RX ADMIN — ACETAMINOPHEN 650 MG: 325 TABLET ORAL at 12:06

## 2022-02-27 RX ADMIN — Medication 150 MG: at 08:21

## 2022-02-27 RX ADMIN — OXYCODONE 5 MG: 5 TABLET ORAL at 21:48

## 2022-02-27 RX ADMIN — INSULIN GLARGINE 23 UNITS: 100 INJECTION, SOLUTION SUBCUTANEOUS at 21:47

## 2022-02-27 RX ADMIN — BUPROPION HYDROCHLORIDE 150 MG: 150 TABLET, EXTENDED RELEASE ORAL at 08:21

## 2022-02-27 RX ADMIN — ATORVASTATIN CALCIUM 20 MG: 20 TABLET, FILM COATED ORAL at 08:21

## 2022-02-27 RX ADMIN — OXYCODONE 5 MG: 5 TABLET ORAL at 06:30

## 2022-02-27 RX ADMIN — Medication 200 MG: at 08:21

## 2022-02-27 RX ADMIN — ACETAMINOPHEN 650 MG: 325 TABLET ORAL at 15:59

## 2022-02-27 RX ADMIN — Medication 3 UNITS: at 12:06

## 2022-02-27 RX ADMIN — METHOCARBAMOL TABLETS 500 MG: 500 TABLET, COATED ORAL at 08:21

## 2022-02-27 RX ADMIN — OXYCODONE 5 MG: 5 TABLET ORAL at 14:16

## 2022-02-27 NOTE — PROGRESS NOTES
[facility] PHYSICAL REHABILITATION  [address] [place] [address], [address]     INPATIENT REHABILITATION  DAILY PROGRESS NOTE     Date: 2/27/2022    Name: Reji Ponce Age / Sex: 59 y.o. / male   CSN: [id number] MRN: [id number]   [facility] Date: 2/15/2022 Length of Stay: 12 days     Primary Rehabilitation Diagnosis: Impaired Mobility and ADLs secondary to:  1. S/P Right below-the-knee amputation (2/8/2022 - Dr. Santiago Banda)  2. History of critical ischemia of the right lower extremity      Subjective:     No new issues or problems reported. Blood pressure controlled.    Blood glucose low before breakfast.       Objective:     Vital Signs:  Patient Vitals for the past 24 hrs:   BP Temp Pulse Resp SpO2   02/27/22 1030 118/70  68  100 %   02/27/22 0819 (!) 94/54 98 °F (36.7 °C) 72 18 100 %   02/26/22 2034 131/77 98 °F (36.7 °C) 81 18 98 %   02/26/22 1546 114/68 97.4 °F (36.3 °C) 62 14 100 %        Current Medications:  Current Facility-Administered Medications   Medication Dose Route Frequency    insulin glargine (LANTUS) injection 30 Units  30 Units SubCUTAneous QHS    insulin lispro (HUMALOG) injection 4 Units  0.05 Units/kg SubCUTAneous TIDAC    gabapentin (NEURONTIN) capsule 300 mg  300 mg Oral TID    melatonin tablet 3 mg  3 mg Oral PCD    oxyCODONE IR (ROXICODONE) tablet 5 mg  5 mg Oral Q4H PRN    metFORMIN (GLUCOPHAGE) tablet 1,000 mg  1,000 mg Oral BID WITH MEALS    glucose chewable tablet 16 g  4 Tablet Oral PRN    glucagon (GLUCAGEN) injection 1 mg  1 mg IntraMUSCular PRN    dextrose 10% infusion 0-250 mL  0-250 mL IntraVENous PRN    iron polysaccharides (NIFEREX) capsule 150 mg  1 Capsule Oral BID    thiamine HCL (B-1) tablet 200 mg  200 mg Oral DAILY    aspirin chewable tablet 81 mg  81 mg Oral DAILY WITH BREAKFAST    atorvastatin (LIPITOR) tablet 20 mg  20 mg Oral DAILY    buPROPion SR (WELLBUTRIN SR) tablet 150 mg  150 mg Oral DAILY    therapeutic multivitamin (THERAGRAN) tablet 1 Tablet  1 Tablet Oral DAILY    acetaminophen (TYLENOL) tablet 650 mg  650 mg Oral TID    methocarbamoL (ROBAXIN) tablet 500 mg  500 mg Oral TID    acetaminophen (TYLENOL) tablet 650 mg  650 mg Oral Q4H PRN    bisacodyL (DULCOLAX) tablet 10 mg  10 mg Oral Q48H PRN    enoxaparin (LOVENOX) injection 40 mg  40 mg SubCUTAneous Q24H    insulin lispro (HUMALOG) injection   SubCUTAneous TIDAC       Allergies: Allergies   Allergen Reactions    Niacin Itching       Lab/Data Review:  Recent Results (from the past 24 hour(s))   GLUCOSE, POC    Collection Time: 02/26/22 12:10 PM   Result Value Ref Range    Glucose (POC) 66 (L) 70 - 110 mg/dL   GLUCOSE, POC    Collection Time: 02/26/22 12:26 PM   Result Value Ref Range    Glucose (POC) 96 70 - 110 mg/dL   GLUCOSE, POC    Collection Time: 02/26/22  4:52 PM   Result Value Ref Range    Glucose (POC) 204 (H) 70 - 110 mg/dL   GLUCOSE, POC    Collection Time: 02/26/22  8:13 PM   Result Value Ref Range    Glucose (POC) 155 (H) 70 - 110 mg/dL   GLUCOSE, POC    Collection Time: 02/27/22  7:43 AM   Result Value Ref Range    Glucose (POC) 81 70 - 110 mg/dL   GLUCOSE, POC    Collection Time: 02/27/22 11:54 AM   Result Value Ref Range    Glucose (POC) 102 70 - 110 mg/dL       Assessment:     Primary Rehabilitation Diagnosis  1. Impaired Mobility and ADLs  2. S/P Right below-the-knee amputation (2/8/2022 - Dr. Arely Oseguera)  3.  History of critical ischemia of the right lower extremity    Comorbidities  Patient Active Problem List   Diagnosis Code    Hyperlipidemia E78.5    Tobacco use disorder F17.200    Mediastinal adenopathy R59.0    History of vitamin D deficiency Z86.39    Insomnia G47.00    Diabetic nephropathy associated with type 2 diabetes mellitus ([facility] E11.21    Otalgia H92.09    Mild nonproliferative diabetic retinopathy of left eye without macular edema associated with type 2 diabetes mellitus ([facility] C74.0298    Hypertensive retinopathy H35.039    Nuclear sclerosis of both eyes H25.13    Arterial occlusion, lower extremity ([facility]) I70.209    Impaired mobility and ADLs Z74.09, Z78.9    Status post below knee amputation of right lower extremity ([facility]) Z89.511    Chronic alcohol use Z72.89    History of coronary angioplasty with insertion of stent Z95.5    Coronary artery disease involving native coronary artery of native heart I25.10    Constipation K59.00    Poorly controlled type 2 diabetes mellitus ([facility]) E11.65    Long term current use of insulin ([facility]) Z79.4    Migraine headache G43.909    Diabetic neuropathy associated with type 2 diabetes mellitus ([facility]) E11.40    Gastroesophageal reflux disease K21.9    History of essential hypertension Z86.79    History of lacunar cerebrovascular accident Z80.78    History of acute inferior wall myocardial infarction I25.2    Critical ischemia of lower extremity ([facility]) I70.229    Acute postoperative anemia due to expected blood loss D62    Depression F32. A    High vitamin D level E67.3       Plan:     1. Justification for continued stay: Good progression towards established rehabilitation goals. 2. Medical Issues being followed closely:    [x]  Fall and safety precautions     [x]  Wound Care     [x]  Bowel and Bladder Function     [x]  Fluid Electrolyte and Nutrition Balance     [x]  Pain Control      3. Issues that 24 hour rehabilitation nursing is following:    [x]  Fall and safety precautions     [x]  Wound Care     [x]  Bowel and Bladder Function     [x]  Fluid Electrolyte and Nutrition Balance     [x]  Pain Control      [x]  Assistance with and education on in-room safety with transfers to and from the bed, wheelchair, toilet and shower. 4. Acute rehabilitation plan of care:    [x]  Continue current care and rehab. [x]  Physical Therapy           [x]  Occupational Therapy           []  Speech Therapy     []  Hold Rehab until further notice     5.  Medications:    [x]  MAR Reviewed [x]  Continue Present Medications     6. Chemical DVT Prophylaxis:      [x]  Enoxaparin     []  Unfractionated Heparin     []  Warfarin     []  NOAC     []  Aspirin     []  None     7. Mechanical DVT Prophylaxis:      []  FLEX Stockings     []  Sequential Compression Device     [x]  None     8. GI Prophylaxis:      []  PPI     []  H2 Blocker     [x]  None / Not indicated     9. Code status:    [x]  Full code     []  Partial code     []  Do not intubate     []  Do not resuscitate     10. Diet:  Specifications  4 carb choices (60 gm/meal), Low fat/Low cholesterol/High fiber/SEEMA   Solids (consistency)  Regular   Liquids (consistency)  Thin   Fluid Restriction  None      11. COVID-19:  Laboratory testing COVID-19 rapid test ([facility] (2/4/2022): Not detected  COVID-19 rapid test ([facility] (2/15/2022): Not detected   Precautions None   Vaccine to be given this admission No      12. Orders:   > S/P Right below-the-knee amputation (2/8/2022 - Dr. Maynard Oppenheim); History of critical ischemia of the right lower extremity    > Staples to be removed on 3/8/2022    > Acute postoperative blood loss anemia   > Hgb/Hct (2/16/2022, on admission to the ARU) = 8.9/28.6   > Anemia work-up (2/16/2022) showed serum iron 14, TIBC 234, iron % saturation 18, ferritin 404, reticulocyte count 2.1   > On 2/16/2022, started Iron polysaccharides 150 mg PO BID      02/21/22  0608 02/17/22  0502 02/16/22  0626   HGB 8.5* 8.7* 8.9*   HCT 27.8* 27.8* 28.6*       > On 2/21/2022, patient was given Epoetin lico 10,000 units SC x 1 dose   > Continue Iron polysaccharides 150 mg PO BID    > Constipation   > On 2/17/2022, started Senna 1 tab PO once daily   > On 2/18/2022, discontinued Senna 1 tab PO once daily    > Coronary artery disease; History of inferior wall myocardial infarction;  History of coronary angioplasty with stent placement   > Not on any ACE-I or beta-blockers   > Continue:    > Aspirin 81 mg PO daily with breakfast    > Atorvastatin 20 mg PO once daily    > Depression   > On 2/16/2022, discontinued Trazodone 50 mg PO q HS (re: patient says he has not been taking this prior to admission)   > Continue Bupropion  mg PO once daily    > High vitamin D level;  History of vitamin D deficiency   > Prior to [facility], patient was on Ergocalciferol 50,000 units PO q 7 days   > Vitamin D 25-Hydroxy (2/16/2022) = 105.7   > On 2/16/2022, discontinued Ergocalciferol 50,000 units PO q 7 days     > Hyperlipidemia   > Continue Atorvastatin 20 mg PO once daily    > Type 2 diabetes mellitus, poorly controlled, with diabetic nephropathy, diabetic neuropathy, diabetic retinopathy, with long-term current use of insulin   > HbA1c (2/8/2022) = 13.8   > Vitamin B12 (2/16/2022) = 574   > On 2/16/2022:    > Increased Metformin from 500 mg to 850 mg PO BID with meals    > Increased Insulin lispro from 4 units to 5 units SC TID AC   > On 2/17/2022:    > Increased Insulin glargine from 45 units to 50 units SC daily before breakfast    > Decreased Insulin lispro from 5 units to 4 units SC TID AC   > On 2/18/2022:    > Patient was given Insulin glargine 20 units SC x 1 dose at 9PM    > Increased Metformin from 850 mg to 1000 mg PO BID with meals   > On 2/19/2022, changed Insulin glargine from 50 units SC daily before breakfast to 35 units SC q HS   > On 2/22/2022, decreased Insulin lispro from 4 units to 3 units SC TID AC   > On 2/23/2022, increased Insulin lispro from 3 units to 4 units SC TID AC   > On 2/26/2022, decreased Insulin glargine from 35 units to 30 units SC q HS   > Continue:    > Metformin 1000 mg PO BID with meals    > Decrease Insulin glargine from 30 units to 23 units SC q HS    > Decrease Insulin lispro from 4 units to 3 units SC TID AC    > Insulin lispro sliding scale SC TID AC only    > Difficulty sleeping   > On 2/16/2022, discontinued Trazodone 50 mg PO q HS (re: patient says he has not been taking this prior to admission)   > On 2/21/2022, patient reported difficulty sleeping; started Melatonin 3 mg PO daily after dinner   > Continue Melatonin 3 mg PO daily after dinner    > Depressed mood   > Patient was seen by Clinical Psychologist and patient was felt to have a depressed mood   > Option of starting an antidepressant was presented and the patient politely declined it at this time, stating he is still able to cope with his present condition    > Analgesia   > On 2/16/2022:    > Started:     > Acetaminophen 650 mg PO TID (8AM, 12PM, 4PM)     > Methocarbamol 500 mg PO TID (8AM, 2PM, 8PM)    > Increased Gabapentin from 300 mg to 400 mg PO TID (8AM, 2PM, 8PM)   > On 2/18/2022, decreased Oxycodone from 10 mg to 7.5 mg PO q 4 hr PRN for pain level 5/10 or greater    > On 2/21/2022, decreased Oxycodone from 7.5 mg to 5 mg PO q 4 hr PRN for pain level 5/10 or greater    > On 2/22/2022, decreased Gabapentin from 400 mg to 300 mg PO TID (8AM, 2PM, 8PM)   > Continue:    > Acetaminophen 650 mg PO TID (8AM, 12PM, 4PM)    > Acetaminophen 650 mg PO q 4 hr PRN for pain level 4/10 or lesser (from 8PM to 4AM only)    > Gabapentin 300 mg PO TID (8AM, 2PM, 8PM)    > Methocarbamol 500 mg PO TID (8AM, 2PM, 8PM)    > Oxycodone 5 mg PO q 4 hr PRN for pain level 5/10 or greater       Signed:    Ayden Hopkins MD    February 27, 2022

## 2022-02-27 NOTE — PROGRESS NOTES
Patient's blood glucose at 1210 was noted to be 66. Patient is asymptomatic and states that he feels fine. 4 ounces of juice given as well as his dinner tray. Blood sugar rechecked at 1226 and noted to be 96. Will continue to monitor.

## 2022-02-27 NOTE — ROUTINE PROCESS
SHIFT CHANGE NOTE FOR [facility]    Bedside and Verbal shift change report given to ANITHA Louie (oncoming nurse) by Lee Hdz RN (offgoing nurse). Report included the following information SBAR, Kardex, MAR and Recent Results. Situation:   Code Status: Full Code   Hospital Day: 12   Problem List:   Hospital Problems  Date Reviewed: 2/27/2022          Codes Class Noted POA    Poorly controlled type 2 diabetes mellitus ([facility] ICD-10-CM: E11.65  ICD-9-CM: 250.00  Unknown Yes    Overview Signed 2/16/2022 12:50 AM by Allan Lange MD     HbA1c (2/8/2022) = 13.8             Long term current use of insulin (HCC) ICD-10-CM: Z79.4  ICD-9-CM: V58.67  Unknown Yes        High vitamin D level (Chronic) ICD-10-CM: E67.3  ICD-9-CM: 278.4  2/16/2022 Yes    Overview Signed 2/16/2022  9:52 AM by Allan Lange MD     Vitamin D 25-Hydroxy (2/16/2022) = 105.7             Impaired mobility and ADLs ICD-10-CM: Z74.09, Z78.9  ICD-9-CM: V49.89  2/8/2022 Yes        * (Principal) Status post below knee amputation of right lower extremity ([facility] ICD-10-CM: Z89.511  ICD-9-CM: V49.75  2/8/2022 Yes    Overview Signed 2/16/2022 12:47 AM by Allan Lange MD     S/P Right below-the-knee amputation (2/8/2022 - Dr. Khushbu Arenas)             Critical ischemia of lower extremity [facility]) ICD-10-CM: D70.887  ICD-9-CM: 459.9  2/8/2022 No        Acute postoperative anemia due to expected blood loss ICD-10-CM: D62  ICD-9-CM: 285.1  2/8/2022 Yes              Background:   Past Medical History:   Past Medical History:   Diagnosis Date    Acute postoperative anemia due to expected blood loss 2/8/2022    Arterial occlusion, lower extremity ([facility] 11/27/2021    Cardiac cath 11/09/2011    RCA patent. LM patent. LAD patent. mD1 55%. CX patent. Prior stent patent. Edison 85% (2.5 x 15-mm Xience stent, resid 0%). LVEDP 12. EF 40-45%. High lateral hypk (RI distribution).       Chronic alcohol use     Fri-Sun one fifth; Mon-Thur: Pint of liquor  Constipation     Coronary artery disease involving native coronary artery of native heart     Critical ischemia of lower extremity ([facility] 2/8/2022    Depression     Diabetic nephropathy associated with type 2 diabetes mellitus ([facility] 4/4/2019    [facility] with [facility]     Gastroesophageal reflux disease     High vitamin D level 2/16/2022    Vitamin D 25-Hydroxy (2/16/2022) = 105.7    History of acute inferior wall myocardial infarction 2009    History of coronary angioplasty with insertion of stent 07/29/2009    2.5 x 13 Cypher stent to CX.      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 without macular edema associated with type 2 diabetes mellitus ([facility] 12/18/2020    Nuclear sclerosis of both eyes 12/18/2020    Otalgia 05/08/2019    Poorly controlled type 2 diabetes mellitus (HCC)     HbA1c (2/8/2022) = 13.8    Tobacco use disorder     contemplating stopping        Assessment:   Changes in Assessment throughout shift: No change to previous assessment     Patient has a central line: no Reasons if yes: n  Insertion date:na Last dressing date:na   Patient has Epstein Cath: no Reasons if yes: na   Insertion date:na  Shift epstein care completed: NO     Last Vitals:     Vitals:    02/26/22 2034 02/27/22 0819 02/27/22 1030 02/27/22 1549   BP: 131/77 (!) 94/54 118/70 112/66   Pulse: 81 72 68 76   Resp: 18 18  17   Temp: 98 °F (36.7 °C) 98 °F (36.7 °C)  97.9 °F (36.6 °C)   SpO2: 98% 100% 100% 98%   Weight:       Height:            PAIN    Pain Assessment    Pain Intensity 1: 8 (02/27/22 1858) Pain Intensity 1: 2 (12/29/14 1105)    Pain Location 1: Leg Pain Location 1: Abdomen    Pain Intervention(s) 1: Medication (see MAR) Pain Intervention(s) 1: Medication (see MAR)  Patient Stated Pain Goal: 0 Patient Stated Pain Goal: 0  o Intervention effective: yes  o Other actions taken for pain: Medication (see MAR)     Skin Assessment  Skin color    Condition/Temperature    Integrity Skin Integrity: Incision (comment) (right bka)  Turgor    Weekly Pressure Ulcer Documentation  Pressure  Injury Documentation: No Pressure Injury Noted-Pressure Ulcer Prevention Initiated  Wound Prevention & Protection Methods  Orientation of wound Orientation of Wound Prevention: Posterior  Location of Prevention Location of Wound Prevention: Sacrum/Coccyx  Dressing Present Dressing Present : No  Dressing Status    Wound Offloading Wound Offloading (Prevention Methods): Bed, pressure redistribution/air,Bed, pressure reduction mattress,Repositioning     INTAKE/OUPUT  Date 02/26/22 1900 - 02/27/22 0659 02/27/22 0700 - 02/28/22 0659   Shift 4102-4494 24 Hour Total 1136-4202 7530-2604 24 Hour Total   INTAKE   P.O.  480 739  739     P. O.  480 739  739   Shift Total(mL/kg)  480(6.1) 739(9.4)  739(9.4)   OUTPUT   Urine(mL/kg/hr)          Urine Occurrence(s) 2 x 6 x 2 x  2 x   Emesis/NG output          Emesis Occurrence(s) 0 x 0 x      Stool          Stool Occurrence(s) 0 x 0 x 2 x  2 x   Shift Total(mL/kg)        NET  480 739  739   Weight (kg) 78.2 78.2 78.2 78.2 78.2       Recommendations:  1. Patient needs and requests: pain management    2. Pending tests/procedures: none     3. Functional Level/Equipment: Partial (one person) / Bed (comment); Wheelchair;Stabilization belt    Fall Precautions:   Fall risk precautions were reinforced with the patient; he was instructed to call for help prior to getting up. The following fall risk precautions were continued: bed/ chair alarms, door signage, yellow bracelet and socks as well as update of the Vernia Colder tool in the patient's room. Loly Score: 3    HEALS Safety Check    A safety check occurred in the patient's room between off going nurse and oncoming nurse listed above. The safety check included the below items  Area Items   H  High Alert Medications - Verify all high alert medication drips (heparin, PCA, etc.)   E  Equipment - Suction is set up for ALL patients (with eliel)  - Red plugs utilized for all equipment (IV pumps, etc.)  - WOWs wiped down at end of shift.  - Room stocked with oxygen, suction, and other unit-specific supplies   A  Alarms - Bed alarm is set for fall risk patients  - Ensure chair alarm is in place and activated if patient is up in a chair   L  Lines - Check IV for any infiltration  - Gallardo bag is empty if patient has a Gallardo   - Tubing and IV bags are labeled   S  Safety   - Room is clean, patient is clean, and equipment is clean. - Hallways are clear from equipment besides carts. - Fall bracelet on for fall risk patients  - Ensure room is clear and free of clutter  - Suction is set up for ALL patients (with eliel)  - Hallways are clear from equipment besides carts.    - Isolation precautions followed, supplies available outside room, sign posted     Brandin Lane RN

## 2022-02-27 NOTE — ROUTINE PROCESS
SHIFT CHANGE NOTE FOR [facility]    Bedside and Verbal shift change report given to [facility] RN (oncoming nurse) by Roseanna Ratliff RN (offgoing nurse). Report included the following information SBAR, Kardex, MAR and Recent Results. Situation:   Code Status: Full Code   Hospital Day: 12   Problem List:   Hospital Problems  Date Reviewed: 2/26/2022          Codes Class Noted POA    Poorly controlled type 2 diabetes mellitus ([facility] ICD-10-CM: E11.65  ICD-9-CM: 250.00  Unknown Yes    Overview Signed 2/16/2022 12:50 AM by Shan Mohr MD     HbA1c (2/8/2022) = 13.8             Long term current use of insulin (HCC) ICD-10-CM: Z79.4  ICD-9-CM: V58.67  Unknown Yes        High vitamin D level (Chronic) ICD-10-CM: E67.3  ICD-9-CM: 278.4  2/16/2022 Yes    Overview Signed 2/16/2022  9:52 AM by Shan Mohr MD     Vitamin D 25-Hydroxy (2/16/2022) = 105.7             Impaired mobility and ADLs ICD-10-CM: Z74.09, Z78.9  ICD-9-CM: V49.89  2/8/2022 Yes        * (Principal) Status post below knee amputation of right lower extremity ([facility] ICD-10-CM: Z89.511  ICD-9-CM: V49.75  2/8/2022 Yes    Overview Signed 2/16/2022 12:47 AM by Shan Mohr MD     S/P Right below-the-knee amputation (2/8/2022 - Dr. Dory Wilhelm)             Critical ischemia of lower extremity [facility]) ICD-10-CM: E18.804  ICD-9-CM: 459.9  2/8/2022 No        Acute postoperative anemia due to expected blood loss ICD-10-CM: D62  ICD-9-CM: 285.1  2/8/2022 Yes              Background:   Past Medical History:   Past Medical History:   Diagnosis Date    Acute postoperative anemia due to expected blood loss 2/8/2022    [facility]    Cardiac cath 11/09/2011    RCA patent. LM patent. LAD patent. mD1 55%. CX patent. Prior stent patent. Edison 85% (2.5 x 15-mm Xience stent, resid 0%). LVEDP 12. EF 40-45%. High lateral hypk (RI distribution).       Chronic alcohol use     Fri-Sun one fifth; Mon-Thur: Pint of liquor  Constipation     Coronary artery disease involving native coronary artery of native heart     Critical ischemia of lower extremity ([facility] 2/8/2022    Depression     Diabetic nephropathy associated with type 2 diabetes mellitus ([facility] 4/4/2019    [facility]     Gastroesophageal reflux disease     High vitamin D level 2/16/2022    Vitamin D 25-Hydroxy (2/16/2022) = 105.7    History of acute inferior wall myocardial infarction 2009    History of coronary angioplasty with insertion of stent 07/29/2009    2.5 x 13 Cypher stent to CX.      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 without macular edema associated with type 2 diabetes mellitus ([facility] 12/18/2020    Nuclear sclerosis of both eyes 12/18/2020    Otalgia 05/08/2019    Poorly controlled type 2 diabetes mellitus (HCC)     HbA1c (2/8/2022) = 13.8    Tobacco use disorder     contemplating stopping        Assessment:   Changes in Assessment throughout shift: No change to previous assessment     Patient has a central line: no Reasons if yes: n  Insertion date:na Last dressing date:na   Patient has Epstein Cath: no Reasons if yes: na   Insertion date:na  Shift epstein care completed: NO     Last Vitals:     Vitals:    02/25/22 2120 02/26/22 0747 02/26/22 1546 02/26/22 2034   BP: 126/77 137/85 114/68 131/77   Pulse: 79 73 62 81   Resp: 16 16 14 18   Temp: 98.8 °F (37.1 °C) 97.7 °F (36.5 °C) 97.4 °F (36.3 °C) 98 °F (36.7 °C)   SpO2: 99% 100% 100% 98%   Weight:       Height:            PAIN    Pain Assessment    Pain Intensity 1: 0 (02/27/22 1815) Pain Intensity 1: 2 (12/29/14 0606)    Pain Location 1: Leg Pain Location 1: Abdomen    Pain Intervention(s) 1: Medication (see MAR) Pain Intervention(s) 1: Medication (see MAR)  Patient Stated Pain Goal: 0 Patient Stated Pain Goal: 0  o Intervention effective: yes  o Other actions taken for pain: Medication (see MAR)     Skin Assessment  Skin color    Condition/Temperature    Integrity Skin Integrity: Incision (comment)  Turgor    Weekly Pressure Ulcer Documentation  Pressure  Injury Documentation: No Pressure Injury Noted-Pressure Ulcer Prevention Initiated  Wound Prevention & Protection Methods  Orientation of wound Orientation of Wound Prevention: Posterior  Location of Prevention Location of Wound Prevention: Sacrum/Coccyx  Dressing Present Dressing Present : No  Dressing Status    Wound Offloading Wound Offloading (Prevention Methods): Bed, pressure reduction mattress     INTAKE/OUPUT  Date 02/26/22 0700 - 02/27/22 0659 02/27/22 0700 - 02/28/22 0659   Shift 9786-5026 4843-0784 24 Hour Total 9184-5889 2890-6257 24 Hour Total   INTAKE   P.O. 480  480        P. O. 480  480      Shift Total(mL/kg) 480(6.1)  480(6.1)      OUTPUT   Urine(mL/kg/hr)           Urine Occurrence(s) 4 x 1 x 5 x      Emesis/NG output           Emesis Occurrence(s)  0 x 0 x      Stool           Stool Occurrence(s) 0 x 0 x 0 x      Shift Total(mL/kg)           480      Weight (kg) 78.2 78.2 78.2 78.2 78.2 78.2       Recommendations:  1. Patient needs and requests: pain management    2. Pending tests/procedures: none     3. Functional Level/Equipment: Partial (one person) / Bed (comment); Wheelchair;Stabilization belt    Fall Precautions:   Fall risk precautions were reinforced with the patient; he was instructed to call for help prior to getting up. The following fall risk precautions were continued: bed/ chair alarms, door signage, yellow bracelet and socks as well as update of the Dakota Boozer tool in the patient's room.    Loly Score: 3    HEALS Safety Check    A safety check occurred in the patient's room between off going nurse and oncoming nurse listed above. The safety check included the below items  Area Items   H  High Alert Medications - Verify all high alert medication drips (heparin, PCA, etc.)   E  Equipment - Suction is set up for ALL patients (with eliel)  - Red plugs utilized for all equipment (IV pumps, etc.)  - WOWs wiped down at end of shift.  - Room stocked with oxygen, suction, and other unit-specific supplies   A  Alarms - Bed alarm is set for fall risk patients  - Ensure chair alarm is in place and activated if patient is up in a chair   L  Lines - Check IV for any infiltration  - Gallardo bag is empty if patient has a Gallardo   - Tubing and IV bags are labeled   S  Safety   - Room is clean, patient is clean, and equipment is clean. - Hallways are clear from equipment besides carts. - Fall bracelet on for fall risk patients  - Ensure room is clear and free of clutter  - Suction is set up for ALL patients (with eliel)  - Hallways are clear from equipment besides carts.    - Isolation precautions followed, supplies available outside room, sign posted     Crystal Roberts RN

## 2022-02-28 LAB
ALBUMIN SERPL-MCNC: 3.2 G/DL (ref 3.4–5)
ALBUMIN/GLOB SERPL: 0.9 {RATIO} (ref 0.8–1.7)
ALP SERPL-CCNC: 92 U/L (ref 45–117)
ALT SERPL-CCNC: 44 U/L (ref 16–61)
ANION GAP SERPL CALC-SCNC: 6 MMOL/L (ref 3–18)
AST SERPL-CCNC: 27 U/L (ref 10–38)
BASOPHILS # BLD: 0 K/UL (ref 0–0.1)
BASOPHILS NFR BLD: 1 % (ref 0–2)
BILIRUB DIRECT SERPL-MCNC: <0.1 MG/DL (ref 0–0.2)
BILIRUB SERPL-MCNC: 0.2 MG/DL (ref 0.2–1)
BUN SERPL-MCNC: 21 MG/DL (ref 7–18)
BUN/CREAT SERPL: 23 (ref 12–20)
CALCIUM SERPL-MCNC: 9 MG/DL (ref 8.5–10.1)
CHLORIDE SERPL-SCNC: 108 MMOL/L (ref 100–111)
CO2 SERPL-SCNC: 23 MMOL/L (ref 21–32)
CREAT SERPL-MCNC: 0.9 MG/DL (ref 0.6–1.3)
DIFFERENTIAL METHOD BLD: ABNORMAL
EOSINOPHIL # BLD: 0.1 K/UL (ref 0–0.4)
EOSINOPHIL NFR BLD: 5 % (ref 0–5)
ERYTHROCYTE [DISTWIDTH] IN BLOOD BY AUTOMATED COUNT: 14.4 % (ref 11.6–14.5)
GLOBULIN SER CALC-MCNC: 3.7 G/DL (ref 2–4)
GLUCOSE BLD STRIP.AUTO-MCNC: 104 MG/DL (ref 70–110)
GLUCOSE BLD STRIP.AUTO-MCNC: 166 MG/DL (ref 70–110)
GLUCOSE BLD STRIP.AUTO-MCNC: 236 MG/DL (ref 70–110)
GLUCOSE BLD STRIP.AUTO-MCNC: 65 MG/DL (ref 70–110)
GLUCOSE BLD STRIP.AUTO-MCNC: 74 MG/DL (ref 70–110)
GLUCOSE BLD STRIP.AUTO-MCNC: 84 MG/DL (ref 70–110)
GLUCOSE SERPL-MCNC: 72 MG/DL (ref 74–99)
HCT VFR BLD AUTO: 31.3 % (ref 36–48)
HGB BLD-MCNC: 9.6 G/DL (ref 13–16)
IMM GRANULOCYTES # BLD AUTO: 0 K/UL (ref 0–0.04)
IMM GRANULOCYTES NFR BLD AUTO: 1 % (ref 0–0.5)
LYMPHOCYTES # BLD: 1 K/UL (ref 0.9–3.6)
LYMPHOCYTES NFR BLD: 38 % (ref 21–52)
MCH RBC QN AUTO: 27.4 PG (ref 24–34)
MCHC RBC AUTO-ENTMCNC: 30.7 G/DL (ref 31–37)
MCV RBC AUTO: 89.4 FL (ref 78–100)
MONOCYTES # BLD: 0.4 K/UL (ref 0.05–1.2)
MONOCYTES NFR BLD: 14 % (ref 3–10)
NEUTS SEG # BLD: 1.1 K/UL (ref 1.8–8)
NEUTS SEG NFR BLD: 42 % (ref 40–73)
NRBC # BLD: 0 K/UL (ref 0–0.01)
NRBC BLD-RTO: 0 PER 100 WBC
PLATELET # BLD AUTO: 335 K/UL (ref 135–420)
PMV BLD AUTO: 10.8 FL (ref 9.2–11.8)
POTASSIUM SERPL-SCNC: 4.6 MMOL/L (ref 3.5–5.5)
PROT SERPL-MCNC: 6.9 G/DL (ref 6.4–8.2)
RBC # BLD AUTO: 3.5 M/UL (ref 4.35–5.65)
SODIUM SERPL-SCNC: 137 MMOL/L (ref 136–145)
WBC # BLD AUTO: 2.6 K/UL (ref 4.6–13.2)

## 2022-02-28 PROCEDURE — 97110 THERAPEUTIC EXERCISES: CPT

## 2022-02-28 PROCEDURE — 36415 COLL VENOUS BLD VENIPUNCTURE: CPT

## 2022-02-28 PROCEDURE — 65310000000 HC RM PRIVATE REHAB

## 2022-02-28 PROCEDURE — 82962 GLUCOSE BLOOD TEST: CPT

## 2022-02-28 PROCEDURE — 80076 HEPATIC FUNCTION PANEL: CPT

## 2022-02-28 PROCEDURE — 97116 GAIT TRAINING THERAPY: CPT

## 2022-02-28 PROCEDURE — 74011250637 HC RX REV CODE- 250/637: Performed by: INTERNAL MEDICINE

## 2022-02-28 PROCEDURE — 80048 BASIC METABOLIC PNL TOTAL CA: CPT

## 2022-02-28 PROCEDURE — 74011250636 HC RX REV CODE- 250/636: Performed by: INTERNAL MEDICINE

## 2022-02-28 PROCEDURE — 97530 THERAPEUTIC ACTIVITIES: CPT

## 2022-02-28 PROCEDURE — 99232 SBSQ HOSP IP/OBS MODERATE 35: CPT | Performed by: INTERNAL MEDICINE

## 2022-02-28 PROCEDURE — 74011636637 HC RX REV CODE- 636/637: Performed by: INTERNAL MEDICINE

## 2022-02-28 PROCEDURE — 92507 TX SP LANG VOICE COMM INDIV: CPT

## 2022-02-28 PROCEDURE — 2709999900 HC NON-CHARGEABLE SUPPLY

## 2022-02-28 PROCEDURE — 85025 COMPLETE CBC W/AUTO DIFF WBC: CPT

## 2022-02-28 RX ORDER — INSULIN LISPRO 100 [IU]/ML
INJECTION, SOLUTION INTRAVENOUS; SUBCUTANEOUS
Status: DISCONTINUED | OUTPATIENT
Start: 2022-02-28 | End: 2022-03-02 | Stop reason: HOSPADM

## 2022-02-28 RX ORDER — INSULIN GLARGINE 100 [IU]/ML
0.25 INJECTION, SOLUTION SUBCUTANEOUS
Status: DISCONTINUED | OUTPATIENT
Start: 2022-02-28 | End: 2022-03-02 | Stop reason: HOSPADM

## 2022-02-28 RX ADMIN — Medication 150 MG: at 17:18

## 2022-02-28 RX ADMIN — ACETAMINOPHEN 650 MG: 325 TABLET ORAL at 08:27

## 2022-02-28 RX ADMIN — METFORMIN HYDROCHLORIDE 1000 MG: 500 TABLET ORAL at 17:18

## 2022-02-28 RX ADMIN — OXYCODONE 5 MG: 5 TABLET ORAL at 17:31

## 2022-02-28 RX ADMIN — GABAPENTIN 300 MG: 300 CAPSULE ORAL at 08:28

## 2022-02-28 RX ADMIN — METHOCARBAMOL TABLETS 500 MG: 500 TABLET, COATED ORAL at 08:28

## 2022-02-28 RX ADMIN — OXYCODONE 5 MG: 5 TABLET ORAL at 21:35

## 2022-02-28 RX ADMIN — ENOXAPARIN SODIUM 40 MG: 100 INJECTION SUBCUTANEOUS at 05:54

## 2022-02-28 RX ADMIN — ASPIRIN 81 MG 81 MG: 81 TABLET ORAL at 08:28

## 2022-02-28 RX ADMIN — OXYCODONE 5 MG: 5 TABLET ORAL at 10:05

## 2022-02-28 RX ADMIN — OXYCODONE 5 MG: 5 TABLET ORAL at 14:13

## 2022-02-28 RX ADMIN — Medication 3 MG: at 17:18

## 2022-02-28 RX ADMIN — OXYCODONE 5 MG: 5 TABLET ORAL at 05:28

## 2022-02-28 RX ADMIN — ACETAMINOPHEN 650 MG: 325 TABLET ORAL at 15:32

## 2022-02-28 RX ADMIN — Medication 3 UNITS: at 08:25

## 2022-02-28 RX ADMIN — METHOCARBAMOL TABLETS 500 MG: 500 TABLET, COATED ORAL at 22:11

## 2022-02-28 RX ADMIN — METFORMIN HYDROCHLORIDE 1000 MG: 500 TABLET ORAL at 08:27

## 2022-02-28 RX ADMIN — Medication 150 MG: at 08:27

## 2022-02-28 RX ADMIN — BUPROPION HYDROCHLORIDE 150 MG: 150 TABLET, EXTENDED RELEASE ORAL at 08:27

## 2022-02-28 RX ADMIN — ACETAMINOPHEN 650 MG: 325 TABLET ORAL at 11:47

## 2022-02-28 RX ADMIN — ATORVASTATIN CALCIUM 20 MG: 20 TABLET, FILM COATED ORAL at 08:28

## 2022-02-28 RX ADMIN — Medication 20 UNITS: at 22:11

## 2022-02-28 RX ADMIN — Medication 4 UNITS: at 17:18

## 2022-02-28 RX ADMIN — METHOCARBAMOL TABLETS 500 MG: 500 TABLET, COATED ORAL at 14:08

## 2022-02-28 RX ADMIN — THERA TABS 1 TABLET: TAB at 08:28

## 2022-02-28 RX ADMIN — Medication 200 MG: at 08:27

## 2022-02-28 RX ADMIN — OXYCODONE 5 MG: 5 TABLET ORAL at 01:54

## 2022-02-28 RX ADMIN — GABAPENTIN 300 MG: 300 CAPSULE ORAL at 22:11

## 2022-02-28 RX ADMIN — GABAPENTIN 300 MG: 300 CAPSULE ORAL at 15:32

## 2022-02-28 NOTE — INTERDISCIPLINARY ROUNDS
[facility] PHYSICAL [address] [place], [address]    INPATIENT REHABILITATION  PRE-TEAM CONFERENCE SUMMARY     Date of Conference: 3/1/2022    Patient Information:        Name: Scott Hunt Age / Sex: 59 y.o. / male   CSN: [id number] MRN: [id number]   [facility] Date: 2/15/2022 Length of Stay: 13 days     Primary Rehabilitation Diagnosis  1. Impaired Mobility and ADLs  2. S/P Right below-the-knee amputation (2/8/2022 - Dr. Randall Rausch)  3.  History of critical ischemia of the right lower extremity    Comorbidities  Patient Active Problem List   Diagnosis Code    Hyperlipidemia E78.5    Tobacco use disorder F17.200    Mediastinal adenopathy R59.0    History of vitamin D deficiency Z86.39    Insomnia G47.00    Diabetic nephropathy associated with type 2 diabetes mellitus ([facility] E11.21    Otalgia H92.09    Mild nonproliferative diabetic retinopathy of left eye without macular edema associated with type 2 diabetes mellitus ([facility] H08.7346    Hypertensive retinopathy H35.039    Nuclear sclerosis of both eyes H25.13    Arterial occlusion, lower extremity (HCC) I70.209    Impaired mobility and ADLs Z74.09, Z78.9    Status post below knee amputation of right lower extremity (HCC) Z89.511    Chronic alcohol use Z72.89    History of coronary angioplasty with insertion of stent Z95.5    Coronary artery disease involving native coronary artery of native heart I25.10    Constipation K59.00    Poorly controlled type 2 diabetes mellitus (HCC) E11.65    Long term current use of insulin ([facility]) Z79.4    Migraine headache G43.909    Diabetic neuropathy associated with type 2 diabetes mellitus ([facility]) E11.40    Gastroesophageal reflux disease K21.9    History of essential hypertension Z86.79    History of lacunar cerebrovascular accident Z80.78    History of acute inferior wall myocardial infarction I25.2    Critical ischemia of lower extremity ([facility]) I70.229    Acute postoperative anemia due to expected blood loss D62    Depression F32. A    High vitamin D level E67.3          Therapy:     FIM SCORES Initial Assessment Weekly Progress Assessment 2/28/2022   Eating Functional Level: 5  6   Swallowing     Grooming 5  6   Bathing 4     6   Upper Body Dressing Functional Level: 5 (setup)  Items Applied/Steps Completed: Pullover (4 steps)  Comments: Pt performed UB dressing with setup  6   Lower Body Dressing Functional Level: 3  Items Applied/Steps Completed: Elastic waist pants (3 steps),Sock, left (1 step),Underpants (3 steps)  Comments: Pt performed LB dressing with modA to don LLE sock and pull pants up over R hip and standing at grab abr for increased stability  5   Toileting Functional Level:  (Pt declined)  5   Bladder 1 1   Bowel  0 0   Toilet Transfer Luce Toilet Transfer Score: 5 (CGA)  5   Tub/Shower Transfer Luce Tub or Shower Type: Shower  Tub/Shower Transfer Score: 5 (CGA with grab bar)     5   Comprehension Primary Mode of Comprehension: Auditory  Score: 5     5   Expression Primary Mode of Expression: Verbal  Score: 5     5   Social Interaction Score: 5  5   Problem Solving Score: 5  4   Memory Score: 5  4     FIM SCORES Initial Assessment Weekly Progress Assessment 2/28/2022   Bed/Chair/Wheelchair Transfers Transfer Type:  Other  Other: stand step with RW  Transfer Assistance : 4 (Contact guard assistance)  Sit to Stand Assistance: Contact guard assistance  Car Transfers:  (CGA)  Car Type: car transfer  Transfer Type: Lateral pivot  Transfer Assistance : 5 (Supervision/setup)  Sit to Stand Assistance: Supervision   Bed Mobility Rolling Right 6 (Modified independent)   Rolling Left 6 (Modified independent)   Supine to Sit 6 (Modified independent)   Sit to Stand Contact guard assistance   Sit to Supine 6 (Modified independent)    Rolling Right   7 (Independent)   Rolling Left   7 (Independent)   Supine to Sit   7 (Independent)   Sit to Stand Supervision   Sit to Supine   7 (Independent)      Locomotion (W/C) Able to Propel (ft): 240 feet  Functional Level: 4  Curbs/Ramps Assist Required (FIM Score): 0 (Not tested)  Wheelchair Setup Assist Required : 3 (Moderate assistance)  Wheelchair Management: Manages left brake,Manages right brake Function 6  Setup Assistance  5 (Supervision/setup)      Locomotion (W/C distance)   260 feet   Locomotion (Walk) 4 (Minimal assistance) 5 (Supervision/setup)  Gait belt;Walker, rolling   Locomotion (Walk dist.) 50 Feet (ft) 150 Feet (ft)   Steps/Stairs Steps/Stairs Ambulated (#): 3 (4\" steps)  Level of Assist : 3 (Moderate assistance)  Rail Use: Both   Steps/Stairs Ambulated (#): 4 (6\" steps)  Level of Assist : 4 Minimal assistance  Rail Use: Both         Nursing:     Neuro:   AAA&O x     4      Respiratory:   [x] WNL   [] O2 LPM:   Other:  Peripheral Vascular:   [] TEDS present   [] Edema present ____ Grade   Cardiac:   [x] WNL   [] Other  Genitourinary:   [x] continent   [] incontinent   [] epstein  Abdominal __2/27_____ LBM  GI: __DM Reg_____ Diet __thin____ Liquids _____ tube feeds  Musculoskeletal: ____ ROM Transfers _____ Assistive Device Used  __1 person__ Level of Assistance  Skin Integumentary:   [] Intact   [x] Not Intact- R. BKA   __________Preventative Measures  Details______________________________________________________________  Pain: [x] Controlled   [] Not Controlled   Pain Meds:   [] Scheduled   [x] PRN        Interdisciplinary Team Goals:     1. Discipline  Physical Therapy    Goal  Encourage pt to demonstrate safe w/c set up for functional transfers and parts management without verbal cuing. Barrier  Impaired strength, Impaired balance, Impaired safety awareness    Intervention  Education, Transfer Training, Strength and Balance Training    Goal written by:   Sunny Tapia PT, DPT     2.  Discipline  Occupational Therapy    Goal  Pt will perform all self cares with Joseph    Barrier  impaired safety, memory and recall    Intervention  transfer training, ADL training    Goal written by:  Eugenio  MSOTR/L     3. Discipline  Speech Therapy    Goal  Patient will perform functional problem solving tasks with 85-90% accuracy. Barrier  mild cognitive deficits    Intervention  cognitive retraining    Goal written by:  Gisel Christensen, CCC-SLP     4. Discipline  Nursing    Goal  The patient will a reduction in pain level. Barrier  Recent R. BKA    Intervention Pain assessment    Goal written by:  Thao NOEL RN LISA-BC     5. Discipline  Clinical Psychology    Goal      Barrier      Intervention      Goal written by:           Disposition / Discharge Planning: Follow-up services:  [x] Physical Therapy             [x] Occupational Therapy       [x] Speech Therapy           [] Skilled Nursing      [] Medical Social Worker   [] Aide        [] Outpatient      [] vs   [x] Home Health  [] vs       [x] to progress to outpatient       [] with 24-hour supervision       [] with 24-hour assistance   [] Lan TURCIOS recommendations: EMMA, w/c   Estimated discharge date:  3/2/2022   Discharge Location:  [x] Home  [] versus    [] [place]    [] [facility]   [] Other:           Electronic Signatures:      Signature Date Signed   Physical Therapist   Sunny Courser PT, DPT  2/28/2022   Occupational Therapist   Eugeniodong Moraes MSOTR/L  3/1/2022   Speech Therapist   Gisel Christensen, [address]  2/28/22   Recreational Therapist    Betty Castillo, CTRS 3/1/2022   Nursing    Thao NOEL RN LISA-BC 2/28/2022   Clinical Psychologist         Physician    Alejandro Law MD   2/28/2022        [place], [facility]  2/28/2022     Opportunity to share with family/caregiver[] YES [] NO    Relationship to patient____________________________________________________      The above information has been reviewed with the patient in a language that they can understand. Opportunity for comments and questions has been provided and a signed attestation has been scanned into the \"media tab\" of the EMR.       Patient Signature: ______________________________________________________    Date Signed: __________________________________________________________

## 2022-02-28 NOTE — PROGRESS NOTES
Problem: Self Care Deficits Care Plan (Adult)  Goal: *Therapy Goal (Edit Goal, Insert Text)  Description: Long Term Goals  Initiated 22 and to be accomplished within 2 week(s)  1. Pt will perform self-feeding independently. 2. Pt will perform grooming independently. 3. Pt will perform UB bathing with Joseph. 4. Pt will perform LB bathing with Joseph. 5. Pt will perform tub/shower transfer with Joseph. 6. Pt will perform UB dressing with Joseph. Goal met 2022  7. Pt will perform LB dressing with Joseph. 8. Pt will perform toileting task with Joseph. 9. Pt will perform toilet transfer with Joseph.    hort Term Goals   Initiated 22 and to be accomplished within 7 day(s), reassess 22  1. Pt will perform self-feeding with Joseph. 2. Pt will perform grooming with Joseph. Goal met 2022  3. Pt will perform UB bathing with supervision. 4. Pt will perform LB bathing with supervision. 5. Pt will perform tub/shower transfer with SBA. 6. Pt will perform UB dressing with Joseph. Goal met 2022  7. Pt will perform LB dressing with Diana. Goal met 2022  8. Pt will perform toileting task with CGA. 9. Pt will perform toilet transfer with SBA. Outcome: Progressing Towards Goal  Goal: Interventions  Outcome: Progressing Towards Goal   Occupational Therapy TREATMENT    Patient: Jelena Lind   59 y.o. Patient identified with name and : yes    Date: 2022    First Tx Session  Time In: 80  Time Out[de-identified] 1001        Diagnosis: Status post below knee amputation of right lower extremity [facility]) [Z89.511]   Precautions: Fall  Chart, occupational therapy assessment, plan of care, and goals were reviewed. Pain:  Pt reports 10/10 pain or discomfort prior to treatment. Pt reports 10/10 pain or discomfort post treatment.    Intervention Provided: Pt reported 10/10 pain in L leg however agreeable to engage in OT session, nursing notified and reported able to receive meds at 0930      SUBJECTIVE: Patient stated It's cold in here\".  \"I already washed\"    OBJECTIVE DATA SUMMARY:     THERAPEUTIC ACTIVITY Daily Assessment    Pt engaged in static standing activity which involved reaching across table top and outside OTIS and across midline with SBA and 2# wrist weights. Pt demonstrated ability to grasp small tokens and release into vertical slot while in standing to play game of Connect 4 with therapist winning 3/3 games and demonstrating good problem solving skills. Pt demonstrated standing tolerance of 2 min. , 3 min. And 2 min. 20 sec. With SBA/supervision. THERAPEUTIC EXERCISE Daily Assessment    Pt engaged in BUE strengthening with 4# dowel in all planes (bicep curls, shoulder flexion/extension, rowing x2, chest press, bicep curls) 6x20 reps, 3 sets. UPPER BODY BATHING Daily Assessment     Pt declined. LOWER BODY BATHING Daily Assessment      Pt declined. TOILETING Daily Assessment    Pt declined. UPPER BODY DRESSING Daily Assessment      Pt declined. Donned RLE clam shell with supervision/Joseph in seated position. MOBILITY/TRANSFERS Daily Assessment     Pt performed sit to stand with SBA and self propelled w/c with Joseph through hallways. ASSESSMENT:  Pt declined all self cares this date due to performing already prior to [id number] discussed need to assess pt performance with self cares prior to DC tomorrow and pt agreeable. Pt donned RLE clam shell this date with supervision/Joseph. Pt is increasing BUE strength and safety for self cares. Progression toward goals:  []          Improving appropriately and progressing toward goals  []          Improving slowly and progressing toward goals  []          Not making progress toward goals and plan of care will be adjusted     PLAN:  Patient continues to benefit from skilled intervention to address the above impairments. Continue treatment per established plan of care.   Discharge Recommendations: Home Health with support  Further Equipment Recommendations for Discharge: and transfer bench     Activity Tolerance:  good      Estimated LOS: ~ Dc 3/2/22    Please refer to the flowsheet for vital signs taken during this treatment. After treatment:   [x]  Patient left in no apparent distress sitting up in chair   []  Patient left in no apparent distress in bed  [x]  Call bell left within reach  []  Nursing notified  []  Caregiver present  []  Bed alarm activated    COMMUNICATION/EDUCATION:   [x] Home safety education was provided and the patient/caregiver indicated understanding. [] Patient/family have participated as able in goal setting and plan of care. [x] Patient/family agree to work toward stated goals and plan of care. [] Patient understands intent and goals of therapy, but is neutral about his/her participation. [] Patient is unable to participate in goal setting and plan of care.       Aretha Landeros, OT

## 2022-02-28 NOTE — PROGRESS NOTES
Problem: Falls - Risk of  Goal: *Absence of Falls  Description: Document Sofia Fothergill Fall Risk and appropriate interventions in the flowsheet. Outcome: Progressing Towards Goal  Note: Fall Risk Interventions:  Mobility Interventions: Assess mobility with egress test         Medication Interventions: Bed/chair exit alarm    Elimination Interventions: Bed/chair exit alarm,Call light in reach,Patient to call for help with toileting needs    History of Falls Interventions: Bed/chair exit alarm         Problem: Patient Education: Go to Patient Education Activity  Goal: Patient/Family Education  Outcome: Progressing Towards Goal     Problem: Diabetes Self-Management  Goal: *Disease process and treatment process  Description: Define diabetes and identify own type of diabetes; list 3 options for treating diabetes.   Outcome: Progressing Towards Goal

## 2022-02-28 NOTE — PROGRESS NOTES
Problem: Mobility Impaired (Adult and Pediatric)  Goal: *Acute Goals and Plan of Care (Insert Text)  Description: Physical Therapy Short Term Goals  Initiated 2/16/2022, reassessed 2/23/2022 and to be progressed to long term goals  1. Patient will move from supine to sit and sit to supine , scoot up and down, and roll side to side in bed with independence. MET 2/23/2022  2. Patient will transfer from bed to chair and chair to bed with supervision/set-up using the least restrictive device. MET 2/23/2022  3. Patient will perform sit to stand with supervision/set-up. MET 2/23/2022  4. Patient will ambulate with supervision/set-up for 50 feet with the least restrictive device. MET 2/23/2022  5. Patient will ascend/descend 3 stairs with 1 handrail(s) with minimal assistance/contact guard assist.  Not Met - pt needs UE support on B handrails    Physical Therapy Long Term Goals  Initiated 2/16/2022 and to be accomplished within 14 day(s) (3/02/2022)  1. Patient will move from supine to sit and sit to supine , scoot up and down, and roll side to side in bed with independence. 2.  Patient will transfer from bed to chair and chair to bed with modified independence using the least restrictive device. 3.  Patient will perform sit to stand with modified independence. 4.  Patient will ambulate with modified independence for 50 feet with the least restrictive device. 5.  Patient will ascend/descend 3 stairs with 1 handrail(s) or bumping up on steps with supervision/set-up. Outcome: Progressing Towards Goal    PHYSICAL THERAPY TREATMENT    Patient: Nieves Jeff (84 y.o. male)  Date: 2/28/2022  Diagnosis: Status post below knee amputation of right lower extremity ([facility]) [Z89.511] Status post below knee amputation of right lower extremity [facility])  Precautions: Fall Risk Precautions  Chart, physical therapy assessment, plan of care and goals were reviewed.     Time In:1000  Time Out:1130    Patient seen for: Balance activities;Gait training;Patient education;Transfer training; Therapeutic exercise    Pain:  Pt pain was reported as 10/10 pre-treatment. Pt pain was reported as 8-9/10 post-treatment. Intervention: Pt's nursing staff notified and brought pt medication at start of treatment session. Patient identified with name and : yes    SUBJECTIVE:      \"My lady friend will be there,\" when PT asked about pt's plans for safe home entry. PT reiterated recommendation for either railing installation or a ramp for safe home entry (temporary portable or permanent). OBJECTIVE DATA SUMMARY:    Objective:     BED/MAT MOBILITY Daily Assessment     Rolling Right : 7 (Independent)  Rolling Left : 7 (Independent)  Supine to Sit : 7 (Independent)  Sit to Supine : 7 (Independent)      TRANSFERS Daily Assessment     Transfer Type: Lateral pivot  Transfer Assistance : 5 (Supervision/setup)  Sit to Stand Assistance: Supervision   Pt requires supervision for safety. Pt challenged to perform 4 squat pivot transfers to different surfaces in gym and manage all w/c parts requiring moderate verbal cues for attention to safe parts management and w/c set up for transfers. GAIT Daily Assessment    Gait Description (WDL) Exceptions to WDL    Gait Abnormalities  Decreased foot clearance with fatigue    Assistive device Gait belt;Walker, rolling    Ambulation assistance - level surface 5 (Supervision/setup)    Distance 150 Feet (ft)    Ambulation assistance- uneven surface  NT    Comments Pt requires supervision secondary to functional weakness and decreased foot clearance with fatigue.         BALANCE Daily Assessment     Sitting - Static: Good (unsupported)  Sitting - Dynamic: Good (unsupported)  Standing - Static: Fair  Standing - Dynamic : Impaired        WHEELCHAIR MOBILITY Daily Assessment     Able to Propel (ft): 260 feet  Functional Level: 6  Wheelchair Setup Assist Required : 5 (Supervision/setup)  Wheelchair Management: Manages left brake;Manages right brake;Manages left footrest;Manages right footrest        THERAPEUTIC EXERCISES Daily Assessment     Prone LE Strength Training:  3 Sets of 10 Repetitions  Right and Left SLR Hip Extension  Right and Left Hamstring Curls  Supine LE Strength Training:  3 Sets of 10 Repetitions  SLR Hipt  Sidelying LE Strength Training:  3 Sets of 10 Repetitions:  Right and Left Hip Abd with moderate tactile cuing for decreased compensation  Right and Left Clamshells        ASSESSMENT:  Pt is progressing with functional mobility demonstrating improved functional strength but was limited by pain this treatment session. Progression toward goals:  [x]      Improving appropriately and progressing toward goals  []      Improving slowly and progressing toward goals  []      Not making progress toward goals and plan of care will be adjusted      PLAN:  Patient continues to benefit from skilled intervention to address the above impairments. Continue treatment per established plan of care. Emphasize functional strengthening to promote increased safety and independence with mobility. Discharge Recommendations:  Home Physical Therapy to progress to Outpatient Physical Therapy  Further Equipment Recommendations for Discharge:  rolling walker and wheelchair 18 inch      Estimated Discharge Date: 3/02/2022    Activity Tolerance:   Fair  Please refer to the flowsheet for vital signs taken during this treatment.     After treatment:   [] Patient left in no apparent distress in bed  [] Patient left in no apparent distress sitting up in chair  [x] Patient left in no apparent distress in w/c mobilizing under own power  [] Patient left in no apparent distress dining area  [] Patient left in no apparent distress mobilizing under own power  [x] Call bell left within reach  [] Nursing notified  [] Caregiver present  [] Bed alarm activated   [x] Chair alarm activated      Nighat Bledsoe PT, DPT  2/28/2022

## 2022-02-28 NOTE — PROGRESS NOTES
[facility] PHYSICAL REHABILITATION  [address] [place] [address], [address]     INPATIENT REHABILITATION  DAILY PROGRESS NOTE     Date: 2/28/2022    Name: Jhonny Trinidad Age / Sex: 59 y.o. / male   CSN: [id number] MRN: [id number]   [facility] Date: 2/15/2022 Length of Stay: 13 days     Primary Rehabilitation Diagnosis: Impaired Mobility and ADLs secondary to:  1. S/P Right below-the-knee amputation (2/8/2022 - Dr. Valerie De Leon)  2. History of critical ischemia of the right lower extremity      Subjective:     Patient seen and examined. Blood pressure controlled. Blood glucose controlled. Patient was found by nursing clinical supervisor to be using his own supply of Acetaminophen IN ADDITION to the Acetaminophen being given at the ARU. Patient's Complaint:   No significant medical complaints    Pain Control: stable, mild-to-moderate joint symptoms intermittently, reasonably well controlled by current meds      Objective:     Vital Signs:  Patient Vitals for the past 24 hrs:   BP Temp Pulse Resp SpO2   02/28/22 0755 (!) 121/42 98.4 °F (36.9 °C) 71 18 99 %   02/27/22 2148 122/71 98.1 °F (36.7 °C) 71 18 96 %   02/27/22 1549 112/66 97.9 °F (36.6 °C) 76 17 98 %        Physical Examination:  GENERAL SURVEY: Patient is awake, alert, oriented x 3, sitting comfortably on the chair, not in acute respiratory distress.   HEENT: pale palpebral conjunctivae, anicteric sclerae, no nasoaural discharge, moist oral mucosa  NECK: supple, no jugular venous distention, no palpable lymph nodes  CHEST/LUNGS: symmetrical chest expansion, good air entry, clear breath sounds  HEART: adynamic precordium, good S1 S2, no S3, regular rhythm, no murmurs  ABDOMEN: flat, bowel sounds appreciated, soft, non-tender  EXTREMITIES: (+) right BKA stump, pale nailbeds, no edema, full and equal pulses, no calf tenderness   NEUROLOGICAL EXAM: The patient is awake, alert and oriented x3, able to answer questions fairly appropriately, able to follow 1 and 2 step commands. Able to tell time from the wall clock. Cranial nerves II-XII are grossly intact. No gross sensory deficit. Motor strength is 4/5 on BUE and BLE. Incision(s): covered, clean, dry, and intact       Current Medications:  Current Facility-Administered Medications   Medication Dose Route Frequency    insulin lispro (HUMALOG) injection 3 Units  0.04 Units/kg SubCUTAneous TIDAC    insulin glargine (LANTUS) injection 23 Units  0.3 Units/kg SubCUTAneous QHS    gabapentin (NEURONTIN) capsule 300 mg  300 mg Oral TID    melatonin tablet 3 mg  3 mg Oral PCD    oxyCODONE IR (ROXICODONE) tablet 5 mg  5 mg Oral Q4H PRN    metFORMIN (GLUCOPHAGE) tablet 1,000 mg  1,000 mg Oral BID WITH MEALS    glucose chewable tablet 16 g  4 Tablet Oral PRN    glucagon (GLUCAGEN) injection 1 mg  1 mg IntraMUSCular PRN    dextrose 10% infusion 0-250 mL  0-250 mL IntraVENous PRN    iron polysaccharides (NIFEREX) capsule 150 mg  1 Capsule Oral BID    thiamine HCL (B-1) tablet 200 mg  200 mg Oral DAILY    aspirin chewable tablet 81 mg  81 mg Oral DAILY WITH BREAKFAST    atorvastatin (LIPITOR) tablet 20 mg  20 mg Oral DAILY    buPROPion SR (WELLBUTRIN SR) tablet 150 mg  150 mg Oral DAILY    therapeutic multivitamin (THERAGRAN) tablet 1 Tablet  1 Tablet Oral DAILY    acetaminophen (TYLENOL) tablet 650 mg  650 mg Oral TID    methocarbamoL (ROBAXIN) tablet 500 mg  500 mg Oral TID    acetaminophen (TYLENOL) tablet 650 mg  650 mg Oral Q4H PRN    bisacodyL (DULCOLAX) tablet 10 mg  10 mg Oral Q48H PRN    enoxaparin (LOVENOX) injection 40 mg  40 mg SubCUTAneous Q24H    insulin lispro (HUMALOG) injection   SubCUTAneous TIDAC       Allergies:   Allergies   Allergen Reactions    Niacin Itching       Lab/Data Review:  Recent Results (from the past 24 hour(s))   GLUCOSE, POC    Collection Time: 02/27/22  5:04 PM   Result Value Ref Range    Glucose (POC) 161 (H) 70 - 110 mg/dL   GLUCOSE, POC    Collection Time: 02/27/22  9:27 PM   Result Value Ref Range    Glucose (POC) 152 (H) 70 - 110 mg/dL   HEPATIC FUNCTION PANEL    Collection Time: 02/28/22  7:01 AM   Result Value Ref Range    Protein, total 6.9 6.4 - 8.2 g/dL    Albumin 3.2 (L) 3.4 - 5.0 g/dL    Globulin 3.7 2.0 - 4.0 g/dL    A-G Ratio 0.9 0.8 - 1.7      Bilirubin, total 0.2 0.2 - 1.0 MG/DL    Bilirubin, direct <0.1 0.0 - 0.2 MG/DL    Alk. phosphatase 92 45 - 117 U/L    AST (SGOT) 27 10 - 38 U/L    ALT (SGPT) 44 16 - 61 U/L   METABOLIC PANEL, BASIC    Collection Time: 02/28/22  7:10 AM   Result Value Ref Range    Sodium 137 136 - 145 mmol/L    Potassium 4.6 3.5 - 5.5 mmol/L    Chloride 108 100 - 111 mmol/L    CO2 23 21 - 32 mmol/L    Anion gap 6 3.0 - 18 mmol/L    Glucose 72 (L) 74 - 99 mg/dL    BUN 21 (H) 7.0 - 18 MG/DL    Creatinine 0.90 0.6 - 1.3 MG/DL    BUN/Creatinine ratio 23 (H) 12 - 20      GFR est AA >60 >60 ml/min/1.73m2    GFR est non-AA >60 >60 ml/min/1.73m2    Calcium 9.0 8.5 - 10.1 MG/DL   CBC WITH AUTOMATED DIFF    Collection Time: 02/28/22  7:10 AM   Result Value Ref Range    WBC 2.6 (L) 4.6 - 13.2 K/uL    RBC 3.50 (L) 4.35 - 5.65 M/uL    HGB 9.6 (L) 13.0 - 16.0 g/dL    HCT 31.3 (L) 36.0 - 48.0 %    MCV 89.4 78.0 - 100.0 FL    MCH 27.4 24.0 - 34.0 PG    MCHC 30.7 (L) 31.0 - 37.0 g/dL    RDW 14.4 11.6 - 14.5 %    PLATELET 600 748 - 329 K/uL    MPV 10.8 9.2 - 11.8 FL    NRBC 0.0 0  WBC    ABSOLUTE NRBC 0.00 0.00 - 0.01 K/uL    NEUTROPHILS 42 40 - 73 %    LYMPHOCYTES 38 21 - 52 %    MONOCYTES 14 (H) 3 - 10 %    EOSINOPHILS 5 0 - 5 %    BASOPHILS 1 0 - 2 %    IMMATURE GRANULOCYTES 1 (H) 0.0 - 0.5 %    ABS. NEUTROPHILS 1.1 (L) 1.8 - 8.0 K/UL    ABS. LYMPHOCYTES 1.0 0.9 - 3.6 K/UL    ABS. MONOCYTES 0.4 0.05 - 1.2 K/UL    ABS. EOSINOPHILS 0.1 0.0 - 0.4 K/UL    ABS. BASOPHILS 0.0 0.0 - 0.1 K/UL    ABS. IMM.  GRANS. 0.0 0.00 - 0.04 K/UL    DF AUTOMATED     GLUCOSE, POC    Collection Time: 02/28/22  7:24 AM   Result Value Ref Range    Glucose (POC) 84 70 - 110 mg/dL   GLUCOSE, POC    Collection Time: 02/28/22 11:38 AM   Result Value Ref Range    Glucose (POC) 65 (L) 70 - 110 mg/dL   GLUCOSE, POC    Collection Time: 02/28/22 11:52 AM   Result Value Ref Range    Glucose (POC) 74 70 - 110 mg/dL   GLUCOSE, POC    Collection Time: 02/28/22 12:08 PM   Result Value Ref Range    Glucose (POC) 104 70 - 110 mg/dL       Assessment:     Primary Rehabilitation Diagnosis  1. Impaired Mobility and ADLs  2. S/P Right below-the-knee amputation (2/8/2022 - Dr. Teresa Lin)  3.  History of critical ischemia of the right lower extremity    Comorbidities  Patient Active Problem List   Diagnosis Code    Hyperlipidemia E78.5    Tobacco use disorder F17.200    Mediastinal adenopathy R59.0    History of vitamin D deficiency Z86.39    Insomnia G47.00    Diabetic nephropathy associated with type 2 diabetes mellitus ([facility] E11.21    Otalgia H92.09    Mild nonproliferative diabetic retinopathy of left eye without macular edema associated with type 2 diabetes mellitus ([facility] [address] B64.8942    Hypertensive retinopathy H35.039    Nuclear sclerosis of both eyes H25.13    Arterial occlusion, lower extremity (HCC) I70.209    Impaired mobility and ADLs Z74.09, Z78.9    Status post below knee amputation of right lower extremity (HCC) Z89.511    Chronic alcohol use Z72.89    History of coronary angioplasty with insertion of stent Z95.5    Coronary artery disease involving native coronary artery of native heart I25.10    Constipation K59.00    Poorly controlled type 2 diabetes mellitus (HCC) E11.65    Long term current use of insulin ([facility]) Z79.4    Migraine headache G43.909    Diabetic neuropathy associated with type 2 diabetes mellitus (HCC) E11.40    Gastroesophageal reflux disease K21.9    History of essential hypertension Z86.79    History of lacunar cerebrovascular accident Z80.78    History of acute inferior wall myocardial infarction I25.2    Critical ischemia of lower extremity ([address] I70.229    Acute postoperative anemia due to expected blood loss D62    Depression F32. A    High vitamin D level E67.3       Plan:     1. Justification for continued stay: Good progression towards established rehabilitation goals. 2. Medical Issues being followed closely:    [x]  Fall and safety precautions     [x]  Wound Care     [x]  Bowel and Bladder Function     [x]  Fluid Electrolyte and Nutrition Balance     [x]  Pain Control      3. Issues that 24 hour rehabilitation nursing is following:    [x]  Fall and safety precautions     [x]  Wound Care     [x]  Bowel and Bladder Function     [x]  Fluid Electrolyte and Nutrition Balance     [x]  Pain Control      [x]  Assistance with and education on in-room safety with transfers to and from the bed, wheelchair, toilet and shower. 4. Acute rehabilitation plan of care:    [x]  Continue current care and rehab. [x]  Physical Therapy           [x]  Occupational Therapy           []  Speech Therapy     []  Hold Rehab until further notice     5. Medications:    [x]  MAR Reviewed     [x]  Continue Present Medications     6. Chemical DVT Prophylaxis:      [x]  Enoxaparin     []  Unfractionated Heparin     []  Warfarin     []  NOAC     []  Aspirin     []  None     7. Mechanical DVT Prophylaxis:      []  FLEX Stockings     []  Sequential Compression Device     [x]  None     8. GI Prophylaxis:      []  PPI     []  H2 Blocker     [x]  None / Not indicated     9. Code status:    [x]  Full code     []  Partial code     []  Do not intubate     []  Do not resuscitate     10. Diet:  Specifications  4 carb choices (60 gm/meal), Low fat/Low cholesterol/High fiber/SEEMA   Solids (consistency)  Regular   Liquids (consistency)  Thin   Fluid Restriction  None      11. COVID-19:  Laboratory testing COVID-19 rapid test ([facility], [facility]) (2/4/2022): Not detected  COVID-19 rapid test ([facility], [facility]) (2/15/2022):  Not detected   Precautions None   Vaccine to be given this admission No      12. Orders:   > S/P Right below-the-knee amputation (2/8/2022 - Dr. Maynard Oppenheim); History of critical ischemia of the right lower extremity    > Staples to be removed on 3/8/2022    > Acute postoperative blood loss anemia   > Hgb/Hct (2/16/2022, on admission to the ARU) = 8.9/28.6   > Anemia work-up (2/16/2022) showed serum iron 14, TIBC 234, iron % saturation 18, ferritin 404, reticulocyte count 2.1   > On 2/16/2022, started Iron polysaccharides 150 mg PO BID      02/21/22  0608 02/17/22  0502 02/16/22  0626   HGB 8.5* 8.7* 8.9*   HCT 27.8* 27.8* 28.6*       > On 2/21/2022, patient was given Epoetin lico 10,000 units SC x 1 dose      02/28/22  0710 02/24/22  0709   HGB 9.6* 9.1*   HCT 31.3* 28.9*      > Continue Iron polysaccharides 150 mg PO BID    > Constipation   > On 2/17/2022, started Senna 1 tab PO once daily   > On 2/18/2022, discontinued Senna 1 tab PO once daily    > Coronary artery disease; History of inferior wall myocardial infarction; History of coronary angioplasty with stent placement   > Not on any ACE-I or beta-blockers   > Continue:    > Aspirin 81 mg PO daily with breakfast    > Atorvastatin 20 mg PO once daily    > Depression   > On 2/16/2022, discontinued Trazodone 50 mg PO q HS (re: patient says he has not been taking this prior to admission)   > Continue Bupropion  mg PO once daily    > High vitamin D level;  History of vitamin D deficiency   > Prior to [facility] to [facility], patient was on Ergocalciferol 50,000 units PO q 7 days   > Vitamin D 25-Hydroxy (2/16/2022) = 105.7   > On 2/16/2022, discontinued Ergocalciferol 50,000 units PO q 7 days     > Hyperlipidemia   > Continue Atorvastatin 20 mg PO once daily    > Type 2 diabetes mellitus, poorly controlled, with diabetic nephropathy, diabetic neuropathy, diabetic retinopathy, with long-term current use of insulin   > HbA1c (2/8/2022) = 13.8   > Vitamin B12 (2/16/2022) = 574   > On 2/16/2022:    > Increased Metformin from 500 mg to 850 mg PO BID with meals    > Increased Insulin lispro from 4 units to 5 units SC TID AC   > On 2/17/2022:    > Increased Insulin glargine from 45 units to 50 units SC daily before breakfast    > Decreased Insulin lispro from 5 units to 4 units SC TID AC   > On 2/18/2022:    > Patient was given Insulin glargine 20 units SC x 1 dose at 9PM    > Increased Metformin from 850 mg to 1000 mg PO BID with meals   > On 2/19/2022, changed Insulin glargine from 50 units SC daily before breakfast to 35 units SC q HS   > On 2/22/2022, decreased Insulin lispro from 4 units to 3 units SC TID AC   > On 2/23/2022, increased Insulin lispro from 3 units to 4 units SC TID AC   > On 2/26/2022, decreased Insulin glargine from 35 units to 30 units SC q HS   > On 2/27/2022:    > Decreased Insulin glargine from 30 units to 23 units SC q HS    > Decreased Insulin lispro from 4 units to 3 units SC TID AC   > Discontinue Insulin lispro 3 units SC TID AC   > Continue:    > Metformin 1000 mg PO BID with meals    > Decrease Insulin glargine from 23 units to 20 units SC q HS    > Insulin lispro sliding scale SC TID AC only    > Difficulty sleeping   > On 2/16/2022, discontinued Trazodone 50 mg PO q HS (re: patient says he has not been taking this prior to admission)   > On 2/21/2022, patient reported difficulty sleeping; started Melatonin 3 mg PO daily after dinner   > Continue Melatonin 3 mg PO daily after dinner    > Depressed mood   > Patient was seen by Clinical Psychologist and patient was felt to have a depressed mood   > Option of starting an antidepressant was presented and the patient politely declined it at this time, stating he is still able to cope with his present condition    > Analgesia   > On 2/16/2022:    > Started:     > Acetaminophen 650 mg PO TID (8AM, 12PM, 4PM)     > Methocarbamol 500 mg PO TID (8AM, 2PM, 8PM)    > Increased Gabapentin from 300 mg to 400 mg PO TID (8AM, 2PM, 8PM)   > On 2/18/2022, decreased Oxycodone from 10 mg to 7.5 mg PO q 4 hr PRN for pain level 5/10 or greater    > On 2/21/2022, decreased Oxycodone from 7.5 mg to 5 mg PO q 4 hr PRN for pain level 5/10 or greater    > On 2/22/2022, decreased Gabapentin from 400 mg to 300 mg PO TID (8AM, 2PM, 8PM)   > Continue:    > Acetaminophen 650 mg PO TID (8AM, 12PM, 4PM)    > Acetaminophen 650 mg PO q 4 hr PRN for pain level 4/10 or lesser (from 8PM to 4AM only)    > Gabapentin 300 mg PO TID (8AM, 2PM, 8PM)    > Methocarbamol 500 mg PO TID (8AM, 2PM, 8PM)    > Oxycodone 5 mg PO q 4 hr PRN for pain level 5/10 or greater       12. Personal Protective Equipment (N95 face mask and eye goggles) was used while interacting with the patient. Patient was using a surgical mask. 15. Patient's progress in rehabilitation and medical issues discussed with the patient. All questions answered to the best of my ability. Care plan discussed with patient and nurse. 14. Total clinical care time is 30 minutes, including review of chart including all labs, radiology, past medical history, and discussion with patient. Greater than 50% of my time was spent in coordination of care and counseling.       Signed:    Kg Pa MD    February 28, 2022

## 2022-02-28 NOTE — ROUTINE PROCESS
SHIFT CHANGE NOTE FOR [facility]    Bedside and Verbal shift change report given to Margot Hammonds, ANITHA (oncoming nurse) by Victor Hugo Molina RN (offgoing nurse). Report included the following information SBAR, Kardex, MAR and Recent Results. Situation:   Code Status: Full Code   Hospital Day: 13   Problem List:   Hospital Problems  Date Reviewed: 2/27/2022          Codes Class Noted POA    Poorly controlled type 2 diabetes mellitus [facility] ICD-10-CM: E11.65  ICD-9-CM: 250.00  Unknown Yes    Overview Signed 2/16/2022 12:50 AM by Laura Irene MD     HbA1c (2/8/2022) = 13.8             Long term current use of insulin (HCC) ICD-10-CM: Z79.4  ICD-9-CM: V58.67  Unknown Yes        High vitamin D level (Chronic) ICD-10-CM: E67.3  ICD-9-CM: 278.4  2/16/2022 Yes    Overview Signed 2/16/2022  9:52 AM by Laura Irene MD     Vitamin D 25-Hydroxy (2/16/2022) = 105.7             Impaired mobility and ADLs ICD-10-CM: Z74.09, Z78.9  ICD-9-CM: V49.89  2/8/2022 Yes        * (Principal) Status post below knee amputation of right lower extremity ([facility] [address] ICD-10-CM: Z89.511  ICD-9-CM: V49.75  2/8/2022 Yes    Overview Signed 2/16/2022 12:47 AM by Laura Irene MD     S/P Right below-the-knee amputation (2/8/2022 - Dr. Maynard Oppenheim)             Critical ischemia of lower extremity [facility]) ICD-10-CM: G34.894  ICD-9-CM: 459.9  2/8/2022 No        Acute postoperative anemia due to expected blood loss ICD-10-CM: D62  ICD-9-CM: 285.1  2/8/2022 Yes              Background:   Past Medical History:   Past Medical History:   Diagnosis Date    Acute postoperative anemia due to expected blood loss 2/8/2022    Arterial occlusion, lower extremity [facility] 11/27/2021    Cardiac cath 11/09/2011    RCA patent. LM patent. LAD patent. mD1 55%. CX patent. Prior stent patent. Edison 85% (2.5 x 15-mm Xience stent, resid 0%). LVEDP 12. EF 40-45%. High lateral hypk (RI distribution).       Chronic alcohol use     Fri-Sun one fifth; Mon-Thur: Pint of liquor  Constipation     Coronary artery disease involving native coronary artery of native heart     Critical ischemia of lower extremity ([facility] [facility] 2/8/2022    Depression     Diabetic nephropathy associated with type 2 diabetes mellitus ([facility] 4/4/2019    Diabetic neuropathy [facility] with type [facility]     Gastroesophageal reflux disease     High vitamin D level 2/16/2022    Vitamin D 25-Hydroxy (2/16/2022) = 105.7    History of acute inferior wall myocardial infarction 2009    History of coronary angioplasty with insertion of stent 07/29/2009    2.5 x 13 Cypher stent to CX.      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 without macular edema associated with type 2 diabetes mellitus ([facility] [facility] 12/18/2020    Nuclear sclerosis of both eyes 12/18/2020    Otalgia 05/08/2019    Poorly controlled type 2 diabetes mellitus (HCC)     HbA1c (2/8/2022) = 13.8    Tobacco use disorder     contemplating stopping        Assessment:   Changes in Assessment throughout shift: No change to previous assessment     Patient has a central line: no Reasons if yes: n  Insertion date:na Last dressing date:na   Patient has Epstein Cath: no Reasons if yes: na   Insertion date:na  Shift epstein care completed: NO     Last Vitals:     Vitals:    02/27/22 0819 02/27/22 1030 02/27/22 1549 02/27/22 2148   BP: (!) 94/54 118/70 112/66 122/71   Pulse: 72 68 76 71   Resp: 18  17 18   Temp: 98 °F (36.7 °C)  97.9 °F (36.6 °C) 98.1 °F (36.7 °C)   SpO2: 100% 100% 98% 96%   Weight:       Height:            PAIN    Pain Assessment    Pain Intensity 1: 0 (02/28/22 0636) Pain Intensity 1: 2 (12/29/14 1105)    Pain Location 1: Leg Pain Location 1: Abdomen    Pain Intervention(s) 1: Medication (see MAR) Pain Intervention(s) 1: Medication (see MAR)  Patient Stated Pain Goal: 0 Patient Stated Pain Goal: 0  o Intervention effective: yes  o Other actions taken for pain: Medication (see MAR)     Skin Assessment  Skin color    Condition/Temperature    Integrity Skin Integrity: Incision (comment) (jenny)  Turgor    Weekly Pressure Ulcer Documentation  Pressure  Injury Documentation: No Pressure Injury Noted-Pressure Ulcer Prevention Initiated  Wound Prevention & Protection Methods  Orientation of wound Orientation of Wound Prevention: Posterior  Location of Prevention Location of Wound Prevention: Sacrum/Coccyx  Dressing Present Dressing Present : No  Dressing Status    Wound Offloading Wound Offloading (Prevention Methods): Bed, pressure reduction mattress,Pillows,Wheelchair     INTAKE/OUPUT  Date 02/27/22 0700 - 02/28/22 0659 02/28/22 0700 - 03/01/22 0659   Shift 5739-4531 3895-4583 24 Hour Total 2951-0865 3412-7632 24 Hour Total   INTAKE   P.O. 739  739        P. O. 739  739      Shift Total(mL/kg) 739(9.4)  739(9.4)      OUTPUT   Urine(mL/kg/hr)  1300 1300        Urine Voided  1300 1300        Urine Occurrence(s) 2 x 2 x 4 x      Emesis/NG output           Emesis Occurrence(s)  0 x 0 x      Stool           Stool Occurrence(s) 2 x 0 x 2 x      Shift Total(mL/kg)  1300(16.6) 1300(16.6)       1300 561      Weight (kg) 78.2 78.2 78.2 78.2 78.2 78.2       Recommendations:  1. Patient needs and requests: pain management    2. Pending tests/procedures: none     3. Functional Level/Equipment: Partial (one person) / Bed (comment); Wheelchair    Fall Precautions:   Fall risk precautions were reinforced with the patient; he was instructed to call for help prior to getting up.  The following fall risk precautions were continued: bed/ chair alarms, door signage, yellow bracelet and socks as well as update of the Yris Andujar tool in the patient's room. Loly Score: 3    HEALS Safety Check    A safety check occurred in the patient's room between off going nurse and oncoming nurse listed above. The safety check included the below items  Area Items   H  High Alert Medications - Verify all high alert medication drips (heparin, PCA, etc.)   E  Equipment - Suction is set up for ALL patients (with eliel)  - Red plugs utilized for all equipment (IV pumps, etc.)  - WOWs wiped down at end of shift.  - Room stocked with oxygen, suction, and other unit-specific supplies   A  Alarms - Bed alarm is set for fall risk patients  - Ensure chair alarm is in place and activated if patient is up in a chair   L  Lines - Check IV for any infiltration  - Gallardo bag is empty if patient has a Gallardo   - Tubing and IV bags are labeled   S  Safety   - Room is clean, patient is clean, and equipment is clean. - Hallways are clear from equipment besides carts. - Fall bracelet on for fall risk patients  - Ensure room is clear and free of clutter  - Suction is set up for ALL patients (with eliel)  - Hallways are clear from equipment besides carts.    - Isolation precautions followed, supplies available outside room, sign posted     John Nunes RN

## 2022-02-28 NOTE — ROUTINE PROCESS
Results for Sean Jeans (MRN [id number]) as of 2/28/2022 10:27   Ref.  Range 12/1/2021 12:40 12/1/2021 16:03 12/6/2021 17:30 12/7/2021 10:28 12/7/2021 10:41 12/7/2021 11:31 12/7/2021 11:32 12/7/2021 11:40 12/7/2021 14:13 12/22/2021 12:05 12/22/2021 14:13 12/22/2021 14:32 12/22/2021 14:34 12/22/2021 15:48 1/5/2022 11:47 1/5/2022 14:09 1/5/2022 14:20 1/11/2022 07:31 1/11/2022 09:48 1/11/2022 10:10 1/14/2022 11:38 1/14/2022 13:41 1/14/2022 13:43 1/17/2022 07:50 1/17/2022 08:18 2/4/2022 12:30 2/4/2022 12:32 2/4/2022 12:40 2/8/2022 07:05 2/8/2022 07:10 2/8/2022 08:14 2/8/2022 10:32 2/8/2022 16:05 2/9/2022 08:37 2/9/2022 10:59 2/9/2022 16:12 2/9/2022 21:29 2/9/2022 21:33 2/9/2022 21:54 2/10/2022 06:07 2/10/2022 11:13 2/10/2022 16:24 2/10/2022 21:22 2/11/2022 07:01 2/11/2022 10:58 2/11/2022 17:37 2/11/2022 21:33 2/11/2022 21:34 2/12/2022 06:28 2/12/2022 11:20 2/12/2022 15:52 2/12/2022 21:41 2/13/2022 06:26 2/13/2022 11:55 2/13/2022 16:21 2/13/2022 21:43 2/14/2022 06:19 2/14/2022 13:09 2/14/2022 16:52 2/14/2022 21:17 2/15/2022 06:11 2/15/2022 10:55 2/15/2022 11:31 2/15/2022 16:33 2/16/2022 06:26 2/16/2022 07:38 2/16/2022 12:08 2/16/2022 16:30 2/16/2022 21:26 2/17/2022 05:02 2/17/2022 10:11 2/17/2022 12:23 2/17/2022 16:31 2/17/2022 21:52 2/18/2022 08:05 2/18/2022 12:32 2/18/2022 17:09 2/18/2022 21:45 2/19/2022 07:26 2/19/2022 11:23 2/19/2022 16:40 2/19/2022 20:28 2/20/2022 07:38 2/20/2022 11:53 2/20/2022 16:59 2/20/2022 20:07 2/21/2022 06:08 2/21/2022 07:59 2/21/2022 12:21 2/21/2022 16:42 2/21/2022 21:56 2/22/2022 07:46 2/22/2022 11:26 2/22/2022 16:46 2/22/2022 21:15 2/23/2022 07:53 2/23/2022 11:24 2/23/2022 16:27 2/23/2022 20:03 2/24/2022 07:09 2/24/2022 07:58 2/24/2022 12:12 2/24/2022 17:12 2/24/2022 21:22 2/25/2022 09:05 2/25/2022 12:16 2/25/2022 16:44 2/25/2022 21:24 2/26/2022 08:01 2/26/2022 12:10 2/26/2022 12:26 2/26/2022 16:52 2/26/2022 20:13 2/27/2022 07:43 2/27/2022 11:54 2/27/2022 17:04 2/27/2022 21:27 2/28/2022 07:10 2/28/2022 07:24   WBC Latest Ref Range: 4.6 - 13.2 K/uL     4.8     5.2       4.0 (L)     4.3 (L)           9.1                                3.9 (L)     5.0                 3.4 (L)             3.2 (L)                  2.6 (L)      Dr. Ankit Tovar notified of trending downward trend to WBC, today 2.6. Writer confiscated Extra Strength Tylenol pill bottle from patient and placed in med. Room patient specific kathleen- he was self medicating. He stated that he gave himself a dose last night. Dr. Ankit Tovar was notified.        Yaquelin Jose MSN, RN, [address] [place] [address] [phone]

## 2022-02-28 NOTE — PROGRESS NOTES
Ashely spoke with pt's significant other, Ms Lucila Yi, who is agreeable to a final caregiver education tomorrow at 130 pm.     Ashely confirmed with [facility] the approval to deliver pt's DME (wheelchair, rolling walker, bedside commode and tub transfer bench) to pt's room tomorrow. Ashely shared this plan with Ms Lucila Yi. Ms Lucila Yi states she is unsure of the home health agency and will get back with sw with [facility]. Sw will follow.

## 2022-02-28 NOTE — PROGRESS NOTES
Patient returned from PT , bs 65, hypoglycemic protocol started . Patient given 4 oz. Of juice. Repeat bs in 15 min.  No s+s of hypoglycemia

## 2022-02-28 NOTE — PROGRESS NOTES
Problem: Neurolinguistics Impaired (Adult)  Goal: *Speech Goal: (INSERT TEXT)  Description: Long term goals  Patient will:  1. Be oriented x 3 and recall events of the day, supervision. 2. Recall 3 related words after 5 minutes with supervision and mnemonic training. 3. Perform functional problem solving tasks with 90% accuracy. 4. Recall strategies learned in OT/PT sessions (provided by the therapists) with min assist-supervision. Short term goals (by 3/4/22)  Patient will:  1. Be oriented x 3 and recall events of the day, min assist.  2. Recall 3 related words after 5 minutes with min assist and mnemonic training. 3. Perform functional problem solving tasks with 80% accuracy. 4. Recall strategies learned in OT/PT sessions (provided by the therapists) with min cues. Note:   Speech language pathology treatment    Patient: Luke Chaney (86 y.o. male)  Date: 2022  Diagnosis: Status post below knee amputation of right lower extremity ([facility]) [Z89.511] Status post below knee amputation of right lower extremity ([facility]       Time in: 1345  Time Out:  1420    Pain:  Pre-tx:  8 When asked by the RN  Post tx: 8 When asked by the RN (received pain medication at the end of the session)    SUBJECTIVE:   Patient stated I'm doing alright. OBJECTIVE:   Mental Status:  Mr. Mu Barrera was awake and alert when seen this afternoon. Treatment & Interventions:  Patient was seen in his room this afternoon. The following treatment tasks were presented:  Neuro-Linguistics:   Orientation:  Independent  Recent memory: Min assist  Recall 3 words: Mod assist  Problem solvin% accuracy  ID harder task: 100% accuracy (with rationale)  Similar meanings: 80% accuracy  Donning leg sleeve: Patient able to describe for SLP to perform steps, supervision. Response & Tolerance to Activities:  Mr. Mu Barrera participated well this afternoon. He was a bit more talkative than in prior sessions.     Pain:  Pain Scale 1: Numeric (0 - 10)  Pain Orientation 1: Right  Pain Description 1: Aching  After treatment:   [x]       Patient left in no apparent distress sitting up in chair  []       Patient left in no apparent distress in bed  [x]       Call bell left within reach  [x]       Nursing notified  []       Caregiver present  []       Bed alarm activated    ASSESSMENT:   Progression toward goals:  []       Improving appropriately and progressing toward goals  [x]       Improving slowly and progressing toward goals  []       Not making progress toward goals and plan of care will be adjusted    PLAN:   Patient continues to benefit from skilled intervention to address the above impairments. Continue treatment per established plan of care.   Discharge Recommendations:  Outpatient    Estimated LOS: Through 3/2/22    LOI Lopez  Time Calculation: 35 mins

## 2022-03-01 LAB
GLUCOSE BLD STRIP.AUTO-MCNC: 143 MG/DL (ref 70–110)
GLUCOSE BLD STRIP.AUTO-MCNC: 192 MG/DL (ref 70–110)
GLUCOSE BLD STRIP.AUTO-MCNC: 221 MG/DL (ref 70–110)
GLUCOSE BLD STRIP.AUTO-MCNC: 85 MG/DL (ref 70–110)

## 2022-03-01 PROCEDURE — 74011250636 HC RX REV CODE- 250/636: Performed by: INTERNAL MEDICINE

## 2022-03-01 PROCEDURE — 65310000000 HC RM PRIVATE REHAB

## 2022-03-01 PROCEDURE — 99232 SBSQ HOSP IP/OBS MODERATE 35: CPT | Performed by: INTERNAL MEDICINE

## 2022-03-01 PROCEDURE — 92507 TX SP LANG VOICE COMM INDIV: CPT

## 2022-03-01 PROCEDURE — 82962 GLUCOSE BLOOD TEST: CPT

## 2022-03-01 PROCEDURE — 74011636637 HC RX REV CODE- 636/637: Performed by: INTERNAL MEDICINE

## 2022-03-01 PROCEDURE — 74011250637 HC RX REV CODE- 250/637: Performed by: INTERNAL MEDICINE

## 2022-03-01 PROCEDURE — 97116 GAIT TRAINING THERAPY: CPT

## 2022-03-01 PROCEDURE — 97530 THERAPEUTIC ACTIVITIES: CPT

## 2022-03-01 PROCEDURE — 97535 SELF CARE MNGMENT TRAINING: CPT

## 2022-03-01 PROCEDURE — 97110 THERAPEUTIC EXERCISES: CPT

## 2022-03-01 RX ORDER — OXYCODONE HYDROCHLORIDE 5 MG/1
5 TABLET ORAL
Qty: 20 TABLET | Refills: 0 | Status: SHIPPED | OUTPATIENT
Start: 2022-03-01 | End: 2022-03-06

## 2022-03-01 RX ORDER — ACETAMINOPHEN 325 MG/1
650 TABLET ORAL
Qty: 30 TABLET | Refills: 0 | Status: SHIPPED | OUTPATIENT
Start: 2022-03-01 | End: 2022-03-09

## 2022-03-01 RX ORDER — GABAPENTIN 300 MG/1
300 CAPSULE ORAL 3 TIMES DAILY
Qty: 90 CAPSULE | Refills: 0 | Status: SHIPPED | OUTPATIENT
Start: 2022-03-02

## 2022-03-01 RX ORDER — METHOCARBAMOL 500 MG/1
500 TABLET, FILM COATED ORAL 3 TIMES DAILY
Qty: 21 TABLET | Refills: 0 | Status: SHIPPED | OUTPATIENT
Start: 2022-03-02 | End: 2022-03-09

## 2022-03-01 RX ORDER — GUAIFENESIN 100 MG/5ML
81 LIQUID (ML) ORAL DAILY
COMMUNITY
End: 2022-03-14 | Stop reason: SDUPTHER

## 2022-03-01 RX ORDER — IRON POLYSACCHARIDE COMPLEX 150 MG
150 CAPSULE ORAL 2 TIMES DAILY
Qty: 30 CAPSULE | Refills: 0 | Status: SHIPPED | OUTPATIENT
Start: 2022-03-02

## 2022-03-01 RX ORDER — LANOLIN ALCOHOL/MO/W.PET/CERES
3 CREAM (GRAM) TOPICAL
Qty: 30 TABLET | Refills: 0 | Status: SHIPPED | OUTPATIENT
Start: 2022-03-02

## 2022-03-01 RX ADMIN — BUPROPION HYDROCHLORIDE 150 MG: 150 TABLET, EXTENDED RELEASE ORAL at 09:09

## 2022-03-01 RX ADMIN — METFORMIN HYDROCHLORIDE 1000 MG: 500 TABLET ORAL at 09:09

## 2022-03-01 RX ADMIN — Medication 150 MG: at 17:01

## 2022-03-01 RX ADMIN — Medication 20 UNITS: at 21:10

## 2022-03-01 RX ADMIN — ACETAMINOPHEN 650 MG: 325 TABLET ORAL at 09:09

## 2022-03-01 RX ADMIN — ENOXAPARIN SODIUM 40 MG: 100 INJECTION SUBCUTANEOUS at 06:31

## 2022-03-01 RX ADMIN — METFORMIN HYDROCHLORIDE 1000 MG: 500 TABLET ORAL at 17:01

## 2022-03-01 RX ADMIN — Medication 200 MG: at 09:09

## 2022-03-01 RX ADMIN — ACETAMINOPHEN 650 MG: 325 TABLET ORAL at 12:27

## 2022-03-01 RX ADMIN — ACETAMINOPHEN 650 MG: 325 TABLET ORAL at 17:01

## 2022-03-01 RX ADMIN — GABAPENTIN 300 MG: 300 CAPSULE ORAL at 17:01

## 2022-03-01 RX ADMIN — THERA TABS 1 TABLET: TAB at 09:09

## 2022-03-01 RX ADMIN — OXYCODONE 5 MG: 5 TABLET ORAL at 14:27

## 2022-03-01 RX ADMIN — OXYCODONE 5 MG: 5 TABLET ORAL at 22:39

## 2022-03-01 RX ADMIN — ATORVASTATIN CALCIUM 20 MG: 20 TABLET, FILM COATED ORAL at 09:09

## 2022-03-01 RX ADMIN — ASPIRIN 81 MG 81 MG: 81 TABLET ORAL at 09:09

## 2022-03-01 RX ADMIN — Medication 150 MG: at 09:09

## 2022-03-01 RX ADMIN — OXYCODONE 5 MG: 5 TABLET ORAL at 06:31

## 2022-03-01 RX ADMIN — GABAPENTIN 300 MG: 300 CAPSULE ORAL at 21:10

## 2022-03-01 RX ADMIN — Medication 4 UNITS: at 12:26

## 2022-03-01 RX ADMIN — OXYCODONE 5 MG: 5 TABLET ORAL at 10:26

## 2022-03-01 RX ADMIN — METHOCARBAMOL TABLETS 500 MG: 500 TABLET, COATED ORAL at 14:27

## 2022-03-01 RX ADMIN — METHOCARBAMOL TABLETS 500 MG: 500 TABLET, COATED ORAL at 20:17

## 2022-03-01 RX ADMIN — OXYCODONE 5 MG: 5 TABLET ORAL at 02:47

## 2022-03-01 RX ADMIN — Medication 3 MG: at 17:01

## 2022-03-01 RX ADMIN — METHOCARBAMOL TABLETS 500 MG: 500 TABLET, COATED ORAL at 09:09

## 2022-03-01 RX ADMIN — GABAPENTIN 300 MG: 300 CAPSULE ORAL at 09:09

## 2022-03-01 RX ADMIN — OXYCODONE 5 MG: 5 TABLET ORAL at 18:36

## 2022-03-01 NOTE — PROGRESS NOTES
Problem: Self Care Deficits Care Plan (Adult)  Goal: *Therapy Goal (Edit Goal, Insert Text)  Description: Long Term Goals  Initiated 22 and to be accomplished within 2 week(s)  1. Pt will perform self-feeding independently. Goal met  2. Pt will perform grooming independently. Goal met  3. Pt will perform UB bathing with Joseph. 4. Pt will perform LB bathing with Joseph. 5. Pt will perform tub/shower transfer with Joseph. 6. Pt will perform UB dressing with Joseph. Goal met 2022  7. Pt will perform LB dressing with Joseph. Goal met  8. Pt will perform toileting task with Joseph. Goal met 3/1/22  9. Pt will perform toilet transfer with Joseph. Goal met 3/1/22    hort Term Goals   Initiated 22 and to be accomplished within 7 day(s), reassess 22  1. Pt will perform self-feeding with Joseph. Goal met 3/1/22  2. Pt will perform grooming with Joseph. Goal met 2022  3. Pt will perform UB bathing with supervision. Goal met 3/1/22  4. Pt will perform LB bathing with supervision. Goal met 3/1/22  5. Pt will perform tub/shower transfer with SBA. 6. Pt will perform UB dressing with Joseph. Goal met 2022  7. Pt will perform LB dressing with Diana. Goal met 2022  8. Pt will perform toileting task with CGA. Goal met 3/1/22  9. Pt will perform toilet transfer with SBA.  Goal met 3/1/22  Outcome: Progressing Towards Goal  Goal: Interventions  Outcome: Progressing Towards Goal   OCCUPATIONAL THERAPY DISCHARGE    Patient: Nighat Calzada (89 y.o. male)  Date: 3/1/2022    First Tx Session  Time In: 0  Time Out[de-identified] [id number]        Primary Diagnosis: Status post below knee amputation of right lower extremity (HCC) [Z89.511] Status post below knee amputation of right lower extremity (HCC)    Precautions:   Fall    Barriers to Learning/Limitations: None  Compensate with: visual, verbal, tactile, kinesthetic cues/model     Patient identified with name and :yes    SUBJECTIVE:   Patient stated so am I done or do I have this tomorrow? Staci Jacobs    OBJECTIVE DATA SUMMARY:     Past Medical History:   Diagnosis Date    Acute postoperative anemia due to expected blood loss 2/8/2022    Arterial occlusion, lower extremity ([address] 11/27/2021    Cardiac cath 11/09/2011    RCA patent. LM patent. LAD patent. mD1 55%. CX patent. Prior stent patent. Edison 85% (2.5 x 15-mm Xience stent, resid 0%). LVEDP 12. EF 40-45%. High lateral hypk (RI distribution). Chronic alcohol use     Fri-Sun one fifth; Mon-Thur: Pint of liquor    Constipation     Coronary artery disease involving native coronary artery of native heart     Critical ischemia of lower extremity ([address] 2/8/2022    Depression     Diabetic nephropathy associated with type 2 diabetes mellitus ([address] 4/4/2019    Diabetic neuropathy associated with type 2 diabetes mellitus ([address]     Gastroesophageal reflux disease     High vitamin D level 2/16/2022    Vitamin D 25-Hydroxy (2/16/2022) = 105.7    History of acute inferior wall myocardial infarction 2009    History of coronary angioplasty with insertion of stent 07/29/2009    2.5 x 13 Cypher stent to CX.       History of essential hypertension     History of lacunar cerebrovascular accident     History of vitamin D deficiency 4/15/2018    Hyperlipidemia     Hypertensive retinopathy 12/18/2020    Insomnia 7/2/2018    Long term current use of insulin ([address]     Mediastinal adenopathy 06/25/2015    Migraine headache     Mild nonproliferative diabetic retinopathy of left eye without macular edema associated with type 2 diabetes mellitus ([address] 12/18/2020    Nuclear sclerosis of both eyes 12/18/2020    Otalgia 05/08/2019    Poorly controlled type 2 diabetes mellitus ([address]     HbA1c (2/8/2022) = 13.8    Tobacco use disorder     contemplating stopping     Past Surgical History:   Procedure Laterality Date    COLONOSCOPY N/A 10/28/2020    COLONOSCOPY w/polypectomy performed by Madyson Mcdonald MD at [facility] Right 02/08/2022    S/P Right below-the-knee amputation (2/8/2022 - Dr. Teresa Lin)    [address]  07/29/2009    HX HEART CATHETERIZATION  8/30/2011    HX HEART CATHETERIZATION  11/09/2011    HX WISDOM TEETH EXTRACTION      x4    VASCULAR SURGERY PROCEDURE UNLIST      Multiple vascular surgeries     Prior Level of Function/Home Situation: Independent with all ADL's prior  Home Situation  Home Environment: Private residence  # Steps to Enter: 1  Rails to Enter: Yes  Hand Rails : Bilateral  One/Two Story Residence: One story  Living Alone: No (lives with girlfriend)  Support Systems: Spouse/Significant Other,Child(kenzie)  Patient Expects to be Discharged to[de-identified] Home  Current DME Used/Available at Home: None  Tub or Shower Type: Shower  [x]     Right hand dominant   []     Left hand dominant    Therapeutic Exercise:  Pt engaged inBUE strengthening with blue theraband in all planes to maintain pt BUE strength with transition home . Pain:  Pt reports 7/10 pain or discomfort prior to treatment. Pt reports 8/10 pain or discomfort post treatment.    Problem List:    Decreased strength B UE  []     Decreased strength trunk/core  []     Decreased AROM   []     Decreased PROM  []     Decreased balance sitting  []     Decreased balance standing  []     Decreased endurance  []     Pain  []       Functional Limitations:   Decreased independence with ADL  []     Decreased independence with functional transfers  []     Decreased independence with ambulation  []     Decreased independence with IADL  []       Outcome Measures:      MMT Initial Assessment   Right Upper Extremity  Left Upper Extremity    UE AROM [facility]   Shoulder flexion 3+/5 3+/5   Shoulder extension 3+/5 3+/5   Shoulder ABDuction     Shoulder ADDUction     Elbow Flexion 4-/5 4-/5   Elbow Extension 4-/5 4-/5   Wrist Extension/Flexion                   MMT Discharge Assessment   Right Upper Extremity  Left Upper Extremity    UE AROM [facility] Shoulder flexion 4-/5 4-/5   Shoulder extension 4-/5 4-/5   Shoulder ABDuction     Shoulder ADDUction     Elbow Flexion 4/5 4-/5   Elbow Extension 4/5 4-/5   Wrist Extension/Flexion              0/5 No palpable muscle contraction  1/5 Palpable muscle contraction, no joint movement  2-/5 Less than full range of motion in gravity eliminated position  2/5 Able to complete full range of motion in gravity eliminated position  2+/5 Able to initiate movement against gravity  3-/5 More than half but not full range of motion against gravity  3/5 Able to complete full range of motion against gravity  3+/5 Completes full range of motion against gravity with minimal resistance  4-/5 Completes full range of motion against gravity with minimal resistance  4/5 Completes full range of motion against gravity with moderate resistance  5/5 Completes full range of motion against gravity with maximum resistance    Coordination: WFL  Sensation: appears intact    FIM SCORES Initial Assessment Discharge Assessment   Eating 5  6   Grooming 5      Oral Hygiene FIM: 6    Bathing 4  5      Upper Body Dressing 5 (setup)  6   Lower Body Dressing 3 Lower Body Dressing   Dressing Assistance : 6 (Modified independent)  Leg Crossed Method Used: No  Position Performed: Bending forward method  Comments: Pt donned RLE clam shell with Joseph. Sock and/or Shoe Management FIM: 6   Toileting  (Pt declined)  6   Tub/Shower Transfer 5 (CGA with grab bar)  5   Toilet Transfer 5 (CGA)  6   Comprehension 5 Score: 5   Expression 5 Score: 5   Social Interaction 5 Score: 5   Problem Solving 5 Score: 5   Memory 5 Score: 5   Please see [facility] Interdisciplinary Eval: Coordination/Balance Section for details regarding FIM score description. Activity Tolerance:   good    ASSESSMENT:  Pt now demonstrates increased independence with RLE LB dressing donning clam shell with Joseph and improved safety and independence with toileting, dressing and bathing.  Pt requires intermittent VC's for safety with functional mobility and self cares. Pt has met 8/9 STG's and 6/9 LTG's at this time. Family education provided with pt and pt girlfriend for increased safety with self cares and functional mobility, decreasing risk for falls and donning RLE clam shell. Progression toward goals:  [x]      Improving appropriately and progressing toward goals  []      Improving slowly and progressing toward goals  []      Not making progress toward goals and plan of care will be adjusted     PLAN:  Patient continues to benefit from skilled intervention to address the above impairments. Continue treatment per established plan of care. Discharge Recommendations: Home Health with support  Further Equipment Recommendations for Discharge: commode and transfer bench       Please refer to the flow sheet for vital signs taken during this treatment. After treatment:   [x]  Patient left in no apparent distress sitting up in chair  []  Patient left in no apparent distress in bed  [x]  Call bell left within reach  [x]  Nursing notified  []  Caregiver present  []  Bed alarm activated    COMMUNICATION/EDUCATION:   Communication/Collaboration:  [x]      Home safety education was provided and the patient/caregiver indicated understanding. [x]      Patient/family have participated as able and agree with findings and recommendations. [x]      Patient is unable to participate in plan of care at this time.     Dg Robles, OT  3/1/2022

## 2022-03-01 NOTE — DISCHARGE INSTRUCTIONS
------------------------------------------------------------------------------------------------------------    DISCHARGE INSTRUCTIONS    1. Make sure that when you request refills at the pharmacy that the refill requests are sent to your PCP (NOT to the prescriber at the [facility]) to avoid any delays in getting your medication refills. The physician at the [facility] for [address] [facility] will not able to order medications or refills after discharge -- Please understand that though we would like to help, it is simply not safe for our physician to order you a medication that we cannot monitor. 2. If any of the prescribed medications require a PRIOR AUTHORIZATION, contact your Primary Care Physician or specialist to EITHER complete prior authorizations and paperwork on your behalf OR prescribe an alternative medication. -- Please understand that though we would like to help, it is simply not safe for our physician to order you a medication that we cannot monitor. 3. If any of your prescriptions medications PRIOR TO ADMISSION TO [facility] [place] [address] needs a refill (and either the dosage, frequency or instructions was not changed), kindly contact your Primary Care Physician for refills.     -------------------------------------------------------------------------------------------------------------------

## 2022-03-01 NOTE — ROUTINE PROCESS
SHIFT CHANGE NOTE FOR [facility]    Bedside and Verbal shift change report given to Sandy RN (oncoming nurse) by Fred Talavera RN (offgoing nurse). Report included the following information SBAR, Kardex, MAR and Recent Results. Situation:   Code Status: Full Code   Hospital Day: 14   Problem List:   Hospital Problems  Date Reviewed: 3/1/2022          Codes Class Noted POA    Poorly controlled type 2 diabetes mellitus ([facility] [address] ICD-10-CM: E11.65  ICD-9-CM: 250.00  Unknown Yes    Overview Signed 2/16/2022 12:50 AM by Luis E Norton MD     HbA1c (2/8/2022) = 13.8             Long term current use of insulin (HCC) ICD-10-CM: Z79.4  ICD-9-CM: V58.67  Unknown Yes        High vitamin D level (Chronic) ICD-10-CM: E67.3  ICD-9-CM: 278.4  2/16/2022 Yes    Overview Signed 2/16/2022  9:52 AM by Luis E Norton MD     Vitamin D 25-Hydroxy (2/16/2022) = 105.7             Impaired mobility and ADLs ICD-10-CM: Z74.09, Z78.9  ICD-9-CM: V49.89  2/8/2022 Yes        * (Principal) Status post below knee amputation of right lower extremity ([facility] ICD-10-CM: Z89.511  ICD-9-CM: V49.75  2/8/2022 Yes    Overview Signed 2/16/2022 12:47 AM by Luis E Norton MD     S/P Right below-the-knee amputation (2/8/2022 - Dr. Alicia Boateng)             Critical ischemia of lower extremity [facility]) ICD-10-CM: I53.135  ICD-9-CM: 459.9  2/8/2022 No        Acute postoperative anemia due to expected blood loss ICD-10-CM: D62  ICD-9-CM: 285.1  2/8/2022 Yes              Background:   Past Medical History:   Past Medical History:   Diagnosis Date    Acute postoperative anemia due to expected blood loss 2/8/2022    Arterial occlusion, lower extremity ([facility] [address] 11/27/2021    Cardiac cath 11/09/2011    RCA patent. LM patent. LAD patent. mD1 55%. CX patent. Prior stent patent. Edison 85% (2.5 x 15-mm Xience stent, resid 0%). LVEDP 12. EF 40-45%. High lateral hypk (RI distribution).       Chronic alcohol use     Fri-Sun one fifth; Mon-Thur: Pint of liquor    Constipation     Coronary artery disease involving native coronary artery of native heart     Critical ischemia of lower extremity ([facility] [address] 2/8/2022    Depression     Diabetic nephropathy associated with type 2 diabetes mellitus ([facility] [address] 4/4/2019    Diabetic [facility] with [facility]     Gastroesophageal reflux disease     High vitamin D level 2/16/2022    Vitamin D 25-Hydroxy (2/16/2022) = 105.7    History of acute inferior wall myocardial infarction 2009    History of coronary angioplasty with insertion of stent 07/29/2009    2.5 x 13 Cypher stent to CX.      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 without macular edema associated with type 2 diabetes mellitus ([facility] 12/18/2020    Nuclear sclerosis of both eyes 12/18/2020    Otalgia 05/08/2019    Poorly controlled type 2 diabetes mellitus (HCC)     HbA1c (2/8/2022) = 13.8    Tobacco use disorder     contemplating stopping        Assessment:   Changes in Assessment throughout shift: No change to previous assessment     Patient has a central line: no Reasons if yes: n  Insertion date:na Last dressing date:na   Patient has Epstein Cath: no Reasons if yes: na   Insertion date:na  Shift epstein care completed: NO     Last Vitals:     Vitals:    02/28/22 1422 02/28/22 1558 03/01/22 0829 03/01/22 1552   BP: 116/71 122/68 112/66 119/70   Pulse:  78 63 76   Resp:  18 19 18   Temp:  98.4 °F (36.9 °C) 98.3 °F (36.8 °C) 98.5 °F (36.9 °C)   SpO2:  99% 95% 96%   Weight:       Height:            PAIN    Pain Assessment    Pain Intensity 1: 8 (03/01/22 1900) Pain Intensity 1: 2 (12/29/14 1105)    Pain Location 1: Leg Pain Location 1: Abdomen    Pain Intervention(s) 1: Medication (see MAR) Pain Intervention(s) 1: Medication (see MAR)  Patient Stated Pain Goal: 0 Patient Stated Pain Goal: 0  o Intervention effective: yes  o Other actions taken for pain: Medication (see MAR)     Skin Assessment  Skin color    Condition/Temperature    Integrity Skin Integrity: Incision (comment)  Turgor    Weekly Pressure Ulcer Documentation  Pressure  Injury Documentation: No Pressure Injury Noted-Pressure Ulcer Prevention Initiated  Wound Prevention & Protection Methods  Orientation of wound Orientation of Wound Prevention: Posterior  Location of Prevention Location of Wound Prevention: Sacrum/Coccyx  Dressing Present Dressing Present : No  Dressing Status    Wound Offloading Wound Offloading (Prevention Methods): Bed, pressure redistribution/air,Bed, pressure reduction mattress,Repositioning,Walker,Wheelchair     INTAKE/OUPUT  Date 02/28/22 1900 - 03/01/22 0659 03/01/22 0700 - 03/02/22 0659   Shift 7956-0612 24 Hour Total 3843-8840 0684-5249 24 Hour Total   INTAKE   P.O.  960 240  240     P. O.  960 240  240   Shift Total(mL/kg)  960(12.3) 240(3.1)  240(3.1)   OUTPUT   Urine(mL/kg/hr) 1150 2050        Urine Voided 1150 2050        Urine Occurrence(s) 2 x 4 x 4 x  4 x   Emesis/NG output          Emesis Occurrence(s) 0 x 0 x      Stool          Stool Occurrence(s) 0 x 0 x 0 x  0 x   Shift Total(mL/kg) 1150(14.7) 2582(59.3)      NET -1150 -1090 240  240   Weight (kg) 78.2 78.2 78.2 78.2 78.2       Recommendations:  1. Patient needs and requests: pain management    2. Pending tests/procedures: none     3. Functional Level/Equipment: Partial (one person) / Bed (comment); Wheelchair;Stabilization belt    Fall Precautions:   Fall risk precautions were reinforced with the patient; he was instructed to call for help prior to getting up.  The following fall risk precautions were continued: bed/ chair alarms, door signage, yellow bracelet and socks as well as update of the Halie Don tool in the patient's room. Loly Score: 3    HEALS Safety Check    A safety check occurred in the patient's room between off going nurse and oncoming nurse listed above. The safety check included the below items  Area Items   H  High Alert Medications - Verify all high alert medication drips (heparin, PCA, etc.)   E  Equipment - Suction is set up for ALL patients (with eliel)  - Red plugs utilized for all equipment (IV pumps, etc.)  - WOWs wiped down at end of shift.  - Room stocked with oxygen, suction, and other unit-specific supplies   A  Alarms - Bed alarm is set for fall risk patients  - Ensure chair alarm is in place and activated if patient is up in a chair   L  Lines - Check IV for any infiltration  - Gallardo bag is empty if patient has a Gallardo   - Tubing and IV bags are labeled   S  Safety   - Room is clean, patient is clean, and equipment is clean. - Hallways are clear from equipment besides carts. - Fall bracelet on for fall risk patients  - Ensure room is clear and free of clutter  - Suction is set up for ALL patients (with eliel)  - Hallways are clear from equipment besides carts.    - Isolation precautions followed, supplies available outside room, sign posted     Brandin Lane RN

## 2022-03-01 NOTE — ROUTINE PROCESS
SHIFT CHANGE NOTE FOR [facility]    Bedside and Verbal shift change report given to Cachorro Oneil RN (oncoming nurse) by Morales Juarez RN (offgoing nurse). Report included the following information SBAR, Kardex, MAR and Recent Results. Situation:   Code Status: Full Code   Hospital Day: 14   Problem List:   Hospital Problems  Date Reviewed: 2/28/2022          Codes Class Noted POA    Poorly controlled type 2 diabetes mellitus ([facility] ICD-10-CM: E11.65  ICD-9-CM: 250.00  Unknown Yes    Overview Signed 2/16/2022 12:50 AM by Isaiah Elise MD     HbA1c (2/8/2022) = 13.8             Long term current use of insulin (HCC) ICD-10-CM: Z79.4  ICD-9-CM: V58.67  Unknown Yes        High vitamin D level (Chronic) ICD-10-CM: E67.3  ICD-9-CM: 278.4  2/16/2022 Yes    Overview Signed 2/16/2022  9:52 AM by Isaiah Elise MD     Vitamin D 25-Hydroxy (2/16/2022) = 105.7             Impaired mobility and ADLs ICD-10-CM: Z74.09, Z78.9  ICD-9-CM: V49.89  2/8/2022 Yes        * (Principal) Status post below knee amputation of right lower extremity ([facility] [address] ICD-10-CM: Z89.511  ICD-9-CM: V49.75  2/8/2022 Yes    Overview Signed 2/16/2022 12:47 AM by Isaiah Elise MD     S/P Right below-the-knee amputation (2/8/2022 - Dr. Ladonna Jacobs)             Critical ischemia of lower extremity [facility]) ICD-10-CM: T01.880  ICD-9-CM: 459.9  2/8/2022 No        Acute postoperative anemia due to expected blood loss ICD-10-CM: D62  ICD-9-CM: 285.1  2/8/2022 Yes              Background:   Past Medical History:   Past Medical History:   Diagnosis Date    Acute postoperative anemia due to expected blood loss 2/8/2022    Arterial occlusion, lower extremity ([facility] 11/27/2021    Cardiac cath 11/09/2011    RCA patent. LM patent. LAD patent. mD1 55%. CX patent. Prior stent patent. Edison 85% (2.5 x 15-mm Xience stent, resid 0%). LVEDP 12. EF 40-45%. High lateral hypk (RI distribution).       Chronic alcohol use     Fri-Sun one fifth; Mon-Thur: Pint of liquor  Constipation     Coronary artery disease involving native coronary artery of native heart     Critical ischemia of lower extremity ([facility] [address] 2/8/2022    Depression     Diabetic nephropathy associated with type 2 diabetes mellitus ([facility] 4/4/2019    Di[facility]     Gastroesophageal reflux disease     High vitamin D level 2/16/2022    Vitamin D 25-Hydroxy (2/16/2022) = 105.7    History of acute inferior wall myocardial infarction 2009    History of coronary angioplasty with insertion of stent 07/29/2009    2.5 x 13 Cypher stent to CX.      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 without macular edema associated with type 2 diabetes mellitus ([facility] 12/18/2020    Nuclear sclerosis of both eyes 12/18/2020    Otalgia 05/08/2019    Poorly controlled type 2 diabetes mellitus (HCC)     HbA1c (2/8/2022) = 13.8    Tobacco use disorder     contemplating stopping        Assessment:   Changes in Assessment throughout shift: No change to previous assessment     Patient has a central line: no Reasons if yes: n  Insertion date:na Last dressing date:na   Patient has Epstein Cath: no Reasons if yes: na   Insertion date:na  Shift epstein care completed: NO     Last Vitals:     Vitals:    02/27/22 2148 02/28/22 0755 02/28/22 1422 02/28/22 1558   BP: 122/71 (!) 121/42 116/71 122/68   Pulse: 71 71  78   Resp: 18 18 18   Temp: 98.1 °F (36.7 °C) 98.4 °F (36.9 °C)  98.4 °F (36.9 °C)   SpO2: 96% 99%  99%   Weight:       Height:            PAIN    Pain Assessment    Pain Intensity 1: 6 (03/01/22 0632) Pain Intensity 1: 2 (12/29/14 1105)    Pain Location 1: Leg Pain Location 1: Abdomen    Pain Intervention(s) 1: Medication (see MAR) Pain Intervention(s) 1: Medication (see MAR)  Patient Stated Pain Goal: 0 Patient Stated Pain Goal: 0  o Intervention effective: yes  o Other actions taken for pain: Medication (see MAR)     Skin Assessment  Skin color    Condition/Temperature    Integrity Skin Integrity: Incision (comment)  Turgor    Weekly Pressure Ulcer Documentation  Pressure  Injury Documentation: No Pressure Injury Noted-Pressure Ulcer Prevention Initiated  Wound Prevention & Protection Methods  Orientation of wound Orientation of Wound Prevention: Posterior  Location of Prevention Location of Wound Prevention: Sacrum/Coccyx  Dressing Present Dressing Present : No  Dressing Status    Wound Offloading Wound Offloading (Prevention Methods): Bed, pressure reduction mattress,Pillows,Wheelchair     INTAKE/OUPUT  Date 02/28/22 0700 - 03/01/22 0659 03/01/22 0700 - 03/02/22 0659   Shift 0402-4343 6700-0414 24 Hour Total 4979-7020 7257-2269 24 Hour Total   INTAKE   P.O. 960  960        P. O. 960  960      Shift Total(mL/kg) 960(12.3)  228(15.6)      OUTPUT   Urine(mL/kg/hr) 900(1) 1150(1.2) 2050(1.1)        Urine Voided 900 1150 2050        Urine Occurrence(s) 2 x 2 x 4 x      Emesis/NG output           Emesis Occurrence(s)  0 x 0 x      Stool           Stool Occurrence(s) 0 x 0 x 0 x      Shift Total(mL/kg) 900(11.5) 1150(14.7) 0630(80.2)      NET 60 -1150 -1090      Weight (kg) 78.2 78.2 78.2 78.2 78.2 78.2       Recommendations:  1. Patient needs and requests: pain management    2. Pending tests/procedures: none     3. Functional Level/Equipment: Partial (one person) / Bed (comment); Bedside Commode; Wheelchair    Fall Precautions:   Fall risk precautions were reinforced with the patient; he was instructed to call for help prior to getting up.  The following fall risk precautions were continued: bed/ chair alarms, door signage, yellow bracelet and socks as well as update of the Dakota Lock tool in the patient's room. Loly Score: 3    HEALS Safety Check    A safety check occurred in the patient's room between off going nurse and oncoming nurse listed above. The safety check included the below items  Area Items   H  High Alert Medications - Verify all high alert medication drips (heparin, PCA, etc.)   E  Equipment - Suction is set up for ALL patients (with eliel)  - Red plugs utilized for all equipment (IV pumps, etc.)  - WOWs wiped down at end of shift.  - Room stocked with oxygen, suction, and other unit-specific supplies   A  Alarms - Bed alarm is set for fall risk patients  - Ensure chair alarm is in place and activated if patient is up in a chair   L  Lines - Check IV for any infiltration  - Gallardo bag is empty if patient has a Gallardo   - Tubing and IV bags are labeled   S  Safety   - Room is clean, patient is clean, and equipment is clean. - Hallways are clear from equipment besides carts. - Fall bracelet on for fall risk patients  - Ensure room is clear and free of clutter  - Suction is set up for ALL patients (with eliel)  - Hallways are clear from equipment besides carts.    - Isolation precautions followed, supplies available outside room, sign posted     Christo Lee RN

## 2022-03-01 NOTE — INTERDISCIPLINARY ROUNDS
[facility] PHYSICAL REHABILITATION  [address] [place] [address], [address]    INPATIENT REHABILITATION  TEAM CONFERENCE SUMMARY     Date of Conference: 3/1/2022    Patient Information:        Name: Amanda Colon Age / Sex: 59 y.o. / male   CSN: [id number] MRN: [id number]   [facility] Date: 2/15/2022 Length of Stay: 14 days     Primary Rehabilitation Diagnosis  1. Impaired Mobility and ADLs  2. S/P Right below-the-knee amputation (2/8/2022 - Dr. Arely Oseguera)  3.  History of critical ischemia of the right lower extremity    Comorbidities  Patient Active Problem List   Diagnosis Code    Hyperlipidemia E78.5    Tobacco use disorder F17.200    Mediastinal adenopathy R59.0    History of vitamin D deficiency Z86.39    Insomnia G47.00    Diabetic nephropathy associated with type 2 diabetes mellitus ([facility] E11.21    Otalgia H92.09    Mild nonproliferative diabetic retinopathy of left eye without macular edema associated with type 2 diabetes mellitus ([facility] T17.3634    Hypertensive retinopathy H35.039    Nuclear sclerosis of both eyes H25.13    Arterial occlusion, lower extremity (HCC) I70.209    Impaired mobility and ADLs Z74.09, Z78.9    Status post below knee amputation of right lower extremity (HCC) Z89.511    Chronic alcohol use Z72.89    History of coronary angioplasty with insertion of stent Z95.5    Coronary artery disease involving native coronary artery of native heart I25.10    Constipation K59.00    Poorly controlled type 2 diabetes mellitus (HCC) E11.65    Long term current use of insulin ([facility]) Z79.4    Migraine headache G43.909    Diabetic neuropathy associated with type 2 diabetes mellitus (HCC) E11.40    Gastroesophageal reflux disease K21.9    History of essential hypertension Z86.79    History of lacunar cerebrovascular accident Z80.78    History of acute inferior wall myocardial infarction I25.2    Critical ischemia of lower extremity (HCC) I70.229    Acute postoperative anemia due to expected blood loss D62    Depression F32. A    High vitamin D level E67.3          Therapy:     FIM SCORES Initial Assessment Weekly Progress Assessment 3/1/2022   Eating Functional Level: 5  6   Swallowing     Grooming 5  6   Bathing 4     6   Upper Body Dressing Functional Level: 5 (setup)  Items Applied/Steps Completed: Pullover (4 steps)  Comments: Pt performed UB dressing with setup  6   Lower Body Dressing Functional Level: 3  Items Applied/Steps Completed: Elastic waist pants (3 steps),Sock, left (1 step),Underpants (3 steps)  Comments: Pt performed LB dressing with modA to don LLE sock and pull pants up over R hip and standing at grab abr for increased stability  5   Toileting Functional Level:  (Pt declined)  5   Bladder 1 1   Bowel  0 0   Toilet Transfer [place] Toilet Transfer Score: 5 (CGA)  5   Tub/Shower Transfer [place] Tub or Shower Type: Shower  Tub/Shower Transfer Score: 5 (CGA with grab bar)     5   Comprehension Primary Mode of Comprehension: Auditory  Score: 5 Auditory  55   Expression Primary Mode of Expression: Verbal  Score: 5 Verbal  55   Social Interaction Score: 5 55   Problem Solving Score: 5 54   Memory Score: 5 54     FIM SCORES Initial Assessment Weekly Progress Assessment 3/1/2022   Bed/Chair/Wheelchair Transfers Transfer Type:  Other  Other: stand step with RW  Transfer Assistance : 4 (Contact guard assistance)  Sit to Stand Assistance: Contact guard assistance  Car Transfers:  (CGA)  Car Type: car transfer  Transfer Type: Lateral pivot  Transfer Assistance : 5 (Supervision/setup)  Sit to Stand Assistance: Supervision  Car Transfers: Supervision  Car Type: Car Transfer Trainer   Bed Mobility Rolling Right 6 (Modified independent)   Rolling Left 6 (Modified independent)   Supine to Sit 6 (Modified independent)   Sit to Stand Contact guard assistance   Sit to Supine 6 (Modified independent)    Rolling Right       Rolling Left       Supine to Sit       Sit to Stand   Supervision   Sit to Supine          Locomotion (W/C) Able to Propel (ft): 240 feet  Functional Level: 4  Curbs/Ramps Assist Required (FIM Score): 0 (Not tested)  Wheelchair Setup Assist Required : 3 (Moderate assistance)  Wheelchair Management: Manages left brake,Manages right brake Function 6  Setup Assistance  5 (Supervision/setup)      Locomotion (W/C distance)   260 feet   Locomotion (Walk) 4 (Minimal assistance) 5 (Supervision/setup)  Gait belt;Walker, rolling   Locomotion (Walk dist.) 50 Feet (ft) 150 Feet (ft)   Steps/Stairs Steps/Stairs Ambulated (#): 3 (4\" steps)  Level of Assist : 3 (Moderate assistance)  Rail Use: Both   Steps/Stairs Ambulated (#): 4 (6\" steps)  Level of Assist : 4 Minimal assistance  Rail Use: Both         Nursing:     Neuro:   AAA&O x     4      Respiratory:   [x] WNL   [] O2 LPM:   Other:  Peripheral Vascular:   [] TEDS present   [] Edema present ____ Grade   Cardiac:   [x] WNL   [] Other  Genitourinary:   [x] continent   [] incontinent   [] epstein  Abdominal __2/27_____ LBM  GI: __DM Reg_____ Diet __thin____ Liquids _____ tube feeds  Musculoskeletal: ____ ROM Transfers _____ Assistive Device Used  __1 person__ Level of Assistance  Skin Integumentary:   [] Intact   [x] Not Intact- R. BKA   __________Preventative Measures  Details______________________________________________________________  Pain: [x] Controlled   [] Not Controlled   Pain Meds:   [] Scheduled   [x] PRN        Interdisciplinary Team Goals:     1. Discipline  Physical Therapy    Goal  Encourage pt to demonstrate safe w/c set up for functional transfers and parts management without verbal cuing. Barrier  Impaired strength, Impaired balance, Impaired safety awareness    Intervention  Education, Transfer Training, Strength and Balance Training    Goal written by:   Nery Naalehu PT, DPT     2.  Discipline  Occupational Therapy    Goal  Pt will perform all self cares with Joseph    Barrier impaired safety, memory and recall    Intervention  transfer training, ADL training    Goal written by:  Man Molina MSOTR/L     3. Discipline  Speech Therapy    Goal  Patient will perform functional problem solving tasks with 85-90% accuracy. Barrier  mild cognitive deficits    Intervention  cognitive retraining    Goal written by:  Chuyita Shah [facility]-SLP     4. Discipline  Nursing    Goal  The patient will a reduction in pain level. Barrier  Recent R. BKA    Intervention Pain assessment    Goal written by:  Lara NOEL RN LISA-BC     5. Discipline  Clinical Psychology    Goal      Barrier      Intervention      Goal written by:           Disposition / Discharge Planning:      Follow-up services:  [x] Physical Therapy             [x] Occupational Therapy       [x] Speech Therapy           [] Skilled Nursing      [] Medical Social Worker   [] Aide        [] Outpatient      [] vs   [x] Home Health  [] vs       [x] to progress to outpatient       [] with 24-hour supervision       [] with 24-hour assistance   [] Lan TURCIOS recommendations: EMMA, w/c   Estimated discharge date:  3/2/2022   Discharge Location:  [x] Home  [] versus    [] [place]    [] [facility]   [] Other:           Interdisciplinary team rounds were held this PM with the following team members:       Name   Physical Therapist    Brett Cabezas PT, DPT     Occupational Therapist    Agnieszka Laguna OTR/L   Speech Therapist    Chuyita Shah, [id number] [facility]   Recreational Therapist    Bernarda Álvarez, [address] [facility] MSN RN [facility]-BC   Physician    Johnathan Alexandra MD        Signed:  Johnathan Alexandra MD    March 1, 2022

## 2022-03-01 NOTE — PROGRESS NOTES
SHIFT CHANGE NOTE FOR [facility]    Bedside and Verbal shift change report given to KALE RN (oncoming nurse) by Tayo Mitchell RN (offgoing nurse). Report included the following information SBAR, Kardex, MAR and Recent Results. Situation:   Code Status: Full Code   Hospital Day: 13   Problem List:   Hospital Problems  Date Reviewed: 2/28/2022          Codes Class Noted POA    Poorly controlled type 2 diabetes mellitus ([facility] ICD-10-CM: E11.65  ICD-9-CM: 250.00  Unknown Yes    Overview Signed 2/16/2022 12:50 AM by Ynes Shaffer MD     HbA1c (2/8/2022) = 13.8             Long term current use of insulin (HCC) ICD-10-CM: Z79.4  ICD-9-CM: V58.67  Unknown Yes        High vitamin D level (Chronic) ICD-10-CM: E67.3  ICD-9-CM: 278.4  2/16/2022 Yes    Overview Signed 2/16/2022  9:52 AM by Ynes Shaffer MD     Vitamin D 25-Hydroxy (2/16/2022) = 105.7             Impaired mobility and ADLs ICD-10-CM: Z74.09, Z78.9  ICD-9-CM: V49.89  2/8/2022 Yes        * (Principal) Status post below knee amputation of right lower extremity ([facility] ICD-10-CM: Z89.511  ICD-9-CM: V49.75  2/8/2022 Yes    Overview Signed 2/16/2022 12:47 AM by Ynes Shaffer MD     S/P Right below-the-knee amputation (2/8/2022 - Dr. Violetta Smart)             Critical ischemia of lower extremity [facility]) ICD-10-CM: K54.570  ICD-9-CM: 459.9  2/8/2022 No        Acute postoperative anemia due to expected blood loss ICD-10-CM: D62  ICD-9-CM: 285.1  2/8/2022 Yes              Background:   Past Medical History:   Past Medical History:   Diagnosis Date    Acute postoperative anemia due to expected blood loss 2/8/2022    Arterial occlusion, lower extremity ([facility] 11/27/2021    Cardiac cath 11/09/2011    RCA patent. LM patent. LAD patent. mD1 55%. CX patent. Prior stent patent. Edison 85% (2.5 x 15-mm Xience stent, resid 0%). LVEDP 12. EF 40-45%. High lateral hypk (RI distribution).       Chronic alcohol use     Fri-Sun one fifth; Mon-Thur: Pint of liquor    Constipation     Coronary artery disease involving native coronary artery of native heart     Critical ischemia of lower extremity ([facility] 2/8/2022    Depression     Diabetic nephropathy associated with type 2 diabetes mellitus ([facility] 4/4/2019    Diabetic neuropathy associated with type 2 diabetes mellitus ([facility]     Gastroesophageal reflux disease     High vitamin D level 2/16/2022    Vitamin D 25-Hydroxy (2/16/2022) = 105.7    History of acute inferior wall myocardial infarction 2009    History of coronary angioplasty with insertion of stent 07/29/2009    2.5 x 13 Cypher stent to CX.      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 without macular edema associated with type 2 diabetes mellitus ([facility] 12/18/2020    Nuclear sclerosis of both eyes 12/18/2020    Otalgia 05/08/2019    Poorly controlled type 2 diabetes mellitus (HCC)     HbA1c (2/8/2022) = 13.8    Tobacco use disorder     contemplating stopping        Assessment:   Changes in Assessment throughout shift: No change to previous assessment     Patient has a central line: no      Last Vitals:     Vitals:    02/27/22 2148 02/28/22 0755 02/28/22 1422 02/28/22 1558   BP: 122/71 (!) 121/42 116/71 122/68   Pulse: 71 71  78   Resp: 18 18  18   Temp: 98.1 °F (36.7 °C) 98.4 °F (36.9 °C)  98.4 °F (36.9 °C)   SpO2: 96% 99%  99%   Weight:       Height:            PAIN    Pain Assessment    Pain Intensity 1: 8 (02/28/22 1850) Pain Intensity 1: 2 (12/29/14 1105)    Pain Location 1: Leg Pain Location 1: Abdomen    Pain Intervention(s) 1: Distraction,Rest Pain Intervention(s) 1: Medication (see MAR)  Patient Stated Pain Goal: 0 Patient Stated Pain Goal: 0  o Intervention effective: yes  o Other actions taken for pain: Distraction; Rest     Skin Assessment  Skin color    Condition/Temperature    Integrity Skin Integrity: Incision (comment)  Turgor    Weekly Pressure Ulcer Documentation  Pressure  Injury Documentation: No Pressure Injury Noted-Pressure Ulcer Prevention Initiated  Wound Prevention & Protection Methods  Orientation of wound Orientation of Wound Prevention: Posterior  Location of Prevention Location of Wound Prevention: Sacrum/Coccyx  Dressing Present Dressing Present : No  Dressing Status    Wound Offloading Wound Offloading (Prevention Methods): Bed, pressure redistribution/air     INTAKE/OUPUT  Date 02/27/22 1900 - 02/28/22 0659 02/28/22 0700 - 03/01/22 0659   Shift 2431-3774 24 Hour Total 7404-9027 4539-4970 24 Hour Total   INTAKE   P.O.  739 960  960     P. O.  739 960  960   Shift Total(mL/kg)  739(9.4) 960(12.3)  960(12.3)   OUTPUT   Urine(mL/kg/hr) 1300 1300 900(1)  900     Urine Voided 1300 1300 900  900     Urine Occurrence(s) 2 x 4 x 2 x  2 x   Emesis/NG output          Emesis Occurrence(s) 0 x 0 x      Stool          Stool Occurrence(s) 0 x 2 x 0 x  0 x   Shift Total(mL/kg) 1300(16.6) 1300(16.6) 900(11.5)  900(11.5)   NET -1300 -561 60  60   Weight (kg) 78.2 78.2 78.2 78.2 78.2       Recommendations:  1. Patient needs and requests: ASSESS RT LEG AND CHEST INCISION DAILY AND PAIN MEDICATION    2. Pending tests/procedures: LABS     3. Functional Level/Equipment: Partial (one person) / Bed (comment); Wheelchair    Fall Precautions:   Fall risk precautions were reinforced with the patient; she was instructed to call for help prior to getting up. The following fall risk precautions were continued: bed/ chair alarms, door signage, yellow bracelet and socks as well as update of the Venetta Rebel tool in the patient's room.    Loly Score: 3    HEALS Safety Check    A safety check occurred in the patient's room between off going nurse and oncoming nurse listed above. The safety check included the below items  Area Items   H  High Alert Medications - Verify all high alert medication drips (heparin, PCA, etc.)   E  Equipment - Suction is set up for ALL patients (with eliel)  - Red plugs utilized for all equipment (IV pumps, etc.)  - WOWs wiped down at end of shift.  - Room stocked with oxygen, suction, and other unit-specific supplies   A  Alarms - Bed alarm is set for fall risk patients  - Ensure chair alarm is in place and activated if patient is up in a chair   L  Lines - Check IV for any infiltration  - Gallardo bag is empty if patient has a Gallardo   - Tubing and IV bags are labeled   S  Safety   - Room is clean, patient is clean, and equipment is clean. - Hallways are clear from equipment besides carts. - Fall bracelet on for fall risk patients  - Ensure room is clear and free of clutter  - Suction is set up for ALL patients (with eliel)  - Hallways are clear from equipment besides carts.    - Isolation precautions followed, supplies available outside room, sign posted     Cristobal Enrique RN

## 2022-03-01 NOTE — PROGRESS NOTES
[facility] PHYSICAL REHABILITATION  [address] [place] [address], [address]     INPATIENT REHABILITATION  DAILY PROGRESS NOTE     Date: 3/1/2022    Name: Milton Renee Age / Sex: 59 y.o. / male   CSN: [id number] MRN: [id number]   [facility] Date: 2/15/2022 Length of Stay: 14 days     Primary Rehabilitation Diagnosis: Impaired Mobility and ADLs secondary to:  1. S/P Right below-the-knee amputation (2/8/2022 - Dr. Augusto Gotti)  2. History of critical ischemia of the right lower extremity      Subjective:     Patient seen and examined. Blood pressure controlled. Blood glucose controlled. Team conference was held at bedside this PM.     Patient's Complaint:   No significant medical complaints    Pain Control: stable, mild-to-moderate joint symptoms intermittently, reasonably well controlled by current meds      Objective:     Vital Signs:  Patient Vitals for the past 24 hrs:   BP Temp Pulse Resp SpO2   03/01/22 0829 112/66 98.3 °F (36.8 °C) 63 19 95 %   02/28/22 1558 122/68 98.4 °F (36.9 °C) 78 18 99 %   02/28/22 1422 116/71            Physical Examination:  GENERAL SURVEY: Patient is awake, alert, oriented x 3, sitting comfortably on the chair, not in acute respiratory distress. HEENT: pale palpebral conjunctivae, anicteric sclerae, no nasoaural discharge, moist oral mucosa  NECK: supple, no jugular venous distention, no palpable lymph nodes  CHEST/LUNGS: symmetrical chest expansion, good air entry, clear breath sounds  HEART: adynamic precordium, good S1 S2, no S3, regular rhythm, no murmurs  ABDOMEN: flat, bowel sounds appreciated, soft, non-tender  EXTREMITIES: (+) right BKA stump, pale nailbeds, no edema, full and equal pulses, no calf tenderness   NEUROLOGICAL EXAM: The patient is awake, alert and oriented x3, able to answer questions fairly appropriately, able to follow 1 and 2 step commands. Able to tell time from the wall clock. Cranial nerves II-XII are grossly intact.   No gross sensory deficit. Motor strength is 4/5 on BUE and BLE. Incision(s): covered, clean, dry, and intact       Current Medications:  Current Facility-Administered Medications   Medication Dose Route Frequency    insulin glargine (LANTUS) injection 20 Units  0.25 Units/kg SubCUTAneous QHS    insulin lispro (HUMALOG) injection   SubCUTAneous TIDAC    gabapentin (NEURONTIN) capsule 300 mg  300 mg Oral TID    melatonin tablet 3 mg  3 mg Oral PCD    oxyCODONE IR (ROXICODONE) tablet 5 mg  5 mg Oral Q4H PRN    metFORMIN (GLUCOPHAGE) tablet 1,000 mg  1,000 mg Oral BID WITH MEALS    glucose chewable tablet 16 g  4 Tablet Oral PRN    glucagon (GLUCAGEN) injection 1 mg  1 mg IntraMUSCular PRN    dextrose 10% infusion 0-250 mL  0-250 mL IntraVENous PRN    iron polysaccharides (NIFEREX) capsule 150 mg  1 Capsule Oral BID    thiamine HCL (B-1) tablet 200 mg  200 mg Oral DAILY    aspirin chewable tablet 81 mg  81 mg Oral DAILY WITH BREAKFAST    atorvastatin (LIPITOR) tablet 20 mg  20 mg Oral DAILY    buPROPion SR (WELLBUTRIN SR) tablet 150 mg  150 mg Oral DAILY    therapeutic multivitamin (THERAGRAN) tablet 1 Tablet  1 Tablet Oral DAILY    acetaminophen (TYLENOL) tablet 650 mg  650 mg Oral TID    methocarbamoL (ROBAXIN) tablet 500 mg  500 mg Oral TID    acetaminophen (TYLENOL) tablet 650 mg  650 mg Oral Q4H PRN    bisacodyL (DULCOLAX) tablet 10 mg  10 mg Oral Q48H PRN    enoxaparin (LOVENOX) injection 40 mg  40 mg SubCUTAneous Q24H       Allergies:   Allergies   Allergen Reactions    Niacin Itching       Functional Progress:    PHYSICAL THERAPY    ON ADMISSION MOST RECENT   Wheelchair Mobility/Management  Able to Propel (ft): 240 feet  Functional Level: 4  Curbs/Ramps Assist Required (FIM Score): 0 (Not tested)  Wheelchair Setup Assist Required : 3 (Moderate assistance)  Wheelchair Management: Manages left brake,Manages right brake Wheelchair Mobility/Management  Able to Propel (ft): 260 feet  Functional Level: 6  Curbs/Ramps Assist Required (FIM Score): 0 (Not tested)  Wheelchair Setup Assist Required : 5 (Supervision/setup)  Wheelchair Management: Manages left brake,Manages right brake,Manages left footrest,Manages right footrest     Gait  Amount of Assistance: 4 (Minimal assistance)  Distance (ft): 50 Feet (ft)  Assistive Device: Gait belt,Walker, rolling Gait  Amount of Assistance: 5 (Supervision/setup)  Distance (ft): 150 Feet (ft)  Assistive Device: Gait belt,Walker, rolling     Balance-Sitting/Standing  Sitting - Static: Good (unsupported)  Sitting - Dynamic: Good (unsupported)  Standing - Static: Fair  Standing - Dynamic : Impaired Balance-Sitting/Standing  Sitting - Static: Good (unsupported)  Sitting - Dynamic: Good (unsupported)  Standing - Static: Fair  Standing - Dynamic : Impaired     Bed/Mat Mobility  Rolling Right : 6 (Modified independent)  Rolling Left : 6 (Modified independent)  Supine to Sit : 6 (Modified independent)  Sit to Supine : 6 (Modified independent) Bed/Mat Mobility  Rolling Right : 7 (Independent)  Rolling Left : 7 (Independent)  Supine to Sit : 7 (Independent)  Sit to Supine : 7 (Independent)     Transfers  Transfer Type:  Other  Other: stand step with RW  Transfer Assistance : 4 (Contact guard assistance)  Sit to Stand Assistance: Contact guard assistance  Car Transfers:  (CGA)  Car Type: car transfer  Transfers  Transfer Type: Lateral pivot  Other: stand step with RW  Transfer Assistance : 5 (Supervision/setup)  Sit to Stand Assistance: Supervision  Car Transfers: Supervision  Car Type: Car Transfer Trainer     Steps or Stairs  Steps/Stairs Ambulated (#): 3 (4\" steps)  Level of Assist : 3 (Moderate assistance)  Rail Use: Both Steps or Stairs  Steps/Stairs Ambulated (#): 4 (6\")  Level of Assist : 4 (Contact guard assistance)  Rail Use: Both         Lab/Data Review:  Recent Results (from the past 24 hour(s))   GLUCOSE, POC    Collection Time: 02/28/22  4:36 PM   Result Value Ref Range Glucose (POC) 236 (H) 70 - 110 mg/dL   GLUCOSE, POC    Collection Time: 02/28/22  9:28 PM   Result Value Ref Range    Glucose (POC) 166 (H) 70 - 110 mg/dL   GLUCOSE, POC    Collection Time: 03/01/22  8:00 AM   Result Value Ref Range    Glucose (POC) 85 70 - 110 mg/dL   GLUCOSE, POC    Collection Time: 03/01/22 12:15 PM   Result Value Ref Range    Glucose (POC) 221 (H) 70 - 110 mg/dL       Assessment:     Primary Rehabilitation Diagnosis  1. Impaired Mobility and ADLs  2. S/P Right below-the-knee amputation (2/8/2022 - Dr. Dory Wilhelm)  3.  History of critical ischemia of the right lower extremity    Comorbidities  Patient Active Problem List   Diagnosis Code    Hyperlipidemia E78.5    Tobacco use disorder F17.200    Mediastinal adenopathy R59.0    History of vitamin D deficiency Z86.39    Insomnia G47.00    Diabetic nephropathy associated with type 2 diabetes mellitus ([facility] E11.21    Otalgia H92.09    Mild nonproliferative diabetic retinopathy of left eye without macular edema associated with type 2 diabetes mellitus ([facility] W35.8147    Hypertensive retinopathy H35.039    Nuclear sclerosis of both eyes H25.13    Arterial occlusion, lower extremity (HCC) I70.209    Impaired mobility and ADLs Z74.09, Z78.9    Status post below knee amputation of right lower extremity ([facility]) Z89.511    Chronic alcohol use Z72.89    History of coronary angioplasty with insertion of stent Z95.5    Coronary artery disease involving native coronary artery of native heart I25.10    Constipation K59.00    Poorly controlled type 2 diabetes mellitus (HCC) E11.65    Long term current use of insulin ([facility]) Z79.4    Migraine headache G43.909    Diabetic neuropathy associated with type 2 diabetes mellitus ([facility]) E11.40    Gastroesophageal reflux disease K21.9    History of essential hypertension Z86.79    History of lacunar cerebrovascular accident Z80.78    History of acute inferior wall myocardial infarction I25.2    Critical ischemia of lower extremity (HCC) I70.229    Acute postoperative anemia due to expected blood loss D62    Depression F32. A    High vitamin D level E67.3       Plan:     1. Justification for continued stay: Good progression towards established rehabilitation goals. 2. Medical Issues being followed closely:    [x]  Fall and safety precautions     [x]  Wound Care     [x]  Bowel and Bladder Function     [x]  Fluid Electrolyte and Nutrition Balance     [x]  Pain Control      3. Issues that 24 hour rehabilitation nursing is following:    [x]  Fall and safety precautions     [x]  Wound Care     [x]  Bowel and Bladder Function     [x]  Fluid Electrolyte and Nutrition Balance     [x]  Pain Control      [x]  Assistance with and education on in-room safety with transfers to and from the bed, wheelchair, toilet and shower. 4. Acute rehabilitation plan of care:    [x]  Continue current care and rehab. [x]  Physical Therapy           [x]  Occupational Therapy           []  Speech Therapy     []  Hold Rehab until further notice     5. Medications:    [x]  MAR Reviewed     [x]  Continue Present Medications     6. Chemical DVT Prophylaxis:      [x]  Enoxaparin     []  Unfractionated Heparin     []  Warfarin     []  NOAC     []  Aspirin     []  None     7. Mechanical DVT Prophylaxis:      []  FLEX Stockings     []  Sequential Compression Device     [x]  None     8. GI Prophylaxis:      []  PPI     []  H2 Blocker     [x]  None / Not indicated     9. Code status:    [x]  Full code     []  Partial code     []  Do not intubate     []  Do not resuscitate     10. Diet:  Specifications  4 carb choices (60 gm/meal), Low fat/Low cholesterol/High fiber/SEEMA   Solids (consistency)  Regular   Liquids (consistency)  Thin   Fluid Restriction  None      11. COVID-19:  Laboratory testing COVID-19 rapid test ([facility], [facility]) (2/4/2022): Not detected  COVID-19 rapid test ([facility]) (2/15/2022):  Not detected Precautions None   Vaccine to be given this admission No      12. Orders:   > S/P Right below-the-knee amputation (2/8/2022 - Dr. Kailey Villa); History of critical ischemia of the right lower extremity    > Staples to be removed on 3/8/2022    > Acute postoperative blood loss anemia   > Hgb/Hct (2/16/2022, on admission to the ARU) = 8.9/28.6   > Anemia work-up (2/16/2022) showed serum iron 14, TIBC 234, iron % saturation 18, ferritin 404, reticulocyte count 2.1   > On 2/16/2022, started Iron polysaccharides 150 mg PO BID      02/21/22  0608 02/17/22  0502 02/16/22  0626   HGB 8.5* 8.7* 8.9*   HCT 27.8* 27.8* 28.6*       > On 2/21/2022, patient was given Epoetin lico 10,000 units SC x 1 dose      02/28/22  0710 02/24/22  0709   HGB 9.6* 9.1*   HCT 31.3* 28.9*      > Continue Iron polysaccharides 150 mg PO BID    > Constipation   > On 2/17/2022, started Senna 1 tab PO once daily   > On 2/18/2022, discontinued Senna 1 tab PO once daily    > Coronary artery disease; History of inferior wall myocardial infarction; History of coronary angioplasty with stent placement   > Not on any ACE-I or beta-blockers   > Continue:    > Aspirin 81 mg PO daily with breakfast    > Atorvastatin 20 mg PO once daily    > Depression   > On 2/16/2022, discontinued Trazodone 50 mg PO q HS (re: patient says he has not been taking this prior to admission)   > Continue Bupropion  mg PO once daily    > High vitamin D level;  History of vitamin D deficiency   > Prior to [facility] to [facility], patient was on Ergocalciferol 50,000 units PO q 7 days   > Vitamin D 25-Hydroxy (2/16/2022) = 105.7   > On 2/16/2022, discontinued Ergocalciferol 50,000 units PO q 7 days     > Hyperlipidemia   > Continue Atorvastatin 20 mg PO once daily    > Type 2 diabetes mellitus, poorly controlled, with diabetic nephropathy, diabetic neuropathy, diabetic retinopathy, with long-term current use of insulin   > HbA1c (2/8/2022) = 13.8   > Vitamin B12 (2/16/2022) = 574   > On 2/16/2022:    > Increased Metformin from 500 mg to 850 mg PO BID with meals    > Increased Insulin lispro from 4 units to 5 units SC TID AC   > On 2/17/2022:    > Increased Insulin glargine from 45 units to 50 units SC daily before breakfast    > Decreased Insulin lispro from 5 units to 4 units SC TID AC   > On 2/18/2022:    > Patient was given Insulin glargine 20 units SC x 1 dose at 9PM    > Increased Metformin from 850 mg to 1000 mg PO BID with meals   > On 2/19/2022, changed Insulin glargine from 50 units SC daily before breakfast to 35 units SC q HS   > On 2/22/2022, decreased Insulin lispro from 4 units to 3 units SC TID AC   > On 2/23/2022, increased Insulin lispro from 3 units to 4 units SC TID AC   > On 2/26/2022, decreased Insulin glargine from 35 units to 30 units SC q HS   > On 2/27/2022:    > Decreased Insulin glargine from 30 units to 23 units SC q HS    > Decreased Insulin lispro from 4 units to 3 units SC TID AC   > On 2/28/2022:    > Discontinued Insulin lispro 3 units SC TID AC    > Decreased Insulin glargine from 23 units to 20 units SC q HS   > Continue:    > Metformin 1000 mg PO BID with meals    > Insulin glargine 20 units SC q HS    > Insulin lispro sliding scale SC TID AC only    > Difficulty sleeping   > On 2/16/2022, discontinued Trazodone 50 mg PO q HS (re: patient says he has not been taking this prior to admission)   > On 2/21/2022, patient reported difficulty sleeping; started Melatonin 3 mg PO daily after dinner   > Continue Melatonin 3 mg PO daily after dinner    > Depressed mood   > Patient was seen by Clinical Psychologist and patient was felt to have a depressed mood   > Option of starting an antidepressant was presented and the patient politely declined it at this time, stating he is still able to cope with his present condition    > Analgesia   > On 2/16/2022:    > Started:     > Acetaminophen 650 mg PO TID (8AM, 12PM, 4PM)     > Methocarbamol 500 mg PO TID (8AM, 2PM, 8PM)    > Increased Gabapentin from 300 mg to 400 mg PO TID (8AM, 2PM, 8PM)   > On 2/18/2022, decreased Oxycodone from 10 mg to 7.5 mg PO q 4 hr PRN for pain level 5/10 or greater    > On 2/21/2022, decreased Oxycodone from 7.5 mg to 5 mg PO q 4 hr PRN for pain level 5/10 or greater    > On 2/22/2022, decreased Gabapentin from 400 mg to 300 mg PO TID (8AM, 2PM, 8PM)   > Continue:    > Acetaminophen 650 mg PO TID (8AM, 12PM, 4PM)    > Acetaminophen 650 mg PO q 4 hr PRN for pain level 4/10 or lesser (from 8PM to 4AM only)    > Gabapentin 300 mg PO TID (8AM, 2PM, 8PM)    > Methocarbamol 500 mg PO TID (8AM, 2PM, 8PM)    > Oxycodone 5 mg PO q 4 hr PRN for pain level 5/10 or greater       12. Personal Protective Equipment (N95 face mask and eye goggles) was used while interacting with the patient. Patient was using a surgical mask. 15. Patient's progress in rehabilitation and medical issues discussed with the patient. All questions answered to the best of my ability. Care plan discussed with patient and nurse. 14. Total clinical care time is 30 minutes, including review of chart including all labs, radiology, past medical history, and discussion with patient. Greater than 50% of my time was spent in coordination of care and counseling. 15. Discharge Planning:  Discharge date  3/2/2022 (Wednesday)   Discharge location  [x] Home     [] [address]    [] Other:    Follow-up services  [] Outpatient      [x] Home Health       [x] Physical Therapy              [x] Occupational Therapy       [x] Speech Therapy                [] Medical Social Worker    [] Aide   [x] Skilled Nursing           [x] Medication reconciliation        [x] Disease education        [] PT/INR monitoring        [] Routine PICC line care        [] IV antibiotic administration            Antibiotic:             Stop date:        [] Tube feeding        [] Indwelling epstein catheter care        [x] Wound Care of right BKA stump:  Inspect incision/wound routinely, keep clean, dry , and due dressing changes with xerofoam, kerlix, ace wrap. [] Other:      Follow-up appointments  1.  PCP Bi Pimentel NP)   2. Vascular Surgery (Dr. Jude Hood)        Signed:    Yumiko Lebron MD    March 1, 2022

## 2022-03-01 NOTE — PROGRESS NOTES
Problem: Neurolinguistics Impaired (Adult)  Goal: *Speech Goal: (INSERT TEXT)  Description: Long term goals  Patient will:  1. Be oriented x 3 and recall events of the day, supervision. 2. Recall 3 related words after 5 minutes with supervision and mnemonic training. 3. Perform functional problem solving tasks with 90% accuracy. 4. Recall strategies learned in OT/PT sessions (provided by the therapists) with min assist-supervision. Short term goals (by 3/4/22)  Patient will:  1. Be oriented x 3 and recall events of the day, min assist.  2. Recall 3 related words after 5 minutes with min assist and mnemonic training. 3. Perform functional problem solving tasks with 80% accuracy. 4. Recall strategies learned in OT/PT sessions (provided by the therapists) with min cues. Note:   Speech language pathology treatment    Patient: Sarah Ingram (8 y.o. male)  Date: 3/1/2022  Diagnosis: Status post below knee amputation of right lower extremity ([facility] [address] [Z89.511] Status post below knee amputation of right lower extremity ([facility] [address]       Time in: 1100  Time Out:  1130    Pain:  Pre-tx:  No report of pain  Post tx:     SUBJECTIVE:   Patient stated I don't think I'll have any trouble at home. OBJECTIVE:   Mental Status:  Mr. Francois Mclena was awake and alert, but subdued this morning. Treatment & Interventions:   Patient was seen in his room for a half hour session. The following treatment tasks were presented:  Neuro-Linguistics:   Orientation:  Min-mod assist with the change of month. Recent memory: Supervision  Recall 2 words: Max assist  Problem solvin% in functional tasks  Naming \"animals\": Patient named 7 with encouragement to continue. Challenges @ home: Patient was not able to anticipate any challenges upon his return. Response & Tolerance to Activities:  Mr. Francois Mclean was more detatched today. He is eagerly awaiting his discharge tomorrow (\"at 11:00\") and not as invested in therapy tasks.     Pain:  Pain Scale 1: Numeric (0 - 10)  Pain Orientation 1: Right  Pain Description 1: Throbbing  After treatment:   [x]       Patient left in no apparent distress sitting up in chair  []       Patient left in no apparent distress in bed  [x]       Call bell left within reach  [x]       Nursing notified  []       Caregiver present  []       Bed alarm activated    ASSESSMENT:   Progression toward goals:  []       Improving appropriately and progressing toward goals  [x]       Improving slowly and progressing toward goals  []       Not making progress toward goals and plan of care will be adjusted    PLAN:   Patient continues to benefit from skilled intervention to address the above impairments. Continue treatment per established plan of care.   Discharge Recommendations:  Home Health    Estimated LOS: Patient is to be discharged tomorrow, 3/2/22    Xochilt Canchola SLP  Time Calculation: 30 mins

## 2022-03-01 NOTE — PROGRESS NOTES
Problem: Mobility Impaired (Adult and Pediatric)  Goal: *Acute Goals and Plan of Care (Insert Text)  Description: Physical Therapy Short Term Goals  Initiated 2/16/2022, reassessed 2/23/2022 and to be progressed to long term goals  1. Patient will move from supine to sit and sit to supine , scoot up and down, and roll side to side in bed with independence. MET 2/23/2022  2. Patient will transfer from bed to chair and chair to bed with supervision/set-up using the least restrictive device. MET 2/23/2022  3. Patient will perform sit to stand with supervision/set-up. MET 2/23/2022  4. Patient will ambulate with supervision/set-up for 50 feet with the least restrictive device. MET 2/23/2022  5. Patient will ascend/descend 3 stairs with 1 handrail(s) with minimal assistance/contact guard assist.  Not Met - pt needs UE support on B handrails    Physical Therapy Long Term Goals  Initiated 2/16/2022 and to be accomplished within 14 day(s) (3/02/2022)  1. Patient will move from supine to sit and sit to supine , scoot up and down, and roll side to side in bed with independence. MET 2/28/2022   2. Patient will transfer from bed to chair and chair to bed with modified independence using the least restrictive device. Not Met due to going decreased safety awareness  3. Patient will perform sit to stand with modified independence. Not Met due to going decreased safety awareness  4. Patient will ambulate with modified independence for 50 feet with the least restrictive device. Not Met due to going decreased safety awareness  5. Patient will ascend/descend 3 stairs with 1 handrail(s) or bumping up on steps with supervision/set-up.   Not Met due to going decreased safety awareness  Outcome: Progressing Towards Goal    PHYSICAL THERAPY DISCHARGE NOTE    Patient: Luke Chaney (36 y.o. male)  Date: 3/1/2022  Diagnosis: Status post below knee amputation of right lower extremity ([facility] [address] [Z89.511] Status post below knee amputation of right lower extremity ([facility] [address]  Precautions: Fall  Chart, physical therapy assessment, plan of care and goals were reviewed. Time DQ:4432  Time AEN:5342    Patient seen for: Balance activities;Gait training;Patient education;Transfer training     Time in:0  Time out:0    Patient seen for: Balance activities;Gait training;Patient education;Transfer training    Pain:  Pt pain was reported as no c/o pain pre-treatment. Pt pain was reported as no c/o pain post-treatment. Intervention: N/A    Patient identified with name and : yes    SUBJECTIVE:     Patient stated: \"some times I get hard headed. \"  Pt educated re: safety concerns of transferring and mobilizing in w/c without wearing residual limbguard as well as benefit of promoting extension/limiting contracture as pt presents with right knee flexed resting residual limb on residual limb rest demonstrating poor safety awareness. Pt's significant other attended family education opportunity during second treatment session. Both pt and significant other express understanding of education provided re: maintaining a safe environment at home, recommendation for follow up care, and 24 hour supervision for safety as well as CGA for safe home entry. Pt reports a friend was, \"supposed to be putting up rails today. \"    OBJECTIVE DATA SUMMARY:     GROSS ASSESSMENT Discharge Assessment 3/1/2022   AROM Within functional limits   Strength Generally decreased, functional   Coordination Within functional limits   Tone Normal   Sensation Intact   PROM Within functional limits       POSTURE Discharge Assessment 3/1/2022   Posture (WDL)  Exceptions to WDL   Posture Assessment Rounded shoulders with B UE weight bearing on RW         BALANCE Discharge Assessment 3/1/2022    Sitting - Static: Good (unsupported)  Sitting - Dynamic: Good (unsupported)  Standing - Static: Fair  Standing - Dynamic : Impaired       BED/CHAIR/WHEELCHAIR TRANSFERS Initial Assessment Discharge Assessment   Rolling Right 6 (Modified independent) Independent    Rolling Left 6 (Modified independent) Independent    Supine to Sit 6 (Modified independent) Independent    Sit to Stand Contact guard assistance Supervision for safety   Sit to Supine 6 (Modified independent) Independent    Transfer Assistance Level 4 (Contact guard assistance) Supervision for safety   Transfer Type Other Lateral pivot   Comments     Pt requires verbal reminders for safety with all functional transfers   Car Transfer  (CGA) Supervision   Car Type car transfer  Car Transfer Trainer       [facility] MOBILITY/MANAGEMENT Initial Assessment Discharge Assessment   Able to Propel 240 feet 260 feet   Assistance Level 4 Pt propels w/c over level surfaces with modified independence. Curbs/ramps assistance required 0 (Not tested)  NT   Wheelchair set up assistance required 3 (Moderate assistance) 5 (Supervision/setup)   Wheelchair management Manages left brake,Manages right brake Manages left brake;Manages right brake;Manages left footrest;Manages right footrest       GAIT Discharge Assessment 3/1/2022   Gait Description (WDL) Exceptions to WDL   Gait Abnormalities  Decreased foot clearance with fatigue       WALKING INDEPENDENCE Initial Assessment Discharge Assessment   Assistive device Gait belt,Walker, rolling Gait belt;Walker, rolling   Ambulation assistance - level surface 4 (Minimal assistance) 5 (Supervision/setup)   Distance 50 Feet (ft) 150 Feet (ft)   Comments     Pt requires supervision for safety    During afternoon session, pt also ambulated 120 feet with RW with pt's significant other present and providing supervision.    Ambulation assistance - unlevel surface  (NT)  NT       STEPS/STAIRS Initial Assessment Discharge Assessment   Steps/Stairs ambulated 3 (Moderate assistance) 4 (6\")   Rail Use Both Both   Assistance Level   4 (Contact guard assistance)   Comments     Pt requires maximal cuing for safe positioning to initiate ascending stairs and pt relies heavily on UEs requiring CGA for safety and balance. Curbs/Ramps    NT       ASSESSMENT:  Pt is progressing with improved functional strength requiring decreased physical assistance ascending stairs. However, pt demonstrates poor safety awareness overall and therefor continues to require supervision for safety with functional mobility and transfers. LTGs: Pt did not achieve long term goals with the exception of independence with bed mobility due to ongoing functional weakness and poor insight into impairments with limited safety awareness. PLAN:  Pt would benefit from continued skilled physical therapy in order to improve independent functional mobility at a home health level. Interventions may include range of motion (AROM, PROM B LE/trunk), motor function (B LE/trunk strengthening/coordination), activity tolerance (vitals, oxygen saturation levels), bed mobility training, balance activities, gait training (progressive ambulation program), and functional transfer training. Discharge Recommendations:  Home Physical Therapy with 24 hour supervision  Further Equipment Recommendations for Discharge:  wheelchair 18 inch with right residual limb rest/elevated leg rest       Activity Tolerance:   Good    Please refer to the flowsheet for vital signs taken during this treatment.   After treatment:   [] Patient left in no apparent distress in bed  [x] Patient left in no apparent distress sitting up in chair  [x] Patient left in no apparent distress in w/c mobilizing under own power  [] Patient left in no apparent distress dining area  [] Patient left in no apparent distress mobilizing under own power  [x] Call bell left within reach  [] Nursing notified  [] Caregiver present  [] Bed alarm activated   [x] Chair alarm activated      Raphael Victoria PT, DPT  3/1/2022

## 2022-03-02 VITALS
SYSTOLIC BLOOD PRESSURE: 103 MMHG | RESPIRATION RATE: 16 BRPM | HEIGHT: 69 IN | HEART RATE: 66 BPM | TEMPERATURE: 98.2 F | DIASTOLIC BLOOD PRESSURE: 63 MMHG | BODY MASS INDEX: 25.55 KG/M2 | OXYGEN SATURATION: 99 % | WEIGHT: 172.5 LBS

## 2022-03-02 LAB — GLUCOSE BLD STRIP.AUTO-MCNC: 130 MG/DL (ref 70–110)

## 2022-03-02 PROCEDURE — 99239 HOSP IP/OBS DSCHRG MGMT >30: CPT | Performed by: INTERNAL MEDICINE

## 2022-03-02 PROCEDURE — 74011250637 HC RX REV CODE- 250/637: Performed by: INTERNAL MEDICINE

## 2022-03-02 PROCEDURE — 82962 GLUCOSE BLOOD TEST: CPT

## 2022-03-02 PROCEDURE — 2709999900 HC NON-CHARGEABLE SUPPLY

## 2022-03-02 PROCEDURE — 74011250636 HC RX REV CODE- 250/636: Performed by: INTERNAL MEDICINE

## 2022-03-02 RX ADMIN — GABAPENTIN 300 MG: 300 CAPSULE ORAL at 09:48

## 2022-03-02 RX ADMIN — BUPROPION HYDROCHLORIDE 150 MG: 150 TABLET, EXTENDED RELEASE ORAL at 09:50

## 2022-03-02 RX ADMIN — THERA TABS 1 TABLET: TAB at 09:49

## 2022-03-02 RX ADMIN — OXYCODONE 5 MG: 5 TABLET ORAL at 06:13

## 2022-03-02 RX ADMIN — ATORVASTATIN CALCIUM 20 MG: 20 TABLET, FILM COATED ORAL at 09:50

## 2022-03-02 RX ADMIN — ENOXAPARIN SODIUM 40 MG: 100 INJECTION SUBCUTANEOUS at 06:03

## 2022-03-02 RX ADMIN — ASPIRIN 81 MG 81 MG: 81 TABLET ORAL at 09:49

## 2022-03-02 RX ADMIN — Medication 150 MG: at 09:49

## 2022-03-02 RX ADMIN — METFORMIN HYDROCHLORIDE 1000 MG: 500 TABLET ORAL at 09:49

## 2022-03-02 RX ADMIN — METHOCARBAMOL TABLETS 500 MG: 500 TABLET, COATED ORAL at 09:50

## 2022-03-02 RX ADMIN — Medication 200 MG: at 09:48

## 2022-03-02 RX ADMIN — OXYCODONE 5 MG: 5 TABLET ORAL at 10:16

## 2022-03-02 RX ADMIN — ACETAMINOPHEN 650 MG: 325 TABLET ORAL at 09:49

## 2022-03-02 NOTE — FACE TO FACE
[address]  [address] [facility], [address]    [address] TO [place] [address]    Name: Jayy Schafer Age / Sex: 59 y.o. / male   CSN: [id number] MRN: [id number]   [facility] Date: 2/15/2022 Discharge Date: 3/2/2022     Primary Care Provider: Joan Metzger NP      Primary Rehabilitation Diagnosis  1. Impaired Mobility and ADLs  2. S/P Right below-the-knee amputation (2/8/2022 - Dr. Juan Salcido)  3.  History of critical ischemia of the right lower extremity    Comorbidities  Patient Active Problem List   Diagnosis Code    Hyperlipidemia E78.5    Tobacco use disorder F17.200    Mediastinal adenopathy R59.0    History of vitamin D deficiency Z86.39    Insomnia G47.00    Diabetic nephropathy associated with type 2 diabetes mellitus ([address] E11.21    Otalgia H92.09    Mild nonproliferative diabetic retinopathy of left eye without macular edema associated with type 2 diabetes mellitus ([address] T46.5586    Hypertensive retinopathy H35.039    Nuclear sclerosis of both eyes H25.13    Arterial occlusion, lower extremity (HCC) I70.209    Impaired mobility and ADLs Z74.09, Z78.9    Status post below knee amputation of right lower extremity (HCC) Z89.511    Chronic alcohol use Z72.89    History of coronary angioplasty with insertion of stent Z95.5    Coronary artery disease involving native coronary artery of native heart I25.10    Constipation K59.00    Poorly controlled type 2 diabetes mellitus (HCC) E11.65    Long term current use of insulin (HCC) Z79.4    Migraine headache G43.909    Diabetic neuropathy associated with type 2 diabetes mellitus (HCC) E11.40    Gastroesophageal reflux disease K21.9    History of essential hypertension Z86.79    History of lacunar cerebrovascular accident Z80.78    History of acute inferior wall myocardial infarction I25.2    Critical ischemia of lower extremity (HCC) I70.229    Acute postoperative anemia due to expected blood loss D62    Depression F32. A    High vitamin D level E67.3       History of the Present Illness: The patient is a 77-year-old Black male with multiple medical comorbidities who was admitted to [facility] on 2/8/2022 for an elective right below-the-knee amputation. On 2/2/2022, the patient was seen at the office of Vascular Surgery (Dr. Gennaro Arreola) for evaluation of right leg ischemic rest pain. Dr. Karen Garza recommended a right below-the-knee amputation. COVID-19 rapid test ([facility], [facility]) (2/4/2022): Not detected    On 2/8/2022, the patient was admitted under the service of Vascular Surgery (Dr. Gennaro Arreola). Excerpt (HPI) from the H&P by Dr. Gennaro Arreola:  \"Bryce Gill is a 59 y.o. male with critical right leg ischemia. No revascularization options available. Patient has debilitating rest pain of the right lower extremity and the only option for pain management currently is right leg below the knee amputation. \"    S/P Right below-the-knee amputation (2/8/2022 - Dr. Gennaro Arreola)    The patient tolerated the procedure well with no intraoperative complications. The patient developed acute postoperative blood loss anemia but no blood transfusion was given. CBC  Recent Labs     02/08/22  1605   WBC 9.1   HGB 9.9*   HCT 31.3*          Electrolytes  Recent Labs     02/08/22  1605   *   K 4.1      CA 8.5       Renal Function  Recent Labs     02/08/22  1605   BUN 12   CREA 1.19   CO2 27       Liver Function  Recent Labs     02/08/22  1605   TBILI <0.1*   TP 6.2*   ALB 2.8*   GLOB 3.4   ALT 19   AST 20   AP 75       COVID-19 rapid test ([facility], [facility]) (2/15/2022):  Not detected    Excerpt [facility] Stay) from the Discharge Summary by Jose Rafael Newell NP:  [facility]:  Nadya Jacobs is a 59year old male s/p R BKA by Dr. Daysi Jones 2/8/2022 for Right lower extremity limb ischemia after a failed bypass attempt. No major complications were noted during the stay. Prosthetics were consulted and the patient received a consultation and stump . \"    Pain was controlled with Percocet 5/325. No DVT prophylaxis was used. The patient had remained hemodynamically stable but due to the above events, the patient was noted to be generally weak and with impaired mobility and ADLs. Patient was felt to be a good candidate for acute inpatient rehabilitation. Upon evaluation by Physical Therapy and Occupational Therapy, the patient was recommended for acute inpatient rehabilitation. The patient was discharged and was subsequently admitted to the [facility] for intensive rehabilitation to help recover strength, function and mobility. Past Medical History:  Past Medical History:   Diagnosis Date    Acute postoperative anemia due to expected blood loss 2/8/2022    Arterial occlusion, lower extremity ([address] 11/27/2021    Cardiac cath 11/09/2011    RCA patent. LM patent. LAD patent. mD1 55%. CX patent. Prior stent patent. Edison 85% (2.5 x 15-mm Xience stent, resid 0%). LVEDP 12. EF 40-45%. High lateral hypk (RI distribution).  Chronic alcohol use     Fri-Sun one fifth; Mon-Thur: Pint of liquor    Constipation     Coronary artery disease involving native coronary artery of native heart     Critical ischemia of lower extremity ([address] 2/8/2022    Depression     Diabetic nephropathy associated with type 2 diabetes mellitus ([address] 4/4/2019    Diabetic neuropathy associated with type 2 diabetes mellitus ([address]     Gastroesophageal reflux disease     High vitamin D level 2/16/2022    Vitamin D 25-Hydroxy (2/16/2022) = 105.7    History of acute inferior wall myocardial infarction 2009    History of coronary angioplasty with insertion of stent 07/29/2009    2.5 x 13 Cypher stent to CX.      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 without macular edema associated with type 2 diabetes mellitus ([facility] 12/18/2020    Nuclear sclerosis of both eyes 12/18/2020    Otalgia 05/08/2019    Poorly controlled type 2 diabetes mellitus (HCC)     HbA1c (2/8/2022) = 13.8    Tobacco use disorder     contemplating stopping       Past Surgical History:  Past Surgical History:   Procedure Laterality Date    COLONOSCOPY N/A 10/28/2020    COLONOSCOPY w/polypectomy performed by Shae Blake MD at [facility] ENDOSCOPY    HX [address] Right 02/08/2022    S/P Right below-the-knee amputation (2/8/2022 - Dr. Ashvin Lion)    HX CORONARY STENT PLACEMENT  07/29/2009    HX HEART CATHETERIZATION  8/30/2011    HX HEART CATHETERIZATION  11/09/2011    HX WISDOM TEETH EXTRACTION      x4    VASCULAR SURGERY PROCEDURE UNLIST      Multiple vascular surgeries       Medications on Discharge:    Current Discharge Medication List      START taking these medications    Details   acetaminophen (TYLENOL) 325 mg tablet Take 2 Tablets by mouth every four (4) hours as needed (for fever or pain level 4/10 or lesser). Indications: fever, pain  Qty: 30 Tablet, Refills: 0  Start date: 3/1/2022    Associated Diagnoses: Status post below knee amputation of right lower extremity ([facility])      gabapentin (NEURONTIN) 300 mg capsule Take 1 Capsule by mouth three (3) times daily. Max Daily Amount: 900 mg. Indications: neuropathic pain, acute pain following an operation  Qty: 90 Capsule, Refills: 0  Start date: 3/2/2022    Associated Diagnoses: Status post below knee amputation of right lower extremity ([facility]  Diabetic polyneuropathy associated with type 2 diabetes mellitus ([facility])      iron polysaccharides (NIFEREX) 150 mg iron capsule Take 1 Capsule by mouth two (2) times a day. Indications: anemia from inadequate iron  Qty: 30 Capsule, Refills: 0  Start date: 3/2/2022    Associated Diagnoses: Acute postoperative anemia due to expected blood loss      melatonin 3 mg tablet Take 1 Tablet by mouth daily (after dinner). Indications: difficulty sleeping  Qty: 30 Tablet, Refills: 0  Start date: 3/2/2022    Associated Diagnoses: Insomnia, unspecified type      methocarbamoL (ROBAXIN) 500 mg tablet Take 1 Tablet by mouth three (3) times daily for 7 days. Indications: muscle spasm  Qty: 21 Tablet, Refills: 0  Start date: 3/2/2022, End date: 3/9/2022    Associated Diagnoses: Status post below knee amputation of right lower extremity ([facility])      oxyCODONE IR (ROXICODONE) 5 mg immediate release tablet Take 1 Tablet by mouth every six (6) hours as needed (for pain level 5/10 or greater) for up to 5 days. Max Daily Amount: 20 mg. Indications: pain  Qty: 20 Tablet, Refills: 0  Start date: 3/1/2022, End date: 3/6/2022    Associated Diagnoses: Status post below knee amputation of right lower extremity ([facility]         CONTINUE these medications which have NOT CHANGED    Details   aspirin 81 mg chewable tablet Take 81 mg by mouth daily. Trulicity 7.09 TE/1.4 mL sub-q pen INJECT 0.75 MG UNDER THE SKIN ONCE WEEKLY  Qty: 2 mL, Refills: 0    Associated Diagnoses: Type 2 diabetes mellitus with diabetic neuropathy, with long-term current use of insulin ([facility])      insulin glargine (Basaglar KwikPen U-100 Insulin) 100 unit/mL (3 mL) inpn 45 Units by SubCUTAneous route daily. INJECT 50 UNITS UNDER THE SKIN DAILY  Qty: 30 Adjustable Dose Pre-filled Pen Syringe, Refills: 1      buPROPion SR (WELLBUTRIN SR) 150 mg SR tablet ONE TABLET BY MOUTH ONCE A DAY  Qty: 90 Tablet, Refills: 0    Associated Diagnoses: Mild depression ([address]  Tobacco use disorder      simvastatin (Zocor) 20 mg tablet Take 1 Tablet by mouth nightly. Qty: 90 Tablet, Refills: 1    Associated Diagnoses: Hypercholesterolemia      metFORMIN (GLUCOPHAGE) 1,000 mg tablet TAKE ONE TABLET BY MOUTH TWICE A DAY WITH A MEAL  Qty: 180 Tablet, Refills: 2    Associated Diagnoses: Type 2 diabetes mellitus with diabetic neuropathy, with long-term current use of insulin ([facility])      glucose blood VI test strips (ASCENSIA AUTODISC VI, ONE TOUCH ULTRA TEST VI) strip Provide test strip and glucose meter that insurance will cover - Twice daily-  fasting in the morning and once two hours after a meal.  Qty: 100 Strip, Refills: 10    Associated Diagnoses: Type 2 diabetes mellitus with diabetic neuropathy, with long-term current use of insulin ([facility])      flash glucose sensor (FreeStyle Ezio 2 Sensor) kit Apply 1 sensor every 14 days to check blood sugar as directed at least 3 times daily. Qty: 2 Kit, Refills: 5    Associated Diagnoses: Type 2 diabetes mellitus with diabetic neuropathy, with long-term current use of insulin ([facility])      multivitamin (ONE A DAY) tablet Take 1 Tablet by mouth daily. Qty: 90 Tablet, Refills: 3    Associated Diagnoses: Fatigue, unspecified type         STOP taking these medications       ergocalciferol (ERGOCALCIFEROL) 1,250 mcg (50,000 unit) capsule Comments:   Reason for Stopping:               Condition on Discharge: Stable.     Ambulation Gait  Amount of Assistance: 5 (Supervision/setup)  Distance (ft): 150 Feet (ft)  Assistive Device: Gait belt,Walker, rolling     Wheelchair Mobility Wheelchair Mobility/Management  Able to Propel (ft): 260 feet  Functional Level: 6  Curbs/Ramps Assist Required (FIM Score): 0 (Not tested)  Wheelchair Setup Assist Required : 5 (Supervision/setup)  Wheelchair Management: Manages left brake,Manages right brake,Manages left footrest,Manages right footrest         Disposition: Patient clinically improved and was discharged to home with home health physical therapy, occupational therapy, speech therapy and skilled nursing. The patient is temporarily homebound secondary to functional deficits S/P Right below-the-knee amputation (2/8/2022 - Dr. Nighat Richards); History of critical ischemia of the right lower extremity. The patient can ambulate using a rolling walker (see above). The patient would benefit from continued skilled physical therapy in order to improve independent functional mobility within the home with use of least restrictive device. The patient would also benefit from continued skilled occupational therapy in order to improve self care and functional mobility within the home with use of least restrictive device. The patient would also benefit from continued skilled speech therapy in order to work on cognition. Short-term skilled nursing is needed for wound care, medication reconciliation and disease education. I certify that this patient is homebound, that is: 1) patient requires the use of a walker device, special transportation, or assistance of another to leave the home; or 2) patient's condition makes leaving the home medically contraindicated; and 3) patient has a normal inability to leave the home and leaving the home requires considerable and taxing effort. Patient may leave the home for infrequent and short duration for medical reasons, and occasional absences for non-medical reasons. Homebound status is due to the following functional limitations: Patient with strength deficits limiting the performance of all ADL's without caregiver assistance or the use of an assistive device. Wound Care of right BKA stump: Inspect incision/wound routinely, keep clean, dry , and due dressing changes with xerofoam, kerlix, ace wrap. Due to the abovementioned data, I certify that the patient needs intermittent Skilled Nursing, Physical Therapy, Speech Language Pathology  and Occupational Therapy.      I will NOT be following this patient in the Community and Gina Jacobo NP will be responsible for signing the [facility] 133 of Care. In compliance with the Affordable Care Act, I certify that this patient was managed by me during this hospitalization and that I had a Face-to-Face Encounter that meets the physician Face-to-Face Encounter requirements.       Signed:    Johnathan Alexandra MD    March 1, 2022

## 2022-03-02 NOTE — ROUTINE PROCESS
[facility]    Bedside and Verbal shift change report given to Lannis Boxer (oncoming nurse) by Juan Sheridan RN (offgoing nurse). Report included the following information SBAR, Kardex, MAR and Recent Results. Situation:   Code Status: Full Code   Hospital Day: 15   Problem List:   Hospital Problems  Date Reviewed: 3/1/2022          Codes Class Noted POA    Poorly controlled type 2 diabetes mellitus ([facility] ICD-10-CM: E11.65  ICD-9-CM: 250.00  Unknown Yes    Overview Signed 2/16/2022 12:50 AM by Daniel Rendon MD     HbA1c (2/8/2022) = 13.8             Long term current use of insulin (HCC) ICD-10-CM: Z79.4  ICD-9-CM: V58.67  Unknown Yes        High vitamin D level (Chronic) ICD-10-CM: E67.3  ICD-9-CM: 278.4  2/16/2022 Yes    Overview Signed 2/16/2022  9:52 AM by Daniel Rendon MD     Vitamin D 25-Hydroxy (2/16/2022) = 105.7             Impaired mobility and ADLs ICD-10-CM: Z74.09, Z78.9  ICD-9-CM: V49.89  2/8/2022 Yes        * (Principal) Status post below knee amputation of right lower extremity ([facility] ICD-10-CM: Z89.511  ICD-9-CM: V49.75  2/8/2022 Yes    Overview Signed 2/16/2022 12:47 AM by Daniel Rendon MD     S/P Right below-the-knee amputation (2/8/2022 - Dr. Augusto Gotti)             Critical ischemia of lower extremity [facility]) ICD-10-CM: K70.490  ICD-9-CM: 459.9  2/8/2022 No        Acute postoperative anemia due to expected blood loss ICD-10-CM: D62  ICD-9-CM: 285.1  2/8/2022 Yes              Background:   Past Medical History:   Past Medical History:   Diagnosis Date    Acute postoperative anemia due to expected blood loss 2/8/2022    Arterial occlusion, lower extremity ([facility] 11/27/2021    Cardiac cath 11/09/2011    RCA patent. LM patent. LAD patent. mD1 55%. CX patent. Prior stent patent. Edison 85% (2.5 x 15-mm Xience stent, resid 0%). LVEDP 12. EF 40-45%. High lateral hypk (RI distribution).       Chronic alcohol use     Fri-Sun one fifth; Mon-Thur: Pint of liquor  Constipation     Coronary artery disease involving native coronary artery of native heart     Critical ischemia of lower extremity ([facility] 2/8/2022    Depression     Diabetic nephropathy associated with type 2 diabetes mellitus ([facility] 4/4/2019    Diabetic neuropathy associated with type 2 diabetes mellitus ([facility]     Gastroesophageal reflux disease     High vitamin D level 2/16/2022    Vitamin D 25-Hydroxy (2/16/2022) = 105.7    History of acute inferior wall myocardial infarction 2009    History of coronary angioplasty with insertion of stent 07/29/2009    2.5 x 13 Cypher stent to CX.      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 without macular edema associated with type 2 diabetes mellitus ([facility] 12/18/2020    Nuclear sclerosis of both eyes 12/18/2020    Otalgia 05/08/2019    Poorly controlled type 2 diabetes mellitus (HCC)     HbA1c (2/8/2022) = 13.8    Tobacco use disorder     contemplating stopping        Assessment:   Changes in Assessment throughout shift: No change to previous assessment     Patient has a central line: no Reasons if yes: n/a  Insertion date:n/a Last dressing date:n/a   Patient has Epstein Cath: no Reasons if yes: n/a   Insertion date:n/a  Shift epstein care completed: N/A     Last Vitals:     Vitals:    02/28/22 1558 03/01/22 0829 03/01/22 1552 03/01/22 2020   BP: 122/68 112/66 119/70 119/68   Pulse: 78 63 76 84   Resp: 18 19 18 20   Temp: 98.4 °F (36.9 °C) 98.3 °F (36.8 °C) 98.5 °F (36.9 °C) 98.5 °F (36.9 °C)   SpO2: 99% 95% 96% 97%   Weight:       Height:            PAIN    Pain Assessment    Pain Intensity 1: 0 (03/02/22 0401) Pain Intensity 1: 2 (12/29/14 8625)    Pain Location 1: Leg (incision site BKA) Pain Location 1: Abdomen    Pain Intervention(s) 1: Medication (see MAR) Pain Intervention(s) 1: Medication (see MAR)  Patient Stated Pain Goal: Unable to verbalize/indicate pain (asleep) Patient Stated Pain Goal: 0  o Intervention effective: yes  o Other actions taken for pain: Medication (see MAR)     Skin Assessment  Skin color    Condition/Temperature    Integrity Skin Integrity: Incision (comment) (R BKA)  Turgor    Weekly Pressure Ulcer Documentation  Pressure  Injury Documentation: No Pressure Injury Noted-Pressure Ulcer Prevention Initiated  Wound Prevention & Protection Methods  Orientation of wound Orientation of Wound Prevention: Posterior  Location of Prevention Location of Wound Prevention: Buttocks,Sacrum/Coccyx  Dressing Present Dressing Present : No  Dressing Status    Wound Offloading Wound Offloading (Prevention Methods): Bed, pressure reduction mattress     INTAKE/OUPUT  Date 03/01/22 0700 - 03/02/22 0659 03/02/22 0700 - 03/03/22 0659   Shift 6161-2913 7224-3161 24 Hour Total 2466-1122 5881-6382 24 Hour Total   INTAKE   P.O. 480 240 720        P. O. 480 240 720      Shift Total(mL/kg) 480(6.1) 240(3.1) 720(9.2)      OUTPUT   Urine(mL/kg/hr)  450 450        Urine Voided  450 450        Urine Occurrence(s) 4 x 1 x 5 x      Stool           Stool Occurrence(s) 0 x 0 x 0 x      Shift Total(mL/kg)  450(5.8) 450(5.8)       -210 270      Weight (kg) 78.2 78.2 78.2 78.2 78.2 78.2       Recommendations:  1. Patient needs and requests: wound care for BKA, dressing changes everyday, pain management    2. Pending tests/procedures: none at this time, d/c home today    3. Functional Level/Equipment: Partial (one person) / Wheelchair    Fall Precautions:   Fall risk precautions were reinforced with the patient; he was instructed to call for help prior to getting up.  The following fall risk precautions were continued: bed/ chair alarms, door signage, yellow bracelet and socks as well as update of the Ledesma Blades tool in the patient's room. Loly Score: 4    HEALS Safety Check    A safety check occurred in the patient's room between off going nurse and oncoming nurse listed above. The safety check included the below items  Area Items   H  High Alert Medications - Verify all high alert medication drips (heparin, PCA, etc.)   E  Equipment - Suction is set up for ALL patients (with eliel)  - Red plugs utilized for all equipment (IV pumps, etc.)  - WOWs wiped down at end of shift.  - Room stocked with oxygen, suction, and other unit-specific supplies   A  Alarms - Bed alarm is set for fall risk patients  - Ensure chair alarm is in place and activated if patient is up in a chair   L  Lines - Check IV for any infiltration  - Gallardo bag is empty if patient has a Gallardo   - Tubing and IV bags are labeled   S  Safety   - Room is clean, patient is clean, and equipment is clean. - Hallways are clear from equipment besides carts. - Fall bracelet on for fall risk patients  - Ensure room is clear and free of clutter  - Suction is set up for ALL patients (with eliel)  - Hallways are clear from equipment besides carts.    - Isolation precautions followed, supplies available outside room, sign posted     Marissa Yi RN

## 2022-03-02 NOTE — DISCHARGE SUMMARY
[address]  [address] [place] [address], [phone] [facility] SUMMARY    Name: Rory Teresa MRN: [id number]   Age / Sex: 59 y.o. / male CSN: [id number]   YOB: 1960 Length of Stay: 15 days   Admit Date: 2/15/2022 Discharge Date: 3/2/2022       PRIMARY CARE PHYSICIAN: Miranda Hsu NP      DISCHARGE DIAGNOSES:    Primary Rehabilitation Diagnosis  1. Impaired Mobility and ADLs  2. S/P Right below-the-knee amputation (2/8/2022 - Dr. Teresa Lin)  3.  History of critical ischemia of the right lower extremity    Comorbidities  Patient Active Problem List   Diagnosis Code    Hyperlipidemia E78.5    Tobacco use disorder F17.200    Mediastinal adenopathy R59.0    History of vitamin D deficiency Z86.39    Insomnia G47.00    Diabetic nephropathy associated with type 2 diabetes mellitus ([facility] E11.21    Otalgia H92.09    Mild nonproliferative diabetic retinopathy of left eye without macular edema associated with type 2 diabetes mellitus ([facility] [facility] B27.7271    Hypertensive retinopathy H35.039    Nuclear sclerosis of both eyes H25.13    Arterial occlusion, lower extremity (HCC) I70.209    Impaired mobility and ADLs Z74.09, Z78.9    Status post below knee amputation of right lower extremity (HCC) Z89.511    Chronic alcohol use Z72.89    History of coronary angioplasty with insertion of stent Z95.5    Coronary artery disease involving native coronary artery of native heart I25.10    Constipation K59.00    Poorly controlled type 2 diabetes mellitus (HCC) E11.65    Long term current use of insulin ([facility]) Z79.4    Migraine headache G43.909    Diabetic neuropathy associated with type 2 diabetes mellitus (HCC) E11.40    Gastroesophageal reflux disease K21.9    History of essential hypertension Z86.79    History of lacunar cerebrovascular accident Z80.78    History of acute inferior wall myocardial infarction I25.2    Critical ischemia of lower extremity (HCC) I70.229    Acute postoperative anemia due to expected blood loss D62    Depression F32. A    High vitamin D level E67.3       CONSULTS CALLED: None      PROCEDURES DONE: None      BRIEF HISTORY: The patient is a 22-year-old Black male with multiple medical comorbidities who was admitted to [facility] on 2/8/2022 for an elective right below-the-knee amputation.      On 2/2/2022, the patient was seen at the office of Vascular Surgery (Dr. Teresa Lin) for evaluation of right leg ischemic rest pain. Dr. Man Albright recommended a right below-the-knee amputation.     COVID-19 rapid test ([facility]) (2/4/2022): Not detected     On 2/8/2022, the patient was admitted under the service of Vascular Surgery (Dr. Teresa Lin).     Excerpt (HPI) from the H&P by Dr. Teresa Lin:  \"Bryce Rosener a 59 y. o. male with critical right leg ischemia. No revascularization options available. Patient has debilitating rest pain of the right lower extremity and the only option for pain management currently is right leg below the knee amputation. \"     S/P Right below-the-knee amputation (2/8/2022 - Dr. Teresa Lin)     The patient tolerated the procedure well with no intraoperative complications.     The patient developed acute postoperative blood loss anemia but no blood transfusion was given.     CBC      Recent Labs     02/08/22  1605   WBC 9.1   HGB 9.9*   HCT 31.3*            Electrolytes      Recent Labs     02/08/22  1605   *   K 4.1      CA 8.5         Renal Function      Recent Labs     02/08/22  1605   BUN 12   CREA 1.19   CO2 27         Liver Function      Recent Labs     02/08/22  1605   TBILI <0.1*   TP 6.2*   ALB 2.8*   GLOB 3.4   ALT 19   AST 20   AP 75         COVID-19 rapid test ([facility]) (2/15/2022):  Not detected     Excerpt [facility] Stay) from the Discharge Summary by Konstantin Patino NP:  \"Narrative of Hospital Course:  Sarah Ingram is a 59year old male s/p R BKA by Dr. Sherry Galeano 2/8/2022 for Right lower extremity limb ischemia after a failed bypass attempt. No major complications were noted during the stay. Prosthetics were consulted and the patient received a consultation and stump . \"     Pain was controlled with Percocet 5/325.     No DVT prophylaxis was used.     The patient had remained hemodynamically stable but due to the above events, the patient was noted to be generally weak and with impaired mobility and ADLs. Patient was felt to be a good candidate for acute inpatient rehabilitation. Upon evaluation by Physical Therapy and Occupational Therapy, the patient was recommended for acute inpatient rehabilitation. The patient was discharged and was subsequently admitted to the [facility] for intensive rehabilitation to help recover strength, function and mobility. COURSE IN THE HOSPITAL: Upon admission to the [facility], the patient underwent physical therapy, occupational therapy and speech therapy. The patient was able to actively participate in the rehabilitation activities and progressed well. On discharge, the patient was able to perform the following activities:    1.  Occupational Therapy    ON ADMISSION ON DISCHARGE   Eating  Functional Level: 5   Eating  Functional Level: 5     Grooming  Functional Level: 5   Grooming  Functional Level: 5     Bathing  Functional Level: 4   Bathing  Functional Level: 4     Upper Body Dressing  Functional Level: 5 (setup)   Upper Body Dressing  Functional Level: 5     Lower Body Dressing  Functional Level: 3   Lower Body Dressing  Functional Level: 3     Toileting  Functional Level:  (Pt declined)   Toileting  Functional Level:  (Pt declined)     Toilet Transfers  Toilet Transfer Score: 5 (CGA)   Toilet Transfers  Toilet Transfer Score: 5 (CGA)     Betina Lucas Transfers  Tub/Shower Transfer Score: 5 (CGA with grab bar)   Tub/Shower Transfers  Tub/Shower Transfer Score: 5 (CGA with grab bar)       2. Physical Therapy    ON ADMISSION ON DISCHARGE   Wheelchair Mobility/Management  Able to Propel (ft): 240 feet  Functional Level: 4  Curbs/Ramps Assist Required (FIM Score): 0 (Not tested)  Wheelchair Setup Assist Required : 3 (Moderate assistance)  Wheelchair Management: Manages left brake,Manages right brake Wheelchair Mobility/Management  Able to Propel (ft): 260 feet  Functional Level: 6  Curbs/Ramps Assist Required (FIM Score): 0 (Not tested)  Wheelchair Setup Assist Required : 5 (Supervision/setup)  Wheelchair Management: Manages left brake,Manages right brake,Manages left footrest,Manages right footrest     Gait  Amount of Assistance: 4 (Minimal assistance)  Distance (ft): 50 Feet (ft)  Assistive Device: Gait belt,Walker, rolling Gait  Amount of Assistance: 5 (Supervision/setup)  Distance (ft): 150 Feet (ft)  Assistive Device: Gait belt,Walker, rolling     Balance-Sitting/Standing  Sitting - Static: Good (unsupported)  Sitting - Dynamic: Good (unsupported)  Standing - Static: Fair  Standing - Dynamic : Impaired Balance-Sitting/Standing  Sitting - Static: Good (unsupported)  Sitting - Dynamic: Good (unsupported)  Standing - Static: Fair  Standing - Dynamic : Impaired     Bed/Mat Mobility  Rolling Right : 6 (Modified independent)  Rolling Left : 6 (Modified independent)  Supine to Sit : 6 (Modified independent)  Sit to Supine : 6 (Modified independent) Bed/Mat Mobility  Rolling Right : 7 (Independent)  Rolling Left : 7 (Independent)  Supine to Sit : 7 (Independent)  Sit to Supine : 7 (Independent)     Transfers  Transfer Type:  Other  Other: stand step with RW  Transfer Assistance : 4 (Contact guard assistance)  Sit to Stand Assistance: Contact guard assistance  Car Transfers:  (CGA)  Car Type: car transfer  Transfers  Transfer Type: Lateral pivot  Other: stand step with RW  Transfer Assistance : 5 (Supervision/setup)  Sit to Stand Assistance: Supervision  Car Transfers: Supervision  Car Type: Car Transfer Trainer     Steps or Stairs  Steps/Stairs Ambulated (#): 3 (4\" steps)  Level of Assist : 3 (Moderate assistance)  Rail Use: Both Steps or Stairs  Steps/Stairs Ambulated (#): 4 (6\")  Level of Assist : 4 (Contact guard assistance)  Rail Use: Both       3. Speech and Language Pathology    ON ADMISSION ON DISCHARGE   Comprehension (Native Language)  Primary Mode of Comprehension: Auditory  Score: 5 Comprehension (Native Language)  Primary Mode of Comprehension: Auditory  Score: 5     Expression (Native Language)  Primary Mode of Expression: Verbal  Score: 5   Expression (Native Language)  Primary Mode of Expression: Verbal  Score: 5     Social Interaction/Pragmatics  Score: 5 Social Interaction/Pragmatics  Score: 5     Problem Solving  Score: 5   Problem Solving  Score: 5     Memory  Score: 5 Memory  Score: 5       Legend:   7 - Independent   6 - Modified Independent   5 - Standby Assistance / Supervision / Set-up   4 - Minimum Assistance / Contact Guard Assistance   3 - Moderate Assistance   2 - Maximum Assistance   1 - Total Assistance / Dependent       ACUTE MEDICAL ISSUES ADDRESSED IN INPATIENT REHABILITATION FACILITY:     > S/P Right below-the-knee amputation (2/8/2022 - Dr. Juan Salcido);  History of critical ischemia of the right lower extremity    > Staples to be removed on 3/8/2022    > Acute postoperative blood loss anemia   > Hgb/Hct (2/16/2022, on admission to the ARU) = 8.9/28.6   > Anemia work-up (2/16/2022) showed serum iron 14, TIBC 234, iron % saturation 18, ferritin 404, reticulocyte count 2.1   > On 2/16/2022, started Iron polysaccharides 150 mg PO BID      02/21/22  0608 02/17/22  0502 02/16/22  0626   HGB 8.5* 8.7* 8.9*   HCT 27.8* 27.8* 28.6*       > On 2/21/2022, patient was given Epoetin lico 10,000 units SC x 1 dose      02/28/22  0710 02/24/22  0709 HGB 9.6* 9.1*   HCT 31.3* 28.9*      > Continue Iron polysaccharides 150 mg PO BID    > Constipation   > On 2/17/2022, started Senna 1 tab PO once daily   > On 2/18/2022, discontinued Senna 1 tab PO once daily    > Coronary artery disease; History of inferior wall myocardial infarction; History of coronary angioplasty with stent placement   > Not on any ACE-I or beta-blockers   > Continue:    > Aspirin 81 mg PO daily with breakfast    > Atorvastatin 20 mg PO once daily    > Depression   > On 2/16/2022, discontinued Trazodone 50 mg PO q HS (re: patient says he has not been taking this prior to admission)   > Continue Bupropion  mg PO once daily    > High vitamin D level;  History of vitamin D deficiency   > Prior to admission to [facility], patient was on Ergocalciferol 50,000 units PO q 7 days   > Vitamin D 25-Hydroxy (2/16/2022) = 105.7   > On 2/16/2022, discontinued Ergocalciferol 50,000 units PO q 7 days     > Hyperlipidemia   > Continue Atorvastatin 20 mg PO once daily    > Type 2 diabetes mellitus, poorly controlled, with diabetic nephropathy, diabetic neuropathy, diabetic retinopathy, with long-term current use of insulin   > HbA1c (2/8/2022) = 13.8   > Vitamin B12 (2/16/2022) = 574   > On 2/16/2022:    > Increased Metformin from 500 mg to 850 mg PO BID with meals    > Increased Insulin lispro from 4 units to 5 units SC TID AC   > On 2/17/2022:    > Increased Insulin glargine from 45 units to 50 units SC daily before breakfast    > Decreased Insulin lispro from 5 units to 4 units SC TID AC   > On 2/18/2022:    > Patient was given Insulin glargine 20 units SC x 1 dose at 9PM    > Increased Metformin from 850 mg to 1000 mg PO BID with meals   > On 2/19/2022, changed Insulin glargine from 50 units SC daily before breakfast to 35 units SC q HS   > On 2/22/2022, decreased Insulin lispro from 4 units to 3 units SC TID AC   > On 2/23/2022, increased Insulin lispro from 3 units to 4 units SC TID AC   > On 2/26/2022, decreased Insulin glargine from 35 units to 30 units SC q HS   > On 2/27/2022:    > Decreased Insulin glargine from 30 units to 23 units SC q HS    > Decreased Insulin lispro from 4 units to 3 units SC TID AC   > On 2/28/2022:    > Discontinued Insulin lispro 3 units SC TID AC    > Decreased Insulin glargine from 23 units to 20 units SC q HS              > During the patient's stay at the ARU, the patient was given:     > Metformin 1000 mg PO BID with meals    > Insulin glargine 20 units SC q HS    > Insulin lispro sliding scale SC TID AC only    > Difficulty sleeping   > On 2/16/2022, discontinued Trazodone 50 mg PO q HS (re: patient says he has not been taking this prior to admission)   > On 2/21/2022, patient reported difficulty sleeping; started Melatonin 3 mg PO daily after dinner   > Continue Melatonin 3 mg PO daily after dinner    > Depressed mood   > Patient was seen by Clinical Psychologist and patient was felt to have a depressed mood   > Option of starting an antidepressant was presented and the patient politely declined it at this time, stating he is still able to cope with his present condition    > Analgesia   > On 2/16/2022:    > Started:     > Acetaminophen 650 mg PO TID (8AM, 12PM, 4PM)     > Methocarbamol 500 mg PO TID (8AM, 2PM, 8PM)    > Increased Gabapentin from 300 mg to 400 mg PO TID (8AM, 2PM, 8PM)   > On 2/18/2022, decreased Oxycodone from 10 mg to 7.5 mg PO q 4 hr PRN for pain level 5/10 or greater    > On 2/21/2022, decreased Oxycodone from 7.5 mg to 5 mg PO q 4 hr PRN for pain level 5/10 or greater    > On 2/22/2022, decreased Gabapentin from 400 mg to 300 mg PO TID (8AM, 2PM, 8PM)   > Continue:    > Acetaminophen 650 mg PO TID (8AM, 12PM, 4PM)    > Acetaminophen 650 mg PO q 4 hr PRN for pain level 4/10 or lesser (from 8PM to 4AM only)    > Gabapentin 300 mg PO TID (8AM, 2PM, 8PM)    > Methocarbamol 500 mg PO TID (8AM, 2PM, 8PM)    > Oxycodone 5 mg PO q 4 hr PRN for pain level 5/10 or greater       MEDICATIONS ON DISCHARGE:    Current Discharge Medication List      START taking these medications    Details   acetaminophen (TYLENOL) 325 mg tablet Take 2 Tablets by mouth every four (4) hours as needed (for fever or pain level 4/10 or lesser). Indications: fever, pain  Qty: 30 Tablet, Refills: 0  Start date: 3/1/2022    Associated Diagnoses: Status post below knee amputation of right lower extremity ([facility])      gabapentin (NEURONTIN) 300 mg capsule Take 1 Capsule by mouth three (3) times daily. Max Daily Amount: 900 mg. Indications: neuropathic pain, acute pain following an operation  Qty: 90 Capsule, Refills: 0  Start date: 3/2/2022    Associated Diagnoses: Status post below knee amputation of right lower extremity ([facility] [address] Diabetic polyneuropathy associated with type 2 diabetes mellitus ([facility])      iron polysaccharides (NIFEREX) 150 mg iron capsule Take 1 Capsule by mouth two (2) times a day. Indications: anemia from inadequate iron  Qty: 30 Capsule, Refills: 0  Start date: 3/2/2022    Associated Diagnoses: Acute postoperative anemia due to expected blood loss      melatonin 3 mg tablet Take 1 Tablet by mouth daily (after dinner). Indications: difficulty sleeping  Qty: 30 Tablet, Refills: 0  Start date: 3/2/2022    Associated Diagnoses: Insomnia, unspecified type      methocarbamoL (ROBAXIN) 500 mg tablet Take 1 Tablet by mouth three (3) times daily for 7 days. Indications: muscle spasm  Qty: 21 Tablet, Refills: 0  Start date: 3/2/2022, End date: 3/9/2022    Associated Diagnoses: Status post below knee amputation of right lower extremity ([facility])      oxyCODONE IR (ROXICODONE) 5 mg immediate release tablet Take 1 Tablet by mouth every six (6) hours as needed (for pain level 5/10 or greater) for up to 5 days. Max Daily Amount: 20 mg.  Indications: pain  Qty: 20 Tablet, Refills: 0  Start date: 3/1/2022, End date: 3/6/2022    Associated Diagnoses: Status post below knee amputation of right lower extremity ([facility]         CONTINUE these medications which have NOT CHANGED    Details   aspirin 81 mg chewable tablet Take 81 mg by mouth daily. Trulicity 1.40 SK/2.1 mL sub-q pen INJECT 0.75 MG UNDER THE SKIN ONCE WEEKLY  Qty: 2 mL, Refills: 0    Associated Diagnoses: Type 2 diabetes mellitus with diabetic neuropathy, with long-term current use of insulin ([facility])      insulin glargine (Basaglar KwikPen U-100 Insulin) 100 unit/mL (3 mL) inpn 45 Units by SubCUTAneous route daily. INJECT 50 UNITS UNDER THE SKIN DAILY  Qty: 30 Adjustable Dose Pre-filled Pen Syringe, Refills: 1      buPROPion SR (WELLBUTRIN SR) 150 mg SR tablet ONE TABLET BY MOUTH ONCE A DAY  Qty: 90 Tablet, Refills: 0    Associated Diagnoses: Mild depression ([facility] Tobacco use disorder      simvastatin (Zocor) 20 mg tablet Take 1 Tablet by mouth nightly. Qty: 90 Tablet, Refills: 1    Associated Diagnoses: Hypercholesterolemia      metFORMIN (GLUCOPHAGE) 1,000 mg tablet TAKE ONE TABLET BY MOUTH TWICE A DAY WITH A MEAL  Qty: 180 Tablet, Refills: 2    Associated Diagnoses: Type 2 diabetes mellitus with diabetic neuropathy, with long-term current use of insulin ([facility])      glucose blood VI test strips (ASCENSIA AUTODISC VI, ONE TOUCH ULTRA TEST VI) strip Provide test strip and glucose meter that insurance will cover - Twice daily-  fasting in the morning and once two hours after a meal.  Qty: 100 Strip, Refills: 10    Associated Diagnoses: Type 2 diabetes mellitus with diabetic neuropathy, with long-term current use of insulin ([facility])      flash glucose sensor (FreeStyle Ezio 2 Sensor) kit Apply 1 sensor every 14 days to check blood sugar as directed at least 3 times daily. Qty: 2 Kit, Refills: 5    Associated Diagnoses: Type 2 diabetes mellitus with diabetic neuropathy, with long-term current use of insulin ([facility])      multivitamin (ONE A DAY) tablet Take 1 Tablet by mouth daily.   Qty: 90 Tablet, Refills: 3    Associated Diagnoses: Fatigue, unspecified type STOP taking these medications       ergocalciferol (ERGOCALCIFEROL) 1,250 mcg (50,000 unit) capsule Comments:   Reason for Stopping:               DISCHARGE VITAL SIGNS:  Visit Vitals  /63 (BP 1 Location: Right upper arm, BP Patient Position: Sitting)   Pulse 66   Temp 98.2 °F (36.8 °C)   Resp 16   Ht 5' 9\" (1.753 m)   Wt 78.2 kg (172 lb 8 oz)   SpO2 99%   BMI 25.47 kg/m²       DISCHARGE PHYSICAL EXAMINATION:  GENERAL SURVEY: Patient is awake, alert, oriented x 3, sitting comfortably on the chair, not in acute respiratory distress. HEENT: pale palpebral conjunctivae, anicteric sclerae, no nasoaural discharge, moist oral mucosa  NECK: supple, no jugular venous distention, no palpable lymph nodes  CHEST/LUNGS: symmetrical chest expansion, good air entry, clear breath sounds  HEART: adynamic precordium, good S1 S2, no S3, regular rhythm, no murmurs  ABDOMEN: flat, bowel sounds appreciated, soft, non-tender  EXTREMITIES: (+) right BKA stump, pale nailbeds, no edema, full and equal pulses, no calf tenderness   NEUROLOGICAL EXAM: The patient is awake, alert and oriented x3, able to answer questions fairly appropriately, able to follow 1 and 2 step commands.  Able to tell time from the wall clock.  Cranial nerves II-XII are grossly intact.  No gross sensory deficit.  Motor strength is 4/5 on BUE and BLE.     Incision(s): covered, clean, dry, and intact       CONDITION ON DISCHARGE: Stable. DISPOSITION: Patient clinically improved and was discharged to home with home health physical therapy, occupational therapy, speech therapy and skilled nursing. The patient is temporarily homebound secondary to functional deficits S/P Right below-the-knee amputation (2/8/2022 - Dr. Maureen Claire); History of critical ischemia of the right lower extremity. The patient can ambulate using a rolling walker (see above).  The patient would benefit from continued skilled physical therapy in order to improve independent functional mobility within the home with use of least restrictive device. The patient would also benefit from continued skilled occupational therapy in order to improve self care and functional mobility within the home with use of least restrictive device. The patient would also benefit from continued skilled speech therapy in order to work on cognition. Short-term skilled nursing is needed for wound care, medication reconciliation and disease education. FOLLOW-UP RECOMMENDATIONS:   Follow-up Information     Follow up With Specialties Details Why [address] DME Services  Orders were provided for a wheelchair, cushion, rolling walker, bedside commode and tub transfer bench. [address]    Anu Crowley NP Nurse Practitioner On 3/11/2022 Patient has an appointment scheduled with NP/PCP Antonia hwang on March 11, 2022 @ 10:15am.  Patient will be seen by  Ginger Landa. Please arrive 15mins. early. Bring Picture ID, Insurance Card, list of medications. [address]      Evgeny Roberts MD Vascular Surgery On 3/9/2022 Patient has an appointment scheduled with Vascular Dr. Franck Clay on March 9, 2022 @ 10:30am. [address]  [phone]          OTHER INSTRUCTIONS:  1. Diet. Specifications  4 carb choices (60 gm/meal), Low fat/Low cholesterol/High fiber/SEEMA   Solids (consistency)  Regular   Liquids (consistency)  Thin   Fluid Restriction  None      2. Activity. As tolerated. 3. Safety / fall precautions. 4. Wound Care of right BKA stump: Inspect incision/wound routinely, keep clean, dry , and due dressing changes with xerofoam, kerlix, ace wrap. TIME SPENT ON DISCHARGE ACTIVITIES: 33 minutes.       Signed:  Alexandria Flores MD    3/2/2022

## 2022-03-02 NOTE — ROUTINE PROCESS
Nighat Calzada 59 y.o. male was discharged home on 03/02/22. Patient's armband removed and shredded. Discharge instructions were reviewed with patient, and he verbalized understanding. The patient was wheeled out to transportation vehicle by a staff member along with the patient's belongings. Transportation was provided by private vehicle. Tylenol pill bottle returned to patient.     Rose Marie Dumont RN

## 2022-03-02 NOTE — PROGRESS NOTES
Problem: Falls - Risk of  Goal: *Absence of Falls  Description: Document Elbert Carranza Fall Risk and appropriate interventions in the flowsheet. Outcome: Progressing Towards Goal  Note: Fall Risk Interventions:  Mobility Interventions: Communicate number of staff needed for ambulation/transfer,OT consult for ADLs,Patient to call before getting OOB,PT Consult for mobility concerns,PT Consult for assist device competence,Strengthening exercises (ROM-active/passive),Utilize gait belt for transfers/ambulation         Medication Interventions: Bed/chair exit alarm,Evaluate medications/consider consulting pharmacy,Patient to call before getting OOB,Teach patient to arise slowly,Utilize gait belt for transfers/ambulation    Elimination Interventions: Bed/chair exit alarm,Call light in reach,Patient to call for help with toileting needs,Stay With Me (per policy),Urinal in reach    History of Falls Interventions: Bed/chair exit alarm,Consult care management for discharge planning,Door open when patient unattended,Evaluate medications/consider consulting pharmacy,Utilize gait belt for transfer/ambulation         Problem: Diabetes Self-Management  Goal: *Disease process and treatment process  Description: Define diabetes and identify own type of diabetes; list 3 options for treating diabetes. Outcome: Progressing Towards Goal  Goal: *Using medications safely  Description: State effect of diabetes medications on diabetes; name diabetes medication taking, action and side effects. Outcome: Progressing Towards Goal  Goal: *Monitoring blood glucose, interpreting and using results  Description: Identify recommended blood glucose targets  and personal targets. Outcome: Progressing Towards Goal  Goal: *Prevention, detection, treatment of acute complications  Description: List symptoms of hyper- and hypoglycemia; describe how to treat low blood sugar and actions for lowering  high blood glucose level.   Outcome: Progressing Towards Goal  Goal: *Prevention, detection and treatment of chronic complications  Description: Define the natural course of diabetes and describe the relationship of blood glucose levels to long term complications of diabetes.   Outcome: Progressing Towards Goal (0) swallows foods and liquids w/o difficulty

## 2022-03-09 ENCOUNTER — OFFICE VISIT (OUTPATIENT)
Dept: VASCULAR SURGERY | Age: 62
End: 2022-03-09
Payer: COMMERCIAL

## 2022-03-09 VITALS
HEART RATE: 83 BPM | OXYGEN SATURATION: 99 % | BODY MASS INDEX: 25.53 KG/M2 | HEIGHT: 69 IN | SYSTOLIC BLOOD PRESSURE: 142 MMHG | WEIGHT: 172.4 LBS | DIASTOLIC BLOOD PRESSURE: 98 MMHG

## 2022-03-09 DIAGNOSIS — I73.9 PAD (PERIPHERAL ARTERY DISEASE) (HCC): Primary | ICD-10-CM

## 2022-03-09 PROCEDURE — 99213 OFFICE O/P EST LOW 20 MIN: CPT | Performed by: STUDENT IN AN ORGANIZED HEALTH CARE EDUCATION/TRAINING PROGRAM

## 2022-03-09 RX ORDER — OXYCODONE AND ACETAMINOPHEN 10; 325 MG/1; MG/1
1 TABLET ORAL
Qty: 20 TABLET | Refills: 0 | Status: SHIPPED | OUTPATIENT
Start: 2022-03-09 | End: 2022-03-14

## 2022-03-09 RX ORDER — OXYCODONE HYDROCHLORIDE 5 MG/1
5 TABLET ORAL
COMMUNITY
End: 2022-03-09

## 2022-03-09 NOTE — PROGRESS NOTES
1. Have you been to an emergency room or urgent care clinic since your last visit? Yes    Hospitalized since your last visit? If yes, where, when, and reason for visit? Surgical procedure    2. Have you seen or consulted any other health care providers outside of the [facility] since your last visit including any procedures, health maintenance items. If yes, where, when and reason for visit?  No

## 2022-03-09 NOTE — PROGRESS NOTES
03/09/22        Vandana Foster        History and Physical    Vandana Foster is a 59 y.o. male here for follow up s/p right leg BKA. He is doing well and has the  on the right stump. He has been seen by the Prosthetist in the hospital and has a follow up appointment on 3/15/22. The site has healed well and the sutures are still in place. I will plan to remove them in 2 weeks. He still complains of incisional and stump site pain which is not uncommon especially given his pain history. Return to clinic in 2 weeks for suture removal  Follow up with [facility] scheduled for prosthetic evaluation  Pain medication refilled    Physical Exam:    Visit Vitals  BP (!) 142/98 (BP 1 Location: Right upper arm, BP Patient Position: Sitting)   Pulse 83   Ht 5' 9\" (1.753 m)   Wt 172 lb 6.4 oz (78.2 kg)   SpO2 99%   BMI 25.46 kg/m²      HEENT-PERRLA exactly movements intact, no scleral icterus, mucous membranes pink and moist  Neck-no JVD, no carotid bruits  Heart-regular rate and rhythm no murmurs, rubs or gallops  Chest-clear to auscultation bilaterally no wheezes, rhonchi or rubs  Abdomen-soft, nontender, no palpable organomegaly or masses, no palpable pulsatile masses  Extremities-no clubbing, cyanosis or edema. No wounds or gangrenous changes to the toes or feet. Skin is intact. Feet are pink warm and well-perfused bilaterally  Pulses-carotid, radial, brachial, femoral 2+ bilaterally. Bilateral doppler signals dorsalis pedis, posterior tibial       Past Medical History:   Diagnosis Date    Acute postoperative anemia due to expected blood loss 2/8/2022    Arterial occlusion, lower extremity ([address] 11/27/2021    Cardiac cath 11/09/2011    RCA patent. LM patent. LAD patent. mD1 55%. CX patent. Prior stent patent. Edison 85% (2.5 x 15-mm Xience stent, resid 0%). LVEDP 12. EF 40-45%. High lateral hypk (RI distribution).       Chronic alcohol use     Fri-Sun one fifth; Mon-Thur: Pint of liquor    Constipation  Coronary artery disease involving native coronary artery of native heart     Critical ischemia of lower extremity ([facility] 2/8/2022    Depression     Diabetic nephropathy associated with type 2 diabetes mellitus ([facility] 4/4/2019    Diabetic neuropathy associated with type 2 diabetes mellitus ([facility]     Gastroesophageal reflux disease     High vitamin D level 2/16/2022    Vitamin D 25-Hydroxy (2/16/2022) = 105.7    History of acute inferior wall myocardial infarction 2009    History of coronary angioplasty with insertion of stent 07/29/2009    2.5 x 13 Cypher stent to CX.      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 without macular edema associated with type 2 diabetes mellitus ([facility] 12/18/2020    Nuclear sclerosis of both eyes 12/18/2020    Otalgia 05/08/2019    Poorly controlled type 2 diabetes mellitus ([facility] [address] [facility]     HbA1c (2/8/2022) = 13.8    Tobacco use disorder     contemplating stopping     Past Surgical History:   Procedure Laterality Date    COLONOSCOPY N/A 10/28/2020    COLONOSCOPY w/polypectomy performed by Tiki Bills MD at [facility] END[facility]    HX [address] Right 02/08/2022    S/P Right below-the-knee amputation (2/8/2022 - Dr. Vivienne Leslie)    HX CORONARY STENT PLACEMENT  07/29/2009    HX HEART CATHETERIZATION  8/30/2011    HX HEART CATHETERIZATION  11/09/2011    HX WISDOM TEETH EXTRACTION      x4    VASCULAR SURGERY PROCEDURE UNLIST      Multiple vascular surgeries     Patient Active Problem List   Diagnosis Code    Hyperlipidemia E78.5    Tobacco use disorder F17.200    Mediastinal adenopathy R59.0    History of vitamin D deficiency Z86.39    Insomnia G47.00    Diabetic nephropathy associated with type 2 diabetes mellitus ([facility]) E11.21    Otalgia H92.09    Mild nonproliferative diabetic retinopathy of left eye without macular edema associated with type 2 diabetes mellitus ([facility] U37.1115    Hypertensive retinopathy H35.039    Nuclear sclerosis of both eyes H25.13    Arterial occlusion, lower extremity ([facility]) I70.209    Impaired mobility and ADLs Z74.09, Z78.9    Status post below knee amputation of right lower extremity ([facility]) Z89.511    Chronic alcohol use Z72.89    History of coronary angioplasty with insertion of stent Z95.5    Coronary artery disease involving native coronary artery of native heart I25.10    Constipation K59.00    Poorly controlled type 2 diabetes mellitus ([facility]) E11.65    Long term current use of insulin ([facility]) Z79.4    Migraine headache G43.909    Diabetic neuropathy associated with type 2 diabetes mellitus ([facility]) E11.40    Gastroesophageal reflux disease K21.9    History of essential hypertension Z86.79    History of lacunar cerebrovascular accident Z80.78    History of acute inferior wall myocardial infarction I25.2    Critical ischemia of lower extremity ([facility]) I70.229    Acute postoperative anemia due to expected blood loss D62    Depression F32. A    High vitamin D level E67.3     Current Outpatient Medications   Medication Sig Dispense Refill    oxyCODONE IR (ROXICODONE) 5 mg immediate release tablet Take 5 mg by mouth every six (6) hours as needed for Pain.  aspirin 81 mg chewable tablet Take 81 mg by mouth daily.  acetaminophen (TYLENOL) 325 mg tablet Take 2 Tablets by mouth every four (4) hours as needed (for fever or pain level 4/10 or lesser). Indications: fever, pain 30 Tablet 0    gabapentin (NEURONTIN) 300 mg capsule Take 1 Capsule by mouth three (3) times daily. Max Daily Amount: 900 mg.  Indications: neuropathic pain, acute pain following an operation 90 Capsule 0    iron polysaccharides (NIFEREX) 150 mg iron capsule Take 1 Capsule by mouth two (2) times a day. Indications: anemia from inadequate iron 30 Capsule 0    melatonin 3 mg tablet Take 1 Tablet by mouth daily (after dinner). Indications: difficulty sleeping 30 Tablet 0    methocarbamoL (ROBAXIN) 500 mg tablet Take 1 Tablet by mouth three (3) times daily for 7 days. Indications: muscle spasm 21 Tablet 0    Trulicity 1.50 WH/7.2 mL sub-q pen INJECT 0.75 MG UNDER THE SKIN ONCE WEEKLY 2 mL 0    insulin glargine (Basaglar KwikPen U-100 Insulin) 100 unit/mL (3 mL) inpn 45 Units by SubCUTAneous route daily. INJECT 50 UNITS UNDER THE SKIN DAILY 30 Adjustable Dose Pre-filled Pen Syringe 1    buPROPion SR (WELLBUTRIN SR) 150 mg SR tablet ONE TABLET BY MOUTH ONCE A DAY 90 Tablet 0    simvastatin (Zocor) 20 mg tablet Take 1 Tablet by mouth nightly. 90 Tablet 1    metFORMIN (GLUCOPHAGE) 1,000 mg tablet TAKE ONE TABLET BY MOUTH TWICE A DAY WITH A MEAL 180 Tablet 2    multivitamin (ONE A DAY) tablet Take 1 Tablet by mouth daily. 90 Tablet 3    glucose blood VI test strips (ASCENSIA AUTODISC VI, ONE TOUCH ULTRA TEST VI) strip Provide test strip and glucose meter that insurance will cover - Twice daily-  fasting in the morning and once two hours after a meal. (Patient not taking: Reported on 2/8/2022) 100 Strip 10    flash glucose sensor (FreeStyle Ezio 2 Sensor) kit Apply 1 sensor every 14 days to check blood sugar as directed at least 3 times daily.  (Patient not taking: Reported on 2/8/2022) 2 Kit 5     Allergies   Allergen Reactions    Niacin Itching     Social History     Socioeconomic History    Marital status: SINGLE     Spouse name: Not on file    Number of children: Not on file    Years of education: Not on file    Highest education level: Not on file   Occupational History    Not on file   Tobacco Use    Smoking status: Current Every Day Smoker     Packs/day: 0.50     Years: 1.00     Pack years: 0.50     Types: Cigarettes    Smokeless tobacco: Never Used   Vaping Use    Vaping Use: Never used   Substance and Sexual Activity    Alcohol use: Yes     Alcohol/week: 4.2 standard drinks     Types: 5 Shots of liquor per week    Drug use: Not Currently     Types: Marijuana, Cocaine     Comment: none    Sexual activity: Yes   Other Topics Concern    Not on file   Social History Narrative     at [employer]. Social Determinants of Health     Financial Resource Strain:     Difficulty of Paying Living Expenses: Not on file   Food Insecurity:     Worried About Running Out of Food in the Last Year: Not on file    Mara of Food in the Last Year: Not on file   Transportation Needs:     Lack of Transportation (Medical): Not on file    Lack of Transportation (Non-Medical):  Not on file   Physical Activity:     Days of Exercise per Week: Not on file    Minutes of Exercise per Session: Not on file   Stress:     Feeling of Stress : Not on file   Social Connections:     Frequency of Communication with Friends and Family: Not on file    Frequency of Social Gatherings with Friends and Family: Not on file    Attends Jainism Services: Not on file    Active Member of [address] [place] [address] [employer] or Organizations: Not on file    Attends Club or Organization Meetings: Not on file    Marital Status: Not on file   Intimate Partner Violence:     Fear of Current or Ex-Partner: Not on file    Emotionally Abused: Not on file    Physically Abused: Not on file    Sexually Abused: Not on file   Housing Stability:     Unable to Pay for Housing in the Last Year: Not on file    Number of [place] in the Last Year: Not on file    Unstable Housing in the Last Year: Not on file     Family History   Problem Relation Age of Onset    Hypertension Mother     Heart Attack Mother     Diabetes Mother     Hypertension Father     Heart Attack Father     Diabetes Father     Diabetes Sister     Hypertension Sister    Mireya  Stroke Sister     Diabetes Brother     Hypertension Brother     Stroke Brother     No Known Problems Maternal Grandmother     No Known Problems Maternal Grandfather     No Known Problems Paternal Grandmother     No Known Problems Paternal Grandfather     No Known Problems Brother     Heart Disease Other     Kidney Disease Other      We reviewed the plan with the patient and the patient understands.         Adolfo Pineda MD

## 2022-03-11 ENCOUNTER — OFFICE VISIT (OUTPATIENT)
Dept: FAMILY MEDICINE CLINIC | Age: 62
End: 2022-03-11
Payer: COMMERCIAL

## 2022-03-11 VITALS
DIASTOLIC BLOOD PRESSURE: 67 MMHG | SYSTOLIC BLOOD PRESSURE: 131 MMHG | OXYGEN SATURATION: 97 % | BODY MASS INDEX: 25.46 KG/M2 | HEIGHT: 69 IN | HEART RATE: 80 BPM | RESPIRATION RATE: 16 BRPM | TEMPERATURE: 98.8 F

## 2022-03-11 DIAGNOSIS — E78.5 HYPERLIPIDEMIA ASSOCIATED WITH TYPE 2 DIABETES MELLITUS (HCC): ICD-10-CM

## 2022-03-11 DIAGNOSIS — E11.69 HYPERLIPIDEMIA ASSOCIATED WITH TYPE 2 DIABETES MELLITUS (HCC): ICD-10-CM

## 2022-03-11 DIAGNOSIS — E11.40 TYPE 2 DIABETES MELLITUS WITH DIABETIC NEUROPATHY, WITH LONG-TERM CURRENT USE OF INSULIN (HCC): ICD-10-CM

## 2022-03-11 DIAGNOSIS — Z89.511 HX OF BKA, RIGHT (HCC): Primary | ICD-10-CM

## 2022-03-11 DIAGNOSIS — I10 ESSENTIAL HYPERTENSION: ICD-10-CM

## 2022-03-11 DIAGNOSIS — D64.9 ANEMIA FOLLOWING SURGERY: ICD-10-CM

## 2022-03-11 DIAGNOSIS — Z79.4 TYPE 2 DIABETES MELLITUS WITH DIABETIC NEUROPATHY, WITH LONG-TERM CURRENT USE OF INSULIN (HCC): ICD-10-CM

## 2022-03-11 PROCEDURE — 99214 OFFICE O/P EST MOD 30 MIN: CPT | Performed by: FAMILY MEDICINE

## 2022-03-11 RX ORDER — CLOPIDOGREL BISULFATE 75 MG/1
75 TABLET ORAL DAILY
COMMUNITY
End: 2022-03-14 | Stop reason: SDUPTHER

## 2022-03-11 RX ORDER — TRAZODONE HYDROCHLORIDE 50 MG/1
TABLET ORAL
COMMUNITY
Start: 2022-01-26

## 2022-03-11 RX ORDER — LANOLIN ALCOHOL/MO/W.PET/CERES
100 CREAM (GRAM) TOPICAL DAILY
COMMUNITY
End: 2022-03-14 | Stop reason: SDUPTHER

## 2022-03-11 RX ORDER — ACETAMINOPHEN 325 MG/1
650 TABLET ORAL
COMMUNITY
End: 2022-03-28

## 2022-03-11 RX ORDER — METHOCARBAMOL 500 MG/1
500 TABLET, FILM COATED ORAL 3 TIMES DAILY
COMMUNITY

## 2022-03-11 RX ORDER — ATORVASTATIN CALCIUM 80 MG/1
80 TABLET, FILM COATED ORAL DAILY
COMMUNITY

## 2022-03-11 NOTE — PROGRESS NOTES
Ric Burnett presents today for   Chief Complaint   Patient presents with   [facility] Follow Up     admitted to [facility] on 2/15/22 for a below the knee amputation (right leg), discharged 3/2/22       Ric Burnett preferred language for health care discussion is english/other. Is someone accompanying this pt? no    Is the patient using any DME equipment during [address]? Yes, walker    Depression Screening:  3 most recent PHQ Screens 3/11/2022   PHQ Not Done -   Little interest or pleasure in doing things Not at all   Feeling down, depressed, irritable, or hopeless Not at all   Total Score PHQ 2 0   Trouble falling or staying asleep, or sleeping too much -   Feeling tired or having little energy -   Poor appetite, weight loss, or overeating -   Feeling bad about yourself - or that you are a failure or have let yourself or your family down -   Trouble concentrating on things such as school, work, reading, or watching TV -   Moving or speaking so slowly that other people could have noticed; or the opposite being so fidgety that others notice -   Thoughts of being better off dead, or hurting yourself in some way -   PHQ 9 Score -   How difficult have these problems made it for you to do your work, take care of your home and get along with others -       Learning Assessment:  Learning Assessment 3/11/2022   PRIMARY LEARNER Patient   HIGHEST LEVEL OF EDUCATION - PRIMARY LEARNER  DID NOT GRADUATE HIGH SCHOOL   BARRIERS PRIMARY LEARNER NONE   CO-LEARNER CAREGIVER No   PRIMARY LANGUAGE ENGLISH    NEED No   LEARNER PREFERENCE PRIMARY DEMONSTRATION   ANSWERED BY patient   RELATIONSHIP SELF       Abuse Screening:  Abuse Screening Questionnaire 3/11/2022   Do you ever feel afraid of your partner? N   Are you in a relationship with someone who physically or mentally threatens you? N   Is it safe for you to go home? Y       Generalized Anxiety  No flowsheet data found.       Health Maintenance Due   Topic Date Due  COVID-19 Vaccine (1) Never done    Pneumococcal 0-64 years (1 of 2 - PPSV23) Never done    DTaP/Tdap/Td series (1 - Tdap) Never done    Shingrix Vaccine Age 50> (1 of 2) Never done    Foot Exam Q1  07/11/2019    MICROALBUMIN Q1  10/22/2021    Lipid Screen  03/16/2022   . Health Maintenance reviewed and discussed and ordered per Provider. Coordination of Care:  1. Have you been to the ER, urgent care clinic since your last visit? Hospitalized since your last visit? Yes, Edwin    2. Have you seen or consulted any other health care providers outside of the [address] [place] [address] since your last visit? Include any pap smears or colon screening. no      Advance Directive:  1. Do you have an advance directive in place?  Patient Reply:no

## 2022-03-11 NOTE — PROGRESS NOTES
Chief Complaint   Patient presents with   [facility]     admitted to [facility] on 2/15/22 for a below the knee amputation (right leg), discharged 3/2/22     Subjective  Nadya Jacobs is a 59 y.o. male. HPI   Pt here for hospital f/u. He was admitted to [facility] from 2/8/22-2/15/22 for R BKA. He was transferred to in rehab and has recently been d/c'ed to home. Pt has been doing ok since returning home. Pt reports he still has pain and needs to get more meds from his surgeon. He will see the surgeon for f/u later this month. Pt cont to have memory issues. He did not sched an appt with neuro. Pt notes his sugars have been \"up and down\" since returning home. Review of Systems   Constitutional: Negative. HENT: Negative. Respiratory: Negative. Cardiovascular: Negative. Musculoskeletal:        Stump pain   All other systems reviewed and are negative. Objective  Physical Exam  Vitals and nursing note reviewed. Constitutional:       General: He is not in acute distress. Appearance: Normal appearance. HENT:      Head: Normocephalic and atraumatic. Right Ear: External ear normal.      Left Ear: External ear normal.      Nose: Nose normal.   Eyes:      Extraocular Movements: Extraocular movements intact. Conjunctiva/sclera: Conjunctivae normal.   Cardiovascular:      Rate and Rhythm: Normal rate and regular rhythm. Heart sounds: No murmur heard. No friction rub. No gallop. Pulmonary:      Effort: Pulmonary effort is normal.      Breath sounds: Normal breath sounds. No wheezing, rhonchi or rales. Musculoskeletal:         General: Normal range of motion. Cervical back: Normal range of motion. No rigidity. Right Lower Extremity: Right leg is amputated below knee. Skin:     General: Skin is warm and dry. Neurological:      Mental Status: He is alert and oriented to person, place, and time.       Coordination: Coordination normal.   Psychiatric: Mood and Affect: Mood normal.         Behavior: Behavior normal.         Thought Content: Thought content normal.         Judgment: Judgment normal.          Assessment & Plan  Diagnoses and all orders for this visit:    1. Hx of BKA, right ([facility]  Care as per surg team.     2. Type 2 diabetes mellitus with diabetic neuropathy, with long-term current use of insulin ([facility]  -     METABOLIC PANEL, COMPREHENSIVE; Future  -     LIPID PANEL; Future  -     HEMOGLOBIN A1C WITH EAG; Future  -     MICROALBUMIN, UR, RAND W/ MICROALB/CREAT RATIO; Future  Cont to monitor closely. Home log reviewed. No med changes indicated at this time. 3. Hyperlipidemia associated with type 2 diabetes mellitus (HCC)  -     METABOLIC PANEL, COMPREHENSIVE; Future  -     LIPID PANEL; Future    4. Essential hypertension  -     METABOLIC PANEL, COMPREHENSIVE; Future  -     LIPID PANEL; Future  Stable, cont pres tx plan. 5. Anemia following surgery  -     CBC WITH AUTOMATED DIFF; Future  -     IRON PROFILE; Future      Follow-up and Dispositions    · Return in about 6 weeks (around 4/22/2022) for diabetes, high blood pressure, high cholesterol, anemia, lab review.        Ricardo Aguillon MD

## 2022-03-16 ENCOUNTER — TELEPHONE (OUTPATIENT)
Dept: FAMILY MEDICINE CLINIC | Age: 62
End: 2022-03-16

## 2022-03-16 NOTE — TELEPHONE ENCOUNTER
Patient need a refill for oxycodone which he was given when he was in the hospital. The home health nurse said that he is complete out of this medication.  Please advise

## 2022-03-18 PROBLEM — E67.3 HIGH VITAMIN D LEVEL: Status: ACTIVE | Noted: 2022-02-16

## 2022-03-18 PROBLEM — D62 ACUTE POSTOPERATIVE ANEMIA DUE TO EXPECTED BLOOD LOSS: Status: ACTIVE | Noted: 2022-02-08

## 2022-03-18 PROBLEM — H25.13 NUCLEAR SCLEROSIS OF BOTH EYES: Status: ACTIVE | Noted: 2020-12-18

## 2022-03-18 PROBLEM — Z86.39 HISTORY OF VITAMIN D DEFICIENCY: Status: ACTIVE | Noted: 2018-04-15

## 2022-03-18 PROBLEM — E11.3292 MILD NONPROLIFERATIVE DIABETIC RETINOPATHY OF LEFT EYE WITHOUT MACULAR EDEMA ASSOCIATED WITH TYPE 2 DIABETES MELLITUS (HCC): Status: ACTIVE | Noted: 2020-12-18

## 2022-03-19 PROBLEM — Z78.9 IMPAIRED MOBILITY AND ADLS: Status: ACTIVE | Noted: 2022-02-08

## 2022-03-19 PROBLEM — G47.00 INSOMNIA: Status: ACTIVE | Noted: 2018-07-02

## 2022-03-19 PROBLEM — I70.209 ARTERIAL OCCLUSION, LOWER EXTREMITY (HCC): Status: ACTIVE | Noted: 2021-11-27

## 2022-03-19 PROBLEM — Z89.511 STATUS POST BELOW KNEE AMPUTATION OF RIGHT LOWER EXTREMITY (HCC): Status: ACTIVE | Noted: 2022-02-08

## 2022-03-19 PROBLEM — E11.21 DIABETIC NEPHROPATHY ASSOCIATED WITH TYPE 2 DIABETES MELLITUS (HCC): Status: ACTIVE | Noted: 2019-04-04

## 2022-03-19 PROBLEM — Z74.09 IMPAIRED MOBILITY AND ADLS: Status: ACTIVE | Noted: 2022-02-08

## 2022-03-19 PROBLEM — H35.039 HYPERTENSIVE RETINOPATHY: Status: ACTIVE | Noted: 2020-12-18

## 2022-03-19 PROBLEM — H92.09 OTALGIA: Status: ACTIVE | Noted: 2019-05-08

## 2022-03-19 PROBLEM — I70.229 CRITICAL ISCHEMIA OF LOWER EXTREMITY (HCC): Status: ACTIVE | Noted: 2022-02-08

## 2022-03-21 ENCOUNTER — TELEPHONE (OUTPATIENT)
Dept: VASCULAR SURGERY | Age: 62
End: 2022-03-21

## 2022-03-21 ENCOUNTER — TELEPHONE (OUTPATIENT)
Dept: FAMILY MEDICINE CLINIC | Age: 62
End: 2022-03-21

## 2022-03-21 RX ORDER — POLYETHYLENE GLYCOL 3350 17 G/17G
17 POWDER, FOR SOLUTION ORAL DAILY
Qty: 510 G | Refills: 0 | Status: SHIPPED | OUTPATIENT
Start: 2022-03-21

## 2022-03-21 NOTE — TELEPHONE ENCOUNTER
Discussed with Dr. Lucia Carrizales , and he will call in a few pain meds to hold pt over until the appt on Wednesday . Called the patient and left detailed message regarding refill and that he needed to make meds stretch until appt on Wednesday.

## 2022-03-21 NOTE — TELEPHONE ENCOUNTER
He called and said he has been with out pain medication for over a week now, he needs a refill for his pain medication.

## 2022-03-23 ENCOUNTER — OFFICE VISIT (OUTPATIENT)
Dept: VASCULAR SURGERY | Age: 62
End: 2022-03-23
Payer: COMMERCIAL

## 2022-03-23 VITALS
BODY MASS INDEX: 25.53 KG/M2 | WEIGHT: 172.4 LBS | HEART RATE: 86 BPM | DIASTOLIC BLOOD PRESSURE: 84 MMHG | OXYGEN SATURATION: 98 % | SYSTOLIC BLOOD PRESSURE: 130 MMHG | HEIGHT: 69 IN

## 2022-03-23 DIAGNOSIS — I73.9 PAD (PERIPHERAL ARTERY DISEASE) (HCC): Primary | ICD-10-CM

## 2022-03-23 DIAGNOSIS — I70.261 ATHEROSCLEROSIS OF NATIVE ARTERY OF RIGHT LOWER EXTREMITY WITH GANGRENE (HCC): Primary | ICD-10-CM

## 2022-03-23 PROCEDURE — 99214 OFFICE O/P EST MOD 30 MIN: CPT | Performed by: STUDENT IN AN ORGANIZED HEALTH CARE EDUCATION/TRAINING PROGRAM

## 2022-03-23 NOTE — PROGRESS NOTES
1. Have you been to an emergency room or urgent care clinic since your last visit? No    Hospitalized since your last visit? If yes, where, when, and reason for visit? No  2. Have you seen or consulted any other health care providers outside of the [facility] since your last visit including any procedures, health maintenance items. If yes, where, when and reason for visit?  No

## 2022-03-28 RX ORDER — OXYCODONE AND ACETAMINOPHEN 10; 325 MG/1; MG/1
1 TABLET ORAL
Qty: 20 TABLET | Refills: 0 | Status: SHIPPED | OUTPATIENT
Start: 2022-03-28 | End: 2022-04-04

## 2022-05-04 NOTE — PROGRESS NOTES
05/04/22        Patsy Hurst        History and Physical    Patsy Hurst is a 59 y.o. male s/p right leg BKA. Patient has been doing well and is awaiting to see his prosthetist for placement. I removed the sutures today and the incision has been healing well. I will plan to see him again in 6 months. Physical Exam:    Visit Vitals  /84 (BP 1 Location: Right upper arm, BP Patient Position: Sitting)   Pulse 86   Ht 5' 9\" (1.753 m)   Wt 172 lb 6.4 oz (78.2 kg)   SpO2 98%   BMI 25.46 kg/m²      HEENT-PERRLA exactly movements intact, no scleral icterus, mucous membranes pink and moist  Neck-no JVD, no carotid bruits  Heart-regular rate and rhythm no murmurs, rubs or gallops  Chest-clear to auscultation bilaterally no wheezes, rhonchi or rubs  Abdomen-soft, nontender, no palpable organomegaly or masses, no palpable pulsatile masses  Extremities-no clubbing, cyanosis or edema. No wounds or gangrenous changes to the toes or feet. Skin is intact. Feet are pink warm and well-perfused bilaterally  Pulses-carotid, radial, brachial, femoral 2+ bilaterally. Bilateral doppler signals dorsalis pedis, posterior tibial       Past Medical History:   Diagnosis Date    Acute postoperative anemia due to expected blood loss 2/8/2022    Arterial occlusion, lower extremity ([address] 11/27/2021    Cardiac cath 11/09/2011    RCA patent. LM patent. LAD patent. mD1 55%. CX patent. Prior stent patent. Edison 85% (2.5 x 15-mm Xience stent, resid 0%). LVEDP 12. EF 40-45%. High lateral hypk (RI distribution).       Chronic alcohol use     Fri-Sun one fifth; Mon-Thur: Pint of liquor    Constipation     Coronary artery disease involving native coronary artery of native heart     Critical ischemia of lower extremity ([address] 2/8/2022    Depression     Diabetic nephropathy associated with type 2 diabetes mellitus ([address] 4/4/2019    Diabetic neuropathy associated with type 2 diabetes mellitus (HCC)     Gastroesophageal reflux disease     High vitamin D level 2/16/2022    Vitamin D 25-Hydroxy (2/16/2022) = 105.7    History of acute inferior wall myocardial infarction 2009    History of coronary angioplasty with insertion of stent 07/29/2009    2.5 x 13 Cypher stent to CX.      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 without macular edema associated with type 2 diabetes mellitus ([address] 12/18/2020    Nuclear sclerosis of both eyes 12/18/2020    Otalgia 05/08/2019    Poorly controlled type 2 diabetes mellitus ([address]     HbA1c (2/8/2022) = 13.8    Tobacco use disorder     contemplating stopping     Past Surgical History:   Procedure Laterality Date    COLONOSCOPY N/A 10/28/2020    COLONOSCOPY w/polypectomy performed by Woody Cortes MD at [facility] ENDOSCOPY    HX [address] Right 02/08/2022    S/P Right below-the-knee amputation (2/8/2022 - Dr. Synetta Shone)    HX CORONARY STENT PLACEMENT  07/29/2009    HX HEART CATHETERIZATION  8/30/2011    HX HEART CATHETERIZATION  11/09/2011    HX WISDOM TEETH EXTRACTION      x4    VASCULAR SURGERY PROCEDURE UNLIST      Multiple vascular surgeries     Patient Active Problem List   Diagnosis Code    Hyperlipidemia E78.5    Tobacco use disorder F17.200    Mediastinal adenopathy R59.0    History of vitamin D deficiency Z86.39    Insomnia G47.00    Diabetic nephropathy associated with type 2 diabetes mellitus ([address] E11.21    Otalgia H92.09    Mild nonproliferative diabetic retinopathy of left eye without macular edema associated with type 2 diabetes mellitus ([address] M15.0997    Hypertensive retinopathy H35.039    Nuclear sclerosis of both eyes H25.13    Arterial occlusion, lower extremity ([facility]) I70.209    Impaired mobility and ADLs Z74.09, Z78.9    Status post below knee amputation of right lower extremity ([facility]) Z89.511    Chronic alcohol use Z72.89    History of coronary angioplasty with insertion of stent Z95.5    Coronary artery disease involving native coronary artery of native heart I25.10    Constipation K59.00    Poorly controlled type 2 diabetes mellitus ([facility]) E11.65    Long term current use of insulin ([facility]) Z79.4    Migraine headache G43.909    Diabetic neuropathy associated with type 2 diabetes mellitus ([facility]) E11.40    Gastroesophageal reflux disease K21.9    History of essential hypertension Z86.79    History of lacunar cerebrovascular accident Z80.78    History of acute inferior wall myocardial infarction I25.2    Critical ischemia of lower extremity ([facility]) I70.229    Acute postoperative anemia due to expected blood loss D62    Depression F32. A    High vitamin D level E67.3     Current Outpatient Medications   Medication Sig Dispense Refill    polyethylene glycol (MIRALAX) 17 gram/dose powder Take 17 g by mouth daily. 510 g 0    buPROPion SR (WELLBUTRIN SR) 150 mg SR tablet ONE TABLET BY MOUTH ONCE A DAY 30 Tablet 5    thiamine HCL (B-1) 100 mg tablet Take 1 Tablet by mouth daily. 30 Tablet 5    clopidogreL (PLAVIX) 75 mg tab Take 1 Tablet by mouth daily. 30 Tablet 5    aspirin 81 mg chewable tablet Take 1 Tablet by mouth daily. 30 Tablet 5    atorvastatin (LIPITOR) 80 mg tablet Take 80 mg by mouth daily.  traZODone (DESYREL) 50 mg tablet       iron aspgly,mn-G-C19-FA-Ca-suc (FERREX 150 FORTE PLUS) 795-68-50-2 mg-mg-mcg-mg cap cap Take 1 Capsule by mouth two (2) times a day.  methocarbamoL (ROBAXIN) 500 mg tablet Take 500 mg by mouth three (3) times daily.  gabapentin (NEURONTIN) 300 mg capsule Take 1 Capsule by mouth three (3) times daily. Max Daily Amount: 900 mg.  Indications: neuropathic pain, acute pain following an operation 90 Capsule 0    iron polysaccharides (NIFEREX) 150 mg iron capsule Take 1 Capsule by mouth two (2) times a day. Indications: anemia from inadequate iron 30 Capsule 0    melatonin 3 mg tablet Take 1 Tablet by mouth daily (after dinner). Indications: difficulty sleeping 30 Tablet 0    Trulicity 5.15 CJ/2.2 mL sub-q pen INJECT 0.75 MG UNDER THE SKIN ONCE WEEKLY 2 mL 0    insulin glargine (Basaglar KwikPen U-100 Insulin) 100 unit/mL (3 mL) inpn 45 Units by SubCUTAneous route daily. INJECT 50 UNITS UNDER THE SKIN DAILY 30 Adjustable Dose Pre-filled Pen Syringe 1    simvastatin (Zocor) 20 mg tablet Take 1 Tablet by mouth nightly. 90 Tablet 1    metFORMIN (GLUCOPHAGE) 1,000 mg tablet TAKE ONE TABLET BY MOUTH TWICE A DAY WITH A MEAL 180 Tablet 2    multivitamin (ONE A DAY) tablet Take 1 Tablet by mouth daily. 90 Tablet 3    glucose blood VI test strips (ASCENSIA AUTODISC VI, ONE TOUCH ULTRA TEST VI) strip Provide test strip and glucose meter that insurance will cover - Twice daily-  fasting in the morning and once two hours after a meal. (Patient not taking: Reported on 2/8/2022) 100 Strip 10    flash glucose sensor (FreeStyle Ezio 2 Sensor) kit Apply 1 sensor every 14 days to check blood sugar as directed at least 3 times daily. (Patient not taking: Reported on 2/8/2022) 2 Kit 5     Allergies   Allergen Reactions    Niacin Itching     Social History     Socioeconomic History    Marital status: SINGLE     Spouse name: Not on file    Number of children: Not on file    Years of education: Not on file    Highest education level: Not on file   Occupational History    Not on file   Tobacco Use    Smoking status: Former Smoker     Packs/day: 0.50     Years: 1.00     Pack years: 0.50     Types: Cigarettes    Smokeless tobacco: Never Used   Vaping Use    Vaping Use: Never used   Substance and Sexual Activity    Alcohol use:  Yes     Alcohol/week: 4.2 standard drinks     Types: 5 Shots of liquor per week    Drug use: Not Currently     Types: Marijuana, Cocaine     Comment: none    Sexual activity: Yes   Other Topics Concern    Not on file   Social History Narrative     at [employer]. Social Determinants of Health     Financial Resource Strain:     Difficulty of Paying Living Expenses: Not on file   Food Insecurity:     Worried About Running Out of Food in the Last Year: Not on file    Mara of Food in the Last Year: Not on file   Transportation Needs:     Lack of Transportation (Medical): Not on file    Lack of Transportation (Non-Medical):  Not on file   Physical Activity:     Days of Exercise per Week: Not on file    Minutes of Exercise per Session: Not on file   Stress:     Feeling of Stress : Not on file   Social Connections:     Frequency of Communication with Friends and Family: Not on file    Frequency of Social Gatherings with Friends and Family: Not on file    Attends Hindu Services: Not on file    Active Member of [address] [place] [address] or Organizations: Not on file    Attends Club or Organization Meetings: Not on file    Marital Status: Not on file   Intimate Partner Violence:     Fear of Current or Ex-Partner: Not on file    Emotionally Abused: Not on file    Physically Abused: Not on file    Sexually Abused: Not on file   Housing Stability:     Unable to Pay for Housing in the Last Year: Not on file    Number of [place] in the Last Year: Not on file    Unstable Housing in the Last Year: Not on file     Family History   Problem Relation Age of Onset    Hypertension Mother     Heart Attack Mother     Diabetes Mother     Hypertension Father     Heart Attack Father     Diabetes Father     Diabetes Sister     Hypertension Sister     Stroke Sister     Diabetes Brother     Hypertension Brother     Stroke Brother     No Known Problems Maternal Grandmother     No Known Problems Maternal Grandfather     No Known Problems Paternal Grandmother     No Known Problems Paternal Grandfather     No Known Problems Brother     Heart Disease Other     Kidney Disease Other        Impression and Plan:  Patsy Hurst is a 59 y.o. male with right leg BKA    - Healing well, incision healed  - No contractures  - RTC in 6 months    We reviewed the plan with the patient and the patient understands.         Santiago Walter MD

## 2022-05-17 ENCOUNTER — DOCUMENTATION ONLY (OUTPATIENT)
Dept: VASCULAR SURGERY | Age: 62
End: 2022-05-17

## 2022-05-18 ENCOUNTER — TELEPHONE (OUTPATIENT)
Dept: VASCULAR SURGERY | Age: 62
End: 2022-05-18

## 2022-05-18 NOTE — TELEPHONE ENCOUNTER
----- Message from Sandra Allison 44 sent at 5/18/2022  9:30 AM EDT -----  Pt called and said that he spoke with [facility] and they need us to reach out to them before patient can be seen   [phone]

## 2022-05-18 NOTE — TELEPHONE ENCOUNTER
Patient's paperwork faxed to [address] [place] [address] on 05/04/22 ; refaxed at this time; spoke with [facility] office in [place] to confirm paperwork to be refaxed; updated patient as to same.

## 2022-06-20 ENCOUNTER — OFFICE VISIT (OUTPATIENT)
Dept: VASCULAR SURGERY | Age: 62
End: 2022-06-20
Payer: COMMERCIAL

## 2022-06-20 VITALS
HEART RATE: 71 BPM | WEIGHT: 172.4 LBS | BODY MASS INDEX: 25.53 KG/M2 | OXYGEN SATURATION: 99 % | SYSTOLIC BLOOD PRESSURE: 130 MMHG | DIASTOLIC BLOOD PRESSURE: 82 MMHG | HEIGHT: 69 IN

## 2022-06-20 DIAGNOSIS — Z89.511 HX OF RIGHT BKA (HCC): ICD-10-CM

## 2022-06-20 DIAGNOSIS — I73.9 PAD (PERIPHERAL ARTERY DISEASE) (HCC): Primary | ICD-10-CM

## 2022-06-20 PROCEDURE — 99024 POSTOP FOLLOW-UP VISIT: CPT | Performed by: NURSE PRACTITIONER

## 2022-06-20 NOTE — PROGRESS NOTES
History and Physical    Dara Yusuf is a 59 y.o. male with hx of tobacco abuse and DM2 s/p right leg BKA by Dr. Terrence Glynn week 2/8/2022 due to critical limb ischemia. He was last seen 3 months ago. His stump is healing well and the sutures were removed. He returns today with complaints of a small opening on the medial portion of his right BKA. The patient states that he peeled off a scab along the stump and has opened. The edges are clean with signs of infection. There is no foudl ofor or drainage. He denies fever chills, nausea and vomiting. Palpable right femoral pulse. While he did not express what his blood sugars were. He has not completed any blood glucose labs recently. We discussed the importance of tight glycemic control keeping his blood sugar under 150 optimal wound healing. There were 3 sutures still left in place and removed by the nurse practitioner student. He is ready for preparatory prosthesis. The small opening is just an area from a pulled scab and is of no significance. His residual limb has healed sufficiently to begin prosthetic use. He is motivated to ambulate and will benefit from prosthesis. The patient has potential to return to pain regularly in his community for work leisure or exercise or K3 level ambulation. Physical Exam:    Visit Vitals  /84 (BP 1 Location: Right upper arm, BP Patient Position: Sitting)   Pulse 86   Ht 5' 9\" (1.753 m)   Wt 172 lb 6.4 oz (78.2 kg)   SpO2 98%   BMI 25.46 kg/m²      HEENT-PERRLA exactly movements intact, no scleral icterus, mucous membranes pink and moist  Neck-no JVD, no carotid bruits  Heart-regular rate and rhythm no murmurs, rubs or gallops  Chest-clear to auscultation bilaterally no wheezes, rhonchi or rubs  Abdomen-soft, nontender, no palpable organomegaly or masses, no palpable pulsatile masses  Extremities-no clubbing, cyanosis or edema. No wounds or gangrenous changes to the toes or feet. Skin is intact.  Feet are pink warm and well-perfused bilaterally  Pulses-Right femoral 2+           Past Medical History:   Diagnosis Date    Acute postoperative anemia due to expected blood loss 2/8/2022    Arterial occlusion, lower extremity ([address] 11/27/2021    Cardiac cath 11/09/2011    RCA patent. LM patent. LAD patent. mD1 55%. CX patent. Prior stent patent. Edison 85% (2.5 x 15-mm Xience stent, resid 0%). LVEDP 12. EF 40-45%. High lateral hypk (RI distribution).  Chronic alcohol use     Fri-Sun one fifth; Mon-Thur: Pint of liquor    Constipation     Coronary artery disease involving native coronary artery of native heart     Critical ischemia of lower extremity ([address] 2/8/2022    Depression     Diabetic nephropathy associated with type 2 diabetes mellitus ([address] 4/4/2019    Diabetic neuropathy associated with type 2 diabetes mellitus ([address]     Gastroesophageal reflux disease     High vitamin D level 2/16/2022    Vitamin D 25-Hydroxy (2/16/2022) = 105.7    History of acute inferior wall myocardial infarction 2009    History of coronary angioplasty with insertion of stent 07/29/2009    2.5 x 13 Cypher stent to CX.      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 without macular edema associated with type 2 diabetes mellitus ([address] 12/18/2020    Nuclear sclerosis of both eyes 12/18/2020    Otalgia 05/08/2019    Poorly controlled type 2 diabetes mellitus ([address]     HbA1c (2/8/2022) = 13.8    Tobacco use disorder     contemplating stopping     Past Surgical History:   Procedure Laterality Date    COLONOSCOPY N/A 10/28/2020    COLONOSCOPY w/polypectomy performed by Maldonado Lopez [facility] at [facility] ENDOSCOPY    HX BELOW KNEE AMPUTATION Right 02/08/2022    S/P Right below-the-knee amputation (2/8/2022 - Dr. Nathalia Davidson)    HX CORONARY STENT PLACEMENT  07/29/2009    HX HEART CATHETERIZATION  8/30/2011    HX HEART CATHETERIZATION  11/09/2011    HX WISDOM TEETH EXTRACTION      x4    VASCULAR SURGERY PROCEDURE UNLIST      Multiple vascular surgeries     Patient Active Problem List   Diagnosis Code    Hyperlipidemia E78.5    Tobacco use disorder F17.200    Mediastinal adenopathy R59.0    History of vitamin D deficiency Z86.39    Insomnia G47.00    Diabetic nephropathy associated with type 2 diabetes mellitus ([facility] E11.21    Otalgia H92.09    Mild nonproliferative diabetic retinopathy of left eye without macular edema associated with type 2 diabetes mellitus ([facility] G36.5607    Hypertensive retinopathy H35.039    Nuclear sclerosis of both eyes H25.13    Arterial occlusion, lower extremity (HCC) I70.209    Impaired mobility and ADLs Z74.09, Z78.9    Status post below knee amputation of right lower extremity (HCC) Z89.511    Chronic alcohol use Z72.89    History of coronary angioplasty with insertion of stent Z95.5    Coronary artery disease involving native coronary artery of native heart I25.10    Constipation K59.00    Poorly controlled type 2 diabetes mellitus ([facility]) E11.65    Long term current use of insulin ([facility]) Z79.4    Migraine headache G43.909    Diabetic neuropathy associated with type 2 diabetes mellitus ([facility]) E11.40    Gastroesophageal reflux disease K21.9    History of essential hypertension Z86.79    History of lacunar cerebrovascular accident Z80.78    History of acute inferior wall myocardial infarction I25.2    Critical ischemia of lower extremity ([facility]) I70.229    Acute postoperative anemia due to expected blood loss D62    Depression F32. A    High vitamin D level E67.3     Current Outpatient Medications   Medication Sig Dispense Refill    polyethylene glycol (MIRALAX) 17 gram/dose powder Take 17 g by mouth daily. 510 g 0    buPROPion SR (WELLBUTRIN SR) 150 mg SR tablet ONE TABLET BY MOUTH ONCE A DAY 30 Tablet 5    thiamine HCL (B-1) 100 mg tablet Take 1 Tablet by mouth daily. 30 Tablet 5    clopidogreL (PLAVIX) 75 mg tab Take 1 Tablet by mouth daily. 30 Tablet 5    aspirin 81 mg chewable tablet Take 1 Tablet by mouth daily. 30 Tablet 5    atorvastatin (LIPITOR) 80 mg tablet Take 80 mg by mouth daily.  traZODone (DESYREL) 50 mg tablet       iron aspgly,eb-V-M70-FA-Ca-suc (FERREX 150 FORTE PLUS) 300-07-53-3 mg-mg-mcg-mg cap cap Take 1 Capsule by mouth two (2) times a day.  methocarbamoL (ROBAXIN) 500 mg tablet Take 500 mg by mouth three (3) times daily.  gabapentin (NEURONTIN) 300 mg capsule Take 1 Capsule by mouth three (3) times daily. Max Daily Amount: 900 mg. Indications: neuropathic pain, acute pain following an operation 90 Capsule 0    iron polysaccharides (NIFEREX) 150 mg iron capsule Take 1 Capsule by mouth two (2) times a day. Indications: anemia from inadequate iron 30 Capsule 0    melatonin 3 mg tablet Take 1 Tablet by mouth daily (after dinner). Indications: difficulty sleeping 30 Tablet 0    Trulicity 5.02 WQ/1.0 mL sub-q pen INJECT 0.75 MG UNDER THE SKIN ONCE WEEKLY 2 mL 0    insulin glargine (Basaglar KwikPen U-100 Insulin) 100 unit/mL (3 mL) inpn 45 Units by SubCUTAneous route daily. INJECT 50 UNITS UNDER THE SKIN DAILY 30 Adjustable Dose Pre-filled Pen Syringe 1    simvastatin (Zocor) 20 mg tablet Take 1 Tablet by mouth nightly. 90 Tablet 1    metFORMIN (GLUCOPHAGE) 1,000 mg tablet TAKE ONE TABLET BY MOUTH TWICE A DAY WITH A MEAL 180 Tablet 2    multivitamin (ONE A DAY) tablet Take 1 Tablet by mouth daily.  90 Tablet 3    glucose blood VI test strips (ASCENSIA AUTODISC VI, ONE TOUCH ULTRA TEST VI) strip Provide test strip and glucose meter that insurance will cover - Twice daily-  fasting in the morning and once two hours after a meal. (Patient not taking: Reported on 2/8/2022) 100 Strip 10    flash glucose sensor (FreeStyle Ezio 2 Sensor) kit Apply 1 sensor every 14 days to check blood sugar as directed at least 3 times daily. (Patient not taking: Reported on 2/8/2022) 2 Kit 5     Allergies   Allergen Reactions    Niacin Itching     Social History     Socioeconomic History    Marital status: SINGLE     Spouse name: Not on file    Number of children: Not on file    Years of education: Not on file    Highest education level: Not on file   Occupational History    Not on file   Tobacco Use    Smoking status: Former Smoker     Packs/day: 0.50     Years: 1.00     Pack years: 0.50     Types: Cigarettes    Smokeless tobacco: Never Used   Vaping Use    Vaping Use: Never used   Substance and Sexual Activity    Alcohol use: Yes     Alcohol/week: 4.2 standard drinks     Types: 5 Shots of liquor per week    Drug use: Not Currently     Types: Marijuana, Cocaine     Comment: none    Sexual activity: Yes   Other Topics Concern    Not on file   Social History Narrative     at [employer]. Social Determinants of Health     Financial Resource Strain:     Difficulty of Paying Living Expenses: Not on file   Food Insecurity:     Worried About Running Out of Food in the Last Year: Not on file    Mara of Food in the Last Year: Not on file   Transportation Needs:     Lack of Transportation (Medical): Not on file    Lack of Transportation (Non-Medical):  Not on file   Physical Activity:     Days of Exercise per Week: Not on file    Minutes of Exercise per Session: Not on file   Stress:     Feeling of Stress : Not on file   Social Connections:     Frequency of Communication with Friends and Family: Not on file    Frequency of Social Gatherings with Friends and Family: Not on file    Attends [employer] Services: Not on file    Active Member of Clubs or Organizations: Not on file    Attends Club or Organization Meetings: Not on file    Marital Status: Not on file   Intimate Partner Violence:     Fear of Current or Ex-Partner: Not on file    Emotionally Abused: Not on file    Physically Abused: Not on file    Sexually Abused: Not on file   Housing Stability:     Unable to Pay for Housing in the Last Year: Not on file    Number of Places Lived in the Last Year: Not on file    Unstable Housing in the Last Year: Not on file     Family History   Problem Relation Age of Onset    Hypertension Mother     Heart Attack Mother     Diabetes Mother     Hypertension Father     Heart Attack Father     Diabetes Father     Diabetes Sister     Hypertension Sister     Stroke Sister     Diabetes Brother     Hypertension Brother     Stroke Brother     No Known Problems Maternal Grandmother     No Known Problems Maternal Grandfather     No Known Problems Paternal Grandmother     No Known Problems Paternal Grandfather     No Known Problems Brother     Heart Disease Other     Kidney Disease Other        Impression and Plan:  Doreen Wahl is a 59 y.o. male with right leg BKA. - small opening from pulled scab, Cover with Mepilex   - RTO in 2 weeks for wound healing.    Pt ready for prosthesis

## 2022-06-20 NOTE — PROGRESS NOTES
1. Have you been to an emergency room or urgent care clinic since your last visit? No    Hospitalized since your last visit? If yes, where, when, and reason for visit? No    2. Have you seen or consulted any other health care providers outside of the [facility] since your last visit including any procedures, health maintenance items. If yes, where, when and reason for visit?  No

## 2022-06-22 ENCOUNTER — OFFICE VISIT (OUTPATIENT)
Dept: FAMILY MEDICINE CLINIC | Age: 62
End: 2022-06-22
Payer: COMMERCIAL

## 2022-06-22 VITALS
HEART RATE: 74 BPM | HEIGHT: 69 IN | DIASTOLIC BLOOD PRESSURE: 87 MMHG | BODY MASS INDEX: 25.46 KG/M2 | OXYGEN SATURATION: 98 % | SYSTOLIC BLOOD PRESSURE: 146 MMHG | RESPIRATION RATE: 16 BRPM | TEMPERATURE: 98.2 F

## 2022-06-22 DIAGNOSIS — I73.9 PAD (PERIPHERAL ARTERY DISEASE) (HCC): ICD-10-CM

## 2022-06-22 DIAGNOSIS — Z89.511 HX OF BKA, RIGHT (HCC): ICD-10-CM

## 2022-06-22 DIAGNOSIS — E11.69 HYPERLIPIDEMIA ASSOCIATED WITH TYPE 2 DIABETES MELLITUS (HCC): ICD-10-CM

## 2022-06-22 DIAGNOSIS — I10 ESSENTIAL HYPERTENSION: ICD-10-CM

## 2022-06-22 DIAGNOSIS — E11.59 CONTROLLED TYPE 2 DIABETES MELLITUS WITH OTHER CIRCULATORY COMPLICATION, WITH LONG-TERM CURRENT USE OF INSULIN (HCC): Primary | ICD-10-CM

## 2022-06-22 DIAGNOSIS — E78.5 HYPERLIPIDEMIA ASSOCIATED WITH TYPE 2 DIABETES MELLITUS (HCC): ICD-10-CM

## 2022-06-22 DIAGNOSIS — E11.40 TYPE 2 DIABETES MELLITUS WITH DIABETIC NEUROPATHY, WITH LONG-TERM CURRENT USE OF INSULIN (HCC): ICD-10-CM

## 2022-06-22 DIAGNOSIS — Z79.4 CONTROLLED TYPE 2 DIABETES MELLITUS WITH OTHER CIRCULATORY COMPLICATION, WITH LONG-TERM CURRENT USE OF INSULIN (HCC): Primary | ICD-10-CM

## 2022-06-22 DIAGNOSIS — Z79.4 TYPE 2 DIABETES MELLITUS WITH DIABETIC NEUROPATHY, WITH LONG-TERM CURRENT USE OF INSULIN (HCC): ICD-10-CM

## 2022-06-22 LAB — HBA1C MFR BLD HPLC: 6.3 %

## 2022-06-22 PROCEDURE — 83036 HEMOGLOBIN GLYCOSYLATED A1C: CPT | Performed by: FAMILY MEDICINE

## 2022-06-22 PROCEDURE — 3044F HG A1C LEVEL LT 7.0%: CPT | Performed by: FAMILY MEDICINE

## 2022-06-22 PROCEDURE — 99214 OFFICE O/P EST MOD 30 MIN: CPT | Performed by: FAMILY MEDICINE

## 2022-06-22 RX ORDER — GLIPIZIDE 10 MG/1
10 TABLET, FILM COATED, EXTENDED RELEASE ORAL DAILY
COMMUNITY

## 2022-06-22 RX ORDER — INSULIN GLARGINE 100 [IU]/ML
25 INJECTION, SOLUTION SUBCUTANEOUS
Qty: 30 ADJUSTABLE DOSE PRE-FILLED PEN SYRINGE | Refills: 5 | Status: SHIPPED | OUTPATIENT
Start: 2022-06-22 | End: 2022-09-28

## 2022-06-22 RX ORDER — ASPIRIN 325 MG
50000 TABLET, DELAYED RELEASE (ENTERIC COATED) ORAL
COMMUNITY

## 2022-06-22 NOTE — PROGRESS NOTES
Lorin Thompson presents today for   Chief Complaint   Patient presents with    Diabetes    Hypertension    Cholesterol Problem     high chol    Anemia       Tyler Dawkins preferred language for health care discussion is english/other. Is someone accompanying this pt? no    Is the patient using any DME equipment during [address]? Yes, walker    Depression Screening:  3 most recent PHQ Screens 6/22/2022   PHQ Not Done -   Little interest or pleasure in doing things Not at all   Feeling down, depressed, irritable, or hopeless Not at all   Total Score PHQ 2 0   Trouble falling or staying asleep, or sleeping too much -   Feeling tired or having little energy -   Poor appetite, weight loss, or overeating -   Feeling bad about yourself - or that you are a failure or have let yourself or your family down -   Trouble concentrating on things such as school, work, reading, or watching TV -   Moving or speaking so slowly that other people could have noticed; or the opposite being so fidgety that others notice -   Thoughts of being better off dead, or hurting yourself in some way -   PHQ 9 Score -   How difficult have these problems made it for you to do your work, take care of your home and get along with others -       Learning Assessment:  Learning Assessment 6/20/2022   PRIMARY LEARNER Patient   HIGHEST LEVEL OF EDUCATION - PRIMARY LEARNER  -   BARRIERS PRIMARY LEARNER -   CO-LEARNER CAREGIVER -   PRIMARY LANGUAGE ENGLISH    NEED -   LEARNER PREFERENCE PRIMARY DEMONSTRATION   ANSWERED BY Patient   RELATIONSHIP SELF       Abuse Screening:  Abuse Screening Questionnaire 3/11/2022   Do you ever feel afraid of your partner? N   Are you in a relationship with someone who physically or mentally threatens you? N   Is it safe for you to go home? Y       Generalized Anxiety  No flowsheet data found.       Health Maintenance Due   Topic Date Due    COVID-19 Vaccine (1) Never done    Pneumococcal 0-64 years (1 - PCV) Never done    DTaP/Tdap/Td series (1 - Tdap) Never done    Shingrix Vaccine Age 50> (1 of 2) Never done    Foot Exam Q1  07/11/2019    MICROALBUMIN Q1  10/22/2021    Lipid Screen  03/16/2022    A1C test (Diabetic or Prediabetic)  05/08/2022   . Health Maintenance reviewed and discussed and ordered per Provider. Coordination of Care:  1. Have you been to the ER, urgent care clinic since your last visit? Hospitalized since your last visit? no    2. Have you seen or consulted any other health care providers outside of the [address] [place] [address] since your last visit? Include any pap smears or colon screening.  no      Advance Directive:  Discussed 3/11/22

## 2022-06-22 NOTE — PROGRESS NOTES
Chief Complaint   Patient presents with    Diabetes    Hypertension    Cholesterol Problem     high chol    Anemia         HPI    Samira Infante is a 57 y.o. male presenting today for delayed follow up of dm, htn, hld, anemia. pt has been checking home bs readings. They are usually . Pt's girlfriend reports that they are not using the trulicity due to how low the sugars have been. Labs not yet completed. Patient has had recent follow-up with vascular surgery for his BKA. They feel that he is now ready for prosthesis. Patient does not need medication refills today. New concerns today: none      Review of Systems   Constitutional: Negative. HENT: Negative. Respiratory: Negative. Cardiovascular: Negative. All other systems reviewed and are negative. Physical Exam  Vitals and nursing note reviewed. Constitutional:       General: He is not in acute distress. Appearance: Normal appearance. HENT:      Head: Normocephalic and atraumatic. Right Ear: External ear normal.      Left Ear: External ear normal.      Nose: Nose normal.   Eyes:      Extraocular Movements: Extraocular movements intact. Conjunctiva/sclera: Conjunctivae normal.   Cardiovascular:      Rate and Rhythm: Normal rate and regular rhythm. Heart sounds: No murmur heard. No friction rub. No gallop. Pulmonary:      Effort: Pulmonary effort is normal.      Breath sounds: Normal breath sounds. No wheezing, rhonchi or rales. Musculoskeletal:         General: Normal range of motion. Cervical back: Normal range of motion. No rigidity. Skin:     General: Skin is warm and dry. Neurological:      Mental Status: He is alert and oriented to person, place, and time. Coordination: Coordination normal.   Psychiatric:         Mood and Affect: Mood normal.         Behavior: Behavior normal.         Thought Content:  Thought content normal.         Judgment: Judgment normal. Recent Results (from the past 12 hour(s))   AMB POC HEMOGLOBIN A1C    Collection Time: 06/22/22  1:41 PM   Result Value Ref Range    Hemoglobin A1c (POC) 6.3 %       Diagnoses and all orders for this visit:    1. Controlled type 2 diabetes mellitus with other circulatory complication, with long-term current use of insulin ([facility]  2. Type 2 diabetes mellitus with diabetic neuropathy, with long-term current use of insulin ([facility])  -     AMB POC HEMOGLOBIN A1C  -    Dose change:  insulin glargine (Basaglar KwikPen U-100 Insulin) 100 unit/mL (3 mL) inpn; 25 Units by SubCUTAneous route nightly. Doing well. Pt commended. Cont to work on diet. 3. Essential hypertension  Recommend routine home blood pressure monitoring. bp control handouts given  Labs pending    4. Hyperlipidemia associated with type 2 diabetes mellitus ([facility]  Labs pending    5. PAD (peripheral artery disease) ([facility]  6. Hx of BKA, right ([facility]  Care as per vasc surg    Follow-up and Dispositions    · Return in about 3 months (around 9/22/2022) for diabetes, high blood pressure, high cholesterol, lab review.

## 2022-07-06 NOTE — PROGRESS NOTES
Patient interested in getting the CT done.    Order loaded and pended for review and completion by Dr. Bronson.   Pt is to dc to home today with family support. FOC was given for home care and they selected [facility]. [facility] is not in network and new FOC was given today. Referral given to [facility] who is in network. DME delivered by [facility].     Dc scripts sent to [place]. No further needs are noted at this time.

## 2022-07-11 ENCOUNTER — OFFICE VISIT (OUTPATIENT)
Dept: VASCULAR SURGERY | Age: 62
End: 2022-07-11
Payer: COMMERCIAL

## 2022-07-11 VITALS
SYSTOLIC BLOOD PRESSURE: 136 MMHG | WEIGHT: 172.4 LBS | HEIGHT: 69 IN | HEART RATE: 73 BPM | DIASTOLIC BLOOD PRESSURE: 80 MMHG | BODY MASS INDEX: 25.53 KG/M2 | OXYGEN SATURATION: 96 %

## 2022-07-11 DIAGNOSIS — I73.9 PAD (PERIPHERAL ARTERY DISEASE) (HCC): ICD-10-CM

## 2022-07-11 DIAGNOSIS — Z89.511 HX OF RIGHT BKA (HCC): Primary | ICD-10-CM

## 2022-07-11 PROCEDURE — 99213 OFFICE O/P EST LOW 20 MIN: CPT | Performed by: NURSE PRACTITIONER

## 2022-07-11 NOTE — PROGRESS NOTES
1. Have you been to the ER, urgent care clinic since your last visit? No       Hospitalized since your last visit? No     2. Have you seen or consulted any other health care providers outside of the [address] [place] [address] since your last visit? Include any pap smears or colon screening.   No

## 2022-07-11 NOTE — PROGRESS NOTES
History and Physical    Rivas Pappas is a 59 y.o. male with hx of tobacco abuse and DM2 s/p right leg BKA by Dr. Annmarie Valentin week 2/8/2022 due to critical limb ischemia. He returns in follow-up for a right stump wound check. He had a small opening on the medial portion of his right BKA. The patient states that he peeled off a scab along the stump and has opened. Today the wound has healed completely. There is a small scab over it. He denies pain. He has not received his prosthetic yet. He stated at his last appointment he had issues with sitting. He asked if he needed to continue to wear his . I advised him to continue to wear his  and follow-up with  Prosthetics. He was wrapping his leg with an Ace bandage and . He was advised to no longer use ace bandage. Now that his wound is healed Mr. Urbina is ready for a preparatory prosthesis. He has residual limb has healed sufficiently to begin prosthetic use. He is motivated to ambulate and will benefit from a prosthesis. He has the potential to return to a cane regularly in his community for work leisure or exercise or K3 level ambulation. Physical Exam:    Visit Vitals  /84 (BP 1 Location: Right upper arm, BP Patient Position: Sitting)   Pulse 86   Ht 5' 9\" (1.753 m)   Wt 172 lb 6.4 oz (78.2 kg)   SpO2 98%   BMI 25.46 kg/m²      HEENT-PERRLA exactly movements intact, no scleral icterus, mucous membranes pink and moist  Neck-no JVD, no carotid bruits  Heart-regular rate and rhythm no murmurs, rubs or gallops  Chest-clear to auscultation bilaterally no wheezes, rhonchi or rubs  Abdomen-soft, nontender, no palpable organomegaly or masses, no palpable pulsatile masses  Extremities-no clubbing, cyanosis or edema. No wounds or gangrenous changes to the toes or feet. Skin is intact.  Feet are pink warm and well-perfused bilaterally  Pulses-Right femoral 2+             Previous           Past Medical History:   Diagnosis Date Acute postoperative anemia due to expected blood loss 2/8/2022    Arterial occlusion, lower extremity ([address] 11/27/2021    Cardiac cath 11/09/2011    RCA patent. LM patent. LAD patent. mD1 55%. CX patent. Prior stent patent. Edison 85% (2.5 x 15-mm Xience stent, resid 0%). LVEDP 12. EF 40-45%. High lateral hypk (RI distribution). Chronic alcohol use     Fri-Sun one fifth; Mon-Thur: Pint of liquor    Constipation     Coronary artery disease involving native coronary artery of native heart     Critical ischemia of lower extremity ([address] 2/8/2022    Depression     Diabetic nephropathy associated with type 2 diabetes mellitus ([address] 4/4/2019    Diabetic neuropathy associated with type 2 diabetes mellitus ([address]     Gastroesophageal reflux disease     High vitamin D level 2/16/2022    Vitamin D 25-Hydroxy (2/16/2022) = 105.7    History of acute inferior wall myocardial infarction 2009    History of coronary angioplasty with insertion of stent 07/29/2009    2.5 x 13 Cypher stent to CX.       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 Maureen Segal)    HX CORONARY STENT PLACEMENT  07/29/2009    HX HEART CATHETERIZATION  8/30/2011    HX HEART CATHETERIZATION  11/09/2011    HX WISDOM TEETH EXTRACTION      x4    VASCULAR SURGERY PROCEDURE UNLIST      Multiple vascular surgeries     Patient Active Problem List   Diagnosis Code    Hyperlipidemia E78.5    Tobacco use disorder F17.200    Mediastinal adenopathy R59.0    History of vitamin D deficiency Z86.39    Insomnia G47.00    Diabetic nephropathy associated with type 2 diabetes mellitus ([facility] E11.21    Otalgia H92.09    Mild nonproliferative diabetic retinopathy of left eye without macular edema associated with type 2 diabetes mellitus ([facility] H35.9770    Hypertensive retinopathy H35.039    Nuclear sclerosis of both eyes H25.13    Arterial occlusion, lower extremity ([facility]) I70.209    Impaired mobility and ADLs Z74.09, Z78.9    Status post below knee amputation of right lower extremity ([facility]) Z89.511    Chronic alcohol use Z72.89    History of coronary angioplasty with insertion of stent Z95.5    Coronary artery disease involving native coronary artery of native heart I25.10    Constipation K59.00    Poorly controlled type 2 diabetes mellitus ([facility]) E11.65    Long term current use of insulin ([facility]) Z79.4    Migraine headache G43.909    Diabetic neuropathy associated with type 2 diabetes mellitus ([facility]) E11.40    Gastroesophageal reflux disease K21.9    History of essential hypertension Z86.79    History of lacunar cerebrovascular accident Z86.73    History of acute inferior wall myocardial infarction I25.2    Critical ischemia of lower extremity ([facility] I70.229    Acute postoperative anemia due to expected blood loss D62    Depression F32. A    High vitamin D level E67.3     Current Outpatient Medications   Medication Sig Dispense Refill    polyethylene glycol (MIRALAX) 17 gram/dose powder Take 17 g by mouth daily.  510 g 0    buPROPion SR (WELLBUTRIN SR) 150 mg SR tablet ONE TABLET BY MOUTH ONCE A DAY 30 Tablet 5    thiamine HCL (B-1) 100 mg tablet Take 1 Tablet by mouth daily. 30 Tablet 5    clopidogreL (PLAVIX) 75 mg tab Take 1 Tablet by mouth daily. 30 Tablet 5    aspirin 81 mg chewable tablet Take 1 Tablet by mouth daily. 30 Tablet 5    atorvastatin (LIPITOR) 80 mg tablet Take 80 mg by mouth daily. traZODone (DESYREL) 50 mg tablet       iron aspgly,qo-R-G11-FA-Ca-suc (FERREX 150 FORTE PLUS) 945-23-98-3 mg-mg-mcg-mg cap cap Take 1 Capsule by mouth two (2) times a day. methocarbamoL (ROBAXIN) 500 mg tablet Take 500 mg by mouth three (3) times daily. gabapentin (NEURONTIN) 300 mg capsule Take 1 Capsule by mouth three (3) times daily. Max Daily Amount: 900 mg. Indications: neuropathic pain, acute pain following an operation 90 Capsule 0    iron polysaccharides (NIFEREX) 150 mg iron capsule Take 1 Capsule by mouth two (2) times a day. Indications: anemia from inadequate iron 30 Capsule 0    melatonin 3 mg tablet Take 1 Tablet by mouth daily (after dinner). Indications: difficulty sleeping 30 Tablet 0    Trulicity 7.25 OB/2.5 mL sub-q pen INJECT 0.75 MG UNDER THE SKIN ONCE WEEKLY 2 mL 0    insulin glargine (Basaglar KwikPen U-100 Insulin) 100 unit/mL (3 mL) inpn 45 Units by SubCUTAneous route daily. INJECT 50 UNITS UNDER THE SKIN DAILY 30 Adjustable Dose Pre-filled Pen Syringe 1    simvastatin (Zocor) 20 mg tablet Take 1 Tablet by mouth nightly. 90 Tablet 1    metFORMIN (GLUCOPHAGE) 1,000 mg tablet TAKE ONE TABLET BY MOUTH TWICE A DAY WITH A MEAL 180 Tablet 2    multivitamin (ONE A DAY) tablet Take 1 Tablet by mouth daily.  90 Tablet 3    glucose blood VI test strips (ASCENSIA AUTODISC VI, ONE TOUCH ULTRA TEST VI) strip Provide test strip and glucose meter that insurance will cover - Twice daily-  fasting in the morning and once two hours after a meal. (Patient not taking: Reported on 2/8/2022) 100 Strip 10    flash glucose sensor (FreeStyle Ezio 2 Sensor) kit Apply 1 sensor every 14 days to check blood sugar as directed at least 3 times daily. (Patient not taking: Reported on 2/8/2022) 2 Kit 5     Allergies   Allergen Reactions    Niacin Itching     Social History     Socioeconomic History    Marital status: SINGLE     Spouse name: Not on file    Number of children: Not on file    Years of education: Not on file    Highest education level: Not on file   Occupational History    Not on file   Tobacco Use    Smoking status: Former Smoker     Packs/day: 0.50     Years: 1.00     Pack years: 0.50     Types: Cigarettes    Smokeless tobacco: Never Used   Vaping Use    Vaping Use: Never used   Substance and Sexual Activity    Alcohol use: Yes     Alcohol/week: 4.2 standard drinks     Types: 5 Shots of liquor per week    Drug use: Not Currently     Types: Marijuana, Cocaine     Comment: none    Sexual activity: Yes   Other Topics Concern    Not on file   Social History Narrative     at [employer]. Social Determinants of Health     Financial Resource Strain:     Difficulty of Paying Living Expenses: Not on file   Food Insecurity:     Worried About [address] in the Last Year: Not on file    Mara of Food in the Last Year: Not on file   Transportation Needs:     Lack of Transportation (Medical): Not on file    Lack of Transportation (Non-Medical):  Not on file   Physical Activity:     Days of Exercise per Week: Not on file    Minutes of Exercise per Session: Not on file   Stress:     Feeling of Stress : Not on file   Social Connections:     Frequency of Communication with Friends and Family: Not on file    Frequency of Social Gatherings with Friends and Family: Not on file    Attends Confucianism Services: Not on file    Active Member of Clubs or Organizations: Not on file    Attends Club or Organization Meetings: Not on file    Marital Status: Not on file   Intimate Partner Violence:     Fear of Current or Ex-Partner: Not on file    Emotionally Abused: Not on file    Physically Abused: Not on file    Sexually Abused: Not on file   Housing Stability:     Unable to Pay for Housing in the Last Year: Not on file    Number of Places Lived in the Last Year: Not on file    Unstable Housing in the Last Year: Not on file       Impression and Plan:  Michele aLrios is a 59 y.o. male with right leg BKA.    RTO prn    Follow up with [facility]

## 2022-08-01 ENCOUNTER — HOSPITAL ENCOUNTER (OUTPATIENT)
Dept: LAB | Age: 62
Discharge: HOME | End: 2022-08-01
Payer: COMMERCIAL

## 2022-08-01 DIAGNOSIS — E78.5 HYPERLIPIDEMIA ASSOCIATED WITH TYPE 2 DIABETES MELLITUS (HCC): ICD-10-CM

## 2022-08-01 DIAGNOSIS — I10 ESSENTIAL HYPERTENSION: ICD-10-CM

## 2022-08-01 DIAGNOSIS — E11.40 TYPE 2 DIABETES MELLITUS WITH DIABETIC NEUROPATHY, WITH LONG-TERM CURRENT USE OF INSULIN (HCC): ICD-10-CM

## 2022-08-01 DIAGNOSIS — D64.9 ANEMIA FOLLOWING SURGERY: ICD-10-CM

## 2022-08-01 DIAGNOSIS — Z79.4 TYPE 2 DIABETES MELLITUS WITH DIABETIC NEUROPATHY, WITH LONG-TERM CURRENT USE OF INSULIN (HCC): ICD-10-CM

## 2022-08-01 DIAGNOSIS — E11.69 HYPERLIPIDEMIA ASSOCIATED WITH TYPE 2 DIABETES MELLITUS (HCC): ICD-10-CM

## 2022-08-01 LAB
ALBUMIN SERPL-MCNC: 3.9 G/DL (ref 3.4–5)
ALBUMIN/GLOB SERPL: 1.3 {RATIO} (ref 0.8–1.7)
ALP SERPL-CCNC: 77 U/L (ref 45–117)
ALT SERPL-CCNC: 32 U/L (ref 16–61)
ANION GAP SERPL CALC-SCNC: 7 MMOL/L (ref 3–18)
AST SERPL-CCNC: 23 U/L (ref 10–38)
BASOPHILS # BLD: 0 K/UL (ref 0–0.1)
BASOPHILS NFR BLD: 1 % (ref 0–2)
BILIRUB SERPL-MCNC: 0.4 MG/DL (ref 0.2–1)
BUN SERPL-MCNC: 11 MG/DL (ref 7–18)
BUN/CREAT SERPL: 13 (ref 12–20)
CALCIUM SERPL-MCNC: 8.8 MG/DL (ref 8.5–10.1)
CHLORIDE SERPL-SCNC: 108 MMOL/L (ref 100–111)
CHOLEST SERPL-MCNC: 128 MG/DL
CO2 SERPL-SCNC: 25 MMOL/L (ref 21–32)
CREAT SERPL-MCNC: 0.88 MG/DL (ref 0.6–1.3)
CREAT UR-MCNC: 212 MG/DL (ref 30–125)
DIFFERENTIAL METHOD BLD: ABNORMAL
EOSINOPHIL # BLD: 0.1 K/UL (ref 0–0.4)
EOSINOPHIL NFR BLD: 3 % (ref 0–5)
ERYTHROCYTE [DISTWIDTH] IN BLOOD BY AUTOMATED COUNT: 14.4 % (ref 11.6–14.5)
GLOBULIN SER CALC-MCNC: 3.1 G/DL (ref 2–4)
GLUCOSE SERPL-MCNC: 176 MG/DL (ref 74–99)
HCT VFR BLD AUTO: 35.5 % (ref 36–48)
HDLC SERPL-MCNC: 40 MG/DL (ref 40–60)
HDLC SERPL: 3.2 {RATIO} (ref 0–5)
HGB BLD-MCNC: 11.7 G/DL (ref 13–16)
IMM GRANULOCYTES # BLD AUTO: 0 K/UL (ref 0–0.04)
IMM GRANULOCYTES NFR BLD AUTO: 1 % (ref 0–0.5)
IRON SATN MFR SERPL: 30 % (ref 20–50)
IRON SERPL-MCNC: 85 UG/DL (ref 50–175)
LDLC SERPL CALC-MCNC: 52 MG/DL (ref 0–100)
LIPID PROFILE,FLP: ABNORMAL
LYMPHOCYTES # BLD: 0.9 K/UL (ref 0.9–3.6)
LYMPHOCYTES NFR BLD: 39 % (ref 21–52)
MCH RBC QN AUTO: 28.5 PG (ref 24–34)
MCHC RBC AUTO-ENTMCNC: 33 G/DL (ref 31–37)
MCV RBC AUTO: 86.4 FL (ref 78–100)
MICROALBUMIN UR-MCNC: 28 MG/DL (ref 0–3)
MICROALBUMIN/CREAT UR-RTO: 132 MG/G (ref 0–30)
MONOCYTES # BLD: 0.3 K/UL (ref 0.05–1.2)
MONOCYTES NFR BLD: 11 % (ref 3–10)
NEUTS SEG # BLD: 1.1 K/UL (ref 1.8–8)
NEUTS SEG NFR BLD: 45 % (ref 40–73)
NRBC # BLD: 0 K/UL (ref 0–0.01)
NRBC BLD-RTO: 0 PER 100 WBC
PLATELET # BLD AUTO: 210 K/UL (ref 135–420)
PMV BLD AUTO: 12 FL (ref 9.2–11.8)
POTASSIUM SERPL-SCNC: 4.2 MMOL/L (ref 3.5–5.5)
PROT SERPL-MCNC: 7 G/DL (ref 6.4–8.2)
RBC # BLD AUTO: 4.11 M/UL (ref 4.35–5.65)
SODIUM SERPL-SCNC: 140 MMOL/L (ref 136–145)
TIBC SERPL-MCNC: 281 UG/DL (ref 250–450)
TRIGL SERPL-MCNC: 180 MG/DL (ref ?–150)
VLDLC SERPL CALC-MCNC: 36 MG/DL
WBC # BLD AUTO: 2.4 K/UL (ref 4.6–13.2)

## 2022-08-01 PROCEDURE — 85025 COMPLETE CBC W/AUTO DIFF WBC: CPT

## 2022-08-01 PROCEDURE — 82043 UR ALBUMIN QUANTITATIVE: CPT

## 2022-08-01 PROCEDURE — 80061 LIPID PANEL: CPT

## 2022-08-01 PROCEDURE — 83550 IRON BINDING TEST: CPT

## 2022-08-01 PROCEDURE — 80053 COMPREHEN METABOLIC PANEL: CPT

## 2022-08-01 PROCEDURE — 36415 COLL VENOUS BLD VENIPUNCTURE: CPT

## 2022-08-08 ENCOUNTER — DOCUMENTATION ONLY (OUTPATIENT)
Dept: VASCULAR SURGERY | Age: 62
End: 2022-08-08

## 2022-09-14 ENCOUNTER — OFFICE VISIT (OUTPATIENT)
Dept: VASCULAR SURGERY | Age: 62
End: 2022-09-14
Payer: COMMERCIAL

## 2022-09-14 VITALS
BODY MASS INDEX: 23.7 KG/M2 | WEIGHT: 160 LBS | OXYGEN SATURATION: 98 % | HEIGHT: 69 IN | HEART RATE: 71 BPM | DIASTOLIC BLOOD PRESSURE: 90 MMHG | SYSTOLIC BLOOD PRESSURE: 150 MMHG

## 2022-09-14 DIAGNOSIS — Z89.511 HX OF RIGHT BKA (HCC): Primary | ICD-10-CM

## 2022-09-14 PROCEDURE — 99214 OFFICE O/P EST MOD 30 MIN: CPT | Performed by: STUDENT IN AN ORGANIZED HEALTH CARE EDUCATION/TRAINING PROGRAM

## 2022-09-14 NOTE — PROGRESS NOTES
09/14/22        Berry Gan        History and Physical    Berry Gan is a 57 y.o. male with history of right leg BKA here for follow up up assess progress of the stump and prosthesis. Incision has healed well and there is no evidence of ulceration or pressure wounds. There is a good stump pad and patient is able to use his prosthetic. He feels a lot better since his amputation and is now in better spirits. I will see him again in 6 months for symptoms check. Physical Exam:    Visit Vitals  BP (!) 150/90   Pulse 71   Ht 5' 9\" (1.753 m)   Wt 160 lb (72.6 kg)   SpO2 98%   BMI 23.63 kg/m²      HEENT-PERRLA exactly movements intact, no scleral icterus, mucous membranes pink and moist  Neck-no JVD, no carotid bruits  Heart-regular rate and rhythm no murmurs, rubs or gallops  Chest-clear to auscultation bilaterally no wheezes, rhonchi or rubs  Abdomen-soft, nontender, no palpable organomegaly or masses, no palpable pulsatile masses  Extremities-no clubbing, cyanosis or edema. No wounds or gangrenous changes to the toes or feet. Skin is intact. Feet are pink warm and well-perfused bilaterally  Pulses-carotid, radial, brachial, femoral 2+ bilaterally. Bilateral doppler signals dorsalis pedis, posterior tibial       Past Medical History:   Diagnosis Date    Acute postoperative anemia due to expected blood loss 2/8/2022    Arterial occlusion, lower extremity ([address] 11/27/2021    Cardiac cath 11/09/2011    RCA patent. LM patent. LAD patent. mD1 55%. CX patent. Prior stent patent. Edison 85% (2.5 x 15-mm Xience stent, resid 0%). LVEDP 12. EF 40-45%. High lateral hypk (RI distribution).       Chronic alcohol use     Fri-Sun one fifth; Mon-Thur: Pint of liquor    Constipation     Coronary artery disease involving native coronary artery of native heart     Critical ischemia of lower extremity ([address] 2/8/2022    Depression     Diabetic nephropathy associated with type 2 diabetes mellitus ([address] 4/4/2019 Diabetic neuropathy associated with type 2 diabetes mellitus ([address]     Gastroesophageal reflux disease     High vitamin D level 2/16/2022    Vitamin D 25-Hydroxy (2/16/2022) = 105.7    History of acute inferior wall myocardial infarction 2009    History of coronary angioplasty with insertion of stent 07/29/2009    2.5 x 13 Cypher stent to CX.       History of essential hypertension     History of lacunar cerebrovascular accident     History of vitamin D deficiency 4/15/2018    Hyperlipidemia     Hypertensive retinopathy 12/18/2020    Insomnia 7/2/2018    Long term current use of insulin ([address]     Mediastinal adenopathy 06/25/2015    Migraine headache     Mild nonproliferative diabetic retinopathy of left eye without macular edema associated with type 2 diabetes mellitus ([address] 12/18/2020    Nuclear sclerosis of both eyes 12/18/2020    Otalgia 05/08/2019    Poorly controlled type 2 diabetes mellitus ([address]     HbA1c (2/8/2022) = 13.8    Tobacco use disorder     contemplating stopping     Past Surgical History:   Procedure Laterality Date    COLONOSCOPY N/A 10/28/2020    COLONOSCOPY w/polypectomy performed by Alpesh Watson MD at [facility] Right 02/08/2022    S/P Right below-the-knee amputation (2/8/2022 - Dr. Eder Moya)    [address]  07/29/2009    HX HEART CATHETERIZATION  8/30/2011    HX HEART CATHETERIZATION  11/09/2011    HX WISDOM TEETH EXTRACTION      x4    VASCULAR SURGERY PROCEDURE UNLIST      Multiple vascular surgeries     Patient Active Problem List   Diagnosis Code    Hyperlipidemia E78.5    Tobacco use disorder F17.200    Mediastinal adenopathy R59.0    History of vitamin D deficiency Z86.39    Insomnia G47.00    Diabetic nephropathy associated with type 2 diabetes mellitus (HCC) E11.21    Otalgia H92.09    Mild nonproliferative diabetic retinopathy of left eye without macular edema associated with type 2 diabetes mellitus ([address] B95.2584 Hypertensive retinopathy H35.039    Nuclear sclerosis of both eyes H25.13    Arterial occlusion, lower extremity ([facility]) I70.209    Impaired mobility and ADLs Z74.09, Z78.9    Status post below knee amputation of right lower extremity ([facility]) Z89.511    Chronic alcohol use Z72.89    History of coronary angioplasty with insertion of stent Z95.5    Coronary artery disease involving native coronary artery of native heart I25.10    Constipation K59.00    Poorly controlled type 2 diabetes mellitus ([facility]) E11.65    Long term current use of insulin ([facility]) Z79.4    Migraine headache G43.909    Diabetic neuropathy associated with type 2 diabetes mellitus ([facility]) E11.40    Gastroesophageal reflux disease K21.9    History of essential hypertension Z86.79    History of lacunar cerebrovascular accident Z86.73    History of acute inferior wall myocardial infarction I25.2    Critical ischemia of lower extremity ([facility]) I70.229    Acute postoperative anemia due to expected blood loss D62    Depression F32. A    High vitamin D level E67.3     Current Outpatient Medications   Medication Sig Dispense Refill    cholecalciferol (VITAMIN D3) (50,000 UNITS /1250 MCG) capsule Take 50,000 Units by mouth every seven (7) days. glipiZIDE SR (GLUCOTROL XL) 10 mg CR tablet Take 10 mg by mouth daily. insulin glargine (Basaglar KwikPen U-100 Insulin) 100 unit/mL (3 mL) inpn 25 Units by SubCUTAneous route nightly. 30 Adjustable Dose Pre-filled Pen Syringe 5    polyethylene glycol (MIRALAX) 17 gram/dose powder Take 17 g by mouth daily. 510 g 0    buPROPion SR (WELLBUTRIN SR) 150 mg SR tablet ONE TABLET BY MOUTH ONCE A DAY 30 Tablet 5    thiamine HCL (B-1) 100 mg tablet Take 1 Tablet by mouth daily. 30 Tablet 5    clopidogreL (PLAVIX) 75 mg tab Take 1 Tablet by mouth daily. 30 Tablet 5    aspirin 81 mg chewable tablet Take 1 Tablet by mouth daily. 30 Tablet 5    atorvastatin (LIPITOR) 80 mg tablet Take 80 mg by mouth daily.       traZODone (DESYREL) 50 mg tablet       iron aspgly,[id number]-suc (FERREX 150 FORTE PLUS) [id number] mg-mg-mcg-mg cap cap Take 1 Capsule by mouth two (2) times a day. methocarbamoL (ROBAXIN) 500 mg tablet Take 500 mg by mouth three (3) times daily. gabapentin (NEURONTIN) 300 mg capsule Take 1 Capsule by mouth three (3) times daily. Max Daily Amount: 900 mg. Indications: neuropathic pain, acute pain following an operation 90 Capsule 0    iron polysaccharides (NIFEREX) 150 mg iron capsule Take 1 Capsule by mouth two (2) times a day. Indications: anemia from inadequate iron 30 Capsule 0    melatonin 3 mg tablet Take 1 Tablet by mouth daily (after dinner). Indications: difficulty sleeping 30 Tablet 0    simvastatin (Zocor) 20 mg tablet Take 1 Tablet by mouth nightly. 90 Tablet 1    metFORMIN (GLUCOPHAGE) 1,000 mg tablet TAKE ONE TABLET BY MOUTH TWICE A DAY WITH A MEAL 180 Tablet 2    glucose blood VI test strips (ASCENSIA AUTODISC VI, ONE TOUCH ULTRA TEST VI) strip Provide test strip and glucose meter that insurance will cover - Twice daily-  fasting in the morning and once two hours after a meal. 100 Strip 10    flash glucose sensor (FreeStyle Ezio 2 Sensor) kit Apply 1 sensor every 14 days to check blood sugar as directed at least 3 times daily. 2 Kit 5    multivitamin (ONE A DAY) tablet Take 1 Tablet by mouth daily. 90 Tablet 3     Allergies   Allergen Reactions    Niacin Itching     Social History     Socioeconomic History    Marital status: SINGLE     Spouse name: Not on file    Number of children: Not on file    Years of education: Not on file    Highest education level: Not on file   Occupational History    Not on file   Tobacco Use    Smoking status: Former     Packs/day: 0.50     Years: 1.00     Pack years: 0.50     Types: Cigarettes    Smokeless tobacco: Never   Vaping Use    Vaping Use: Never used   Substance and Sexual Activity    Alcohol use:  Yes     Alcohol/week: 4.2 standard drinks     Types: 5 Shots of liquor per week    Drug use: Not Currently     Types: Marijuana, Cocaine     Comment: none    Sexual activity: Yes   Other Topics Concern    Not on file   Social History Narrative     at [employer]. Social Determinants of Health     Financial Resource Strain: Not on file   Food Insecurity: Not on file   Transportation Needs: Not on file   Physical Activity: Not on file   Stress: Not on file   Social Connections: Not on file   Intimate Partner Violence: Not on file   Housing Stability: Not on file     Family History   Problem Relation Age of Onset    Hypertension Mother     Heart Attack Mother     Diabetes Mother     Hypertension Father     Heart Attack Father     Diabetes Father     Diabetes Sister     Hypertension Sister     Stroke Sister     Diabetes Brother     Hypertension Brother     Stroke Brother     No Known Problems Maternal Grandmother     No Known Problems Maternal Grandfather     No Known Problems Paternal Grandmother     No Known Problems Paternal Grandfather     No Known Problems Brother     Heart Disease Other     Kidney Disease Other        We reviewed the plan with the patient and the patient understands.         Libby Pacheco MD

## 2022-09-26 RX ORDER — CLOPIDOGREL BISULFATE 75 MG/1
TABLET ORAL
Qty: 30 TABLET | Refills: 5 | Status: SHIPPED | OUTPATIENT
Start: 2022-09-26

## 2022-09-26 RX ORDER — GUAIFENESIN 100 MG/5ML
LIQUID (ML) ORAL
Qty: 30 TABLET | Refills: 5 | Status: SHIPPED | OUTPATIENT
Start: 2022-09-26

## 2022-09-28 ENCOUNTER — TELEPHONE (OUTPATIENT)
Dept: FAMILY MEDICINE CLINIC | Age: 62
End: 2022-09-28

## 2022-09-28 ENCOUNTER — OFFICE VISIT (OUTPATIENT)
Dept: FAMILY MEDICINE CLINIC | Age: 62
End: 2022-09-28
Payer: COMMERCIAL

## 2022-09-28 VITALS
DIASTOLIC BLOOD PRESSURE: 81 MMHG | WEIGHT: 170 LBS | HEIGHT: 69 IN | TEMPERATURE: 98.3 F | HEART RATE: 72 BPM | BODY MASS INDEX: 25.18 KG/M2 | RESPIRATION RATE: 16 BRPM | SYSTOLIC BLOOD PRESSURE: 132 MMHG

## 2022-09-28 DIAGNOSIS — Z79.4 CONTROLLED TYPE 2 DIABETES MELLITUS WITH OTHER CIRCULATORY COMPLICATION, WITH LONG-TERM CURRENT USE OF INSULIN (HCC): Primary | ICD-10-CM

## 2022-09-28 DIAGNOSIS — E11.69 HYPERLIPIDEMIA ASSOCIATED WITH TYPE 2 DIABETES MELLITUS (HCC): ICD-10-CM

## 2022-09-28 DIAGNOSIS — E11.59 CONTROLLED TYPE 2 DIABETES MELLITUS WITH OTHER CIRCULATORY COMPLICATION, WITH LONG-TERM CURRENT USE OF INSULIN (HCC): Primary | ICD-10-CM

## 2022-09-28 DIAGNOSIS — I10 ESSENTIAL HYPERTENSION: ICD-10-CM

## 2022-09-28 DIAGNOSIS — I73.9 PAD (PERIPHERAL ARTERY DISEASE) (HCC): ICD-10-CM

## 2022-09-28 DIAGNOSIS — E78.5 HYPERLIPIDEMIA ASSOCIATED WITH TYPE 2 DIABETES MELLITUS (HCC): ICD-10-CM

## 2022-09-28 DIAGNOSIS — Z89.511 HX OF BKA, RIGHT (HCC): ICD-10-CM

## 2022-09-28 DIAGNOSIS — E11.40 TYPE 2 DIABETES MELLITUS WITH DIABETIC NEUROPATHY, WITH LONG-TERM CURRENT USE OF INSULIN (HCC): ICD-10-CM

## 2022-09-28 DIAGNOSIS — Z79.4 TYPE 2 DIABETES MELLITUS WITH DIABETIC NEUROPATHY, WITH LONG-TERM CURRENT USE OF INSULIN (HCC): ICD-10-CM

## 2022-09-28 LAB — HBA1C MFR BLD HPLC: 10.4 %

## 2022-09-28 PROCEDURE — 83036 HEMOGLOBIN GLYCOSYLATED A1C: CPT | Performed by: FAMILY MEDICINE

## 2022-09-28 PROCEDURE — 3046F HEMOGLOBIN A1C LEVEL >9.0%: CPT | Performed by: FAMILY MEDICINE

## 2022-09-28 PROCEDURE — 99214 OFFICE O/P EST MOD 30 MIN: CPT | Performed by: FAMILY MEDICINE

## 2022-09-28 RX ORDER — INSULIN GLARGINE 100 [IU]/ML
40 INJECTION, SOLUTION SUBCUTANEOUS
Qty: 12 ML | Refills: 5 | Status: SHIPPED | OUTPATIENT
Start: 2022-09-28

## 2022-09-28 NOTE — PROGRESS NOTES
Berry Gan presents today for   Chief Complaint   Patient presents with    Diabetes    Hypertension    Cholesterol Problem     High chol    Results     Discuss lab results       Berry Montoyaa preferred language for health care discussion is english/other. Is someone accompanying this pt? no    Is the patient using any DME equipment during [address]? Yes, artificial limb, walker    Depression Screening:  3 most recent PHQ Screens 9/28/2022   PHQ Not Done -   Little interest or pleasure in doing things Several days   Feeling down, depressed, irritable, or hopeless Several days   Total Score PHQ 2 2   Trouble falling or staying asleep, or sleeping too much More than half the days   Feeling tired or having little energy Not at all   Poor appetite, weight loss, or overeating Several days   Feeling bad about yourself - or that you are a failure or have let yourself or your family down Not at all   Trouble concentrating on things such as school, work, reading, or watching TV Not at all   Moving or speaking so slowly that other people could have noticed; or the opposite being so fidgety that others notice Not at all   Thoughts of being better off dead, or hurting yourself in some way Not at all   PHQ 9 Score 5   How difficult have these problems made it for you to do your work, take care of your home and get along with others Somewhat difficult       Learning Assessment:  Learning Assessment 7/11/2022   PRIMARY LEARNER Patient   HIGHEST LEVEL OF EDUCATION - PRIMARY LEARNER  -   BARRIERS PRIMARY LEARNER -   CO-LEARNER CAREGIVER -   PRIMARY LANGUAGE ENGLISH    NEED -   LEARNER PREFERENCE PRIMARY DEMONSTRATION   ANSWERED BY patient   RELATIONSHIP SELF       Abuse Screening:  Abuse Screening Questionnaire 3/11/2022   Do you ever feel afraid of your partner? N   Are you in a relationship with someone who physically or mentally threatens you? N   Is it safe for you to go home?  Y       Generalized Anxiety  No flowsheet data found. Health Maintenance Due   Topic Date Due    COVID-19 Vaccine (1) Never done    Pneumococcal 0-64 years (1 - PCV) Never done    DTaP/Tdap/Td series (1 - Tdap) Never done    Shingrix Vaccine Age 50> (1 of 2) Never done    Foot Exam Q1  07/11/2019    Flu Vaccine (1) Never done   . Health Maintenance reviewed and discussed and ordered per Provider. Coordination of Care:  1. Have you been to the ER, urgent care clinic since your last visit? Hospitalized since your last visit? no    2. Have you seen or consulted any other health care providers outside of the [address] [place] [address] since your last visit? Include any pap smears or colon screening.  no      Advance Directive:  discussed 3/11/22

## 2022-09-28 NOTE — TELEPHONE ENCOUNTER
LVM for Mr. Dawkins to return call patient A1C was elevated in office needs to increase his isulin to 40 units .

## 2022-09-28 NOTE — PROGRESS NOTES
Chief Complaint   Patient presents with    Diabetes    Hypertension    Cholesterol Problem     High chol    Results     Discuss lab results         HPI    Demetrius Encarnacion is a 57 y.o. male presenting today for 3 months  follow up of dm, htn, hld. Patient had labs on 8/1/22. Labs reviewed in detail with patient     Patient does not need medication refills today. New concerns today: none    Pt declines flu shot      Review of Systems   Constitutional: Negative. HENT: Negative. Respiratory: Negative. Cardiovascular: Negative. All other systems reviewed and are negative. Physical Exam  Vitals and nursing note reviewed. Constitutional:       General: He is not in acute distress. Appearance: Normal appearance. HENT:      Head: Normocephalic and atraumatic. Right Ear: External ear normal.      Left Ear: External ear normal.      Nose: Nose normal.   Eyes:      Extraocular Movements: Extraocular movements intact. Conjunctiva/sclera: Conjunctivae normal.   Cardiovascular:      Rate and Rhythm: Normal rate and regular rhythm. Heart sounds: No murmur heard. No friction rub. No gallop. Pulmonary:      Effort: Pulmonary effort is normal.      Breath sounds: Normal breath sounds. No wheezing, rhonchi or rales. Musculoskeletal:         General: Normal range of motion. Cervical back: Normal range of motion. No rigidity. Right Lower Extremity: Right leg is amputated below knee. Skin:     General: Skin is warm and dry. Neurological:      Mental Status: He is alert and oriented to person, place, and time. Coordination: Coordination normal.   Psychiatric:         Mood and Affect: Mood normal.         Behavior: Behavior normal.         Thought Content:  Thought content normal.         Judgment: Judgment normal.     Recent Results (from the past 24 hour(s))   AMB POC HEMOGLOBIN A1C    Collection Time: 09/28/22  9:00 AM   Result Value Ref Range    Hemoglobin A1c (POC) 10.4 %         Diagnoses and all orders for this visit:    1. Controlled type 2 diabetes mellitus with other circulatory complication, with long-term current use of insulin (HCC)  -     AMB POC HEMOGLOBIN S5N  -     METABOLIC PANEL, COMPREHENSIVE; Future  -     LIPID PANEL; Future  -     HEMOGLOBIN A1C WITH EAG; Future  -     MICROALBUMIN, UR, RAND W/ MICROALB/CREAT RATIO; Future  -     insulin glargine (Basaglar KwikPen U-100 Insulin) 100 unit/mL (3 mL) inpn; 40 Units by SubCUTAneous route nightly. 2. Type 2 diabetes mellitus with diabetic neuropathy, with long-term current use of insulin (HCC)  -     insulin glargine (Basaglar KwikPen U-100 Insulin) 100 unit/mL (3 mL) inpn; 40 Units by SubCUTAneous route nightly. 3. Hyperlipidemia associated with type 2 diabetes mellitus (HCC)  -     METABOLIC PANEL, COMPREHENSIVE; Future  -     LIPID PANEL; Future    4. Essential hypertension  -     METABOLIC PANEL, COMPREHENSIVE; Future  -     LIPID PANEL; Future  Stable, cont pres tx plan. 5. PAD (peripheral artery disease) ([facility])  -     METABOLIC PANEL, COMPREHENSIVE; Future  -     LIPID PANEL; Future    6. Hx of BKA, right ([facility]  Stable; care as per vasc surg  Follow-up and Dispositions    Return for diabetes, high blood pressure, high cholesterol, lab review.

## 2022-11-10 DIAGNOSIS — E11.40 TYPE 2 DIABETES MELLITUS WITH DIABETIC NEUROPATHY, WITH LONG-TERM CURRENT USE OF INSULIN (HCC): ICD-10-CM

## 2022-11-10 DIAGNOSIS — E78.00 HYPERCHOLESTEROLEMIA: ICD-10-CM

## 2022-11-10 DIAGNOSIS — Z79.4 TYPE 2 DIABETES MELLITUS WITH DIABETIC NEUROPATHY, WITH LONG-TERM CURRENT USE OF INSULIN (HCC): ICD-10-CM

## 2022-11-10 DIAGNOSIS — R53.83 FATIGUE, UNSPECIFIED TYPE: ICD-10-CM

## 2022-11-10 RX ORDER — SIMVASTATIN 20 MG/1
20 TABLET, FILM COATED ORAL
Qty: 30 TABLET | Refills: 0 | Status: SHIPPED | OUTPATIENT
Start: 2022-11-10

## 2022-11-10 RX ORDER — BISMUTH SUBSALICYLATE 262 MG
1 TABLET,CHEWABLE ORAL DAILY
Qty: 30 TABLET | Refills: 0 | Status: SHIPPED | OUTPATIENT
Start: 2022-11-10

## 2022-11-10 RX ORDER — METFORMIN HYDROCHLORIDE 1000 MG/1
TABLET ORAL
Qty: 60 TABLET | Refills: 0 | Status: SHIPPED | OUTPATIENT
Start: 2022-11-10

## 2022-11-10 NOTE — TELEPHONE ENCOUNTER
Requested Prescriptions     Pending Prescriptions Disp Refills    simvastatin (Zocor) 20 mg tablet 90 Tablet 1     Sig: Take 1 Tablet by mouth nightly. multivitamin (ONE A DAY) tablet 90 Tablet 3     Sig: Take 1 Tablet by mouth daily.     metFORMIN (GLUCOPHAGE) 1,000 mg tablet 180 Tablet 2     Sig: TAKE ONE TABLET BY MOUTH TWICE A DAY WITH A MEAL       Last seen 9/28/22    Next OV 12/23/22   Last fasting labs 08/01/22  Last a1c 10.4

## 2022-11-10 NOTE — TELEPHONE ENCOUNTER
Patient need a medication refill. Please advise     Requested Prescriptions     Pending Prescriptions Disp Refills    simvastatin (Zocor) 20 mg tablet 90 Tablet 1     Sig: Take 1 Tablet by mouth nightly. multivitamin (ONE A DAY) tablet 90 Tablet 3     Sig: Take 1 Tablet by mouth daily.     metFORMIN (GLUCOPHAGE) 1,000 mg tablet 180 Tablet 2     Sig: TAKE ONE TABLET BY MOUTH TWICE A DAY WITH A MEAL

## 2022-12-01 DIAGNOSIS — F32.A MILD DEPRESSION: ICD-10-CM

## 2022-12-01 DIAGNOSIS — F17.200 TOBACCO USE DISORDER: ICD-10-CM

## 2022-12-01 RX ORDER — BUPROPION HYDROCHLORIDE 150 MG/1
TABLET, EXTENDED RELEASE ORAL
Qty: 30 TABLET | Refills: 5 | Status: SHIPPED | OUTPATIENT
Start: 2022-12-01

## 2022-12-01 NOTE — TELEPHONE ENCOUNTER
Received faxed refill request ([facility])    Last seen 9/28/22,  next appt 12/23/22.     Requested Prescriptions     Pending Prescriptions Disp Refills    buPROPion SR (WELLBUTRIN SR) 150 mg SR tablet 30 Tablet 5     Sig: ONE TABLET BY MOUTH ONCE A DAY

## 2022-12-09 RX ORDER — LANOLIN ALCOHOL/MO/W.PET/CERES
CREAM (GRAM) TOPICAL
Qty: 30 TABLET | Refills: 5 | Status: SHIPPED | OUTPATIENT
Start: 2022-12-09

## 2022-12-23 ENCOUNTER — OFFICE VISIT (OUTPATIENT)
Dept: FAMILY MEDICINE CLINIC | Age: 62
End: 2022-12-23
Payer: COMMERCIAL

## 2022-12-23 VITALS
OXYGEN SATURATION: 100 % | HEIGHT: 69 IN | SYSTOLIC BLOOD PRESSURE: 130 MMHG | BODY MASS INDEX: 25.18 KG/M2 | WEIGHT: 170 LBS | DIASTOLIC BLOOD PRESSURE: 79 MMHG | RESPIRATION RATE: 17 BRPM | HEART RATE: 85 BPM | TEMPERATURE: 97.9 F

## 2022-12-23 DIAGNOSIS — Z28.21 INFLUENZA VACCINATION DECLINED: ICD-10-CM

## 2022-12-23 DIAGNOSIS — E11.40 TYPE 2 DIABETES MELLITUS WITH DIABETIC NEUROPATHY, WITH LONG-TERM CURRENT USE OF INSULIN (HCC): Primary | ICD-10-CM

## 2022-12-23 DIAGNOSIS — E78.5 HYPERLIPIDEMIA ASSOCIATED WITH TYPE 2 DIABETES MELLITUS (HCC): ICD-10-CM

## 2022-12-23 DIAGNOSIS — Z71.85 VACCINE COUNSELING: ICD-10-CM

## 2022-12-23 DIAGNOSIS — I10 ESSENTIAL HYPERTENSION: ICD-10-CM

## 2022-12-23 DIAGNOSIS — Z02.79 MEDICAL CERTIFICATE ISSUANCE: ICD-10-CM

## 2022-12-23 DIAGNOSIS — E11.69 HYPERLIPIDEMIA ASSOCIATED WITH TYPE 2 DIABETES MELLITUS (HCC): ICD-10-CM

## 2022-12-23 DIAGNOSIS — E11.59 CONTROLLED TYPE 2 DIABETES MELLITUS WITH OTHER CIRCULATORY COMPLICATION, WITH LONG-TERM CURRENT USE OF INSULIN (HCC): ICD-10-CM

## 2022-12-23 DIAGNOSIS — I73.9 PAD (PERIPHERAL ARTERY DISEASE) (HCC): ICD-10-CM

## 2022-12-23 DIAGNOSIS — Z79.4 CONTROLLED TYPE 2 DIABETES MELLITUS WITH OTHER CIRCULATORY COMPLICATION, WITH LONG-TERM CURRENT USE OF INSULIN (HCC): ICD-10-CM

## 2022-12-23 DIAGNOSIS — Z79.4 TYPE 2 DIABETES MELLITUS WITH DIABETIC NEUROPATHY, WITH LONG-TERM CURRENT USE OF INSULIN (HCC): Primary | ICD-10-CM

## 2022-12-23 NOTE — PROGRESS NOTES
Chief Complaint   Patient presents with    Cholesterol Problem    Hypertension    Diabetes    Other     PAD         HPI    Ky Monreal is a 57 y.o. male presenting today for 3 months  follow up of diabetes, hypertension, HLD. Pt reports that his sugars are variable. He admits to eating a lot of soul food. Patient had labs  at [facility]. Labs not available for review. Patient does not need medication refills today. New concerns today: none    Pt has not yet had the bivalent covid booster. Pt declines flu shot today. Pt does not have a handicapped parking placard but would like to have one. Review of Systems   Constitutional: Negative. HENT: Negative. Respiratory: Negative. Cardiovascular: Negative. All other systems reviewed and are negative. Physical Exam  Vitals and nursing note reviewed. Constitutional:       General: He is not in acute distress. Appearance: Normal appearance. HENT:      Head: Normocephalic and atraumatic. Right Ear: External ear normal.      Left Ear: External ear normal.      Nose: Nose normal.   Eyes:      Extraocular Movements: Extraocular movements intact. Conjunctiva/sclera: Conjunctivae normal.   Cardiovascular:      Rate and Rhythm: Normal rate and regular rhythm. Heart sounds: No murmur heard. No friction rub. No gallop. Pulmonary:      Effort: Pulmonary effort is normal.      Breath sounds: Normal breath sounds. No wheezing, rhonchi or rales. Musculoskeletal:         General: Normal range of motion. Cervical back: Normal range of motion. No rigidity. Skin:     General: Skin is warm and dry. Neurological:      Mental Status: He is alert and oriented to person, place, and time. Coordination: Coordination normal.   Psychiatric:         Mood and Affect: Mood normal.         Behavior: Behavior normal.         Thought Content:  Thought content normal.         Judgment: Judgment normal.       Diagnoses and all orders for this visit:    1. Type 2 diabetes mellitus with diabetic neuropathy, with long-term current use of insulin ([facility]  2. Controlled type 2 diabetes mellitus with other circulatory complication, with long-term current use of insulin ([facility]  Await labs    3. Hyperlipidemia associated with type 2 diabetes mellitus ([facility]  Await labs    4. Essential hypertension  Stable, cont pres tx plan. 5. PAD (peripheral artery disease) ([facility]  Care as per vasc surg    6. Influenza vaccination declined    7. Vaccine counseling  Bivalent covid booster recommended. 8. Medical certificate issuance  DMV forms completed and given to patient today    Follow-up and Dispositions    Return in about 3 months (around 3/23/2023) for dm, HTN, HLD, lab review.

## 2022-12-23 NOTE — PROGRESS NOTES
Milton Renee presents today for   Chief Complaint   Patient presents with    Cholesterol Problem    Hypertension    Diabetes    Other     PAD       Is someone accompanying this pt? no    Is the patient using any DME equipment during OV? Patient uses walker     Depression Screening:  3 most recent PHQ Screens 12/23/2022   PHQ Not Done -   Little interest or pleasure in doing things Not at all   Feeling down, depressed, irritable, or hopeless Not at all   Total Score PHQ 2 0   Trouble falling or staying asleep, or sleeping too much -   Feeling tired or having little energy -   Poor appetite, weight loss, or overeating -   Feeling bad about yourself - or that you are a failure or have let yourself or your family down -   Trouble concentrating on things such as school, work, reading, or watching TV -   Moving or speaking so slowly that other people could have noticed; or the opposite being so fidgety that others notice -   Thoughts of being better off dead, or hurting yourself in some way -   PHQ 9 Score -   How difficult have these problems made it for you to do your work, take care of your home and get along with others -       Learning Assessment:  Learning Assessment 7/11/2022   PRIMARY LEARNER Patient   HIGHEST LEVEL OF EDUCATION - PRIMARY LEARNER  -   BARRIERS PRIMARY LEARNER -   CO-LEARNER CAREGIVER -   PRIMARY LANGUAGE ENGLISH    NEED -   LEARNER PREFERENCE PRIMARY DEMONSTRATION   ANSWERED BY patient   RELATIONSHIP SELF       Fall Risk  Fall Risk Assessment, last 12 mths 10/13/2020   Able to walk? Yes   Fall in past 12 months? No       ADL  No flowsheet data found. Travel Screening:    Travel Screening       Question Response    In the last 10 days, have you been in contact with someone who was confirmed or suspected to have Coronavirus/COVID-19? No / Unsure    Have you had a COVID-19 viral test in the last 10 days? No    Do you have any of the following new or worsening symptoms?  None of these    Have you traveled internationally or domestically in the last month? No          Travel History   Travel since 11/23/22    No documented travel since 11/23/22         Health Maintenance reviewed and discussed and ordered per Provider. Health Maintenance Due   Topic Date Due    COVID-19 Vaccine (1) Never done    Pneumococcal 0-64 years (1 - PCV) Never done    DTaP/Tdap/Td series (1 - Tdap) Never done    Shingles Vaccine (1 of 2) Never done    Foot Exam Q1  07/11/2019    Flu Vaccine (1) Never done    Eye Exam Retinal or Dilated  12/10/2022    A1C test (Diabetic or Prediabetic)  12/28/2022   . Coordination of Care:  1. Have you been to the ER, urgent care clinic since your last visit? Hospitalized since your last visit? no    2. Have you seen or consulted any other health care providers outside of the [address] [place] [address] since your last visit? Include any pap smears or colon screening.  no

## 2023-04-06 RX ORDER — CLOPIDOGREL BISULFATE 75 MG/1
TABLET ORAL
Qty: 30 TABLET | Refills: 1 | Status: SHIPPED | OUTPATIENT
Start: 2023-04-06

## 2023-04-06 RX ORDER — ASPIRIN 81 MG
TABLET,CHEWABLE ORAL
Qty: 30 TABLET | Refills: 1 | Status: SHIPPED | OUTPATIENT
Start: 2023-04-06

## 2023-05-03 ASSESSMENT — ENCOUNTER SYMPTOMS: RESPIRATORY NEGATIVE: 1

## 2023-05-04 ENCOUNTER — OFFICE VISIT (OUTPATIENT)
Facility: CLINIC | Age: 63
End: 2023-05-04
Payer: COMMERCIAL

## 2023-05-04 VITALS
DIASTOLIC BLOOD PRESSURE: 78 MMHG | SYSTOLIC BLOOD PRESSURE: 127 MMHG | WEIGHT: 167 LBS | RESPIRATION RATE: 16 BRPM | HEART RATE: 67 BPM | TEMPERATURE: 97.6 F | OXYGEN SATURATION: 98 % | BODY MASS INDEX: 24.66 KG/M2

## 2023-05-04 DIAGNOSIS — F32.A DEPRESSION, UNSPECIFIED DEPRESSION TYPE: ICD-10-CM

## 2023-05-04 DIAGNOSIS — I10 ESSENTIAL (PRIMARY) HYPERTENSION: ICD-10-CM

## 2023-05-04 DIAGNOSIS — I25.10 CORONARY ARTERY DISEASE INVOLVING NATIVE CORONARY ARTERY OF NATIVE HEART, UNSPECIFIED WHETHER ANGINA PRESENT: ICD-10-CM

## 2023-05-04 DIAGNOSIS — E78.5 HYPERLIPIDEMIA ASSOCIATED WITH TYPE 2 DIABETES MELLITUS (HCC): ICD-10-CM

## 2023-05-04 DIAGNOSIS — E11.69 HYPERLIPIDEMIA ASSOCIATED WITH TYPE 2 DIABETES MELLITUS (HCC): ICD-10-CM

## 2023-05-04 DIAGNOSIS — I70.209 ARTERIAL OCCLUSION, LOWER EXTREMITY (HCC): ICD-10-CM

## 2023-05-04 DIAGNOSIS — E11.40 TYPE 2 DIABETES MELLITUS WITH DIABETIC NEUROPATHY, WITH LONG-TERM CURRENT USE OF INSULIN (HCC): Primary | ICD-10-CM

## 2023-05-04 DIAGNOSIS — E11.59 TYPE 2 DIABETES MELLITUS WITH OTHER CIRCULATORY COMPLICATIONS (HCC): ICD-10-CM

## 2023-05-04 DIAGNOSIS — Z12.5 SCREENING FOR MALIGNANT NEOPLASM OF PROSTATE: ICD-10-CM

## 2023-05-04 DIAGNOSIS — D64.9 ANEMIA, UNSPECIFIED TYPE: ICD-10-CM

## 2023-05-04 DIAGNOSIS — Z79.4 TYPE 2 DIABETES MELLITUS WITH DIABETIC NEUROPATHY, WITH LONG-TERM CURRENT USE OF INSULIN (HCC): Primary | ICD-10-CM

## 2023-05-04 PROCEDURE — 3078F DIAST BP <80 MM HG: CPT | Performed by: FAMILY MEDICINE

## 2023-05-04 PROCEDURE — 99214 OFFICE O/P EST MOD 30 MIN: CPT | Performed by: FAMILY MEDICINE

## 2023-05-04 PROCEDURE — 3074F SYST BP LT 130 MM HG: CPT | Performed by: FAMILY MEDICINE

## 2023-05-04 RX ORDER — BUPROPION HYDROCHLORIDE 150 MG/1
TABLET, EXTENDED RELEASE ORAL
Qty: 30 TABLET | Refills: 5 | Status: SHIPPED | OUTPATIENT
Start: 2023-05-04

## 2023-05-04 RX ORDER — CLOPIDOGREL BISULFATE 75 MG/1
75 TABLET ORAL DAILY
Qty: 30 TABLET | Refills: 5 | Status: SHIPPED | OUTPATIENT
Start: 2023-05-04

## 2023-05-04 RX ORDER — INSULIN GLARGINE 100 [IU]/ML
40 INJECTION, SOLUTION SUBCUTANEOUS NIGHTLY
Qty: 5 ADJUSTABLE DOSE PRE-FILLED PEN SYRINGE | Refills: 5 | Status: SHIPPED | OUTPATIENT
Start: 2023-05-04

## 2023-05-04 RX ORDER — ASPIRIN 81 MG/1
TABLET, CHEWABLE ORAL
Qty: 30 TABLET | Refills: 5 | Status: SHIPPED | OUTPATIENT
Start: 2023-05-04

## 2023-05-04 RX ORDER — SIMVASTATIN 20 MG
20 TABLET ORAL NIGHTLY
Qty: 30 TABLET | Refills: 5 | Status: SHIPPED | OUTPATIENT
Start: 2023-05-04

## 2023-05-04 RX ORDER — GLIPIZIDE 10 MG/1
10 TABLET, FILM COATED, EXTENDED RELEASE ORAL DAILY
Qty: 30 TABLET | Refills: 5 | Status: SHIPPED | OUTPATIENT
Start: 2023-05-04

## 2023-05-04 NOTE — PROGRESS NOTES
Chief Complaint   Patient presents with    Diabetes    Hypertension    Cholesterol Problem    Discuss Labs        There were no vitals filed for this visit. Depression: Not at risk    PHQ-2 Score: 0        No flowsheet data found. Laura Christianson \"Have you been to the ER, urgent care clinic since your last visit? Hospitalized since your last visit? \" No     2. \"Have you seen or consulted any other health care providers outside of the [address] [place] [address] since your last visit? \" no     3. For patients aged 39-70: Has the patient had a colonoscopy / FIT/ Cologuard?  Yes

## 2023-05-04 NOTE — PROGRESS NOTES
ASSESSMENT/PLAN:  1. Type 2 diabetes mellitus with diabetic neuropathy, with long-term current use of insulin (HCC)  -     glipiZIDE (GLUCOTROL XL) 10 MG extended release tablet; Take 1 tablet by mouth daily, Disp-30 tablet, R-5Normal  -     insulin glargine (BASAGLAR KWIKPEN) 100 UNIT/ML injection pen; Inject 40 Units into the skin nightly, Disp-5 Adjustable Dose Pre-filled Pen Syringe, R-5Normal  -     metFORMIN (GLUCOPHAGE) 1000 MG tablet; Take 1 tablet by mouth 2 times daily (with meals) with meals, Disp-60 tablet, R-5Normal  -     Comprehensive Metabolic Panel; Future  -     Lipid Panel; Future  -     Hemoglobin A1C; Future  -     Microalbumin / Creatinine Urine Ratio; Future  2. Type 2 diabetes mellitus with other circulatory complications (HCC)  -     Comprehensive Metabolic Panel; Future  -     Lipid Panel; Future  -     Hemoglobin A1C; Future  -     Microalbumin / Creatinine Urine Ratio; Future  3. Essential (primary) hypertension  -     Comprehensive Metabolic Panel; Future  -     Lipid Panel; Future  4. Hyperlipidemia associated with type 2 diabetes mellitus (HCC)  -     aspirin (ASPIRIN LOW DOSE) 81 MG chewable tablet; CHEW ONE TABLET BY MOUTH DAILY, Disp-30 tablet, R-5Normal  -     simvastatin (ZOCOR) 20 MG tablet; Take 1 tablet by mouth nightly, Disp-30 tablet, R-5Normal  -     Comprehensive Metabolic Panel; Future  -     Lipid Panel; Future  5. Depression, unspecified depression type  -     buPROPion (WELLBUTRIN SR) 150 MG extended release tablet; ONE TABLET BY MOUTH ONCE A DAY, Disp-30 tablet, R-5Normal  6. Arterial occlusion, lower extremity (HCC)  -     clopidogrel (PLAVIX) 75 MG tablet; Take 1 tablet by mouth daily, Disp-30 tablet, R-5Normal  7. Coronary artery disease involving native coronary artery of native heart, unspecified whether angina present  -     Comprehensive Metabolic Panel; Future  -     Lipid Panel; Future  8.  Screening for malignant neoplasm of prostate  -     PSA Screening;

## 2023-07-17 ENCOUNTER — APPOINTMENT (OUTPATIENT)
Facility: HOSPITAL | Age: 63
End: 2023-07-17
Payer: COMMERCIAL

## 2023-07-17 ENCOUNTER — HOSPITAL ENCOUNTER (INPATIENT)
Facility: HOSPITAL | Age: 63
LOS: 4 days | Discharge: HOME HEALTH | End: 2023-07-21
Attending: EMERGENCY MEDICINE | Admitting: STUDENT IN AN ORGANIZED HEALTH CARE EDUCATION/TRAINING PROGRAM
Payer: COMMERCIAL

## 2023-07-17 DIAGNOSIS — R41.82 ALTERED MENTAL STATUS, UNSPECIFIED ALTERED MENTAL STATUS TYPE: Primary | ICD-10-CM

## 2023-07-17 DIAGNOSIS — E11.10 DIABETIC KETOACIDOSIS WITHOUT COMA ASSOCIATED WITH TYPE 2 DIABETES MELLITUS (HCC): ICD-10-CM

## 2023-07-17 DIAGNOSIS — E87.5 HYPERKALEMIA: ICD-10-CM

## 2023-07-17 DIAGNOSIS — I63.9 ACUTE CVA (CEREBROVASCULAR ACCIDENT) (HCC): ICD-10-CM

## 2023-07-17 PROBLEM — E10.10 DKA, TYPE 1, NOT AT GOAL (HCC): Status: ACTIVE | Noted: 2023-07-17

## 2023-07-17 PROBLEM — E13.10 DIABETIC KETOACIDOSIS WITHOUT COMA ASSOCIATED WITH OTHER SPECIFIED DIABETES MELLITUS (HCC): Status: ACTIVE | Noted: 2023-07-17

## 2023-07-17 LAB
ALBUMIN SERPL-MCNC: 3.5 G/DL (ref 3.4–5)
ALBUMIN/GLOB SERPL: 0.9 (ref 0.8–1.7)
ALP SERPL-CCNC: 99 U/L (ref 45–117)
ALT SERPL-CCNC: 24 U/L (ref 16–61)
AMPHET UR QL SCN: NEGATIVE
ANION GAP BLD CALC-SCNC: ABNORMAL (ref 10–20)
ANION GAP SERPL CALC-SCNC: 22 MMOL/L (ref 3–18)
ANION GAP SERPL CALC-SCNC: 24 MMOL/L (ref 3–18)
AST SERPL-CCNC: 22 U/L (ref 10–38)
BARBITURATES UR QL SCN: NEGATIVE
BASE DEFICIT BLD-SCNC: 17.4 MMOL/L
BASOPHILS # BLD: 0 K/UL (ref 0–0.1)
BASOPHILS NFR BLD: 0 % (ref 0–2)
BENZODIAZ UR QL: NEGATIVE
BILIRUB SERPL-MCNC: 0.4 MG/DL (ref 0.2–1)
BUN SERPL-MCNC: 64 MG/DL (ref 7–18)
BUN SERPL-MCNC: 67 MG/DL (ref 7–18)
BUN/CREAT SERPL: 24 (ref 12–20)
BUN/CREAT SERPL: 24 (ref 12–20)
CA-I BLD-MCNC: 1.13 MMOL/L (ref 1.12–1.32)
CALCIUM SERPL-MCNC: 9.1 MG/DL (ref 8.5–10.1)
CALCIUM SERPL-MCNC: 9.2 MG/DL (ref 8.5–10.1)
CANNABINOIDS UR QL SCN: NEGATIVE
CHLORIDE BLD-SCNC: 118 MMOL/L (ref 98–107)
CHLORIDE SERPL-SCNC: 104 MMOL/L (ref 100–111)
CHLORIDE SERPL-SCNC: 113 MMOL/L (ref 100–111)
CK SERPL-CCNC: 946 U/L (ref 39–308)
CO2 BLD-SCNC: 10 MMOL/L (ref 19–24)
CO2 SERPL-SCNC: 10 MMOL/L (ref 21–32)
CO2 SERPL-SCNC: 11 MMOL/L (ref 21–32)
COCAINE UR QL SCN: NEGATIVE
CREAT BLD-MCNC: 1.56 MG/DL (ref 0.6–1.3)
CREAT SERPL-MCNC: 2.67 MG/DL (ref 0.6–1.3)
CREAT SERPL-MCNC: 2.79 MG/DL (ref 0.6–1.3)
DIFFERENTIAL METHOD BLD: ABNORMAL
EOSINOPHIL # BLD: 0 K/UL (ref 0–0.4)
EOSINOPHIL NFR BLD: 0 % (ref 0–5)
ERYTHROCYTE [DISTWIDTH] IN BLOOD BY AUTOMATED COUNT: 14.2 % (ref 11.6–14.5)
EST. AVERAGE GLUCOSE BLD GHB EST-MCNC: ABNORMAL MG/DL
ETHANOL SERPL-MCNC: <3 MG/DL (ref 0–3)
GLOBULIN SER CALC-MCNC: 4.1 G/DL (ref 2–4)
GLUCOSE BLD STRIP.AUTO-MCNC: 279 MG/DL (ref 70–110)
GLUCOSE BLD STRIP.AUTO-MCNC: 338 MG/DL (ref 70–110)
GLUCOSE BLD STRIP.AUTO-MCNC: 508 MG/DL (ref 70–110)
GLUCOSE BLD STRIP.AUTO-MCNC: 588 MG/DL (ref 70–110)
GLUCOSE BLD STRIP.AUTO-MCNC: >600 MG/DL (ref 70–110)
GLUCOSE BLD STRIP.AUTO-MCNC: >600 MG/DL (ref 70–110)
GLUCOSE BLD-MCNC: >700 MG/DL (ref 65–100)
GLUCOSE SERPL-MCNC: 693 MG/DL (ref 74–99)
GLUCOSE SERPL-MCNC: 927 MG/DL (ref 74–99)
HBA1C MFR BLD: >14 % (ref 4.2–5.6)
HCO3 BLD-SCNC: 10.1 MMOL/L (ref 22–26)
HCT VFR BLD AUTO: 39 % (ref 36–48)
HGB BLD-MCNC: 12.4 G/DL (ref 13–16)
IMM GRANULOCYTES # BLD AUTO: 0.2 K/UL (ref 0–0.04)
IMM GRANULOCYTES NFR BLD AUTO: 2 % (ref 0–0.5)
LACTATE BLD-SCNC: 1.64 MMOL/L (ref 0.4–2)
LACTATE SERPL-SCNC: 2.1 MMOL/L (ref 0.4–2)
LYMPHOCYTES # BLD: 0.7 K/UL (ref 0.9–3.6)
LYMPHOCYTES NFR BLD: 6 % (ref 21–52)
Lab: NORMAL
MAGNESIUM SERPL-MCNC: 3.5 MG/DL (ref 1.6–2.6)
MCH RBC QN AUTO: 28.4 PG (ref 24–34)
MCHC RBC AUTO-ENTMCNC: 31.8 G/DL (ref 31–37)
MCV RBC AUTO: 89.2 FL (ref 78–100)
METHADONE UR QL: NEGATIVE
MONOCYTES # BLD: 0.6 K/UL (ref 0.05–1.2)
MONOCYTES NFR BLD: 6 % (ref 3–10)
NEUTS SEG # BLD: 8.7 K/UL (ref 1.8–8)
NEUTS SEG NFR BLD: 86 % (ref 40–73)
NRBC # BLD: 0 K/UL (ref 0–0.01)
NRBC BLD-RTO: 0 PER 100 WBC
OPIATES UR QL: NEGATIVE
PCO2 BLDV: 29 MMHG (ref 41–51)
PCP UR QL: NEGATIVE
PH BLDV: 7.15 (ref 7.32–7.42)
PH BLDV: 7.19 (ref 7.32–7.42)
PHOSPHATE SERPL-MCNC: 6.3 MG/DL (ref 2.5–4.9)
PLATELET # BLD AUTO: 273 K/UL (ref 135–420)
PMV BLD AUTO: 12.7 FL (ref 9.2–11.8)
PO2 BLDV: 33 MMHG (ref 25–40)
POTASSIUM BLD-SCNC: 6.4 MMOL/L (ref 3.5–5.1)
POTASSIUM SERPL-SCNC: 5 MMOL/L (ref 3.5–5.5)
POTASSIUM SERPL-SCNC: 6.3 MMOL/L (ref 3.5–5.5)
PROT SERPL-MCNC: 7.6 G/DL (ref 6.4–8.2)
RBC # BLD AUTO: 4.37 M/UL (ref 4.35–5.65)
SAO2 % BLD: 48 %
SERVICE CMNT-IMP: ABNORMAL
SODIUM BLD-SCNC: 139 MMOL/L (ref 136–145)
SODIUM SERPL-SCNC: 138 MMOL/L (ref 136–145)
SODIUM SERPL-SCNC: 146 MMOL/L (ref 136–145)
SPECIMEN SITE: ABNORMAL
TROPONIN I SERPL HS-MCNC: 43 NG/L (ref 0–78)
WBC # BLD AUTO: 10.1 K/UL (ref 4.6–13.2)

## 2023-07-17 PROCEDURE — 82077 ASSAY SPEC XCP UR&BREATH IA: CPT

## 2023-07-17 PROCEDURE — 83605 ASSAY OF LACTIC ACID: CPT

## 2023-07-17 PROCEDURE — 84484 ASSAY OF TROPONIN QUANT: CPT

## 2023-07-17 PROCEDURE — 6360000002 HC RX W HCPCS: Performed by: STUDENT IN AN ORGANIZED HEALTH CARE EDUCATION/TRAINING PROGRAM

## 2023-07-17 PROCEDURE — 2580000003 HC RX 258

## 2023-07-17 PROCEDURE — 80053 COMPREHEN METABOLIC PANEL: CPT

## 2023-07-17 PROCEDURE — 82947 ASSAY GLUCOSE BLOOD QUANT: CPT

## 2023-07-17 PROCEDURE — 80307 DRUG TEST PRSMV CHEM ANLYZR: CPT

## 2023-07-17 PROCEDURE — 83735 ASSAY OF MAGNESIUM: CPT

## 2023-07-17 PROCEDURE — 82330 ASSAY OF CALCIUM: CPT

## 2023-07-17 PROCEDURE — 6370000000 HC RX 637 (ALT 250 FOR IP)

## 2023-07-17 PROCEDURE — 82010 KETONE BODYS QUAN: CPT

## 2023-07-17 PROCEDURE — 87086 URINE CULTURE/COLONY COUNT: CPT

## 2023-07-17 PROCEDURE — 71045 X-RAY EXAM CHEST 1 VIEW: CPT

## 2023-07-17 PROCEDURE — 81001 URINALYSIS AUTO W/SCOPE: CPT

## 2023-07-17 PROCEDURE — 84132 ASSAY OF SERUM POTASSIUM: CPT

## 2023-07-17 PROCEDURE — 96374 THER/PROPH/DIAG INJ IV PUSH: CPT

## 2023-07-17 PROCEDURE — 70450 CT HEAD/BRAIN W/O DYE: CPT

## 2023-07-17 PROCEDURE — 85025 COMPLETE CBC W/AUTO DIFF WBC: CPT

## 2023-07-17 PROCEDURE — 1100000000 HC RM PRIVATE

## 2023-07-17 PROCEDURE — 82962 GLUCOSE BLOOD TEST: CPT

## 2023-07-17 PROCEDURE — 84100 ASSAY OF PHOSPHORUS: CPT

## 2023-07-17 PROCEDURE — 83036 HEMOGLOBIN GLYCOSYLATED A1C: CPT

## 2023-07-17 PROCEDURE — 84295 ASSAY OF SERUM SODIUM: CPT

## 2023-07-17 PROCEDURE — 96361 HYDRATE IV INFUSION ADD-ON: CPT

## 2023-07-17 PROCEDURE — 82550 ASSAY OF CK (CPK): CPT

## 2023-07-17 PROCEDURE — 82800 BLOOD PH: CPT

## 2023-07-17 PROCEDURE — 93005 ELECTROCARDIOGRAM TRACING: CPT

## 2023-07-17 PROCEDURE — 99285 EMERGENCY DEPT VISIT HI MDM: CPT

## 2023-07-17 PROCEDURE — 82803 BLOOD GASES ANY COMBINATION: CPT

## 2023-07-17 RX ORDER — 0.9 % SODIUM CHLORIDE 0.9 %
15 INTRAVENOUS SOLUTION INTRAVENOUS ONCE
Status: DISCONTINUED | OUTPATIENT
Start: 2023-07-17 | End: 2023-07-17 | Stop reason: SDUPTHER

## 2023-07-17 RX ORDER — HEPARIN SODIUM 5000 [USP'U]/ML
5000 INJECTION, SOLUTION INTRAVENOUS; SUBCUTANEOUS EVERY 8 HOURS SCHEDULED
Status: DISCONTINUED | OUTPATIENT
Start: 2023-07-17 | End: 2023-07-21 | Stop reason: HOSPADM

## 2023-07-17 RX ORDER — 0.9 % SODIUM CHLORIDE 0.9 %
1000 INTRAVENOUS SOLUTION INTRAVENOUS ONCE
Status: DISCONTINUED | OUTPATIENT
Start: 2023-07-17 | End: 2023-07-17

## 2023-07-17 RX ORDER — DEXTROSE AND SODIUM CHLORIDE 5; .45 G/100ML; G/100ML
INJECTION, SOLUTION INTRAVENOUS CONTINUOUS PRN
Status: DISCONTINUED | OUTPATIENT
Start: 2023-07-17 | End: 2023-07-17 | Stop reason: SDUPTHER

## 2023-07-17 RX ORDER — SODIUM CHLORIDE 9 MG/ML
INJECTION, SOLUTION INTRAVENOUS CONTINUOUS
Status: DISCONTINUED | OUTPATIENT
Start: 2023-07-17 | End: 2023-07-18

## 2023-07-17 RX ORDER — SODIUM CHLORIDE 9 MG/ML
INJECTION, SOLUTION INTRAVENOUS CONTINUOUS
Status: DISCONTINUED | OUTPATIENT
Start: 2023-07-17 | End: 2023-07-17 | Stop reason: SDUPTHER

## 2023-07-17 RX ORDER — MAGNESIUM SULFATE 1 G/100ML
1000 INJECTION INTRAVENOUS PRN
Status: DISCONTINUED | OUTPATIENT
Start: 2023-07-17 | End: 2023-07-17

## 2023-07-17 RX ORDER — POLYETHYLENE GLYCOL 3350 17 G/17G
17 POWDER, FOR SOLUTION ORAL DAILY PRN
Status: DISCONTINUED | OUTPATIENT
Start: 2023-07-17 | End: 2023-07-21 | Stop reason: HOSPADM

## 2023-07-17 RX ORDER — DEXTROSE AND SODIUM CHLORIDE 5; .45 G/100ML; G/100ML
INJECTION, SOLUTION INTRAVENOUS CONTINUOUS PRN
Status: DISCONTINUED | OUTPATIENT
Start: 2023-07-17 | End: 2023-07-21 | Stop reason: HOSPADM

## 2023-07-17 RX ORDER — 0.9 % SODIUM CHLORIDE 0.9 %
15 INTRAVENOUS SOLUTION INTRAVENOUS ONCE
Status: COMPLETED | OUTPATIENT
Start: 2023-07-17 | End: 2023-07-17

## 2023-07-17 RX ORDER — POTASSIUM CHLORIDE 7.45 MG/ML
10 INJECTION INTRAVENOUS PRN
Status: DISCONTINUED | OUTPATIENT
Start: 2023-07-17 | End: 2023-07-17

## 2023-07-17 RX ADMIN — INSULIN HUMAN 10.8 UNITS/HR: 1 INJECTION, SOLUTION INTRAVENOUS at 18:02

## 2023-07-17 RX ADMIN — INSULIN HUMAN 13.1 UNITS/HR: 1 INJECTION, SOLUTION INTRAVENOUS at 23:44

## 2023-07-17 RX ADMIN — SODIUM CHLORIDE 1034 ML: 900 INJECTION, SOLUTION INTRAVENOUS at 17:59

## 2023-07-17 RX ADMIN — HEPARIN SODIUM 5000 UNITS: 5000 INJECTION INTRAVENOUS; SUBCUTANEOUS at 22:23

## 2023-07-17 RX ADMIN — INSULIN HUMAN 6 UNITS: 100 INJECTION, SOLUTION PARENTERAL at 18:01

## 2023-07-17 RX ADMIN — SODIUM CHLORIDE: 900 INJECTION, SOLUTION INTRAVENOUS at 19:34

## 2023-07-17 NOTE — ED TRIAGE NOTES
Patient was seen by the daughter lying on the floor in his room with altered mental status. Patient is currently very sleepy and difficulty to alert.

## 2023-07-17 NOTE — DISCHARGE INSTRUCTIONS

## 2023-07-17 NOTE — ED PROVIDER NOTES
EMERGENCY DEPARTMENT HISTORY AND PHYSICAL EXAM      Patient Name: Lazara Matos  MRN: [id number]  YOB: 1960  Provider: Rodri Hernandez PA-C  PCP: [place] MD   Time/Date of evaluation: 4:18 PM EDT on 7/17/23    History of Presenting Illness     Chief Complaint   Patient presents with    Altered Mental Status       History Provided By: EMS and Patient's Daughter     History Sourav Chambers):   Lazara Matos is a 70 y.o. male with a PMHX of of diabetes, hyperlipidemia, hypertension, depression, alcohol abuse  who presents to the emergency department  by EMS C/O altered mental status onset unknown. Per patient's daughter she states that she checks on him regularly every day and the last time she saw him was Friday and he was acting normally. However, today she went over his house and noticed that he was lying on the ground and difficult to awake. She is unsure of any trauma or any drug use. Per EMS, initial glucometer reading was low, and they gave patient D10 in route, then repeat glucometer was very high. Patient is more alert than on arrival.  Per EMS patient has poor living conditions.          Past History     Past Medical History:  Past Medical History:   Diagnosis Date    Acute postoperative anemia due to expected blood loss 2/8/2022    Arterial occlusion, lower extremity (HCC) 11/27/2021    Chronic alcohol use     Fri-Sun one fifth; Mon-Thur: Pint of liquor    Constipation     Coronary artery disease involving native coronary artery of native heart     Critical ischemia of lower extremity ([address]) 2/8/2022    Depression     Diabetic nephropathy associated with type 2 diabetes mellitus ([address]) 4/4/2019    Diabetic neuropathy associated with type 2 diabetes mellitus ([address])     Gastroesophageal reflux disease     High vitamin D level 2/16/2022    Vitamin D 25-Hydroxy (2/16/2022) = 105.7    History of acute inferior wall myocardial infarction 2009    History of coronary angioplasty with Hospitalist are pending BMP, for admission, lab was drawn and lab was called. [CD]   2025 Has agreed to admit patient due to improving BMP. Orders have been placed. [CD]      ED Course User Index  [CD] Radha Romero PA-C       Medical Decision Making       39-year-old male with history of type 2 diabetes, hypertension,, hyperlipidemia, depression, alcohol abuse presented via EMS for altered mental status. On physical exam patient was alert to self and time, but was very lethargic, and disoriented, however was able to follow basic commands, had no signs of respiratory distress, and overall was hemodynamically stable. Patient initial blood sugar was significant for being over 700, and venous blood gas was significant for metabolic acidosis, patient had a potassium of 6.3  with peak T waves on EKG. Patient was given initial bolus of insulin and 1 L of fluids. Patient was immediately started on glucose stabilizer. Patient's lab work was also significant for elevation in his creatinine, lactic acidosis. Appears that his DKA is coming from noncompliance per daughter, and there was no signs of obvious infection, and patient also had a normal troponin. Patient needs to be admitted for further stabilization/evaluation.             Disposition Considerations : admission       Critical Care Time:     Critical Care Time:  The services I provided to this patient were to treat and/or prevent clinically significant deterioration that could result in the failure of one or more body systems and/or organ systems due to DKA      Services included the following:  -reviewing nursing notes and old charts  -vital sign assessments  -direct patient care  -medication orders and management  -interpreting and reviewing diagnostic studies/labs  -re-evaluations  -documentation time    Aggregate critical care time was 30 minutes, which includes only time during which I was engaged in work directly related to the patient's care as

## 2023-07-18 ENCOUNTER — APPOINTMENT (OUTPATIENT)
Facility: HOSPITAL | Age: 63
End: 2023-07-18
Payer: COMMERCIAL

## 2023-07-18 PROBLEM — N17.9 ARF (ACUTE RENAL FAILURE) (HCC): Status: ACTIVE | Noted: 2023-07-18

## 2023-07-18 PROBLEM — E87.0 HYPERNATREMIA: Status: ACTIVE | Noted: 2023-07-18

## 2023-07-18 LAB
ANION GAP SERPL CALC-SCNC: 3 MMOL/L (ref 3–18)
ANION GAP SERPL CALC-SCNC: 7 MMOL/L (ref 3–18)
APPEARANCE UR: CLEAR
BACTERIA URNS QL MICRO: NEGATIVE /HPF
BASOPHILS # BLD: 0 K/UL (ref 0–0.1)
BASOPHILS NFR BLD: 0 % (ref 0–2)
BILIRUB UR QL: NEGATIVE
BUN SERPL-MCNC: 48 MG/DL (ref 7–18)
BUN SERPL-MCNC: 54 MG/DL (ref 7–18)
BUN/CREAT SERPL: 24 (ref 12–20)
BUN/CREAT SERPL: 24 (ref 12–20)
CALCIUM SERPL-MCNC: 8.7 MG/DL (ref 8.5–10.1)
CALCIUM SERPL-MCNC: 9.2 MG/DL (ref 8.5–10.1)
CHLORIDE SERPL-SCNC: 122 MMOL/L (ref 100–111)
CHLORIDE SERPL-SCNC: 128 MMOL/L (ref 100–111)
CHLORIDE UR-SCNC: 62 MMOL/L (ref 55–125)
CO2 SERPL-SCNC: 23 MMOL/L (ref 21–32)
CO2 SERPL-SCNC: 27 MMOL/L (ref 21–32)
COLOR UR: YELLOW
CREAT SERPL-MCNC: 2.04 MG/DL (ref 0.6–1.3)
CREAT SERPL-MCNC: 2.29 MG/DL (ref 0.6–1.3)
CREAT UR-MCNC: 64 MG/DL (ref 30–125)
DIFFERENTIAL METHOD BLD: ABNORMAL
EKG ATRIAL RATE: 100 BPM
EKG DIAGNOSIS: NORMAL
EKG P AXIS: 40 DEGREES
EKG P-R INTERVAL: 162 MS
EKG Q-T INTERVAL: 370 MS
EKG QRS DURATION: 94 MS
EKG QTC CALCULATION (BAZETT): 477 MS
EKG R AXIS: 69 DEGREES
EKG T AXIS: 248 DEGREES
EKG VENTRICULAR RATE: 100 BPM
EOSINOPHIL # BLD: 0 K/UL (ref 0–0.4)
EOSINOPHIL NFR BLD: 0 % (ref 0–5)
EPITH CASTS URNS QL MICRO: NEGATIVE /LPF (ref 0–5)
ERYTHROCYTE [DISTWIDTH] IN BLOOD BY AUTOMATED COUNT: 14 % (ref 11.6–14.5)
EST. AVERAGE GLUCOSE BLD GHB EST-MCNC: ABNORMAL MG/DL
GLUCOSE BLD STRIP.AUTO-MCNC: 102 MG/DL (ref 70–110)
GLUCOSE BLD STRIP.AUTO-MCNC: 130 MG/DL (ref 70–110)
GLUCOSE BLD STRIP.AUTO-MCNC: 165 MG/DL (ref 70–110)
GLUCOSE BLD STRIP.AUTO-MCNC: 224 MG/DL (ref 70–110)
GLUCOSE BLD STRIP.AUTO-MCNC: 235 MG/DL (ref 70–110)
GLUCOSE BLD STRIP.AUTO-MCNC: 268 MG/DL (ref 70–110)
GLUCOSE BLD STRIP.AUTO-MCNC: 308 MG/DL (ref 70–110)
GLUCOSE BLD STRIP.AUTO-MCNC: 345 MG/DL (ref 70–110)
GLUCOSE BLD STRIP.AUTO-MCNC: 350 MG/DL (ref 70–110)
GLUCOSE BLD STRIP.AUTO-MCNC: 372 MG/DL (ref 70–110)
GLUCOSE BLD STRIP.AUTO-MCNC: 402 MG/DL (ref 70–110)
GLUCOSE BLD STRIP.AUTO-MCNC: 77 MG/DL (ref 70–110)
GLUCOSE SERPL-MCNC: 410 MG/DL (ref 74–99)
GLUCOSE SERPL-MCNC: 78 MG/DL (ref 74–99)
GLUCOSE UR STRIP.AUTO-MCNC: >1000 MG/DL
HBA1C MFR BLD: >14 % (ref 4.2–5.6)
HCT VFR BLD AUTO: 36.2 % (ref 36–48)
HGB BLD-MCNC: 12.2 G/DL (ref 13–16)
HGB UR QL STRIP: ABNORMAL
IMM GRANULOCYTES # BLD AUTO: 0.3 K/UL (ref 0–0.04)
IMM GRANULOCYTES NFR BLD AUTO: 2 % (ref 0–0.5)
KETONES UR QL STRIP.AUTO: 80 MG/DL
LEUKOCYTE ESTERASE UR QL STRIP.AUTO: NEGATIVE
LYMPHOCYTES # BLD: 1.4 K/UL (ref 0.9–3.6)
LYMPHOCYTES NFR BLD: 11 % (ref 21–52)
MAGNESIUM SERPL-MCNC: 3.1 MG/DL (ref 1.6–2.6)
MAGNESIUM SERPL-MCNC: 3.1 MG/DL (ref 1.6–2.6)
MCH RBC QN AUTO: 28.6 PG (ref 24–34)
MCHC RBC AUTO-ENTMCNC: 33.7 G/DL (ref 31–37)
MCV RBC AUTO: 84.8 FL (ref 78–100)
MONOCYTES # BLD: 1.8 K/UL (ref 0.05–1.2)
MONOCYTES NFR BLD: 13 % (ref 3–10)
NEUTS SEG # BLD: 10 K/UL (ref 1.8–8)
NEUTS SEG NFR BLD: 74 % (ref 40–73)
NITRITE UR QL STRIP.AUTO: NEGATIVE
NRBC # BLD: 0 K/UL (ref 0–0.01)
NRBC BLD-RTO: 0 PER 100 WBC
OSMOLALITY SERPL: 367 MOSM/KG H2O (ref 280–300)
PH UR STRIP: 5 (ref 5–8)
PHOSPHATE SERPL-MCNC: 2.1 MG/DL (ref 2.5–4.9)
PHOSPHATE SERPL-MCNC: 2.2 MG/DL (ref 2.5–4.9)
PLATELET # BLD AUTO: 263 K/UL (ref 135–420)
PMV BLD AUTO: 12 FL (ref 9.2–11.8)
POTASSIUM SERPL-SCNC: 3.8 MMOL/L (ref 3.5–5.5)
POTASSIUM SERPL-SCNC: 4.6 MMOL/L (ref 3.5–5.5)
POTASSIUM UR-SCNC: 42 MMOL/L (ref 12–62)
PROT UR STRIP-MCNC: 30 MG/DL
RBC # BLD AUTO: 4.27 M/UL (ref 4.35–5.65)
RBC #/AREA URNS HPF: NEGATIVE /HPF (ref 0–5)
SODIUM SERPL-SCNC: 152 MMOL/L (ref 136–145)
SODIUM SERPL-SCNC: 158 MMOL/L (ref 136–145)
SODIUM UR-SCNC: 23 MMOL/L (ref 20–110)
SP GR UR REFRACTOMETRY: 1.03 (ref 1–1.03)
UROBILINOGEN UR QL STRIP.AUTO: 0.2 EU/DL (ref 0.2–1)
WBC # BLD AUTO: 13.6 K/UL (ref 4.6–13.2)
WBC URNS QL MICRO: NEGATIVE /HPF (ref 0–4)

## 2023-07-18 PROCEDURE — 83930 ASSAY OF BLOOD OSMOLALITY: CPT

## 2023-07-18 PROCEDURE — 84133 ASSAY OF URINE POTASSIUM: CPT

## 2023-07-18 PROCEDURE — 2580000003 HC RX 258: Performed by: INTERNAL MEDICINE

## 2023-07-18 PROCEDURE — 94761 N-INVAS EAR/PLS OXIMETRY MLT: CPT

## 2023-07-18 PROCEDURE — 82962 GLUCOSE BLOOD TEST: CPT

## 2023-07-18 PROCEDURE — 6370000000 HC RX 637 (ALT 250 FOR IP): Performed by: STUDENT IN AN ORGANIZED HEALTH CARE EDUCATION/TRAINING PROGRAM

## 2023-07-18 PROCEDURE — 93010 ELECTROCARDIOGRAM REPORT: CPT | Performed by: INTERNAL MEDICINE

## 2023-07-18 PROCEDURE — 70551 MRI BRAIN STEM W/O DYE: CPT

## 2023-07-18 PROCEDURE — 76770 US EXAM ABDO BACK WALL COMP: CPT

## 2023-07-18 PROCEDURE — 82436 ASSAY OF URINE CHLORIDE: CPT

## 2023-07-18 PROCEDURE — 80048 BASIC METABOLIC PNL TOTAL CA: CPT

## 2023-07-18 PROCEDURE — 85025 COMPLETE CBC W/AUTO DIFF WBC: CPT

## 2023-07-18 PROCEDURE — 84300 ASSAY OF URINE SODIUM: CPT

## 2023-07-18 PROCEDURE — 83935 ASSAY OF URINE OSMOLALITY: CPT

## 2023-07-18 PROCEDURE — 2580000003 HC RX 258

## 2023-07-18 PROCEDURE — 99233 SBSQ HOSP IP/OBS HIGH 50: CPT | Performed by: NURSE PRACTITIONER

## 2023-07-18 PROCEDURE — 36415 COLL VENOUS BLD VENIPUNCTURE: CPT

## 2023-07-18 PROCEDURE — 82570 ASSAY OF URINE CREATININE: CPT

## 2023-07-18 PROCEDURE — 6370000000 HC RX 637 (ALT 250 FOR IP): Performed by: INTERNAL MEDICINE

## 2023-07-18 PROCEDURE — 84100 ASSAY OF PHOSPHORUS: CPT

## 2023-07-18 PROCEDURE — 2580000003 HC RX 258: Performed by: STUDENT IN AN ORGANIZED HEALTH CARE EDUCATION/TRAINING PROGRAM

## 2023-07-18 PROCEDURE — 2140000001 HC CVICU INTERMEDIATE R&B

## 2023-07-18 PROCEDURE — 6360000002 HC RX W HCPCS: Performed by: STUDENT IN AN ORGANIZED HEALTH CARE EDUCATION/TRAINING PROGRAM

## 2023-07-18 PROCEDURE — 83735 ASSAY OF MAGNESIUM: CPT

## 2023-07-18 RX ORDER — ACETAMINOPHEN 650 MG/1
650 SUPPOSITORY RECTAL EVERY 6 HOURS PRN
Status: DISCONTINUED | OUTPATIENT
Start: 2023-07-18 | End: 2023-07-21 | Stop reason: HOSPADM

## 2023-07-18 RX ORDER — ACETAMINOPHEN 325 MG/1
650 TABLET ORAL EVERY 6 HOURS PRN
Status: DISCONTINUED | OUTPATIENT
Start: 2023-07-18 | End: 2023-07-21 | Stop reason: HOSPADM

## 2023-07-18 RX ORDER — INSULIN LISPRO 100 [IU]/ML
0-4 INJECTION, SOLUTION INTRAVENOUS; SUBCUTANEOUS NIGHTLY
Status: DISCONTINUED | OUTPATIENT
Start: 2023-07-18 | End: 2023-07-18

## 2023-07-18 RX ORDER — INSULIN GLARGINE 100 [IU]/ML
10 INJECTION, SOLUTION SUBCUTANEOUS 2 TIMES DAILY
Status: DISCONTINUED | OUTPATIENT
Start: 2023-07-18 | End: 2023-07-18

## 2023-07-18 RX ORDER — SODIUM CHLORIDE 450 MG/100ML
INJECTION, SOLUTION INTRAVENOUS CONTINUOUS
Status: DISCONTINUED | OUTPATIENT
Start: 2023-07-18 | End: 2023-07-19

## 2023-07-18 RX ORDER — INSULIN GLARGINE 100 [IU]/ML
20 INJECTION, SOLUTION SUBCUTANEOUS DAILY
Status: DISCONTINUED | OUTPATIENT
Start: 2023-07-19 | End: 2023-07-21 | Stop reason: HOSPADM

## 2023-07-18 RX ORDER — INSULIN GLARGINE 100 [IU]/ML
10 INJECTION, SOLUTION SUBCUTANEOUS ONCE
Status: COMPLETED | OUTPATIENT
Start: 2023-07-18 | End: 2023-07-18

## 2023-07-18 RX ORDER — ONDANSETRON 2 MG/ML
4 INJECTION INTRAMUSCULAR; INTRAVENOUS EVERY 6 HOURS PRN
Status: DISCONTINUED | OUTPATIENT
Start: 2023-07-18 | End: 2023-07-21 | Stop reason: HOSPADM

## 2023-07-18 RX ORDER — ONDANSETRON 4 MG/1
4 TABLET, ORALLY DISINTEGRATING ORAL EVERY 8 HOURS PRN
Status: DISCONTINUED | OUTPATIENT
Start: 2023-07-18 | End: 2023-07-21 | Stop reason: HOSPADM

## 2023-07-18 RX ORDER — SODIUM CHLORIDE, SODIUM LACTATE, POTASSIUM CHLORIDE, CALCIUM CHLORIDE 600; 310; 30; 20 MG/100ML; MG/100ML; MG/100ML; MG/100ML
INJECTION, SOLUTION INTRAVENOUS CONTINUOUS
Status: DISCONTINUED | OUTPATIENT
Start: 2023-07-18 | End: 2023-07-18

## 2023-07-18 RX ORDER — HEPARIN SODIUM 5000 [USP'U]/ML
5000 INJECTION, SOLUTION INTRAVENOUS; SUBCUTANEOUS EVERY 8 HOURS SCHEDULED
Status: DISCONTINUED | OUTPATIENT
Start: 2023-07-18 | End: 2023-07-18 | Stop reason: SDUPTHER

## 2023-07-18 RX ORDER — SODIUM CHLORIDE 9 MG/ML
INJECTION, SOLUTION INTRAVENOUS CONTINUOUS
Status: DISCONTINUED | OUTPATIENT
Start: 2023-07-18 | End: 2023-07-18

## 2023-07-18 RX ORDER — DEXTROSE MONOHYDRATE 100 MG/ML
INJECTION, SOLUTION INTRAVENOUS CONTINUOUS PRN
Status: DISCONTINUED | OUTPATIENT
Start: 2023-07-18 | End: 2023-07-21 | Stop reason: HOSPADM

## 2023-07-18 RX ORDER — SODIUM CHLORIDE 9 MG/ML
INJECTION, SOLUTION INTRAVENOUS PRN
Status: DISCONTINUED | OUTPATIENT
Start: 2023-07-18 | End: 2023-07-21 | Stop reason: HOSPADM

## 2023-07-18 RX ORDER — INSULIN LISPRO 100 [IU]/ML
0-8 INJECTION, SOLUTION INTRAVENOUS; SUBCUTANEOUS
Status: DISCONTINUED | OUTPATIENT
Start: 2023-07-18 | End: 2023-07-18

## 2023-07-18 RX ORDER — INSULIN LISPRO 100 [IU]/ML
0-8 INJECTION, SOLUTION INTRAVENOUS; SUBCUTANEOUS
Status: DISCONTINUED | OUTPATIENT
Start: 2023-07-18 | End: 2023-07-21 | Stop reason: HOSPADM

## 2023-07-18 RX ORDER — SODIUM CHLORIDE 0.9 % (FLUSH) 0.9 %
5-40 SYRINGE (ML) INJECTION PRN
Status: DISCONTINUED | OUTPATIENT
Start: 2023-07-18 | End: 2023-07-21 | Stop reason: HOSPADM

## 2023-07-18 RX ORDER — SODIUM CHLORIDE 0.9 % (FLUSH) 0.9 %
5-40 SYRINGE (ML) INJECTION EVERY 12 HOURS SCHEDULED
Status: DISCONTINUED | OUTPATIENT
Start: 2023-07-18 | End: 2023-07-21 | Stop reason: HOSPADM

## 2023-07-18 RX ORDER — INSULIN LISPRO 100 [IU]/ML
5 INJECTION, SOLUTION INTRAVENOUS; SUBCUTANEOUS
Status: DISCONTINUED | OUTPATIENT
Start: 2023-07-18 | End: 2023-07-21 | Stop reason: HOSPADM

## 2023-07-18 RX ADMIN — HEPARIN SODIUM 5000 UNITS: 5000 INJECTION INTRAVENOUS; SUBCUTANEOUS at 14:12

## 2023-07-18 RX ADMIN — INSULIN GLARGINE 10 UNITS: 100 INJECTION, SOLUTION SUBCUTANEOUS at 08:03

## 2023-07-18 RX ADMIN — INSULIN GLARGINE 10 UNITS: 100 INJECTION, SOLUTION SUBCUTANEOUS at 12:33

## 2023-07-18 RX ADMIN — SODIUM CHLORIDE: 4.5 INJECTION, SOLUTION INTRAVENOUS at 22:01

## 2023-07-18 RX ADMIN — DEXTROSE AND SODIUM CHLORIDE: 5; 450 INJECTION, SOLUTION INTRAVENOUS at 01:59

## 2023-07-18 RX ADMIN — SODIUM CHLORIDE: 4.5 INJECTION, SOLUTION INTRAVENOUS at 12:41

## 2023-07-18 RX ADMIN — INSULIN LISPRO 8 UNITS: 100 INJECTION, SOLUTION INTRAVENOUS; SUBCUTANEOUS at 12:32

## 2023-07-18 RX ADMIN — HEPARIN SODIUM 5000 UNITS: 5000 INJECTION INTRAVENOUS; SUBCUTANEOUS at 06:53

## 2023-07-18 RX ADMIN — INSULIN LISPRO 5 UNITS: 100 INJECTION, SOLUTION INTRAVENOUS; SUBCUTANEOUS at 12:33

## 2023-07-18 RX ADMIN — INSULIN LISPRO 5 UNITS: 100 INJECTION, SOLUTION INTRAVENOUS; SUBCUTANEOUS at 17:25

## 2023-07-18 RX ADMIN — SODIUM CHLORIDE, PRESERVATIVE FREE 10 ML: 5 INJECTION INTRAVENOUS at 08:13

## 2023-07-18 RX ADMIN — INSULIN LISPRO 6 UNITS: 100 INJECTION, SOLUTION INTRAVENOUS; SUBCUTANEOUS at 08:03

## 2023-07-18 RX ADMIN — HEPARIN SODIUM 5000 UNITS: 5000 INJECTION INTRAVENOUS; SUBCUTANEOUS at 21:08

## 2023-07-18 RX ADMIN — INSULIN LISPRO 2 UNITS: 100 INJECTION, SOLUTION INTRAVENOUS; SUBCUTANEOUS at 17:25

## 2023-07-18 NOTE — ED NOTES
100 [facility] care of pt. Pt in DKA per report. Pt needing insulin drip started. A&Ox0.     2230 Pt ripped out both IVS. IVS replaced and fluids/insulin drip continued.  Sites wrapped in [address] [place] [address]  07/17/23 0593

## 2023-07-18 NOTE — PROGRESS NOTES
[facility] Hospitalist Group  Progress Note    Patient: Soheila Ng Age: 70 y.o. : 1960 MR#: [id number] SSN: [id number]  Date: 2023         Assessment/Plan:   Diabetic Ketoacidosis     - Off of insulin gtt    - Hold oral hypoglycemics     - Lantus 20 units and bolus     - Continue SSI     - Continue IVF    Acute Metabolic Encephalopathy     - Possibly related to DKA    - CT head shows not acute intracranial pathology     Acute Kidney Injury with electrolyte abnormalities    - Continue IVF    - Renally dose medications    - Avoid nephrotoxins, hypotension and NSAIDs    - Consulted nephrology, appreciate assistance       Hospital Problems             Last Modified POA    * (Principal) Diabetic ketoacidosis without coma associated with other specified diabetes mellitus ([address]) 2023 Yes    DKA, type 1, not at goal [facility]) 2023 Yes    ARF (acute renal failure) ([address]) 2023 Yes    Hypernatremia 2023 Yes           DVT Prophylaxis:  []Lovenox  [x]Hep SQ  []SCDs  []Coumadin   []On Heparin gtt []PO anticoagulant    Anticipated discharge: > 2 days    Subjective:     Pt s/e @ bedside. No major events overnight. Pt offers no complaints this AM. Denies CP, SOB, cough, abd pain, nvd, headache, dysuria. Hospital Course:   Mr Tracy Jennings is a 70year old male with a past medical history significant for hyperlipidemia, hypertension, depression, alcohol abuse, nonadherence with medication, right BKA. Patient presented to the emergency department with altered mental status. EMS found patient and blood glucose was low so he got D10 in route to the hospital, patient was started on IV insulin after arrival due to repeat blood sugar being very high. Patient has been off of insulin drip today and given Lantus subcu. He continues to have acute kidney injury with electrolyte abnormalities so nephrology was consulted for assistance.   We will continue IV fluid and monitoring matter differentiation. CSF spaces normal size for age. Bones look normal without acute fracture. Visualized paranasal sinuses free of significant mucosal disease. 1. New hypodensity centered at the left basal ganglia compared to prior study 10/21/2021, consistent with age indeterminant lacunar infarct, potentially subacute to chronic. Consider MRI brain for further evaluation. -There is mild diffusely increased attenuation throughout the left basal ganglia, favored to represent mineralization. 2. Mild burden of presumed chronic small vessel ischemic changes. Chronic lacunar infarct in the right basal ganglia. XR CHEST PORTABLE    Result Date: 7/17/2023  EXAM: Chest Radiograph INDICATION:  AMS TECHNIQUE: AP view of the chest COMPARISON: 2022. FINDINGS: No pneumothorax identified. The lungs are clear. No infiltrates appreciated. No effusions identified. The cardiomediastinal silhouette is unremarkable. The pulmonary vasculature is unremarkable. The osseous structures are unremarkable. 1.  No acute cardiopulmonary process. I have personally reviewed all pertinent labs, films and EKGs that have officially resulted. I reviewed available electronic documentation outlining the initial presentation as well as the emergency room physician's encounter. 50 minutes.      Dottie Vargas, KAITLIN-BC, [facility]

## 2023-07-18 NOTE — PROGRESS NOTES
Consult Note    Patient: Bessy Aranda               Sex: male          DOA: 7/17/2023         YOB: 1960      Age:  70 y.o.        LOS:  LOS: 1 day              HPI:     Bessy Aranda is a 70 y.o. male who has been seen on consultation for renal failure. Patient by this time is in the stepdown bed feels slightly better. Can communicate to some extent but states that he has known type 2 diabetes mellitus for long time. And also has some kidney issue but has not seen any kidney doctor also admits that he has taken NSAIDs off and on for very unspecified period of time. His baseline creatinine is not known but but lab check going back as back as July 2019 and during that time serum creatinine was 1.07 and in November of that year creatinine was 1.1    As per the chart patient was brought to the emergency room by EMS with altered mental status. By reviewing the chart found that the patient has history of hypertension type 2 diabetes mellitus hyperlipidemia depression alcohol abuse and was on metformin as well as glipizide. No urinary complaint no fall no fever no chills. As per the EMS note patient lives very poor environment. As per the chart glucometer reading initially was very low then was given 10 and subsequently the blood sugar was quite high. Patient urine was positive for ketone and being treated with as of DKA with insulin drip. Subsequent was found the patient has hyponatremia also. Currently off insulin drip and was started on lactated Holden's which is changed to half-normal saline given his hyponatremia. His medical history includes history of hypertension type 2 diabetes mellitus hyperlipidemia alcohol-related problem and a right BKA. Not sure whether he is on any H2 receptor blocker or ACE inhibitor or not and whether the patient has any diabetic nephropathy or not. On admission his blood sugar was more than 900,  Point-of-care glucose is 224 gradually decreasing.     Past

## 2023-07-18 NOTE — PROGRESS NOTES
8661 Received report from [employer] [facility]. Patient alert and oriented to self. 0930 Patient awake, denies any pain. 1500 Patient off the floor to Ultrasound. 1700 MRI checklist done and faxed to MRI. 1920 Bedside shift change report given to [facility] [employer] [facility] (oncoming nurse). Report included the following information Nurse handoff report.

## 2023-07-18 NOTE — H&P
History & Physical    Patient: Stanislav Benites MRN: [id number]  CSN: [id number]    YOB: 1960  Age: 70 y.o. Sex: male             DOA: 7/17/2023    Chief Complaint:   Chief Complaint   Patient presents with    Altered Mental Status          HPI:  History taken by chart ED note review   Stanislav Benites is a 70 y.o.  male with PMH of diabetes noncompliant with medication, right BKA, hyperlipidemia, hypertension, depression, alcohol abuse admitted for DKA after he presented with complaining of altered mental status of unknown onset. As per ED report, ED provider spoke with the daughter and last time she was 1 that was Friday was acting normal but today she went to the house and he was laying in the ground and difficult to awake but when EMS had arrived and he is Lizbet Muse doing was low and he got D10 in the route, repeat blood sugar was very high. ED course  Vital signs stable  Blood work significant for blood sugar 927, K6.3, creatinine 2.79, CO2 10, gap 24, lactate 2.4  He got IV bolus and tenderness, insulin    When I saw him alert oriented x3 (he knows his name, date of birth and place )but confused unable to get much information.   Discussed with ED provider and will admit the patient to [facility] for DKA for more than 2 midnights    Past Medical History:   Diagnosis Date    Acute postoperative anemia due to expected blood loss 2/8/2022    Arterial occlusion, lower extremity (HCC) 11/27/2021    Chronic alcohol use     Fri-Sun one fifth; Mon-Thur: Pint of liquor    Constipation     Coronary artery disease involving native coronary artery of native heart     Critical ischemia of lower extremity ([address]) 2/8/2022    Depression     Diabetic nephropathy associated with type 2 diabetes mellitus ([address]) 4/4/2019    Diabetic neuropathy associated with type 2 diabetes mellitus (HCC)     Gastroesophageal reflux disease     High vitamin D PROCEDURE:   Video-fluoroscopy swallowing study. 

 

CLINICAL INDICATION:   Dysphagia. 

 

TECHNIQUE:   Fluoroscopic guided video swallowing study was done in conjunction with the speech ther
apist. The study was confined to the oral, pharyngeal, and cervical phases of the swallowing mechani
sm. 1.7 minutes of fluoroscopy time was used.

 

COMPARISON:   No prior study is available for comparison. 

 

FINDINGS:

There is no evidence of aspiration during the exam. However, there is penetration with nectar-thick 
liquids by straw and thin liquids by straw.  The patient has a large amount of residue within the va
llecula after swallowing.

 

IMPRESSION:

1.  Penetration.  Large amount of residue within the vallecula after swallowing.  No aspiration.

2.  Please refer to the speech therapist's recommendations for future feedings.  

 

RPTAT: QQ

_____________________________________________ 

.Chinmay Peter MD, MD           Date    Time 

Electronically viewed and signed by .Chinmay Peter MD, MD on 02/14/2017 16:57 

 

D:  02/14/2017 16:57  T:  02/14/2017 16:57

.R/ care.    Alan Cannon MD  7/17/2023 8:48 PM    Disclaimer: Sections of this note are dictated using utilizing voice recognition software. Minor typographical errors may be present. If questions arise, please do not hesitate to contact me or call our department.

## 2023-07-18 NOTE — PROGRESS NOTES
Acute on CRF with DKA & Hypernatremia. Currently of Insulin drip. Will Change IVF to 1/2 NS at 125 cc/hour. May have underlying Diabetic Nephropathy. Avoid Nephrotoxins. Adjust medicine dose as per renal function  Do not restart any Metformin,Oral hypoglycemic Medicine. Will follow.

## 2023-07-18 NOTE — PROGRESS NOTES
: Bedside shift change report given to ALMA ROSA Munguia (oncoming nurse) by Zoe Barnes (offgoing nurse). Report included the following information SBAR, Kardex, Intake/Output, MAR, Recent Results, and Cardiac Rhythm NSR . Wound Prevention Checklist    Patient: Negrita Hackett (76 y.o. male)  Date: 2023  Diagnosis: Hyperkalemia [E87.5]  DKA, type 1, not at goal [facility]) [E10.10]  Altered mental status, unspecified altered mental status type [R41.82]  Diabetic ketoacidosis without coma associated with type 2 diabetes mellitus ([address]) [E11.10]  Diabetic ketoacidosis without coma associated with other specified diabetes mellitus ([address]) [E13.10] Diabetic ketoacidosis without coma associated with other specified diabetes mellitus ([address])    Precautions:         []  Heel prevention boots placed on patient    []  Patient turned q2h during shift    []  Lift team ordered    []  Patient on [facility] bed/Specialty bed    []  Each Wound is documented during shift (Stage, Color, drainage, odor, measurements, and dressings)    [x]  Dual skin check done with Mar Zamora RN     7646: Shift assessment done. V/S obtained. Pt resting quietly in bed. A/Ox2; disoriented to time and situation. No c/o pain or SOB at this time. 99% SpO2 on RA. Patient oriented to room and call light. Patient aware to use call light to communicate any pain or needs. : Pt went down for MRI.     : Scheduled med given. B; No insulin coverage given per protocol. : New bag of IVF given. 0000: No changes from previous assessment. Pt sleeping; easily awaken. No distress noted at this time. Call light within reach. 0200: Pt asleep; easily aroused. 0445: No changes from previous assessment. Pt resting in bed. No distress noted at this time. BP: 191/96; will notify MD. Bathed pt. Incontinence and roger care done. No complaints. Call light within reach. 0500: BP: 191/96; Rechecked other hand BP: 185/90.  MD

## 2023-07-19 LAB
ALBUMIN SERPL-MCNC: 2.8 G/DL (ref 3.4–5)
ALBUMIN/GLOB SERPL: 0.7 (ref 0.8–1.7)
ALP SERPL-CCNC: 78 U/L (ref 45–117)
ALT SERPL-CCNC: 21 U/L (ref 16–61)
ANION GAP SERPL CALC-SCNC: 5 MMOL/L (ref 3–18)
AST SERPL-CCNC: 35 U/L (ref 10–38)
B-OH-BUTYR SERPL-SCNC: >4.42 MMOL/L
BACTERIA SPEC CULT: NORMAL
BASOPHILS # BLD: 0 K/UL (ref 0–0.1)
BASOPHILS NFR BLD: 0 % (ref 0–2)
BILIRUB SERPL-MCNC: 1.1 MG/DL (ref 0.2–1)
BUN SERPL-MCNC: 26 MG/DL (ref 7–18)
BUN/CREAT SERPL: 19 (ref 12–20)
CALCIUM SERPL-MCNC: 8.5 MG/DL (ref 8.5–10.1)
CHLORIDE SERPL-SCNC: 117 MMOL/L (ref 100–111)
CO2 SERPL-SCNC: 26 MMOL/L (ref 21–32)
CREAT SERPL-MCNC: 1.34 MG/DL (ref 0.6–1.3)
DIFFERENTIAL METHOD BLD: ABNORMAL
EOSINOPHIL # BLD: 0 K/UL (ref 0–0.4)
EOSINOPHIL NFR BLD: 0 % (ref 0–5)
ERYTHROCYTE [DISTWIDTH] IN BLOOD BY AUTOMATED COUNT: 14.4 % (ref 11.6–14.5)
GLOBULIN SER CALC-MCNC: 3.8 G/DL (ref 2–4)
GLUCOSE BLD STRIP.AUTO-MCNC: 153 MG/DL (ref 70–110)
GLUCOSE BLD STRIP.AUTO-MCNC: 171 MG/DL (ref 70–110)
GLUCOSE BLD STRIP.AUTO-MCNC: 187 MG/DL (ref 70–110)
GLUCOSE BLD STRIP.AUTO-MCNC: 212 MG/DL (ref 70–110)
GLUCOSE SERPL-MCNC: 176 MG/DL (ref 74–99)
HCT VFR BLD AUTO: 34 % (ref 36–48)
HGB BLD-MCNC: 11 G/DL (ref 13–16)
IMM GRANULOCYTES # BLD AUTO: 0.1 K/UL (ref 0–0.04)
IMM GRANULOCYTES NFR BLD AUTO: 1 % (ref 0–0.5)
LYMPHOCYTES # BLD: 1.3 K/UL (ref 0.9–3.6)
LYMPHOCYTES NFR BLD: 18 % (ref 21–52)
MAGNESIUM SERPL-MCNC: 2.3 MG/DL (ref 1.6–2.6)
MAGNESIUM SERPL-MCNC: 2.3 MG/DL (ref 1.6–2.6)
MAGNESIUM SERPL-MCNC: 2.4 MG/DL (ref 1.6–2.6)
MAGNESIUM SERPL-MCNC: 2.4 MG/DL (ref 1.6–2.6)
MAGNESIUM SERPL-MCNC: 2.9 MG/DL (ref 1.6–2.6)
MCH RBC QN AUTO: 28.3 PG (ref 24–34)
MCHC RBC AUTO-ENTMCNC: 32.4 G/DL (ref 31–37)
MCV RBC AUTO: 87.4 FL (ref 78–100)
MONOCYTES # BLD: 0.5 K/UL (ref 0.05–1.2)
MONOCYTES NFR BLD: 6 % (ref 3–10)
NEUTS SEG # BLD: 5.5 K/UL (ref 1.8–8)
NEUTS SEG NFR BLD: 75 % (ref 40–73)
NRBC # BLD: 0 K/UL (ref 0–0.01)
NRBC BLD-RTO: 0 PER 100 WBC
PHOSPHATE SERPL-MCNC: 1.5 MG/DL (ref 2.5–4.9)
PHOSPHATE SERPL-MCNC: 1.5 MG/DL (ref 2.5–4.9)
PHOSPHATE SERPL-MCNC: 1.6 MG/DL (ref 2.5–4.9)
PHOSPHATE SERPL-MCNC: 1.6 MG/DL (ref 2.5–4.9)
PHOSPHATE SERPL-MCNC: 2.1 MG/DL (ref 2.5–4.9)
PLATELET # BLD AUTO: 218 K/UL (ref 135–420)
PMV BLD AUTO: 12.8 FL (ref 9.2–11.8)
POTASSIUM SERPL-SCNC: 3.8 MMOL/L (ref 3.5–5.5)
PROT SERPL-MCNC: 6.6 G/DL (ref 6.4–8.2)
RBC # BLD AUTO: 3.89 M/UL (ref 4.35–5.65)
SERVICE CMNT-IMP: NORMAL
SODIUM SERPL-SCNC: 148 MMOL/L (ref 136–145)
WBC # BLD AUTO: 7.3 K/UL (ref 4.6–13.2)

## 2023-07-19 PROCEDURE — 85025 COMPLETE CBC W/AUTO DIFF WBC: CPT

## 2023-07-19 PROCEDURE — 6370000000 HC RX 637 (ALT 250 FOR IP): Performed by: STUDENT IN AN ORGANIZED HEALTH CARE EDUCATION/TRAINING PROGRAM

## 2023-07-19 PROCEDURE — 80053 COMPREHEN METABOLIC PANEL: CPT

## 2023-07-19 PROCEDURE — 83735 ASSAY OF MAGNESIUM: CPT

## 2023-07-19 PROCEDURE — 94761 N-INVAS EAR/PLS OXIMETRY MLT: CPT

## 2023-07-19 PROCEDURE — 2580000003 HC RX 258: Performed by: STUDENT IN AN ORGANIZED HEALTH CARE EDUCATION/TRAINING PROGRAM

## 2023-07-19 PROCEDURE — 6370000000 HC RX 637 (ALT 250 FOR IP): Performed by: INTERNAL MEDICINE

## 2023-07-19 PROCEDURE — 99233 SBSQ HOSP IP/OBS HIGH 50: CPT | Performed by: NURSE PRACTITIONER

## 2023-07-19 PROCEDURE — 6360000002 HC RX W HCPCS: Performed by: STUDENT IN AN ORGANIZED HEALTH CARE EDUCATION/TRAINING PROGRAM

## 2023-07-19 PROCEDURE — 2140000001 HC CVICU INTERMEDIATE R&B

## 2023-07-19 PROCEDURE — 84100 ASSAY OF PHOSPHORUS: CPT

## 2023-07-19 PROCEDURE — 36415 COLL VENOUS BLD VENIPUNCTURE: CPT

## 2023-07-19 PROCEDURE — 82962 GLUCOSE BLOOD TEST: CPT

## 2023-07-19 RX ORDER — ASPIRIN 81 MG/1
162 TABLET ORAL DAILY
Status: DISCONTINUED | OUTPATIENT
Start: 2023-07-19 | End: 2023-07-21 | Stop reason: HOSPADM

## 2023-07-19 RX ORDER — HYDRALAZINE HYDROCHLORIDE 20 MG/ML
10 INJECTION INTRAMUSCULAR; INTRAVENOUS EVERY 6 HOURS PRN
Status: DISCONTINUED | OUTPATIENT
Start: 2023-07-19 | End: 2023-07-21 | Stop reason: HOSPADM

## 2023-07-19 RX ORDER — BUPROPION HYDROCHLORIDE 150 MG/1
150 TABLET, EXTENDED RELEASE ORAL DAILY
Status: DISCONTINUED | OUTPATIENT
Start: 2023-07-19 | End: 2023-07-19

## 2023-07-19 RX ORDER — CLOPIDOGREL BISULFATE 75 MG/1
75 TABLET ORAL DAILY
Status: DISCONTINUED | OUTPATIENT
Start: 2023-07-19 | End: 2023-07-21 | Stop reason: HOSPADM

## 2023-07-19 RX ORDER — LANOLIN ALCOHOL/MO/W.PET/CERES
3 CREAM (GRAM) TOPICAL NIGHTLY PRN
Status: DISCONTINUED | OUTPATIENT
Start: 2023-07-19 | End: 2023-07-21 | Stop reason: HOSPADM

## 2023-07-19 RX ORDER — BUPROPION HYDROCHLORIDE 150 MG/1
150 TABLET ORAL DAILY
Status: DISCONTINUED | OUTPATIENT
Start: 2023-07-19 | End: 2023-07-21 | Stop reason: HOSPADM

## 2023-07-19 RX ORDER — ATORVASTATIN CALCIUM 40 MG/1
40 TABLET, FILM COATED ORAL NIGHTLY
Status: DISCONTINUED | OUTPATIENT
Start: 2023-07-19 | End: 2023-07-21 | Stop reason: HOSPADM

## 2023-07-19 RX ORDER — GABAPENTIN 300 MG/1
300 CAPSULE ORAL 3 TIMES DAILY
Status: DISCONTINUED | OUTPATIENT
Start: 2023-07-19 | End: 2023-07-21 | Stop reason: HOSPADM

## 2023-07-19 RX ORDER — GAUZE BANDAGE 2" X 2"
100 BANDAGE TOPICAL DAILY
Status: DISCONTINUED | OUTPATIENT
Start: 2023-07-19 | End: 2023-07-21 | Stop reason: HOSPADM

## 2023-07-19 RX ADMIN — HEPARIN SODIUM 5000 UNITS: 5000 INJECTION INTRAVENOUS; SUBCUTANEOUS at 05:20

## 2023-07-19 RX ADMIN — GABAPENTIN 300 MG: 300 CAPSULE ORAL at 21:10

## 2023-07-19 RX ADMIN — HEPARIN SODIUM 5000 UNITS: 5000 INJECTION INTRAVENOUS; SUBCUTANEOUS at 14:47

## 2023-07-19 RX ADMIN — INSULIN LISPRO 2 UNITS: 100 INJECTION, SOLUTION INTRAVENOUS; SUBCUTANEOUS at 08:44

## 2023-07-19 RX ADMIN — ASPIRIN 162 MG: 81 TABLET, COATED ORAL at 14:47

## 2023-07-19 RX ADMIN — INSULIN LISPRO 5 UNITS: 100 INJECTION, SOLUTION INTRAVENOUS; SUBCUTANEOUS at 08:45

## 2023-07-19 RX ADMIN — INSULIN GLARGINE 20 UNITS: 100 INJECTION, SOLUTION SUBCUTANEOUS at 08:37

## 2023-07-19 RX ADMIN — CLOPIDOGREL BISULFATE 75 MG: 75 TABLET ORAL at 14:47

## 2023-07-19 RX ADMIN — INSULIN LISPRO 5 UNITS: 100 INJECTION, SOLUTION INTRAVENOUS; SUBCUTANEOUS at 12:12

## 2023-07-19 RX ADMIN — SODIUM CHLORIDE, PRESERVATIVE FREE 10 ML: 5 INJECTION INTRAVENOUS at 08:40

## 2023-07-19 RX ADMIN — BUPROPION HYDROCHLORIDE 150 MG: 150 TABLET, FILM COATED, EXTENDED RELEASE ORAL at 19:26

## 2023-07-19 RX ADMIN — GABAPENTIN 300 MG: 300 CAPSULE ORAL at 14:47

## 2023-07-19 RX ADMIN — HYDRALAZINE HYDROCHLORIDE 10 MG: 20 INJECTION INTRAMUSCULAR; INTRAVENOUS at 05:20

## 2023-07-19 RX ADMIN — HEPARIN SODIUM 5000 UNITS: 5000 INJECTION INTRAVENOUS; SUBCUTANEOUS at 21:10

## 2023-07-19 RX ADMIN — Medication 50000 UNITS: at 14:47

## 2023-07-19 RX ADMIN — ATORVASTATIN CALCIUM 40 MG: 40 TABLET, FILM COATED ORAL at 21:10

## 2023-07-19 RX ADMIN — Medication 100 MG: at 14:47

## 2023-07-19 RX ADMIN — INSULIN LISPRO 5 UNITS: 100 INJECTION, SOLUTION INTRAVENOUS; SUBCUTANEOUS at 17:06

## 2023-07-19 NOTE — CARE COORDINATION
UAI/LTSS Screening ID # : [id number] completed in [place] Medicaid Website. Notified Dr. Fredy Mcgregor.                 Silvestre Ventura, BEATRIZN RN  Care Management

## 2023-07-19 NOTE — CARE COORDINATION
07/19/23 1529   Service Assessment   Patient Orientation Alert and Oriented;Person  (confused)   Cognition Other (see comment)  (confused)   History Provided By Patient; Child/Family  (pt's daughter Carolina)   Support Systems Children;Family Members   Patient's Healthcare Decision Maker is: Legal Next of [address] [place] [address]   PCP Verified by CM Yes   Last Visit to PCP Within last 3 months   Prior Functional Level Assistance with the following:;Bathing;Mobility;Housework;Cooking   Current Functional Level Independent in ADLs/IADLs   Can patient return to prior living arrangement Yes   Ability to make needs known: Poor   Family able to assist with home care needs: Yes   Financial Resources Medicaid   Social/Functional History   Lives With Alone   Type of Home Apartment   Home Layout Multi-level; Able to Live on Main level with bedroom/bathroom   Home Access Stairs to enter without rails   Entrance Stairs - Number of Steps 2   Bathroom Shower/Tub Tub/Shower unit   Bathroom Toilet Standard   Bathroom Equipment Shower chair   Home Equipment [place], [place] Help From Family   ADL Assistance Needs assistance   Ambulation Assistance Needs assistance   Transfer Assistance Needs assistance   Occupation Retired   [address]   Patient expects to be discharged to: [facility] [address]  ([facility]) [facility] No   Reason Outside Agency Chosen Patient requires Medicaid personal care [address] Discharge Home Health;PT;OT;Nursing services   Mode of Transport at Discharge BLS   Confirm Follow Up Transport Family   Condition of Participation: Discharge Planning   The Patient and/or Patient Representative was provided with a Choice of Provider?  Patient Representative   Name of the Patient Representative who was provided with the Choice of Provider and agrees with the

## 2023-07-19 NOTE — PROGRESS NOTES
[facility] Hospitalist Group  Progress Note    Patient: Lawanda Rooney Age: 70 y.o. : 1960 MR#: [id number] SSN: [id number]  Date: 2023         Assessment/Plan:   Diabetic Ketoacidosis     - Off of insulin gtt    - Hold oral hypoglycemics     - Lantus 20 units and bolus     - Continue SSI     - Continue IVF    Acute Metabolic Encephalopathy related to Acute CVA    - CT head shows no  acute intracranial pathology     - MRI shows small acute lacunar infarct in the left deep brain adjacent to the caudate nucleus. Chronic lacunar infarcts in the bilateral basal ganglia. Chronic left PCA territory infarct involving the left occipital pole and likely the posterior medial temporal lobe (limited by motion artifacts). - Will order echocardiogram and PVL neck     - Add ASA and plavix     Acute Kidney Injury with electrolyte abnormalities    - Continue IVF    - Renally dose medications    - Avoid nephrotoxins, hypotension and NSAIDs    - Consulted nephrology, appreciate assistance       Hospital Problems             Last Modified POA    * (Principal) Diabetic ketoacidosis without coma associated with other specified diabetes mellitus ([address]) 2023 Yes    DKA, type 1, not at goal [facility]) 2023 Yes    ARF (acute renal failure) ([address]) 2023 Yes    Hypernatremia 2023 Yes         DVT Prophylaxis:  []Lovenox  [x]Hep SQ  []SCDs  []Coumadin   []On Heparin gtt []PO anticoagulant    Anticipated discharge: > 2 days    Subjective:     Pt s/e @ bedside. No major events overnight. Pt offers no complaints this AM. Denies CP, SOB, cough, abd pain, nvd, headache, dysuria. Hospital Course:   Mr Mary Willis is a 70year old male with a past medical history significant for hyperlipidemia, hypertension, depression, alcohol abuse, nonadherence with medication, right BKA. Patient presented to the emergency department with altered mental status.   EMS found patient and blood glucose was low (LIPITOR) tablet 40 mg  40 mg Oral Nightly    buPROPion [facility] SR) extended release tablet 150 mg  150 mg Oral Daily    clopidogrel (PLAVIX) tablet 75 mg  75 mg Oral Daily    gabapentin (NEURONTIN) capsule 300 mg  300 mg Oral TID    melatonin tablet 3 mg  3 mg Oral Nightly PRN    thiamine mononitrate tablet 100 mg  100 mg Oral Daily    cholecalciferol (VITAMIN D3) wafer 50,000 Units  50,000 Units Oral Q7 Days    sodium chloride flush 0.9 % injection 5-40 mL  5-40 mL IntraVENous 2 times per day    sodium chloride flush 0.9 % injection 5-40 mL  5-40 mL IntraVENous PRN    0.9 % sodium chloride infusion   IntraVENous PRN    ondansetron (ZOFRAN-ODT) disintegrating tablet 4 mg  4 mg Oral Q8H PRN    Or    ondansetron (ZOFRAN) injection 4 mg  4 mg IntraVENous Q6H PRN    acetaminophen (TYLENOL) tablet 650 mg  650 mg Oral Q6H PRN    Or    acetaminophen (TYLENOL) suppository 650 mg  650 mg Rectal Q6H PRN    insulin glargine (LANTUS) injection vial 20 Units  20 Units SubCUTAneous Daily    insulin lispro (HUMALOG) injection vial 5 Units  5 Units SubCUTAneous TID WC    glucose chewable tablet 16 g  4 tablet Oral PRN    dextrose bolus 10% 125 mL  125 mL IntraVENous PRN    Or    dextrose bolus 10% 250 mL  250 mL IntraVENous PRN    glucagon injection 1 mg  1 mg SubCUTAneous PRN    dextrose 10 % infusion   IntraVENous Continuous PRN    insulin lispro (HUMALOG) injection vial 0-8 Units  0-8 Units SubCUTAneous 4x Daily AC & HS    insulin regular (HUMULIN R;NOVOLIN R) injection 0-10 Units  0-10 Units IntraVENous PRN    dextrose bolus 10% 125 mL  125 mL IntraVENous PRN    Or    dextrose bolus 10% 250 mL  250 mL IntraVENous PRN    sodium phosphate 10 mmol in sodium chloride 0.9 % 250 mL IVPB  10 mmol IntraVENous PRN    Or    sodium phosphate 15 mmol in sodium chloride 0.9 % 250 mL IVPB  15 mmol IntraVENous PRN    Or    sodium phosphate 20 mmol in sodium chloride 0.9 % 250 mL IVPB  20 mmol IntraVENous PRN    dextrose 5 % and 0.45 %

## 2023-07-19 NOTE — PROGRESS NOTES
Buproprion SR 150mg daily was therapeutically interchanged for Buproprion XL 150mg daily per the P&T Committee approved Therapeutic Interchanges Policy.     Tisha Tamayo, [address] [place] [address], Pharmacist  7/19/2023 6:43 PM

## 2023-07-19 NOTE — PLAN OF CARE
Problem: Discharge Planning  Goal: Discharge to home or other facility with appropriate resources  7/19/2023 1027 by Erendira Rivas RN  Outcome: Progressing  7/19/2023 0723 by Nena Perea RN  Outcome: Progressing  Flowsheets (Taken 7/18/2023 1930)  Discharge to home or other facility with appropriate resources:   Arrange for needed discharge resources and transportation as appropriate   Identify barriers to discharge with patient and caregiver   Arrange for interpreters to assist at discharge as needed     Problem: Safety - Adult  Goal: Free from fall injury  7/19/2023 1027 by Erendira Rivas RN  Outcome: Progressing  7/19/2023 0723 by Nena Perea RN  Outcome: Progressing     Problem: Skin/Tissue Integrity  Goal: Absence of new skin breakdown  Description: 1. Monitor for areas of redness and/or skin breakdown  2. Assess vascular access sites hourly  3. Every 4-6 hours minimum:  Change oxygen saturation probe site  4. Every 4-6 hours:  If on nasal continuous positive airway pressure, respiratory therapy assess nares and determine need for appliance change or resting period.   7/19/2023 1027 by Erendira Rivas RN  Outcome: Progressing  7/19/2023 0723 by Nena Perea RN  Outcome: Progressing

## 2023-07-19 NOTE — PROGRESS NOTES
: Bedside shift change report given to ALMA ROSA Munguia (oncoming nurse) by Tabatha Mckinney (offgoing nurse). Report included the following information SBAR, Kardex, Intake/Output, MAR, Recent Results, and Cardiac Rhythm NSR/Andrea . Wound Prevention Checklist    Patient: Marsha Lindsey (39 y.o. male)  Date: 2023  Diagnosis: Hyperkalemia [E87.5]  DKA, type 1, not at goal [facility]) [E10.10]  Altered mental status, unspecified altered mental status type [R41.82]  Diabetic ketoacidosis without coma associated with type 2 diabetes mellitus ([address]) [E11.10]  Diabetic ketoacidosis without coma associated with other specified diabetes mellitus ([address]) [E13.10] Diabetic ketoacidosis without coma associated with other specified diabetes mellitus ([address])    Precautions:         []  Heel prevention boots placed on patient    []  Patient turned q2h during shift    []  Lift team ordered    []  Patient on [place] bed/Specialty bed    []  Each Wound is documented during shift (Stage, Color, drainage, odor, measurements, and dressings)    [x]  Dual skin check done with Cathryn Low RN     7817: Shift assessment done. V/S obtained. Pt resting quietly in bed. A/Ox2; answers \"1643\" for the current year and states \"I don't know when asked the reason why he is here or who the president is. Pt is anxious and states \"I had a bad day, I want to go home. \" Pt is able to follow command. No c/o pain or SOB at this time. 99% SpO2 on RA. Scheduled med given. Patient oriented to room and call light. Patient aware to use call light to communicate any pain or needs. : Scheduled med given. B; No insulin coverage needed per protocol. 0: Pt resting in bed, watching TV. No distress noted at this time. 0000: No changes from previous assessment. Pt sleeping; easily awaken. No distress noted at this time. Call light within reach. 0200: Pt asleep; no distress noted at this time.      0430:  No changes from previous

## 2023-07-19 NOTE — PROGRESS NOTES
9712 Received report from [employer] [facility]. Patient alert and oriented to name and place and year. 0800 Patient's daughter at bedside. Informed  about Daughter's request for help for patient's home medication compliance. 1230 Patient resting in the bed. No complaints at this time. 1630 Patient in bed, no complaints. Bed alarm on. Call bell within reach,      1940 Bedside shift change report given to [facility] [employer] [facility] (oncoming nurse). Report included the following information Nurse handoff report.

## 2023-07-19 NOTE — CARE COORDINATION
Spoke with pt's daughter Eliza Shetty. She stated pt used to be independent with ADLs but he is not compliant with his medications. She stated pt is gradually declining and unable to complete ADLs. She stated she works and goes to school and unable to provide care for her dad. She is requesting for personal care aid  and also home healh with any accepting home health agency.               BEATRIZ RicksN RN  Care Management No

## 2023-07-20 ENCOUNTER — APPOINTMENT (OUTPATIENT)
Facility: HOSPITAL | Age: 63
End: 2023-07-20
Attending: INTERNAL MEDICINE
Payer: COMMERCIAL

## 2023-07-20 PROBLEM — E87.6 HYPOKALEMIA: Status: ACTIVE | Noted: 2023-07-20

## 2023-07-20 LAB
ANION GAP SERPL CALC-SCNC: 4 MMOL/L (ref 3–18)
ANION GAP SERPL CALC-SCNC: 5 MMOL/L (ref 3–18)
BASOPHILS # BLD: 0 K/UL (ref 0–0.1)
BASOPHILS NFR BLD: 1 % (ref 0–2)
BUN SERPL-MCNC: 16 MG/DL (ref 7–18)
BUN SERPL-MCNC: 19 MG/DL (ref 7–18)
BUN/CREAT SERPL: 17 (ref 12–20)
BUN/CREAT SERPL: 18 (ref 12–20)
CALCIUM SERPL-MCNC: 7.8 MG/DL (ref 8.5–10.1)
CALCIUM SERPL-MCNC: 7.9 MG/DL (ref 8.5–10.1)
CHLORIDE SERPL-SCNC: 107 MMOL/L (ref 100–111)
CHLORIDE SERPL-SCNC: 109 MMOL/L (ref 100–111)
CHOLEST SERPL-MCNC: 149 MG/DL
CO2 SERPL-SCNC: 25 MMOL/L (ref 21–32)
CO2 SERPL-SCNC: 28 MMOL/L (ref 21–32)
CREAT SERPL-MCNC: 0.93 MG/DL (ref 0.6–1.3)
CREAT SERPL-MCNC: 1.04 MG/DL (ref 0.6–1.3)
DIFFERENTIAL METHOD BLD: ABNORMAL
EOSINOPHIL # BLD: 0 K/UL (ref 0–0.4)
EOSINOPHIL NFR BLD: 1 % (ref 0–5)
ERYTHROCYTE [DISTWIDTH] IN BLOOD BY AUTOMATED COUNT: 13.5 % (ref 11.6–14.5)
GLUCOSE BLD STRIP.AUTO-MCNC: 151 MG/DL (ref 70–110)
GLUCOSE BLD STRIP.AUTO-MCNC: 155 MG/DL (ref 70–110)
GLUCOSE BLD STRIP.AUTO-MCNC: 156 MG/DL (ref 70–110)
GLUCOSE BLD STRIP.AUTO-MCNC: 221 MG/DL (ref 70–110)
GLUCOSE SERPL-MCNC: 132 MG/DL (ref 74–99)
GLUCOSE SERPL-MCNC: 207 MG/DL (ref 74–99)
HCT VFR BLD AUTO: 30.5 % (ref 36–48)
HDLC SERPL-MCNC: 47 MG/DL (ref 40–60)
HDLC SERPL: 3.2 (ref 0–5)
HGB BLD-MCNC: 10.2 G/DL (ref 13–16)
IMM GRANULOCYTES # BLD AUTO: 0 K/UL (ref 0–0.04)
IMM GRANULOCYTES NFR BLD AUTO: 1 % (ref 0–0.5)
LDLC SERPL CALC-MCNC: 69 MG/DL (ref 0–100)
LIPID PANEL: ABNORMAL
LYMPHOCYTES # BLD: 1.4 K/UL (ref 0.9–3.6)
LYMPHOCYTES NFR BLD: 37 % (ref 21–52)
MAGNESIUM SERPL-MCNC: 2.2 MG/DL (ref 1.6–2.6)
MAGNESIUM SERPL-MCNC: 2.3 MG/DL (ref 1.6–2.6)
MCH RBC QN AUTO: 28.5 PG (ref 24–34)
MCHC RBC AUTO-ENTMCNC: 33.4 G/DL (ref 31–37)
MCV RBC AUTO: 85.2 FL (ref 78–100)
MONOCYTES # BLD: 0.2 K/UL (ref 0.05–1.2)
MONOCYTES NFR BLD: 5 % (ref 3–10)
NEUTS SEG # BLD: 2.2 K/UL (ref 1.8–8)
NEUTS SEG NFR BLD: 56 % (ref 40–73)
NRBC # BLD: 0 K/UL (ref 0–0.01)
NRBC BLD-RTO: 0 PER 100 WBC
PHOSPHATE SERPL-MCNC: 1.8 MG/DL (ref 2.5–4.9)
PHOSPHATE SERPL-MCNC: 1.9 MG/DL (ref 2.5–4.9)
PLATELET # BLD AUTO: 186 K/UL (ref 135–420)
PMV BLD AUTO: 11.5 FL (ref 9.2–11.8)
POTASSIUM SERPL-SCNC: 3 MMOL/L (ref 3.5–5.5)
POTASSIUM SERPL-SCNC: 3.8 MMOL/L (ref 3.5–5.5)
RBC # BLD AUTO: 3.58 M/UL (ref 4.35–5.65)
SODIUM SERPL-SCNC: 137 MMOL/L (ref 136–145)
SODIUM SERPL-SCNC: 141 MMOL/L (ref 136–145)
TRIGL SERPL-MCNC: 165 MG/DL
VAS LEFT CCA DIST EDV: 14.4 CM/S
VAS LEFT CCA DIST PSV: 70.9 CM/S
VAS LEFT CCA MID EDV: 17.6 CM/S
VAS LEFT CCA MID PSV: 85.44 CM/S
VAS LEFT CCA PROX EDV: 19.2 CM/S
VAS LEFT CCA PROX PSV: 74.1 CM/S
VAS LEFT ECA EDV: 6.02 CM/S
VAS LEFT ECA PSV: 54 CM/S
VAS LEFT ICA DIST EDV: 14.2 CM/S
VAS LEFT ICA DIST PSV: 35.2 CM/S
VAS LEFT ICA MID EDV: 18.9 CM/S
VAS LEFT ICA MID PSV: 42.7 CM/S
VAS LEFT ICA PROX EDV: 16.5 CM/S
VAS LEFT ICA PROX PSV: 54 CM/S
VAS LEFT ICA/CCA PSV: 0.76 NO UNITS
VAS LEFT SUBCLAVIAN PROX PSV: 86.6 CM/S
VAS LEFT VERTEBRAL EDV: 9.22 CM/S
VAS LEFT VERTEBRAL PSV: 28.4 CM/S
VAS RIGHT CCA DIST EDV: 12.9 CM/S
VAS RIGHT CCA DIST PSV: 63.8 CM/S
VAS RIGHT CCA MID EDV: 16.83 CM/S
VAS RIGHT CCA MID PSV: 61.18 CM/S
VAS RIGHT CCA PROX EDV: 15.5 CM/S
VAS RIGHT CCA PROX PSV: 65.1 CM/S
VAS RIGHT ECA EDV: 9 CM/S
VAS RIGHT ECA PSV: 88.6 CM/S
VAS RIGHT ICA DIST EDV: 22.6 CM/S
VAS RIGHT ICA DIST PSV: 43.5 CM/S
VAS RIGHT ICA MID EDV: 18.2 CM/S
VAS RIGHT ICA MID PSV: 54.9 CM/S
VAS RIGHT ICA PROX EDV: 11.3 CM/S
VAS RIGHT ICA PROX PSV: 42.6 CM/S
VAS RIGHT ICA/CCA PSV: 0.9 NO UNITS
VAS RIGHT SUBCLAVIAN PROX PSV: 59.6 CM/S
VAS RIGHT VERTEBRAL EDV: 8.27 CM/S
VAS RIGHT VERTEBRAL PSV: 26.7 CM/S
VLDLC SERPL CALC-MCNC: 33 MG/DL
WBC # BLD AUTO: 3.8 K/UL (ref 4.6–13.2)

## 2023-07-20 PROCEDURE — 97535 SELF CARE MNGMENT TRAINING: CPT

## 2023-07-20 PROCEDURE — 97161 PT EVAL LOW COMPLEX 20 MIN: CPT

## 2023-07-20 PROCEDURE — 80061 LIPID PANEL: CPT

## 2023-07-20 PROCEDURE — 6370000000 HC RX 637 (ALT 250 FOR IP): Performed by: STUDENT IN AN ORGANIZED HEALTH CARE EDUCATION/TRAINING PROGRAM

## 2023-07-20 PROCEDURE — 97165 OT EVAL LOW COMPLEX 30 MIN: CPT

## 2023-07-20 PROCEDURE — 85025 COMPLETE CBC W/AUTO DIFF WBC: CPT

## 2023-07-20 PROCEDURE — 82962 GLUCOSE BLOOD TEST: CPT

## 2023-07-20 PROCEDURE — 99232 SBSQ HOSP IP/OBS MODERATE 35: CPT | Performed by: NURSE PRACTITIONER

## 2023-07-20 PROCEDURE — 36415 COLL VENOUS BLD VENIPUNCTURE: CPT

## 2023-07-20 PROCEDURE — 84100 ASSAY OF PHOSPHORUS: CPT

## 2023-07-20 PROCEDURE — 93880 EXTRACRANIAL BILAT STUDY: CPT

## 2023-07-20 PROCEDURE — 2140000001 HC CVICU INTERMEDIATE R&B

## 2023-07-20 PROCEDURE — 6370000000 HC RX 637 (ALT 250 FOR IP): Performed by: INTERNAL MEDICINE

## 2023-07-20 PROCEDURE — 83735 ASSAY OF MAGNESIUM: CPT

## 2023-07-20 PROCEDURE — 6360000002 HC RX W HCPCS: Performed by: INTERNAL MEDICINE

## 2023-07-20 PROCEDURE — 2580000003 HC RX 258: Performed by: STUDENT IN AN ORGANIZED HEALTH CARE EDUCATION/TRAINING PROGRAM

## 2023-07-20 PROCEDURE — 92610 EVALUATE SWALLOWING FUNCTION: CPT

## 2023-07-20 PROCEDURE — 94761 N-INVAS EAR/PLS OXIMETRY MLT: CPT

## 2023-07-20 PROCEDURE — 80048 BASIC METABOLIC PNL TOTAL CA: CPT

## 2023-07-20 PROCEDURE — 6360000002 HC RX W HCPCS: Performed by: STUDENT IN AN ORGANIZED HEALTH CARE EDUCATION/TRAINING PROGRAM

## 2023-07-20 PROCEDURE — 92523 SPEECH SOUND LANG COMPREHEN: CPT

## 2023-07-20 RX ORDER — POTASSIUM CHLORIDE 7.45 MG/ML
10 INJECTION INTRAVENOUS PRN
Status: DISCONTINUED | OUTPATIENT
Start: 2023-07-20 | End: 2023-07-21 | Stop reason: HOSPADM

## 2023-07-20 RX ORDER — POTASSIUM CHLORIDE 20 MEQ/1
40 TABLET, EXTENDED RELEASE ORAL PRN
Status: DISCONTINUED | OUTPATIENT
Start: 2023-07-20 | End: 2023-07-21 | Stop reason: HOSPADM

## 2023-07-20 RX ORDER — POTASSIUM CHLORIDE 7.45 MG/ML
10 INJECTION INTRAVENOUS
Status: COMPLETED | OUTPATIENT
Start: 2023-07-20 | End: 2023-07-20

## 2023-07-20 RX ADMIN — GABAPENTIN 300 MG: 300 CAPSULE ORAL at 21:03

## 2023-07-20 RX ADMIN — POTASSIUM CHLORIDE 10 MEQ: 7.46 INJECTION, SOLUTION INTRAVENOUS at 18:24

## 2023-07-20 RX ADMIN — INSULIN LISPRO 5 UNITS: 100 INJECTION, SOLUTION INTRAVENOUS; SUBCUTANEOUS at 08:24

## 2023-07-20 RX ADMIN — ATORVASTATIN CALCIUM 40 MG: 40 TABLET, FILM COATED ORAL at 21:03

## 2023-07-20 RX ADMIN — POTASSIUM CHLORIDE 10 MEQ: 7.46 INJECTION, SOLUTION INTRAVENOUS at 19:30

## 2023-07-20 RX ADMIN — POTASSIUM CHLORIDE 10 MEQ: 7.46 INJECTION, SOLUTION INTRAVENOUS at 16:24

## 2023-07-20 RX ADMIN — POTASSIUM CHLORIDE 10 MEQ: 7.46 INJECTION, SOLUTION INTRAVENOUS at 14:25

## 2023-07-20 RX ADMIN — SODIUM CHLORIDE, PRESERVATIVE FREE 10 ML: 5 INJECTION INTRAVENOUS at 08:28

## 2023-07-20 RX ADMIN — Medication 100 MG: at 08:26

## 2023-07-20 RX ADMIN — INSULIN LISPRO 5 UNITS: 100 INJECTION, SOLUTION INTRAVENOUS; SUBCUTANEOUS at 12:12

## 2023-07-20 RX ADMIN — CLOPIDOGREL BISULFATE 75 MG: 75 TABLET ORAL at 08:26

## 2023-07-20 RX ADMIN — INSULIN LISPRO 5 UNITS: 100 INJECTION, SOLUTION INTRAVENOUS; SUBCUTANEOUS at 17:15

## 2023-07-20 RX ADMIN — ASPIRIN 162 MG: 81 TABLET, COATED ORAL at 08:27

## 2023-07-20 RX ADMIN — HEPARIN SODIUM 5000 UNITS: 5000 INJECTION INTRAVENOUS; SUBCUTANEOUS at 14:23

## 2023-07-20 RX ADMIN — BUPROPION HYDROCHLORIDE 150 MG: 150 TABLET, FILM COATED, EXTENDED RELEASE ORAL at 08:27

## 2023-07-20 RX ADMIN — POTASSIUM CHLORIDE 10 MEQ: 7.46 INJECTION, SOLUTION INTRAVENOUS at 17:16

## 2023-07-20 RX ADMIN — INSULIN LISPRO 2 UNITS: 100 INJECTION, SOLUTION INTRAVENOUS; SUBCUTANEOUS at 17:14

## 2023-07-20 RX ADMIN — GABAPENTIN 300 MG: 300 CAPSULE ORAL at 14:22

## 2023-07-20 RX ADMIN — INSULIN GLARGINE 20 UNITS: 100 INJECTION, SOLUTION SUBCUTANEOUS at 08:30

## 2023-07-20 RX ADMIN — HEPARIN SODIUM 5000 UNITS: 5000 INJECTION INTRAVENOUS; SUBCUTANEOUS at 06:12

## 2023-07-20 RX ADMIN — POTASSIUM CHLORIDE 10 MEQ: 7.46 INJECTION, SOLUTION INTRAVENOUS at 15:20

## 2023-07-20 RX ADMIN — GABAPENTIN 300 MG: 300 CAPSULE ORAL at 08:26

## 2023-07-20 RX ADMIN — HEPARIN SODIUM 5000 UNITS: 5000 INJECTION INTRAVENOUS; SUBCUTANEOUS at 21:03

## 2023-07-20 NOTE — PROGRESS NOTES
8705 Received report from [facility]. Patient alert and oriented. 1130 Patient worked with PT/OT. 1930Bedside shift change report given to [facility] (oncoming nurse). Report included the following information Nurse handoff report.

## 2023-07-20 NOTE — PROGRESS NOTES
Patient patient seen and examined discussed with NP. Patient's DKA resolved. Continue Lantus and sliding scale. His confusion is resolved. Serum sodium and creatinine is normal.    He is low scale replaced today. Echo and PVL carotids are pending. Does not want rehab. Discharge in 1 to 2 days.

## 2023-07-20 NOTE — PLAN OF CARE
Problem: Occupational Therapy - Adult  Goal: By Discharge: Performs self-care activities at highest level of function for planned discharge setting. See evaluation for individualized goals. Description: Occupational Therapy Goals:  Initiated 7/20/2023 to be met within 7-10 days. 1.  Patient will perform lower body dressing with supervision/set-up for seated and standing aspects. 2.  Patient will perform toilet transfers with modified independence. 3.  Patient will perform all aspects of toileting with modified independence. 4.  Patient will utilize energy conservation techniques during functional activities with verbal cues. 5.  Patient will participate in BUE TherEx with Supervision for 8 min to improve UB strength and endurance needed for ADLs. PLOF: Pt lives alone, reports being Mod Ind for ADLs and functional mobility with walker and RLE prosthesis. Outcome: Progressing  OCCUPATIONAL THERAPY EVALUATION    Patient: Radha Deleon (17 y.o. male)  Date: 7/20/2023  Primary Diagnosis: Hyperkalemia [E87.5]  DKA, type 1, not at goal [facility]) [E10.10]  Altered mental status, unspecified altered mental status type [R41.82]  Diabetic ketoacidosis without coma associated with type 2 diabetes mellitus ([address]) [E11.10]  Diabetic ketoacidosis without coma associated with other specified diabetes mellitus ([address]) [E13.10]       Precautions: Weight Bearing (R BKA)    ASSESSMENT :    Pt cleared to participate in OT evaluation by RN. Upon entering room, pt received in bed, alert, and agreeable to OT eval/treatment. Pt demos decreased processing and delayed responses, requiring additional time to answer all questions and benefiting from one step concrete commands to promote participation. Pt required increased time to maneuver to EOB and Min A to don sock on LLE. Pt reports his prosthetic for RLE is at home, he usually performs functional mobility with prosthesis and walker.  Pt required CGA to perform functional txfr to Xience stent, resid 0%). LVEDP 12. EF 40-45%. High lateral hypk (RI distribution).       Tobacco use disorder     contemplating stopping     Past Surgical History:   Procedure Laterality Date    CARDIAC CATHETERIZATION  8/30/2011    CARDIAC CATHETERIZATION  11/09/2011    COLONOSCOPY N/A 10/28/2020    COLONOSCOPY w/polypectomy performed by Torsten Lora MD at [address]  07/29/2009    LEG AMPUTATION BELOW KNEE Right 02/08/2022    S/P Right below-the-knee amputation (2/8/2022 - Dr. Suzan Pate)    VASCULAR SURGERY      Multiple vascular surgeries    WISDOM TOOTH EXTRACTION      x4       Home Situation:   Social/Functional History  Lives With: Alone  Type of Home: Apartment  Home Layout: Multi-level, Able to Live on Main level with bedroom/bathroom  Home Access: Stairs to enter without rails  Entrance Stairs - Number of Steps: 2  Bathroom Shower/Tub: Tub/Shower unit  Bathroom Toilet: Standard  Bathroom Equipment: Shower chair  Home Equipment: Tim Luis, rolling, [address] Help From: Family  ADL Assistance: Independent  Ambulation Assistance: Independent  Transfer Assistance: Independent  Occupation: Retired  [x]  Right hand dominant   []  Left hand dominant    Cognitive/Behavioral Status:  Orientation  Overall Orientation Status: Within Functional Limits  Orientation Level: Oriented X4  Cognition  Overall Cognitive Status: Exceptions  Attention Span: Difficulty dividing attention  Problem Solving: Assistance required to generate solutions;Assistance required to implement solutions  Insights: Decreased awareness of deficits    Skin: visible skin intact  Edema: min in LUE    Vision/Perceptual:    Vision  Vision: Within Functional Limits        Coordination: BUE  Coordination: Generally decreased, functional        Balance:     Balance  Sitting: Intact  Standing: Impaired  Standing - Static: Good  Standing - Dynamic: Fair    Strength: BUE  Strength:

## 2023-07-20 NOTE — PROGRESS NOTES
PHYSICAL THERAPY EVALUATION/DISCHARGE    Patient: June Zaidi (6 y.o. male)  Date: 7/20/2023  Primary Diagnosis: Hyperkalemia [E87.5]  DKA, type 1, not at goal [facility]) [E10.10]  Altered mental status, unspecified altered mental status type [R41.82]  Diabetic ketoacidosis without coma associated with type 2 diabetes mellitus ([address]) [E11.10]  Diabetic ketoacidosis without coma associated with other specified diabetes mellitus ([address]) [E13.10]       Precautions: Restrictions/Precautions  Restrictions/Precautions: Weight Bearing (R BKA),  ,    PLOF: Pt independent, lives alone in 1 story house with 2 JACOBO, has daughter in area. R BKA with prosthetic, has RW and wheelchair      ASSESSMENT AND RECOMMENDATIONS:  Pt cleared to participate in PT session, pt received semi-reclined in recliner and agreeable to therapy session. Completing with OT to maximize safety and mobility. Based on the objective data described below, the patient presents with baseline functional mobility. Pt completing sit to stand with José to RW, ambulating with supervision and hop to gait pattern x30 feet in room. Pt with no questions or concerns regarding mobility. , declined need for equipment. Pt positioned for comfort and educated to call for assist before getting up, pt verbalized understanding. Pt left with all needs met and call bell in reach. RN notified of position and participation. Will sign off. Patient does not require further skilled physical therapy intervention at this level of care. Further Equipment Recommendations for Discharge:  n/a    AMPAC:    22       Current research shows that an AM-PAC score of 18 (14 without stairs) or greater is associated with a discharge to the patient's home setting. Based on an AM-PAC score and their current functional mobility deficits, it is recommended that the patient have 2-3 sessions per week of Physical Therapy at d/c to increase the patient's independence.         This AMPAC score should be

## 2023-07-20 NOTE — PLAN OF CARE
Problem: Discharge Planning  Goal: Discharge to home or other facility with appropriate resources  7/20/2023 1107 by Cathy Barnes RN  Outcome: Progressing  7/20/2023 0532 by Mary Peguero RN  Outcome: Yisel Vasquez (Taken 7/19/2023 1922)  Discharge to home or other facility with appropriate resources:   Identify barriers to discharge with patient and caregiver   Arrange for needed discharge resources and transportation as appropriate   Identify discharge learning needs (meds, wound care, etc)     Problem: Safety - Adult  Goal: Free from fall injury  7/20/2023 1107 by Cathy Barnes RN  Outcome: Progressing  7/20/2023 0532 by Mary Peguero RN  Outcome: Progressing     Problem: Skin/Tissue Integrity  Goal: Absence of new skin breakdown  Description: 1. Monitor for areas of redness and/or skin breakdown  2. Assess vascular access sites hourly  3. Every 4-6 hours minimum:  Change oxygen saturation probe site  4. Every 4-6 hours:  If on nasal continuous positive airway pressure, respiratory therapy assess nares and determine need for appliance change or resting period.   7/20/2023 1107 by Cathy Barnes RN  Outcome: Progressing  7/20/2023 0532 by Mary Peguero RN  Outcome: Progressing

## 2023-07-20 NOTE — PROGRESS NOTES
[address] [facility] Hospitalist Group  Progress Note    Patient: Peri De Leon Age: 70 y.o. : 1960 MR#: [id number] SSN: [id number]  Date: 2023         Assessment/Plan:   Diabetic Ketoacidosis, resolved      - Off of insulin gtt    - Hold oral hypoglycemics     - Lantus 20 units and bolus     - Continue SSI     - Continue IVF    Acute Metabolic Encephalopathy related to Acute CVA, improving     - CT head shows no  acute intracranial pathology     - MRI shows small acute lacunar infarct in the left deep brain adjacent to the caudate nucleus. Chronic lacunar infarcts in the bilateral basal ganglia. Chronic left PCA territory infarct involving the left occipital pole and likely the posterior medial temporal lobe (limited by motion artifacts). - Will order echocardiogram and PVL neck     - Add ASA and plavix     Acute Kidney Injury with electrolyte abnormalities    - Continue IVF    - Renally dose medications    - Avoid nephrotoxins, hypotension and NSAIDs    - Consulted nephrology, appreciate assistance     Hypokalemia     - Replace with IV potassium     - Recheck AM   Hospital Problems             Last Modified POA    * (Principal) Diabetic ketoacidosis without coma associated with other specified diabetes mellitus ([address]) 2023 Yes    DKA, type 1, not at goal [facility]) 2023 Yes    ARF (acute renal failure) ([address]) 2023 Yes    Hypernatremia 2023 Yes       DVT Prophylaxis:  []Lovenox  [x]Hep SQ  []SCDs  []Coumadin   []On Heparin gtt []PO anticoagulant    Anticipated discharge: 1-2 days    Subjective:     Pt s/e @ bedside. No major events overnight. Pt offers no complaints this AM. Denies CP, SOB, cough, abd pain, nvd, headache, dysuria. Hospital Course:   Mr Ramesh Oconnell is a 70year old male with a past medical history significant for hyperlipidemia, hypertension, depression, alcohol abuse, nonadherence with medication, right BKA.   Patient presented to the emergency

## 2023-07-20 NOTE — PLAN OF CARE
Problem: SLP Adult - Impaired Communication  Goal: By Discharge: Demonstrates communication skills at highest level of function for planned discharge setting. See evaluation for individualized goals. Description: Patient will:  1. Accurately answer complex yes/no questions with min A with >80% acc. 2. Follow 2-step commands with 80% accuracy with mod A for increased participation in therapeutic tasks. 3. Utilize compensatory strategies (external aids) to recall daily events with 80% acc and min assist.   4. Demonstrate functional problem solving given verbal scenarios with 80% acc and min-mod assist.  Outcome: Progressing    Recommendations:   Regular diet and thin liquids  Aspiration precautions  Meds as tolerated  No follow up SLP for dysphagia indicated. SPEECH LANGUAGE PATHOLOGY BEDSIDE SWALLOW EVALUATION & DISCHARGE AND SPEECH/LANGUAGE EVALUATION    Patient: Julio Allen (32 y.o. male)  Date: 7/20/2023  Primary Diagnosis: Hyperkalemia [E87.5]  DKA, type 1, not at goal [facility]) [E10.10]  Altered mental status, unspecified altered mental status type [R41.82]  Diabetic ketoacidosis without coma associated with type 2 diabetes mellitus ([address]) [E11.10]  Diabetic ketoacidosis without coma associated with other specified diabetes mellitus ([address]) [E13.10]       Precautions: Aspiration  PLOF: As per H&P    DYSPHAGIA ASSESSMENT:  Patient seen seated upright in chair for clinical swallow evaluation. Presented to the ED with AMS. MRI revealed small acute lacunar  infarct in L deep brain. Oral mech exam revealed intact natural dentition. Patient presented with oropharyngeal swallow essentially WFL. He tolerated regular solids and thin liquids + straw sequential swallows with no clinical s/sx of aspiration. Mastication, bolus formation, and oral clearance adequate. Recommend continue regular diet and thin liquids. No additional follow up SLP services indicated at this time r/t dysphagia.      SPEECH/LANGUAGE

## 2023-07-21 VITALS
WEIGHT: 154.76 LBS | OXYGEN SATURATION: 100 % | RESPIRATION RATE: 18 BRPM | SYSTOLIC BLOOD PRESSURE: 126 MMHG | HEART RATE: 66 BPM | TEMPERATURE: 97.5 F | BODY MASS INDEX: 22.85 KG/M2 | DIASTOLIC BLOOD PRESSURE: 83 MMHG

## 2023-07-21 LAB
ANION GAP SERPL CALC-SCNC: 3 MMOL/L (ref 3–18)
BASOPHILS # BLD: 0 K/UL (ref 0–0.1)
BASOPHILS NFR BLD: 0 % (ref 0–2)
BUN SERPL-MCNC: 14 MG/DL (ref 7–18)
BUN/CREAT SERPL: 17 (ref 12–20)
CALCIUM SERPL-MCNC: 8.2 MG/DL (ref 8.5–10.1)
CHLORIDE SERPL-SCNC: 114 MMOL/L (ref 100–111)
CO2 SERPL-SCNC: 26 MMOL/L (ref 21–32)
CREAT SERPL-MCNC: 0.81 MG/DL (ref 0.6–1.3)
DIFFERENTIAL METHOD BLD: ABNORMAL
EOSINOPHIL # BLD: 0 K/UL (ref 0–0.4)
EOSINOPHIL NFR BLD: 1 % (ref 0–5)
ERYTHROCYTE [DISTWIDTH] IN BLOOD BY AUTOMATED COUNT: 13.2 % (ref 11.6–14.5)
GLUCOSE BLD STRIP.AUTO-MCNC: 142 MG/DL (ref 70–110)
GLUCOSE BLD STRIP.AUTO-MCNC: 159 MG/DL (ref 70–110)
GLUCOSE SERPL-MCNC: 144 MG/DL (ref 74–99)
HCT VFR BLD AUTO: 31.6 % (ref 36–48)
HGB BLD-MCNC: 10.7 G/DL (ref 13–16)
IMM GRANULOCYTES # BLD AUTO: 0 K/UL (ref 0–0.04)
IMM GRANULOCYTES NFR BLD AUTO: 0 % (ref 0–0.5)
LYMPHOCYTES # BLD: 0.8 K/UL (ref 0.9–3.6)
LYMPHOCYTES NFR BLD: 27 % (ref 21–52)
MAGNESIUM SERPL-MCNC: 2.4 MG/DL (ref 1.6–2.6)
MCH RBC QN AUTO: 28.8 PG (ref 24–34)
MCHC RBC AUTO-ENTMCNC: 33.9 G/DL (ref 31–37)
MCV RBC AUTO: 85.2 FL (ref 78–100)
MONOCYTES # BLD: 0.2 K/UL (ref 0.05–1.2)
MONOCYTES NFR BLD: 5 % (ref 3–10)
NEUTS SEG # BLD: 2 K/UL (ref 1.8–8)
NEUTS SEG NFR BLD: 66 % (ref 40–73)
NRBC # BLD: 0 K/UL (ref 0–0.01)
NRBC BLD-RTO: 0 PER 100 WBC
OSMOLALITY UR: 688 MOSM/KG H2O
PHOSPHATE SERPL-MCNC: 2.3 MG/DL (ref 2.5–4.9)
PLATELET # BLD AUTO: 185 K/UL (ref 135–420)
PMV BLD AUTO: 12 FL (ref 9.2–11.8)
POTASSIUM SERPL-SCNC: 4 MMOL/L (ref 3.5–5.5)
RBC # BLD AUTO: 3.71 M/UL (ref 4.35–5.65)
SODIUM SERPL-SCNC: 143 MMOL/L (ref 136–145)
WBC # BLD AUTO: 3 K/UL (ref 4.6–13.2)

## 2023-07-21 PROCEDURE — 94761 N-INVAS EAR/PLS OXIMETRY MLT: CPT

## 2023-07-21 PROCEDURE — 85025 COMPLETE CBC W/AUTO DIFF WBC: CPT

## 2023-07-21 PROCEDURE — 2580000003 HC RX 258: Performed by: STUDENT IN AN ORGANIZED HEALTH CARE EDUCATION/TRAINING PROGRAM

## 2023-07-21 PROCEDURE — 83735 ASSAY OF MAGNESIUM: CPT

## 2023-07-21 PROCEDURE — 36415 COLL VENOUS BLD VENIPUNCTURE: CPT

## 2023-07-21 PROCEDURE — 92507 TX SP LANG VOICE COMM INDIV: CPT

## 2023-07-21 PROCEDURE — 6370000000 HC RX 637 (ALT 250 FOR IP): Performed by: INTERNAL MEDICINE

## 2023-07-21 PROCEDURE — 6360000002 HC RX W HCPCS: Performed by: STUDENT IN AN ORGANIZED HEALTH CARE EDUCATION/TRAINING PROGRAM

## 2023-07-21 PROCEDURE — 80048 BASIC METABOLIC PNL TOTAL CA: CPT

## 2023-07-21 PROCEDURE — 84100 ASSAY OF PHOSPHORUS: CPT

## 2023-07-21 PROCEDURE — 82962 GLUCOSE BLOOD TEST: CPT

## 2023-07-21 PROCEDURE — 99239 HOSP IP/OBS DSCHRG MGMT >30: CPT | Performed by: NURSE PRACTITIONER

## 2023-07-21 RX ORDER — ATORVASTATIN CALCIUM 40 MG/1
40 TABLET, FILM COATED ORAL NIGHTLY
Qty: 30 TABLET | Refills: 0 | Status: SHIPPED | OUTPATIENT
Start: 2023-07-21

## 2023-07-21 RX ADMIN — INSULIN LISPRO 5 UNITS: 100 INJECTION, SOLUTION INTRAVENOUS; SUBCUTANEOUS at 08:54

## 2023-07-21 RX ADMIN — ASPIRIN 162 MG: 81 TABLET, COATED ORAL at 08:53

## 2023-07-21 RX ADMIN — HEPARIN SODIUM 5000 UNITS: 5000 INJECTION INTRAVENOUS; SUBCUTANEOUS at 06:01

## 2023-07-21 RX ADMIN — INSULIN GLARGINE 20 UNITS: 100 INJECTION, SOLUTION SUBCUTANEOUS at 08:55

## 2023-07-21 RX ADMIN — SODIUM CHLORIDE, PRESERVATIVE FREE 10 ML: 5 INJECTION INTRAVENOUS at 08:55

## 2023-07-21 RX ADMIN — GABAPENTIN 300 MG: 300 CAPSULE ORAL at 08:53

## 2023-07-21 RX ADMIN — BUPROPION HYDROCHLORIDE 150 MG: 150 TABLET, FILM COATED, EXTENDED RELEASE ORAL at 08:53

## 2023-07-21 RX ADMIN — Medication 100 MG: at 08:53

## 2023-07-21 RX ADMIN — CLOPIDOGREL BISULFATE 75 MG: 75 TABLET ORAL at 08:53

## 2023-07-21 NOTE — PROGRESS NOTES
[address] [facility] Hospitalist [facility]    Discharge Summary    Patient: Grupo Hernandez MRN: [id number]  [facility]: [id number]    YOB: 1960  Age: 70 y.o. Sex: male    DOA: 7/17/2023 LOS:  LOS: 4 days   Discharge Date:      Admission Diagnoses: Hyperkalemia [E87.5]  DKA, type 1, not at goal [facility]) [E10.10]  Altered mental status, unspecified altered mental status type [R41.82]  Diabetic ketoacidosis without coma associated with type 2 diabetes mellitus ([address] [facility]) [E11.10]  Diabetic ketoacidosis without coma associated with other specified diabetes mellitus ([facility]) [E13.10]    Discharge Diagnoses:    Hospital Problems             Last Modified POA    * (Principal) Diabetic ketoacidosis without coma associated with other specified diabetes mellitus ([facility]) 7/17/2023 Yes    DKA, type 1, not at goal [facility]) 7/17/2023 Yes    ARF (acute renal failure) ([facility]) 7/18/2023 Yes    Hypernatremia 7/18/2023 Yes    Hypokalemia 7/20/2023 Yes   Diabetic Ketoacidosis, resolved      - Resume home insulin    - Holding home oral hypoglycemics due to kidney function      Acute Metabolic Encephalopathy related to Acute CVA, mental status improving     - CT head shows no  acute intracranial pathology     - MRI shows small acute lacunar infarct in the left deep brain adjacent to the caudate nucleus. Chronic lacunar infarcts in the bilateral basal ganglia. Chronic left PCA territory infarct involving the left occipital pole and likely the posterior medial temporal lobe (limited by motion artifacts). - PVL shows mild stenosis bilateral ICA    - Echo EF 50-55%     Acute Kidney Injury with electrolyte abnormalities    - Renally dose medications    - Avoid nephrotoxins, hypotension and NSAIDs    - Follow up with nephrology outpatient      Hypokalemia     - Improved     Discharge Condition: Stable    Discharge To: Home with Home Health    Consults: Nephrology    HPI: Grupo Hernandez is a 70 y.o.   male with PMH of

## 2023-07-21 NOTE — PLAN OF CARE
Problem: SLP Adult - Impaired Communication  Goal: By Discharge: Demonstrates communication skills at highest level of function for planned discharge setting. See evaluation for individualized goals. Description: Patient will:  1. Accurately answer complex yes/no questions with min A with >80% acc. 2. Follow 2-step commands with 80% accuracy with mod A for increased participation in therapeutic tasks.   3. Utilize compensatory strategies (external aids) to recall daily events with 80% acc and min assist.   4. Demonstrate functional problem solving given verbal scenarios with 80% acc and min-mod assist.  7/21/2023 1227 by BRITTNEY Dyson  Outcome: Progressing

## 2023-07-21 NOTE — PLAN OF CARE
Problem: SLP Adult - Impaired Communication  Goal: By Discharge: Demonstrates communication skills at highest level of function for planned discharge setting. See evaluation for individualized goals. Description: Patient will:  1. Accurately answer complex yes/no questions with min A with >80% acc. 2. Follow 2-step commands with 80% accuracy with mod A for increased participation in therapeutic tasks. 3. Utilize compensatory strategies (external aids) to recall daily events with 80% acc and min assist.   4. Demonstrate functional problem solving given verbal scenarios with 80% acc and min-mod assist.  Outcome: Progressing   SPEECH-LANGUAGE TREATMENT    Patient: Lawanda Rooney (67 y.o. male)  Date: 7/21/2023  Diagnosis: Hyperkalemia [E87.5]  DKA, type 1, not at goal [facility]) [E10.10]  Altered mental status, unspecified altered mental status type [R41.82]  Diabetic ketoacidosis without coma associated with type 2 diabetes mellitus ([address]) [E11.10]  Diabetic ketoacidosis without coma associated with other specified diabetes mellitus ([address]) [E13.10] Diabetic ketoacidosis without coma associated with other specified diabetes mellitus ([address])      Precautions: Standard  PLOF: As per H&P     ASSESSMENT:  Patient received seated upright in chair for continued cog-comm tx. Alert, verbally responsive and recalled this SLP from yesterday's evaluation. Reviewed rationale of SLP tx, results of evaluation and role of SLP. Pt continues to verbalize no changes re: cognitive communication abilities or speech/language. Pt participated in unstructured conversation regarding daily events and he demonstrated improved recall of events/people encountered. Oriented to person, place and situation (I). He did benefit from min cues to utilize external aids for JOAQUINA and year. He participated in verbal problem solving tasks requiring him to ID problems and provide solutions to functional scenarios.  Pt completed task with min assist and 90%

## 2023-07-21 NOTE — PROGRESS NOTES
Patient discharged home with home health. Instructions given as ordered. Verbalized understanding. Opportunity for questions and clarification provided. All questions answered. Daughter at bedside. Pt left unit via wheelchair. Accompanied by daughter. All belongings with patient.  Left in no apparent distress

## 2023-07-21 NOTE — CARE COORDINATION
Home health orders sent to [address] [facility] in [facility]. Spoke with pt's daughter Tiffanie Byrd. She stated she will be taking pt home. Apolonio Fothergill Discharge order noted for today. Pt has been accepted to [address] [place] [address]. Met with patient and his daughter Carolina and are agreeable to the transition plan today. Transport has been arranged through pt's daughter. Patient's discharge summary and home health  orders have been forwarded to [facility]  agency via [address] Updated bedside RN, Dionisio Moreno,  to the transition plan. Discharge information has been documented on the AVS.         Copy of [facility] and personal care list given to pt's daughter.       Shiv Baker, BSN RN  Care Management

## 2023-07-21 NOTE — DISCHARGE SUMMARY
See NP's note;
Reason for Stopping:         methocarbamol (ROBAXIN) 500 MG tablet Comments:   Reason for Stopping:         traZODone (DESYREL) 50 MG tablet Comments:   Reason for Stopping:                Activity: activity as tolerated    Diet: diabetic diet    Wound Care: none needed    Follow-up:   Follow-up Information       Follow up With Specialties Details Why Contact Info    Brooklynn Serrano MD Family Medicine Follow up in 1 week(s)  [address]  [address] MD Family Medicine   [address] [place] [address]  [phone]      Lexx Knox MD Nephrology Follow up in 2 week(s)  [address] [place] [address] [phone]               Discharge time: >30 minutes spent coordinating this discharge    Izabel Breaux, MSN, FNP-BC, AGACNP-BC  [address] [facility]   7/21/2023, 10:54 AM

## 2023-07-21 NOTE — PROGRESS NOTES
1920: Bedside shift change report given to ALMA ROSA Munguia (oncoming nurse) by Kori Altamirano (offgoing nurse). Report included the following information SBAR, Kardex, Intake/Output, MAR, Recent Results, and Cardiac Rhythm NSR/Sinus Ada Odor . Wound Prevention Checklist    Patient: Marco Antonio Elaine (3 y.o. male)  Date: 2023  Diagnosis: Hyperkalemia [E87.5]  DKA, type 1, not at goal [facility]) [E10.10]  Altered mental status, unspecified altered mental status type [R41.82]  Diabetic ketoacidosis without coma associated with type 2 diabetes mellitus ([address]) [E11.10]  Diabetic ketoacidosis without coma associated with other specified diabetes mellitus ([address]) [E13.10] Diabetic ketoacidosis without coma associated with other specified diabetes mellitus ([address])    Precautions:         []  Heel prevention boots placed on patient    []  Patient turned q2h during shift    []  Lift team ordered    []  Patient on [facility] bed/Specialty bed    []  Each Wound is documented during shift (Stage, Color, drainage, odor, measurements, and dressings)    [x]  Dual skin check done with Jud Waldrop RN     1193: Shift assessment done. V/S obtained. Pt resting quietly in bed. A/Ox3; disoriented to situation. No c/o pain or SOB at this time. 97% SpO2 on RA. Urinal within reach. Patient oriented to room and call light. Patient aware to use call light to communicate any pain or needs. : Scheduled med given. B; No insulin coverage needed per protocol. 2330: No changes from previous assessment. Pt sleeping; no distress noted at this time. Call light within reach. 0200: Pt asleep;easily awaken. No distress noted at this time. 0400: No changes from previous assessment. Pt sleeping; no distress noted at this time. Call light within reach. 0600: Scheduled med given. 0710: Bedside shift change report given to [employer] (oncoming nurse) by Nettie Fabry (offgoing nurse).  Report included the following information

## 2023-07-24 ENCOUNTER — CLINICAL DOCUMENTATION (OUTPATIENT)
Age: 63
End: 2023-07-24

## 2023-07-24 NOTE — PROGRESS NOTES
PATIENT REQUIRES TCM OUTREACH    MRN: [id number]     PATIENT:  Oli Perdomo    ADMIT DATE:  7/17/23     DISCHARGE DATE:  7/21/23     ADMITTING DX: DKA w/o coma    MEDICATION CHANGES:   Start: lipitor  Stop: glipizide, metformin, methocarbamol, simvastatin, trazodone  Change:  n/a    NEW SPECIALISTS:   nephrology    HOME HEALTH/WOUND CARE/PT/OT:  home health care    SCHEDULED FOLLOW UP APPTS:    Future Appointments   Date Time Provider [address] [place] [address]   7/24/2023 11:30 AM DARNELL Menon [facility] BS AMB   8/4/2023  8:45 AM Pily Doshi MD [facility] BS AMB       *Please contact patient within 2 business days and document under . TCMOFFICE.   A scheduled Hospital Follow-up for patient within 7 days is preferred*

## 2023-08-18 ENCOUNTER — APPOINTMENT (OUTPATIENT)
Facility: HOSPITAL | Age: 63
End: 2023-08-18
Payer: MEDICARE

## 2023-08-18 ENCOUNTER — HOSPITAL ENCOUNTER (INPATIENT)
Facility: HOSPITAL | Age: 63
LOS: 6 days | Discharge: NURSING, REHAB OR LONG-TERM CARE | End: 2023-08-24
Attending: HOSPITALIST | Admitting: HOSPITALIST
Payer: MEDICARE

## 2023-08-18 DIAGNOSIS — E11.65 HYPERGLYCEMIA DUE TO DIABETES MELLITUS (HCC): ICD-10-CM

## 2023-08-18 DIAGNOSIS — G93.41 ACUTE METABOLIC ENCEPHALOPATHY: Primary | ICD-10-CM

## 2023-08-18 DIAGNOSIS — I63.9 CEREBROVASCULAR ACCIDENT (CVA), UNSPECIFIED MECHANISM (HCC): ICD-10-CM

## 2023-08-18 DIAGNOSIS — I70.209 ARTERIAL OCCLUSION, LOWER EXTREMITY (HCC): ICD-10-CM

## 2023-08-18 DIAGNOSIS — R93.0 ABNORMAL HEAD CT: ICD-10-CM

## 2023-08-18 LAB
ALBUMIN SERPL-MCNC: 2.9 G/DL (ref 3.4–5)
ALBUMIN/GLOB SERPL: 0.9 (ref 0.8–1.7)
ALP SERPL-CCNC: 126 U/L (ref 45–117)
ALT SERPL-CCNC: 24 U/L (ref 16–61)
AMMONIA PLAS-SCNC: 20 UMOL/L (ref 11–32)
ANION GAP SERPL CALC-SCNC: 9 MMOL/L (ref 3–18)
APPEARANCE UR: CLEAR
AST SERPL-CCNC: 16 U/L (ref 10–38)
BASOPHILS # BLD: 0 K/UL (ref 0–0.1)
BASOPHILS NFR BLD: 1 % (ref 0–2)
BILIRUB SERPL-MCNC: 0.2 MG/DL (ref 0.2–1)
BILIRUB UR QL: NEGATIVE
BUN SERPL-MCNC: 16 MG/DL (ref 7–18)
BUN/CREAT SERPL: 11 (ref 12–20)
CALCIUM SERPL-MCNC: 8.1 MG/DL (ref 8.5–10.1)
CHLORIDE SERPL-SCNC: 96 MMOL/L (ref 100–111)
CK SERPL-CCNC: 67 U/L (ref 39–308)
CO2 SERPL-SCNC: 23 MMOL/L (ref 21–32)
COLOR UR: YELLOW
CREAT SERPL-MCNC: 1.52 MG/DL (ref 0.6–1.3)
DIFFERENTIAL METHOD BLD: ABNORMAL
EKG ATRIAL RATE: 84 BPM
EKG DIAGNOSIS: NORMAL
EKG P AXIS: 34 DEGREES
EKG P-R INTERVAL: 170 MS
EKG Q-T INTERVAL: 386 MS
EKG QRS DURATION: 96 MS
EKG QTC CALCULATION (BAZETT): 456 MS
EKG R AXIS: 37 DEGREES
EKG T AXIS: 257 DEGREES
EKG VENTRICULAR RATE: 84 BPM
EOSINOPHIL # BLD: 0 K/UL (ref 0–0.4)
EOSINOPHIL NFR BLD: 1 % (ref 0–5)
ERYTHROCYTE [DISTWIDTH] IN BLOOD BY AUTOMATED COUNT: 13.2 % (ref 11.6–14.5)
ETHANOL SERPL-MCNC: <3 MG/DL (ref 0–3)
GLOBULIN SER CALC-MCNC: 3.1 G/DL (ref 2–4)
GLUCOSE BLD STRIP.AUTO-MCNC: 130 MG/DL (ref 70–110)
GLUCOSE BLD STRIP.AUTO-MCNC: 304 MG/DL (ref 70–110)
GLUCOSE BLD STRIP.AUTO-MCNC: 439 MG/DL (ref 70–110)
GLUCOSE BLD STRIP.AUTO-MCNC: 592 MG/DL (ref 70–110)
GLUCOSE BLD STRIP.AUTO-MCNC: >600 MG/DL (ref 70–110)
GLUCOSE BLD STRIP.AUTO-MCNC: >600 MG/DL (ref 70–110)
GLUCOSE SERPL-MCNC: 754 MG/DL (ref 74–99)
GLUCOSE UR STRIP.AUTO-MCNC: >1000 MG/DL
HCT VFR BLD AUTO: 32.3 % (ref 36–48)
HGB BLD-MCNC: 10.8 G/DL (ref 13–16)
HGB UR QL STRIP: NEGATIVE
IMM GRANULOCYTES # BLD AUTO: 0 K/UL (ref 0–0.04)
IMM GRANULOCYTES NFR BLD AUTO: 0 % (ref 0–0.5)
KETONES UR QL STRIP.AUTO: NEGATIVE MG/DL
LEUKOCYTE ESTERASE UR QL STRIP.AUTO: NEGATIVE
LYMPHOCYTES # BLD: 0.9 K/UL (ref 0.9–3.6)
LYMPHOCYTES NFR BLD: 27 % (ref 21–52)
MAGNESIUM SERPL-MCNC: 1.9 MG/DL (ref 1.6–2.6)
MCH RBC QN AUTO: 28.2 PG (ref 24–34)
MCHC RBC AUTO-ENTMCNC: 33.4 G/DL (ref 31–37)
MCV RBC AUTO: 84.3 FL (ref 78–100)
MONOCYTES # BLD: 0.3 K/UL (ref 0.05–1.2)
MONOCYTES NFR BLD: 8 % (ref 3–10)
NEUTS SEG # BLD: 2.2 K/UL (ref 1.8–8)
NEUTS SEG NFR BLD: 63 % (ref 40–73)
NITRITE UR QL STRIP.AUTO: NEGATIVE
NRBC # BLD: 0 K/UL (ref 0–0.01)
NRBC BLD-RTO: 0 PER 100 WBC
PH UR STRIP: 6 (ref 5–8)
PLATELET # BLD AUTO: 193 K/UL (ref 135–420)
PMV BLD AUTO: 13.3 FL (ref 9.2–11.8)
POTASSIUM SERPL-SCNC: 4.5 MMOL/L (ref 3.5–5.5)
PROT SERPL-MCNC: 6 G/DL (ref 6.4–8.2)
PROT UR STRIP-MCNC: NEGATIVE MG/DL
RBC # BLD AUTO: 3.83 M/UL (ref 4.35–5.65)
SODIUM SERPL-SCNC: 128 MMOL/L (ref 136–145)
SP GR UR REFRACTOMETRY: 1.03 (ref 1–1.03)
TROPONIN I SERPL HS-MCNC: 22 NG/L (ref 0–78)
TROPONIN I SERPL HS-MCNC: 24 NG/L (ref 0–78)
UROBILINOGEN UR QL STRIP.AUTO: 0.2 EU/DL (ref 0.2–1)
WBC # BLD AUTO: 3.4 K/UL (ref 4.6–13.2)

## 2023-08-18 PROCEDURE — 1100000003 HC PRIVATE W/ TELEMETRY

## 2023-08-18 PROCEDURE — 84484 ASSAY OF TROPONIN QUANT: CPT

## 2023-08-18 PROCEDURE — 2580000003 HC RX 258: Performed by: EMERGENCY MEDICINE

## 2023-08-18 PROCEDURE — 80053 COMPREHEN METABOLIC PANEL: CPT

## 2023-08-18 PROCEDURE — 71045 X-RAY EXAM CHEST 1 VIEW: CPT

## 2023-08-18 PROCEDURE — 99223 1ST HOSP IP/OBS HIGH 75: CPT | Performed by: HOSPITALIST

## 2023-08-18 PROCEDURE — 99285 EMERGENCY DEPT VISIT HI MDM: CPT

## 2023-08-18 PROCEDURE — 6360000002 HC RX W HCPCS: Performed by: HOSPITALIST

## 2023-08-18 PROCEDURE — 82550 ASSAY OF CK (CPK): CPT

## 2023-08-18 PROCEDURE — 82962 GLUCOSE BLOOD TEST: CPT

## 2023-08-18 PROCEDURE — 93005 ELECTROCARDIOGRAM TRACING: CPT | Performed by: EMERGENCY MEDICINE

## 2023-08-18 PROCEDURE — 83735 ASSAY OF MAGNESIUM: CPT

## 2023-08-18 PROCEDURE — 2580000003 HC RX 258: Performed by: HOSPITALIST

## 2023-08-18 PROCEDURE — 70450 CT HEAD/BRAIN W/O DYE: CPT

## 2023-08-18 PROCEDURE — 82140 ASSAY OF AMMONIA: CPT

## 2023-08-18 PROCEDURE — 96374 THER/PROPH/DIAG INJ IV PUSH: CPT

## 2023-08-18 PROCEDURE — 81003 URINALYSIS AUTO W/O SCOPE: CPT

## 2023-08-18 PROCEDURE — 85025 COMPLETE CBC W/AUTO DIFF WBC: CPT

## 2023-08-18 PROCEDURE — 6370000000 HC RX 637 (ALT 250 FOR IP): Performed by: HOSPITALIST

## 2023-08-18 PROCEDURE — 82077 ASSAY SPEC XCP UR&BREATH IA: CPT

## 2023-08-18 PROCEDURE — 93010 ELECTROCARDIOGRAM REPORT: CPT | Performed by: INTERNAL MEDICINE

## 2023-08-18 PROCEDURE — 6370000000 HC RX 637 (ALT 250 FOR IP): Performed by: EMERGENCY MEDICINE

## 2023-08-18 RX ORDER — ATORVASTATIN CALCIUM 40 MG/1
40 TABLET, FILM COATED ORAL NIGHTLY
Status: DISCONTINUED | OUTPATIENT
Start: 2023-08-18 | End: 2023-08-18

## 2023-08-18 RX ORDER — LABETALOL HYDROCHLORIDE 5 MG/ML
10 INJECTION, SOLUTION INTRAVENOUS EVERY 10 MIN PRN
Status: DISCONTINUED | OUTPATIENT
Start: 2023-08-18 | End: 2023-08-24 | Stop reason: HOSPADM

## 2023-08-18 RX ORDER — POLYETHYLENE GLYCOL 3350 17 G/17G
17 POWDER, FOR SOLUTION ORAL DAILY PRN
Status: DISCONTINUED | OUTPATIENT
Start: 2023-08-18 | End: 2023-08-24 | Stop reason: HOSPADM

## 2023-08-18 RX ORDER — ENOXAPARIN SODIUM 100 MG/ML
40 INJECTION SUBCUTANEOUS DAILY
Status: DISCONTINUED | OUTPATIENT
Start: 2023-08-18 | End: 2023-08-24 | Stop reason: HOSPADM

## 2023-08-18 RX ORDER — ONDANSETRON 4 MG/1
4 TABLET, ORALLY DISINTEGRATING ORAL EVERY 8 HOURS PRN
Status: DISCONTINUED | OUTPATIENT
Start: 2023-08-18 | End: 2023-08-24 | Stop reason: HOSPADM

## 2023-08-18 RX ORDER — POTASSIUM CHLORIDE 7.45 MG/ML
10 INJECTION INTRAVENOUS PRN
Status: DISCONTINUED | OUTPATIENT
Start: 2023-08-18 | End: 2023-08-18

## 2023-08-18 RX ORDER — ERGOCALCIFEROL 1.25 MG/1
50000 CAPSULE ORAL
Status: DISCONTINUED | OUTPATIENT
Start: 2023-08-19 | End: 2023-08-24 | Stop reason: HOSPADM

## 2023-08-18 RX ORDER — SODIUM CHLORIDE 9 MG/ML
INJECTION, SOLUTION INTRAVENOUS PRN
Status: DISCONTINUED | OUTPATIENT
Start: 2023-08-18 | End: 2023-08-24 | Stop reason: HOSPADM

## 2023-08-18 RX ORDER — POLYETHYLENE GLYCOL 3350 17 G/17G
17 POWDER, FOR SOLUTION ORAL DAILY
Status: DISCONTINUED | OUTPATIENT
Start: 2023-08-18 | End: 2023-08-24 | Stop reason: HOSPADM

## 2023-08-18 RX ORDER — INSULIN GLARGINE 100 [IU]/ML
30 INJECTION, SOLUTION SUBCUTANEOUS NIGHTLY
Status: DISCONTINUED | OUTPATIENT
Start: 2023-08-18 | End: 2023-08-20

## 2023-08-18 RX ORDER — MAGNESIUM SULFATE IN WATER 40 MG/ML
2000 INJECTION, SOLUTION INTRAVENOUS PRN
Status: DISCONTINUED | OUTPATIENT
Start: 2023-08-18 | End: 2023-08-18

## 2023-08-18 RX ORDER — SODIUM CHLORIDE 9 MG/ML
INJECTION, SOLUTION INTRAVENOUS CONTINUOUS
Status: DISCONTINUED | OUTPATIENT
Start: 2023-08-18 | End: 2023-08-20

## 2023-08-18 RX ORDER — DEXTROSE MONOHYDRATE, SODIUM CHLORIDE, AND POTASSIUM CHLORIDE 50; 1.49; 4.5 G/1000ML; G/1000ML; G/1000ML
INJECTION, SOLUTION INTRAVENOUS CONTINUOUS PRN
Status: DISCONTINUED | OUTPATIENT
Start: 2023-08-18 | End: 2023-08-18

## 2023-08-18 RX ORDER — ASPIRIN 81 MG/1
81 TABLET, CHEWABLE ORAL DAILY
Status: DISCONTINUED | OUTPATIENT
Start: 2023-08-19 | End: 2023-08-24 | Stop reason: HOSPADM

## 2023-08-18 RX ORDER — ONDANSETRON 2 MG/ML
4 INJECTION INTRAMUSCULAR; INTRAVENOUS EVERY 6 HOURS PRN
Status: DISCONTINUED | OUTPATIENT
Start: 2023-08-18 | End: 2023-08-24 | Stop reason: HOSPADM

## 2023-08-18 RX ORDER — CLOPIDOGREL BISULFATE 75 MG/1
75 TABLET ORAL DAILY
Status: DISCONTINUED | OUTPATIENT
Start: 2023-08-18 | End: 2023-08-24 | Stop reason: HOSPADM

## 2023-08-18 RX ORDER — 0.9 % SODIUM CHLORIDE 0.9 %
1000 INTRAVENOUS SOLUTION INTRAVENOUS ONCE
Status: DISCONTINUED | OUTPATIENT
Start: 2023-08-18 | End: 2023-08-18

## 2023-08-18 RX ORDER — GAUZE BANDAGE 2" X 2"
100 BANDAGE TOPICAL DAILY
Status: DISCONTINUED | OUTPATIENT
Start: 2023-08-18 | End: 2023-08-24 | Stop reason: HOSPADM

## 2023-08-18 RX ORDER — 0.9 % SODIUM CHLORIDE 0.9 %
15 INTRAVENOUS SOLUTION INTRAVENOUS ONCE
Status: DISCONTINUED | OUTPATIENT
Start: 2023-08-18 | End: 2023-08-18

## 2023-08-18 RX ORDER — ATORVASTATIN CALCIUM 40 MG/1
80 TABLET, FILM COATED ORAL NIGHTLY
Status: DISCONTINUED | OUTPATIENT
Start: 2023-08-18 | End: 2023-08-24 | Stop reason: HOSPADM

## 2023-08-18 RX ORDER — ASPIRIN 81 MG/1
81 TABLET, CHEWABLE ORAL DAILY
Status: DISCONTINUED | OUTPATIENT
Start: 2023-08-18 | End: 2023-08-18

## 2023-08-18 RX ORDER — SODIUM CHLORIDE 9 MG/ML
INJECTION, SOLUTION INTRAVENOUS CONTINUOUS
OUTPATIENT
Start: 2023-08-18

## 2023-08-18 RX ORDER — INSULIN LISPRO 100 [IU]/ML
0-4 INJECTION, SOLUTION INTRAVENOUS; SUBCUTANEOUS NIGHTLY
Status: DISCONTINUED | OUTPATIENT
Start: 2023-08-18 | End: 2023-08-24 | Stop reason: HOSPADM

## 2023-08-18 RX ORDER — 0.9 % SODIUM CHLORIDE 0.9 %
1000 INTRAVENOUS SOLUTION INTRAVENOUS ONCE
Status: COMPLETED | OUTPATIENT
Start: 2023-08-18 | End: 2023-08-18

## 2023-08-18 RX ORDER — DEXTROSE MONOHYDRATE 100 MG/ML
INJECTION, SOLUTION INTRAVENOUS CONTINUOUS PRN
Status: DISCONTINUED | OUTPATIENT
Start: 2023-08-18 | End: 2023-08-24 | Stop reason: HOSPADM

## 2023-08-18 RX ORDER — ASPIRIN 300 MG/1
300 SUPPOSITORY RECTAL DAILY
Status: DISCONTINUED | OUTPATIENT
Start: 2023-08-19 | End: 2023-08-20

## 2023-08-18 RX ORDER — SODIUM CHLORIDE 0.9 % (FLUSH) 0.9 %
5-40 SYRINGE (ML) INJECTION EVERY 12 HOURS SCHEDULED
Status: DISCONTINUED | OUTPATIENT
Start: 2023-08-18 | End: 2023-08-24 | Stop reason: HOSPADM

## 2023-08-18 RX ORDER — SODIUM CHLORIDE 9 MG/ML
INJECTION, SOLUTION INTRAVENOUS CONTINUOUS
Status: DISCONTINUED | OUTPATIENT
Start: 2023-08-18 | End: 2023-08-18

## 2023-08-18 RX ORDER — SODIUM CHLORIDE 0.9 % (FLUSH) 0.9 %
5-40 SYRINGE (ML) INJECTION PRN
Status: DISCONTINUED | OUTPATIENT
Start: 2023-08-18 | End: 2023-08-24 | Stop reason: HOSPADM

## 2023-08-18 RX ORDER — INSULIN LISPRO 100 [IU]/ML
0-4 INJECTION, SOLUTION INTRAVENOUS; SUBCUTANEOUS
Status: DISCONTINUED | OUTPATIENT
Start: 2023-08-18 | End: 2023-08-24 | Stop reason: HOSPADM

## 2023-08-18 RX ADMIN — ENOXAPARIN SODIUM 40 MG: 100 INJECTION SUBCUTANEOUS at 22:17

## 2023-08-18 RX ADMIN — CLOPIDOGREL BISULFATE 75 MG: 75 TABLET, FILM COATED ORAL at 18:56

## 2023-08-18 RX ADMIN — INSULIN LISPRO 3 UNITS: 100 INJECTION, SOLUTION INTRAVENOUS; SUBCUTANEOUS at 19:13

## 2023-08-18 RX ADMIN — INSULIN GLARGINE 30 UNITS: 100 INJECTION, SOLUTION SUBCUTANEOUS at 22:16

## 2023-08-18 RX ADMIN — SODIUM CHLORIDE 1000 ML: 9 INJECTION, SOLUTION INTRAVENOUS at 14:33

## 2023-08-18 RX ADMIN — SODIUM CHLORIDE: 9 INJECTION, SOLUTION INTRAVENOUS at 22:22

## 2023-08-18 RX ADMIN — INSULIN HUMAN 7 UNITS: 100 INJECTION, SOLUTION PARENTERAL at 15:35

## 2023-08-18 RX ADMIN — INSULIN HUMAN 4 UNITS: 100 INJECTION, SOLUTION PARENTERAL at 17:42

## 2023-08-18 RX ADMIN — ATORVASTATIN CALCIUM 80 MG: 40 TABLET, FILM COATED ORAL at 22:15

## 2023-08-18 RX ADMIN — THIAMINE HCL TAB 100 MG 100 MG: 100 TAB at 18:55

## 2023-08-18 ASSESSMENT — PAIN - FUNCTIONAL ASSESSMENT: PAIN_FUNCTIONAL_ASSESSMENT: NONE - DENIES PAIN

## 2023-08-18 ASSESSMENT — PAIN SCALES - GENERAL
PAINLEVEL_OUTOF10: 0
PAINLEVEL_OUTOF10: 0

## 2023-08-18 ASSESSMENT — ENCOUNTER SYMPTOMS
ABDOMINAL PAIN: 0
EYES NEGATIVE: 1
ALLERGIC/IMMUNOLOGIC NEGATIVE: 1
SHORTNESS OF BREATH: 0

## 2023-08-18 NOTE — ED NOTES
Per report pt was found soiled with urine and feces, current living condition in the apartment is also unsanitary due to old feces on the floor. Per report APS was contacted.      Jesse Francis RN  08/18/23 8336

## 2023-08-18 NOTE — H&P
History & Physical    Patient: Radha Deleon MRN: [id number]  [facility]: [id number]    YOB: 1960  Age: 70 y.o. Sex: male             DOA: 8/18/2023    Chief Complaint:   Chief Complaint   Patient presents with    Hyperglycemia          HPI:    Radha Deleon is a 70 y.o.  male with past medical history of recent CVA, hypertension, diabetes mellitus type 2 brought in by EMS after patient was found to be confused at his home. Neighbors did not notice any family members to visit the patient and they did not see the patient so decided to call 911. When EMS arrived noted that patient lethargic and his blood sugars were elevated. He is also was very messy with multiple bags of fast food. Patient states he was doing fine and denies any complaints. On asking he says he was taking his insulin and medications but could not give me dosage of his insulin. Patient seemed slightly confused but was able to answer some questions. On talking to his daughter Carolina, patient has been noncompliant with the medication and diet. He lives alone, did not take care of himself after he got discharged from the hospital.  He was not taking his insulin or medications as recommended. He has been eating a lot of fast food and his blood sugars been running high. Per daughter she does not think patient can stay at home by himself so recommending for SNF. Per daughter, patient after stroke has been having some memory issues. On asking leading questions, patient denies any chest pain, no shortness of breath, no dysuria, no urgency or frequency of urination. He denies any fevers, no chills, no abdominal pain, no nausea or vomiting. In ED, patient noted to be hypoglycemic with blood sugars in 600s, noted to have acute renal failure. Patient was started on IV fluids and was given insulin. CT head showed some evolution of old stroke.   Case was 8/18/2023 4:00:42 PM     Troponin    Collection Time: 08/18/23  3:20 PM   Result Value Ref Range    Troponin, High Sensitivity 24 0 - 78 ng/L   Urinalysis    Collection Time: 08/18/23  3:20 PM   Result Value Ref Range    Color, UA YELLOW      Appearance CLEAR      Specific Gravity, UA 1.026 1.005 - 1.030      pH, Urine 6.0 5.0 - 8.0      Protein, UA Negative NEG mg/dL    Glucose, UA >1000 (A) NEG mg/dL    Ketones, Urine Negative NEG mg/dL    Bilirubin Urine Negative NEG      Blood, Urine Negative NEG      Urobilinogen, Urine 0.2 0.2 - 1.0 EU/dL    Nitrite, Urine Negative NEG      Leukocyte Esterase, Urine Negative NEG     POCT Glucose    Collection Time: 08/18/23  4:12 PM   Result Value Ref Range    POC Glucose 592 (HH) 70 - 110 mg/dL   POCT Glucose    Collection Time: 08/18/23  5:16 PM   Result Value Ref Range    POC Glucose 439 (HH) 70 - 110 mg/dL        CT Result (most recent):  CT HEAD WO CONTRAST 08/18/2023    Narrative  CT Of The Head Without Contrast    CLINICAL HISTORY: Altered mental status, hyperglycemia. TECHNIQUE: 5 mm helical scan obtained of the head. All CT scans at this  facility are performed using dose optimization techniques as appropriate to a  performed exam, to include automated exposure control, adjustment of the mA  and/or kV according to patient's size (including appropriate matching for site  specific examinations), or use of iterative reconstruction technique. COMPARISON: 7/18/2023; 7/17/2023. FINDINGS:    There is similar generalized increased density left caudate head and basal  ganglia. Evolution of the left basal ganglia infarct. Similar remote right basal  ganglia infarct. No acute intracranial hemorrhage. No midline shift. Gray-white  matter differentiation is maintained. Mild generalized volume loss. Moderate  periventricular hypodensity.     Limited visualized orbits, soft tissues and osseous structures grossly normal.  Limited visualized paranasal sinuses and mastoid air

## 2023-08-18 NOTE — ED NOTES
Attempted to call report , per floor shift change ongoing, report given to Tamia Tompkins RN  08/18/23 2111

## 2023-08-18 NOTE — ED TRIAGE NOTES
Pt arrived via EMS from an [facility] complex  c/o altered mental status and high blood sugar, per report on EMS arrival pt was coming around and was able to answer some questions.

## 2023-08-18 NOTE — ED NOTES
Pt oriented to person and place, NIHSS- 2 due to mild aphasia and mild dysarthria. PA aware of Ct scan result.      Katharine Bojorquez RN  08/18/23 1922

## 2023-08-19 ENCOUNTER — APPOINTMENT (OUTPATIENT)
Facility: HOSPITAL | Age: 63
End: 2023-08-19
Payer: MEDICARE

## 2023-08-19 PROBLEM — I63.9 CEREBROVASCULAR ACCIDENT (CVA) (HCC): Status: ACTIVE | Noted: 2023-08-19

## 2023-08-19 PROBLEM — E87.1 HYPONATREMIA: Status: ACTIVE | Noted: 2023-08-19

## 2023-08-19 LAB
ANION GAP SERPL CALC-SCNC: 4 MMOL/L (ref 3–18)
ANION GAP SERPL CALC-SCNC: 5 MMOL/L (ref 3–18)
ANION GAP SERPL CALC-SCNC: 6 MMOL/L (ref 3–18)
ANION GAP SERPL CALC-SCNC: 6 MMOL/L (ref 3–18)
ANION GAP SERPL CALC-SCNC: 8 MMOL/L (ref 3–18)
BUN SERPL-MCNC: 10 MG/DL (ref 7–18)
BUN SERPL-MCNC: 11 MG/DL (ref 7–18)
BUN SERPL-MCNC: 13 MG/DL (ref 7–18)
BUN SERPL-MCNC: 8 MG/DL (ref 7–18)
BUN SERPL-MCNC: 8 MG/DL (ref 7–18)
BUN/CREAT SERPL: 10 (ref 12–20)
BUN/CREAT SERPL: 10 (ref 12–20)
BUN/CREAT SERPL: 11 (ref 12–20)
BUN/CREAT SERPL: 13 (ref 12–20)
BUN/CREAT SERPL: 9 (ref 12–20)
CALCIUM SERPL-MCNC: 7.2 MG/DL (ref 8.5–10.1)
CALCIUM SERPL-MCNC: 7.7 MG/DL (ref 8.5–10.1)
CALCIUM SERPL-MCNC: 7.8 MG/DL (ref 8.5–10.1)
CALCIUM SERPL-MCNC: 7.9 MG/DL (ref 8.5–10.1)
CALCIUM SERPL-MCNC: 8 MG/DL (ref 8.5–10.1)
CHLORIDE SERPL-SCNC: 100 MMOL/L (ref 100–111)
CHLORIDE SERPL-SCNC: 103 MMOL/L (ref 100–111)
CHLORIDE SERPL-SCNC: 105 MMOL/L (ref 100–111)
CHLORIDE SERPL-SCNC: 105 MMOL/L (ref 100–111)
CHLORIDE SERPL-SCNC: 107 MMOL/L (ref 100–111)
CHOLEST SERPL-MCNC: 177 MG/DL
CO2 SERPL-SCNC: 22 MMOL/L (ref 21–32)
CO2 SERPL-SCNC: 24 MMOL/L (ref 21–32)
CO2 SERPL-SCNC: 25 MMOL/L (ref 21–32)
CREAT SERPL-MCNC: 0.8 MG/DL (ref 0.6–1.3)
CREAT SERPL-MCNC: 0.82 MG/DL (ref 0.6–1.3)
CREAT SERPL-MCNC: 0.85 MG/DL (ref 0.6–1.3)
CREAT SERPL-MCNC: 1.17 MG/DL (ref 0.6–1.3)
CREAT SERPL-MCNC: 1.19 MG/DL (ref 0.6–1.3)
ERYTHROCYTE [DISTWIDTH] IN BLOOD BY AUTOMATED COUNT: 13.2 % (ref 11.6–14.5)
EST. AVERAGE GLUCOSE BLD GHB EST-MCNC: ABNORMAL MG/DL
GLUCOSE BLD STRIP.AUTO-MCNC: 101 MG/DL (ref 70–110)
GLUCOSE BLD STRIP.AUTO-MCNC: 226 MG/DL (ref 70–110)
GLUCOSE BLD STRIP.AUTO-MCNC: 332 MG/DL (ref 70–110)
GLUCOSE BLD STRIP.AUTO-MCNC: 355 MG/DL (ref 70–110)
GLUCOSE BLD STRIP.AUTO-MCNC: 462 MG/DL (ref 70–110)
GLUCOSE SERPL-MCNC: 111 MG/DL (ref 74–99)
GLUCOSE SERPL-MCNC: 193 MG/DL (ref 74–99)
GLUCOSE SERPL-MCNC: 204 MG/DL (ref 74–99)
GLUCOSE SERPL-MCNC: 308 MG/DL (ref 74–99)
GLUCOSE SERPL-MCNC: 393 MG/DL (ref 74–99)
HBA1C MFR BLD: >14 % (ref 4.2–5.6)
HCT VFR BLD AUTO: 33.5 % (ref 36–48)
HDLC SERPL-MCNC: 50 MG/DL (ref 40–60)
HDLC SERPL: 3.5 (ref 0–5)
HGB BLD-MCNC: 11.1 G/DL (ref 13–16)
LDLC SERPL CALC-MCNC: 105.2 MG/DL (ref 0–100)
LIPID PANEL: ABNORMAL
MCH RBC QN AUTO: 27.8 PG (ref 24–34)
MCHC RBC AUTO-ENTMCNC: 33.1 G/DL (ref 31–37)
MCV RBC AUTO: 84 FL (ref 78–100)
NRBC # BLD: 0 K/UL (ref 0–0.01)
NRBC BLD-RTO: 0 PER 100 WBC
PLATELET # BLD AUTO: 221 K/UL (ref 135–420)
PMV BLD AUTO: 12.6 FL (ref 9.2–11.8)
POTASSIUM SERPL-SCNC: 3.3 MMOL/L (ref 3.5–5.5)
POTASSIUM SERPL-SCNC: 3.8 MMOL/L (ref 3.5–5.5)
POTASSIUM SERPL-SCNC: 3.9 MMOL/L (ref 3.5–5.5)
POTASSIUM SERPL-SCNC: 3.9 MMOL/L (ref 3.5–5.5)
POTASSIUM SERPL-SCNC: 4 MMOL/L (ref 3.5–5.5)
RBC # BLD AUTO: 3.99 M/UL (ref 4.35–5.65)
SODIUM SERPL-SCNC: 133 MMOL/L (ref 136–145)
SODIUM SERPL-SCNC: 135 MMOL/L (ref 136–145)
SODIUM SERPL-SCNC: 136 MMOL/L (ref 136–145)
TRIGL SERPL-MCNC: 109 MG/DL
VLDLC SERPL CALC-MCNC: 21.8 MG/DL
WBC # BLD AUTO: 3.1 K/UL (ref 4.6–13.2)

## 2023-08-19 PROCEDURE — 80061 LIPID PANEL: CPT

## 2023-08-19 PROCEDURE — 1100000003 HC PRIVATE W/ TELEMETRY

## 2023-08-19 PROCEDURE — 82962 GLUCOSE BLOOD TEST: CPT

## 2023-08-19 PROCEDURE — 2580000003 HC RX 258: Performed by: HOSPITALIST

## 2023-08-19 PROCEDURE — 83036 HEMOGLOBIN GLYCOSYLATED A1C: CPT

## 2023-08-19 PROCEDURE — 85027 COMPLETE CBC AUTOMATED: CPT

## 2023-08-19 PROCEDURE — 6360000002 HC RX W HCPCS: Performed by: HOSPITALIST

## 2023-08-19 PROCEDURE — 94761 N-INVAS EAR/PLS OXIMETRY MLT: CPT

## 2023-08-19 PROCEDURE — 97161 PT EVAL LOW COMPLEX 20 MIN: CPT

## 2023-08-19 PROCEDURE — 99232 SBSQ HOSP IP/OBS MODERATE 35: CPT | Performed by: HOSPITALIST

## 2023-08-19 PROCEDURE — 97165 OT EVAL LOW COMPLEX 30 MIN: CPT

## 2023-08-19 PROCEDURE — 6370000000 HC RX 637 (ALT 250 FOR IP): Performed by: HOSPITALIST

## 2023-08-19 PROCEDURE — 36415 COLL VENOUS BLD VENIPUNCTURE: CPT

## 2023-08-19 PROCEDURE — 99221 1ST HOSP IP/OBS SF/LOW 40: CPT | Performed by: STUDENT IN AN ORGANIZED HEALTH CARE EDUCATION/TRAINING PROGRAM

## 2023-08-19 PROCEDURE — 97530 THERAPEUTIC ACTIVITIES: CPT

## 2023-08-19 PROCEDURE — 80048 BASIC METABOLIC PNL TOTAL CA: CPT

## 2023-08-19 PROCEDURE — 70551 MRI BRAIN STEM W/O DYE: CPT

## 2023-08-19 RX ORDER — SIMVASTATIN 20 MG
20 TABLET ORAL NIGHTLY
Status: ON HOLD | COMMUNITY
End: 2023-08-24 | Stop reason: HOSPADM

## 2023-08-19 RX ORDER — POTASSIUM CHLORIDE 20 MEQ/1
40 TABLET, EXTENDED RELEASE ORAL ONCE
Status: COMPLETED | OUTPATIENT
Start: 2023-08-19 | End: 2023-08-19

## 2023-08-19 RX ORDER — M-VIT,TX,IRON,MINS/CALC/FOLIC 27MG-0.4MG
1 TABLET ORAL DAILY
COMMUNITY

## 2023-08-19 RX ORDER — TRAZODONE HYDROCHLORIDE 50 MG/1
50 TABLET ORAL NIGHTLY
COMMUNITY

## 2023-08-19 RX ORDER — GLIPIZIDE 10 MG/1
10 TABLET, FILM COATED, EXTENDED RELEASE ORAL DAILY
Status: ON HOLD | COMMUNITY
End: 2023-08-24 | Stop reason: HOSPADM

## 2023-08-19 RX ADMIN — INSULIN LISPRO 1 UNITS: 100 INJECTION, SOLUTION INTRAVENOUS; SUBCUTANEOUS at 12:05

## 2023-08-19 RX ADMIN — ASPIRIN 81 MG CHEWABLE TABLET 81 MG: 81 TABLET CHEWABLE at 17:05

## 2023-08-19 RX ADMIN — ERGOCALCIFEROL 50000 UNITS: 1.25 CAPSULE ORAL at 10:35

## 2023-08-19 RX ADMIN — INSULIN LISPRO 3 UNITS: 100 INJECTION, SOLUTION INTRAVENOUS; SUBCUTANEOUS at 17:05

## 2023-08-19 RX ADMIN — THIAMINE HCL TAB 100 MG 100 MG: 100 TAB at 10:35

## 2023-08-19 RX ADMIN — SODIUM CHLORIDE, PRESERVATIVE FREE 10 ML: 5 INJECTION INTRAVENOUS at 20:39

## 2023-08-19 RX ADMIN — POLYETHYLENE GLYCOL 3350 17 G: 17 POWDER, FOR SOLUTION ORAL at 18:10

## 2023-08-19 RX ADMIN — SODIUM CHLORIDE, PRESERVATIVE FREE 10 ML: 5 INJECTION INTRAVENOUS at 21:48

## 2023-08-19 RX ADMIN — INSULIN LISPRO 4 UNITS: 100 INJECTION, SOLUTION INTRAVENOUS; SUBCUTANEOUS at 21:44

## 2023-08-19 RX ADMIN — POTASSIUM CHLORIDE 40 MEQ: 1500 TABLET, EXTENDED RELEASE ORAL at 10:35

## 2023-08-19 RX ADMIN — ATORVASTATIN CALCIUM 80 MG: 40 TABLET, FILM COATED ORAL at 20:35

## 2023-08-19 RX ADMIN — INSULIN GLARGINE 30 UNITS: 100 INJECTION, SOLUTION SUBCUTANEOUS at 21:45

## 2023-08-19 RX ADMIN — CLOPIDOGREL BISULFATE 75 MG: 75 TABLET, FILM COATED ORAL at 17:05

## 2023-08-19 RX ADMIN — ENOXAPARIN SODIUM 40 MG: 100 INJECTION SUBCUTANEOUS at 20:36

## 2023-08-19 ASSESSMENT — PAIN SCALES - GENERAL
PAINLEVEL_OUTOF10: 0

## 2023-08-19 NOTE — CONSULTS
A 70years old male patient with medical history of hypertension, poorly controlled diabetes, diabetic neuropathy, PVD, BKA of the right, left basal ganglia lacunar stroke from July 2023 with no obvious residual was brought to the emergency room after he was found confused at home. His daughter by the bedside. His neighbors were concerned that they did not see him and called 911. He was found confused by EMS; his blood sugar was high. Patient does not know the details. His daughter states that she has seen him the previous night. He is poorly compliant to medications. Has generalized weakness. He states his left side is weak. Denied any changes in his speech. His blood sugar in the emergency room was 754. Started on insulin. Trending down; in the 200s today. Mental status is getting better. Moving his extremities symmetrically. CT scan of the brain in the emergency room showed evolution  of the left basal ganglia infarct; increased density over the left caudate and basal ganglia; similar to the previous CT from July. No hemorrhage. Social History     Socioeconomic History    Marital status: Single     Spouse name: Not on file    Number of children: Not on file    Years of education: Not on file    Highest education level: Not on file   Occupational History    Not on file   Tobacco Use    Smoking status: Former     Packs/day: 0.50     Types: Cigarettes    Smokeless tobacco: Never   Substance and Sexual Activity    Alcohol use: Yes     Alcohol/week: 4.2 standard drinks    Drug use: Not Currently     Types: Marijuana Graciella Muzzy), Cocaine    Sexual activity: Not on file   Other Topics Concern    Not on file   Social History Narrative     at [employer].        Social Determinants of Health     Financial Resource Strain: Not on file   Food Insecurity: Not on file   Transportation Needs: Not on file   Physical Activity: Not on file   Stress: Not on file   Social Connections: Not

## 2023-08-19 NOTE — PLAN OF CARE
Problem: Occupational Therapy - Adult  Goal: By Discharge: Performs self-care activities at highest level of function for planned discharge setting. See evaluation for individualized goals. Description: Occupational Therapy Goals:  Initiated 8/19/2023 to be met within 7-10 days. 1.  Patient will perform lower body dressing with modified independence for seated and standing aspects. 2.  Patient will perform toilet transfers with modified independence. 3.  Patient will perform all aspects of toileting with modified independence. 4.  Patient will utilize energy conservation techniques during functional activities with verbal cues. 5.  Patient will participate in BUE TherEx with Supervision for 8 min to improve UB strength and endurance needed for ADLs. PLOF: Pt lives alone, reports being Mod Ind for ADLs and functional mobility with walker and RLE prosthesis. Outcome: Progressing  OCCUPATIONAL THERAPY EVALUATION    Patient: Frederic Vann (87 y.o. male)  Date: 8/19/2023  Primary Diagnosis: Abnormal head CT [R93.0]  Cerebrovascular accident (CVA), unspecified mechanism ([address]) [L64.0]  Acute metabolic encephalopathy [C56.16]  Hyperglycemia due to diabetes mellitus ([address]) [E11.65]       Precautions: Fall Risk (R BKA)    ASSESSMENT :    Pt is up in recliner upon entry, agreeable to OT evaluation. Pt required increased time for sock management on LLE in preparation for functional txfrs. Pt required SBA for functional txfr with FWW simulating toilet txfr, CGA for toileting hygiene in std due to decrease dynamic std balance w/o use of prosthesis. Pt demos decreased processing speed and decreased safety awareness, would benefit from supervises environment to maximize safety. DEFICITS/IMPAIRMENTS:  Performance deficits / Impairments: Decreased functional mobility ; Decreased endurance;Decreased safe awareness;Decreased cognition;Decreased strength    Patient will benefit from skilled intervention to address the Medical History:   Diagnosis Date    Acute postoperative anemia due to expected blood loss 2/8/2022    Arterial occlusion, lower extremity (HCC) 11/27/2021    Chronic alcohol use     Fri-Sun one fifth; Mon-Thur: Pint of liquor    Constipation     Coronary artery disease involving native coronary artery of native heart     Critical ischemia of lower extremity ([address]) 2/8/2022    Depression     Diabetic nephropathy associated with type 2 diabetes mellitus ([address]) 4/4/2019    Diabetic neuropathy associated with type 2 diabetes mellitus ([address])     Gastroesophageal reflux disease     High vitamin D level 2/16/2022    Vitamin D 25-Hydroxy (2/16/2022) = 105.7    History of acute inferior wall myocardial infarction 2009    History of coronary angioplasty with insertion of stent 07/29/2009    2.5 x 13 Cypher stent to CX. History of essential hypertension     History of lacunar cerebrovascular accident     History of vitamin D deficiency 4/15/2018    Hyperlipidemia     Hypertensive retinopathy 12/18/2020    Insomnia 7/2/2018    Long term current use of insulin ([address])     Mediastinal adenopathy 06/25/2015    Migraine headache     Mild nonproliferative diabetic retinopathy of left eye without macular edema associated with type 2 diabetes mellitus ([address]) 12/18/2020    Nuclear sclerosis of both eyes 12/18/2020    Otalgia 05/08/2019    Poorly controlled type 2 diabetes mellitus (HCC)     HbA1c (2/8/2022) = 13.8    S/P cardiac cath 11/09/2011    RCA patent. LM patent. LAD patent. mD1 55%. CX patent. Prior stent patent. Tete 85% (2.5 x 15-mm Xience stent, resid 0%). LVEDP 12. EF 40-45%. High lateral hypk (RI distribution).       Tobacco use disorder     contemplating stopping     Past Surgical History:   Procedure Laterality Date    CARDIAC CATHETERIZATION  8/30/2011    CARDIAC CATHETERIZATION  11/09/2011    COLONOSCOPY N/A 10/28/2020    COLONOSCOPY w/polypectomy performed by Nancy Pruitt MD at [email or web address]

## 2023-08-20 PROBLEM — I63.81 LACUNAR STROKE (HCC): Status: ACTIVE | Noted: 2023-08-20

## 2023-08-20 LAB
ANION GAP SERPL CALC-SCNC: 4 MMOL/L (ref 3–18)
ANION GAP SERPL CALC-SCNC: 5 MMOL/L (ref 3–18)
ANION GAP SERPL CALC-SCNC: 5 MMOL/L (ref 3–18)
ANION GAP SERPL CALC-SCNC: 8 MMOL/L (ref 3–18)
BASOPHILS # BLD: 0 K/UL (ref 0–0.1)
BASOPHILS NFR BLD: 1 % (ref 0–2)
BUN SERPL-MCNC: 10 MG/DL (ref 7–18)
BUN SERPL-MCNC: 11 MG/DL (ref 7–18)
BUN/CREAT SERPL: 10 (ref 12–20)
BUN/CREAT SERPL: 11 (ref 12–20)
BUN/CREAT SERPL: 11 (ref 12–20)
BUN/CREAT SERPL: 13 (ref 12–20)
CALCIUM SERPL-MCNC: 7.7 MG/DL (ref 8.5–10.1)
CALCIUM SERPL-MCNC: 7.8 MG/DL (ref 8.5–10.1)
CALCIUM SERPL-MCNC: 8.1 MG/DL (ref 8.5–10.1)
CALCIUM SERPL-MCNC: 8.3 MG/DL (ref 8.5–10.1)
CHLORIDE SERPL-SCNC: 102 MMOL/L (ref 100–111)
CHLORIDE SERPL-SCNC: 104 MMOL/L (ref 100–111)
CHLORIDE SERPL-SCNC: 106 MMOL/L (ref 100–111)
CHLORIDE SERPL-SCNC: 106 MMOL/L (ref 100–111)
CO2 SERPL-SCNC: 20 MMOL/L (ref 21–32)
CO2 SERPL-SCNC: 25 MMOL/L (ref 21–32)
CO2 SERPL-SCNC: 27 MMOL/L (ref 21–32)
CO2 SERPL-SCNC: 27 MMOL/L (ref 21–32)
CREAT SERPL-MCNC: 0.88 MG/DL (ref 0.6–1.3)
CREAT SERPL-MCNC: 0.93 MG/DL (ref 0.6–1.3)
CREAT SERPL-MCNC: 1.02 MG/DL (ref 0.6–1.3)
CREAT SERPL-MCNC: 1.11 MG/DL (ref 0.6–1.3)
DIFFERENTIAL METHOD BLD: ABNORMAL
EOSINOPHIL # BLD: 0.1 K/UL (ref 0–0.4)
EOSINOPHIL NFR BLD: 2 % (ref 0–5)
ERYTHROCYTE [DISTWIDTH] IN BLOOD BY AUTOMATED COUNT: 13.4 % (ref 11.6–14.5)
GLUCOSE BLD STRIP.AUTO-MCNC: 133 MG/DL (ref 70–110)
GLUCOSE BLD STRIP.AUTO-MCNC: 211 MG/DL (ref 70–110)
GLUCOSE BLD STRIP.AUTO-MCNC: 218 MG/DL (ref 70–110)
GLUCOSE BLD STRIP.AUTO-MCNC: 244 MG/DL (ref 70–110)
GLUCOSE SERPL-MCNC: 161 MG/DL (ref 74–99)
GLUCOSE SERPL-MCNC: 230 MG/DL (ref 74–99)
GLUCOSE SERPL-MCNC: 312 MG/DL (ref 74–99)
GLUCOSE SERPL-MCNC: 73 MG/DL (ref 74–99)
HCT VFR BLD AUTO: 34.4 % (ref 36–48)
HGB BLD-MCNC: 11.8 G/DL (ref 13–16)
IMM GRANULOCYTES # BLD AUTO: 0 K/UL (ref 0–0.04)
IMM GRANULOCYTES NFR BLD AUTO: 0 % (ref 0–0.5)
LYMPHOCYTES # BLD: 1.6 K/UL (ref 0.9–3.6)
LYMPHOCYTES NFR BLD: 42 % (ref 21–52)
MAGNESIUM SERPL-MCNC: 1.8 MG/DL (ref 1.6–2.6)
MCH RBC QN AUTO: 28.6 PG (ref 24–34)
MCHC RBC AUTO-ENTMCNC: 34.3 G/DL (ref 31–37)
MCV RBC AUTO: 83.3 FL (ref 78–100)
MONOCYTES # BLD: 0.5 K/UL (ref 0.05–1.2)
MONOCYTES NFR BLD: 13 % (ref 3–10)
NEUTS SEG # BLD: 1.6 K/UL (ref 1.8–8)
NEUTS SEG NFR BLD: 42 % (ref 40–73)
NRBC # BLD: 0 K/UL (ref 0–0.01)
NRBC BLD-RTO: 0 PER 100 WBC
PLATELET # BLD AUTO: 230 K/UL (ref 135–420)
PMV BLD AUTO: 12.4 FL (ref 9.2–11.8)
POTASSIUM SERPL-SCNC: 3.8 MMOL/L (ref 3.5–5.5)
POTASSIUM SERPL-SCNC: 4.5 MMOL/L (ref 3.5–5.5)
RBC # BLD AUTO: 4.13 M/UL (ref 4.35–5.65)
SODIUM SERPL-SCNC: 133 MMOL/L (ref 136–145)
SODIUM SERPL-SCNC: 134 MMOL/L (ref 136–145)
SODIUM SERPL-SCNC: 134 MMOL/L (ref 136–145)
SODIUM SERPL-SCNC: 138 MMOL/L (ref 136–145)
WBC # BLD AUTO: 3.9 K/UL (ref 4.6–13.2)

## 2023-08-20 PROCEDURE — 1100000003 HC PRIVATE W/ TELEMETRY

## 2023-08-20 PROCEDURE — 80048 BASIC METABOLIC PNL TOTAL CA: CPT

## 2023-08-20 PROCEDURE — 99232 SBSQ HOSP IP/OBS MODERATE 35: CPT | Performed by: HOSPITALIST

## 2023-08-20 PROCEDURE — 82962 GLUCOSE BLOOD TEST: CPT

## 2023-08-20 PROCEDURE — 6360000002 HC RX W HCPCS: Performed by: HOSPITALIST

## 2023-08-20 PROCEDURE — 83735 ASSAY OF MAGNESIUM: CPT

## 2023-08-20 PROCEDURE — 2580000003 HC RX 258: Performed by: HOSPITALIST

## 2023-08-20 PROCEDURE — 92610 EVALUATE SWALLOWING FUNCTION: CPT

## 2023-08-20 PROCEDURE — 99232 SBSQ HOSP IP/OBS MODERATE 35: CPT | Performed by: STUDENT IN AN ORGANIZED HEALTH CARE EDUCATION/TRAINING PROGRAM

## 2023-08-20 PROCEDURE — 85025 COMPLETE CBC W/AUTO DIFF WBC: CPT

## 2023-08-20 PROCEDURE — 36415 COLL VENOUS BLD VENIPUNCTURE: CPT

## 2023-08-20 PROCEDURE — 6370000000 HC RX 637 (ALT 250 FOR IP): Performed by: HOSPITALIST

## 2023-08-20 RX ORDER — INSULIN GLARGINE 100 [IU]/ML
5 INJECTION, SOLUTION SUBCUTANEOUS
Status: COMPLETED | OUTPATIENT
Start: 2023-08-20 | End: 2023-08-20

## 2023-08-20 RX ORDER — INSULIN GLARGINE 100 [IU]/ML
35 INJECTION, SOLUTION SUBCUTANEOUS NIGHTLY
Status: DISCONTINUED | OUTPATIENT
Start: 2023-08-20 | End: 2023-08-23

## 2023-08-20 RX ORDER — AMLODIPINE BESYLATE 2.5 MG/1
2.5 TABLET ORAL DAILY
Status: DISCONTINUED | OUTPATIENT
Start: 2023-08-20 | End: 2023-08-22

## 2023-08-20 RX ADMIN — INSULIN GLARGINE 5 UNITS: 100 INJECTION, SOLUTION SUBCUTANEOUS at 10:40

## 2023-08-20 RX ADMIN — SODIUM CHLORIDE, PRESERVATIVE FREE 10 ML: 5 INJECTION INTRAVENOUS at 10:41

## 2023-08-20 RX ADMIN — ASPIRIN 81 MG CHEWABLE TABLET 81 MG: 81 TABLET CHEWABLE at 09:03

## 2023-08-20 RX ADMIN — SODIUM CHLORIDE: 9 INJECTION, SOLUTION INTRAVENOUS at 00:35

## 2023-08-20 RX ADMIN — AMLODIPINE BESYLATE 2.5 MG: 2.5 TABLET ORAL at 15:25

## 2023-08-20 RX ADMIN — INSULIN LISPRO 1 UNITS: 100 INJECTION, SOLUTION INTRAVENOUS; SUBCUTANEOUS at 18:04

## 2023-08-20 RX ADMIN — CLOPIDOGREL BISULFATE 75 MG: 75 TABLET, FILM COATED ORAL at 18:04

## 2023-08-20 RX ADMIN — INSULIN LISPRO 1 UNITS: 100 INJECTION, SOLUTION INTRAVENOUS; SUBCUTANEOUS at 12:23

## 2023-08-20 RX ADMIN — INSULIN LISPRO 1 UNITS: 100 INJECTION, SOLUTION INTRAVENOUS; SUBCUTANEOUS at 09:03

## 2023-08-20 RX ADMIN — THIAMINE HCL TAB 100 MG 100 MG: 100 TAB at 09:03

## 2023-08-20 RX ADMIN — ATORVASTATIN CALCIUM 80 MG: 40 TABLET, FILM COATED ORAL at 20:46

## 2023-08-20 RX ADMIN — POLYETHYLENE GLYCOL 3350 17 G: 17 POWDER, FOR SOLUTION ORAL at 18:04

## 2023-08-20 RX ADMIN — INSULIN GLARGINE 35 UNITS: 100 INJECTION, SOLUTION SUBCUTANEOUS at 20:47

## 2023-08-20 RX ADMIN — ENOXAPARIN SODIUM 40 MG: 100 INJECTION SUBCUTANEOUS at 20:47

## 2023-08-20 ASSESSMENT — PAIN SCALES - GENERAL
PAINLEVEL_OUTOF10: 0
PAINLEVEL_OUTOF10: 0

## 2023-08-20 NOTE — PLAN OF CARE
02/08/2022    S/P Right below-the-knee amputation (2/8/2022 - Dr. Juan Franco)    VASCULAR SURGERY      Multiple vascular surgeries    WISDOM TOOTH EXTRACTION      x4     Prior Level of Function/Home Situation:  Social/Functional History  Lives With: Alone  Type of Home: House  Home Layout: One level  Home Access: Level entry  Bathroom Shower/Tub: Tub/Shower unit  Bathroom Toilet: Standard  Bathroom Equipment: Shower chair  Bathroom Accessibility: Accessible  Home Equipment: [employer]etrixes, rolling  Has the patient had two or more falls in the past year or any fall with injury in the past year?: Unknown  Receives Help From: Family  ADL Assistance: Independent  Ambulation Assistance: Independent  Transfer Assistance: Independent    Daily Assessment:  Baseline Assessment  Temperature Spikes Noted: No  Respiratory Status: Room air  O2 Device: None (Room air)  Communication Observation: Functional  Follows Directions: Simple  Current Diet : Regular  Current Liquid Diet : Thin  Dentition: Adequate  Patient Positioning: Upright in bed  Baseline Vocal Quality: Normal    Orientation:  Person and Place    Oral Assessment:  Oral Motor   Labial: No impairment  Dentition: Natural  Oral Hygiene: Clean  Lingual: No impairment  Velum: No Impairment  Mandible: No impairment  Gag: No Impairment  Pharyngeal Phase: WNL     P.O.  Trials:  See above     PAIN:  Start of Eval: not reported   End of Eval: not reported      After evaluation:   []            Patient left in no apparent distress sitting up in chair  [x]            Patient left in no apparent distress in bed  [x]            Call bell left within reach  [x]            Nursing notified  []            Family present  []            Caregiver present  []            Bed alarm activated      COMMUNICATION/EDUCATION:   [x]            Aspiration precautions; swallow safety; compensatory techniques provided via demonstration, verbalization and teach back of comprehension  [x] Patient understands intent and goals of therapy, neutral about participation.   Thank you for this referral.  Bola Taylor M.S., CCC-SLP

## 2023-08-20 NOTE — PROGRESS NOTES
[unfilled]     Re:  Jyoti Lyles,Follow up visit     8/20/2023 10:45 AM    SSN: [id number]    Subjective:   Frederic Vann was seen on follow-up. No new symptoms. He denied weakness or numbness. But on exam, he has mild left upper and lower extremity weakness. Speech is back to normal.  No headache, nausea, or vomiting. His blood sugar has been trending down: 211 this morning  MRI of the brain had shown a small area of early subacute infarction over the left basal ganglia.     Medications:    Current Facility-Administered Medications   Medication Dose Route Frequency Provider Last Rate Last Admin    insulin glargine (LANTUS) injection vial 35 Units  35 Units SubCUTAneous Nightly Suleiman Romero MD        clopidogrel (PLAVIX) tablet 75 mg  75 mg Oral Daily Suleiman Romero MD   75 mg at 08/19/23 1705    thiamine mononitrate tablet 100 mg  100 mg Oral Daily Suleiman Romero MD   100 mg at 08/20/23 0903    vitamin D (ERGOCALCIFEROL) capsule 50,000 Units  50,000 Units Oral Q7 Days Suleiman Romero MD   50,000 Units at 08/19/23 1035    polyethylene glycol (GLYCOLAX) packet 17 g  17 g Oral Daily Suleiman Romero MD   17 g at 08/19/23 1810    insulin lispro (HUMALOG) injection vial 0-4 Units  0-4 Units SubCUTAneous TID WC Suleiman Romero MD   1 Units at 08/20/23 0903    insulin lispro (HUMALOG) injection vial 0-4 Units  0-4 Units SubCUTAneous Nightly Suleiman Romero MD   4 Units at 08/19/23 2144    glucose chewable tablet 16 g  4 tablet Oral PRN Suleiman Romero MD        dextrose bolus 10% 125 mL  125 mL IntraVENous PRN Suleiman Romero MD        Or    dextrose bolus 10% 250 mL  250 mL IntraVENous PRN Suleiman Romero MD        glucagon injection 1 mg  1 mg SubCUTAneous PRN Suleiman Romero MD        dextrose 10 % infusion   IntraVENous Continuous PRN Suleiman Romero MD

## 2023-08-21 LAB
ANION GAP SERPL CALC-SCNC: 5 MMOL/L (ref 3–18)
ANION GAP SERPL CALC-SCNC: 5 MMOL/L (ref 3–18)
ANION GAP SERPL CALC-SCNC: 6 MMOL/L (ref 3–18)
ANION GAP SERPL CALC-SCNC: 7 MMOL/L (ref 3–18)
BUN SERPL-MCNC: 8 MG/DL (ref 7–18)
BUN SERPL-MCNC: 8 MG/DL (ref 7–18)
BUN SERPL-MCNC: 9 MG/DL (ref 7–18)
BUN SERPL-MCNC: 9 MG/DL (ref 7–18)
BUN/CREAT SERPL: 11 (ref 12–20)
BUN/CREAT SERPL: 9 (ref 12–20)
CALCIUM SERPL-MCNC: 8.2 MG/DL (ref 8.5–10.1)
CALCIUM SERPL-MCNC: 8.3 MG/DL (ref 8.5–10.1)
CALCIUM SERPL-MCNC: 8.4 MG/DL (ref 8.5–10.1)
CALCIUM SERPL-MCNC: 8.6 MG/DL (ref 8.5–10.1)
CHLORIDE SERPL-SCNC: 103 MMOL/L (ref 100–111)
CHLORIDE SERPL-SCNC: 103 MMOL/L (ref 100–111)
CHLORIDE SERPL-SCNC: 104 MMOL/L (ref 100–111)
CHLORIDE SERPL-SCNC: 108 MMOL/L (ref 100–111)
CO2 SERPL-SCNC: 21 MMOL/L (ref 21–32)
CO2 SERPL-SCNC: 26 MMOL/L (ref 21–32)
CREAT SERPL-MCNC: 0.8 MG/DL (ref 0.6–1.3)
CREAT SERPL-MCNC: 0.9 MG/DL (ref 0.6–1.3)
CREAT SERPL-MCNC: 0.93 MG/DL (ref 0.6–1.3)
CREAT SERPL-MCNC: 1.01 MG/DL (ref 0.6–1.3)
GLUCOSE BLD STRIP.AUTO-MCNC: 186 MG/DL (ref 70–110)
GLUCOSE BLD STRIP.AUTO-MCNC: 224 MG/DL (ref 70–110)
GLUCOSE BLD STRIP.AUTO-MCNC: 247 MG/DL (ref 70–110)
GLUCOSE BLD STRIP.AUTO-MCNC: 86 MG/DL (ref 70–110)
GLUCOSE SERPL-MCNC: 187 MG/DL (ref 74–99)
GLUCOSE SERPL-MCNC: 188 MG/DL (ref 74–99)
GLUCOSE SERPL-MCNC: 207 MG/DL (ref 74–99)
GLUCOSE SERPL-MCNC: 71 MG/DL (ref 74–99)
POTASSIUM SERPL-SCNC: 3.7 MMOL/L (ref 3.5–5.5)
POTASSIUM SERPL-SCNC: 4 MMOL/L (ref 3.5–5.5)
POTASSIUM SERPL-SCNC: 4.1 MMOL/L (ref 3.5–5.5)
POTASSIUM SERPL-SCNC: 4.4 MMOL/L (ref 3.5–5.5)
SODIUM SERPL-SCNC: 134 MMOL/L (ref 136–145)
SODIUM SERPL-SCNC: 134 MMOL/L (ref 136–145)
SODIUM SERPL-SCNC: 135 MMOL/L (ref 136–145)
SODIUM SERPL-SCNC: 137 MMOL/L (ref 136–145)

## 2023-08-21 PROCEDURE — 2580000003 HC RX 258: Performed by: HOSPITALIST

## 2023-08-21 PROCEDURE — 80048 BASIC METABOLIC PNL TOTAL CA: CPT

## 2023-08-21 PROCEDURE — 82962 GLUCOSE BLOOD TEST: CPT

## 2023-08-21 PROCEDURE — 36415 COLL VENOUS BLD VENIPUNCTURE: CPT

## 2023-08-21 PROCEDURE — 99232 SBSQ HOSP IP/OBS MODERATE 35: CPT | Performed by: HOSPITALIST

## 2023-08-21 PROCEDURE — 94761 N-INVAS EAR/PLS OXIMETRY MLT: CPT

## 2023-08-21 PROCEDURE — 6360000002 HC RX W HCPCS: Performed by: HOSPITALIST

## 2023-08-21 PROCEDURE — 1100000003 HC PRIVATE W/ TELEMETRY

## 2023-08-21 PROCEDURE — 6370000000 HC RX 637 (ALT 250 FOR IP): Performed by: HOSPITALIST

## 2023-08-21 RX ORDER — IPRATROPIUM BROMIDE AND ALBUTEROL SULFATE 2.5; .5 MG/3ML; MG/3ML
1 SOLUTION RESPIRATORY (INHALATION) EVERY 6 HOURS PRN
Status: DISCONTINUED | OUTPATIENT
Start: 2023-08-21 | End: 2023-08-24 | Stop reason: HOSPADM

## 2023-08-21 RX ADMIN — POLYETHYLENE GLYCOL 3350 17 G: 17 POWDER, FOR SOLUTION ORAL at 17:52

## 2023-08-21 RX ADMIN — ENOXAPARIN SODIUM 40 MG: 100 INJECTION SUBCUTANEOUS at 20:56

## 2023-08-21 RX ADMIN — AMLODIPINE BESYLATE 2.5 MG: 2.5 TABLET ORAL at 10:04

## 2023-08-21 RX ADMIN — SODIUM CHLORIDE, PRESERVATIVE FREE 10 ML: 5 INJECTION INTRAVENOUS at 08:12

## 2023-08-21 RX ADMIN — THIAMINE HCL TAB 100 MG 100 MG: 100 TAB at 08:11

## 2023-08-21 RX ADMIN — INSULIN GLARGINE 35 UNITS: 100 INJECTION, SOLUTION SUBCUTANEOUS at 20:56

## 2023-08-21 RX ADMIN — SODIUM CHLORIDE, PRESERVATIVE FREE 10 ML: 5 INJECTION INTRAVENOUS at 20:59

## 2023-08-21 RX ADMIN — ASPIRIN 81 MG CHEWABLE TABLET 81 MG: 81 TABLET CHEWABLE at 08:11

## 2023-08-21 RX ADMIN — INSULIN LISPRO 1 UNITS: 100 INJECTION, SOLUTION INTRAVENOUS; SUBCUTANEOUS at 16:16

## 2023-08-21 RX ADMIN — CLOPIDOGREL BISULFATE 75 MG: 75 TABLET, FILM COATED ORAL at 17:51

## 2023-08-21 RX ADMIN — ATORVASTATIN CALCIUM 80 MG: 40 TABLET, FILM COATED ORAL at 20:56

## 2023-08-21 ASSESSMENT — PAIN SCALES - GENERAL
PAINLEVEL_OUTOF10: 0
PAINLEVEL_OUTOF10: 0

## 2023-08-21 NOTE — CARE COORDINATION
CM called patient's daughter Drake Leon [phone], CM received voicemail, CM left message for a return call, CM phone number given.              Tiny Morin, RN  Case Management [phone]

## 2023-08-22 ENCOUNTER — APPOINTMENT (OUTPATIENT)
Facility: HOSPITAL | Age: 63
End: 2023-08-22
Attending: HOSPITALIST
Payer: MEDICARE

## 2023-08-22 LAB
GLUCOSE BLD STRIP.AUTO-MCNC: 131 MG/DL (ref 70–110)
GLUCOSE BLD STRIP.AUTO-MCNC: 162 MG/DL (ref 70–110)
GLUCOSE BLD STRIP.AUTO-MCNC: 232 MG/DL (ref 70–110)
GLUCOSE BLD STRIP.AUTO-MCNC: 98 MG/DL (ref 70–110)

## 2023-08-22 PROCEDURE — 97530 THERAPEUTIC ACTIVITIES: CPT

## 2023-08-22 PROCEDURE — 82962 GLUCOSE BLOOD TEST: CPT

## 2023-08-22 PROCEDURE — 93306 TTE W/DOPPLER COMPLETE: CPT

## 2023-08-22 PROCEDURE — 2580000003 HC RX 258: Performed by: HOSPITALIST

## 2023-08-22 PROCEDURE — 6370000000 HC RX 637 (ALT 250 FOR IP): Performed by: HOSPITALIST

## 2023-08-22 PROCEDURE — 1100000003 HC PRIVATE W/ TELEMETRY

## 2023-08-22 PROCEDURE — 94761 N-INVAS EAR/PLS OXIMETRY MLT: CPT

## 2023-08-22 PROCEDURE — 6360000002 HC RX W HCPCS: Performed by: HOSPITALIST

## 2023-08-22 PROCEDURE — 99232 SBSQ HOSP IP/OBS MODERATE 35: CPT | Performed by: HOSPITALIST

## 2023-08-22 RX ORDER — AMLODIPINE BESYLATE 5 MG/1
5 TABLET ORAL DAILY
Status: DISCONTINUED | OUTPATIENT
Start: 2023-08-22 | End: 2023-08-24

## 2023-08-22 RX ADMIN — CLOPIDOGREL BISULFATE 75 MG: 75 TABLET, FILM COATED ORAL at 17:47

## 2023-08-22 RX ADMIN — ASPIRIN 81 MG CHEWABLE TABLET 81 MG: 81 TABLET CHEWABLE at 09:16

## 2023-08-22 RX ADMIN — SODIUM CHLORIDE, PRESERVATIVE FREE 10 ML: 5 INJECTION INTRAVENOUS at 22:29

## 2023-08-22 RX ADMIN — THIAMINE HCL TAB 100 MG 100 MG: 100 TAB at 09:16

## 2023-08-22 RX ADMIN — SODIUM CHLORIDE, PRESERVATIVE FREE 10 ML: 5 INJECTION INTRAVENOUS at 09:17

## 2023-08-22 RX ADMIN — AMLODIPINE BESYLATE 2.5 MG: 2.5 TABLET ORAL at 09:16

## 2023-08-22 RX ADMIN — AMLODIPINE BESYLATE 5 MG: 5 TABLET ORAL at 15:17

## 2023-08-22 RX ADMIN — INSULIN GLARGINE 35 UNITS: 100 INJECTION, SOLUTION SUBCUTANEOUS at 22:28

## 2023-08-22 RX ADMIN — ATORVASTATIN CALCIUM 80 MG: 40 TABLET, FILM COATED ORAL at 22:29

## 2023-08-22 RX ADMIN — ENOXAPARIN SODIUM 40 MG: 100 INJECTION SUBCUTANEOUS at 22:28

## 2023-08-22 ASSESSMENT — PAIN SCALES - GENERAL
PAINLEVEL_OUTOF10: 0
PAINLEVEL_OUTOF10: 0

## 2023-08-22 NOTE — PROGRESS NOTES
MD     August 22, 2023      Disclaimer: Sections of this note are dictated using utilizing voice recognition software. Minor typographical errors may be present. If questions arise, please do not hesitate to contact me or call our department.

## 2023-08-22 NOTE — CARE COORDINATION
08/22/23 1830   Service Assessment   Patient Orientation Unable to Assess   Cognition Other (see comment)  (Unable to assess at this time.)   History Provided By Child/Family   Primary Caregiver Self   Support Systems Children;Family Members   PCP Verified by CM Yes   Last Visit to PCP Within last 3 months   Prior Functional Level Independent in ADLs/IADLs   Current Functional Level Assistance with the following:;Mobility; Bathing;Housework; Toileting;Dressing   Can patient return to prior living arrangement No   Ability to make needs known: Other (see comment)  (Unable to assess at this time.)   Family able to assist with home care needs: No   Financial Resources Medicare;Medicaid   Community Resources None   Social/Functional History   Lives With Alone   Type of [facility] One level   Home Access Stairs to enter with rails   Entrance Stairs - Number of Steps 3 Steps to enter the apartment. Entrance Stairs - Rails Left   Bathroom Shower/Tub Tub/Shower unit   Bathroom Toilet Standard   Bathroom Equipment None   Bathroom Accessibility Accessible   Home Equipment Walker, [place] Help From Family   ADL Assistance Needs assistance   Toileting Needs assistance   [facility] Needs assistance   Ambulation Assistance Needs assistance   Transfer Assistance Needs assistance   Active  Yes   Mode of Transportation Truck   Occupation On disability   Discharge Planning   Type of Residence Apartment   Living Arrangements Alone   Current Services Prior To Admission Durable Medical Equipment   Current DME Prior to [facility] Other (Comment)  ([address]   DME Ordered?  No   Potential Assistance Purchasing Medications No   Type of Home Care Services None   Patient expects to be discharged to: Long-term care   Services At/After Discharge   Transition of Care Consult (CM Consult) [address] Discharge Long term care Notes:  Patient unable to care for himself at home, non compliant with medications. The Plan for Transition of Care is related to the following treatment goals of Abnormal head CT [R93.0]  Cerebrovascular accident (CVA), unspecified mechanism ([address]) [R43.3]  Acute metabolic encephalopathy [X20.67]  Hyperglycemia due to diabetes mellitus ([address]) [Q22.77]    IF APPLICABLE: The Patient and/or patient representative Ciera Jeffers and his family were provided with a choice of provider and agrees with the discharge plan. Freedom of choice list with basic dialogue that supports the patient's individualized plan of care/goals and shares the quality data associated with the providers was provided to: (P) Patient Representative   Patient Representative Name: (P) Carolina Lyles (Patient's daughter)     The Patient and/or Patient Representative Agree with the Discharge Plan?  (P) Yes    [facility]  Case Management Department

## 2023-08-22 NOTE — CARE COORDINATION
UAI/LTSS on file for patient, completed on 07/19/2023.        UAI/LTSS uploaded to [facility] for [facility], and Andrea Elizabeth RN  Case Management [phone]

## 2023-08-22 NOTE — CARE COORDINATION
GENNY spoke with patient's daughter Jesus Gale [phone], said patient needs LTC facility, patient is non complaint with medications, apartment is a mess, and patient unable to take care of himself at home.    [address] verbal consent given for [address] [place] [address] and Rehab, and [facility].         GENNY uploaded patient with clinicals to [employer], and sent booking requests to [address] [place] [address] and Rehab, and [address] [place] [address], RN  Case Management [phone]

## 2023-08-22 NOTE — CARE COORDINATION
08/22/23 1827   Readmission Assessment   Number of Days since last admission? 8-30 days   Previous Disposition Home with Home Health   Who is being Interviewed Caregiver   What was the patient's/caregiver's perception as to why they think they needed to return back to the hospital? Other (Comment)  ([place] patient to ED for AMS and high blood sugar.)   Did you visit your Primary Care Physician after you left the hospital, before you returned this time? Yes   Did you see a specialist, such as Cardiac, Pulmonary, Orthopedic Physician, etc. after you left the hospital? No   Who advised the patient to return to the hospital? Other (Comment)  (EMS called by either neighbor or apt complex.)   Does the patient report anything that got in the way of taking their medications?   (Unknown.)   In our efforts to provide the best possible care to you and others like you, can you think of anything that we could have done to help you after you left the hospital the first time, so that you might not have needed to return so soon?  Other (Comment)  (Patient's daughter said patient refused to go to SNF after last hospital admission.)

## 2023-08-23 LAB
ECHO AO ASC DIAM: 3.2 CM
ECHO AO ASCENDING AORTA INDEX: 1.73 CM/M2
ECHO AO ROOT DIAM: 3.4 CM
ECHO AO ROOT INDEX: 1.84 CM/M2
ECHO AV AREA PEAK VELOCITY: 2.6 CM2
ECHO AV AREA VTI: 2.8 CM2
ECHO AV AREA/BSA PEAK VELOCITY: 1.4 CM2/M2
ECHO AV AREA/BSA VTI: 1.5 CM2/M2
ECHO AV MEAN GRADIENT: 6 MMHG
ECHO AV MEAN VELOCITY: 1.2 M/S
ECHO AV PEAK GRADIENT: 14 MMHG
ECHO AV PEAK VELOCITY: 1.9 M/S
ECHO AV VELOCITY RATIO: 0.58
ECHO AV VTI: 34.2 CM
ECHO BSA: 1.84 M2
ECHO EST RA PRESSURE: 3 MMHG
ECHO LA VOL 2C: 94 ML (ref 18–58)
ECHO LA VOL 2C: 99 ML (ref 18–58)
ECHO LA VOL 4C: 72 ML (ref 18–58)
ECHO LA VOL 4C: 84 ML (ref 18–58)
ECHO LA VOL BP: 86 ML (ref 18–58)
ECHO LA VOL/BSA BIPLANE: 46 ML/M2 (ref 16–34)
ECHO LA VOLUME AREA LENGTH: 95 ML
ECHO LA VOLUME INDEX AREA LENGTH: 51 ML/M2 (ref 16–34)
ECHO LV FRACTIONAL SHORTENING: 18 % (ref 28–44)
ECHO LV INTERNAL DIMENSION DIASTOLE INDEX: 2.76 CM/M2
ECHO LV INTERNAL DIMENSION DIASTOLIC: 5.1 CM (ref 4.2–5.9)
ECHO LV INTERNAL DIMENSION SYSTOLIC INDEX: 2.27 CM/M2
ECHO LV INTERNAL DIMENSION SYSTOLIC: 4.2 CM
ECHO LV IVSD: 1.1 CM (ref 0.6–1)
ECHO LV MASS 2D: 188 G (ref 88–224)
ECHO LV MASS INDEX 2D: 101.6 G/M2 (ref 49–115)
ECHO LV POSTERIOR WALL DIASTOLIC: 0.9 CM (ref 0.6–1)
ECHO LV RELATIVE WALL THICKNESS RATIO: 0.35
ECHO LVOT AREA: 4.5 CM2
ECHO LVOT AV VTI INDEX: 0.61
ECHO LVOT DIAM: 2.4 CM
ECHO LVOT MEAN GRADIENT: 2 MMHG
ECHO LVOT PEAK GRADIENT: 4 MMHG
ECHO LVOT PEAK VELOCITY: 1.1 M/S
ECHO LVOT STROKE VOLUME INDEX: 51.1 ML/M2
ECHO LVOT SV: 94.5 ML
ECHO LVOT VTI: 20.9 CM
ECHO PV MAX VELOCITY: 0.9 M/S
ECHO PV PEAK GRADIENT: 3 MMHG
ECHO RA AREA 4C: 17.6 CM2
ECHO RV BASAL DIMENSION: 3.6 CM
ECHO RV FREE WALL PEAK S': 12 CM/S
ECHO RV TAPSE: 2.3 CM (ref 1.7–?)
GLUCOSE BLD STRIP.AUTO-MCNC: 118 MG/DL (ref 70–110)
GLUCOSE BLD STRIP.AUTO-MCNC: 123 MG/DL (ref 70–110)
GLUCOSE BLD STRIP.AUTO-MCNC: 167 MG/DL (ref 70–110)
GLUCOSE BLD STRIP.AUTO-MCNC: 192 MG/DL (ref 70–110)
GLUCOSE BLD STRIP.AUTO-MCNC: 44 MG/DL (ref 70–110)
GLUCOSE BLD STRIP.AUTO-MCNC: 51 MG/DL (ref 70–110)
GLUCOSE BLD STRIP.AUTO-MCNC: 64 MG/DL (ref 70–110)
GLUCOSE BLD STRIP.AUTO-MCNC: 65 MG/DL (ref 70–110)

## 2023-08-23 PROCEDURE — 1100000003 HC PRIVATE W/ TELEMETRY

## 2023-08-23 PROCEDURE — 6360000002 HC RX W HCPCS: Performed by: HOSPITALIST

## 2023-08-23 PROCEDURE — 97535 SELF CARE MNGMENT TRAINING: CPT

## 2023-08-23 PROCEDURE — 82962 GLUCOSE BLOOD TEST: CPT

## 2023-08-23 PROCEDURE — 2580000003 HC RX 258: Performed by: HOSPITALIST

## 2023-08-23 PROCEDURE — 94761 N-INVAS EAR/PLS OXIMETRY MLT: CPT

## 2023-08-23 PROCEDURE — 99232 SBSQ HOSP IP/OBS MODERATE 35: CPT | Performed by: HOSPITALIST

## 2023-08-23 PROCEDURE — 6370000000 HC RX 637 (ALT 250 FOR IP): Performed by: HOSPITALIST

## 2023-08-23 RX ORDER — INSULIN GLARGINE 100 [IU]/ML
30 INJECTION, SOLUTION SUBCUTANEOUS NIGHTLY
Status: DISCONTINUED | OUTPATIENT
Start: 2023-08-23 | End: 2023-08-24 | Stop reason: HOSPADM

## 2023-08-23 RX ADMIN — ASPIRIN 81 MG CHEWABLE TABLET 81 MG: 81 TABLET CHEWABLE at 09:53

## 2023-08-23 RX ADMIN — THIAMINE HCL TAB 100 MG 100 MG: 100 TAB at 09:53

## 2023-08-23 RX ADMIN — AMLODIPINE BESYLATE 5 MG: 5 TABLET ORAL at 09:53

## 2023-08-23 RX ADMIN — ENOXAPARIN SODIUM 40 MG: 100 INJECTION SUBCUTANEOUS at 20:35

## 2023-08-23 RX ADMIN — ATORVASTATIN CALCIUM 80 MG: 40 TABLET, FILM COATED ORAL at 20:35

## 2023-08-23 RX ADMIN — POLYETHYLENE GLYCOL 3350 17 G: 17 POWDER, FOR SOLUTION ORAL at 17:10

## 2023-08-23 RX ADMIN — SODIUM CHLORIDE, PRESERVATIVE FREE 10 ML: 5 INJECTION INTRAVENOUS at 20:38

## 2023-08-23 RX ADMIN — CLOPIDOGREL BISULFATE 75 MG: 75 TABLET, FILM COATED ORAL at 17:11

## 2023-08-23 RX ADMIN — SODIUM CHLORIDE, PRESERVATIVE FREE 10 ML: 5 INJECTION INTRAVENOUS at 09:54

## 2023-08-23 RX ADMIN — INSULIN GLARGINE 30 UNITS: 100 INJECTION, SOLUTION SUBCUTANEOUS at 20:35

## 2023-08-23 ASSESSMENT — PAIN SCALES - GENERAL: PAINLEVEL_OUTOF10: 0

## 2023-08-23 NOTE — CARE COORDINATION
Patient has acceptances to both LTC facilities selected in [facility].         CM called patient's daughter Mandy Weinberg [phone], CM received voicemail, CM left message that both LTC facilities [facility] and [facility]  have accepted patient, and need to know which LTC facility she wants for patient, and that patient could possibly be discharged tomorrow to LTC. CM left phone number for a return call.             Sola Valle, RN  Case Management [phone]

## 2023-08-23 NOTE — PLAN OF CARE
Problem: Occupational Therapy - Adult  Goal: By Discharge: Performs self-care activities at highest level of function for planned discharge setting. See evaluation for individualized goals. Description: Occupational Therapy Goals:  Initiated 8/19/2023 to be met within 7-10 days. 1.  Patient will perform lower body dressing with modified independence for seated and standing aspects. 2.  Patient will perform toilet transfers with modified independence. 3.  Patient will perform all aspects of toileting with modified independence. 4.  Patient will utilize energy conservation techniques during functional activities with verbal cues. 5.  Patient will participate in BUE TherEx with Supervision for 8 min to improve UB strength and endurance needed for ADLs. PLOF: Pt lives alone, reports being Mod Ind for ADLs and functional mobility with walker and RLE prosthesis. Outcome: Progressing   OCCUPATIONAL THERAPY TREATMENT    Patient: Lazara Matos (78 y.o. male)  Date: 8/23/2023  Diagnosis: Abnormal head CT [R93.0]  Cerebrovascular accident (CVA), unspecified mechanism ([address]) [Z92.0]  Acute metabolic encephalopathy [Y96.93]  Hyperglycemia due to diabetes mellitus ([address]) [Y39.65] Acute metabolic encephalopathy      Precautions: Fall Risk (R BKA)    Chart, occupational therapy assessment, plan of care, and goals were reviewed. ASSESSMENT:  Pt on the phone upon entry. Agreeable to OOB activity. Pt requires min vc's for safety 2/2 impulsivity. Pt tolerates standing to void making good use of grab bar to maintain dynamic standing balance. Pt w/1 LOB standing for hand hygiene requiring CGA to recover. Educated on importance OOB and encouraged OOB for all meals. Pt demonstrates good AROM BUE at chair level. Encouraged to perform throughout the day. Reinforced importance of calling for assistance.      Progression toward goals:  [x]          Improving appropriately and progressing toward goals  []          Improving

## 2023-08-24 VITALS
BODY MASS INDEX: 22.81 KG/M2 | HEART RATE: 62 BPM | TEMPERATURE: 98.2 F | SYSTOLIC BLOOD PRESSURE: 141 MMHG | DIASTOLIC BLOOD PRESSURE: 85 MMHG | RESPIRATION RATE: 19 BRPM | WEIGHT: 154 LBS | OXYGEN SATURATION: 98 % | HEIGHT: 69 IN

## 2023-08-24 PROBLEM — E87.1 HYPONATREMIA: Status: RESOLVED | Noted: 2023-08-19 | Resolved: 2023-08-24

## 2023-08-24 PROBLEM — I63.81 LACUNAR STROKE (HCC): Status: RESOLVED | Noted: 2023-08-20 | Resolved: 2023-08-24

## 2023-08-24 PROBLEM — G93.41 ACUTE METABOLIC ENCEPHALOPATHY: Status: RESOLVED | Noted: 2023-08-18 | Resolved: 2023-08-24

## 2023-08-24 LAB
GLUCOSE BLD STRIP.AUTO-MCNC: 253 MG/DL (ref 70–110)
GLUCOSE BLD STRIP.AUTO-MCNC: 73 MG/DL (ref 70–110)
GLUCOSE BLD STRIP.AUTO-MCNC: 83 MG/DL (ref 70–110)

## 2023-08-24 PROCEDURE — 82962 GLUCOSE BLOOD TEST: CPT

## 2023-08-24 PROCEDURE — 2580000003 HC RX 258: Performed by: HOSPITALIST

## 2023-08-24 PROCEDURE — 99239 HOSP IP/OBS DSCHRG MGMT >30: CPT | Performed by: HOSPITALIST

## 2023-08-24 PROCEDURE — 6370000000 HC RX 637 (ALT 250 FOR IP): Performed by: HOSPITALIST

## 2023-08-24 PROCEDURE — 94761 N-INVAS EAR/PLS OXIMETRY MLT: CPT

## 2023-08-24 RX ORDER — AMLODIPINE BESYLATE 10 MG/1
10 TABLET ORAL DAILY
Qty: 30 TABLET | Refills: 3 | Status: SHIPPED | DISCHARGE
Start: 2023-08-25

## 2023-08-24 RX ORDER — CLOPIDOGREL BISULFATE 75 MG/1
75 TABLET ORAL DAILY
Qty: 30 TABLET | Refills: 2 | Status: SHIPPED | DISCHARGE
Start: 2023-08-24

## 2023-08-24 RX ORDER — ATORVASTATIN CALCIUM 80 MG/1
80 TABLET, FILM COATED ORAL NIGHTLY
Qty: 30 TABLET | Refills: 3 | Status: SHIPPED | DISCHARGE
Start: 2023-08-24

## 2023-08-24 RX ORDER — AMLODIPINE BESYLATE 5 MG/1
5 TABLET ORAL
Status: COMPLETED | OUTPATIENT
Start: 2023-08-24 | End: 2023-08-24

## 2023-08-24 RX ORDER — INSULIN GLARGINE 100 [IU]/ML
30 INJECTION, SOLUTION SUBCUTANEOUS NIGHTLY
Qty: 10 ML | Refills: 3 | Status: SHIPPED | DISCHARGE
Start: 2023-08-24

## 2023-08-24 RX ORDER — AMLODIPINE BESYLATE 10 MG/1
10 TABLET ORAL DAILY
Status: DISCONTINUED | OUTPATIENT
Start: 2023-08-25 | End: 2023-08-24 | Stop reason: HOSPADM

## 2023-08-24 RX ADMIN — CLOPIDOGREL BISULFATE 75 MG: 75 TABLET, FILM COATED ORAL at 17:07

## 2023-08-24 RX ADMIN — AMLODIPINE BESYLATE 5 MG: 5 TABLET ORAL at 08:19

## 2023-08-24 RX ADMIN — ASPIRIN 81 MG CHEWABLE TABLET 81 MG: 81 TABLET CHEWABLE at 08:19

## 2023-08-24 RX ADMIN — THIAMINE HCL TAB 100 MG 100 MG: 100 TAB at 08:19

## 2023-08-24 RX ADMIN — AMLODIPINE BESYLATE 5 MG: 5 TABLET ORAL at 12:16

## 2023-08-24 RX ADMIN — POLYETHYLENE GLYCOL 3350 17 G: 17 POWDER, FOR SOLUTION ORAL at 17:07

## 2023-08-24 RX ADMIN — INSULIN LISPRO 1 UNITS: 100 INJECTION, SOLUTION INTRAVENOUS; SUBCUTANEOUS at 08:19

## 2023-08-24 RX ADMIN — SODIUM CHLORIDE, PRESERVATIVE FREE 10 ML: 5 INJECTION INTRAVENOUS at 08:19

## 2023-08-24 RX ADMIN — INSULIN LISPRO 2 UNITS: 100 INJECTION, SOLUTION INTRAVENOUS; SUBCUTANEOUS at 17:07

## 2023-08-24 ASSESSMENT — PAIN SCALES - GENERAL
PAINLEVEL_OUTOF10: 0

## 2023-08-24 NOTE — PROGRESS NOTES
Spoke with [facility] and she states [email or web address]. She was able to connect with their transport and said they should be arriving in approximately 30 mins. Updated 4S RN.      [facility] RN ZEN  Case Management

## 2023-08-24 NOTE — PROGRESS NOTES
Per MD, pt is medically ready for discharge. Left message with pts daughter Carolina to please return call to this CM regarding [address] for pt at discharge. Awaiting return call. Spoke with pt. And he states that he wants to remain in [facility] and he would like to go to [address] [place] [address]. Notified Aryan Leal with [facility]. Will work on transportation for today.       [facility] RN CDCES  Case Management

## 2023-08-24 NOTE — PROGRESS NOTES
Attempted to make contact with facility again for report still getting busy signal. Will try again later.

## 2023-08-24 NOTE — PROGRESS NOTES
Verified number for facility [phone] [facility]. Report given to Betty Becerra RN.

## 2023-08-24 NOTE — DISCHARGE INSTRUCTIONS
DISCHARGE SUMMARY from Nurse    PATIENT INSTRUCTIONS:    After general anesthesia or intravenous sedation, for 24 hours or while taking prescription Narcotics:  Limit your activities  Do not drive and operate hazardous machinery  Do not make important personal or business decisions  Do  not drink alcoholic beverages  If you have not urinated within 8 hours after discharge, please contact your surgeon on call. Report the following to your surgeon:  Excessive pain, swelling, redness or odor of or around the surgical area  Temperature over 100.5  Nausea and vomiting lasting longer than 4 hours or if unable to take medications  Any signs of decreased circulation or nerve impairment to extremity: change in color, persistent  numbness, tingling, coldness or increase pain  Any questions    What to do at Home:  Recommended activity: activity as tolerated,     If you experience any of the following symptoms slurred speech, dizziness, falls, please follow up with Emergency Department or Primary care physician. *  Please give a list of your current medications to your Primary Care Provider. *  Please update this list whenever your medications are discontinued, doses are      changed, or new medications (including over-the-counter products) are added. *  Please carry medication information at all times in case of emergency situations. These are general instructions for a healthy lifestyle:    No smoking/ No tobacco products/ Avoid exposure to second hand smoke  Surgeon General's Warning:  Quitting smoking now greatly reduces serious risk to your health.     Obesity, smoking, and sedentary lifestyle greatly increases your risk for illness    A healthy diet, regular physical exercise & weight monitoring are important for maintaining a healthy lifestyle    You may be retaining fluid if you have a history of heart failure or if you experience any of the following symptoms:  Weight gain of 3 pounds or more overnight

## 2023-08-24 NOTE — CARE COORDINATION
Call made to [facility] [phone], spoke with Gerald Bell, dispatch will call the nurses station with EVAN. Trip # [id number].

## 2023-08-24 NOTE — PROGRESS NOTES
Requested Case Management specialist to assist with transportation to [facility]. Address is [address] [place] [address]. [facility],[address]. [address] and phone number is [phone]  Patient will require BLS transport. Pt requires Stretcher If stretcher, reason: recent CVA, acute metabolic encephalopathy, Right Bka, impaired mobility  Patient is currently requiring oxygen:  no  Height:5'9\"   Weight: 154 lbs  Pt is on isolation: No  Is the pt ready now? Yes  Requested time: next available  PCS Faxed: not yet  Insurance verified on face sheet: yes  Auth needed for transport: yes  CM completed PCS/ Envelope and placed on chart.      [facility] RN CDCES  Case Management

## 2023-10-20 ENCOUNTER — TELEPHONE (OUTPATIENT)
Facility: CLINIC | Age: 63
End: 2023-10-20

## 2023-10-20 NOTE — TELEPHONE ENCOUNTER
Paige Theodore from [facility] called to let us know they are discharging pt from them due to being non compliance

## 2023-12-02 ENCOUNTER — HOSPITAL ENCOUNTER (EMERGENCY)
Facility: HOSPITAL | Age: 63
Discharge: HOME | End: 2023-12-02
Attending: STUDENT IN AN ORGANIZED HEALTH CARE EDUCATION/TRAINING PROGRAM
Payer: MEDICARE

## 2023-12-02 VITALS
SYSTOLIC BLOOD PRESSURE: 142 MMHG | BODY MASS INDEX: 25.18 KG/M2 | DIASTOLIC BLOOD PRESSURE: 84 MMHG | HEART RATE: 70 BPM | HEIGHT: 69 IN | OXYGEN SATURATION: 99 % | WEIGHT: 170 LBS | RESPIRATION RATE: 20 BRPM | TEMPERATURE: 98.7 F
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
## 2023-12-02 DIAGNOSIS — R73.9 ELEVATED BLOOD SUGAR: ICD-10-CM

## 2023-12-02 LAB
ALBUMIN SERPL-MCNC: 2.9 G/DL (ref 3.4–5)
ALBUMIN/GLOB SERPL: 0.9 (ref 0.8–1.7)
ALP SERPL-CCNC: 112 U/L (ref 45–117)
ALT SERPL-CCNC: 20 U/L (ref 16–61)
ANION GAP SERPL CALC-SCNC: 7 MMOL/L (ref 3–18)
APPEARANCE UR: CLEAR
AST SERPL-CCNC: 14 U/L (ref 10–38)
BASOPHILS # BLD: 0 K/UL (ref 0–0.1)
BASOPHILS NFR BLD: 0 % (ref 0–2)
BILIRUB SERPL-MCNC: 0.2 MG/DL (ref 0.2–1)
BILIRUB UR QL: NEGATIVE
BUN SERPL-MCNC: 14 MG/DL (ref 7–18)
BUN/CREAT SERPL: 10 (ref 12–20)
CALCIUM SERPL-MCNC: 8.5 MG/DL (ref 8.5–10.1)
CHLORIDE SERPL-SCNC: 97 MMOL/L (ref 100–111)
CO2 SERPL-SCNC: 27 MMOL/L (ref 21–32)
COLOR UR: YELLOW
CREAT SERPL-MCNC: 1.34 MG/DL (ref 0.6–1.3)
DIFFERENTIAL METHOD BLD: ABNORMAL
EOSINOPHIL # BLD: 0 K/UL (ref 0–0.4)
EOSINOPHIL NFR BLD: 1 % (ref 0–5)
ERYTHROCYTE [DISTWIDTH] IN BLOOD BY AUTOMATED COUNT: 13.9 % (ref 11.6–14.5)
GLOBULIN SER CALC-MCNC: 3.3 G/DL (ref 2–4)
GLUCOSE BLD STRIP.AUTO-MCNC: 132 MG/DL (ref 70–110)
GLUCOSE BLD STRIP.AUTO-MCNC: 394 MG/DL (ref 70–110)
GLUCOSE BLD STRIP.AUTO-MCNC: >600 MG/DL (ref 70–110)
GLUCOSE SERPL-MCNC: 684 MG/DL (ref 74–99)
GLUCOSE UR STRIP.AUTO-MCNC: >1000 MG/DL
HCT VFR BLD AUTO: 29 % (ref 36–48)
HGB BLD-MCNC: 9.6 G/DL (ref 13–16)
HGB UR QL STRIP: NEGATIVE
IMM GRANULOCYTES # BLD AUTO: 0 K/UL (ref 0–0.04)
IMM GRANULOCYTES NFR BLD AUTO: 0 % (ref 0–0.5)
KETONES UR QL STRIP.AUTO: NEGATIVE MG/DL
LEUKOCYTE ESTERASE UR QL STRIP.AUTO: NEGATIVE
LYMPHOCYTES # BLD: 0.7 K/UL (ref 0.9–3.6)
LYMPHOCYTES NFR BLD: 15 % (ref 21–52)
MAGNESIUM SERPL-MCNC: 2.1 MG/DL (ref 1.6–2.6)
MCH RBC QN AUTO: 28.1 PG (ref 24–34)
MCHC RBC AUTO-ENTMCNC: 33.1 G/DL (ref 31–37)
MCV RBC AUTO: 84.8 FL (ref 78–100)
MONOCYTES # BLD: 0.3 K/UL (ref 0.05–1.2)
MONOCYTES NFR BLD: 5 % (ref 3–10)
NEUTS SEG # BLD: 3.8 K/UL (ref 1.8–8)
NEUTS SEG NFR BLD: 78 % (ref 40–73)
NITRITE UR QL STRIP.AUTO: NEGATIVE
NRBC # BLD: 0 K/UL (ref 0–0.01)
NRBC BLD-RTO: 0 PER 100 WBC
PH BLDV: 7.39 (ref 7.32–7.42)
PH UR STRIP: 5.5 (ref 5–8)
PLATELET # BLD AUTO: 176 K/UL (ref 135–420)
PMV BLD AUTO: 12.6 FL (ref 9.2–11.8)
POTASSIUM SERPL-SCNC: 4.1 MMOL/L (ref 3.5–5.5)
PROT SERPL-MCNC: 6.2 G/DL (ref 6.4–8.2)
PROT UR STRIP-MCNC: NEGATIVE MG/DL
RBC # BLD AUTO: 3.42 M/UL (ref 4.35–5.65)
SODIUM SERPL-SCNC: 131 MMOL/L (ref 136–145)
SP GR UR REFRACTOMETRY: >1.03 (ref 1–1.03)
UROBILINOGEN UR QL STRIP.AUTO: 0.2 EU/DL (ref 0.2–1)
WBC # BLD AUTO: 4.8 K/UL (ref 4.6–13.2)

## 2023-12-02 PROCEDURE — 82962 GLUCOSE BLOOD TEST: CPT

## 2023-12-02 PROCEDURE — 80053 COMPREHEN METABOLIC PANEL: CPT

## 2023-12-02 PROCEDURE — 6370000000 HC RX 637 (ALT 250 FOR IP): Performed by: STUDENT IN AN ORGANIZED HEALTH CARE EDUCATION/TRAINING PROGRAM

## 2023-12-02 PROCEDURE — 85025 COMPLETE CBC W/AUTO DIFF WBC: CPT

## 2023-12-02 PROCEDURE — 6360000002 HC RX W HCPCS: Performed by: STUDENT IN AN ORGANIZED HEALTH CARE EDUCATION/TRAINING PROGRAM

## 2023-12-02 PROCEDURE — 82800 BLOOD PH: CPT

## 2023-12-02 PROCEDURE — 96361 HYDRATE IV INFUSION ADD-ON: CPT

## 2023-12-02 PROCEDURE — 2580000003 HC RX 258: Performed by: STUDENT IN AN ORGANIZED HEALTH CARE EDUCATION/TRAINING PROGRAM

## 2023-12-02 PROCEDURE — 99284 EMERGENCY DEPT VISIT MOD MDM: CPT

## 2023-12-02 PROCEDURE — 83735 ASSAY OF MAGNESIUM: CPT

## 2023-12-02 PROCEDURE — 81003 URINALYSIS AUTO W/O SCOPE: CPT

## 2023-12-02 PROCEDURE — 96374 THER/PROPH/DIAG INJ IV PUSH: CPT

## 2023-12-02 RX ORDER — INSULIN LISPRO 100 [IU]/ML
6 INJECTION, SOLUTION INTRAVENOUS; SUBCUTANEOUS
Status: COMPLETED | OUTPATIENT
Start: 2023-12-02 | End: 2023-12-02

## 2023-12-02 RX ORDER — ONDANSETRON 2 MG/ML
4 INJECTION INTRAMUSCULAR; INTRAVENOUS
Status: COMPLETED | OUTPATIENT
Start: 2023-12-02 | End: 2023-12-02

## 2023-12-02 RX ORDER — INSULIN LISPRO 100 [IU]/ML
10 INJECTION, SOLUTION INTRAVENOUS; SUBCUTANEOUS ONCE
Status: COMPLETED | OUTPATIENT
Start: 2023-12-02 | End: 2023-12-02

## 2023-12-02 RX ORDER — ACETAMINOPHEN 325 MG/1
650 TABLET ORAL
Status: COMPLETED | OUTPATIENT
Start: 2023-12-02 | End: 2023-12-02

## 2023-12-02 RX ORDER — SODIUM CHLORIDE, SODIUM LACTATE, POTASSIUM CHLORIDE, AND CALCIUM CHLORIDE .6; .31; .03; .02 G/100ML; G/100ML; G/100ML; G/100ML
1000 INJECTION, SOLUTION INTRAVENOUS ONCE
Status: COMPLETED | OUTPATIENT
Start: 2023-12-02 | End: 2023-12-02

## 2023-12-02 RX ADMIN — INSULIN LISPRO 10 UNITS: 100 INJECTION, SOLUTION INTRAVENOUS; SUBCUTANEOUS at 20:15

## 2023-12-02 RX ADMIN — SODIUM CHLORIDE, SODIUM LACTATE, POTASSIUM CHLORIDE, AND CALCIUM CHLORIDE 1000 ML: 600; 310; 30; 20 INJECTION, SOLUTION INTRAVENOUS at 19:48

## 2023-12-02 RX ADMIN — ACETAMINOPHEN 325MG 650 MG: 325 TABLET ORAL at 20:15

## 2023-12-02 RX ADMIN — INSULIN LISPRO 6 UNITS: 100 INJECTION, SOLUTION INTRAVENOUS; SUBCUTANEOUS at 22:00

## 2023-12-02 RX ADMIN — ONDANSETRON 4 MG: 2 INJECTION INTRAMUSCULAR; INTRAVENOUS at 20:17

## 2023-12-02 ASSESSMENT — ENCOUNTER SYMPTOMS
ABDOMINAL DISTENTION: 0
ABDOMINAL PAIN: 0
FACIAL SWELLING: 0
CONSTIPATION: 0
EYE PAIN: 0
SHORTNESS OF BREATH: 0
BLOOD IN STOOL: 0
BACK PAIN: 0
CHEST TIGHTNESS: 0
DIARRHEA: 0

## 2023-12-02 NOTE — ED TRIAGE NOTES
PATIENT TAKEN TO THE EMERGENCY DEPT BY EMS WITH COMPLAINT OF FALL AND BEING OUTSIDE HIS HOME FOR 30MINS, PATIENT HAS FECAL AND URINARY INCONTINENCE. GLUCOSE HI AS PER EMS       PATIENT ATTACHED TO MONITOR,     PATIENT ALERT AND ORIENTED X 4. BREATHING ON ROOM AIR.

## 2023-12-03 NOTE — ED NOTES
PATIENT LEFT LYING IN BED, BREATHING EVIDENT, IV FLUID PROGRESSING NO COMPLAINT VOICED. HAND OVER REPORT GIVEN TO HORACIO RN, CARE CONTINUES.        Chloe Nieto RN  12/02/23 3166

## 2023-12-03 NOTE — ED NOTES
Patient given blue scrubs for discharge as their clothes were soiled.       Pierce Moore RN  12/02/23 3202

## 2023-12-03 NOTE — DISCHARGE INSTRUCTIONS
You were evaluated for elevated blood sugar . Based on your work-up it was deemed that she was stable for discharge. Please make sure that you are taking medications help control blood sugar, please make sure you follow-up with your primary care doctor to ensure that you are getting that right medications to help with your blood glucose. Please follow-up with your primary care physician if you have any further concerns and go over your work-up. If you experience any chest pain, shortness of breath, worsening abdominal pain, vomiting blood, worsening headache, seizures, or any worsening of your symptoms please return to the emergency department immediately. If you have any pending results or any further questions please contact the emergency department at [phone].

## 2023-12-03 NOTE — ED NOTES
PATIENT COVERED IN FECAL MATTER FROM HEAD TO TOES, PATIENT ASKED TO REMOVED CLOTHES AND GIVEN BED BATH. PATIENT HAD BLOOD SENT TO THE LAB.       Poonam Null RN  12/02/23 1922

## 2023-12-03 NOTE — ED NOTES
Patient discharged at this time. Discharge instructions explained to patient and patient verbalized understanding. Patient is ambulatory, alert, and oriented at discharge.       Denver Ko, RN  12/02/23 2464

## 2023-12-03 NOTE — ED PROVIDER NOTES
worsen       Disclaimer: Sections of this note are dictated using utilizing voice recognition software. Minor typographical errors may be present. If questions arise, please do not hesitate to contact me or call our department.           Althea Viera MD  12/05/23 0867

## 2023-12-04 ENCOUNTER — HOSPITAL ENCOUNTER (EMERGENCY)
Facility: HOSPITAL | Age: 63
Discharge: HOME | End: 2023-12-04
Attending: EMERGENCY MEDICINE
Payer: MEDICARE

## 2023-12-04 VITALS
SYSTOLIC BLOOD PRESSURE: 141 MMHG | HEIGHT: 69 IN | HEART RATE: 88 BPM | WEIGHT: 170 LBS | OXYGEN SATURATION: 99 % | DIASTOLIC BLOOD PRESSURE: 74 MMHG | BODY MASS INDEX: 25.18 KG/M2 | RESPIRATION RATE: 12 BRPM

## 2023-12-04 DIAGNOSIS — Z44.9 PROSTHESIS ADJUSTMENT: Primary | ICD-10-CM

## 2023-12-04 PROCEDURE — 99283 EMERGENCY DEPT VISIT LOW MDM: CPT

## 2023-12-04 ASSESSMENT — PAIN - FUNCTIONAL ASSESSMENT: PAIN_FUNCTIONAL_ASSESSMENT: NONE - DENIES PAIN

## 2023-12-04 NOTE — DISCHARGE INSTRUCTIONS
As discussed, you need to go to your prosthetics clinic today for management of your concerns with your prosthetic leg.

## 2023-12-04 NOTE — ED PROVIDER NOTES
[facility] EMERGENCY DEPT  eMERGENCY dEPARTMENT eNCOUnter        Pt Name: Marsha Lindsey  MRN: [id number]  [address] [place] 1960  Date of evaluation: 12/4/2023  Provider: Chelsea Young MD  PCP: Elisa Ocampo MD  Note Started: 12:15 PM EST 12/4/2023          CHIEF COMPLAINT       Chief Complaint   Patient presents with    patient can not remove prostethic leg       HISTORY OFPRESENT ILLNESS        Patient coming in today concerned that he cannot remove his prosthetic limb on his right leg. He has no other concerns. He sees a prosthetics clinic in [address] [facility]. Has not tried to reach out to them regarding his problem. No other concerns at this time. Nursing Notes were all reviewed and agreed with or any disagreements were addressed  in the HPI. REVIEW OF SYSTEMS               PASTMEDICAL HISTORY     Past Medical History:   Diagnosis Date    Acute postoperative anemia due to expected blood loss 2/8/2022    Arterial occlusion, lower extremity ([address]) 11/27/2021    Chronic alcohol use     Fri-Sun one fifth; Mon-Thur: Pint of liquor    Constipation     Coronary artery disease involving native coronary artery of native heart     Critical ischemia of lower extremity ([address]) 2/8/2022    Depression     Diabetic nephropathy associated with type 2 diabetes mellitus ([address]) 4/4/2019    Diabetic neuropathy associated with type 2 diabetes mellitus ([address])     Gastroesophageal reflux disease     High vitamin D level 2/16/2022    Vitamin D 25-Hydroxy (2/16/2022) = 105.7    History of acute inferior wall myocardial infarction 2009    History of coronary angioplasty with insertion of stent 07/29/2009    2.5 x 13 Cypher stent to CX.       History of essential hypertension     History of lacunar cerebrovascular accident     History of vitamin D deficiency 4/15/2018    Hyperlipidemia     Hypertensive retinopathy 12/18/2020    Insomnia 7/2/2018    Long term current use of insulin (HCC)     Mediastinal adenopathy 06/25/2015    Migraine

## 2023-12-04 NOTE — ED NOTES
I have reviewed the discharge instructions with the patient. The patient verbalized understanding of instructions with no further questions.       Regina Arellano RN  12/04/23 6103

## 2024-08-19 NOTE — TELEPHONE ENCOUNTER
Patient called this morning he said that he need to talk to you can d would like for you to call him.  Please advise 2022

## 2025-01-08 NOTE — TELEPHONE ENCOUNTER
Call to patient. He verified his name, , and address. He reports they called him to schedule his nephrology appointment and he wanted to make sure he understood why he needed to go. Reminded patient of his hospital visit back in  and informed him of his repeat labs. Will continue to hold off ordering a higher dose of gabapentin until he has his nephrology visit. He will call back and schedule his appointment.  Yane Mukherjee
alone

## (undated) DEVICE — SPLINT KNEE L24IN FOR 32IN THGH UNIV FOAM 3 PC DSGN TRIMMED

## (undated) DEVICE — SUTURE MCRYL SZ 2-0 L36IN ABSRB UD L36MM CT-1 1/2 CIR Y945H

## (undated) DEVICE — FORCEPS BX L240CM JAW DIA2.8MM L CAP W/ NDL MIC MESH TOOTH

## (undated) DEVICE — SET FLD ADMIN 3 W STPCOCK FIX FEM L BOR 1IN

## (undated) DEVICE — COVER US PRB W15XL120CM W/ GEL RUBBERBAND TAPE STRP FLD GEN

## (undated) DEVICE — 3M™ STERI-DRAPE™ U-DRAPE 1015: Brand: STERI-DRAPE™

## (undated) DEVICE — SOLUTION IRRIG 1000ML H2O STRL BLT

## (undated) DEVICE — BLANKET WRM W29.9XL79.1IN UP BODY FORC AIR MISTRAL-AIR

## (undated) DEVICE — FLEX ADVANTAGE 3000CC: Brand: FLEX ADVANTAGE

## (undated) DEVICE — SUTURE PROL SZ 7-0 L30IN NONABSORBABLE BLU L9.3MM BV-1 1/2 8703H

## (undated) DEVICE — INTENDED FOR TISSUE SEPARATION, AND OTHER PROCEDURES THAT REQUIRE A SHARP SURGICAL BLADE TO PUNCTURE OR CUT.: Brand: BARD-PARKER SAFETY BLADES SIZE 11, STERILE

## (undated) DEVICE — INTENDED FOR TISSUE SEPARATION, AND OTHER PROCEDURES THAT REQUIRE A SHARP SURGICAL BLADE TO PUNCTURE OR CUT.: Brand: BARD-PARKER ® STAINLESS STEEL BLADES

## (undated) DEVICE — SUTURE VCRL SZ 0 L36IN ABSRB UD L36MM CT-1 1/2 CIR J946H

## (undated) DEVICE — APPLIER CLP L9.38IN M LIG TI DISP STR RNG HNDL LIGACLP

## (undated) DEVICE — SYRINGE KIT,PACKAGED,,150FT,MK 7(ANGIO-ARTERION, 150ML SYR KIT W/QFT,MC)(60729385): Brand: MEDRAD® MARK 7 ARTERION DISPOSABLE SYRINGE 150 ML WITH QUICK FILL TUBE

## (undated) DEVICE — PACK PROCEDURE SURG VASC CATH 161 MMC LF

## (undated) DEVICE — INTENDED FOR TISSUE SEPARATION, AND OTHER PROCEDURES THAT REQUIRE A SHARP SURGICAL BLADE TO PUNCTURE OR CUT.: Brand: BARD-PARKER ® CARBON RIB-BACK BLADES

## (undated) DEVICE — SUTURE PERMAHAND SZ 2-0 L30IN NONABSORBABLE BLK SILK W/O A305H

## (undated) DEVICE — BANDAGE,GAUZE,BULKEE II,4.5"X4.1YD,STRL: Brand: MEDLINE

## (undated) DEVICE — COVADERM: Brand: DEROYAL

## (undated) DEVICE — CATHETER ANGIO 4FR L65CM 0.035IN VIS OMNI FLUSH-0 SFT TIP

## (undated) DEVICE — ELASTIC BANDAGE 6": Brand: CARDINAL HEALTH

## (undated) DEVICE — PTA BALLOON DILATATION CATHETER: Brand: COYOTE™

## (undated) DEVICE — SUTURE VCRL SZ 3-0 L27IN ABSRB UD L26MM SH 1/2 CIR J416H

## (undated) DEVICE — SUTURE PERMA-HAND 2-0 L30IN NONABSORBABLE BLK MH L36MM 1/2 K843H

## (undated) DEVICE — RADIFOCUS GLIDEWIRE ADVANTAGE GUIDEWIRE: Brand: GLIDEWIRE ADVANTAGE

## (undated) DEVICE — DESTINATION RENAL GUIDING SHEATH: Brand: DESTINATION

## (undated) DEVICE — SUT PROL 6-0 24IN BV1 DA BLU --

## (undated) DEVICE — PREP SKN CHLRAPRP APL 26ML STR --

## (undated) DEVICE — BANDAGE, HONEYCOMB ELAS 4IN: Brand: CARDINAL HEALTH

## (undated) DEVICE — SYR 10ML LUER LOK 1/5ML GRAD --

## (undated) DEVICE — SUTURE PERMA-HAND SZ 3-0 L24IN NONABSORBABLE BLK W/O NDL SA74H

## (undated) DEVICE — SPONGE LAP 18X18IN STRL -- 5/PK

## (undated) DEVICE — TABLE COVER: Brand: CONVERTORS

## (undated) DEVICE — ANGIOGRAPHY KIT CUST VASC

## (undated) DEVICE — Device: Brand: QUICK-CROSS SUPPORT CATHETER

## (undated) DEVICE — Device

## (undated) DEVICE — LUB GUID WR CARDIC CATH 100ML -- VIPERSLIDE

## (undated) DEVICE — THROMBECTOMY SET: Brand: ANGIOJET™ SOLENT™ OMNI

## (undated) DEVICE — DEVICE INFL L13IN 40ATM 30ML BASIXTOUCH

## (undated) DEVICE — SUTURE PERMAHAND SZ 4-0 L12X30IN NONABSORBABLE BLK SILK A303H

## (undated) DEVICE — INTRODUCER SHTH 5FR CANN L11CM DIL TIP 25MM GRY TUNGSTEN

## (undated) DEVICE — SUTURE PERMA-HAND SZ 2-0 L24IN NONABSORBABLE BLK W/O NDL SA75H

## (undated) DEVICE — CATH IV SAFE STR 22GX1IN BLU -- PROTECTIV PLUS

## (undated) DEVICE — Z DISCONTINUED USE (MFG CAT 7984-37) SOLUTION IV SODIUM CHL 0.9% 100 ML INJ

## (undated) DEVICE — REM POLYHESIVE ADULT PATIENT RETURN ELECTRODE: Brand: VALLEYLAB

## (undated) DEVICE — IMMOBILIZER KNEE PREMIER PRO TRI PNL 24INCH FOAM TIETEX PAT

## (undated) DEVICE — RADIFOCUS GLIDECATH: Brand: GLIDECATH

## (undated) DEVICE — GUIDEWIRE WITH ICE™ HYDROPHILIC COATING: Brand: V-18™ CONTROL WIRE™

## (undated) DEVICE — DECANTER BAG 9": Brand: MEDLINE INDUSTRIES, INC.

## (undated) DEVICE — 450 ML BOTTLE OF 0.05% CHLORHEXIDINE GLUCONATE IN 99.95% STERILE WATER FOR IRRIGATION, USP AND APPLICATOR.: Brand: IRRISEPT ANTIMICROBIAL WOUND LAVAGE

## (undated) DEVICE — PERCLOSE PROGLIDE™ SUTURE-MEDIATED CLOSURE SYSTEM: Brand: PERCLOSE PROGLIDE™

## (undated) DEVICE — DRAPE,EXTREMITY,89X128,STERILE: Brand: MEDLINE

## (undated) DEVICE — PACLITAXEL-COATED PTA BALLOON CATHETER: Brand: RANGER™

## (undated) DEVICE — DRESSING,GAUZE,XEROFORM,CURAD,1"X8",ST: Brand: CURAD

## (undated) DEVICE — PROBE VASC 8MHZ WTRPRF

## (undated) DEVICE — MAYO STAND COVER: Brand: CONVERTORS

## (undated) DEVICE — VIPERWIRE, ADVANCE PERIPHERAL, .017" DIA SPRING TIP, 335CM, 5 PACK: Brand: VIPERWIRE, ADVANCE

## (undated) DEVICE — Z DUPLICATE USE 2103554 VALVE HEMOSTATIC BLEEDBK CTRL COPILOT

## (undated) DEVICE — FLEXOR, CHECK-FLO, INTRODUCER RAABE MODIFICATION: Brand: FLEXOR

## (undated) DEVICE — SUTURE ETHLN SZ 2-0 L18IN NONABSORBABLE BLK L19MM PS-2 PRIM 593H

## (undated) DEVICE — BLANKET WRM AD W50XL85.8IN PACU FULL BODY FORC AIR

## (undated) DEVICE — KIT CLN UP BON SECOURS MARYV

## (undated) DEVICE — SOFT SILICONE HYDROCELLULAR SACRUM DRESSING WITH LOCK AWAY LAYER: Brand: ALLEVYN LIFE SACRUM (LARGE) PACK OF 10

## (undated) DEVICE — SUT SLK 0 30IN SH BLK --

## (undated) DEVICE — INTRODUCER SHTH 4FR CANN L11CM DIL TIP 25MM RED TUNGSTEN

## (undated) DEVICE — STOCKINETTE,IMPERVIOUS,12X48,STERILE: Brand: MEDLINE

## (undated) DEVICE — SOLUTION IV 1000ML 0.9% SOD CHL

## (undated) DEVICE — TRAY,URINE METER,100% SILICONE,16FR10ML: Brand: MEDLINE

## (undated) DEVICE — FOGARTY ARTERIAL EMBOLECTOMY CATHETER 4F 40CM: Brand: FOGARTY

## (undated) DEVICE — SUTURE MCRYL SZ 4 0 L18IN ABSRB VLT PS 1 L24MM 3 8 CIR REV Y682H

## (undated) DEVICE — GLOVE SURG SZ 7 L11.33IN FNGR THK9.8MIL STRW LTX POLYMER

## (undated) DEVICE — RADIFOCUS TORQUE DEVICE MULTI-TORQUE VISE: Brand: RADIFOCUS TORQUE DEVICE

## (undated) DEVICE — GLOVE SURG SZ 7.5 L11.73IN FNGR THK9.8MIL STRW LTX POLYMER

## (undated) DEVICE — CATH BLLN PTA 5X40MM -- CHOCOLATE

## (undated) DEVICE — SUTURE ABSORBABLE BRAIDED 2-0 CT-1 27 IN UD VICRYL J259H

## (undated) DEVICE — INTENDED FOR TISSUE SEPARATION, AND OTHER PROCEDURES THAT REQUIRE A SHARP SURGICAL BLADE TO PUNCTURE OR CUT.: Brand: BARD-PARKER SAFETY BLADES SIZE 10, STERILE

## (undated) DEVICE — PACK PROCEDURE SURG MAJ W/ BASIN LF

## (undated) DEVICE — MICROPUNCTURE, INTRODUCER SET SILHOUETTE, TRANSITIONLESS PUSH-PLUS DESIGN - STIFFENED CANNULA WITH NITINOL WIRE GUIDE: Brand: MICROPUNCTURE

## (undated) DEVICE — DIAMONDBACK PERIPHERAL, SOLID CROWN, 1.50MM, 145CM SHAFT: Brand: DIAMONDBACK PERIPHERAL

## (undated) DEVICE — 2108 SERIES SAGITTAL BLADE (24.8 X 1.24 X 80.1MM)

## (undated) DEVICE — Z DISCONTINUED BY MEDLINE USE 2711682 TRAY SKIN PREP DRY W/ PREM GLV

## (undated) DEVICE — APPLIER CLP L9.375IN APER 2.1MM CLS L3.8MM 20 SM TI CLP

## (undated) DEVICE — GAUZE,SPONGE,4"X4",16PLY,STRL,LF,10/TRAY: Brand: MEDLINE

## (undated) DEVICE — GLUE TISS 10ML PLAS STD SPRD TIP SYR PLUNG PREFIL SKIN CLSR 5PK

## (undated) DEVICE — SUTURE VCRL SZ 2-0 L12X18IN ABSRB UD POLYGLACTIN 910 BRAID J911T

## (undated) DEVICE — GDWIRE ANGIO STR THRUWAY 0.014 --

## (undated) DEVICE — SUTURE PERMAHAND SZ 0 L30IN NONABSORBABLE BLK SILK BRAID A306H

## (undated) DEVICE — PAD,ABDOMINAL,8"X10",ST,LF: Brand: MEDLINE